# Patient Record
Sex: FEMALE | Race: WHITE | HISPANIC OR LATINO | Employment: UNEMPLOYED | URBAN - METROPOLITAN AREA
[De-identification: names, ages, dates, MRNs, and addresses within clinical notes are randomized per-mention and may not be internally consistent; named-entity substitution may affect disease eponyms.]

---

## 2017-01-09 DIAGNOSIS — G47.9 SLEEP DISTURBANCE: Primary | ICD-10-CM

## 2017-01-11 RX ORDER — ZOLPIDEM TARTRATE 5 MG/1
5 TABLET ORAL
Qty: 30 TABLET | Refills: 2
Start: 2017-01-11 | End: 2017-09-05

## 2017-01-16 DIAGNOSIS — G12.9 SPINAL MUSCLE ATROPHY (H): Primary | ICD-10-CM

## 2017-01-18 RX ORDER — ALPRAZOLAM 0.25 MG
0.25 TABLET ORAL 3 TIMES DAILY PRN
Qty: 60 TABLET | Refills: 2
Start: 2017-01-18 | End: 2017-07-07

## 2017-01-24 ENCOUNTER — MEDICAL CORRESPONDENCE (OUTPATIENT)
Dept: HEALTH INFORMATION MANAGEMENT | Facility: CLINIC | Age: 27
End: 2017-01-24

## 2017-02-03 DIAGNOSIS — G12.9 SPINAL MUSCLE ATROPHY (H): Primary | ICD-10-CM

## 2017-02-03 RX ORDER — DIAZEPAM 5 MG
5 TABLET ORAL EVERY 6 HOURS PRN
Qty: 30 TABLET | Refills: 2 | Status: SHIPPED | OUTPATIENT
Start: 2017-02-03 | End: 2017-08-08

## 2017-02-06 ENCOUNTER — TRANSFERRED RECORDS (OUTPATIENT)
Dept: HEALTH INFORMATION MANAGEMENT | Facility: CLINIC | Age: 27
End: 2017-02-06

## 2017-03-13 ENCOUNTER — MYC REFILL (OUTPATIENT)
Dept: INTERNAL MEDICINE | Facility: CLINIC | Age: 27
End: 2017-03-13

## 2017-03-13 DIAGNOSIS — J45.909 ASTHMA: Primary | ICD-10-CM

## 2017-03-13 DIAGNOSIS — J45.909 UNSPECIFIED ASTHMA(493.90): ICD-10-CM

## 2017-03-13 RX ORDER — ALBUTEROL SULFATE 0.83 MG/ML
1 SOLUTION RESPIRATORY (INHALATION) EVERY 6 HOURS PRN
Qty: 360 ML | Refills: 11 | Status: CANCELLED | OUTPATIENT
Start: 2017-03-13

## 2017-03-14 RX ORDER — ALBUTEROL SULFATE 0.83 MG/ML
1 SOLUTION RESPIRATORY (INHALATION) EVERY 6 HOURS PRN
Qty: 360 ML | Refills: 11 | Status: SHIPPED | OUTPATIENT
Start: 2017-03-14 | End: 2018-03-24

## 2017-03-14 NOTE — TELEPHONE ENCOUNTER
albuterol (2.5 MG/3ML) 0.083% nebulizer solution  Last Written Prescription Date:  9/22/15  Last Fill Quantity: 360ml,   # refills: 11  Last Office Visit with [facility], [facility] or [facility] prescribing provider:  11/14/16  Future Office visit:   none

## 2017-03-14 NOTE — TELEPHONE ENCOUNTER
Message from Linda:  Silvia Laws, RN Tue Mar 14, 2017 7:40 AM        ----- Message -----   From: Lanie Neil   Sent: 3/13/2017 10:29 PM   To: [facility] Nursing Staff-  Subject: Medication Renewal Request     Original authorizing provider: MD Lanie Contreras would like a refill of the following medications:  albuterol (2.5 MG/3ML) 0.083% nebulizer solution [Erica Mejia MD]    Preferred pharmacy: [facility], [place] - [address]    Comment:

## 2017-03-16 ENCOUNTER — TRANSFERRED RECORDS (OUTPATIENT)
Dept: HEALTH INFORMATION MANAGEMENT | Facility: CLINIC | Age: 27
End: 2017-03-16

## 2017-03-25 ENCOUNTER — MEDICAL CORRESPONDENCE (OUTPATIENT)
Dept: HEALTH INFORMATION MANAGEMENT | Facility: CLINIC | Age: 27
End: 2017-03-25

## 2017-04-06 ENCOUNTER — TRANSFERRED RECORDS (OUTPATIENT)
Dept: HEALTH INFORMATION MANAGEMENT | Facility: CLINIC | Age: 27
End: 2017-04-06

## 2017-04-24 ENCOUNTER — TRANSFERRED RECORDS (OUTPATIENT)
Dept: HEALTH INFORMATION MANAGEMENT | Facility: CLINIC | Age: 27
End: 2017-04-24

## 2017-04-25 ENCOUNTER — TRANSFERRED RECORDS (OUTPATIENT)
Dept: HEALTH INFORMATION MANAGEMENT | Facility: CLINIC | Age: 27
End: 2017-04-25

## 2017-05-08 ENCOUNTER — OFFICE VISIT (OUTPATIENT)
Dept: INTERNAL MEDICINE | Facility: CLINIC | Age: 27
End: 2017-05-08

## 2017-05-08 DIAGNOSIS — H61.22 IMPACTED CERUMEN OF LEFT EAR: ICD-10-CM

## 2017-05-08 DIAGNOSIS — L85.3 DRY SKIN: ICD-10-CM

## 2017-05-08 DIAGNOSIS — G12.9 SPINAL MUSCLE ATROPHY (H): Primary | ICD-10-CM

## 2017-05-08 DIAGNOSIS — L70.0 ACNE VULGARIS: ICD-10-CM

## 2017-05-08 DIAGNOSIS — G89.4 CHRONIC PAIN SYNDROME: ICD-10-CM

## 2017-05-08 RX ORDER — NEOMYCIN SULFATE, POLYMYXIN B SULFATE, AND DEXAMETHASONE 3.5; 10000; 1 MG/G; [USP'U]/G; MG/G
1 OINTMENT OPHTHALMIC 3 TIMES DAILY
COMMUNITY
End: 2018-05-15

## 2017-05-08 RX ORDER — GUAIFENESIN 200 MG/1
200 TABLET ORAL EVERY 4 HOURS PRN
COMMUNITY

## 2017-05-08 NOTE — PROGRESS NOTES
PRIMARY CARE CENTER       SUBJECTIVE:  Lanie Neil is a 26 year old female with pmh of Spinal muscular atrophy, vent dependent, chronic pain who comes in for follow up.     Going to [facility] and don't want to adjust medications due to her very high doses of pain medicaitons at this time.  Pt reports that seh is still In a lot of pain.  Worse anytime she tries to get up in a chair.  Has tried all kinds of muscle relaxants and alternative means of controlling pain,  On high dose gabapentin as well.   and her mother are very frustrated.  They both understand the issue with tolerance to narcotic medications over time.  Using valium for muscle spasms as well without relief.  They are inquiring about possible medical marijuana with goal to try and get down on the dose of narcotics.  They wanted my opinion on this today.     She has been doing well otherwise.  Struggling with some acne on her face and requesting topical treatment for this.     No acte illness this winter.  No uri sxs.  No cough.  No output from trach. No abd pain.  Moving bowels regularly with bowel regimen despite narcs.  No urinary sxs.  No areas of skin breakdown.         Medications and allergies reviewed by me today.       Review Of Systems    10 point ROS of systems including Constitutional, Eyes, Respiratory, Cardiovascular, Pulmonary, Gastroenterology, Genitourinary, Integumentary, Musculoskeletal, Psychiatric were all negative except for pertinent positives noted in my HPI and   notes decreased hearing in R ear.  Feels plugged.             OBJECTIVE:    There were no vitals taken for this visit.   Wt Readings from Last 1 Encounters:   09/26/16 58.5 kg (129 lb)       Constitutional: no distress, comfortable, pleasant   HEENT anicteric  PERRL  TM on R obscured.  L is normal in appearance.   OP clear   Neck: Trach clean and dry.  Vent on.  supple  no thyromegaly.   Nodes: no cervical, supraclavicular or  axillary lad  Cardiovascular: regular rate and rhythm, normal S1 and S2, no murmurs, rubs or gallops,   Respiratory: clear to auscultation, no wheezes or crackles, normal breath sounds   Gastrointestinal: soft.  NT/ND  positive bowel sounds all four quadrants no hepatosplenomegaly, no masses   Ext:   no edema     Skin: severe dry skin of arms and legs.  Scattered inflammatory papules noted on face.       ASSESSMENT/PLAN:  Pt is a 26 year old female here for follow up.    Lanie was seen today for medication request.    Diagnoses and all orders for this visit:    Impacted cerumen of left ear- R ear.  Ear wash was done today.    -     REMOVE IMPACTED CERUMEN      Chronic pain:   - certified patient for MM program in order to try and taper down on narcotics.  Pt has tried mult multidisciplinary pain programs with out success.   Pt qualifies based on chronic pain that is intractable and diffuse muscle spasms from SMA.  - idealy would like to get off narcotics.   - They are currently following with [facility].  Pt on very high doses of narcotics and requiring escalating doses for pain control.  Discussed risk of overdose with patient.      Severe dry skin recommended vanicream or eucerin bid to areas.     Inflammatory acne.  Topical clindamycin bid until resolved.     I did notify the patient that I will be departing from the [facility] at the end of May.  I have recommended that they consider establishing care with Dr. Aniyah Alarcon or Dr. Corona.  They will set up appointment for meet and greet with physician soon.             Erica Mejia MD

## 2017-05-08 NOTE — PATIENT INSTRUCTIONS
Provider notes    It was a pleasure caring for you today at the [facility].           Erica Mejia MD

## 2017-05-08 NOTE — MR AVS SNAPSHOT
After Visit Summary   5/8/2017    Lanie Neil    MRN: [id number]           Patient Information     Date Of Birth          1990        Visit Information        Provider Department      5/8/2017 12:00 PM Erica Mejia MD [facility]        Today's Diagnoses     Impacted cerumen of left ear    -  1      Care Instructions    Provider notes    It was a pleasure caring for you today at the [facility].           Erica Mejia MD          Follow-ups after your visit        Your next 10 appointments already scheduled     Jul 06, 2017  1:40 PM CDT   (Arrive by 1:25 PM)   Return Visit with Anne Soni MD   [facility] ([facility])    [address]  [address] [place] [address] [phone]              Who to contact     Please call your clinic at [phone] to:    Ask questions about your health    Make or cancel appointments    Discuss your medicines    Learn about your test results    Speak to your doctor   If you have compliments or concerns about an experience at your clinic, or if you wish to file a complaint, please contact [facility] Patient Relations at [phone] or email us at [email or web address]         Additional Information About Your Visit        MyChart Information     [employer] gives you secure access to your electronic health record. If you see a primary care provider, you can also send messages to your care team and make appointments. If you have questions, please call your primary care clinic.  If you do not have a primary care provider, please call [phone] and they will assist you.      [employer] is an electronic gateway that provides easy, online access to your medical records. With [employer], you can request a clinic appointment, read your test results, renew a prescription or communicate with your care team.     To access your  existing account, please contact your [facility] or call [phone] for assistance.        Care EveryWhere ID     This is your Care EveryWhere ID. This could be used by other organizations to access your [facility] medical records  [id number]         Blood Pressure from Last 3 Encounters:   11/14/16 118/74   09/26/16 127/85   03/07/16 (!) 83/54    Weight from Last 3 Encounters:   09/26/16 58.5 kg (129 lb)   01/22/15 68 kg (150 lb)   08/20/14 68 kg (150 lb)              We Performed the Following     REMOVE IMPACTED CERUMEN          Today's Medication Changes          These changes are accurate as of: 5/8/17 12:53 PM.  If you have any questions, ask your nurse or doctor.               These medicines have changed or have updated prescriptions.        Dose/Directions    omeprazole 40 MG capsule   Commonly known as:  priLOSEC   This may have changed:  Another medication with the same name was removed. Continue taking this medication, and follow the directions you see here.   Used for:  Gastroesophageal reflux disease without esophagitis        Dose:  40 mg   Take 1 capsule (40 mg) by mouth daily   Quantity:  30 capsule   Refills:  11         Stop taking these medicines if you haven't already. Please contact your care team if you have questions.     terbinafine 1 % cream   Commonly known as:  lamISIL AT                    Primary Care Provider Office Phone # Fax #    Erica Mejia [phone]       [address] [address]        Thank you!     Thank you for choosing [facility]  for your care. Our goal is always to provide you with excellent care. Hearing back from our patients is one way we can continue to improve our services. Please take a few minutes to complete the written survey that you may receive in the mail after your visit with us. Thank you!             Your Updated Medication List - Protect others  around you: Learn how to safely use, store and throw away your medicines at www.disposemymeds.org.          This list is accurate as of: 5/8/17 12:53 PM.  Always use your most recent med list.                   Brand Name Dispense Instructions for use    ACETAMINOPHEN PO      Take 650 mg by mouth every 4 hours as needed for pain       * albuterol 108 (90 BASE) MCG/ACT Inhaler    albuterol    1 Inhaler    Inhale 2 puffs into the lungs every 4 hours as needed for shortness of breath / dyspnea or wheezing       * albuterol (2.5 MG/3ML) 0.083% neb solution     360 mL    Take 1 vial (2.5 mg) by nebulization every 6 hours as needed for shortness of breath / dyspnea or wheezing       ALPRAZolam 0.25 MG tablet    XANAX    60 tablet    Take 1 tablet (0.25 mg) by mouth 3 times daily as needed for anxiety       celecoxib 200 MG capsule    celeBREX    30 capsule    Take 1 capsule (200 mg) by mouth daily       diazepam 5 MG tablet    VALIUM    30 tablet    Take 1 tablet (5 mg) by mouth every 6 hours as needed for muscle spasms       Flunisolide HFA 80 MCG/ACT Aers     1 Inhaler    Inhale 2 puffs into the lungs 2 times daily       fluticasone 50 MCG/ACT spray    FLONASE    16 g    Spray 1 spray into both nostrils daily       gabapentin 250 MG/5ML solution    NEURONTIN    1350 mL    USE/TAKE 15 ML IN  FEEDING TUBE 3 TIMES DAILY       glycerin-hypromellose- 0.2-0.2-1 % Soln ophthalmic solution    ARTIFICIAL TEARS    1 Bottle    Place 1 drop into both eyes 2 times daily as needed for dry eyes       guaiFENesin 200 MG Tabs tablet    ORGANIDIN     Take 200 mg by mouth every 4 hours as needed for cough       HYDROmorphone 4 MG tablet    DILAUDID    300 tablet    Take 1-2 tablets (4-8 mg) by mouth every 4 hours as needed for moderate to severe pain       SABINE 30 MG 24 hr capsule   Generic drug:  morphine     60 capsule    Take 1 capsule (30 mg) by mouth 2 times daily       LANsoprazole 30 MG CR capsule    PREVACID    30 capsule     Take 1 capsule (30 mg) by mouth daily       MILK OF MAGNESIA PO      Take 30 mLs by mouth as needed       neomycin-polymyxin-dexamethasone 3.5-41877-0.1 Oint ophthalmic ointment    MAXITROL     1 Application 3 times daily       omeprazole 40 MG capsule    priLOSEC    30 capsule    Take 1 capsule (40 mg) by mouth daily       ondansetron 4 MG ODT tab    ZOFRAN-ODT    60 tablet    Take 1 tablet (4 mg) by mouth every 8 hours as needed for nausea       OXY BALANCE FACIAL CLEAN WASH EX      Externally apply 1 pad topically daily       polyethylene glycol powder    MIRALAX    510 g    Take 17 g (1 capful) by mouth daily       Senna 8.8 MG/5ML Syrp     600 mL    Take 10 mLs (17.6 mg) by mouth 2 times daily       silver sulfADIAZINE 1 % cream    SILVADENE    400 g    Apply topically daily       XARELTO 20 MG Tabs tablet   Generic drug:  rivaroxaban ANTICOAGULANT     30 tablet    TAKE 1 TABLET(20 MG) BY MOUTH DAILY WITH DINNER       zolpidem 5 MG tablet    AMBIEN    30 tablet    Take 1 tablet (5 mg) by mouth nightly as needed for sleep       * Notice:  This list has 2 medication(s) that are the same as other medications prescribed for you. Read the directions carefully, and ask your doctor or other care provider to review them with you.

## 2017-05-10 ENCOUNTER — MYC MEDICAL ADVICE (OUTPATIENT)
Dept: INTERNAL MEDICINE | Facility: CLINIC | Age: 27
End: 2017-05-10

## 2017-05-10 DIAGNOSIS — L70.0 ACNE VULGARIS: Primary | ICD-10-CM

## 2017-05-10 PROBLEM — G89.4 CHRONIC PAIN SYNDROME: Status: ACTIVE | Noted: 2017-05-10

## 2017-05-10 RX ORDER — CLINDAMYCIN PHOSPHATE 10 UG/ML
LOTION TOPICAL 2 TIMES DAILY
Qty: 60 ML | Refills: 3 | Status: SHIPPED | OUTPATIENT
Start: 2017-05-10

## 2017-05-21 DIAGNOSIS — K21.9 GASTROESOPHAGEAL REFLUX DISEASE WITHOUT ESOPHAGITIS: Primary | ICD-10-CM

## 2017-05-23 NOTE — TELEPHONE ENCOUNTER
omeprazole (PRILOSEC) 40 MG      Last Written Prescription Date:  5/9/16  Last Fill Quantity: 30,   # refills: 11  Last Office Visit : 5/8/17  Future Office visit:   NONE

## 2017-05-24 ENCOUNTER — MEDICAL CORRESPONDENCE (OUTPATIENT)
Dept: HEALTH INFORMATION MANAGEMENT | Facility: CLINIC | Age: 27
End: 2017-05-24

## 2017-05-27 DIAGNOSIS — J32.9 SINUSITIS, CHRONIC: ICD-10-CM

## 2017-05-27 RX ORDER — FLUTICASONE PROPIONATE 50 MCG
1 SPRAY, SUSPENSION (ML) NASAL DAILY
Qty: 16 G | Refills: 11 | Status: SHIPPED | OUTPATIENT
Start: 2017-05-27 | End: 2017-12-12

## 2017-05-27 NOTE — TELEPHONE ENCOUNTER
fluticasone (FLONASE) 50 MCG/ACT nasal spray   Last Written Prescription Date:  6/21/16  Last Fill Quantity: 16g,   # refills: 11  Last Office Visit with [facility], [facility] or [facility] prescribing provider: 5/8/17  Future Office visit:   none

## 2017-06-05 ENCOUNTER — TRANSFERRED RECORDS (OUTPATIENT)
Dept: HEALTH INFORMATION MANAGEMENT | Facility: CLINIC | Age: 27
End: 2017-06-05

## 2017-06-14 ENCOUNTER — TRANSFERRED RECORDS (OUTPATIENT)
Dept: HEALTH INFORMATION MANAGEMENT | Facility: CLINIC | Age: 27
End: 2017-06-14

## 2017-06-19 ENCOUNTER — OFFICE VISIT (OUTPATIENT)
Dept: INTERNAL MEDICINE | Facility: CLINIC | Age: 27
End: 2017-06-19

## 2017-06-19 VITALS — HEART RATE: 103 BPM | SYSTOLIC BLOOD PRESSURE: 119 MMHG | OXYGEN SATURATION: 97 % | DIASTOLIC BLOOD PRESSURE: 78 MMHG

## 2017-06-19 DIAGNOSIS — G12.9 SPINAL MUSCULAR ATROPHY (H): Primary | ICD-10-CM

## 2017-06-19 ASSESSMENT — PAIN SCALES - GENERAL: PAINLEVEL: SEVERE PAIN (7)

## 2017-06-19 NOTE — NURSING NOTE
Chief Complaint   Patient presents with     Establish Care     pt is here to establish care with new PCP. Prior Dr Mejia patient       Treasure Ernandez CMA at 10:53 AM on 6/19/2017

## 2017-06-19 NOTE — PROGRESS NOTES
Cc:  Chronic pain  History of present illness:  Lanie  Is a 26-year-old woman with a history of spinal muscular atrophy, on chronic home ventilation through tracheostomy since 2004, who takes her medications via gas, but is able to eat and swallow some food for pleasure, and ltimately complicated by a chronic pain syndrome due to a bilateral dislocation of as a result of her profound muscle atrophy. She presents along with her mother, Aster, who cares for her 24/7 at home to establish care.    She saw her ventilator MD last week and she is in good shape.    Pt was certified for the medical marijuana by Dr. Mejia, in an attempt to taper opiates. [facility] she ws stable.   However, she states that they will not allow use of Medical Marijuana, so they are going to try [facility].  She has seen numerous orthopedists and surgery is not recommended because of her severe muscle atrophy.  She is working with [facility] to secure a chair that pads her hips and allows her to sit comfortably.    She had a tracheostomy placed in 2004 after multiple episodes of pneumonia, but has not had pneumonia since then.    [facility]--[facility] 6[facility]5/17  She had a full PE per Dr. Mejia 5/8/17.  She has no new concerns or complaints today.    Past Medical History:   Diagnosis Date     Anxiety      Snoring      Past Surgical History:   Procedure Laterality Date     ENT SURGERY      tubes     GASTROSTOMY TUBE       KIDNEY SURGERY       SPINE SURGERY       TRACHEOSTOMY       ROS: 10 point ROS neg other than the symptoms noted above in the HPI.    /78  Pulse 103  SpO2 97%  Breastfeeding? No  Gen:  Pleasant,conversant quadrant paretic woman with a trach who speaks very articulately.  Lying in a semi-supine wheelchair.      ASSESSMENT  This is a remarkable 26-year-old woman with a diagnosis of hereditary spinal muscular atrophy who is living her life to the maximum.    1.  Chronic pain  syndrome.  Due to bilateral hip dislocation.  Plan is to continue medical marijuana in an attempt to reduce opiate dependence. Currently  Opiates are managed and prescribed by [facility], but they're switching to [facility] , a pain clinic where use of medical marijuana is allowed. They will let me know about this.    2.  Nutrition received mostly through G-tube. We'll continue.    3.  Tracheostomy care is excellent. She has periodic ventilator management as well.  No evidence of recurrent aspiration pneumonia since 2004.    Plan: RTC and p.r.n.    I spent a total of 25 minutes face-to-face with Lanie Neil during today's office visit.  Over 50% of this time was spent counseling the patient and/or coordinating care regarding review of medical history, chronic pain, ventilation.  See note for details.

## 2017-06-19 NOTE — MR AVS SNAPSHOT
After Visit Summary   6/19/2017    Lanie Neil    MRN: [id number]           Patient Information     Date Of Birth          1990        Visit Information        Provider Department      6/19/2017 11:10 AM Doris Pat MD [facility]        Today's Diagnoses     Spinal muscular atrophy (H)    -  1      Care Instructions    Primary Care Center Phone Number [phone]  Primary Care Center Medication Refill Request Information:  * Please contact your pharmacy regarding ANY request for medication refills.  ** [facility] Prescription Fax = [phone]  * Please allow 3 business days for routine medication refills.  * Please allow 5 business days for controlled substance medication refills.     Primary Care Center Test Result notification information:  *You will be notified with in 7-10 days of your appointment day regarding the results of your test.  If you are on MyChart you will be notified as soon as the provider has reviewed the results and signed off on them.                  Follow-ups after your visit        Your next 10 appointments already scheduled     Jul 06, 2017  1:40 PM CDT   (Arrive by 1:25 PM)   Return Visit with Anne Soni MD   [facility] ([facility])    [address] [place] [address] [phone]              Who to contact     Please call your clinic at [phone] to:    Ask questions about your health    Make or cancel appointments    Discuss your medicines    Learn about your test results    Speak to your doctor   If you have compliments or concerns about an experience at your clinic, or if you wish to file a complaint, please contact [facility] Patient Relations at [phone] or email us at [email or web address]         Additional Information About Your Visit        MyChart Information     [employer] gives you secure access to  your electronic health record. If you see a primary care provider, you can also send messages to your care team and make appointments. If you have questions, please call your primary care clinic.  If you do not have a primary care provider, please call [phone] and they will assist you.      [employer] is an electronic gateway that provides easy, online access to your medical records. With [employer], you can request a clinic appointment, read your test results, renew a prescription or communicate with your care team.     To access your existing account, please contact your [facility] or call [phone] for assistance.        Care EveryWhere ID     This is your Care EveryWhere ID. This could be used by other organizations to access your [place] medical records  [id number]        Your Vitals Were     Pulse Pulse Oximetry Breastfeeding?             103 97% No          Blood Pressure from Last 3 Encounters:   06/19/17 119/78   11/14/16 118/74   09/26/16 127/85    Weight from Last 3 Encounters:   09/26/16 58.5 kg (129 lb)   01/22/15 68 kg (150 lb)   08/20/14 68 kg (150 lb)              Today, you had the following     No orders found for display       Primary Care Provider Office Phone # Fax #    Erica Meija [phone]       [address] [address]        Thank you!     Thank you for choosing [facility]  for your care. Our goal is always to provide you with excellent care. Hearing back from our patients is one way we can continue to improve our services. Please take a few minutes to complete the written survey that you may receive in the mail after your visit with us. Thank you!             Your Updated Medication List - Protect others around you: Learn how to safely use, store and throw away your medicines at www.disposemymeds.org.          This list is accurate as of: 6/19/17  4:41 PM.  Always use your  most recent med list.                   Brand Name Dispense Instructions for use    ACETAMINOPHEN PO      Take 650 mg by mouth every 4 hours as needed for pain       * albuterol 108 (90 BASE) MCG/ACT Inhaler    albuterol    1 Inhaler    Inhale 2 puffs into the lungs every 4 hours as needed for shortness of breath / dyspnea or wheezing       * albuterol (2.5 MG/3ML) 0.083% neb solution     360 mL    Take 1 vial (2.5 mg) by nebulization every 6 hours as needed for shortness of breath / dyspnea or wheezing       ALPRAZolam 0.25 MG tablet    XANAX    60 tablet    Take 1 tablet (0.25 mg) by mouth 3 times daily as needed for anxiety       celecoxib 200 MG capsule    celeBREX    30 capsule    Take 1 capsule (200 mg) by mouth daily       clindamycin 1 % lotion    CLEOCIN T    60 mL    Apply topically 2 times daily To face as needed       diazepam 5 MG tablet    VALIUM    30 tablet    Take 1 tablet (5 mg) by mouth every 6 hours as needed for muscle spasms       flunisolide HFA 80 MCG/ACT Aers oral inhaler    AEROSPAN    1 Inhaler    Inhale 2 puffs into the lungs 2 times daily       fluticasone 50 MCG/ACT spray    FLONASE    16 g    Spray 1 spray into both nostrils daily       gabapentin 250 MG/5ML solution    NEURONTIN    1350 mL    USE/TAKE 15 ML IN  FEEDING TUBE 3 TIMES DAILY       glycerin-hypromellose- 0.2-0.2-1 % Soln ophthalmic solution    ARTIFICIAL TEARS    1 Bottle    Place 1 drop into both eyes 2 times daily as needed for dry eyes       guaiFENesin 200 MG Tabs tablet    ORGANIDIN     Take 200 mg by mouth every 4 hours as needed for cough       HYDROmorphone 4 MG tablet    DILAUDID    300 tablet    Take 1-2 tablets (4-8 mg) by mouth every 4 hours as needed for moderate to severe pain       SABINE 30 MG 24 hr capsule   Generic drug:  morphine     60 capsule    Take 1 capsule (30 mg) by mouth 2 times daily       LANsoprazole 30 MG CR capsule    PREVACID    30 capsule    Take 1 capsule (30 mg) by mouth daily     "   MILK OF MAGNESIA PO      Take 30 mLs by mouth as needed       neomycin-polymyxin-dexamethasone 3.5-88362-0.1 Oint ophthalmic ointment    MAXITROL     1 Application 3 times daily       * omeprazole 40 MG capsule    priLOSEC    30 capsule    Take 1 capsule (40 mg) by mouth daily       * omeprazole 20 MG CR capsule    priLOSEC    90 capsule    TAKE 20 ML(40MG) VIA \"G-TUBE\" DAILY       ondansetron 4 MG ODT tab    ZOFRAN-ODT    60 tablet    Take 1 tablet (4 mg) by mouth every 8 hours as needed for nausea       OXY BALANCE FACIAL CLEAN WASH EX      Externally apply 1 pad topically daily       polyethylene glycol powder    MIRALAX    510 g    Take 17 g (1 capful) by mouth daily       Senna 8.8 MG/5ML Syrp     600 mL    Take 10 mLs (17.6 mg) by mouth 2 times daily       silver sulfADIAZINE 1 % cream    SILVADENE    400 g    Apply topically daily       XARELTO 20 MG Tabs tablet   Generic drug:  rivaroxaban ANTICOAGULANT     30 tablet    TAKE 1 TABLET(20 MG) BY MOUTH DAILY WITH DINNER       zolpidem 5 MG tablet    AMBIEN    30 tablet    Take 1 tablet (5 mg) by mouth nightly as needed for sleep       * Notice:  This list has 4 medication(s) that are the same as other medications prescribed for you. Read the directions carefully, and ask your doctor or other care provider to review them with you.      "

## 2017-06-19 NOTE — PATIENT INSTRUCTIONS
Primary Care Center Phone Number [phone]  Primary Care Center Medication Refill Request Information:  * Please contact your pharmacy regarding ANY request for medication refills.  ** [facility] Prescription Fax = [phone]  * Please allow 3 business days for routine medication refills.  * Please allow 5 business days for controlled substance medication refills.     Primary Care Center Test Result notification information:  *You will be notified with in 7-10 days of your appointment day regarding the results of your test.  If you are on MyChart you will be notified as soon as the provider has reviewed the results and signed off on them.

## 2017-07-02 DIAGNOSIS — G12.9 SPINAL MUSCLE ATROPHY (H): ICD-10-CM

## 2017-07-03 ENCOUNTER — MYC MEDICAL ADVICE (OUTPATIENT)
Dept: INTERNAL MEDICINE | Facility: CLINIC | Age: 27
End: 2017-07-03

## 2017-07-05 RX ORDER — GABAPENTIN 250 MG/5ML
SOLUTION ORAL
Qty: 1350 ML | Refills: 11 | Status: SHIPPED | OUTPATIENT
Start: 2017-07-05 | End: 2018-01-19

## 2017-07-05 NOTE — TELEPHONE ENCOUNTER
gabapentin (NEURONTIN)       Last Written Prescription Date:  7/14/16  Last Fill Quantity: 1350ML,   # refills: 11  Last Office Visit : 6/19/17  Future Office visit:  NONE

## 2017-07-07 DIAGNOSIS — G12.9 SPINAL MUSCLE ATROPHY (H): ICD-10-CM

## 2017-07-07 RX ORDER — ALPRAZOLAM 0.25 MG
0.25 TABLET ORAL 3 TIMES DAILY PRN
Qty: 60 TABLET | Refills: 2
Start: 2017-07-07 | End: 2018-01-30

## 2017-07-13 ENCOUNTER — TELEPHONE (OUTPATIENT)
Dept: INTERNAL MEDICINE | Facility: CLINIC | Age: 27
End: 2017-07-13

## 2017-07-23 ENCOUNTER — MEDICAL CORRESPONDENCE (OUTPATIENT)
Dept: HEALTH INFORMATION MANAGEMENT | Facility: CLINIC | Age: 27
End: 2017-07-23

## 2017-07-24 ENCOUNTER — MEDICAL CORRESPONDENCE (OUTPATIENT)
Dept: HEALTH INFORMATION MANAGEMENT | Facility: CLINIC | Age: 27
End: 2017-07-24

## 2017-08-01 ENCOUNTER — TELEPHONE (OUTPATIENT)
Dept: INTERNAL MEDICINE | Facility: CLINIC | Age: 27
End: 2017-08-01

## 2017-08-01 NOTE — TELEPHONE ENCOUNTER
----- Message from Priya Mendoza sent at 7/31/2017  9:33 AM CDT -----  Regarding: Dr. Pat, Request call to discuss concerns with pt. neck pain  Hi Gabriela,     Hope you're well!    Aster, patient mom called in and scheduled an appointment with Dr. Pat for daughters neck pain, but would also like an outreach to discuss the pain and the concerns she has for her daughter. Please follow-up.     Left message for mother to call back.  Gabriela Hylton RN 9:10 AM on 8/1/2017.

## 2017-08-07 ENCOUNTER — MEDICAL CORRESPONDENCE (OUTPATIENT)
Dept: HEALTH INFORMATION MANAGEMENT | Facility: CLINIC | Age: 27
End: 2017-08-07

## 2017-08-08 ENCOUNTER — TRANSFERRED RECORDS (OUTPATIENT)
Dept: HEALTH INFORMATION MANAGEMENT | Facility: CLINIC | Age: 27
End: 2017-08-08

## 2017-08-08 DIAGNOSIS — G12.9 SPINAL MUSCLE ATROPHY (H): ICD-10-CM

## 2017-08-08 NOTE — TELEPHONE ENCOUNTER
diazepam       Last Written Prescription Date:  2/3/17  Last Fill Quantity: 30,   # refills: 2  Last Office Visit : 6/19/17  Future Office visit:  0  Routing refill request to provider for review/approval because:  Drug not on refill protocol or controlled substance    Requesting refill for Valium.  Gabriela Hylton RN 8:25 AM on 8/9/2017.

## 2017-08-09 ENCOUNTER — TELEPHONE (OUTPATIENT)
Dept: INTERNAL MEDICINE | Facility: CLINIC | Age: 27
End: 2017-08-09

## 2017-08-09 RX ORDER — DIAZEPAM 5 MG
5 TABLET ORAL EVERY 6 HOURS PRN
Qty: 30 TABLET | Refills: 2 | Status: SHIPPED | OUTPATIENT
Start: 2017-08-09 | End: 2018-01-30

## 2017-08-09 NOTE — TELEPHONE ENCOUNTER
----- Message from Priya Mendoza sent at 7/31/2017  9:33 AM CDT -----  Regarding: Dr. Pat, Request call to discuss concerns with pt. neck pain  Hi Gabriela,     Hope you're well!    Aster, patient mom called in and scheduled an appointment with Dr. Pat for daughters neck pain, but would also like an outreach to discuss the pain and the concerns she has for her daughter. Please follow-up.     Appts made and then canceled by mother.  Gabriela Hylton RN 11:08 AM on 8/9/2017.

## 2017-08-09 NOTE — TELEPHONE ENCOUNTER
----- Message from Priya Mendoza sent at 7/31/2017  9:33 AM CDT -----  Regarding: Dr. Pat, Request call to discuss concerns with pt. neck pain  Hi Gabriela,     Hope you're well!    Aster, patient mom called in and scheduled an appointment with Dr. Pat for daughters neck pain, but would also like an outreach to discuss the pain and the concerns she has for her daughter. Please follow-up.     Left message for pt to call back.  Gabriela Hylton RN 12:18 PM on 8/9/2017.  RX for Valium called to [facility].  Gabriela Hylton RN 3:59 PM on 8/9/2017.

## 2017-08-31 ENCOUNTER — MYC REFILL (OUTPATIENT)
Dept: INTERNAL MEDICINE | Facility: CLINIC | Age: 27
End: 2017-08-31

## 2017-08-31 DIAGNOSIS — G12.9 SPINAL MUSCLE ATROPHY (H): ICD-10-CM

## 2017-09-05 ENCOUNTER — TELEPHONE (OUTPATIENT)
Dept: HEMATOLOGY | Facility: CLINIC | Age: 27
End: 2017-09-05

## 2017-09-05 DIAGNOSIS — G47.9 SLEEP DISTURBANCE: ICD-10-CM

## 2017-09-05 RX ORDER — ZOLPIDEM TARTRATE 5 MG/1
5 TABLET ORAL
Qty: 30 TABLET | Refills: 2
Start: 2017-09-05 | End: 2018-03-11

## 2017-09-05 RX ORDER — ONDANSETRON 4 MG/1
4 TABLET, ORALLY DISINTEGRATING ORAL EVERY 8 HOURS PRN
Qty: 60 TABLET | Refills: 5 | Status: SHIPPED | OUTPATIENT
Start: 2017-09-05 | End: 2020-01-21

## 2017-09-05 NOTE — TELEPHONE ENCOUNTER
ondansetron (ZOFRAN-ODT) 4 MG ODT tab     Last Written Prescription Date:  3/18/16  Last Fill Quantity: 60,   # refills: 5  Last Office Visit with [facility], [facility] or [facility] prescribing provider: 6/19/17  Future Office visit:  none

## 2017-09-05 NOTE — TELEPHONE ENCOUNTER
"    [facility]  [address] [place] [address], [facility] [address], [address], [place] [address]  Phone: [phone], Fax: [phone].    Telephone Note    Patient: Lanie Neil  MRN: [id number]  : 1990  Date: 2017  Time: 11:30 am    Lanie's mother called and reports that she has noticed that Lanie's upper extremities color changes to a dark purple when her mother tries to turn Lanie on her sides. She also concerns about Lanie's facial color changes to tomato red in the past few days. Otherwise, she reports that Lanie seems to be doing okay without pain or difficulty breathing. She apparently talked to her neurologist and was suggested to call our center as it might be symptoms of possible \"blood clot\". Lanie is currently on full therapeutic anticoagulation therapy with Xarelto at 20 mg PO Qday. Hence it is unlikely that she has a recurrent thrombotic event.     I have asked Lanie's mother to call her primary care physician's clinic to report this. I also asked Lanie's mother that if she is so concern about Lanie's symptoms, I would suggest that she brings Lanie to the emergency department for evaluation.       Jt Mir PA-C, MPAS  Physician Assistant  [facility].               "

## 2017-09-06 DIAGNOSIS — G12.9 SPINAL MUSCLE ATROPHY (H): ICD-10-CM

## 2017-09-06 RX ORDER — SILVER SULFADIAZINE 10 MG/G
CREAM TOPICAL DAILY
Qty: 400 G | Refills: 1 | Status: SHIPPED | OUTPATIENT
Start: 2017-09-06 | End: 2020-01-21

## 2017-09-06 NOTE — TELEPHONE ENCOUNTER
silver sulfADIAZINE (SILVADENE) 1 % cream      Last Written Prescription Date:  6/22/2016  Last Fill Quantity: 400 g,   # refills: 1  Last Office Visit : 6/19/2017  Future Office visit:  9/7/2017    Routing refill request to provider for review/approval because:  Drug not on the [facility] refill protocol.  Pended as last filled.

## 2017-09-07 ENCOUNTER — OFFICE VISIT (OUTPATIENT)
Dept: INTERNAL MEDICINE | Facility: CLINIC | Age: 27
End: 2017-09-07

## 2017-09-07 VITALS — DIASTOLIC BLOOD PRESSURE: 76 MMHG | OXYGEN SATURATION: 100 % | HEART RATE: 82 BPM | SYSTOLIC BLOOD PRESSURE: 112 MMHG

## 2017-09-07 DIAGNOSIS — R11.0 NAUSEA: ICD-10-CM

## 2017-09-07 DIAGNOSIS — R23.0 CYANOSIS: Primary | ICD-10-CM

## 2017-09-07 ASSESSMENT — PAIN SCALES - GENERAL: PAINLEVEL: EXTREME PAIN (8)

## 2017-09-07 NOTE — PATIENT INSTRUCTIONS
[facility] Medication Refill Request Information:  * Please contact your pharmacy regarding ANY request for medication refills.  ** [facility] Prescription Fax = [phone]  * Please allow 3 business days for routine medication refills.  * Please allow 5 business days for controlled substance medication refills.     [facility] Test Result notification information:  *You will be notified with in 7-10 days of your appointment day regarding the results of your test.  If you are on MyChart you will be notified as soon as the provider has reviewed the results and signed off on them.    [facility] [phone]

## 2017-09-07 NOTE — MR AVS SNAPSHOT
After Visit Summary   9/7/2017    Lanie Neil    MRN: [id number]           Patient Information     Date Of Birth          1990        Visit Information        Provider Department      9/7/2017 2:25 PM Santiago Lopez MD [facility]        Today's Diagnoses     Cyanosis    -  1    Nausea          Care Instructions    Primary Care Center Medication Refill Request Information:  * Please contact your pharmacy regarding ANY request for medication refills.  ** [facility] Prescription Fax = [phone]  * Please allow 3 business days for routine medication refills.  * Please allow 5 business days for controlled substance medication refills.     Primary Care Center Test Result notification information:  *You will be notified with in 7-10 days of your appointment day regarding the results of your test.  If you are on MyChart you will be notified as soon as the provider has reviewed the results and signed off on them.    [facility] [phone]             Follow-ups after your visit        Your next 10 appointments already scheduled     Nov 08, 2017  8:15 AM CST   (Arrive by 8:00 AM)   New Patient Visit with Rosa Hernandez MD   [facility] ([facility])    [address] [place] [id number] [phone]              Who to contact     Please call your clinic at [phone] to:    Ask questions about your health    Make or cancel appointments    Discuss your medicines    Learn about your test results    Speak to your doctor   If you have compliments or concerns about an experience at your clinic, or if you wish to file a complaint, please contact [facility] Patient Relations at [phone] or email us at [email or web address]         Additional Information About Your Visit        MyChart Information     [employer] gives you secure access to your electronic health record.  If you see a primary care provider, you can also send messages to your care team and make appointments. If you have questions, please call your primary care clinic.  If you do not have a primary care provider, please call [phone] and they will assist you.      [employer] is an electronic gateway that provides easy, online access to your medical records. With [employer], you can request a clinic appointment, read your test results, renew a prescription or communicate with your care team.     To access your existing account, please contact your [facility] or call [phone] for assistance.        Care EveryWhere ID     This is your Care EveryWhere ID. This could be used by other organizations to access your [place] medical records  [id number]        Your Vitals Were     Pulse Pulse Oximetry Breastfeeding?             82 100% No          Blood Pressure from Last 3 Encounters:   09/07/17 112/76   06/19/17 119/78   11/14/16 118/74    Weight from Last 3 Encounters:   09/26/16 58.5 kg (129 lb)   01/22/15 68 kg (150 lb)   08/20/14 68 kg (150 lb)               Primary Care Provider Office Phone # Fax #    Doris KAYLYNN Pat [phone]       [address] [address]        Equal Access to Services     GLEN ART : Hadii aad ku hadasho Soomaali, waaxda luqadaha, qaybta kaalmada adeegyada, waxay idiin haykaitlinn priyanka gautam. So [place] [phone].    ATENCIÓN: Si habla español, tiene a warren disposición servicios gratuitos de asistencia lingüística. Llame al [phone].    We comply with applicable federal civil rights laws and [place] laws. We do not discriminate on the basis of race, color, national origin, age, disability, sex, sexual orientation, or gender identity.            Thank you!     Thank you for choosing [facility]  for your care. Our goal is always to provide you with excellent care. Hearing back from our patients  is one way we can continue to improve our services. Please take a few minutes to complete the written survey that you may receive in the mail after your visit with us. Thank you!             Your Updated Medication List - Protect others around you: Learn how to safely use, store and throw away your medicines at www.disposemymeds.org.          This list is accurate as of: 9/7/17 11:59 PM.  Always use your most recent med list.                   Brand Name Dispense Instructions for use Diagnosis    ACETAMINOPHEN PO      Take 650 mg by mouth every 4 hours as needed for pain        * albuterol 108 (90 BASE) MCG/ACT Inhaler    PROAIR HFA    1 Inhaler    Inhale 2 puffs into the lungs every 4 hours as needed for shortness of breath / dyspnea or wheezing    Unspecified asthma(493.90)       * albuterol (2.5 MG/3ML) 0.083% neb solution     360 mL    Take 1 vial (2.5 mg) by nebulization every 6 hours as needed for shortness of breath / dyspnea or wheezing    Asthma       ALPRAZolam 0.25 MG tablet    XANAX    60 tablet    Take 1 tablet (0.25 mg) by mouth 3 times daily as needed for anxiety    Spinal muscle atrophy (H)       celecoxib 200 MG capsule    celeBREX    30 capsule    Take 1 capsule (200 mg) by mouth daily    Chronic anticoagulation, History of deep venous thrombosis, Painful menstrual periods       clindamycin 1 % lotion    CLEOCIN T    60 mL    Apply topically 2 times daily To face as needed    Acne vulgaris       diazepam 5 MG tablet    VALIUM    30 tablet    Take 1 tablet (5 mg) by mouth every 6 hours as needed for muscle spasms    Spinal muscle atrophy (H)       flunisolide HFA 80 MCG/ACT Aers oral inhaler    AEROSPAN    1 Inhaler    Inhale 2 puffs into the lungs 2 times daily    Asthma       fluticasone 50 MCG/ACT spray    FLONASE    16 g    Spray 1 spray into both nostrils daily    Sinusitis, chronic       gabapentin 250 MG/5ML solution    NEURONTIN    1350 mL    TAKE 15 ML IN FEEDING TUBE THREE TIMES DAILY     Spinal muscle atrophy (H)       glycerin-hypromellose- 0.2-0.2-1 % Soln ophthalmic solution    ARTIFICIAL TEARS    1 Bottle    Place 1 drop into both eyes 2 times daily as needed for dry eyes    Dry eyes, unspecified laterality       guaiFENesin 200 MG Tabs tablet    ORGANIDIN     Take 200 mg by mouth every 4 hours as needed for cough        HYDROmorphone 4 MG tablet    DILAUDID    300 tablet    Take 1-2 tablets (4-8 mg) by mouth every 4 hours as needed for moderate to severe pain    Spinal muscle atrophy (H)       SABINE 30 MG 24 hr capsule   Generic drug:  morphine     60 capsule    Take 1 capsule (30 mg) by mouth 2 times daily    Spinal muscle atrophy (H)       LANsoprazole 30 MG CR capsule    PREVACID    30 capsule    Take 1 capsule (30 mg) by mouth daily    Esophageal reflux       MILK OF MAGNESIA PO      Take 30 mLs by mouth as needed        neomycin-polymyxin-dexamethasone 3.5-22930-1.1 Oint ophthalmic ointment    MAXITROL     1 Application 3 times daily        omeprazole 40 MG capsule    priLOSEC    30 capsule    Take 1 capsule (40 mg) by mouth daily    Gastroesophageal reflux disease without esophagitis       ondansetron 4 MG ODT tab    ZOFRAN-ODT    60 tablet    Take 1 tablet (4 mg) by mouth every 8 hours as needed for nausea    Spinal muscle atrophy (H)       OXY BALANCE FACIAL CLEAN WASH EX      Externally apply 1 pad topically daily        polyethylene glycol powder    MIRALAX    510 g    Take 17 g (1 capful) by mouth daily    Constipation, unspecified constipation type, Need for prophylactic vaccination and inoculation against influenza, Abnormal weight gain       silver sulfADIAZINE 1 % cream    SILVADENE    400 g    Apply topically daily    Spinal muscle atrophy (H)       XARELTO 20 MG Tabs tablet   Generic drug:  rivaroxaban ANTICOAGULANT     30 tablet    TAKE 1 TABLET(20 MG) BY MOUTH DAILY WITH DINNER    DVT (deep venous thrombosis) (H)       zolpidem 5 MG tablet    AMBIEN    30 tablet     Take 1 tablet (5 mg) by mouth nightly as needed for sleep    Sleep disturbance       * Notice:  This list has 2 medication(s) that are the same as other medications prescribed for you. Read the directions carefully, and ask your doctor or other care provider to review them with you.

## 2017-09-07 NOTE — NURSING NOTE
Chief Complaint   Patient presents with     Neck Pain     Patient is here to discuss ongoing neck and back pain     Annalisa Aguilar CMA 2:23 PM on 9/7/2017.

## 2017-09-10 NOTE — PROGRESS NOTES
PRIMARY CARE CENTER       SUBJECTIVE:  Lanie Neil is a 27 year old female with a PMHx of SMA, sinusitis, DVT on anticoagulation and chronic pain who comes in for limb and facial discoloration when turned on side.      Has been going on approximately 1 month.  Worse when turned on R side compared to when on L side.  Resolve quickly after rolled back to supine position.  Denies dyspnea or desaturations on home pulse oxymeter when facial and limb discoloration is occurring.  Has never had this before.  Also notes that face swells and gets bright red during these events as well.  Denies increased in O2 supplemental needs at night.  Remains on RA during the day.  Vent settings are stable without changes from baseline.  Denies swelling of UE or LE during these episodes.  No pain, chest pain, or palpitations with these events.  Also noting increased nausea over past few weeks.  Nausea not associated with pleasure eating or tube feeding.  Tube feeds stable, no reflux or GERD symptoms associated with nausea.  No vomiting.  Recently had refill of zofran with good effect.  Continues to take anticoagulant as prescribed.  No missed doses.  Also having increased pain in shoulders and hips.  Transferring pain management to [facility] from [facility] pain for possible medical cannabis prescription.  Pain does not seem to be related to desaturations or nausea.      Denies other problems today.    Medications and allergies reviewed by me today.     ROS:   Constitutional, neuro, ENT, endocrine, pulmonary, cardiac, gastrointestinal, genitourinary, musculoskeletal, integument and psychiatric systems are negative, except as otherwise noted.      OBJECTIVE:  /76 (BP Location: Right arm, Patient Position: Chair, Cuff Size: Adult Regular)  Pulse 82  SpO2 100%  Breastfeeding? No   Wt Readings from Last 1 Encounters:   09/26/16 58.5 kg (129 lb)       GENERAL APPEARANCE: chronically ill female, wheelchair bound.  Pleasant and interactive.       HENT: MMM, no oral lesions     NECK: neck deviated to left at baseline with scoliosis, tracheostomy with clean/dry dressing      RESP: R fields clear to auscultation -reduced breath sounds L lung fields, normal work of breathing on baseline ventilator settings     CV: regular rates and rhythm, normal S1 S2, no S3 or S4 and no murmur, click or rub     ABDOMEN:  soft, nontender, bowel sounds normal     MS: reduced bulk, increased tone with posturing/contractures bilateral UE/LE, markedly reduced ROM, no UE or LE edema bilaterally     SKIN: no suspicious lesions or rashes on examined areas of skin      NEURO: mentation intact and speech normal, typical features of SMA      PSYCH: mentation appears normal. and affect normal, linear thought process      ASSESSMENT/PLAN:    Lanie was seen today for neck pain.    Diagnoses and all orders for this visit:    Cyanosis.  Pt presents with positional cyanosis without SpO2 desaturations or dyspnea.  Pt with cyanosis only.  Unclear etiology, but she does have history of DVT and is on anticoagulation chronically.  Also concern for SVC syndrome, particularly mechanical obstruction given marked scoliosis. She is otherwise stable on RA with stable vent setttings, so this is likely not an acute process.  CT chest demonstrated L lung collapse.  No prior imaging available via [facility] or [facility] to confirm chronicity, but would expect dsypnea and desaturations of SpO2 if this was an acute process.  Pt notified of results.    -     CTA Angiogram Chest & Pulmonary Embolism; Future    Nausea.  Continue zofran as it is working well.  No exacerbating events noted on interview with patient.        Pt should return to clinic for f/u with PCP as scheduled or PRN.     Santiago Lopez MD  PGY-3, IM  Pager: [phone]   Sep 7, 2017    Pt was seen and plan of care discussed with Dr. Zhang.     Pt was seen and examined with Dr. Lopez.  I agree with his  documentation as noted above.    My additional comments: None    Nithin Zhang MD

## 2017-09-15 DIAGNOSIS — G12.9 SPINAL MUSCLE ATROPHY (H): ICD-10-CM

## 2017-09-18 NOTE — TELEPHONE ENCOUNTER
SENNA  8.8 MG/5ML       Last Written Prescription Date:  7/8/16  Last Fill Quantity: 600ml,   # refills: 11  Last Office Visit : 9/7/17  Future Office visit:  NONE

## 2017-09-19 ENCOUNTER — MYC MEDICAL ADVICE (OUTPATIENT)
Dept: INTERNAL MEDICINE | Facility: CLINIC | Age: 27
End: 2017-09-19

## 2017-09-19 DIAGNOSIS — K21.9 GASTROESOPHAGEAL REFLUX DISEASE WITHOUT ESOPHAGITIS: ICD-10-CM

## 2017-09-20 RX ORDER — SENNOSIDES 8.8 MG/5ML
LIQUID ORAL
Qty: 600 ML | Refills: 11 | Status: SHIPPED | OUTPATIENT
Start: 2017-09-20 | End: 2018-09-11

## 2017-09-21 ENCOUNTER — TRANSFERRED RECORDS (OUTPATIENT)
Dept: HEALTH INFORMATION MANAGEMENT | Facility: CLINIC | Age: 27
End: 2017-09-21

## 2017-09-22 ENCOUNTER — MEDICAL CORRESPONDENCE (OUTPATIENT)
Dept: HEALTH INFORMATION MANAGEMENT | Facility: CLINIC | Age: 27
End: 2017-09-22

## 2017-09-26 ENCOUNTER — TELEPHONE (OUTPATIENT)
Dept: INTERNAL MEDICINE | Facility: CLINIC | Age: 27
End: 2017-09-26

## 2017-09-26 ENCOUNTER — TRANSFERRED RECORDS (OUTPATIENT)
Dept: HEALTH INFORMATION MANAGEMENT | Facility: CLINIC | Age: 27
End: 2017-09-26

## 2017-10-03 ENCOUNTER — TRANSFERRED RECORDS (OUTPATIENT)
Dept: HEALTH INFORMATION MANAGEMENT | Facility: CLINIC | Age: 27
End: 2017-10-03

## 2017-10-09 ENCOUNTER — TRANSFERRED RECORDS (OUTPATIENT)
Dept: HEALTH INFORMATION MANAGEMENT | Facility: CLINIC | Age: 27
End: 2017-10-09

## 2017-10-11 DIAGNOSIS — R21 FACIAL RASH: Primary | ICD-10-CM

## 2017-11-08 ENCOUNTER — OFFICE VISIT (OUTPATIENT)
Dept: DERMATOLOGY | Facility: CLINIC | Age: 27
End: 2017-11-08

## 2017-11-08 ENCOUNTER — MEDICAL CORRESPONDENCE (OUTPATIENT)
Dept: HEALTH INFORMATION MANAGEMENT | Facility: CLINIC | Age: 27
End: 2017-11-08

## 2017-11-08 DIAGNOSIS — L71.9 ACNE ROSACEA: Primary | ICD-10-CM

## 2017-11-08 RX ORDER — DOXYCYCLINE 100 MG/1
CAPSULE ORAL
Qty: 60 CAPSULE | Refills: 2 | Status: SHIPPED | OUTPATIENT
Start: 2017-11-08 | End: 2017-12-12

## 2017-11-08 ASSESSMENT — PAIN SCALES - GENERAL: PAINLEVEL: MILD PAIN (2)

## 2017-11-08 NOTE — MR AVS SNAPSHOT
After Visit Summary   11/8/2017    Lanie Neil    MRN: [id number]           Patient Information     Date Of Birth          1990        Visit Information        Provider Department      11/8/2017 8:15 AM Rosa Hernandez MD M [facility] Dermatology        Today's Diagnoses     Acne rosacea    -  1       Follow-ups after your visit        Your next 10 appointments already scheduled     Jan 29, 2018 11:00 AM CST   (Arrive by 10:45 AM)   Return Visit with MD CONSTANTIN Monahan [facility] Dermatology ([facility] [place])    [address] [place] [id number] [phone]              Who to contact     Please call your clinic at [phone] to:    Ask questions about your health    Make or cancel appointments    Discuss your medicines    Learn about your test results    Speak to your doctor   If you have compliments or concerns about an experience at your clinic, or if you wish to file a complaint, please contact [facility] Patient Relations at [phone] or email us at [email or web address]         Additional Information About Your Visit        MyChart Information     [employer] gives you secure access to your electronic health record. If you see a primary care provider, you can also send messages to your care team and make appointments. If you have questions, please call your primary care clinic.  If you do not have a primary care provider, please call [phone] and they will assist you.      [employer] is an electronic gateway that provides easy, online access to your medical records. With [employer], you can request a clinic appointment, read your test results, renew a prescription or communicate with your care team.     To access your existing account, please contact your [facility] or call [phone] for assistance.        Care EveryWhere ID     This is your Care EveryWhere  ID. This could be used by other organizations to access your [facility] medical records  [id number]         Blood Pressure from Last 3 Encounters:   09/07/17 112/76   06/19/17 119/78   11/14/16 118/74    Weight from Last 3 Encounters:   09/26/16 58.5 kg (129 lb)   01/22/15 68 kg (150 lb)   08/20/14 68 kg (150 lb)              Today, you had the following     No orders found for display         Today's Medication Changes          These changes are accurate as of: 11/8/17  8:47 AM.  If you have any questions, ask your nurse or doctor.               Start taking these medicines.        Dose/Directions    doxycycline 100 MG capsule   Commonly known as:  VIBRAMYCIN   Used for:  Acne rosacea   Started by:  Rosa Hernandez MD        100 mg twice daily through G tube   Quantity:  60 capsule   Refills:  2       metroNIDAZOLE 0.75 % cream   Commonly known as:  METROCREAM   Used for:  Acne rosacea   Started by:  Rosa Hernandez MD        Apply topically 2 times daily   Quantity:  45 g   Refills:  11            Where to get your medicines      [facility] [address] [place] [address] [address] [facility] [address]  [address] [facility] [address]     Phone:  [phone]     doxycycline 100 MG capsule    metroNIDAZOLE 0.75 % cream                Primary Care Provider Office Phone # Fax #    Doris CHAIDEZ MD Bi [phone]        [address] [address]        Equal Access to Services     [facility] AH: Hadii pema ku hadasho Soomaali, waaxda luqadaha, qaybta kaalmada adeegyada, calvin gautam. So [facility] [phone].    ATENCIÓN: Si habla español, tiene a warren disposición servicios gratuitos de asistencia lingüística. Llame al [phone].    We comply with applicable federal civil rights laws and [place] laws. We do not discriminate on the basis of race, color, national origin, age, disability, sex,  sexual orientation, or gender identity.            Thank you!     Thank you for choosing [facility] DERMATOLOGY  for your care. Our goal is always to provide you with excellent care. Hearing back from our patients is one way we can continue to improve our services. Please take a few minutes to complete the written survey that you may receive in the mail after your visit with us. Thank you!             Your Updated Medication List - Protect others around you: Learn how to safely use, store and throw away your medicines at www.disposemymeds.org.          This list is accurate as of: 11/8/17  8:48 AM.  Always use your most recent med list.                   Brand Name Dispense Instructions for use Diagnosis    ACETAMINOPHEN PO      Take 650 mg by mouth every 4 hours as needed for pain        * albuterol 108 (90 BASE) MCG/ACT Inhaler    PROAIR HFA    1 Inhaler    Inhale 2 puffs into the lungs every 4 hours as needed for shortness of breath / dyspnea or wheezing    Unspecified asthma(493.90)       * albuterol (2.5 MG/3ML) 0.083% neb solution     360 mL    Take 1 vial (2.5 mg) by nebulization every 6 hours as needed for shortness of breath / dyspnea or wheezing    Asthma       ALPRAZolam 0.25 MG tablet    XANAX    60 tablet    Take 1 tablet (0.25 mg) by mouth 3 times daily as needed for anxiety    Spinal muscle atrophy (H)       celecoxib 200 MG capsule    celeBREX    30 capsule    Take 1 capsule (200 mg) by mouth daily    Chronic anticoagulation, History of deep venous thrombosis, Painful menstrual periods       clindamycin 1 % lotion    CLEOCIN T    60 mL    Apply topically 2 times daily To face as needed    Acne vulgaris       diazepam 5 MG tablet    VALIUM    30 tablet    Take 1 tablet (5 mg) by mouth every 6 hours as needed for muscle spasms    Spinal muscle atrophy (H)       doxycycline 100 MG capsule    VIBRAMYCIN    60 capsule    100 mg twice daily through G tube    Acne rosacea       flunisolide HFA 80 MCG/ACT Aers  "oral inhaler    AEROSPAN    1 Inhaler    Inhale 2 puffs into the lungs 2 times daily    Asthma       fluticasone 50 MCG/ACT spray    FLONASE    16 g    Spray 1 spray into both nostrils daily    Sinusitis, chronic       gabapentin 250 MG/5ML solution    NEURONTIN    1350 mL    TAKE 15 ML IN FEEDING TUBE THREE TIMES DAILY    Spinal muscle atrophy (H)       glycerin-hypromellose- 0.2-0.2-1 % Soln ophthalmic solution    ARTIFICIAL TEARS    1 Bottle    Place 1 drop into both eyes 2 times daily as needed for dry eyes    Dry eyes, unspecified laterality       guaiFENesin 200 MG Tabs tablet    ORGANIDIN     Take 200 mg by mouth every 4 hours as needed for cough        HYDROmorphone 4 MG tablet    DILAUDID    300 tablet    Take 1-2 tablets (4-8 mg) by mouth every 4 hours as needed for moderate to severe pain    Spinal muscle atrophy (H)       SABINE 30 MG 24 hr capsule   Generic drug:  morphine     60 capsule    Take 1 capsule (30 mg) by mouth 2 times daily    Spinal muscle atrophy (H)       LANsoprazole 30 MG CR capsule    PREVACID    30 capsule    Take 1 capsule (30 mg) by mouth daily    Esophageal reflux       metroNIDAZOLE 0.75 % cream    METROCREAM    45 g    Apply topically 2 times daily    Acne rosacea       MILK OF MAGNESIA PO      Take 30 mLs by mouth as needed        neomycin-polymyxin-dexamethasone 3.5-14997-3.1 Oint ophthalmic ointment    MAXITROL     1 Application 3 times daily        * omeprazole 40 MG capsule    priLOSEC    30 capsule    Take 1 capsule (40 mg) by mouth daily    Gastroesophageal reflux disease without esophagitis       * omeprazole 20 MG CR capsule    priLOSEC    90 capsule    TAKE 20 ML(40MG) VIA \"G-TUBE\" DAILY    Gastroesophageal reflux disease without esophagitis       ondansetron 4 MG ODT tab    ZOFRAN-ODT    60 tablet    Take 1 tablet (4 mg) by mouth every 8 hours as needed for nausea    Spinal muscle atrophy (H)       OXY BALANCE FACIAL CLEAN WASH EX      Externally apply 1 pad " topically daily        polyethylene glycol powder    MIRALAX    510 g    Take 17 g (1 capful) by mouth daily    Constipation, unspecified constipation type, Need for prophylactic vaccination and inoculation against influenza, Abnormal weight gain       Senna 8.8 MG/5ML Syrp     600 mL    TAKE 10 ML(17.6MG) BY MOUTH TWICE DAILY AS DIRECTED    Spinal muscle atrophy (H)       silver sulfADIAZINE 1 % cream    SILVADENE    400 g    Apply topically daily    Spinal muscle atrophy (H)       XARELTO 20 MG Tabs tablet   Generic drug:  rivaroxaban ANTICOAGULANT     30 tablet    TAKE 1 TABLET(20 MG) BY MOUTH DAILY WITH DINNER    DVT (deep venous thrombosis) (H)       zolpidem 5 MG tablet    AMBIEN    30 tablet    Take 1 tablet (5 mg) by mouth nightly as needed for sleep    Sleep disturbance       * Notice:  This list has 4 medication(s) that are the same as other medications prescribed for you. Read the directions carefully, and ask your doctor or other care provider to review them with you.

## 2017-11-08 NOTE — LETTER
"11/8/2017       RE: Lanie Neil  [address]   [place] [address]     Dear Colleague,    Thank you for referring your patient, Lanie Neil, to the [facility] DERMATOLOGY at [facility]. Please see a copy of my visit note below.    [facility] Dermatology Note      Dermatology Problem List:  1.Rosacea    CC:   Chief Complaint   Patient presents with     Rash     Lanie is here today for a facial rash that she has had for about 6 weeks. Lanie notes\" The rash isn't itchy but it goes away and come back with pus in the inside \"          Encounter Date: Nov 8, 2017    History of Present Illness:  Ms. Lanie Neil is a 27 year old female who presents as a referral from Dr Pat for a facial rash for the past 6 weeks.  Her mother accompanies her today.  She reports deeper pustular lesions that have come and gone on her face, ears, and arm.  She had clindamycin lotion for another issue and tried using it for this problem but does not think it helped.  She has baseline redness of the face.  She does not use soap, only water to wash her face.  No sores in the nose. No trigger factors the patient can identify.    Past Medical History:   Patient Active Problem List   Diagnosis     Constipation     Asthma     Spinal muscle atrophy (H)     Dyspepsia and other specified disorders of function of stomach     Sinusitis, chronic     DVT (deep venous thrombosis) (H)     Chronic respiratory failure (H)     Chronic pain syndrome     Past Medical History:   Diagnosis Date     Anxiety      Snoring      Past Surgical History:   Procedure Laterality Date     ENT SURGERY      tubes     GASTROSTOMY TUBE       KIDNEY SURGERY       SPINE SURGERY       TRACHEOSTOMY         Social History:      Family History:  No family history of rosacea    Medications:  Current Outpatient Prescriptions   Medication Sig Dispense Refill     Sennosides (SENNA) 8.8 MG/5ML SYRP TAKE " "10 ML(17.6MG) BY MOUTH TWICE DAILY AS DIRECTED 600 mL 11     omeprazole (PRILOSEC) 20 MG CR capsule TAKE 20 ML(40MG) VIA \"G-TUBE\" DAILY 90 capsule 3     silver sulfADIAZINE (SILVADENE) 1 % cream Apply topically daily 400 g 1     ondansetron (ZOFRAN-ODT) 4 MG ODT tab Take 1 tablet (4 mg) by mouth every 8 hours as needed for nausea 60 tablet 5     zolpidem (AMBIEN) 5 MG tablet Take 1 tablet (5 mg) by mouth nightly as needed for sleep 30 tablet 2     diazepam (VALIUM) 5 MG tablet Take 1 tablet (5 mg) by mouth every 6 hours as needed for muscle spasms 30 tablet 2     ALPRAZolam (XANAX) 0.25 MG tablet Take 1 tablet (0.25 mg) by mouth 3 times daily as needed for anxiety 60 tablet 2     gabapentin (NEURONTIN) 250 MG/5ML solution TAKE 15 ML IN FEEDING TUBE THREE TIMES DAILY 1350 mL 11     fluticasone (FLONASE) 50 MCG/ACT spray Spray 1 spray into both nostrils daily 16 g 11     clindamycin (CLEOCIN T) 1 % lotion Apply topically 2 times daily To face as needed 60 mL 3     ACETAMINOPHEN PO Take 650 mg by mouth every 4 hours as needed for pain       Magnesium Hydroxide (MILK OF MAGNESIA PO) Take 30 mLs by mouth as needed       Salicylic Acid (OXY BALANCE FACIAL CLEAN WASH EX) Externally apply 1 pad topically daily       neomycin-polymyxin-dexamethasone (MAXITROL) 3.5-42139-4.1 OINT ophthalmic ointment 1 Application 3 times daily       guaiFENesin (ORGANIDIN) 200 MG TABS tablet Take 200 mg by mouth every 4 hours as needed for cough       albuterol (2.5 MG/3ML) 0.083% neb solution Take 1 vial (2.5 mg) by nebulization every 6 hours as needed for shortness of breath / dyspnea or wheezing 360 mL 11     Flunisolide HFA 80 MCG/ACT AERS Inhale 2 puffs into the lungs 2 times daily 1 Inhaler 11     XARELTO 20 MG TABS tablet TAKE 1 TABLET(20 MG) BY MOUTH DAILY WITH DINNER 30 tablet 11     polyethylene glycol (MIRALAX) powder Take 17 g (1 capful) by mouth daily 510 g 11     celecoxib (CELEBREX) 200 MG capsule Take 1 capsule (200 mg) by " mouth daily 30 capsule 3     albuterol (ALBUTEROL) 108 (90 BASE) MCG/ACT inhaler Inhale 2 puffs into the lungs every 4 hours as needed for shortness of breath / dyspnea or wheezing 1 Inhaler 2     omeprazole (PRILOSEC) 40 MG capsule Take 1 capsule (40 mg) by mouth daily 30 capsule 11     glycerin-hypromellose- (ARTIFICIAL TEARS) 0.2-0.2-1 % SOLN ophthalmic solution Place 1 drop into both eyes 2 times daily as needed for dry eyes 1 Bottle 11     LANsoprazole (PREVACID) 30 MG capsule Take 1 capsule (30 mg) by mouth daily 30 capsule 6     HYDROmorphone (DILAUDID) 4 MG tablet Take 1-2 tablets (4-8 mg) by mouth every 4 hours as needed for moderate to severe pain 300 tablet 0     SABINE 30 MG Take 1 capsule (30 mg) by mouth 2 times daily 60 capsule 0     No Known Allergies      Review of Systems:  -As per HPI    -Skin: As above in HPI. No additional skin concerns.    Physical exam:  Vitals: There were no vitals taken for this visit.  GEN: This is a well developed, well-nourished female in no acute distress, in a pleasant mood.    SKIN: Focused examination of the face was performed due to limitations of her wheelchair.  -There are erythematous papules and scattered telangectasias on the bilateral cheeks and nose.   No active cystic lesions today but Mom shows me pictures of pustules of the face  -No other lesions of concern on areas examined.     Impression/Plan:  1. Rosacea with a cystic component.      Metrocream bid,  since has had pustular and cystic component, will start doxycycline 100 mg capsules twice daily via G tube.    Will order capsules so she can open them and mix with water for g tube    Discussed GI upset and sun sensitivity           CC Dr. Pat on close of this encounter.  Follow-up in 3 months, earlier for new or changing lesions.       Staff Involved:  Staff Only      Again, thank you for allowing me to participate in the care of your patient.      Sincerely,    Rosa Hernandez MD

## 2017-11-08 NOTE — PROGRESS NOTES
"[facility] Dermatology Note      Dermatology Problem List:  1.Rosacea    CC:   Chief Complaint   Patient presents with     Rash     Lanie is here today for a facial rash that she has had for about 6 weeks. Lanie notes\" The rash isn't itchy but it goes away and come back with pus in the inside \"          Encounter Date: Nov 8, 2017    History of Present Illness:  Ms. Lanie Neil is a 27 year old female who presents as a referral from Dr Pat for a facial rash for the past 6 weeks.  Her mother accompanies her today.  She reports deeper pustular lesions that have come and gone on her face, ears, and arm.  She had clindamycin lotion for another issue and tried using it for this problem but does not think it helped.  She has baseline redness of the face.  She does not use soap, only water to wash her face.  No sores in the nose. No trigger factors the patient can identify.    Past Medical History:   Patient Active Problem List   Diagnosis     Constipation     Asthma     Spinal muscle atrophy (H)     Dyspepsia and other specified disorders of function of stomach     Sinusitis, chronic     DVT (deep venous thrombosis) (H)     Chronic respiratory failure (H)     Chronic pain syndrome     Past Medical History:   Diagnosis Date     Anxiety      Snoring      Past Surgical History:   Procedure Laterality Date     ENT SURGERY      tubes     GASTROSTOMY TUBE       KIDNEY SURGERY       SPINE SURGERY       TRACHEOSTOMY         Social History:      Family History:  No family history of rosacea    Medications:  Current Outpatient Prescriptions   Medication Sig Dispense Refill     Sennosides (SENNA) 8.8 MG/5ML SYRP TAKE 10 ML(17.6MG) BY MOUTH TWICE DAILY AS DIRECTED 600 mL 11     omeprazole (PRILOSEC) 20 MG CR capsule TAKE 20 ML(40MG) VIA \"G-TUBE\" DAILY 90 capsule 3     silver sulfADIAZINE (SILVADENE) 1 % cream Apply topically daily 400 g 1     ondansetron (ZOFRAN-ODT) 4 MG ODT tab Take 1 tablet (4 mg) " by mouth every 8 hours as needed for nausea 60 tablet 5     zolpidem (AMBIEN) 5 MG tablet Take 1 tablet (5 mg) by mouth nightly as needed for sleep 30 tablet 2     diazepam (VALIUM) 5 MG tablet Take 1 tablet (5 mg) by mouth every 6 hours as needed for muscle spasms 30 tablet 2     ALPRAZolam (XANAX) 0.25 MG tablet Take 1 tablet (0.25 mg) by mouth 3 times daily as needed for anxiety 60 tablet 2     gabapentin (NEURONTIN) 250 MG/5ML solution TAKE 15 ML IN FEEDING TUBE THREE TIMES DAILY 1350 mL 11     fluticasone (FLONASE) 50 MCG/ACT spray Spray 1 spray into both nostrils daily 16 g 11     clindamycin (CLEOCIN T) 1 % lotion Apply topically 2 times daily To face as needed 60 mL 3     ACETAMINOPHEN PO Take 650 mg by mouth every 4 hours as needed for pain       Magnesium Hydroxide (MILK OF MAGNESIA PO) Take 30 mLs by mouth as needed       Salicylic Acid (OXY BALANCE FACIAL CLEAN WASH EX) Externally apply 1 pad topically daily       neomycin-polymyxin-dexamethasone (MAXITROL) 3.5-85139-6.1 OINT ophthalmic ointment 1 Application 3 times daily       guaiFENesin (ORGANIDIN) 200 MG TABS tablet Take 200 mg by mouth every 4 hours as needed for cough       albuterol (2.5 MG/3ML) 0.083% neb solution Take 1 vial (2.5 mg) by nebulization every 6 hours as needed for shortness of breath / dyspnea or wheezing 360 mL 11     Flunisolide HFA 80 MCG/ACT AERS Inhale 2 puffs into the lungs 2 times daily 1 Inhaler 11     XARELTO 20 MG TABS tablet TAKE 1 TABLET(20 MG) BY MOUTH DAILY WITH DINNER 30 tablet 11     polyethylene glycol (MIRALAX) powder Take 17 g (1 capful) by mouth daily 510 g 11     celecoxib (CELEBREX) 200 MG capsule Take 1 capsule (200 mg) by mouth daily 30 capsule 3     albuterol (ALBUTEROL) 108 (90 BASE) MCG/ACT inhaler Inhale 2 puffs into the lungs every 4 hours as needed for shortness of breath / dyspnea or wheezing 1 Inhaler 2     omeprazole (PRILOSEC) 40 MG capsule Take 1 capsule (40 mg) by mouth daily 30 capsule 11      glycerin-hypromellose- (ARTIFICIAL TEARS) 0.2-0.2-1 % SOLN ophthalmic solution Place 1 drop into both eyes 2 times daily as needed for dry eyes 1 Bottle 11     LANsoprazole (PREVACID) 30 MG capsule Take 1 capsule (30 mg) by mouth daily 30 capsule 6     HYDROmorphone (DILAUDID) 4 MG tablet Take 1-2 tablets (4-8 mg) by mouth every 4 hours as needed for moderate to severe pain 300 tablet 0     SABINE 30 MG Take 1 capsule (30 mg) by mouth 2 times daily 60 capsule 0     No Known Allergies      Review of Systems:  -As per HPI    -Skin: As above in HPI. No additional skin concerns.    Physical exam:  Vitals: There were no vitals taken for this visit.  GEN: This is a well developed, well-nourished female in no acute distress, in a pleasant mood.    SKIN: Focused examination of the face was performed due to limitations of her wheelchair.  -There are erythematous papules and scattered telangectasias on the bilateral cheeks and nose.   No active cystic lesions today but Mom shows me pictures of pustules of the face  -No other lesions of concern on areas examined.     Impression/Plan:  1. Rosacea with a cystic component.      Metrocream bid,  since has had pustular and cystic component, will start doxycycline 100 mg capsules twice daily via G tube.    Will order capsules so she can open them and mix with water for g tube    Discussed GI upset and sun sensitivity           CC Dr. aPt on close of this encounter.  Follow-up in 3 months, earlier for new or changing lesions.       Staff Involved:  Staff Only

## 2017-11-08 NOTE — NURSING NOTE
"Dermatology Rooming Note    Lanie Neil's goals for this visit include:   Chief Complaint   Patient presents with     Rash     Lanie is here today for a facial rash that she has had for about 6 weeks. Lanie notes\" The rash isn't itchy but it goes away and come back with pus in the inside \"      Marisela Cotter, RMKENNEDY    "

## 2017-11-16 ENCOUNTER — TRANSFERRED RECORDS (OUTPATIENT)
Dept: HEALTH INFORMATION MANAGEMENT | Facility: CLINIC | Age: 27
End: 2017-11-16

## 2017-11-20 DIAGNOSIS — I82.409 DVT (DEEP VENOUS THROMBOSIS) (H): ICD-10-CM

## 2017-11-20 RX ORDER — RIVAROXABAN 20 MG/1
TABLET, FILM COATED ORAL
Qty: 30 TABLET | Refills: 0 | Status: SHIPPED | OUTPATIENT
Start: 2017-11-20 | End: 2017-12-18

## 2017-11-21 ENCOUNTER — MEDICAL CORRESPONDENCE (OUTPATIENT)
Dept: HEALTH INFORMATION MANAGEMENT | Facility: CLINIC | Age: 27
End: 2017-11-21

## 2017-11-29 ENCOUNTER — TRANSFERRED RECORDS (OUTPATIENT)
Dept: HEALTH INFORMATION MANAGEMENT | Facility: CLINIC | Age: 27
End: 2017-11-29

## 2017-12-11 ENCOUNTER — TELEPHONE (OUTPATIENT)
Dept: INTERNAL MEDICINE | Facility: CLINIC | Age: 27
End: 2017-12-11

## 2017-12-11 DIAGNOSIS — N94.6 PAINFUL MENSTRUAL PERIODS: ICD-10-CM

## 2017-12-11 DIAGNOSIS — Z79.01 CHRONIC ANTICOAGULATION: ICD-10-CM

## 2017-12-11 DIAGNOSIS — J18.9 RECURRENT PNEUMONIA: Primary | ICD-10-CM

## 2017-12-11 DIAGNOSIS — Z86.718 HISTORY OF DEEP VENOUS THROMBOSIS: ICD-10-CM

## 2017-12-11 NOTE — TELEPHONE ENCOUNTER
----- Message from Abby Coe sent at 12/8/2017  2:10 PM CST -----  Regarding: ORDERS NEED TO BE FAXED TO CARE COORDINATOR AT [facility]  Contact: [phone]  Nanette  from [facility] called and needs orders from Dr. Doris Pat for a hand held vibracare percussor. Please fax to her attention at [phone].    DME order faxed.  Gabriela Hylton RN 9:14 AM on 12/11/2017.

## 2017-12-12 ENCOUNTER — OFFICE VISIT (OUTPATIENT)
Dept: HEMATOLOGY | Facility: CLINIC | Age: 27
End: 2017-12-12
Attending: PHYSICIAN ASSISTANT
Payer: COMMERCIAL

## 2017-12-12 VITALS
TEMPERATURE: 98.7 F | OXYGEN SATURATION: 98 % | HEART RATE: 76 BPM | SYSTOLIC BLOOD PRESSURE: 126 MMHG | DIASTOLIC BLOOD PRESSURE: 87 MMHG | RESPIRATION RATE: 12 BRPM

## 2017-12-12 DIAGNOSIS — Z79.01 CHRONIC ANTICOAGULATION: Primary | ICD-10-CM

## 2017-12-12 DIAGNOSIS — Z86.718 HISTORY OF DEEP VENOUS THROMBOSIS: ICD-10-CM

## 2017-12-12 PROCEDURE — 99211 OFF/OP EST MAY X REQ PHY/QHP: CPT | Mod: 25

## 2017-12-12 PROCEDURE — 99213 OFFICE O/P EST LOW 20 MIN: CPT | Performed by: PHYSICIAN ASSISTANT

## 2017-12-12 RX ORDER — CELECOXIB 200 MG/1
CAPSULE ORAL
Qty: 30 CAPSULE | Refills: 3 | Status: SHIPPED | OUTPATIENT
Start: 2017-12-12 | End: 2018-04-13

## 2017-12-12 ASSESSMENT — PAIN SCALES - GENERAL: PAINLEVEL: EXTREME PAIN (8)

## 2017-12-12 NOTE — PROGRESS NOTES
[facility]  [address] [place] [id number]  Main: [phone], Fax: [phone]    Patient seen at: [facility] at [address] [place] [address]    Outpatient Visit Note:    Patient: Lanie Neil  MRN: [id number]  : 1990  JOSÉ MIGUEL: 2017    Reason:  Spinal muscular atrophy. Wheelchair bound. History of DVT. Currently on long term anticoagulation therapy with Xarelto. Annual routine follow up visit.       Brief History:  This is a 27 year old female with a history of spinal muscular atrophy, who is chronically wheelchair bound, who moved from [place] to [place] back in May 2014, who has a history of DVT in the left leg, currently on long term anticoagulation therapy with Xarelto, returns to clinic for her annual routine follow up clinic visit. I last saw this patient back in 2014 and again back in 2015, please see my initial consult note and previous progress note for her complete detail history.      Briefly, Lanie has been wheelchair bound and immobilized for many years. Dated back in 2012, she was complaining of left leg swelling. At the time an ultrasound out in [place] ([facility]), found subacute/chronic occlusive thrombus in the mid to distal left femoral vein with collateralization. She was placed on Enoxaparin initially. About 2 weeks after she was started on Enoxaparin therapy, she was complaining of headache and a head CT at the time showed a subdural hematoma on 2012. She was admitted and Enoxaparin was held. The decision was made at the time to have an IVC filter placed, which was done on 2012.      She eventually was restarted on Enoxaparin at a lowered dose and has maintain that until she was evaluated by a hematologist, Dr. Willy Box at [place]. In 2013, Dr. Box decided to stop the patient's Enoxaparin injections and started her on  Xarelto at 20 mg PO Qday. The patient has since remained on Xarelto at this dose without any trouble.      During our visit with Lanie back in Aug 2014, we agreed that she should maintain on long term anticoagulation therapy given her chronic immobilization and high risk of recurrent thromboembolism. At the time, we did obtain approval from her health insurance company for Xarelto use at 20 mg PO Qday dosing. She has since remained on this dosing regimen.     Her mother usually crushes up her Xarelto and administer it via her J-tube.      Interim History:  In the past year, Lanie has been doing well with Xarelto at 20 mg PO qday. She denies any significant bleeding issue. She denies any frequent nose bleed or gum bleeding. Denies any bleeding from the tracheostomy site. She has no issues with hematuria or blood in stools. She denies any significant ecchymosis or bruising.      In the past year, Lanie had not undergone any invasive procedures. However, she is planning to undergo intrathecal catheter placement on 1/12/2018 for treatment with newly FDA approved spinal muscular atrophy medication, SPINRAZA. The procedure will be done at [facility] and she will be hospitalized at [facility]. The neurosurgeon performing the procedure is Dr. Mihaela Lopez.     Also in the past year, she is diagnosed with Rosacea for which her rash has improved with treatment.     ROS:   As above.     Medications:  Current Outpatient Prescriptions   Medication     XARELTO 20 MG TABS tablet     metroNIDAZOLE (METROCREAM) 0.75 % cream     Sennosides (SENNA) 8.8 MG/5ML SYRP     omeprazole (PRILOSEC) 20 MG CR capsule     silver sulfADIAZINE (SILVADENE) 1 % cream     ondansetron (ZOFRAN-ODT) 4 MG ODT tab     zolpidem (AMBIEN) 5 MG tablet     diazepam (VALIUM) 5 MG tablet     ALPRAZolam (XANAX) 0.25 MG tablet     gabapentin (NEURONTIN) 250 MG/5ML solution     ACETAMINOPHEN PO     Magnesium Hydroxide (MILK OF MAGNESIA  PO)     Salicylic Acid (OXY BALANCE FACIAL CLEAN WASH EX)     guaiFENesin (ORGANIDIN) 200 MG TABS tablet     albuterol (2.5 MG/3ML) 0.083% neb solution     Flunisolide HFA 80 MCG/ACT AERS     polyethylene glycol (MIRALAX) powder     albuterol (ALBUTEROL) 108 (90 BASE) MCG/ACT inhaler     glycerin-hypromellose- (ARTIFICIAL TEARS) 0.2-0.2-1 % SOLN ophthalmic solution     HYDROmorphone (DILAUDID) 4 MG tablet     SABINE 30 MG     celecoxib (CELEBREX) 200 MG capsule     order for DME     clindamycin (CLEOCIN T) 1 % lotion     neomycin-polymyxin-dexamethasone (MAXITROL) 3.5-66867-7.1 OINT ophthalmic ointment     [DISCONTINUED] celecoxib (CELEBREX) 200 MG capsule     No current facility-administered medications for this visit.      Allergies:   No Known Allergies     PmHx:  Past Medical History:   Diagnosis Date     Anxiety      Snoring      Social History and Family History:  Deferred.    Objective:  Pleasant 27 year old female in no acute distress.  Vitals: /87 (BP Location: Left arm, Patient Position: Supine, Cuff Size: Adult Small)  Pulse 76  Temp 98.7  F (37.1  C) (Oral)  Resp 12  SpO2 98%  Exam:  Complete exam is not performed today.    Assessment:  In summary, Lanie is a 27 year old female with spinal muscular atrophy with chronic respiratory failure on ventilator via tracheostomy, who is also chronically wheelchair bound and a history of left leg DVT, currently on chronic anticoagulation therapy with Xarelto at 20 mg PO Qday, returns to clinic today for her routine follow up clinic visit. Lanie has done well in the past year with Xarelto without any bleeding or any recurrent venous thromboembolic events.      Diagnosis:  1. History of left lower extremity DVT.  2. On chronic anticoagulation therapy with Xarelto at 20 mg PO Qday.  3. Spinal muscular atrophy.  4. Chronic respiratory failure.    5. Chronically wheelchair bound.  6. Rosacea     Plan:  Lanie remains to be a good candidate to stay  on extended anticoagulation therapy with Xarelto. She is doing well with Xarelto without any bleeding complications. I will continue her on Xarelto at 20 mg PO Qday dosing.     I will also refill her Celebrex prescription today.    Check complete metabolic panel and CBC in the next few weeks. Can be done when she returns for her pre-operative physical exam with Dr. Pat in a few weeks.     In regard to her intrathecal catheter placement on 1/12/2018 for treatment of her spinal muscular atrophy, her anticoagulation therapy plan around the time of her surgery is as follow:  1. Stop Xarelto 48 hours prior to her surgery. This timing should be more than enough to have her return to normal hemostasis status.   2. Restart Xarelto 48-72 hours after her surgery assuming that she has no bleeding complications from the surgery.    As I review the package insert of SPINRAZA, about 11% of patients had below normal Platelet count during treatment with the medication in the trial. However, none had a platelet count of <50K. I assume that she will be monitored closely for platelet count while on this new medications (SPINRAZA). I would recommend to discontinue Xarelto if her platelet counts were to drop below 50K. We should also be contacted if that was the case and we can determine the timing of restarting Xarelto.     Her and her mother know to contact our center if they should have any further questions or concerns.    Otherwise, I will plan on seeing her back in one year.    Total Time Spent:  17 minutes, all 17 minutes was spent on face-to-face consultation with the patient and coordination of care in regard to her history of DVT and anticoagulation therapy management.     Time IN: 15:03  Time OUT: 15:20      Jt Mir PA-C, MPAS  Physician Assistant  [facility].

## 2017-12-12 NOTE — NURSING NOTE
Lanie Neil  MRN: [id number]  Female, 27 year old, 1990  Hx of Left leg DVT 2012; WC     Time with patient: 5 minutes    Roomed patient and reviewed med list and allergies.  Patient is taking Xarelto without bleeding issues.  Her period lasts 5-7 days.  She takes Celebrex for her period pain with good relief.  Ariana Crandall, RN, MSN -Nurse Clinician, [facility] [phone]

## 2017-12-12 NOTE — PATIENT INSTRUCTIONS
1.  Labs with Dr. Pat at pro-op visit (CBC & liver, kidney function).  2.  Hold Xarelto for 48 hours prior to surgery (spinal tube) on 1/12/18, then restart 48-72 hours after surgery with surgeon approval.  3.  See MORALES Pérez back in 1 year to review anticoagulation plan.

## 2017-12-12 NOTE — LETTER
2017      RE: Lanie Neil  [address]   [facility] [address]           [facility]  [address] [place] [address] [address], [place] [address]  Main: [phone], Fax: [phone]    Patient seen at: [facility] at [address] [place] [address]    Outpatient Visit Note:    Patient: Lanie Neil  MRN: [id number]  : 1990  JOSÉ MIGUEL: 2017    Reason:  Spinal muscular atrophy. Wheelchair bound. History of DVT. Currently on long term anticoagulation therapy with Xarelto. Annual routine follow up visit.       Brief History:  This is a 27 year old female with a history of spinal muscular atrophy, who is chronically wheelchair bound, who moved from [place] to [place] back in May 2014, who has a history of DVT in the left leg, currently on long term anticoagulation therapy with Xarelto, returns to clinic for her annual routine follow up clinic visit. I last saw this patient back in 2014 and again back in 2015, please see my initial consult note and previous progress note for her complete detail history.      Briefly, Lanie has been wheelchair bound and immobilized for many years. Dated back in 2012, she was complaining of left leg swelling. At the time an ultrasound out in [place] ([facility]), found subacute/chronic occlusive thrombus in the mid to distal left femoral vein with collateralization. She was placed on Enoxaparin initially. About 2 weeks after she was started on Enoxaparin therapy, she was complaining of headache and a head CT at the time showed a subdural hematoma on 2012. She was admitted and Enoxaparin was held. The decision was made at the time to have an IVC filter placed, which was done on 2012.      She eventually was restarted on Enoxaparin at a lowered dose and has maintain that until she was evaluated by a hematologist, Dr. Willy Box at [place]. In  9/11/2013, Dr. Box decided to stop the patient's Enoxaparin injections and started her on Xarelto at 20 mg PO Qday. The patient has since remained on Xarelto at this dose without any trouble.      During our visit with Lanie back in Aug 2014, we agreed that she should maintain on long term anticoagulation therapy given her chronic immobilization and high risk of recurrent thromboembolism. At the time, we did obtain approval from her health insurance company for Xarelto use at 20 mg PO Qday dosing. She has since remained on this dosing regimen.     Her mother usually crushes up her Xarelto and administer it via her J-tube.      Interim History:  In the past year, Lanie has been doing well with Xarelto at 20 mg PO qday. She denies any significant bleeding issue. She denies any frequent nose bleed or gum bleeding. Denies any bleeding from the tracheostomy site. She has no issues with hematuria or blood in stools. She denies any significant ecchymosis or bruising.      In the past year, Lanie had not undergone any invasive procedures. However, she is planning to undergo intrathecal catheter placement on 1/12/2018 for treatment with newly FDA approved spinal muscular atrophy medication, SPINRAZA. The procedure will be done at [facility] and she will be hospitalized at [place] [facility]. The neurosurgeon performing the procedure is Dr. Mihaela Lopez.     Also in the past year, she is diagnosed with Rosacea for which her rash has improved with treatment.     ROS:   As above.     Medications:  Current Outpatient Prescriptions   Medication     XARELTO 20 MG TABS tablet     metroNIDAZOLE (METROCREAM) 0.75 % cream     Sennosides (SENNA) 8.8 MG/5ML SYRP     omeprazole (PRILOSEC) 20 MG CR capsule     silver sulfADIAZINE (SILVADENE) 1 % cream     ondansetron (ZOFRAN-ODT) 4 MG ODT tab     zolpidem (AMBIEN) 5 MG tablet     diazepam (VALIUM) 5 MG tablet     ALPRAZolam (XANAX) 0.25 MG tablet      gabapentin (NEURONTIN) 250 MG/5ML solution     ACETAMINOPHEN PO     Magnesium Hydroxide (MILK OF MAGNESIA PO)     Salicylic Acid (OXY BALANCE FACIAL CLEAN WASH EX)     guaiFENesin (ORGANIDIN) 200 MG TABS tablet     albuterol (2.5 MG/3ML) 0.083% neb solution     Flunisolide HFA 80 MCG/ACT AERS     polyethylene glycol (MIRALAX) powder     albuterol (ALBUTEROL) 108 (90 BASE) MCG/ACT inhaler     glycerin-hypromellose- (ARTIFICIAL TEARS) 0.2-0.2-1 % SOLN ophthalmic solution     HYDROmorphone (DILAUDID) 4 MG tablet     SABINE 30 MG     celecoxib (CELEBREX) 200 MG capsule     order for DME     clindamycin (CLEOCIN T) 1 % lotion     neomycin-polymyxin-dexamethasone (MAXITROL) 3.5-45642-6.1 OINT ophthalmic ointment     [DISCONTINUED] celecoxib (CELEBREX) 200 MG capsule     No current facility-administered medications for this visit.      Allergies:   No Known Allergies     PmHx:  Past Medical History:   Diagnosis Date     Anxiety      Snoring      Social History and Family History:  Deferred.    Objective:  Pleasant 27 year old female in no acute distress.  Vitals: /87 (BP Location: Left arm, Patient Position: Supine, Cuff Size: Adult Small)  Pulse 76  Temp 98.7  F (37.1  C) (Oral)  Resp 12  SpO2 98%  Exam:  Complete exam is not performed today.    Assessment:  In summary, Lanie is a 27 year old female with spinal muscular atrophy with chronic respiratory failure on ventilator via tracheostomy, who is also chronically wheelchair bound and a history of left leg DVT, currently on chronic anticoagulation therapy with Xarelto at 20 mg PO Qday, returns to clinic today for her routine follow up clinic visit. Lanie has done well in the past year with Xarelto without any bleeding or any recurrent venous thromboembolic events.      Diagnosis:  1. History of left lower extremity DVT.  2. On chronic anticoagulation therapy with Xarelto at 20 mg PO Qday.  3. Spinal muscular atrophy.  4. Chronic respiratory failure.     5. Chronically wheelchair bound.  6. Rosacea     Plan:  Lanie remains to be a good candidate to stay on extended anticoagulation therapy with Xarelto. She is doing well with Xarelto without any bleeding complications. I will continue her on Xarelto at 20 mg PO Qday dosing.     I will also refill her Celebrex prescription today.    Check complete metabolic panel and CBC in the next few weeks. Can be done when she returns for her pre-operative physical exam with Dr. Pat in a few weeks.     In regard to her intrathecal catheter placement on 1/12/2018 for treatment of her spinal muscular atrophy, her anticoagulation therapy plan around the time of her surgery is as follow:  1. Stop Xarelto 48 hours prior to her surgery. This timing should be more than enough to have her return to normal hemostasis status.   2. Restart Xarelto 48-72 hours after her surgery assuming that she has no bleeding complications from the surgery.    As I review the package insert of SPINRAZA, about 11% of patients had below normal Platelet count during treatment with the medication in the trial. However, none had a platelet count of <50K. I assume that she will be monitored closely for platelet count while on this new medications (SPINRAZA). I would recommend to discontinue Xarelto if her platelet counts were to drop below 50K. We should also be contacted if that was the case and we can determine the timing of restarting Xarelto.     Her and her mother know to contact our center if they should have any further questions or concerns.    Otherwise, I will plan on seeing her back in one year.    Total Time Spent:  17 minutes, all 17 minutes was spent on face-to-face consultation with the patient and coordination of care in regard to her history of DVT and anticoagulation therapy management.     Time IN: 15:03  Time OUT: 15:20      Jt Mir PA-C, MPAS  Physician Assistant  [facility].

## 2017-12-12 NOTE — MR AVS SNAPSHOT
After Visit Summary   12/12/2017    Lanie Neil    MRN: [id number]           Patient Information     Date Of Birth          1990        Visit Information        Provider Department      12/12/2017 3:00 PM Jt Mir PA-C [facility]        Today's Diagnoses     Chronic anticoagulation    -  1    History of deep venous thrombosis          Care Instructions    1.  Labs with Dr. Pat at pro-op visit (CBC & liver, kidney function).  2.  Hold Xarelto for 48 hours prior to surgery (spinal tube) on 1/12/18, then restart 48-72 hours after surgery with surgeon approval.  3.  See MORALES Pérez back in 1 year to review anticoagulation plan.          Follow-ups after your visit        Your next 10 appointments already scheduled     Dec 26, 2017  4:30 PM CST   (Arrive by 4:15 PM)   PRE-OP with Doris Pat MD   [facility] ([facility])    [address] [place] [address]  4th [facility] [address] [phone]            Jan 29, 2018 11:00 AM CST   (Arrive by 10:45 AM)   Return Visit with Rosa Hernandez MD   [facility] ([facility])    [address] [place] [address]  3rd [facility] [address] [phone]              Who to contact     If you have questions or need follow up information about today's clinic visit or your schedule please contact [facility] directly at [phone].  Normal or non-critical lab and imaging results will be communicated to you by MyChart, letter or phone within 4 business days after the clinic has received the results. If you do not hear from us within 7 days, please contact the clinic through MyChart or phone. If you have a critical or abnormal lab result, we will notify you by phone as soon as possible.  Submit refill requests through [employer] or call your pharmacy and they will forward the refill request to us.  "Please allow 3 business days for your refill to be completed.          Additional Information About Your Visit        MyChart Information     [employer] gives you secure access to your electronic health record. If you see a primary care provider, you can also send messages to your care team and make appointments. If you have questions, please call your primary care clinic.  If you do not have a primary care provider, please call [phone] and they will assist you.        Care EveryWhere ID     This is your Care EveryWhere ID. This could be used by other organizations to access your [place] medical records  [id number]        Your Vitals Were     Pulse Temperature Respirations Pulse Oximetry          76 98.7  F (37.1  C) (Oral) 12 98%         Blood Pressure from Last 3 Encounters:   12/12/17 126/87   09/07/17 112/76   06/19/17 119/78    Weight from Last 3 Encounters:   09/26/16 58.5 kg (129 lb)   01/22/15 68 kg (150 lb)   08/20/14 68 kg (150 lb)              We Performed the Following     CBC with platelets     Comprehensive metabolic panel          Today's Medication Changes          These changes are accurate as of: 12/12/17  3:38 PM.  If you have any questions, ask your nurse or doctor.               These medicines have changed or have updated prescriptions.        Dose/Directions    omeprazole 20 MG CR capsule   Commonly known as:  priLOSEC   This may have changed:  Another medication with the same name was removed. Continue taking this medication, and follow the directions you see here.   Used for:  Gastroesophageal reflux disease without esophagitis        TAKE 20 ML(40MG) VIA \"G-TUBE\" DAILY   Quantity:  90 capsule   Refills:  3                Primary Care Provider Office Phone # Fax #    Doris Pat [phone] [address] [address]        Equal Access to Services     GLEN ART AH: angel Ash, zach pressley, " calvin gardnerflash greenesh la'aan ah. So [facility] [phone].    ATENCIÓN: Si teo knight, tiene a warren disposición servicios gratuitos de asistencia lingüística. Edwin daugherty [phone].    We comply with applicable federal civil rights laws and [place] laws. We do not discriminate on the basis of race, color, national origin, age, disability, sex, sexual orientation, or gender identity.            Thank you!     Thank you for choosing [facility]  for your care. Our goal is always to provide you with excellent care. Hearing back from our patients is one way we can continue to improve our services. Please take a few minutes to complete the written survey that you may receive in the mail after your visit with us. Thank you!             Your Updated Medication List - Protect others around you: Learn how to safely use, store and throw away your medicines at www.disposemymeds.org.          This list is accurate as of: 12/12/17  3:38 PM.  Always use your most recent med list.                   Brand Name Dispense Instructions for use Diagnosis    ACETAMINOPHEN PO      Take 650 mg by mouth every 4 hours as needed for pain        * albuterol 108 (90 BASE) MCG/ACT Inhaler    PROAIR HFA    1 Inhaler    Inhale 2 puffs into the lungs every 4 hours as needed for shortness of breath / dyspnea or wheezing    Unspecified asthma(493.90)       * albuterol (2.5 MG/3ML) 0.083% neb solution     360 mL    Take 1 vial (2.5 mg) by nebulization every 6 hours as needed for shortness of breath / dyspnea or wheezing    Asthma       ALPRAZolam 0.25 MG tablet    XANAX    60 tablet    Take 1 tablet (0.25 mg) by mouth 3 times daily as needed for anxiety    Spinal muscle atrophy (H)       celecoxib 200 MG capsule    celeBREX    30 capsule    TAKE 1 CAPSULE(200 MG) BY MOUTH DAILY    Chronic anticoagulation, History of deep venous thrombosis, Painful menstrual periods       clindamycin 1 % lotion    CLEOCIN T    60  "mL    Apply topically 2 times daily To face as needed    Acne vulgaris       diazepam 5 MG tablet    VALIUM    30 tablet    Take 1 tablet (5 mg) by mouth every 6 hours as needed for muscle spasms    Spinal muscle atrophy (H)       flunisolide HFA 80 MCG/ACT Aers oral inhaler    AEROSPAN    1 Inhaler    Inhale 2 puffs into the lungs 2 times daily    Asthma       gabapentin 250 MG/5ML solution    NEURONTIN    1350 mL    TAKE 15 ML IN FEEDING TUBE THREE TIMES DAILY    Spinal muscle atrophy (H)       glycerin-hypromellose- 0.2-0.2-1 % Soln ophthalmic solution    ARTIFICIAL TEARS    1 Bottle    Place 1 drop into both eyes 2 times daily as needed for dry eyes    Dry eyes, unspecified laterality       guaiFENesin 200 MG Tabs tablet    ORGANIDIN     Take 200 mg by mouth every 4 hours as needed for cough        HYDROmorphone 4 MG tablet    DILAUDID    300 tablet    Take 1-2 tablets (4-8 mg) by mouth every 4 hours as needed for moderate to severe pain    Spinal muscle atrophy (H)       SABINE 30 MG 24 hr capsule   Generic drug:  morphine     60 capsule    Take 1 capsule (30 mg) by mouth 2 times daily    Spinal muscle atrophy (H)       metroNIDAZOLE 0.75 % cream    METROCREAM    45 g    Apply topically 2 times daily    Acne rosacea       MILK OF MAGNESIA PO      Take 30 mLs by mouth as needed        neomycin-polymyxin-dexamethasone 3.5-73256-3.1 Oint ophthalmic ointment    MAXITROL     1 Application 3 times daily        omeprazole 20 MG CR capsule    priLOSEC    90 capsule    TAKE 20 ML(40MG) VIA \"G-TUBE\" DAILY    Gastroesophageal reflux disease without esophagitis       ondansetron 4 MG ODT tab    ZOFRAN-ODT    60 tablet    Take 1 tablet (4 mg) by mouth every 8 hours as needed for nausea    Spinal muscle atrophy (H)       order for DME     1 Units    Equipment being ordered: [employer] Percussor    Recurrent pneumonia       OXY BALANCE FACIAL CLEAN WASH EX      Externally apply 1 pad topically daily        polyethylene " glycol powder    MIRALAX    510 g    Take 17 g (1 capful) by mouth daily    Constipation, unspecified constipation type, Need for prophylactic vaccination and inoculation against influenza, Abnormal weight gain       Senna 8.8 MG/5ML Syrp     600 mL    TAKE 10 ML(17.6MG) BY MOUTH TWICE DAILY AS DIRECTED    Spinal muscle atrophy (H)       silver sulfADIAZINE 1 % cream    SILVADENE    400 g    Apply topically daily    Spinal muscle atrophy (H)       XARELTO 20 MG Tabs tablet   Generic drug:  rivaroxaban ANTICOAGULANT     30 tablet    TAKE 1 TABLET(20 MG) BY MOUTH DAILY WITH DINNER    DVT (deep venous thrombosis) (H)       zolpidem 5 MG tablet    AMBIEN    30 tablet    Take 1 tablet (5 mg) by mouth nightly as needed for sleep    Sleep disturbance       * Notice:  This list has 2 medication(s) that are the same as other medications prescribed for you. Read the directions carefully, and ask your doctor or other care provider to review them with you.

## 2017-12-14 ASSESSMENT — ENCOUNTER SYMPTOMS
NERVOUS/ANXIOUS: 1
INSOMNIA: 1

## 2017-12-15 ENCOUNTER — DOCUMENTATION ONLY (OUTPATIENT)
Dept: HEMATOLOGY | Facility: CLINIC | Age: 27
End: 2017-12-15

## 2017-12-15 NOTE — PROGRESS NOTES
Prior Authorization- [facility]    Medication/Dose: Celecoxib 200 mg  Frequency: daily  Route: oral  Diagnosis and ICD: z86.718, z79.01, N94.6   New/Renewal/Insurance change PA: renewal    Important lab values: none   Rationale: patient on long-term anticoagulation, NSAIDs not a good idea due to bleeding risk    If notification received from pharmacy;  Pharmacy name: [facility]  Pharmacy Phone: [phone]  Pharmacy Fax: [phone]  Contact: na - submitted via cover my meds    Insurance name: [employer]   Insurance ID:   Insurance phone (if provided):       Submitted PA via cover my meds, KEY: [id number]

## 2017-12-17 ENCOUNTER — HEALTH MAINTENANCE LETTER (OUTPATIENT)
Age: 27
End: 2017-12-17

## 2017-12-18 DIAGNOSIS — Z23 NEED FOR PROPHYLACTIC VACCINATION AND INOCULATION AGAINST INFLUENZA: ICD-10-CM

## 2017-12-18 DIAGNOSIS — R63.5 ABNORMAL WEIGHT GAIN: ICD-10-CM

## 2017-12-18 DIAGNOSIS — K59.00 CONSTIPATION, UNSPECIFIED CONSTIPATION TYPE: ICD-10-CM

## 2017-12-18 DIAGNOSIS — I82.409 DVT (DEEP VENOUS THROMBOSIS) (H): ICD-10-CM

## 2017-12-18 RX ORDER — POLYETHYLENE GLYCOL 3350 17 G/17G
17 POWDER, FOR SOLUTION ORAL DAILY
Qty: 850 G | Refills: 8 | Status: SHIPPED | OUTPATIENT
Start: 2017-12-18 | End: 2019-01-05

## 2017-12-19 RX ORDER — RIVAROXABAN 20 MG/1
TABLET, FILM COATED ORAL
Qty: 30 TABLET | Refills: 6 | Status: SHIPPED | OUTPATIENT
Start: 2017-12-19 | End: 2018-10-22

## 2017-12-20 NOTE — PROGRESS NOTES
PA approved.  Effective dates: 12/20/17 to 12/20/18  PA reference #: n/a  Pt/pharmacy notified: Maribeth notified 12/20/18 they got an approved claim., tried calling patient's phone but got a message that the voicemail box is not receiving voicemails.     PA was originally denied on 12/19/17 because it didn't meet criteria, however, one of the criteria included dysmennorhea, but primary. This was included on our PA, but listed second, after 'long-term use of anticoagulation' so they denied it. I called and did an appeals over the phone and it was approved for 12 months. Will need to remember in the future to meg N94.6 (dysmennorhea) as the primary diagnosis.

## 2017-12-26 ENCOUNTER — OFFICE VISIT (OUTPATIENT)
Dept: INTERNAL MEDICINE | Facility: CLINIC | Age: 27
End: 2017-12-26
Payer: COMMERCIAL

## 2017-12-26 VITALS — HEART RATE: 72 BPM | DIASTOLIC BLOOD PRESSURE: 65 MMHG | OXYGEN SATURATION: 99 % | SYSTOLIC BLOOD PRESSURE: 94 MMHG

## 2017-12-26 DIAGNOSIS — Z86.718 HISTORY OF DEEP VENOUS THROMBOSIS: ICD-10-CM

## 2017-12-26 DIAGNOSIS — Z79.01 CHRONIC ANTICOAGULATION: ICD-10-CM

## 2017-12-26 DIAGNOSIS — Z01.818 PRE-OP EXAM: ICD-10-CM

## 2017-12-26 DIAGNOSIS — Z01.818 PRE-OP EXAM: Primary | ICD-10-CM

## 2017-12-26 LAB
ALBUMIN SERPL-MCNC: 3.7 G/DL (ref 3.4–5)
ALP SERPL-CCNC: 107 U/L (ref 40–150)
ALT SERPL W P-5'-P-CCNC: 40 U/L (ref 0–50)
ANION GAP SERPL CALCULATED.3IONS-SCNC: 10 MMOL/L (ref 3–14)
AST SERPL W P-5'-P-CCNC: 26 U/L (ref 0–45)
BILIRUB SERPL-MCNC: 0.4 MG/DL (ref 0.2–1.3)
BUN SERPL-MCNC: 12 MG/DL (ref 7–30)
CALCIUM SERPL-MCNC: 8.8 MG/DL (ref 8.5–10.1)
CHLORIDE SERPL-SCNC: 107 MMOL/L (ref 94–109)
CO2 SERPL-SCNC: 23 MMOL/L (ref 20–32)
CREAT SERPL-MCNC: <0.2 MG/DL (ref 0.52–1.04)
ERYTHROCYTE [DISTWIDTH] IN BLOOD BY AUTOMATED COUNT: 18.8 % (ref 10–15)
GFR SERPL CREATININE-BSD FRML MDRD: >90 ML/MIN/1.7M2
GLUCOSE SERPL-MCNC: 84 MG/DL (ref 70–99)
HCT VFR BLD AUTO: 39.4 % (ref 35–47)
HGB BLD-MCNC: 12.1 G/DL (ref 11.7–15.7)
MCH RBC QN AUTO: 25 PG (ref 26.5–33)
MCHC RBC AUTO-ENTMCNC: 30.7 G/DL (ref 31.5–36.5)
MCV RBC AUTO: 81 FL (ref 78–100)
PLATELET # BLD AUTO: 336 10E9/L (ref 150–450)
POTASSIUM SERPL-SCNC: 3.7 MMOL/L (ref 3.4–5.3)
PROT SERPL-MCNC: 8.6 G/DL (ref 6.8–8.8)
RBC # BLD AUTO: 4.84 10E12/L (ref 3.8–5.2)
SODIUM SERPL-SCNC: 140 MMOL/L (ref 133–144)
WBC # BLD AUTO: 7.4 10E9/L (ref 4–11)

## 2017-12-26 NOTE — PROGRESS NOTES
[facility]   Doris aPt MD  12/26/2017     Chief Complaint:  Preoperative assessment         History of Present Illness:   This is a preoperative assessment for a 27-year-old woman with spinal muscle atrophy who will have a spinal reservoir inserted surgically on December 12, 2017 for the purpose of providing her with an experimental medication for SMA, Spinraza.    Lanie Neil has a past medical history of spinal muscular atrophy, on chronic home ventilation through tracheostomy since 2004, who takes her medications via G-tube though still able to eat and swallow some food for pleasure, and ultimately complicated by a chronic pain syndrome due to a bilateral dislocation as a result of her muscle atrophy. Her past medical history is otherwise significant for DVT on NOAC, Xarelto.    She has been feeling well recently with no upper respiratory symptoms, cough, or shortness of breath. She has not had any problems with her G-tube, nausea or vomiting. Her breathing is very comfortable recently. She's not had any bleeding whatsoever.    She is concerned about pain management postoperatively because of her chronic pain syndrome and reliance on Dilaudid.    She is accompanied by her PCA.     Review of Systems:   10 point ROS negative other than the symptoms noted above in the HPI.    Answers for HPI/ROS submitted by the patient on 12/14/2017   General Symptoms: No  Skin Symptoms: No  HENT Symptoms: No  EYE SYMPTOMS: No  HEART SYMPTOMS: No  LUNG SYMPTOMS: No  INTESTINAL SYMPTOMS: No  URINARY SYMPTOMS: No  GYNECOLOGIC SYMPTOMS: No  BREAST SYMPTOMS: No  SKELETAL SYMPTOMS: No  BLOOD SYMPTOMS: No  NERVOUS SYSTEM SYMPTOMS: No  MENTAL HEALTH SYMPTOMS: Yes  Nervous or Anxious: Yes  Trouble sleeping: Yes    Active Medications:      XARELTO 20 MG TABS tablet, TAKE 1 TABLET(20 MG) BY MOUTH DAILY WITH DINNER, Disp: 30 tablet, Rfl: 6     polyethylene glycol (MIRALAX/GLYCOLAX) powder, Take 17 g by mouth  "daily, Disp: 850 g, Rfl: 8     celecoxib (CELEBREX) 200 MG capsule, TAKE 1 CAPSULE(200 MG) BY MOUTH DAILY, Disp: 30 capsule, Rfl: 3     metroNIDAZOLE (METROCREAM) 0.75 % cream, Apply topically 2 times daily, Disp: 45 g, Rfl: 11     Sennosides (SENNA) 8.8 MG/5ML SYRP, TAKE 10 ML(17.6MG) BY MOUTH TWICE DAILY AS DIRECTED, Disp: 600 mL, Rfl: 11     omeprazole (PRILOSEC) 20 MG CR capsule, TAKE 20 ML(40MG) VIA \"G-TUBE\" DAILY, Disp: 90 capsule, Rfl: 3     silver sulfADIAZINE (SILVADENE) 1 % cream, Apply topically daily, Disp: 400 g, Rfl: 1     ondansetron (ZOFRAN-ODT) 4 MG ODT tab, Take 1 tablet (4 mg) by mouth every 8 hours as needed for nausea, Disp: 60 tablet, Rfl: 5     zolpidem (AMBIEN) 5 MG tablet, Take 1 tablet (5 mg) by mouth nightly as needed for sleep, Disp: 30 tablet, Rfl: 2     diazepam (VALIUM) 5 MG tablet, Take 1 tablet (5 mg) by mouth every 6 hours as needed for muscle spasms, Disp: 30 tablet, Rfl: 2     ALPRAZolam (XANAX) 0.25 MG tablet, Take 1 tablet (0.25 mg) by mouth 3 times daily as needed for anxiety, Disp: 60 tablet, Rfl: 2     gabapentin (NEURONTIN) 250 MG/5ML solution, TAKE 15 ML IN FEEDING TUBE THREE TIMES DAILY, Disp: 1350 mL, Rfl: 11     clindamycin (CLEOCIN T) 1 % lotion, Apply topically 2 times daily To face as needed (Patient not taking: Reported on 12/12/2017), Disp: 60 mL, Rfl: 3     ACETAMINOPHEN PO, Take 650 mg by mouth every 4 hours as needed for pain, Disp: , Rfl:      Magnesium Hydroxide (MILK OF MAGNESIA PO), Take 30 mLs by mouth as needed, Disp: , Rfl:      Salicylic Acid (OXY BALANCE FACIAL CLEAN WASH EX), Externally apply 1 pad topically daily, Disp: , Rfl:      neomycin-polymyxin-dexamethasone (MAXITROL) 3.5-94570-4.1 OINT ophthalmic ointment, 1 Application 3 times daily, Disp: , Rfl:      guaiFENesin (ORGANIDIN) 200 MG TABS tablet, Take 200 mg by mouth every 4 hours as needed for cough, Disp: , Rfl:      albuterol (2.5 MG/3ML) 0.083% neb solution, Take 1 vial (2.5 mg) by " nebulization every 6 hours as needed for shortness of breath / dyspnea or wheezing, Disp: 360 mL, Rfl: 11     Flunisolide HFA 80 MCG/ACT AERS, Inhale 2 puffs into the lungs 2 times daily, Disp: 1 Inhaler, Rfl: 11     albuterol (ALBUTEROL) 108 (90 BASE) MCG/ACT inhaler, Inhale 2 puffs into the lungs every 4 hours as needed for shortness of breath / dyspnea or wheezing, Disp: 1 Inhaler, Rfl: 2     glycerin-hypromellose- (ARTIFICIAL TEARS) 0.2-0.2-1 % SOLN ophthalmic solution, Place 1 drop into both eyes 2 times daily as needed for dry eyes, Disp: 1 Bottle, Rfl: 11     HYDROmorphone (DILAUDID) 4 MG tablet, Take 1-2 tablets (4-8 mg) by mouth every 4 hours as needed for moderate to severe pain, Disp: 300 tablet, Rfl: 0     SABINE 30 MG, Take 1 capsule (30 mg) by mouth 2 times daily, Disp: 60 capsule, Rfl: 0      Allergies:   Review of patient's allergies indicates no known allergies.      Past Medical History:  Anxiety   Snoring  Spinal muscle atrophy  Asthma   Chronic sinusitis   Chronic respiratory failure   Constipation   Dyspepsia and other specified disorders of function of stomach   DVT (deep vein thrombosis)  Chronic pain syndrome      Past Surgical History:  Spine surgery  Tracheostomy  Kidney surgery  ENT surgery  Gastrostomy tube    Family History:   There is no family history currently on file for the patient.      Social History:   The patient is single, a nonsmoker, and does not consume alcohol.       Physical Exam:   BP 94/65  Pulse 72  SpO2 99%  Constitutional: Healthy, alert, no distress and cooperative quadriparetic, lying added incline in an electric wheelchair with the ventilator over her tracheostiomy.  Head: Normocephalic. No masses, lesions, tenderness or abnormalities  ENT: Bilateral TM normal without fluid or infection, throat normal without erythema or exudate, no cervical lymphadenopathy  Cardiovascular: RRR, no murmurs, clicks, rubs, or gallops. Trace bilateral pretibial  edema.  Respiratory: Clear to auscultation bilaterally. No wheezes or crackles.  Gastrointestinal: Soft, nontender, no hepatosplenomegaly.  Neurologic: Spastic quadriplegia  Psychiatric: Mentation appears normal and affect normal/bright  Hematologic/Lymphatic/Immunologic: Normal cervical lymph nodes     Assessment and Plan:  Lanie is a low risk candidate for a low risk surgical procedure.    Hematologic wrists: There is a risk of bleeding on Xarelto.   I would recommend discontinuation of the Xarelto 2 days prior to surgery and resumption 2 days postoperatively. She is on a seen a hematologist who made the identical recommendations.     I've advised her that it is safe to take all other medications on the day of surgery.    I've agreed to help with postoperative management in the outpatient setting, on condition that her inpatient physicians specify the level with increased doses of opiates required to manage her pain in the hospital.    Lanie was seen today for pre-op exam.    Diagnoses and all orders for this visit:    Pre-op exam  -     Basic metabolic panel; Future           Scribe Disclosure:  I, Marysol Prasad, am serving as a scribe to document services personally performed by Doris Pat MD at this visit, based upon the provider's statements to me. All documentation has been reviewed by the aforementioned provider prior to being entered into the official medical record.     Portions of this medical record were completed by a scribe. UPON MY REVIEW AND AUTHENTICATION BY ELECTRONIC SIGNATURE, this confirms (a) I performed the applicable clinical services, and (b) the record is accurate.     Doris Pat MD

## 2017-12-26 NOTE — PROGRESS NOTES
Surgeon (please enter first and last name):  Dr Mihaela Dietz  Fax number for Preop Evaluation: [phone]   Location of Surgery: [facility]  Date of Surgery:  1.12.18  Procedure:  Kittrell for spine   History of reaction to anesthesia?  No    Answers for HPI/ROS submitted by the patient on 12/14/2017   General Symptoms: No  Skin Symptoms: No  HENT Symptoms: No  EYE SYMPTOMS: No  HEART SYMPTOMS: No  LUNG SYMPTOMS: No  INTESTINAL SYMPTOMS: No  URINARY SYMPTOMS: No  GYNECOLOGIC SYMPTOMS: No  BREAST SYMPTOMS: No  SKELETAL SYMPTOMS: No  BLOOD SYMPTOMS: No  NERVOUS SYSTEM SYMPTOMS: No  MENTAL HEALTH SYMPTOMS: Yes  Nervous or Anxious: Yes  Trouble sleeping: Yes

## 2017-12-26 NOTE — MR AVS SNAPSHOT
After Visit Summary   12/26/2017    Lanie Neil    MRN: [id number]           Patient Information     Date Of Birth          1990        Visit Information        Provider Department      12/26/2017 4:30 PM Doris Pat MD [facility]        Today's Diagnoses     Pre-op exam    -  1       Follow-ups after your visit        Your next 10 appointments already scheduled     Dec 26, 2017  5:00 PM CST   LAB with  LAB   [facility] ([facility])    [address] [place] [id number] [phone]           Please do not eat 10-12 hours before your appointment if you are coming in fasting for labs on lipids, cholesterol, or glucose (sugar). This does not apply to pregnant women. Water, hot tea and black coffee (with nothing added) are okay. Do not drink other fluids, diet soda or chew gum.            Jan 29, 2018 11:00 AM CST   (Arrive by 10:45 AM)   Return Visit with Rosa Hernandez MD   [facility] ([facility])    [address] [place] [address] [phone]              Future tests that were ordered for you today     Open Future Orders        Priority Expected Expires Ordered    Basic metabolic panel Routine 12/26/2017 1/9/2018 12/26/2017            Who to contact     Please call your clinic at [phone] to:    Ask questions about your health    Make or cancel appointments    Discuss your medicines    Learn about your test results    Speak to your doctor   If you have compliments or concerns about an experience at your clinic, or if you wish to file a complaint, please contact [facility] Patient Relations at [phone] or email us at [email or web address]         Additional Information About Your Visit        MyChart Information     [employer] gives you secure access to your electronic health record. If you see a  primary care provider, you can also send messages to your care team and make appointments. If you have questions, please call your primary care clinic.  If you do not have a primary care provider, please call [phone] and they will assist you.      [employer] is an electronic gateway that provides easy, online access to your medical records. With [employer], you can request a clinic appointment, read your test results, renew a prescription or communicate with your care team.     To access your existing account, please contact your [facility] or call [phone] for assistance.        Care EveryWhere ID     This is your Care EveryWhere ID. This could be used by other organizations to access your [place] medical records  [id number]        Your Vitals Were     Pulse Pulse Oximetry                72 99%           Blood Pressure from Last 3 Encounters:   12/26/17 94/65   12/12/17 126/87   09/07/17 112/76    Weight from Last 3 Encounters:   09/26/16 58.5 kg (129 lb)   01/22/15 68 kg (150 lb)   08/20/14 68 kg (150 lb)               Primary Care Provider Office Phone # Fax #    Doris Pat [phone] [address] [address]        Equal Access to Services     GLEN ART : Hadii aad ku hadasho Soomaali, waaxda luqadaha, qaybta kaalmada adeegyada, waxay mitzyin haykaitlinn priyanka allred lasunni gautam. So [facility] [phone].    ATENCIÓN: Si habla español, tiene a warren disposición servicios gratuitos de asistencia lingüística. Llame al [phone].    We comply with applicable federal civil rights laws and [place] laws. We do not discriminate on the basis of race, color, national origin, age, disability, sex, sexual orientation, or gender identity.            Thank you!     Thank you for choosing [facility]  for your care. Our goal is always to provide you with excellent care. Hearing back from our patients is one way we can continue to  improve our services. Please take a few minutes to complete the written survey that you may receive in the mail after your visit with us. Thank you!             Your Updated Medication List - Protect others around you: Learn how to safely use, store and throw away your medicines at www.disposemymeds.org.          This list is accurate as of: 12/26/17  4:53 PM.  Always use your most recent med list.                   Brand Name Dispense Instructions for use Diagnosis    ACETAMINOPHEN PO      Take 650 mg by mouth every 4 hours as needed for pain        * albuterol 108 (90 BASE) MCG/ACT Inhaler    PROAIR HFA    1 Inhaler    Inhale 2 puffs into the lungs every 4 hours as needed for shortness of breath / dyspnea or wheezing    Unspecified asthma(493.90)       * albuterol (2.5 MG/3ML) 0.083% neb solution     360 mL    Take 1 vial (2.5 mg) by nebulization every 6 hours as needed for shortness of breath / dyspnea or wheezing    Asthma       ALPRAZolam 0.25 MG tablet    XANAX    60 tablet    Take 1 tablet (0.25 mg) by mouth 3 times daily as needed for anxiety    Spinal muscle atrophy (H)       celecoxib 200 MG capsule    celeBREX    30 capsule    TAKE 1 CAPSULE(200 MG) BY MOUTH DAILY    Chronic anticoagulation, History of deep venous thrombosis, Painful menstrual periods       clindamycin 1 % lotion    CLEOCIN T    60 mL    Apply topically 2 times daily To face as needed    Acne vulgaris       diazepam 5 MG tablet    VALIUM    30 tablet    Take 1 tablet (5 mg) by mouth every 6 hours as needed for muscle spasms    Spinal muscle atrophy (H)       flunisolide HFA 80 MCG/ACT Aers oral inhaler    AEROSPAN    1 Inhaler    Inhale 2 puffs into the lungs 2 times daily    Asthma       gabapentin 250 MG/5ML solution    NEURONTIN    1350 mL    TAKE 15 ML IN FEEDING TUBE THREE TIMES DAILY    Spinal muscle atrophy (H)       glycerin-hypromellose- 0.2-0.2-1 % Soln ophthalmic solution    ARTIFICIAL TEARS    1 Bottle    Place 1 drop  "into both eyes 2 times daily as needed for dry eyes    Dry eyes, unspecified laterality       guaiFENesin 200 MG Tabs tablet    ORGANIDIN     Take 200 mg by mouth every 4 hours as needed for cough        HYDROmorphone 4 MG tablet    DILAUDID    300 tablet    Take 1-2 tablets (4-8 mg) by mouth every 4 hours as needed for moderate to severe pain    Spinal muscle atrophy (H)       SABINE 30 MG 24 hr capsule   Generic drug:  morphine     60 capsule    Take 1 capsule (30 mg) by mouth 2 times daily    Spinal muscle atrophy (H)       metroNIDAZOLE 0.75 % cream    METROCREAM    45 g    Apply topically 2 times daily    Acne rosacea       MILK OF MAGNESIA PO      Take 30 mLs by mouth as needed        neomycin-polymyxin-dexamethasone 3.5-80854-9.1 Oint ophthalmic ointment    MAXITROL     1 Application 3 times daily        omeprazole 20 MG CR capsule    priLOSEC    90 capsule    TAKE 20 ML(40MG) VIA \"G-TUBE\" DAILY    Gastroesophageal reflux disease without esophagitis       ondansetron 4 MG ODT tab    ZOFRAN-ODT    60 tablet    Take 1 tablet (4 mg) by mouth every 8 hours as needed for nausea    Spinal muscle atrophy (H)       order for [facility]     1 Units    Equipment being ordered: [employer] Percussor    Recurrent pneumonia       OXY BALANCE FACIAL CLEAN WASH EX      Externally apply 1 pad topically daily        polyethylene glycol powder    MIRALAX/GLYCOLAX    850 g    Take 17 g by mouth daily    Constipation, unspecified constipation type, Need for prophylactic vaccination and inoculation against influenza, Abnormal weight gain       Senna 8.8 MG/5ML Syrp     600 mL    TAKE 10 ML(17.6MG) BY MOUTH TWICE DAILY AS DIRECTED    Spinal muscle atrophy (H)       silver sulfADIAZINE 1 % cream    SILVADENE    400 g    Apply topically daily    Spinal muscle atrophy (H)       XARELTO 20 MG Tabs tablet   Generic drug:  rivaroxaban ANTICOAGULANT     30 tablet    TAKE 1 TABLET(20 MG) BY MOUTH DAILY WITH DINNER    DVT (deep venous thrombosis) " (H)       zolpidem 5 MG tablet    AMBIEN    30 tablet    Take 1 tablet (5 mg) by mouth nightly as needed for sleep    Sleep disturbance       * Notice:  This list has 2 medication(s) that are the same as other medications prescribed for you. Read the directions carefully, and ask your doctor or other care provider to review them with you.

## 2017-12-26 NOTE — LETTER
12/26/2017      RE: Lanie Neil  [address]   [place] [address]       [facility]  Primary Care Center   Doris Pat MD  12/26/2017     Chief Complaint:  Preoperative assessment         History of Present Illness:   This is a preoperative assessment for a 27-year-old woman with spinal muscle atrophy who will have a spinal reservoir inserted surgically on December 12, 2017 for the purpose of providing her with an experimental medication for SMA, Spinraza.    Lanie Neil has a past medical history of spinal muscular atrophy, on chronic home ventilation through tracheostomy since 2004, who takes her medications via G-tube though still able to eat and swallow some food for pleasure, and ultimately complicated by a chronic pain syndrome due to a bilateral dislocation as a result of her muscle atrophy. Her past medical history is otherwise significant for DVT on NOAC, Xarelto.    She has been feeling well recently with no upper respiratory symptoms, cough, or shortness of breath. She has not had any problems with her G-tube, nausea or vomiting. Her breathing is very comfortable recently. She's not had any bleeding whatsoever.    She is concerned about pain management postoperatively because of her chronic pain syndrome and reliance on Dilaudid.    She is accompanied by her PCA.     Review of Systems:   10 point ROS negative other than the symptoms noted above in the HPI.    Answers for HPI/ROS submitted by the patient on 12/14/2017   General Symptoms: No  Skin Symptoms: No  HENT Symptoms: No  EYE SYMPTOMS: No  HEART SYMPTOMS: No  LUNG SYMPTOMS: No  INTESTINAL SYMPTOMS: No  URINARY SYMPTOMS: No  GYNECOLOGIC SYMPTOMS: No  BREAST SYMPTOMS: No  SKELETAL SYMPTOMS: No  BLOOD SYMPTOMS: No  NERVOUS SYSTEM SYMPTOMS: No  MENTAL HEALTH SYMPTOMS: Yes  Nervous or Anxious: Yes  Trouble sleeping: Yes    Active Medications:      XARELTO 20 MG TABS tablet, TAKE 1 TABLET(20 MG) BY MOUTH DAILY WITH DINNER,  "Disp: 30 tablet, Rfl: 6     polyethylene glycol (MIRALAX/GLYCOLAX) powder, Take 17 g by mouth daily, Disp: 850 g, Rfl: 8     celecoxib (CELEBREX) 200 MG capsule, TAKE 1 CAPSULE(200 MG) BY MOUTH DAILY, Disp: 30 capsule, Rfl: 3     metroNIDAZOLE (METROCREAM) 0.75 % cream, Apply topically 2 times daily, Disp: 45 g, Rfl: 11     Sennosides (SENNA) 8.8 MG/5ML SYRP, TAKE 10 ML(17.6MG) BY MOUTH TWICE DAILY AS DIRECTED, Disp: 600 mL, Rfl: 11     omeprazole (PRILOSEC) 20 MG CR capsule, TAKE 20 ML(40MG) VIA \"G-TUBE\" DAILY, Disp: 90 capsule, Rfl: 3     silver sulfADIAZINE (SILVADENE) 1 % cream, Apply topically daily, Disp: 400 g, Rfl: 1     ondansetron (ZOFRAN-ODT) 4 MG ODT tab, Take 1 tablet (4 mg) by mouth every 8 hours as needed for nausea, Disp: 60 tablet, Rfl: 5     zolpidem (AMBIEN) 5 MG tablet, Take 1 tablet (5 mg) by mouth nightly as needed for sleep, Disp: 30 tablet, Rfl: 2     diazepam (VALIUM) 5 MG tablet, Take 1 tablet (5 mg) by mouth every 6 hours as needed for muscle spasms, Disp: 30 tablet, Rfl: 2     ALPRAZolam (XANAX) 0.25 MG tablet, Take 1 tablet (0.25 mg) by mouth 3 times daily as needed for anxiety, Disp: 60 tablet, Rfl: 2     gabapentin (NEURONTIN) 250 MG/5ML solution, TAKE 15 ML IN FEEDING TUBE THREE TIMES DAILY, Disp: 1350 mL, Rfl: 11     clindamycin (CLEOCIN T) 1 % lotion, Apply topically 2 times daily To face as needed (Patient not taking: Reported on 12/12/2017), Disp: 60 mL, Rfl: 3     ACETAMINOPHEN PO, Take 650 mg by mouth every 4 hours as needed for pain, Disp: , Rfl:      Magnesium Hydroxide (MILK OF MAGNESIA PO), Take 30 mLs by mouth as needed, Disp: , Rfl:      Salicylic Acid (OXY BALANCE FACIAL CLEAN WASH EX), Externally apply 1 pad topically daily, Disp: , Rfl:      neomycin-polymyxin-dexamethasone (MAXITROL) 3.5-79461-5.1 OINT ophthalmic ointment, 1 Application 3 times daily, Disp: , Rfl:      guaiFENesin (ORGANIDIN) 200 MG TABS tablet, Take 200 mg by mouth every 4 hours as needed for cough, " Disp: , Rfl:      albuterol (2.5 MG/3ML) 0.083% neb solution, Take 1 vial (2.5 mg) by nebulization every 6 hours as needed for shortness of breath / dyspnea or wheezing, Disp: 360 mL, Rfl: 11     Flunisolide HFA 80 MCG/ACT AERS, Inhale 2 puffs into the lungs 2 times daily, Disp: 1 Inhaler, Rfl: 11     albuterol (ALBUTEROL) 108 (90 BASE) MCG/ACT inhaler, Inhale 2 puffs into the lungs every 4 hours as needed for shortness of breath / dyspnea or wheezing, Disp: 1 Inhaler, Rfl: 2     glycerin-hypromellose- (ARTIFICIAL TEARS) 0.2-0.2-1 % SOLN ophthalmic solution, Place 1 drop into both eyes 2 times daily as needed for dry eyes, Disp: 1 Bottle, Rfl: 11     HYDROmorphone (DILAUDID) 4 MG tablet, Take 1-2 tablets (4-8 mg) by mouth every 4 hours as needed for moderate to severe pain, Disp: 300 tablet, Rfl: 0     SABINE 30 MG, Take 1 capsule (30 mg) by mouth 2 times daily, Disp: 60 capsule, Rfl: 0      Allergies:   Review of patient's allergies indicates no known allergies.      Past Medical History:  Anxiety   Snoring  Spinal muscle atrophy  Asthma   Chronic sinusitis   Chronic respiratory failure   Constipation   Dyspepsia and other specified disorders of function of stomach   DVT (deep vein thrombosis)  Chronic pain syndrome      Past Surgical History:  Spine surgery  Tracheostomy  Kidney surgery  ENT surgery  Gastrostomy tube    Family History:   There is no family history currently on file for the patient.      Social History:   The patient is single, a nonsmoker, and does not consume alcohol.       Physical Exam:   BP 94/65  Pulse 72  SpO2 99%  Constitutional: Healthy, alert, no distress and cooperative quadriparetic, lying added incline in an electric wheelchair with the ventilator over her tracheostiomy.  Head: Normocephalic. No masses, lesions, tenderness or abnormalities  ENT: Bilateral TM normal without fluid or infection, throat normal without erythema or exudate, no cervical  lymphadenopathy  Cardiovascular: RRR, no murmurs, clicks, rubs, or gallops. Trace bilateral pretibial edema.  Respiratory: Clear to auscultation bilaterally. No wheezes or crackles.  Gastrointestinal: Soft, nontender, no hepatosplenomegaly.  Neurologic: Spastic quadriplegia  Psychiatric: Mentation appears normal and affect normal/bright  Hematologic/Lymphatic/Immunologic: Normal cervical lymph nodes     Assessment and Plan:  Lanie is a low risk candidate for a low risk surgical procedure.    Hematologic wrists: There is a risk of bleeding on Xarelto.   I would recommend discontinuation of the Xarelto 2 days prior to surgery and resumption 2 days postoperatively. She is on a seen a hematologist who made the identical recommendations.     I've advised her that it is safe to take all other medications on the day of surgery.    I've agreed to help with postoperative management in the outpatient setting, on condition that her inpatient physicians specify the level with increased doses of opiates required to manage her pain in the hospital.    Lanie was seen today for pre-op exam.    Diagnoses and all orders for this visit:    Pre-op exam  -     Basic metabolic panel; Future           Scribe Disclosure:  I, Marysol Prasad, am serving as a scribe to document services personally performed by Doris Pat MD at this visit, based upon the provider's statements to me. All documentation has been reviewed by the aforementioned provider prior to being entered into the official medical record.     Portions of this medical record were completed by a scribe. UPON MY REVIEW AND AUTHENTICATION BY ELECTRONIC SIGNATURE, this confirms (a) I performed the applicable clinical services, and (b) the record is accurate.     Doris Pat MD    Surgeon (please enter first and last name):  Dr Mihaela Dietz  Fax number for Preop Evaluation: [phone]   [facility]  Date of Surgery:  1.12.18  Procedure:   Reservoir for spine   History of reaction to anesthesia?  No    Answers for HPI/ROS submitted by the patient on 12/14/2017   General Symptoms: No  Skin Symptoms: No  HENT Symptoms: No  EYE SYMPTOMS: No  HEART SYMPTOMS: No  LUNG SYMPTOMS: No  INTESTINAL SYMPTOMS: No  URINARY SYMPTOMS: No  GYNECOLOGIC SYMPTOMS: No  BREAST SYMPTOMS: No  SKELETAL SYMPTOMS: No  BLOOD SYMPTOMS: No  NERVOUS SYSTEM SYMPTOMS: No  MENTAL HEALTH SYMPTOMS: Yes  Nervous or Anxious: Yes  Trouble sleeping: Yes      Doris Pat MD

## 2018-01-19 DIAGNOSIS — G12.9 SPINAL MUSCLE ATROPHY (H): ICD-10-CM

## 2018-01-19 NOTE — TELEPHONE ENCOUNTER
Dr. Pat    This patient was recently discharged from the [facility] in [facility]. She was discharged on an increased dose of gabapentin. Previously taking 250 mg three times daily.  The discharge summary shows an increase to 300 mg three times a day.  I pended the increased dose for you to review.     Thanks,    Armin

## 2018-01-20 ENCOUNTER — MEDICAL CORRESPONDENCE (OUTPATIENT)
Dept: HEALTH INFORMATION MANAGEMENT | Facility: CLINIC | Age: 28
End: 2018-01-20

## 2018-01-22 DIAGNOSIS — K21.9 GASTROESOPHAGEAL REFLUX DISEASE WITHOUT ESOPHAGITIS: ICD-10-CM

## 2018-01-22 RX ORDER — GABAPENTIN 250 MG/5ML
300 SOLUTION ORAL 3 TIMES DAILY
Qty: 1350 ML | Refills: 3 | Status: SHIPPED | OUTPATIENT
Start: 2018-01-22 | End: 2018-05-15 | Stop reason: DRUGHIGH

## 2018-01-29 DIAGNOSIS — G12.9 SPINAL MUSCLE ATROPHY (H): ICD-10-CM

## 2018-01-29 DIAGNOSIS — K21.9 GASTROESOPHAGEAL REFLUX DISEASE WITHOUT ESOPHAGITIS: ICD-10-CM

## 2018-01-30 DIAGNOSIS — G12.9 SPINAL MUSCLE ATROPHY (H): ICD-10-CM

## 2018-01-30 RX ORDER — ALPRAZOLAM 0.25 MG
TABLET ORAL
Qty: 60 TABLET | Refills: 0 | OUTPATIENT
Start: 2018-01-30

## 2018-01-30 RX ORDER — ALPRAZOLAM 0.25 MG
0.25 TABLET ORAL 3 TIMES DAILY PRN
Qty: 60 TABLET | Refills: 2 | Status: SHIPPED | OUTPATIENT
Start: 2018-01-30 | End: 2018-03-14

## 2018-01-30 NOTE — TELEPHONE ENCOUNTER
Pt calling and needing PA on her Omeprazole.      Hi,   This medication was denied. If the provider would like to appeal please provide a letter of medical necessity and route back to the team. Otherwise you can close the encounter.   Thank you,   Central PA Team (Routing comment)                             Central Prior Authorization Team   Phone: [phone]     PRIOR AUTHORIZATION DENIED     Medication: omeprazole (PRILOSEC) 2 mg/mL SUSP-Initiated - DENIED     Denial Date: 1/30/2018     Denial Rational:                         Appeal Information:

## 2018-01-31 RX ORDER — DIAZEPAM 5 MG
5 TABLET ORAL EVERY 6 HOURS PRN
Qty: 30 TABLET | Refills: 2 | Status: SHIPPED | OUTPATIENT
Start: 2018-01-31 | End: 2018-05-22

## 2018-01-31 NOTE — TELEPHONE ENCOUNTER
diazepam (VALIUM)  Last Written Prescription Date:  8/9/17  Last Fill Quantity: 30,   # refills: 2  Last Office Visit : 12/26/17   Future Office visit:   none    Routing  Because:   controlled

## 2018-02-08 ENCOUNTER — TRANSFERRED RECORDS (OUTPATIENT)
Dept: HEALTH INFORMATION MANAGEMENT | Facility: CLINIC | Age: 28
End: 2018-02-08

## 2018-02-28 ENCOUNTER — TRANSFERRED RECORDS (OUTPATIENT)
Dept: HEALTH INFORMATION MANAGEMENT | Facility: CLINIC | Age: 28
End: 2018-02-28

## 2018-03-11 DIAGNOSIS — G47.9 SLEEP DISTURBANCE: ICD-10-CM

## 2018-03-12 NOTE — TELEPHONE ENCOUNTER
zolpidem (AMBIEN) 5 MG  Last Written Prescription Date:  9/5/17  Last Fill Quantity: 30,   # refills: 2  Last Office Visit : 12/26/17  Future Office visit:  NONE    Routing refill request to provider for review/approval because:  Drug not on the refill protocol or controlled substance

## 2018-03-14 ENCOUNTER — TRANSFERRED RECORDS (OUTPATIENT)
Dept: HEALTH INFORMATION MANAGEMENT | Facility: CLINIC | Age: 28
End: 2018-03-14

## 2018-03-14 DIAGNOSIS — G12.9 SPINAL MUSCLE ATROPHY (H): ICD-10-CM

## 2018-03-14 RX ORDER — ZOLPIDEM TARTRATE 5 MG/1
5 TABLET ORAL
Qty: 30 TABLET | Refills: 2 | Status: SHIPPED | OUTPATIENT
Start: 2018-03-14 | End: 2019-07-23

## 2018-03-14 RX ORDER — ALPRAZOLAM 0.25 MG
0.25 TABLET ORAL 3 TIMES DAILY PRN
Qty: 60 TABLET | Refills: 2 | Status: SHIPPED | OUTPATIENT
Start: 2018-03-14 | End: 2018-05-22

## 2018-03-14 NOTE — TELEPHONE ENCOUNTER
Called to [facility].  Gabriela Hylton RN 12:00 PM on 3/14/2018.

## 2018-03-19 DIAGNOSIS — J20.9 ACUTE BRONCHITIS, UNSPECIFIED ORGANISM: Primary | ICD-10-CM

## 2018-03-19 RX ORDER — AMOXICILLIN 500 MG/1
500 CAPSULE ORAL 3 TIMES DAILY
Qty: 21 CAPSULE | Refills: 0 | Status: SHIPPED | OUTPATIENT
Start: 2018-03-19 | End: 2018-03-26

## 2018-03-21 ENCOUNTER — MEDICAL CORRESPONDENCE (OUTPATIENT)
Dept: HEALTH INFORMATION MANAGEMENT | Facility: CLINIC | Age: 28
End: 2018-03-21

## 2018-03-24 DIAGNOSIS — J45.909 ASTHMA: ICD-10-CM

## 2018-03-26 RX ORDER — ALBUTEROL SULFATE 0.83 MG/ML
SOLUTION RESPIRATORY (INHALATION)
Qty: 360 ML | Refills: 6 | Status: SHIPPED | OUTPATIENT
Start: 2018-03-26 | End: 2019-12-19

## 2018-03-26 NOTE — TELEPHONE ENCOUNTER
albuterol (2.5 MG/3ML) 0.083% neb solution  Last Written Prescription Date:  3/14/17  Last Fill Quantity: 360ml,   # refills: 11  Last Office Visit : 12/26/17  Future Office visit: none       Routing  Because:  ACT

## 2018-04-11 DIAGNOSIS — K59.00 CONSTIPATION, UNSPECIFIED CONSTIPATION TYPE: Primary | ICD-10-CM

## 2018-04-11 RX ORDER — LACTULOSE 10 G/15ML
20 SOLUTION ORAL 2 TIMES DAILY PRN
Qty: 500 ML | Refills: 11 | Status: SHIPPED | OUTPATIENT
Start: 2018-04-11 | End: 2019-07-23

## 2018-04-13 DIAGNOSIS — Z86.718 HISTORY OF DEEP VENOUS THROMBOSIS: ICD-10-CM

## 2018-04-13 DIAGNOSIS — Z79.01 CHRONIC ANTICOAGULATION: ICD-10-CM

## 2018-04-13 DIAGNOSIS — N94.6 PAINFUL MENSTRUAL PERIODS: ICD-10-CM

## 2018-04-13 RX ORDER — CELECOXIB 200 MG/1
CAPSULE ORAL
Qty: 30 CAPSULE | Refills: 8 | Status: SHIPPED | OUTPATIENT
Start: 2018-04-13 | End: 2020-08-17

## 2018-04-18 ENCOUNTER — TRANSFERRED RECORDS (OUTPATIENT)
Dept: HEALTH INFORMATION MANAGEMENT | Facility: CLINIC | Age: 28
End: 2018-04-18

## 2018-05-01 ENCOUNTER — TRANSFERRED RECORDS (OUTPATIENT)
Dept: HEALTH INFORMATION MANAGEMENT | Facility: CLINIC | Age: 28
End: 2018-05-01

## 2018-05-15 ENCOUNTER — OFFICE VISIT (OUTPATIENT)
Dept: HEMATOLOGY | Facility: CLINIC | Age: 28
End: 2018-05-15
Attending: PHYSICIAN ASSISTANT
Payer: COMMERCIAL

## 2018-05-15 VITALS
WEIGHT: 145 LBS | DIASTOLIC BLOOD PRESSURE: 80 MMHG | BODY MASS INDEX: 31.38 KG/M2 | TEMPERATURE: 98 F | SYSTOLIC BLOOD PRESSURE: 111 MMHG | HEART RATE: 65 BPM

## 2018-05-15 DIAGNOSIS — Z79.01 CHRONIC ANTICOAGULATION: Primary | ICD-10-CM

## 2018-05-15 DIAGNOSIS — Z86.718 HISTORY OF DEEP VENOUS THROMBOSIS: ICD-10-CM

## 2018-05-15 DIAGNOSIS — J96.10 CHRONIC RESPIRATORY FAILURE, UNSPECIFIED WHETHER WITH HYPOXIA OR HYPERCAPNIA (H): ICD-10-CM

## 2018-05-15 DIAGNOSIS — G12.9 SPINAL MUSCLE ATROPHY (H): ICD-10-CM

## 2018-05-15 PROCEDURE — G0463 HOSPITAL OUTPT CLINIC VISIT: HCPCS

## 2018-05-15 PROCEDURE — 99212 OFFICE O/P EST SF 10 MIN: CPT | Performed by: PHYSICIAN ASSISTANT

## 2018-05-15 RX ORDER — GABAPENTIN 250 MG/5ML
18 SOLUTION ORAL 3 TIMES DAILY
COMMUNITY
End: 2018-07-25

## 2018-05-15 ASSESSMENT — PAIN SCALES - GENERAL: PAINLEVEL: SEVERE PAIN (6)

## 2018-05-15 NOTE — NURSING NOTE
Lanie Neil  MRN: [id number]  Female, 27 year old, 1990  Hx of Left leg DVT 2012;      Saw Lanie with [facility] P[facility].  Also present was her mother.  She is having a lumbar reservoir revision at [facility] which has not yet been scheduled.  We have [facility]'s Pre-op phone: [phone] and fax: [phone] to send holding orders for her Xarelto.  We will use same plan as last surgery of holding 48 hours prior to surgery and restarting 48-72 hours after surgery.  Mom will call me with surgery date and then I will fax over recommendations.  She is having no bleeding issues and Celebrex is working great for periods.  Ariana Crandall, RN, MSN -Nurse Clinician, [facility] [phone]

## 2018-05-15 NOTE — PROGRESS NOTES
[facility]  [address] [place] [id number]  Main: [phone], Fax: [phone]    Patient seen at: [facility] at [address] [place] [address].    Outpatient Visit Note:    Patient: Lanie Neil  MRN: [id number]  : 1990  JOSÉ MIGUEL: May 15, 2018    Reason:  Spinal muscular atrophy. Wheelchair bound. History of DVT. Currently on long term anticoagulation therapy with Xarelto. Here to discuss anticoagulation plan for her upcoming surgery (intrathecal catheter placement revision).       Summary of Medical History:  This is a 27 year old female with a history of spinal muscular atrophy, who is chronically wheelchair bound, who moved from [place] to [place] back in May 2014, who has a history of DVT in the left leg, currently on long term anticoagulation therapy with Xarelto, returns to clinic today to discuss anticoagulation management plan for her upcoming intrathecal catheter placement revision surgery. I last saw this patient back in Aug 2014 and again back in 2015 and Dec 2017, please see my initial consult note and previous progress note for her complete detail history.       Briefly, Lanie has been wheelchair bound and immobilized for many years. Dated back in 2012, she was complaining of left leg swelling. At the time an ultrasound out at [place] ([facility]), found subacute/chronic occlusive thrombus in the mid to distal left femoral vein with collateralization. She was placed on Enoxaparin initially. About 2 weeks after she was started on Enoxaparin therapy, she was complaining of headache and a head CT at the time showed a subdural hematoma on 2012. She was admitted and Enoxaparin was held. The decision was made at the time to have an IVC filter placed, which was done on 2012.       She eventually was restarted on Enoxaparin at a lowered dose and has maintain that until  she was evaluated by a hematologist, Dr. Willy Box at [place]. In 9/11/2013, Dr. Box decided to stop the patient's Enoxaparin injections and started her on Xarelto at 20 mg PO Qday. The patient has since remained on Xarelto at this dose without any trouble.       During our visit with Lanie tellez in Aug 2014, we agreed that she should maintain on long term anticoagulation therapy given her chronic immobilization and high risk of recurrent thromboembolism. At the time, we did obtain approval from her health insurance company for Xarelto use at 20 mg PO Qday dosing. She has since remained on this dosing regimen.      Her mother usually crushes up her Xarelto and administer it via her J-tube.       Interim History:  Lanie has continued to do well on Xarelto at 20 mg PO Qday dosing. During her visit with me back in Dec 2017, we discussed anticoagulation management plan surrounding her intrathecal catheter placement on 1/12/2018 at [facility] [place] [facility]. She stopped her Xarelto 48 hours prior to surgery and restarted her Xarelto about 72 hours after the procedure. She did well without any bleeding complications.    Her intrathecal catheter is not functional currently and she is needing a revision. The surgery is not scheduled as of yet.    Medication:  Current Outpatient Prescriptions   Medication     ACETAMINOPHEN PO     albuterol (2.5 MG/3ML) 0.083% neb solution     albuterol (ALBUTEROL) 108 (90 BASE) MCG/ACT inhaler     Albuterol Sulfate (VENTOLIN HFA IN)     ALPRAZolam (XANAX) 0.25 MG tablet     celecoxib (CELEBREX) 200 MG capsule     clindamycin (CLEOCIN T) 1 % lotion     diazepam (VALIUM) 5 MG tablet     gabapentin (NEURONTIN) 250 MG/5ML solution     glycerin-hypromellose- (ARTIFICIAL TEARS) 0.2-0.2-1 % SOLN ophthalmic solution     guaiFENesin (ORGANIDIN) 200 MG TABS tablet     HYDROmorphone (DILAUDID) 4 MG tablet     SABINE 30 MG     lactulose (CHRONULAC) 10 GM/15ML solution     Magnesium Hydroxide  (MILK OF MAGNESIA PO)     metroNIDAZOLE (METROCREAM) 0.75 % cream     omeprazole (PRILOSEC) 2 mg/mL SUSP     ondansetron (ZOFRAN-ODT) 4 MG ODT tab     order for DME     polyethylene glycol (MIRALAX/GLYCOLAX) powder     Salicylic Acid (OXY BALANCE FACIAL CLEAN WASH EX)     Sennosides (SENNA) 8.8 MG/5ML SYRP     silver sulfADIAZINE (SILVADENE) 1 % cream     XARELTO 20 MG TABS tablet     zolpidem (AMBIEN) 5 MG tablet     order for DME     [DISCONTINUED] gabapentin (NEURONTIN) 250 MG/5ML solution     No current facility-administered medications for this visit.      Allergies:   No Known Allergies    Social History and Family History:  Deferred.    PmHx:  Past Medical History:   Diagnosis Date     Anxiety      Snoring      Spinal muscle atrophy (H)        Objective:  In no acute distress. Wheelchair bound.  Vitals: /80 (BP Location: Right arm, Patient Position: Semi-Chairez's, Cuff Size: Adult Small)  Pulse 65  Temp 98  F (36.7  C) (Oral)  Wt 65.8 kg (145 lb)  BMI 31.38 kg/m2  Exam:  Complete exam is not performed today.    Assessment:  In summary, Lanie is a 27 year old female who has a history of spinal muscular atrophy, who is chronically wheelchair bound, who also has a history of DVT in the left leg, currently on long term anticoagulation therapy with Xarelto, returns to clinic today to discuss anticoagulation management plan for her upcoming intrathecal catheter placement revision surgery. Lanie continues to do well on Xarelto at 20 mg PO Qday.     Diagnosis:  1. History of left lower extremity DVT.  2. On chronic anticoagulation therapy with Xarelto at 20 mg PO Qday.  3. Spinal muscular atrophy.  4. Chronic respiratory failure.    5. Chronically wheelchair bound.  6. Rosacea     Plan:  She remains to be a good candidate to stay on Xarelto at 20 mg PO Qday. No change in regard to her indefinite anticoagulation therapy.     For her upcoming intrathecal catheter revision procedure, her anticoagulation  management is as follow:  1. Stop Xarelto 48 hours prior to her surgery. This timing should be more than enough to have her return to normal hemostasis status.   2. Restart Xarelto 48-72 hours after her surgery assuming that she has no bleeding complications from the surgery.    Will plan on seeing her back annually for routine follow up clinic visit. Sooner if she should have any bleeding issues or recurrent venous thromboembolism.     Total Time Spent:  13 minutes, all 13 minutes was spent on face-to-face consultation with the patient and coordination of care.    Time IN: 11:05  Time OUT: 11:18      Jt Mir PA-C, MPAS  Physician Assistant  [facility].

## 2018-05-15 NOTE — PATIENT INSTRUCTIONS
1.  Stay on Xarelto 20 mg.  2.  Hold 48 hours prior to surgery & restart 48-72 hours after surgery.  3.  See back in 1 year.

## 2018-05-15 NOTE — MR AVS SNAPSHOT
After Visit Summary   5/15/2018    Lanie Neil    MRN: [id number]           Patient Information     Date Of Birth          1990        Visit Information        Provider Department      5/15/2018 11:00 AM Jt Mir PA-C [facility]        Today's Diagnoses     Chronic anticoagulation    -  1    Spinal muscle atrophy (H)        History of deep venous thrombosis        Chronic respiratory failure, unspecified whether with hypoxia or hypercapnia (H)          Care Instructions    1.  Stay on Xarelto 20 mg.  2.  Hold 48 hours prior to surgery & restart 48-72 hours after surgery.  3.  See back in 1 year.          Follow-ups after your visit        Follow-up notes from your care team     Return in about 1 year (around 5/15/2019).      Your next 10 appointments already scheduled     May 29, 2018  3:00 PM CDT   (Arrive by 2:45 PM)   PRE-OP with Doris Pat MD   [facility] ([facility])    [address] [place] [id number] [phone]              Who to contact     If you have questions or need follow up information about today's clinic visit or your schedule please contact [facility] directly at [phone].  Normal or non-critical lab and imaging results will be communicated to you by MyChart, letter or phone within 4 business days after the clinic has received the results. If you do not hear from us within 7 days, please contact the clinic through [facility] Inchart or phone. If you have a critical or abnormal lab result, we will notify you by phone as soon as possible.  Submit refill requests through [employer] or call your pharmacy and they will forward the refill request to us. Please allow 3 business days for your refill to be completed.          Additional Information About Your Visit        [employer] Information     [employer] gives you secure access to your electronic  health record. If you see a primary care provider, you can also send messages to your care team and make appointments. If you have questions, please call your primary care clinic.  If you do not have a primary care provider, please call [phone] and they will assist you.        Care EveryWhere ID     This is your Care EveryWhere ID. This could be used by other organizations to access your [facility] medical records  [id number]        Your Vitals Were     Pulse Temperature BMI (Body Mass Index)             65 98  F (36.7  C) (Oral) 31.38 kg/m2          Blood Pressure from Last 3 Encounters:   05/15/18 111/80   12/26/17 94/65   12/12/17 126/87    Weight from Last 3 Encounters:   05/15/18 65.8 kg (145 lb)   09/26/16 58.5 kg (129 lb)   01/22/15 68 kg (150 lb)              Today, you had the following     No orders found for display         Today's Medication Changes          These changes are accurate as of 5/15/18 11:59 PM.  If you have any questions, ask your nurse or doctor.               These medicines have changed or have updated prescriptions.        Dose/Directions    gabapentin 250 MG/5ML solution   Commonly known as:  NEURONTIN   This may have changed:  Another medication with the same name was removed. Continue taking this medication, and follow the directions you see here.   Changed by:  Jt Mir PA-C        Dose:  18 mg   Take 18 mg by mouth 3 times daily   Refills:  0       omeprazole 2 mg/mL Susp   Commonly known as:  priLOSEC   This may have changed:  Another medication with the same name was removed. Continue taking this medication, and follow the directions you see here.   Used for:  Gastroesophageal reflux disease without esophagitis   Changed by:  tJ Mir PA-C        Dose:  40 mg   Take 20 mLs (40 mg) by mouth daily   Quantity:  1800 mL   Refills:  3         Stop taking these medicines if you haven't already. Please contact your care team if you have questions.     flunisolide HFA 80  MCG/ACT Aers oral inhaler   Commonly known as:  AEROSPAN   Stopped by:  Jt Mir PA-C                    Primary Care Provider Office Phone # Fax #    Doris Pat [phone] [address] [address]        Equal Access to Services     JESUSITAKENIA KAELYN : Hadii aad ku hadasho Soomaali, waaxda luqadaha, qaybta kaalmada adeegyada, waxay idiin hayaan adeeg jcjoanna lasunni . So [facility] [phone].    ATENCIÓN: Si habla español, tiene a warren disposición servicios gratuitos de asistencia lingüística. [facility] [phone].    We comply with applicable federal civil rights laws and [place] laws. We do not discriminate on the basis of race, color, national origin, age, disability, sex, sexual orientation, or gender identity.            Thank you!     Thank you for choosing [facility]  for your care. Our goal is always to provide you with excellent care. Hearing back from our patients is one way we can continue to improve our services. Please take a few minutes to complete the written survey that you may receive in the mail after your visit with us. Thank you!             Your Updated Medication List - Protect others around you: Learn how to safely use, store and throw away your medicines at www.disposemymeds.org.          This list is accurate as of 5/15/18 11:59 PM.  Always use your most recent med list.                   Brand Name Dispense Instructions for use Diagnosis    ACETAMINOPHEN PO      Take 650 mg by mouth every 4 hours as needed for pain        * VENTOLIN HFA IN      Inhale 2 puffs into the lungs        * albuterol 108 (90 Base) MCG/ACT Inhaler    PROAIR HFA    1 Inhaler    Inhale 2 puffs into the lungs every 4 hours as needed for shortness of breath / dyspnea or wheezing    Unspecified asthma(493.90)       * albuterol (2.5 MG/3ML) 0.083% neb solution     360 mL    USE ONE VIAL BY NEBULIZATION EVERY 6 HOURS AS NEEDED FOR SHORTNESS  OF BREATH/ DYSPNEA OR WHEEZING    Asthma       ALPRAZolam 0.25 MG tablet    XANAX    60 tablet    Take 1 tablet (0.25 mg) by mouth 3 times daily as needed for anxiety    Spinal muscle atrophy (H)       celecoxib 200 MG capsule    celeBREX    30 capsule    TAKE 1 CAPSULE(200 MG) BY MOUTH DAILY    Chronic anticoagulation, History of deep venous thrombosis, Painful menstrual periods       clindamycin 1 % lotion    CLEOCIN T    60 mL    Apply topically 2 times daily To face as needed    Acne vulgaris       diazepam 5 MG tablet    VALIUM    30 tablet    Take 1 tablet (5 mg) by mouth every 6 hours as needed for muscle spasms    Spinal muscle atrophy (H)       gabapentin 250 MG/5ML solution    NEURONTIN     Take 18 mg by mouth 3 times daily        glycerin-hypromellose- 0.2-0.2-1 % Soln ophthalmic solution    ARTIFICIAL TEARS    1 Bottle    Place 1 drop into both eyes 2 times daily as needed for dry eyes    Dry eyes, unspecified laterality       guaiFENesin 200 MG Tabs tablet    ORGANIDIN     Take 200 mg by mouth every 4 hours as needed for cough        HYDROmorphone 4 MG tablet    DILAUDID    300 tablet    Take 1-2 tablets (4-8 mg) by mouth every 4 hours as needed for moderate to severe pain    Spinal muscle atrophy (H)       SABINE 30 MG 24 hr capsule   Generic drug:  morphine     60 capsule    Take 1 capsule (30 mg) by mouth 2 times daily    Spinal muscle atrophy (H)       lactulose 10 GM/15ML solution    CHRONULAC    500 mL    Take 30 mLs (20 g) by mouth 2 times daily as needed for constipation    Constipation, unspecified constipation type       metroNIDAZOLE 0.75 % cream    METROCREAM    45 g    Apply topically 2 times daily    Acne rosacea       MILK OF MAGNESIA PO      Take 30 mLs by mouth as needed        omeprazole 2 mg/mL Susp    priLOSEC    1800 mL    Take 20 mLs (40 mg) by mouth daily    Gastroesophageal reflux disease without esophagitis       ondansetron 4 MG ODT tab    ZOFRAN-ODT    60 tablet    Take  1 tablet (4 mg) by mouth every 8 hours as needed for nausea    Spinal muscle atrophy (H)       * order for DME     1 Units    Equipment being ordered: Vibracare Percussor    Recurrent pneumonia       * order for DME     30 pad    Equipment being ordered: Mepilex (6x6), Apply to wound everyday. Fax to [facility] from [facility] at: [phone]    Encounter for wound care       OXY BALANCE FACIAL CLEAN WASH EX      Externally apply 1 pad topically daily        polyethylene glycol powder    MIRALAX/GLYCOLAX    850 g    Take 17 g by mouth daily    Constipation, unspecified constipation type, Need for prophylactic vaccination and inoculation against influenza, Abnormal weight gain       Senna 8.8 MG/5ML Syrp     600 mL    TAKE 10 ML(17.6MG) BY MOUTH TWICE DAILY AS DIRECTED    Spinal muscle atrophy (H)       silver sulfADIAZINE 1 % cream    SILVADENE    400 g    Apply topically daily    Spinal muscle atrophy (H)       XARELTO 20 MG Tabs tablet   Generic drug:  rivaroxaban ANTICOAGULANT     30 tablet    TAKE 1 TABLET(20 MG) BY MOUTH DAILY WITH DINNER    DVT (deep venous thrombosis) (H)       zolpidem 5 MG tablet    AMBIEN    30 tablet    Take 1 tablet (5 mg) by mouth nightly as needed for sleep    Sleep disturbance       * Notice:  This list has 5 medication(s) that are the same as other medications prescribed for you. Read the directions carefully, and ask your doctor or other care provider to review them with you.

## 2018-05-20 ENCOUNTER — MEDICAL CORRESPONDENCE (OUTPATIENT)
Dept: HEALTH INFORMATION MANAGEMENT | Facility: CLINIC | Age: 28
End: 2018-05-20

## 2018-05-21 DIAGNOSIS — J32.9 SINUSITIS, CHRONIC: ICD-10-CM

## 2018-05-22 DIAGNOSIS — G12.9 SPINAL MUSCLE ATROPHY (H): ICD-10-CM

## 2018-05-22 RX ORDER — DIAZEPAM 5 MG
5 TABLET ORAL EVERY 6 HOURS PRN
Qty: 30 TABLET | Refills: 2 | Status: SHIPPED | OUTPATIENT
Start: 2018-05-22 | End: 2018-09-13

## 2018-05-22 RX ORDER — ALPRAZOLAM 0.25 MG
0.25 TABLET ORAL 3 TIMES DAILY PRN
Qty: 60 TABLET | Refills: 2 | Status: SHIPPED | OUTPATIENT
Start: 2018-05-22 | End: 2018-09-13

## 2018-05-22 NOTE — TELEPHONE ENCOUNTER
Refill for diazepam.  Gabriela Hylton RN 9:04 AM on 5/22/2018.  RX faxed to pt's pharmacy.  Gabriela Hylton RN 10:30 AM on 5/22/2018.

## 2018-05-22 NOTE — TELEPHONE ENCOUNTER
RX refill for Xanax. To DR Pat for signing.  Gabriela Hylton RN 9:02 AM on 5/22/2018.  RX for Xanax faxed to [facility].  Gabriela Hylton RN 10:31 AM on 5/22/2018.

## 2018-05-23 RX ORDER — FLUTICASONE PROPIONATE 50 MCG
1 SPRAY, SUSPENSION (ML) NASAL DAILY
Qty: 16 ML | Refills: 11 | Status: SHIPPED | OUTPATIENT
Start: 2018-05-23 | End: 2020-01-21

## 2018-06-09 ENCOUNTER — NURSE TRIAGE (OUTPATIENT)
Dept: NURSING | Facility: CLINIC | Age: 28
End: 2018-06-09

## 2018-06-09 ENCOUNTER — OFFICE VISIT (OUTPATIENT)
Dept: URGENT CARE | Facility: URGENT CARE | Age: 28
End: 2018-06-09
Payer: COMMERCIAL

## 2018-06-09 VITALS
TEMPERATURE: 100.1 F | DIASTOLIC BLOOD PRESSURE: 79 MMHG | SYSTOLIC BLOOD PRESSURE: 111 MMHG | HEART RATE: 87 BPM | OXYGEN SATURATION: 100 %

## 2018-06-09 DIAGNOSIS — H60.331 SWIMMER'S EAR OF RIGHT SIDE, UNSPECIFIED CHRONICITY: Primary | ICD-10-CM

## 2018-06-09 PROCEDURE — 99213 OFFICE O/P EST LOW 20 MIN: CPT

## 2018-06-09 RX ORDER — NEOMYCIN SULFATE, POLYMYXIN B SULFATE AND HYDROCORTISONE 10; 3.5; 1 MG/ML; MG/ML; [USP'U]/ML
4 SUSPENSION/ DROPS AURICULAR (OTIC) 3 TIMES DAILY
Qty: 6 ML | Refills: 1 | Status: SHIPPED | OUTPATIENT
Start: 2018-06-09 | End: 2018-06-19

## 2018-06-09 NOTE — PROGRESS NOTES
Subjective:   Lanie Neil is a 27 year old female who presents for   Chief Complaint   Patient presents with     Urgent Care     Ear Problem     Mother tried to clean ear and injuried ear. Her ear was bleeding and popping. x3 days   Lanie comes in for evaluation of her R ear discomfort that has been noticeably worse the last few days. She had a small amount of blood after mom inserted a q-tip inside her ear. She produces a lot of tears and this will often drop along her face into her R ear. Hearing is fine.   Patient is accompanied by mother  PMHX/PSHX/MEDS/ALLERGIES/SHX/FHX reviewed and updated in Epic.    Patient Active Problem List    Diagnosis Date Noted     Chronic pain syndrome 05/10/2017     Priority: Medium     Constipation 06/18/2014     Priority: Medium     Asthma 06/18/2014     Priority: Medium     Problem list name updated by automated process. Provider to review       Spinal muscle atrophy (H) 06/18/2014     Priority: Medium     Dyspepsia and other specified disorders of function of stomach 06/18/2014     Priority: Medium     Sinusitis, chronic 06/18/2014     Priority: Medium     DVT (deep venous thrombosis) (H) 06/18/2014     Priority: Medium     Chronic respiratory failure (H) 06/18/2014     Priority: Medium       Current Outpatient Prescriptions   Medication     neomycin-polymyxin-hydrocortisone (CORTISPORIN) 3.5-67888-9 otic suspension     ACETAMINOPHEN PO     albuterol (2.5 MG/3ML) 0.083% neb solution     albuterol (ALBUTEROL) 108 (90 BASE) MCG/ACT inhaler     Albuterol Sulfate (VENTOLIN HFA IN)     ALPRAZolam (XANAX) 0.25 MG tablet     celecoxib (CELEBREX) 200 MG capsule     clindamycin (CLEOCIN T) 1 % lotion     diazepam (VALIUM) 5 MG tablet     fluticasone (FLONASE) 50 MCG/ACT spray     gabapentin (NEURONTIN) 250 MG/5ML solution     glycerin-hypromellose- (ARTIFICIAL TEARS) 0.2-0.2-1 % SOLN ophthalmic solution     guaiFENesin (ORGANIDIN) 200 MG TABS tablet     HYDROmorphone  (DILAUDID) 4 MG tablet     SABINE 30 MG     lactulose (CHRONULAC) 10 GM/15ML solution     Magnesium Hydroxide (MILK OF MAGNESIA PO)     metroNIDAZOLE (METROCREAM) 0.75 % cream     omeprazole (PRILOSEC) 2 mg/mL SUSP     ondansetron (ZOFRAN-ODT) 4 MG ODT tab     order for DME     order for DME     polyethylene glycol (MIRALAX/GLYCOLAX) powder     Salicylic Acid (OXY BALANCE FACIAL CLEAN WASH EX)     Sennosides (SENNA) 8.8 MG/5ML SYRP     silver sulfADIAZINE (SILVADENE) 1 % cream     XARELTO 20 MG TABS tablet     zolpidem (AMBIEN) 5 MG tablet     No current facility-administered medications for this visit.        ROS:  As above per HPI    Objective:   /79  Pulse 87  Temp 100.1  F (37.8  C) (Oral)  SpO2 100%  Breastfeeding? No, There is no height or weight on file to calculate BMI.  Gen:  NAD, interactive, wheelchair bound  HEENT: EOMI, PERRL sclera anicteric, Head normocephalic, ; nares patent; oropharynx pink and moist, macerated tissue of outer and inner ear canal of R side. Slight narrowing of middle portion of ear canal, no pain when pulling at the tragus   CV:  Hemodynamically stablen  Pulm:  no  increased work of breathing     Assessment & Plan:   Lanie Neil, 27 year old female who presents with:  Swimmer's ear of right side, unspecified chronicity  Treatment for 7-10 days apply 3x daily. Recommended avoiding allowing water/tears/liquids from collecting into the ear canals  - neomycin-polymyxin-hydrocortisone (CORTISPORIN) 3.5-62187-6 otic suspension  Dispense: 6 mL; Refill: 1    Henrry Mancilla MD PGY3  [phone] (Pager)  [facility]    Options for treatment and/or follow-up care were reviewed with the patient. Lanie Neil and/or legal guardian was engaged and actively involved in the decision making process. Patient/guardian verbalized understanding of the options discussed and was satisfied with the final plan.

## 2018-06-09 NOTE — TELEPHONE ENCOUNTER
Mom was cleaning tears out of her ears 3 days ago and accidentally injured her with the que tip. Some bloody drainage came out at first and now just hurts. Informed important that she is seen to evaluate what is wrong, so she can be given the correct treatment. Pt is wheel chair bound. Mom will have her other adult children help her to .     Keyonna Quinones RN, [facility]    Reason for Disposition    [1] Cotton swab (e.g., Q-tip) inserted with force AND [2] pain persists > 30 minutes    Additional Information    [1] Bloody discharge AND [2] it followed ear trauma    Negative: Knocked out (unconscious) > 1 minute    Negative: Difficult to awaken or acting confused  (e.g., disoriented, slurred speech)    Negative: Severe neck pain    Negative: Sounds like a life-threatening emergency to the triager    Negative: Wound looks infected    Negative: Pierced ear, problems and symptoms    Negative: Caused by lightning    Negative: [1] Bleeding AND [2] won't stop after 10 minutes of direct pressure (using correct technique)    Negative: Skin is split open or gaping  (or length > 1/4 inch or 6 mm)    Negative: [1] Animal or human bite AND [2] skin is broken (abraded, lacerated)    Negative: Walking is unsteady (i.e., falling or tilting to one side)    Negative: Sounds like a serious injury to the triager    Negative: [1] SEVERE pain AND [2] not improved 2 hours after pain medicine    Negative: [1] Pointed object was inserted into the ear canal AND [2] now has bleeding or earache     (Exception: doctor's ear exam)    Protocols used: EAR INJURY-ADULT-, EAR - DISCHARGE-ADULT-

## 2018-06-09 NOTE — PATIENT INSTRUCTIONS
Apply the ear drops several times a day to the outside and inside the canal    Try to avoid water/tears or other fluids from entering the ear.     Should see improvement within a week

## 2018-06-09 NOTE — MR AVS SNAPSHOT
After Visit Summary   6/9/2018    Lanie Neil    MRN: [id number]           Patient Information     Date Of Birth          1990        Visit Information        Provider Department      6/9/2018 1:20 PM CS URGENT CARE [facility]        Today's Diagnoses     Swimmer's ear of right side, unspecified chronicity    -  1      Care Instructions    Apply the ear drops several times a day to the outside and inside the canal    Try to avoid water/tears or other fluids from entering the ear.     Should see improvement within a week          Follow-ups after your visit        Your next 10 appointments already scheduled     Jun 25, 2018 10:55 AM CDT   (Arrive by 10:40 AM)   PRE-OP with Doris Pat MD   [facility] ([facility])    [address] [place] [id number] [phone]              Who to contact     If you have questions or need follow up information about today's clinic visit or your schedule please contact [facility] directly at [phone].  Normal or non-critical lab and imaging results will be communicated to you by MyChart, letter or phone within 4 business days after the clinic has received the results. If you do not hear from us within 7 days, please contact the clinic through [employer] or phone. If you have a critical or abnormal lab result, we will notify you by phone as soon as possible.  Submit refill requests through [employer] or call your pharmacy and they will forward the refill request to us. Please allow 3 business days for your refill to be completed.          Additional Information About Your Visit        MyChart Information     [employer] gives you secure access to your electronic health record. If you see a primary care provider, you can also send messages to your care team and make appointments. If you have questions, please call your primary care clinic.  If  you do not have a primary care provider, please call [phone] and they will assist you.        Care EveryWhere ID     This is your Care EveryWhere ID. This could be used by other organizations to access your [facility] medical records  [id number]        Your Vitals Were     Pulse Temperature Pulse Oximetry Breastfeeding?          87 100.1  F (37.8  C) (Oral) 100% No         Blood Pressure from Last 3 Encounters:   06/09/18 111/79   05/15/18 111/80   12/26/17 94/65    Weight from Last 3 Encounters:   05/15/18 145 lb (65.8 kg)   09/26/16 129 lb (58.5 kg)   01/22/15 150 lb (68 kg)              Today, you had the following     No orders found for display         Today's Medication Changes          These changes are accurate as of 6/9/18  1:33 PM.  If you have any questions, ask your nurse or doctor.               Start taking these medicines.        Dose/Directions    neomycin-polymyxin-hydrocortisone 3.5-67030-8 otic suspension   Commonly known as:  CORTISPORIN   Used for:  Swimmer's ear of right side, unspecified chronicity        Dose:  4 drop   Place 4 drops into the right ear 3 times daily for 10 days   Quantity:  6 mL   Refills:  1            Where to get your medicines      [facility] [address] [place] [address] - [address] AT [facility] [address] [facility] [address]     Phone:  [phone]     neomycin-polymyxin-hydrocortisone 3.5-34436-5 otic suspension                Primary Care Provider Office Phone # Fax #    Doris Pat [phone]       [address] [address]        Equal Access to Services     [facility]: Izzy Montenegro, watyler lucelina, qarickie kaalmada wendie, calvin gautam. An [facility] [phone].    ATENCIÓN: Si habla español, tiene a warren disposición servicios gratuitos de asistencia lingüística. Llame al [phone].    We comply with applicable  federal civil rights laws and [place] laws. We do not discriminate on the basis of race, color, national origin, age, disability, sex, sexual orientation, or gender identity.            Thank you!     Thank you for choosing [facility]  for your care. Our goal is always to provide you with excellent care. Hearing back from our patients is one way we can continue to improve our services. Please take a few minutes to complete the written survey that you may receive in the mail after your visit with us. Thank you!             Your Updated Medication List - Protect others around you: Learn how to safely use, store and throw away your medicines at www.disposemymeds.org.          This list is accurate as of 6/9/18  1:33 PM.  Always use your most recent med list.                   Brand Name Dispense Instructions for use Diagnosis    ACETAMINOPHEN PO      Take 650 mg by mouth every 4 hours as needed for pain        * VENTOLIN HFA IN      Inhale 2 puffs into the lungs        * albuterol 108 (90 Base) MCG/ACT Inhaler    PROAIR HFA    1 Inhaler    Inhale 2 puffs into the lungs every 4 hours as needed for shortness of breath / dyspnea or wheezing    Unspecified asthma(493.90)       * albuterol (2.5 MG/3ML) 0.083% neb solution     360 mL    USE ONE VIAL BY NEBULIZATION EVERY 6 HOURS AS NEEDED FOR SHORTNESS OF BREATH/ DYSPNEA OR WHEEZING    Asthma       ALPRAZolam 0.25 MG tablet    XANAX    60 tablet    Take 1 tablet (0.25 mg) by mouth 3 times daily as needed for anxiety    Spinal muscle atrophy (H)       celecoxib 200 MG capsule    celeBREX    30 capsule    TAKE 1 CAPSULE(200 MG) BY MOUTH DAILY    Chronic anticoagulation, History of deep venous thrombosis, Painful menstrual periods       clindamycin 1 % lotion    CLEOCIN T    60 mL    Apply topically 2 times daily To face as needed    Acne vulgaris       diazepam 5 MG tablet    VALIUM    30 tablet    Take 1 tablet (5 mg) by mouth every 6 hours as needed  for muscle spasms    Spinal muscle atrophy (H)       fluticasone 50 MCG/ACT spray    FLONASE    16 mL    Spray 1 spray into both nostrils daily    Sinusitis, chronic       gabapentin 250 MG/5ML solution    NEURONTIN     Take 18 mg by mouth 3 times daily        glycerin-hypromellose- 0.2-0.2-1 % Soln ophthalmic solution    ARTIFICIAL TEARS    1 Bottle    Place 1 drop into both eyes 2 times daily as needed for dry eyes    Dry eyes, unspecified laterality       guaiFENesin 200 MG Tabs tablet    ORGANIDIN     Take 200 mg by mouth every 4 hours as needed for cough        HYDROmorphone 4 MG tablet    DILAUDID    300 tablet    Take 1-2 tablets (4-8 mg) by mouth every 4 hours as needed for moderate to severe pain    Spinal muscle atrophy (H)       SABINE 30 MG 24 hr capsule   Generic drug:  morphine     60 capsule    Take 1 capsule (30 mg) by mouth 2 times daily    Spinal muscle atrophy (H)       lactulose 10 GM/15ML solution    CHRONULAC    500 mL    Take 30 mLs (20 g) by mouth 2 times daily as needed for constipation    Constipation, unspecified constipation type       metroNIDAZOLE 0.75 % cream    METROCREAM    45 g    Apply topically 2 times daily    Acne rosacea       MILK OF MAGNESIA PO      Take 30 mLs by mouth as needed        neomycin-polymyxin-hydrocortisone 3.5-67801-3 otic suspension    CORTISPORIN    6 mL    Place 4 drops into the right ear 3 times daily for 10 days    Swimmer's ear of right side, unspecified chronicity       omeprazole 2 mg/mL Susp    priLOSEC    1800 mL    Take 20 mLs (40 mg) by mouth daily    Gastroesophageal reflux disease without esophagitis       ondansetron 4 MG ODT tab    ZOFRAN-ODT    60 tablet    Take 1 tablet (4 mg) by mouth every 8 hours as needed for nausea    Spinal muscle atrophy (H)       * order for DME     1 Units    Equipment being ordered: Vibracare Percussor    Recurrent pneumonia       * order for DME     30 pad    Equipment being ordered: Mepilex (6x6), Apply to  wound everyday. [facility] to [facility] at: [phone]    Encounter for wound care       OXY BALANCE FACIAL CLEAN WASH EX      Externally apply 1 pad topically daily        polyethylene glycol powder    MIRALAX/GLYCOLAX    850 g    Take 17 g by mouth daily    Constipation, unspecified constipation type, Need for prophylactic vaccination and inoculation against influenza, Abnormal weight gain       Senna 8.8 MG/5ML Syrp     600 mL    TAKE 10 ML(17.6MG) BY MOUTH TWICE DAILY AS DIRECTED    Spinal muscle atrophy (H)       silver sulfADIAZINE 1 % cream    SILVADENE    400 g    Apply topically daily    Spinal muscle atrophy (H)       XARELTO 20 MG Tabs tablet   Generic drug:  rivaroxaban ANTICOAGULANT     30 tablet    TAKE 1 TABLET(20 MG) BY MOUTH DAILY WITH DINNER    DVT (deep venous thrombosis) (H)       zolpidem 5 MG tablet    AMBIEN    30 tablet    Take 1 tablet (5 mg) by mouth nightly as needed for sleep    Sleep disturbance       * Notice:  This list has 5 medication(s) that are the same as other medications prescribed for you. Read the directions carefully, and ask your doctor or other care provider to review them with you.

## 2018-06-12 ENCOUNTER — TRANSFERRED RECORDS (OUTPATIENT)
Dept: HEALTH INFORMATION MANAGEMENT | Facility: CLINIC | Age: 28
End: 2018-06-12

## 2018-06-14 ENCOUNTER — TRANSFERRED RECORDS (OUTPATIENT)
Dept: HEALTH INFORMATION MANAGEMENT | Facility: CLINIC | Age: 28
End: 2018-06-14

## 2018-06-21 ENCOUNTER — TRANSFERRED RECORDS (OUTPATIENT)
Dept: HEALTH INFORMATION MANAGEMENT | Facility: CLINIC | Age: 28
End: 2018-06-21

## 2018-06-24 ASSESSMENT — ENCOUNTER SYMPTOMS
TROUBLE SWALLOWING: 0
SINUS CONGESTION: 0
HOARSE VOICE: 0
SORE THROAT: 0
SINUS PAIN: 0
TASTE DISTURBANCE: 0
SMELL DISTURBANCE: 0
NECK MASS: 0

## 2018-06-25 ENCOUNTER — HOSPITAL ENCOUNTER (EMERGENCY)
Facility: CLINIC | Age: 28
Discharge: HOME | End: 2018-06-25
Attending: EMERGENCY MEDICINE | Admitting: EMERGENCY MEDICINE
Payer: COMMERCIAL

## 2018-06-25 ENCOUNTER — OFFICE VISIT (OUTPATIENT)
Dept: INTERNAL MEDICINE | Facility: CLINIC | Age: 28
End: 2018-06-25
Payer: COMMERCIAL

## 2018-06-25 VITALS
HEART RATE: 75 BPM | SYSTOLIC BLOOD PRESSURE: 98 MMHG | WEIGHT: 145 LBS | BODY MASS INDEX: 31.38 KG/M2 | OXYGEN SATURATION: 96 % | DIASTOLIC BLOOD PRESSURE: 61 MMHG | RESPIRATION RATE: 20 BRPM

## 2018-06-25 VITALS
OXYGEN SATURATION: 99 % | TEMPERATURE: 98.6 F | SYSTOLIC BLOOD PRESSURE: 137 MMHG | DIASTOLIC BLOOD PRESSURE: 92 MMHG | RESPIRATION RATE: 16 BRPM

## 2018-06-25 DIAGNOSIS — Z01.818 PREOP GENERAL PHYSICAL EXAM: ICD-10-CM

## 2018-06-25 DIAGNOSIS — Z01.818 PREOP GENERAL PHYSICAL EXAM: Primary | ICD-10-CM

## 2018-06-25 DIAGNOSIS — Z60.9 SOCIAL PROBLEM: ICD-10-CM

## 2018-06-25 LAB
ANION GAP SERPL CALCULATED.3IONS-SCNC: 8 MMOL/L (ref 3–14)
BUN SERPL-MCNC: 10 MG/DL (ref 7–30)
CALCIUM SERPL-MCNC: 9.2 MG/DL (ref 8.5–10.1)
CHLORIDE SERPL-SCNC: 108 MMOL/L (ref 94–109)
CO2 SERPL-SCNC: 23 MMOL/L (ref 20–32)
CREAT SERPL-MCNC: <0.2 MG/DL (ref 0.52–1.04)
GFR SERPL CREATININE-BSD FRML MDRD: >90 ML/MIN/1.7M2
GLUCOSE SERPL-MCNC: 79 MG/DL (ref 70–99)
POTASSIUM SERPL-SCNC: 3.9 MMOL/L (ref 3.4–5.3)
SODIUM SERPL-SCNC: 139 MMOL/L (ref 133–144)

## 2018-06-25 PROCEDURE — 99283 EMERGENCY DEPT VISIT LOW MDM: CPT | Mod: Z6 | Performed by: EMERGENCY MEDICINE

## 2018-06-25 PROCEDURE — 99283 EMERGENCY DEPT VISIT LOW MDM: CPT | Performed by: EMERGENCY MEDICINE

## 2018-06-25 PROCEDURE — 25000132 ZZH RX MED GY IP 250 OP 250 PS 637: Performed by: EMERGENCY MEDICINE

## 2018-06-25 RX ORDER — ALPRAZOLAM 0.25 MG
0.5 TABLET ORAL ONCE
Status: COMPLETED | OUTPATIENT
Start: 2018-06-25 | End: 2018-06-25

## 2018-06-25 RX ADMIN — ALPRAZOLAM 0.5 MG: 0.25 TABLET ORAL at 12:29

## 2018-06-25 SDOH — SOCIAL STABILITY - SOCIAL INSECURITY: PROBLEM RELATED TO SOCIAL ENVIRONMENT, UNSPECIFIED: Z60.9

## 2018-06-25 ASSESSMENT — ENCOUNTER SYMPTOMS
SHORTNESS OF BREATH: 0
WEAKNESS: 1
FEVER: 0

## 2018-06-25 ASSESSMENT — PAIN SCALES - GENERAL: PAINLEVEL: SEVERE PAIN (7)

## 2018-06-25 NOTE — ED PROVIDER NOTES
History     Chief Complaint   Patient presents with     Social Work Services     HPI  Lanie Neil is a 27 year old female with a history of spinal muscle atrophy, DVT, chronic respiratory failure, chronic pain syndrome, and anxiety who presents for social concerns. Patient was on her way to a 10:55am appointment for preparation for a surgery when her [place] [address] The tow truck was unable to change her tire as the spare was not in the car and towed the car with her wheelchair inside. The patient was unable to call a taxi since their policy will not let them  on the side of a highway therefore she called an ambulance to bring her to the Emergency Department in hopes to find help and reschedule her appointment for today. Patient is anxious due to missing her appointment and not having her wheelchair with her; she requested her prescribed Xanax. She denies any acute medical concerns or changes in her baseline health.      I have reviewed the Medications, Allergies, Past Medical and Surgical History, and Social History in the [employer] system.  Past Medical History:   Diagnosis Date     Anxiety      Snoring      Spinal muscle atrophy (H)        Past Surgical History:   Procedure Laterality Date     ENT SURGERY      tubes     GASTROSTOMY TUBE       KIDNEY SURGERY       SPINE SURGERY       TRACHEOSTOMY         Family History   Problem Relation Age of Onset     Family History Negative Other        Social History   Substance Use Topics     Smoking status: Never Smoker     Smokeless tobacco: Never Used     Alcohol use No     No current facility-administered medications for this encounter.      Current Outpatient Prescriptions   Medication     ACETAMINOPHEN PO     albuterol (2.5 MG/3ML) 0.083% neb solution     albuterol (ALBUTEROL) 108 (90 BASE) MCG/ACT inhaler     Albuterol Sulfate (VENTOLIN HFA IN)     ALPRAZolam (XANAX) 0.25 MG tablet     celecoxib (CELEBREX) 200 MG capsule     clindamycin  (CLEOCIN T) 1 % lotion     diazepam (VALIUM) 5 MG tablet     fluticasone (FLONASE) 50 MCG/ACT spray     gabapentin (NEURONTIN) 250 MG/5ML solution     glycerin-hypromellose- (ARTIFICIAL TEARS) 0.2-0.2-1 % SOLN ophthalmic solution     guaiFENesin (ORGANIDIN) 200 MG TABS tablet     HYDROmorphone (DILAUDID) 4 MG tablet     SABINE 30 MG     lactulose (CHRONULAC) 10 GM/15ML solution     Magnesium Hydroxide (MILK OF MAGNESIA PO)     metroNIDAZOLE (METROCREAM) 0.75 % cream     omeprazole (PRILOSEC) 2 mg/mL SUSP     ondansetron (ZOFRAN-ODT) 4 MG ODT tab     order for DME     order for DME     polyethylene glycol (MIRALAX/GLYCOLAX) powder     Salicylic Acid (OXY BALANCE FACIAL CLEAN WASH EX)     Sennosides (SENNA) 8.8 MG/5ML SYRP     silver sulfADIAZINE (SILVADENE) 1 % cream     XARELTO 20 MG TABS tablet     zolpidem (AMBIEN) 5 MG tablet      No Known Allergies    Review of Systems   Constitutional: Negative for fever.   Respiratory: Negative for shortness of breath.    Cardiovascular: Negative for chest pain.   Neurological: Positive for weakness (baseline).       Physical Exam   BP: (!) 137/92  Heart Rate: 77  Temp: 98.6  F (37  C)  Resp: 16  SpO2: 99 %      Physical Exam   General: patient is alert and oriented and in no acute distress but anxious  Head: atraumatic and normocephalic   EENT: moist mucus membranes, trach in place on home vent  Neck: supple   Cardiovascular: regular rate and rhythm  Pulmonary: no respiratory distress  Neurological: alert and oriented  Skin: warm, dry       ED Course   12:11 PM  The patient was seen and examined by Yamile Cosme MD in Room 3.     ED Course     Procedures             Critical Care time:  none             Labs Ordered and Resulted from Time of ED Arrival Up to the Time of Departure from the ED - No data to display         Assessments & Plan (with Medical Decision Making)   Ms. Neil is a 27 year old female with a history of spinal muscle atrophy, DVT, chronic  respiratory failure, chronic pain syndrome, and anxiety who presents for social concerns.  I have called her primary care provider who is able to see the patient at 1:10 this afternoon in clinic.  We will plan to arrange [facility] transport over to the clinic.  The clinic does have stretchers available for which she can transfer into as she does not have her wheelchair.  Social work has been involved to help coordinate her transportation home.  Her van is currently being repaired and may be available by the end of her visit to use to be transported to home.    I have reviewed the nursing notes.    I have reviewed the findings, diagnosis, plan and need for follow up with the patient.    New Prescriptions    No medications on file       Final diagnoses:   Social problem     I, Edward Randall, am serving as a trained medical scribe to document services personally performed by Yamile Cosme MD, based on the provider's statements to me.   I, Yamile Cosme MD, was physically present and have reviewed and verified the accuracy of this note documented by Edward Randall.    6/25/2018   [facility], [place], EMERGENCY DEPARTMENT     Yamile Cosme MD  06/25/18 [id number]

## 2018-06-25 NOTE — NURSING NOTE
Chief Complaint   Patient presents with     Pre-Op Exam     reservoir revision--7/5/18     Ear Problem     follow up right ear infection   Hilda Sher LPN 1:50 PM on 6/25/2018    Will do Health Maintenance at a future date.Hilda Sher LPN 1:51 PM on 6/25/2018

## 2018-06-25 NOTE — PROGRESS NOTES
[facility]  Primary Care Center   Doris Pat MD  06/25/2018      Chief Complaint:   Pre-Op Exam        History of Present Illness:   Lanie Neil is a 27 year old female with a history of chronic respiratory failure and spinal muscle atrophy who presents for a pre-op exam accompanied by her nurse, Jacinta. Lanie arrives on a gurney and using a ventilator. She is having revision surgery on 7/5/2018 for her interthecal reservoir per Dr. Washington at [facility].     Hematologic risk: She has a past medical history of DVT treated with Xarelto.  She visited Dr. Mir in Hematology on 5/15/2018 and was told to stop taking Xarelto 48 hours prior to surgery, and to restart it 48-72 hours after surgery as long as there are no postoperative complications.   Cardiovascular risks: No history of palpitation, CHF, coronary artery disease or chest pain  Pulmonary risk: She is on a ventilator and has had no recent cough wheeze or dyspnea.  Infection risk: No recent fevers chills night sweats or purulent sputum production.  No UTI symptoms.    Other concerns discussed:  Lanie reports a right external otitis infection. Her left ear is normal, and she has no other symptoms of illness or infection other than in her right ear.     Lanie got a flat tire this morning, and for this reason had to reschedule her appointment today. She presented to the ED today for evaluation of anxiety resulting from the incident.     Lanie reports that she does not need to use her Albuterol inhaler frequently, and only uses it when she is sick.      Review of Systems:   Pertinent items are noted in HPI, remainder of complete ROS is negative.      Active Medications:   Current Outpatient Prescriptions:      ACETAMINOPHEN PO, Take 650 mg by mouth every 4 hours as needed for pain, Disp: , Rfl:      albuterol (2.5 MG/3ML) 0.083% neb solution, USE ONE VIAL BY NEBULIZATION EVERY 6 HOURS AS NEEDED FOR SHORTNESS OF BREATH/ DYSPNEA OR WHEEZING,  Disp: 360 mL, Rfl: 6     albuterol (ALBUTEROL) 108 (90 BASE) MCG/ACT inhaler, Inhale 2 puffs into the lungs every 4 hours as needed for shortness of breath / dyspnea or wheezing, Disp: 1 Inhaler, Rfl: 2     Albuterol Sulfate (VENTOLIN HFA IN), Inhale 2 puffs into the lungs, Disp: , Rfl:      ALPRAZolam (XANAX) 0.25 MG tablet, Take 1 tablet (0.25 mg) by mouth 3 times daily as needed for anxiety, Disp: 60 tablet, Rfl: 2     celecoxib (CELEBREX) 200 MG capsule, TAKE 1 CAPSULE(200 MG) BY MOUTH DAILY, Disp: 30 capsule, Rfl: 8     clindamycin (CLEOCIN T) 1 % lotion, Apply topically 2 times daily To face as needed, Disp: 60 mL, Rfl: 3     diazepam (VALIUM) 5 MG tablet, Take 1 tablet (5 mg) by mouth every 6 hours as needed for muscle spasms, Disp: 30 tablet, Rfl: 2     fluticasone (FLONASE) 50 MCG/ACT spray, Spray 1 spray into both nostrils daily, Disp: 16 mL, Rfl: 11     gabapentin (NEURONTIN) 250 MG/5ML solution, Take 18 mg by mouth 3 times daily, Disp: , Rfl:      glycerin-hypromellose- (ARTIFICIAL TEARS) 0.2-0.2-1 % SOLN ophthalmic solution, Place 1 drop into both eyes 2 times daily as needed for dry eyes, Disp: 1 Bottle, Rfl: 11     guaiFENesin (ORGANIDIN) 200 MG TABS tablet, Take 200 mg by mouth every 4 hours as needed for cough, Disp: , Rfl:      HYDROmorphone (DILAUDID) 4 MG tablet, Take 1-2 tablets (4-8 mg) by mouth every 4 hours as needed for moderate to severe pain, Disp: 300 tablet, Rfl: 0     SABINE 30 MG, Take 1 capsule (30 mg) by mouth 2 times daily, Disp: 60 capsule, Rfl: 0     lactulose (CHRONULAC) 10 GM/15ML solution, Take 30 mLs (20 g) by mouth 2 times daily as needed for constipation, Disp: 500 mL, Rfl: 11     Magnesium Hydroxide (MILK OF MAGNESIA PO), Take 30 mLs by mouth as needed, Disp: , Rfl:      metroNIDAZOLE (METROCREAM) 0.75 % cream, Apply topically 2 times daily, Disp: 45 g, Rfl: 11     omeprazole (PRILOSEC) 2 mg/mL SUSP, Take 20 mLs (40 mg) by mouth daily, Disp: 1800 mL, Rfl: 3      ondansetron (ZOFRAN-ODT) 4 MG ODT tab, Take 1 tablet (4 mg) by mouth every 8 hours as needed for nausea, Disp: 60 tablet, Rfl: 5     order for DME, Equipment being ordered: Mepilex (6x6), Apply to wound everyday. Fax to [facility] from [facility] at: [phone], Disp: 30 pad, Rfl: 11     order for DME, Equipment being ordered: Vibracare Percussor, Disp: 1 Units, Rfl: 0     polyethylene glycol (MIRALAX/GLYCOLAX) powder, Take 17 g by mouth daily, Disp: 850 g, Rfl: 8     Salicylic Acid (OXY BALANCE FACIAL CLEAN WASH EX), Externally apply 1 pad topically daily, Disp: , Rfl:      Sennosides (SENNA) 8.8 MG/5ML SYRP, TAKE 10 ML(17.6MG) BY MOUTH TWICE DAILY AS DIRECTED, Disp: 600 mL, Rfl: 11     silver sulfADIAZINE (SILVADENE) 1 % cream, Apply topically daily, Disp: 400 g, Rfl: 1     XARELTO 20 MG TABS tablet, TAKE 1 TABLET(20 MG) BY MOUTH DAILY WITH DINNER, Disp: 30 tablet, Rfl: 6     zolpidem (AMBIEN) 5 MG tablet, Take 1 tablet (5 mg) by mouth nightly as needed for sleep, Disp: 30 tablet, Rfl: 2  No current facility-administered medications for this visit.       Allergies:   Review of patient's allergies indicates no known allergies.      Past Medical History:  Anxiety  Snoring  Asthma  Sinusitis, chronic  Chronic respiratory failure  Spinal muscle atrophy  Deep venous thrombosis  Constipation  Dyspepsia and other specified disorders of function of stomach  Chronic pain syndrome     Past Surgical History:  ENT surgery, unspecified  Gastrostomy tube  Kidney surgery, unspecified  Spine surgery, unspecified  Tracheostomy    Family History:   The patient denies any relevant family history.      Social History:   Marital Status: Single  Presents to the clinic accompanied by her nurseJacinta  Tobacco Use: Never smoker, never used  Alcohol Use: No  PCP: Doris Pat     Physical Exam:   BP 98/61 (BP Location: Right arm, Patient Position: Sitting, Cuff Size: Adult Small)  Pulse 75  Resp 20  Wt 65.8 kg (145 lb)  SpO2  96%  BMI 31.38 kg/m2   Constitutional: Pleasant,  Paraplegic young woman lying on the stretcher with a ventilator attached via a trach.  Alert and oriented.  Head: Normocephalic. No masses, lesions, tenderness or abnormalities  Eyes: PERRL, no conjunctival injection  ENT: Right TM normal without fluid or infection, throat normal without erythema or exudate, no cervical lymphadenopathy  Cardiovascular: RRR, S1,S2 no murmurs, clicks, rubs, or gallops. No pretibial edema.    Respiratory: Clear to auscultation and percussion bilaterally. Using ventilator. Scattered rhonchi, partially clear with cough.  Gastrointestinal: BS NA. Soft, nontender, no hepatosplenomegaly or mass. Liver is 8 centimeters at the midclavicular line.    Skin: No suspicious lesions or rashes  Psychiatric: Mentation appears normal and affect normal    Last Basic Metabolic Panel:  Lab Results   Component Value Date     06/25/2018      Lab Results   Component Value Date    POTASSIUM 3.9 06/25/2018     Lab Results   Component Value Date    CHLORIDE 108 06/25/2018     Lab Results   Component Value Date    CHINTAN 9.2 06/25/2018     Lab Results   Component Value Date    CO2 23 06/25/2018     Lab Results   Component Value Date    BUN 10 06/25/2018     Lab Results   Component Value Date    CR <0.20 06/25/2018     Lab Results   Component Value Date    GLC 79 06/25/2018         Assessment and Plan:  Preop general physical exam  Pt is a moderate risk candidate for low risk surgical procedure.  Her moderate risk is based on anticoagulation.  This can be mitigated by Holding Xarelto 48 hours prior to surgery, and to restart it 48-72 hours after surgery as long as there are no postoperative complications.    Her pulmonary status is stable.  I have advised her to take Albuterol MDI 2 puffs 30-60 minutes before surgery.    Lanie may take all prescribed medications prior to the procedure, except for Xanax, which she is not to take the morning of procedure.        Follow-up: Data Unavailable         Scribe Disclosure:   I, Heather Lucio, am serving as a scribe to document services personally performed by Doris Pat MD at this visit, based upon the provider's statements to me. All documentation has been reviewed by the aforementioned provider prior to being entered into the official medical record.     Portions of this medical record were completed by a scribe. UPON MY REVIEW AND AUTHENTICATION BY ELECTRONIC SIGNATURE, this confirms (a) I performed the applicable clinical services, and (b) the record is accurate.       Doris Pat MD

## 2018-06-25 NOTE — ED AVS SNAPSHOT
[facility], Emergency Department    [address] [address]    Phone:  [phone]                                       Lanie Neil   MRN: [id number]    Department:  [facility], Emergency Department   Date of Visit:  6/25/2018           Patient Information     Date Of Birth          1990        Your diagnoses for this visit were:     Social problem        You were seen by Yamile Cosme MD.        Discharge Instructions       Follow-up with your primary care provider today at 1:10 for pre-operative exam.      Your next 10 appointments already scheduled     Jun 25, 2018  1:10 PM CDT   (Arrive by 12:55 PM)   PRE-OP with Doris Pat MD   [facility] ([facility])    [address]  [address] [facility] [address] [phone]              24 Hour Appointment Hotline       To make an appointment at any [facility], call [phone] ([phone]). If you don't have a family doctor or clinic, we will help you find one. [place] clinics are conveniently located to serve the needs of you and your family.             Review of your medicines      Our records show that you are taking the medicines listed below. If these are incorrect, please call your family doctor or clinic.        Dose / Directions Last dose taken    ACETAMINOPHEN PO   Dose:  650 mg        Take 650 mg by mouth every 4 hours as needed for pain   Refills:  0        * VENTOLIN HFA IN   Dose:  2 puff        Inhale 2 puffs into the lungs   Refills:  0        * albuterol 108 (90 Base) MCG/ACT Inhaler   Commonly known as:  PROAIR HFA   Dose:  2 puff   Quantity:  1 Inhaler        Inhale 2 puffs into the lungs every 4 hours as needed for shortness of breath / dyspnea or wheezing   Refills:  2        * albuterol (2.5 MG/3ML) 0.083% neb solution   Quantity:  360 mL        USE ONE VIAL BY NEBULIZATION EVERY 6 HOURS AS NEEDED FOR SHORTNESS OF BREATH/ DYSPNEA  OR WHEEZING   Refills:  6        ALPRAZolam 0.25 MG tablet   Commonly known as:  XANAX   Dose:  0.25 mg   Quantity:  60 tablet        Take 1 tablet (0.25 mg) by mouth 3 times daily as needed for anxiety   Refills:  2        celecoxib 200 MG capsule   Commonly known as:  celeBREX   Quantity:  30 capsule        TAKE 1 CAPSULE(200 MG) BY MOUTH DAILY   Refills:  8        clindamycin 1 % lotion   Commonly known as:  CLEOCIN T   Quantity:  60 mL        Apply topically 2 times daily To face as needed   Refills:  3        diazepam 5 MG tablet   Commonly known as:  VALIUM   Dose:  5 mg   Quantity:  30 tablet        Take 1 tablet (5 mg) by mouth every 6 hours as needed for muscle spasms   Refills:  2        fluticasone 50 MCG/ACT spray   Commonly known as:  FLONASE   Dose:  1 spray   Quantity:  16 mL        Spray 1 spray into both nostrils daily   Refills:  11        gabapentin 250 MG/5ML solution   Commonly known as:  NEURONTIN   Dose:  18 mg        Take 18 mg by mouth 3 times daily   Refills:  0        glycerin-hypromellose- 0.2-0.2-1 % Soln ophthalmic solution   Commonly known as:  ARTIFICIAL TEARS   Dose:  1 drop   Quantity:  1 Bottle        Place 1 drop into both eyes 2 times daily as needed for dry eyes   Refills:  11        guaiFENesin 200 MG Tabs tablet   Commonly known as:  ORGANIDIN   Dose:  200 mg        Take 200 mg by mouth every 4 hours as needed for cough   Refills:  0        HYDROmorphone 4 MG tablet   Commonly known as:  DILAUDID   Dose:  4-8 mg   Quantity:  300 tablet        Take 1-2 tablets (4-8 mg) by mouth every 4 hours as needed for moderate to severe pain   Refills:  0        SABINE 30 MG 24 hr capsule   Dose:  30 mg   Quantity:  60 capsule   Generic drug:  morphine        Take 1 capsule (30 mg) by mouth 2 times daily   Refills:  0        lactulose 10 GM/15ML solution   Commonly known as:  CHRONULAC   Dose:  20 g   Quantity:  500 mL        Take 30 mLs (20 g) by mouth 2 times daily as needed for  constipation   Refills:  11        metroNIDAZOLE 0.75 % cream   Commonly known as:  METROCREAM   Quantity:  45 g        Apply topically 2 times daily   Refills:  11        MILK OF MAGNESIA PO   Dose:  30 mL        Take 30 mLs by mouth as needed   Refills:  0        omeprazole 2 mg/mL Susp   Commonly known as:  priLOSEC   Dose:  40 mg   Quantity:  1800 mL        Take 20 mLs (40 mg) by mouth daily   Refills:  3        ondansetron 4 MG ODT tab   Commonly known as:  ZOFRAN-ODT   Dose:  4 mg   Quantity:  60 tablet        Take 1 tablet (4 mg) by mouth every 8 hours as needed for nausea   Refills:  5        * order for DME   Quantity:  1 Units        Equipment being ordered: Vibracare Percussor   Refills:  0        * order for DME   Quantity:  30 pad        Equipment being ordered: Mepilex (6x6), Apply to wound everyday. Fax to [facility] from [facility] at: [phone]   Refills:  11        OXY BALANCE FACIAL CLEAN WASH EX   Dose:  1 pad        Externally apply 1 pad topically daily   Refills:  0        polyethylene glycol powder   Commonly known as:  MIRALAX/GLYCOLAX   Dose:  17 g   Quantity:  850 g        Take 17 g by mouth daily   Refills:  8        Senna 8.8 MG/5ML Syrp   Quantity:  600 mL        TAKE 10 ML(17.6MG) BY MOUTH TWICE DAILY AS DIRECTED   Refills:  11        silver sulfADIAZINE 1 % cream   Commonly known as:  SILVADENE   Quantity:  400 g        Apply topically daily   Refills:  1        XARELTO 20 MG Tabs tablet   Quantity:  30 tablet   Generic drug:  rivaroxaban ANTICOAGULANT        TAKE 1 TABLET(20 MG) BY MOUTH DAILY WITH DINNER   Refills:  6        zolpidem 5 MG tablet   Commonly known as:  AMBIEN   Dose:  5 mg   Quantity:  30 tablet        Take 1 tablet (5 mg) by mouth nightly as needed for sleep   Refills:  2        * Notice:  This list has 5 medication(s) that are the same as other medications prescribed for you. Read the directions carefully, and ask your doctor or other care provider to review them  with you.            Orders Needing Specimen Collection     None      Pending Results     No orders found from 6/23/2018 to 6/26/2018.            Pending Culture Results     No orders found from 6/23/2018 to 6/26/2018.            Pending Results Instructions     If you had any lab results that were not finalized at the time of your Discharge, you can call the ED Lab Result RN at [phone]. You will be contacted by this team for any positive Lab results or changes in treatment. The nurses are available 7 days a week from 10A to 6:30P.  You can leave a message 24 hours per day and they will return your call.        Thank you for choosing [facility]       Thank you for choosing [facility] for your care. Our goal is always to provide you with excellent care. Hearing back from our patients is one way we can continue to improve our services. Please take a few minutes to complete the written survey that you may receive in the mail after you visit with us. Thank you!        [facility] Information     [employer] gives you secure access to your electronic health record. If you see a primary care provider, you can also send messages to your care team and make appointments. If you have questions, please call your primary care clinic.  If you do not have a primary care provider, please call [phone] and they will assist you.        Care EveryWhere ID     This is your Care EveryWhere ID. This could be used by other organizations to access your [facility] medical records  [id number]        Equal Access to Services     GLEN ART : Izzy Montenegro, watyler mae, qaybta kaalcalvin arteaga. So [facility] [phone].    ATENCIÓN: Si habla español, tiene a warren disposición servicios gratuitos de asistencia lingüística. Llame al [phone].    We comply with applicable federal civil rights laws and [place] laws. We do not discriminate on the basis of race, color, national origin,  age, disability, sex, sexual orientation, or gender identity.            After Visit Summary       This is your record. Keep this with you and show to your community pharmacist(s) and doctor(s) at your next visit.
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
## 2018-06-25 NOTE — MR AVS SNAPSHOT
After Visit Summary   6/25/2018    Lanie Neil    MRN: [id number]           Patient Information     Date Of Birth          1990        Visit Information        Provider Department      6/25/2018 1:10 PM Doris Pat MD [facility]        Today's Diagnoses     Preop general physical exam    -  1      Care Instructions      Before Your Surgery      Call your surgeon if there is any change in your health. This includes signs of a cold or flu (such as a sore throat, runny nose, cough, rash or fever).    Do not smoke, drink alcohol or take over the counter medicine (unless your surgeon or primary care doctor tells you to) for the 24 hours before and after surgery.    If you take prescribed drugs: Follow your doctor s orders about which medicines to take and which to stop until after surgery.    Eating and drinking prior to surgery: follow the instructions from your surgeon    Take a shower or bath the night before surgery. Use the soap your surgeon gave you to gently clean your skin. If you do not have soap from your surgeon, use your regular soap. Do not shave or scrub the surgery site.  Wear clean pajamas and have clean sheets on your bed.           Follow-ups after your visit        Who to contact     Please call your clinic at [phone] to:    Ask questions about your health    Make or cancel appointments    Discuss your medicines    Learn about your test results    Speak to your doctor            Additional Information About Your Visit        MyChart Information     [employer] gives you secure access to your electronic health record. If you see a primary care provider, you can also send messages to your care team and make appointments. If you have questions, please call your primary care clinic.  If you do not have a primary care provider, please call [phone] and they will assist you.      [employer] is an electronic gateway that provides easy, online access to your  medical records. With [employer], you can request a clinic appointment, read your test results, renew a prescription or communicate with your care team.     To access your existing account, please contact your [facility] or call [phone] for assistance.        Care EveryWhere ID     This is your Care EveryWhere ID. This could be used by other organizations to access your [facility] medical records  [id number]        Your Vitals Were     Pulse Respirations Pulse Oximetry BMI (Body Mass Index)          75 20 96% 31.38 kg/m2         Blood Pressure from Last 3 Encounters:   06/25/18 98/61   06/25/18 (!) 137/92   06/09/18 111/79    Weight from Last 3 Encounters:   06/25/18 65.8 kg (145 lb)   05/15/18 65.8 kg (145 lb)   09/26/16 58.5 kg (129 lb)               Primary Care Provider Office Phone # Fax #    Doris Pat [phone]       [address] [address]        Equal Access to Services     [facility]: Hadii aad ku hadasho Soomaali, waaxda luqadaha, qaybta kaalmada adeegyada, waxay idiin hayandrew lopez . So [facility] [phone].    ATENCIÓN: Si habla español, tiene a warren disposición servicios gratuitos de asistencia lingüística. Llame al [phone].    We comply with applicable federal civil rights laws and [place] laws. We do not discriminate on the basis of race, color, national origin, age, disability, sex, sexual orientation, or gender identity.            Thank you!     Thank you for choosing [facility]  for your care. Our goal is always to provide you with excellent care. Hearing back from our patients is one way we can continue to improve our services. Please take a few minutes to complete the written survey that you may receive in the mail after your visit with us. Thank you!             Your Updated Medication List - Protect others around you: Learn how to safely use, store and throw away your  medicines at www.disposemymeds.org.          This list is accurate as of 6/25/18  2:32 PM.  Always use your most recent med list.                   Brand Name Dispense Instructions for use Diagnosis    ACETAMINOPHEN PO      Take 650 mg by mouth every 4 hours as needed for pain        * VENTOLIN HFA IN      Inhale 2 puffs into the lungs        * albuterol 108 (90 Base) MCG/ACT Inhaler    PROAIR HFA    1 Inhaler    Inhale 2 puffs into the lungs every 4 hours as needed for shortness of breath / dyspnea or wheezing    Unspecified asthma(493.90)       * albuterol (2.5 MG/3ML) 0.083% neb solution     360 mL    USE ONE VIAL BY NEBULIZATION EVERY 6 HOURS AS NEEDED FOR SHORTNESS OF BREATH/ DYSPNEA OR WHEEZING    Asthma       ALPRAZolam 0.25 MG tablet    XANAX    60 tablet    Take 1 tablet (0.25 mg) by mouth 3 times daily as needed for anxiety    Spinal muscle atrophy (H)       celecoxib 200 MG capsule    celeBREX    30 capsule    TAKE 1 CAPSULE(200 MG) BY MOUTH DAILY    Chronic anticoagulation, History of deep venous thrombosis, Painful menstrual periods       clindamycin 1 % lotion    CLEOCIN T    60 mL    Apply topically 2 times daily To face as needed    Acne vulgaris       diazepam 5 MG tablet    VALIUM    30 tablet    Take 1 tablet (5 mg) by mouth every 6 hours as needed for muscle spasms    Spinal muscle atrophy (H)       fluticasone 50 MCG/ACT spray    FLONASE    16 mL    Spray 1 spray into both nostrils daily    Sinusitis, chronic       gabapentin 250 MG/5ML solution    NEURONTIN     Take 18 mg by mouth 3 times daily        glycerin-hypromellose- 0.2-0.2-1 % Soln ophthalmic solution    ARTIFICIAL TEARS    1 Bottle    Place 1 drop into both eyes 2 times daily as needed for dry eyes    Dry eyes, unspecified laterality       guaiFENesin 200 MG Tabs tablet    ORGANIDIN     Take 200 mg by mouth every 4 hours as needed for cough        HYDROmorphone 4 MG tablet    DILAUDID    300 tablet    Take 1-2 tablets (4-8 mg)  by mouth every 4 hours as needed for moderate to severe pain    Spinal muscle atrophy (H)       SABINE 30 MG 24 hr capsule   Generic drug:  morphine     60 capsule    Take 1 capsule (30 mg) by mouth 2 times daily    Spinal muscle atrophy (H)       lactulose 10 GM/15ML solution    CHRONULAC    500 mL    Take 30 mLs (20 g) by mouth 2 times daily as needed for constipation    Constipation, unspecified constipation type       metroNIDAZOLE 0.75 % cream    METROCREAM    45 g    Apply topically 2 times daily    Acne rosacea       MILK OF MAGNESIA PO      Take 30 mLs by mouth as needed        omeprazole 2 mg/mL Susp    priLOSEC    1800 mL    Take 20 mLs (40 mg) by mouth daily    Gastroesophageal reflux disease without esophagitis       ondansetron 4 MG ODT tab    ZOFRAN-ODT    60 tablet    Take 1 tablet (4 mg) by mouth every 8 hours as needed for nausea    Spinal muscle atrophy (H)       * order for DME     1 Units    Equipment being ordered: Vibracare Percussor    Recurrent pneumonia       * order for DME     30 pad    Equipment being ordered: Mepilex (6x6), Apply to wound everyday. Fax to [facility] from [facility] at: [phone]    Encounter for wound care       OXY BALANCE FACIAL CLEAN WASH EX      Externally apply 1 pad topically daily        polyethylene glycol powder    MIRALAX/GLYCOLAX    850 g    Take 17 g by mouth daily    Constipation, unspecified constipation type, Need for prophylactic vaccination and inoculation against influenza, Abnormal weight gain       Senna 8.8 MG/5ML Syrp     600 mL    TAKE 10 ML(17.6MG) BY MOUTH TWICE DAILY AS DIRECTED    Spinal muscle atrophy (H)       silver sulfADIAZINE 1 % cream    SILVADENE    400 g    Apply topically daily    Spinal muscle atrophy (H)       XARELTO 20 MG Tabs tablet   Generic drug:  rivaroxaban ANTICOAGULANT     30 tablet    TAKE 1 TABLET(20 MG) BY MOUTH DAILY WITH DINNER    DVT (deep venous thrombosis) (H)       zolpidem 5 MG tablet    AMBIEN    30 tablet     Take 1 tablet (5 mg) by mouth nightly as needed for sleep    Sleep disturbance       * Notice:  This list has 5 medication(s) that are the same as other medications prescribed for you. Read the directions carefully, and ask your doctor or other care provider to review them with you.

## 2018-06-25 NOTE — ED AVS SNAPSHOT
[facility] [place], Emergency Department    [address] [place] [address]    Phone:  [phone]                                       Lanie Neil   MRN: [id number]    Department:  [facility], Emergency Department   Date of Visit:  6/25/2018           After Visit Summary Signature Page     I have received my discharge instructions, and my questions have been answered. I have discussed any challenges I see with this plan with the nurse or doctor.    ..........................................................................................................................................  Patient/Patient Representative Signature      ..........................................................................................................................................  Patient Representative Print Name and Relationship to Patient    ..................................................               ................................................  Date                                            Time    ..........................................................................................................................................  Reviewed by Signature/Title    ...................................................              ..............................................  Date                                                            Time

## 2018-07-05 ENCOUNTER — TELEPHONE (OUTPATIENT)
Dept: INTERNAL MEDICINE | Facility: CLINIC | Age: 28
End: 2018-07-05

## 2018-07-05 NOTE — TELEPHONE ENCOUNTER
Preop faxed and verified with Dina at [facility].  Julissa Giron LPN

## 2018-07-05 NOTE — TELEPHONE ENCOUNTER
[facility] Call Center    Phone Message    May a detailed message be left on voicemail: no    Reason for Call: Other: Dina at [facility] surg states they are needing paperwork from pt's pre-op physical ASAP, as they are 45 min late for surg. Writer informed Dina that a call had already been placed requesting this. Dina stated that [facility] has a different computer system, and that the fax number given by the caller at 1:18 was not correct for them. They are needing this faxed to [phone]  ASAP. Please advise.    Action Taken: Message routed to:  [facility] JEAN CARLOS

## 2018-07-05 NOTE — TELEPHONE ENCOUNTER
[facility] Call Center    Phone Message    May a detailed message be left on voicemail: no    Reason for Call: Other: Tasia from [facility], called in regards to patient. Patient is on the OR table right now. Patient needs Pre-Op Physical paperwork faxed to Tasia. Phone: [phone] Fax: [phone]      Action Taken: Message routed to:  [facility] JEAN CARLOS

## 2018-07-18 ENCOUNTER — TELEPHONE (OUTPATIENT)
Dept: INTERNAL MEDICINE | Facility: CLINIC | Age: 28
End: 2018-07-18

## 2018-07-18 DIAGNOSIS — G62.9 NEUROPATHY: Primary | ICD-10-CM

## 2018-07-19 ENCOUNTER — MEDICAL CORRESPONDENCE (OUTPATIENT)
Dept: HEALTH INFORMATION MANAGEMENT | Facility: CLINIC | Age: 28
End: 2018-07-19

## 2018-07-20 NOTE — TELEPHONE ENCOUNTER
gabapentin (NEURONTIN) 250 MG/5ML solution   Last Written Prescription Date:  UNK  Last Fill Quantity: UNK,   # refills: UNK  Last Office Visit : 6/25/18  Future Office visit:  NONE    Routing refill request to provider for review/approval because:  Drug not on the refill protocol or controlled substance  Medication is reported/historical   ERROR?         Left message for pt to call back.  Gabriela Hylton RN 11:19 AM on 7/25/2018.

## 2018-07-24 RX ORDER — GABAPENTIN 250 MG/5ML
18 SOLUTION ORAL 3 TIMES DAILY
OUTPATIENT
Start: 2018-07-24

## 2018-07-25 ENCOUNTER — TELEPHONE (OUTPATIENT)
Dept: INTERNAL MEDICINE | Facility: CLINIC | Age: 28
End: 2018-07-25

## 2018-07-25 RX ORDER — GABAPENTIN 250 MG/5ML
18 SOLUTION ORAL 3 TIMES DAILY
Qty: 450 ML | Refills: 3 | Status: SHIPPED | OUTPATIENT
Start: 2018-07-25 | End: 2018-07-26

## 2018-07-25 NOTE — TELEPHONE ENCOUNTER
[facility]    Phone Message    May a detailed message be left on voicemail: yes    Reason for Call: Other: Pt's mother is returning Gabriela's phone call     Action Taken: Message routed to:  [facility] JEAN CARLOS

## 2018-07-25 NOTE — TELEPHONE ENCOUNTER
[facility]    Phone Message    May a detailed message be left on voicemail: yes    Reason for Call: Other: Need clarification on medication before refilling it - gabapentin (NEURONTIN) 250 MG/5ML solution     Action Taken: Message routed to:  [facility] JEAN CARLOS

## 2018-07-25 NOTE — TELEPHONE ENCOUNTER
Contacted Maribeth at [phone] regarding neurontin (gabapentin) rx.  The most recent refill reads: 0.36mLs (18mg) by per feeding tube route 3 times daily.  The last gabapentin rx was:  Take 15mls  (750 mg)  per feeding tube 3 times daily.  Pharmacy needs clarification on this. Will inform provider.   Mynor Vasquez LPN at 3:15 PM on 7/25/2018

## 2018-07-26 ENCOUNTER — TELEPHONE (OUTPATIENT)
Dept: INTERNAL MEDICINE | Facility: CLINIC | Age: 28
End: 2018-07-26

## 2018-07-26 DIAGNOSIS — G62.9 NEUROPATHY: ICD-10-CM

## 2018-07-26 RX ORDER — GABAPENTIN 250 MG/5ML
SOLUTION ORAL
Qty: 450 ML | Refills: 5 | Status: SHIPPED | OUTPATIENT
Start: 2018-07-26 | End: 2018-07-30

## 2018-07-26 NOTE — TELEPHONE ENCOUNTER
I talked with patient's mother to verify the dose of gabapentin 250/5ml for chronic pain.  The dose was increased from 18 to 24 ml during July hospitalization. She immediately began feeling facial tingling after discharge on that dose. Her mother then reduce the dose to 20 mL three times per day and the facial tingling immediately went away.  The pain is well-controlled.    I have therefore renewed her prescription at 20 mL per feeding to three times daily

## 2018-07-26 NOTE — TELEPHONE ENCOUNTER
[facility] Call Center    Phone Message    May a detailed message be left on voicemail: yes    Reason for Call: Medication Question or concern regarding medication   Prescription Clarification  Name of Medication: gabapentin (NEURONTIN) 250 MG/5ML solution  Prescribing Provider: Bi   Pharmacy: [facility]    What on the order needs clarification? Dosage is off, supply will only last 7 days when it should be 30 days.  Please call [phone] to follow up.           Action Taken: Message routed to:  [facility] JEAN CARLOS

## 2018-07-26 NOTE — TELEPHONE ENCOUNTER
Pt's mother is returning Gabriela's phone call      Action Taken: Message routed to:  [facility] PCC           RX for gabapentin sent to pt's pharmacy. Mother notified.  Gabriela Hylton RN 3:06 PM on 7/26/2018.

## 2018-07-27 NOTE — TELEPHONE ENCOUNTER
I called and reached pharmacy by phone, and pharmacist verified that patient is taking Gabapentin 20 ml three times a day. (for a total of 1000mg three times a day) .  Pharmacist pointed out that with 450 ml and 5 refills, this would only last for a total of 6 weeks using all refills.  Routed to Dr. Pat to verify.    Joanne Hagan RN on 7/27/2018 at 12:48 PM

## 2018-07-30 ENCOUNTER — TRANSFERRED RECORDS (OUTPATIENT)
Dept: HEALTH INFORMATION MANAGEMENT | Facility: CLINIC | Age: 28
End: 2018-07-30

## 2018-07-30 ENCOUNTER — TELEPHONE (OUTPATIENT)
Dept: INTERNAL MEDICINE | Facility: CLINIC | Age: 28
End: 2018-07-30

## 2018-07-30 DIAGNOSIS — G62.9 NEUROPATHY: ICD-10-CM

## 2018-07-30 RX ORDER — GABAPENTIN 250 MG/5ML
SOLUTION ORAL
Qty: 450 ML | Refills: 11 | Status: SHIPPED | OUTPATIENT
Start: 2018-07-30 | End: 2018-07-30

## 2018-07-30 RX ORDER — GABAPENTIN 250 MG/5ML
SOLUTION ORAL
Qty: 1800 ML | Refills: 5 | Status: SHIPPED | OUTPATIENT
Start: 2018-07-30 | End: 2019-02-06

## 2018-07-30 NOTE — TELEPHONE ENCOUNTER
Pharmacy was called, and pharmacist suggested that for monthly refills, Rx could be made for 1800 mls.  450 mls is a weekly amount.  Routed to Dr. Pat for approval.    Joanne Hagan RN on 7/30/2018 at 9:34 AM

## 2018-07-30 NOTE — TELEPHONE ENCOUNTER
Health Call Center    Phone Message    May a detailed message be left on voicemail: no    Reason for Call: Medication Question or concern regarding medication   Prescription Clarification  Name of Medication: Dr. Pat  Prescribing Provider: gabapentin (NEURONTIN) 250 MG/5ML solution   Pharmacy: [facility] - [address]   What on the order needs clarification? Quantity- Dr. Pat prescribed a 7 day supply w/ 11 refills, however in the past pt has received a Month's worth supply.           Action Taken: Message routed to:  [facility] JEAN CARLOS

## 2018-07-31 ENCOUNTER — TELEPHONE (OUTPATIENT)
Dept: INTERNAL MEDICINE | Facility: CLINIC | Age: 28
End: 2018-07-31

## 2018-07-31 NOTE — TELEPHONE ENCOUNTER
Prior Authorization Retail Medication Request    Medication/Dose: gabapentin (NEURONTIN) 250 MG/5ML solution  ICD code (if different than what is on RX):  Neuropathy [G62.9]   Previously Tried and Failed:     Rationale:       Insurance Name:     Insurance ID:  [id number]      Pharmacy Information (if different than what is on RX)  Name:  [facility]   Phone:  [phone]

## 2018-08-01 NOTE — TELEPHONE ENCOUNTER
Central Prior Authorization Team   Phone: [phone]    PA Initiation    Medication: gabapentin (NEURONTIN) 250 MG/5ML solution  Insurance Company: [employer] - Phone [phone] Fax [phone]  Pharmacy Filling the Rx: [facility] [id number] [place] [address] - [address] AT [facility] [address]  Filling Pharmacy Phone: [phone]  Filling Pharmacy Fax:    Start Date: 8/1/2018    Waiting for questions to generate/completed via cmm. Pharmacy will call if another PA is needed for next fill

## 2018-08-20 ENCOUNTER — MEDICAL CORRESPONDENCE (OUTPATIENT)
Dept: HEALTH INFORMATION MANAGEMENT | Facility: CLINIC | Age: 28
End: 2018-08-20

## 2018-08-21 ENCOUNTER — TRANSFERRED RECORDS (OUTPATIENT)
Dept: HEALTH INFORMATION MANAGEMENT | Facility: CLINIC | Age: 28
End: 2018-08-21

## 2018-08-22 ENCOUNTER — TRANSFERRED RECORDS (OUTPATIENT)
Dept: HEALTH INFORMATION MANAGEMENT | Facility: CLINIC | Age: 28
End: 2018-08-22

## 2018-09-11 ENCOUNTER — TELEPHONE (OUTPATIENT)
Dept: INTERNAL MEDICINE | Facility: CLINIC | Age: 28
End: 2018-09-11

## 2018-09-11 DIAGNOSIS — G12.9 SPINAL MUSCLE ATROPHY (H): ICD-10-CM

## 2018-09-11 NOTE — TELEPHONE ENCOUNTER
I spoke with Lanie his mom, Aster, about the need for IV access as the patient requires multiple blood draws on her current experimental Spenraza treatment.  She tells me that each blood draw requires multiple sticks and they were unable to place a central line last time it the July clinics.  Her next infusion is in 2 weeks and they are going to do the infusion intrathecally under anesthesia at [facility].  She requests away to access venous blood.    I discussed the possibility of a Port-A-Cath and recommended that she speak with the physicians at [facility], i.e. to obtain an interventional radiology consultation for that purpose.  I told her that it may not be possible, and that they would have to assess the venous anatomy before placing the port.    Doris Pat

## 2018-09-12 ENCOUNTER — TELEPHONE (OUTPATIENT)
Dept: INTERNAL MEDICINE | Facility: CLINIC | Age: 28
End: 2018-09-12

## 2018-09-12 ASSESSMENT — ENCOUNTER SYMPTOMS
SKIN CHANGES: 0
NAIL CHANGES: 0
POOR WOUND HEALING: 0

## 2018-09-12 NOTE — TELEPHONE ENCOUNTER
Saida     Last Written Prescription Date:  9-20-17  Last Fill Quantity: 600 ml,   # refills: 11  Last Office Visit : 6-25-18  Future Office visit:  9-13-18    Routing refill request to RN for review/approval because:  Not on protocol

## 2018-09-12 NOTE — TELEPHONE ENCOUNTER
[facility] Call Center    Phone Message    May a detailed message be left on voicemail: yes    Reason for Call: Other: Pt's mother Aster called to get a sooner pre-op appt for pt since pt is getting a port put in on monday 9/17. There were no appts available, I offered getting PAC on the line to get pt scheduled there, but Aster refused. Please call Aster to discuss options. Thanks     Action Taken: Message routed to:  [facility] Primary Care

## 2018-09-12 NOTE — TELEPHONE ENCOUNTER
Spoke to patient's mother regarding port placement to be done on Monday, September 17.  Pre-op appointment will be tomorrow with Dr. Moseley at 11:05 am.  Mynor Vasquez LPN at 2:20 PM on 9/12/2018

## 2018-09-12 NOTE — PROGRESS NOTES
"[facility]   Jimmy Moseley MD  09/13/2018     Chief Complaint:   Pre-Op Exam    Surgeon (please enter first and last name):  [facility]  Fax number for Preop Evaluation:  [phone]  Location of Surgery: [facility]  Date of Surgery:  9.20.18  Procedure:  Injection into back  History of reaction to anesthesia?  No    Surgeon (please enter first and last name):  Dr Cárdenas  Fax number for Preop Evaluation:  [phone]  Location of Surgery: [facility]  Date of Surgery:  9.17.18  Procedure:  Port Placement   History of reaction to anesthesia?  No            Sept 17 port place  Sept 20 spine injection       History of Present Illness:   Lanie Neil is 28 year old female here at the request of Dr. Sanchez at [facility] for cardiovascular, pulmonary, and perioperative risk assessment prior to surgeries.  The intended surgical procedures are Port-A-Cath placement and a Spinraza injection.  A copy of this note will be sent to the surgeon . The patient comes with her mother, Aster, to the appointment today. The patient is non-ambulatory, in a gurney.      Reason for surgery:  (must document 4+HPI factors for completion) Per chart review, the patient's mother requested central line placement or port placement for blood draws that she gets with her Spinraza medication administration--this is given by IR through reservoir in the patient's spine. However, the multiple blood draws and the reservoir with this causes the patient \"extreme\" pain, per mother. It was suggested that she be placed under anesthesia in the future for the Spinraza infusions.     Her mother felt that having a port placed until treatments are completed would cause less pain for the patient, overall, as blood could be drawn through a port, rather than having multiple sticks for this. In addition, she will be placed under anesthesia for the Spinraza injections in the future, rather than having them while awake. " These are the two procedures that she needs to have pre-operative examinations for. She has three more doses of Spinraza in two weeks, then it will be administered monthly. Notably, the port is not for the Spinraza, per mother--only for blood draws.     Cardiovascular Risk:  This patient does not have chest pain with ambulation or walking up a flight of stairs.  There is not any chest pain with exercise.  She does not have any notable cardiac history, including palpitations, congestive heart failure, coronary artery disease or chest pain.  There is not a history of stroke or valvular disease.    Pulmonary Risk:  In terms of risk factors for pulmonary complications, the patient does have a history of Asthma, chronic LLL collapse by CXR from 2015, and Chronic Respiratory Failure with chronic trach in the setting of spinal muscle atrophy. The patient follows with Dr. Soni for this. The patient is on a LTV ventilator 24/7 with trach cuff deflated, and she tolerates this well, per Dr. Soni's note from 06/12/2018. This was the last time she saw her; see note for further summary of her respiratory history, care and recommendations.     Of note, the patient notes congestion 1-2 times per day in her lungs, and she has been using albuterol to clear this up, which alleviates this entirely per patient and her mother. She has no fevers or other symptoms with this, and is not concerned for pneumonia.    Perioperative Complications:   The patient does have a history of bleeding or clotting problems in the past; she has a history of DVT in her left inguinal region and her legs, treated with Xarelto. She has been on Xarelto for about ten years now. She visited Dr. Mir in hematology on 05/15/2018 and was told to stop taking Xarelto 48 hours prior to surgery, and to restart it 48/72 hours after surgery as long as there are not postoperative complications; the surgeons for both procedures now asked her to only stop Xarelto 24  hours prior to surgery. She has had complications from past surgeries.  The patient does not have a family history of any anesthesia or surgical complications.    Review of Systems:   Pertinent items are noted in HPI and below, remainder of complete ROS is negative.    Answers for HPI/ROS submitted by the patient on 9/12/2018   General Symptoms: No  Skin Symptoms: Yes  HENT Symptoms: No  EYE SYMPTOMS: No  HEART SYMPTOMS: No  LUNG SYMPTOMS: No  INTESTINAL SYMPTOMS: No  URINARY SYMPTOMS: No  GYNECOLOGIC SYMPTOMS: No  BREAST SYMPTOMS: No  SKELETAL SYMPTOMS: No  BLOOD SYMPTOMS: No  NERVOUS SYSTEM SYMPTOMS: No  MENTAL HEALTH SYMPTOMS: No  Changes in hair: No  Changes in moles/birth marks: No  Itching: No  Rashes: No  Changes in nails: No  Acne: No  Hair in places you don't want it: No  Change in facial hair: No  Warts: Yes - the patient has a small sore on her eye, which her mother would like to have looked at. She thinks it is because she tears up a lot.   Non-healing sores: No  Scarring: No  Flaking of skin: No  Color changes of hands/feet in cold : No  Sun sensitivity: No  Skin thickening: No      Active Medications:      ACETAMINOPHEN PO, Take 650 mg by mouth every 4 hours as needed for pain, Disp: , Rfl:      albuterol (2.5 MG/3ML) 0.083% neb solution, USE ONE VIAL BY NEBULIZATION EVERY 6 HOURS AS NEEDED FOR SHORTNESS OF BREATH/ DYSPNEA OR WHEEZING, Disp: 360 mL, Rfl: 6     albuterol (ALBUTEROL) 108 (90 BASE) MCG/ACT inhaler, Inhale 2 puffs into the lungs every 4 hours as needed for shortness of breath / dyspnea or wheezing, Disp: 1 Inhaler, Rfl: 2     Albuterol Sulfate (VENTOLIN HFA IN), Inhale 2 puffs into the lungs, Disp: , Rfl:      ALPRAZolam (XANAX) 0.25 MG tablet, Take 1 tablet (0.25 mg) by mouth 3 times daily as needed for anxiety, Disp: 60 tablet, Rfl: 2     celecoxib (CELEBREX) 200 MG capsule, TAKE 1 CAPSULE(200 MG) BY MOUTH DAILY, Disp: 30 capsule, Rfl: 8     clindamycin (CLEOCIN T) 1 % lotion, Apply  topically 2 times daily To face as needed, Disp: 60 mL, Rfl: 3     diazepam (VALIUM) 5 MG tablet, Take 1 tablet (5 mg) by mouth every 6 hours as needed for muscle spasms, Disp: 30 tablet, Rfl: 2     fluticasone (FLONASE) 50 MCG/ACT spray, Spray 1 spray into both nostrils daily, Disp: 16 mL, Rfl: 11     gabapentin (NEURONTIN) 250 MG/5ML solution, Take 20 mL per feeding tube three times per day, Disp: 1800 mL, Rfl: 5     glycerin-hypromellose- (ARTIFICIAL TEARS) 0.2-0.2-1 % SOLN ophthalmic solution, Place 1 drop into both eyes 2 times daily as needed for dry eyes, Disp: 1 Bottle, Rfl: 11     guaiFENesin (ORGANIDIN) 200 MG TABS tablet, Take 200 mg by mouth every 4 hours as needed for cough, Disp: , Rfl:      HYDROmorphone (DILAUDID) 4 MG tablet, Take 1-2 tablets (4-8 mg) by mouth every 4 hours as needed for moderate to severe pain, Disp: 300 tablet, Rfl: 0     SABINE 30 MG, Take 1 capsule (30 mg) by mouth 2 times daily, Disp: 60 capsule, Rfl: 0     lactulose (CHRONULAC) 10 GM/15ML solution, Take 30 mLs (20 g) by mouth 2 times daily as needed for constipation, Disp: 500 mL, Rfl: 11     Magnesium Hydroxide (MILK OF MAGNESIA PO), Take 30 mLs by mouth as needed, Disp: , Rfl:      metroNIDAZOLE (METROCREAM) 0.75 % cream, Apply topically 2 times daily, Disp: 45 g, Rfl: 11     omeprazole (PRILOSEC) 2 mg/mL SUSP, Take 20 mLs (40 mg) by mouth daily, Disp: 1800 mL, Rfl: 3     ondansetron (ZOFRAN-ODT) 4 MG ODT tab, Take 1 tablet (4 mg) by mouth every 8 hours as needed for nausea, Disp: 60 tablet, Rfl: 5     polyethylene glycol (MIRALAX/GLYCOLAX) powder, Take 17 g by mouth daily, Disp: 850 g, Rfl: 8     Salicylic Acid (OXY BALANCE FACIAL CLEAN WASH EX), Externally apply 1 pad topically daily, Disp: , Rfl:      Sennosides (SENNA) 8.8 MG/5ML SYRP, TAKE 10 MLS BY MOUTH TWICE DAILY AS DIRECTED, Disp: 474 mL, Rfl: 0     silver sulfADIAZINE (SILVADENE) 1 % cream, Apply topically daily, Disp: 400 g, Rfl: 1     XARELTO 20 MG TABS  tablet, TAKE 1 TABLET(20 MG) BY MOUTH DAILY WITH DINNER, Disp: 30 tablet, Rfl: 6     zolpidem (AMBIEN) 5 MG tablet, Take 1 tablet (5 mg) by mouth nightly as needed for sleep, Disp: 30 tablet, Rfl: 2      Allergies:   Review of patient's allergies indicates no known allergies.      Past Medical History:  Anxiety  Snoring  Spinal muscle atrophy   Constipation  Asthma  Dyspepsia and other specified disorders of function of stomach   Sinusitis  Deep vein thrombosis   Chronic respiratory failure   Chronic pain syndrome      Past Surgical History:  ENT surgery, tubes  Gastrostomy tube  Kidney surgery   Spine surgery  Tracheostomy     Family History:    History reviewed. No pertinent family history.       Social History:   The patient was accompanied to the appointment by: her mother, Aster.  Smoking Status: Never   Smokeless Tobacco: Never   Alcohol Use: no      Physical Exam:   /79  Pulse 83  SpO2 99%     Constitutional: Alert. In no distress. In wheelchair, per usual.   Head: Normocephalic. No masses, lesions, tenderness or abnormalities.  ENT: No neck nodes or sinus tenderness.  Cardiovascular: RRR. No murmurs, clicks, gallops, or rub.  Respiratory: Clear to auscultation bilaterally, no wheezes or crackles.  Gastrointestinal: Abdomen soft. Non-tender. BS normal. No masses or organomegaly.  Musculoskeletal: Extremities normal. Contracture deformities and reduced muscle tone.  Skin: No rashes. Left upper cheek just below inner eyelid, 1mm red papule   Neurologic: Sensation grossly WNL. Chronic weakness.  Psychiatric: Mentation appears normal. Normal affect.   Hematologic/Lymphatic/Immunologic: Normal cervical lymph nodes.      Future Labs:  Not necessary.     EKG:  EKG was not indicated based on risk assessment.     Pregnancy test needed: No     Assessment and Plan:  Patient received flu shot on 11/16/17 last year per mother, and will plan on getting this again soon. This is not available in clinic yet,  unfortunately, and I have recommended she get this somewhere else as soon as she is able.     We will continue to monitor the red spot by her eye--it looks benign on examination.     1. Pre-operative assessment for Port-A-Cath placement and general anesthesia for Spinraza injections  The patient is at LOW risk for cardiovascular complications and at MODERATE risk for pulmonary complications due to chronic ventilation. I did suggest she use her albuterol before surgery. This is a LOW risk surgery.     --Approval given to proceed with proposed procedure, without further diagnostic evaluation  --Patient is currently taking    Anticoagulant or Antiplatelet Medication Use  XARELTO: Bleeding risk is at least moderate for this procedure and rivaroxaban (Xarelto) should be stopped at least 24 hours prior to procedure, per recommendations of the surgeons rather than Dr. Mir of hematology.      --Do not take Celebrex next week, as she has both procedures next week.      --Patient is to take all other scheduled medications on the day of surgery     The patient has been told to not take any aspirin or NSAIDs for 10 days prior to surgery. The patient has been instructed as to what to do with medications prior to surgery.    Follow-up: Return to clinic in per previous plan.         Scribe Disclosure:   I, Lexi Lyn, am serving as a scribe to document services personally performed by Jimmy Moseley MD at this visit, based upon the provider's statements to me. All documentation has been reviewed by the aforementioned provider prior to being entered into the official medical record.     Portions of this medical record were completed by a scribe. UPON MY REVIEW AND AUTHENTICATION BY ELECTRONIC SIGNATURE, this confirms (a) I performed the applicable clinical services, and (b) the record is accurate.    Jimmy Moseley MD   normal

## 2018-09-13 ENCOUNTER — OFFICE VISIT (OUTPATIENT)
Dept: FAMILY MEDICINE | Facility: CLINIC | Age: 28
End: 2018-09-13
Payer: COMMERCIAL

## 2018-09-13 VITALS — OXYGEN SATURATION: 99 % | DIASTOLIC BLOOD PRESSURE: 79 MMHG | SYSTOLIC BLOOD PRESSURE: 117 MMHG | HEART RATE: 83 BPM

## 2018-09-13 DIAGNOSIS — Z01.818 PRE-OP EXAM: Primary | ICD-10-CM

## 2018-09-13 DIAGNOSIS — G12.9 SPINAL MUSCLE ATROPHY (H): ICD-10-CM

## 2018-09-13 RX ORDER — ALPRAZOLAM 0.25 MG
0.25 TABLET ORAL 3 TIMES DAILY PRN
Qty: 60 TABLET | Refills: 2 | Status: SHIPPED | OUTPATIENT
Start: 2018-09-13 | End: 2018-12-04

## 2018-09-13 RX ORDER — SENNOSIDES 8.8 MG/5ML
LIQUID ORAL
Qty: 474 ML | Refills: 0 | Status: SHIPPED | OUTPATIENT
Start: 2018-09-13 | End: 2018-12-21

## 2018-09-13 RX ORDER — DIAZEPAM 5 MG
5 TABLET ORAL EVERY 6 HOURS PRN
Qty: 30 TABLET | Refills: 2 | Status: SHIPPED | OUTPATIENT
Start: 2018-09-13 | End: 2018-12-04

## 2018-09-13 NOTE — MR AVS SNAPSHOT
After Visit Summary   9/13/2018    Lanie Neil    MRN: [id number]           Patient Information     Date Of Birth          1990        Visit Information        Provider Department      9/13/2018 11:05 AM Jimmy Moseley MD [facility]         Follow-ups after your visit        Who to contact     Please call your clinic at [phone] to:    Ask questions about your health    Make or cancel appointments    Discuss your medicines    Learn about your test results    Speak to your doctor            Additional Information About Your Visit        MyChart Information     [employer] gives you secure access to your electronic health record. If you see a primary care provider, you can also send messages to your care team and make appointments. If you have questions, please call your primary care clinic.  If you do not have a primary care provider, please call [phone] and they will assist you.      [employer] is an electronic gateway that provides easy, online access to your medical records. With [employer], you can request a clinic appointment, read your test results, renew a prescription or communicate with your care team.     To access your existing account, please contact your [facility] or call [phone] for assistance.        Care EveryWhere ID     This is your Care EveryWhere ID. This could be used by other organizations to access your [place] medical records  [id number]        Your Vitals Were     Pulse Pulse Oximetry                83 99%           Blood Pressure from Last 3 Encounters:   09/13/18 117/79   06/25/18 98/61   06/25/18 (!) 137/92    Weight from Last 3 Encounters:   06/25/18 65.8 kg (145 lb)   05/15/18 65.8 kg (145 lb)   09/26/16 58.5 kg (129 lb)              Today, you had the following     No orders found for display       Primary Care Provider Office Phone # Fax #    Doris Pat [phone] [address]        Equal Access to Services     GLEN ART : Hadii aad ku hadjeimysherlyn [place], wayaneliskendra mae, evonneclay jonesmakendra pressley, calvin jorgensenelizabethjoanna gautam. So [facility] [phone].    ATENCIÓN: Si habla español, tiene a warren disposición servicios gratuitos de asistencia lingüística. Ll[facility] [phone].    We comply with applicable federal civil rights laws and [place] laws. We do not discriminate on the basis of race, color, national origin, age, disability, sex, sexual orientation, or gender identity.            Thank you!     Thank you for choosing [facility]  for your care. Our goal is always to provide you with excellent care. Hearing back from our patients is one way we can continue to improve our services. Please take a few minutes to complete the written survey that you may receive in the mail after your visit with us. Thank you!             Your Updated Medication List - Protect others around you: Learn how to safely use, store and throw away your medicines at www.disposemymeds.org.          This list is accurate as of 9/13/18  3:44 PM.  Always use your most recent med list.                   Brand Name Dispense Instructions for use Diagnosis    ACETAMINOPHEN PO      Take 650 mg by mouth every 4 hours as needed for pain        * VENTOLIN HFA IN      Inhale 2 puffs into the lungs        * albuterol 108 (90 Base) MCG/ACT inhaler    PROAIR HFA    1 Inhaler    Inhale 2 puffs into the lungs every 4 hours as needed for shortness of breath / dyspnea or wheezing    Unspecified asthma(493.90)       * albuterol (2.5 MG/3ML) 0.083% neb solution     360 mL    USE ONE VIAL BY NEBULIZATION EVERY 6 HOURS AS NEEDED FOR SHORTNESS OF BREATH/ DYSPNEA OR WHEEZING    Asthma       ALPRAZolam 0.25 MG tablet    XANAX    60 tablet    Take 1 tablet (0.25 mg) by mouth 3 times daily as needed for anxiety    Spinal muscle atrophy (H)       celecoxib 200 MG capsule     celeBREX    30 capsule    TAKE 1 CAPSULE(200 MG) BY MOUTH DAILY    Chronic anticoagulation, History of deep venous thrombosis, Painful menstrual periods       clindamycin 1 % lotion    CLEOCIN T    60 mL    Apply topically 2 times daily To face as needed    Acne vulgaris       diazepam 5 MG tablet    VALIUM    30 tablet    Take 1 tablet (5 mg) by mouth every 6 hours as needed for muscle spasms    Spinal muscle atrophy (H)       fluticasone 50 MCG/ACT spray    FLONASE    16 mL    Spray 1 spray into both nostrils daily    Sinusitis, chronic       gabapentin 250 MG/5ML solution    NEURONTIN    1800 mL    Take 20 mL per feeding tube three times per day    Neuropathy       glycerin-hypromellose- 0.2-0.2-1 % Soln ophthalmic solution    ARTIFICIAL TEARS    1 Bottle    Place 1 drop into both eyes 2 times daily as needed for dry eyes    Dry eyes, unspecified laterality       guaiFENesin 200 MG Tabs tablet    ORGANIDIN     Take 200 mg by mouth every 4 hours as needed for cough        HYDROmorphone 4 MG tablet    DILAUDID    300 tablet    Take 1-2 tablets (4-8 mg) by mouth every 4 hours as needed for moderate to severe pain    Spinal muscle atrophy (H)       SABINE 30 MG 24 hr capsule   Generic drug:  morphine     60 capsule    Take 1 capsule (30 mg) by mouth 2 times daily    Spinal muscle atrophy (H)       lactulose 10 GM/15ML solution    CHRONULAC    500 mL    Take 30 mLs (20 g) by mouth 2 times daily as needed for constipation    Constipation, unspecified constipation type       metroNIDAZOLE 0.75 % cream    METROCREAM    45 g    Apply topically 2 times daily    Acne rosacea       MILK OF MAGNESIA PO      Take 30 mLs by mouth as needed        omeprazole 2 mg/mL Susp    priLOSEC    1800 mL    Take 20 mLs (40 mg) by mouth daily    Gastroesophageal reflux disease without esophagitis       ondansetron 4 MG ODT tab    ZOFRAN-ODT    60 tablet    Take 1 tablet (4 mg) by mouth every 8 hours as needed for nausea    Spinal  muscle atrophy (H)       * order for DME     1 Units    Equipment being ordered: V[employer] Percussor    Recurrent pneumonia       * order for DME     30 pad    Equipment being ordered: Mepilex (6x6), Apply to wound everyday. Fax to [facility] from [facility] at: [phone]    Encounter for wound care       OXY BALANCE FACIAL CLEAN WASH EX      Externally apply 1 pad topically daily        polyethylene glycol powder    MIRALAX/GLYCOLAX    850 g    Take 17 g by mouth daily    Constipation, unspecified constipation type, Need for prophylactic vaccination and inoculation against influenza, Abnormal weight gain       Senna 8.8 MG/5ML Syrp     474 mL    TAKE 10 MLS BY MOUTH TWICE DAILY AS DIRECTED    Spinal muscle atrophy (H)       silver sulfADIAZINE 1 % cream    SILVADENE    400 g    Apply topically daily    Spinal muscle atrophy (H)       XARELTO 20 MG Tabs tablet   Generic drug:  rivaroxaban ANTICOAGULANT     30 tablet    TAKE 1 TABLET(20 MG) BY MOUTH DAILY WITH DINNER    DVT (deep venous thrombosis) (H)       zolpidem 5 MG tablet    AMBIEN    30 tablet    Take 1 tablet (5 mg) by mouth nightly as needed for sleep    Sleep disturbance       * Notice:  This list has 5 medication(s) that are the same as other medications prescribed for you. Read the directions carefully, and ask your doctor or other care provider to review them with you.

## 2018-09-13 NOTE — LETTER
"9/13/2018      RE: Lanie Niel  [address]  [place] [address]       [facility]   Jimmy Moseley MD  09/13/2018     Chief Complaint:   Pre-Op Exam    Surgeon (please enter first and last name):  Dr Siddiqui ([facility]) [place]  Fax number for Preop Evaluation:  [phone]  Location of Surgery: [place]  Date of Surgery:  9.20.18  Procedure:  Injection into back  History of reaction to anesthesia?  No    Surgeon (please enter first and last name):  Dr Cárdenas  Fax number for Preop Evaluation:  [phone]  Location of Surgery: [facility]  Date of Surgery:  9.17.18  Procedure:  Port Placement   History of reaction to anesthesia?  No            Sept 17 port place  Sept 20 spine injection       History of Present Illness:   Lanie Neil is 28 year old female here at the request of Dr. Sanchez at [facility] for cardiovascular, pulmonary, and perioperative risk assessment prior to surgeries.  The intended surgical procedures are Port-A-Cath placement and a Spinraza injection.  A copy of this note will be sent to the surgeon . The patient comes with her mother, Aster, to the appointment today. The patient is non-ambulatory, in a gurney.      Reason for surgery:  (must document 4+HPI factors for completion) Per chart review, the patient's mother requested central line placement or port placement for blood draws that she gets with her Spinraza medication administration--this is given by IR through reservoir in the patient's spine. However, the multiple blood draws and the reservoir with this causes the patient \"extreme\" pain, per mother. It was suggested that she be placed under anesthesia in the future for the Spinraza infusions.     Her mother felt that having a port placed until treatments are completed would cause less pain for the patient, overall, as blood could be drawn through a port, rather than having multiple sticks for this. In addition, she will be placed under " anesthesia for the Spinraza injections in the future, rather than having them while awake. These are the two procedures that she needs to have pre-operative examinations for. She has three more doses of Spinraza in two weeks, then it will be administered monthly. Notably, the port is not for the Spinraza, per mother--only for blood draws.     Cardiovascular Risk:  This patient does not have chest pain with ambulation or walking up a flight of stairs.  There is not any chest pain with exercise.  She does not have any notable cardiac history, including palpitations, congestive heart failure, coronary artery disease or chest pain.  There is not a history of stroke or valvular disease.    Pulmonary Risk:  In terms of risk factors for pulmonary complications, the patient does have a history of Asthma, chronic LLL collapse by CXR from 2015, and Chronic Respiratory Failure with chronic trach in the setting of spinal muscle atrophy. The patient follows with Dr. Soni for this. The patient is on a LTV ventilator 24/7 with trach cuff deflated, and she tolerates this well, per Dr. Soni's note from 06/12/2018. This was the last time she saw her; see note for further summary of her respiratory history, care and recommendations.     Of note, the patient notes congestion 1-2 times per day in her lungs, and she has been using albuterol to clear this up, which alleviates this entirely per patient and her mother. She has no fevers or other symptoms with this, and is not concerned for pneumonia.    Perioperative Complications:   The patient does have a history of bleeding or clotting problems in the past; she has a history of DVT in her left inguinal region and her legs, treated with Xarelto. She has been on Xarelto for about ten years now. She visited Dr. Mir in hematology on 05/15/2018 and was told to stop taking Xarelto 48 hours prior to surgery, and to restart it 48/72 hours after surgery as long as there are not  postoperative complications; the surgeons for both procedures now asked her to only stop Xarelto 24 hours prior to surgery. She has had complications from past surgeries.  The patient does not have a family history of any anesthesia or surgical complications.    Review of Systems:   Pertinent items are noted in HPI and below, remainder of complete ROS is negative.    Answers for HPI/ROS submitted by the patient on 9/12/2018   General Symptoms: No  Skin Symptoms: Yes  HENT Symptoms: No  EYE SYMPTOMS: No  HEART SYMPTOMS: No  LUNG SYMPTOMS: No  INTESTINAL SYMPTOMS: No  URINARY SYMPTOMS: No  GYNECOLOGIC SYMPTOMS: No  BREAST SYMPTOMS: No  SKELETAL SYMPTOMS: No  BLOOD SYMPTOMS: No  NERVOUS SYSTEM SYMPTOMS: No  MENTAL HEALTH SYMPTOMS: No  Changes in hair: No  Changes in moles/birth marks: No  Itching: No  Rashes: No  Changes in nails: No  Acne: No  Hair in places you don't want it: No  Change in facial hair: No  Warts: Yes - the patient has a small sore on her eye, which her mother would like to have looked at. She thinks it is because she tears up a lot.   Non-healing sores: No  Scarring: No  Flaking of skin: No  Color changes of hands/feet in cold : No  Sun sensitivity: No  Skin thickening: No      Active Medications:      ACETAMINOPHEN PO, Take 650 mg by mouth every 4 hours as needed for pain, Disp: , Rfl:      albuterol (2.5 MG/3ML) 0.083% neb solution, USE ONE VIAL BY NEBULIZATION EVERY 6 HOURS AS NEEDED FOR SHORTNESS OF BREATH/ DYSPNEA OR WHEEZING, Disp: 360 mL, Rfl: 6     albuterol (ALBUTEROL) 108 (90 BASE) MCG/ACT inhaler, Inhale 2 puffs into the lungs every 4 hours as needed for shortness of breath / dyspnea or wheezing, Disp: 1 Inhaler, Rfl: 2     Albuterol Sulfate (VENTOLIN HFA IN), Inhale 2 puffs into the lungs, Disp: , Rfl:      ALPRAZolam (XANAX) 0.25 MG tablet, Take 1 tablet (0.25 mg) by mouth 3 times daily as needed for anxiety, Disp: 60 tablet, Rfl: 2     celecoxib (CELEBREX) 200 MG capsule, TAKE 1  CAPSULE(200 MG) BY MOUTH DAILY, Disp: 30 capsule, Rfl: 8     clindamycin (CLEOCIN T) 1 % lotion, Apply topically 2 times daily To face as needed, Disp: 60 mL, Rfl: 3     diazepam (VALIUM) 5 MG tablet, Take 1 tablet (5 mg) by mouth every 6 hours as needed for muscle spasms, Disp: 30 tablet, Rfl: 2     fluticasone (FLONASE) 50 MCG/ACT spray, Spray 1 spray into both nostrils daily, Disp: 16 mL, Rfl: 11     gabapentin (NEURONTIN) 250 MG/5ML solution, Take 20 mL per feeding tube three times per day, Disp: 1800 mL, Rfl: 5     glycerin-hypromellose- (ARTIFICIAL TEARS) 0.2-0.2-1 % SOLN ophthalmic solution, Place 1 drop into both eyes 2 times daily as needed for dry eyes, Disp: 1 Bottle, Rfl: 11     guaiFENesin (ORGANIDIN) 200 MG TABS tablet, Take 200 mg by mouth every 4 hours as needed for cough, Disp: , Rfl:      HYDROmorphone (DILAUDID) 4 MG tablet, Take 1-2 tablets (4-8 mg) by mouth every 4 hours as needed for moderate to severe pain, Disp: 300 tablet, Rfl: 0     SABINE 30 MG, Take 1 capsule (30 mg) by mouth 2 times daily, Disp: 60 capsule, Rfl: 0     lactulose (CHRONULAC) 10 GM/15ML solution, Take 30 mLs (20 g) by mouth 2 times daily as needed for constipation, Disp: 500 mL, Rfl: 11     Magnesium Hydroxide (MILK OF MAGNESIA PO), Take 30 mLs by mouth as needed, Disp: , Rfl:      metroNIDAZOLE (METROCREAM) 0.75 % cream, Apply topically 2 times daily, Disp: 45 g, Rfl: 11     omeprazole (PRILOSEC) 2 mg/mL SUSP, Take 20 mLs (40 mg) by mouth daily, Disp: 1800 mL, Rfl: 3     ondansetron (ZOFRAN-ODT) 4 MG ODT tab, Take 1 tablet (4 mg) by mouth every 8 hours as needed for nausea, Disp: 60 tablet, Rfl: 5     polyethylene glycol (MIRALAX/GLYCOLAX) powder, Take 17 g by mouth daily, Disp: 850 g, Rfl: 8     Salicylic Acid (OXY BALANCE FACIAL CLEAN WASH EX), Externally apply 1 pad topically daily, Disp: , Rfl:      Sennosides (SENNA) 8.8 MG/5ML SYRP, TAKE 10 MLS BY MOUTH TWICE DAILY AS DIRECTED, Disp: 474 mL, Rfl: 0     silver  sulfADIAZINE (SILVADENE) 1 % cream, Apply topically daily, Disp: 400 g, Rfl: 1     XARELTO 20 MG TABS tablet, TAKE 1 TABLET(20 MG) BY MOUTH DAILY WITH DINNER, Disp: 30 tablet, Rfl: 6     zolpidem (AMBIEN) 5 MG tablet, Take 1 tablet (5 mg) by mouth nightly as needed for sleep, Disp: 30 tablet, Rfl: 2      Allergies:   Review of patient's allergies indicates no known allergies.      Past Medical History:  Anxiety  Snoring  Spinal muscle atrophy   Constipation  Asthma  Dyspepsia and other specified disorders of function of stomach   Sinusitis  Deep vein thrombosis   Chronic respiratory failure   Chronic pain syndrome      Past Surgical History:  ENT surgery, tubes  Gastrostomy tube  Kidney surgery   Spine surgery  Tracheostomy     Family History:    History reviewed. No pertinent family history.       Social History:   The patient was accompanied to the appointment by: her mother, Aster.  Smoking Status: Never   Smokeless Tobacco: Never   Alcohol Use: no      Physical Exam:   /79  Pulse 83  SpO2 99%     Constitutional: Alert. In no distress. In wheelchair, per usual.   Head: Normocephalic. No masses, lesions, tenderness or abnormalities.  ENT: No neck nodes or sinus tenderness.  Cardiovascular: RRR. No murmurs, clicks, gallops, or rub.  Respiratory: Clear to auscultation bilaterally, no wheezes or crackles.  Gastrointestinal: Abdomen soft. Non-tender. BS normal. No masses or organomegaly.  Musculoskeletal: Extremities normal. Contracture deformities and reduced muscle tone.  Skin: No rashes. Left upper cheek just below inner eyelid, 1mm red papule   Neurologic: Sensation grossly WNL. Chronic weakness.  Psychiatric: Mentation appears normal. Normal affect.   Hematologic/Lymphatic/Immunologic: Normal cervical lymph nodes.      Future Labs:  Not necessary.     EKG:  EKG was not indicated based on risk assessment.     Pregnancy test needed: No     Assessment and Plan:  Patient received flu shot on 11/16/17 last year  per mother, and will plan on getting this again soon. This is not available in clinic yet, unfortunately, and I have recommended she get this somewhere else as soon as she is able.     We will continue to monitor the red spot by her eye--it looks benign on examination.     1. Pre-operative assessment for Port-A-Cath placement and general anesthesia for Spinraza injections  The patient is at LOW risk for cardiovascular complications and at MODERATE risk for pulmonary complications due to chronic ventilation. I did suggest she use her albuterol before surgery. This is a LOW risk surgery.     --Approval given to proceed with proposed procedure, without further diagnostic evaluation  --Patient is currently taking    Anticoagulant or Antiplatelet Medication Use  XARELTO: Bleeding risk is at least moderate for this procedure and rivaroxaban (Xarelto) should be stopped at least 24 hours prior to procedure, per recommendations of the surgeons rather than Dr. Mir of hematology.      --Do not take Celebrex next week, as she has both procedures next week.      --Patient is to take all other scheduled medications on the day of surgery     The patient has been told to not take any aspirin or NSAIDs for 10 days prior to surgery. The patient has been instructed as to what to do with medications prior to surgery.    Follow-up: Return to clinic in per previous plan.         Scribe Disclosure:   I, Lexi Lyn, am serving as a scribe to document services personally performed by Jimmy Moseley MD at this visit, based upon the provider's statements to me. All documentation has been reviewed by the aforementioned provider prior to being entered into the official medical record.     Portions of this medical record were completed by a scribe. UPON MY REVIEW AND AUTHENTICATION BY ELECTRONIC SIGNATURE, this confirms (a) I performed the applicable clinical services, and (b) the record is accurate.    Jimmy Marquez  ELIE Moseley MD

## 2018-09-13 NOTE — NURSING NOTE
Surgeon (please enter first and last name):  Dr Siddiqui ([facility]) Lucy  Fax number for Preop Evaluation:  [phone]  Location of Surgery: [place]  Date of Surgery:  9.20.18  Procedure:  Injection into back  History of reaction to anesthesia?  No    Surgeon (please enter first and last name):  Dr Cárdenas  Fax number for Preop Evaluation:  [phone]  Location of Surgery: [facility]  Date of Surgery:  9.17.18  Procedure:  Port Placement   History of reaction to anesthesia?  No  Jimmy Moseley MD

## 2018-09-14 ENCOUNTER — TELEPHONE (OUTPATIENT)
Dept: INTERNAL MEDICINE | Facility: CLINIC | Age: 28
End: 2018-09-14

## 2018-09-14 NOTE — TELEPHONE ENCOUNTER
[facility] Call Center    Phone Message    May a detailed message be left on voicemail: no    Reason for Call: Other: Shanika from [facility] called and stated someone from our clinic was requesting a fax number to send over a recent H & P for an upcoming procedure for the pt. Fax: [phone]     Action Taken: Message routed to:  [facility] ([facility]): JEAN CARLOS

## 2018-09-17 ENCOUNTER — MEDICAL CORRESPONDENCE (OUTPATIENT)
Dept: HEALTH INFORMATION MANAGEMENT | Facility: CLINIC | Age: 28
End: 2018-09-17

## 2018-09-18 ENCOUNTER — DOCUMENTATION ONLY (OUTPATIENT)
Dept: INTERNAL MEDICINE | Facility: CLINIC | Age: 28
End: 2018-09-18

## 2018-09-28 ENCOUNTER — TRANSFERRED RECORDS (OUTPATIENT)
Dept: HEALTH INFORMATION MANAGEMENT | Facility: CLINIC | Age: 28
End: 2018-09-28

## 2018-10-02 ENCOUNTER — MYC MEDICAL ADVICE (OUTPATIENT)
Dept: INTERNAL MEDICINE | Facility: CLINIC | Age: 28
End: 2018-10-02

## 2018-10-02 ENCOUNTER — TELEPHONE (OUTPATIENT)
Dept: INTERNAL MEDICINE | Facility: CLINIC | Age: 28
End: 2018-10-02

## 2018-10-02 NOTE — TELEPHONE ENCOUNTER
Prior Authorization Retail Medication Request    Medication/Dose: Gabapentin 250mg/5 ml  ICD code (if different than what is on RX):  G62.9  Previously Tried and Failed:  Unknown    Rationale:  None    Insurance Name:  [employer]  Insurance ID:  [id number]       Pharmacy Information (if different than what is on RX)

## 2018-10-02 NOTE — TELEPHONE ENCOUNTER
PA Initiation    Medication: Gabapentin 250mg/5 ml -   Insurance Company: [employer] - Phone [phone] Fax [phone]  Pharmacy Filling the Rx: [facility] [address] [place] [address] [place] - [address]  Filling Pharmacy Phone: [phone]  Filling Pharmacy Fax:    Start Date: 10/2/2018

## 2018-10-02 NOTE — TELEPHONE ENCOUNTER
Prior authorization form has been filled out and sent to PA team.    LILLIAM DAMICO at 1:00 PM on 10/2/2018.

## 2018-10-03 NOTE — TELEPHONE ENCOUNTER
Prior Authorization Not Needed per Insurance    Medication: Gabapentin 250mg/5 ml - NOT NEEDED  Insurance Company: [employer] - Phone [phone] Fax [phone]  Expected CoPay:      Pharmacy Filling the Rx: [id number]  [place] - [address] AT [facility] [address]  Pharmacy Notified: Yes  Patient Notified: Comment:  **Pharmacy will notify patient when script is ready for .**

## 2018-10-04 ENCOUNTER — TELEPHONE (OUTPATIENT)
Dept: INTERNAL MEDICINE | Facility: CLINIC | Age: 28
End: 2018-10-04

## 2018-10-04 NOTE — TELEPHONE ENCOUNTER
Central Prior Authorization Team   Phone: [phone]      PA Initiation    Medication: gabapentin (NEURONTIN) 250 MG/5ML solution-PA Initiated  Insurance Company: [employer] - Phone [phone] Fax [phone]  Pharmacy Filling the Rx: [facility] [id number] [place], [place] - [address]  Filling Pharmacy Phone: [phone]  Filling Pharmacy Fax: [phone]  Start Date: 10/4/2018

## 2018-10-04 NOTE — TELEPHONE ENCOUNTER
Prior Authorization Not Needed per Insurance    Medication: gabapentin (NEURONTIN) 250 MG/5ML solution-PA Not needed  Insurance Company: [employer] - Phone [phone] Fax [phone]  Expected CoPay:      Pharmacy Filling the Rx: [facility] [id number] [place] [place] - [address] AT [facility] [address]  Pharmacy Notified: Yes  Patient Notified: No    Spoke to pharmacy, they will call the insurance to find out what they can do. Pharmacy believes patient is running into an issue where she received 700mls at a different pharmacy and now trying to fill 1800mls, and getting a rejection that patient can only get a max of 2100mls every 25 days. Per pharmacy, due to the rejection, patient was only able to get about 900mls yesterday.

## 2018-10-22 DIAGNOSIS — I82.409 DVT (DEEP VENOUS THROMBOSIS) (H): ICD-10-CM

## 2018-10-22 ASSESSMENT — ENCOUNTER SYMPTOMS: BRUISES/BLEEDS EASILY: 1

## 2018-10-23 ENCOUNTER — OFFICE VISIT (OUTPATIENT)
Dept: INTERNAL MEDICINE | Facility: CLINIC | Age: 28
End: 2018-10-23
Payer: COMMERCIAL

## 2018-10-23 VITALS
SYSTOLIC BLOOD PRESSURE: 100 MMHG | HEART RATE: 66 BPM | RESPIRATION RATE: 20 BRPM | DIASTOLIC BLOOD PRESSURE: 69 MMHG | OXYGEN SATURATION: 98 %

## 2018-10-23 DIAGNOSIS — G12.9 SPINAL MUSCLE ATROPHY (H): Primary | ICD-10-CM

## 2018-10-23 NOTE — MR AVS SNAPSHOT
After Visit Summary   10/23/2018    Lanie Neil    MRN: [id number]           Patient Information     Date Of Birth          1990        Visit Information        Provider Department      10/23/2018 9:00 AM Nilo Montaño MD [facility]        Care Instructions    Primary Care Center Medication Refill Request Information:  * Please contact your pharmacy regarding ANY request for medication refills.  ** [facility] Prescription Fax = [phone]  * Please allow 3 business days for routine medication refills.  * Please allow 5 business days for controlled substance medication refills.     Primary Care Center Test Result notification information:  *You will be notified with in 7-10 days of your appointment day regarding the results of your test.  If you are on MyChart you will be notified as soon as the provider has reviewed the results and signed off on them.    [facility]: [phone]           Follow-ups after your visit        Who to contact     Please call your clinic at [phone] to:    Ask questions about your health    Make or cancel appointments    Discuss your medicines    Learn about your test results    Speak to your doctor            Additional Information About Your Visit        MyChart Information     [employer] gives you secure access to your electronic health record. If you see a primary care provider, you can also send messages to your care team and make appointments. If you have questions, please call your primary care clinic.  If you do not have a primary care provider, please call [phone] and they will assist you.      [employer] is an electronic gateway that provides easy, online access to your medical records. With [employer], you can request a clinic appointment, read your test results, renew a prescription or communicate with your care team.     To access your existing account, please contact your [facility] or  call [phone] for assistance.        Care EveryWhere ID     This is your Care EveryWhere ID. This could be used by other organizations to access your [facility] medical records  [id number]        Your Vitals Were     Pulse Respirations Pulse Oximetry             66 20 98%          Blood Pressure from Last 3 Encounters:   10/23/18 100/69   09/13/18 117/79   06/25/18 98/61    Weight from Last 3 Encounters:   06/25/18 65.8 kg (145 lb)   05/15/18 65.8 kg (145 lb)   09/26/16 58.5 kg (129 lb)              Today, you had the following     No orders found for display       Primary Care Provider Office Phone # Fax #    Doris CHAIDEZ [phone]       [address] [address]        Equal Access to Services     GLEN ART : Hadii pema cota hadasho Sojoseph, waaxda luqadaha, qaybta kaalmada adeegyada, calvin lopez . So [facility] [phone].    ATENCIÓN: Si habla español, tiene a warren disposición servicios gratuitos de asistencia lingüística. Edwin al [phone].    We comply with applicable federal civil rights laws and [place] laws. We do not discriminate on the basis of race, color, national origin, age, disability, sex, sexual orientation, or gender identity.            Thank you!     Thank you for choosing [facility]  for your care. Our goal is always to provide you with excellent care. Hearing back from our patients is one way we can continue to improve our services. Please take a few minutes to complete the written survey that you may receive in the mail after your visit with us. Thank you!             Your Updated Medication List - Protect others around you: Learn how to safely use, store and throw away your medicines at www.disposemymeds.org.          This list is accurate as of 10/23/18  9:56 AM.  Always use your most recent med list.                   Brand Name Dispense Instructions for use Diagnosis    ACETAMINOPHEN PO       Take 650 mg by mouth every 4 hours as needed for pain        * VENTOLIN HFA IN      Inhale 2 puffs into the lungs        * albuterol 108 (90 Base) MCG/ACT inhaler    PROAIR HFA    1 Inhaler    Inhale 2 puffs into the lungs every 4 hours as needed for shortness of breath / dyspnea or wheezing    Unspecified asthma(493.90)       * albuterol (2.5 MG/3ML) 0.083% neb solution     360 mL    USE ONE VIAL BY NEBULIZATION EVERY 6 HOURS AS NEEDED FOR SHORTNESS OF BREATH/ DYSPNEA OR WHEEZING    Asthma       ALPRAZolam 0.25 MG tablet    XANAX    60 tablet    Take 1 tablet (0.25 mg) by mouth 3 times daily as needed for anxiety    Spinal muscle atrophy (H)       celecoxib 200 MG capsule    celeBREX    30 capsule    TAKE 1 CAPSULE(200 MG) BY MOUTH DAILY    Chronic anticoagulation, History of deep venous thrombosis, Painful menstrual periods       clindamycin 1 % lotion    CLEOCIN T    60 mL    Apply topically 2 times daily To face as needed    Acne vulgaris       diazepam 5 MG tablet    VALIUM    30 tablet    Take 1 tablet (5 mg) by mouth every 6 hours as needed for muscle spasms    Spinal muscle atrophy (H)       fluticasone 50 MCG/ACT spray    FLONASE    16 mL    Spray 1 spray into both nostrils daily    Sinusitis, chronic       gabapentin 250 MG/5ML solution    NEURONTIN    1800 mL    Take 20 mL per feeding tube three times per day    Neuropathy       glycerin-hypromellose- 0.2-0.2-1 % Soln ophthalmic solution    ARTIFICIAL TEARS    1 Bottle    Place 1 drop into both eyes 2 times daily as needed for dry eyes    Dry eyes, unspecified laterality       guaiFENesin 200 MG Tabs tablet    ORGANIDIN     Take 200 mg by mouth every 4 hours as needed for cough        HYDROmorphone 4 MG tablet    DILAUDID    300 tablet    Take 1-2 tablets (4-8 mg) by mouth every 4 hours as needed for moderate to severe pain    Spinal muscle atrophy (H)       SABINE 30 MG 24 hr capsule   Generic drug:  morphine     60 capsule    Take 1 capsule (30  mg) by mouth 2 times daily    Spinal muscle atrophy (H)       lactulose 10 GM/15ML solution    CHRONULAC    500 mL    Take 30 mLs (20 g) by mouth 2 times daily as needed for constipation    Constipation, unspecified constipation type       metroNIDAZOLE 0.75 % cream    METROCREAM    45 g    Apply topically 2 times daily    Acne rosacea       MILK OF MAGNESIA PO      Take 30 mLs by mouth as needed        omeprazole 2 mg/mL Susp    priLOSEC    1800 mL    Take 20 mLs (40 mg) by mouth daily    Gastroesophageal reflux disease without esophagitis       ondansetron 4 MG ODT tab    ZOFRAN-ODT    60 tablet    Take 1 tablet (4 mg) by mouth every 8 hours as needed for nausea    Spinal muscle atrophy (H)       * order for DME     1 Units    Equipment being ordered: Vibracare Percussor    Recurrent pneumonia       * order for DME     30 pad    Equipment being ordered: Mepilex (6x6), Apply to wound everyday. Fax to [facility] from [facility] at: [phone]    Encounter for wound care       OXY BALANCE FACIAL CLEAN WASH EX      Externally apply 1 pad topically daily        polyethylene glycol powder    MIRALAX/GLYCOLAX    850 g    Take 17 g by mouth daily    Constipation, unspecified constipation type, Need for prophylactic vaccination and inoculation against influenza, Abnormal weight gain       rivaroxaban ANTICOAGULANT 20 MG Tabs tablet    XARELTO    30 tablet    TAKE 1 TABLET(20 MG) BY MOUTH DAILY WITH DINNER    DVT (deep venous thrombosis) (H)       Senna 8.8 MG/5ML Syrp     474 mL    TAKE 10 MLS BY MOUTH TWICE DAILY AS DIRECTED    Spinal muscle atrophy (H)       silver sulfADIAZINE 1 % cream    SILVADENE    400 g    Apply topically daily    Spinal muscle atrophy (H)       zolpidem 5 MG tablet    AMBIEN    30 tablet    Take 1 tablet (5 mg) by mouth nightly as needed for sleep    Sleep disturbance       * Notice:  This list has 5 medication(s) that are the same as other medications prescribed for you. Read the directions  carefully, and ask your doctor or other care provider to review them with you.

## 2018-10-23 NOTE — NURSING NOTE
Chief Complaint   Patient presents with     Pre-Op Exam     Sciraza shot -10/25/18   Hilda Sher LPN 9:22 AM on 10/23/2018    Requests to do Health Maintenance at future appointment. Requests to do flu shot at future date.Hilda Sher LPN 9:23 AM on 10/23/2018

## 2018-10-23 NOTE — PATIENT INSTRUCTIONS
[facility] Medication Refill Request Information:  * Please contact your pharmacy regarding ANY request for medication refills.  ** [facility] Prescription Fax = [phone]  * Please allow 3 business days for routine medication refills.  * Please allow 5 business days for controlled substance medication refills.     [facility] Test Result notification information:  *You will be notified with in 7-10 days of your appointment day regarding the results of your test.  If you are on MyChart you will be notified as soon as the provider has reviewed the results and signed off on them.    [facility]: [phone]

## 2018-10-23 NOTE — PROGRESS NOTES
HPI  28-year-old with a history of spinal muscle atrophy with chronic respiratory failure with tracheostomy and ongoing ventilator therapy presents today for preoperative medical evaluation.  She has a intrathecal reservoir which is filled regularly with general anesthesia related to her anxiety and pain associated with this process.  This is scheduled in another 2 days and I was asked to evaluate her medically prior to the planned procedure.  She is stable from a medical and a cardiovascular standpoint she is experienced no change in her respiratory status no increased dyspnea chest pain palpitations dizziness lightheadedness or other complaints.  This is the fourth time she is undergone this procedure she is tolerated the other is well without complications bleeding or anesthetic reaction.  Said no recent change in her overall medical status no recent infections or other complaints.  Past Medical History:   Diagnosis Date     Anxiety      Snoring      Spinal muscle atrophy (H)      Past Surgical History:   Procedure Laterality Date     ENT SURGERY      tubes     GASTROSTOMY TUBE       KIDNEY SURGERY       SPINE SURGERY       TRACHEOSTOMY       Family History   Problem Relation Age of Onset     Family History Negative Other      Social History     Social History     Marital status: Single     Spouse name: N/A     Number of children: N/A     Years of education: N/A     Social History Main Topics     Smoking status: Never Smoker     Smokeless tobacco: Never Used     Alcohol use No     Drug use: No     Sexual activity: Not Asked     Other Topics Concern     None     Social History Narrative     Answers for HPI/ROS submitted by the patient on 10/22/2018   General Symptoms: No  Skin Symptoms: No  HENT Symptoms: No  EYE SYMPTOMS: No  HEART SYMPTOMS: No  LUNG SYMPTOMS: No  INTESTINAL SYMPTOMS: No  URINARY SYMPTOMS: No  GYNECOLOGIC SYMPTOMS: No  BREAST SYMPTOMS: No  SKELETAL SYMPTOMS: No  BLOOD SYMPTOMS: Yes  NERVOUS  SYSTEM SYMPTOMS: No  MENTAL HEALTH SYMPTOMS: No  Anemia: Yes  Easy bleeding or bruising: Yes    Exam:  /69 (BP Location: Right arm, Patient Position: Sitting, Cuff Size: Adult Regular)  Pulse 66  Resp 20  SpO2 98%  PHYSICAL EXAMINATION:   The patient is alert, oriented with a clear sensorium and examined in her motorized wheelchair.   Skin shows no lesions or rashes and good turgor.   Head is normocephalic and atraumatic.   Eyes show PERRLA.   Ears show normal TMs   Mouth shows limited opening shows clear oral mucosa.   Neck shows no nodes, thyromegaly or bruits, some induration over the right lower neck upper shoulder area with some tenderness but no erythema drainage or irritation at the port site.   Lungs show few rales anteriorly with mechanical respirations  Heart shows normal S1 and S2   Abdomen is obese, soft, nontender without masses or organomegaly, G-tube in left abdomen.   Extremities show no edema and no evidence of active synovitis.   Neurologic examination shows paralysis in her extremities    ASSESSMENT  1 spinal muscle atrophy  2 respiratory failure secondary to above  3 past history of DVT  4 chronic pain syndrome  5 chronic sinusitis  6 history of anxiety    Plan  She appears stable from a medical and a cardiovascular standpoint.  She would not require any additional imaging or investigation prior to the planned procedure which she has had done several times before.  I would consider her high risk however given her respiratory status for this low risk procedure.    This note was completed using Dragon voice recognition software.  Although reviewed after completion, some word and grammatical errors may occur.    Nilo Montaño MD  General Internal Medicine  [facility]  [phone]

## 2018-11-16 ENCOUNTER — MEDICAL CORRESPONDENCE (OUTPATIENT)
Dept: HEALTH INFORMATION MANAGEMENT | Facility: CLINIC | Age: 28
End: 2018-11-16

## 2018-12-04 DIAGNOSIS — G12.9 SPINAL MUSCLE ATROPHY (H): ICD-10-CM

## 2018-12-05 RX ORDER — ALPRAZOLAM 0.25 MG
0.25 TABLET ORAL 3 TIMES DAILY PRN
Qty: 60 TABLET | Refills: 2 | Status: SHIPPED | OUTPATIENT
Start: 2018-12-05 | End: 2019-04-02

## 2018-12-05 RX ORDER — DIAZEPAM 5 MG
5 TABLET ORAL EVERY 6 HOURS PRN
Qty: 30 TABLET | Refills: 2 | Status: SHIPPED | OUTPATIENT
Start: 2018-12-05 | End: 2019-03-01

## 2018-12-05 NOTE — TELEPHONE ENCOUNTER
Valium 5 mg tab      Last Written Prescription Date:  9-13-18  Last Fill Quantity: 30,   # refills: 2  Last Office Visit : 10-23-18  Future Office visit:  none    Routing refill request to provider for review/approval because:   controlled substance    Xanax 0.25 mg      Last Written Prescription Date:  9-13-18  Last Fill Quantity: 60,   # refills: 2    Routing refill request to provider for review/approval because:   controlled substance    RXs called to Bret Hylton RN 11:20 AM on 12/5/2018.

## 2018-12-09 DIAGNOSIS — L71.9 ACNE ROSACEA: ICD-10-CM

## 2018-12-12 DIAGNOSIS — L71.9 ACNE ROSACEA: ICD-10-CM

## 2018-12-12 NOTE — TELEPHONE ENCOUNTER
Last Clinic Visit:  11/8/17  NV: NONE      DENIED RF  *RF 12 MOS FROM LAST VISIT   Scheduling has been notified to contact the pt for appointment

## 2018-12-13 ENCOUNTER — MYC MEDICAL ADVICE (OUTPATIENT)
Dept: DERMATOLOGY | Facility: CLINIC | Age: 28
End: 2018-12-13

## 2018-12-13 DIAGNOSIS — L71.9 ACNE ROSACEA: ICD-10-CM

## 2018-12-21 DIAGNOSIS — L71.9 ACNE ROSACEA: ICD-10-CM

## 2018-12-21 DIAGNOSIS — G12.9 SPINAL MUSCLE ATROPHY (H): ICD-10-CM

## 2018-12-26 ENCOUNTER — TELEPHONE (OUTPATIENT)
Dept: HEMATOLOGY | Facility: CLINIC | Age: 28
End: 2018-12-26

## 2018-12-26 RX ORDER — SENNOSIDES 8.8 MG/5ML
LIQUID ORAL
Qty: 474 ML | Refills: 0 | Status: SHIPPED | OUTPATIENT
Start: 2018-12-26 | End: 2019-02-07

## 2018-12-26 NOTE — TELEPHONE ENCOUNTER
Lanie Neil  MRN:   [id number]     Submitted PA (Celecoxib) via covermymeds.org website after Maribeth (Cesar [phone]) PA request fax received today.  Sent as urgent.  Left voicemail for patient.  Follow up via Covermymeds (Key [id number], 1990) or [phone].  Ariana Crandall, RN, MSN -Nurse Clinician, [facility] [phone]

## 2018-12-27 ENCOUNTER — DOCUMENTATION ONLY (OUTPATIENT)
Dept: HEMATOLOGY | Facility: CLINIC | Age: 28
End: 2018-12-27

## 2018-12-27 RX ORDER — DOXYCYCLINE 100 MG/1
CAPSULE ORAL
Qty: 60 CAPSULE | Refills: 2 | Status: SHIPPED | OUTPATIENT
Start: 2018-12-27 | End: 2024-08-22

## 2018-12-27 NOTE — TELEPHONE ENCOUNTER
Pt with inflammatory rosacea is requesting a refill of doxycyline. Pt was last seen in 2017, but has a F.U in March 2019. I have continued the previous Rx of doxycyline 100mg BID. I have Rx'ed 60 pill with 2 refills. No additional refills should be given unless pt RTCs.

## 2018-12-27 NOTE — TELEPHONE ENCOUNTER
"   doxycycline (VIBRAMYCIN) 100 MG capsule   Last Written Prescription Date:  11/8/17  Last Fill Quantity: 60,   # refills: 2  Last Office Visit : 11/8/17  Future Office visit:  3/13/19    11/8/17  Mary   \"doxycycline 100 mg capsules twice daily via G tube\"  \" Follow-up in 3 months, earlier for new or changing lesions\"    Routing refill request to provider for review/approval because: med end date  12/12/17   F/u 3/13/19 bid,   Rf?           "

## 2019-01-05 DIAGNOSIS — R63.5 ABNORMAL WEIGHT GAIN: ICD-10-CM

## 2019-01-05 DIAGNOSIS — Z23 NEED FOR PROPHYLACTIC VACCINATION AND INOCULATION AGAINST INFLUENZA: ICD-10-CM

## 2019-01-05 DIAGNOSIS — K59.00 CONSTIPATION, UNSPECIFIED CONSTIPATION TYPE: ICD-10-CM

## 2019-01-08 RX ORDER — POLYETHYLENE GLYCOL 3350 17 G/17G
POWDER, FOR SOLUTION ORAL
Qty: 850 G | Refills: 6 | Status: SHIPPED | OUTPATIENT
Start: 2019-01-08 | End: 2020-01-14

## 2019-01-08 NOTE — TELEPHONE ENCOUNTER
Polyethylene Glycol 3350 NP Powder 17 GM's  po every day      Sent to pharmacy Disp. 850 gms with 6 refills

## 2019-01-15 ENCOUNTER — MYC MEDICAL ADVICE (OUTPATIENT)
Dept: INTERNAL MEDICINE | Facility: CLINIC | Age: 29
End: 2019-01-15

## 2019-01-15 ENCOUNTER — MEDICAL CORRESPONDENCE (OUTPATIENT)
Dept: HEALTH INFORMATION MANAGEMENT | Facility: CLINIC | Age: 29
End: 2019-01-15

## 2019-01-15 NOTE — TELEPHONE ENCOUNTER
Spoke with  mother, not currently with patient, gave # for nurse who is currently with patient [phone].    Spoke with patient, denies headache, fever, dental pain, halitosis, ear pain, pressure or fullness, facial pain or pressure, facial congestion or fullness, orbital pain, severe neck pain, visual disturbances, periorbital or facial swelling or erythremia.     Regarding hyposmia and anosmia (does note that typically has decreased sense of smell)    Yesterday started feeling like something was coming on and this morning started with productive cough yellow slightly green sputum, congestion in lungs, fatigued, sore throat, both rhinorrhea and nasal congestion, purulent anterior and posterior nasal drainage noted yellow to greenish in color and watery eyes     Note patient is on a ventilator, wears O2 at night, O2 sats now running 97-98 with out O2 supplementation.     Patient was advised this will be sent to RN for review to see if anything can be prescribed via protocol, did advise patient that with congestion in lungs may need to be evaluated.    Advised patient to go to ED or UC if develops SOB, fever, severe headache or if symptoms worsen. Verbalized understanding.     Please call [phone] with update, ok to discuss with patients mother, Rx to [facility] in [place] if appropriate.      Discussed with RN , patient should be evaluated in clinic ok to schedule tomorrow.    Spoke with Aster patients mother, scheduled appt tomorrow with Dr Vann at [phone].     Advised patient should go to ED/UC if develops SOB, worsening of symptoms, fever, increase in secretions, verbalized understanding.

## 2019-01-28 ENCOUNTER — OFFICE VISIT (OUTPATIENT)
Dept: INTERNAL MEDICINE | Facility: CLINIC | Age: 29
End: 2019-01-28
Payer: COMMERCIAL

## 2019-01-28 ENCOUNTER — ANCILLARY PROCEDURE (OUTPATIENT)
Dept: GENERAL RADIOLOGY | Facility: CLINIC | Age: 29
End: 2019-01-28
Payer: COMMERCIAL

## 2019-01-28 VITALS
TEMPERATURE: 97.6 F | DIASTOLIC BLOOD PRESSURE: 81 MMHG | OXYGEN SATURATION: 98 % | SYSTOLIC BLOOD PRESSURE: 119 MMHG | HEART RATE: 79 BPM

## 2019-01-28 DIAGNOSIS — Z23 NEED FOR PROPHYLACTIC VACCINATION AND INOCULATION AGAINST INFLUENZA: Primary | ICD-10-CM

## 2019-01-28 DIAGNOSIS — Z01.818 PREOP GENERAL PHYSICAL EXAM: ICD-10-CM

## 2019-01-28 DIAGNOSIS — R09.3 ABNORMAL AMOUNT OF SPUTUM: ICD-10-CM

## 2019-01-28 DIAGNOSIS — J01.90 ACUTE NON-RECURRENT SINUSITIS, UNSPECIFIED LOCATION: ICD-10-CM

## 2019-01-28 ASSESSMENT — PAIN SCALES - GENERAL: PAINLEVEL: MODERATE PAIN (5)

## 2019-01-28 NOTE — PROGRESS NOTES
Rooming Note      Pre-Operative Information  Procedure: Injection into back  Date of Surgery: 2/5/19  Location: [place]  Surgeon: Dr. Angus Sanchez, Peyton Ayala NP  Fax: [phone]

## 2019-01-28 NOTE — NURSING NOTE
Lanie Neil      1.  Has the patient received the information for the influenza vaccine? YES    2.  Does the patient have any of the following contraindications?     Allergy to eggs? No     Allergic reaction to previous influenza vaccines? No     Any other problems to previous influenza vaccines? No     Paralyzed by Guillain-[place]a syndrome? No     Currently pregnant? NO     Current moderate or severe illness? No     Allergy to contact lens solution? No    3.  The vaccine has been administered in the usual fashion and the patient was instructed to wait 20 minutes before leaving the building in the event of an allergic reaction: YES    Recorded by Mj Butler

## 2019-01-28 NOTE — PROGRESS NOTES
"[facility] [place]   Doris Pat MD  01/28/2019      Chief Complaint:   No chief complaint on file.       History of Present Illness:   Lanie Neil is a 28 year old female with a history of *** who presents for evaluation of ***.    Other concerns discussed:  ***     Review of Systems:   Pertinent items are noted in HPI, remainder of complete ROS is negative.      Active Medications:     Current Outpatient Medications:      ACETAMINOPHEN PO, Take 650 mg by mouth every 4 hours as needed for pain, Disp: , Rfl:      albuterol (2.5 MG/3ML) 0.083% neb solution, USE ONE VIAL BY NEBULIZATION EVERY 6 HOURS AS NEEDED FOR SHORTNESS OF BREATH/ DYSPNEA OR WHEEZING, Disp: 360 mL, Rfl: 6     albuterol (ALBUTEROL) 108 (90 BASE) MCG/ACT inhaler, Inhale 2 puffs into the lungs every 4 hours as needed for shortness of breath / dyspnea or wheezing, Disp: 1 Inhaler, Rfl: 2     Albuterol Sulfate (VENTOLIN HFA IN), Inhale 2 puffs into the lungs, Disp: , Rfl:      ALPRAZolam (XANAX) 0.25 MG tablet, Take 1 tablet (0.25 mg) by mouth 3 times daily as needed for anxiety, Disp: 60 tablet, Rfl: 2     celecoxib (CELEBREX) 200 MG capsule, TAKE 1 CAPSULE(200 MG) BY MOUTH DAILY, Disp: 30 capsule, Rfl: 8     clindamycin (CLEOCIN T) 1 % lotion, Apply topically 2 times daily To face as needed, Disp: 60 mL, Rfl: 3     diazepam (VALIUM) 5 MG tablet, Take 1 tablet (5 mg) by mouth every 6 hours as needed for muscle spasms, Disp: 30 tablet, Rfl: 2     doxycycline hyclate (VIBRAMYCIN) 100 MG capsule, GIVE ONE CAPSULE VIA \"G\"- TUBE TWICE DAILY, Disp: 60 capsule, Rfl: 2     fluticasone (FLONASE) 50 MCG/ACT spray, Spray 1 spray into both nostrils daily, Disp: 16 mL, Rfl: 11     gabapentin (NEURONTIN) 250 MG/5ML solution, Take 20 mL per feeding tube three times per day, Disp: 1800 mL, Rfl: 5     glycerin-hypromellose- (ARTIFICIAL TEARS) 0.2-0.2-1 % SOLN ophthalmic solution, Place 1 drop into both eyes 2 times daily as needed for " dry eyes, Disp: 1 Bottle, Rfl: 11     guaiFENesin (ORGANIDIN) 200 MG TABS tablet, Take 200 mg by mouth every 4 hours as needed for cough, Disp: , Rfl:      HYDROmorphone (DILAUDID) 4 MG tablet, Take 1-2 tablets (4-8 mg) by mouth every 4 hours as needed for moderate to severe pain, Disp: 300 tablet, Rfl: 0     SABINE 30 MG, Take 1 capsule (30 mg) by mouth 2 times daily, Disp: 60 capsule, Rfl: 0     lactulose (CHRONULAC) 10 GM/15ML solution, Take 30 mLs (20 g) by mouth 2 times daily as needed for constipation, Disp: 500 mL, Rfl: 11     Magnesium Hydroxide (MILK OF MAGNESIA PO), Take 30 mLs by mouth as needed, Disp: , Rfl:      metroNIDAZOLE (METROCREAM) 0.75 % external cream, Apply topically 2 times daily, Disp: 45 g, Rfl: 11     omeprazole (PRILOSEC) 2 mg/mL SUSP, Take 20 mLs (40 mg) by mouth daily, Disp: 1800 mL, Rfl: 3     ondansetron (ZOFRAN-ODT) 4 MG ODT tab, Take 1 tablet (4 mg) by mouth every 8 hours as needed for nausea, Disp: 60 tablet, Rfl: 5     order for DME, Equipment being ordered: Mepilex (6x6), Apply to wound everyday. Fax to [facility] from [facility] at: [phone], Disp: 30 pad, Rfl: 11     order for DME, Equipment being ordered: Vibracare Percussor, Disp: 1 Units, Rfl: 0     polyethylene glycol (MIRALAX/GLYCOLAX) powder, TAKE 17 GRAMS BY MOUTH EVERY DAY, Disp: 850 g, Rfl: 6     rivaroxaban ANTICOAGULANT (XARELTO) 20 MG TABS tablet, TAKE 1 TABLET(20 MG) BY MOUTH DAILY WITH DINNER, Disp: 30 tablet, Rfl: 6     Salicylic Acid (OXY BALANCE FACIAL CLEAN WASH EX), Externally apply 1 pad topically daily, Disp: , Rfl:      Sennosides (SENNA) 8.8 MG/5ML SYRP, TAKE 10 ML BY MOUTH TWICE DAILY AS DIRECTED, Disp: 474 mL, Rfl: 0     silver sulfADIAZINE (SILVADENE) 1 % cream, Apply topically daily, Disp: 400 g, Rfl: 1     zolpidem (AMBIEN) 5 MG tablet, Take 1 tablet (5 mg) by mouth nightly as needed for sleep, Disp: 30 tablet, Rfl: 2      Allergies:   No known drug allergies.       Past Medical History:  Spinal  muscle atrophy   Chronic respiratory failure  Chronic pain syndrome  Deep vein thrombosis   Asthma   Anxiety   Dyspepsia   Chronic sinusitis      Past Surgical History:  Pressure equalizing tubes   Tracheostomy  Spine surgery  G-tube placement  Kidney surgery    Family History:   History reviewed. No pertinent family history.       Social History:   Presents to clinic ***  Tobacco Use: No previous or current tobacco use.   Alcohol Use: No alcohol use.      Physical Exam:   There were no vitals taken for this visit.   ***      Assessment and Plan:  There are no diagnoses linked to this encounter.     Follow-up: Data Unavailable

## 2019-01-28 NOTE — PROGRESS NOTES
-                     [facility]                                   [facility]     Lanie Neil is 28 year old female here at the request of Dr. Sanchez for perioperative risk assessment prior to injection via intrathecal reservoir. A copy of this note will be sent to the physician.    Past Medical History:   Diagnosis Date     Anxiety      Snoring      Spinal muscle atrophy (H)       PAST SURGICAL HISTORY  Current Outpatient Medications   Medication     ACETAMINOPHEN PO     albuterol (2.5 MG/3ML) 0.083% neb solution     albuterol (ALBUTEROL) 108 (90 BASE) MCG/ACT inhaler     Albuterol Sulfate (VENTOLIN HFA IN)     ALPRAZolam (XANAX) 0.25 MG tablet     celecoxib (CELEBREX) 200 MG capsule     clindamycin (CLEOCIN T) 1 % lotion     diazepam (VALIUM) 5 MG tablet     doxycycline hyclate (VIBRAMYCIN) 100 MG capsule     fluticasone (FLONASE) 50 MCG/ACT spray     gabapentin (NEURONTIN) 250 MG/5ML solution     glycerin-hypromellose- (ARTIFICIAL TEARS) 0.2-0.2-1 % SOLN ophthalmic solution     guaiFENesin (ORGANIDIN) 200 MG TABS tablet     HYDROmorphone (DILAUDID) 4 MG tablet     SABINE 30 MG     lactulose (CHRONULAC) 10 GM/15ML solution     Magnesium Hydroxide (MILK OF MAGNESIA PO)     metroNIDAZOLE (METROCREAM) 0.75 % external cream     omeprazole (PRILOSEC) 2 mg/mL SUSP     ondansetron (ZOFRAN-ODT) 4 MG ODT tab     order for DME     order for DME     polyethylene glycol (MIRALAX/GLYCOLAX) powder     rivaroxaban ANTICOAGULANT (XARELTO) 20 MG TABS tablet     Salicylic Acid (OXY BALANCE FACIAL CLEAN WASH EX)     Sennosides (SENNA) 8.8 MG/5ML SYRP     silver sulfADIAZINE (SILVADENE) 1 % cream     zolpidem (AMBIEN) 5 MG tablet     No current facility-administered medications for this visit.      Immunization History   Administered Date(s) Administered     Influenza (IIV3) PF 11/24/2014, 11/14/2016     Influenza Vaccine IM 3yrs+ 4 Valent IIV4 11/01/2017     Pneumococcal 23 valent 08/23/2011,  11/14/2016     TDAP Vaccine (Boostrix) 03/07/2016       This is a LOW risk surgery.      HPI:   Reason for surgery:     Patient with PMHx chronic progressive spinal muscular atrophy (c/b chronic respiratory failure requiring tracheostomy and chronic ventilation and PEG) with prior intrathecal spinal reservoir insertion in 2017 for adminsitration of spinraza, or nusinersen, for treatment of spinal muscular atrophy.  Placement of intrathecal reservoir was complicated by infection requiring reservoir revision. Per patient and mother, positioning of reservoir causes pain during administration of spinraza, necessitating anaesthesia during subsequent administrations. She is now beginning maintenance therapy period during which she will receive injections every 4 months.    Cardiovascular Risk:  The patient does not have chest pain. She does not have a history of known cardiac disease.   The patient does not have a history of stroke.     Pulmonary Risk:  In terms of risk factors for pulmonary complications, the patient does have a history of mild Asthma.     Perioperative Complications:  The patient does have a history of bleeding or clotting problems in the past, with history of DVT on chronic anticoagulation with rivaroxaban.    Other Concerns addressed today:  Further concerns include URI symptoms and need for vaccination against the influenza virus. Patient reports onset of congestion, coryza, watery eyes, sore throat with 10 days prior to current presentation with some waxing and waning of symptoms. Used APAP q 4 hours for one day and robitussin cough syrup q 6 hours for several days with some improvement in symptoms. Also increased albuterol nebulizer use to q 6 hours with some alleviation. Further endorses occasional increase and thickening of sputum production requiring increased suction frequency, however, has been intermittent. Denies fever/chills, headaches, otalgia, N/V/D.      ROS:  Constitutional: no fevers    Eyes: no vision change  Ears, Nose, Mouth, Throat: sore throat, odynophagia  Cardiovascular: no chest pain, palpitations   Respiratory: no dyspnea, + increased sputum production   GI: no nausea, vomiting, diarrhea or abdominal pain; + constipation unchanged from baseline  : no dysuria  Musculoskeletal: chronically progressive stiffness, unilateral lower extremity edema that is stable, no acute pain  Integumentary: no concerning lesions   Neuro: no headache   Heme/Lymph: no concerning bumps    PHYSICAL EXAM:  /81   Pulse 79   Temp 97.6  F (36.4  C) (Oral)   SpO2 98%     Wt Readings from Last 1 Encounters:   06/25/18 65.8 kg (145 lb)       Constitutional: no distress, comfortable, pleasant   Eyes: anicteric, normal extra-ocular movements, pupils 3 mm and equal bilaterally  Ears, Nose and Throat: tympanic membranes obscured by cerumen with mild erythema of external ear on right, macroglossia obscuring view of posterior oropharynx, no frontal or maxillary tenderness  Cardiovascular: regular rate and rhythm, normal S1 and S2  Respiratory: tracheostomy in place with coarse ventilated breath sounds bilaterally, mildly diminished at left base   Gastrointestinal: positive bowel sounds, nontender   Musculoskeletal: no appreciable edema   Skin: no concerning lesions, no jaundice   Neurological: cranial nerves grossly intact  Psychological: appropriate mood   Lymphatic: no cervical lymphadenopathy      A/P:    The patient with   Past Medical History:   Diagnosis Date     Anxiety      Snoring      Spinal muscle atrophy (H)     presents prior to surgery for assessment of perioperative risk. The patient is at low risk for cardiovascular complications and at moderate risk for pulmonary complications of this low risk procedure.    Proceed with procedure as planned. Patient previously instructed by specialty provider to hold xarelto on day prior to procedure without modification to other medications.    # Preop general  physical exam  Low risk procedure with planned anasthesia administration in setting of intrathecal device presumed for pain management. No recent labs obtained and will need BMP prior to procedure.  - BMP not obtained in office today. Please obtain BMP prior to procedure, per patient and personal care assistant discussion with your team recently.  - EKG was not indicated based on risk assessment.      # Abnormal amount of sputum  # Upper respiratory infection  # Acute sinusitis, nonrecurrent  Increased sputum production in patient with chronic respiratory failure and impaired cough requiring increased frequency of suctioning and increased albuterol nebulizer use. No fever or chills. Physical exam with coarse breath sounds. Given increased risk of pneumonia in this patient, will obtain CXR.  -     XR Chest 1 View; Future    # Need for prophylactic vaccination and inoculation against influenza  -     HC FLU VAC PRESRV FREE QUAD SPLIT VIR 3+YRS IM      Please contact our office if there are any further questions or information required about this patient.    Yelena Wells MD  PGY-1, Internal Medicine  Jan 28, 2019    Pt was seen and plan of care discussed with Dr. Pat.     Pt was seen and examined with Dr. Wells.  I agree with her documentation as noted above.    My additional comments: None    Doris Pat MD

## 2019-01-28 NOTE — PATIENT INSTRUCTIONS
[facility] Medication Refill Request Information:  * Please contact your pharmacy regarding ANY request for medication refills.  ** [facility] Prescription Fax = [phone]  * Please allow 3 business days for routine medication refills.  * Please allow 5 business days for controlled substance medication refills.     [facility] Test Result notification information:  *You will be notified with in 7-10 days of your appointment day regarding the results of your test.  If you are on MyChart you will be notified as soon as the provider has reviewed the results and signed off on them.    [facility]: [phone]

## 2019-01-28 NOTE — LETTER
1/28/2019      RE: Lanie Neil  [address]  [place] [address]       -                     [facility] CARE [facility]     Lanie Neil is 28 year old female here at the request of Dr. Sanchez for perioperative risk assessment prior to injection via intrathecal reservoir. A copy of this note will be sent to the physician.    Past Medical History:   Diagnosis Date     Anxiety      Snoring      Spinal muscle atrophy (H)       PAST SURGICAL HISTORY  Current Outpatient Medications   Medication     ACETAMINOPHEN PO     albuterol (2.5 MG/3ML) 0.083% neb solution     albuterol (ALBUTEROL) 108 (90 BASE) MCG/ACT inhaler     Albuterol Sulfate (VENTOLIN HFA IN)     ALPRAZolam (XANAX) 0.25 MG tablet     celecoxib (CELEBREX) 200 MG capsule     clindamycin (CLEOCIN T) 1 % lotion     diazepam (VALIUM) 5 MG tablet     doxycycline hyclate (VIBRAMYCIN) 100 MG capsule     fluticasone (FLONASE) 50 MCG/ACT spray     gabapentin (NEURONTIN) 250 MG/5ML solution     glycerin-hypromellose- (ARTIFICIAL TEARS) 0.2-0.2-1 % SOLN ophthalmic solution     guaiFENesin (ORGANIDIN) 200 MG TABS tablet     HYDROmorphone (DILAUDID) 4 MG tablet     SABINE 30 MG     lactulose (CHRONULAC) 10 GM/15ML solution     Magnesium Hydroxide (MILK OF MAGNESIA PO)     metroNIDAZOLE (METROCREAM) 0.75 % external cream     omeprazole (PRILOSEC) 2 mg/mL SUSP     ondansetron (ZOFRAN-ODT) 4 MG ODT tab     order for DME     order for DME     polyethylene glycol (MIRALAX/GLYCOLAX) powder     rivaroxaban ANTICOAGULANT (XARELTO) 20 MG TABS tablet     Salicylic Acid (OXY BALANCE FACIAL CLEAN WASH EX)     Sennosides (SENNA) 8.8 MG/5ML SYRP     silver sulfADIAZINE (SILVADENE) 1 % cream     zolpidem (AMBIEN) 5 MG tablet     No current facility-administered medications for this visit.      Immunization History   Administered Date(s) Administered     Influenza (IIV3) PF 11/24/2014, 11/14/2016     Influenza  Vaccine IM 3yrs+ 4 Valent IIV4 11/01/2017     Pneumococcal 23 valent 08/23/2011, 11/14/2016     TDAP Vaccine (Boostrix) 03/07/2016       This is a LOW risk surgery.      HPI:   Reason for surgery:     Patient with PMHx chronic progressive spinal muscular atrophy (c/b chronic respiratory failure requiring tracheostomy and chronic ventilation and PEG) with prior intrathecal spinal reservoir insertion in 2017 for adminsitration of spinraza, or nusinersen, for treatment of spinal muscular atrophy.  Placement of intrathecal reservoir was complicated by infection requiring reservoir revision. Per patient and mother, positioning of reservoir causes pain during administration of spinraza, necessitating anaesthesia during subsequent administrations. She is now beginning maintenance therapy period during which she will receive injections every 4 months.    Cardiovascular Risk:  The patient does not have chest pain. She does not have a history of known cardiac disease.   The patient does not have a history of stroke.     Pulmonary Risk:  In terms of risk factors for pulmonary complications, the patient does have a history of mild Asthma.     Perioperative Complications:  The patient does have a history of bleeding or clotting problems in the past, with history of DVT on chronic anticoagulation with rivaroxaban.    Other Concerns addressed today:  Further concerns include URI symptoms and need for vaccination against the influenza virus. Patient reports onset of congestion, coryza, watery eyes, sore throat with 10 days prior to current presentation with some waxing and waning of symptoms. Used APAP q 4 hours for one day and robitussin cough syrup q 6 hours for several days with some improvement in symptoms. Also increased albuterol nebulizer use to q 6 hours with some alleviation. Further endorses occasional increase and thickening of sputum production requiring increased suction frequency, however, has been intermittent.  Denies fever/chills, headaches, otalgia, N/V/D.      ROS:  Constitutional: no fevers   Eyes: no vision change  Ears, Nose, Mouth, Throat: sore throat, odynophagia  Cardiovascular: no chest pain, palpitations   Respiratory: no dyspnea, + increased sputum production   GI: no nausea, vomiting, diarrhea or abdominal pain; + constipation unchanged from baseline  : no dysuria  Musculoskeletal: chronically progressive stiffness, unilateral lower extremity edema that is stable, no acute pain  Integumentary: no concerning lesions   Neuro: no headache   Heme/Lymph: no concerning bumps    PHYSICAL EXAM:  /81   Pulse 79   Temp 97.6  F (36.4  C) (Oral)   SpO2 98%     Wt Readings from Last 1 Encounters:   06/25/18 65.8 kg (145 lb)       Constitutional: no distress, comfortable, pleasant   Eyes: anicteric, normal extra-ocular movements, pupils 3 mm and equal bilaterally  Ears, Nose and Throat: tympanic membranes obscured by cerumen with mild erythema of external ear on right, macroglossia obscuring view of posterior oropharynx, no frontal or maxillary tenderness  Cardiovascular: regular rate and rhythm, normal S1 and S2  Respiratory: tracheostomy in place with coarse ventilated breath sounds bilaterally, mildly diminished at left base   Gastrointestinal: positive bowel sounds, nontender   Musculoskeletal: no appreciable edema   Skin: no concerning lesions, no jaundice   Neurological: cranial nerves grossly intact  Psychological: appropriate mood   Lymphatic: no cervical lymphadenopathy      A/P:    The patient with   Past Medical History:   Diagnosis Date     Anxiety      Snoring      Spinal muscle atrophy (H)     presents prior to surgery for assessment of perioperative risk. The patient is at low risk for cardiovascular complications and at moderate risk for pulmonary complications of this low risk procedure.    Proceed with procedure as planned. Patient previously instructed by specialty provider to hold xarelto on  day prior to procedure without modification to other medications.    # Preop general physical exam  Low risk procedure with planned anasthesia administration in setting of intrathecal device presumed for pain management. No recent labs obtained and will need BMP prior to procedure.  - BMP not obtained in office today. Please obtain BMP prior to procedure, per patient and personal care assistant discussion with your team recently.  - EKG was not indicated based on risk assessment.      # Abnormal amount of sputum  # Upper respiratory infection  # Acute sinusitis, nonrecurrent  Increased sputum production in patient with chronic respiratory failure and impaired cough requiring increased frequency of suctioning and increased albuterol nebulizer use. No fever or chills. Physical exam with coarse breath sounds. Given increased risk of pneumonia in this patient, will obtain CXR.  -     XR Chest 1 View; Future    # Need for prophylactic vaccination and inoculation against influenza  -     HC FLU VAC PRESRV FREE QUAD SPLIT VIR 3+YRS IM      Please contact our office if there are any further questions or information required about this patient.    Yelena Wells MD  PGY-1, Internal Medicine  Jan 28, 2019    Pt was seen and plan of care discussed with Dr. Pat.     Pt was seen and examined with Dr. Wells.  I agree with her documentation as noted above.    My additional comments: None    Doris Pat MD                      Rooming Note      Pre-Operative Information  Procedure: Injection into back  Date of Surgery: 2/5/19  Location: [place]  Surgeon: Peyton Lucas NP  Fax: [phone]    Doris Pat MD

## 2019-02-06 DIAGNOSIS — G62.9 NEUROPATHY: ICD-10-CM

## 2019-02-06 NOTE — TELEPHONE ENCOUNTER
GABAPENTIN 250MG/5ML SOLUTION  Take 20 mL per feeding tube three times per day     Last Written Prescription Date:  7/30/2018  Last Fill Quantity: 1800 ml,   # refills: 5  Last Office Visit : 1/28/2019  Future Office visit: None    Routing refill request to provider for review/approval because:  Drug not on the [facility] refill protocol or controlled substance

## 2019-02-07 DIAGNOSIS — G12.9 SPINAL MUSCLE ATROPHY (H): ICD-10-CM

## 2019-02-07 RX ORDER — GABAPENTIN 250 MG/5ML
SOLUTION ORAL
Qty: 1800 ML | Refills: 6 | Status: SHIPPED | OUTPATIENT
Start: 2019-02-07 | End: 2019-09-23

## 2019-02-25 ENCOUNTER — TELEPHONE (OUTPATIENT)
Dept: INTERNAL MEDICINE | Facility: CLINIC | Age: 29
End: 2019-02-25

## 2019-02-25 DIAGNOSIS — K59.03 DRUG-INDUCED CONSTIPATION: Primary | ICD-10-CM

## 2019-02-25 NOTE — TELEPHONE ENCOUNTER
Pt's RX for Senna-GRX 8.8 mg/5 syrup is on back order-needing a replacement RX.    Per Dr Reece:  sennosides (SENNAZON) 8.8 MG/5ML syrup 5 mL, AT BEDTIME PRN 3 ordered           Summary: Take 5 mLs by mouth nightly as needed for constipation, Disp-450 mL, R-3, E-Prescribe   Dose, Route, Frequency: 5 mL, Oral, AT BEDTIME PRN  Start: 2/25/2019  End: 2/25/2020  Ord/Sold: 2/25/2019 (O)  Report  Adh:   Long-term:   [facility] [address] [place] [address] - [address] AT [facility] [address]  Med Dose History  Change       Patient Sig: Take 5 mLs by mouth nightly as needed for constipation       Ordered on: 2/25/2019       Authorized by: DEO REECE       Dispense: 450 mL        Mother called and informed of new medication. Clinic numbers given for questions or concerns.    Gabriela Hylton RN 7:56 AM on 2/26/2019.

## 2019-02-27 DIAGNOSIS — K21.9 GASTROESOPHAGEAL REFLUX DISEASE WITHOUT ESOPHAGITIS: ICD-10-CM

## 2019-03-01 ENCOUNTER — TELEPHONE (OUTPATIENT)
Dept: INTERNAL MEDICINE | Facility: CLINIC | Age: 29
End: 2019-03-01

## 2019-03-01 DIAGNOSIS — G12.9 SPINAL MUSCLE ATROPHY (H): ICD-10-CM

## 2019-03-01 DIAGNOSIS — K21.00 GASTROESOPHAGEAL REFLUX DISEASE WITH ESOPHAGITIS: Primary | ICD-10-CM

## 2019-03-01 NOTE — TELEPHONE ENCOUNTER
[facility] Call Center    Phone Message    May a detailed message be left on voicemail: yes    Reason for Call: Medication Question or concern regarding medication   Prescription Clarification  Name of Medication: omeprazole (PRILOSEC) 2 mg/mL suspension     Prescribing Provider: Dr Pat   Pharmacy: [facility] [id number] [id number] (Pharmacy)   What on the order needs clarification?   Heather from [facility] Is calling to get clarification on this medication before they fill it the order was written for 120ML and to take 20ML daily so that would only be a week supply they needed to know if the ML needs to be 600 for a 30day supply or is the original order correct please call asap its urgent         Action Taken: Message routed to:  [facility] aleshia

## 2019-03-04 RX ORDER — DIAZEPAM 5 MG
TABLET ORAL
Qty: 30 TABLET | Refills: 0 | Status: SHIPPED | OUTPATIENT
Start: 2019-03-04 | End: 2019-03-15

## 2019-03-04 NOTE — TELEPHONE ENCOUNTER
RX for Valium faxed to [facility].    LILLIAM DAMICO at 12:21 PM on 3/4/2019.      Reason For Call:        Chief Complaint   Patient presents with     Refill Request                 Medication Name, Dose and Monthly Quantity:   Valium 5 mg    Diagnosis requiring opiates:   G12.9     Problem List Updated:   Yes     Opioid Agreement On File - [facility] PAIN CONTRACT ID# [id number]:       Last Urine Drug Screen (at least once every 12 months) Date:     Unexpected Results:        [place]  Data Reviewed (at least once every 3 months) Date:   03/04/19  Unexpected Results:         Last Fill Date:    02/18/19    Next Fill Date:   03/04/19     Last Visit with PCP:    01/28/19    Future Visits with PCP:      Processing:   [facility]      LILLIAM DAMICO at 10:21 AM on 3/4/2019.

## 2019-03-08 ENCOUNTER — TELEPHONE (OUTPATIENT)
Dept: INTERNAL MEDICINE | Facility: CLINIC | Age: 29
End: 2019-03-08

## 2019-03-08 DIAGNOSIS — N39.46 MIXED INCONTINENCE: Primary | ICD-10-CM

## 2019-03-08 NOTE — TELEPHONE ENCOUNTER
[facility] Call Center    Phone Message    May a detailed message be left on voicemail: yes, Wanting a call when order has been put through     Reason for Call: Order(s): Other:   Reason for requested: Catheter   Date needed: 3/8/2019  Provider name: Doris Pat      Action Taken: Message routed to:  [facility]

## 2019-03-08 NOTE — TELEPHONE ENCOUNTER
Patient's mother, Aster, was reached by phone and explained that reason for Fischer request is that mother will be having surgery on March 21st, and she will be away for 3 days.  Aster is hoping to have fischer put in by home care nurse [place] [facility] [place], so patient will not have to go for an extended period of time with no care for urinary incontinence.  She stated that home care nurses do not come every day, and Aster is trying to plan for when she will be away for the surgery.  The fischer would only need to be in place for 3 days.  If approved, order would need to be faxed to [facility] [place].    Joanne Hagan RN on 3/8/2019 at 5:05 PM    DME order for a fischer catheter with 5cc balloon sent to [facility].  Gabriela Hylton RN 8:20 AM on 3/13/2019.

## 2019-03-12 ENCOUNTER — TELEPHONE (OUTPATIENT)
Dept: INTERNAL MEDICINE | Facility: CLINIC | Age: 29
End: 2019-03-12

## 2019-03-12 ENCOUNTER — DOCUMENTATION ONLY (OUTPATIENT)
Dept: CARE COORDINATION | Facility: CLINIC | Age: 29
End: 2019-03-12

## 2019-03-12 DIAGNOSIS — N39.41 URGE INCONTINENCE OF URINE: Primary | ICD-10-CM

## 2019-03-12 NOTE — TELEPHONE ENCOUNTER
Pt needing indwelling 16 fr catheter with 5cc balloon for 3-5 day with drainage bag.  RX faxed to [facility] [phone]  Gabriela Hylton RN 11:34 AM on 3/12/2019.

## 2019-03-15 DIAGNOSIS — G12.9 SPINAL MUSCLE ATROPHY (H): ICD-10-CM

## 2019-03-16 ENCOUNTER — MEDICAL CORRESPONDENCE (OUTPATIENT)
Dept: HEALTH INFORMATION MANAGEMENT | Facility: CLINIC | Age: 29
End: 2019-03-16

## 2019-03-18 RX ORDER — DIAZEPAM 5 MG
TABLET ORAL
Qty: 30 TABLET | Refills: 0 | Status: SHIPPED | OUTPATIENT
Start: 2019-03-18 | End: 2019-05-29

## 2019-03-18 NOTE — TELEPHONE ENCOUNTER
DIAZEPAM 5MG TABLETS          Will file in chart as: diazepam (VALIUM) 5 MG tablet    Sig: TAKE 1 TABLET BY MOUTH EVERY 6 HOURS AS NEEDED FOR MUSCLE SPASMS    Disp:  30 tablet    Refills:  0    Start: 3/15/2019    Class: E-Prescribe    For: Spinal muscle atrophy (H)    Requested on: 3/15/2019

## 2019-04-02 DIAGNOSIS — G12.9 SPINAL MUSCLE ATROPHY (H): ICD-10-CM

## 2019-04-02 RX ORDER — ALPRAZOLAM 0.25 MG
0.25 TABLET ORAL 3 TIMES DAILY PRN
Qty: 60 TABLET | Refills: 2 | Status: SHIPPED | OUTPATIENT
Start: 2019-04-02 | End: 2019-09-06

## 2019-04-02 NOTE — TELEPHONE ENCOUNTER
RX for Xanax faxed to [facility].    LILLIAM DAMICO at 8:24 AM on 4/3/2019.      Reason For Call:        Chief Complaint   Patient presents with     Refill Request                 Medication Name, Dose and Monthly Quantity:   Xanax 0.25 mg    Diagnosis requiring opiates:   G12.9     Problem List Updated:   Yes     Opioid Agreement On File - [facility] PAIN CONTRACT ID# [id number]:       Last Urine Drug Screen (at least once every 12 months) Date:     Unexpected Results:        [place]  Data Reviewed (at least once every 3 months) Date:   04/02/19  Unexpected Results:         Last Fill Date:   02/18/19    Next Fill Date:   04/02/19     Last Visit with PCP:    01/28/19    Future Visits with PCP:    Nothing scheduled  Processing:   [facility]      LILLIAM DAMICO at 10:21 AM on 3/4/2019.

## 2019-04-18 ENCOUNTER — MYC MEDICAL ADVICE (OUTPATIENT)
Dept: INTERNAL MEDICINE | Facility: CLINIC | Age: 29
End: 2019-04-18

## 2019-04-18 DIAGNOSIS — N39.41 URGE INCONTINENCE OF URINE: ICD-10-CM

## 2019-04-19 NOTE — TELEPHONE ENCOUNTER
DME order was faxed to [facility] @ [phone], attn: Fredo Hagan RN on 4/19/2019 at 11:27 AM

## 2019-04-24 ENCOUNTER — MYC MEDICAL ADVICE (OUTPATIENT)
Dept: INTERNAL MEDICINE | Facility: CLINIC | Age: 29
End: 2019-04-24

## 2019-04-30 DIAGNOSIS — G12.9 SPINAL MUSCLE ATROPHY (H): ICD-10-CM

## 2019-05-01 RX ORDER — DIAZEPAM 5 MG
TABLET ORAL
Qty: 30 TABLET | Refills: 0
Start: 2019-05-01

## 2019-05-01 NOTE — TELEPHONE ENCOUNTER
Patient was ordered 2 refills on 4/02/19.    Pharmacy was called on 04/30/19 and verified that 2 refills were valid.    Duplicate request.    LILLIAM DAMICO at 6:56 AM on 5/1/2019.

## 2019-05-15 ENCOUNTER — MEDICAL CORRESPONDENCE (OUTPATIENT)
Dept: HEALTH INFORMATION MANAGEMENT | Facility: CLINIC | Age: 29
End: 2019-05-15

## 2019-05-23 ENCOUNTER — MEDICAL CORRESPONDENCE (OUTPATIENT)
Dept: HEALTH INFORMATION MANAGEMENT | Facility: CLINIC | Age: 29
End: 2019-05-23

## 2019-05-27 ASSESSMENT — ENCOUNTER SYMPTOMS
FEVER: 0
DIARRHEA: 1
WEIGHT GAIN: 0
BOWEL INCONTINENCE: 1
CONSTIPATION: 1
POLYDIPSIA: 0
DECREASED APPETITE: 0
ABDOMINAL PAIN: 1
JAUNDICE: 0
CHILLS: 0
INCREASED ENERGY: 0
BLOOD IN STOOL: 0
NAUSEA: 1
WEIGHT LOSS: 0
BLOATING: 0
VOMITING: 1
RECTAL PAIN: 0
ALTERED TEMPERATURE REGULATION: 0
NIGHT SWEATS: 0
HALLUCINATIONS: 0
FATIGUE: 0
HEARTBURN: 0
POLYPHAGIA: 0

## 2019-05-28 ENCOUNTER — OFFICE VISIT (OUTPATIENT)
Dept: INTERNAL MEDICINE | Facility: CLINIC | Age: 29
End: 2019-05-28
Payer: COMMERCIAL

## 2019-05-28 VITALS — HEART RATE: 70 BPM | OXYGEN SATURATION: 98 % | SYSTOLIC BLOOD PRESSURE: 122 MMHG | DIASTOLIC BLOOD PRESSURE: 81 MMHG

## 2019-05-28 DIAGNOSIS — Z01.818 PRE-OPERATIVE EXAMINATION: Primary | ICD-10-CM

## 2019-05-28 RX ORDER — PROCHLORPERAZINE MALEATE 5 MG
5 TABLET ORAL EVERY 6 HOURS PRN
COMMUNITY

## 2019-05-28 RX ORDER — HYDROXYZINE HYDROCHLORIDE 25 MG/1
25 TABLET, FILM COATED ORAL EVERY 6 HOURS PRN
COMMUNITY

## 2019-05-28 RX ORDER — BISACODYL 10 MG
10 SUPPOSITORY, RECTAL RECTAL DAILY PRN
COMMUNITY

## 2019-05-28 RX ORDER — SIMETHICONE
30 LIQUID (ML) MISCELLANEOUS PRN
COMMUNITY
End: 2024-08-22

## 2019-05-28 ASSESSMENT — ANXIETY QUESTIONNAIRES
3. WORRYING TOO MUCH ABOUT DIFFERENT THINGS: NOT AT ALL
2. NOT BEING ABLE TO STOP OR CONTROL WORRYING: SEVERAL DAYS
GAD7 TOTAL SCORE: 3
6. BECOMING EASILY ANNOYED OR IRRITABLE: SEVERAL DAYS
7. FEELING AFRAID AS IF SOMETHING AWFUL MIGHT HAPPEN: NOT AT ALL
1. FEELING NERVOUS, ANXIOUS, OR ON EDGE: SEVERAL DAYS
5. BEING SO RESTLESS THAT IT IS HARD TO SIT STILL: NOT AT ALL

## 2019-05-28 ASSESSMENT — PAIN SCALES - GENERAL: PAINLEVEL: SEVERE PAIN (7)

## 2019-05-28 ASSESSMENT — PATIENT HEALTH QUESTIONNAIRE - PHQ9
5. POOR APPETITE OR OVEREATING: NOT AT ALL
SUM OF ALL RESPONSES TO PHQ QUESTIONS 1-9: 1

## 2019-05-28 NOTE — PROGRESS NOTES
[facility]   Parul KILLIANDanni Sonyodessa, RIDGE CNP  05/28/2019     Chief Complaint:   Pre-Op Exam        History of Present Illness:   Lanie Neil is an anticoagulated 28 year old female on Xarelto here at the request of the emergency department for cardiovascular, pulmonary, and perioperative risk assessment prior to surgery.  The intended procedure is an injection via intrathecal reservoir on 05/30/2019. A copy of this note will be sent to her physician.        Reason for surgery: The patient has a history of progressive spinal muscular atrophy as well as chronic pain syndrome. She underwent intrathecal spinal reservoir insertion in 2017 for administration of Spinraza for treatment of spinal muscular atrophy. Placement of the device was complicated by infection shortly thereafter, requiring reservoir revision. Due to the revised positioning of the reservoir, the patient experiences pain during administration of Spinraza, requiring anaesthesia for administrations. Of note, the patient has been receiving these injections every 4 months. She denies a history of complications with this procedure.     Cardiovascular Risk:  The patient does not have chest pain. She does not have a history of known cardiac disease.   The patient does not have a history of stroke.     Pulmonary Risk:  In terms of risk factors for pulmonary complications, the patient does have a history of mild Asthma.      Perioperative Complications:  The patient does have a history of bleeding or clotting problems in the past, with history of DVT on chronic anticoagulation with rivaroxaban.    Other concerns:   Of note, the patient was recently evaluated in the emergency department for constipation. Her last bowel movement was yesterday. She denies recent abdominal pain. The patient has not had any other recent illnesses including cold.     Review of Systems:   Pertinent items are noted in HPI or as in patient entered ROS below, remainder  "of complete ROS is negative.    Active Medications:      ACETAMINOPHEN PO, Take 650 mg by mouth every 4 hours as needed for pain, Disp: , Rfl:      albuterol (2.5 MG/3ML) 0.083% neb solution, USE ONE VIAL BY NEBULIZATION EVERY 6 HOURS AS NEEDED FOR SHORTNESS OF BREATH/ DYSPNEA OR WHEEZING, Disp: 360 mL, Rfl: 6     albuterol (ALBUTEROL) 108 (90 BASE) MCG/ACT inhaler, Inhale 2 puffs into the lungs every 4 hours as needed for shortness of breath / dyspnea or wheezing, Disp: 1 Inhaler, Rfl: 2     Albuterol Sulfate (VENTOLIN HFA IN), Inhale 2 puffs into the lungs, Disp: , Rfl:      ALPRAZolam (XANAX) 0.25 MG tablet, Take 1 tablet (0.25 mg) by mouth 3 times daily as needed for anxiety, Disp: 60 tablet, Rfl: 2     bisacodyl (DULCOLAX) 10 MG suppository, Place 10 mg rectally daily as needed for constipation, Disp: , Rfl:      celecoxib (CELEBREX) 200 MG capsule, TAKE 1 CAPSULE(200 MG) BY MOUTH DAILY, Disp: 30 capsule, Rfl: 8     clindamycin (CLEOCIN T) 1 % lotion, Apply topically 2 times daily To face as needed, Disp: 60 mL, Rfl: 3     diazepam (VALIUM) 5 MG tablet, TAKE 1 TABLET BY MOUTH EVERY 6 HOURS AS NEEDED FOR MUSCLE SPASMS, Disp: 30 tablet, Rfl: 0     doxycycline hyclate (VIBRAMYCIN) 100 MG capsule, GIVE ONE CAPSULE VIA \"G\"- TUBE TWICE DAILY, Disp: 60 capsule, Rfl: 2     fluticasone (FLONASE) 50 MCG/ACT spray, Spray 1 spray into both nostrils daily, Disp: 16 mL, Rfl: 11     gabapentin (NEURONTIN) 250 MG/5ML solution, TAKE 20 ML PER FEEDING TUBE THREE TIMES DAILY., Disp: 1800 mL, Rfl: 6     glycerin-hypromellose- (ARTIFICIAL TEARS) 0.2-0.2-1 % SOLN ophthalmic solution, Place 1 drop into both eyes 2 times daily as needed for dry eyes, Disp: 1 Bottle, Rfl: 11     guaiFENesin (ORGANIDIN) 200 MG TABS tablet, Take 200 mg by mouth every 4 hours as needed for cough, Disp: , Rfl:      HYDROmorphone (DILAUDID) 4 MG tablet, Take 1-2 tablets (4-8 mg) by mouth every 4 hours as needed for moderate to severe pain, Disp: 300 " tablet, Rfl: 0     hydrOXYzine (ATARAX) 25 MG tablet, Take 25 mg by mouth every 6 hours as needed for itching, Disp: , Rfl:      SABINE 30 MG, Take 1 capsule (30 mg) by mouth 2 times daily, Disp: 60 capsule, Rfl: 0     lactulose (CHRONULAC) 10 GM/15ML solution, Take 30 mLs (20 g) by mouth 2 times daily as needed for constipation, Disp: 500 mL, Rfl: 11     Magnesium Hydroxide (MILK OF MAGNESIA PO), Take 30 mLs by mouth as needed, Disp: , Rfl:      metroNIDAZOLE (METROCREAM) 0.75 % external cream, Apply topically 2 times daily, Disp: 45 g, Rfl: 11     omeprazole (PRILOSEC) 2 mg/mL suspension, SHAKE LIQUID WELL AND GIVE 20ML (40MG) DAILY (VIA G-TUBE), Disp: 600 mL, Rfl: 11     ondansetron (ZOFRAN-ODT) 4 MG ODT tab, Take 1 tablet (4 mg) by mouth every 8 hours as needed for nausea, Disp: 60 tablet, Rfl: 5     polyethylene glycol (MIRALAX/GLYCOLAX) powder, TAKE 17 GRAMS BY MOUTH EVERY DAY (Patient taking differently: TAKE 17 GRAMS BY MOUTH three times EVERY DAY), Disp: 850 g, Rfl: 6     prochlorperazine (COMPAZINE) 5 MG tablet, Take 5 mg by mouth every 6 hours as needed for nausea or vomiting, Disp: , Rfl:      rivaroxaban ANTICOAGULANT (XARELTO) 20 MG TABS tablet, TAKE 1 TABLET(20 MG) BY MOUTH DAILY WITH DINNER, Disp: 30 tablet, Rfl: 6     Salicylic Acid (OXY BALANCE FACIAL CLEAN WASH EX), Externally apply 1 pad topically daily, Disp: , Rfl:      sennosides (SENNA-GRX) 8.8 MG/5ML syrup, Take 10 mLs by mouth 2 times daily, Disp: 474 mL, Rfl: 7     sennosides (SENNAZON) 8.8 MG/5ML syrup, Take 5 mLs by mouth nightly as needed for constipation (Patient taking differently: Take 10 mLs by mouth 2 times daily ), Disp: 450 mL, Rfl: 3     silver sulfADIAZINE (SILVADENE) 1 % cream, Apply topically daily, Disp: 400 g, Rfl: 1     simethicone (MYLICON) 66.7 mg/ml, as needed, Disp: , Rfl:      zolpidem (AMBIEN) 5 MG tablet, Take 1 tablet (5 mg) by mouth nightly as needed for sleep, Disp: 30 tablet, Rfl: 2      Allergies:   Patient has  no known allergies.      Past Medical History:  Anxiety   Snoring   Spinal muscular atrophy   Constipation   Dyspepsia and other specified disorders of function of stomach   Chronic sinusitis   Deep venous thrombosis (DVT)   Chronic respiratory failure   Chronic pain syndrome      Past Surgical History:  ENT surgery   Gastrostomy tube   Kidney surgery   Spine surgery   Tracheostomy     Family History:   The patient does not have a known family history.      Social History:   The patient is single, a nonsmoker, and does not consume alcohol.      Physical Exam:   /81   Pulse 70   SpO2 98%       Wt Readings from Last 1 Encounters:   06/25/18 65.8 kg (145 lb)     Constitutional: no distress, comfortable, pleasant   Eyes: anicteric  Cardiovascular: regular rate and rhythm, normal S1 and S2  Respiratory: tracheostomy in place with coarse ventilated breath sounds bilaterally.  Gastrointestinal: positive bowel sounds, nontender   Musculoskeletal: no appreciable edema   Skin: no concerning lesions, no jaundice   Neurological: alert and oriented.  Psychological: appropriate mood      Recent Labs: Laboratory studies were not indicated based on risk assessment.     EKG:  EKG was not indicated based on risk assessment.      Pregnancy test needed: No     Assessment and Plan    # Preop general physical exam  Low risk procedure with planned anasthesia administration in setting of intrathecal device presumed for pain management. No recent labs obtained and will need BMP prior to procedure.  - BMP not obtained in office today. Please obtain BMP prior to procedure, per patient and personal care assistant discussion with your team recently.  - EKG was not indicated based on risk assessment.  - Hold Xarelto the day prior to procedure and hold day of p[rocedure until after procedure completed. The patient has been told to not take any aspirin or NSAIDs for 10 days prior to surgery. The patient has been instructed as to what to do  with medications prior to surgery.     Scribe Disclosure:  MORE, Marysol Prasad, am serving as a scribe to document services personally performed by RIDGE Chan CNP at this visit, based upon the provider's statements to me. All documentation has been reviewed by the aforementioned provider prior to being entered into the official medical record.    Answers for HPI/ROS submitted by the patient on 5/27/2019   General Symptoms: Yes  Skin Symptoms: No  HENT Symptoms: No  EYE SYMPTOMS: No  HEART SYMPTOMS: No  LUNG SYMPTOMS: No  INTESTINAL SYMPTOMS: Yes  URINARY SYMPTOMS: No  GYNECOLOGIC SYMPTOMS: No  BREAST SYMPTOMS: No  SKELETAL SYMPTOMS: No  BLOOD SYMPTOMS: No  NERVOUS SYSTEM SYMPTOMS: No  MENTAL HEALTH SYMPTOMS: No  Fever: No  Loss of appetite: No  Weight loss: No  Weight gain: No  Fatigue: No  Night sweats: No  Chills: No  Increased stress: No  Excessive hunger: No  Excessive thirst: No  Feeling hot or cold when others believe the temperature is normal: No  Loss of height: No  Post-operative complications: No  Surgical site pain: No  Hallucinations: No  Change in or Loss of Energy: No  Hyperactivity: No  Confusion: No  Heart burn or indigestion: No  Nausea: Yes  Vomiting: Yes  Abdominal pain: Yes  Bloating: No  Constipation: Yes  Diarrhea: Yes  Blood in stool: No  Black stools: No  Rectal or Anal pain: No  Fecal incontinence: Yes  Yellowing of skin or eyes: No  Vomit with blood: No  Change in stools: No

## 2019-05-28 NOTE — NURSING NOTE
Pre-Operative Information  Procedure: Injection into back  Date of Surgery: 5/30/19  Location: [place]  Surgeon: Dr. Angus Sanchez, Peyton Ayala NP  Fax: [phone]

## 2019-05-28 NOTE — LETTER
5/28/2019      RE: Lanie Neil  [address]  [facility] [address]       [facility]   Parul Jean-Baptiste, RIDGE GUILLAUME  05/28/2019     Chief Complaint:   Pre-Op Exam        History of Present Illness:   Lanie Neil is an anticoagulated 28 year old female on Xarelto here at the request of the emergency department for cardiovascular, pulmonary, and perioperative risk assessment prior to surgery.  The intended procedure is an injection via intrathecal reservoir on 05/30/2019. A copy of this note will be sent to her physician.        Reason for surgery: The patient has a history of progressive spinal muscular atrophy as well as chronic pain syndrome. She underwent intrathecal spinal reservoir insertion in 2017 for administration of Spinraza for treatment of spinal muscular atrophy. Placement of the device was complicated by infection shortly thereafter, requiring reservoir revision. Due to the revised positioning of the reservoir, the patient experiences pain during administration of Spinraza, requiring anaesthesia for administrations. Of note, the patient has been receiving these injections every 4 months. She denies a history of complications with this procedure.     Cardiovascular Risk:  The patient does not have chest pain. She does not have a history of known cardiac disease.   The patient does not have a history of stroke.     Pulmonary Risk:  In terms of risk factors for pulmonary complications, the patient does have a history of mild Asthma.      Perioperative Complications:  The patient does have a history of bleeding or clotting problems in the past, with history of DVT on chronic anticoagulation with rivaroxaban.    Other concerns:   Of note, the patient was recently evaluated in the emergency department for constipation. Her last bowel movement was yesterday. She denies recent abdominal pain. The patient has not had any other recent illnesses including cold.     Review of  "Systems:   Pertinent items are noted in HPI or as in patient entered ROS below, remainder of complete ROS is negative.    Active Medications:      ACETAMINOPHEN PO, Take 650 mg by mouth every 4 hours as needed for pain, Disp: , Rfl:      albuterol (2.5 MG/3ML) 0.083% neb solution, USE ONE VIAL BY NEBULIZATION EVERY 6 HOURS AS NEEDED FOR SHORTNESS OF BREATH/ DYSPNEA OR WHEEZING, Disp: 360 mL, Rfl: 6     albuterol (ALBUTEROL) 108 (90 BASE) MCG/ACT inhaler, Inhale 2 puffs into the lungs every 4 hours as needed for shortness of breath / dyspnea or wheezing, Disp: 1 Inhaler, Rfl: 2     Albuterol Sulfate (VENTOLIN HFA IN), Inhale 2 puffs into the lungs, Disp: , Rfl:      ALPRAZolam (XANAX) 0.25 MG tablet, Take 1 tablet (0.25 mg) by mouth 3 times daily as needed for anxiety, Disp: 60 tablet, Rfl: 2     bisacodyl (DULCOLAX) 10 MG suppository, Place 10 mg rectally daily as needed for constipation, Disp: , Rfl:      celecoxib (CELEBREX) 200 MG capsule, TAKE 1 CAPSULE(200 MG) BY MOUTH DAILY, Disp: 30 capsule, Rfl: 8     clindamycin (CLEOCIN T) 1 % lotion, Apply topically 2 times daily To face as needed, Disp: 60 mL, Rfl: 3     diazepam (VALIUM) 5 MG tablet, TAKE 1 TABLET BY MOUTH EVERY 6 HOURS AS NEEDED FOR MUSCLE SPASMS, Disp: 30 tablet, Rfl: 0     doxycycline hyclate (VIBRAMYCIN) 100 MG capsule, GIVE ONE CAPSULE VIA \"G\"- TUBE TWICE DAILY, Disp: 60 capsule, Rfl: 2     fluticasone (FLONASE) 50 MCG/ACT spray, Spray 1 spray into both nostrils daily, Disp: 16 mL, Rfl: 11     gabapentin (NEURONTIN) 250 MG/5ML solution, TAKE 20 ML PER FEEDING TUBE THREE TIMES DAILY., Disp: 1800 mL, Rfl: 6     glycerin-hypromellose- (ARTIFICIAL TEARS) 0.2-0.2-1 % SOLN ophthalmic solution, Place 1 drop into both eyes 2 times daily as needed for dry eyes, Disp: 1 Bottle, Rfl: 11     guaiFENesin (ORGANIDIN) 200 MG TABS tablet, Take 200 mg by mouth every 4 hours as needed for cough, Disp: , Rfl:      HYDROmorphone (DILAUDID) 4 MG tablet, Take 1-2 " tablets (4-8 mg) by mouth every 4 hours as needed for moderate to severe pain, Disp: 300 tablet, Rfl: 0     hydrOXYzine (ATARAX) 25 MG tablet, Take 25 mg by mouth every 6 hours as needed for itching, Disp: , Rfl:      SABINE 30 MG, Take 1 capsule (30 mg) by mouth 2 times daily, Disp: 60 capsule, Rfl: 0     lactulose (CHRONULAC) 10 GM/15ML solution, Take 30 mLs (20 g) by mouth 2 times daily as needed for constipation, Disp: 500 mL, Rfl: 11     Magnesium Hydroxide (MILK OF MAGNESIA PO), Take 30 mLs by mouth as needed, Disp: , Rfl:      metroNIDAZOLE (METROCREAM) 0.75 % external cream, Apply topically 2 times daily, Disp: 45 g, Rfl: 11     omeprazole (PRILOSEC) 2 mg/mL suspension, SHAKE LIQUID WELL AND GIVE 20ML (40MG) DAILY (VIA G-TUBE), Disp: 600 mL, Rfl: 11     ondansetron (ZOFRAN-ODT) 4 MG ODT tab, Take 1 tablet (4 mg) by mouth every 8 hours as needed for nausea, Disp: 60 tablet, Rfl: 5     polyethylene glycol (MIRALAX/GLYCOLAX) powder, TAKE 17 GRAMS BY MOUTH EVERY DAY (Patient taking differently: TAKE 17 GRAMS BY MOUTH three times EVERY DAY), Disp: 850 g, Rfl: 6     prochlorperazine (COMPAZINE) 5 MG tablet, Take 5 mg by mouth every 6 hours as needed for nausea or vomiting, Disp: , Rfl:      rivaroxaban ANTICOAGULANT (XARELTO) 20 MG TABS tablet, TAKE 1 TABLET(20 MG) BY MOUTH DAILY WITH DINNER, Disp: 30 tablet, Rfl: 6     Salicylic Acid (OXY BALANCE FACIAL CLEAN WASH EX), Externally apply 1 pad topically daily, Disp: , Rfl:      sennosides (SENNA-GRX) 8.8 MG/5ML syrup, Take 10 mLs by mouth 2 times daily, Disp: 474 mL, Rfl: 7     sennosides (SENNAZON) 8.8 MG/5ML syrup, Take 5 mLs by mouth nightly as needed for constipation (Patient taking differently: Take 10 mLs by mouth 2 times daily ), Disp: 450 mL, Rfl: 3     silver sulfADIAZINE (SILVADENE) 1 % cream, Apply topically daily, Disp: 400 g, Rfl: 1     simethicone (MYLICON) 66.7 mg/ml, as needed, Disp: , Rfl:      zolpidem (AMBIEN) 5 MG tablet, Take 1 tablet (5 mg) by  mouth nightly as needed for sleep, Disp: 30 tablet, Rfl: 2      Allergies:   Patient has no known allergies.      Past Medical History:  Anxiety   Snoring   Spinal muscular atrophy   Constipation   Dyspepsia and other specified disorders of function of stomach   Chronic sinusitis   Deep venous thrombosis (DVT)   Chronic respiratory failure   Chronic pain syndrome      Past Surgical History:  ENT surgery   Gastrostomy tube   Kidney surgery   Spine surgery   Tracheostomy     Family History:   The patient does not have a known family history.      Social History:   The patient is single, a nonsmoker, and does not consume alcohol.      Physical Exam:   /81   Pulse 70   SpO2 98%       Wt Readings from Last 1 Encounters:   06/25/18 65.8 kg (145 lb)     Constitutional: no distress, comfortable, pleasant   Eyes: anicteric  Cardiovascular: regular rate and rhythm, normal S1 and S2  Respiratory: tracheostomy in place with coarse ventilated breath sounds bilaterally.  Gastrointestinal: positive bowel sounds, nontender   Musculoskeletal: no appreciable edema   Skin: no concerning lesions, no jaundice   Neurological: alert and oriented.  Psychological: appropriate mood      Recent Labs: Laboratory studies were not indicated based on risk assessment.     EKG:  EKG was not indicated based on risk assessment.      Pregnancy test needed: No     Assessment and Plan    # Preop general physical exam  Low risk procedure with planned anasthesia administration in setting of intrathecal device presumed for pain management. No recent labs obtained and will need BMP prior to procedure.  - BMP not obtained in office today. Please obtain BMP prior to procedure, per patient and personal care assistant discussion with your team recently.  - EKG was not indicated based on risk assessment.  - Hold Xarelto the day prior to procedure and hold day of p[rocedure until after procedure completed. The patient has been told to not take any aspirin  or NSAIDs for 10 days prior to surgery. The patient has been instructed as to what to do with medications prior to surgery.     Scribe Disclosure:  I, Marysol Prasad, am serving as a scribe to document services personally performed by RIDGE Chan CNP at this visit, based upon the provider's statements to me. All documentation has been reviewed by the aforementioned provider prior to being entered into the official medical record.    Answers for HPI/ROS submitted by the patient on 5/27/2019   General Symptoms: Yes  Skin Symptoms: No  HENT Symptoms: No  EYE SYMPTOMS: No  HEART SYMPTOMS: No  LUNG SYMPTOMS: No  INTESTINAL SYMPTOMS: Yes  URINARY SYMPTOMS: No  GYNECOLOGIC SYMPTOMS: No  BREAST SYMPTOMS: No  SKELETAL SYMPTOMS: No  BLOOD SYMPTOMS: No  NERVOUS SYSTEM SYMPTOMS: No  MENTAL HEALTH SYMPTOMS: No  Fever: No  Loss of appetite: No  Weight loss: No  Weight gain: No  Fatigue: No  Night sweats: No  Chills: No  Increased stress: No  Excessive hunger: No  Excessive thirst: No  Feeling hot or cold when others believe the temperature is normal: No  Loss of height: No  Post-operative complications: No  Surgical site pain: No  Hallucinations: No  Change in or Loss of Energy: No  Hyperactivity: No  Confusion: No  Heart burn or indigestion: No  Nausea: Yes  Vomiting: Yes  Abdominal pain: Yes  Bloating: No  Constipation: Yes  Diarrhea: Yes  Blood in stool: No  Black stools: No  Rectal or Anal pain: No  Fecal incontinence: Yes  Yellowing of skin or eyes: No  Vomit with blood: No  Change in stools: No      RIDGE Chan CNP

## 2019-05-29 ENCOUNTER — TRANSFERRED RECORDS (OUTPATIENT)
Dept: HEALTH INFORMATION MANAGEMENT | Facility: CLINIC | Age: 29
End: 2019-05-29

## 2019-05-29 DIAGNOSIS — G12.9 SPINAL MUSCLE ATROPHY (H): ICD-10-CM

## 2019-05-29 DIAGNOSIS — Z01.818 PRE-OPERATIVE EXAMINATION: ICD-10-CM

## 2019-05-29 LAB
ANION GAP SERPL CALCULATED.3IONS-SCNC: 10 MMOL/L (ref 3–14)
BUN SERPL-MCNC: 11 MG/DL (ref 7–30)
CALCIUM SERPL-MCNC: 9.2 MG/DL (ref 8.5–10.1)
CHLORIDE SERPL-SCNC: 107 MMOL/L (ref 94–109)
CO2 SERPL-SCNC: 22 MMOL/L (ref 20–32)
CREAT SERPL-MCNC: <0.2 MG/DL (ref 0.52–1.04)
GFR SERPL CREATININE-BSD FRML MDRD: >90 ML/MIN/{1.73_M2}
GLUCOSE SERPL-MCNC: 114 MG/DL (ref 70–99)
POTASSIUM SERPL-SCNC: 3.1 MMOL/L (ref 3.4–5.3)
SODIUM SERPL-SCNC: 140 MMOL/L (ref 133–144)

## 2019-05-29 PROCEDURE — 80048 BASIC METABOLIC PNL TOTAL CA: CPT | Performed by: NURSE PRACTITIONER

## 2019-05-29 PROCEDURE — 25000128 H RX IP 250 OP 636: Performed by: NURSE PRACTITIONER

## 2019-05-29 RX ORDER — HEPARIN SODIUM (PORCINE) LOCK FLUSH IV SOLN 100 UNIT/ML 100 UNIT/ML
5 SOLUTION INTRAVENOUS EVERY 8 HOURS
Status: DISCONTINUED | OUTPATIENT
Start: 2019-05-29 | End: 2019-06-06 | Stop reason: HOSPADM

## 2019-05-29 RX ADMIN — HEPARIN 5 ML: 100 SYRINGE at 13:19

## 2019-05-29 ASSESSMENT — ANXIETY QUESTIONNAIRES: GAD7 TOTAL SCORE: 3

## 2019-05-29 NOTE — NURSING NOTE
Chief Complaint   Patient presents with     Port Draw     Labs drawn via PORT by RN in lab. Line flushed with saline and heparin.Lab appt only.      Lis Gill RN

## 2019-05-30 ENCOUNTER — TRANSFERRED RECORDS (OUTPATIENT)
Dept: HEALTH INFORMATION MANAGEMENT | Facility: CLINIC | Age: 29
End: 2019-05-30

## 2019-05-30 RX ORDER — DIAZEPAM 5 MG
TABLET ORAL
Qty: 30 TABLET | Refills: 0 | Status: SHIPPED | OUTPATIENT
Start: 2019-05-30 | End: 2019-07-02

## 2019-05-30 NOTE — TELEPHONE ENCOUNTER
Patient Requested  Diazepam  Last Filled  03/18/19  Last Office Visit  01/28/19  Next Office Visit  Nothing scheduled   Checked  05/30/19    LILLIAM DAMICO at 8:49 AM on 5/30/2019.

## 2019-06-04 ENCOUNTER — TELEPHONE (OUTPATIENT)
Dept: INTERNAL MEDICINE | Facility: CLINIC | Age: 29
End: 2019-06-04

## 2019-06-04 DIAGNOSIS — E87.6 HYPOKALEMIA: Primary | ICD-10-CM

## 2019-06-04 NOTE — TELEPHONE ENCOUNTER
Spoke with mom, Aster and informed the result/recommenadtion.  Mom says pt's potassium is frequently low due to the BM and she had potassium supplement before the surgery at the hospital. She will recheck the lab. Future lab was ordered.    Soon-Mi  ----------------------------------------------------------------      Written by Parul Jean-Baptiste APRN CNP on 6/3/2019 11:11 PM   Kenisha,     Can you call her and see if she was given any potassium supplement at the time of her procedure?  SHe should have her potassium re-checked.  I dd not see any meds which would cause hypokalemia. I only saw her for her pre-op and the labs were done the day of the procedure.     Parul SABILLON, CNP

## 2019-06-07 DIAGNOSIS — I82.409 DVT (DEEP VENOUS THROMBOSIS) (H): ICD-10-CM

## 2019-06-25 ENCOUNTER — MEDICAL CORRESPONDENCE (OUTPATIENT)
Dept: HEALTH INFORMATION MANAGEMENT | Facility: CLINIC | Age: 29
End: 2019-06-25

## 2019-06-28 ENCOUNTER — HOSPITAL LABORATORY (OUTPATIENT)
Dept: OTHER | Facility: CLINIC | Age: 29
End: 2019-06-28

## 2019-06-28 ENCOUNTER — TELEPHONE (OUTPATIENT)
Dept: INTERNAL MEDICINE | Facility: CLINIC | Age: 29
End: 2019-06-28

## 2019-06-28 LAB
ANION GAP SERPL CALCULATED.3IONS-SCNC: 11 MMOL/L (ref 3–14)
BUN SERPL-MCNC: 8 MG/DL (ref 7–30)
CALCIUM SERPL-MCNC: 9 MG/DL (ref 8.5–10.1)
CHLORIDE SERPL-SCNC: 105 MMOL/L (ref 94–109)
CO2 SERPL-SCNC: 24 MMOL/L (ref 20–32)
CREAT SERPL-MCNC: 0.15 MG/DL (ref 0.52–1.04)
GFR SERPL CREATININE-BSD FRML MDRD: >90 ML/MIN/{1.73_M2}
GLUCOSE SERPL-MCNC: 89 MG/DL (ref 70–99)
POTASSIUM SERPL-SCNC: 3.4 MMOL/L (ref 3.4–5.3)
SODIUM SERPL-SCNC: 140 MMOL/L (ref 133–144)

## 2019-06-28 NOTE — TELEPHONE ENCOUNTER
[facility] Call Center    Phone Message    May a detailed message be left on voicemail: yes    Reason for Call: Other: [facility], fax [phone] - mom requesting we fax Basic Metabolic Panel orders asap, as home care nurse is currently at their house.     Action Taken: Message routed to:  [facility]

## 2019-07-01 ENCOUNTER — MEDICAL CORRESPONDENCE (OUTPATIENT)
Dept: HEALTH INFORMATION MANAGEMENT | Facility: CLINIC | Age: 29
End: 2019-07-01

## 2019-07-02 DIAGNOSIS — G12.9 SPINAL MUSCLE ATROPHY (H): ICD-10-CM

## 2019-07-02 RX ORDER — DIAZEPAM 5 MG
TABLET ORAL
Qty: 30 TABLET | Refills: 0 | Status: SHIPPED | OUTPATIENT
Start: 2019-07-02 | End: 2019-09-06

## 2019-07-02 NOTE — TELEPHONE ENCOUNTER
RX for Valium faxed to pharmacy.    LILLIAM DAMICO at 7:31 AM on 7/3/2019.      Patient Requested  Diazepam  Last Filled  05/30/2019  Last Office Visit  05/28/2019  Next Office Visit  Nothing scheduled   Checked  07/02/2019    LILLIAM DAMICO at 1:10 PM on 7/2/2019.

## 2019-07-14 ENCOUNTER — MEDICAL CORRESPONDENCE (OUTPATIENT)
Dept: HEALTH INFORMATION MANAGEMENT | Facility: CLINIC | Age: 29
End: 2019-07-14

## 2019-07-23 ENCOUNTER — OFFICE VISIT (OUTPATIENT)
Dept: HEMATOLOGY | Facility: CLINIC | Age: 29
End: 2019-07-23
Attending: PHYSICIAN ASSISTANT
Payer: COMMERCIAL

## 2019-07-23 VITALS
TEMPERATURE: 97.6 F | OXYGEN SATURATION: 99 % | RESPIRATION RATE: 14 BRPM | SYSTOLIC BLOOD PRESSURE: 104 MMHG | HEART RATE: 67 BPM | DIASTOLIC BLOOD PRESSURE: 61 MMHG

## 2019-07-23 DIAGNOSIS — Z79.01 CHRONIC ANTICOAGULATION: Primary | ICD-10-CM

## 2019-07-23 DIAGNOSIS — Z86.718 HISTORY OF DEEP VENOUS THROMBOSIS: ICD-10-CM

## 2019-07-23 DIAGNOSIS — J96.10 CHRONIC RESPIRATORY FAILURE, UNSPECIFIED WHETHER WITH HYPOXIA OR HYPERCAPNIA (H): ICD-10-CM

## 2019-07-23 PROCEDURE — 99207 ZZC CDG-HISTORY COMP: MEETS EXP. PROB FOCUSED- DOWN CODED LACK OF PFSH: CPT | Performed by: PHYSICIAN ASSISTANT

## 2019-07-23 PROCEDURE — 99213 OFFICE O/P EST LOW 20 MIN: CPT | Performed by: PHYSICIAN ASSISTANT

## 2019-07-23 RX ORDER — AMINOCAPROIC ACID 0.25 G/ML
SYRUP ORAL
Qty: 2 BOTTLE | Refills: 5 | Status: SHIPPED | OUTPATIENT
Start: 2019-07-23 | End: 2020-09-17

## 2019-07-23 ASSESSMENT — PAIN SCALES - GENERAL: PAINLEVEL: SEVERE PAIN (6)

## 2019-07-23 NOTE — LETTER
2019      RE: Lanie Neil  [address]  [facility] [address]       [facility]  [address] [place] [address] [address], [place] [address]  Main: [phone], Fax: [phone]    [facility] at: [facility] at [address] [place] [address]    Outpatient Visit Note:    Patient: Lanie Neil  MRN: [id number]  : 1990  JOSÉ MIGUEL: 2019    Reason:  Spinal muscular atrophy. Wheelchair bound. History of DVT. Currently on long term anticoagulation therapy with Xarelto.  Here for her routine annual follow up clinic visit.      Summary of Medical History:  This is a 29 year old female with a history of spinal muscular atrophy, who is chronically wheelchair bound, who moved from [place] to [place] back in May 2014, who has a history of DVT in the left leg, currently on long term anticoagulation therapy with Xarelto, returns to clinic today for her routine annual follow up clinic visit. I last saw this patient back in 5/15/2018, please see my initial consult note and previous progress notes for her complete detail history.       Briefly, Lanie has been wheelchair bound and immobilized for many years. Dated back in 2012, she was complaining of left leg swelling. At the time an ultrasound out at [place] ([facility]), found subacute/chronic occlusive thrombus in the mid to distal left femoral vein with collateralization. She was placed on Enoxaparin initially. About 2 weeks after she was started on Enoxaparin therapy, she was complaining of headache and a head CT at the time showed a subdural hematoma on 2012. She was admitted and Enoxaparin was held. The decision was made at the time to have an IVC filter placed, which was done on 2012.       She eventually was restarted on Enoxaparin at a lowered dose and has maintain that until she was evaluated by a hematologist, Dr. Willy Box at [place]. In  9/11/2013, Dr. Box decided to stop the patient's Enoxaparin injections and started her on Xarelto at 20 mg PO Qday. The patient has since remained on Xarelto at this dose without any trouble.       During our visit with Lanie back in Aug 2014, we agreed that she should maintain on long term anticoagulation therapy given her chronic immobilization and high risk of recurrent thromboembolism. At the time, we did obtain approval from her health insurance company for Xarelto use at 20 mg PO Qday dosing. She has since remained on this dosing regimen.      Her mother usually crushes up her Xarelto and administer it via her J-tube.       Interim History:  Lanie has continued to do well on Xarelto at 20 mg PO Qday dosing. During her visit with me back in Dec 2017, we discussed anticoagulation management plan surrounding her intrathecal catheter placement on 1/12/2018 at [facility]. She stopped her Xarelto 48 hours prior to surgery and restarted her Xarelto about 72 hours after the procedure. She did well without any bleeding complications.     Also during her visit with me back in 2018, she needed revision once again for her intrathecal catheter. Again we held her Xarelto for 48 hours prior to her surgery and restart Xarelto 48-72 hours post procedurally.     Since her last visit with this writer back in 2018, she has done well without any bleeding or recurrent venous thromboembolic events.      ROS:  Denies any shortness of breath. No chest pain. Denies any significant swelling of the lower extremities. Denies any bleeding issues. No frequent epistaxis. Denies any oral mucosal bleeding. Denies any hematuria or blood in stools. She does endorse significant menstrual bleeding about every other months where she and her mother reports that she has heavy bleeding for about 4-5 days. Recently she was hospitalized for constipation and during the hospitalization she was found to be iron deficient and anemic.      Medication, Allergies and PmHx:  All have been reviewed by this writer in the electronic medical records.    Social History and Family History:  Deferred.    Objective:  Pleasant in no acute distress.  Vitals: /61 (BP Location: Right arm, Patient Position: Sitting, Cuff Size: Adult Regular)   Pulse 67   Temp 97.6  F (36.4  C) (Oral)   Resp 14   SpO2 99%   Exam:  Lungs Clear to auscultation bilaterally.  Card: S1 S2. No murmurs rubs or gallops. RRR.  Abd: Non tender. Non distended. Positive bowel sound.  Ext: There is no significant swelling noted in both lower extremities.     Labs:  Component      Latest Ref Rng & Units 6/28/2019   Sodium      133 - 144 mmol/L 140   Potassium      3.4 - 5.3 mmol/L 3.4   Chloride      94 - 109 mmol/L 105   Carbon Dioxide      20 - 32 mmol/L 24   Anion Gap      3 - 14 mmol/L 11   Glucose      70 - 99 mg/dL 89   Urea Nitrogen      7 - 30 mg/dL 8   Creatinine      0.52 - 1.04 mg/dL 0.15 (L)   GFR Estimate      >60 mL/min/1.73:m2 >90   GFR Estimate If Black      >60 mL/min/1.73:m2 >90   Calcium      8.5 - 10.1 mg/dL 9.0     CBC at outside facility on 5/30/2019:  WBC 6.4, Hgb 10.6, Plt 353.    Iron panel done at outside facility on 5/30/2019:  Iron 30. (normal is )    Assessment:  In summary, Lanie is a 27 year old female who has a history of spinal muscular atrophy, who is chronically wheelchair bound, who also has a history of DVT in the left leg, currently on long term anticoagulation therapy with Xarelto, returns to clinic today for her routine annual follow up clinic visit. She is scheduled to undergo colonoscopy in Sept 2019 for iron deficiency anemia and chronic constipation. Her iron deficiency anemia might be related to menorrhagia as well.      Diagnosis:  1. History of left lower extremity DVT.  2. On chronic anticoagulation therapy with Xarelto at 20 mg PO Qday.  3. Spinal muscular atrophy.  4. Chronic respiratory failure.    5. Chronically wheelchair  bound.  6. Rosacea   7. Iron deficiency anemia.  8. Menorrhagia.     Plan:  Doing well with chronic anticoagulation therapy with no recurrent thromboembolism. However, she does endorse menorrhagia and now with iron deficiency anemia.     I discuss with the patient today about decreasing her dose of Xarelto from 20 mg PO Qday to current FDA approved secondary pharmacological DVT/PE prophylaxis dose of Xarelto at 10 mg PO Qday. This might decrease her bleeding. She is in agreement with this plan. I will have her finish the remaining of her Xarelto at 20 mg tablets supply and then start the 10 mg PO Qday dosing thereafter.     Will provide her with Amicar syrup (an antifibrinolytic) to take during her period in attempt to decrease her bleeding. Rx sent to our clinic's pharmacy. Note that she is taking Celebrex to decrease her menstrual cramping and she reports that it is effective.     She is encouraged to re-establish care with her primary care provider to follow up with her overall health maintenance and to follow and treat her iron deficiency anemia. Might consider IV iron replacement.     She is planning to undergo colonoscopy in Sept, 2019. For this, she is instructed to hold her Xarelto for 24 hours prior to the colonoscopy procedure, then restart the next day.     Will arrange to have her get Hepatic panel check via homecare where they will come out later this week to flush her port-a-cath.     She knows to call our center if she should have any further questions or concerns.  Otherwise, we will plan on seeing her back annually for follow up clinic visit.      Jt Mir PA-C, MPAS  Physician Assistant  [facility].              Lanie Neil  MRN: [id number]  Female, 27 year old, 1990  Hx of Left leg DVT 2012  Long-term anticoagulation with Xarelto     Orders (Attn: [facility] Fax: [phone])    1. Please draw hepatic panel on  Friday 7/26/19 from [facility] (with other labs)  2.  Fax results to MORALES Pérez / Sanam Crandall -[phone]  Jt Mir PA-C, [facility]     Ariana Crandall, RN, MSN, Nurse Clinician, [facility] [phone]

## 2019-07-23 NOTE — PATIENT INSTRUCTIONS
1.  Hold Xarelto 24 hours prior to colonoscopy (Sept 5th).  Restart day after procedure.  2.  Change to Xarelto 10 mg dosing with next refill.  3.  Hold Xarelto for 24 hours after spinal injection (reservoir) (Sept).  Restart 12-24 hours after procedure with doctor approval.  4.  Get iron labs checked with new PCP  5. Labs on Friday through port (liver panel).  6.  Amicar syrup 3 gms every 8 hours prn period.

## 2019-07-23 NOTE — PROGRESS NOTES
Lanie Neil  MRN: [id number]  Female, 27 year old, 1990  Hx of Left leg DVT 2012  Long-term anticoagulation with Xarelto     Orders (Attn: [facility] Fax: [phone])    1. Please draw hepatic panel on Friday 7/26/19 from port (with other labs)  2.  Fax results to MORALES Pérez / Sanam Crandall -[phone]  Jt Mir PA-C, [facility]     Ariana Crandall, RN, MSN, Nurse Clinician, [facility] [phone]

## 2019-07-23 NOTE — PROGRESS NOTES
[facility]  [address] [place] [id number]  Main: [phone], Fax: [phone]    Patient seen at: [facility] at [address] [place] [address]    Outpatient Visit Note:    Patient: Lanie Neil  MRN: [id number]  : 1990  JOSÉ MIGUEL: 2019    Reason:  Spinal muscular atrophy. Wheelchair bound. History of DVT. Currently on long term anticoagulation therapy with Xarelto. Here for her routine annual follow up clinic visit.      Summary of Medical History:  This is a 29 year old female with a history of spinal muscular atrophy, who is chronically wheelchair bound, who moved from [place] to [place] back in May 2014, who has a history of DVT in the left leg, currently on long term anticoagulation therapy with Xarelto, returns to clinic today for her routine annual follow up clinic visit. I last saw this patient back in 5/15/2018, please see my initial consult note and previous progress notes for her complete detail history.       Briefly, Lanie has been wheelchair bound and immobilized for many years. Dated back in 2012, she was complaining of left leg swelling. At the time an ultrasound out at [place] ([facility]), found subacute/chronic occlusive thrombus in the mid to distal left femoral vein with collateralization. She was placed on Enoxaparin initially. About 2 weeks after she was started on Enoxaparin therapy, she was complaining of headache and a head CT at the time showed a subdural hematoma on 2012. She was admitted and Enoxaparin was held. The decision was made at the time to have an IVC filter placed, which was done on 2012.       She eventually was restarted on Enoxaparin at a lowered dose and has maintain that until she was evaluated by a hematologist, Dr. Willy Box at [place]. In 2013, Dr. Box decided to stop the patient's Enoxaparin injections and  started her on Xarelto at 20 mg PO Qday. The patient has since remained on Xarelto at this dose without any trouble.       During our visit with Lanie back in Aug 2014, we agreed that she should maintain on long term anticoagulation therapy given her chronic immobilization and high risk of recurrent thromboembolism. At the time, we did obtain approval from her health insurance company for Xarelto use at 20 mg PO Qday dosing. She has since remained on this dosing regimen.      Her mother usually crushes up her Xarelto and administer it via her J-tube.       Interim History:  Lanie has continued to do well on Xarelto at 20 mg PO Qday dosing. During her visit with me back in Dec 2017, we discussed anticoagulation management plan surrounding her intrathecal catheter placement on 1/12/2018 at [facility]. She stopped her Xarelto 48 hours prior to surgery and restarted her Xarelto about 72 hours after the procedure. She did well without any bleeding complications.     Also during her visit with me back in 2018, she needed revision once again for her intrathecal catheter. Again we held her Xarelto for 48 hours prior to her surgery and restart Xarelto 48-72 hours post procedurally.     Since her last visit with this writer back in 2018, she has done well without any bleeding or recurrent venous thromboembolic events.      ROS:  Denies any shortness of breath. No chest pain. Denies any significant swelling of the lower extremities. Denies any bleeding issues. No frequent epistaxis. Denies any oral mucosal bleeding. Denies any hematuria or blood in stools. She does endorse significant menstrual bleeding about every other months where she and her mother reports that she has heavy bleeding for about 4-5 days. Recently she was hospitalized for constipation and during the hospitalization she was found to be iron deficient and anemic.     Medication, Allergies and PmHx:  All have been reviewed by this writer in the  electronic medical records.    Social History and Family History:  Deferred.    Objective:  Pleasant in no acute distress.  Vitals: /61 (BP Location: Right arm, Patient Position: Sitting, Cuff Size: Adult Regular)   Pulse 67   Temp 97.6  F (36.4  C) (Oral)   Resp 14   SpO2 99%   Exam:  Lungs Clear to auscultation bilaterally.  Card: S1 S2. No murmurs rubs or gallops. RRR.  Abd: Non tender. Non distended. Positive bowel sound.  Ext: There is no significant swelling noted in both lower extremities.     Labs:  Component      Latest Ref Rng & Units 6/28/2019   Sodium      133 - 144 mmol/L 140   Potassium      3.4 - 5.3 mmol/L 3.4   Chloride      94 - 109 mmol/L 105   Carbon Dioxide      20 - 32 mmol/L 24   Anion Gap      3 - 14 mmol/L 11   Glucose      70 - 99 mg/dL 89   Urea Nitrogen      7 - 30 mg/dL 8   Creatinine      0.52 - 1.04 mg/dL 0.15 (L)   GFR Estimate      >60 mL/min/1.73:m2 >90   GFR Estimate If Black      >60 mL/min/1.73:m2 >90   Calcium      8.5 - 10.1 mg/dL 9.0     CBC at outside facility on 5/30/2019:  WBC 6.4, Hgb 10.6, Plt 353.    Iron panel done at outside facility on 5/30/2019:  Iron 30. (normal is )    Assessment:  In summary, Lanie is a 27 year old female who has a history of spinal muscular atrophy, who is chronically wheelchair bound, who also has a history of DVT in the left leg, currently on long term anticoagulation therapy with Xarelto, returns to clinic today for her routine annual follow up clinic visit. She is scheduled to undergo colonoscopy in Sept 2019 for iron deficiency anemia and chronic constipation. Her iron deficiency anemia might be related to menorrhagia as well.      Diagnosis:  1. History of left lower extremity DVT.  2. On chronic anticoagulation therapy with Xarelto at 20 mg PO Qday.  3. Spinal muscular atrophy.  4. Chronic respiratory failure.    5. Chronically wheelchair bound.  6. Rosacea   7. Iron deficiency anemia.  8. Menorrhagia.     Plan:  Doing  well with chronic anticoagulation therapy with no recurrent thromboembolism. However, she does endorse menorrhagia and now with iron deficiency anemia.     I discuss with the patient today about decreasing her dose of Xarelto from 20 mg PO Qday to current FDA approved secondary pharmacological DVT/PE prophylaxis dose of Xarelto at 10 mg PO Qday. This might decrease her bleeding. She is in agreement with this plan. I will have her finish the remaining of her Xarelto at 20 mg tablets supply and then start the 10 mg PO Qday dosing thereafter.     Will provide her with Amicar syrup (an antifibrinolytic) to take during her period in attempt to decrease her bleeding. Rx sent to our clinic's pharmacy. Note that she is taking Celebrex to decrease her menstrual cramping and she reports that it is effective.     She is encouraged to re-establish care with her primary care provider to follow up with her overall health maintenance and to follow and treat her iron deficiency anemia. Might consider IV iron replacement.     She is planning to undergo colonoscopy in Sept, 2019. For this, she is instructed to hold her Xarelto for 24 hours prior to the colonoscopy procedure, then restart the next day.     Will arrange to have her get Hepatic panel check via homecare where they will come out later this week to flush her port-a-cath.     She knows to call our center if she should have any further questions or concerns.  Otherwise, we will plan on seeing her back annually for follow up clinic visit.      Jt Mir PA-C, MPAS  Physician Assistant  [facility].

## 2019-07-24 ENCOUNTER — MYC MEDICAL ADVICE (OUTPATIENT)
Dept: INTERNAL MEDICINE | Facility: CLINIC | Age: 29
End: 2019-07-24

## 2019-07-25 DIAGNOSIS — N39.41 URGE INCONTINENCE OF URINE: ICD-10-CM

## 2019-07-25 NOTE — TELEPHONE ENCOUNTER
Replied to patient, faxed an order for two fischer caths. Ana Littlejohn Paramedic on 7/25/2019 at 3:00 PM

## 2019-07-26 ENCOUNTER — HOSPITAL LABORATORY (OUTPATIENT)
Dept: OTHER | Facility: CLINIC | Age: 29
End: 2019-07-26

## 2019-07-26 LAB
ALBUMIN SERPL-MCNC: 3.2 G/DL (ref 3.4–5)
ALP SERPL-CCNC: 119 U/L (ref 40–150)
ALT SERPL W P-5'-P-CCNC: 50 U/L (ref 0–50)
AST SERPL W P-5'-P-CCNC: 27 U/L (ref 0–45)
BILIRUB DIRECT SERPL-MCNC: 0.1 MG/DL (ref 0–0.2)
BILIRUB SERPL-MCNC: 0.4 MG/DL (ref 0.2–1.3)
PROT SERPL-MCNC: 7.5 G/DL (ref 6.8–8.8)

## 2019-07-30 ENCOUNTER — TRANSFERRED RECORDS (OUTPATIENT)
Dept: HEALTH INFORMATION MANAGEMENT | Facility: CLINIC | Age: 29
End: 2019-07-30

## 2019-07-31 ENCOUNTER — MEDICAL CORRESPONDENCE (OUTPATIENT)
Dept: HEALTH INFORMATION MANAGEMENT | Facility: CLINIC | Age: 29
End: 2019-07-31

## 2019-08-16 ENCOUNTER — MYC MEDICAL ADVICE (OUTPATIENT)
Dept: INTERNAL MEDICINE | Facility: CLINIC | Age: 29
End: 2019-08-16

## 2019-08-18 DIAGNOSIS — G12.9 SPINAL MUSCLE ATROPHY (H): ICD-10-CM

## 2019-08-19 NOTE — TELEPHONE ENCOUNTER
Dear Tami,  I am not very comfortable providing these refills.  She is taking high dose benzos and opioids, which is placing her at high risk for respiratory depression and death.  Is she using both alprazolam and diazepam? I will consider one time refill but she needs to schedule a med review for refills with a new PCP now that Dr. Pat has retired ASAP or ask her other providers to fill the benzos in the interim.  Thanks,  Lucia Corona MD  Internal Medicine

## 2019-08-19 NOTE — TELEPHONE ENCOUNTER
Patient Requested  ALPRAZolam (XANAX) 0.25 MG tablet  diazepam (VALIUM) 5 MG tablet  Last Filled  06/28/19 07/03/2019  Last Office Visit  05/28/2019  Next Office Visit  Nothing scheduled   Checked  08/19/2019    DX: Spinal muscle atrophy     Pharmacy:   [id number] - [place], [place] - [address]      LILLIAM DAMICO at 9:57 AM on 8/19/2019.

## 2019-08-28 ENCOUNTER — TELEPHONE (OUTPATIENT)
Dept: INTERNAL MEDICINE | Facility: CLINIC | Age: 29
End: 2019-08-28

## 2019-08-28 ENCOUNTER — OFFICE VISIT (OUTPATIENT)
Dept: FAMILY MEDICINE | Facility: CLINIC | Age: 29
End: 2019-08-28
Payer: COMMERCIAL

## 2019-08-28 VITALS — HEART RATE: 69 BPM | DIASTOLIC BLOOD PRESSURE: 64 MMHG | SYSTOLIC BLOOD PRESSURE: 97 MMHG

## 2019-08-28 DIAGNOSIS — G12.9 SPINAL MUSCLE ATROPHY (H): ICD-10-CM

## 2019-08-28 DIAGNOSIS — J32.9 SINUSITIS, UNSPECIFIED CHRONICITY, UNSPECIFIED LOCATION: Primary | ICD-10-CM

## 2019-08-28 DIAGNOSIS — Z93.1 G TUBE FEEDINGS (H): Primary | ICD-10-CM

## 2019-08-28 DIAGNOSIS — Z01.818 PREOPERATIVE EXAMINATION: ICD-10-CM

## 2019-08-28 DIAGNOSIS — R10.13 DYSPEPSIA AND DISORDER OF FUNCTION OF STOMACH: ICD-10-CM

## 2019-08-28 DIAGNOSIS — K31.9 DYSPEPSIA AND DISORDER OF FUNCTION OF STOMACH: ICD-10-CM

## 2019-08-28 DIAGNOSIS — J96.10 CHRONIC RESPIRATORY FAILURE (H): ICD-10-CM

## 2019-08-28 RX ORDER — LANSOPRAZOLE 30 MG/1
30 CAPSULE, DELAYED RELEASE ORAL
Status: ON HOLD | COMMUNITY
Start: 2014-05-14 | End: 2024-01-24

## 2019-08-28 RX ORDER — MOMETASONE FUROATE MONOHYDRATE 50 UG/1
2 SPRAY, METERED NASAL DAILY PRN
COMMUNITY
Start: 2014-05-14

## 2019-08-28 RX ORDER — NUTRITIONAL SUPPLEMENT 0.04G-1/ML
1 LIQUID (ML) ORAL
COMMUNITY
End: 2019-12-18

## 2019-08-28 RX ORDER — LACTULOSE 10 G/15ML
SOLUTION ORAL; RECTAL
Refills: 11 | COMMUNITY
Start: 2019-01-09

## 2019-08-28 RX ORDER — ECHINACEA PURPUREA EXTRACT 125 MG
2 TABLET ORAL DAILY PRN
COMMUNITY
End: 2024-08-22

## 2019-08-28 RX ORDER — AMOXICILLIN 250 MG
CAPSULE ORAL
COMMUNITY
End: 2020-03-11

## 2019-08-28 RX ORDER — AZITHROMYCIN 200 MG/5ML
POWDER, FOR SUSPENSION ORAL
Qty: 37.5 ML | Refills: 0 | Status: SHIPPED | OUTPATIENT
Start: 2019-08-28 | End: 2020-01-21

## 2019-08-28 RX ORDER — GINSENG 100 MG
1 CAPSULE ORAL
COMMUNITY
Start: 2014-05-14 | End: 2020-01-21

## 2019-08-28 RX ORDER — ONDANSETRON 4 MG/1
TABLET, FILM COATED ORAL
COMMUNITY
Start: 2014-01-21 | End: 2019-12-18

## 2019-08-28 ASSESSMENT — ENCOUNTER SYMPTOMS
SORE THROAT: 0
HEMOPTYSIS: 0
TASTE DISTURBANCE: 0
SNORES LOUDLY: 0
SINUS CONGESTION: 1
DYSPNEA ON EXERTION: 0
HOARSE VOICE: 1
POSTURAL DYSPNEA: 0
WHEEZING: 0
TROUBLE SWALLOWING: 0
SINUS PAIN: 0
SHORTNESS OF BREATH: 0
SPUTUM PRODUCTION: 1
COUGH DISTURBING SLEEP: 0
SMELL DISTURBANCE: 0
COUGH: 0
NECK MASS: 0

## 2019-08-28 ASSESSMENT — PAIN SCALES - GENERAL: PAINLEVEL: SEVERE PAIN (7)

## 2019-08-28 NOTE — NURSING NOTE
Chief Complaint   Patient presents with     Pre-Op Exam     pt having colonoscopy on 09/05/ 19       Becky Man CMA at 3:30 PM on 8/28/2019.

## 2019-08-28 NOTE — PROGRESS NOTES
[facility]   Jimmy Moseley MD  08/28/2019     Chief Complaint:   Pre-Op Exam        History of Present Illness:   Lanie Neil is 29 year old female here at the request of Dr. Martin Chambers for cardiovascular, pulmonary, and perioperative risk assessment prior to surgery.  The intended surgical procedure is a colonoscopy on 9/5/2019.  A copy of this note will be sent to the surgeon.     Reason for surgery:  Lanie has been experiencing worsening constipation over the last year. She is finding it more and more difficult to have a bowel movement on her own, and has been unable to feel any contractions of her muscles to pass a bowel movement. She has been taking Dulcolax and Senna, but these have not entirely relieved the constipation. Of note, she was hospitalized  on 5/5/2019 for 9 days due to constipation as well.     Cardiovascular Risk:  This patient does not have chest pain  She does not have a history of known cardiac disease.  There is not a history of stroke or valvular disease.    Pulmonary Risk:  In terms of risk factors for pulmonary complications, the patient does have a history of chronic respiratory failure and mild asthma    Perioperative Complications:  The patient does have a history of bleeding or clotting problems in the past, with history of DVT on chronic anticoagulation with Xarelto.  They are currently anticoagulated on Xarelto.  She has not had complications from past surgeries.  The patient does not have a family history of any anesthesia or surgical complications.    Review of Systems:   Pertinent items are noted in HPI or as in patient entered ROS below, remainder of complete ROS is negative.     Answers for HPI/ROS submitted by the patient on 8/28/2019   General Symptoms: No  Skin Symptoms: No  HENT Symptoms: Yes  EYE SYMPTOMS: No  HEART SYMPTOMS: No  LUNG SYMPTOMS: Yes  INTESTINAL SYMPTOMS: No  URINARY SYMPTOMS: No  GYNECOLOGIC SYMPTOMS: No  BREAST SYMPTOMS:  No  SKELETAL SYMPTOMS: No  BLOOD SYMPTOMS: No  NERVOUS SYSTEM SYMPTOMS: No  MENTAL HEALTH SYMPTOMS: No  Ear pain: No  Ear discharge: No  Hearing loss: No  Tinnitus: No  Nosebleeds: No  Congestion: Yes  Sinus pain: No  Trouble swallowing: No   Voice hoarseness: Yes  Mouth sores: No  Sore throat: No  Tooth pain: No  Gum tenderness: No  Bleeding gums: No  Change in taste: No  Change in sense of smell: No  Dry mouth: No  Hearing aid used: No  Neck lump: No  Cough: No  Sputum or phlegm: Yes  Coughing up blood: No  Difficulty breating or shortness of breath: No  Snoring: No  Wheezing: No  Difficulty breathing on exertion: No  Nighttime Cough: No  Difficulty breathing when lying flat: No      Active Medications:      ACETAMINOPHEN PO, Take 650 mg by mouth every 4 hours as needed for pain, Disp: , Rfl:      albuterol (2.5 MG/3ML) 0.083% neb solution, USE ONE VIAL BY NEBULIZATION EVERY 6 HOURS AS NEEDED FOR SHORTNESS OF BREATH/ DYSPNEA OR WHEEZING, Disp: 360 mL, Rfl: 6     albuterol (ALBUTEROL) 108 (90 BASE) MCG/ACT inhaler, Inhale 2 puffs into the lungs every 4 hours as needed for shortness of breath / dyspnea or wheezing, Disp: 1 Inhaler, Rfl: 2     Albuterol Sulfate (VENTOLIN HFA IN), Inhale 2 puffs into the lungs, Disp: , Rfl:      ALPRAZolam (XANAX) 0.25 MG tablet, Take 1 tablet (0.25 mg) by mouth 3 times daily as needed for anxiety, Disp: 60 tablet, Rfl: 2     aminocaproic acid (AMICAR) 0.25 GM/ML solution, Take 12 mL (3 grams) via G-tube every 8 hours during menstrual period., Disp: 2 Bottle, Rfl: 5     bacitracin 500 UNIT/GM OINT, Apply 1 each topically, Disp: , Rfl:      bisacodyl (DULCOLAX) 10 MG suppository, Place 10 mg rectally daily as needed for constipation, Disp: , Rfl:      celecoxib (CELEBREX) 200 MG capsule, TAKE 1 CAPSULE(200 MG) BY MOUTH DAILY, Disp: 30 capsule, Rfl: 8     clindamycin (CLEOCIN T) 1 % lotion, Apply topically 2 times daily To face as needed, Disp: 60 mL, Rfl: 3     diazepam (VALIUM) 5 MG  "tablet, TAKE 1 TABLET BY MOUTH EVERY 6 HOURS AS NEEDED FOR MUSCLE SPASMS, Disp: 30 tablet, Rfl: 0     doxycycline hyclate (VIBRAMYCIN) 100 MG capsule, GIVE ONE CAPSULE VIA \"G\"- TUBE TWICE DAILY, Disp: 60 capsule, Rfl: 2     ferrous sulfate 220 (44 Fe) MG/5ML ELIX, 22 mg by Enteral route, Disp: , Rfl:      flunisolide HFA (AEROSPAN) 80 MCG/ACT AERS oral inhaler, Inhale 2 puffs into the lungs, Disp: , Rfl:      fluticasone (FLONASE) 50 MCG/ACT spray, Spray 1 spray into both nostrils daily, Disp: 16 mL, Rfl: 11     gabapentin (NEURONTIN) 250 MG/5ML solution, TAKE 20 ML PER FEEDING TUBE THREE TIMES DAILY., Disp: 1800 mL, Rfl: 6     GENERLAC 10 GM/15ML solution, , Disp: , Rfl: 11     glycerin-hypromellose- (ARTIFICIAL TEARS) 0.2-0.2-1 % SOLN ophthalmic solution, Place 1 drop into both eyes 2 times daily as needed for dry eyes, Disp: 1 Bottle, Rfl: 11     guaiFENesin (ORGANIDIN) 200 MG TABS tablet, Take 200 mg by mouth every 4 hours as needed for cough, Disp: , Rfl:      HYDROmorphone (DILAUDID) 4 MG tablet, Take 1-2 tablets (4-8 mg) by mouth every 4 hours as needed for moderate to severe pain, Disp: 300 tablet, Rfl: 0     hydrOXYzine (ATARAX) 25 MG tablet, Take 25 mg by mouth every 6 hours as needed for itching, Disp: , Rfl:      SABINE 30 MG, Take 1 capsule (30 mg) by mouth 2 times daily, Disp: 60 capsule, Rfl: 0     LANsoprazole (PREVACID) 30 MG DR capsule, Take 30 mg by mouth, Disp: , Rfl:      Magnesium Hydroxide (MILK OF MAGNESIA PO), Take 30 mLs by mouth as needed, Disp: , Rfl:      metroNIDAZOLE (METROCREAM) 0.75 % external cream, Apply topically 2 times daily, Disp: 45 g, Rfl: 11     mometasone (NASONEX) 50 MCG/ACT nasal spray, , Disp: , Rfl:      Nutritional Supplements (REPLETE FIBER 1 CHINTAN) LIQD, Take 1 Can by mouth, Disp: , Rfl:      omeprazole (PRILOSEC) 2 mg/mL suspension, SHAKE LIQUID WELL AND GIVE 20ML (40MG) DAILY (VIA G-TUBE), Disp: 600 mL, Rfl: 11     ondansetron (ZOFRAN) 4 MG tablet, Take 1 " tablet by mouth every 6 hours as needed for nausea., Disp: , Rfl:      ondansetron (ZOFRAN-ODT) 4 MG ODT tab, Take 1 tablet (4 mg) by mouth every 8 hours as needed for nausea, Disp: 60 tablet, Rfl: 5     order for DME, Equipment being ordered: 16 fr 5cc balloon indwelling catheter for 3-5 days with drainage bag, Disp: 2 catheter, Rfl: 1     order for DME, Equipment being ordered: 16 fr 5cc balloon indwelling catheter for 3-5 days with drainage bag, Disp: 2 catheter, Rfl: 0     order for DME, Equipment being ordered: Vibracare Percussor, Disp: 1 Units, Rfl: 0     polyethylene glycol (GOLYTELY/NULYTELY) 236 g suspension, Take as directed in patient instructions: Drink 4000ml between 4 and 6 p.m. the evening before and 2000ml 4 hours before your procedure, Disp: , Rfl:      polyethylene glycol (MIRALAX/GLYCOLAX) powder, TAKE 17 GRAMS BY MOUTH EVERY DAY (Patient taking differently: TAKE 17 GRAMS BY MOUTH three times EVERY DAY), Disp: 850 g, Rfl: 6     prochlorperazine (COMPAZINE) 5 MG tablet, Take 5 mg by mouth every 6 hours as needed for nausea or vomiting, Disp: , Rfl:      rivaroxaban ANTICOAGULANT (XARELTO) 10 MG TABS tablet, Take 1 tablet (10 mg) by mouth daily (with dinner), Disp: 30 tablet, Rfl: 5     Salicylic Acid (OXY BALANCE FACIAL CLEAN WASH EX), Externally apply 1 pad topically daily, Disp: , Rfl:      senna-docusate (SENOKOT-S/PERICOLACE) 8.6-50 MG tablet, , Disp: , Rfl:      sennosides (SENNA-GRX) 8.8 MG/5ML syrup, Take 10 mLs by mouth 2 times daily, Disp: 474 mL, Rfl: 7     silver sulfADIAZINE (SILVADENE) 1 % cream, Apply topically daily, Disp: 400 g, Rfl: 1     simethicone (MYLICON) 66.7 mg/ml, as needed, Disp: , Rfl:      simethicone 40 MG/0.6ML LIQD, , Disp: , Rfl:      sodium chloride (OCEAN) 0.65 % nasal spray, 2 sprays, Disp: , Rfl:       Allergies:   Patient has no known allergies.      Past Medical History:  Anxiety  Constipation  Chronic respiratory failure  Chronic pain syndrome  Deep venous  thrombosis  Dyspepsia and other specified disorders of function of stomach  Sinusitis, chronic  Snoring  Spinal muscle atrophy     Past Surgical History:  ENT Surgery  Gastrostomy tube  Kidney surgery  Spine surgery  Tracheostomy    Family History:   History reviewed. No pertinent family history.       Social History:   The patient is single, a nonsmoker, and does not consume alcohol.      Physical Exam:   BP 97/64 (BP Location: Right arm, Patient Position: Sitting, Cuff Size: Adult Regular)   Pulse 69    Constitutional: Alert. In no distress. In wheelchair, on ventilator  Head: Normocephalic. No masses, lesions, tenderness or abnormalities.  ENT: No neck nodes or sinus tenderness.  Cardiovascular: RRR. No murmurs, clicks, gallops, or rub.  Respiratory: Clear to auscultation bilaterally, no wheezes or crackles.  Gastrointestinal: Abdomen soft. Non-tender. BS normal. No masses or organomegaly.  Musculoskeletal: Extremities no muscle tone  Skin: No suspicious lesions. No rashes.  Neurologic: She is able to speak and use her eyes, otherwise no strength  Psychiatric: Mentation appears normal. Normal affect.   Hematologic/Lymphatic/Immunologic: Normal cervical lymph nodes.     Recent Labs:      Ref Range & Units Value   WBC 3.5 - 10.5 x10(9)/L 7.9    RBC 3.90 - 5.03 x10(12)/L 4.23    Hemoglobin 12.0 - 15.5 g/dL 10.5Low     HCT 34.9 - 44.5 % 36.1    MCV 80.0 - 100.0 fL 85.3    MCH 27.6 - 33.3 pg 24.8Low     MCHC 31.5 - 35.2 g/dL 29.1Low     RDW 11.9 - 15.5 % 19.0High     Platelets 150 - 450 x10(9)/L 338    Automated NRBC <=0 /100 WBC 0    Neutrophil Absolute 1.7 - 7.0 10(9)/L 4.9    Lymphocyte Absolute 1.0 - 4.8 10(9)/L 2.3    Monocytes Absolute 0.2 - 0.9 10(9)/L 0.4    Eosinophil Absolute 0.0 - 0.5 10(9)/L 0.2    Basophil Absolute 0.0 - 0.3 10(9)/L 0.0    Immature Gran % 0.0 - 0.5 % 0.0    Resulting [facility]     Chronic Anemia stable    EKG:  EKG was not indicated based on risk assessment.    Pregnancy  test needed: No     Assessment and Plan:  Sinusitis: zpak times one, f/u prn for this     1. Pre-operative assessment for Colonssopy  The patient is at LOW risk for cardiovascular complications and at LOW risk for pulmonary complications of this LOW risk surgery.    --Approval given to proceed with proposed procedure, without further diagnostic evaluation    The patient has been told to not take any aspirin or NSAIDs for 10 days prior to surgery. The patient has been instructed as to what to do with medications prior to surgery. She will have a BMP drawn the day before her colonoscopy by her home health service as her Golytely use and resulting stool changes could potentially disrupt her electrolytes. She is already aware from her hematologist to hold her Xarelto the day before and the day of her colonoscopy. Her mother informs me she is to be on her same formula brand, but a fiber free version from now until the colonoscopy. I have asked my nurse to arrange that with her pharmacy    Follow-up: She needs 4-month pre-ops for procedures, so we will see her at those times.         Scribe Disclosure:  I, Dina Muñoz, am serving as a scribe to document services personally performed by Jimmy Moseley MD at this visit, based upon the provider's statements to me. All documentation has been reviewed by the aforementioned provider prior to being entered into the official medical record.     Portions of this medical record were completed by a scribe. UPON MY REVIEW AND AUTHENTICATION BY ELECTRONIC SIGNATURE, this confirms (a) I performed the applicable clinical services, and (b) the record is accurate.    Jimmy Moseley MD MD

## 2019-08-28 NOTE — LETTER
8/28/2019    RE: Lanie Neil  [address]  [facility] [address]     [facility]   Jimmy Moseley MD  08/28/2019     Chief Complaint:   Pre-Op Exam        History of Present Illness:   Lanie Neil is 29 year old female here at the request of Dr. Martin Chambers for cardiovascular, pulmonary, and perioperative risk assessment prior to surgery.  The intended surgical procedure is a colonoscopy on 9/5/2019.  A copy of this note will be sent to the surgeon.     Reason for surgery:  Lanie has been experiencing worsening constipation over the last year. She is finding it more and more difficult to have a bowel movement on her own, and has been unable to feel any contractions of her muscles to pass a bowel movement. She has been taking Dulcolax and Senna, but these have not entirely relieved the constipation. Of note, she was hospitalized  on 5/5/2019 for 9 days due to constipation as well.     Cardiovascular Risk:  This patient does not have chest pain  She does not have a history of known cardiac disease.  There is not a history of stroke or valvular disease.    Pulmonary Risk:  In terms of risk factors for pulmonary complications, the patient does have a history of chronic respiratory failure and mild asthma    Perioperative Complications:  The patient does have a history of bleeding or clotting problems in the past, with history of DVT on chronic anticoagulation with Xarelto.  They are currently anticoagulated on Xarelto.  She has not had complications from past surgeries.  The patient does not have a family history of any anesthesia or surgical complications.    Review of Systems:   Pertinent items are noted in HPI or as in patient entered ROS below, remainder of complete ROS is negative.     Answers for HPI/ROS submitted by the patient on 8/28/2019   General Symptoms: No  Skin Symptoms: No  HENT Symptoms: Yes  EYE SYMPTOMS: No  HEART SYMPTOMS: No  LUNG SYMPTOMS: Yes  INTESTINAL  SYMPTOMS: No  URINARY SYMPTOMS: No  GYNECOLOGIC SYMPTOMS: No  BREAST SYMPTOMS: No  SKELETAL SYMPTOMS: No  BLOOD SYMPTOMS: No  NERVOUS SYSTEM SYMPTOMS: No  MENTAL HEALTH SYMPTOMS: No  Ear pain: No  Ear discharge: No  Hearing loss: No  Tinnitus: No  Nosebleeds: No  Congestion: Yes  Sinus pain: No  Trouble swallowing: No   Voice hoarseness: Yes  Mouth sores: No  Sore throat: No  Tooth pain: No  Gum tenderness: No  Bleeding gums: No  Change in taste: No  Change in sense of smell: No  Dry mouth: No  Hearing aid used: No  Neck lump: No  Cough: No  Sputum or phlegm: Yes  Coughing up blood: No  Difficulty breating or shortness of breath: No  Snoring: No  Wheezing: No  Difficulty breathing on exertion: No  Nighttime Cough: No  Difficulty breathing when lying flat: No      Active Medications:      ACETAMINOPHEN PO, Take 650 mg by mouth every 4 hours as needed for pain, Disp: , Rfl:      albuterol (2.5 MG/3ML) 0.083% neb solution, USE ONE VIAL BY NEBULIZATION EVERY 6 HOURS AS NEEDED FOR SHORTNESS OF BREATH/ DYSPNEA OR WHEEZING, Disp: 360 mL, Rfl: 6     albuterol (ALBUTEROL) 108 (90 BASE) MCG/ACT inhaler, Inhale 2 puffs into the lungs every 4 hours as needed for shortness of breath / dyspnea or wheezing, Disp: 1 Inhaler, Rfl: 2     Albuterol Sulfate (VENTOLIN HFA IN), Inhale 2 puffs into the lungs, Disp: , Rfl:      ALPRAZolam (XANAX) 0.25 MG tablet, Take 1 tablet (0.25 mg) by mouth 3 times daily as needed for anxiety, Disp: 60 tablet, Rfl: 2     aminocaproic acid (AMICAR) 0.25 GM/ML solution, Take 12 mL (3 grams) via G-tube every 8 hours during menstrual period., Disp: 2 Bottle, Rfl: 5     bacitracin 500 UNIT/GM OINT, Apply 1 each topically, Disp: , Rfl:      bisacodyl (DULCOLAX) 10 MG suppository, Place 10 mg rectally daily as needed for constipation, Disp: , Rfl:      celecoxib (CELEBREX) 200 MG capsule, TAKE 1 CAPSULE(200 MG) BY MOUTH DAILY, Disp: 30 capsule, Rfl: 8     clindamycin (CLEOCIN T) 1 % lotion, Apply topically 2  "times daily To face as needed, Disp: 60 mL, Rfl: 3     diazepam (VALIUM) 5 MG tablet, TAKE 1 TABLET BY MOUTH EVERY 6 HOURS AS NEEDED FOR MUSCLE SPASMS, Disp: 30 tablet, Rfl: 0     doxycycline hyclate (VIBRAMYCIN) 100 MG capsule, GIVE ONE CAPSULE VIA \"G\"- TUBE TWICE DAILY, Disp: 60 capsule, Rfl: 2     ferrous sulfate 220 (44 Fe) MG/5ML ELIX, 22 mg by Enteral route, Disp: , Rfl:      flunisolide HFA (AEROSPAN) 80 MCG/ACT AERS oral inhaler, Inhale 2 puffs into the lungs, Disp: , Rfl:      fluticasone (FLONASE) 50 MCG/ACT spray, Spray 1 spray into both nostrils daily, Disp: 16 mL, Rfl: 11     gabapentin (NEURONTIN) 250 MG/5ML solution, TAKE 20 ML PER FEEDING TUBE THREE TIMES DAILY., Disp: 1800 mL, Rfl: 6     GENERLAC 10 GM/15ML solution, , Disp: , Rfl: 11     glycerin-hypromellose- (ARTIFICIAL TEARS) 0.2-0.2-1 % SOLN ophthalmic solution, Place 1 drop into both eyes 2 times daily as needed for dry eyes, Disp: 1 Bottle, Rfl: 11     guaiFENesin (ORGANIDIN) 200 MG TABS tablet, Take 200 mg by mouth every 4 hours as needed for cough, Disp: , Rfl:      HYDROmorphone (DILAUDID) 4 MG tablet, Take 1-2 tablets (4-8 mg) by mouth every 4 hours as needed for moderate to severe pain, Disp: 300 tablet, Rfl: 0     hydrOXYzine (ATARAX) 25 MG tablet, Take 25 mg by mouth every 6 hours as needed for itching, Disp: , Rfl:      SABINE 30 MG, Take 1 capsule (30 mg) by mouth 2 times daily, Disp: 60 capsule, Rfl: 0     LANsoprazole (PREVACID) 30 MG DR capsule, Take 30 mg by mouth, Disp: , Rfl:      Magnesium Hydroxide (MILK OF MAGNESIA PO), Take 30 mLs by mouth as needed, Disp: , Rfl:      metroNIDAZOLE (METROCREAM) 0.75 % external cream, Apply topically 2 times daily, Disp: 45 g, Rfl: 11     mometasone (NASONEX) 50 MCG/ACT nasal spray, , Disp: , Rfl:      Nutritional Supplements (REPLETE FIBER 1 CHINTAN) LIQD, Take 1 Can by mouth, Disp: , Rfl:      omeprazole (PRILOSEC) 2 mg/mL suspension, SHAKE LIQUID WELL AND GIVE 20ML (40MG) DAILY (VIA " G-TUBE), Disp: 600 mL, Rfl: 11     ondansetron (ZOFRAN) 4 MG tablet, Take 1 tablet by mouth every 6 hours as needed for nausea., Disp: , Rfl:      ondansetron (ZOFRAN-ODT) 4 MG ODT tab, Take 1 tablet (4 mg) by mouth every 8 hours as needed for nausea, Disp: 60 tablet, Rfl: 5     order for DME, Equipment being ordered: 16 fr 5cc balloon indwelling catheter for 3-5 days with drainage bag, Disp: 2 catheter, Rfl: 1     order for DME, Equipment being ordered: 16 fr 5cc balloon indwelling catheter for 3-5 days with drainage bag, Disp: 2 catheter, Rfl: 0     order for DME, Equipment being ordered: Vibracare Percussor, Disp: 1 Units, Rfl: 0     polyethylene glycol (GOLYTELY/NULYTELY) 236 g suspension, Take as directed in patient instructions: Drink 4000ml between 4 and 6 p.m. the evening before and 2000ml 4 hours before your procedure, Disp: , Rfl:      polyethylene glycol (MIRALAX/GLYCOLAX) powder, TAKE 17 GRAMS BY MOUTH EVERY DAY (Patient taking differently: TAKE 17 GRAMS BY MOUTH three times EVERY DAY), Disp: 850 g, Rfl: 6     prochlorperazine (COMPAZINE) 5 MG tablet, Take 5 mg by mouth every 6 hours as needed for nausea or vomiting, Disp: , Rfl:      rivaroxaban ANTICOAGULANT (XARELTO) 10 MG TABS tablet, Take 1 tablet (10 mg) by mouth daily (with dinner), Disp: 30 tablet, Rfl: 5     Salicylic Acid (OXY BALANCE FACIAL CLEAN WASH EX), Externally apply 1 pad topically daily, Disp: , Rfl:      senna-docusate (SENOKOT-S/PERICOLACE) 8.6-50 MG tablet, , Disp: , Rfl:      sennosides (SENNA-GRX) 8.8 MG/5ML syrup, Take 10 mLs by mouth 2 times daily, Disp: 474 mL, Rfl: 7     silver sulfADIAZINE (SILVADENE) 1 % cream, Apply topically daily, Disp: 400 g, Rfl: 1     simethicone (MYLICON) 66.7 mg/ml, as needed, Disp: , Rfl:      simethicone 40 MG/0.6ML LIQD, , Disp: , Rfl:      sodium chloride (OCEAN) 0.65 % nasal spray, 2 sprays, Disp: , Rfl:       Allergies:   Patient has no known allergies.      Past Medical  History:  Anxiety  Constipation  Chronic respiratory failure  Chronic pain syndrome  Deep venous thrombosis  Dyspepsia and other specified disorders of function of stomach  Sinusitis, chronic  Snoring  Spinal muscle atrophy     Past Surgical History:  ENT Surgery  Gastrostomy tube  Kidney surgery  Spine surgery  Tracheostomy    Family History:   History reviewed. No pertinent family history.       Social History:   The patient is single, a nonsmoker, and does not consume alcohol.      Physical Exam:   BP 97/64 (BP Location: Right arm, Patient Position: Sitting, Cuff Size: Adult Regular)   Pulse 69    Constitutional: Alert. In no distress. In wheelchair, on ventilator  Head: Normocephalic. No masses, lesions, tenderness or abnormalities.  ENT: No neck nodes or sinus tenderness.  Cardiovascular: RRR. No murmurs, clicks, gallops, or rub.  Respiratory: Clear to auscultation bilaterally, no wheezes or crackles.  Gastrointestinal: Abdomen soft. Non-tender. BS normal. No masses or organomegaly.  Musculoskeletal: Extremities no muscle tone  Skin: No suspicious lesions. No rashes.  Neurologic: She is able to speak and use her eyes, otherwise no strength  Psychiatric: Mentation appears normal. Normal affect.   Hematologic/Lymphatic/Immunologic: Normal cervical lymph nodes.     Recent Labs:      Ref Range & Units Value   WBC 3.5 - 10.5 x10(9)/L 7.9    RBC 3.90 - 5.03 x10(12)/L 4.23    Hemoglobin 12.0 - 15.5 g/dL 10.5Low     HCT 34.9 - 44.5 % 36.1    MCV 80.0 - 100.0 fL 85.3    MCH 27.6 - 33.3 pg 24.8Low     MCHC 31.5 - 35.2 g/dL 29.1Low     RDW 11.9 - 15.5 % 19.0High     Platelets 150 - 450 x10(9)/L 338    Automated NRBC <=0 /100 WBC 0    Neutrophil Absolute 1.7 - 7.0 10(9)/L 4.9    Lymphocyte Absolute 1.0 - 4.8 10(9)/L 2.3    Monocytes Absolute 0.2 - 0.9 10(9)/L 0.4    Eosinophil Absolute 0.0 - 0.5 10(9)/L 0.2    Basophil Absolute 0.0 - 0.3 10(9)/L 0.0    Immature Gran % 0.0 - 0.5 % 0.0    [facility]      Chronic Anemia stable    EKG:  EKG was not indicated based on risk assessment.    Pregnancy test needed: No     Assessment and Plan:  Sinusitis: zpak times one, f/u prn for this     1. Pre-operative assessment for Colonssopy  The patient is at LOW risk for cardiovascular complications and at LOW risk for pulmonary complications of this LOW risk surgery.    --Approval given to proceed with proposed procedure, without further diagnostic evaluation    The patient has been told to not take any aspirin or NSAIDs for 10 days prior to surgery. The patient has been instructed as to what to do with medications prior to surgery. She will have a BMP drawn the day before her colonoscopy by her home health service as her Golytely use and resulting stool changes could potentially disrupt her electrolytes. She is already aware from her hematologist to hold her Xarelto the day before and the day of her colonoscopy. Her mother informs me she is to be on her same formula brand, but a fiber free version from now until the colonoscopy. I have asked my nurse to arrange that with her pharmacy    Follow-up: She needs 4-month pre-ops for procedures, so we will see her at those times.         Scribe Disclosure:  I, Dina Muñoz, am serving as a scribe to document services personally performed by Jimmy Moseley MD at this visit, based upon the provider's statements to me. All documentation has been reviewed by the aforementioned provider prior to being entered into the official medical record.     Portions of this medical record were completed by a scribe. UPON MY REVIEW AND AUTHENTICATION BY ELECTRONIC SIGNATURE, this confirms (a) I performed the applicable clinical services, and (b) the record is accurate.    Jimmy Moseley MD MD      Surgeon (please enter first and last name): Dr Martin Chambers  Fax number for Preop Evaluation: [phone]  Location of Surgery: [facility]  Date of Surgery: 09/05/19  Procedure:  colonoschopy  History of reaction to anesthesia? NO    Jimmy Moseley MD MD

## 2019-08-28 NOTE — PROGRESS NOTES
"[facility]   Jimmy Moseley MD  08/28/2019      Chief Complaint:   No chief complaint on file.       History of Present Illness:   Lanie Neil is a 29 year old female anticoagulated on Rivaroxaban with a history of DVT, chronic respiratory failure, dyspepsia, spinal muscle atrophy, and chronic pain syndrome who presents for {eval/fu:924735} of ***.    Other concerns discussed:  ***     Review of Systems:   Pertinent items are noted in HPI or as in patient entered ROS below, remainder of complete ROS is negative.     Active Medications:     Current Outpatient Medications:      ACETAMINOPHEN PO, Take 650 mg by mouth every 4 hours as needed for pain, Disp: , Rfl:      albuterol (2.5 MG/3ML) 0.083% neb solution, USE ONE VIAL BY NEBULIZATION EVERY 6 HOURS AS NEEDED FOR SHORTNESS OF BREATH/ DYSPNEA OR WHEEZING, Disp: 360 mL, Rfl: 6     albuterol (ALBUTEROL) 108 (90 BASE) MCG/ACT inhaler, Inhale 2 puffs into the lungs every 4 hours as needed for shortness of breath / dyspnea or wheezing, Disp: 1 Inhaler, Rfl: 2     Albuterol Sulfate (VENTOLIN HFA IN), Inhale 2 puffs into the lungs, Disp: , Rfl:      ALPRAZolam (XANAX) 0.25 MG tablet, Take 1 tablet (0.25 mg) by mouth 3 times daily as needed for anxiety, Disp: 60 tablet, Rfl: 2     aminocaproic acid (AMICAR) 0.25 GM/ML solution, Take 12 mL (3 grams) via G-tube every 8 hours during menstrual period., Disp: 2 Bottle, Rfl: 5     bisacodyl (DULCOLAX) 10 MG suppository, Place 10 mg rectally daily as needed for constipation, Disp: , Rfl:      celecoxib (CELEBREX) 200 MG capsule, TAKE 1 CAPSULE(200 MG) BY MOUTH DAILY, Disp: 30 capsule, Rfl: 8     clindamycin (CLEOCIN T) 1 % lotion, Apply topically 2 times daily To face as needed, Disp: 60 mL, Rfl: 3     diazepam (VALIUM) 5 MG tablet, TAKE 1 TABLET BY MOUTH EVERY 6 HOURS AS NEEDED FOR MUSCLE SPASMS, Disp: 30 tablet, Rfl: 0     doxycycline hyclate (VIBRAMYCIN) 100 MG capsule, GIVE ONE CAPSULE VIA \"G\"- " TUBE TWICE DAILY, Disp: 60 capsule, Rfl: 2     fluticasone (FLONASE) 50 MCG/ACT spray, Spray 1 spray into both nostrils daily, Disp: 16 mL, Rfl: 11     gabapentin (NEURONTIN) 250 MG/5ML solution, TAKE 20 ML PER FEEDING TUBE THREE TIMES DAILY., Disp: 1800 mL, Rfl: 6     glycerin-hypromellose- (ARTIFICIAL TEARS) 0.2-0.2-1 % SOLN ophthalmic solution, Place 1 drop into both eyes 2 times daily as needed for dry eyes, Disp: 1 Bottle, Rfl: 11     guaiFENesin (ORGANIDIN) 200 MG TABS tablet, Take 200 mg by mouth every 4 hours as needed for cough, Disp: , Rfl:      HYDROmorphone (DILAUDID) 4 MG tablet, Take 1-2 tablets (4-8 mg) by mouth every 4 hours as needed for moderate to severe pain, Disp: 300 tablet, Rfl: 0     hydrOXYzine (ATARAX) 25 MG tablet, Take 25 mg by mouth every 6 hours as needed for itching, Disp: , Rfl:      SABINE 30 MG, Take 1 capsule (30 mg) by mouth 2 times daily, Disp: 60 capsule, Rfl: 0     Magnesium Hydroxide (MILK OF MAGNESIA PO), Take 30 mLs by mouth as needed, Disp: , Rfl:      metroNIDAZOLE (METROCREAM) 0.75 % external cream, Apply topically 2 times daily, Disp: 45 g, Rfl: 11     omeprazole (PRILOSEC) 2 mg/mL suspension, SHAKE LIQUID WELL AND GIVE 20ML (40MG) DAILY (VIA G-TUBE), Disp: 600 mL, Rfl: 11     ondansetron (ZOFRAN-ODT) 4 MG ODT tab, Take 1 tablet (4 mg) by mouth every 8 hours as needed for nausea, Disp: 60 tablet, Rfl: 5     order for DME, Equipment being ordered: 16 fr 5cc balloon indwelling catheter for 3-5 days with drainage bag, Disp: 2 catheter, Rfl: 1     order for DME, Equipment being ordered: 16 fr 5cc balloon indwelling catheter for 3-5 days with drainage bag, Disp: 2 catheter, Rfl: 0     order for DME, Equipment being ordered: Vibracare Percussor, Disp: 1 Units, Rfl: 0     polyethylene glycol (MIRALAX/GLYCOLAX) powder, TAKE 17 GRAMS BY MOUTH EVERY DAY (Patient taking differently: TAKE 17 GRAMS BY MOUTH three times EVERY DAY), Disp: 850 g, Rfl: 6     prochlorperazine  (COMPAZINE) 5 MG tablet, Take 5 mg by mouth every 6 hours as needed for nausea or vomiting, Disp: , Rfl:      rivaroxaban ANTICOAGULANT (XARELTO) 10 MG TABS tablet, Take 1 tablet (10 mg) by mouth daily (with dinner), Disp: 30 tablet, Rfl: 5     Salicylic Acid (OXY BALANCE FACIAL CLEAN WASH EX), Externally apply 1 pad topically daily, Disp: , Rfl:      sennosides (SENNA-GRX) 8.8 MG/5ML syrup, Take 10 mLs by mouth 2 times daily, Disp: 474 mL, Rfl: 7     silver sulfADIAZINE (SILVADENE) 1 % cream, Apply topically daily, Disp: 400 g, Rfl: 1     simethicone (MYLICON) 66.7 mg/ml, as needed, Disp: , Rfl:       Allergies:   Patient has no known allergies.      Past Medical History:  Anxiety  Constipation  Chronic respiratory failure  Chronic pain syndrome  Deep venous thrombosis  Dyspepsia and other specified disorders of function of stomach  Sinusitis, chronic  Snoring  Spinal muscle atrophy     Past Surgical History:  ENT Surgery  Gastrostomy tube  Kidney surgery  Spine surgery  Tracheostomy    Family History:   History reviewed. No pertinent family history.       Social History:   The patient is single, a nonsmoker, and does not consume alcohol.      Physical Exam:   There were no vitals taken for this visit.   ***     Assessment and Plan:  There are no diagnoses linked to this encounter.     Follow-up: No follow-ups on file.         Scribe Disclosure:  Dina AGUERO, am serving as a scribe to document services personally performed by Jimmy Moseley MD at this visit, based upon the provider's statements to me. All documentation has been reviewed by the aforementioned provider prior to being entered into the official medical record.    Scribe Preparation Attestation:  Dina AGUERO, a scribe, prepared the chart for today's encounter. ***     Portions of this medical record were completed by a scribe. UPON MY REVIEW AND AUTHENTICATION BY ELECTRONIC SIGNATURE, this confirms (a) I performed the applicable  clinical services, and (b) the record is accurate.

## 2019-08-28 NOTE — PATIENT INSTRUCTIONS
[facility] Medication Refill Request Information:  * Please contact your pharmacy regarding ANY request for medication refills.  ** [facility] Prescription Fax = [phone]  * Please allow 3 business days for routine medication refills.  * Please allow 5 business days for controlled substance medication refills.     [facility] Test Result notification information:  *You will be notified with in 7-10 days of your appointment day regarding the results of your test.  If you are on MyChart you will be notified as soon as the provider has reviewed the results and signed off on them.    [facility]: [phone]

## 2019-08-28 NOTE — TELEPHONE ENCOUNTER
[facility] Call Center    Phone Message    May a detailed message be left on voicemail: yes    Reason for Call: Other: Patient's mother calling.  Patient is scheduled for a colonoscopy on 9/5/19 at [facility].  Patient needs to be off fiber for 5 days (starting Thursday) and the Replete she currently uses has fiber.  They are asking for a new prescription be sent over to [facility] for Replete NO FIBER, [phone].  Patient is scheduled for a preop today with Dr. Moseley at 3 p.m. in case you have questions for mom.   Mom states Lanie takes 4 cans a day and they need a 5 day supply.      Action Taken: Message routed to:  [facility] Primary Care

## 2019-08-28 NOTE — PROGRESS NOTES
Surgeon (please enter first and last name): Dr Martin Chambers  Fax number for Preop Evaluation: [phone]  Location of Surgery: [facility]  Date of Surgery: 09/05/19  Procedure: colonoschopy  History of reaction to anesthesia? NO    Jimmy Moseley MD MD

## 2019-08-28 NOTE — TELEPHONE ENCOUNTER
Route to RN for Dr VALLE Unsure if she is establishing care? Was [facility] on 8/28/2019 at 1:32 PM

## 2019-08-29 ENCOUNTER — MYC MEDICAL ADVICE (OUTPATIENT)
Dept: FAMILY MEDICINE | Facility: CLINIC | Age: 29
End: 2019-08-29

## 2019-08-30 ENCOUNTER — MYC MEDICAL ADVICE (OUTPATIENT)
Dept: FAMILY MEDICINE | Facility: CLINIC | Age: 29
End: 2019-08-30

## 2019-08-30 ENCOUNTER — TELEPHONE (OUTPATIENT)
Dept: INTERNAL MEDICINE | Facility: CLINIC | Age: 29
End: 2019-08-30

## 2019-08-30 DIAGNOSIS — J45.909 ASTHMA: ICD-10-CM

## 2019-08-30 DIAGNOSIS — Z91.89 ELECTROLYTE IMBALANCE RISK: Primary | ICD-10-CM

## 2019-08-30 DIAGNOSIS — J32.9 SINUSITIS: Primary | ICD-10-CM

## 2019-08-30 DIAGNOSIS — J96.10 CHRONIC RESPIRATORY FAILURE (H): ICD-10-CM

## 2019-08-30 NOTE — TELEPHONE ENCOUNTER
Spoke with Lurdes at [facility] and informed the details. I faxed the BMP order to her per her request. Fax # [phone]  They will fax us the result.    Soon-Mi  ---------------------------------------------------------------------      ----- Message from Jimmy Moseley MD sent at 8/28/2019  3:47 PM CDT -----  She has a home care service called [facility]    Phone  Lurdes    See if they can come out and do BMP lab and get stat to me on Wednesday Sept 4 re: edema    She will be finishing a golytely prep that day for Sept 5 colonoscopy and we would like to make sure the prep does not disrupt her electrolytes

## 2019-08-30 NOTE — TELEPHONE ENCOUNTER
Dr. Moseley    [facility] accepts providers sign on the lab order. I faxed the order with my signature, but they denied. BMP order is pended.  Please sign the lab order. Thank you.    Kenisha

## 2019-09-04 ENCOUNTER — HOSPITAL LABORATORY (OUTPATIENT)
Dept: OTHER | Facility: CLINIC | Age: 29
End: 2019-09-04

## 2019-09-04 ENCOUNTER — MYC MEDICAL ADVICE (OUTPATIENT)
Dept: FAMILY MEDICINE | Facility: CLINIC | Age: 29
End: 2019-09-04

## 2019-09-04 DIAGNOSIS — G12.9 SPINAL MUSCLE ATROPHY (H): ICD-10-CM

## 2019-09-04 LAB
ANION GAP SERPL CALCULATED.3IONS-SCNC: 13 MMOL/L (ref 3–14)
BUN SERPL-MCNC: 9 MG/DL (ref 7–30)
CALCIUM SERPL-MCNC: 8.7 MG/DL (ref 8.5–10.1)
CHLORIDE SERPL-SCNC: 107 MMOL/L (ref 94–109)
CO2 SERPL-SCNC: 20 MMOL/L (ref 20–32)
CREAT SERPL-MCNC: <0.14 MG/DL (ref 0.52–1.04)
GFR SERPL CREATININE-BSD FRML MDRD: >90 ML/MIN/{1.73_M2}
GLUCOSE SERPL-MCNC: 93 MG/DL (ref 70–99)
POTASSIUM SERPL-SCNC: 3 MMOL/L (ref 3.4–5.3)
SODIUM SERPL-SCNC: 141 MMOL/L (ref 133–144)

## 2019-09-05 ENCOUNTER — TELEPHONE (OUTPATIENT)
Dept: INTERNAL MEDICINE | Facility: CLINIC | Age: 29
End: 2019-09-05

## 2019-09-05 DIAGNOSIS — E87.6 HYPOKALEMIA: Primary | ICD-10-CM

## 2019-09-05 RX ORDER — POTASSIUM CHLORIDE 20MEQ/15ML
LIQUID (ML) ORAL
Qty: 60 ML | Refills: 0 | Status: SHIPPED | OUTPATIENT
Start: 2019-09-05 | End: 2019-09-19

## 2019-09-05 NOTE — TELEPHONE ENCOUNTER
Yes  Potassium 20 meq dose, liquid (I think she uses liquids via G tube)    Give one dose now, one dose tomorrow morning, one dose tomorrow darci, one dose Saturday morning    Since I think it is low from colonoscopy prep I don't think she'll need it long term    See if can get potassium level redone by home nurse then Monday and let us know result

## 2019-09-05 NOTE — TELEPHONE ENCOUNTER
Spoke with pt's mom, states that she showed the result to colonoscopy provider who said it is OK to proceed colonoscopy.    Do you want to prescribe potassium ?    Soon-Mi  -----------------------------------------------------------------      ----- Message from Jimmy Moseley MD sent at 9/5/2019 12:51 PM CDT -----  I just saw low K report. I think having surgery today. Try to call mom. K is 3.0. Patient might be at [facility] for surgery. They could give her some if there.    If she is at home let me know, we'd give her K at home.    It is likely low due to GI prep for colonoscopy

## 2019-09-05 NOTE — TELEPHONE ENCOUNTER
Patient Requested  ALPRAZolam (XANAX) 0.25 MG tablet  diazepam (VALIUM) 5 MG tablet  Last Filled  06/28/19 07/03/2019  Last Office Visit  08/28/2019  Next Office Visit  Nothing scheduled   Checked  08/19/2019     DX: Spinal muscle atrophy      Pharmacy:   [id number] - [place], [place] - [address]        LILLIAM DAMICO at 9:57 AM on 8/19/2019.

## 2019-09-05 NOTE — TELEPHONE ENCOUNTER
New Rx was sent. Lab order placed.  Lab order was faxed to [facility].  Left a detailed message to mom regarding this matter or call me back with questions.

## 2019-09-06 RX ORDER — ALPRAZOLAM 0.25 MG
0.25 TABLET ORAL 3 TIMES DAILY PRN
Qty: 60 TABLET | Refills: 2 | Status: SHIPPED | OUTPATIENT
Start: 2019-09-06 | End: 2020-03-06

## 2019-09-06 RX ORDER — DIAZEPAM 5 MG
TABLET ORAL
Qty: 30 TABLET | Refills: 0 | Status: SHIPPED | OUTPATIENT
Start: 2019-09-06 | End: 2019-12-06

## 2019-09-09 ENCOUNTER — TELEPHONE (OUTPATIENT)
Dept: INTERNAL MEDICINE | Facility: CLINIC | Age: 29
End: 2019-09-09

## 2019-09-09 ENCOUNTER — HOSPITAL LABORATORY (OUTPATIENT)
Dept: OTHER | Facility: CLINIC | Age: 29
End: 2019-09-09

## 2019-09-09 LAB — POTASSIUM SERPL-SCNC: 3.8 MMOL/L (ref 3.4–5.3)

## 2019-09-09 NOTE — TELEPHONE ENCOUNTER
"[facility] Call Center    Phone Message    May a detailed message be left on voicemail: yes    Reason for Call: Order(s): Home Care Orders: Skilled Nursing:  Regarding the potassium lab order; pt did not start medication on time, needing clarification \"when\" to draw this lab. Please call Lurdes back.    Action Taken: Message sent to -[facility]  "

## 2019-09-09 NOTE — TELEPHONE ENCOUNTER
Spoke to home care nurse who states that the patient did not get the medication right away and that the patients mom gave the patient 2 doses yesterday (9/8/19) and 2 doses today (9/9/2019).     Home care is wondering when to draw the potassium lab. Lynette De La O LPN 9/9/2019 10:34 AM

## 2019-09-09 NOTE — TELEPHONE ENCOUNTER
Spoke to home care nurse to relay providers orders. Lurdes states verbal understanding. Lynette De La O LPN 9/9/2019 11:43 AM

## 2019-09-09 NOTE — TELEPHONE ENCOUNTER
That's fine    Have them stop giving potassium now and do the lab today and let me know    See if back to normal in terms of nutrition/fluid intake plus in terms of stooling    As if those are normal (since did colonoscopy) and blood K back to normal, then can stop K dosing.

## 2019-09-10 ENCOUNTER — TRANSFERRED RECORDS (OUTPATIENT)
Dept: HEALTH INFORMATION MANAGEMENT | Facility: CLINIC | Age: 29
End: 2019-09-10

## 2019-09-13 ENCOUNTER — MEDICAL CORRESPONDENCE (OUTPATIENT)
Dept: HEALTH INFORMATION MANAGEMENT | Facility: CLINIC | Age: 29
End: 2019-09-13

## 2019-09-16 ENCOUNTER — TELEPHONE (OUTPATIENT)
Dept: FAMILY MEDICINE | Facility: CLINIC | Age: 29
End: 2019-09-16

## 2019-09-16 DIAGNOSIS — K92.2 ACUTE LOWER GI BLEEDING: Primary | ICD-10-CM

## 2019-09-16 NOTE — TELEPHONE ENCOUNTER
Health Call Center    Phone Message    May a detailed message be left on voicemail: no    Reason for Call: Order(s): Other:   Reason for requested: Labs needed after discharge from [facility], need hemoglobin, electrolytes, and magnesium.  Date needed: 9/17/19  Provider name: Dr. Moseley  Comments: Please fax to [phone].       Action Taken: Message routed to:  [facility]

## 2019-09-17 ENCOUNTER — HOSPITAL LABORATORY (OUTPATIENT)
Dept: OTHER | Facility: CLINIC | Age: 29
End: 2019-09-17

## 2019-09-17 ENCOUNTER — MYC MEDICAL ADVICE (OUTPATIENT)
Dept: FAMILY MEDICINE | Facility: CLINIC | Age: 29
End: 2019-09-17

## 2019-09-17 DIAGNOSIS — D62 ANEMIA DUE TO BLOOD LOSS, ACUTE: ICD-10-CM

## 2019-09-17 LAB
ALBUMIN SERPL-MCNC: 3.2 G/DL (ref 3.4–5)
ALP SERPL-CCNC: 102 U/L (ref 40–150)
ALT SERPL W P-5'-P-CCNC: 42 U/L (ref 0–50)
ANION GAP SERPL CALCULATED.3IONS-SCNC: 9 MMOL/L (ref 3–14)
AST SERPL W P-5'-P-CCNC: 28 U/L (ref 0–45)
BASOPHILS # BLD AUTO: 0 10E9/L (ref 0–0.2)
BASOPHILS NFR BLD AUTO: 0.4 %
BILIRUB SERPL-MCNC: 0.4 MG/DL (ref 0.2–1.3)
BUN SERPL-MCNC: 7 MG/DL (ref 7–30)
CALCIUM SERPL-MCNC: 8.5 MG/DL (ref 8.5–10.1)
CHLORIDE SERPL-SCNC: 107 MMOL/L (ref 94–109)
CO2 SERPL-SCNC: 23 MMOL/L (ref 20–32)
CREAT SERPL-MCNC: <0.14 MG/DL (ref 0.52–1.04)
DIFFERENTIAL METHOD BLD: ABNORMAL
EOSINOPHIL # BLD AUTO: 0.3 10E9/L (ref 0–0.7)
EOSINOPHIL NFR BLD AUTO: 4.1 %
ERYTHROCYTE [DISTWIDTH] IN BLOOD BY AUTOMATED COUNT: 18 % (ref 10–15)
GFR SERPL CREATININE-BSD FRML MDRD: >90 ML/MIN/{1.73_M2}
GLUCOSE SERPL-MCNC: 125 MG/DL (ref 70–99)
HCT VFR BLD AUTO: 29.8 % (ref 35–47)
HGB BLD-MCNC: 8.8 G/DL (ref 11.7–15.7)
IMM GRANULOCYTES # BLD: 0 10E9/L (ref 0–0.4)
IMM GRANULOCYTES NFR BLD: 0.2 %
LYMPHOCYTES # BLD AUTO: 1.8 10E9/L (ref 0.8–5.3)
LYMPHOCYTES NFR BLD AUTO: 21.4 %
MAGNESIUM SERPL-MCNC: 2 MG/DL (ref 1.6–2.3)
MCH RBC QN AUTO: 26 PG (ref 26.5–33)
MCHC RBC AUTO-ENTMCNC: 29.5 G/DL (ref 31.5–36.5)
MCV RBC AUTO: 88 FL (ref 78–100)
MONOCYTES # BLD AUTO: 0.4 10E9/L (ref 0–1.3)
MONOCYTES NFR BLD AUTO: 5 %
NEUTROPHILS # BLD AUTO: 5.7 10E9/L (ref 1.6–8.3)
NEUTROPHILS NFR BLD AUTO: 68.9 %
NRBC # BLD AUTO: 0.1 10*3/UL
NRBC BLD AUTO-RTO: 1 /100
PLATELET # BLD AUTO: 318 10E9/L (ref 150–450)
POTASSIUM SERPL-SCNC: 3.4 MMOL/L (ref 3.4–5.3)
PROT SERPL-MCNC: 7.1 G/DL (ref 6.8–8.8)
RBC # BLD AUTO: 3.39 10E12/L (ref 3.8–5.2)
SODIUM SERPL-SCNC: 139 MMOL/L (ref 133–144)
WBC # BLD AUTO: 8.2 10E9/L (ref 4–11)

## 2019-09-18 ENCOUNTER — MYC MEDICAL ADVICE (OUTPATIENT)
Dept: FAMILY MEDICINE | Facility: CLINIC | Age: 29
End: 2019-09-18

## 2019-09-18 PROBLEM — D62 ANEMIA DUE TO BLOOD LOSS, ACUTE: Status: ACTIVE | Noted: 2019-09-18

## 2019-09-18 RX ORDER — NUTRITIONAL SUPPLEMENT 0.07 G-1.5
1 LIQUID (ML) ORAL 4 TIMES DAILY
COMMUNITY
Start: 2019-09-18 | End: 2024-08-22

## 2019-09-18 NOTE — TELEPHONE ENCOUNTER
I called mom and informed the transfusion order. Pt has an appt tomorrow, will sign on the consent.

## 2019-09-18 NOTE — TELEPHONE ENCOUNTER
Soon mi given symptoms can you arrange for one unit PRC transfusion re: acute blood loss anemia? Also check with patient/mom who is her caregiver to see what diet consists of--it could be short term needs iron supplementation.

## 2019-09-19 ENCOUNTER — OFFICE VISIT (OUTPATIENT)
Dept: FAMILY MEDICINE | Facility: CLINIC | Age: 29
End: 2019-09-19
Payer: COMMERCIAL

## 2019-09-19 ENCOUNTER — INFUSION THERAPY VISIT (OUTPATIENT)
Dept: INFUSION THERAPY | Facility: CLINIC | Age: 29
End: 2019-09-19
Attending: FAMILY MEDICINE
Payer: COMMERCIAL

## 2019-09-19 VITALS
HEART RATE: 70 BPM | TEMPERATURE: 98.7 F | SYSTOLIC BLOOD PRESSURE: 102 MMHG | DIASTOLIC BLOOD PRESSURE: 69 MMHG | OXYGEN SATURATION: 93 %

## 2019-09-19 VITALS
HEART RATE: 73 BPM | DIASTOLIC BLOOD PRESSURE: 67 MMHG | OXYGEN SATURATION: 99 % | RESPIRATION RATE: 16 BRPM | SYSTOLIC BLOOD PRESSURE: 106 MMHG | TEMPERATURE: 97.6 F

## 2019-09-19 DIAGNOSIS — E87.6 HYPOKALEMIA: ICD-10-CM

## 2019-09-19 DIAGNOSIS — D64.9 ANEMIA, UNSPECIFIED TYPE: ICD-10-CM

## 2019-09-19 DIAGNOSIS — Z79.51 CURRENT CHRONIC USE OF INHALED STEROID: ICD-10-CM

## 2019-09-19 DIAGNOSIS — K92.2 ACUTE LOWER GI BLEEDING: ICD-10-CM

## 2019-09-19 DIAGNOSIS — R53.83 FATIGUE, UNSPECIFIED TYPE: ICD-10-CM

## 2019-09-19 DIAGNOSIS — N92.0 MENORRHAGIA WITH REGULAR CYCLE: ICD-10-CM

## 2019-09-19 DIAGNOSIS — Z92.241 HISTORY OF STEROID THERAPY: ICD-10-CM

## 2019-09-19 DIAGNOSIS — R26.89 UNABLE TO BEAR WEIGHT: ICD-10-CM

## 2019-09-19 DIAGNOSIS — R73.09 ELEVATED GLUCOSE: ICD-10-CM

## 2019-09-19 DIAGNOSIS — Z00.00 HEALTH CARE MAINTENANCE: ICD-10-CM

## 2019-09-19 DIAGNOSIS — Z79.899 POLYPHARMACY: ICD-10-CM

## 2019-09-19 DIAGNOSIS — Z78.9 ON PARENTERAL NUTRITION: Primary | ICD-10-CM

## 2019-09-19 DIAGNOSIS — D62 ANEMIA DUE TO BLOOD LOSS, ACUTE: Primary | ICD-10-CM

## 2019-09-19 LAB
ABO + RH BLD: NORMAL
ABO + RH BLD: NORMAL
ALBUMIN SERPL-MCNC: 3.5 G/DL (ref 3.4–5)
ALP SERPL-CCNC: 109 U/L (ref 40–150)
ALT SERPL W P-5'-P-CCNC: 46 U/L (ref 0–50)
ANION GAP SERPL CALCULATED.3IONS-SCNC: 8 MMOL/L (ref 3–14)
AST SERPL W P-5'-P-CCNC: 33 U/L (ref 0–45)
BASOPHILS # BLD AUTO: 0 10E9/L (ref 0–0.2)
BASOPHILS NFR BLD AUTO: 0.5 %
BILIRUB SERPL-MCNC: 0.4 MG/DL (ref 0.2–1.3)
BLD GP AB SCN SERPL QL: NORMAL
BLD PROD TYP BPU: NORMAL
BLD PROD TYP BPU: NORMAL
BLD UNIT ID BPU: 0
BLOOD BANK CMNT PATIENT-IMP: NORMAL
BLOOD BANK CMNT PATIENT-IMP: NORMAL
BLOOD PRODUCT CODE: NORMAL
BPU ID: NORMAL
BUN SERPL-MCNC: 8 MG/DL (ref 7–30)
CALCIUM SERPL-MCNC: 9.2 MG/DL (ref 8.5–10.1)
CHLORIDE SERPL-SCNC: 108 MMOL/L (ref 94–109)
CO2 SERPL-SCNC: 24 MMOL/L (ref 20–32)
CREAT SERPL-MCNC: <0.2 MG/DL (ref 0.52–1.04)
DIFFERENTIAL METHOD BLD: ABNORMAL
EOSINOPHIL # BLD AUTO: 0.2 10E9/L (ref 0–0.7)
EOSINOPHIL NFR BLD AUTO: 2.7 %
ERYTHROCYTE [DISTWIDTH] IN BLOOD BY AUTOMATED COUNT: 17.9 % (ref 10–15)
GFR SERPL CREATININE-BSD FRML MDRD: >90 ML/MIN/{1.73_M2}
GLUCOSE SERPL-MCNC: 103 MG/DL (ref 70–99)
HCT VFR BLD AUTO: 30.5 % (ref 35–47)
HGB BLD-MCNC: 9.1 G/DL (ref 11.7–15.7)
IMM GRANULOCYTES # BLD: 0 10E9/L (ref 0–0.4)
IMM GRANULOCYTES NFR BLD: 0.4 %
LYMPHOCYTES # BLD AUTO: 1.9 10E9/L (ref 0.8–5.3)
LYMPHOCYTES NFR BLD AUTO: 25.7 %
MAGNESIUM SERPL-MCNC: 2.2 MG/DL (ref 1.6–2.3)
MCH RBC QN AUTO: 26.7 PG (ref 26.5–33)
MCHC RBC AUTO-ENTMCNC: 29.8 G/DL (ref 31.5–36.5)
MCV RBC AUTO: 89 FL (ref 78–100)
MONOCYTES # BLD AUTO: 0.5 10E9/L (ref 0–1.3)
MONOCYTES NFR BLD AUTO: 7.1 %
NEUTROPHILS # BLD AUTO: 4.7 10E9/L (ref 1.6–8.3)
NEUTROPHILS NFR BLD AUTO: 63.6 %
NRBC # BLD AUTO: 0.1 10*3/UL
NRBC BLD AUTO-RTO: 1 /100
NUM BPU REQUESTED: 1
PLATELET # BLD AUTO: 317 10E9/L (ref 150–450)
POTASSIUM SERPL-SCNC: 2.9 MMOL/L (ref 3.4–5.3)
PROT SERPL-MCNC: 7.9 G/DL (ref 6.8–8.8)
RBC # BLD AUTO: 3.41 10E12/L (ref 3.8–5.2)
SODIUM SERPL-SCNC: 139 MMOL/L (ref 133–144)
SPECIMEN EXP DATE BLD: NORMAL
TRANSFUSION STATUS PATIENT QL: NORMAL
TRANSFUSION STATUS PATIENT QL: NORMAL
WBC # BLD AUTO: 7.3 10E9/L (ref 4–11)

## 2019-09-19 PROCEDURE — P9016 RBC LEUKOCYTES REDUCED: HCPCS | Performed by: FAMILY MEDICINE

## 2019-09-19 PROCEDURE — 86850 RBC ANTIBODY SCREEN: CPT | Performed by: FAMILY MEDICINE

## 2019-09-19 PROCEDURE — 36430 TRANSFUSION BLD/BLD COMPNT: CPT

## 2019-09-19 PROCEDURE — 85025 COMPLETE CBC W/AUTO DIFF WBC: CPT | Performed by: FAMILY MEDICINE

## 2019-09-19 PROCEDURE — 86901 BLOOD TYPING SEROLOGIC RH(D): CPT | Performed by: FAMILY MEDICINE

## 2019-09-19 PROCEDURE — 25000128 H RX IP 250 OP 636: Performed by: FAMILY MEDICINE

## 2019-09-19 PROCEDURE — 86900 BLOOD TYPING SEROLOGIC ABO: CPT | Performed by: FAMILY MEDICINE

## 2019-09-19 PROCEDURE — 84132 ASSAY OF SERUM POTASSIUM: CPT | Performed by: FAMILY MEDICINE

## 2019-09-19 PROCEDURE — 83735 ASSAY OF MAGNESIUM: CPT | Performed by: FAMILY MEDICINE

## 2019-09-19 PROCEDURE — 86923 COMPATIBILITY TEST ELECTRIC: CPT | Performed by: FAMILY MEDICINE

## 2019-09-19 PROCEDURE — 80053 COMPREHEN METABOLIC PANEL: CPT | Performed by: FAMILY MEDICINE

## 2019-09-19 RX ORDER — HEPARIN SODIUM (PORCINE) LOCK FLUSH IV SOLN 100 UNIT/ML 100 UNIT/ML
5 SOLUTION INTRAVENOUS EVERY 8 HOURS
Status: DISCONTINUED | OUTPATIENT
Start: 2019-09-19 | End: 2019-09-27 | Stop reason: HOSPADM

## 2019-09-19 RX ORDER — POTASSIUM CHLORIDE 20MEQ/15ML
LIQUID (ML) ORAL
Qty: 450 ML | Refills: 1 | Status: SHIPPED | OUTPATIENT
Start: 2019-09-19 | End: 2019-10-07

## 2019-09-19 RX ADMIN — HEPARIN SODIUM (PORCINE) LOCK FLUSH IV SOLN 100 UNIT/ML 5 ML: 100 SOLUTION at 11:44

## 2019-09-19 ASSESSMENT — PAIN SCALES - GENERAL: PAINLEVEL: EXTREME PAIN (8)

## 2019-09-19 NOTE — PATIENT INSTRUCTIONS
Patient Education     After Your Blood Transfusion  Discharge Instructions  After you leave  After a blood transfusion (received red blood cells, platelets, plasma, cryo or granulocytes), you will need to watch for signs of a reaction for the next 48 hours.  Call your clinic or 911 (or go to the Emergency room) if you have any signs of a reaction:    Shaking or chills    Fever (temperature above 100.0 F)    Headache    Nausea    Hives    Itching    Swelling of the face or feeling flushed    Ongoing dry cough (nothing is coughed up)    Trouble breathing or wheezing  Some signs of a reaction won't show up for a few [id number].  These may include:    Fatigue    Dizziness    Pink or red urine  Your clinic is:   ________________________________________  ________________________________________  For informational purposes only. Not to replace the advice of your health care provider.   Copyright   2015 "[employer] All rights reserved. [employer] 271222 - Rev 07/16.  For informational purposes only. Not to replace the advice of your health care provider.  Copyright   2018 "[employer] All rights reserved.

## 2019-09-19 NOTE — PATIENT INSTRUCTIONS
[facility] Medication Refill Request Information:  * Please contact your pharmacy regarding ANY request for medication refills.  ** [facility] Prescription Fax = [phone]  * Please allow 3 business days for routine medication refills.  * Please allow 5 business days for controlled substance medication refills.     [facility] Test Result notification information:  *You will be notified with in 7-10 days of your appointment day regarding the results of your test.  If you are on MyChart you will be notified as soon as the provider has reviewed the results and signed off on them.    [facility]: [phone]

## 2019-09-19 NOTE — PROGRESS NOTES
Blood Product Transfusion Nursing Note:    Lanie Neil presents today to [facility] for a RBC transfusion.  During today's [facility] appointment orders from Dr. Moseley were completed.    Patient requires a fritz lift and a bed for infusions.    Progress note:  ID verified by name and .  Assessment completed.  Vitals were stable throughout time in [facility].    verbal education given to patient/representative regarding transfusion and possible side effects.  Patient/representative verbalized understanding.     present during visit today: Not Applicable.    All pertinent labs reviewed prior to infusion: YES    Date of consent or authorization: 19    Patient tolerated the procedure well    Transfusion given over approximately  1.5 hours     Discharge Plan:    Discharge instructions were reviewed with patient Yes  Patient/representative verbalized understanding of discharge instructions and all questions answered Yes.        Marysol Ledesma RN    /81   Pulse 67   Temp 97.6  F (36.4  C) (Oral)   Resp 16   SpO2 97%

## 2019-09-19 NOTE — PROGRESS NOTES
[facility]   Jimmy Moseley MD  09/19/2019      Chief Complaint:   Hospital F/U    History of Present Illness:   Lanie Neil is a 29 year old female with a history of spinal muscular atrophy, deep vein thrombosis, who presents with her mother, Aster, for an ED follow up.    ED visit summary: She presented to the ED on 9/10/19 with blood in her stool s/p a colonoscopy and subsequent polypectomy on 9/5/19. She is on Xarelto 10 mg daily for deep vein thrombosis, and held it before the initial colonoscopy and resumed the day following the initial colonoscopy. The ED providers determined that she had acute lower GI bleeding and acute anemia most likely due to bleeding from polypectomy site in the setting of anticoagulant use. GI was consulted, Xarelto 10 mg was held, and a colonoscopy was performed. A solitary six mm ulcer in the cecum with a clip and stigmata of recent bleeding were found . 2 additional clips were successfully placed and there were no bloody stools present after the procedure. An area of moderate melanosis was found in the entire colon. Xarelto was recommended to be resumed on 9/14/19. She experienced hypokalemia after the colonoscopy prep and was given supplemental potassium. Lanie was worried about recurring hypokalemia after discharge, so she was prescribed a 3 day course of potassium chloride 20% solution 40 mEQ to be inserted into her feeding tube. She was discharged on 9/12/18.     Anemia: She reports that she has never had blood in her stool before, and has not had any since. She is feeling very fatigued due to blood loss, and her pain has increased from baseline as well. She is on a regular iron supplement, but her dosage was not increased while she was in the hospital. She reports menstrual periods that are usually very heavy and painful with a lot of clotting. She has a blood transfusion later today. Today, her RBC was low at 3.41, her hemoglobin was low as 9.1,  her hematocrit was low at 30.5 and her potassium was low at 2.9. Her hemoglobin last week on discharge was 8.8. Her hemoglobin in May 2019 was 9.8, January 2018 was 8.5, and September 2016 was 12.4. She has a hematologist, Dr. Mir, but she has never discussed anemia with him.     Other concerns discussed:  1. She has never seen an endocrinologist. She has had to be on Prednisone in the past. She no longer has a dietician.      Review of Systems:   Pertinent items are noted in HPI or as in patient entered ROS below, remainder of complete ROS is negative.     Active Medications:     Current Outpatient Medications:      ACETAMINOPHEN PO, Take 650 mg by mouth every 4 hours as needed for pain, Disp: , Rfl:      albuterol (2.5 MG/3ML) 0.083% neb solution, USE ONE VIAL BY NEBULIZATION EVERY 6 HOURS AS NEEDED FOR SHORTNESS OF BREATH/ DYSPNEA OR WHEEZING, Disp: 360 mL, Rfl: 6     albuterol (ALBUTEROL) 108 (90 BASE) MCG/ACT inhaler, Inhale 2 puffs into the lungs every 4 hours as needed for shortness of breath / dyspnea or wheezing, Disp: 1 Inhaler, Rfl: 2     Albuterol Sulfate (VENTOLIN HFA IN), Inhale 2 puffs into the lungs, Disp: , Rfl:      ALPRAZolam (XANAX) 0.25 MG tablet, Take 1 tablet (0.25 mg) by mouth 3 times daily as needed for anxiety, Disp: 60 tablet, Rfl: 2     aminocaproic acid (AMICAR) 0.25 GM/ML solution, Take 12 mL (3 grams) via G-tube every 8 hours during menstrual period., Disp: 2 Bottle, Rfl: 5     azithromycin (ZITHROMAX) 200 MG/5ML suspension, 12.5 ml per g tube today then 6.25 ml per g tube daily for four days, Disp: 37.5 mL, Rfl: 0     bacitracin 500 UNIT/GM OINT, Apply 1 each topically, Disp: , Rfl:      bisacodyl (DULCOLAX) 10 MG suppository, Place 10 mg rectally daily as needed for constipation, Disp: , Rfl:      celecoxib (CELEBREX) 200 MG capsule, TAKE 1 CAPSULE(200 MG) BY MOUTH DAILY, Disp: 30 capsule, Rfl: 8     clindamycin (CLEOCIN T) 1 % lotion, Apply topically 2 times daily To face as  "needed, Disp: 60 mL, Rfl: 3     diazepam (VALIUM) 5 MG tablet, TAKE 1 TABLET BY MOUTH EVERY 6 HOURS AS NEEDED FOR MUSCLE SPASMS, Disp: 30 tablet, Rfl: 0     doxycycline hyclate (VIBRAMYCIN) 100 MG capsule, GIVE ONE CAPSULE VIA \"G\"- TUBE TWICE DAILY, Disp: 60 capsule, Rfl: 2     ferrous sulfate 220 (44 Fe) MG/5ML ELIX, 22 mg by Enteral route, Disp: , Rfl:      flunisolide HFA (AEROSPAN) 80 MCG/ACT AERS oral inhaler, Inhale 2 puffs into the lungs, Disp: , Rfl:      fluticasone (FLONASE) 50 MCG/ACT spray, Spray 1 spray into both nostrils daily, Disp: 16 mL, Rfl: 11     gabapentin (NEURONTIN) 250 MG/5ML solution, TAKE 20 ML PER FEEDING TUBE THREE TIMES DAILY., Disp: 1800 mL, Rfl: 6     GENERLAC 10 GM/15ML solution, , Disp: , Rfl: 11     glycerin-hypromellose- (ARTIFICIAL TEARS) 0.2-0.2-1 % SOLN ophthalmic solution, Place 1 drop into both eyes 2 times daily as needed for dry eyes, Disp: 1 Bottle, Rfl: 11     guaiFENesin (ORGANIDIN) 200 MG TABS tablet, Take 200 mg by mouth every 4 hours as needed for cough, Disp: , Rfl:      HYDROmorphone (DILAUDID) 4 MG tablet, Take 1-2 tablets (4-8 mg) by mouth every 4 hours as needed for moderate to severe pain, Disp: 300 tablet, Rfl: 0     hydrOXYzine (ATARAX) 25 MG tablet, Take 25 mg by mouth every 6 hours as needed for itching, Disp: , Rfl:      SABINE 30 MG, Take 1 capsule (30 mg) by mouth 2 times daily, Disp: 60 capsule, Rfl: 0     LANsoprazole (PREVACID) 30 MG DR capsule, Take 30 mg by mouth, Disp: , Rfl:      Magnesium Hydroxide (MILK OF MAGNESIA PO), Take 30 mLs by mouth as needed, Disp: , Rfl:      metroNIDAZOLE (METROCREAM) 0.75 % external cream, Apply topically 2 times daily, Disp: 45 g, Rfl: 11     mometasone (NASONEX) 50 MCG/ACT nasal spray, , Disp: , Rfl:      Nutritional Supplements (REPLETE FIBER 1 CHINTAN) LIQD, Take 1 Can by mouth, Disp: , Rfl:      Nutritional Supplements (REPLETE FIBER) LIQD, Take 1 Can by mouth 4 times daily, Disp: , Rfl:      omeprazole " (PRILOSEC) 2 mg/mL suspension, SHAKE LIQUID WELL AND GIVE 20ML (40MG) DAILY (VIA G-TUBE), Disp: 600 mL, Rfl: 11     ondansetron (ZOFRAN) 4 MG tablet, Take 1 tablet by mouth every 6 hours as needed for nausea., Disp: , Rfl:      ondansetron (ZOFRAN-ODT) 4 MG ODT tab, Take 1 tablet (4 mg) by mouth every 8 hours as needed for nausea, Disp: 60 tablet, Rfl: 5     order for DME, Equipment being ordered: 16 fr 5cc balloon indwelling catheter for 3-5 days with drainage bag, Disp: 2 catheter, Rfl: 1     order for DME, Equipment being ordered: 16 fr 5cc balloon indwelling catheter for 3-5 days with drainage bag, Disp: 2 catheter, Rfl: 0     order for DME, Equipment being ordered: Vibracare Percussor, Disp: 1 Units, Rfl: 0     polyethylene glycol (GOLYTELY/NULYTELY) 236 g suspension, Take as directed in patient instructions: Drink 4000ml between 4 and 6 p.m. the evening before and 2000ml 4 hours before your procedure, Disp: , Rfl:      polyethylene glycol (MIRALAX/GLYCOLAX) powder, TAKE 17 GRAMS BY MOUTH EVERY DAY (Patient taking differently: TAKE 17 GRAMS BY MOUTH three times EVERY DAY), Disp: 850 g, Rfl: 6     potassium chloride (KAYCIEL) 20 MEQ/15ML (10%) solution, Take 20 Meq (15 ml) per g tube daily, Disp: 450 mL, Rfl: 1     prochlorperazine (COMPAZINE) 5 MG tablet, Take 5 mg by mouth every 6 hours as needed for nausea or vomiting, Disp: , Rfl:      rivaroxaban ANTICOAGULANT (XARELTO) 10 MG TABS tablet, Take 1 tablet (10 mg) by mouth daily (with dinner), Disp: 30 tablet, Rfl: 5     Salicylic Acid (OXY BALANCE FACIAL CLEAN WASH EX), Externally apply 1 pad topically daily, Disp: , Rfl:      senna-docusate (SENOKOT-S/PERICOLACE) 8.6-50 MG tablet, , Disp: , Rfl:      sennosides (SENNA-GRX) 8.8 MG/5ML syrup, Take 10 mLs by mouth 2 times daily, Disp: 474 mL, Rfl: 7     silver sulfADIAZINE (SILVADENE) 1 % cream, Apply topically daily, Disp: 400 g, Rfl: 1     simethicone (MYLICON) 66.7 mg/ml, as needed, Disp: , Rfl:       simethicone 40 MG/0.6ML LIQD, , Disp: , Rfl:      sodium chloride (OCEAN) 0.65 % nasal spray, 2 sprays, Disp: , Rfl:   No current facility-administered medications for this visit.     Facility-Administered Medications Ordered in Other Visits:      heparin 100 UNIT/ML injection 5 mL, 5 mL, Intracatheter, Q8H, Jimmy Moseley MD, 5 mL at 09/19/19 1144      Allergies:   Patient has no known allergies.      Past Medical History:  Anxiety   Snoring  Spinal muscle atrophy  Constipation  Asthma  Dyspepsia and other specified disorders of function of stomach  Sinusitis, chronic   Deep venous thrombosis  Chronic respiratory failure  Chronic pain syndrome      Past Surgical History:  ENT surgery  Gastronomy tube   Kidney surgery  Spine surgery  Tracheostomy     Family History:   Reviewed. No pertinent family history on file.      Social History:   Smoking status: never smoker  Alcohol use: no    Physical Exam:   /69 (BP Location: Left arm, Patient Position: Sitting, Cuff Size: Adult Regular)   Pulse 70   Temp 98.7  F (37.1  C) (Oral)   SpO2 93%    Constitutional: Alert. In no distress. In wheelchair.   Head: The scalp, face, and head appear normal.  Musculoskeletal: Patient in stable chronic state.   Psychiatric: Mentation appears normal. Normal affect.      Assessment and Plan:  Hypokalemia  Recheck at next visit. I will ask for dietician to see her. If this persists, we will consider a renal clinic evaluation.   - potassium chloride (KAYCIEL) 20 MEQ/15ML (10%) solution  Dispense: 450 mL; Refill: 1     On parenteral nutrition  She should have periodic reassessment of appropriate nutrition. Plus for a few years she has been anemic. More recently, she has had low potassium for no apparent reason.   - NUTRITION REFERRAL    Anemia, unspecified type  I did contact her hematologist to see if he could see her to help diagnose anemia. This existed before her recent GI bleed.   - NUTRITION REFERRAL  - OB/GYN REFERRAL  -  PHARMACY ([facility]) REFERRAL  - Iron and iron binding capacity  - Ferritin  - Vitamin B12  - Folate    Unable to bear weight, Current chronic use of inhaled steroid, History of steroid therapy  She did use significant amounts of Prednisone before she reached adulthood.   - Dexa hip/pelvis/spine*    Menorrhagia with regular cycle  Heavy, painful, regular periods. It would be good to help with the pain, the blood loss, and simply the practical issues associated with a disabled person having heavy periods.  - OB/GYN REFERRAL    Polypharmacy  In particular, I would have to wonder if her meds could contrbute to her anemia over the past few years.   - PHARMACY ([facility]) REFERRAL    Fatigue, unspecified type  - Cortisol  - TSH with free T4 reflex    Elevated glucose  - Hemoglobin A1c    Health care maintenance  - FLU VAC PRESRV FREE QUAD SPLIT VIR 3+YRS IM  - Pneumococcal vaccine 13 valent PCV13 IM (Prevnar) [54129]     If unable to do bone density, I would consider having an endocrinologist see her, as her lack of weight bearing and remote steroid use put her at some risk of osteoporosis.     Follow-up: Return in about 1 month (around 10/19/2019).      Scribe Disclosure:  Kesha AGUERO, am serving as a scribe to document services personally performed by Jimmy Moseley MD at this visit, based upon the provider's statements to me. All documentation has been reviewed by the aforementioned provider prior to being entered into the official medical record.    Scribe Preparation Attestation:  Kesha AGUERO, a scribe, prepared the chart for today's encounter.      Portions of this medical record were completed by a scribe. UPON MY REVIEW AND AUTHENTICATION BY ELECTRONIC SIGNATURE, this confirms (a) I performed the applicable clinical services, and (b) the record is accurate.   Jimmy Moseley MD MD

## 2019-09-19 NOTE — TELEPHONE ENCOUNTER
I added Replete 4 cans a day to the patient's medication list as patient reported. I will route this to Dr. GARCIA as an SYDNI Novak on 9/18/2019 at 7:11 PM

## 2019-09-19 NOTE — NURSING NOTE
Chief Complaint   Patient presents with     Hospital F/U     pt here for follow up from hospital stay 9/10-9/12       Marla Willoughby EMT at 12:18 PM on 9/19/2019.

## 2019-09-23 ENCOUNTER — TELEPHONE (OUTPATIENT)
Dept: FAMILY MEDICINE | Facility: CLINIC | Age: 29
End: 2019-09-23

## 2019-09-23 DIAGNOSIS — D62 ANEMIA DUE TO BLOOD LOSS, ACUTE: ICD-10-CM

## 2019-09-23 DIAGNOSIS — E61.1 IRON DEFICIENCY: ICD-10-CM

## 2019-09-23 DIAGNOSIS — Z79.01 CHRONIC ANTICOAGULATION: Primary | ICD-10-CM

## 2019-09-23 DIAGNOSIS — Z86.718 HISTORY OF DEEP VENOUS THROMBOSIS: ICD-10-CM

## 2019-09-23 DIAGNOSIS — G62.9 NEUROPATHY: ICD-10-CM

## 2019-09-23 NOTE — TELEPHONE ENCOUNTER
[facility]    Phone Message    May a detailed message be left on voicemail: yes    Reason for Call: Order(s): Other:   Reason for requested: unknown lab orders - need to know frequency and type of labs you want pt to have taken  Date needed: asap  Provider name:     Please send to fax: [phone]    Action Taken: Message routed to:  [facility] JEAN CARLOS

## 2019-09-24 ENCOUNTER — TELEPHONE (OUTPATIENT)
Dept: FAMILY MEDICINE | Facility: CLINIC | Age: 29
End: 2019-09-24

## 2019-09-24 RX ORDER — DIAZEPAM 5 MG
TABLET ORAL
Qty: 20 TABLET | Refills: 0
Start: 2019-09-24

## 2019-09-24 RX ORDER — GABAPENTIN 250 MG/5ML
SOLUTION ORAL
Qty: 1800 ML | Refills: 5 | Status: SHIPPED | OUTPATIENT
Start: 2019-09-24 | End: 2020-03-17

## 2019-09-24 RX ORDER — ALPRAZOLAM 0.25 MG
0.25 TABLET ORAL 3 TIMES DAILY PRN
Qty: 10 TABLET | Refills: 0
Start: 2019-09-24

## 2019-09-24 NOTE — TELEPHONE ENCOUNTER
I don't yet know what labs we will need to do on a routine or recurring basis at regular intervals--first I'd like her to do the labs I ordered the other day, and see consultants I ordered including nutritionist. Try to do the labs this week for sure; perhaps they could be done at her home?

## 2019-09-24 NOTE — TELEPHONE ENCOUNTER
[facility]    Phone Message    May a detailed message be left on voicemail: yes    Reason for Call: Order(s): Other:   Reason for requested: Please fax [facility] to [facility] and please put down that it's okay for the lab to be drawn any day this week. Fax: [phone]  Date needed: asap - Please call Clara once it's been faxed.  Provider name: Dr. Moseley       Action Taken: Message routed to:  [facility]

## 2019-09-24 NOTE — TELEPHONE ENCOUNTER
[facility]    Phone Message    May a detailed message be left on voicemail: yes    Reason for Call: Order(s): Other:   Reason for requested: one of the labs cant be taken in the home - would it be ok to do all labs in clinic since pt has to go in anyway. - type and screen  Date needed: asap  Provider name:       Action Taken: Message routed to:  [facility] JEAN CARLOS

## 2019-09-24 NOTE — TELEPHONE ENCOUNTER
Called mom and got patient scheduled on [facility] schedule.    Left a message for Ludmila to relay providers message. If orders need to be faxed to ludmila, will need to know. Provider would like labs to be done this week. Once those labs are done, then PCP will have a better idea of which labs will be more regular and more routine and can inform them after these lab are done. Lynette De La O LPN 9/24/2019 10:28 AM

## 2019-09-24 NOTE — TELEPHONE ENCOUNTER
gabapentin (NEURONTIN) 250 MG/5ML solution    Last Written Prescription Date:  2/7/19  Last Fill Quantity: 1800 ml,   # refills: 6  Last Office Visit : 9/19/19 [place]  Future Office visit:  10/3/19    Routing refill request to RN for review/approval because:  Drug not on the [facility], [facility] or [facility] refill protocol or controlled substance

## 2019-09-25 NOTE — TELEPHONE ENCOUNTER
Spoke to home care nurse to relay that provider would like labs done this week. If patient is coming into clinic can get all labs done at that time, if she is not, advised to get as many as they can done this week. Home care nurse stated that the only one they would not be able to do was the type and screen. Lynette De La O LPN 9/25/2019 8:22 AM

## 2019-09-26 ENCOUNTER — TRANSFERRED RECORDS (OUTPATIENT)
Dept: HEALTH INFORMATION MANAGEMENT | Facility: CLINIC | Age: 29
End: 2019-09-26

## 2019-09-27 ENCOUNTER — TELEPHONE (OUTPATIENT)
Dept: HEMATOLOGY | Facility: CLINIC | Age: 29
End: 2019-09-27

## 2019-09-27 DIAGNOSIS — Z79.01 CHRONIC ANTICOAGULATION: ICD-10-CM

## 2019-09-27 DIAGNOSIS — D50.0 IRON DEFICIENCY ANEMIA DUE TO CHRONIC BLOOD LOSS: Primary | ICD-10-CM

## 2019-09-27 RX ORDER — HEPARIN SODIUM (PORCINE) LOCK FLUSH IV SOLN 100 UNIT/ML 100 UNIT/ML
5 SOLUTION INTRAVENOUS
Status: CANCELLED | OUTPATIENT
Start: 2019-09-27

## 2019-09-27 RX ORDER — HEPARIN SODIUM,PORCINE 10 UNIT/ML
5 VIAL (ML) INTRAVENOUS
Status: CANCELLED | OUTPATIENT
Start: 2019-09-27

## 2019-09-27 NOTE — TELEPHONE ENCOUNTER
[facility]  [address] [place], [place] [id number]  Main: [phone], Fax: [phone]    Documentation Note:    Patient: Lanie Neil  MRN: [id number]  : 1990  Date of this note written: 2019    Labs done on 2019. Received Faxed lab report from [facility]:    Iron 43 (normal )  Iron Binding Cap 406 (normal 240-430)  Iron Sat Index 10 (normal 15-46)    Ferritin 15 (normal 15-46)  Folic Acid 20.0 (normal >5.4)    Vit B12 1289 (normal 193-986)    WBC 7.4  RBC 3.81  Hgb 10.0  MCV 90      Assessment:  Lanie is iron deficient with low normal ferritin and anemic.    Plan:  Will plan on IV iron replacement considering that she has been chronically iron deficient and recent GI bleeding episode.     Will arrange for Injectafer infusions as an outpatient at Infusion Center either at [facility] or [facility]. I will have Sanam Crandall, nurse clinician at our clinic to contact patient to arrange.      Jt Mir PA-C, MPAS  Physician Assistant  [facility].

## 2019-09-30 ENCOUNTER — TELEPHONE (OUTPATIENT)
Dept: HEMATOLOGY | Facility: CLINIC | Age: 29
End: 2019-09-30

## 2019-09-30 NOTE — TELEPHONE ENCOUNTER
Lanie Neil  MRN:   [id number]    Notified Lanie's mom that Lanie was iron deficient and that MORALES Pérez has ordered IV iron for her.  Arranged iron infusions weekly x 2 over at [facility] for 10/11 and 10/18 both at 1 pm.    Ariana Crandall, RN, MSN -Nurse Clinician, [facility] [phone]

## 2019-10-02 ASSESSMENT — ENCOUNTER SYMPTOMS
SKIN CHANGES: 0
VOMITING: 0
BLOATING: 0
NAUSEA: 1
POOR WOUND HEALING: 0
ABDOMINAL PAIN: 1
DIARRHEA: 0
HEARTBURN: 0
BLOOD IN STOOL: 1
BRUISES/BLEEDS EASILY: 0
RECTAL PAIN: 0
JAUNDICE: 0
NAIL CHANGES: 0
CONSTIPATION: 0
BOWEL INCONTINENCE: 0
SWOLLEN GLANDS: 0

## 2019-10-03 ENCOUNTER — OFFICE VISIT (OUTPATIENT)
Dept: FAMILY MEDICINE | Facility: CLINIC | Age: 29
End: 2019-10-03
Payer: COMMERCIAL

## 2019-10-03 VITALS — SYSTOLIC BLOOD PRESSURE: 155 MMHG | OXYGEN SATURATION: 97 % | HEART RATE: 66 BPM | DIASTOLIC BLOOD PRESSURE: 83 MMHG

## 2019-10-03 DIAGNOSIS — E87.6 HYPOKALEMIA: ICD-10-CM

## 2019-10-03 DIAGNOSIS — D64.9 ANEMIA, UNSPECIFIED TYPE: Primary | ICD-10-CM

## 2019-10-03 DIAGNOSIS — D62 ANEMIA DUE TO BLOOD LOSS, ACUTE: ICD-10-CM

## 2019-10-03 LAB
ABO + RH BLD: NORMAL
ABO + RH BLD: NORMAL
BLD GP AB SCN SERPL QL: NORMAL
BLOOD BANK CMNT PATIENT-IMP: NORMAL
SPECIMEN EXP DATE BLD: NORMAL

## 2019-10-03 PROCEDURE — 86900 BLOOD TYPING SEROLOGIC ABO: CPT | Performed by: FAMILY MEDICINE

## 2019-10-03 PROCEDURE — 86850 RBC ANTIBODY SCREEN: CPT | Performed by: FAMILY MEDICINE

## 2019-10-03 PROCEDURE — 25000128 H RX IP 250 OP 636: Performed by: FAMILY MEDICINE

## 2019-10-03 PROCEDURE — 86901 BLOOD TYPING SEROLOGIC RH(D): CPT | Performed by: FAMILY MEDICINE

## 2019-10-03 RX ORDER — HEPARIN SODIUM (PORCINE) LOCK FLUSH IV SOLN 100 UNIT/ML 100 UNIT/ML
5 SOLUTION INTRAVENOUS EVERY 8 HOURS
Status: DISCONTINUED | OUTPATIENT
Start: 2019-10-03 | End: 2019-10-11 | Stop reason: HOSPADM

## 2019-10-03 RX ADMIN — HEPARIN 5 ML: 100 SYRINGE at 11:27

## 2019-10-03 ASSESSMENT — PAIN SCALES - GENERAL: PAINLEVEL: EXTREME PAIN (8)

## 2019-10-03 NOTE — PATIENT INSTRUCTIONS
[facility] Medication Refill Request Information:  * Please contact your pharmacy regarding ANY request for medication refills.  ** [facility] Prescription Fax = [phone]  * Please allow 3 business days for routine medication refills.  * Please allow 5 business days for controlled substance medication refills.     [facility] Test Result notification information:  *You will be notified with in 7-10 days of your appointment day regarding the results of your test.  If you are on MyChart you will be notified as soon as the provider has reviewed the results and signed off on them.    [facility]: [phone]

## 2019-10-03 NOTE — NURSING NOTE
Chief Complaint   Patient presents with     Pre-Op Exam     pt here for preop       Marla Willoughby CMA, EMT at 11:57 AM on 10/3/2019.

## 2019-10-03 NOTE — PROGRESS NOTES
Surgeon: Dr. Oliveros  Location of the surgery: [facility]  Date of the surgery: 10/09/19  Procedure: spinraza injection  History of reaction to anesthesia? : NO  Jimmy Moseley MD MD

## 2019-10-03 NOTE — PROGRESS NOTES
[facility]  Primary Care Center   Jimmy Moseley MD  10/03/2019     Chief Complaint:   Pre-Op Exam     History of Present Illness:   Lanie Neil is 29 year old female here at the request of Dr. Sanchez for cardiovascular, pulmonary, and perioperative risk assessment prior to surgery.  The intended surgical procedure is a Spinraza injection.  A copy of this note will be sent to the surgeon.     Reason for surgery:  She has chronic progressive spinal muscular atrophy (c/b chronic respiratory failure requiring tracheostomy and chronic ventilation and PEG) with prior intrathecal spinal reservoir insertion in 2017 for administration of spinraza for treatment of spinal muscular atrophy. She is having another Spinraza injection for maintenance.     Cardiovascular Risk:  This patient does not have chest pain with ambulation or walking up a flight of stairs.  There is not any chest pain with exercise.  She does not have a history of known cardiac disease.  There is not a history of stroke or valvular disease.    Pulmonary Risk:  In terms of risk factors for pulmonary complications, the patient does have a history of Asthma    Perioperative Complications:  The patient does have a history of bleeding or clotting problems in the past, specifically has history of DVT.  They are currently anticoagulated on Xarelto 10 mg daily.  She has had complications from past surgeries.  The patient does not have a family history of any anesthesia or surgical complications.    Review of Systems:   Pertinent items are noted in HPI or as in patient entered ROS below, remainder of complete ROS is negative.     Active Medications:     Current Outpatient Medications:      ACETAMINOPHEN PO, Take 650 mg by mouth every 4 hours as needed for pain, Disp: , Rfl:      albuterol (2.5 MG/3ML) 0.083% neb solution, USE ONE VIAL BY NEBULIZATION EVERY 6 HOURS AS NEEDED FOR SHORTNESS OF BREATH/ DYSPNEA OR WHEEZING, Disp: 360 mL, Rfl: 6     albuterol  "(ALBUTEROL) 108 (90 BASE) MCG/ACT inhaler, Inhale 2 puffs into the lungs every 4 hours as needed for shortness of breath / dyspnea or wheezing, Disp: 1 Inhaler, Rfl: 2     Albuterol Sulfate (VENTOLIN HFA IN), Inhale 2 puffs into the lungs, Disp: , Rfl:      ALPRAZolam (XANAX) 0.25 MG tablet, Take 1 tablet (0.25 mg) by mouth 3 times daily as needed for anxiety, Disp: 60 tablet, Rfl: 2     aminocaproic acid (AMICAR) 0.25 GM/ML solution, Take 12 mL (3 grams) via G-tube every 8 hours during menstrual period., Disp: 2 Bottle, Rfl: 5     azithromycin (ZITHROMAX) 200 MG/5ML suspension, 12.5 ml per g tube today then 6.25 ml per g tube daily for four days, Disp: 37.5 mL, Rfl: 0     bacitracin 500 UNIT/GM OINT, Apply 1 each topically, Disp: , Rfl:      bisacodyl (DULCOLAX) 10 MG suppository, Place 10 mg rectally daily as needed for constipation, Disp: , Rfl:      celecoxib (CELEBREX) 200 MG capsule, TAKE 1 CAPSULE(200 MG) BY MOUTH DAILY, Disp: 30 capsule, Rfl: 8     clindamycin (CLEOCIN T) 1 % lotion, Apply topically 2 times daily To face as needed, Disp: 60 mL, Rfl: 3     diazepam (VALIUM) 5 MG tablet, TAKE 1 TABLET BY MOUTH EVERY 6 HOURS AS NEEDED FOR MUSCLE SPASMS, Disp: 30 tablet, Rfl: 0     doxycycline hyclate (VIBRAMYCIN) 100 MG capsule, GIVE ONE CAPSULE VIA \"G\"- TUBE TWICE DAILY, Disp: 60 capsule, Rfl: 2     ferrous sulfate 220 (44 Fe) MG/5ML ELIX, 22 mg by Enteral route, Disp: , Rfl:      flunisolide HFA (AEROSPAN) 80 MCG/ACT AERS oral inhaler, Inhale 2 puffs into the lungs, Disp: , Rfl:      fluticasone (FLONASE) 50 MCG/ACT spray, Spray 1 spray into both nostrils daily, Disp: 16 mL, Rfl: 11     gabapentin (NEURONTIN) 250 MG/5ML solution, TAKE 20MLS PER FEEDING TUBE THREE TIMES DAILY, Disp: 1800 mL, Rfl: 5     GENERLAC 10 GM/15ML solution, , Disp: , Rfl: 11     glycerin-hypromellose- (ARTIFICIAL TEARS) 0.2-0.2-1 % SOLN ophthalmic solution, Place 1 drop into both eyes 2 times daily as needed for dry eyes, Disp: " 1 Bottle, Rfl: 11     guaiFENesin (ORGANIDIN) 200 MG TABS tablet, Take 200 mg by mouth every 4 hours as needed for cough, Disp: , Rfl:      HYDROmorphone (DILAUDID) 4 MG tablet, Take 1-2 tablets (4-8 mg) by mouth every 4 hours as needed for moderate to severe pain, Disp: 300 tablet, Rfl: 0     hydrOXYzine (ATARAX) 25 MG tablet, Take 25 mg by mouth every 6 hours as needed for itching, Disp: , Rfl:      SABINE 30 MG, Take 1 capsule (30 mg) by mouth 2 times daily, Disp: 60 capsule, Rfl: 0     LANsoprazole (PREVACID) 30 MG DR capsule, Take 30 mg by mouth, Disp: , Rfl:      Magnesium Hydroxide (MILK OF MAGNESIA PO), Take 30 mLs by mouth as needed, Disp: , Rfl:      metroNIDAZOLE (METROCREAM) 0.75 % external cream, Apply topically 2 times daily, Disp: 45 g, Rfl: 11     mometasone (NASONEX) 50 MCG/ACT nasal spray, , Disp: , Rfl:      Nutritional Supplements (REPLETE FIBER 1 CHINTAN) LIQD, Take 1 Can by mouth, Disp: , Rfl:      Nutritional Supplements (REPLETE FIBER) LIQD, Take 1 Can by mouth 4 times daily, Disp: , Rfl:      omeprazole (PRILOSEC) 2 mg/mL suspension, SHAKE LIQUID WELL AND GIVE 20ML (40MG) DAILY (VIA G-TUBE), Disp: 600 mL, Rfl: 11     ondansetron (ZOFRAN) 4 MG tablet, Take 1 tablet by mouth every 6 hours as needed for nausea., Disp: , Rfl:      ondansetron (ZOFRAN-ODT) 4 MG ODT tab, Take 1 tablet (4 mg) by mouth every 8 hours as needed for nausea, Disp: 60 tablet, Rfl: 5     order for DME, Equipment being ordered: 16 fr 5cc balloon indwelling catheter for 3-5 days with drainage bag, Disp: 2 catheter, Rfl: 1     order for DME, Equipment being ordered: 16 fr 5cc balloon indwelling catheter for 3-5 days with drainage bag, Disp: 2 catheter, Rfl: 0     order for DME, Equipment being ordered: Vibracare Percussor, Disp: 1 Units, Rfl: 0     polyethylene glycol (GOLYTELY/NULYTELY) 236 g suspension, Take as directed in patient instructions: Drink 4000ml between 4 and 6 p.m. the evening before and 2000ml 4 hours before your  procedure, Disp: , Rfl:      polyethylene glycol (MIRALAX/GLYCOLAX) powder, TAKE 17 GRAMS BY MOUTH EVERY DAY (Patient taking differently: TAKE 17 GRAMS BY MOUTH three times EVERY DAY), Disp: 850 g, Rfl: 6     potassium chloride (KAYCIEL) 20 MEQ/15ML (10%) solution, Take 20 Meq (15 ml) per g tube daily, Disp: 450 mL, Rfl: 1     prochlorperazine (COMPAZINE) 5 MG tablet, Take 5 mg by mouth every 6 hours as needed for nausea or vomiting, Disp: , Rfl:      rivaroxaban ANTICOAGULANT (XARELTO) 10 MG TABS tablet, Take 1 tablet (10 mg) by mouth daily (with dinner), Disp: 30 tablet, Rfl: 5     Salicylic Acid (OXY BALANCE FACIAL CLEAN WASH EX), Externally apply 1 pad topically daily, Disp: , Rfl:      senna-docusate (SENOKOT-S/PERICOLACE) 8.6-50 MG tablet, , Disp: , Rfl:      sennosides (SENNA-GRX) 8.8 MG/5ML syrup, Take 10 mLs by mouth 2 times daily, Disp: 474 mL, Rfl: 7     silver sulfADIAZINE (SILVADENE) 1 % cream, Apply topically daily, Disp: 400 g, Rfl: 1     simethicone (MYLICON) 66.7 mg/ml, as needed, Disp: , Rfl:      simethicone 40 MG/0.6ML LIQD, , Disp: , Rfl:      sodium chloride (OCEAN) 0.65 % nasal spray, 2 sprays, Disp: , Rfl:   No current facility-administered medications for this visit.     Facility-Administered Medications Ordered in Other Visits:      heparin 100 UNIT/ML injection 5 mL, 5 mL, Intracatheter, Q8H, OfstedaJimmy jose MD, 5 mL at 10/03/19 1127      Allergies:   Patient has no known allergies.      Past Medical History:  Anxiety   Snoring   Spinal muscular atrophy  Constipation  Asthma  Dyspepsia and other specified disorders of function of stomach  Sinusitis, chronic  Deep venous thrombosis  Chronic respiratory failure  Chronic pain syndrome  Anemia due to blood loss, acute  Iron deficiency anemia due to chronic blood loss  Chronic anticoagulation      Past Surgical History:  ENT surgery  Gastrostomy tube  Kidney surgery  Spine surgery  Tracheostomy     Family History:   Reviewed. No pertinent  family history on file.      Social History:   Smoking status: never smoker  Alcohol use: no     Physical Exam:   BP (!) 155/83 (BP Location: Left arm, Patient Position: Sitting, Cuff Size: Adult Regular)   Pulse 66   SpO2 97%    Constitutional: Alert. In no distress. In wheelchair  Head: The scalp, face, and head appear normal.  Musculoskeletal: Extremities appear normal. No gross deformities noted.   Neurologic: Gait normal. Speech is normal and fluent.  Psychiatric: Mentation appears normal. Normal affect.   Cardiovascular: RRR. No murmurs, clicks, gallops, or rub.  Respiratory: Clear to auscultation bilaterally, no wheezes or crackles.  Gastrointestinal: Abdomen soft. Non-tender. BS normal. No masses or organomegaly.    Recent Labs:  Component      Latest Ref Rng & Units 9/19/2019   WBC      4.0 - 11.0 10e9/L 7.3   RBC Count      3.8 - 5.2 10e12/L 3.41 (L)   Hemoglobin      11.7 - 15.7 g/dL 9.1 (L)   Hematocrit      35.0 - 47.0 % 30.5 (L)   MCV      78 - 100 fl 89   MCH      26.5 - 33.0 pg 26.7   MCHC      31.5 - 36.5 g/dL 29.8 (L)   RDW      10.0 - 15.0 % 17.9 (H)   Platelet Count      150 - 450 10e9/L 317   Diff Method       Automated Method   % Neutrophils      % 63.6   % Lymphocytes      % 25.7   % Monocytes      % 7.1   % Eosinophils      % 2.7   % Basophils      % 0.5   % Immature Granulocytes      % 0.4   Nucleated RBCs      0 /100 1 (H)   Absolute Neutrophil      1.6 - 8.3 10e9/L 4.7   Absolute Lymphocytes      0.8 - 5.3 10e9/L 1.9   Absolute Monocytes      0.0 - 1.3 10e9/L 0.5   Absolute Eosinophils      0.0 - 0.7 10e9/L 0.2   Absolute Basophils      0.0 - 0.2 10e9/L 0.0   Abs Immature Granulocytes      0 - 0.4 10e9/L 0.0   Absolute Nucleated RBC       0.1   Sodium      133 - 144 mmol/L 139   Potassium      3.4 - 5.3 mmol/L 2.9 (L)   Chloride      94 - 109 mmol/L 108   Carbon Dioxide      20 - 32 mmol/L 24   Anion Gap      3 - 14 mmol/L 8   Glucose      70 - 99 mg/dL 103 (H)   Urea Nitrogen      7 -  30 mg/dL 8   Creatinine      0.52 - 1.04 mg/dL <0.20 (L)   GFR Estimate      >60 mL/min/1.73:m2 >90   GFR Estimate If Black      >60 mL/min/1.73:m2 >90   Calcium      8.5 - 10.1 mg/dL 9.2   Bilirubin Total      0.2 - 1.3 mg/dL 0.4   Albumin      3.4 - 5.0 g/dL 3.5   Protein Total      6.8 - 8.8 g/dL 7.9   Alkaline Phosphatase      40 - 150 U/L 109   ALT      0 - 50 U/L 46   AST      0 - 45 U/L 33     EKG:  EKG was not indicated based on risk assessment.    Pregnancy test needed: No     Assessment and Plan:  Anemia, unspecified type  Patient is getting IV iron soon from hematology. She will also see gynecology to try to reduce heavy periods. Lastly, she will meet with dieticians.   - CBC with platelets differential    Hypokalemia  Due for recheck. She will plan to do blood at home the day before surgery.   - Basic metabolic panel    She will hold Xarelto 10 mg the day before the procedure.     1. Pre-operative assessment for Spinraza injection  The patient is at LOW risk for cardiovascular complications and at LOW risk for pulmonary complications of this LOW risk surgery.    --Approval given to proceed with proposed procedure, without further diagnostic evaluation    The patient has been told to not take any aspirin or NSAIDs for 10 days prior to surgery. The patient has been instructed as to what to do with medications prior to surgery.      Other concerns  Anemia: Her labs on 9/26/19 showed a low iron saturation index of 10 and a low iron value of 43. Her hematologist Dr. Mir diagnosed her with iron deficiency anemia. He arranged for repeat IV iron infusions.     Health maintenance: She would consider doing a bone density scan, but is unsure about the practicality as she is in a wheelchair. She will have an appointment with gynecology to discuss heavy and painful periods.     Scribe Disclosure:  I, Kesha Villegas, am serving as a scribe to document services personally performed by Jimmy Moseley MD at this  visit, based upon the provider's statements to me. All documentation has been reviewed by the aforementioned provider prior to being entered into the official medical record.    Scribe Preparation Attestation:  I, Kesha Villegas, a scribe, prepared the chart for today's encounter.      Portions of this medical record were completed by a scribe. UPON MY REVIEW AND AUTHENTICATION BY ELECTRONIC SIGNATURE, this confirms (a) I performed the applicable clinical services, and (b) the record is accurate.      Answers for HPI/ROS submitted by the patient on 10/2/2019   General Symptoms: No  Skin Symptoms: Yes  HENT Symptoms: No  EYE SYMPTOMS: No  HEART SYMPTOMS: No  LUNG SYMPTOMS: No  INTESTINAL SYMPTOMS: Yes  URINARY SYMPTOMS: No  GYNECOLOGIC SYMPTOMS: No  BREAST SYMPTOMS: No  SKELETAL SYMPTOMS: No  BLOOD SYMPTOMS: Yes  NERVOUS SYSTEM SYMPTOMS: No  MENTAL HEALTH SYMPTOMS: No  Changes in hair: No  Changes in moles/birth marks: No  Itching: Yes  Rashes: No  Changes in nails: No  Acne: No  Hair in places you don't want it: No  Change in facial hair: No  Warts: No  Non-healing sores: No  Scarring: No  Flaking of skin: No  Color changes of hands/feet in cold : No  Sun sensitivity: No  Skin thickening: No  Heart burn or indigestion: No  Nausea: Yes  Vomiting: No  Abdominal pain: Yes  Bloating: No  Constipation: No  Diarrhea: No  Blood in stool: Yes  Black stools: Yes  Rectal or Anal pain: No  Fecal incontinence: No  Yellowing of skin or eyes: No  Vomit with blood: No  Change in stools: No  Anemia: Yes  Swollen glands: No  Easy bleeding or bruising: No  Edema or swelling: Yes  Jimmy Moseley MD MD    Due to underlying medical condition, spinal muscle atrophy, she requires lifelong power wheelchair    Jimmy Moseley MD MD

## 2019-10-04 ENCOUNTER — TELEPHONE (OUTPATIENT)
Dept: INTERNAL MEDICINE | Facility: CLINIC | Age: 29
End: 2019-10-04

## 2019-10-04 NOTE — TELEPHONE ENCOUNTER
Spoke with jennifer at [facility] and informed the lab orders (CBC & BMP). She will go to 's house on 10/7 and draw the blood. She will fax us the results.

## 2019-10-07 ENCOUNTER — HOSPITAL LABORATORY (OUTPATIENT)
Dept: OTHER | Facility: CLINIC | Age: 29
End: 2019-10-07

## 2019-10-07 ENCOUNTER — DOCUMENTATION ONLY (OUTPATIENT)
Dept: HEMATOLOGY | Facility: CLINIC | Age: 29
End: 2019-10-07

## 2019-10-07 ENCOUNTER — TELEPHONE (OUTPATIENT)
Dept: FAMILY MEDICINE | Facility: CLINIC | Age: 29
End: 2019-10-07

## 2019-10-07 DIAGNOSIS — E87.6 HYPOKALEMIA: Primary | ICD-10-CM

## 2019-10-07 DIAGNOSIS — Z91.89 AT RISK FOR BLEEDING: ICD-10-CM

## 2019-10-07 LAB
ANION GAP SERPL CALCULATED.3IONS-SCNC: 8 MMOL/L (ref 3–14)
BASOPHILS # BLD AUTO: 0 10E9/L (ref 0–0.2)
BASOPHILS NFR BLD AUTO: 0.5 %
BUN SERPL-MCNC: 12 MG/DL (ref 7–30)
CALCIUM SERPL-MCNC: 8.5 MG/DL (ref 8.5–10.1)
CHLORIDE SERPL-SCNC: 108 MMOL/L (ref 94–109)
CO2 SERPL-SCNC: 23 MMOL/L (ref 20–32)
CREAT SERPL-MCNC: <0.14 MG/DL (ref 0.52–1.04)
DIFFERENTIAL METHOD BLD: ABNORMAL
EOSINOPHIL # BLD AUTO: 0.2 10E9/L (ref 0–0.7)
EOSINOPHIL NFR BLD AUTO: 3.7 %
ERYTHROCYTE [DISTWIDTH] IN BLOOD BY AUTOMATED COUNT: 17.5 % (ref 10–15)
GFR SERPL CREATININE-BSD FRML MDRD: >90 ML/MIN/{1.73_M2}
GLUCOSE SERPL-MCNC: 121 MG/DL (ref 70–99)
HCT VFR BLD AUTO: 35.5 % (ref 35–47)
HGB BLD-MCNC: 10.4 G/DL (ref 11.7–15.7)
IMM GRANULOCYTES # BLD: 0 10E9/L (ref 0–0.4)
IMM GRANULOCYTES NFR BLD: 0.2 %
LYMPHOCYTES # BLD AUTO: 2.4 10E9/L (ref 0.8–5.3)
LYMPHOCYTES NFR BLD AUTO: 36.4 %
MCH RBC QN AUTO: 25.6 PG (ref 26.5–33)
MCHC RBC AUTO-ENTMCNC: 29.3 G/DL (ref 31.5–36.5)
MCV RBC AUTO: 87 FL (ref 78–100)
MONOCYTES # BLD AUTO: 0.4 10E9/L (ref 0–1.3)
MONOCYTES NFR BLD AUTO: 6.4 %
NEUTROPHILS # BLD AUTO: 3.5 10E9/L (ref 1.6–8.3)
NEUTROPHILS NFR BLD AUTO: 52.8 %
NRBC # BLD AUTO: 0 10*3/UL
NRBC BLD AUTO-RTO: 0 /100
PLATELET # BLD AUTO: 385 10E9/L (ref 150–450)
POTASSIUM SERPL-SCNC: 3.1 MMOL/L (ref 3.4–5.3)
RBC # BLD AUTO: 4.06 10E12/L (ref 3.8–5.2)
SODIUM SERPL-SCNC: 138 MMOL/L (ref 133–144)
WBC # BLD AUTO: 6.5 10E9/L (ref 4–11)

## 2019-10-07 RX ORDER — POTASSIUM CHLORIDE 20MEQ/15ML
LIQUID (ML) ORAL
Qty: 450 ML | Refills: 1 | Status: SHIPPED | OUTPATIENT
Start: 2019-10-07 | End: 2019-12-14

## 2019-10-07 NOTE — TELEPHONE ENCOUNTER
[facility] Call Center    Phone Message    May a detailed message be left on voicemail: yes    Reason for Call: Other: Per call from [facility] PT is going in to see them today to prepare for the upcoming cervical procedure on 10/9 but the records and lab results from Medical Record not meeting requirements, missing PT INR, PTT and total urine protein. Am[facility] is requesting a call back ASAP to discuss it, otherwise, the procedure will be cancelled.     Action Taken: Message routed to:  [facility] ([facility] Primary Care

## 2019-10-07 NOTE — TELEPHONE ENCOUNTER
Per Dr. Moseley:  1--see phone message re: her   Needs INR, PT, PTT, urine protein   For preop     2--today blood was done did not do those, see if lab can add OR   She needs to get boost potassium, it is low   See if she is on any   Continue it   But in addition take extra 20 meq this darci, and extra 20 meq in am (above and beyond what on routinely), then, check K level tomorrow--so at that K level tomorrow she could do those above extra labs     If she is on no potassium now, then take 20 meq tonight, 20 meq in morning, and redo K tomorrow after that, along with INR, PT, PTT, urine protein     For the urine, order routine UA plus urine protein, not clear to me what Lesa wants

## 2019-10-07 NOTE — TELEPHONE ENCOUNTER
Spoke to patients mother to relay that the patient will need labs drawn tomorrow 10/8/19, as well as potassium lab is low and will need to take potassium 20meq tonight and 20meq tomorrow AM. Patients mother states verbal ok.    Spoke to pediatric home care to relay that labs will need to be drawn tomorrow, as well as the patient needs to take additional potassium. [facility] will draw labs tomorrow and call mom to set up a time.       Labs were placed and rx was sent to pharmacy. Lynette De La O LPN 10/7/2019 4:46 PM

## 2019-10-07 NOTE — PROGRESS NOTES
[facility]  [address] [place] [address] [address], [place] [address]  Main: [phone], Fax: [phone]    2019    To: [facility]   Fax: [phone]    From: Jt Mir PA-C, MPAS - Physician Assistant - [facility].     Re: Lanie Neil   : 1990    Lanie Neil  has a history of spinal muscular atrophy and is chronically wheelchair bound. 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  She is having injection of medication into a reservoir along the spine on 10/09/19.     For this procedure, the following is recommended:    1.  Hold Xarelto on 10/8/19 and 10/9/19.  2.  Resume Xarelto 10 mg daily on 10/10/19.    Please feel free to call our center with any questions at [phone].            Jt Mir PA-C, MPAS - Physician Assistant - [facility].

## 2019-10-07 NOTE — TELEPHONE ENCOUNTER
Oh if there was a specific request for those things, either a paper form or a message of some sort, I'm sorry I did not see it at the preop.    Is there a way to do at home? She has home care.    INR, PT, PTT (for bleeding risk) plus urine protein

## 2019-10-08 ENCOUNTER — HOSPITAL LABORATORY (OUTPATIENT)
Dept: OTHER | Facility: CLINIC | Age: 29
End: 2019-10-08

## 2019-10-08 LAB
ALBUMIN UR-MCNC: NEGATIVE MG/DL
AMORPH CRY #/AREA URNS HPF: ABNORMAL /HPF
APPEARANCE UR: ABNORMAL
APTT PPP: 60 SEC (ref 22–37)
BACTERIA #/AREA URNS HPF: ABNORMAL /HPF
BILIRUB UR QL STRIP: NEGATIVE
COLOR UR AUTO: ABNORMAL
CREAT UR-MCNC: 3 MG/DL
GLUCOSE UR STRIP-MCNC: NEGATIVE MG/DL
HGB UR QL STRIP: NEGATIVE
INR PPP: 1.14 (ref 0.86–1.14)
KETONES UR STRIP-MCNC: NEGATIVE MG/DL
LEUKOCYTE ESTERASE UR QL STRIP: NEGATIVE
NITRATE UR QL: NEGATIVE
PH UR STRIP: 7.5 PH (ref 5–7)
POTASSIUM SERPL-SCNC: 3.5 MMOL/L (ref 3.4–5.3)
PROT UR-MCNC: <0.05 G/L
PROT/CREAT 24H UR: NORMAL G/G CR (ref 0–0.2)
RBC #/AREA URNS AUTO: 1 /HPF (ref 0–2)
SOURCE: ABNORMAL
SP GR UR STRIP: 1 (ref 1–1.03)
UROBILINOGEN UR STRIP-MCNC: NORMAL MG/DL (ref 0–2)
WBC #/AREA URNS AUTO: 2 /HPF (ref 0–5)

## 2019-10-09 ENCOUNTER — MYC MEDICAL ADVICE (OUTPATIENT)
Dept: INTERNAL MEDICINE | Facility: CLINIC | Age: 29
End: 2019-10-09

## 2019-10-09 NOTE — TELEPHONE ENCOUNTER
Spoke with surgeon's office and pt already had the surgery done this morning and was discharged to home. There were no conflicts on surgery.    Also I called nutrition dept and they will schedule the pt. Lab order for potassium placed. I will call Lurdes at [facility] for this lab order.    Soon-Mi  ----------------------------------------------------------------------------------          ----- Message from Jimmy Moseley MD sent at 10/9/2019  2:33 PM CDT -----  Labs back.  Surgery tomorrow.Surgeon wanted PTT. Up a bit. I think can go ahead with surgery but if you can, let surgeon know PTT was 60, mildly elevated. I'm thinking PTT likely up a bit from the Xarelto, which she is holding, so it'd come down more before surgery  Potassium fine now    I've referrred to dietician for overall diet review, but [facility] if ongoing need for more K maybe could just get in nutrition and not need extra meds.    Have her redo K level in a week to see if needs it long term

## 2019-10-11 ENCOUNTER — INFUSION THERAPY VISIT (OUTPATIENT)
Dept: INFUSION THERAPY | Facility: CLINIC | Age: 29
End: 2019-10-11
Attending: PHYSICIAN ASSISTANT
Payer: COMMERCIAL

## 2019-10-11 VITALS
HEART RATE: 65 BPM | OXYGEN SATURATION: 97 % | DIASTOLIC BLOOD PRESSURE: 82 MMHG | SYSTOLIC BLOOD PRESSURE: 132 MMHG | RESPIRATION RATE: 16 BRPM

## 2019-10-11 DIAGNOSIS — Z79.01 CHRONIC ANTICOAGULATION: ICD-10-CM

## 2019-10-11 DIAGNOSIS — D50.0 IRON DEFICIENCY ANEMIA DUE TO CHRONIC BLOOD LOSS: Primary | ICD-10-CM

## 2019-10-11 PROCEDURE — 25000128 H RX IP 250 OP 636: Mod: ZF | Performed by: PHYSICIAN ASSISTANT

## 2019-10-11 PROCEDURE — 96365 THER/PROPH/DIAG IV INF INIT: CPT

## 2019-10-11 PROCEDURE — 25800030 ZZH RX IP 258 OP 636: Mod: ZF | Performed by: PHYSICIAN ASSISTANT

## 2019-10-11 PROCEDURE — 96366 THER/PROPH/DIAG IV INF ADDON: CPT

## 2019-10-11 RX ORDER — HEPARIN SODIUM,PORCINE 10 UNIT/ML
5 VIAL (ML) INTRAVENOUS
Status: CANCELLED | OUTPATIENT
Start: 2019-10-18

## 2019-10-11 RX ORDER — HEPARIN SODIUM (PORCINE) LOCK FLUSH IV SOLN 100 UNIT/ML 100 UNIT/ML
5 SOLUTION INTRAVENOUS
Status: CANCELLED | OUTPATIENT
Start: 2019-10-18

## 2019-10-11 RX ADMIN — FERRIC CARBOXYMALTOSE INJECTION 750 MG: 50 INJECTION, SOLUTION INTRAVENOUS at 13:14

## 2019-10-11 NOTE — TELEPHONE ENCOUNTER
Unable to reach BELEN Chatman at [facility].  I faxed the lab order to her at [phone].

## 2019-10-11 NOTE — PATIENT INSTRUCTIONS
Dear Lanie Neil    Thank you for choosing [facility] for your [facility].  The following information is a summary of our appointment as well as important reminders.      We look forward in seeing you on your next appointment here at [facility]  Please don t hesitate to call us at [phone] to reschedule any of your appointments or to speak with one of the [facility] registered nurses.  It was a pleasure taking care of you today.    Sincerely,    [facility]  [facility]  [address] [place] [address]  [id number]  Phone:  [phone]

## 2019-10-11 NOTE — PROGRESS NOTES
Nursing Note  Lanie Neil [facility] for:   Chief Complaint   Patient presents with     Infusion     Injectafer     During today's [facility] appointment, orders from Jt Mir were completed.  Frequency: today is dose 1 of 2 total; one week a part    Progress note:  Patient identification verified by name and date of birth.  Assessment completed.  Vitals recorded in Doc Flowsheets.  Patient was provided with education regarding infusion and possible side effects.  Patient verbalized understanding.     present during visit today: Not Applicable.    Pt in electric wheelchair and transfers via fritz lift. Pt decided not to transfer today due to short duration of infusion. Pt was accompanied by mother today.     Treatment Conditions: non-applicable.    Premedications: were not ordered.    Drug Waste Record: No    Infusion length and rate:  infusion given over approximately 15 minutes    Labs: were not ordered for this appointment.    Vascular access: port accessed prior to appointment at [facility].    Post Infusion Assessment:  Patient tolerated infusion without incident.  Patient observed for 30 minutes post infusion per protocol.     Discharge Plan:   Follow up plan of care with: ongoing infusions at [facility].  Discharge instructions were reviewed with patient.  Patient/representative verbalized understanding of discharge instructions and all questions answered.  Patient discharged from [facility] in stable condition.    Lurdes Stephens RN    Administrations This Visit     ferric carboxymaltose (INJECTAFER) 750 mg in sodium chloride 0.9 % 100 mL intermittent infusion     Admin Date  10/11/2019 Action  New Bag Dose  750 mg Rate  500 mL/hr Route  Intravenous Administered By  Lurdes Stephens RN                /82 (BP Location:  Left arm)   Pulse 65   Resp 16   SpO2 97%

## 2019-10-14 ENCOUNTER — TELEPHONE (OUTPATIENT)
Dept: HEMATOLOGY | Facility: CLINIC | Age: 29
End: 2019-10-14

## 2019-10-14 NOTE — TELEPHONE ENCOUNTER
[facility]  [address] [place] [address] [address], [place] [address]  Main: [phone], Fax: [phone]    Telephone Note:    Patient: Lanie Neil  MRN: [id number]  : 1990  Date of this note written: 2019    Received a call from homecare nursing staff inquiring if Lanie needs to get another iron panel done for labs today. Apparently Lanie's mother is asking. Also Lanie's mother is asking why her most recent aPTT was prolonged.     This writer called Aster, Lanie's mother on 10/14/2019 at around 13:50 and spoke with her.    Confirmed with Aster that Lanie is receiving her second dose of Injectafer on 10/18/2019. I explain to Aster that we generally do not check iron panel during IV iron therapy but will check level about 1 month post therapy. I have instructed Aster to call our center 4 weeks after Lanie's second Injectafer injection on 10/18/2019 to get repeat labs including CBC, Ferritin and iron panel.     In regard to the prolonged aPTT, this is likely related to the fact that Lanie is on Xarelto. However, underlying lupus anticoagulant cannot be excluded while the patient is on Xarelto. I explain to Aster that the only way to make sure that her aPTT is normal is that Lanie is off Xarelto. I explain to Aster that if Lanie were to stop Xarelto for about 48 hours in the future for any reasons, we can repeat her aPTT and make sure that it is back to normal.     Lanie did well with the medication reservoir refill procedure on 10/9/2019 after holding her Xarelto for about 24 hours.       Jt Mir PA-C, MPAS  Physician Assistant  [facility].

## 2019-10-15 ENCOUNTER — HOSPITAL LABORATORY (OUTPATIENT)
Dept: OTHER | Facility: CLINIC | Age: 29
End: 2019-10-15

## 2019-10-15 LAB — POTASSIUM SERPL-SCNC: 3.6 MMOL/L (ref 3.4–5.3)

## 2019-10-18 ENCOUNTER — INFUSION THERAPY VISIT (OUTPATIENT)
Dept: INFUSION THERAPY | Facility: CLINIC | Age: 29
End: 2019-10-18
Attending: PHYSICIAN ASSISTANT
Payer: COMMERCIAL

## 2019-10-18 VITALS
HEART RATE: 69 BPM | SYSTOLIC BLOOD PRESSURE: 103 MMHG | OXYGEN SATURATION: 98 % | TEMPERATURE: 98 F | RESPIRATION RATE: 16 BRPM | DIASTOLIC BLOOD PRESSURE: 63 MMHG

## 2019-10-18 DIAGNOSIS — D50.0 IRON DEFICIENCY ANEMIA DUE TO CHRONIC BLOOD LOSS: Primary | ICD-10-CM

## 2019-10-18 DIAGNOSIS — Z79.01 CHRONIC ANTICOAGULATION: ICD-10-CM

## 2019-10-18 PROCEDURE — 25800030 ZZH RX IP 258 OP 636: Mod: ZF | Performed by: PHYSICIAN ASSISTANT

## 2019-10-18 PROCEDURE — 96365 THER/PROPH/DIAG IV INF INIT: CPT

## 2019-10-18 PROCEDURE — 25000128 H RX IP 250 OP 636: Mod: ZF | Performed by: PHYSICIAN ASSISTANT

## 2019-10-18 RX ORDER — HEPARIN SODIUM (PORCINE) LOCK FLUSH IV SOLN 100 UNIT/ML 100 UNIT/ML
5 SOLUTION INTRAVENOUS
Status: CANCELLED | OUTPATIENT
Start: 2019-10-18

## 2019-10-18 RX ORDER — HEPARIN SODIUM (PORCINE) LOCK FLUSH IV SOLN 100 UNIT/ML 100 UNIT/ML
5 SOLUTION INTRAVENOUS
Status: DISCONTINUED | OUTPATIENT
Start: 2019-10-18 | End: 2019-10-18 | Stop reason: HOSPADM

## 2019-10-18 RX ORDER — HEPARIN SODIUM,PORCINE 10 UNIT/ML
5 VIAL (ML) INTRAVENOUS
Status: CANCELLED | OUTPATIENT
Start: 2019-10-18

## 2019-10-18 RX ADMIN — FERRIC CARBOXYMALTOSE INJECTION 750 MG: 50 INJECTION, SOLUTION INTRAVENOUS at 13:42

## 2019-10-18 RX ADMIN — HEPARIN 5 ML: 100 SYRINGE at 14:31

## 2019-10-18 NOTE — PATIENT INSTRUCTIONS
Patient Education     Injectafer  Generic Name: Ferric Carboxymaltose  Your body needs iron to make red blood cells. If you don't have enough iron, your body may be low in red blood cells. This is called iron-deficiency anemia.   Drug type   Injectafer is an iron treatment.  What this drug is used for   We use Injectafer to treat iron-deficiency anemia in adults who can't take iron treatments by mouth. Injectafer is also for patients if they have tried taking iron by mouth, but it has not helped them.  How this drug is given  This drug is given through an IV line, a small tube inserted into a vein.   We generally give Injectafer in 2 doses at least 7 days apart. Each dose, or infusion, takes 15 minutes or less. We will then watch you for another 30 minutes before we let you go home.   Please tell your nurse right away if you feel pain, discomfort or heat during your infusion. Also let the nurse know if you see redness on the skin where we put the IV.  Common side effects   These side effects should go away on their own in 1 to 2 days:    Upset stomach (nausea)    Flushing    Dizziness    Headache    Taste changes    Hard, dry stools (constipation)  Tenderness, swelling or bruising  Mild tenderness, swelling or bruising where the IV was put in should go away in 1 to 2 days. A warm washcloth or other warm, wet compress may help with pain.   Rash, itching or hives  Take 25 mg of Benadryl every 6 to 8 hours as needed for rash, itching or hives. Benadryl may make you drowsy.  Call your clinic if any of the effects listed above gets worse or lasts more than 2 days.  When to call 9-1-1  It's very rare to have a strong allergic reaction to Injectafer. But call 9-1-1 or get a ride to the emergency room if you have:    Trouble breathing    Feeling like your throat is closing up    Swelling of the mouth, face, lips or tongue  Meanwhile, take one of these drugs if you can:    Benadryl (diphenhydramine) 50 mg    ZyrTEC  (cetirizine) 10 mg    Claritin (loratidine) 10 mg  For informational purposes only. Not to replace the advice of your health care provider. Copyright   2018 [employer] [place]. All rights reserved. Clinically reviewed by Clara Horton, Pharm. D. [facility]. [employer] 186250 - 04/18.  For informational purposes only. Not to replace the advice of your health care provider.  Copyright   2018 [employer] [place]. All rights reserved.

## 2019-10-18 NOTE — PROGRESS NOTES
Nursing Note  Lanie Neil [facility] for:   Chief Complaint   Patient presents with     Infusion     Injectafer     During today's [facility] appointment, orders from Jt Mir PA-C were completed.  Frequency: today is dose 2 of 2 total.    Progress note:  Patient identification verified by name and date of birth.  Assessment completed.  Vitals recorded in Doc Flowsheets.  Patient was provided with education regarding infusion and possible side effects.  Patient verbalized understanding.     present during visit today: Not Applicable.    Treatment Conditions: patient monitored for 30 minutes after medication was infused.    Premedications: were not ordered.    Drug Waste Record: No    Infusion length and rate:  infusion given over approximately 15 minutes  400 ml/hr.    Labs: were not ordered for this appointment.    Vascular access: port accessed today.    Post Infusion Assessment:  Patient tolerated infusion without incident.     Discharge Plan:   Follow up plan of care with: ongoing infusions at [facility]. and primary medical doctor.  Discharge instructions were reviewed with patient.  Patient/representative verbalized understanding of discharge instructions and all questions answered.  Patient discharged from [facility] in stable condition.    Marysol Ledesma RN       Administrations This Visit     ferric carboxymaltose (INJECTAFER) 750 mg in sodium chloride 0.9 % 100 mL intermittent infusion     Admin Date  10/18/2019 Action  New Bag Dose  750 mg Rate  500 mL/hr Route  Intravenous Administered By  Marysol Ledesma RN          heparin 100 UNIT/ML injection 5 mL     Admin Date  10/18/2019 Action  Given Dose  5 mL Route  Intracatheter Administered By  Marysol Ledesma RN                  BP 98/67   Pulse 66   Temp 98  F (36.7  C) (Oral)   Resp 16   SpO2  98%

## 2019-11-12 ENCOUNTER — MEDICAL CORRESPONDENCE (OUTPATIENT)
Dept: HEALTH INFORMATION MANAGEMENT | Facility: CLINIC | Age: 29
End: 2019-11-12

## 2019-11-12 DIAGNOSIS — D50.0 IRON DEFICIENCY ANEMIA DUE TO CHRONIC BLOOD LOSS: Primary | ICD-10-CM

## 2019-11-12 DIAGNOSIS — Z79.01 CHRONIC ANTICOAGULATION: ICD-10-CM

## 2019-11-12 DIAGNOSIS — Z86.718 HISTORY OF DEEP VENOUS THROMBOSIS: ICD-10-CM

## 2019-11-18 ENCOUNTER — HOSPITAL LABORATORY (OUTPATIENT)
Dept: OTHER | Facility: CLINIC | Age: 29
End: 2019-11-18

## 2019-11-18 LAB
BASOPHILS # BLD AUTO: 0 10E9/L (ref 0–0.2)
BASOPHILS NFR BLD AUTO: 0.6 %
DIFFERENTIAL METHOD BLD: ABNORMAL
EOSINOPHIL # BLD AUTO: 0.2 10E9/L (ref 0–0.7)
EOSINOPHIL NFR BLD AUTO: 3.4 %
ERYTHROCYTE [DISTWIDTH] IN BLOOD BY AUTOMATED COUNT: 19.8 % (ref 10–15)
FERRITIN SERPL-MCNC: 196 NG/ML (ref 12–150)
HCT VFR BLD AUTO: 40.1 % (ref 35–47)
HGB BLD-MCNC: 12.4 G/DL (ref 11.7–15.7)
IMM GRANULOCYTES # BLD: 0 10E9/L (ref 0–0.4)
IMM GRANULOCYTES NFR BLD: 0.3 %
IRON SATN MFR SERPL: 25 % (ref 15–46)
IRON SERPL-MCNC: 78 UG/DL (ref 35–180)
LYMPHOCYTES # BLD AUTO: 2.2 10E9/L (ref 0.8–5.3)
LYMPHOCYTES NFR BLD AUTO: 34.3 %
MCH RBC QN AUTO: 28.7 PG (ref 26.5–33)
MCHC RBC AUTO-ENTMCNC: 30.9 G/DL (ref 31.5–36.5)
MCV RBC AUTO: 93 FL (ref 78–100)
MONOCYTES # BLD AUTO: 0.3 10E9/L (ref 0–1.3)
MONOCYTES NFR BLD AUTO: 4.8 %
NEUTROPHILS # BLD AUTO: 3.6 10E9/L (ref 1.6–8.3)
NEUTROPHILS NFR BLD AUTO: 56.6 %
NRBC # BLD AUTO: 0 10*3/UL
NRBC BLD AUTO-RTO: 0 /100
PLATELET # BLD AUTO: 304 10E9/L (ref 150–450)
RBC # BLD AUTO: 4.32 10E12/L (ref 3.8–5.2)
TIBC SERPL-MCNC: 308 UG/DL (ref 240–430)
WBC # BLD AUTO: 6.4 10E9/L (ref 4–11)

## 2019-11-19 ENCOUNTER — TELEPHONE (OUTPATIENT)
Dept: HEMATOLOGY | Facility: CLINIC | Age: 29
End: 2019-11-19

## 2019-11-19 NOTE — TELEPHONE ENCOUNTER
[facility]  [facility]  [address] [place], [place] [id number]  Main: [phone], Fax: [phone]    Telephone Note:    Patient: Lanie Neil  MRN: [id number]  : 1990  Date of this note written: 2019  Time: 16:10    This writer called patient's mother to communicate the test result done on 2019:    Component      Latest Ref Rng & Units 2019   WBC      4.0 - 11.0 10e9/L 6.4   RBC Count      3.8 - 5.2 10e12/L 4.32   Hemoglobin      11.7 - 15.7 g/dL 12.4   Hematocrit      35.0 - 47.0 % 40.1   MCV      78 - 100 fl 93   MCH      26.5 - 33.0 pg 28.7   MCHC      31.5 - 36.5 g/dL 30.9 (L)   RDW      10.0 - 15.0 % 19.8 (H)   Platelet Count      150 - 450 10e9/L 304   Diff Method       Automated Method   % Neutrophils      % 56.6   % Lymphocytes      % 34.3   % Monocytes      % 4.8   % Eosinophils      % 3.4   % Basophils      % 0.6   % Immature Granulocytes      % 0.3   Nucleated RBCs      0 /100 0   Absolute Neutrophil      1.6 - 8.3 10e9/L 3.6   Absolute Lymphocytes      0.8 - 5.3 10e9/L 2.2   Absolute Monocytes      0.0 - 1.3 10e9/L 0.3   Absolute Eosinophils      0.0 - 0.7 10e9/L 0.2   Absolute Basophils      0.0 - 0.2 10e9/L 0.0   Abs Immature Granulocytes      0 - 0.4 10e9/L 0.0   Absolute Nucleated RBC       0.0   Iron      35 - 180 ug/dL 78   Iron Binding Cap      240 - 430 ug/dL 308   Iron Saturation Index      15 - 46 % 25   Ferritin      12 - 150 ng/mL 196 (H)     This writer communicated that Lanie's iron panel, ferritin and hemoglobin all within normal range. In fact, the ferritin level is higher than normal. I indicate to her that Lanie does not need further iron replacement therapy at this time.    Patient's mother verbally expresses understanding of the results and verbally expresses appreciation for the call.       Jt Mir PA-C, MPAS  Physician Assistant  [facility]  for [facility].

## 2019-12-06 DIAGNOSIS — G12.9 SPINAL MUSCLE ATROPHY (H): ICD-10-CM

## 2019-12-06 RX ORDER — DIAZEPAM 5 MG
TABLET ORAL
Qty: 30 TABLET | Refills: 0 | Status: SHIPPED | OUTPATIENT
Start: 2019-12-06 | End: 2020-01-15

## 2019-12-06 NOTE — TELEPHONE ENCOUNTER
Patient Requested  diazepam (VALIUM) 5 MG tablet  Last Filled  09/06/2019  Last Office Visit  08/28/2019  Next Office Visit  Nothing scheduled   Checked  08/19/2019     DX: Spinal muscle atrophy      Pharmacy:   [facility] #[id number] - [place], [place] - [address]        LILLIAM DAMICO CMA at 11:27 AM on 12/6/2019.

## 2019-12-09 ENCOUNTER — MEDICAL CORRESPONDENCE (OUTPATIENT)
Dept: HEALTH INFORMATION MANAGEMENT | Facility: CLINIC | Age: 29
End: 2019-12-09

## 2019-12-14 DIAGNOSIS — E87.6 HYPOKALEMIA: ICD-10-CM

## 2019-12-16 RX ORDER — POTASSIUM CHLORIDE 20MEQ/15ML
20 LIQUID (ML) ORAL DAILY
Qty: 450 ML | Refills: 3 | Status: SHIPPED | OUTPATIENT
Start: 2019-12-16 | End: 2020-01-21

## 2019-12-16 NOTE — TELEPHONE ENCOUNTER
POTASSIUM CHLOR 10% LIQ(20MEQ/15ML)   Last Written Prescription Date:  10/7/2019  Last Fill Quantity: 450mL,   # refills: 1  Last Office Visit : 10/3/2019  Future Office visit:  None  450 mL, 1 Refill sent to pharmacy 12/16/2019    Georgina Sy RN  Central Triage Red Flags/Med Refills

## 2019-12-17 ENCOUNTER — MYC MEDICAL ADVICE (OUTPATIENT)
Dept: FAMILY MEDICINE | Facility: CLINIC | Age: 29
End: 2019-12-17

## 2019-12-17 DIAGNOSIS — N39.41 URGE INCONTINENCE OF URINE: ICD-10-CM

## 2019-12-19 DIAGNOSIS — J45.909 ASTHMA: ICD-10-CM

## 2019-12-19 RX ORDER — ALBUTEROL SULFATE 0.83 MG/ML
SOLUTION RESPIRATORY (INHALATION)
Qty: 360 ML | Refills: 0 | Status: SHIPPED | OUTPATIENT
Start: 2019-12-19 | End: 2022-12-07

## 2019-12-19 NOTE — TELEPHONE ENCOUNTER
Last Clinic Visit: 10/3/2019  [facility]  Overdue ACT: FYI to clinic CMA  90 day RF to pharm

## 2020-01-10 ENCOUNTER — MYC MEDICAL ADVICE (OUTPATIENT)
Dept: FAMILY MEDICINE | Facility: CLINIC | Age: 30
End: 2020-01-10

## 2020-01-10 ENCOUNTER — MEDICAL CORRESPONDENCE (OUTPATIENT)
Dept: HEALTH INFORMATION MANAGEMENT | Facility: CLINIC | Age: 30
End: 2020-01-10

## 2020-01-10 DIAGNOSIS — E87.6 HYPOKALEMIA: Primary | ICD-10-CM

## 2020-01-11 ENCOUNTER — MEDICAL CORRESPONDENCE (OUTPATIENT)
Dept: HEALTH INFORMATION MANAGEMENT | Facility: CLINIC | Age: 30
End: 2020-01-11

## 2020-01-13 NOTE — TELEPHONE ENCOUNTER
Probably, but let's redo lab, I think her home care team can do that for her, she or her mom would know.

## 2020-01-14 DIAGNOSIS — R63.5 ABNORMAL WEIGHT GAIN: ICD-10-CM

## 2020-01-14 DIAGNOSIS — Z23 NEED FOR PROPHYLACTIC VACCINATION AND INOCULATION AGAINST INFLUENZA: ICD-10-CM

## 2020-01-14 DIAGNOSIS — K59.00 CONSTIPATION, UNSPECIFIED CONSTIPATION TYPE: ICD-10-CM

## 2020-01-14 RX ORDER — POLYETHYLENE GLYCOL 3350 17 G/17G
POWDER, FOR SOLUTION ORAL
Qty: 714 G | Refills: 6 | Status: SHIPPED | OUTPATIENT
Start: 2020-01-14 | End: 2020-12-23

## 2020-01-15 DIAGNOSIS — G12.9 SPINAL MUSCLE ATROPHY (H): ICD-10-CM

## 2020-01-15 DIAGNOSIS — L71.9 ACNE ROSACEA: ICD-10-CM

## 2020-01-15 RX ORDER — DIAZEPAM 5 MG
TABLET ORAL
Qty: 30 TABLET | Refills: 0 | Status: SHIPPED | OUTPATIENT
Start: 2020-01-15 | End: 2020-02-25

## 2020-01-15 RX ORDER — DIAZEPAM 5 MG
TABLET ORAL
Qty: 30 TABLET | Refills: 0 | Status: CANCELLED | OUTPATIENT
Start: 2020-01-15

## 2020-01-15 NOTE — TELEPHONE ENCOUNTER
Patient Requested  diazepam (VALIUM) 5 MG tablet  Last Filled  12/06/2019  Last Office Visit  08/28/2019  Next Office Visit  Nothing scheduled   Checked  08/19/2019     DX: Spinal muscle atrophy      Pharmacy:   [facility] #[id number] - [place], [place] - [address]        LILLIAM DAMICO CMA at 1:43 PM on 1/15/2020.

## 2020-01-17 ENCOUNTER — TELEPHONE (OUTPATIENT)
Dept: DERMATOLOGY | Facility: CLINIC | Age: 30
End: 2020-01-17

## 2020-01-17 RX ORDER — DOXYCYCLINE 100 MG/1
CAPSULE ORAL
Qty: 60 CAPSULE | Refills: 2 | OUTPATIENT
Start: 2020-01-17

## 2020-01-17 NOTE — TELEPHONE ENCOUNTER
Last Clinic Visit:  11/8/17   NV: NONE  Scheduling has been notified to contact the pt for appointment.

## 2020-01-20 ENCOUNTER — HOSPITAL LABORATORY (OUTPATIENT)
Dept: OTHER | Facility: CLINIC | Age: 30
End: 2020-01-20

## 2020-01-20 LAB — POTASSIUM SERPL-SCNC: 3.1 MMOL/L (ref 3.4–5.3)

## 2020-01-21 ENCOUNTER — TELEPHONE (OUTPATIENT)
Dept: INTERNAL MEDICINE | Facility: CLINIC | Age: 30
End: 2020-01-21

## 2020-01-21 ENCOUNTER — MEDICAL CORRESPONDENCE (OUTPATIENT)
Dept: HEALTH INFORMATION MANAGEMENT | Facility: CLINIC | Age: 30
End: 2020-01-21

## 2020-01-21 DIAGNOSIS — E87.6 HYPOKALEMIA: ICD-10-CM

## 2020-01-21 DIAGNOSIS — G12.9 SPINAL MUSCLE ATROPHY (H): ICD-10-CM

## 2020-01-21 RX ORDER — POTASSIUM CHLORIDE 20MEQ/15ML
30 LIQUID (ML) ORAL DAILY
Qty: 675 ML | Refills: 11 | Status: SHIPPED | OUTPATIENT
Start: 2020-01-21 | End: 2020-12-15

## 2020-01-21 NOTE — TELEPHONE ENCOUNTER
Ok  We should increase potassium to 30 meq/day total  Redo lab only K in a week    See who manages her feedings, we should contact them about low K they might be able to adjust it, perhaps it is [facility]

## 2020-01-21 NOTE — TELEPHONE ENCOUNTER
Pt's mom was informed the increased dose of potassium and rechecking the lab next week. Pt does not have nutritionist now, will find one in near future and consult the potassium amount. Lab order was faxed to [facility] at [phone].

## 2020-01-22 ENCOUNTER — MYC MEDICAL ADVICE (OUTPATIENT)
Dept: FAMILY MEDICINE | Facility: CLINIC | Age: 30
End: 2020-01-22

## 2020-01-23 ENCOUNTER — MEDICAL CORRESPONDENCE (OUTPATIENT)
Dept: HEALTH INFORMATION MANAGEMENT | Facility: CLINIC | Age: 30
End: 2020-01-23

## 2020-01-31 ENCOUNTER — HOSPITAL LABORATORY (OUTPATIENT)
Dept: OTHER | Facility: CLINIC | Age: 30
End: 2020-01-31

## 2020-01-31 LAB — POTASSIUM SERPL-SCNC: 3.5 MMOL/L (ref 3.4–5.3)

## 2020-02-03 ENCOUNTER — TELEPHONE (OUTPATIENT)
Dept: FAMILY MEDICINE | Facility: CLINIC | Age: 30
End: 2020-02-03

## 2020-02-03 NOTE — TELEPHONE ENCOUNTER
Spoke to patients mother to relay providers message. Patients mother states verbal understanding. Lynette De La O LPN 2/3/2020 12:05 PM

## 2020-02-03 NOTE — TELEPHONE ENCOUNTER
----- Message from Jimmy Moseley MD sent at 2/3/2020  8:18 AM CST -----  Let her know potassium normal, at this point we should stay on same treatment plan and redo lab in a month

## 2020-02-10 DIAGNOSIS — Z79.01 CHRONIC ANTICOAGULATION: ICD-10-CM

## 2020-02-10 DIAGNOSIS — Z86.718 HISTORY OF DEEP VENOUS THROMBOSIS: ICD-10-CM

## 2020-02-10 DIAGNOSIS — J96.10 CHRONIC RESPIRATORY FAILURE, UNSPECIFIED WHETHER WITH HYPOXIA OR HYPERCAPNIA (H): ICD-10-CM

## 2020-02-12 ENCOUNTER — OFFICE VISIT (OUTPATIENT)
Dept: FAMILY MEDICINE | Facility: CLINIC | Age: 30
End: 2020-02-12

## 2020-02-12 ENCOUNTER — DOCUMENTATION ONLY (OUTPATIENT)
Dept: HEMATOLOGY | Facility: CLINIC | Age: 30
End: 2020-02-12

## 2020-02-12 VITALS
OXYGEN SATURATION: 98 % | HEART RATE: 72 BPM | SYSTOLIC BLOOD PRESSURE: 132 MMHG | TEMPERATURE: 98 F | DIASTOLIC BLOOD PRESSURE: 91 MMHG | RESPIRATION RATE: 16 BRPM

## 2020-02-12 DIAGNOSIS — D64.9 ANEMIA, UNSPECIFIED TYPE: ICD-10-CM

## 2020-02-12 DIAGNOSIS — F41.9 ANXIETY: ICD-10-CM

## 2020-02-12 DIAGNOSIS — F32.A DEPRESSION, UNSPECIFIED DEPRESSION TYPE: ICD-10-CM

## 2020-02-12 DIAGNOSIS — E87.6 HYPOKALEMIA: Primary | ICD-10-CM

## 2020-02-12 RX ORDER — SERTRALINE HYDROCHLORIDE 20 MG/ML
50 SOLUTION ORAL DAILY
Qty: 75 ML | Refills: 3 | Status: SHIPPED | OUTPATIENT
Start: 2020-02-12 | End: 2020-06-02

## 2020-02-12 ASSESSMENT — ANXIETY QUESTIONNAIRES
3. WORRYING TOO MUCH ABOUT DIFFERENT THINGS: NEARLY EVERY DAY
7. FEELING AFRAID AS IF SOMETHING AWFUL MIGHT HAPPEN: MORE THAN HALF THE DAYS
GAD7 TOTAL SCORE: 19
6. BECOMING EASILY ANNOYED OR IRRITABLE: NEARLY EVERY DAY
5. BEING SO RESTLESS THAT IT IS HARD TO SIT STILL: MORE THAN HALF THE DAYS
2. NOT BEING ABLE TO STOP OR CONTROL WORRYING: NEARLY EVERY DAY
1. FEELING NERVOUS, ANXIOUS, OR ON EDGE: NEARLY EVERY DAY

## 2020-02-12 ASSESSMENT — PATIENT HEALTH QUESTIONNAIRE - PHQ9
SUM OF ALL RESPONSES TO PHQ QUESTIONS 1-9: 16
5. POOR APPETITE OR OVEREATING: NEARLY EVERY DAY

## 2020-02-12 ASSESSMENT — PAIN SCALES - GENERAL: PAINLEVEL: NO PAIN (0)

## 2020-02-12 NOTE — NURSING NOTE
Chief Complaint   Patient presents with     Pre-Op Exam     Pt comes in for a pre op exam.         BRINA Mcclure on 2/12/2020 at 1:08 PM

## 2020-02-12 NOTE — PROGRESS NOTES
[facility]  [address] [place] [address], [place] [id number]  Main: [phone], Fax: [phone]     2020     To:      [facility]              Fax: [phone]     From:  Jt Mir PA-C MPAS - Physician Assistant - [facility].      Re:       Lanie Neil               : 1990     Lanie Neil  has a history of spinal muscular atrophy and is chronically wheelchair bound. 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  She is having injection of medication into a reservoir along the spine on 2020.      For this procedure, the following is recommended:     1.  Hold Xarelto on 20 and 20.  2.  Resume Xarelto 10 mg daily on 20.     Please feel free to call our center with any questions at [phone].                  Jt Mir PA-C, MPAS - Physician Assistant - [facility] for [facility].    Statement Selected

## 2020-02-12 NOTE — PATIENT INSTRUCTIONS
[facility] Medication Refill Request Information:  * Please contact your pharmacy regarding ANY request for medication refills.  ** [facility] Prescription Fax = [phone]  * Please allow 3 business days for routine medication refills.  * Please allow 5 business days for controlled substance medication refills.     [facility] Test Result notification information:  *You will be notified with in 7-10 days of your appointment day regarding the results of your test.  If you are on MyChart you will be notified as soon as the provider has reviewed the results and signed off on them.    [facility]: [phone]

## 2020-02-13 ENCOUNTER — MYC MEDICAL ADVICE (OUTPATIENT)
Dept: FAMILY MEDICINE | Facility: CLINIC | Age: 30
End: 2020-02-13

## 2020-02-13 ASSESSMENT — ANXIETY QUESTIONNAIRES: GAD7 TOTAL SCORE: 19

## 2020-02-13 NOTE — TELEPHONE ENCOUNTER
Using Xarelto with sertraline can cause an increased risk for bleeding. However, this is safely managed with monitoring. She should monitor for worsened bruising or bleeding at home at let us know if she notices anything concerning.

## 2020-02-14 ENCOUNTER — TELEPHONE (OUTPATIENT)
Dept: FAMILY MEDICINE | Facility: CLINIC | Age: 30
End: 2020-02-14

## 2020-02-17 ENCOUNTER — TELEPHONE (OUTPATIENT)
Dept: FAMILY MEDICINE | Facility: CLINIC | Age: 30
End: 2020-02-17

## 2020-02-17 NOTE — TELEPHONE ENCOUNTER
[facility] Call Center    Phone Message    May a detailed message be left on voicemail: yes     Reason for Call: Form or Letter   Type or form/letter needing completion: Please re Fax the patient Pre-op and lab Results from 2/12/2020 to [facility] ASAP, if there s any questions please call to address concerns   Provider: Jimmy Moseley MD   Date form needed: asap  Once completed: Fax form to: [phone]      Action Taken: Message routed to:  [facility] p[facility]    Travel Screening: Not Applicable

## 2020-02-24 ENCOUNTER — OFFICE VISIT (OUTPATIENT)
Dept: BEHAVIORAL HEALTH | Facility: CLINIC | Age: 30
End: 2020-02-24
Attending: FAMILY MEDICINE

## 2020-02-24 DIAGNOSIS — F33.1 MAJOR DEPRESSIVE DISORDER, RECURRENT EPISODE, MODERATE (H): Primary | ICD-10-CM

## 2020-02-24 NOTE — PROGRESS NOTES
[facility]: Integrated Behavioral Health  February 24, 2020      Behavioral Health Clinician Progress Note    Patient Name: Lanie Neil           Service Type: Consult Note      Service Location:  in clinic      Session Start Time: 12:00  Session End Time: 12:40      Session Length: 38 - 52      Attendees: Client    Visit Activities (Refresh list every visit): NEW and [facility] Only    Diagnostic Assessment Date: NA  Treatment Plan Review Date: NA  See Flowsheets for today's PHQ-9 and KATHARINE-7 results  Previous PHQ-9:   PHQ-9 SCORE 10/9/2014 5/28/2019 2/12/2020   PHQ-9 Total Score 7 - -   PHQ-9 Total Score - 1 16     Previous KATHARINE-7:   KATHARINE-7 SCORE 5/28/2019 2/12/2020   Total Score 3 19       BRITTANY LEVEL:  No flowsheet data found.    DATA  Extended Session (60+ minutes): No  Interactive Complexity: No  Crisis: No    Treatment Objective(s) Addressed in This Session:  Target Behavior(s): disease management/lifestyle changes      Determine treatment options     Current Stressors / Issues:  Today Lanie reported concerns with depressed mood, which she suggested began about six months ago, but has been worse in the last two months. Lanie described history of struggling with depression on occasion, noting she will struggle with low mood for some time and see improvement once a particular source of stress has resolved. Currently, Lanie stated she has struggled with depressed mood from her reported difficulty making friends or establishing intimate partners. She indicated several of her family members are pregnant or having relational troubles, which makes her struggle with feeling trapped/stuck and worthless. She cited her mother and a few family members as her primary sources of support, however suggested its hard that these are individuals that take care of her and are not the same as an intimate partner. Lanie mentioned she occasionally feels like a burden to others, particularly her  mother, for taking care of her. She additionally endorsed social withdrawal/isolation as another behavior that occurs when she becomes depressed. Lanie endorsed having rare and passive thoughts of suicide when she struggles with depression. She denied ever having a plan, identifying means, or having active intent to hurt herself however. Lanie additionally noted she began Zoloft about 10 days ago.    Shortly into session, Lanie asked if her mother could accompany her to the appointment to assist with a complication in her wheelchair. At this time, I presented several treatment options to both of them. I provided her the behavioral health access number due to Lanie living in [facility] and stating it takes over two hours to reach the [facility] due to transportation needs. Lanie stated she would prefer a therapist to come to her home for therapy if possible. I encouraged her to contact the behavioral health access number for help finding a therapist that could do in home work. I additionally scheduled Lanie to return for [facility] services with me in about two weeks for ongoing support and to begin help addressing her depressive symptoms. Despite Lanie denying history of active suicidal ideation, I provided her a handout of local crisis line information.       Progress on Treatment Objective(s) / Homework:  New Objective established this session - PREPARATION (Decided to change - considering how); Intervened by negotiating a change plan and determining options / strategies for behavior change, identifying triggers, exploring social supports, and working towards setting a date to begin behavior change    Also provided psychoeducation about behavioral health condition, symptoms, and treatment options    Care Plan review completed: No    Medication Review:  No changes to current psychiatric medication(s)    Medication Compliance:  NA    Changes in Health Issues:   None reported    Chemical Use Review:   Substance Use:  Chemical use reviewed, no active concerns identified      Tobacco Use: No current tobacco use.      Assessment: Current Emotional / Mental Status (status of significant symptoms):  Risk status (Self / Other harm or suicidal ideation)  Patient has had a history of suicidal ideation: passive thoughts only; easily controlled  Patient denies current fears or concerns for personal safety.  Patient denies current or recent suicidal ideation or behaviors.  Patient denies current or recent homicidal ideation or behaviors.  Patient denies current or recent self injurious behavior or ideation.  Patient denies other safety concerns.  A safety and risk management plan has not been developed at this time, however patient was encouraged to call [place] [phone] should there be a change in any of these risk factors.    Appearance:   Appropriate   Eye Contact:   Good   Psychomotor Behavior: Normal   Attitude:   Cooperative   Orientation:   All  Speech   Rate / Production: Normal    Volume:  Normal   Mood:    Depressed   Affect:    Appropriate   Thought Content:  Clear   Thought Form:  Coherent  Logical   Insight:    Good     Diagnoses:  1. Major depressive disorder, recurrent episode, moderate (H)        Collateral Reports Completed:  Communicated with: patient's mother     Plan: (Homework, other):  Lanie was scheduled to RTC in two weeks for help addressing depressive symptoms and assist in establishing a treatment of her choosing.  She was also given information about mental health symptoms and treatment options .  CD Recommendations: No indications of CD issues.     Héctor Higuera Psy.D, LP 2/24/2020

## 2020-02-25 DIAGNOSIS — G12.9 SPINAL MUSCLE ATROPHY (H): ICD-10-CM

## 2020-02-25 RX ORDER — DIAZEPAM 5 MG
TABLET ORAL
Qty: 30 TABLET | Refills: 0 | Status: SHIPPED | OUTPATIENT
Start: 2020-02-25 | End: 2020-04-02

## 2020-02-25 NOTE — TELEPHONE ENCOUNTER
Patient Requested  diazepam (VALIUM) 5 MG tablet  Last Filled  01/16/2020  Last Office Visit  02/12/2020  Next Office Visit  Nothing scheduled   Checked  02/25/20       DX: Spinal muscle atrophy      Pharmacy:   [facility] #[id number] - [place], [place] - [address]        LILLIAM DAMICO CMA at 11:03 AM on 2/25/2020.

## 2020-03-02 ENCOUNTER — HEALTH MAINTENANCE LETTER (OUTPATIENT)
Age: 30
End: 2020-03-02

## 2020-03-06 DIAGNOSIS — G12.9 SPINAL MUSCLE ATROPHY (H): ICD-10-CM

## 2020-03-06 RX ORDER — ALPRAZOLAM 0.25 MG
TABLET ORAL
Qty: 60 TABLET | Refills: 0 | Status: SHIPPED | OUTPATIENT
Start: 2020-03-06 | End: 2020-04-08

## 2020-03-06 NOTE — TELEPHONE ENCOUNTER
March 6, 2020  12:59 PM    Patient Requested  ALPRAZolam (XANAX) 0.25 MG tablet  Last Filled  1/16/20  Last Office Visit  2/12/20  Next Office Visit  None   Checked  3/6/20     DX:  Spinal muscle atrophy (H) [G12.9]     Pharmacy: [facility] #[id number] - [place], [place] - [address]    Raissa Ryan RN BSN

## 2020-03-11 ENCOUNTER — MEDICAL CORRESPONDENCE (OUTPATIENT)
Dept: HEALTH INFORMATION MANAGEMENT | Facility: CLINIC | Age: 30
End: 2020-03-11

## 2020-03-11 ENCOUNTER — MYC MEDICAL ADVICE (OUTPATIENT)
Dept: FAMILY MEDICINE | Facility: CLINIC | Age: 30
End: 2020-03-11

## 2020-03-11 DIAGNOSIS — K21.00 GASTROESOPHAGEAL REFLUX DISEASE WITH ESOPHAGITIS: ICD-10-CM

## 2020-03-11 DIAGNOSIS — K59.00 CONSTIPATION, UNSPECIFIED CONSTIPATION TYPE: Primary | ICD-10-CM

## 2020-03-11 RX ORDER — AMOXICILLIN 250 MG
CAPSULE ORAL
Qty: 100 TABLET | Refills: 11 | Status: SHIPPED | OUTPATIENT
Start: 2020-03-11 | End: 2020-03-12

## 2020-03-12 ENCOUNTER — TELEPHONE (OUTPATIENT)
Dept: INTERNAL MEDICINE | Facility: CLINIC | Age: 30
End: 2020-03-12

## 2020-03-12 DIAGNOSIS — K59.00 CONSTIPATION, UNSPECIFIED CONSTIPATION TYPE: ICD-10-CM

## 2020-03-12 RX ORDER — SENNOSIDES 8.8 MG/5ML
10 LIQUID ORAL 2 TIMES DAILY
Qty: 600 ML | Refills: 11 | Status: SHIPPED | OUTPATIENT
Start: 2020-03-12 | End: 2021-10-25

## 2020-03-12 NOTE — TELEPHONE ENCOUNTER
Senna-docusate was sent to the pharmacy yesterday.  Pt is requesting senna liquid, not the tablets. It appears from 'care everywhere', pt was taking sennosides 8.8 mg/5 mL, 10 mL BID. Is it ok to send the same thing ?    Soon-Mi

## 2020-03-13 ENCOUNTER — MYC MEDICAL ADVICE (OUTPATIENT)
Dept: FAMILY MEDICINE | Facility: CLINIC | Age: 30
End: 2020-03-13

## 2020-03-17 ENCOUNTER — MYC REFILL (OUTPATIENT)
Dept: INTERNAL MEDICINE | Facility: CLINIC | Age: 30
End: 2020-03-17

## 2020-03-17 ENCOUNTER — MYC MEDICAL ADVICE (OUTPATIENT)
Dept: FAMILY MEDICINE | Facility: CLINIC | Age: 30
End: 2020-03-17

## 2020-03-17 ENCOUNTER — MEDICAL CORRESPONDENCE (OUTPATIENT)
Dept: HEALTH INFORMATION MANAGEMENT | Facility: CLINIC | Age: 30
End: 2020-03-17

## 2020-03-17 DIAGNOSIS — G62.9 NEUROPATHY: ICD-10-CM

## 2020-03-17 RX ORDER — GABAPENTIN 250 MG/5ML
SOLUTION ORAL
Qty: 1800 ML | Refills: 5 | Status: SHIPPED | OUTPATIENT
Start: 2020-03-17 | End: 2020-09-10

## 2020-03-17 NOTE — TELEPHONE ENCOUNTER
March 17, 2020  12:24 PM    Patient Requested  gabapentin (NEURONTIN) 250 MG/5ML solution   Last Filled  2/17/20  Last Office Visit  2/20/20  Next Office Visit  None   Checked  3/17/20     DX:  Neuropathy [G62.9]      Pharmacy: [facility] #[id number] - [place], [place] - [address] AT [facility] [address]     Patient meets criteria for Primary Care Gabapentin protocol.    Raissa Ryan RN BSN

## 2020-04-02 ENCOUNTER — MYC REFILL (OUTPATIENT)
Dept: FAMILY MEDICINE | Facility: CLINIC | Age: 30
End: 2020-04-02

## 2020-04-02 DIAGNOSIS — G12.9 SPINAL MUSCLE ATROPHY (H): ICD-10-CM

## 2020-04-02 DIAGNOSIS — K21.00 GASTROESOPHAGEAL REFLUX DISEASE WITH ESOPHAGITIS: ICD-10-CM

## 2020-04-03 ENCOUNTER — MYC MEDICAL ADVICE (OUTPATIENT)
Dept: FAMILY MEDICINE | Facility: CLINIC | Age: 30
End: 2020-04-03

## 2020-04-03 DIAGNOSIS — K21.00 GASTROESOPHAGEAL REFLUX DISEASE WITH ESOPHAGITIS: ICD-10-CM

## 2020-04-03 RX ORDER — DIAZEPAM 5 MG
TABLET ORAL
Qty: 30 TABLET | Refills: 0 | Status: SHIPPED | OUTPATIENT
Start: 2020-04-03 | End: 2020-05-13

## 2020-04-07 ENCOUNTER — TELEPHONE (OUTPATIENT)
Dept: FAMILY MEDICINE | Facility: CLINIC | Age: 30
End: 2020-04-07

## 2020-04-07 NOTE — TELEPHONE ENCOUNTER
Prior Authorization Not Needed per Insurance    Medication: prior auth xanax-PA NOT NEEDED   Insurance Company: [employer] - Phone [phone] Fax [phone]  Expected CoPay:      Pharmacy Filling the Rx: [facility] #[id number] - [place], [place] - [address] AT [facility] [address]  Pharmacy Notified: Yes  Patient Notified: No    Called pharmacy and pharmacy stated that PA is Not Needed and medication is covered. Pharmacy is requesting a new script with refills be sent to filling pharmacy at [facility] #[id number] - [place], [place] - [address] [facility] [address]. Once script is received with a paid claim; pharmacy will notify patient when medicaiton is ready for . Insurance also stated that PA is Not Needed and medication is covered.

## 2020-04-07 NOTE — TELEPHONE ENCOUNTER
Prior Authorization Retail Medication Request    Medication/Dose:   ALPRAZolam (XANAX) 0.25 MG tablet  60 tablet  0  3/6/2020   No    Sig: TAKE 1 TABLET(0.25 MG) BY MOUTH THREE TIMES DAILY AS NEEDED FOR ANXIETY    Sent to pharmacy as: ALPRAZolam (XANAX) 0.25 MG tablet        ICD code (if different than what is on RX):  na  Previously Tried and Failed:  na  Rationale:  na    Insurance Name:     [employer] ACCESS [place]ILITY [place]        Insurance ID:  [id number]      Pharmacy Information (if different than what is on RX)  Name:  [facility]  Phone:  elise

## 2020-04-08 ENCOUNTER — TRANSFERRED RECORDS (OUTPATIENT)
Dept: HEALTH INFORMATION MANAGEMENT | Facility: CLINIC | Age: 30
End: 2020-04-08

## 2020-04-08 DIAGNOSIS — G12.9 SPINAL MUSCLE ATROPHY (H): ICD-10-CM

## 2020-04-08 RX ORDER — ALPRAZOLAM 0.25 MG
TABLET ORAL
Qty: 60 TABLET | Refills: 1 | Status: SHIPPED | OUTPATIENT
Start: 2020-04-08 | End: 2020-07-03

## 2020-05-10 ENCOUNTER — MEDICAL CORRESPONDENCE (OUTPATIENT)
Dept: HEALTH INFORMATION MANAGEMENT | Facility: CLINIC | Age: 30
End: 2020-05-10

## 2020-05-13 DIAGNOSIS — G12.9 SPINAL MUSCLE ATROPHY (H): ICD-10-CM

## 2020-05-13 RX ORDER — DIAZEPAM 5 MG
TABLET ORAL
Qty: 30 TABLET | Refills: 0 | Status: SHIPPED | OUTPATIENT
Start: 2020-05-13 | End: 2020-07-03

## 2020-05-13 NOTE — TELEPHONE ENCOUNTER
Patient Requested  ALPRAZolam (XANAX) 0.25 MG tablet    Last Filled  04/03/2020  Last Office Visit  08/28/2019  Next Office Visit  Nothing scheduled   Checked  05/13/2020       DX: Spinal muscle atrophy      Pharmacy:   [facility] #[id number] - [place], [place] - [address]        LILLIAM DAMICO CMA at 12:43 PM on 5/13/2020.

## 2020-05-19 ENCOUNTER — TELEPHONE (OUTPATIENT)
Dept: FAMILY MEDICINE | Facility: CLINIC | Age: 30
End: 2020-05-19

## 2020-05-19 NOTE — TELEPHONE ENCOUNTER
Health Call Center    Phone Message    May a detailed message be left on voicemail: yes , Patient is wanting to have Dr. Moseley to give Patient a call back.     Reason for Call: Other: Per Patient is wanting to get a call back in regards to pre-op apt, Patient states not able to physically come to apt. Patient is wanting to know if able to have it done over the phone and take the vitals at home and provide them. Please Advise.      Action Taken: Message routed to:  [facility]    Travel Screening: Not Applicable

## 2020-05-19 NOTE — TELEPHONE ENCOUNTER
I scheduled patient for virtual pre op this Thursday with Dr. Moseley. They will send us vitals. She has checked with her surgeons office who confirmed that they will accept virtual pre op. I will route to provider as an FYI. No other questions or concerns.   Joana Jaeger, EMT at 12:29 PM on 5/19/2020.

## 2020-05-19 NOTE — TELEPHONE ENCOUNTER
I called the patient to have her inquire at her surgeons office to see if they will even accept a virtual pre op appointment. This is important because we would not want her to have to come in for a second one if they need in person. Patient said she would contact her surgeons office and call us with an update.   Joana Jaeger, EMT at 11:55 AM on 5/19/2020.

## 2020-05-19 NOTE — TELEPHONE ENCOUNTER
[facility] Call Center    Phone Message    May a detailed message be left on voicemail: yes     Reason for Call: Other: Per call from PT is returnng calls to Joana and has questions re: pre-op APPT. Please reach out to the PT.      Action Taken: Message routed to:  [facility]): Primary Care    Travel Screening: Not Applicable

## 2020-05-21 ENCOUNTER — VIRTUAL VISIT (OUTPATIENT)
Dept: FAMILY MEDICINE | Facility: CLINIC | Age: 30
End: 2020-05-21

## 2020-05-21 DIAGNOSIS — Z01.818 PREOP GENERAL PHYSICAL EXAM: ICD-10-CM

## 2020-05-21 DIAGNOSIS — F32.A DEPRESSION, UNSPECIFIED DEPRESSION TYPE: Primary | ICD-10-CM

## 2020-05-21 NOTE — PROGRESS NOTES
Surgeon: ***  Location of the surgery: ***  Date of the surgery: 6/18  Procedure: Spinraza  History of reaction to anesthesia? : NO

## 2020-05-21 NOTE — NURSING NOTE
Chief Complaint   Patient presents with     Pre-Op Exam     pt to have preop       Marla Willoughby CMA, EMT at 2:13 PM on 5/21/2020.

## 2020-05-21 NOTE — PROGRESS NOTES
"Lanie Neil is a 29 year old female who is being evaluated via a billable video visit.      The patient has been notified of following:     \"This video visit will be conducted via a call between you and your physician/provider. We have found that certain health care needs can be provided without the need for an in-person physical exam.  This service lets us provide the care you need with a video conversation.  If a prescription is necessary we can send it directly to your pharmacy.  If lab work is needed we can place an order for that and you can then stop by our lab to have the test done at a later time.    Video visits are billed at different rates depending on your insurance coverage.  Please reach out to your insurance provider with any questions.    If during the course of the call the physician/provider feels a video visit is not appropriate, you will not be charged for this service.\"    Patient has given verbal consent for Video visit? Yes    How would you like to obtain your AVS? Linda    Patient would like the video invitation sent by: Send to e-mail at: [email or web address]    Will anyone else be joining your video visit? 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    No personal or FH easy bleeding or anesthesia reaction    Past Medical History:   Diagnosis Date     Anxiety      Snoring      Spinal muscle atrophy (H)      Past Surgical History:   Procedure Laterality Date     ENT SURGERY      tubes     " GASTROSTOMY TUBE       KIDNEY SURGERY       SPINE SURGERY       TRACHEOSTOMY       Current Outpatient Medications   Medication     ACETAMINOPHEN PO     albuterol (ALBUTEROL) 108 (90 BASE) MCG/ACT inhaler     albuterol (PROVENTIL) (2.5 MG/3ML) 0.083% neb solution     ALPRAZolam (XANAX) 0.25 MG tablet     aminocaproic acid (AMICAR) 0.25 GM/ML solution     bisacodyl (DULCOLAX) 10 MG suppository     celecoxib (CELEBREX) 200 MG capsule     clindamycin (CLEOCIN T) 1 % lotion     diazepam (VALIUM) 5 MG tablet     doxycycline hyclate (VIBRAMYCIN) 100 MG capsule     ferrous sulfate 220 (44 Fe) MG/5ML ELIX     flunisolide HFA (AEROSPAN) 80 MCG/ACT AERS oral inhaler     gabapentin (NEURONTIN) 250 MG/5ML solution     GENERLAC 10 GM/15ML solution     guaiFENesin (ORGANIDIN) 200 MG TABS tablet     hydrOXYzine (ATARAX) 25 MG tablet     LANsoprazole (PREVACID) 30 MG DR capsule     Magnesium Hydroxide (MILK OF MAGNESIA PO)     metroNIDAZOLE (METROCREAM) 0.75 % external cream     mometasone (NASONEX) 50 MCG/ACT nasal spray     naloxone (NARCAN) 4 MG/0.1ML nasal spray     Nutritional Supplements (REPLETE FIBER) LIQD     omeprazole (PRILOSEC) 2 mg/mL suspension     order for DME     order for DME     polyethylene glycol (MIRALAX/GLYCOLAX) powder     potassium chloride (KAYCIEL) 20 MEQ/15ML (10%) solution     prochlorperazine (COMPAZINE) 5 MG tablet     rivaroxaban ANTICOAGULANT (XARELTO ANTICOAGULANT) 10 MG TABS tablet     Salicylic Acid (OXY BALANCE FACIAL CLEAN WASH EX)     Sennosides (SENNA) 8.8 MG/5ML LIQD     sertraline (ZOLOFT) 20 MG/ML (HIGH CONC) solution     simethicone (MYLICON) 66.7 mg/ml     simethicone 40 MG/0.6ML LIQD     sodium chloride (OCEAN) 0.65 % nasal spray     No current facility-administered medications for this visit.      No Known Allergies  Social History     Socioeconomic History     Marital status: Single     Spouse name: Not on file     Number of children: Not on file     Years of education: Not on file      "Highest education level: Not on file   Occupational History     Not on file   Social Needs     Financial resource strain: Not on file     Food insecurity     Worry: Not on file     Inability: Not on file     Transportation needs     Medical: Not on file     Non-medical: Not on file   Tobacco Use     Smoking status: Never Smoker     Smokeless tobacco: Never Used   Substance and Sexual Activity     Alcohol use: No     Alcohol/week: 0.0 standard drinks     Drug use: No     Sexual activity: Not on file   Lifestyle     Physical activity     Days per week: Not on file     Minutes per session: Not on file     Stress: Not on file   Relationships     Social connections     Talks on phone: Not on file     Gets together: Not on file     Attends Mandaen service: Not on file     Active member of club or organization: Not on file     Attends meetings of clubs or organizations: Not on file     Relationship status: Not on file     Intimate partner violence     Fear of current or ex partner: Not on file     Emotionally abused: Not on file     Physically abused: Not on file     Forced sexual activity: Not on file   Other Topics Concern     Not on file   Social History Narrative     Not on file     Family History   Problem Relation Age of Onset     Family History Negative Other      No new medical issues since I last saw her, isolating not having outside help in due to covid  Zoloft very helpful and tolerated for depression      Video Start Time: 2:35               Review of Systems   Ten pt ROS completed, negative for acute concerns      Objective    There were no vitals taken for this visit.  Estimated body mass index is 31.38 kg/m  as calculated from the following:    Height as of 9/26/16: 1.448 m (4' 9\").    Weight as of 6/25/18: 65.8 kg (145 lb).  Physical Exam     GENERAL: Healthy, alert and no distress  EYES: Eyes grossly normal to inspection.  No discharge or erythema, or obvious scleral/conjunctival abnormalities.  RESP: No " audible wheeze, cough, or visible cyanosis.  No visible retractions or increased work of breathing.    SKIN: Visible skin clear. No significant rash, abnormal pigmentation or lesions.  NEURO: Cranial nerves grossly intact.  Mentation and speech appropriate for age.  PSYCH: Mentation appears normal, affect normal/bright, judgement and insight intact, normal speech and appearance well-groomed.      Will get HHN to do BMP, CBC before surgery      Preop:  Await labs  Stop xarelto day before surgery  Cleared for surgery on a medical basis if labs stable, will be done by [facility], I will review before surgery    Depression, start therapy, continue zoloft          Video-Visit Details    Type of service:  Video Visit    Video End Time: 3:00    Originating Location (pt. Location): Home    Distant Location (provider location):  [facility]     Platform used for Video Visit: 1-800-DOCTORS    No follow-ups on file.       Jimmy Moseley MD

## 2020-05-22 ENCOUNTER — TELEPHONE (OUTPATIENT)
Dept: INTERNAL MEDICINE | Facility: CLINIC | Age: 30
End: 2020-05-22

## 2020-05-22 ENCOUNTER — TELEPHONE (OUTPATIENT)
Dept: FAMILY MEDICINE | Facility: CLINIC | Age: 30
End: 2020-05-22

## 2020-05-22 DIAGNOSIS — D64.9 ANEMIA: Primary | ICD-10-CM

## 2020-05-22 DIAGNOSIS — E87.6 HYPOKALEMIA: ICD-10-CM

## 2020-05-22 NOTE — TELEPHONE ENCOUNTER
Can you print the lab orders (CBC, BMP) and fax to [facility] ,fax [phone] ?   She has surgery at [place] June 18, needs to have these labs done prior.   [facility] is  her home care, please inform them to fax the result to us on the cover sheet. Thank you.     Labs printed and faxed to [facility] @[phone]  Gabriela Hylton RN 12:28 PM on 5/22/2020.

## 2020-05-22 NOTE — TELEPHONE ENCOUNTER
Lab orders will be faxed to [facility]. They will fax us the results.    Soon-Mi  ---------------------------------------------------------------    ----- Message from Jimmy Moseley MD sent at 5/21/2020  2:45 PM CDT -----  She has surgery at [place] June 18  I think you've helped with this in past  She needs cbc, bmp  Re; anemia/hypokalemia  [facility] is  her home care, they can do it and fax it to us

## 2020-05-22 NOTE — TELEPHONE ENCOUNTER
[facility] Call Center    Phone Message    May a detailed message be left on voicemail: yes     Reason for Call: Order(s): Other:   Reason for requested: Lab order, signed by nurse and needing to be signed by Jimmy Mason, fax to: [phone]  Date needed: asap  Provider name: Pradip      Action Taken: Message routed to:  [facility]    Travel Screening: Not Applicable

## 2020-05-22 NOTE — TELEPHONE ENCOUNTER
Dr. Moseley,    Please sign the pended lab orders. Thank you.      Linda,    Please print the lab orders and fax again after Dr. Moseley signs. Thank you.    Adonis-Mi

## 2020-06-02 DIAGNOSIS — F41.9 ANXIETY: ICD-10-CM

## 2020-06-02 DIAGNOSIS — F32.A DEPRESSION, UNSPECIFIED DEPRESSION TYPE: ICD-10-CM

## 2020-06-02 RX ORDER — SERTRALINE HYDROCHLORIDE 20 MG/ML
50 SOLUTION ORAL DAILY
Qty: 75 ML | Refills: 2 | Status: SHIPPED | OUTPATIENT
Start: 2020-06-02 | End: 2020-09-10

## 2020-06-02 NOTE — TELEPHONE ENCOUNTER
sertraline (ZOLOFT) 20 MG/ML (HIGH CONC) solution   Take 2.5 mLs (50 mg) by mouth daily     Last Written Prescription Date:  2/12/20  Last Fill Quantity: 75 ml,   # refills: 3  Last Office Visit : 5/21/20 virtual visit  Future Office visit:  6/9/20  90 day to pharmacy.     Refill to pharmacy.

## 2020-06-05 ENCOUNTER — MEDICAL CORRESPONDENCE (OUTPATIENT)
Dept: HEALTH INFORMATION MANAGEMENT | Facility: CLINIC | Age: 30
End: 2020-06-05

## 2020-06-05 NOTE — PROGRESS NOTES
[facility]  [address] [place] [address] [address], [place] [address]  Main: [phone], Fax: [phone]     2020    Recommendations  6/10/2021     To:       [facility]              Fax: [phone]     From:  Jt Mir PA-C, MPAS - Physician Assistant - [facility].      Re:        Lanie Neil               : 1990     Lanie Neil  has a history of spinal muscular atrophy and is chronically wheelchair bound. 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  She is having injection of medication into a reservoir along the spine on 20.      For this procedure, the following is recommended:     1.  Hold Xarelto day before and day of  2.  Resume Xarelto 10 mg daily on POD 1     Please feel free to call our center with any questions at [phone].

## 2020-06-15 ENCOUNTER — HOSPITAL LABORATORY (OUTPATIENT)
Dept: OTHER | Facility: CLINIC | Age: 30
End: 2020-06-15

## 2020-06-15 ENCOUNTER — TRANSFERRED RECORDS (OUTPATIENT)
Dept: HEALTH INFORMATION MANAGEMENT | Facility: CLINIC | Age: 30
End: 2020-06-15

## 2020-06-15 LAB
ANION GAP SERPL CALCULATED.3IONS-SCNC: 7 MMOL/L (ref 3–14)
BASOPHILS # BLD AUTO: 0 10E9/L (ref 0–0.2)
BASOPHILS NFR BLD AUTO: 0.4 %
BUN SERPL-MCNC: 9 MG/DL (ref 7–30)
CALCIUM SERPL-MCNC: 8.8 MG/DL (ref 8.5–10.1)
CHLORIDE SERPL-SCNC: 108 MMOL/L (ref 94–109)
CO2 SERPL-SCNC: 23 MMOL/L (ref 20–32)
CREAT SERPL-MCNC: <0.14 MG/DL (ref 0.52–1.04)
CREAT UR-MCNC: <3 MG/DL
DIFFERENTIAL METHOD BLD: ABNORMAL
EOSINOPHIL # BLD AUTO: 0.1 10E9/L (ref 0–0.7)
EOSINOPHIL NFR BLD AUTO: 1.9 %
ERYTHROCYTE [DISTWIDTH] IN BLOOD BY AUTOMATED COUNT: 18.4 % (ref 10–15)
GFR SERPL CREATININE-BSD FRML MDRD: >90 ML/MIN/{1.73_M2}
GLUCOSE SERPL-MCNC: 96 MG/DL (ref 70–99)
HCT VFR BLD AUTO: 38.3 % (ref 35–47)
HGB BLD-MCNC: 11.9 G/DL (ref 11.7–15.7)
IMM GRANULOCYTES # BLD: 0 10E9/L (ref 0–0.4)
IMM GRANULOCYTES NFR BLD: 0.3 %
INR PPP: 1.43 (ref 0.86–1.14)
LYMPHOCYTES # BLD AUTO: 1.6 10E9/L (ref 0.8–5.3)
LYMPHOCYTES NFR BLD AUTO: 22.8 %
MCH RBC QN AUTO: 27.9 PG (ref 26.5–33)
MCHC RBC AUTO-ENTMCNC: 31.1 G/DL (ref 31.5–36.5)
MCV RBC AUTO: 90 FL (ref 78–100)
MONOCYTES # BLD AUTO: 0.4 10E9/L (ref 0–1.3)
MONOCYTES NFR BLD AUTO: 6.3 %
NEUTROPHILS # BLD AUTO: 4.8 10E9/L (ref 1.6–8.3)
NEUTROPHILS NFR BLD AUTO: 68.3 %
NRBC # BLD AUTO: 0 10*3/UL
NRBC BLD AUTO-RTO: 0 /100
PLATELET # BLD AUTO: 308 10E9/L (ref 150–450)
POTASSIUM SERPL-SCNC: 3.8 MMOL/L (ref 3.4–5.3)
PROT UR-MCNC: 0.09 G/L
PROT/CREAT 24H UR: NORMAL G/G CR (ref 0–0.2)
RBC # BLD AUTO: 4.26 10E12/L (ref 3.8–5.2)
SODIUM SERPL-SCNC: 138 MMOL/L (ref 133–144)
WBC # BLD AUTO: 7 10E9/L (ref 4–11)

## 2020-07-03 ENCOUNTER — MYC REFILL (OUTPATIENT)
Dept: FAMILY MEDICINE | Facility: CLINIC | Age: 30
End: 2020-07-03

## 2020-07-03 DIAGNOSIS — G12.9 SPINAL MUSCLE ATROPHY (H): ICD-10-CM

## 2020-07-03 RX ORDER — ALPRAZOLAM 0.25 MG
TABLET ORAL
Qty: 60 TABLET | Refills: 1 | Status: SHIPPED | OUTPATIENT
Start: 2020-07-03 | End: 2020-09-18

## 2020-07-03 RX ORDER — DIAZEPAM 5 MG
TABLET ORAL
Qty: 30 TABLET | Refills: 0 | Status: SHIPPED | OUTPATIENT
Start: 2020-07-03 | End: 2020-08-12

## 2020-07-03 NOTE — TELEPHONE ENCOUNTER
Patient Requested  ALPRAZolam (XANAX) 0.25 MG tablet    Last Filled  05/08/2020-xanax  05/13/2020-Diazapam  Last Office Visit  08/28/2019  Next Office Visit  Nothing scheduled   Checked  07/03/2020       DX: Spinal muscle atrophy      Pharmacy:   [id number] - [place], [place] - [address]        LILLIAM DAMICO CMA at 10:42 AM on 7/3/2020.

## 2020-07-09 ENCOUNTER — MEDICAL CORRESPONDENCE (OUTPATIENT)
Dept: HEALTH INFORMATION MANAGEMENT | Facility: CLINIC | Age: 30
End: 2020-07-09

## 2020-07-16 ENCOUNTER — TRANSFERRED RECORDS (OUTPATIENT)
Dept: HEALTH INFORMATION MANAGEMENT | Facility: CLINIC | Age: 30
End: 2020-07-16

## 2020-08-07 ENCOUNTER — TELEPHONE (OUTPATIENT)
Dept: HEMATOLOGY | Facility: CLINIC | Age: 30
End: 2020-08-07

## 2020-08-07 DIAGNOSIS — Z79.01 CHRONIC ANTICOAGULATION: ICD-10-CM

## 2020-08-07 DIAGNOSIS — J96.10 CHRONIC RESPIRATORY FAILURE, UNSPECIFIED WHETHER WITH HYPOXIA OR HYPERCAPNIA (H): ICD-10-CM

## 2020-08-07 DIAGNOSIS — Z86.718 HISTORY OF DEEP VENOUS THROMBOSIS: ICD-10-CM

## 2020-08-12 ENCOUNTER — MYC REFILL (OUTPATIENT)
Dept: FAMILY MEDICINE | Facility: CLINIC | Age: 30
End: 2020-08-12

## 2020-08-12 DIAGNOSIS — G12.9 SPINAL MUSCLE ATROPHY (H): ICD-10-CM

## 2020-08-13 ENCOUNTER — VIRTUAL VISIT (OUTPATIENT)
Dept: INTERNAL MEDICINE | Facility: CLINIC | Age: 30
End: 2020-08-13

## 2020-08-13 DIAGNOSIS — G12.9 SPINAL MUSCLE ATROPHY (H): ICD-10-CM

## 2020-08-13 DIAGNOSIS — R09.89 TERMINAL RESPIRATORY SECRETIONS: Primary | ICD-10-CM

## 2020-08-13 RX ORDER — AZITHROMYCIN 250 MG/1
250 TABLET, FILM COATED ORAL DAILY
Status: ACTIVE | OUTPATIENT
Start: 2020-08-13 | End: 2020-08-18

## 2020-08-13 RX ORDER — DIAZEPAM 5 MG
TABLET ORAL
Qty: 30 TABLET | Refills: 0 | Status: SHIPPED | OUTPATIENT
Start: 2020-08-13 | End: 2020-09-10

## 2020-08-13 NOTE — NURSING NOTE
Chief Complaint   Patient presents with     Medication Request     Pt reports congestion and would like medication      Bette Vargas EMT at 7:45 AM sign on 8/13/2020

## 2020-08-13 NOTE — PROGRESS NOTES
"Video Visit Technology for this patient: Well Video Visit- Patient was left in waiting room     Lanie Neil is a 30 year old female who is being evaluated via a billable video visit.      The patient has been notified of following:     \"This video visit will be conducted via a call between you and your physician/provider. We have found that certain health care needs can be provided without the need for an in-person physical exam.  This service lets us provide the care you need with a video conversation.  If a prescription is necessary we can send it directly to your pharmacy.  If lab work is needed we can place an order for that and you can then stop by our lab to have the test done at a later time.    Video visits are billed at different rates depending on your insurance coverage.  Please reach out to your insurance provider with any questions.    If during the course of the call the physician/provider feels a video visit is not appropriate, you will not be charged for this service.\"    Patient has given verbal consent for Video visit? Yes  How would you like to obtain your AVS? MyChart    "

## 2020-08-13 NOTE — PROGRESS NOTES
"Resident video visit documentation      This patient is being evaluated via a billable video visit as an alternative to an in-person visit.       The patient has been notified of following:     \"This video visit will be conducted via a call between you and your physician/provider. We have found that certain health care needs can be provided without the need for an in-person physical exam.  This service lets us provide the care you need with a video conversation.  If a prescription is necessary we can send it directly to your pharmacy.  If lab work is needed we can place an order for that and you can then stop by our lab to have the test done at a later time. If during the course of the call the physician/provider feels a video visit is not appropriate, you will not be charged for this service.\"     Patient has given verbal consent for Video visit? Yes    Person spoken to:  (patient name and anyone else listening in--family member, care giver, , etc.)    Location of patient: home  Location of physician resident: ( in home office):  Location of teaching physician: ( in home office):  Department name:  Medicine  Mode of communication:  Video Conference via ( LocBox,)  Time video initiated:  08:02 AM  Time video ended:  08:28 AM  Total length of video visit: 26 mins    Staffed with: Dr. Jacinta Vann                          [facility]       SUBJECTIVE:  PMHx chronic progressive spinal muscular atrophy (c/b chronic respiratory failure requiring tracheostomy and chronic ventilation and PEG) with prior intrathecal spinal reservoir insertion in 2017 for adminsitration of spinraza, or nusinersen, for treatment of spinal muscular atrophy.  Placement of intrathecal reservoir was complicated by infection requiring reservoir revision    Patient is c/o nasal and chest congestion started 3 days ago with sore throat and \"slight\" fever between 97-98. She hsa been suctioning more that often with greenish to " clear secretions. No blood was noticed on suctioning. She has a productive cough as well, but no pleurisy or SOB. Has been using 2L of O2 via trach only at night(baseline)  No chills or night sweats. No N/V/D with good appetite. Has been using Guaifenesin for secretions so far  No sick contacts. Lives with her mom.      Medications and allergies reviewed by me today.     ROS:   Constitutional, neuro, ENT, endocrine, pulmonary, cardiac, gastrointestinal, genitourinary, musculoskeletal, integument and psychiatric systems are negative, except as otherwise noted.    OBJECTIVE:    There were no vitals taken for this visit.   Wt Readings from Last 1 Encounters:   06/25/18 65.8 kg (145 lb)     Gen: alert and Ox4 NAD laying in bed   Res: on trach. Was not coughing. No respiratory distress and speaks in full sentences     ASSESSMENT/PLAN:    Lanie was seen today for medication request.    Diagnoses and all orders for this visit:    Terminal respiratory secretions  Spinal muscle atrophy (H)  Patient most likely has an URTI. Giving her chronic disease and poor ability to clear secretions, concerns for superimposed bacterial infection. Will give a ppx course of a Z-pack of 5 days and monitor for symptoms improvement. Discussed with the patient to seek medical attention if sxs are getting worse or not improving  -     azithromycin (ZITHROMAX) tablet 250 mg    Spinal muscle atrophy (H)  Patient requested a refill for Valium. Will send Dr. Moseley a Plumzi message for the patient's request         Kandice Sandra MD  IM PGY-2  Aug 13, 2020    Pt was seen and plan of care discussed with Dr. Vann     Teaching Physician Note:      Return patients, low complexity:  I, Dr. Vann, was immediately accessible to the resident, and agree with the residents's findings and plan of care, as documented in the resident's note.     Jacinta Vann M.D.  [facility]  [facility] Center  pager [phone]

## 2020-08-13 NOTE — TELEPHONE ENCOUNTER
Patient Requested  diazepam (VALIUM) 5 MG tablet  Last Filled  07/03/2020  Last Office Visit  02/12/2020  Next Office Visit  Nothing scheduled   Checked  02/25/20       DX: Spinal muscle atrophy      Pharmacy:   [id number] - [place], [place] - [address]        LILLIAM DAMICO CMA at 8:57 AM on 8/13/2020.

## 2020-08-17 DIAGNOSIS — N94.6 PAINFUL MENSTRUAL PERIODS: ICD-10-CM

## 2020-08-17 DIAGNOSIS — Z86.718 HISTORY OF DEEP VENOUS THROMBOSIS: ICD-10-CM

## 2020-08-17 DIAGNOSIS — Z79.01 CHRONIC ANTICOAGULATION: ICD-10-CM

## 2020-08-17 RX ORDER — CELECOXIB 200 MG/1
200 CAPSULE ORAL DAILY
Qty: 30 CAPSULE | Refills: 6 | Status: SHIPPED | OUTPATIENT
Start: 2020-08-17 | End: 2021-03-08

## 2020-08-20 ENCOUNTER — VIRTUAL VISIT (OUTPATIENT)
Dept: INTERNAL MEDICINE | Facility: CLINIC | Age: 30
End: 2020-08-20

## 2020-08-20 ENCOUNTER — TELEPHONE (OUTPATIENT)
Dept: FAMILY MEDICINE | Facility: CLINIC | Age: 30
End: 2020-08-20

## 2020-08-20 ENCOUNTER — ANCILLARY PROCEDURE (OUTPATIENT)
Dept: GENERAL RADIOLOGY | Facility: CLINIC | Age: 30
End: 2020-08-20
Attending: FAMILY MEDICINE

## 2020-08-20 DIAGNOSIS — J06.9 UPPER RESPIRATORY TRACT INFECTION, UNSPECIFIED TYPE: Primary | ICD-10-CM

## 2020-08-20 DIAGNOSIS — R05.9 COUGH: ICD-10-CM

## 2020-08-20 DIAGNOSIS — R05.9 COUGH: Primary | ICD-10-CM

## 2020-08-20 LAB
ANION GAP SERPL CALCULATED.3IONS-SCNC: 7 MMOL/L (ref 3–14)
BASOPHILS # BLD AUTO: 0.1 10E9/L (ref 0–0.2)
BASOPHILS NFR BLD AUTO: 0.7 %
BUN SERPL-MCNC: 11 MG/DL (ref 7–30)
CALCIUM SERPL-MCNC: 9 MG/DL (ref 8.5–10.1)
CHLORIDE SERPL-SCNC: 112 MMOL/L (ref 94–109)
CO2 SERPL-SCNC: 23 MMOL/L (ref 20–32)
CREAT SERPL-MCNC: <0.2 MG/DL (ref 0.52–1.04)
DIFFERENTIAL METHOD BLD: ABNORMAL
EOSINOPHIL # BLD AUTO: 0.2 10E9/L (ref 0–0.7)
EOSINOPHIL NFR BLD AUTO: 2.2 %
ERYTHROCYTE [DISTWIDTH] IN BLOOD BY AUTOMATED COUNT: 18.9 % (ref 10–15)
GFR SERPL CREATININE-BSD FRML MDRD: >90 ML/MIN/{1.73_M2}
GLUCOSE SERPL-MCNC: 96 MG/DL (ref 70–99)
HCT VFR BLD AUTO: 39.1 % (ref 35–47)
HGB BLD-MCNC: 12.4 G/DL (ref 11.7–15.7)
IMM GRANULOCYTES # BLD: 0 10E9/L (ref 0–0.4)
IMM GRANULOCYTES NFR BLD: 0.2 %
LYMPHOCYTES # BLD AUTO: 2.2 10E9/L (ref 0.8–5.3)
LYMPHOCYTES NFR BLD AUTO: 27 %
MCH RBC QN AUTO: 27.6 PG (ref 26.5–33)
MCHC RBC AUTO-ENTMCNC: 31.7 G/DL (ref 31.5–36.5)
MCV RBC AUTO: 87 FL (ref 78–100)
MONOCYTES # BLD AUTO: 0.5 10E9/L (ref 0–1.3)
MONOCYTES NFR BLD AUTO: 6.5 %
NEUTROPHILS # BLD AUTO: 5.2 10E9/L (ref 1.6–8.3)
NEUTROPHILS NFR BLD AUTO: 63.4 %
NRBC # BLD AUTO: 0 10*3/UL
NRBC BLD AUTO-RTO: 0 /100
PLATELET # BLD AUTO: 353 10E9/L (ref 150–450)
POTASSIUM SERPL-SCNC: 3.7 MMOL/L (ref 3.4–5.3)
RBC # BLD AUTO: 4.49 10E12/L (ref 3.8–5.2)
SODIUM SERPL-SCNC: 142 MMOL/L (ref 133–144)
WBC # BLD AUTO: 8.2 10E9/L (ref 4–11)

## 2020-08-20 PROCEDURE — 85025 COMPLETE CBC W/AUTO DIFF WBC: CPT | Performed by: FAMILY MEDICINE

## 2020-08-20 PROCEDURE — 80048 BASIC METABOLIC PNL TOTAL CA: CPT | Performed by: FAMILY MEDICINE

## 2020-08-20 PROCEDURE — 25000128 H RX IP 250 OP 636: Performed by: FAMILY MEDICINE

## 2020-08-20 RX ORDER — AZITHROMYCIN 250 MG/1
TABLET, FILM COATED ORAL
Qty: 6 TABLET | Refills: 0 | Status: SHIPPED | OUTPATIENT
Start: 2020-08-20 | End: 2020-08-25

## 2020-08-20 RX ORDER — HEPARIN SODIUM (PORCINE) LOCK FLUSH IV SOLN 100 UNIT/ML 100 UNIT/ML
5 SOLUTION INTRAVENOUS EVERY 8 HOURS
Status: DISCONTINUED | OUTPATIENT
Start: 2020-08-20 | End: 2020-08-28 | Stop reason: HOSPADM

## 2020-08-20 RX ADMIN — Medication 5 ML: at 13:17

## 2020-08-20 NOTE — TELEPHONE ENCOUNTER
Spoke to provider who would like a zpack sent to patients pharmacy and to follow up will clinic on how patient is feeling on Monday 8/24/20. Patient and patients mother state verbal understanding. Lynette De La O LPN 8/20/2020 2:29 PM

## 2020-08-20 NOTE — PROGRESS NOTES
"Resident video visit documentation      This patient is being evaluated via a billable video visit as an alternative to an in-person visit.       The patient has been notified of following:     \"This video visit will be conducted via a call between you and your physician/provider. We have found that certain health care needs can be provided without the need for an in-person physical exam.  This service lets us provide the care you need with a video conversation.  If a prescription is necessary we can send it directly to your pharmacy.  If lab work is needed we can place an order for that and you can then stop by our lab to have the test done at a later time. If during the course of the call the physician/provider feels a video visit is not appropriate, you will not be charged for this service.\"     Patient has given verbal consent for Video visit? Yes    Person spoken to:  Patient     Location of patient: (home,)  Location of physician resident: ( in home office):  Location of teaching physician: ( in clinic)  Department name:  Medicine  Mode of communication:  Video Conference via (specify: [employer])  Time video initiated:  07:56 AM  Time video ended:  08:15 AM  Total length of video visit  19 mins    Staffed with: Dr. guallpa                         [facility]       SUBJECTIVE:  Lanie Neil is a 30 year old female with a PMHx chronic progressive spinal muscular atrophy (c/b chronic respiratory failure requiring tracheostomy and chronic ventilation and PEG) with prior intrathecal spinal reservoir insertion in 2017 for adminsitration of spinraza, or nusinersen, for treatment of spinal muscular atrophy.  Placement of intrathecal reservoir was complicated by infection requiring reservoir revision who is presenting for 1 week follow up. Patient on last visit was diagnosed with URTI of 3 days and was given a prophylactic azithromycin for 5 days against pneumonia. patient reported that  She got little " better while she was on z-pack, but still having chest congestion and >baseline suctioning of clear secretions. Today she looked to me worse than last visit despite she said she feels little better. She was actively coughing a wet cough with obvious expiratory wheezing, but not in acute respiratory distress      Medications and allergies reviewed by me today.     ROS:   Constitutional, neuro, ENT, endocrine, pulmonary, cardiac, gastrointestinal, genitourinary, musculoskeletal, integument and psychiatric systems are negative, except as otherwise noted.    OBJECTIVE:    There were no vitals taken for this visit.   Wt Readings from Last 1 Encounters:   06/25/18 65.8 kg (145 lb)     Gen: NAD actively coughing  Resp: no tachypnea, expiratory wheezing, no use of accessory muscles      ASSESSMENT/PLAN:    Lanie was seen today for recheck medication.    Diagnoses and all orders for this visit:    Upper respiratory tract infection, unspecified type  ?CAP  Patient with chronic respiratory failure on trach with hx of recurrent pna, now with unresolved chest congestion, coughing and frequent suctioning concerning for ? drug-resistant pna. Patient was instructed that she needs to be evaluated IN-PERSON in either an urgent care clinic vs ED for blood tests and CXR.   Patient and her mother verbalized understanding.       Kandice Sandra MD  Internal Medicine PGY-2  Aug 20, 2020    Pt was seen and plan of care discussed with Dr. Malone     I reviewed the video visit encounter and discussed the patient with the resident and agree with the resident s assessment and plan of care as documented.    Jenna Malone MD

## 2020-08-20 NOTE — NURSING NOTE
Chief Complaint   Patient presents with     Recheck Medication     Pt would like to discuss medications      Bette Vargas, EMT at 7:51 AM sign on 8/20/2020

## 2020-08-20 NOTE — TELEPHONE ENCOUNTER
Patient came into [facility] to get labs and Xray. Xray orders were not placed. Waiting to hear from provider regarding if xrays are ok to place or if patient is to go to urgent care or ER. Lynette De La O LPN 8/20/2020 12:58 PM      Spoke to PCP and orders were placed for chest xray. Radiologist will review xray and hold patient if xrays are abnormal and call PCP. PCP has a spot in schedule today that would possibly be able to see patient if xrays are abnormal. Lyntete De La O LPN 8/20/2020 1:06 PM

## 2020-08-20 NOTE — TELEPHONE ENCOUNTER
[facility] Call Center    Phone Message    May a detailed message be left on voicemail: yes     Reason for Call: Order(s): Other: Send orders to [facility] for Lab draws through the patients port  [phone]    Reason for requested: Pt mom requesting this  Date needed: ASAP  Provider name: Dr Moselye      Action Taken: Message routed to:  [facility]    Travel Screening: Not Applicable

## 2020-08-24 ENCOUNTER — TELEPHONE (OUTPATIENT)
Dept: INTERNAL MEDICINE | Facility: CLINIC | Age: 30
End: 2020-08-24

## 2020-09-08 DIAGNOSIS — F32.A DEPRESSION, UNSPECIFIED DEPRESSION TYPE: ICD-10-CM

## 2020-09-08 DIAGNOSIS — G12.9 SPINAL MUSCLE ATROPHY (H): ICD-10-CM

## 2020-09-08 DIAGNOSIS — F41.9 ANXIETY: ICD-10-CM

## 2020-09-08 DIAGNOSIS — G62.9 NEUROPATHY: ICD-10-CM

## 2020-09-10 ENCOUNTER — MYC MEDICAL ADVICE (OUTPATIENT)
Dept: FAMILY MEDICINE | Facility: CLINIC | Age: 30
End: 2020-09-10

## 2020-09-10 RX ORDER — SERTRALINE HYDROCHLORIDE 20 MG/ML
50 SOLUTION ORAL DAILY
Qty: 75 ML | Refills: 5 | Status: SHIPPED | OUTPATIENT
Start: 2020-09-10 | End: 2021-03-04

## 2020-09-10 NOTE — TELEPHONE ENCOUNTER
sertraline (ZOLOFT) 20 MG/ML (HIGH CONC) solution       Last Written Prescription Date:  6/2/20  Last Fill Quantity: 75,   # refills: 2  Refilled x 6 months...    gabapentin (NEURONTIN) 250 MG/5ML solution       Last Written Prescription Date:  3/17/20  Last Fill Quantity: 1800,   # refills: 5     diazepam (VALIUM) 5 MG tablet       Last Written Prescription Date:  8/13/20  Last Fill Quantity: 30,   # refills: 0    Last Office Visit : 8/20/20  Future Office visit:  0    Routing refill request to provider for review/approval because:  Drug not on the [facility], [facility] or [facility] refill protocol or controlled substance

## 2020-09-11 RX ORDER — GABAPENTIN 250 MG/5ML
SOLUTION ORAL
Qty: 1800 ML | Refills: 5 | Status: SHIPPED | OUTPATIENT
Start: 2020-09-11 | End: 2021-03-05

## 2020-09-11 RX ORDER — DIAZEPAM 5 MG
5 TABLET ORAL EVERY 6 HOURS PRN
Qty: 30 TABLET | Refills: 0 | Status: SHIPPED | OUTPATIENT
Start: 2020-09-11 | End: 2020-10-19

## 2020-09-11 NOTE — TELEPHONE ENCOUNTER
Diazepam : last filled on 8/14/20 with 30 tabs per  data.  Gabapentin : last filled on 8/9 with 30 day supply per pharmacy.

## 2020-09-17 ENCOUNTER — VIRTUAL VISIT (OUTPATIENT)
Dept: HEMATOLOGY | Facility: CLINIC | Age: 30
End: 2020-09-17
Attending: PHYSICIAN ASSISTANT
Payer: COMMERCIAL

## 2020-09-17 DIAGNOSIS — Z86.718 HISTORY OF DEEP VENOUS THROMBOSIS: ICD-10-CM

## 2020-09-17 DIAGNOSIS — J96.10 CHRONIC RESPIRATORY FAILURE, UNSPECIFIED WHETHER WITH HYPOXIA OR HYPERCAPNIA (H): ICD-10-CM

## 2020-09-17 DIAGNOSIS — Z79.01 CHRONIC ANTICOAGULATION: Primary | ICD-10-CM

## 2020-09-17 PROCEDURE — 99212 OFFICE O/P EST SF 10 MIN: CPT | Mod: 95 | Performed by: PHYSICIAN ASSISTANT

## 2020-09-17 RX ORDER — AMINOCAPROIC ACID 0.25 G/ML
SYRUP ORAL
Qty: 2 BOTTLE | Refills: 5 | Status: SHIPPED | OUTPATIENT
Start: 2020-09-17 | End: 2022-07-12

## 2020-09-17 NOTE — PROGRESS NOTES
[facility]  [facility]  [address] [place] [address]  Main: [phone], Fax: [phone]    [facility]:    Patient: Lanie Neil  MRN: [id number]  : 1990  Date of this note written: 2020    Diagnosis:  1. History of DVT.  2. Chronic anticoagulation therapy.  3. History of iron deficiency anemia.     Orders for [facility]:  1. Please collect blood specimen for labs: 1) Complete blood count (CBC) AND 2) Comprehensive Metabolic Panel (CMP) with her next monthly port flush appointment.   2. Please fax results to [phone] attention: Jt Mir PA-C.     This order is faxed to [facility] at [phone]    Thank you.       Jt Mir PA-C, MPAS  Physician Assistant  [facility].

## 2020-09-17 NOTE — PATIENT INSTRUCTIONS
Lanie,    It was nice to see you via video visit today.    Below is what we have discussed:  1. Continue with Xarelto at 10 mg by mouth every 24 hours.   2. [facility] will collect your blood for labs with your next port-a-cath flushing.  3. Please call us if you need any invasive or surgical procedures or if you are experiencing any unusual bleeding issues.   4. One of our administrative assistants will contact you to schedule your return visit with us in one year.    Thank you in choosing our clinic as part of your healthcare team.      Jt Mir PA-C, MPAS  Physician Assistant  [facility].

## 2020-09-17 NOTE — PROGRESS NOTES
Patient was contacted to complete the pre-visit call prior to their video visit with the provider.  The following statement was read:       This visit will be billed to your insurance the same as an in-person visit. Because of Coronavirus we are instituting video visits when possible to keep everyone safe. This video visit will be conducted between you and the provider.  This service lets us provide the care you need with a video conversation.  If a prescription is necessary, we can send it directly to your pharmacy.If lab work or other testing is needed, we can help arrange a place/time for that to be done at a later date.If during the course of the call the provider feels a video visit is not appropriate, then your insurance company will not be billed.       Allergies and medications were reviewed and travel screening complete.     A test connection was Completed with Pt? No    I thanked them for their time to cover this information     Tevin Gallo CMA       somnolent, responds to questions

## 2020-09-17 NOTE — PROGRESS NOTES
[facility]  [address] [place] [address]  Main: [phone], Fax: [phone]    Patient seen at: [facility] at [address] [place] [address]    Video Virtual Visit Note:    Patient: Lanie Neil  MRN: [id number]  : 1990  JOSÉ MIGUEL: 2020    Due to the ongoing COVID-19 outbreak, this visit was conducted by video, with the patient's approval.    Physician has received verbal consent for a Video Visit from the patient? Yes  Patient logged in via [employer]  Video Start Time: 14:35    Reason of today's visit:  Spinal muscular atrophy. Wheelchair bound. History of DVT. Currently on long term anticoagulation therapy with Xarelto. Here for her routine annual follow up clinic visit.      Summary of Medical History:  This is a 30 year old female with a history of spinal muscular atrophy, who is chronically wheelchair bound, who moved from [place] to [place] back in May 2014, who has a history of DVT in the left leg, currently on long term anticoagulation therapy with Xarelto, participates in today's scheduled video visit for her annual follow up. I last saw this patient back in 2019, please see my initial consult note and previous progress notes for her complete detail history.       Briefly, Lanie has been wheelchair bound and immobilized for many years. Dated back in 2012, she was complaining of left leg swelling. At the time an ultrasound out at [place] ([facility]), found subacute/chronic occlusive thrombus in the mid to distal left femoral vein with collateralization. She was placed on Enoxaparin initially. About 2 weeks after she was started on Enoxaparin therapy, she was complaining of headache and a head CT at the time showed a subdural hematoma on 2012. She was admitted and Enoxaparin was held. The decision was made at the time to have an IVC filter placed, which was  done on 1/21/2012.       She eventually was restarted on Enoxaparin at a lowered dose and has maintain that until she was evaluated by a hematologist, Dr. Willy Box at [place]. In 9/11/2013, Dr. Box decided to stop the patient's Enoxaparin injections and started her on Xarelto at 20 mg PO Qday. The patient has since remained on Xarelto at this dose without any trouble.       During our visit with Lanie back in Aug 2014, we agreed that she should maintain on long term anticoagulation therapy given her chronic immobilization and high risk of recurrent thromboembolism. At the time, we did obtain approval from her health insurance company for Xarelto use at 20 mg PO Qday dosing. She has since remained on this dosing regimen.      Her mother usually crushes up her Xarelto and administer it via her J-tube.       Interim History:  During her visit with this writer back on 7/23/20219, we discussed decreasing her Xarelto to 10 mg PO Qday dosing given that she was experiencing menorrhagia. In the past year, she has been on Xarelto at 10 mg PO Qday dosing. Then in Sept 2019, she was found to be iron deficient and was anemic and thus we arranged for her to receive IV iron infusion with Injectafer. Repeat iron panel in Nov 2019 shows normal iron and ferritin with normal hgb.     She also uses celebrex for her dysmenorrhea and amicar for menorrhagia which according to Lanie are effective.     In the past year, she also has had multiple procedures done related to her spinal reservoir and catheter. We usually hold her Xarelto for about 48 hours and resume the next day and she does well with that.     ROS:  She denies any epistaxis. No oral mucosal bleeding. Denies any significant bruising. Denies any hematuria or blood in stools.     Medication, Allergies and PmHx:  All have been reviewed by this writer in the electronic medical records.    Social History and Family History:  Deferred.    Objective:  Pleasant in no  acute distress.  Visual Examination via Video:  Complete exam is not performed today.     Labs:  Component      Latest Ref Rng & Units 8/20/2020   WBC      4.0 - 11.0 10e9/L 8.2   RBC Count      3.8 - 5.2 10e12/L 4.49   Hemoglobin      11.7 - 15.7 g/dL 12.4   Hematocrit      35.0 - 47.0 % 39.1   MCV      78 - 100 fl 87   MCH      26.5 - 33.0 pg 27.6   MCHC      31.5 - 36.5 g/dL 31.7   RDW      10.0 - 15.0 % 18.9 (H)   Platelet Count      150 - 450 10e9/L 353   Diff Method       Automated Method   % Neutrophils      % 63.4   % Lymphocytes      % 27.0   % Monocytes      % 6.5   % Eosinophils      % 2.2   % Basophils      % 0.7   % Immature Granulocytes      % 0.2   Nucleated RBCs      0 /100 0   Absolute Neutrophil      1.6 - 8.3 10e9/L 5.2   Absolute Lymphocytes      0.8 - 5.3 10e9/L 2.2   Absolute Monocytes      0.0 - 1.3 10e9/L 0.5   Absolute Eosinophils      0.0 - 0.7 10e9/L 0.2   Absolute Basophils      0.0 - 0.2 10e9/L 0.1   Abs Immature Granulocytes      0 - 0.4 10e9/L 0.0   Absolute Nucleated RBC       0.0   Sodium      133 - 144 mmol/L 142   Potassium      3.4 - 5.3 mmol/L 3.7   Chloride      94 - 109 mmol/L 112 (H)   Carbon Dioxide      20 - 32 mmol/L 23   Anion Gap      3 - 14 mmol/L 7   Glucose      70 - 99 mg/dL 96   Urea Nitrogen      7 - 30 mg/dL 11   Creatinine      0.52 - 1.04 mg/dL <0.20 (L)   GFR Estimate      >60 mL/min/1.73:m2 >90   GFR Estimate If Black      >60 mL/min/1.73:m2 >90   Calcium      8.5 - 10.1 mg/dL 9.0       Assessment:  In summary, Lanie is a 30 year old female who has a history of spinal muscular atrophy, who is chronically wheelchair bound, who also has a history of DVT in the left leg, currently on long term anticoagulation therapy with Xarelto, participates in today's scheduled video visit for routine annual follow up. She has a history of iron deficiency anemia which likely was related menorrhagia. She has been using amicar for her menorrhagia and according to her is  decreasing her bleeding during her menstrual period. She also uses Celebrex for her dysmenorrhea which also is effective.      Diagnosis:  1. History of left lower extremity DVT.  2. On chronic anticoagulation therapy with Xarelto at 10 mg PO Qday.  3. Spinal muscular atrophy.  4. Chronic respiratory failure.    5. Chronically wheelchair bound.  6. Rosacea   7. Iron deficiency anemia.  8. Menorrhagia.     Plan:  No change in regard to her anticoagulation therapy. Will continue her on Xarelto at 10 mg PO Qday. I will refill her Amicar today. She still has enough Celebrex at this time. Will arrange for her to get a repeat CBC and CMP done by her homecare nursing staff with her next scheduled port-a-cath flushing.     She knows to contact our clinic if she should need any invasive or surgical procedures in the future or if she should have any unusual bleeding issues.     Otherwise, I will plan on seeing her back in one year.     Video-Visit Details:    Type of service:  Video Visit  Video End Time (time video stopped): 14:47  Originating Location (pt. Location): Home  Distant Location (provider location):  [facility]   Mode of Communication:  Video Conference via Maureen Mir PA-C, MPAS  Physician Assistant  [facility].

## 2020-09-18 ENCOUNTER — MYC REFILL (OUTPATIENT)
Dept: FAMILY MEDICINE | Facility: CLINIC | Age: 30
End: 2020-09-18

## 2020-09-18 DIAGNOSIS — G12.9 SPINAL MUSCLE ATROPHY (H): ICD-10-CM

## 2020-09-21 RX ORDER — ALPRAZOLAM 0.25 MG
TABLET ORAL
Qty: 60 TABLET | Refills: 1 | Status: SHIPPED | OUTPATIENT
Start: 2020-09-21 | End: 2020-12-08

## 2020-09-21 NOTE — TELEPHONE ENCOUNTER
Patient Requested  ALPRAZolam (XANAX) 0.25 MG tablet    Last Filled  08/03/2020  Last Office Visit  08/20/2020-resident  Next Office Visit  Nothing scheduled   Checked  09/21/2020       DX: Spinal muscle atrophy      Pharmacy:   [id number] - [place], [place] - [address]        LILLIAM DAMICO CMA at 10:17 AM on 9/21/2020.

## 2020-09-27 ENCOUNTER — VIRTUAL VISIT (OUTPATIENT)
Dept: FAMILY MEDICINE | Facility: OTHER | Age: 30
End: 2020-09-27

## 2020-09-27 ENCOUNTER — MYC MEDICAL ADVICE (OUTPATIENT)
Dept: FAMILY MEDICINE | Facility: CLINIC | Age: 30
End: 2020-09-27

## 2020-09-27 DIAGNOSIS — R50.9 FEVER: ICD-10-CM

## 2020-09-27 DIAGNOSIS — N39.0 URINARY TRACT INFECTION: Primary | ICD-10-CM

## 2020-09-27 NOTE — PROGRESS NOTES
"Date: 2020 09:46:02  Clinician: Leidy Washington  Clinician NPI: [id number]  Patient: Lanie Neil  Patient : 1990  Patient Address: [address] [place] [id number]  Patient Phone: [phone]  Visit Protocol: URI  Patient Summary:  Lanie is a 30 year old ( : 1990 ) female who initiated a [facility] Visit for COVID-19 (Coronavirus) evaluation and screening. When asked the question \"Please sign me up to receive news, health information and promotions. \", Lanie responded \"No\".    Lanie states her symptoms started 1-2 days ago.   Her symptoms consist of nasal congestion, myalgia, chills, and malaise. Lanie also feels feverish.   Symptom details     Nasal secretions: The color of her mucus is white.    Temperature: Her current temperature is 102.3 degrees Fahrenheit. Lanie has had a temperature over 100 degrees Fahrenheit for 1-2 days.      Lanie denies having cough, headache, ear pain, anosmia, vomiting, rhinitis, nausea, wheezing, facial pain or pressure, sore throat, teeth pain, ageusia, and diarrhea. She also denies having recent facial or sinus surgery in the past 60 days. She is not experiencing dyspnea.   Precipitating events  She has not recently been exposed to someone with influenza. Lanie has been in close contact with the following high risk individuals: immunocompromised people.   Pertinent COVID-19 (Coronavirus) information  In the past 14 days, Lanie has not worked in a congregate living setting.   She does not work or volunteer as healthcare worker or a  and does not work or volunteer in a healthcare facility.   Lanie also has not lived in a congregate living setting in the past 14 days. She lives with a healthcare worker.   Lanie has not had a close contact with a laboratory-confirmed COVID-19 patient within 14 days of symptom onset.   Since 2019, Lanie and has had upper respiratory infection (URI) or influenza-like illness. Has " not been diagnosed with lab-confirmed COVID-19 test      Date(s) of previous URI or influenza-like illness (free-text): beginning of August     Symptoms Lanie experienced during previous URI or influenza-like illness as reported by the patient (free-text): congestion runny nose, cough        Triage Point(s) temporarily suspended for COVID-19 (Coronavirus) screening  Lanie reported the following symptoms which were previously protocol referral points. These protocol referral points have temporarily been removed for purposes of COVID-19 (Coronavirus) screening.   Temperature &gt; 102. Current temperature: 102.3    Pertinent medical history  Lanie has taken an antibiotic medication in the past month. Antibiotic details as reported by the patient (free text): zithromycin and i took for congestion   Lanie does not get yeast infections when she takes antibiotics.   Lanie does not need a return to work/school note.   Weight: 150 lbs   Lanie does not smoke or use smokeless tobacco.   She denies pregnancy and denies breastfeeding. She is currently menstruating.   Additional information as reported by the patient (free text): I have SMA and i am on a vent 24/7,   Weight: 150 lbs    MEDICATIONS: diazepam oral, Xarelto oral, Celebrex oral, Tylenol Extra Strength oral, sertraline oral, potassium chloride oral, Xanax oral, Felipa oral, Neurontin oral, Dilaudid oral, ALLERGIES: NKDA  Clinician Response:  Dear Lanie,   Your symptoms show that you may have coronavirus (COVID-19). This illness can cause fever, cough and trouble breathing. Many people get a mild case and get better on their own. Some people can get very sick.  What should I do?  We would like to test you for this virus.   1. Please call [phone] to schedule your visit. Explain that you were referred by [facility] to have a COVID-19 test. Be ready to share your [facility] visit ID number.  The following will serve as your written order for this COVID Test,  "ordered by me, for the indication of suspected COVID [Z20.828]: The test will be ordered in [employer], our electronic health record, after you are scheduled. It will show as ordered and authorized by Ramin Burciaga MD.  Order: COVID-19 (Coronavirus) PCR [facility]IC [facility].      2. When it's time for your COVID test:  Stay at least 6 feet away from others. (If someone will drive you to your test, stay in the backseat, as far away from the  as you can.)   Cover your mouth and nose with a mask, tissue or washcloth.  Go straight to the testing site. Don't make any stops on the way there or back.      3.Starting now: Stay home and away from others (self-isolate) until:   You've had no fever---and no medicine that reduces fever---for one full day (24 hours). And...   Your other symptoms have gotten better. For example, your cough or breathing has improved. And...   At least 10 days have passed since your symptoms started.       During this time, don't leave the house except for testing or medical care.   Stay in your own room, even for meals. Use your own bathroom if you can.   Stay away from others in your home. No hugging, kissing or shaking hands. No visitors.  Don't go to work, school or anywhere else.    Clean \"high touch\" surfaces often (doorknobs, counters, handles, etc.). Use a household cleaning spray or wipes. You'll find a full list of  on the EPA website: www.epa.gov/pesticide-registration/list-n-disinfectants-use-against-sars-cov-2.   Cover your mouth and nose with a mask, tissue or washcloth to avoid spreading germs.  Wash your hands and face often. Use soap and water.  Caregivers in these groups are at risk for severe illness due to COVID-19:  o People 65 years and older  o People who live in a nursing home or long-term care facility  o People with chronic disease (lung, heart, cancer, diabetes, kidney, liver, immunologic)  o People who have a weakened immune system, including those " who:   Are in cancer treatment  Take medicine that weakens the immune system, such as corticosteroids  Had a bone marrow or organ transplant  Have an immune deficiency  Have poorly controlled HIV or AIDS  Are obese (body mass index of 40 or higher)  Smoke regularly   o Caregivers should wear gloves while washing dishes, handling laundry and cleaning bedrooms and bathrooms.  o Use caution when washing and drying laundry: Don't shake dirty laundry, and use the warmest water setting that you can.  o For more tips, go to www.cdc.gov/coronavirus/2019-ncov/downloads/10Things.pdf.    4.Sign up for [employer]. We know it's scary to hear that you might have COVID-19. We want to track your symptoms to make sure you're okay over the next 2 weeks. Please look for an email from [employer]---this is a free, online program that we'll use to keep in touch. To sign up, follow the link in the email. Learn more at [email or web address]  How can I take care of myself?   Get lots of rest. Drink extra fluids (unless a doctor has told you not to).   Take Tylenol (acetaminophen) for fever or pain. If you have liver or kidney problems, ask your family doctor if it's okay to take Tylenol.   Adults can take either:    650 mg (two 325 mg pills) every 4 to 6 hours, or...   1,000 mg (two 500 mg pills) every 8 hours as needed.    Note: Don't take more than 3,000 mg in one day. Acetaminophen is found in many medicines (both prescribed and over-the-counter medicines). Read all labels to be sure you don't take too much.   For children, check the Tylenol bottle for the right dose. The dose is based on the child's age or weight.    If you have other health problems (like cancer, heart failure, an organ transplant or severe kidney disease): Call your specialty clinic if you don't feel better in the next 2 days.       Know when to call 911. Emergency warning signs include:    Trouble breathing or shortness of breath Pain or pressure in  the chest that doesn't go away Feeling confused like you haven't felt before, or not being able to wake up Bluish-colored lips or face.  Where can I get more information?   [facility] -- About COVID-19: [email or web address]   CDC -- What to Do If You're Sick: www.cdc.gov/coronavirus/2019-ncov/about/steps-when-sick.html   CDC -- Ending Home Isolation: www.cdc.gov/coronavirus/2019-ncov/hcp/disposition-in-home-patients.html   [facility] -- Caring for Someone: www.cdc.gov/coronavirus/2019-ncov/if-you-are-sick/care-for-someone.html   [facility] -- Interim Guidance for Hospital Discharge to Home: www.health.Pending sale to [facility].[place]./diseases/coronavirus/hcp/hospdischarge.pdf   [facility] clinical trials (COVID-19 research studies): clinicalaffairs.[facility]-clinical-trials    Below are the COVID-19 hotlines at the [facility] of Health ([facility]). Interpreters are available.    For health questions: Call 530-967-1295 or 1-126.976.9898 (7 a.m. to 7 p.m.) For questions about schools and childcare: Call 871-101-3513 or 1-279.244.7648 (7 a.m. to 7 p.m.)    Diagnosis: Myalgia, unspecified site  Diagnosis ICD: M79.10

## 2020-09-28 ENCOUNTER — VIRTUAL VISIT (OUTPATIENT)
Dept: INTERNAL MEDICINE | Facility: CLINIC | Age: 30
End: 2020-09-28

## 2020-09-28 ENCOUNTER — TELEPHONE (OUTPATIENT)
Dept: FAMILY MEDICINE | Facility: CLINIC | Age: 30
End: 2020-09-28

## 2020-09-28 VITALS — TEMPERATURE: 97.5 F

## 2020-09-28 DIAGNOSIS — D64.9 ANEMIA, UNSPECIFIED TYPE: ICD-10-CM

## 2020-09-28 DIAGNOSIS — R50.9 FEVER AND CHILLS: Primary | ICD-10-CM

## 2020-09-28 DIAGNOSIS — T83.511A URINARY TRACT INFECTION ASSOCIATED WITH INDWELLING URETHRAL CATHETER, INITIAL ENCOUNTER (H): ICD-10-CM

## 2020-09-28 DIAGNOSIS — N39.0 URINARY TRACT INFECTION ASSOCIATED WITH INDWELLING URETHRAL CATHETER, INITIAL ENCOUNTER (H): ICD-10-CM

## 2020-09-28 DIAGNOSIS — Z20.822 SUSPECTED 2019 NOVEL CORONAVIRUS INFECTION: Primary | ICD-10-CM

## 2020-09-28 NOTE — NURSING NOTE
Chief Complaint   Patient presents with     UTI     pt would like to discuss possible uti since friday with fever, fever better today       Marla Willoughby CMA, EMT at 1:02 PM on 9/28/2020.

## 2020-09-28 NOTE — PROGRESS NOTES
"Lanie Neil is a 30 year old female who is being evaluated via a billable video visit.      The patient has been notified of following:     \"This video visit will be conducted via a call between you and your physician/provider. We have found that certain health care needs can be provided without the need for an in-person physical exam.  This service lets us provide the care you need with a video conversation.  If a prescription is necessary we can send it directly to your pharmacy.  If lab work is needed we can place an order for that and you can then stop by our lab to have the test done at a later time.    Video visits are billed at different rates depending on your insurance coverage.  Please reach out to your insurance provider with any questions.    If during the course of the call the physician/provider feels a video visit is not appropriate, you will not be charged for this service.\"    Patient has given verbal consent for Video visit? Yes  How would you like to obtain your AVS? MyChart  If you are dropped from the video visit, the video invite should be resent to: Text to cell phone: [phone]  Will anyone else be joining your video visit? No    Subjective     Lanie Neil is a 30 year old female who presents today via video visit for the following health issues:  Fever to 102.9 last night    Ms. Gutierres has SMA and is wheelchair bound with trach, port and indwelling fischer. No recent UTIs but had a bronchitis treated with azithro a month ago. Now with fever overnight. Better today. NO NEW SYMPTOMS. She denies a litany of symtpoms- no cough, sob, sputum, dysuria, rash, port pain, headache, stool changes. No urgency/freq but has Fischer. Has her period now so has usual pain with cycles. Home urine test LE ++ nitrite --    Vitals:  No vitals were obtained today due to virtual visit.    Physical Exam     GENERAL: In bed with trach, alert and no distress  EYES: Eyes grossly normal to inspection.  No " discharge or erythema, or obvious scleral/conjunctival abnormalities.  RESP: No audible wheeze, cough, or visible cyanosis.  No visible retractions or increased work of breathing.    SKIN: Visible skin clear. No significant rash, abnormal pigmentation or lesions.  NEURO: Cranial nerves grossly intact.  Mentation and speech appropriate for age.  PSYCH: Mentation appears normal, affect normal/bright, judgement and insight intact, normal speech and appearance well-groomed. Mom is there with her      Labs and notes from [facility] reviewed        A/P) 1) Fever without a source. Given trach, Fischer, and port need to round up the usual suspects. Check blood cx X2 including port and peripheral, UA/UC, and a procal/CBC. Hold on Abx for now but if fever recurs or symptoms localize to bladder, would add tmp/smx ds bid until culture back. Home nursing to help with this.     ADDENDUM: UA with pyuria and culture pending. GIven symptoms in setting of fischer, port, trach..... will give cipro 500 bid for 7d.     Hgb low at 8.9. Heavy periods but MCV 88 which makes Fe def less likely. No bleeding. No h/o hemolysis known - will need to repeat and if still low, get peripheral smear and retic count to start.     Over 15 min of 25 min visit spent in care coordination and counseling related to the above issues.    Herve Valenzuela MD, FACP, FAAP

## 2020-09-28 NOTE — TELEPHONE ENCOUNTER
Health Call Center    Phone Message    May a detailed message be left on voicemail: yes     Reason for Call: Other: Ariana is calling from [facility] for order clarification. She said she got a call from the patient's mother telling her they should have got lab orders from Dr. Valenzuela today before 3:30pm. however the order are in epic but the pediatric home [facility] service didn't receive any orders please reach out to address this concerns with orders  if they are supposed to receive orders or not if so please Fax them to Fax [phone] and call to address any questions or concerns thank you.     Action Taken: Message routed to:  [facility]    Travel Screening: Not Applicable

## 2020-09-28 NOTE — TELEPHONE ENCOUNTER
Ideal would be this  1-get sample for ua/uc re; fever  2-once sample obtained start presumptive antibiotic for uti (bactrim ds one po bid for one week)  3-ideally would do covid test due to fever but if she'd prefer to just pursure urine infection first she can, ask her.

## 2020-09-28 NOTE — TELEPHONE ENCOUNTER
Lab orders were faxed to them at [phone].    Called Ariana from pediatric [facility]:  Confirmed the fax number was correct compared to fax number below and they received the lab orders.  They are unable to obtain these labs today so will get them tomorrow.  Ariana report Hx of difficulty venipuncture for this patient.  If venipuncture is unsuccessful, instruct patient to get them drawn at a  clinic or here. Ariana gave verbal understanding and will relay message to pt's mother.      Remigio Becerra CMA ([facility]) at 4:54 PM on 9/28/2020

## 2020-09-29 ENCOUNTER — HOSPITAL LABORATORY (OUTPATIENT)
Dept: OTHER | Facility: CLINIC | Age: 30
End: 2020-09-29

## 2020-09-29 DIAGNOSIS — R50.9 FEVER AND CHILLS: ICD-10-CM

## 2020-09-29 LAB
ALBUMIN UR-MCNC: 10 MG/DL
APPEARANCE UR: ABNORMAL
BACTERIA #/AREA URNS HPF: ABNORMAL /HPF
BASOPHILS # BLD AUTO: 0 10E9/L (ref 0–0.2)
BASOPHILS NFR BLD AUTO: 0.6 %
BILIRUB UR QL STRIP: NEGATIVE
COLOR UR AUTO: ABNORMAL
DIFFERENTIAL METHOD BLD: ABNORMAL
EOSINOPHIL # BLD AUTO: 0.1 10E9/L (ref 0–0.7)
EOSINOPHIL NFR BLD AUTO: 3 %
ERYTHROCYTE [DISTWIDTH] IN BLOOD BY AUTOMATED COUNT: 18.8 % (ref 10–15)
GLUCOSE UR STRIP-MCNC: NEGATIVE MG/DL
HCT VFR BLD AUTO: 29.1 % (ref 35–47)
HGB BLD-MCNC: 8.9 G/DL (ref 11.7–15.7)
HGB UR QL STRIP: ABNORMAL
IMM GRANULOCYTES # BLD: 0 10E9/L (ref 0–0.4)
IMM GRANULOCYTES NFR BLD: 0.4 %
KETONES UR STRIP-MCNC: NEGATIVE MG/DL
LEUKOCYTE ESTERASE UR QL STRIP: ABNORMAL
LYMPHOCYTES # BLD AUTO: 1.6 10E9/L (ref 0.8–5.3)
LYMPHOCYTES NFR BLD AUTO: 33.2 %
MCH RBC QN AUTO: 27 PG (ref 26.5–33)
MCHC RBC AUTO-ENTMCNC: 30.6 G/DL (ref 31.5–36.5)
MCV RBC AUTO: 88 FL (ref 78–100)
MONOCYTES # BLD AUTO: 0.5 10E9/L (ref 0–1.3)
MONOCYTES NFR BLD AUTO: 11.2 %
MUCOUS THREADS #/AREA URNS LPF: PRESENT /LPF
NEUTROPHILS # BLD AUTO: 2.4 10E9/L (ref 1.6–8.3)
NEUTROPHILS NFR BLD AUTO: 51.6 %
NITRATE UR QL: NEGATIVE
NRBC # BLD AUTO: 0 10*3/UL
NRBC BLD AUTO-RTO: 0 /100
PH UR STRIP: 7.5 PH (ref 5–7)
PLATELET # BLD AUTO: 280 10E9/L (ref 150–450)
PROLACTIN SERPL-MCNC: 20 UG/L (ref 3–27)
RBC # BLD AUTO: 3.3 10E12/L (ref 3.8–5.2)
RBC #/AREA URNS AUTO: 2 /HPF (ref 0–2)
SOURCE: ABNORMAL
SP GR UR STRIP: 1 (ref 1–1.03)
TRANS CELLS #/AREA URNS HPF: 1 /HPF (ref 0–1)
UROBILINOGEN UR STRIP-MCNC: NORMAL MG/DL (ref 0–2)
WBC # BLD AUTO: 4.7 10E9/L (ref 4–11)
WBC #/AREA URNS AUTO: 89 /HPF (ref 0–5)

## 2020-09-30 ENCOUNTER — TELEPHONE (OUTPATIENT)
Dept: FAMILY MEDICINE | Facility: CLINIC | Age: 30
End: 2020-09-30

## 2020-09-30 ENCOUNTER — TELEPHONE (OUTPATIENT)
Dept: INTERNAL MEDICINE | Facility: CLINIC | Age: 30
End: 2020-09-30

## 2020-09-30 LAB
BACTERIA SPEC CULT: NORMAL
SPECIMEN SOURCE: NORMAL

## 2020-09-30 RX ORDER — CIPROFLOXACIN 500 MG/1
500 TABLET, FILM COATED ORAL 2 TIMES DAILY
Qty: 14 TABLET | Refills: 0 | Status: SHIPPED | OUTPATIENT
Start: 2020-09-30 | End: 2020-10-07

## 2020-09-30 NOTE — TELEPHONE ENCOUNTER
Noted. Mychart message from patient was sent on to PCP to advise on next step. Lynette De La O LPN 9/30/2020 12:48 PM

## 2020-09-30 NOTE — TELEPHONE ENCOUNTER
[facility] Call Center    Phone Message    May a detailed message be left on voicemail: yes     Reason for Call: Order(s): Other: peripheral labs- pt couldn't get any labs done because her veins had a hard time getting any blood. They did however get blood out of her port.They did try getting blood but enough came out to test out of the peripheral labs. Also her fever broke on Monday. They did a urine test as well at home and it is clean.  Provider name: Dr Valenzuela      Action Taken: Message routed to:  [facility]    Travel Screening: Not Applicable

## 2020-10-05 LAB
BACTERIA SPEC CULT: NO GROWTH
BACTERIA SPEC CULT: NO GROWTH
Lab: NORMAL
Lab: NORMAL
SPECIMEN SOURCE: NORMAL
SPECIMEN SOURCE: NORMAL

## 2020-10-19 ENCOUNTER — VIRTUAL VISIT (OUTPATIENT)
Dept: FAMILY MEDICINE | Facility: CLINIC | Age: 30
End: 2020-10-19
Payer: COMMERCIAL

## 2020-10-19 ENCOUNTER — TELEPHONE (OUTPATIENT)
Dept: INTERNAL MEDICINE | Facility: CLINIC | Age: 30
End: 2020-10-19

## 2020-10-19 DIAGNOSIS — G12.9 SPINAL MUSCLE ATROPHY (H): ICD-10-CM

## 2020-10-19 DIAGNOSIS — D64.9 ANEMIA, UNSPECIFIED TYPE: Primary | ICD-10-CM

## 2020-10-19 PROCEDURE — 99214 OFFICE O/P EST MOD 30 MIN: CPT | Mod: 95 | Performed by: FAMILY MEDICINE

## 2020-10-19 RX ORDER — DIAZEPAM 5 MG
5 TABLET ORAL EVERY 6 HOURS PRN
Qty: 30 TABLET | Refills: 5 | Status: SHIPPED | OUTPATIENT
Start: 2020-10-19 | End: 2021-03-05

## 2020-10-19 NOTE — PROGRESS NOTES
"Oct 28  Blood draw HHN before; h/o low K, hgb  Will arrange labs at home before   Lanie Neil is a 30 year old female who is being evaluated via a billable video visit.      The patient has been notified of following:     \"This video visit will be conducted via a call between you and your physician/provider. We have found that certain health care needs can be provided without the need for an in-person physical exam.  This service lets us provide the care you need with a video conversation.  If a prescription is necessary we can send it directly to your pharmacy.  If lab work is needed we can place an order for that and you can then stop by our lab to have the test done at a later time.    Video visits are billed at different rates depending on your insurance coverage.  Please reach out to your insurance provider with any questions.    If during the course of the call the physician/provider feels a video visit is not appropriate, you will not be charged for this service.\"    Patient has given verbal consent for Video visit? Yes  How would you like to obtain your AVS? MyChart  If you are dropped from the video visit, the video invite should be resent to:   Will anyone else be joining your video visit? no    Subjective     Lanie Neil is a 30 year old female who presents today via video visit for the following health issues:    HPI     1-Preop  October 28  eLsa O[facility]endo for spinal muscle atrophy, repeat injection    2-anemia f/u; on xareltoa, gets amicar during menses, some are heavy, not anemic in August, was in Sept w/o obvious other bleeding or change to cause, is on xarelto (knows to hold day before and day of surgery), just saw heme a month ago again here in followup    3-Fever, late Sept, source not clearly found, went away after three days, no associated symptoms, did get atbx    Past Medical History:   Diagnosis Date     Anxiety      Snoring      Spinal muscle atrophy (H)      Past " Surgical History:   Procedure Laterality Date     ENT SURGERY      tubes     GASTROSTOMY TUBE       KIDNEY SURGERY       SPINE SURGERY       TRACHEOSTOMY       Current Outpatient Medications   Medication     ACETAMINOPHEN PO     albuterol (ALBUTEROL) 108 (90 BASE) MCG/ACT inhaler     albuterol (PROVENTIL) (2.5 MG/3ML) 0.083% neb solution     ALPRAZolam (XANAX) 0.25 MG tablet     aminocaproic acid (AMICAR) 0.25 GM/ML solution     bisacodyl (DULCOLAX) 10 MG suppository     celecoxib (CELEBREX) 200 MG capsule     clindamycin (CLEOCIN T) 1 % lotion     diazepam (VALIUM) 5 MG tablet     doxycycline hyclate (VIBRAMYCIN) 100 MG capsule     ferrous sulfate 220 (44 Fe) MG/5ML ELIX     flunisolide HFA (AEROSPAN) 80 MCG/ACT AERS oral inhaler     gabapentin (NEURONTIN) 250 MG/5ML solution     GENERLAC 10 GM/15ML solution     guaiFENesin (ORGANIDIN) 200 MG TABS tablet     hydrOXYzine (ATARAX) 25 MG tablet     LANsoprazole (PREVACID) 30 MG DR capsule     Magnesium Hydroxide (MILK OF MAGNESIA PO)     metroNIDAZOLE (METROCREAM) 0.75 % external cream     mometasone (NASONEX) 50 MCG/ACT nasal spray     naloxone (NARCAN) 4 MG/0.1ML nasal spray     Nutritional Supplements (REPLETE FIBER) LIQD     omeprazole (PRILOSEC) 2 mg/mL suspension     order for DME     order for DME     polyethylene glycol (MIRALAX/GLYCOLAX) powder     potassium chloride (KAYCIEL) 20 MEQ/15ML (10%) solution     prochlorperazine (COMPAZINE) 5 MG tablet     rivaroxaban ANTICOAGULANT (XARELTO ANTICOAGULANT) 10 MG TABS tablet     Salicylic Acid (OXY BALANCE FACIAL CLEAN WASH EX)     Sennosides (SENNA) 8.8 MG/5ML LIQD     sertraline (ZOLOFT) 20 MG/ML (HIGH CONC) solution     simethicone (MYLICON) 66.7 mg/ml     simethicone 40 MG/0.6ML LIQD     sodium chloride (OCEAN) 0.65 % nasal spray     No current facility-administered medications for this visit.      No Known Allergies  No personal or FH easy bleeding or anesthesia reaction    H/o low K micha recheck before  surgery    Ten pt ROS completed o/w negative for acute issues        Video Start Time: 8        Review of Systems         Objective           Vitals:  No vitals were obtained today due to virtual visit.    Physical Exam     GENERAL: Healthy, alert and no distress  EYES: Eyes grossly normal to inspection.  No discharge or erythema, or obvious scleral/conjunctival abnormalities.  RESP: No audible wheeze, cough, or visible cyanosis.  No visible retractions or increased work of breathing.    SKIN: Visible skin clear. No significant rash, abnormal pigmentation or lesions.  NEURO: Cranial nerves grossly intact.  Mentation and speech appropriate for age.  PSYCH: Mentation appears normal, affect normal/bright, judgement and insight intact, normal speech and appearance well-groomed.    Preop  Low risk cardiopulmonary risk for low risk procedure  Hold xarelto day before/day of surgery   Labs as below  Otherwise cleared for surgery    H/o low potassium; recheck   H/o low hgb last mo no clear explanation, recheck  These will be done before surgery by home health    Fever three weeks ago, none since, no  F/u needed at this time              Video-Visit Details    Type of service:  Video Visit    Video End Time:8:30    Originating Location (pt. Location): Home    Distant Location (provider location):  [facility]     Platform used for Video Visit: Boris Moseley MD

## 2020-10-19 NOTE — TELEPHONE ENCOUNTER
Lab orders were faxed to [facility]. They will fax us the results.    Soon-Mi  -----------------------------------------------------------------        ----- Message from Jimmy Moseley MD sent at 10/19/2020  8:26 AM CDT -----  She's having surgery Oct 28 Lesa  I put lab orders in just now at virtual preop  Can you call her to have her home nurse do labs at home and get results to me

## 2020-10-19 NOTE — NURSING NOTE
Chief Complaint   Patient presents with     Recheck Medication     Discuss results      Kimberly Nissen, EMT at 7:46 AM on 10/19/2020    Video Visit Technology for this patient: Boris Video Visit- Patient was left in waiting room

## 2020-10-21 ENCOUNTER — TRANSFERRED RECORDS (OUTPATIENT)
Dept: HEALTH INFORMATION MANAGEMENT | Facility: CLINIC | Age: 30
End: 2020-10-21

## 2020-10-21 ENCOUNTER — HOSPITAL LABORATORY (OUTPATIENT)
Dept: OTHER | Facility: CLINIC | Age: 30
End: 2020-10-21

## 2020-10-21 LAB
ANION GAP SERPL CALCULATED.3IONS-SCNC: 7 MMOL/L (ref 3–14)
BASOPHILS # BLD AUTO: 0 10E9/L (ref 0–0.2)
BASOPHILS NFR BLD AUTO: 0.6 %
BUN SERPL-MCNC: 8 MG/DL (ref 7–30)
CALCIUM SERPL-MCNC: 9.2 MG/DL (ref 8.5–10.1)
CHLORIDE SERPL-SCNC: 108 MMOL/L (ref 94–109)
CO2 SERPL-SCNC: 22 MMOL/L (ref 20–32)
CREAT SERPL-MCNC: 0.17 MG/DL (ref 0.52–1.04)
DIFFERENTIAL METHOD BLD: ABNORMAL
EOSINOPHIL # BLD AUTO: 0.3 10E9/L (ref 0–0.7)
EOSINOPHIL NFR BLD AUTO: 4.6 %
ERYTHROCYTE [DISTWIDTH] IN BLOOD BY AUTOMATED COUNT: 20.5 % (ref 10–15)
FERRITIN SERPL-MCNC: 31 NG/ML (ref 12–150)
FOLATE SERPL-MCNC: 28 NG/ML
GFR SERPL CREATININE-BSD FRML MDRD: >90 ML/MIN/{1.73_M2}
GLUCOSE SERPL-MCNC: 80 MG/DL (ref 70–99)
HCT VFR BLD AUTO: 36.9 % (ref 35–47)
HGB BLD-MCNC: 10.9 G/DL (ref 11.7–15.7)
IMM GRANULOCYTES # BLD: 0 10E9/L (ref 0–0.4)
IMM GRANULOCYTES NFR BLD: 0.2 %
IRON SATN MFR SERPL: 24 % (ref 15–46)
IRON SERPL-MCNC: 81 UG/DL (ref 35–180)
LYMPHOCYTES # BLD AUTO: 2.2 10E9/L (ref 0.8–5.3)
LYMPHOCYTES NFR BLD AUTO: 33.4 %
MCH RBC QN AUTO: 26.5 PG (ref 26.5–33)
MCHC RBC AUTO-ENTMCNC: 29.5 G/DL (ref 31.5–36.5)
MCV RBC AUTO: 90 FL (ref 78–100)
MONOCYTES # BLD AUTO: 0.4 10E9/L (ref 0–1.3)
MONOCYTES NFR BLD AUTO: 6.5 %
NEUTROPHILS # BLD AUTO: 3.6 10E9/L (ref 1.6–8.3)
NEUTROPHILS NFR BLD AUTO: 54.7 %
NRBC # BLD AUTO: 0 10*3/UL
NRBC BLD AUTO-RTO: 0 /100
PLATELET # BLD AUTO: 320 10E9/L (ref 150–450)
POTASSIUM SERPL-SCNC: 3.3 MMOL/L (ref 3.4–5.3)
RBC # BLD AUTO: 4.12 10E12/L (ref 3.8–5.2)
RETICS # AUTO: 185.8 10E9/L (ref 25–95)
RETICS/RBC NFR AUTO: 4.5 % (ref 0.5–2)
SODIUM SERPL-SCNC: 138 MMOL/L (ref 133–144)
TIBC SERPL-MCNC: 337 UG/DL (ref 240–430)
VIT B12 SERPL-MCNC: 1495 PG/ML (ref 193–986)
WBC # BLD AUTO: 6.6 10E9/L (ref 4–11)

## 2020-10-22 ENCOUNTER — TELEPHONE (OUTPATIENT)
Dept: INTERNAL MEDICINE | Facility: CLINIC | Age: 30
End: 2020-10-22

## 2020-10-22 DIAGNOSIS — E87.6 HYPOKALEMIA: Primary | ICD-10-CM

## 2020-10-22 NOTE — TELEPHONE ENCOUNTER
Spoke with mom and informed the results/recommendations. Pt is currently taking potassium 30 mEq/day, will add the recommended dosage and recheck the lab on 10/26.     I spoke with Lurdes at [facility] and informed this matter.  Lab order will be faxed to [facility].    Soon-Mi  ----------------------------------------------------------        ----- Message from Jimmy Moseley MD sent at 10/22/2020  9:07 AM CDT -----  Preop labs showed low K  Can you call and see if on K or not  Needs to take K  20 meq today then 10 meq a day after that    If on K now add this; if not on K now start this    Redo lab Monday if can, just K level only

## 2020-10-26 ENCOUNTER — TRANSFERRED RECORDS (OUTPATIENT)
Dept: HEALTH INFORMATION MANAGEMENT | Facility: CLINIC | Age: 30
End: 2020-10-26

## 2020-10-27 ENCOUNTER — TELEPHONE (OUTPATIENT)
Dept: FAMILY MEDICINE | Facility: CLINIC | Age: 30
End: 2020-10-27

## 2020-10-27 NOTE — TELEPHONE ENCOUNTER
I called pt's mom.  She forgot to increase the potassium dosage that I instructed last week and pt just started taking potassium 40 meq this morning. That's why the potassium level is still low.  Per Dr. Moseley, pt needs to take 20 meq at bed time tonight (so total 60 meq today) and tomorrow morning. Pt will recheck the lab tomorrow morning when she goes to the procedure place. Mom already talked to the procedure doctor regarding this matter.

## 2020-10-27 NOTE — TELEPHONE ENCOUNTER
[facility] Call Center    Phone Message    May a detailed message be left on voicemail: yes     Reason for Call: Other: Treasure calling to let Dr Moseley know about the Potassium is trending downward. 3.0     Action Taken: Message routed to:  [facility]    Travel Screening: Not Applicable

## 2020-11-04 ENCOUNTER — MYC MEDICAL ADVICE (OUTPATIENT)
Dept: FAMILY MEDICINE | Facility: CLINIC | Age: 30
End: 2020-11-04

## 2020-11-04 DIAGNOSIS — T14.8XXA OPEN WOUND: Primary | ICD-10-CM

## 2020-11-05 RX ORDER — SILVER SULFADIAZINE 10 MG/G
CREAM TOPICAL 2 TIMES DAILY
Qty: 400 G | Refills: 1 | Status: SHIPPED | OUTPATIENT
Start: 2020-11-05 | End: 2021-04-29

## 2020-11-05 NOTE — TELEPHONE ENCOUNTER
RECORDS RECEIVED FROM: Internal   DATE RECEIVED: 12.18.2020   NOTES STATUS DETAILS   OFFICE NOTE from referring provider Internal 11.03.2020 Jimmy Moseley MD   OFFICE NOTE from other specialist  N/A    *Only VASCULITIS or LUPUS gather office notes for the following N/A    *PULMONARY   N/A    *ENT N/A    *DERMATOLOGY N/A    *RHEUMATOLOGY N/A    DISCHARGE SUMMARY from hospital N/A    DISCHARGE REPORT from the ER N/A    MEDICATION LIST Internal    IMAGING  (NEED IMAGES AND REPORTS)     KIDNEY CT SCAN N/A    KIDNEY ULTRASOUND N/A    MR ABDOMEN N/A    NUCLEAR MEDICINE RENAL N/A    LABS     CBC Care Everywhere 10.26.2020   CMP Care Everywhere 10.26.2020   BMP Internal 10.21.2020   UA Internal 09.29.2020   URINE PROTEIN Internal 09.29.2020   RENAL PANEL N/A    BIOPSY     KIDNEY BIOPSY  N/A

## 2020-11-06 ENCOUNTER — MEDICAL CORRESPONDENCE (OUTPATIENT)
Dept: HEALTH INFORMATION MANAGEMENT | Facility: CLINIC | Age: 30
End: 2020-11-06

## 2020-11-24 ENCOUNTER — MEDICAL CORRESPONDENCE (OUTPATIENT)
Dept: HEALTH INFORMATION MANAGEMENT | Facility: CLINIC | Age: 30
End: 2020-11-24

## 2020-12-08 ENCOUNTER — MYC REFILL (OUTPATIENT)
Dept: FAMILY MEDICINE | Facility: CLINIC | Age: 30
End: 2020-12-08

## 2020-12-08 ENCOUNTER — MYC MEDICAL ADVICE (OUTPATIENT)
Dept: FAMILY MEDICINE | Facility: CLINIC | Age: 30
End: 2020-12-08

## 2020-12-08 DIAGNOSIS — G12.9 SPINAL MUSCLE ATROPHY (H): ICD-10-CM

## 2020-12-09 ENCOUNTER — MYC MEDICAL ADVICE (OUTPATIENT)
Dept: FAMILY MEDICINE | Facility: CLINIC | Age: 30
End: 2020-12-09

## 2020-12-09 DIAGNOSIS — G12.9 SPINAL MUSCLE ATROPHY (H): Primary | ICD-10-CM

## 2020-12-09 RX ORDER — ALPRAZOLAM 0.25 MG
TABLET ORAL
Qty: 60 TABLET | Refills: 1 | Status: SHIPPED | OUTPATIENT
Start: 2020-12-09 | End: 2021-02-23

## 2020-12-09 NOTE — TELEPHONE ENCOUNTER
Patient Requested  ALPRAZolam (XANAX) 0.25 MG tablet    Last Filled  10/21/2020  Last Office Visit  08/20/2020-resident  Next Office Visit  Nothing scheduled   Checked  12/09/2020       DX: Spinal muscle atrophy      Pharmacy:   [id number] - [place], [place] - [address]        LILLIAM DAMICO CMA at 6:25 AM on 12/9/2020.

## 2020-12-13 DIAGNOSIS — E87.6 HYPOKALEMIA: ICD-10-CM

## 2020-12-14 DIAGNOSIS — E87.6 HYPOKALEMIA: Primary | ICD-10-CM

## 2020-12-15 ENCOUNTER — TELEPHONE (OUTPATIENT)
Dept: NEPHROLOGY | Facility: CLINIC | Age: 30
End: 2020-12-15

## 2020-12-15 ENCOUNTER — VIRTUAL VISIT (OUTPATIENT)
Dept: BEHAVIORAL HEALTH | Facility: CLINIC | Age: 30
End: 2020-12-15
Payer: COMMERCIAL

## 2020-12-15 ENCOUNTER — TELEPHONE (OUTPATIENT)
Dept: FAMILY MEDICINE | Facility: CLINIC | Age: 30
End: 2020-12-15

## 2020-12-15 DIAGNOSIS — E87.6 HYPOKALEMIA: Primary | ICD-10-CM

## 2020-12-15 DIAGNOSIS — F33.1 MAJOR DEPRESSIVE DISORDER, RECURRENT EPISODE, MODERATE (H): Primary | ICD-10-CM

## 2020-12-15 DIAGNOSIS — G89.4 CHRONIC PAIN SYNDROME: ICD-10-CM

## 2020-12-15 DIAGNOSIS — G12.9 SPINAL MUSCLE ATROPHY (H): Primary | ICD-10-CM

## 2020-12-15 PROCEDURE — 90834 PSYTX W PT 45 MINUTES: CPT | Mod: 95 | Performed by: PSYCHOLOGIST

## 2020-12-15 RX ORDER — POTASSIUM CHLORIDE 20MEQ/15ML
30 LIQUID (ML) ORAL DAILY
Qty: 675 ML | Refills: 11 | Status: SHIPPED | OUTPATIENT
Start: 2020-12-15 | End: 2021-12-03

## 2020-12-15 ASSESSMENT — PATIENT HEALTH QUESTIONNAIRE - PHQ9
SUM OF ALL RESPONSES TO PHQ QUESTIONS 1-9: 2
SUM OF ALL RESPONSES TO PHQ QUESTIONS 1-9: 2
10. IF YOU CHECKED OFF ANY PROBLEMS, HOW DIFFICULT HAVE THESE PROBLEMS MADE IT FOR YOU TO DO YOUR WORK, TAKE CARE OF THINGS AT HOME, OR GET ALONG WITH OTHER PEOPLE: NOT DIFFICULT AT ALL

## 2020-12-15 ASSESSMENT — ANXIETY QUESTIONNAIRES
7. FEELING AFRAID AS IF SOMETHING AWFUL MIGHT HAPPEN: NOT AT ALL
2. NOT BEING ABLE TO STOP OR CONTROL WORRYING: NOT AT ALL
4. TROUBLE RELAXING: NOT AT ALL
5. BEING SO RESTLESS THAT IT IS HARD TO SIT STILL: NOT AT ALL
7. FEELING AFRAID AS IF SOMETHING AWFUL MIGHT HAPPEN: NOT AT ALL
GAD7 TOTAL SCORE: 1
6. BECOMING EASILY ANNOYED OR IRRITABLE: NOT AT ALL
1. FEELING NERVOUS, ANXIOUS, OR ON EDGE: SEVERAL DAYS
3. WORRYING TOO MUCH ABOUT DIFFERENT THINGS: NOT AT ALL
GAD7 TOTAL SCORE: 1
GAD7 TOTAL SCORE: 1

## 2020-12-15 NOTE — TELEPHONE ENCOUNTER
Lab orders placed per Dr. Limon.  Spoke to Ariana GLOVER care coordinator from [facility] for patient, will fax labs to them as Anshu, home care nurse will be going to her home this Thursday. They will attempt to collect VBG, time may be a concern, they will try. Message to Dr. Limon on stability of the test and if ok still. As patient has a fischer catheter, mom will collect urine samples. Will fax all orders to [phone].  Brittany Rosenbaum LPN  Nephrology  [phone]

## 2020-12-15 NOTE — TELEPHONE ENCOUNTER
----- Message from Mono Limon MD sent at 12/14/2020  6:42 PM CST -----  Can I also get a venous blood gas (VBG), a serum aldosterone, and a serum renin activity (for an aldosterone to renin activity ratio)?     ThanksNehemias

## 2020-12-15 NOTE — PROGRESS NOTES
[facility]  December 15, 2020      Behavioral Health Clinician Progress Note    Patient Name: Lanie Neil           Service Type: Video appointment      Service Location:  Video appointment      Session Start Time: 11:02  Session End Time: 11:50      Session Length: 38 - 52      Attendees: Client    Visit Activities (Refresh list every visit): [facility] Only    Telemedicine Visit: The patient's condition can be safely assessed and treated via synchronous audio and visual telemedicine encounter.      Reason for Telemedicine Visit: Patient has requested telehealth visit and Services only offered telehealth    Originating Site (Patient Location): Patient's home    Distant Site (Provider Location): Provider Remote Setting    Consent:  The patient/guardian has verbally consented to: the potential risks and benefits of telemedicine (video visit) versus in person care; bill my insurance or make self-payment for services provided; and responsibility for payment of non-covered services.     Mode of Communication:  Video Conference via [employer]    As the provider I attest to compliance with applicable laws and regulations related to telemedicine.      Diagnostic Assessment Date: IP  Treatment Plan Review Date: IP  See Flowsheets for today's PHQ-9 and KATHARINE-7 results  Previous PHQ-9:   PHQ-9 SCORE 5/28/2019 2/12/2020 12/15/2020   PHQ-9 Total Score - - -   PHQ-9 Total Score MyChart - - 2 (Minimal depression)   PHQ-9 Total Score 1 16 2     Previous KATAHRINE-7:   KATHARINE-7 SCORE 5/28/2019 2/12/2020 12/15/2020   Total Score - - 1 (minimal anxiety)   Total Score 3 19 1       BRITTANY LEVEL:  No flowsheet data found.    DATA  Extended Session (60+ minutes): No  Interactive Complexity: No  Crisis: No    Treatment Objective(s) Addressed in This Session:  Target Behavior(s): disease management/lifestyle changes      Depressed Mood: Increase interest, engagement, and pleasure in doing  things  Decrease frequency and intensity of feeling down, depressed, hopeless  Relationship Problems: will address relationship difficulties in a more adaptive manner  Psychological distress related to Chronic Disease Management    Current Stressors / Issues:  Lanie completed a [facility] follow-up today. She indicated wishing to return to counseling as she reported concerns with depression, relationship troubles, and difficulty with the pandemic. She did report feeling fortunate however that we could meet over video now, as meeting in person was a challenge for her from our last visit on 2/24/20. Our session consisted mainly of reviewing her concerns and discussing options for care. Lanie shared the pandemic has been difficult for her and her mother, her primary care taker. Per her report, they spend a significant amount of time together and are having arguments a little more often than she would prefer. When asked about these arguments, Lanie indicated she struggles at times with having her mother do most things for her, like manage her medications. Lanie said she can insist on doing more on her own and this can lead to conflicts when she is unable or her mother takes charge. An issue with pain medications in particular was discussed. Lanie notes she believes she is dependent on her pain medication, but does not believe she would face issues if she managed this on her own, despite her mother's worries about her taking too much. She did report a history of taking her pain medications earlier than recommended, however.     We talked about themes of independence and autonomy, how these can be difficult for her to achieve. We also discussed a sense of mourning she experiences, with regard to not being able to have a family or children of her own. This was a significant life goal for her. We talked about new ways to build a structured day, find value in what she does/can do, and processing her sense of loss. I  presented to her options for ongoing counseling, as I observed some depressive issues I would like to help her address. She agreed to meet with me for a few more sessions for this purpose.       From Initial Appointment on 2/24/2020:  Today Lanie reported concerns with depressed mood, which she suggested began about six months ago, but has been worse in the last two months. Lanie described history of struggling with depression on occasion, noting she will struggle with low mood for some time and see improvement once a particular source of stress has resolved. Currently, Lanie stated she has struggled with depressed mood from her reported difficulty making friends or establishing intimate partners. She indicated several of her family members are pregnant or having relational troubles, which makes her struggle with feeling trapped/stuck and worthless. She cited her mother and a few family members as her primary sources of support, however suggested its hard that these are individuals that take care of her and are not the same as an intimate partner. Lanie mentioned she occasionally feels like a burden to others, particularly her mother, for taking care of her. She additionally endorsed social withdrawal/isolation as another behavior that occurs when she becomes depressed. Lanie endorsed having rare and passive thoughts of suicide when she struggles with depression. She denied ever having a plan, identifying means, or having active intent to hurt herself however. Lanie additionally noted she began Zoloft about 10 days ago.    Shortly into session, Lanie asked if her mother could accompany her to the appointment to assist with a complication in her wheelchair. At this time, I presented several treatment options to both of them. I provided her the behavioral health access number due to Lanie living in [facility] and stating [place] [facility] [place]. Lanie  stated she would prefer a therapist to come to her home for therapy if possible. I encouraged her to contact the behavioral health access number for help finding a therapist that could do in home work. I additionally scheduled Lanie to return for [facility] services with me in about two weeks for ongoing support and to begin help addressing her depressive symptoms. Despite Lanie denying history of active suicidal ideation, I provided her a handout of local crisis line information.       Progress on Treatment Objective(s) / Homework:  New Objective established this session - PREPARATION (Decided to change - considering how); Intervened by negotiating a change plan and determining options / strategies for behavior change, identifying triggers, exploring social supports, and working towards setting a date to begin behavior change    Also provided psychoeducation about behavioral health condition, symptoms, and treatment options     Supportive counseling, insight-oriented therapy    Care Plan review completed: No    Medication Review:  No changes to current psychiatric medication(s)    Medication Compliance:  NA    Changes in Health Issues:   None reported    Chemical Use Review:   Substance Use: Chemical use reviewed, no active concerns identified      Tobacco Use: No current tobacco use.      Assessment: Current Emotional / Mental Status (status of significant symptoms):  Risk status (Self / Other harm or suicidal ideation)  Patient has had a history of suicidal ideation: passive thoughts only; easily controlled - denied any recently    Patient denies current fears or concerns for personal safety.  Patient denies current or recent suicidal ideation or behaviors.  Patient denies current or recent homicidal ideation or behaviors.  Patient denies current or recent self injurious behavior or ideation.  Patient denies other safety concerns.     A safety and risk management plan has not been developed at this time, however  patient was encouraged to call [facility] should there be a change in any of these risk factors.    Appearance:   Appropriate   Eye Contact:   Good   Psychomotor Behavior: Normal   Attitude:   Cooperative   Orientation:   All  Speech   Rate / Production: Normal    Volume:  Normal   Mood:    Depressed   Affect:    Appropriate   Thought Content:  Clear   Thought Form:  Coherent  Logical   Insight:    Good     Diagnoses:  1. Major depressive disorder, recurrent episode, moderate (H)        Collateral Reports Completed:  [facility] will coordinate with care team as needed    Plan: (Homework, other):  Lanie was scheduled to RTC in two weeks for help addressing depressive symptoms. She was also given information about mental health symptoms and treatment options .  CD Recommendations: No indications of CD issues.     Héctor Higuera Psy.D, LP 12/15/2020      Answers for HPI/ROS submitted by the patient on 12/15/2020   If you checked off any problems, how difficult have these problems made it for you to do your work, take care of things at home, or get along with other people?: Not difficult at all  PHQ9 TOTAL SCORE: 2  KATHARINE 7 TOTAL SCORE: 1

## 2020-12-15 NOTE — TELEPHONE ENCOUNTER
potassium chloride (KAYCIEL) 20 MEQ/15ML (10%) solution     Last Written Prescription Date:  1/21/20  Last Fill Quantity: 675ml,   # refills: 11  Last Office Visit : 10/19/20  Future Office visit:  none    K +  L  Reviewed By    Jimmy Moseley MD on 10/22/2020  9:09 AM     Routed because:      Written by Jimmy Moseley MD on 10/22/2020  9:09 AM  ....... potassium a bit low my RN will call

## 2020-12-15 NOTE — TELEPHONE ENCOUNTER
Health Call Center    Phone Message    May a detailed message be left on voicemail: yes     Reason for Call: Other: Ya, Care Coordinator for patient called to request a prescription for a new hospital bed for the patient. Prescription does not require face to face due to patient being on medical assistance. Per Ya, prescription needs to state diagnosis and length of use. Please fax to ATTN: Treasure at [facility] [phone]. Follow up with Ya for questions. Thank you!      Action Taken: Message routed to:  [facility] pcc    Travel Screening: Not Applicable

## 2020-12-15 NOTE — TELEPHONE ENCOUNTER
----- Message from Mono Limon MD sent at 12/14/2020  3:51 PM CST -----  Carlos Soto,     For this patient, can I also get a serum magnesium, serum osmolality, a spot urine sodium, a spot urine potassium, a spot urine chloride, and a spot urine osmolality?     This patient will definitely need to get her labs before her appointment this Friday since these labs are really the crux of the consult. Could you help arrange?     Thanks,     Nehemias

## 2020-12-16 ASSESSMENT — ANXIETY QUESTIONNAIRES: GAD7 TOTAL SCORE: 1

## 2020-12-16 ASSESSMENT — PATIENT HEALTH QUESTIONNAIRE - PHQ9: SUM OF ALL RESPONSES TO PHQ QUESTIONS 1-9: 2

## 2020-12-16 NOTE — TELEPHONE ENCOUNTER
Dr. Moseley    Pt is requesting an order for the new hospital bed.  Is it OK to order ?    Soon-Mi

## 2020-12-16 NOTE — TELEPHONE ENCOUNTER
Called and left Ya waldron  to call back.  Need to know if they want bed rails on hospital bed.  If yes, full bed rails or half bed rails?      Remigio Becerra CMA ([facility]) at 9:50 AM on 12/16/2020

## 2020-12-17 ENCOUNTER — HOSPITAL LABORATORY (OUTPATIENT)
Dept: OTHER | Facility: CLINIC | Age: 30
End: 2020-12-17

## 2020-12-17 LAB
ALBUMIN SERPL-MCNC: 3.2 G/DL (ref 3.4–5)
ALBUMIN UR-MCNC: 10 MG/DL
ANION GAP SERPL CALCULATED.3IONS-SCNC: 7 MMOL/L (ref 3–14)
APPEARANCE UR: ABNORMAL
BACTERIA #/AREA URNS HPF: ABNORMAL /HPF
BASE EXCESS BLDV CALC-SCNC: 1.1 MMOL/L
BASOPHILS # BLD AUTO: 0 10E9/L (ref 0–0.2)
BASOPHILS NFR BLD AUTO: 0.5 %
BILIRUB UR QL STRIP: NEGATIVE
BUN SERPL-MCNC: 9 MG/DL (ref 7–30)
CALCIUM SERPL-MCNC: 9.2 MG/DL (ref 8.5–10.1)
CHLORIDE SERPL-SCNC: 107 MMOL/L (ref 94–109)
CHLORIDE UR-SCNC: 14 MMOL/L
CO2 SERPL-SCNC: 26 MMOL/L (ref 20–32)
COLOR UR AUTO: ABNORMAL
CREAT SERPL-MCNC: <0.14 MG/DL (ref 0.52–1.04)
CREAT UR-MCNC: <3 MG/DL
DIFFERENTIAL METHOD BLD: ABNORMAL
EOSINOPHIL # BLD AUTO: 0.3 10E9/L (ref 0–0.7)
EOSINOPHIL NFR BLD AUTO: 4.6 %
ERYTHROCYTE [DISTWIDTH] IN BLOOD BY AUTOMATED COUNT: 19.1 % (ref 10–15)
GFR SERPL CREATININE-BSD FRML MDRD: >90 ML/MIN/{1.73_M2}
GLUCOSE SERPL-MCNC: 170 MG/DL (ref 70–99)
GLUCOSE UR STRIP-MCNC: NEGATIVE MG/DL
HCO3 BLDV-SCNC: 26 MMOL/L (ref 21–28)
HCT VFR BLD AUTO: 36.8 % (ref 35–47)
HGB BLD-MCNC: 11.7 G/DL (ref 11.7–15.7)
HGB UR QL STRIP: ABNORMAL
IMM GRANULOCYTES # BLD: 0 10E9/L (ref 0–0.4)
IMM GRANULOCYTES NFR BLD: 0.2 %
KETONES UR STRIP-MCNC: NEGATIVE MG/DL
LEUKOCYTE ESTERASE UR QL STRIP: ABNORMAL
LYMPHOCYTES # BLD AUTO: 1.8 10E9/L (ref 0.8–5.3)
LYMPHOCYTES NFR BLD AUTO: 29.2 %
MAGNESIUM SERPL-MCNC: 1.8 MG/DL (ref 1.6–2.3)
MCH RBC QN AUTO: 27.6 PG (ref 26.5–33)
MCHC RBC AUTO-ENTMCNC: 31.8 G/DL (ref 31.5–36.5)
MCV RBC AUTO: 87 FL (ref 78–100)
MONOCYTES # BLD AUTO: 0.4 10E9/L (ref 0–1.3)
MONOCYTES NFR BLD AUTO: 6.5 %
MUCOUS THREADS #/AREA URNS LPF: PRESENT /LPF
NEUTROPHILS # BLD AUTO: 3.6 10E9/L (ref 1.6–8.3)
NEUTROPHILS NFR BLD AUTO: 59 %
NITRATE UR QL: NEGATIVE
NRBC # BLD AUTO: 0 10*3/UL
NRBC BLD AUTO-RTO: 0 /100
O2/TOTAL GAS SETTING VFR VENT: NORMAL %
OSMOLALITY SERPL: 293 MMOL/KG (ref 275–295)
OSMOLALITY UR: 119 MMOL/KG (ref 100–1200)
PCO2 BLDV: 41 MM HG (ref 40–50)
PH BLDV: 7.41 PH (ref 7.32–7.43)
PH UR STRIP: 7.5 PH (ref 5–7)
PHOSPHATE SERPL-MCNC: 2.8 MG/DL (ref 2.5–4.5)
PLATELET # BLD AUTO: 299 10E9/L (ref 150–450)
PO2 BLDV: 39 MM HG (ref 25–47)
POTASSIUM SERPL-SCNC: 3.5 MMOL/L (ref 3.4–5.3)
PROT UR-MCNC: 0.2 G/L
PROT/CREAT 24H UR: NORMAL G/G CR (ref 0–0.2)
RBC # BLD AUTO: 4.24 10E12/L (ref 3.8–5.2)
RBC #/AREA URNS AUTO: 6 /HPF (ref 0–2)
SODIUM SERPL-SCNC: 140 MMOL/L (ref 133–144)
SODIUM UR-SCNC: 11 MMOL/L
SOURCE: ABNORMAL
SP GR UR STRIP: 1 (ref 1–1.03)
SQUAMOUS #/AREA URNS AUTO: 1 /HPF (ref 0–1)
UROBILINOGEN UR STRIP-MCNC: NORMAL MG/DL (ref 0–2)
WBC # BLD AUTO: 6 10E9/L (ref 4–11)
WBC #/AREA URNS AUTO: 59 /HPF (ref 0–5)
WBC CLUMPS #/AREA URNS HPF: PRESENT /HPF

## 2020-12-17 NOTE — TELEPHONE ENCOUNTER
Order and cover form faxed to [facility] [phone]  Joana Jaeger, EMT at 10:20 AM on 12/17/2020.

## 2020-12-18 ENCOUNTER — PRE VISIT (OUTPATIENT)
Dept: NEPHROLOGY | Facility: CLINIC | Age: 30
End: 2020-12-18

## 2020-12-18 ENCOUNTER — VIRTUAL VISIT (OUTPATIENT)
Dept: NEPHROLOGY | Facility: CLINIC | Age: 30
End: 2020-12-18
Attending: FAMILY MEDICINE
Payer: COMMERCIAL

## 2020-12-18 DIAGNOSIS — E87.6 HYPOKALEMIA: Primary | ICD-10-CM

## 2020-12-18 LAB
ALDOST SERPL-MCNC: 5.6 NG/DL (ref 0–31)
POTASSIUM UR-SCNC: 19 MMOL/L

## 2020-12-18 PROCEDURE — 99204 OFFICE O/P NEW MOD 45 MIN: CPT | Mod: 95 | Performed by: STUDENT IN AN ORGANIZED HEALTH CARE EDUCATION/TRAINING PROGRAM

## 2020-12-18 NOTE — PROGRESS NOTES
"Lanie Neil is a 30 year old female who is being evaluated via a billable video visit.      The patient has been notified of following:     \"This video visit will be conducted via a call between you and your physician/provider. We have found that certain health care needs can be provided without the need for an in-person physical exam.  This service lets us provide the care you need with a video conversation.  If a prescription is necessary we can send it directly to your pharmacy.  If lab work is needed we can place an order for that and you can then stop by our lab to have the test done at a later time.    Video visits are billed at different rates depending on your insurance coverage.  Please reach out to your insurance provider with any questions.    If during the course of the call the physician/provider feels a video visit is not appropriate, you will not be charged for this service.\"    Patient has given verbal consent for Video visit? Yes  How would you like to obtain your AVS? Mail a copy  If you are dropped from the video visit, the video invite should be resent to:    Will anyone else be joining your video visit? No        Video-Visit Details    Type of service:  Video Visit    Video Start Time: 10:05am  Video End Time: 10:49 AM    Originating Location (pt. Location): Home    Distant Location (provider location):  [facility] [place]     Platform used for Video Visit: Boris Limon MD        "

## 2020-12-18 NOTE — PROGRESS NOTES
Nephrology Clinic    Video Visit    CC: hypokalemia     HPI:  Lanie Neil is a 30 year old female with pmh of spinal muscle atrophy, DVT on rivaroxaban, and asthma referred by Dr. Moseley for hypokalemia to nephrology.     The patient has had recurrent hypokalemia, evident in [place] and [facility] labs (via care-everywhere). She has noticed no weakness, but she reports significant generalized weakness at baseline. These low potassium values have often been found in advance of therapy for her SMA and have required repletion. At times her bicarb has been low on the same panel of labs (e.g. 19), but no consistently. Her BP appears variable, ranging from 98 to 155 on BPs in the last year, for example.     She receives all her nutrition via G tube with Replete formula, which has 400mg of KCl per bottle. She takes 4 bottles per day. She also takes 30mEq of KCl liquid per day. This equates to just over 50mEq per day (or 3850 mg/day).    No N/V or G tube drainage. No diarrhea. She reports issues with constipation.     She has had kidney stones. Her urine pH is repeatedly ~7.5. She describes a very large kidney stone in 2012 treated at [place] which sounds like a [place], but she denies ever being told she had a bacteria causing her UTIs.     She reports recent SBP around 120/70.    She urinates via an indwelling urinary catheter. No fever or chills of abdominal pain at this time (her UA is c/w bacterial prepense).     Past Medical History:   Diagnosis Date     Anxiety      Snoring      Spinal muscle atrophy (H)        Past Surgical History:   Procedure Laterality Date     ENT SURGERY      tubes     GASTROSTOMY TUBE       KIDNEY SURGERY       SPINE SURGERY       TRACHEOSTOMY        Family history: mother's sister also had kidney stones     Social History     Tobacco Use     Smoking status: Never Smoker     Smokeless tobacco: Never Used   Substance Use Topics     Alcohol use: No     Alcohol/week: 0.0 standard drinks  "    Drug use: No       Medications:  Current Outpatient Medications   Medication Sig Dispense Refill     ACETAMINOPHEN PO Take 650 mg by mouth every 4 hours as needed for pain       albuterol (ALBUTEROL) 108 (90 BASE) MCG/ACT inhaler Inhale 2 puffs into the lungs every 4 hours as needed for shortness of breath / dyspnea or wheezing 1 Inhaler 2     albuterol (PROVENTIL) (2.5 MG/3ML) 0.083% neb solution USE ONE VIAL BY NEBULIZATION EVERY 6 HOURS AS NEEDED FOR SHORTNESS OF BREATH/ DYSPNEA OR WHEEZING 360 mL 0     ALPRAZolam (XANAX) 0.25 MG tablet TAKE 1 TABLET(0.25 MG) BY MOUTH THREE TIMES DAILY AS NEEDED FOR ANXIETY 60 tablet 1     aminocaproic acid (AMICAR) 0.25 GM/ML solution Take 12 mL (3 grams) via G-tube every 8 hours during menstrual period. 2 Bottle 5     bisacodyl (DULCOLAX) 10 MG suppository Place 10 mg rectally daily as needed for constipation       celecoxib (CELEBREX) 200 MG capsule Take 1 capsule (200 mg) by mouth daily 30 capsule 6     clindamycin (CLEOCIN T) 1 % lotion Apply topically 2 times daily To face as needed 60 mL 3     diazepam (VALIUM) 5 MG tablet Take 1 tablet (5 mg) by mouth every 6 hours as needed for muscle spasms 30 tablet 5     doxycycline hyclate (VIBRAMYCIN) 100 MG capsule GIVE ONE CAPSULE VIA \"G\"- TUBE TWICE DAILY 60 capsule 2     ferrous sulfate 220 (44 Fe) MG/5ML ELIX 22 mg by Enteral route       flunisolide HFA (AEROSPAN) 80 MCG/ACT AERS oral inhaler Inhale 2 puffs into the lungs       gabapentin (NEURONTIN) 250 MG/5ML solution TAKE 20MLS PER FEEDING TUBE THREE TIMES DAILY 1800 mL 5     GENERLAC 10 GM/15ML solution   11     guaiFENesin (ORGANIDIN) 200 MG TABS tablet Take 200 mg by mouth every 4 hours as needed for cough       hydrOXYzine (ATARAX) 25 MG tablet Take 25 mg by mouth every 6 hours as needed for itching       LANsoprazole (PREVACID) 30 MG DR capsule Take 30 mg by mouth       Magnesium Hydroxide (MILK OF MAGNESIA PO) Take 30 mLs by mouth as needed       metroNIDAZOLE " (METROCREAM) 0.75 % external cream Apply topically 2 times daily 45 g 11     mometasone (NASONEX) 50 MCG/ACT nasal spray        naloxone (NARCAN) 4 MG/0.1ML nasal spray Spray 1 spray (4 mg) into one nostril alternating nostrils once as needed for opioid reversal every 2-3 minutes until assistance arrives 0.2 mL 0     Nutritional Supplements (REPLETE FIBER) LIQD Take 1 Can by mouth 4 times daily       omeprazole (PRILOSEC) 2 mg/mL suspension SHAKE LIQUID WELL AND GIVE 20ML (40MG) DAILY (VIA G-TUBE) 600 mL 11     order for DME Equipment being ordered: 16 fr 5cc balloon indwelling catheter with drainage bag. 2 catheters/month 2 catheter 11     order for DME Equipment being ordered: Vibracare Percussor 1 Units 0     polyethylene glycol (MIRALAX/GLYCOLAX) powder TAKE 17 GRAMS BY MOUTH EVERY  g 6     potassium chloride (KAYCIEL) 20 MEQ/15ML (10%) solution 22.5 mLs (30 mEq) by Per G Tube route daily 675 mL 11     prochlorperazine (COMPAZINE) 5 MG tablet Take 5 mg by mouth every 6 hours as needed for nausea or vomiting       rivaroxaban ANTICOAGULANT (XARELTO ANTICOAGULANT) 10 MG TABS tablet Take 1 tablet (10 mg) by mouth daily (with dinner) 90 tablet 2     Salicylic Acid (OXY BALANCE FACIAL CLEAN WASH EX) Externally apply 1 pad topically daily       Sennosides (SENNA) 8.8 MG/5ML LIQD Take 10 mLs by mouth 2 times daily 600 mL 11     sertraline (ZOLOFT) 20 MG/ML (HIGH CONC) solution Take 2.5 mLs (50 mg) by mouth daily 75 mL 5     silver sulfADIAZINE (SILVADENE) 1 % external cream Apply topically 2 times daily To the affected area. 400 g 1     simethicone (MYLICON) 66.7 mg/ml as needed       simethicone 40 MG/0.6ML LIQD        sodium chloride (OCEAN) 0.65 % nasal spray 2 sprays         Review Of Systems:  10 point ROS otherwise negative    OBJECTIVE:  Vitals and exam deferred due to video visit.     Labs reviewed.     Recent Labs   Lab Test 12/17/20 2126 10/21/20  1524    138   POTASSIUM 3.5 3.3*   CHLORIDE 107  108   CO2 26 22   ANIONGAP 7 7   * 80   BUN 9 8   CR <0.14* 0.17*   CHINTAN 9.2 9.2       Lab Results   Component Value Date    WBC 6.0 12/17/2020     Lab Results   Component Value Date    RBC 4.24 12/17/2020     Lab Results   Component Value Date    HGB 11.7 12/17/2020     Lab Results   Component Value Date    HCT 36.8 12/17/2020     No components found for: MCT  Lab Results   Component Value Date    MCV 87 12/17/2020     Lab Results   Component Value Date    MCH 27.6 12/17/2020     Lab Results   Component Value Date    MCHC 31.8 12/17/2020     Lab Results   Component Value Date    RDW 19.1 12/17/2020     Lab Results   Component Value Date     12/17/2020       Color Urine (no units)   Date Value   12/17/2020 Light Yellow     Appearance Urine (no units)   Date Value   12/17/2020 Slightly Cloudy     Glucose Urine (mg/dL)   Date Value   12/17/2020 Negative     Bilirubin Urine (no units)   Date Value   12/17/2020 Negative     Ketones Urine (mg/dL)   Date Value   12/17/2020 Negative     Specific Gravity Urine (no units)   Date Value   12/17/2020 1.004     pH Urine (pH)   Date Value   12/17/2020 7.5 (H)     Protein Albumin Urine (mg/dL)   Date Value   12/17/2020 10 (A)     Nitrite Urine (no units)   Date Value   12/17/2020 Negative     Leukocyte Esterase Urine (no units)   Date Value   12/17/2020 Large (A)             ASSESSMENT/PLAN:  Pt is a 30 year old female here to establish care.    Diagnoses and all orders for this visit:    Hypokalemia, recurrent but resolved at this time   No clear answer on labs checked so far, though they are difficult to interpret in the absence of hypokalemia that at this time is resolved (except her pending shakeel-to-renin ratio, which will actually be more specific in the absence of hypokalemia). For example, her urine potassium concentration is < 20 (though not in the <10 range), but her TTKG is elevated at 13. Regardless, in the absence of current hypokalemia, her urinary  potassium at any level can be appropriate depending on the relative amount of potassium and water in her diet.    What can be said at this time is her daily potassium intake should be adequate (she is getting ~50 mEq of KCl / day) and without diarrhea she isn't having obvious GI losses (and vomiting induces renal losses from aldosterone-activity). We could consider a 24hr urine to show her clearing (and thus absorbing from the GI track) her known daily potassium intake, but I will hold off doing that yet, pending determining if we can find an answer with spot urines.    She also doesn't have hypomagnesemia. Her urine chloride rules out Batter's and Gittleman's (did not check urinary calcium-to-creatinine due to her undetectable serum creatinine clearly undermining an assumption of 1g of daily creatinine production). She isn't taking a diuretic (and her urine chloride similarly wouldn't fit recent diuretic usage).    She doesn't definitively have an RTA (her serum bicarb is 26, and while her urine gap is positive, again in the absence of a current abnormality this can be appropriate). She has had likely metabolic acidosis at times (bicarb down to 19 with some 2019 hypoK-events), though. Her high (very high) urine pH could have suggested distal RTA, potentially due to obstruction from her stone history (though this is very high - perhaps it better fits a urease-producing bacteria having colonized her  track) - will obtain a renal U/S given her stone hx.    Will repeat several of her labs from today/yesterday upon our next visit along with a thyroid test (to check for thyrotoxicosis-driven hypokalemic periodic paralysis), though their utility will largely depend on capturing a day of hypokalemia. Otherwise she may require some additional potassium supplementation and repeating of these labs on a day when hypoK is discovered again.   -     Cancel: Potassium random urine  -     Cancel: TSH with free T4 reflex  -     US  Renal Complete; Future  -     Cystatin C with GFR; Future  -     Renal panel; Future  -     Sodium random urine; Future  -     Chloride random urine; Future  -     Potassium random urine; Future  -     Osmolality urine; Future  -     Osmolality; Future    GFR  Spinal muscle atrophy  Will obtain cystatin C upon next labs due to an undetectable serum creatinine, certainly as a result of muscle atrophy due to her SMA. Her UA has '10' of albumin, and her spot urine protein concentration (in dilute urine, sp grav 1.004) is 0.20 mg/dl. Her UPCR is undetectable, but as stated, she clearly has minimal creatinine production. Will plan to get 24-hour urine protein if we proceed with a 24-hr urine potassium check.     History of kidney stones  Renal U/S ordered as above.    Blood pressure  Variable BP (thus not fitting secondary hypertension, for example, from hyperaldosteronemia with resulting hypokalemia; also bicarb not high to alternatively suggest contracted state that would also cause hypokalemia). Normotensive recently, thus no potassium-raising antihypertensives started at this time.       Return to clinic in 1-2 months.     Mono Limon MD  Instructor  Nephrology  Pager [phone]

## 2020-12-18 NOTE — LETTER
12/18/2020       RE: Lanie Neil  [address]  [place] [address]     Dear Colleague,    Thank you for referring your patient, Lanie Neil, to the [facility] [place] [facility]. Please see a copy of my visit note below.    Nephrology Clinic    Video Visit    CC: hypokalemia     HPI:  Lanie Neil is a 30 year old female with pmh of spinal muscle atrophy, DVT on rivaroxaban, and asthma referred by Dr. Moseley for hypokalemia to nephrology.     The patient has had recurrent hypokalemia, evident in [place] and [facility] labs (via care-everywhere). She has noticed no weakness, but she reports significant generalized weakness at baseline. These low potassium values have often been found in advance of therapy for her SMA and have required repletion. At times her bicarb has been low on the same panel of labs (e.g. 19), but no consistently. Her BP appears variable, ranging from 98 to 155 on BPs in the last year, for example.     She receives all her nutrition via G tube with Replete formula, which has 400mg of KCl per bottle. She takes 4 bottles per day. She also takes 30mEq of KCl liquid per day. This equates to just over 50mEq per day (or 3850 mg/day).    No N/V or G tube drainage. No diarrhea. She reports issues with constipation.     She has had kidney stones. Her urine pH is repeatedly ~7.5. She describes a very large kidney stone in 2012 treated at [place] which sounds like a [place], but she denies ever being told she had a bacteria causing her UTIs.     She reports recent SBP around 120/70.    She urinates via an indwelling urinary catheter. No fever or chills of abdominal pain at this time (her UA is c/w bacterial prepense).     Past Medical History:   Diagnosis Date     Anxiety      Snoring      Spinal muscle atrophy (H)        Past Surgical History:   Procedure Laterality Date     ENT SURGERY      tubes     GASTROSTOMY  "TUBE       KIDNEY SURGERY       SPINE SURGERY       TRACHEOSTOMY        Family history: mother's sister also had kidney stones     Social History     Tobacco Use     Smoking status: Never Smoker     Smokeless tobacco: Never Used   Substance Use Topics     Alcohol use: No     Alcohol/week: 0.0 standard drinks     Drug use: No       Medications:  Current Outpatient Medications   Medication Sig Dispense Refill     ACETAMINOPHEN PO Take 650 mg by mouth every 4 hours as needed for pain       albuterol (ALBUTEROL) 108 (90 BASE) MCG/ACT inhaler Inhale 2 puffs into the lungs every 4 hours as needed for shortness of breath / dyspnea or wheezing 1 Inhaler 2     albuterol (PROVENTIL) (2.5 MG/3ML) 0.083% neb solution USE ONE VIAL BY NEBULIZATION EVERY 6 HOURS AS NEEDED FOR SHORTNESS OF BREATH/ DYSPNEA OR WHEEZING 360 mL 0     ALPRAZolam (XANAX) 0.25 MG tablet TAKE 1 TABLET(0.25 MG) BY MOUTH THREE TIMES DAILY AS NEEDED FOR ANXIETY 60 tablet 1     aminocaproic acid (AMICAR) 0.25 GM/ML solution Take 12 mL (3 grams) via G-tube every 8 hours during menstrual period. 2 Bottle 5     bisacodyl (DULCOLAX) 10 MG suppository Place 10 mg rectally daily as needed for constipation       celecoxib (CELEBREX) 200 MG capsule Take 1 capsule (200 mg) by mouth daily 30 capsule 6     clindamycin (CLEOCIN T) 1 % lotion Apply topically 2 times daily To face as needed 60 mL 3     diazepam (VALIUM) 5 MG tablet Take 1 tablet (5 mg) by mouth every 6 hours as needed for muscle spasms 30 tablet 5     doxycycline hyclate (VIBRAMYCIN) 100 MG capsule GIVE ONE CAPSULE VIA \"G\"- TUBE TWICE DAILY 60 capsule 2     ferrous sulfate 220 (44 Fe) MG/5ML ELIX 22 mg by Enteral route       flunisolide HFA (AEROSPAN) 80 MCG/ACT AERS oral inhaler Inhale 2 puffs into the lungs       gabapentin (NEURONTIN) 250 MG/5ML solution TAKE 20MLS PER FEEDING TUBE THREE TIMES DAILY 1800 mL 5     GENERLAC 10 GM/15ML solution   11     guaiFENesin (ORGANIDIN) 200 MG TABS tablet Take 200 mg " by mouth every 4 hours as needed for cough       hydrOXYzine (ATARAX) 25 MG tablet Take 25 mg by mouth every 6 hours as needed for itching       LANsoprazole (PREVACID) 30 MG DR capsule Take 30 mg by mouth       Magnesium Hydroxide (MILK OF MAGNESIA PO) Take 30 mLs by mouth as needed       metroNIDAZOLE (METROCREAM) 0.75 % external cream Apply topically 2 times daily 45 g 11     mometasone (NASONEX) 50 MCG/ACT nasal spray        naloxone (NARCAN) 4 MG/0.1ML nasal spray Spray 1 spray (4 mg) into one nostril alternating nostrils once as needed for opioid reversal every 2-3 minutes until assistance arrives 0.2 mL 0     Nutritional Supplements (REPLETE FIBER) LIQD Take 1 Can by mouth 4 times daily       omeprazole (PRILOSEC) 2 mg/mL suspension SHAKE LIQUID WELL AND GIVE 20ML (40MG) DAILY (VIA G-TUBE) 600 mL 11     order for DME Equipment being ordered: 16 fr 5cc balloon indwelling catheter with drainage bag. 2 catheters/month 2 catheter 11     order for DME Equipment being ordered: Vibracare Percussor 1 Units 0     polyethylene glycol (MIRALAX/GLYCOLAX) powder TAKE 17 GRAMS BY MOUTH EVERY  g 6     potassium chloride (KAYCIEL) 20 MEQ/15ML (10%) solution 22.5 mLs (30 mEq) by Per G Tube route daily 675 mL 11     prochlorperazine (COMPAZINE) 5 MG tablet Take 5 mg by mouth every 6 hours as needed for nausea or vomiting       rivaroxaban ANTICOAGULANT (XARELTO ANTICOAGULANT) 10 MG TABS tablet Take 1 tablet (10 mg) by mouth daily (with dinner) 90 tablet 2     Salicylic Acid (OXY BALANCE FACIAL CLEAN WASH EX) Externally apply 1 pad topically daily       Sennosides (SENNA) 8.8 MG/5ML LIQD Take 10 mLs by mouth 2 times daily 600 mL 11     sertraline (ZOLOFT) 20 MG/ML (HIGH CONC) solution Take 2.5 mLs (50 mg) by mouth daily 75 mL 5     silver sulfADIAZINE (SILVADENE) 1 % external cream Apply topically 2 times daily To the affected area. 400 g 1     simethicone (MYLICON) 66.7 mg/ml as needed       simethicone 40 MG/0.6ML LIQD         sodium chloride (OCEAN) 0.65 % nasal spray 2 sprays         Review Of Systems:  10 point ROS otherwise negative    OBJECTIVE:  Vitals and exam deferred due to video visit.     Labs reviewed.     Recent Labs   Lab Test 12/17/20  2126 10/21/20  1524    138   POTASSIUM 3.5 3.3*   CHLORIDE 107 108   CO2 26 22   ANIONGAP 7 7   * 80   BUN 9 8   CR <0.14* 0.17*   CHINTAN 9.2 9.2       Lab Results   Component Value Date    WBC 6.0 12/17/2020     Lab Results   Component Value Date    RBC 4.24 12/17/2020     Lab Results   Component Value Date    HGB 11.7 12/17/2020     Lab Results   Component Value Date    HCT 36.8 12/17/2020     No components found for: MCT  Lab Results   Component Value Date    MCV 87 12/17/2020     Lab Results   Component Value Date    MCH 27.6 12/17/2020     Lab Results   Component Value Date    MCHC 31.8 12/17/2020     Lab Results   Component Value Date    RDW 19.1 12/17/2020     Lab Results   Component Value Date     12/17/2020       Color Urine (no units)   Date Value   12/17/2020 Light Yellow     Appearance Urine (no units)   Date Value   12/17/2020 Slightly Cloudy     Glucose Urine (mg/dL)   Date Value   12/17/2020 Negative     Bilirubin Urine (no units)   Date Value   12/17/2020 Negative     Ketones Urine (mg/dL)   Date Value   12/17/2020 Negative     Specific Gravity Urine (no units)   Date Value   12/17/2020 1.004     pH Urine (pH)   Date Value   12/17/2020 7.5 (H)     Protein Albumin Urine (mg/dL)   Date Value   12/17/2020 10 (A)     Nitrite Urine (no units)   Date Value   12/17/2020 Negative     Leukocyte Esterase Urine (no units)   Date Value   12/17/2020 Large (A)             ASSESSMENT/PLAN:  Pt is a 30 year old female here to establish care.    Diagnoses and all orders for this visit:    Hypokalemia, recurrent but resolved at this time   No clear answer on labs checked so far, though they are difficult to interpret in the absence of hypokalemia that at this time is  resolved (except her pending shakeel-to-renin ratio, which will actually be more specific in the absence of hypokalemia). For example, her urine potassium concentration is < 20 (though not in the <10 range), but her TTKG is elevated at 13. Regardless, in the absence of current hypokalemia, her urinary potassium at any level can be appropriate depending on the relative amount of potassium and water in her diet.    What can be said at this time is her daily potassium intake should be adequate (she is getting ~50 mEq of KCl / day) and without diarrhea she isn't having obvious GI losses (and vomiting induces renal losses from aldosterone-activity). We could consider a 24hr urine to show her clearing (and thus absorbing from the GI track) her known daily potassium intake, but I will hold off doing that yet, pending determining if we can find an answer with spot urines.    She also doesn't have hypomagnesemia. Her urine chloride rules out Batter's and Gittleman's (did not check urinary calcium-to-creatinine due to her undetectable serum creatinine clearly undermining an assumption of 1g of daily creatinine production). She isn't taking a diuretic (and her urine chloride similarly wouldn't fit recent diuretic usage).    She doesn't definitively have an RTA (her serum bicarb is 26, and while her urine gap is positive, again in the absence of a current abnormality this can be appropriate). She has had likely metabolic acidosis at times (bicarb down to 19 with some 2019 hypoK-events), though. Her high (very high) urine pH could have suggested distal RTA, potentially due to obstruction from her stone history (though this is very high - perhaps it better fits a urease-producing bacteria having colonized her  track) - will obtain a renal U/S given her stone hx.    Will repeat several of her labs from today/yesterday upon our next visit along with a thyroid test (to check for thyrotoxicosis-driven hypokalemic periodic paralysis),  "though their utility will largely depend on capturing a day of hypokalemia. Otherwise she may require some additional potassium supplementation and repeating of these labs on a day when hypoK is discovered again.   -     Cancel: Potassium random urine  -     Cancel: TSH with free T4 reflex  -     US Renal Complete; Future  -     Cystatin C with GFR; Future  -     Renal panel; Future  -     Sodium random urine; Future  -     Chloride random urine; Future  -     Potassium random urine; Future  -     Osmolality urine; Future  -     Osmolality; Future    GFR  Spinal muscle atrophy  Will obtain cystatin C upon next labs due to an undetectable serum creatinine, certainly as a result of muscle atrophy due to her SMA. Her UA has '10' of albumin, and her spot urine protein concentration (in dilute urine, sp grav 1.004) is 0.20 mg/dl. Her UPCR is undetectable, but as stated, she clearly has minimal creatinine production. Will plan to get 24-hour urine protein if we proceed with a 24-hr urine potassium check.     History of kidney stones  Renal U/S ordered as above.    Blood pressure  Variable BP (thus not fitting secondary hypertension, for example, from hyperaldosteronemia with resulting hypokalemia; also bicarb not high to alternatively suggest contracted state that would also cause hypokalemia). Normotensive recently, thus no potassium-raising antihypertensives started at this time.       Return to clinic in 1-2 months.     Mono Limon MD  Instructor  Nephrology  Pager [phone]        Lanie Neil is a 30 year old female who is being evaluated via a billable video visit.      The patient has been notified of following:     \"This video visit will be conducted via a call between you and your physician/provider. We have found that certain health care needs can be provided without the need for an in-person physical exam.  This service lets us provide the care you need with a video conversation.  If a prescription is " "necessary we can send it directly to your pharmacy.  If lab work is needed we can place an order for that and you can then stop by our lab to have the test done at a later time.    Video visits are billed at different rates depending on your insurance coverage.  Please reach out to your insurance provider with any questions.    If during the course of the call the physician/provider feels a video visit is not appropriate, you will not be charged for this service.\"    Patient has given verbal consent for Video visit? Yes  How would you like to obtain your AVS? Mail a copy  If you are dropped from the video visit, the video invite should be resent to:    Will anyone else be joining your video visit? No        Video-Visit Details    Type of service:  Video Visit    Video Start Time: 10:05am  Video End Time: 10:49 AM    Originating Location (pt. Location): Home    Distant Location (provider location):  [facility] [place]     Platform used for Video Visit: Boris Limon MD          "

## 2020-12-20 ENCOUNTER — HEALTH MAINTENANCE LETTER (OUTPATIENT)
Age: 30
End: 2020-12-20

## 2020-12-23 DIAGNOSIS — Z23 NEED FOR PROPHYLACTIC VACCINATION AND INOCULATION AGAINST INFLUENZA: ICD-10-CM

## 2020-12-23 DIAGNOSIS — K59.00 CONSTIPATION, UNSPECIFIED CONSTIPATION TYPE: ICD-10-CM

## 2020-12-23 DIAGNOSIS — R63.5 ABNORMAL WEIGHT GAIN: ICD-10-CM

## 2020-12-23 LAB — RENIN PLAS-CCNC: 1.4 NG/ML/HR

## 2020-12-29 RX ORDER — POLYETHYLENE GLYCOL 3350 17 G/17G
17 POWDER, FOR SOLUTION ORAL DAILY
Qty: 714 G | Refills: 6 | Status: SHIPPED | OUTPATIENT
Start: 2020-12-29 | End: 2022-07-20

## 2021-01-05 ENCOUNTER — VIRTUAL VISIT (OUTPATIENT)
Dept: BEHAVIORAL HEALTH | Facility: CLINIC | Age: 31
End: 2021-01-05
Payer: COMMERCIAL

## 2021-01-05 ENCOUNTER — MYC MEDICAL ADVICE (OUTPATIENT)
Dept: FAMILY MEDICINE | Facility: CLINIC | Age: 31
End: 2021-01-05

## 2021-01-05 DIAGNOSIS — F33.1 MAJOR DEPRESSIVE DISORDER, RECURRENT EPISODE, MODERATE (H): Primary | ICD-10-CM

## 2021-01-05 PROCEDURE — 90837 PSYTX W PT 60 MINUTES: CPT | Mod: 95 | Performed by: PSYCHOLOGIST

## 2021-01-05 NOTE — PROGRESS NOTES
[facility]: Integrated Behavioral Health  January 5, 2020      Behavioral Health Clinician Progress Note    Patient Name: Lanie Neil           Service Type: Video appointment      Service Location:  Video appointment      Session Start Time: 11:03  Session End Time: 11:59      Session Length: 38 - 52      Attendees: Client    Visit Activities (Refresh list every visit): [facility] Only    Telemedicine Visit: The patient's condition can be safely assessed and treated via synchronous audio and visual telemedicine encounter.      Reason for Telemedicine Visit: Patient has requested telehealth visit and Services only offered telehealth    Originating Site (Patient Location): Patient's home    Distant Site (Provider Location): Provider Remote Setting    Consent:  The patient/guardian has verbally consented to: the potential risks and benefits of telemedicine (video visit) versus in person care; bill my insurance or make self-payment for services provided; and responsibility for payment of non-covered services.     Mode of Communication:  Video Conference via [employer]    As the provider I attest to compliance with applicable laws and regulations related to telemedicine.      Diagnostic Assessment Date: IP  Treatment Plan Review Date: IP  See Flowsheets for today's PHQ-9 and KATHARINE-7 results  Previous PHQ-9:   PHQ-9 SCORE 5/28/2019 2/12/2020 12/15/2020   PHQ-9 Total Score - - -   PHQ-9 Total Score MyChart - - 2 (Minimal depression)   PHQ-9 Total Score 1 16 2     Previous KATHARINE-7:   KATHARINE-7 SCORE 5/28/2019 2/12/2020 12/15/2020   Total Score - - 1 (minimal anxiety)   Total Score 3 19 1       BRITTANY LEVEL:  No flowsheet data found.    DATA  Extended Session (60+ minutes): No  Interactive Complexity: No  Crisis: No    Treatment Objective(s) Addressed in This Session:  Target Behavior(s): disease management/lifestyle changes      Depressed Mood: Increase interest, engagement, and pleasure in doing  "things  Decrease frequency and intensity of feeling down, depressed, hopeless  Relationship Problems: will address relationship difficulties in a more adaptive manner  Psychological distress related to Chronic Disease Management    Current Stressors / Issues:  Lanie states she continues to feel \"stuck\" in life, as if she lacks a sense of purpose or meaning. She spoke about learning of several family members becoming pregnant, something difficult for her to learn, not being able to have children of her own. She indicated despite this positive news about pregnancies, she struggles with family conflicts that occur, between her brothers and mother. Lanie often finds herself in the \"middle\" of these conflicts and states she feels compelled to try and intervene, resolve the issues that unfold. She reports trying to share with a family member the other person's perspective on the matter, but finds this frustrates them. We spoke about risks of triangualization and trying to \"fix\" others issues in attempt to reduce feelings of fear/abanonment that arises in her. We also discussed how confirmation bias in arguments can make her attempts to see others perspectives an invalidating experience for them. We briefly began to discuss alternative forms of communication/focusing on herself more instead of the conflicts of others.     Insight-oriented counseling was primarily used today.       From Second Appointment on 12/15/2020:  Lanie completed a [facility] follow-up today. She indicated wishing to return to counseling as she reported concerns with depression, relationship troubles, and difficulty with the pandemic. She did report feeling fortunate however that we could meet over video now, as meeting in person was a challenge for her from our last visit on 2/24/20. Our session consisted mainly of reviewing her concerns and discussing options for care. Lanie shared the pandemic has been difficult for her and her mother, her " primary care taker. Per her report, they spend a significant amount of time together and are having arguments a little more often than she would prefer. When asked about these arguments, Lanie indicated she struggles at times with having her mother do most things for her, like manage her medications. Lanie said she can insist on doing more on her own and this can lead to conflicts when she is unable or her mother takes charge. An issue with pain medications in particular was discussed. Lanie notes she believes she is dependent on her pain medication, but does not believe she would face issues if she managed this on her own, despite her mother's worries about her taking too much. She did report a history of taking her pain medications earlier than recommended, however.     We talked about themes of independence and autonomy, how these can be difficult for her to achieve. We also discussed a sense of mourning she experiences, with regard to not being able to have a family or children of her own. This was a significant life goal for her. We talked about new ways to build a structured day, find value in what she does/can do, and processing her sense of loss. I presented to her options for ongoing counseling, as I observed some depressive issues I would like to help her address. She agreed to meet with me for a few more sessions for this purpose.       From Initial Appointment on 2/24/2020:  Today Lanie reported concerns with depressed mood, which she suggested began about six months ago, but has been worse in the last two months. Lanie described history of struggling with depression on occasion, noting she will struggle with low mood for some time and see improvement once a particular source of stress has resolved. Currently, Lanie stated she has struggled with depressed mood from her reported difficulty making friends or establishing intimate partners. She indicated several of her family members are  pregnant or having relational troubles, which makes her struggle with feeling trapped/stuck and worthless. She cited her mother and a few family members as her primary sources of support, however suggested its hard that these are individuals that take care of her and are not the same as an intimate partner. Lanie mentioned she occasionally feels like a burden to others, particularly her mother, for taking care of her. She additionally endorsed social withdrawal/isolation as another behavior that occurs when she becomes depressed. Lanie endorsed having rare and passive thoughts of suicide when she struggles with depression. She denied ever having a plan, identifying means, or having active intent to hurt herself however. Lanie additionally noted she began Zoloft about 10 days ago.    Shortly into session, Lanie asked if her mother could accompany her to the appointment to assist with a complication in her wheelchair. At this time, I presented several treatment options to both of them. I provided her the behavioral health access number due to Lanie living in [facility] and stating it takes over two hours to reach the [facility] due to transportation needs. Lanie stated she would prefer a therapist to come to her home for therapy if possible. I encouraged her to contact the behavioral health access number for help finding a therapist that could do in home work. I additionally scheduled Lanie to return for [facility] services with me in about two weeks for ongoing support and to begin help addressing her depressive symptoms. Despite Lanie denying history of active suicidal ideation, I provided her a handout of local crisis line information.       Progress on Treatment Objective(s) / Homework:  New Objective established this session - PREPARATION (Decided to change - considering how); Intervened by negotiating a change plan and determining options / strategies for behavior change, identifying triggers, exploring social  supports, and working towards setting a date to begin behavior change    Also provided psychoeducation about behavioral health condition, symptoms, and treatment options     Supportive counseling, insight-oriented therapy    Care Plan review completed: No    Medication Review:  No changes to current psychiatric medication(s)    Medication Compliance:  NA    Changes in Health Issues:   None reported    Chemical Use Review:   Substance Use: Chemical use reviewed, no active concerns identified      Tobacco Use: No current tobacco use.      Assessment: Current Emotional / Mental Status (status of significant symptoms):  Risk status (Self / Other harm or suicidal ideation)  Patient has had a history of suicidal ideation: passive thoughts only; easily controlled - denied any recently    Patient denies current fears or concerns for personal safety.  Patient denies current or recent suicidal ideation or behaviors.  Patient denies current or recent homicidal ideation or behaviors.  Patient denies current or recent self injurious behavior or ideation.  Patient denies other safety concerns.     A safety and risk management plan has not been developed at this time, however patient was encouraged to call [place] [phone] should there be a change in any of these risk factors.       Appearance:   Appropriate   Eye Contact:   Good   Psychomotor Behavior: Normal   Attitude:   Cooperative   Orientation:   All  Speech   Rate / Production: Normal    Volume:  Normal   Mood:    Depressed    Affect:    Appropriate   Thought Content:  Clear   Thought Form:  Coherent  Logical   Insight:    Good     Diagnoses:  1. Major depressive disorder, recurrent episode, moderate (H)        Collateral Reports Completed:  [facility] will coordinate with care team as needed    Plan: (Homework, other):  Lanie was scheduled to [facility] in two weeks for help addressing depressive symptoms. She was also given information about mental health symptoms and treatment  options .  CD Recommendations: No indications of CD issues.     Héctor Higuera Psy.D, JENNIFER 1/5/2021      Answers for HPI/ROS submitted by the patient on 12/15/2020   If you checked off any problems, how difficult have these problems made it for you to do your work, take care of things at home, or get along with other people?: Not difficult at all  PHQ9 TOTAL SCORE: 2  KATHARINE 7 TOTAL SCORE: 1

## 2021-01-07 ENCOUNTER — MYC MEDICAL ADVICE (OUTPATIENT)
Dept: FAMILY MEDICINE | Facility: CLINIC | Age: 31
End: 2021-01-07

## 2021-01-07 DIAGNOSIS — J45.909 ASTHMA: Primary | ICD-10-CM

## 2021-01-08 NOTE — TELEPHONE ENCOUNTER
I refilled flunisolide HFA inhaler today, but the pharmacy says it is not available any more, needs an alternative.    Soon-Mi

## 2021-01-12 NOTE — TELEPHONE ENCOUNTER
Hi Soon-Mi,    All of the inhaled steroids work about the same. I'm not sure which will be covered by insurance so here are a few options:    Arnuity Ellipta 100 mcg/in 1 puff daily  Qvar 40 mcg/act 2 puffs twice daily  Pulmicort 90  Mcg/act 2 puffs twice daily  Alvesco 80 mcg/act 2 puffs twice daily    I've ordered the Arnuity below. I listed the others for reference just in case it isn't covered.     Fred

## 2021-01-15 ENCOUNTER — TELEPHONE (OUTPATIENT)
Dept: FAMILY MEDICINE | Facility: CLINIC | Age: 31
End: 2021-01-15

## 2021-01-16 ENCOUNTER — HOSPITAL LABORATORY (OUTPATIENT)
Dept: OTHER | Facility: CLINIC | Age: 31
End: 2021-01-16

## 2021-01-16 LAB
ALBUMIN SERPL-MCNC: 3.3 G/DL (ref 3.4–5)
ANION GAP SERPL CALCULATED.3IONS-SCNC: 8 MMOL/L (ref 3–14)
BUN SERPL-MCNC: 9 MG/DL (ref 7–30)
CALCIUM SERPL-MCNC: 8.6 MG/DL (ref 8.5–10.1)
CHLORIDE SERPL-SCNC: 110 MMOL/L (ref 94–109)
CHLORIDE UR-SCNC: 40 MMOL/L
CO2 SERPL-SCNC: 22 MMOL/L (ref 20–32)
CREAT SERPL-MCNC: <0.14 MG/DL (ref 0.52–1.04)
GFR SERPL CREATININE-BSD FRML MDRD: >90 ML/MIN/{1.73_M2}
GLUCOSE SERPL-MCNC: 115 MG/DL (ref 70–99)
OSMOLALITY SERPL: 282 MMOL/KG (ref 275–295)
OSMOLALITY UR: 203 MMOL/KG (ref 100–1200)
PHOSPHATE SERPL-MCNC: 3.2 MG/DL (ref 2.5–4.5)
POTASSIUM SERPL-SCNC: 3.8 MMOL/L (ref 3.4–5.3)
POTASSIUM UR-SCNC: 41 MMOL/L
SODIUM SERPL-SCNC: 140 MMOL/L (ref 133–144)
SODIUM UR-SCNC: 15 MMOL/L
TSH SERPL DL<=0.005 MIU/L-ACNC: 1.48 MU/L (ref 0.4–4)

## 2021-01-19 ENCOUNTER — VIRTUAL VISIT (OUTPATIENT)
Dept: BEHAVIORAL HEALTH | Facility: CLINIC | Age: 31
End: 2021-01-19
Payer: COMMERCIAL

## 2021-01-19 ENCOUNTER — MEDICAL CORRESPONDENCE (OUTPATIENT)
Dept: HEALTH INFORMATION MANAGEMENT | Facility: CLINIC | Age: 31
End: 2021-01-19

## 2021-01-19 DIAGNOSIS — F33.0 MAJOR DEPRESSIVE DISORDER, RECURRENT EPISODE, MILD WITH ANXIOUS DISTRESS (H): Primary | ICD-10-CM

## 2021-01-19 PROCEDURE — 90791 PSYCH DIAGNOSTIC EVALUATION: CPT | Mod: 95 | Performed by: PSYCHOLOGIST

## 2021-01-19 NOTE — TELEPHONE ENCOUNTER
Central Prior Authorization Team   [phone]    PA Initiation    Medication: fluticasone (ARNUITY ELLIPTA) 100 MCG/ACT inhaler  Insurance Company: [employer] - Phone [phone] Fax [phone]  Pharmacy Filling the Rx: [id number] - [place], [place] - [address] AT [facility] [address] [facility] [address]  Filling Pharmacy Phone: [phone]  Filling Pharmacy Fax: [phone]  Start Date: 1/19/2021

## 2021-01-19 NOTE — PROGRESS NOTES
[facility]: Integrated Behavioral Health  Provider Name:  Héctor Higuera      Credentials:  JENNIFER Bradshaw    PATIENT'S NAME: Lanie Neil  PREFERRED NAME: Lanie Neil  PRONOUNS:     She/her/hers  MRN: [id number]  : 1990  ADDRESS: [facility] [address] [place] [address]   ACCT. NUMBER:  [id number]  DATE OF SERVICE: 21  START TIME: 11:00  END TIME: 11:55  PREFERRED PHONE: [phone]  May we leave a program related message: Yes  SERVICE MODALITY:  Video Visit:      Provider verified identity through the following two step process.  Patient provided:  Patient photo and Patient     Telemedicine Visit: The patient's condition can be safely assessed and treated via synchronous audio and visual telemedicine encounter.      Reason for Telemedicine Visit: Services only offered telehealth    Originating Site (Patient Location): Patient's home    Distant Site (Provider Location): Provider Remote Setting    Consent:  The patient/guardian has verbally consented to: the potential risks and benefits of telemedicine (video visit) versus in person care; bill my insurance or make self-payment for services provided; and responsibility for payment of non-covered services.     Patient would like the video invitation sent by:  Send to e-mail at: [email or web address]    Mode of Communication:  Video Conference via [employer]    As the provider I attest to compliance with applicable laws and regulations related to telemedicine.    UNIVERSAL ADULT Mental Health DIAGNOSTIC ASSESSMENT      Identifying Information:  Patient is a 30 year old, [place] single, female with a history of spinal muscle atrophy and history of chronic pain syndrome who presented to [facility] services upon referral from her primary care provider.  The pronoun use throughout this assessment reflects the patient's chosen pronoun.  Patient was referred for an assessment by her primary care provider.  Patient attended the session  alone.     Chief Complaint:   The reason for seeking services at this time is: concerns with relationships and depressed mood. The problem(s) began in Fall of 2020, though records show longstanding issues with depression and anxiety, dating back to at least 2014. Patient has attempted to resolve these concerns in the past through counseling with this writer.      Social/Family History:  Patient was born in [place], but had moved to [place], [place], and back to [place] before moving to [place] in 2014.They were raised by biological mother, who is her primary care-taker today. Her father left at an early age.  Parents are .  Patient reported that their childhood was difficult, as she cited being raised by a single mother.  Patient described their current relationships with family of origin as close, though sometimes conflictual with her mother. She indicated that she has three brothers and three half sisters. She is close with one of her brothers, who periodically lives with her and her mother.       The patient describes their cultural background as of [place] decent.  Cultural influences and impact on patient's life structure, values, norms, and healthcare: none reported.  Contextual influences on patient's health include: Individual Factors spinal muscle atrophy.  These factors will be addressed in the Preliminary Treatment plan.  Patient identified their preferred language to be English. Patient reported they does not need the assistance of an  or other support involved in therapy.     Patient reported had no significant delays in developmental tasks.   Patient's highest education level was high school graduate. Patient identified the following learning problems: none reported.  Modifications will not be used to assist communication in therapy.  Patient reports they are  able to understand written materials.    Patient reported the following relationship history: single, never  .  Patient's current relationship status is single for several years; she did have a boyfriend she met online.   Patient identified their sexual orientation as heterosexual.  Patient reported having zero child(alysia). Patient identified mother and siblings as part of their support system.  Patient identified the quality of these relationships as fair.      Patient's current living/housing situation involves staying in own home/apartment.  She lives with her mother, her PCA, and they report that housing is stable.     Patient is currently disabled.  Patient reports their finances are obtained through disability and mother.  Patient does not identify finances as a current stressor.      Patient reported that they have not been involved with the legal system. Patient denies being on probation / parole / under the jurisdiction of the court.    Patient's Strengths and Limitations:  Patient identified the following strengths or resources that will help them succeed in treatment: commitment to health and well being and family support. Things that may interfere with the patient's success in treatment include: few friends, physical health concerns and transportation concerns.     Personal and Family Medical History:   Patient does report a family history of mental health concerns. She suspects a family history of anxiety and depression. Patient reports family history includes Family History Negative in an other family member.    Patient does report Mental Health Diagnosis and/or Treatment.  Patient Patient reported the following previous diagnoses which include(s): Depression.  Depression issues appear longstanding, according to her report and records. Patient has received mental health services in the past: therapy with a pain psychologist in 2014.  Psychiatric Hospitalizations: None.  Patient denies a history of civil commitment.  Currently, patient is receiving other mental health services.  These include medication  from PCP.     Patient has had a physical exam to rule out medical causes for current symptoms.  Date of last physical exam was within the past year. Client was encouraged to follow up with PCP if symptoms were to develop. The patient has a [facility] Primary Care Provider, who is named Jimmy Moseley.  Patient reports the following current medical concerns: SMA, chronic pain syndrome, asthma, DVT (see EMR).  Patient reports pain concerns including from her SMA.  Patient does want help addressing pain concerns.  There are not significant appetite / nutritional concerns / weight changes. Patient does not report a history of head injury / trauma / cognitive impairment.      See EMR for current medication list.    Medication Adherence:  Patient reports taking prescribed medications as prescribed.    Patient Allergies:  No Known Allergies    Medical History:    Past Medical History:   Diagnosis Date     Anxiety      Snoring      Spinal muscle atrophy (H)          Current Mental Status Exam:   Appearance:  Appropriate    Eye Contact:  Good   Psychomotor:  Slow       Gait / station:  NA Patient uses wheelchair   Attitude / Demeanor: Cooperative  Friendly Pleasant  Speech      Rate / Production: Normal/ Responsive      Volume:  Normal  volume      Language:  intact  Mood:   Depressed   Affect:   Appropriate    Thought Content: Clear   Thought Process: Logical       Associations: No loosening of associations  Insight:   Good   Judgment:  Intact   Orientation:  All  Attention/concentration: Good    Rating Scales:    PHQ9:    PHQ-9 SCORE 5/28/2019 2/12/2020 12/15/2020   PHQ-9 Total Score - - -   PHQ-9 Total Score MyChart - - 2 (Minimal depression)   PHQ-9 Total Score 1 16 2   ;    GAD7:    KATHARINE-7 SCORE 5/28/2019 2/12/2020 12/15/2020   Total Score - - 1 (minimal anxiety)   Total Score 3 19 1     CGI:     First:Considering your total clinical experience with this particular patient population, how severe are the patient's  symptoms at this time?: 4 (2/18/2021  4:43 PM)  ;    Most recentCompared to the patient's condition at the START of treatment, this patient's condition is: 3 (2/18/2021  4:43 PM)    Substance Use:  Patient did not report a family history of substance use concerns; see medical history section for details.  Patient has not received chemical dependency treatment in the past.  Patient has not ever been to detox.      Patient is not currently receiving any chemical dependency treatment. Patient reported the following problems as a result of their substance use: none.    Patient denies using alcohol.  Patient denies using tobacco.  Patient denies using marijuana.  Patient denies using caffeine.  Patient reports using/abusing the following substance(s). Patient reported no other substance use.      Patient does use opioid pain medication and benzodiazepine. She recognizes dependence on these substances due to her pain condition. She denies problematic use however.     Substance Use: none    Based on the clinical interview there  are not indications of drug or alcohol abuse. We have talked about her dependency on narcotics for pain relief however, despite taking these as prescribed.       Significant Losses / Trauma / Abuse / Neglect Issues:   Patient did not serve in the .  There are indications or report of significant loss, trauma, abuse or neglect issues related to: are no indications and client denies any losses, trauma, abuse, or neglect concerns.  Concerns for possible neglect are not present.    Safety Assessment:   Current Safety Concerns:  [place] Suicide Severity Rating Scale (Short Version)  [place] Suicide Severity Rating (Short Version) 2/18/2021   Over the past 2 weeks have you felt down, depressed, or hopeless? yes   Over the past 2 weeks have you had thoughts of killing yourself? no   Have you ever attempted to kill yourself? no     Patient does have a history of passive thoughts of suicide,  however described these thoughts as fleeting and easily controllable, citing her family and pets as protective factors. Her thoughts did not include evidence of a plan for suicide, identified means, or any intent for self-harm. She does not have a history of previous attempts. Estimated risk of self-harm is low. She appears appropriate for an ongoing outpatient level of care.     Patient denies current homicidal ideation and behaviors.  Patient denies current self-injurious ideation and behaviors.    Patient denied risk behaviors associated with substance use.  Patient denies any high risk behaviors associated with mental health symptoms.  Patient reports the following current concerns for their personal safety: None.  Patient reports there are not  firearms in the house. NA.     History of Safety Concerns:  Patient denied a history of homicidal ideation.     Patient denied a history of personal safety concerns.    Patient denied a history of assaultive behaviors.    Patient denied a history of sexual assault behaviors.     Patient denied a history of risk behaviors associated with substance use.  Patient denies any history of high risk behaviors associated with mental health symptoms.  Patient reports the following protective factors: forward/future oriented thinking, dedication to family/friends, living with other people and pets    Risk Plan:  See Recommendations for Safety and Risk Management Plan    Review of Symptoms per patient report:  Depression: Lack of interest, Excessive or inappropriate guilt, Feelings of hopelessness, Feelings of helplessness, Low self-worth, Feeling sad, down, or depressed and Withdrawn  Vy:  No Symptoms  Psychosis: No Symptoms  Anxiety: Excessive worry, Nervousness, Ruminations and Irritability  Panic:  No symptoms  Post Traumatic Stress Disorder:  No Symptoms   Eating Disorder: No Symptoms  ADD / ADHD:  No symptoms  Conduct Disorder: No symptoms  Autism Spectrum Disorder: No  symptoms  Obsessive Compulsive Disorder: No Symptoms    Patient reports the following compulsive behaviors and treatment history: none reported.      Diagnostic Criteria:   A) Recurrent episode(s) - symptoms have been present during the same 2-week period and represent a change from previous functioning 5 or more symptoms (required for diagnosis)   - Depressed mood. Note: In children and adolescents, can be irritable mood.     - Diminished interest or pleasure in all, or almost all, activities.    - Fatigue or loss of energy.    - Feelings of worthlessness or inappropriate and excessive guilt.    - Recurrent thoughts of death (not just fear of dying), recurrent suicidal ideation without a specific plan, or a suicide attempt or a specific plan for committing suicide.   B) The symptoms cause clinically significant distress or impairment in social, occupational, or other important areas of functioning  C) The episode is not attributable to the physiological effects of a substance or to another medical condition  D) The occurence of major depressive episode is not better explained by other thought / psychotic disorders  E) There has never been a manic episode or hypomanic episode    Functional Status:  Patient reports the following functional impairments: chronic disease management and relationship(s).     WHODAS: No flowsheet data found.  Nonprogrammatic care:  Patient is requesting basic services to address current mental health concerns.    Clinical Summary:  1. Reason for assessment: depression and relationship concerns.  2. Psychosocial, Cultural and Contextual Factors: Pt with SMA.  3. Principal DSM5 Diagnoses  (Sustained by DSM5 Criteria Listed Above):   296.31 (F33.0) Major Depressive Disorder, Recurrent Episode, Mild With anxious distress.  4. Other Diagnoses that is relevant to services: See EMR  5. Provisional Diagnosis: None.  6. Prognosis: Expect Improvement.  7. Likely consequences of symptoms if not  treated: deterioration of mood symptoms and quality of life.  8. Client strengths include:  intelligent, open to learning, open to suggestions / feedback and wants to learn .     Recommendations:     1. Plan for Safety and Risk Management:   Recommended that patient call 911 or go to the local ED should there be a change in any of these risk factors..          Report to child / adult protection services was NA.     2. Patient's identified no cultural or diversity factors relevant to counseling.     3. Initial Treatment will focus on:    Depressed Mood - help allieviate depressed mood and related relationship problems.     4. Resources/Service Plan:    services are not indicated.   Modifications to assist communication are not indicated.   Additional disability accommodations are not indicated.      5. Collaboration:   Collaboration / coordination of treatment will be initiated with the following  support professionals: primary care physician.      6.  Referrals:   The following referral(s) will be initiated: none right now. Next Scheduled Appointment: NA.     A Release of Information has been obtained for the following: NA.    7. ROBBY:    ROBBY:  Discussed the general effects of drugs and alcohol on health and well-being. Provider gave patient printed information about the effects of chemical use on their health and well being. Recommendations: She may benefit from long-term therapy for help managing chronic pain and related narcotic use.     8. Records:   These were reviewed at time of assessment.   Information in this assessment was obtained from the medical record and  provided by patient who is a good historian. Patient will have open access to their mental health medical record.        Provider Name/ Credentials:  Héctor Higuera LP  February 18, 2021

## 2021-01-20 LAB — LAB SCANNED RESULT: NORMAL

## 2021-01-20 NOTE — TELEPHONE ENCOUNTER
Prior Authorization Approval    Authorization Effective Date: 12/20/2020  Authorization Expiration Date: 1/20/2022  Medication: fluticasone (ARNUITY ELLIPTA) 100 MCG/ACT inhaler-PA APPROVED   Approved Dose/Quantity:   Reference #:     Insurance Company: [employer] - Phone [phone] Fax [phone]  Expected CoPay:       CoPay Card Available:      Foundation Assistance Needed:    Which Pharmacy is filling the prescription (Not needed for infusion/clinic administered): [id number] - [place], [place] - [address] AT [facility] [address]  Pharmacy Notified: Yes- **Instructed pharmacy to notify patient when script is ready to /ship.**  Patient Notified: Yes

## 2021-02-05 ENCOUNTER — VIRTUAL VISIT (OUTPATIENT)
Dept: NEPHROLOGY | Facility: CLINIC | Age: 31
End: 2021-02-05
Attending: NURSE PRACTITIONER
Payer: COMMERCIAL

## 2021-02-05 DIAGNOSIS — E87.6 HYPOKALEMIA: Primary | ICD-10-CM

## 2021-02-05 PROCEDURE — 99214 OFFICE O/P EST MOD 30 MIN: CPT | Mod: 95 | Performed by: STUDENT IN AN ORGANIZED HEALTH CARE EDUCATION/TRAINING PROGRAM

## 2021-02-05 RX ORDER — MORPHINE SULFATE 30 MG/1
30 CAPSULE, EXTENDED RELEASE ORAL 2 TIMES DAILY
COMMUNITY
Start: 2021-01-08

## 2021-02-05 RX ORDER — HYDROMORPHONE HYDROCHLORIDE 4 MG/1
8 TABLET ORAL
COMMUNITY
Start: 2021-01-08

## 2021-02-05 ASSESSMENT — PAIN SCALES - GENERAL: PAINLEVEL: SEVERE PAIN (6)

## 2021-02-05 NOTE — PROGRESS NOTES
Lanie is a 30 year old who is being evaluated via a billable video visit.  Javi please text a link.    How would you like to obtain your AVS? MyChart  If the video visit is dropped, the invitation should be resent by: Send to e-mail at: [email or web address]  Will anyone else be joining your video visit? No        Video-Visit Details    Type of service:  Video Visit    Video Start Time: 9:00am    Video End Time:9:35am    Originating Location (pt. Location): Home    Distant Location (provider location):  [facility] [place]     Platform used for Video Visit: Javi Limon MD  Nephrology

## 2021-02-05 NOTE — PROGRESS NOTES
"Nephrology Clinic    Video Visit     SUBJECTIVE:   Lanie Neil is a 30 year old year old female with h/o spinal muscle atrophy (SMA), DVT on rivaroxaban, and asthma here for: follow up of hypokalemia    The patient returns after 2 months. Her cystatin C returned showing a cystatin C-eGFR that is > 90 ml/min. Her serum potassium is again normal. Urine potassium has risen from 14 to 41 (TTKG = 15) (but with normal blood level) and urine chloride has risen from 14 to 40.     The patient notes she recently increased her water intake because her urine had a smell/odor and visible \"sediment.\"     One foot recently swollen but not now. Hands sometime swell but not currently. She is trach'ed and uses a mechanical ventilator at home.     Home BP is 104/62 mmHg.     She reaffirms no diarrhea.     Review of systems:  4 point ROS negative except as noted above.    Past Medical History:   Diagnosis Date     Anxiety      Snoring      Spinal muscle atrophy (H)              ACETAMINOPHEN PO, Take 650 mg by mouth every 4 hours as needed for pain       albuterol (ALBUTEROL) 108 (90 BASE) MCG/ACT inhaler, Inhale 2 puffs into the lungs every 4 hours as needed for shortness of breath / dyspnea or wheezing       albuterol (PROVENTIL) (2.5 MG/3ML) 0.083% neb solution, USE ONE VIAL BY NEBULIZATION EVERY 6 HOURS AS NEEDED FOR SHORTNESS OF BREATH/ DYSPNEA OR WHEEZING       ALPRAZolam (XANAX) 0.25 MG tablet, TAKE 1 TABLET(0.25 MG) BY MOUTH THREE TIMES DAILY AS NEEDED FOR ANXIETY       aminocaproic acid (AMICAR) 0.25 GM/ML solution, Take 12 mL (3 grams) via G-tube every 8 hours during menstrual period.       bisacodyl (DULCOLAX) 10 MG suppository, Place 10 mg rectally daily as needed for constipation       celecoxib (CELEBREX) 200 MG capsule, Take 1 capsule (200 mg) by mouth daily       diazepam (VALIUM) 5 MG tablet, Take 1 tablet (5 mg) by mouth every 6 hours as needed for muscle spasms       fluticasone (ARNUITY ELLIPTA) 100 MCG/ACT " inhaler, Inhale 1 puff into the lungs daily Please fill with the one that can be used within the ventilator through a spacer.       gabapentin (NEURONTIN) 250 MG/5ML solution, TAKE 20MLS PER FEEDING TUBE THREE TIMES DAILY       guaiFENesin (ORGANIDIN) 200 MG TABS tablet, Take 200 mg by mouth every 4 hours as needed for cough       metroNIDAZOLE (METROCREAM) 0.75 % external cream, Apply topically 2 times daily       naloxone (NARCAN) 4 MG/0.1ML nasal spray, Spray 1 spray (4 mg) into one nostril alternating nostrils once as needed for opioid reversal every 2-3 minutes until assistance arrives       Nutritional Supplements (REPLETE FIBER) LIQD, Take 1 Can by mouth 4 times daily       omeprazole (PRILOSEC) 2 mg/mL suspension, SHAKE LIQUID WELL AND GIVE 20ML (40MG) DAILY (VIA G-TUBE)       polyethylene glycol (MIRALAX) 17 GM/Dose powder, Take 17 g by mouth daily       potassium chloride (KAYCIEL) 20 MEQ/15ML (10%) solution, 22.5 mLs (30 mEq) by Per G Tube route daily       prochlorperazine (COMPAZINE) 5 MG tablet, Take 5 mg by mouth every 6 hours as needed for nausea or vomiting       rivaroxaban ANTICOAGULANT (XARELTO ANTICOAGULANT) 10 MG TABS tablet, Take 1 tablet (10 mg) by mouth daily (with dinner)       Salicylic Acid (OXY BALANCE FACIAL CLEAN WASH EX), Externally apply 1 pad topically daily       Sennosides (SENNA) 8.8 MG/5ML LIQD, Take 10 mLs by mouth 2 times daily       sertraline (ZOLOFT) 20 MG/ML (HIGH CONC) solution, Take 2.5 mLs (50 mg) by mouth daily       silver sulfADIAZINE (SILVADENE) 1 % external cream, Apply topically 2 times daily To the affected area.       simethicone (MYLICON) 66.7 mg/ml, as needed       simethicone 40 MG/0.6ML LIQD,        sodium chloride (OCEAN) 0.65 % nasal spray, 2 sprays       clindamycin (CLEOCIN T) 1 % lotion, Apply topically 2 times daily To face as needed (Patient not taking: Reported on 2/5/2021)       doxycycline hyclate (VIBRAMYCIN) 100 MG capsule, GIVE ONE CAPSULE VIA  "\"G\"- TUBE TWICE DAILY (Patient not taking: Reported on 2/5/2021)       ferrous sulfate 220 (44 Fe) MG/5ML ELIX, 22 mg by Enteral route       GENERLAC 10 GM/15ML solution,        HYDROmorphone (DILAUDID) 4 MG tablet,        hydrOXYzine (ATARAX) 25 MG tablet, Take 25 mg by mouth every 6 hours as needed for itching       LANsoprazole (PREVACID) 30 MG DR capsule, Take 30 mg by mouth       Magnesium Hydroxide (MILK OF MAGNESIA PO), Take 30 mLs by mouth as needed       mometasone (NASONEX) 50 MCG/ACT nasal spray,        morphine (SABINE) 30 MG 24 hr capsule,        order for DME, Equipment being ordered: 16 fr 5cc balloon indwelling catheter with drainage bag. 2 catheters/month       order for DME, Equipment being ordered: [facility] Percussor    No current facility-administered medications on file prior to visit.        OBJECTIVE:  Vitals and exam deferred due to video visit.     Recent BMP, urine sodium, urine potassium urine chloride, urine osmolality, serum osmolality, cystatin C, aldosterone level and renin activity, and TSH reviewed.     Recent Labs   Lab Test 01/16/21  1350 12/17/20  2126    140   POTASSIUM 3.8 3.5   CHLORIDE 110* 107   CO2 22 26   ANIONGAP 8 7   * 170*   BUN 9 9   CR <0.14* <0.14*   CHINTAN 8.6 9.2       Lab Results   Component Value Date    WBC 6.0 12/17/2020     Lab Results   Component Value Date    RBC 4.24 12/17/2020     Lab Results   Component Value Date    HGB 11.7 12/17/2020     Lab Results   Component Value Date    HCT 36.8 12/17/2020     No components found for: MCT  Lab Results   Component Value Date    MCV 87 12/17/2020     Lab Results   Component Value Date    MCH 27.6 12/17/2020     Lab Results   Component Value Date    MCHC 31.8 12/17/2020     Lab Results   Component Value Date    RDW 19.1 12/17/2020     Lab Results   Component Value Date     12/17/2020       Color Urine (no units)   Date Value   12/17/2020 Light Yellow     Appearance Urine (no units)   Date Value "   12/17/2020 Slightly Cloudy     Glucose Urine (mg/dL)   Date Value   12/17/2020 Negative     Bilirubin Urine (no units)   Date Value   12/17/2020 Negative     Ketones Urine (mg/dL)   Date Value   12/17/2020 Negative     Specific Gravity Urine (no units)   Date Value   12/17/2020 1.004     pH Urine (pH)   Date Value   12/17/2020 7.5 (H)     Protein Albumin Urine (mg/dL)   Date Value   12/17/2020 10 (A)     Nitrite Urine (no units)   Date Value   12/17/2020 Negative     Leukocyte Esterase Urine (no units)   Date Value   12/17/2020 Large (A)           ASSESSMENT and PLAN:   Lanie was seen today for telephone.    Diagnoses and all orders for this visit:    Hypokalemia   The patient again had a normal serum level, potentially due to her daily repletion of potassium with liquid KCl 30 mEq. Her urine level was higher, but like at her last visit when it was lower, with a normal serum level this may just reflect appropriate excretion of excess intake of potassium. Thus, we will now attempt a brief trial off of her potassium for two days (I discussed risks and benefits with the patient, and she elected to proceed) and to re-measure the below labs to attempt to capture how the kidneys are handling potassium when in a situation where, if not the source of the problem, the kidneys should hold onto potassium avidly. If this doesn't reveal a cause (either renal or GI as shifting is much less likely in a patient with normal TSH, normal serum bicarb, no diabetes with insulin), then the patient and I discussed a follow-up plan with repeat labs if or when hypokalemia becomes a more active problem again. Overall, the treatment with supplemental potassium is currently maintaining a normal serum level on her last several labs and doesn't require adjustment at this time.    Additional labs returning show shakeel/renin ratio not suggesting primary hyperaldosteronism (nor is BP high), and without low BP and elevated bicarb an appropriate  elevation in aldosterone from hypovolemia doesn't appear to be present (and on most recent labs the urine chloride isn't low - thus not in a chloride responsive/hypovolemic state). As stated in my last note, her previously low urine chloride rules out Barter's and Gittleman's. .   -     Renal panel; Future  -     Potassium random urine; Future  -     Osmolality; Future  -     Osmolality urine; Future  -     Sodium random urine; Future  -     Chloride random urine; Future      GFR  By cystatin C appears normal (>90 ml/min).     Blood pressure  Patient is without hypertension or diomedes or symptomatic hypotension. Occasional symptoms that may suggest edema - we discussed careful follow-up of potassium if another provider ever prescribes her diuretics.       Return to clinic as needed.     Mono Limon MD  Instructor  Nephrology  Pager: [phone]

## 2021-02-09 ENCOUNTER — VIRTUAL VISIT (OUTPATIENT)
Dept: BEHAVIORAL HEALTH | Facility: CLINIC | Age: 31
End: 2021-02-09
Payer: COMMERCIAL

## 2021-02-09 DIAGNOSIS — F33.0 MAJOR DEPRESSIVE DISORDER, RECURRENT EPISODE, MILD WITH ANXIOUS DISTRESS (H): Primary | ICD-10-CM

## 2021-02-09 PROCEDURE — 90837 PSYTX W PT 60 MINUTES: CPT | Mod: GT | Performed by: PSYCHOLOGIST

## 2021-02-09 NOTE — PROGRESS NOTES
[facility]: Integrated Behavioral Health  February 9, 2021      Behavioral Health Clinician Progress Note    Patient Name: Lanie Neil           Service Type: Video appointment      Service Location:  Video appointment      Session Start Time: 11:04  Session End Time: 11:59      Session Length: 53 - 60      Attendees: Client    Visit Activities (Refresh list every visit): [facility] Only    Telemedicine Visit: The patient's condition can be safely assessed and treated via synchronous audio and visual telemedicine encounter.      Reason for Telemedicine Visit: Patient has requested telehealth visit and Services only offered telehealth    Originating Site (Patient Location): Patient's home    Distant Site (Provider Location): Provider Remote Setting    Consent:  The patient/guardian has verbally consented to: the potential risks and benefits of telemedicine (video visit) versus in person care; bill my insurance or make self-payment for services provided; and responsibility for payment of non-covered services.     Mode of Communication:  Video Conference via [employer]    As the provider I attest to compliance with applicable laws and regulations related to telemedicine.      Diagnostic Assessment Date: 1/19/2021  Treatment Plan Review Date: 5/9/2021  See Flowsheets for today's PHQ-9 and KATHARINE-7 results  Previous PHQ-9:   PHQ-9 SCORE 5/28/2019 2/12/2020 12/15/2020   PHQ-9 Total Score - - -   PHQ-9 Total Score MyChart - - 2 (Minimal depression)   PHQ-9 Total Score 1 16 2     Previous KATHARINE-7:   KATHARINE-7 SCORE 5/28/2019 2/12/2020 12/15/2020   Total Score - - 1 (minimal anxiety)   Total Score 3 19 1       BRITTANY LEVEL:  No flowsheet data found.    DATA  Extended Session (60+ minutes): No  Interactive Complexity: No  Crisis: No    Treatment Objective(s) Addressed in This Session:  Target Behavior(s): disease management/lifestyle changes      Depressed Mood: Increase interest, engagement, and  "pleasure in doing things  Decrease frequency and intensity of feeling down, depressed, hopeless  Relationship Problems: will address relationship difficulties in a more adaptive manner  Psychological distress related to Chronic Disease Management    Current Stressors / Issues:  Lanie presents with concerns about keeping up with her medical appointments over the last few weeks. She cited having low K+ levels and related kidney difficulties and had a follow-up about her chronic pain treatment. She reported particular concern with reaching the maximum safe dose for her pain medication, noting that her tolerance for her current dose does leave her in pain a good deal of the time. We explored her concern with this in some detail, which lead us to discussing how she struggles at times feeling heard or credible in her reporting with regard to her pain treatment. Lanie shared that because she is unable to take more than prescribed or do any nefarious things with regard to narcotics (like buying it off the street, she says), she believes the risk of her having complications is low, but her mother disagrees (she manages her medications).     We explored and processed her experiences with not feeling heard or credible at times, how this affects her sense of agency, a longstanding difficulty for her. In a sense, we talked about the fear of losing more control for her, which manifests in the arguments/conflicts she has with her mother. We explored a bit of her notions about her mother's perspective in the conflict, why she thinks she is trying to moderate her use of pain medication to build her perspective-taking and insight to the conflict.     The latter half the session focused on Lanie's history and concern with dating, relationships, and her sense of low self-esteem/vulnerability. We explored her pursuit of more connection, less isolation in her life.       From 1/5/2021:  Lanie states she continues to feel \"stuck\" " "in life, as if she lacks a sense of purpose or meaning. She spoke about learning of several family members becoming pregnant, something difficult for her to learn, not being able to have children of her own. She indicated despite this positive news about pregnancies, she struggles with family conflicts that occur, between her brothers and mother. Lanie often finds herself in the \"middle\" of these conflicts and states she feels compelled to try and intervene, resolve the issues that unfold. She reports trying to share with a family member the other person's perspective on the matter, but finds this frustrates them. We spoke about risks of triangualization and trying to \"fix\" others issues in attempt to reduce feelings of fear/abanonment that arises in her. We also discussed how confirmation bias in arguments can make her attempts to see others perspectives an invalidating experience for them. We briefly began to discuss alternative forms of communication/focusing on herself more instead of the conflicts of others.     Insight-oriented counseling was primarily used today.        From Second Appointment on 12/15/2020:  Lanie completed a [facility] follow-up today. She indicated wishing to return to counseling as she reported concerns with depression, relationship troubles, and difficulty with the pandemic. She did report feeling fortunate however that we could meet over video now, as meeting in person was a challenge for her from our last visit on 2/24/20. Our session consisted mainly of reviewing her concerns and discussing options for care. Lanie shared the pandemic has been difficult for her and her mother, her primary care taker. Per her report, they spend a significant amount of time together and are having arguments a little more often than she would prefer. When asked about these arguments, Lanie indicated she struggles at times with having her mother do most things for her, like manage her medications. Lanie " said she can insist on doing more on her own and this can lead to conflicts when she is unable or her mother takes charge. An issue with pain medications in particular was discussed. Lanie notes she believes she is dependent on her pain medication, but does not believe she would face issues if she managed this on her own, despite her mother's worries about her taking too much. She did report a history of taking her pain medications earlier than recommended, however.     We talked about themes of independence and autonomy, how these can be difficult for her to achieve. We also discussed a sense of mourning she experiences, with regard to not being able to have a family or children of her own. This was a significant life goal for her. We talked about new ways to build a structured day, find value in what she does/can do, and processing her sense of loss. I presented to her options for ongoing counseling, as I observed some depressive issues I would like to help her address. She agreed to meet with me for a few more sessions for this purpose.       From Initial Appointment on 2/24/2020:  Today Lanie reported concerns with depressed mood, which she suggested began about six months ago, but has been worse in the last two months. Lanie described history of struggling with depression on occasion, noting she will struggle with low mood for some time and see improvement once a particular source of stress has resolved. Currently, Lanie stated she has struggled with depressed mood from her reported difficulty making friends or establishing intimate partners. She indicated several of her family members are pregnant or having relational troubles, which makes her struggle with feeling trapped/stuck and worthless. She cited her mother and a few family members as her primary sources of support, however suggested its hard that these are individuals that take care of her and are not the same as an intimate partner. Lanie  mentioned she occasionally feels like a burden to others, particularly her mother, for taking care of her. She additionally endorsed social withdrawal/isolation as another behavior that occurs when she becomes depressed. Lanie endorsed having rare and passive thoughts of suicide when she struggles with depression. She denied ever having a plan, identifying means, or having active intent to hurt herself however. Lanie additionally noted she began Zoloft about 10 days ago.    Shortly into session, Lanie asked if her mother could accompany her to the appointment to assist with a complication in her wheelchair. At this time, I presented several treatment options to both of them. I provided her the behavioral health access number due to Lanie living in [facility] and stating it takes over two hours to reach the [facility] due to transportation needs. Lanie stated she would prefer a therapist to come to her home for therapy if possible. I encouraged her to contact the behavioral health access number for help finding a therapist that could do in home work. I additionally scheduled Lanie to return for [facility] services with me in about two weeks for ongoing support and to begin help addressing her depressive symptoms. Despite Lanie denying history of active suicidal ideation, I provided her a handout of local crisis line information.       Progress on Treatment Objective(s) / Homework:  New Objective established this session - PREPARATION (Decided to change - considering how); Intervened by negotiating a change plan and determining options / strategies for behavior change, identifying triggers, exploring social supports, and working towards setting a date to begin behavior change    Also provided psychoeducation about behavioral health condition, symptoms, and treatment options     Supportive counseling, insight-oriented therapy    Care Plan review completed: No    Medication Review:  No changes to current psychiatric  medication(s)    Medication Compliance:  NA    Changes in Health Issues:   None reported    Chemical Use Review:   Substance Use: Chemical use reviewed, no active concerns identified      Tobacco Use: No current tobacco use.      Assessment: Current Emotional / Mental Status (status of significant symptoms):  Risk status (Self / Other harm or suicidal ideation)  Patient has had a history of suicidal ideation: passive thoughts only; easily controlled - denied any recently    Patient denies current fears or concerns for personal safety.  Patient denies current or recent suicidal ideation or behaviors.  Patient denies current or recent homicidal ideation or behaviors.  Patient denies current or recent self injurious behavior or ideation.  Patient denies other safety concerns.     A safety and risk management plan has not been developed at this time, however patient was encouraged to call [place] [phone] should there be a change in any of these risk factors.       Appearance:   Appropriate   Eye Contact:   Good   Psychomotor Behavior: Normal   Attitude:   Cooperative   Orientation:   All  Speech   Rate / Production: Normal    Volume:  Normal   Mood:    Depressed    Affect:    Appropriate   Thought Content:  Clear   Thought Form:  Coherent  Logical   Insight:    Good     Diagnoses:  1. Major depressive disorder, recurrent episode, mild with anxious distress (H)        Collateral Reports Completed:  [facility] will coordinate with care team as needed    Plan: (Homework, other):  Lanie was scheduled to RTC in two weeks for help addressing depressive symptoms. She was also given information about mental health symptoms and treatment options .  CD Recommendations: No indications of CD issues.     Héctor Higuera Psy.D, LP 2/9/2021      Answers for HPI/ROS submitted by the patient on 12/15/2020   If you checked off any problems, how difficult have these problems made it for you to do your work, take care of things at home, or  get along with other people?: Not difficult at all  PHQ9 TOTAL SCORE: 2  KATHARINE 7 TOTAL SCORE: 1    _________________________________________________________________________  [facility]    Client's Name: Lanie Neil  YOB: 1990    Date: 2/9/2021    DSM-V Diagnoses: 296.31 (F33.0) Major Depressive Disorder, Recurrent Episode, Mild With anxious distress;   Psychosocial / Contextual Factors: SMA     Clinical Global Impressions  First:  Considering your total clinical experience with this particular patient population, how severe are the patient's symptoms at this time?: 4 (2/18/2021  4:43 PM)      Most recent:  Compared to the patient's condition at the START of treatment, this patient's condition is: 3 (2/18/2021  4:43 PM)      Referral / Collaboration:  Will collaborate with care team as indicated during treatment.    Anticipated number of session or this episode of care: 10+      MeasurableTreatment Goal(s) related to diagnosis / functional impairment(s)  Goal 1:  Patient will experience a reduction in depressive and anxious symptoms, along with a corresponding increase in positive emotion and life satisfaction.    Objective #A: Patient will experience a reduction in depressed mood, will develop more effective coping skills to manage depressive symptoms, will develop healthy cognitive patterns and beliefs, will increase ability to function adaptively and will continue to take medications as prescribed / participate in supportive activities and services    Status: Continued - Date(s): 2/9/2021    Objective #B: Patient will experience a reduction in anxiety, will develop more effective coping skills to manage anxiety symptoms, will develop healthy cognitive patterns and beliefs and will increase ability to function adaptively  Status: Continued - Date(s): 2/9/2021    Objective #C: Patient will develop better understanding of triggers and coping strategies to  stabilize mood  Status: Continued - Date(s): 2/9/2021    Goal 2:  Patient will identify and increase engagement in valued activity, i.e. improving social connections/relationships, pursuing occupational goals or personally meaningful pursuits, exploration of meaning in life.     Objective #A: Patient will identify meaningful activity in social, occupational and  personal goals, and increase behavioral activation around these goals   Status: Continued - Date(s): 2/9/2021    Objective #B: Patient will address relationship difficulties in a more adaptive manner  Status: Continued - Date(s): 2/9/2021    Objective #C: Patient will develop coping/problem-solving skills to facilitate more adaptive adjustment and will effectively address problems that interfere with adaptive functioning  Status: Continued - Date(s): 2/9/2021    Goal 3:  Patient will increase understanding of pain medications and develop more adaptive responses to chronic pain.    Objective #A: Patient will increase understanding of the relationship between pain and stress/mood   Status: Continued - Date(s): 2/9/2021    Objective #B: Patient will develop 2-3 additional chronic pain management strategies  Status: Continued - Date(s): 2/9/2021      Possible Therapeutic Intervention(s)  Psycho-education regarding mental health diagnoses and treatment options    Skills training    Explore skills useful to client in current situation.    Skills include assertiveness, communication, conflict management, problem-solving, relaxation, etc.    Solution-Focused Therapy    Explore patterns in patient's relationships and discuss options for new behaviors.    Explore patterns in patient's actions and choices and discuss options for new behaviors.    Cognitive-behavioral Therapy    Discuss common cognitive distortions, identify them in patient's life.    Explore ways to challenge, replace, and act against these cognitions.    Acceptance and Commitment Therapy    Explore  and identify important values in patient's life.    Discuss ways to commit to behavioral activation around these values.    Psychodynamic psychotherapy    Discuss patient's emotional dynamics and issues and how they impact behaviors.    Explore patient's history of relationships and how they impact present behaviors.    Explore how to work with and make changes in these schemas and patterns.    Narrative Therapy    Explore the patient's story of his/her life from his/her perspective.    Explore alternate ways of understanding their experience, identifying exceptions, developing new themes.    Interpersonal Psychotherapy    Explore patterns in relationships that are effective or ineffective at helping patient reach their goals, find satisfying experience.    Discuss new patterns or behaviors to engage in for improved social functioning.    Behavioral Activation    Discuss steps patient can take to become more involved in meaningful activity.    Identify barriers to these activities and explore possible solutions.    Mindfulness-Based Strategies    Discuss skills based on development and application of mindfulness.    Skills drawn from compassion-focused therapy, dialectical behavior therapy, mindfulness-based stress reduction, mindfulness-based cognitive therapy, etc.      We have developed these goals together during our work to this point. Patient has assisted in the development of these goals and has agreed to this treatment plan.       Héctor Higuera LP  February 09, 2021

## 2021-02-11 ENCOUNTER — TELEPHONE (OUTPATIENT)
Dept: FAMILY MEDICINE | Facility: CLINIC | Age: 31
End: 2021-02-11

## 2021-02-11 ENCOUNTER — VIRTUAL VISIT (OUTPATIENT)
Dept: INTERNAL MEDICINE | Facility: CLINIC | Age: 31
End: 2021-02-11
Payer: COMMERCIAL

## 2021-02-11 DIAGNOSIS — G12.9 SPINAL MUSCLE ATROPHY (H): ICD-10-CM

## 2021-02-11 DIAGNOSIS — Z01.818 PRE-OPERATIVE EXAMINATION: Primary | ICD-10-CM

## 2021-02-11 PROCEDURE — 99214 OFFICE O/P EST MOD 30 MIN: CPT | Mod: 95 | Performed by: STUDENT IN AN ORGANIZED HEALTH CARE EDUCATION/TRAINING PROGRAM

## 2021-02-11 ASSESSMENT — ENCOUNTER SYMPTOMS
SWOLLEN GLANDS: 0
BRUISES/BLEEDS EASILY: 0
NAUSEA: 0
DIARRHEA: 0
DECREASED LIBIDO: 0
BLOATING: 0
VOMITING: 0
ABDOMINAL PAIN: 0
HOT FLASHES: 0
RECTAL PAIN: 0
HEARTBURN: 0
BLOOD IN STOOL: 0
BOWEL INCONTINENCE: 0
CONSTIPATION: 1
JAUNDICE: 0

## 2021-02-11 NOTE — PROGRESS NOTES
CC:  Pre-Op exam, virtual visit    HPI:  Lanie Neil is 30 year old female here for cardiovascular, pulmonary, and perioperative risk assessment prior to surgery.  The intended procedure is a Spinraza injection under anesthesia w/ Dr. Angus Sanchez on 2/17/21 at [facility].  A copy of this note will be sent to the surgeon.     Reason for surgery:   She has chronic progressive spinal muscular atrophy (c/b chronic respiratory failure requiring tracheostomy and chronic ventilation and PEG) with prior intrathecal spinal reservoir insertion in 2017 for administration of spinraza for treatment of spinal muscular atrophy. She is having another Spinraza injection for disease maintenance every 4 months.     Other Concerns Discussed:   1. Potassium - Currently being followed-up by nephrology, serum potassium was within normal limits at last check. Follow-up labs scheduled by nephrology.     2. Depression - Patient reports stable mood. No new concerns at this visit.    Cardiovascular Risk:  This patient does not have chest pain with ambulation. She has no palpitations or light headedness.  She does not have a history of known cardiac disease.  There is not a history of stroke or valvular disease.     Pulmonary Risk:  In terms of risk factors for pulmonary complications, the patient does have a history of Asthma and chronic respiratory failure requiring tracheostomy and chronic mechanical ventilation.       Perioperative Complications:  The patient does have a history of bleeding or clotting problems in the past, specifically has history of DVT.  They are currently anticoagulated on Xarelto 10 mg daily.  The patient does not have a family history of any anesthesia or surgical complications.    ROS:  Answers for HPI/ROS submitted by the patient on 2/11/2021   General Symptoms: No  Skin Symptoms: No  HENT Symptoms: No  EYE SYMPTOMS: No  HEART SYMPTOMS: No  LUNG SYMPTOMS: No  INTESTINAL  SYMPTOMS: Yes  URINARY SYMPTOMS: No  GYNECOLOGIC SYMPTOMS: Yes  BREAST SYMPTOMS: No  SKELETAL SYMPTOMS: No  BLOOD SYMPTOMS: Yes  NERVOUS SYSTEM SYMPTOMS: No  MENTAL HEALTH SYMPTOMS: No  Heart burn or indigestion: No  Nausea: No  Vomiting: No  Abdominal pain: No  Bloating: No  Constipation: Yes  Diarrhea: No  Blood in stool: No  Black stools: No  Rectal or Anal pain: No  Fecal incontinence: No  Yellowing of skin or eyes: No  Vomit with blood: No  Change in stools: No  Anemia: Yes  Swollen glands: No  Easy bleeding or bruising: No  Edema or swelling: No  Bleeding or spotting between periods: No  Heavy or painful periods: Yes  Irregular periods: Yes  Vaginal discharge: No  Hot flashes: No  Vaginal dryness: No  Genital ulcers: No  Reduced libido: No  Painful intercourse: No  Difficulty with sexual arousal: No  Post-menopausal bleeding: No            Past Medical History:   Diagnosis Date     Anxiety      Snoring      Spinal muscle atrophy (H)      Past Surgical History:   Procedure Laterality Date     ENT SURGERY      tubes     GASTROSTOMY TUBE       KIDNEY SURGERY       SPINE SURGERY       TRACHEOSTOMY       Family History   Problem Relation Age of Onset     Family History Negative Other      Social History     Tobacco Use     Smoking status: Never Smoker     Smokeless tobacco: Never Used   Substance Use Topics     Alcohol use: No     Alcohol/week: 0.0 standard drinks     Drug use: No     Current Outpatient Medications   Medication Sig Dispense Refill     ACETAMINOPHEN PO Take 650 mg by mouth every 4 hours as needed for pain       albuterol (ALBUTEROL) 108 (90 BASE) MCG/ACT inhaler Inhale 2 puffs into the lungs every 4 hours as needed for shortness of breath / dyspnea or wheezing 1 Inhaler 2     albuterol (PROVENTIL) (2.5 MG/3ML) 0.083% neb solution USE ONE VIAL BY NEBULIZATION EVERY 6 HOURS AS NEEDED FOR SHORTNESS OF BREATH/ DYSPNEA OR WHEEZING 360 mL 0     ALPRAZolam (XANAX) 0.25 MG tablet TAKE 1 TABLET(0.25 MG) BY  "MOUTH THREE TIMES DAILY AS NEEDED FOR ANXIETY 60 tablet 1     aminocaproic acid (AMICAR) 0.25 GM/ML solution Take 12 mL (3 grams) via G-tube every 8 hours during menstrual period. 2 Bottle 5     bisacodyl (DULCOLAX) 10 MG suppository Place 10 mg rectally daily as needed for constipation       celecoxib (CELEBREX) 200 MG capsule Take 1 capsule (200 mg) by mouth daily 30 capsule 6     clindamycin (CLEOCIN T) 1 % lotion Apply topically 2 times daily To face as needed (Patient not taking: Reported on 2/5/2021) 60 mL 3     diazepam (VALIUM) 5 MG tablet Take 1 tablet (5 mg) by mouth every 6 hours as needed for muscle spasms 30 tablet 5     doxycycline hyclate (VIBRAMYCIN) 100 MG capsule GIVE ONE CAPSULE VIA \"G\"- TUBE TWICE DAILY (Patient not taking: Reported on 2/5/2021) 60 capsule 2     ferrous sulfate 220 (44 Fe) MG/5ML ELIX 22 mg by Enteral route       fluticasone (FLOVENT HFA) 110 MCG/ACT inhaler Inhale 1 puff into the lungs 2 times daily 36 g 3     gabapentin (NEURONTIN) 250 MG/5ML solution TAKE 20MLS PER FEEDING TUBE THREE TIMES DAILY 1800 mL 5     GENERLAC 10 GM/15ML solution   11     guaiFENesin (ORGANIDIN) 200 MG TABS tablet Take 200 mg by mouth every 4 hours as needed for cough       HYDROmorphone (DILAUDID) 4 MG tablet        hydrOXYzine (ATARAX) 25 MG tablet Take 25 mg by mouth every 6 hours as needed for itching       LANsoprazole (PREVACID) 30 MG DR capsule Take 30 mg by mouth       Magnesium Hydroxide (MILK OF MAGNESIA PO) Take 30 mLs by mouth as needed       metroNIDAZOLE (METROCREAM) 0.75 % external cream Apply topically 2 times daily 45 g 11     mometasone (NASONEX) 50 MCG/ACT nasal spray        morphine (SABINE) 30 MG 24 hr capsule        naloxone (NARCAN) 4 MG/0.1ML nasal spray Spray 1 spray (4 mg) into one nostril alternating nostrils once as needed for opioid reversal every 2-3 minutes until assistance arrives 0.2 mL 0     Nutritional Supplements (REPLETE FIBER) LIQD Take 1 Can by mouth 4 times daily "       omeprazole (PRILOSEC) 2 mg/mL suspension SHAKE LIQUID WELL AND GIVE 20ML (40MG) DAILY (VIA G-TUBE) 600 mL 11     order for DME Equipment being ordered: 16 fr 5cc balloon indwelling catheter with drainage bag. 2 catheters/month 2 catheter 11     order for DME Equipment being ordered: Vibracare Percussor 1 Units 0     polyethylene glycol (MIRALAX) 17 GM/Dose powder Take 17 g by mouth daily 714 g 6     potassium chloride (KAYCIEL) 20 MEQ/15ML (10%) solution 22.5 mLs (30 mEq) by Per G Tube route daily 675 mL 11     prochlorperazine (COMPAZINE) 5 MG tablet Take 5 mg by mouth every 6 hours as needed for nausea or vomiting       rivaroxaban ANTICOAGULANT (XARELTO ANTICOAGULANT) 10 MG TABS tablet Take 1 tablet (10 mg) by mouth daily (with dinner) 90 tablet 2     Salicylic Acid (OXY BALANCE FACIAL CLEAN WASH EX) Externally apply 1 pad topically daily       Sennosides (SENNA) 8.8 MG/5ML LIQD Take 10 mLs by mouth 2 times daily 600 mL 11     sertraline (ZOLOFT) 20 MG/ML (HIGH CONC) solution Take 2.5 mLs (50 mg) by mouth daily 75 mL 5     silver sulfADIAZINE (SILVADENE) 1 % external cream Apply topically 2 times daily To the affected area. 400 g 1     simethicone (MYLICON) 66.7 mg/ml as needed       simethicone 40 MG/0.6ML LIQD        sodium chloride (OCEAN) 0.65 % nasal spray 2 sprays        No Known Allergies      There were no vitals taken for this visit. No vitals were obtained today due to virtual visit.  Physical Examination:   General:  Conversant, generally healthy appearing, no acute distress  Respiratory: appears comfortable w/ normal wob, no audible stridor or wheeze, no apparent central cyanosis  Psych:  Alert and oriented. Appropriate affect.  Not psychomotor slowed.  No signs of anxiety or agitation.      EKG:  EKG was not indicated based on risk assessment.      Pregnancy test needed: No    A&P:       1. Pre-operative assessment for Spinraza injection under anesthesia.   The patient is at LOW risk for  cardiovascular complications and at HIGH risk for pulmonary complications of this LOW risk surgery.     --Approval given to proceed with proposed procedure, without further diagnostic evaluation.  --Patient is currently taking:  Anticoagulant or Antiplatelet Medication Use  XARELTO: She will hold Xarelto 10mg the day before and of the procedure.     The patient has been told to not take any aspirin or NSAIDs for 10 days prior to surgery. The patient has been instructed as to what to do with medications prior to surgery.     Follow-up: In March or April 2021.     Patient seen and discussed with Dr. Silvio Smalls MD  Internal Medicine Resident PGY 1  Pager: [phone]    Pt was seen via video with Dr. Smalls.  I agree with his documentation as noted above.    My additional comments: pulmonary risk is just due to underlying condition. As she has trach, post op she will just need to observe regular respiratory routine to prevent atelectasis    Nithin Zhang MD

## 2021-02-11 NOTE — LETTER
2/11/2021       RE: Lanie Neil  [address]  [place] [address]     Dear Colleague,    Thank you for referring your patient, Lanie Neil, to the [facility] [place] at [facility]. Please see a copy of my visit note below.      CC:  Pre-Op exam, virtual visit    HPI:  Lanie Neil is 30 year old female here for cardiovascular, pulmonary, and perioperative risk assessment prior to surgery.  The intended procedure is a Spinraza injection under anesthesia w/ Dr. Angus Sanchez on 2/17/21 at [facility].  A copy of this note will be sent to the surgeon.     Reason for surgery:   She has chronic progressive spinal muscular atrophy (c/b chronic respiratory failure requiring tracheostomy and chronic ventilation and PEG) with prior intrathecal spinal reservoir insertion in 2017 for administration of spinraza for treatment of spinal muscular atrophy. She is having another Spinraza injection for disease maintenance every 4 months.     Other Concerns Discussed:   1. Potassium - Currently being followed-up by nephrology, serum potassium was within normal limits at last check. Follow-up labs scheduled by nephrology.     2. Depression - Patient reports stable mood. No new concerns at this visit.    Cardiovascular Risk:  This patient does not have chest pain with ambulation. She has no palpitations or light headedness.  She does not have a history of known cardiac disease.  There is not a history of stroke or valvular disease.     Pulmonary Risk:  In terms of risk factors for pulmonary complications, the patient does have a history of Asthma and chronic respiratory failure requiring tracheostomy and chronic mechanical ventilation.       Perioperative Complications:  The patient does have a history of bleeding or clotting problems in the past, specifically has history of DVT.  They are currently  anticoagulated on Xarelto 10 mg daily.  The patient does not have a family history of any anesthesia or surgical complications.    ROS:  Answers for HPI/ROS submitted by the patient on 2/11/2021   General Symptoms: No  Skin Symptoms: No  HENT Symptoms: No  EYE SYMPTOMS: No  HEART SYMPTOMS: No  LUNG SYMPTOMS: No  INTESTINAL SYMPTOMS: Yes  URINARY SYMPTOMS: No  GYNECOLOGIC SYMPTOMS: Yes  BREAST SYMPTOMS: No  SKELETAL SYMPTOMS: No  BLOOD SYMPTOMS: Yes  NERVOUS SYSTEM SYMPTOMS: No  MENTAL HEALTH SYMPTOMS: No  Heart burn or indigestion: No  Nausea: No  Vomiting: No  Abdominal pain: No  Bloating: No  Constipation: Yes  Diarrhea: No  Blood in stool: No  Black stools: No  Rectal or Anal pain: No  Fecal incontinence: No  Yellowing of skin or eyes: No  Vomit with blood: No  Change in stools: No  Anemia: Yes  Swollen glands: No  Easy bleeding or bruising: No  Edema or swelling: No  Bleeding or spotting between periods: No  Heavy or painful periods: Yes  Irregular periods: Yes  Vaginal discharge: No  Hot flashes: No  Vaginal dryness: No  Genital ulcers: No  Reduced libido: No  Painful intercourse: No  Difficulty with sexual arousal: No  Post-menopausal bleeding: No            Past Medical History:   Diagnosis Date     Anxiety      Snoring      Spinal muscle atrophy (H)      Past Surgical History:   Procedure Laterality Date     ENT SURGERY      tubes     GASTROSTOMY TUBE       KIDNEY SURGERY       SPINE SURGERY       TRACHEOSTOMY       Family History   Problem Relation Age of Onset     Family History Negative Other      Social History     Tobacco Use     Smoking status: Never Smoker     Smokeless tobacco: Never Used   Substance Use Topics     Alcohol use: No     Alcohol/week: 0.0 standard drinks     Drug use: No     Current Outpatient Medications   Medication Sig Dispense Refill     ACETAMINOPHEN PO Take 650 mg by mouth every 4 hours as needed for pain       albuterol (ALBUTEROL) 108 (90 BASE) MCG/ACT inhaler Inhale 2 puffs  "into the lungs every 4 hours as needed for shortness of breath / dyspnea or wheezing 1 Inhaler 2     albuterol (PROVENTIL) (2.5 MG/3ML) 0.083% neb solution USE ONE VIAL BY NEBULIZATION EVERY 6 HOURS AS NEEDED FOR SHORTNESS OF BREATH/ DYSPNEA OR WHEEZING 360 mL 0     ALPRAZolam (XANAX) 0.25 MG tablet TAKE 1 TABLET(0.25 MG) BY MOUTH THREE TIMES DAILY AS NEEDED FOR ANXIETY 60 tablet 1     aminocaproic acid (AMICAR) 0.25 GM/ML solution Take 12 mL (3 grams) via G-tube every 8 hours during menstrual period. 2 Bottle 5     bisacodyl (DULCOLAX) 10 MG suppository Place 10 mg rectally daily as needed for constipation       celecoxib (CELEBREX) 200 MG capsule Take 1 capsule (200 mg) by mouth daily 30 capsule 6     clindamycin (CLEOCIN T) 1 % lotion Apply topically 2 times daily To face as needed (Patient not taking: Reported on 2/5/2021) 60 mL 3     diazepam (VALIUM) 5 MG tablet Take 1 tablet (5 mg) by mouth every 6 hours as needed for muscle spasms 30 tablet 5     doxycycline hyclate (VIBRAMYCIN) 100 MG capsule GIVE ONE CAPSULE VIA \"G\"- TUBE TWICE DAILY (Patient not taking: Reported on 2/5/2021) 60 capsule 2     ferrous sulfate 220 (44 Fe) MG/5ML ELIX 22 mg by Enteral route       fluticasone (FLOVENT HFA) 110 MCG/ACT inhaler Inhale 1 puff into the lungs 2 times daily 36 g 3     gabapentin (NEURONTIN) 250 MG/5ML solution TAKE 20MLS PER FEEDING TUBE THREE TIMES DAILY 1800 mL 5     GENERLAC 10 GM/15ML solution   11     guaiFENesin (ORGANIDIN) 200 MG TABS tablet Take 200 mg by mouth every 4 hours as needed for cough       HYDROmorphone (DILAUDID) 4 MG tablet        hydrOXYzine (ATARAX) 25 MG tablet Take 25 mg by mouth every 6 hours as needed for itching       LANsoprazole (PREVACID) 30 MG DR capsule Take 30 mg by mouth       Magnesium Hydroxide (MILK OF MAGNESIA PO) Take 30 mLs by mouth as needed       metroNIDAZOLE (METROCREAM) 0.75 % external cream Apply topically 2 times daily 45 g 11     mometasone (NASONEX) 50 MCG/ACT nasal " spray        morphine (SABINE) 30 MG 24 hr capsule        naloxone (NARCAN) 4 MG/0.1ML nasal spray Spray 1 spray (4 mg) into one nostril alternating nostrils once as needed for opioid reversal every 2-3 minutes until assistance arrives 0.2 mL 0     Nutritional Supplements (REPLETE FIBER) LIQD Take 1 Can by mouth 4 times daily       omeprazole (PRILOSEC) 2 mg/mL suspension SHAKE LIQUID WELL AND GIVE 20ML (40MG) DAILY (VIA G-TUBE) 600 mL 11     order for DME Equipment being ordered: 16 fr 5cc balloon indwelling catheter with drainage bag. 2 catheters/month 2 catheter 11     order for DME Equipment being ordered: Vibracare Percussor 1 Units 0     polyethylene glycol (MIRALAX) 17 GM/Dose powder Take 17 g by mouth daily 714 g 6     potassium chloride (KAYCIEL) 20 MEQ/15ML (10%) solution 22.5 mLs (30 mEq) by Per G Tube route daily 675 mL 11     prochlorperazine (COMPAZINE) 5 MG tablet Take 5 mg by mouth every 6 hours as needed for nausea or vomiting       rivaroxaban ANTICOAGULANT (XARELTO ANTICOAGULANT) 10 MG TABS tablet Take 1 tablet (10 mg) by mouth daily (with dinner) 90 tablet 2     Salicylic Acid (OXY BALANCE FACIAL CLEAN WASH EX) Externally apply 1 pad topically daily       Sennosides (SENNA) 8.8 MG/5ML LIQD Take 10 mLs by mouth 2 times daily 600 mL 11     sertraline (ZOLOFT) 20 MG/ML (HIGH CONC) solution Take 2.5 mLs (50 mg) by mouth daily 75 mL 5     silver sulfADIAZINE (SILVADENE) 1 % external cream Apply topically 2 times daily To the affected area. 400 g 1     simethicone (MYLICON) 66.7 mg/ml as needed       simethicone 40 MG/0.6ML LIQD        sodium chloride (OCEAN) 0.65 % nasal spray 2 sprays        No Known Allergies      There were no vitals taken for this visit. No vitals were obtained today due to virtual visit.  Physical Examination:   General:  Conversant, generally healthy appearing, no acute distress  Respiratory: appears comfortable w/ normal wob, no audible stridor or wheeze, no apparent central  cyanosis  Psych:  Alert and oriented. Appropriate affect.  Not psychomotor slowed.  No signs of anxiety or agitation.      EKG:  EKG was not indicated based on risk assessment.      Pregnancy test needed: No    A&P:       1. Pre-operative assessment for Spinraza injection under anesthesia.   The patient is at LOW risk for cardiovascular complications and at HIGH risk for pulmonary complications of this LOW risk surgery.     --Approval given to proceed with proposed procedure, without further diagnostic evaluation.  --Patient is currently taking:  Anticoagulant or Antiplatelet Medication Use  XARELTO: She will hold Xarelto 10mg the day before and of the procedure.     The patient has been told to not take any aspirin or NSAIDs for 10 days prior to surgery. The patient has been instructed as to what to do with medications prior to surgery.     Follow-up: In March or April 2021.     Patient seen and discussed with Dr. Silvio Smalls MD  Internal Medicine Resident PGY 1  Pager: [phone]    Pt was seen via video with Dr. Smalls.  I agree with his documentation as noted above.    My additional comments: pulmonary risk is just due to underlying condition. As she has trach, post op she will just need to observe regular respiratory routine to prevent atelectasis    Nithin Zhang MD         Again, thank you for allowing me to participate in the care of your patient.      Sincerely,    Bill Smalls MD

## 2021-02-11 NOTE — TELEPHONE ENCOUNTER
[facility] Call Center    Phone Message    May a detailed message be left on voicemail: yes     Reason for Call: Other: Per Patients Mother is wanting to get a call back in regards to knowing if able to Pre-op done virtually by Video or Telephone. Patients Mother states she is wanting to prevent Patient from being exposed to covid. Please advise.        Patients mother states the Pre-op is for spinraza injection w/ Angus Sanchez MD on 2/17/2021      Action Taken: Message routed to:  [facility]    Travel Screening: Not Applicable

## 2021-02-12 NOTE — TELEPHONE ENCOUNTER
I called and left VM. They would have to call surg office to see if they will accept a virtual pre op. They can call us to schedule if they accept.  Joana Jaeger, EMT at 7:52 AM on 2/12/2021.

## 2021-02-15 ENCOUNTER — TRANSFERRED RECORDS (OUTPATIENT)
Dept: HEALTH INFORMATION MANAGEMENT | Facility: CLINIC | Age: 31
End: 2021-02-15

## 2021-02-19 ENCOUNTER — HOSPITAL LABORATORY (OUTPATIENT)
Dept: OTHER | Facility: CLINIC | Age: 31
End: 2021-02-19

## 2021-02-19 ENCOUNTER — MYC MEDICAL ADVICE (OUTPATIENT)
Dept: FAMILY MEDICINE | Facility: CLINIC | Age: 31
End: 2021-02-19

## 2021-02-19 DIAGNOSIS — G12.9 SPINAL MUSCLE ATROPHY (H): ICD-10-CM

## 2021-02-19 LAB
ALBUMIN SERPL-MCNC: 2.9 G/DL (ref 3.4–5)
ANION GAP SERPL CALCULATED.3IONS-SCNC: 6 MMOL/L (ref 3–14)
BUN SERPL-MCNC: 9 MG/DL (ref 7–30)
CALCIUM SERPL-MCNC: 8.3 MG/DL (ref 8.5–10.1)
CHLORIDE SERPL-SCNC: 110 MMOL/L (ref 94–109)
CHLORIDE UR-SCNC: <10 MMOL/L
CO2 SERPL-SCNC: 21 MMOL/L (ref 20–32)
CREAT SERPL-MCNC: <0.14 MG/DL (ref 0.52–1.04)
GFR SERPL CREATININE-BSD FRML MDRD: >90 ML/MIN/{1.73_M2}
GLUCOSE SERPL-MCNC: 132 MG/DL (ref 70–99)
OSMOLALITY SERPL: 287 MMOL/KG (ref 275–295)
OSMOLALITY UR: 209 MMOL/KG (ref 100–1200)
PHOSPHATE SERPL-MCNC: 2.6 MG/DL (ref 2.5–4.5)
POTASSIUM SERPL-SCNC: 3.2 MMOL/L (ref 3.4–5.3)
POTASSIUM UR-SCNC: 15 MMOL/L
SODIUM SERPL-SCNC: 137 MMOL/L (ref 133–144)
SODIUM UR-SCNC: 14 MMOL/L

## 2021-02-23 RX ORDER — ALPRAZOLAM 0.25 MG
TABLET ORAL
Qty: 60 TABLET | Refills: 1 | Status: SHIPPED | OUTPATIENT
Start: 2021-02-23 | End: 2021-04-23

## 2021-02-23 NOTE — TELEPHONE ENCOUNTER
Patient Requested  ALPRAZolam (XANAX) 0.25 MG tablet    Last Filled  01/09/2021  Last Office Visit  02/11/2021-Resident  Next Office Visit  Nothing scheduled   Checked  02/23/2021       DX: Spinal muscle atrophy      Pharmacy:   [id number] - [place], [place] - [address]        LILLIAM DAMICO CMA at 6:44 AM on 2/23/2021.

## 2021-02-25 ENCOUNTER — MYC MEDICAL ADVICE (OUTPATIENT)
Dept: FAMILY MEDICINE | Facility: CLINIC | Age: 31
End: 2021-02-25

## 2021-02-25 DIAGNOSIS — Z97.8 INDWELLING FOLEY CATHETER PRESENT: Primary | ICD-10-CM

## 2021-02-25 DIAGNOSIS — K30 FUNCTIONAL DYSPEPSIA: ICD-10-CM

## 2021-02-25 DIAGNOSIS — J96.10 CHRONIC RESPIRATORY FAILURE (H): ICD-10-CM

## 2021-02-25 DIAGNOSIS — K30 DYSPEPSIA DUE TO DYSMOTILITY: ICD-10-CM

## 2021-02-26 ENCOUNTER — VIRTUAL VISIT (OUTPATIENT)
Dept: BEHAVIORAL HEALTH | Facility: CLINIC | Age: 31
End: 2021-02-26
Payer: COMMERCIAL

## 2021-02-26 DIAGNOSIS — F33.0 MAJOR DEPRESSIVE DISORDER, RECURRENT EPISODE, MILD WITH ANXIOUS DISTRESS (H): Primary | ICD-10-CM

## 2021-02-26 PROCEDURE — 90834 PSYTX W PT 45 MINUTES: CPT | Mod: GT | Performed by: PSYCHOLOGIST

## 2021-02-26 NOTE — PROGRESS NOTES
[facility]: Integrated Behavioral Health  February 26, 2021      Behavioral Health Clinician Progress Note    Patient Name: Lanie Neil           Service Type: Video appointment      Service Location:  Video appointment      Session Start Time: 10:14  Session End Time: 10:59      Session Length: 53 - 60      Attendees: Client    Visit Activities (Refresh list every visit): [facility] [place] Only    Telemedicine Visit: The patient's condition can be safely assessed and treated via synchronous audio and visual telemedicine encounter.      Reason for Telemedicine Visit: Patient has requested telehealth visit and Services only offered telehealth    Originating Site (Patient Location): Patient's home    Distant Site (Provider Location): Provider Remote Setting    Consent:  The patient/guardian has verbally consented to: the potential risks and benefits of telemedicine (video visit) versus in person care; bill my insurance or make self-payment for services provided; and responsibility for payment of non-covered services.     Mode of Communication:  Video Conference via [employer]    As the provider I attest to compliance with applicable laws and regulations related to telemedicine.      Diagnostic Assessment Date: 1/19/2021  Treatment Plan Review Date: 5/9/2021  See Flowsheets for today's PHQ-9 and KATHARINE-7 results  Previous PHQ-9:   PHQ-9 SCORE 5/28/2019 2/12/2020 12/15/2020   PHQ-9 Total Score - - -   PHQ-9 Total Score MyChart - - 2 (Minimal depression)   PHQ-9 Total Score 1 16 2     Previous KATHARINE-7:   KATHARINE-7 SCORE 5/28/2019 2/12/2020 12/15/2020   Total Score - - 1 (minimal anxiety)   Total Score 3 19 1       BRITTANY LEVEL:  No flowsheet data found.    DATA  Extended Session (60+ minutes): No  Interactive Complexity: No  Crisis: No    Treatment Objective(s) Addressed in This Session:  Target Behavior(s): disease management/lifestyle changes      Depressed Mood: Increase interest, engagement, and  pleasure in doing things  Decrease frequency and intensity of feeling down, depressed, hopeless  Relationship Problems: will address relationship difficulties in a more adaptive manner  Psychological distress related to Chronic Disease Management    Current Stressors / Issues:  Lanie presented today with relational concerns. Explored/discussed her concerns and difficulties with various family members. Explored her sense of lacking agency and decision making in her life. Lanie also shared she struggles to find lasting and intimate social connections, which is especially difficult right now because of the pandemic, though she suspects this would be hard regardless. Processed Lanie's desire for more friendships rather than intimate partners, but how restricted she feels in finding them. Lanie shared she would ideally like more help at home from nursing staff, though we agreed this might be reflective of her wanting for more friendship, as she has tried to grow relationships with nursing staff before. Explored the pros and downsides of this approach. Looked at that it makes her feel as though her relationships are only transactional in nature. Insight-oriented counseling and support was primary today.     Progress on Treatment Objective(s) / Homework:  Satisfactory progress - ACTION (Actively working towards change); Intervened by reinforcing change plan / affirming steps taken    Also provided psychoeducation about behavioral health condition, symptoms, and treatment options     Supportive counseling, insight-oriented therapy    Care Plan review completed: No    Medication Review:  No changes to current psychiatric medication(s)    Medication Compliance:  NA    Changes in Health Issues:   None reported    Chemical Use Review:   Substance Use: Chemical use reviewed, no active concerns identified      Tobacco Use: No current tobacco use.      Assessment: Current Emotional / Mental Status (status of significant  symptoms):  Risk status (Self / Other harm or suicidal ideation)  Patient has had a history of suicidal ideation: passive thoughts only; easily controlled - denied any recently    Patient denies current fears or concerns for personal safety.  Patient denies current or recent suicidal ideation or behaviors.  Patient denies current or recent homicidal ideation or behaviors.  Patient denies current or recent self injurious behavior or ideation.  Patient denies other safety concerns.     A safety and risk management plan has not been developed at this time, however patient was encouraged to call [place] [phone] should there be a change in any of these risk factors.      Gibson Suicide Severity Rating Scale (Short Version)  Gibson Suicide Severity Rating (Short Version) 2/18/2021   Over the past 2 weeks have you felt down, depressed, or hopeless? yes   Over the past 2 weeks have you had thoughts of killing yourself? no   Have you ever attempted to kill yourself? no       Appearance:   Appropriate   Eye Contact:   Good   Psychomotor Behavior: Normal   Attitude:   Cooperative   Orientation:   All  Speech   Rate / Production: Normal    Volume:  Normal   Mood:    Depressed    Affect:    Appropriate   Thought Content:  Clear   Thought Form:  Coherent  Logical   Insight:    Good     Diagnoses:  1. Major depressive disorder, recurrent episode, mild with anxious distress (H)        Collateral Reports Completed:  [facility] [place] will coordinate with care team as needed    Plan: (Homework, other):  Lanie was scheduled to RTC in two weeks for help addressing depressive symptoms. She was also given information about mental health symptoms and treatment options .  CD Recommendations: No indications of CD issues.     Héctor Higuera Psy.D, LP 2/26/2021      Answers for HPI/ROS submitted by the patient on 12/15/2020   If you checked off any problems, how difficult have these problems made it for you to do your work, take care of things  at home, or get along with other people?: Not difficult at all  PHQ9 TOTAL SCORE: 2  KATHARINE 7 TOTAL SCORE: 1    _________________________________________________________________________  [facility] Integrated Behavioral Health Treatment Plan    Client's Name: Lanie Neil  YOB: 1990    Date: 2/9/2021    DSM-V Diagnoses: 296.31 (F33.0) Major Depressive Disorder, Recurrent Episode, Mild With anxious distress;   Psychosocial / Contextual Factors: SMA     Clinical Global Impressions  First:  Considering your total clinical experience with this particular patient population, how severe are the patient's symptoms at this time?: 4 (2/18/2021  4:43 PM)      Most recent:  Compared to the patient's condition at the START of treatment, this patient's condition is: 3 (2/18/2021  4:43 PM)      Referral / Collaboration:  Will collaborate with care team as indicated during treatment.    Anticipated number of session or this episode of care: 10+      MeasurableTreatment Goal(s) related to diagnosis / functional impairment(s)  Goal 1:  Patient will experience a reduction in depressive and anxious symptoms, along with a corresponding increase in positive emotion and life satisfaction.    Objective #A: Patient will experience a reduction in depressed mood, will develop more effective coping skills to manage depressive symptoms, will develop healthy cognitive patterns and beliefs, will increase ability to function adaptively and will continue to take medications as prescribed / participate in supportive activities and services    Status: Continued - Date(s): 2/9/2021    Objective #B: Patient will experience a reduction in anxiety, will develop more effective coping skills to manage anxiety symptoms, will develop healthy cognitive patterns and beliefs and will increase ability to function adaptively  Status: Continued - Date(s): 2/9/2021    Objective #C: Patient will develop better understanding of triggers and coping  strategies to stabilize mood  Status: Continued - Date(s): 2/9/2021    Goal 2:  Patient will identify and increase engagement in valued activity, i.e. improving social connections/relationships, pursuing occupational goals or personally meaningful pursuits, exploration of meaning in life.     Objective #A: Patient will identify meaningful activity in social, occupational and  personal goals, and increase behavioral activation around these goals   Status: Continued - Date(s): 2/9/2021    Objective #B: Patient will address relationship difficulties in a more adaptive manner  Status: Continued - Date(s): 2/9/2021    Objective #C: Patient will develop coping/problem-solving skills to facilitate more adaptive adjustment and will effectively address problems that interfere with adaptive functioning  Status: Continued - Date(s): 2/9/2021    Goal 3:  Patient will increase understanding of pain medications and develop more adaptive responses to chronic pain.    Objective #A: Patient will increase understanding of the relationship between pain and stress/mood   Status: Continued - Date(s): 2/9/2021    Objective #B: Patient will develop 2-3 additional chronic pain management strategies  Status: Continued - Date(s): 2/9/2021      Possible Therapeutic Intervention(s)  Psycho-education regarding mental health diagnoses and treatment options    Skills training    Explore skills useful to client in current situation.    Skills include assertiveness, communication, conflict management, problem-solving, relaxation, etc.    Solution-Focused Therapy    Explore patterns in patient's relationships and discuss options for new behaviors.    Explore patterns in patient's actions and choices and discuss options for new behaviors.    Cognitive-behavioral Therapy    Discuss common cognitive distortions, identify them in patient's life.    Explore ways to challenge, replace, and act against these cognitions.    Acceptance and Commitment  Therapy    Explore and identify important values in patient's life.    Discuss ways to commit to behavioral activation around these values.    Psychodynamic psychotherapy    Discuss patient's emotional dynamics and issues and how they impact behaviors.    Explore patient's history of relationships and how they impact present behaviors.    Explore how to work with and make changes in these schemas and patterns.    Narrative Therapy    Explore the patient's story of his/her life from his/her perspective.    Explore alternate ways of understanding their experience, identifying exceptions, developing new themes.    Interpersonal Psychotherapy    Explore patterns in relationships that are effective or ineffective at helping patient reach their goals, find satisfying experience.    Discuss new patterns or behaviors to engage in for improved social functioning.    Behavioral Activation    Discuss steps patient can take to become more involved in meaningful activity.    Identify barriers to these activities and explore possible solutions.    Mindfulness-Based Strategies    Discuss skills based on development and application of mindfulness.    Skills drawn from compassion-focused therapy, dialectical behavior therapy, mindfulness-based stress reduction, mindfulness-based cognitive therapy, etc.      We have developed these goals together during our work to this point. Patient has assisted in the development of these goals and has agreed to this treatment plan.       Héctor Higuera LP  February 09, 2021

## 2021-03-01 ENCOUNTER — MYC MEDICAL ADVICE (OUTPATIENT)
Dept: FAMILY MEDICINE | Facility: CLINIC | Age: 31
End: 2021-03-01

## 2021-03-02 NOTE — TELEPHONE ENCOUNTER
Please print out 2 DME orders and fax them to [facility] at [phone].    Thank you.    Kenisha

## 2021-03-03 DIAGNOSIS — F41.9 ANXIETY: ICD-10-CM

## 2021-03-03 DIAGNOSIS — F32.A DEPRESSION, UNSPECIFIED DEPRESSION TYPE: ICD-10-CM

## 2021-03-03 DIAGNOSIS — G62.9 NEUROPATHY: ICD-10-CM

## 2021-03-04 ENCOUNTER — MYC MEDICAL ADVICE (OUTPATIENT)
Dept: FAMILY MEDICINE | Facility: CLINIC | Age: 31
End: 2021-03-04

## 2021-03-04 DIAGNOSIS — G12.9 SPINAL MUSCLE ATROPHY (H): ICD-10-CM

## 2021-03-04 RX ORDER — SERTRALINE HYDROCHLORIDE 20 MG/ML
50 SOLUTION ORAL DAILY
Qty: 75 ML | Refills: 2 | Status: SHIPPED | OUTPATIENT
Start: 2021-03-04 | End: 2021-06-09

## 2021-03-04 NOTE — TELEPHONE ENCOUNTER
Last Clinic Visit: 2/11/2021  [facility] [place]  sertraline (ZOLOFT) 20 MG/ML (HIGH CONC) solution

## 2021-03-04 NOTE — TELEPHONE ENCOUNTER
gabapentin (NEURONTIN) 250 MG/5ML solution      Last Written Prescription Date:  9-11-20  Last Fill Quantity: 1800 ml,   # refills: 5  Last Office Visit : 2-11-21  Future Office visit:  none    Routing refill request to provider for review/approval because:  Drug not on the [facility], [facility] or [facility] refill protocol or controlled substance

## 2021-03-05 ENCOUNTER — TELEPHONE (OUTPATIENT)
Dept: FAMILY MEDICINE | Facility: CLINIC | Age: 31
End: 2021-03-05

## 2021-03-05 RX ORDER — GABAPENTIN 250 MG/5ML
SOLUTION ORAL
Qty: 1800 ML | Refills: 5 | Status: SHIPPED | OUTPATIENT
Start: 2021-03-05 | End: 2021-08-24

## 2021-03-05 RX ORDER — DIAZEPAM 5 MG
5 TABLET ORAL EVERY 6 HOURS PRN
Qty: 30 TABLET | Refills: 5 | Status: SHIPPED | OUTPATIENT
Start: 2021-03-05 | End: 2021-08-30

## 2021-03-05 NOTE — TELEPHONE ENCOUNTER
Patients mother calling requesting a return call to discuss mychart message regarding medications, Please call to discuss thank you.

## 2021-03-08 DIAGNOSIS — N94.6 PAINFUL MENSTRUAL PERIODS: ICD-10-CM

## 2021-03-08 DIAGNOSIS — Z79.01 CHRONIC ANTICOAGULATION: ICD-10-CM

## 2021-03-08 DIAGNOSIS — Z86.718 HISTORY OF DEEP VENOUS THROMBOSIS: ICD-10-CM

## 2021-03-08 RX ORDER — CELECOXIB 200 MG/1
200 CAPSULE ORAL DAILY
Qty: 30 CAPSULE | Refills: 6 | Status: SHIPPED | OUTPATIENT
Start: 2021-03-08 | End: 2021-10-04

## 2021-03-09 NOTE — TELEPHONE ENCOUNTER
LM for patient to offer sooner appt with Gildardo--Cancellation is for tomorrow at [facility]  Please transfer call to me at [phone]  Spoke with pt's mom.  Pt is currently taking potassium 20 meq/day and not taking any diuretics. Per mom pt's right hand is swollen off and on for a month. No redness, no pain, no injury, no shortness of breath.    Soon-Mi  ------------------------------------------------------------------------      ----- Message from Jimmy Moseley MD sent at 1/21/2020  8:52 AM CST -----  Can you see if she is on diuretics and/or potassium? I know we'd been on and off.    Potassium low so regardless will need to give some.

## 2021-03-16 ENCOUNTER — IMMUNIZATION (OUTPATIENT)
Dept: NURSING | Facility: CLINIC | Age: 31
End: 2021-03-16
Payer: COMMERCIAL

## 2021-03-16 ENCOUNTER — VIRTUAL VISIT (OUTPATIENT)
Dept: BEHAVIORAL HEALTH | Facility: CLINIC | Age: 31
End: 2021-03-16
Payer: COMMERCIAL

## 2021-03-16 DIAGNOSIS — F33.0 MAJOR DEPRESSIVE DISORDER, RECURRENT EPISODE, MILD WITH ANXIOUS DISTRESS (H): Primary | ICD-10-CM

## 2021-03-16 PROCEDURE — 0001A PR COVID VAC PFIZER DIL RECON 30 MCG/0.3 ML IM: CPT

## 2021-03-16 PROCEDURE — 90837 PSYTX W PT 60 MINUTES: CPT | Mod: 95 | Performed by: PSYCHOLOGIST

## 2021-03-16 PROCEDURE — 91300 PR COVID VAC PFIZER DIL RECON 30 MCG/0.3 ML IM: CPT

## 2021-03-16 ASSESSMENT — ANXIETY QUESTIONNAIRES
5. BEING SO RESTLESS THAT IT IS HARD TO SIT STILL: NOT AT ALL
7. FEELING AFRAID AS IF SOMETHING AWFUL MIGHT HAPPEN: NOT AT ALL
GAD7 TOTAL SCORE: 1
GAD7 TOTAL SCORE: 1
4. TROUBLE RELAXING: NOT AT ALL
2. NOT BEING ABLE TO STOP OR CONTROL WORRYING: NOT AT ALL
6. BECOMING EASILY ANNOYED OR IRRITABLE: NOT AT ALL
3. WORRYING TOO MUCH ABOUT DIFFERENT THINGS: NOT AT ALL
7. FEELING AFRAID AS IF SOMETHING AWFUL MIGHT HAPPEN: NOT AT ALL
GAD7 TOTAL SCORE: 1
1. FEELING NERVOUS, ANXIOUS, OR ON EDGE: SEVERAL DAYS

## 2021-03-16 NOTE — PROGRESS NOTES
[facility]: Integrated Behavioral Health  March 16, 2021      Behavioral Health Clinician Progress Note    Patient Name: Lanie Neil           Service Type: Video appointment      Service Location:  Video appointment      Session Start Time: 11:08  Session End Time: 12:03      Session Length: 53 - 60      Attendees: Client    Visit Activities (Refresh list every visit): [facility] Only    Telemedicine Visit: The patient's condition can be safely assessed and treated via synchronous audio and visual telemedicine encounter.      Reason for Telemedicine Visit: Patient has requested telehealth visit and Services only offered telehealth    Originating Site (Patient Location): Patient's home    Distant Site (Provider Location): Provider Remote Setting    Consent:  The patient/guardian has verbally consented to: the potential risks and benefits of telemedicine (video visit) versus in person care; bill my insurance or make self-payment for services provided; and responsibility for payment of non-covered services.     Mode of Communication:  Video Conference via [employer]    As the provider I attest to compliance with applicable laws and regulations related to telemedicine.      Diagnostic Assessment Date: 1/19/2021  Treatment Plan Review Date: 5/9/2021  See Flowsheets for today's PHQ-9 and KATHARINE-7 results  Previous PHQ-9:   PHQ-9 SCORE 2/12/2020 12/15/2020 3/16/2021   PHQ-9 Total Score - - -   PHQ-9 Total Score MyChart - 2 (Minimal depression) 2 (Minimal depression)   PHQ-9 Total Score 16 2 2     Previous KATHARINE-7:   KATHARINE-7 SCORE 2/12/2020 12/15/2020 3/16/2021   Total Score - 1 (minimal anxiety) 1 (minimal anxiety)   Total Score 19 1 1       BRITTANY LEVEL:  No flowsheet data found.    DATA  Extended Session (60+ minutes): No  Interactive Complexity: No  Crisis: No    Treatment Objective(s) Addressed in This Session:  Target Behavior(s): disease management/lifestyle changes      Depressed Mood:  "Increase interest, engagement, and pleasure in doing things  Decrease frequency and intensity of feeling down, depressed, hopeless  Relationship Problems: will address relationship difficulties in a more adaptive manner  Psychological distress related to Chronic Disease Management    Current Stressors / Issues:  Lanie reports having a lot to discuss today. For starters, she shared that her bathroom at home is being worked on to be more accessible for her, which creates a lot of noise. She indicated this will be finished in 10-14 days. Lanie also reports she handled a situation involving a conflict with her brother and mother better than she used to - she indicated going to her brother directly to explain her upset feelings with him instead of using the \"devil's advocate\" approach. She indicated this helped resolve the issue faster than otherwise in the past. Continued to talk about relationship concerns she has and how she struggles with acceptance of her current life situation. Lanie indicated she does have trouble  her feelings from her mothers at times, which makes her prone to be upset at times. Explored and worked on her feelings of being a burden to others, how she has thoughts her mother would be better off without her. To note, Lanie denied SI in this context, but does feel guilt from the situation her mother is in taking care of her. Made use of cognitive interventions to help Lanie with her depressed mood.       Answers for HPI/ROS submitted by the patient on 3/16/2021   If you checked off any problems, how difficult have these problems made it for you to do your work, take care of things at home, or get along with other people?: Not difficult at all  PHQ9 TOTAL SCORE: 2  KATHARINE 7 TOTAL SCORE: 1      Progress on Treatment Objective(s) / Homework:  Satisfactory progress - ACTION (Actively working towards change); Intervened by reinforcing change plan / affirming steps taken    Cognitive " therapy used today    Care Plan review completed: No    Medication Review:  No changes to current psychiatric medication(s)    Medication Compliance:  NA    Changes in Health Issues:   None reported    Chemical Use Review:   Substance Use: Chemical use reviewed, no active concerns identified      Tobacco Use: No current tobacco use.      Assessment: Current Emotional / Mental Status (status of significant symptoms):  Risk status (Self / Other harm or suicidal ideation)  Patient has had a history of suicidal ideation: passive thoughts only; easily controlled - denied any recently    Patient denies current fears or concerns for personal safety.  Patient denies current or recent suicidal ideation or behaviors.  Patient denies current or recent homicidal ideation or behaviors.  Patient denies current or recent self injurious behavior or ideation.  Patient denies other safety concerns.     A safety and risk management plan has not been developed at this time, however patient was encouraged to call [place] [phone] should there be a change in any of these risk factors.      [place] Suicide Severity Rating Scale (Short Version)  [place] Suicide Severity Rating (Short Version) 2/18/2021   Over the past 2 weeks have you felt down, depressed, or hopeless? yes   Over the past 2 weeks have you had thoughts of killing yourself? no   Have you ever attempted to kill yourself? no       Appearance:   Appropriate   Eye Contact:   Good   Psychomotor Behavior: Normal   Attitude:   Cooperative   Orientation:   All  Speech   Rate / Production: Normal    Volume:  Normal   Mood:    Depressed    Affect:    Appropriate   Thought Content:  Clear   Thought Form:  Coherent  Logical   Insight:    Good     Diagnoses:  1. Major depressive disorder, recurrent episode, mild with anxious distress (H)        Collateral Reports Completed:  [facility] will coordinate with care team as needed    Plan: (Homework, other):  Lanie was scheduled to RTC in two  weeks for help addressing depressive symptoms. She was also given information about mental health symptoms and treatment options .  CD Recommendations: No indications of CD issues.     Héctor Higuera Psy.D, [place] 3/16/2021      Answers for HPI/ROS submitted by the patient on 12/15/2020   If you checked off any problems, how difficult have these problems made it for you to do your work, take care of things at home, or get along with other people?: Not difficult at all  PHQ9 TOTAL SCORE: 2  KATHARINE 7 TOTAL SCORE: 1    _________________________________________________________________________  [facility] Integrated Behavioral Health Treatment Plan    Client's Name: Lanie Neil  YOB: 1990    Date: 2/9/2021    DSM-V Diagnoses: 296.31 (F33.0) Major Depressive Disorder, Recurrent Episode, Mild With anxious distress;   Psychosocial / Contextual Factors: SMA     Clinical Global Impressions  First:  Considering your total clinical experience with this particular patient population, how severe are the patient's symptoms at this time?: 4 (2/18/2021  4:43 PM)      Most recent:  Compared to the patient's condition at the START of treatment, this patient's condition is: 3 (2/18/2021  4:43 PM)      Referral / Collaboration:  Will collaborate with care team as indicated during treatment.    Anticipated number of session or this episode of care: 10+      MeasurableTreatment Goal(s) related to diagnosis / functional impairment(s)  Goal 1:  Patient will experience a reduction in depressive and anxious symptoms, along with a corresponding increase in positive emotion and life satisfaction.    Objective #A: Patient will experience a reduction in depressed mood, will develop more effective coping skills to manage depressive symptoms, will develop healthy cognitive patterns and beliefs, will increase ability to function adaptively and will continue to take medications as prescribed / participate in supportive activities and services     Status: Continued - Date(s): 2/9/2021    Objective #B: Patient will experience a reduction in anxiety, will develop more effective coping skills to manage anxiety symptoms, will develop healthy cognitive patterns and beliefs and will increase ability to function adaptively  Status: Continued - Date(s): 2/9/2021    Objective #C: Patient will develop better understanding of triggers and coping strategies to stabilize mood  Status: Continued - Date(s): 2/9/2021    Goal 2:  Patient will identify and increase engagement in valued activity, i.e. improving social connections/relationships, pursuing occupational goals or personally meaningful pursuits, exploration of meaning in life.     Objective #A: Patient will identify meaningful activity in social, occupational and  personal goals, and increase behavioral activation around these goals   Status: Continued - Date(s): 2/9/2021    Objective #B: Patient will address relationship difficulties in a more adaptive manner  Status: Continued - Date(s): 2/9/2021    Objective #C: Patient will develop coping/problem-solving skills to facilitate more adaptive adjustment and will effectively address problems that interfere with adaptive functioning  Status: Continued - Date(s): 2/9/2021    Goal 3:  Patient will increase understanding of pain medications and develop more adaptive responses to chronic pain.    Objective #A: Patient will increase understanding of the relationship between pain and stress/mood   Status: Continued - Date(s): 2/9/2021    Objective #B: Patient will develop 2-3 additional chronic pain management strategies  Status: Continued - Date(s): 2/9/2021      Possible Therapeutic Intervention(s)  Psycho-education regarding mental health diagnoses and treatment options    Skills training    Explore skills useful to client in current situation.    Skills include assertiveness, communication, conflict management, problem-solving, relaxation, etc.    Solution-Focused  Therapy    Explore patterns in patient's relationships and discuss options for new behaviors.    Explore patterns in patient's actions and choices and discuss options for new behaviors.    Cognitive-behavioral Therapy    Discuss common cognitive distortions, identify them in patient's life.    Explore ways to challenge, replace, and act against these cognitions.    Acceptance and Commitment Therapy    Explore and identify important values in patient's life.    Discuss ways to commit to behavioral activation around these values.    Psychodynamic psychotherapy    Discuss patient's emotional dynamics and issues and how they impact behaviors.    Explore patient's history of relationships and how they impact present behaviors.    Explore how to work with and make changes in these schemas and patterns.    Narrative Therapy    Explore the patient's story of his/her life from his/her perspective.    Explore alternate ways of understanding their experience, identifying exceptions, developing new themes.    Interpersonal Psychotherapy    Explore patterns in relationships that are effective or ineffective at helping patient reach their goals, find satisfying experience.    Discuss new patterns or behaviors to engage in for improved social functioning.    Behavioral Activation    Discuss steps patient can take to become more involved in meaningful activity.    Identify barriers to these activities and explore possible solutions.    Mindfulness-Based Strategies    Discuss skills based on development and application of mindfulness.    Skills drawn from compassion-focused therapy, dialectical behavior therapy, mindfulness-based stress reduction, mindfulness-based cognitive therapy, etc.      We have developed these goals together during our work to this point. Patient has assisted in the development of these goals and has agreed to this treatment plan.       Héctor Higuera LP  February 09, 2021

## 2021-03-17 ASSESSMENT — PATIENT HEALTH QUESTIONNAIRE - PHQ9: SUM OF ALL RESPONSES TO PHQ QUESTIONS 1-9: 2

## 2021-03-17 ASSESSMENT — ANXIETY QUESTIONNAIRES: GAD7 TOTAL SCORE: 1

## 2021-03-18 ENCOUNTER — VIRTUAL VISIT (OUTPATIENT)
Dept: DERMATOLOGY | Facility: CLINIC | Age: 31
End: 2021-03-18
Payer: COMMERCIAL

## 2021-03-18 DIAGNOSIS — L21.9 DERMATITIS, SEBORRHEIC: Primary | ICD-10-CM

## 2021-03-18 DIAGNOSIS — L85.8 RETENTION HYPERKERATOSIS: ICD-10-CM

## 2021-03-18 PROCEDURE — 99213 OFFICE O/P EST LOW 20 MIN: CPT | Mod: GQ | Performed by: DERMATOLOGY

## 2021-03-18 RX ORDER — KETOCONAZOLE 20 MG/ML
SHAMPOO TOPICAL
Qty: 120 ML | Refills: 11 | Status: SHIPPED | OUTPATIENT
Start: 2021-03-18

## 2021-03-18 RX ORDER — FLUOCINOLONE ACETONIDE 0.11 MG/ML
OIL TOPICAL
Qty: 118 ML | Refills: 3 | Status: SHIPPED | OUTPATIENT
Start: 2021-03-18

## 2021-03-18 ASSESSMENT — PAIN SCALES - GENERAL: PAINLEVEL: NO PAIN (0)

## 2021-03-18 NOTE — PROGRESS NOTES
[facility] Dermatology Note  Encounter Date: Mar 18, 2021  Store-and-Forward and Telephone ([phone]). Location of teledermatologist: [facility] [place].  Start time: 5:17 PM. End time: 5:24 PM.    Dermatology Problem List:  1. Rosacea  - prior tx: doxycycline 100 mg PO BID, metrocream BID  2. Seb derm  - keto shampoo; fluocinolone 0.01% oil, amlactin lotion    ____________________________________________    Assessment & Plan:     # Seborrheic dermatitis / retention hyperkeratosis. Recommended topical therapies as below. Will arrange for follow-up in 3 months to assess response.   - Start ketoconazole 2% shampoo in the shower at least once weekly  - Can use fluocinolone 0.01% oil at bedtime PRN itch or scale; nightly for up two weeks, then reduce frequency to 1-2 times weekly PRN  - Start ammonium lactate moisturizer for forehead to reduce scale    # Hx Acne rosacea. Not addressed at this visit.     Procedures Performed:    None    Follow-up: 3 month(s) virtually (telephone with photos), or earlier for new or changing lesions    Staff:     Edward Mack MD  Pronouns: he/him/his    Department of Dermatology  [facility]: Phone: [phone], Fax:[phone]  [facility]: Phone: [phone] Fax: [phone]    ____________________________________________    CC: Derm Problem (eva is having this visit today to discuss the skin concern on her forehead, states that it builds up over the day)    HPI:  Ms. Eva Neil is a(n) 30 year old female who presents today as a return patient for rash on her forehead. She states she has been having an itchy, scaly rash on the forehead, extending into the scalp. She has been using over-the-counter moisturizers which have not been helpful. She shampoos once weekly with a regular over-the-counter  shampoo.     Patient is otherwise feeling well, without additional skin concerns.    Labs Reviewed:  N/A    Physical Exam:  Vitals: There were no vitals taken for this visit.  SKIN: Teledermatology photos were reviewed; image quality and interpretability: acceptable. Image date: 3/18/21.  - White flaky, somewhat hyperkeratotic scale on bilateral forehead, extending into scalp.   - No other lesions of concern on areas examined.              Medications:  Current Outpatient Medications   Medication     ACETAMINOPHEN PO     albuterol (ALBUTEROL) 108 (90 BASE) MCG/ACT inhaler     albuterol (PROVENTIL) (2.5 MG/3ML) 0.083% neb solution     ALPRAZolam (XANAX) 0.25 MG tablet     aminocaproic acid (AMICAR) 0.25 GM/ML solution     bisacodyl (DULCOLAX) 10 MG suppository     celecoxib (CELEBREX) 200 MG capsule     clindamycin (CLEOCIN T) 1 % lotion     diazepam (VALIUM) 5 MG tablet     doxycycline hyclate (VIBRAMYCIN) 100 MG capsule     ferrous sulfate 220 (44 Fe) MG/5ML ELIX     fluticasone (FLOVENT HFA) 110 MCG/ACT inhaler     gabapentin (NEURONTIN) 250 MG/5ML solution     GENERLAC 10 GM/15ML solution     guaiFENesin (ORGANIDIN) 200 MG TABS tablet     HYDROmorphone (DILAUDID) 4 MG tablet     hydrOXYzine (ATARAX) 25 MG tablet     LANsoprazole (PREVACID) 30 MG DR capsule     Magnesium Hydroxide (MILK OF MAGNESIA PO)     metroNIDAZOLE (METROCREAM) 0.75 % external cream     mometasone (NASONEX) 50 MCG/ACT nasal spray     morphine (SABINE) 30 MG 24 hr capsule     naloxone (NARCAN) 4 MG/0.1ML nasal spray     Nutritional Supplements (REPLETE FIBER) LIQD     omeprazole (PRILOSEC) 2 mg/mL suspension     order for DME     order for DME     polyethylene glycol (MIRALAX) 17 GM/Dose powder     potassium chloride (KAYCIEL) 20 MEQ/15ML (10%) solution     prochlorperazine (COMPAZINE) 5 MG tablet     rivaroxaban ANTICOAGULANT (XARELTO ANTICOAGULANT) 10 MG TABS tablet     Salicylic Acid (OXY BALANCE FACIAL CLEAN WASH EX)     Sennosides (SENNA)  8.8 MG/5ML LIQD     sertraline (ZOLOFT) 20 MG/ML (HIGH CONC) solution     silver sulfADIAZINE (SILVADENE) 1 % external cream     simethicone (MYLICON) 66.7 mg/ml     simethicone 40 MG/0.6ML LIQD     sodium chloride (OCEAN) 0.65 % nasal spray     No current facility-administered medications for this visit.       Past Medical/Surgical History:   Patient Active Problem List   Diagnosis     Constipation     Asthma     Spinal muscle atrophy (H)     Dyspepsia and other specified disorders of function of stomach     Sinusitis, chronic     DVT (deep venous thrombosis) (H)     Chronic respiratory failure (H)     Chronic pain syndrome     Anemia due to blood loss, acute     Iron deficiency anemia due to chronic blood loss     Chronic anticoagulation     Hypokalemia     Past Medical History:   Diagnosis Date     Anxiety      Snoring      Spinal muscle atrophy (H)        CC Jimmy Moseley MD  [address] [place] [id number] on close of this encounter.

## 2021-03-18 NOTE — LETTER
3/18/2021       RE: Lanie Neil  [address]  [place] [address]     Dear Colleague,    Thank you for referring your patient, Lanie Neil, to the [facility] [place] at [facility]. Please see a copy of my visit note below.    [facility] Dermatology Note  Encounter Date: Mar 18, 2021  Store-and-Forward and Telephone ([phone]). Location of teledermatologist: [facility] [place].  Start time: 5:17 PM. End time: 5:24 PM.    Dermatology Problem List:  1. Rosacea  - prior tx: doxycycline 100 mg PO BID, metrocream BID  2. Seb derm  - keto shampoo; fluocinolone 0.01% oil, amlactin lotion    ____________________________________________    Assessment & Plan:     # Seborrheic dermatitis / retention hyperkeratosis. Recommended topical therapies as below. Will arrange for follow-up in 3 months to assess response.   - Start ketoconazole 2% shampoo in the shower at least once weekly  - Can use fluocinolone 0.01% oil at bedtime PRN itch or scale; nightly for up two weeks, then reduce frequency to 1-2 times weekly PRN  - Start ammonium lactate moisturizer for forehead to reduce scale    # Hx Acne rosacea. Not addressed at this visit.     Procedures Performed:    None    Follow-up: 3 month(s) virtually (telephone with photos), or earlier for new or changing lesions    Staff:     Edward Mack MD  Pronouns: he/him/his    Department of Dermatology  [facility]: Phone: [phone], Fax:[phone]  [facility]: Phone: [phone] Fax: [phone]    ____________________________________________    CC: Derm Problem (lanie is having this visit today to discuss the skin concern on her forehead, states that it builds up over the day)    HPI:  Ms. Dela Cruz Jolynn is a(n)  30 year old female who presents today as a return patient for rash on her forehead. She states she has been having an itchy, scaly rash on the forehead, extending into the scalp. She has been using over-the-counter moisturizers which have not been helpful. She shampoos once weekly with a regular over-the-counter shampoo.     Patient is otherwise feeling well, without additional skin concerns.    Labs Reviewed:  N/A    Physical Exam:  Vitals: There were no vitals taken for this visit.  SKIN: Teledermatology photos were reviewed; image quality and interpretability: acceptable. Image date: 3/18/21.  - White flaky, somewhat hyperkeratotic scale on bilateral forehead, extending into scalp.   - No other lesions of concern on areas examined.              Medications:  Current Outpatient Medications   Medication     ACETAMINOPHEN PO     albuterol (ALBUTEROL) 108 (90 BASE) MCG/ACT inhaler     albuterol (PROVENTIL) (2.5 MG/3ML) 0.083% neb solution     ALPRAZolam (XANAX) 0.25 MG tablet     aminocaproic acid (AMICAR) 0.25 GM/ML solution     bisacodyl (DULCOLAX) 10 MG suppository     celecoxib (CELEBREX) 200 MG capsule     clindamycin (CLEOCIN T) 1 % lotion     diazepam (VALIUM) 5 MG tablet     doxycycline hyclate (VIBRAMYCIN) 100 MG capsule     ferrous sulfate 220 (44 Fe) MG/5ML ELIX     fluticasone (FLOVENT HFA) 110 MCG/ACT inhaler     gabapentin (NEURONTIN) 250 MG/5ML solution     GENERLAC 10 GM/15ML solution     guaiFENesin (ORGANIDIN) 200 MG TABS tablet     HYDROmorphone (DILAUDID) 4 MG tablet     hydrOXYzine (ATARAX) 25 MG tablet     LANsoprazole (PREVACID) 30 MG DR capsule     Magnesium Hydroxide (MILK OF MAGNESIA PO)     metroNIDAZOLE (METROCREAM) 0.75 % external cream     mometasone (NASONEX) 50 MCG/ACT nasal spray     morphine (SABINE) 30 MG 24 hr capsule     naloxone (NARCAN) 4 MG/0.1ML nasal spray     Nutritional Supplements (REPLETE FIBER) LIQD     omeprazole (PRILOSEC) 2 mg/mL suspension     order for DME     order  for DME     polyethylene glycol (MIRALAX) 17 GM/Dose powder     potassium chloride (KAYCIEL) 20 MEQ/15ML (10%) solution     prochlorperazine (COMPAZINE) 5 MG tablet     rivaroxaban ANTICOAGULANT (XARELTO ANTICOAGULANT) 10 MG TABS tablet     Salicylic Acid (OXY BALANCE FACIAL CLEAN WASH EX)     Sennosides (SENNA) 8.8 MG/5ML LIQD     sertraline (ZOLOFT) 20 MG/ML (HIGH CONC) solution     silver sulfADIAZINE (SILVADENE) 1 % external cream     simethicone (MYLICON) 66.7 mg/ml     simethicone 40 MG/0.6ML LIQD     sodium chloride (OCEAN) 0.65 % nasal spray     No current facility-administered medications for this visit.       Past Medical/Surgical History:   Patient Active Problem List   Diagnosis     Constipation     Asthma     Spinal muscle atrophy (H)     Dyspepsia and other specified disorders of function of stomach     Sinusitis, chronic     DVT (deep venous thrombosis) (H)     Chronic respiratory failure (H)     Chronic pain syndrome     Anemia due to blood loss, acute     Iron deficiency anemia due to chronic blood loss     Chronic anticoagulation     Hypokalemia     Past Medical History:   Diagnosis Date     Anxiety      Snoring      Spinal muscle atrophy (H)        CC Jimmy Moseley MD  [address] [place] [address] on close of this encounter.

## 2021-03-18 NOTE — NURSING NOTE
Dermatology Rooming Note    Lanie Neil's goals for this visit include:   Chief Complaint   Patient presents with     Derm Problem     lanie is having this visit today to discuss the skin concern on her forehead, states that it builds up over the day     Vanita Fleming CMA on 3/18/2021 at 1:39 PM

## 2021-03-18 NOTE — PATIENT INSTRUCTIONS
[facility] Dermatology Visit    Thank you for allowing us to participate in your care. Your findings, instructions and follow-up plan are as follows:     Seborrheic dermatitis / scale on forehead/scalp.   1. Start using ketoconazole 2% shampoo in the shower at least once weekly; let sit for 1-3 minutes before rinsing.   2. Can use fluocinolone 0.01% oil nightly for up to two weeks to forehead/scalp; then reduce to 1-2 times weekly as able  3. Recommend ammonium lactate (amlactin) 12% lotion as moisturizer which may help reduce scale.     When should I call my doctor?    If you are worsening or not improving, please, contact us or seek urgent care as noted below.     Who should I call with questions (adults)?    [facility] (adult and pediatric): [phone]     [facility] (adult): [phone]    For urgent needs outside of business hours call the [facility] at [phone] and ask for the dermatology resident on call    If this is a medical emergency and you are unable to reach an ER, Call [phone]      Who should I call with questions (pediatric)?  [facility]- Pediatric Dermatology  Dr. Leeanna Davalos, Dr. Savana Valdovinos, Dr. Greta Cam, Petty Dejesus, MORALES Sparks, Dr. Maude Patel & Dr. Daniel Thornton  Non Urgent  Nurse Triage Line; [phone]- Hailey and Yamel GLOVER Care Coordinators   Jeimy (/Complex ) [phone]    If you need a prescription refill, please contact your pharmacy. Refills are approved or denied by our Physicians during normal business hours, Monday through Fridays  Per office policy, refills will not be granted if you have not been seen within the past year (or sooner depending on your child's condition)    Scheduling Information:  Pediatric Appointment Scheduling and Call Center [phone]  Radiology Scheduling-  [phone]  Sedation Unit Scheduling- [phone]  [place] Scheduling- General [phone]; Pediatric Dermatology [phone]  Main  Services: [phone]  Moroccan: [phone]  Panamanian: [phone]  Hmong/Luxembourgish/Gomez: [phone]  Preadmission Nursing Department Fax Number: [phone] (Fax all pre-operative paperwork to this number)    For urgent matters arising during evenings, weekends, or holidays that cannot wait for normal business hours please call [phone] and ask for the Dermatology Resident On-Call to be paged.

## 2021-03-19 ENCOUNTER — TELEPHONE (OUTPATIENT)
Dept: DERMATOLOGY | Facility: CLINIC | Age: 31
End: 2021-03-19

## 2021-03-21 DIAGNOSIS — E87.6 HYPOKALEMIA: ICD-10-CM

## 2021-03-24 RX ORDER — POTASSIUM CHLORIDE 20MEQ/15ML
30 LIQUID (ML) ORAL DAILY
Qty: 675 ML | Refills: 11 | OUTPATIENT
Start: 2021-03-24

## 2021-03-24 NOTE — TELEPHONE ENCOUNTER
"  Duplicate:     potassium chloride (KAYCIEL) 20 MEQ/15ML (10%) solution 675 mL 11 12/15/2020  No     Will call pharmacy upon opening to confirm remaining refills. Spoke to pharmacist, Carline. \"Disregard - refills remaining\".     [id number] - [place], [place] - [address]  [phone].      "

## 2021-04-01 ENCOUNTER — VIRTUAL VISIT (OUTPATIENT)
Dept: BEHAVIORAL HEALTH | Facility: CLINIC | Age: 31
End: 2021-04-01
Payer: COMMERCIAL

## 2021-04-01 ENCOUNTER — TRANSFERRED RECORDS (OUTPATIENT)
Dept: HEALTH INFORMATION MANAGEMENT | Facility: CLINIC | Age: 31
End: 2021-04-01

## 2021-04-01 DIAGNOSIS — F33.0 MAJOR DEPRESSIVE DISORDER, RECURRENT EPISODE, MILD WITH ANXIOUS DISTRESS (H): Primary | ICD-10-CM

## 2021-04-01 PROCEDURE — 90837 PSYTX W PT 60 MINUTES: CPT | Mod: GT | Performed by: PSYCHOLOGIST

## 2021-04-01 NOTE — PROGRESS NOTES
[facility]: Integrated Behavioral Health  April 1, 2021      Behavioral Health Clinician Progress Note    Patient Name: Lanie Neil           Service Type: Video appointment      Service Location:  Video appointment      Session Start Time: 9:02  Session End Time: 10:02      Session Length: 53 - 60      Attendees: Client    Visit Activities (Refresh list every visit): [facility] Only    Telemedicine Visit: The patient's condition can be safely assessed and treated via synchronous audio and visual telemedicine encounter.      Reason for Telemedicine Visit: Patient has requested telehealth visit and Services only offered telehealth    Originating Site (Patient Location): Patient's home    Distant Site (Provider Location): Provider Remote Setting    Consent:  The patient/guardian has verbally consented to: the potential risks and benefits of telemedicine (video visit) versus in person care; bill my insurance or make self-payment for services provided; and responsibility for payment of non-covered services.     Mode of Communication:  Video Conference via [facility]    As the provider I attest to compliance with applicable laws and regulations related to telemedicine.      Diagnostic Assessment Date: 1/19/2021  Treatment Plan Review Date: 5/9/2021  See Flowsheets for today's PHQ-9 and KATHARINE-7 results  Previous PHQ-9:   PHQ-9 SCORE 2/12/2020 12/15/2020 3/16/2021   PHQ-9 Total Score - - -   PHQ-9 Total Score MyChart - 2 (Minimal depression) 2 (Minimal depression)   PHQ-9 Total Score 16 2 2     Previous KATHARINE-7:   KATHARINE-7 SCORE 2/12/2020 12/15/2020 3/16/2021   Total Score - 1 (minimal anxiety) 1 (minimal anxiety)   Total Score 19 1 1       BRITTANY LEVEL:  No flowsheet data found.    DATA  Extended Session (60+ minutes): No  Interactive Complexity: No  Crisis: No    Treatment Objective(s) Addressed in This Session:  Target Behavior(s): disease management/lifestyle changes related to pain medication  "use    Depressed Mood: Increase interest, engagement, and pleasure in doing things  Decrease frequency and intensity of feeling down, depressed, hopeless  Relationship Problems: will address relationship difficulties in a more adaptive manner  Psychological distress related to Pain  Psychological distress related to Chronic Disease Management    Current Stressors / Issues:  Lanie presented today for a [facility] follow-up. Our session focused mainly on her concern about her use of pain medications, how this leads to conflicts with her mother and her care team at times. Per Lanie's report, she runs into conflict with her mother because she believes she ought to have more say/control of how/when to take her prescribed pain medications whereas she says her mother wants this more regulated because she fears Lanie is too dependent on them. Lanie indicated that she does not believe she is an \"addict,\" but can recognize she has dependency on pain medications. Due to her situation, she says, it is not possible for her to get too out of control with her use, such that occasionally taking her pain medication earlier than indicated by an hour is not a problem. Explored and talked about Lanie's thoughts about her mother's perspective on this issue, how perhaps she is trying to protect her from harm, not just being restrictive. Also explored some of the concerns/complicatons from opioid misuse and dependency, such has the risks associated with accidental overdose and possible hyperalgesia/pain syndromes. Other topics we covered included Lanie's difficulties with being labeled as \"drug seeking\" because of her medical record showing at times history of opioid dependency. Talked about the definitions of \"dependency\" versus addiction. Processed her ultimate fear of being denied pain medication because of the stigma/label associated with chemical dependency which would leave her helpless to manage her pain.      Answers for " HPI/ROS submitted by the patient on 3/16/2021   If you checked off any problems, how difficult have these problems made it for you to do your work, take care of things at home, or get along with other people?: Not difficult at all  PHQ9 TOTAL SCORE: 2  KATHARINE 7 TOTAL SCORE: 1      Progress on Treatment Objective(s) / Homework:  Satisfactory progress - ACTION (Actively working towards change); Intervened by reinforcing change plan / affirming steps taken    Motivational Interviewing  Target Behavior: disease management/lifestyle changes pain med use    Stage of Change: CONTEMPLATION (Considering change and yet undecided)    MI Intervention: Expressed Empathy/Understanding, Supported Autonomy, Collaboration, Evocation, Permission to raise concern or advise, Open-ended questions, Reflections: simple and complex, Rolled with resistance: Emphasized patient autonomy, Simple reflection, Complex reflection, Amplified reflection (weaker or stronger meaning) and Reframed sustain talk in the direction of change, Change talk (evoked) and Reframe     Change Talk Expressed by the Patient: Reasons to change    Provider Response to Change Talk: E - Evoked more info from patient about behavior change, A - Affirmed patient's thoughts, decisions, or attempts at behavior change, R - Reflected patient's change talk and S - Summarized patient's change talk statements       Care Plan review completed: No    Medication Review:  No changes to current psychiatric medication(s)    Medication Compliance:  NA    Changes in Health Issues:   None reported    Chemical Use Review:   Substance Use: Chemical use reviewed, no active concerns identified      Tobacco Use: No current tobacco use.      Assessment: Current Emotional / Mental Status (status of significant symptoms):  Risk status (Self / Other harm or suicidal ideation)  Patient has had a history of suicidal ideation: passive thoughts only; easily controlled - denied any recently    Patient  denies current fears or concerns for personal safety.  Patient denies current or recent suicidal ideation or behaviors.  Patient denies current or recent homicidal ideation or behaviors.  Patient denies current or recent self injurious behavior or ideation.  Patient denies other safety concerns.     A safety and risk management plan has not been developed at this time, however patient was encouraged to call [place] [phone] should there be a change in any of these risk factors.      [place] Suicide Severity Rating Scale (Short Version)  [place] Suicide Severity Rating (Short Version) 2/18/2021   Over the past 2 weeks have you felt down, depressed, or hopeless? yes   Over the past 2 weeks have you had thoughts of killing yourself? no   Have you ever attempted to kill yourself? no       Appearance:   Appropriate   Eye Contact:   Good   Psychomotor Behavior: Normal   Attitude:   Cooperative   Orientation:   All  Speech   Rate / Production: Normal    Volume:  Normal   Mood:    Depressed    Affect:    Appropriate   Thought Content:  Clear   Thought Form:  Coherent  Logical   Insight:    Good     Diagnoses:  1. Major depressive disorder, recurrent episode, mild with anxious distress (H)        Collateral Reports Completed:  [facility] will coordinate with care team as needed    Plan: (Homework, other):  Lanie was scheduled to RTC in two weeks for help addressing depressive symptoms. She was also given information about mental health symptoms and treatment options .  CD Recommendations: No indications of CD issues.     Héctor Higuera Psy.D, LP 4/1/2021      Answers for HPI/ROS submitted by the patient on 12/15/2020   If you checked off any problems, how difficult have these problems made it for you to do your work, take care of things at home, or get along with other people?: Not difficult at all  PHQ9 TOTAL SCORE: 2  KATHARINE 7 TOTAL SCORE: 1    _________________________________________________________________________  [facility]  Integrated Behavioral Health Treatment Plan    Client's Name: Lanie Neil  YOB: 1990    Date: 2/9/2021    DSM-V Diagnoses: 296.31 (F33.0) Major Depressive Disorder, Recurrent Episode, Mild With anxious distress;   Psychosocial / Contextual Factors: SMA     Clinical Global Impressions  First:  Considering your total clinical experience with this particular patient population, how severe are the patient's symptoms at this time?: 4 (2/18/2021  4:43 PM)      Most recent:  Compared to the patient's condition at the START of treatment, this patient's condition is: 3 (2/18/2021  4:43 PM)      Referral / Collaboration:  Will collaborate with care team as indicated during treatment.    Anticipated number of session or this episode of care: 10+      MeasurableTreatment Goal(s) related to diagnosis / functional impairment(s)  Goal 1:  Patient will experience a reduction in depressive and anxious symptoms, along with a corresponding increase in positive emotion and life satisfaction.    Objective #A: Patient will experience a reduction in depressed mood, will develop more effective coping skills to manage depressive symptoms, will develop healthy cognitive patterns and beliefs, will increase ability to function adaptively and will continue to take medications as prescribed / participate in supportive activities and services    Status: Continued - Date(s): 2/9/2021    Objective #B: Patient will experience a reduction in anxiety, will develop more effective coping skills to manage anxiety symptoms, will develop healthy cognitive patterns and beliefs and will increase ability to function adaptively  Status: Continued - Date(s): 2/9/2021    Objective #C: Patient will develop better understanding of triggers and coping strategies to stabilize mood  Status: Continued - Date(s): 2/9/2021    Goal 2:  Patient will identify and increase engagement in valued activity, i.e. improving social connections/relationships,  pursuing occupational goals or personally meaningful pursuits, exploration of meaning in life.     Objective #A: Patient will identify meaningful activity in social, occupational and  personal goals, and increase behavioral activation around these goals   Status: Continued - Date(s): 2/9/2021    Objective #B: Patient will address relationship difficulties in a more adaptive manner  Status: Continued - Date(s): 2/9/2021    Objective #C: Patient will develop coping/problem-solving skills to facilitate more adaptive adjustment and will effectively address problems that interfere with adaptive functioning  Status: Continued - Date(s): 2/9/2021    Goal 3:  Patient will increase understanding of pain medications and develop more adaptive responses to chronic pain.    Objective #A: Patient will increase understanding of the relationship between pain and stress/mood   Status: Continued - Date(s): 2/9/2021    Objective #B: Patient will develop 2-3 additional chronic pain management strategies  Status: Continued - Date(s): 2/9/2021      Possible Therapeutic Intervention(s)  Psycho-education regarding mental health diagnoses and treatment options    Skills training    Explore skills useful to client in current situation.    Skills include assertiveness, communication, conflict management, problem-solving, relaxation, etc.    Solution-Focused Therapy    Explore patterns in patient's relationships and discuss options for new behaviors.    Explore patterns in patient's actions and choices and discuss options for new behaviors.    Cognitive-behavioral Therapy    Discuss common cognitive distortions, identify them in patient's life.    Explore ways to challenge, replace, and act against these cognitions.    Acceptance and Commitment Therapy    Explore and identify important values in patient's life.    Discuss ways to commit to behavioral activation around these values.    Psychodynamic psychotherapy    Discuss patient's emotional  dynamics and issues and how they impact behaviors.    Explore patient's history of relationships and how they impact present behaviors.    Explore how to work with and make changes in these schemas and patterns.    Narrative Therapy    Explore the patient's story of his/her life from his/her perspective.    Explore alternate ways of understanding their experience, identifying exceptions, developing new themes.    Interpersonal Psychotherapy    Explore patterns in relationships that are effective or ineffective at helping patient reach their goals, find satisfying experience.    Discuss new patterns or behaviors to engage in for improved social functioning.    Behavioral Activation    Discuss steps patient can take to become more involved in meaningful activity.    Identify barriers to these activities and explore possible solutions.    Mindfulness-Based Strategies    Discuss skills based on development and application of mindfulness.    Skills drawn from compassion-focused therapy, dialectical behavior therapy, mindfulness-based stress reduction, mindfulness-based cognitive therapy, etc.      We have developed these goals together during our work to this point. Patient has assisted in the development of these goals and has agreed to this treatment plan.       Héctor Higuera LP  February 09, 2021

## 2021-04-06 ENCOUNTER — IMMUNIZATION (OUTPATIENT)
Dept: NURSING | Facility: CLINIC | Age: 31
End: 2021-04-06
Attending: INTERNAL MEDICINE
Payer: COMMERCIAL

## 2021-04-06 PROCEDURE — 91300 PR COVID VAC PFIZER DIL RECON 30 MCG/0.3 ML IM: CPT

## 2021-04-06 PROCEDURE — 0002A PR COVID VAC PFIZER DIL RECON 30 MCG/0.3 ML IM: CPT

## 2021-04-08 ENCOUNTER — TRANSFERRED RECORDS (OUTPATIENT)
Dept: HEALTH INFORMATION MANAGEMENT | Facility: CLINIC | Age: 31
End: 2021-04-08

## 2021-04-23 ENCOUNTER — MYC MEDICAL ADVICE (OUTPATIENT)
Dept: FAMILY MEDICINE | Facility: CLINIC | Age: 31
End: 2021-04-23

## 2021-04-23 DIAGNOSIS — G12.9 SPINAL MUSCLE ATROPHY (H): ICD-10-CM

## 2021-04-23 DIAGNOSIS — L98.9 SKIN SORE: Primary | ICD-10-CM

## 2021-04-23 RX ORDER — ALPRAZOLAM 0.25 MG
TABLET ORAL
Qty: 60 TABLET | Refills: 3 | Status: SHIPPED | OUTPATIENT
Start: 2021-04-23 | End: 2021-09-24

## 2021-04-23 RX ORDER — MUPIROCIN CALCIUM 20 MG/G
CREAM TOPICAL 3 TIMES DAILY
Qty: 30 G | Refills: 3 | Status: SHIPPED | OUTPATIENT
Start: 2021-04-23 | End: 2022-10-03

## 2021-04-24 ENCOUNTER — HEALTH MAINTENANCE LETTER (OUTPATIENT)
Age: 31
End: 2021-04-24

## 2021-04-26 ENCOUNTER — TELEPHONE (OUTPATIENT)
Dept: FAMILY MEDICINE | Facility: CLINIC | Age: 31
End: 2021-04-26

## 2021-04-26 NOTE — TELEPHONE ENCOUNTER
Prior Authorization Approval    Authorization Effective Date: 3/27/2021  Authorization Expiration Date: 4/26/2022  Medication: mupirocin (BACTROBAN) 2 % external cream-PA APPROVED   Approved Dose/Quantity:   Reference #:     Insurance Company: [employer] - Phone [phone] Fax [phone]  Expected CoPay:       CoPay Card Available:      Foundation Assistance Needed:    Which Pharmacy is filling the prescription (Not needed for infusion/clinic administered): [id number] - [place] - [address] AT [facility] [address]  Pharmacy Notified: Yes- **Instructed pharmacy to notify patient when script is ready to /ship.**   Patient Notified: Yes

## 2021-04-26 NOTE — TELEPHONE ENCOUNTER
Central Prior Authorization Team   [phone]    PA Initiation    Medication: mupirocin (BACTROBAN) 2 % external cream  Insurance Company: [employer] - Phone [phone] Fax [phone]  Pharmacy Filling the Rx: [id number] - [place], [place] - [address] AT [facility] [place] [address]  Filling Pharmacy Phone: [phone]  Filling Pharmacy Fax: [phone]  Start Date: 4/26/2021

## 2021-04-27 DIAGNOSIS — K21.00 GASTROESOPHAGEAL REFLUX DISEASE WITH ESOPHAGITIS: ICD-10-CM

## 2021-04-28 NOTE — TELEPHONE ENCOUNTER
Last Clinic Visit: 2/11/2021  [facility] Internal Medicine [place]

## 2021-04-29 DIAGNOSIS — T14.8XXA OPEN WOUND: ICD-10-CM

## 2021-04-29 NOTE — TELEPHONE ENCOUNTER
silver sulfADIAZINE (SILVADENE) 1 % external cream      Last Written Prescription Date:  11-5-20  Last Fill Quantity: 400g,   # refills: 1  Last Office Visit : 2-11-21  Future Office visit:  none    Routing refill request to provider for review/approval because:  Med not on protocol

## 2021-05-03 RX ORDER — SILVER SULFADIAZINE 10 MG/G
CREAM TOPICAL 2 TIMES DAILY
Qty: 400 G | Refills: 3 | Status: SHIPPED | OUTPATIENT
Start: 2021-05-03 | End: 2022-07-20

## 2021-05-05 ENCOUNTER — MYC MEDICAL ADVICE (OUTPATIENT)
Dept: FAMILY MEDICINE | Facility: CLINIC | Age: 31
End: 2021-05-05

## 2021-05-05 DIAGNOSIS — R11.0 NAUSEA: Primary | ICD-10-CM

## 2021-05-07 ENCOUNTER — TRANSFERRED RECORDS (OUTPATIENT)
Dept: HEALTH INFORMATION MANAGEMENT | Facility: CLINIC | Age: 31
End: 2021-05-07

## 2021-05-07 RX ORDER — ONDANSETRON 4 MG/1
4 TABLET, ORALLY DISINTEGRATING ORAL EVERY 8 HOURS PRN
Qty: 30 TABLET | Refills: 1 | Status: SHIPPED | OUTPATIENT
Start: 2021-05-07

## 2021-05-18 NOTE — TELEPHONE ENCOUNTER
DIAGNOSIS: Toe pain   APPOINTMENT DATE: 5.28.21   NOTES STATUS DETAILS   OFFICE NOTE from referring provider N/A    OFFICE NOTE from other specialist N/A    DISCHARGE SUMMARY from hospital N/A    DISCHARGE REPORT from the ER N/A    OPERATIVE REPORT N/A    MEDICATION LIST Internal    EMG (for Spine) N/A    IMPLANT RECORD/STICKER N/A    LABS     CBC/DIFF N/A    CULTURES N/A    INJECTIONS DONE IN RADIOLOGY N/A    MRI N/A    CT SCAN N/A    XRAYS (IMAGES & REPORTS) N/A    TUMOR     PATHOLOGY  Slides & report N/A

## 2021-05-28 ENCOUNTER — PRE VISIT (OUTPATIENT)
Dept: ORTHOPEDICS | Facility: CLINIC | Age: 31
End: 2021-05-28

## 2021-06-03 ENCOUNTER — MEDICAL CORRESPONDENCE (OUTPATIENT)
Dept: HEALTH INFORMATION MANAGEMENT | Facility: CLINIC | Age: 31
End: 2021-06-03

## 2021-06-05 DIAGNOSIS — F41.9 ANXIETY: ICD-10-CM

## 2021-06-05 DIAGNOSIS — F32.A DEPRESSION, UNSPECIFIED DEPRESSION TYPE: ICD-10-CM

## 2021-06-07 ENCOUNTER — TELEPHONE (OUTPATIENT)
Dept: INTERNAL MEDICINE | Facility: CLINIC | Age: 31
End: 2021-06-07

## 2021-06-07 ENCOUNTER — VIRTUAL VISIT (OUTPATIENT)
Dept: FAMILY MEDICINE | Facility: CLINIC | Age: 31
End: 2021-06-07
Payer: COMMERCIAL

## 2021-06-07 DIAGNOSIS — Z01.818 PREOP GENERAL PHYSICAL EXAM: ICD-10-CM

## 2021-06-07 DIAGNOSIS — D50.0 IRON DEFICIENCY ANEMIA DUE TO CHRONIC BLOOD LOSS: ICD-10-CM

## 2021-06-07 DIAGNOSIS — E87.6 HYPOKALEMIA: Primary | ICD-10-CM

## 2021-06-07 PROCEDURE — 99214 OFFICE O/P EST MOD 30 MIN: CPT | Mod: 95 | Performed by: FAMILY MEDICINE

## 2021-06-07 NOTE — PROGRESS NOTES
Lanie is a 30 year old who is being evaluated via a billable video visit.      How would you like to obtain your AVS? MyChart  If the video visit is dropped, the invitation should be resent by: in epic  Will anyone else be joining your video visit? No    Video Start Time: 9:33    Assessment & Plan     Hypokalemia  Recheck before surgery. No exact cause found w/ renal consult  - Basic metabolic panel; Future    Iron deficiency anemia due to chronic blood loss  Recheck before procedure  - CBC with platelets differential; Future    She is cleared for procedure on a medical basis pending cbc/bmp       32 minutes spent on the date of the encounter doing chart review, history and exam, documentation and further activities per the note    Jimmy Moseley MD  [facility]   Lanie is a 30 year old who presents for the following health issues     HPI Preop. Spinraza needed episodically by injection for SMA, helpful and tolerated, due. [facility] June 23 2021 Dr Sanchez.     No interval c/o; toenail infection resolved on own w/ mom helping clean it. Sees derm soon for skin lesion and seb d.    Pt and family w/ history DVT, she is on xarelto and will stop it over 24 ahead of time. No personal or FH anesthesia reactions.    Low hgb, potassium in past. Recheck.    Past Medical History:   Diagnosis Date     Anxiety      Snoring      Spinal muscle atrophy (H)      Past Surgical History:   Procedure Laterality Date     ENT SURGERY      tubes     GASTROSTOMY TUBE       KIDNEY SURGERY       SPINE SURGERY       TRACHEOSTOMY       Current Outpatient Medications   Medication     ACETAMINOPHEN PO     albuterol (ALBUTEROL) 108 (90 BASE) MCG/ACT inhaler     albuterol (PROVENTIL) (2.5 MG/3ML) 0.083% neb solution     ALPRAZolam (XANAX) 0.25 MG tablet     aminocaproic acid (AMICAR) 0.25 GM/ML solution     bisacodyl (DULCOLAX) 10 MG suppository     celecoxib (CELEBREX) 200 MG  capsule     clindamycin (CLEOCIN T) 1 % lotion     diazepam (VALIUM) 5 MG tablet     doxycycline hyclate (VIBRAMYCIN) 100 MG capsule     ferrous sulfate 220 (44 Fe) MG/5ML ELIX     fluocinolone acetonide (DERMA SMOOTHE/FS BODY) 0.01 % external oil     fluticasone (FLOVENT HFA) 110 MCG/ACT inhaler     gabapentin (NEURONTIN) 250 MG/5ML solution     GENERLAC 10 GM/15ML solution     guaiFENesin (ORGANIDIN) 200 MG TABS tablet     HYDROmorphone (DILAUDID) 4 MG tablet     hydrOXYzine (ATARAX) 25 MG tablet     ketoconazole (NIZORAL) 2 % external shampoo     LANsoprazole (PREVACID) 30 MG DR capsule     Magnesium Hydroxide (MILK OF MAGNESIA PO)     metroNIDAZOLE (METROCREAM) 0.75 % external cream     mometasone (NASONEX) 50 MCG/ACT nasal spray     morphine (SABINE) 30 MG 24 hr capsule     mupirocin (BACTROBAN) 2 % external cream     naloxone (NARCAN) 4 MG/0.1ML nasal spray     Nutritional Supplements (REPLETE FIBER) LIQD     omeprazole (PRILOSEC) 2 mg/mL suspension     ondansetron (ZOFRAN-ODT) 4 MG ODT tab     order for DME     order for DME     polyethylene glycol (MIRALAX) 17 GM/Dose powder     potassium chloride (KAYCIEL) 20 MEQ/15ML (10%) solution     prochlorperazine (COMPAZINE) 5 MG tablet     rivaroxaban ANTICOAGULANT (XARELTO ANTICOAGULANT) 10 MG TABS tablet     Salicylic Acid (OXY BALANCE FACIAL CLEAN WASH EX)     Sennosides (SENNA) 8.8 MG/5ML LIQD     sertraline (ZOLOFT) 20 MG/ML (HIGH CONC) solution     silver sulfADIAZINE (SILVADENE) 1 % external cream     simethicone (MYLICON) 66.7 mg/ml     simethicone 40 MG/0.6ML LIQD     sodium chloride (OCEAN) 0.65 % nasal spray     No current facility-administered medications for this visit.      No Known Allergies  Family History   Problem Relation Age of Onset     Family History Negative Other      Social History     Socioeconomic History     Marital status: Single     Spouse name: Not on file     Number of children: Not on file     Years of education: Not on file      Highest education level: Not on file   Occupational History     Not on file   Social Needs     Financial resource strain: Not on file     Food insecurity     Worry: Not on file     Inability: Not on file     Transportation needs     Medical: Not on file     Non-medical: Not on file   Tobacco Use     Smoking status: Never Smoker     Smokeless tobacco: Never Used   Substance and Sexual Activity     Alcohol use: No     Alcohol/week: 0.0 standard drinks     Drug use: No     Sexual activity: Not on file   Lifestyle     Physical activity     Days per week: Not on file     Minutes per session: Not on file     Stress: Not on file   Relationships     Social connections     Talks on phone: Not on file     Gets together: Not on file     Attends [employer] service: Not on file     Active member of club or organization: Not on file     Attends meetings of clubs or organizations: Not on file     Relationship status: Not on file     Intimate partner violence     Fear of current or ex partner: Not on file     Emotionally abused: Not on file     Physically abused: Not on file     Forced sexual activity: Not on file   Other Topics Concern     Not on file   Social History Narrative     Not on file         Review of Systems   Ten pt ROS o/w negative for interval or acute problems      Objective           Vitals:  No vitals were obtained today due to virtual visit.    Physical Exam   GENERAL: Healthy, alert and no distress  EYES: Eyes grossly normal to inspection.  No discharge or erythema, or obvious scleral/conjunctival abnormalities.  RESP: No audible wheeze, cough, or visible cyanosis.  No visible retractions or increased work of breathing.    SKIN: Visible skin clear. No significant rash, abnormal pigmentation or lesions.  NEURO: Cranial nerves grossly intact.  Mentation and speech appropriate for age.  PSYCH: Mentation appears normal, affect normal/bright, judgement and insight intact, normal speech and appearance  well-groomed.        Video-Visit Details    Type of service:  Video Visit    Video End Time:9:58    Originating Location (pt. Location): Home    Distant Location (provider location):  [facility] [place]     Platform used for Video Visit: [employer]

## 2021-06-07 NOTE — NURSING NOTE
Chief Complaint   Patient presents with     Pre-Op Exam     Pt is having a pre-op.      Hortensia Quinonez LPN at 8:58 AM on 6/7/2021.

## 2021-06-07 NOTE — TELEPHONE ENCOUNTER
BMP, CBC were ordered today. We will fax these orders to [facility] at [facility], fax # [phone].    Left a detailed message to [facility].

## 2021-06-08 ENCOUNTER — VIRTUAL VISIT (OUTPATIENT)
Dept: BEHAVIORAL HEALTH | Facility: CLINIC | Age: 31
End: 2021-06-08
Payer: COMMERCIAL

## 2021-06-08 ENCOUNTER — TELEPHONE (OUTPATIENT)
Dept: FAMILY MEDICINE | Facility: CLINIC | Age: 31
End: 2021-06-08

## 2021-06-08 DIAGNOSIS — F33.0 MAJOR DEPRESSIVE DISORDER, RECURRENT EPISODE, MILD WITH ANXIOUS DISTRESS (H): Primary | ICD-10-CM

## 2021-06-08 PROCEDURE — 90837 PSYTX W PT 60 MINUTES: CPT | Mod: GT | Performed by: PSYCHOLOGIST

## 2021-06-08 NOTE — PROGRESS NOTES
.[facility]  [facility] Integrated [facility]al Health  June 8, 2021        Behavioral Health Clinician Progress Note    Patient Name: Lanie Neil           Service Type: Video appointment      Service Location:  Video appointment      Session Start Time: 11:04  Session End Time: 12:01       Session Length: 53 - 60      Attendees: Client    Visit Activities (Refresh list every visit): [facility] Only    Telemedicine Visit: The patient's condition can be safely assessed and treated via synchronous audio and visual telemedicine encounter.      Reason for Telemedicine Visit: Patient has requested telehealth visit and Services only offered telehealth    Originating Site (Patient Location): Patient's home    Distant Site (Provider Location): Provider Remote Setting    Consent:  The patient/guardian has verbally consented to: the potential risks and benefits of telemedicine (video visit) versus in person care; bill my insurance or make self-payment for services provided; and responsibility for payment of non-covered services.     Mode of Communication:  Video Conference via [employer]    As the provider I attest to compliance with applicable laws and regulations related to telemedicine.      Diagnostic Assessment Date: 1/19/2021  Treatment Plan Review Date:  9/8/2021  See Flowsheets for today's PHQ-9 and KATHARINE-7 results  Previous PHQ-9:   PHQ-9 SCORE 2/12/2020 12/15/2020 3/16/2021   PHQ-9 Total Score - - -   PHQ-9 Total Score MyChart - 2 (Minimal depression) 2 (Minimal depression)   PHQ-9 Total Score 16 2 2     Previous KATHARINE-7:   KATHARINE-7 SCORE 2/12/2020 12/15/2020 3/16/2021   Total Score - 1 (minimal anxiety) 1 (minimal anxiety)   Total Score 19 1 1       BRITTANY LEVEL:  No flowsheet data found.    DATA  Extended Session (60+ minutes): No  Interactive Complexity: No  Crisis: No    Treatment Objective(s) Addressed in This Session:  Target Behavior(s): disease management/lifestyle changes related to pain  medication use    Depressed Mood: Increase interest, engagement, and pleasure in doing things  Decrease frequency and intensity of feeling down, depressed, hopeless  Relationship Problems: will address relationship difficulties in a more adaptive manner  Psychological distress related to Pain  Psychological distress related to Chronic Disease Management    Current Stressors / Issues:  Lanie reports working with new technologies to improve her sense of autonomy at home -  She got a smart phone and some apps to help control her fan and other things to help her feel more comfortable. She provided a few updates about family events and relationships over the last few weeks. In particular, Lanie presented today with relationship concerns about her brother moving to [place]. She indicated that it feels as though her brother is abandoning her and her mother, which she realizes is not completely fair to her brother, but she still struggles with these feelings. We talked about how some difficulties she has with abandonment might be affecting her perception of her brother moving. Explored under an interpersonal therapy framework her relationship with her biological father, how he left her and her family at an early age. We discussed how this manifests in a few of Lanie's other relationships, large and small. For instance, we talked about the abandonment she feels when a care team member transfers her care or no longer works with her, like with home nursing aids. We began working toward processing these fears of abandonment, looking at new ways of viewing situations like her brother moving away.      Answers for HPI/ROS submitted by the patient on 3/16/2021   If you checked off any problems, how difficult have these problems made it for you to do your work, take care of things at home, or get along with other people?: Not difficult at all  PHQ9 TOTAL SCORE: 2  KATHARINE 7 TOTAL SCORE: 1      Progress on Treatment Objective(s) /  Homework:  Satisfactory progress - CONTEMPLATION (Considering change and yet undecided); Intervened by assessing the negative and positive thinking (ambivalence) about behavior change    Motivational Interviewing  Target Behavior: disease management/lifestyle changes pain med use    Stage of Change: CONTEMPLATION (Considering change and yet undecided)    MI Intervention: Expressed Empathy/Understanding, Supported Autonomy, Collaboration, Evocation, Permission to raise concern or advise, Open-ended questions, Reflections: simple and complex, Rolled with resistance: Emphasized patient autonomy, Simple reflection, Complex reflection, Amplified reflection (weaker or stronger meaning) and Reframed sustain talk in the direction of change, Change talk (evoked) and Reframe     Change Talk Expressed by the Patient: Reasons to change    Provider Response to Change Talk: E - Evoked more info from patient about behavior change, A - Affirmed patient's thoughts, decisions, or attempts at behavior change, R - Reflected patient's change talk and S - Summarized patient's change talk statements       Interventions drawn from:  Interpersonal Psychotherapy    Explore patterns in relationships that are effective or ineffective at helping patient reach their goals, find satisfying experience.    Discuss new patterns or behaviors to engage in for improved social functioning.      Care Plan review completed: Yes; updated    Medication Review:  No changes to current psychiatric medication(s)    Medication Compliance:  NA    Changes in Health Issues:   None reported    Chemical Use Review:   Substance Use: Chemical use reviewed, no active concerns identified      Tobacco Use: No current tobacco use.      Assessment: Current Emotional / Mental Status (status of significant symptoms):  Risk status (Self / Other harm or suicidal ideation)  Patient has had a history of suicidal ideation: passive thoughts only; easily controlled - denied any  recently    Patient denies current fears or concerns for personal safety.  Patient denies current or recent suicidal ideation or behaviors.  Patient denies current or recent homicidal ideation or behaviors.  Patient denies current or recent self injurious behavior or ideation.  Patient denies other safety concerns.     A safety and risk management plan has not been developed at this time, however patient was encouraged to call [place] [phone] should there be a change in any of these risk factors.      [place] Suicide Severity Rating Scale (Short Version)  [place] Suicide Severity Rating (Short Version) 2/18/2021   Over the past 2 weeks have you felt down, depressed, or hopeless? yes   Over the past 2 weeks have you had thoughts of killing yourself? no   Have you ever attempted to kill yourself? no       Appearance:   Appropriate   Eye Contact:   Good   Psychomotor Behavior: Normal   Attitude:   Cooperative   Orientation:   All  Speech   Rate / Production: Normal    Volume:  Normal   Mood:    Depressed    Affect:    Appropriate   Thought Content:  Clear   Thought Form:  Coherent  Logical   Insight:    Good     Diagnoses:  1. Major depressive disorder, recurrent episode, mild with anxious distress (H)        Collateral Reports Completed:  [facility] will coordinate with care team as needed    Plan: (Homework, other):  Lanie was scheduled to RTC in two weeks for help addressing depressive symptoms. She was also given information about mental health symptoms and treatment options .  CD Recommendations: No indications of CD issues.     Héctor Higuera Psy.D, LP 6/8/2021    Answers for HPI/ROS submitted by the patient on 12/15/2020   If you checked off any problems, how difficult have these problems made it for you to do your work, take care of things at home, or get along with other people?: Not difficult at all  PHQ9 TOTAL SCORE: 2  KATHARINE 7 TOTAL SCORE:  1    _________________________________________________________________________  [facility] Integrated [facility]al Health Treatment Plan    Client's Name: Lanie Neil  YOB: 1990    Date: 6/8/2021    DSM-V Diagnoses: 296.31 (F33.0) Major Depressive Disorder, Recurrent Episode, Mild With anxious distress;   Psychosocial / Contextual Factors: SMA     Clinical Global Impressions  First:  Considering your total clinical experience with this particular patient population, how severe are the patient's symptoms at this time?: 4 (6/22/2021  3:13 PM)      Most recent:  Compared to the patient's condition at the START of treatment, this patient's condition is: 3 (6/22/2021  3:13 PM)      Referral / Collaboration:  Will collaborate with care team as indicated during treatment.    Anticipated number of session or this episode of care: 10+      MeasurableTreatment Goal(s) related to diagnosis / functional impairment(s)  Goal 1:  Patient will experience a reduction in depressive and anxious symptoms, along with a corresponding increase in positive emotion and life satisfaction.    Objective #A: Patient will experience a reduction in depressed mood, will develop more effective coping skills to manage depressive symptoms, will develop healthy cognitive patterns and beliefs, will increase ability to function adaptively and will continue to take medications as prescribed / participate in supportive activities and services    Status: Continued - Date(s): 6/8/2021    Objective #B: Patient will experience a reduction in anxiety, will develop more effective coping skills to manage anxiety symptoms, will develop healthy cognitive patterns and beliefs and will increase ability to function adaptively  Status: Continued - Date(s): 6/8/2021    Objective #C: Patient will develop better understanding of triggers and coping strategies to stabilize mood  Status: Continued - Date(s): 6/8/2021    Goal 2:  Patient will identify and  increase engagement in valued activity, i.e. improving social connections/relationships, pursuing occupational goals or personally meaningful pursuits, exploration of meaning in life.     Objective #A: Patient will identify meaningful activity in social, occupational and  personal goals, and increase behavioral activation around these goals   Status: Continued - Date(s):6/8/2021    Objective #B: Patient will address relationship difficulties in a more adaptive manner  Status: Continued - Date(s): 6/8/2021    Objective #C: Patient will develop coping/problem-solving skills to facilitate more adaptive adjustment and will effectively address problems that interfere with adaptive functioning  Status: Continued - Date(s): 6/8/2021    Goal 3:  Patient will increase understanding of pain medications and develop more adaptive responses to chronic pain.    Objective #A: Patient will increase understanding of the relationship between pain and stress/mood   Status: Continued - Date(s): 6/8/2021    Objective #B: Patient will develop 2-3 additional chronic pain management strategies  Status: Continued - Date(s): 6/8/2021      Possible Therapeutic Intervention(s)  Psycho-education regarding mental health diagnoses and treatment options    Skills training    Explore skills useful to client in current situation.    Skills include assertiveness, communication, conflict management, problem-solving, relaxation, etc.    Solution-Focused Therapy    Explore patterns in patient's relationships and discuss options for new behaviors.    Explore patterns in patient's actions and choices and discuss options for new behaviors.    Cognitive-behavioral Therapy    Discuss common cognitive distortions, identify them in patient's life.    Explore ways to challenge, replace, and act against these cognitions.    Acceptance and Commitment Therapy    Explore and identify important values in patient's life.    Discuss ways to commit to behavioral  activation around these values.    Psychodynamic psychotherapy    Discuss patient's emotional dynamics and issues and how they impact behaviors.    Explore patient's history of relationships and how they impact present behaviors.    Explore how to work with and make changes in these schemas and patterns.    Narrative Therapy    Explore the patient's story of his/her life from his/her perspective.    Explore alternate ways of understanding their experience, identifying exceptions, developing new themes.    Interpersonal Psychotherapy    Explore patterns in relationships that are effective or ineffective at helping patient reach their goals, find satisfying experience.    Discuss new patterns or behaviors to engage in for improved social functioning.    Behavioral Activation    Discuss steps patient can take to become more involved in meaningful activity.    Identify barriers to these activities and explore possible solutions.    Mindfulness-Based Strategies    Discuss skills based on development and application of mindfulness.    Skills drawn from compassion-focused therapy, dialectical behavior therapy, mindfulness-based stress reduction, mindfulness-based cognitive therapy, etc.      We have developed these goals together during our work to this point. Patient has assisted in the development of these goals and has agreed to this treatment plan.       Héctor Higuera LP  6/8/2021

## 2021-06-08 NOTE — TELEPHONE ENCOUNTER
Health Call Center    Phone Message    May a detailed message be left on voicemail: yes     Reason for Call: Other: Madai from [facility] called to f/u on orders that were faxed to the clinic on 6/3 and 6/7, she stated they are needing to be signed and faxed back to them. Madai stated the pt's mother is wanting the pt to start the homecare asap     Action Taken: Message routed to:  [facility]

## 2021-06-09 ENCOUNTER — TRANSFERRED RECORDS (OUTPATIENT)
Dept: HEALTH INFORMATION MANAGEMENT | Facility: CLINIC | Age: 31
End: 2021-06-09

## 2021-06-09 RX ORDER — SERTRALINE HYDROCHLORIDE 20 MG/ML
50 SOLUTION ORAL DAILY
Qty: 75 ML | Refills: 5 | Status: SHIPPED | OUTPATIENT
Start: 2021-06-09 | End: 2021-10-11

## 2021-06-09 NOTE — CONFIDENTIAL NOTE
Faxed office notes , Problem list and medication list to [facility] at [phone].    Sybil Devine on 6/9/2021 at 11:53 AM

## 2021-06-17 ENCOUNTER — DOCUMENTATION ONLY (OUTPATIENT)
Dept: HEMATOLOGY | Facility: CLINIC | Age: 31
End: 2021-06-17

## 2021-06-17 NOTE — PROGRESS NOTES
[facility]  [address] [place] [address] [address], [place] [address]  Main: [phone], Fax: [phone]     2021  Recommendations for procedure 2021   To:       [facility]              Fax: [phone]     From:  Jt Mir PA-C, MPAS - Physician Assistant - [facility].      Re:        Lanie Neil               : 1990     Lanie Neil  has a history of spinal muscular atrophy and is chronically wheelchair bound. 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  She is having injection of medication into a reservoir along the spine on 2021     For this procedure, the following is recommended:     1.  Hold Xarelto day before and day of  2.  Resume Xarelto 10 mg daily on POD 1     Please feel free to call our center with any questions at [phone].

## 2021-06-22 ENCOUNTER — TRANSFERRED RECORDS (OUTPATIENT)
Dept: HEALTH INFORMATION MANAGEMENT | Facility: CLINIC | Age: 31
End: 2021-06-22

## 2021-06-22 ENCOUNTER — VIRTUAL VISIT (OUTPATIENT)
Dept: BEHAVIORAL HEALTH | Facility: CLINIC | Age: 31
End: 2021-06-22
Payer: COMMERCIAL

## 2021-06-22 ENCOUNTER — VIRTUAL VISIT (OUTPATIENT)
Dept: DERMATOLOGY | Facility: CLINIC | Age: 31
End: 2021-06-22
Payer: COMMERCIAL

## 2021-06-22 DIAGNOSIS — F33.0 MAJOR DEPRESSIVE DISORDER, RECURRENT EPISODE, MILD WITH ANXIOUS DISTRESS (H): Primary | ICD-10-CM

## 2021-06-22 DIAGNOSIS — L71.9 ACNE ROSACEA: Primary | ICD-10-CM

## 2021-06-22 PROCEDURE — 90837 PSYTX W PT 60 MINUTES: CPT | Mod: GT | Performed by: PSYCHOLOGIST

## 2021-06-22 PROCEDURE — 99213 OFFICE O/P EST LOW 20 MIN: CPT | Mod: TEL | Performed by: DERMATOLOGY

## 2021-06-22 RX ORDER — TRETINOIN 0.25 MG/G
CREAM TOPICAL
Qty: 45 G | Refills: 2 | Status: SHIPPED | OUTPATIENT
Start: 2021-06-22

## 2021-06-22 ASSESSMENT — PAIN SCALES - GENERAL: PAINLEVEL: NO PAIN (0)

## 2021-06-22 NOTE — NURSING NOTE
Dermatology Rooming Note    Lanie Neil's goals for this visit include:   Chief Complaint   Patient presents with     Derm Problem     Lanie would like to follow up on the dermatitis. She states that things seem to be going well. She has noticed less itchiness and general improvments. She is also concerned about a spot on her left inner thigh which is in the picture sent via [address].      Eliseo Arriaga, EMT

## 2021-06-22 NOTE — PROGRESS NOTES
[facility] Dermatology Note  Encounter Date: Jun 22, 2021  Store-and-Forward and Video. Location of teledermatologist: [facility] [place]. Date of images: 06/22/21. Start time: 1:53 PM. End time: 2:05 PM.    Dermatology Problem List:  1. Rosacea / oily skin  - tretinoin 0.025% cream at bedtime started 6/22/21  - prior tx: doxycycline 100 mg PO BID, metrocream BID  2. Seb derm  - keto shampoo; fluocinolone 0.01% oil, amlactin lotion  3. Lesion to monitor: R thigh. Suspect SK. Recommended in-person evaluation.   ____________________________________________    Assessment & Plan:     # Brown papule, R medial thigh. Ddx favor seborrheic keratosis, versus less likely melanocytic lesion. Did recommend non-urgent in-person evaluation for dermatoscopic examination.  - Monitor: Patient to continue monitoring at home and will contact the clinic for any changes.  - Follow-up in-person in 2-3 months for evaluation     # Seborrheic dermatitis. Chronic, stable/improved.  - Continue ketoconazole 2% shampoo at least three times per week  - Can use fluocinolone 0.01% oil at bedtime PRN flares    Procedures Performed:    None    Follow-up: 2 month(s) in-person, or earlier for new or changing lesions    Staff:     Edward Mack MD  Pronouns: he/him/his    Department of Dermatology  [facility]: Phone: [phone], Fax:[phone]  [facility]: Phone: [phone] Fax: [phone]  ____________________________________________    CC: Derm Problem (Lanie would like to follow up on the dermatitis. She states that things seem to be going well. She has noticed less itchiness and general improvments. She is also concerned about a spot on her left inner thigh which is in the picture sent via [facility]. )    HPI:  Ms. Lanie Neil is a(n) 30 year old female who  presents today as a return patient for seborrheic dermatitis / retention hyperkeratosis. Last seen 3/18/21 when started on fluocinolone 0.01% oil, ketoconazole 2% shampoo, and amlactin lotion.     Today, she reports that:    (1) Scalp is improved considerably. Using ketoconazole 2% shampoo and fluocinolone 0.01% oil with improvement.     (2) Dry skin on legs. Using a Jergen's moisturizer.     (3) Rosacea/ oily skin. Gets intermittent acne spots and very oily skin on the nose. Has prior used metrocream and oral doxycycline. Would like to discuss alternatives.     (4) Brown spot on the right. She reports about a 3-4 week history of a brown spot on the right inner thigh. Has never noticed prior. Lesion is asymptomatic, no pain, tenderness or bleeding and stable in size. There is no family history of skin cancer, but many other internal malignancies and she was worried this might be a skin cancer.    Patient is otherwise feeling well, without additional skin concerns.    Labs Reviewed:  N/A    Physical Exam:  Vitals: There were no vitals taken for this visit.  SKIN: Teledermatology photos were reviewed; image quality and interpretability: acceptable. Image date: 6/22/21.  - 8-9 mm brown, well-demarcated, stuck-on appearing papule on the right thigh.   - No other lesions of concern on areas examined.             Medications:  Current Outpatient Medications   Medication     ACETAMINOPHEN PO     albuterol (ALBUTEROL) 108 (90 BASE) MCG/ACT inhaler     albuterol (PROVENTIL) (2.5 MG/3ML) 0.083% neb solution     ALPRAZolam (XANAX) 0.25 MG tablet     aminocaproic acid (AMICAR) 0.25 GM/ML solution     bisacodyl (DULCOLAX) 10 MG suppository     celecoxib (CELEBREX) 200 MG capsule     clindamycin (CLEOCIN T) 1 % lotion     diazepam (VALIUM) 5 MG tablet     doxycycline hyclate (VIBRAMYCIN) 100 MG capsule     ferrous sulfate 220 (44 Fe) MG/5ML ELIX     fluocinolone acetonide (DERMA SMOOTHE/FS BODY) 0.01 % external oil      fluticasone (FLOVENT HFA) 110 MCG/ACT inhaler     gabapentin (NEURONTIN) 250 MG/5ML solution     GENERLAC 10 GM/15ML solution     guaiFENesin (ORGANIDIN) 200 MG TABS tablet     HYDROmorphone (DILAUDID) 4 MG tablet     hydrOXYzine (ATARAX) 25 MG tablet     ketoconazole (NIZORAL) 2 % external shampoo     LANsoprazole (PREVACID) 30 MG DR capsule     Magnesium Hydroxide (MILK OF MAGNESIA PO)     metroNIDAZOLE (METROCREAM) 0.75 % external cream     mometasone (NASONEX) 50 MCG/ACT nasal spray     morphine (SABINE) 30 MG 24 hr capsule     mupirocin (BACTROBAN) 2 % external cream     naloxone (NARCAN) 4 MG/0.1ML nasal spray     Nutritional Supplements (REPLETE FIBER) LIQD     omeprazole (PRILOSEC) 2 mg/mL suspension     ondansetron (ZOFRAN-ODT) 4 MG ODT tab     order for DME     order for DME     polyethylene glycol (MIRALAX) 17 GM/Dose powder     potassium chloride (KAYCIEL) 20 MEQ/15ML (10%) solution     prochlorperazine (COMPAZINE) 5 MG tablet     rivaroxaban ANTICOAGULANT (XARELTO ANTICOAGULANT) 10 MG TABS tablet     Salicylic Acid (OXY BALANCE FACIAL CLEAN WASH EX)     Sennosides (SENNA) 8.8 MG/5ML LIQD     sertraline (ZOLOFT) 20 MG/ML (HIGH CONC) solution     silver sulfADIAZINE (SILVADENE) 1 % external cream     simethicone (MYLICON) 66.7 mg/ml     simethicone 40 MG/0.6ML LIQD     sodium chloride (OCEAN) 0.65 % nasal spray     No current facility-administered medications for this visit.       Past Medical/Surgical History:   Patient Active Problem List   Diagnosis     Constipation     Asthma     Spinal muscle atrophy (H)     Dyspepsia and other specified disorders of function of stomach     Sinusitis, chronic     DVT (deep venous thrombosis) (H)     Chronic respiratory failure (H)     Chronic pain syndrome     Anemia due to blood loss, acute     Iron deficiency anemia due to chronic blood loss     Chronic anticoagulation     Hypokalemia     Past Medical History:   Diagnosis Date     Anxiety      Snoring      Spinal  muscle atrophy (H)        CC Jimmy Moseley MD  [address] [place] [address] on close of this encounter.

## 2021-06-22 NOTE — LETTER
6/22/2021       RE: Lanie Neil  [address]  [place] [address]     Dear Colleague,    Thank you for referring your patient, Lanie Neil, to the [facility] [place] at [facility]. Please see a copy of my visit note below.    [facility] Dermatology Note  Encounter Date: Jun 22, 2021  Store-and-Forward and Video. Location of teledermatologist: [facility] [place]. Date of images: 06/22/21. Start time: 1:53 PM. End time: 2:05 PM.    Dermatology Problem List:  1. Rosacea / oily skin  - tretinoin 0.025% cream at bedtime started 6/22/21  - prior tx: doxycycline 100 mg PO BID, metrocream BID  2. Seb derm  - keto shampoo; fluocinolone 0.01% oil, amlactin lotion  3. Lesion to monitor: R thigh. Suspect SK. Recommended in-person evaluation.   ____________________________________________    Assessment & Plan:     # Brown papule, R medial thigh. Ddx favor seborrheic keratosis, versus less likely melanocytic lesion. Did recommend non-urgent in-person evaluation for dermatoscopic examination.  - Monitor: Patient to continue monitoring at home and will contact the clinic for any changes.  - Follow-up in-person in 2-3 months for evaluation     # Seborrheic dermatitis. Chronic, stable/improved.  - Continue ketoconazole 2% shampoo at least three times per week  - Can use fluocinolone 0.01% oil at bedtime PRN flares    Procedures Performed:    None    Follow-up: 2 month(s) in-person, or earlier for new or changing lesions    Staff:     Edward Mack MD  Pronouns: he/him/his    Department of Dermatology  [facility]: Phone: [phone], Fax:[phone]  [facility]: Phone: [phone] Fax: [phone]  ____________________________________________    CC: Derm Problem  (Lanie would like to follow up on the dermatitis. She states that things seem to be going well. She has noticed less itchiness and general improvments. She is also concerned about a spot on her left inner thigh which is in the picture sent via [facility]. )    HPI:  Ms. Lanie Neil is a(n) 30 year old female who presents today as a return patient for seborrheic dermatitis / retention hyperkeratosis. Last seen 3/18/21 when started on fluocinolone 0.01% oil, ketoconazole 2% shampoo, and amlactin lotion.     Today, she reports that:    (1) Scalp is improved considerably. Using ketoconazole 2% shampoo and fluocinolone 0.01% oil with improvement.     (2) Dry skin on legs. Using a Jergen's moisturizer.     (3) Rosacea/ oily skin. Gets intermittent acne spots and very oily skin on the nose. Has prior used metrocream and oral doxycycline. Would like to discuss alternatives.     (4) Brown spot on the right. She reports about a 3-4 week history of a brown spot on the right inner thigh. Has never noticed prior. Lesion is asymptomatic, no pain, tenderness or bleeding and stable in size. There is no family history of skin cancer, but many other internal malignancies and she was worried this might be a skin cancer.    Patient is otherwise feeling well, without additional skin concerns.    Labs Reviewed:  N/A    Physical Exam:  Vitals: There were no vitals taken for this visit.  SKIN: Teledermatology photos were reviewed; image quality and interpretability: acceptable. Image date: 6/22/21.  - 8-9 mm brown, well-demarcated, stuck-on appearing papule on the right thigh.   - No other lesions of concern on areas examined.             Medications:  Current Outpatient Medications   Medication     ACETAMINOPHEN PO     albuterol (ALBUTEROL) 108 (90 BASE) MCG/ACT inhaler     albuterol (PROVENTIL) (2.5 MG/3ML) 0.083% neb solution     ALPRAZolam (XANAX) 0.25 MG tablet     aminocaproic acid (AMICAR) 0.25 GM/ML solution     bisacodyl  (DULCOLAX) 10 MG suppository     celecoxib (CELEBREX) 200 MG capsule     clindamycin (CLEOCIN T) 1 % lotion     diazepam (VALIUM) 5 MG tablet     doxycycline hyclate (VIBRAMYCIN) 100 MG capsule     ferrous sulfate 220 (44 Fe) MG/5ML ELIX     fluocinolone acetonide (DERMA SMOOTHE/FS BODY) 0.01 % external oil     fluticasone (FLOVENT HFA) 110 MCG/ACT inhaler     gabapentin (NEURONTIN) 250 MG/5ML solution     GENERLAC 10 GM/15ML solution     guaiFENesin (ORGANIDIN) 200 MG TABS tablet     HYDROmorphone (DILAUDID) 4 MG tablet     hydrOXYzine (ATARAX) 25 MG tablet     ketoconazole (NIZORAL) 2 % external shampoo     LANsoprazole (PREVACID) 30 MG DR capsule     Magnesium Hydroxide (MILK OF MAGNESIA PO)     metroNIDAZOLE (METROCREAM) 0.75 % external cream     mometasone (NASONEX) 50 MCG/ACT nasal spray     morphine (SABINE) 30 MG 24 hr capsule     mupirocin (BACTROBAN) 2 % external cream     naloxone (NARCAN) 4 MG/0.1ML nasal spray     Nutritional Supplements (REPLETE FIBER) LIQD     omeprazole (PRILOSEC) 2 mg/mL suspension     ondansetron (ZOFRAN-ODT) 4 MG ODT tab     order for DME     order for DME     polyethylene glycol (MIRALAX) 17 GM/Dose powder     potassium chloride (KAYCIEL) 20 MEQ/15ML (10%) solution     prochlorperazine (COMPAZINE) 5 MG tablet     rivaroxaban ANTICOAGULANT (XARELTO ANTICOAGULANT) 10 MG TABS tablet     Salicylic Acid (OXY BALANCE FACIAL CLEAN WASH EX)     Sennosides (SENNA) 8.8 MG/5ML LIQD     sertraline (ZOLOFT) 20 MG/ML (HIGH CONC) solution     silver sulfADIAZINE (SILVADENE) 1 % external cream     simethicone (MYLICON) 66.7 mg/ml     simethicone 40 MG/0.6ML LIQD     sodium chloride (OCEAN) 0.65 % nasal spray     No current facility-administered medications for this visit.       Past Medical/Surgical History:   Patient Active Problem List   Diagnosis     Constipation     Asthma     Spinal muscle atrophy (H)     Dyspepsia and other specified disorders of function of stomach     Sinusitis, chronic      DVT (deep venous thrombosis) (H)     Chronic respiratory failure (H)     Chronic pain syndrome     Anemia due to blood loss, acute     Iron deficiency anemia due to chronic blood loss     Chronic anticoagulation     Hypokalemia     Past Medical History:   Diagnosis Date     Anxiety      Snoring      Spinal muscle atrophy (H)        CC Jimmy Moseley MD  [address] [place] [id number] on close of this encounter.

## 2021-06-22 NOTE — PATIENT INSTRUCTIONS
"[facility] Dermatology Visit    Thank you for allowing us to participate in your care. Your findings, instructions and follow-up plan are as follows:     Seborrheic dermatitis (dandruff)  1. Continue the ketoconazole 2% shampoo and fluocinolone 0.01% oil.     Spot on the right thigh. Appears most consistent with a benign seborrheic keratosis (see further details below), but do recommend in-person visit to assess.   1. Someone will call you to arrange an in-person visit.     Dry skin   1. Recommend using a CeraVe moisturizer.       Overview    Seborrheic keratoses are common benign growths of unknown cause seen in adults   due to a thickening of an area of the top skin layer.    Who's At Risk  Although they can occur anytime after puberty, almost everyone over 50 has one or more of these and they increase in number with age. Some families have an inherited tendency to grow multiple lesions. Men and women are equally as likely to develop seborrheic keratoses. Dark-skinned people are less affected than those with light skin; a variant seen in blacks is called dermatosis papulosa nigra.    Signs & Symptoms  One or more spots can occur anywhere on the body, except for palms, soles, and mucous membranes (eg, in the mouth or rectum). They do not go away. They do not turn into cancers, but some cancers resemble seborrheic keratosis.    They start as light brown to skin-colored, flat areas, which are round to oval and of varying size (usually less than a half inch, but sometimes much larger). As they grow thicker and rise above the skin surface, seborrheic keratoses may become dark brown to almost black with a \"stuck on\" appearance. The surface may feel smooth or rough.    Self-Care Guidelines  No treatment is needed unless there is irritation from clothing with itching or bleeding.    There is no way to prevent new spots from forming.    Some lotions with alpha hydroxyl acids may make the areas feel " smoother with regular use but will not eliminate them.    OTC freezing techniques are available but usually not effective.    When to Seek Medical Care  If a spot on the skin is growing, bleeding, painful, or itchy, see your doctor.    Spots can be removed if you don't want them, but removal is considered a cosmetic issue and is usually not covered by insurance.    Treatments Your Physician May Prescribe  Removal can be accomplished with freezing (cryosurgery), scraping (curettage), burning (electrocautery), lasers, or with acids. The doctor might conduct a biopsy if the growth looks unusual.    References:  Jeff Otero, ed. Dermatology, pp.8126-2005. [place]: [place], 2003.    Jeremi Owens, ed. Bairon's Dermatology in General Medicine. 6th ed, pp.767-770. [facility] [place]: Balta, 2003.      When should I call my doctor?    If you are worsening or not improving, please, contact us or seek urgent care as noted below.     Who should I call with questions (adults)?    [facility] (adult and pediatric): [phone]     [facility] (adult): [phone]    For urgent needs outside of business hours call the [facility] at [phone] and ask for the dermatology resident on call    If this is a medical emergency and you are unable to reach an ER, Call [phone]      Who should I call with questions (pediatric)?  [facility]- Pediatric Dermatology  Dr. Leeanna Davalos, Dr. Savana Valdovinos, Dr. Greta Cam, Petty Angelnhalia, MORALES Sparks, Dr. Maude Patel & Dr. Daniel Thornton  Non Urgent  Nurse Triage Line; [phone]- Hailey and Yamel GLOVER Care Coordinators   Jeimy (/Complex ) [phone]    If you need a prescription refill, please contact your pharmacy. Refills are approved or denied by our Physicians during normal business hours, Monday through Fridays  Per office policy,  refills will not be granted if you have not been seen within the past year (or sooner depending on your child's condition)    Scheduling Information:  Pediatric Appointment Scheduling and Call Center [phone]  Radiology Scheduling- [phone]  Sedation Unit Scheduling- [phone]  [place] Scheduling- General [phone]; Pediatric Dermatology [phone]  Main  Services: [phone]  [place]: [phone]  [place] Ze[place]ander: [phone]  Hmong/Fijian/Gomez: [phone]  Preadmission Nursing Department Fax Number: [phone] (Fax all pre-operative paperwork to this number)    For urgent matters arising during evenings, weekends, or holidays that cannot wait for normal business hours please call [phone] and ask for the Dermatology Resident On-Call to be paged.

## 2021-06-22 NOTE — PROGRESS NOTES
.[facility] [place]  [facility]: Integrated Behavioral Health  June 22, 2021        Behavioral Health Clinician Progress Note    Patient Name: Lanie Neil           Service Type: Video appointment      Service Location:  Video appointment      Session Start Time: 11:03  Session End Time: 12:01       Session Length: 53 - 60      Attendees: Client    Visit Activities (Refresh list every visit): [facility] Only    Telemedicine Visit: The patient's condition can be safely assessed and treated via synchronous audio and visual telemedicine encounter.      Reason for Telemedicine Visit: Patient has requested telehealth visit and Services only offered telehealth    Originating Site (Patient Location): Patient's home    Distant Site (Provider Location): Provider Remote Setting- Home Office    Consent:  The patient/guardian has verbally consented to: the potential risks and benefits of telemedicine (video visit) versus in person care; bill my insurance or make self-payment for services provided; and responsibility for payment of non-covered services.     Mode of Communication:  Video Conference via [employer]    As the provider I attest to compliance with applicable laws and regulations related to telemedicine.      Diagnostic Assessment Date: 1/19/2021  Treatment Plan Review Date: 9/8/2021  See Flowsheets for today's PHQ-9 and KATHARINE-7 results  Previous PHQ-9:   PHQ-9 SCORE 2/12/2020 12/15/2020 3/16/2021   PHQ-9 Total Score - - -   PHQ-9 Total Score MyChart - 2 (Minimal depression) 2 (Minimal depression)   PHQ-9 Total Score 16 2 2     Previous KATHARINE-7:   KATHARINE-7 SCORE 2/12/2020 12/15/2020 3/16/2021   Total Score - 1 (minimal anxiety) 1 (minimal anxiety)   Total Score 19 1 1       BRITTANY LEVEL:  No flowsheet data found.    DATA  Extended Session (60+ minutes): No  Interactive Complexity: No  Crisis: No    Treatment Objective(s) Addressed in This Session:  Target Behavior(s): disease management/lifestyle changes  related to pain medication use    Depressed Mood: Increase interest, engagement, and pleasure in doing things  Decrease frequency and intensity of feeling down, depressed, hopeless  Relationship Problems: will address relationship difficulties in a more adaptive manner  Psychological distress related to Pain  Psychological distress related to Chronic Disease Management    Current Stressors / Issues:  Lanie presents today for a therapy follow-up. She reports keeping busy with several appointments over the last few weeks prior to a procedure tomorrow. She described some stress with this, as there was some mix-ups with her lab work being sent over. She reports feeling positive about the procedure overall. She has this procedure about every four months, so she is familiar with what to expect. She did express concerned about the new pain medication laws and how this will affect her ability to receive pain relief. We talked about how she feels angry about her sense of being treated unfairly because of other's difficulties with addiction.      We talked about her various attitudes and beliefs about addiction and those who experience it. We talked about underlying assumptions she might be making about herself, in a negative lens perhaps, about struggling with pain medication use, which we discussed her dependence with. Lanie states she dislikes the experience of being treated like an addict during appointments. We talked about alternative explanations for why her care team expresses concern about her opioid medication use. Lanie is aware, she says, of the risks associated with long-term opioid dependence, though does not see other options to adequately manage her pain. Motivational interviewing and CBT elements were emphasized today.     We ended with talking about her sense of having a fantasy life, how this helps cope with pain. Discussed ways to normalize her inner dialogues/worlds, not to pathologize this. This  writer did make comments to Lanie about some of the content of her fantasy worlds, which did contain some concerning elements regarding suicide (not for herself, but for people she imagines). We agreed to talk more about this during our next session.     Answers for HPI/ROS submitted by the patient on 3/16/2021   If you checked off any problems, how difficult have these problems made it for you to do your work, take care of things at home, or get along with other people?: Not difficult at all  PHQ9 TOTAL SCORE: 2  KATHARINE 7 TOTAL SCORE: 1      Progress on Treatment Objective(s) / Homework:  Satisfactory progress - CONTEMPLATION (Considering change and yet undecided); Intervened by assessing the negative and positive thinking (ambivalence) about behavior change    Motivational Interviewing  Target Behavior: disease management/lifestyle changes pain med use    Stage of Change: CONTEMPLATION (Considering change and yet undecided)    MI Intervention: Expressed Empathy/Understanding, Supported Autonomy, Collaboration, Evocation, Permission to raise concern or advise, Open-ended questions, Reflections: simple and complex, Rolled with resistance: Emphasized patient autonomy, Simple reflection, Complex reflection, Amplified reflection (weaker or stronger meaning) and Reframed sustain talk in the direction of change, Change talk (evoked) and Reframe     Change Talk Expressed by the Patient: Reasons to change    Provider Response to Change Talk: E - Evoked more info from patient about behavior change, A - Affirmed patient's thoughts, decisions, or attempts at behavior change, R - Reflected patient's change talk and S - Summarized patient's change talk statements     Narrative Therapy    Explore the patient's story of his/her life from his/her perspective.    Explore alternate ways of understanding their experience, identifying exceptions, developing new themes.    Interpersonal Psychotherapy    Explore patterns in relationships  that are effective or ineffective at helping patient reach their goals, find satisfying experience.    Discuss new patterns or behaviors to engage in for improved social functioning.    Care Plan review completed: No    Medication Review:  No changes to current psychiatric medication(s)    Medication Compliance:  NA    Changes in Health Issues:   None reported    Chemical Use Review:   Substance Use: Chemical use reviewed, no active concerns identified      Tobacco Use: No current tobacco use.      Assessment: Current Emotional / Mental Status (status of significant symptoms):  Risk status (Self / Other harm or suicidal ideation)  Patient has had a history of suicidal ideation: passive thoughts only; easily controlled - denied any recently    Patient denies current fears or concerns for personal safety.  Patient denies current or recent suicidal ideation or behaviors.  Patient denies current or recent homicidal ideation or behaviors.  Patient denies current or recent self injurious behavior or ideation.  Patient denies other safety concerns.     A safety and risk management plan has not been developed at this time, however patient was encouraged to call [place] [phone] should there be a change in any of these risk factors.      [place] Suicide Severity Rating Scale (Short Version)  [place] Suicide Severity Rating (Short Version) 2/18/2021   Over the past 2 weeks have you felt down, depressed, or hopeless? yes   Over the past 2 weeks have you had thoughts of killing yourself? no   Have you ever attempted to kill yourself? no       Appearance:   Appropriate   Eye Contact:   Good   Psychomotor Behavior: Normal   Attitude:   Cooperative   Orientation:   All  Speech   Rate / Production: Normal    Volume:  Normal   Mood:    Depressed    Affect:    Appropriate   Thought Content:  Clear   Thought Form:  Coherent  Logical   Insight:    Good     Diagnoses:  1. Major depressive disorder, recurrent episode, mild with  anxious distress (H)        Collateral Reports Completed:  [facility] will coordinate with care team as needed    Plan: (Homework, other):  Lanie was scheduled to RTC in two weeks for help addressing depressive symptoms. She was also given information about mental health symptoms and treatment options .  CD Recommendations: No indications of CD issues.     Héctor Higuera Psy.D, LP June 22, 2021      Answers for HPI/ROS submitted by the patient on 12/15/2020   If you checked off any problems, how difficult have these problems made it for you to do your work, take care of things at home, or get along with other people?: Not difficult at all  PHQ9 TOTAL SCORE: 2  KATHARINE 7 TOTAL SCORE: 1    _________________________________________________________________________  [facility] Integrated [facility]al Health Treatment Plan    Client's Name: Lanie Neil  YOB: 1990    Date: 6/8/2021    DSM-V Diagnoses: 296.31 (F33.0) Major Depressive Disorder, Recurrent Episode, Mild With anxious distress;   Psychosocial / Contextual Factors: SMA     Clinical Global Impressions  First:  Considering your total clinical experience with this particular patient population, how severe are the patient's symptoms at this time?: 4 (6/22/2021  3:13 PM)      Most recent:  Compared to the patient's condition at the START of treatment, this patient's condition is: 3 (6/22/2021  3:13 PM)      Referral / Collaboration:  Will collaborate with care team as indicated during treatment.    Anticipated number of session or this episode of care: 10+      MeasurableTreatment Goal(s) related to diagnosis / functional impairment(s)  Goal 1:  Patient will experience a reduction in depressive and anxious symptoms, along with a corresponding increase in positive emotion and life satisfaction.    Objective #A: Patient will experience a reduction in depressed mood, will develop more effective coping skills to manage depressive symptoms, will develop healthy  cognitive patterns and beliefs, will increase ability to function adaptively and will continue to take medications as prescribed / participate in supportive activities and services    Status: Continued - Date(s):6/8/2021    Objective #B: Patient will experience a reduction in anxiety, will develop more effective coping skills to manage anxiety symptoms, will develop healthy cognitive patterns and beliefs and will increase ability to function adaptively  Status: Continued - Date(s): 6/8/2021    Objective #C: Patient will develop better understanding of triggers and coping strategies to stabilize mood  Status: Continued - Date(s): 6/8/2021    Goal 2:  Patient will identify and increase engagement in valued activity, i.e. improving social connections/relationships, pursuing occupational goals or personally meaningful pursuits, exploration of meaning in life.     Objective #A: Patient will identify meaningful activity in social, occupational and  personal goals, and increase behavioral activation around these goals   Status: Continued - Date(s): 6/8/2021    Objective #B: Patient will address relationship difficulties in a more adaptive manner  Status: Continued - Date(s): 6/8/2021    Objective #C: Patient will develop coping/problem-solving skills to facilitate more adaptive adjustment and will effectively address problems that interfere with adaptive functioning  Status: Continued - Date(s): 6/8/2021    Goal 3:  Patient will increase understanding of pain medications and develop more adaptive responses to chronic pain.    Objective #A: Patient will increase understanding of the relationship between pain and stress/mood   Status: Continued - Date(s): 6/8/2021    Objective #B: Patient will develop 2-3 additional chronic pain management strategies  Status: Continued - Date(s): 6/8/2021      Possible Therapeutic Intervention(s)  Psycho-education regarding mental health diagnoses and treatment options    Skills  training    Explore skills useful to client in current situation.    Skills include assertiveness, communication, conflict management, problem-solving, relaxation, etc.    Solution-Focused Therapy    Explore patterns in patient's relationships and discuss options for new behaviors.    Explore patterns in patient's actions and choices and discuss options for new behaviors.    Cognitive-behavioral Therapy    Discuss common cognitive distortions, identify them in patient's life.    Explore ways to challenge, replace, and act against these cognitions.    Acceptance and Commitment Therapy    Explore and identify important values in patient's life.    Discuss ways to commit to behavioral activation around these values.    Psychodynamic psychotherapy    Discuss patient's emotional dynamics and issues and how they impact behaviors.    Explore patient's history of relationships and how they impact present behaviors.    Explore how to work with and make changes in these schemas and patterns.    Narrative Therapy    Explore the patient's story of his/her life from his/her perspective.    Explore alternate ways of understanding their experience, identifying exceptions, developing new themes.    Interpersonal Psychotherapy    Explore patterns in relationships that are effective or ineffective at helping patient reach their goals, find satisfying experience.    Discuss new patterns or behaviors to engage in for improved social functioning.    Behavioral Activation    Discuss steps patient can take to become more involved in meaningful activity.    Identify barriers to these activities and explore possible solutions.    Mindfulness-Based Strategies    Discuss skills based on development and application of mindfulness.    Skills drawn from compassion-focused therapy, dialectical behavior therapy, mindfulness-based stress reduction, mindfulness-based cognitive therapy, etc.      We have developed these goals together during our work  to this point. Patient has assisted in the development of these goals and has agreed to this treatment plan.       Héctor Higuera LP  February 09, 2021

## 2021-06-23 ENCOUNTER — TRANSFERRED RECORDS (OUTPATIENT)
Dept: HEALTH INFORMATION MANAGEMENT | Facility: CLINIC | Age: 31
End: 2021-06-23

## 2021-06-25 ENCOUNTER — MYC MEDICAL ADVICE (OUTPATIENT)
Dept: INTERNAL MEDICINE | Facility: CLINIC | Age: 31
End: 2021-06-25

## 2021-06-25 DIAGNOSIS — D64.9 ANEMIA: ICD-10-CM

## 2021-06-25 DIAGNOSIS — E87.6 HYPOKALEMIA: Primary | ICD-10-CM

## 2021-06-25 NOTE — TELEPHONE ENCOUNTER
----- Message from Jimmy Moseley MD sent at 6/25/2021  8:47 AM CDT -----  She just did labs outside  K slightly low at 3.4    Can you ask her if on K? I think we've given her some.     Also see if on diuretics; I don't think so but could be, some doctors are elsewhere    Also slight anemic    We will redo labs at some point, see if she needs to do for any other MD in near future, can combine    Next labs will be  BMP re: low K  CBC/diff re: anemia  Iron, tibc, ferritin, b12, folate

## 2021-06-30 ENCOUNTER — VIRTUAL VISIT (OUTPATIENT)
Dept: BEHAVIORAL HEALTH | Facility: CLINIC | Age: 31
End: 2021-06-30
Payer: COMMERCIAL

## 2021-06-30 DIAGNOSIS — F33.0 MAJOR DEPRESSIVE DISORDER, RECURRENT EPISODE, MILD WITH ANXIOUS DISTRESS (H): Primary | ICD-10-CM

## 2021-06-30 PROCEDURE — 90837 PSYTX W PT 60 MINUTES: CPT | Mod: GT | Performed by: PSYCHOLOGIST

## 2021-06-30 ASSESSMENT — ANXIETY QUESTIONNAIRES
4. TROUBLE RELAXING: NOT AT ALL
GAD7 TOTAL SCORE: 1
GAD7 TOTAL SCORE: 1
1. FEELING NERVOUS, ANXIOUS, OR ON EDGE: NOT AT ALL
7. FEELING AFRAID AS IF SOMETHING AWFUL MIGHT HAPPEN: NOT AT ALL
5. BEING SO RESTLESS THAT IT IS HARD TO SIT STILL: NOT AT ALL
3. WORRYING TOO MUCH ABOUT DIFFERENT THINGS: NOT AT ALL
6. BECOMING EASILY ANNOYED OR IRRITABLE: NOT AT ALL
2. NOT BEING ABLE TO STOP OR CONTROL WORRYING: SEVERAL DAYS
GAD7 TOTAL SCORE: 1
7. FEELING AFRAID AS IF SOMETHING AWFUL MIGHT HAPPEN: NOT AT ALL

## 2021-06-30 NOTE — PROGRESS NOTES
[facility]: Integrated Behavioral Health  June 30, 2021        Behavioral Health Clinician Progress Note    Patient Name: Lanie Neil           Service Type: Video appointment      Service Location:  Video appointment      Session Start Time: 10:07  Session End Time: 11:00      Session Length: 53 - 60      Attendees: Client    Visit Activities (Refresh list every visit): [facility] Only    Telemedicine Visit: The patient's condition can be safely assessed and treated via synchronous audio and visual telemedicine encounter.      Reason for Telemedicine Visit: Patient has requested telehealth visit and Services only offered telehealth    Originating Site (Patient Location): Patient's home    Distant Site (Provider Location): Provider Remote Setting- Home Office    Consent:  The patient/guardian has verbally consented to: the potential risks and benefits of telemedicine (video visit) versus in person care; bill my insurance or make self-payment for services provided; and responsibility for payment of non-covered services.     Mode of Communication:  Video Conference via [employer]    As the provider I attest to compliance with applicable laws and regulations related to telemedicine.      Diagnostic Assessment Date: 1/19/2021  Treatment Plan Review Date: 9/8/2021  See Flowsheets for today's PHQ-9 and KATHARINE-7 results  Previous PHQ-9:   PHQ-9 SCORE 12/15/2020 3/16/2021 6/30/2021   PHQ-9 Total Score - - -   PHQ-9 Total Score MyChart 2 (Minimal depression) 2 (Minimal depression) 2 (Minimal depression)   PHQ-9 Total Score 2 2 2     Previous KATHARINE-7:   KATHARINE-7 SCORE 12/15/2020 3/16/2021 6/30/2021   Total Score 1 (minimal anxiety) 1 (minimal anxiety) 1 (minimal anxiety)   Total Score 1 1 1       BRITTANY LEVEL:  No flowsheet data found.    DATA  Extended Session (60+ minutes): No  Interactive Complexity: No  Crisis: No    Treatment Objective(s) Addressed in This Session:  Target Behavior(s): disease  management/lifestyle changes related to pain medication use    Depressed Mood: Increase interest, engagement, and pleasure in doing things  Decrease frequency and intensity of feeling down, depressed, hopeless  Anxiety: will experience a reduction in anxiety, will develop more effective coping skills to manage anxiety symptoms and will develop healthy cognitive patterns and beliefs  Relationship Problems: will address relationship difficulties in a more adaptive manner    Current Stressors / Issues:  Lanie presented today with concerns about relationship conflict that occurred during her birthday party on 6/26/2021. She described how there were a few mix-ups with communication about the party that resulted in arguments with her brother. Lanie described how upset these conflicts made her feel during her birthday party. She described a sense of feeling particularly upset for apologizing when she felt she did not need to do this. We explored how this is a pattern for her in relationships, apologizing despite feeling hurt and unsettled by relationship conflicts she does not start. Discussed how her efforts to apologize and mitigate conflicts stems from a sense of fearing rejection/abandonment from others, as we have discussed in previous sessions. She indicates that her fear of being abandoned by her family is so strong, that she would prefer to apologize to maintain relationships. We explored how advocacy for herself, such as assertive communication styles, might lead to more desirable outcomes for her. We also explored examples of when she was assertive with others, but was not abandoned to test her fear. To this end, we talked about how love and discontentment can exist simultaneously.      Progress on Treatment Objective(s) / Homework:  Satisfactory progress - ACTION (Actively working towards change); Intervened by reinforcing change plan / affirming steps taken    Narrative Therapy    Explore the patient's  story of his/her life from his/her perspective.    Explore alternate ways of understanding their experience, identifying exceptions, developing new themes.    Interpersonal Psychotherapy    Explore patterns in relationships that are effective or ineffective at helping patient reach their goals, find satisfying experience.    Discuss new patterns or behaviors to engage in for improved social functioning.    Care Plan review completed: No    Medication Review:  No changes to current psychiatric medication(s)    Medication Compliance:  NA    Changes in Health Issues:   None reported    Chemical Use Review:   Substance Use: Chemical use reviewed, no active concerns identified      Tobacco Use: No current tobacco use.      Assessment: Current Emotional / Mental Status (status of significant symptoms):  Risk status (Self / Other harm or suicidal ideation)  Patient has had a history of suicidal ideation: passive thoughts only; easily controlled - denied any recently    Patient denies current fears or concerns for personal safety.  Patient denies current or recent suicidal ideation or behaviors.  Patient denies current or recent homicidal ideation or behaviors.  Patient denies current or recent self injurious behavior or ideation.  Patient denies other safety concerns.     A safety and risk management plan has not been developed at this time, however patient was encouraged to call [place] [phone] should there be a change in any of these risk factors.      [place] Suicide Severity Rating Scale (Short Version)  [place] Suicide Severity Rating (Short Version) 2/18/2021   Over the past 2 weeks have you felt down, depressed, or hopeless? yes   Over the past 2 weeks have you had thoughts of killing yourself? no   Have you ever attempted to kill yourself? no       Appearance:   Appropriate   Eye Contact:   Good   Psychomotor Behavior: Normal   Attitude:   Cooperative   Orientation:   All  Speech   Rate / Production: Normal     Volume:  Normal   Mood:    Depressed    Affect:    Appropriate   Thought Content:  Clear   Thought Form:  Coherent  Logical   Insight:    Good     Diagnoses:  1. Major depressive disorder, recurrent episode, mild with anxious distress (H)        Collateral Reports Completed:  [facility] will coordinate with care team as needed    Plan: (Homework, other):  Lanie was scheduled to RTC in two weeks for help addressing depressive symptoms. She given information about ways to adaptively manage relationship conflict.  CD Recommendations: No indications of CD issues.     Héctor Higuera Psy.D, LP June 30, 2021      Answers for HPI/ROS submitted by the patient on 12/15/2020   If you checked off any problems, how difficult have these problems made it for you to do your work, take care of things at home, or get along with other people?: Not difficult at all  PHQ9 TOTAL SCORE: 2  KATHARINE 7 TOTAL SCORE: 1    _________________________________________________________________________  [facility] Integrated Behavioral Health Treatment Plan    Client's Name: Lanie Neil  YOB: 1990    Date: 6/8/2021    DSM-V Diagnoses: 296.31 (F33.0) Major Depressive Disorder, Recurrent Episode, Mild With anxious distress;   Psychosocial / Contextual Factors: SMA     Clinical Global Impressions  First:  Considering your total clinical experience with this particular patient population, how severe are the patient's symptoms at this time?: 4 (6/22/2021  3:13 PM)      Most recent:  Compared to the patient's condition at the START of treatment, this patient's condition is: 3 (6/22/2021  3:13 PM)      Referral / Collaboration:  Will collaborate with care team as indicated during treatment.    Anticipated number of session or this episode of care: 10+      MeasurableTreatment Goal(s) related to diagnosis / functional impairment(s)  Goal 1:  Patient will experience a reduction in depressive and anxious symptoms, along with a corresponding increase in  positive emotion and life satisfaction.    Objective #A: Patient will experience a reduction in depressed mood, will develop more effective coping skills to manage depressive symptoms, will develop healthy cognitive patterns and beliefs, will increase ability to function adaptively and will continue to take medications as prescribed / participate in supportive activities and services    Status: Continued - Date(s):6/8/2021    Objective #B: Patient will experience a reduction in anxiety, will develop more effective coping skills to manage anxiety symptoms, will develop healthy cognitive patterns and beliefs and will increase ability to function adaptively  Status: Continued - Date(s): 6/8/2021    Objective #C: Patient will develop better understanding of triggers and coping strategies to stabilize mood  Status: Continued - Date(s): 6/8/2021    Goal 2:  Patient will identify and increase engagement in valued activity, i.e. improving social connections/relationships, pursuing occupational goals or personally meaningful pursuits, exploration of meaning in life.     Objective #A: Patient will identify meaningful activity in social, occupational and  personal goals, and increase behavioral activation around these goals   Status: Continued - Date(s): 6/8/2021    Objective #B: Patient will address relationship difficulties in a more adaptive manner  Status: Continued - Date(s): 6/8/2021    Objective #C: Patient will develop coping/problem-solving skills to facilitate more adaptive adjustment and will effectively address problems that interfere with adaptive functioning  Status: Continued - Date(s): 6/8/2021    Goal 3:  Patient will increase understanding of pain medications and develop more adaptive responses to chronic pain.    Objective #A: Patient will increase understanding of the relationship between pain and stress/mood   Status: Continued - Date(s): 6/8/2021    Objective #B: Patient will develop 2-3 additional  chronic pain management strategies  Status: Continued - Date(s): 6/8/2021      Possible Therapeutic Intervention(s)  Psycho-education regarding mental health diagnoses and treatment options    Skills training    Explore skills useful to client in current situation.    Skills include assertiveness, communication, conflict management, problem-solving, relaxation, etc.    Solution-Focused Therapy    Explore patterns in patient's relationships and discuss options for new behaviors.    Explore patterns in patient's actions and choices and discuss options for new behaviors.    Cognitive-behavioral Therapy    Discuss common cognitive distortions, identify them in patient's life.    Explore ways to challenge, replace, and act against these cognitions.    Acceptance and Commitment Therapy    Explore and identify important values in patient's life.    Discuss ways to commit to behavioral activation around these values.    Psychodynamic psychotherapy    Discuss patient's emotional dynamics and issues and how they impact behaviors.    Explore patient's history of relationships and how they impact present behaviors.    Explore how to work with and make changes in these schemas and patterns.    Narrative Therapy    Explore the patient's story of his/her life from his/her perspective.    Explore alternate ways of understanding their experience, identifying exceptions, developing new themes.    Interpersonal Psychotherapy    Explore patterns in relationships that are effective or ineffective at helping patient reach their goals, find satisfying experience.    Discuss new patterns or behaviors to engage in for improved social functioning.    Behavioral Activation    Discuss steps patient can take to become more involved in meaningful activity.    Identify barriers to these activities and explore possible solutions.    Mindfulness-Based Strategies    Discuss skills based on development and application of mindfulness.    Skills drawn  from compassion-focused therapy, dialectical behavior therapy, mindfulness-based stress reduction, mindfulness-based cognitive therapy, etc.      We have developed these goals together during our work to this point. Patient has assisted in the development of these goals and has agreed to this treatment plan.       Héctor Higuera LP  February 09, 2021  Answers for HPI/ROS submitted by the patient on 6/30/2021   If you checked off any problems, how difficult have these problems made it for you to do your work, take care of things at home, or get along with other people?: Not difficult at all  PHQ9 TOTAL SCORE: 2  KATHARINE 7 TOTAL SCORE: 1

## 2021-07-01 ASSESSMENT — PATIENT HEALTH QUESTIONNAIRE - PHQ9: SUM OF ALL RESPONSES TO PHQ QUESTIONS 1-9: 2

## 2021-07-01 ASSESSMENT — ANXIETY QUESTIONNAIRES: GAD7 TOTAL SCORE: 1

## 2021-07-07 ENCOUNTER — TRANSFERRED RECORDS (OUTPATIENT)
Dept: HEALTH INFORMATION MANAGEMENT | Facility: CLINIC | Age: 31
End: 2021-07-07

## 2021-07-13 NOTE — TELEPHONE ENCOUNTER
Please fax the lab orders on 7/13/21 to [facility] at fax [phone]. Thank you.    Kenisha

## 2021-07-14 NOTE — TELEPHONE ENCOUNTER
6 labs ordered on 7/13/21 were faxed to [facility].    Joanne Hagan RN on 7/14/2021 at 8:18 AM

## 2021-07-14 NOTE — TELEPHONE ENCOUNTER
Pt is currently on potassium 30 mEq/day.  She will have another labs done this week.  Do you want to wait till having the new results ?    Soon-Mi

## 2021-07-15 ENCOUNTER — VIRTUAL VISIT (OUTPATIENT)
Dept: BEHAVIORAL HEALTH | Facility: CLINIC | Age: 31
End: 2021-07-15
Payer: COMMERCIAL

## 2021-07-15 DIAGNOSIS — F33.0 MAJOR DEPRESSIVE DISORDER, RECURRENT EPISODE, MILD WITH ANXIOUS DISTRESS (H): Primary | ICD-10-CM

## 2021-07-15 PROCEDURE — 90837 PSYTX W PT 60 MINUTES: CPT | Mod: 95 | Performed by: PSYCHOLOGIST

## 2021-07-15 NOTE — PROGRESS NOTES
[facility]: Integrated Behavioral Health  July 15, 2021        Behavioral Health Clinician Progress Note    Patient Name: Lanie Neil           Service Type: Video appointment      Service Location:  Video appointment      Session Start Time: 11:12  Session End Time: 12:06      Session Length: 53 - 60      Attendees: Client    Visit Activities (Refresh list every visit): [facility] Only    Telemedicine Visit: The patient's condition can be safely assessed and treated via synchronous audio and visual telemedicine encounter.      Reason for Telemedicine Visit: Patient has requested telehealth visit and Services only offered telehealth    Originating Site (Patient Location): Patient's home    Distant Site (Provider Location): Provider Remote Setting- Home Office    Consent:  The patient/guardian has verbally consented to: the potential risks and benefits of telemedicine (video visit) versus in person care; bill my insurance or make self-payment for services provided; and responsibility for payment of non-covered services.     Mode of Communication:  Video Conference via [email or web address]    As the provider I attest to compliance with applicable laws and regulations related to telemedicine.      Diagnostic Assessment Date: 1/19/2021  Treatment Plan Review Date: 9/8/2021  See Flowsheets for today's PHQ-9 and KATHARINE-7 results  Previous PHQ-9:   PHQ-9 SCORE 12/15/2020 3/16/2021 6/30/2021   PHQ-9 Total Score - - -   PHQ-9 Total Score MyChart 2 (Minimal depression) 2 (Minimal depression) 2 (Minimal depression)   PHQ-9 Total Score 2 2 2     Previous KATHARINE-7:   KATHARINE-7 SCORE 12/15/2020 3/16/2021 6/30/2021   Total Score 1 (minimal anxiety) 1 (minimal anxiety) 1 (minimal anxiety)   Total Score 1 1 1       BRITTANY LEVEL:  No flowsheet data found.    DATA  Extended Session (60+ minutes): No  Interactive Complexity: No  Crisis: No    Treatment Objective(s) Addressed in This Session:  Target Behavior(s): disease  management/lifestyle changes related to pain medication use    Depressed Mood: Increase interest, engagement, and pleasure in doing things  Decrease frequency and intensity of feeling down, depressed, hopeless  Anxiety: will experience a reduction in anxiety, will develop more effective coping skills to manage anxiety symptoms and will develop healthy cognitive patterns and beliefs  Relationship Problems: will address relationship difficulties in a more adaptive manner    Current Stressors / Issues:  [facility] met with Lanie today to continue supporting her treatment of depression, anxiety, and relationship conflict. We continued to discuss relationship difficulties she has with family, namely her brother.     Provided active listening and support as we explored relationship conflict in detail. Provided Lanie interpersonal feedback and observations to help expand her insight to relational conflicts/dynamics and unhelpful patterns of relating to others.     Discussed concerns she is having about pain medications, how laws are changing with this soon. This is a difficult topic for her to discuss, as she fears greatly not having pain medication in the future. She verbalized how she has been recommended to cut down before, but she states this not feasible for her. [facility] continued to help Lanie process these upcoming changes, and recommended we talk about how she might prepare for them. She indicated not wanting to prepare for this because it is not certain laws with prescribing pain medications will change, so she will look into this if she needs to.     Progress on Treatment Objective(s) / Homework:  Satisfactory progress - ACTION (Actively working towards change); Intervened by reinforcing change plan / affirming steps taken    Narrative Therapy    Explore the patient's story of his/her life from his/her perspective.    Explore alternate ways of understanding their experience, identifying exceptions, developing new  themes.    Interpersonal Psychotherapy    Explore patterns in relationships that are effective or ineffective at helping patient reach their goals, find satisfying experience.    Discuss new patterns or behaviors to engage in for improved social functioning.    Acceptance and Commitment Therapy    Explore and identify important values in patient's life.    Discuss ways to commit to behavioral activation around these values.    Care Plan review completed: No    Medication Review:  No changes to current psychiatric medication(s)    Medication Compliance:  NA    Changes in Health Issues:   None reported    Chemical Use Review:   Substance Use: Chemical use reviewed, no active concerns identified      Tobacco Use: No current tobacco use.      Assessment: Current Emotional / Mental Status (status of significant symptoms):  Risk status (Self / Other harm or suicidal ideation)  Patient has had a history of suicidal ideation: passive thoughts only; easily controlled - denied any recently    Patient denies current fears or concerns for personal safety.  Patient denies current or recent suicidal ideation or behaviors.  Patient denies current or recent homicidal ideation or behaviors.  Patient denies current or recent self injurious behavior or ideation.  Patient denies other safety concerns.     A safety and risk management plan has not been developed at this time, however patient was encouraged to call [place] [phone] should there be a change in any of these risk factors.      Terry Suicide Severity Rating Scale (Short Version)  Terry Suicide Severity Rating (Short Version) 2/18/2021   Over the past 2 weeks have you felt down, depressed, or hopeless? yes   Over the past 2 weeks have you had thoughts of killing yourself? no   Have you ever attempted to kill yourself? no       Appearance:   Appropriate   Eye Contact:   Good   Psychomotor Behavior: Normal   Attitude:   Cooperative   Orientation:   All  Speech   Rate /  Production: Normal    Volume:  Normal   Mood:    Depressed    Affect:    Appropriate   Thought Content:  Clear   Thought Form:  Coherent  Logical   Insight:    Good     Diagnoses:  1. Major depressive disorder, recurrent episode, mild with anxious distress (H)        Collateral Reports Completed:  [facility] will coordinate with care team as needed    Plan: (Homework, other):  Lanie was scheduled to RTC in two weeks for help addressing depressive symptoms. She given information about ways to adaptively manage relationship conflict.  CD Recommendations: No indications of CD issues.     Héctor Higuera Psy.D, LP July 15, 2021      Answers for HPI/ROS submitted by the patient on 12/15/2020   If you checked off any problems, how difficult have these problems made it for you to do your work, take care of things at home, or get along with other people?: Not difficult at all  PHQ9 TOTAL SCORE: 2  KATHARINE 7 TOTAL SCORE: 1    _________________________________________________________________________  [facility] Integrated Behavioral Health Treatment Plan    Client's Name: Lanie Neil  YOB: 1990    Date: 6/8/2021    DSM-V Diagnoses: 296.31 (F33.0) Major Depressive Disorder, Recurrent Episode, Mild With anxious distress;   Psychosocial / Contextual Factors: SMA     Clinical Global Impressions  First:  Considering your total clinical experience with this particular patient population, how severe are the patient's symptoms at this time?: 4 (6/22/2021  3:13 PM)      Most recent:  Compared to the patient's condition at the START of treatment, this patient's condition is: 3 (6/22/2021  3:13 PM)      Referral / Collaboration:  Will collaborate with care team as indicated during treatment.    Anticipated number of session or this episode of care: 10+      MeasurableTreatment Goal(s) related to diagnosis / functional impairment(s)  Goal 1:  Patient will experience a reduction in depressive and anxious symptoms, along with a  corresponding increase in positive emotion and life satisfaction.    Objective #A: Patient will experience a reduction in depressed mood, will develop more effective coping skills to manage depressive symptoms, will develop healthy cognitive patterns and beliefs, will increase ability to function adaptively and will continue to take medications as prescribed / participate in supportive activities and services    Status: Continued - Date(s):6/8/2021    Objective #B: Patient will experience a reduction in anxiety, will develop more effective coping skills to manage anxiety symptoms, will develop healthy cognitive patterns and beliefs and will increase ability to function adaptively  Status: Continued - Date(s): 6/8/2021    Objective #C: Patient will develop better understanding of triggers and coping strategies to stabilize mood  Status: Continued - Date(s): 6/8/2021    Goal 2:  Patient will identify and increase engagement in valued activity, i.e. improving social connections/relationships, pursuing occupational goals or personally meaningful pursuits, exploration of meaning in life.     Objective #A: Patient will identify meaningful activity in social, occupational and  personal goals, and increase behavioral activation around these goals   Status: Continued - Date(s): 6/8/2021    Objective #B: Patient will address relationship difficulties in a more adaptive manner  Status: Continued - Date(s): 6/8/2021    Objective #C: Patient will develop coping/problem-solving skills to facilitate more adaptive adjustment and will effectively address problems that interfere with adaptive functioning  Status: Continued - Date(s): 6/8/2021    Goal 3:  Patient will increase understanding of pain medications and develop more adaptive responses to chronic pain.    Objective #A: Patient will increase understanding of the relationship between pain and stress/mood   Status: Continued - Date(s): 6/8/2021    Objective #B: Patient will  develop 2-3 additional chronic pain management strategies  Status: Continued - Date(s): 6/8/2021      Possible Therapeutic Intervention(s)  Psycho-education regarding mental health diagnoses and treatment options    Skills training    Explore skills useful to client in current situation.    Skills include assertiveness, communication, conflict management, problem-solving, relaxation, etc.    Solution-Focused Therapy    Explore patterns in patient's relationships and discuss options for new behaviors.    Explore patterns in patient's actions and choices and discuss options for new behaviors.    Cognitive-behavioral Therapy    Discuss common cognitive distortions, identify them in patient's life.    Explore ways to challenge, replace, and act against these cognitions.    Acceptance and Commitment Therapy    Explore and identify important values in patient's life.    Discuss ways to commit to behavioral activation around these values.    Psychodynamic psychotherapy    Discuss patient's emotional dynamics and issues and how they impact behaviors.    Explore patient's history of relationships and how they impact present behaviors.    Explore how to work with and make changes in these schemas and patterns.    Narrative Therapy    Explore the patient's story of his/her life from his/her perspective.    Explore alternate ways of understanding their experience, identifying exceptions, developing new themes.    Interpersonal Psychotherapy    Explore patterns in relationships that are effective or ineffective at helping patient reach their goals, find satisfying experience.    Discuss new patterns or behaviors to engage in for improved social functioning.    Behavioral Activation    Discuss steps patient can take to become more involved in meaningful activity.    Identify barriers to these activities and explore possible solutions.    Mindfulness-Based Strategies    Discuss skills based on development and application of  mindfulness.    Skills drawn from compassion-focused therapy, dialectical behavior therapy, mindfulness-based stress reduction, mindfulness-based cognitive therapy, etc.      We have developed these goals together during our work to this point. Patient has assisted in the development of these goals and has agreed to this treatment plan.       Héctor Higuera LP  February 09, 2021  Answers for HPI/ROS submitted by the patient on 6/30/2021   If you checked off any problems, how difficult have these problems made it for you to do your work, take care of things at home, or get along with other people?: Not difficult at all  PHQ9 TOTAL SCORE: 2  KAHTARINE 7 TOTAL SCORE: 1

## 2021-07-16 ENCOUNTER — MYC MEDICAL ADVICE (OUTPATIENT)
Dept: FAMILY MEDICINE | Facility: CLINIC | Age: 31
End: 2021-07-16

## 2021-07-16 DIAGNOSIS — N12 PYELONEPHRITIS: Primary | ICD-10-CM

## 2021-07-17 ENCOUNTER — NURSE TRIAGE (OUTPATIENT)
Dept: NURSING | Facility: CLINIC | Age: 31
End: 2021-07-17

## 2021-07-17 NOTE — TELEPHONE ENCOUNTER
Writer speaking with Lanie and her mother.  Past medical HX significant for SMA and indwelling catheter.  Her symptoms are:    Fever 102.6-102.9 (Tylenol given)    Pelvic pain    Cloudy urine    [place] colored    Last bladder infection August 2020    Writer paged on call provider Dr. Card she would like patient to go into urgent care for a UA/UC.  Writer call patient back she will have mother drive her to the [place] urgent care.  Silvia Solis, RN MAN   Triage Nurse Advisor [facility]     Reason for Disposition    Fever > 100.4 F (38.0 C)    Additional Information    Negative: Shock suspected (e.g., cold/pale/clammy skin, too weak to stand, low BP, rapid pulse)    Negative: Sounds like a life-threatening emergency to the triager    Negative: [1] Catheter was accidentally pulled-out AND [2] bright red continuous bleeding    Negative: SEVERE abdominal pain    Protocols used: URINARY CATHETER SYMPTOMS AND [id number]

## 2021-07-19 NOTE — TELEPHONE ENCOUNTER
I called and reached , left brief message to call back.  Joana Jaeger, EMT at 3:20 PM on 7/19/2021.  [facility]  [place]  [phone]

## 2021-07-19 NOTE — TELEPHONE ENCOUNTER
If there'd be a way to do UA/UC let's get specimen first, she is not easily mobile so may not be realistic, she'll know, ask her    Start levaquin 750 mg once a day for one week    If can get urine do it first    Levaquin is a pill or a liquid, she might need liquid if so it'd be levaquin 25 mg/ml  Take 30 ml once a day via feed tube, dispense 210 ml no refills    Re: pyelonephritis    Ask her if ever had kidney infection in past, seems likely to be one    To ER if worse

## 2021-07-20 RX ORDER — LEVOFLOXACIN 25 MG/ML
750 SOLUTION ORAL DAILY
Qty: 210 ML | Refills: 0 | Status: SHIPPED | OUTPATIENT
Start: 2021-07-20 | End: 2021-07-27

## 2021-07-20 NOTE — TELEPHONE ENCOUNTER
Spoke with pt's mom. Pt is in [facility] inpatient due to port infection. She is on IV antibiotics.  Her potassium was low and she is having potassium supplement, too.    She will have antibiotic therapy after the discharge.

## 2021-07-20 NOTE — TELEPHONE ENCOUNTER
Pt is currently in [facility] inpatient, had labs and is taking potassium supplement.

## 2021-07-30 ENCOUNTER — VIRTUAL VISIT (OUTPATIENT)
Dept: BEHAVIORAL HEALTH | Facility: CLINIC | Age: 31
End: 2021-07-30
Payer: COMMERCIAL

## 2021-07-30 DIAGNOSIS — Z79.01 CHRONIC ANTICOAGULATION: ICD-10-CM

## 2021-07-30 DIAGNOSIS — J96.10 CHRONIC RESPIRATORY FAILURE, UNSPECIFIED WHETHER WITH HYPOXIA OR HYPERCAPNIA (H): ICD-10-CM

## 2021-07-30 DIAGNOSIS — Z86.718 HISTORY OF DEEP VENOUS THROMBOSIS: ICD-10-CM

## 2021-07-30 DIAGNOSIS — F33.0 MAJOR DEPRESSIVE DISORDER, RECURRENT EPISODE, MILD WITH ANXIOUS DISTRESS (H): Primary | ICD-10-CM

## 2021-07-30 PROCEDURE — 90837 PSYTX W PT 60 MINUTES: CPT | Mod: GT | Performed by: PSYCHOLOGIST

## 2021-07-30 NOTE — PROGRESS NOTES
[facility]: Integrated Behavioral Health  July 30, 2021          Behavioral Health Clinician Progress Note    Patient Name: Lanie Neil           Service Type: Video appointment      Service Location:  Video appointment      Session Start Time: 1:33  Session End Time: 2:30      Session Length: 53 - 60      Attendees: Client    Visit Activities (Refresh list every visit): [facility] Only    Telemedicine Visit: The patient's condition can be safely assessed and treated via synchronous audio and visual telemedicine encounter.      Reason for Telemedicine Visit: Patient has requested telehealth visit and Services only offered telehealth    Originating Site (Patient Location): Patient's home    Distant Site (Provider Location): Provider Remote Setting- Home Office    Consent:  The patient/guardian has verbally consented to: the potential risks and benefits of telemedicine (video visit) versus in person care; bill my insurance or make self-payment for services provided; and responsibility for payment of non-covered services.     Mode of Communication:  Video Conference via [employer]    As the provider I attest to compliance with applicable laws and regulations related to telemedicine.      Diagnostic Assessment Date: 1/19/2021  Treatment Plan Review Date: 9/8/2021  See Flowsheets for today's PHQ-9 and KATHARINE-7 results  Previous PHQ-9:   PHQ-9 SCORE 12/15/2020 3/16/2021 6/30/2021   PHQ-9 Total Score - - -   PHQ-9 Total Score MyChart 2 (Minimal depression) 2 (Minimal depression) 2 (Minimal depression)   PHQ-9 Total Score 2 2 2     Previous KATHARINE-7:   KATHARINE-7 SCORE 12/15/2020 3/16/2021 6/30/2021   Total Score 1 (minimal anxiety) 1 (minimal anxiety) 1 (minimal anxiety)   Total Score 1 1 1       BRITTANY LEVEL:  No flowsheet data found.    DATA  Extended Session (60+ minutes): No  Interactive Complexity: No  Crisis: No    Treatment Objective(s) Addressed in This Session:  Target Behavior(s): disease  management/lifestyle changes related to pain medication use    Depressed Mood: Decrease frequency and intensity of feeling down, depressed, hopeless  Identify negative self-talk and behaviors: challenge core beliefs, myths, and actions  Anxiety: will experience a reduction in anxiety, will develop more effective coping skills to manage anxiety symptoms and will develop healthy cognitive patterns and beliefs  Psychological distress related to Chronic Disease Management    Current Stressors / Issues:  [facility] met with Lanie today over telemedicine to continue supporting her treatment of depression and emotional difficulties related to chronic disease management. Lanie states she was hospitalized at [facility] for a few days over the last few weeks due to a high fever and other discomforting symptoms, which ultimately was diagnosed as sepsis. Lanie disclosed that when she was discharged, she was given a prescription of dilaudid to help with recovery. She reported occaisonally taking a dilaudid pill in addition to her pain medication regimen at night to help with sleep and added pain relief. We spoke at length about opioid dependence and the increased risks associated with opioid use, including risk of increased tolerance, accidental overdose, and exacerbated chronic pain in the long term. Lanie stated she was aware of this risks and expressed confidence she could moderate her use. [facility] recommended Lanie inform her primary care provider and pain physician about her increased use over the last few weeks. Lanie stated she is ambivalent about informing her care team as she fears her medication will be taken away or she will be denied a refill of medication which is frightening to her. She indicated preference to hold onto the surplus of dilaudid medication for breakthrough pain as needed. We spent time processing the idea of having pain medication around, how this would affect her coping with pain. We also continued to  process her difficult emotions about opioid dependence, including fear and resentment for being on them for so long.       Progress on Treatment Objective(s) / Homework:  Satisfactory progress - ACTION (Actively working towards change); Intervened by reinforcing change plan / affirming steps taken    Psycho-education regarding mental health diagnoses and treatment options    Narrative Therapy    Explore the patient's story of his/her life from his/her perspective.    Explore alternate ways of understanding their experience, identifying exceptions, developing new themes.      Acceptance and Commitment Therapy    Explore and identify important values in patient's life.    Discuss ways to commit to behavioral activation around these values.    Care Plan review completed: No    Medication Review:  No changes to current psychiatric medication(s)    Medication Compliance:  NA    Changes in Health Issues:   None reported    Chemical Use Review:   Substance Use: Chemical use reviewed, no active concerns identified      Tobacco Use: No current tobacco use.      Assessment: Current Emotional / Mental Status (status of significant symptoms):  Risk status (Self / Other harm or suicidal ideation)  Patient has had a history of suicidal ideation: passive thoughts only; easily controlled - denied any recently    Patient denies current fears or concerns for personal safety.  Patient denies current or recent suicidal ideation or behaviors.  Patient denies current or recent homicidal ideation or behaviors.  Patient denies current or recent self injurious behavior or ideation.  Patient denies other safety concerns.     A safety and risk management plan has not been developed at this time, however patient was encouraged to call [place] [phone] should there be a change in any of these risk factors.      [place] Suicide Severity Rating Scale (Short Version)  [place] Suicide Severity Rating (Short Version) 2/18/2021   Over the past 2  weeks have you felt down, depressed, or hopeless? yes   Over the past 2 weeks have you had thoughts of killing yourself? no   Have you ever attempted to kill yourself? no       Appearance:   Appropriate   Eye Contact:   Good   Psychomotor Behavior: Normal   Attitude:   Cooperative   Orientation:   All  Speech   Rate / Production: Normal    Volume:  Normal   Mood:    Depressed    Affect:    Appropriate   Thought Content:  Clear   Thought Form:  Coherent  Logical   Insight:    Good     Diagnoses:  1. Major depressive disorder, recurrent episode, mild with anxious distress (H)        Collateral Reports Completed:  [facility] will coordinate with care team as needed    Plan: (Homework, other):  Lanie was scheduled to RTC in two weeks for help addressing depressive symptoms. She was encouraged to follow-up with her PCP about pain medication. CD Recommendations: No indications of CD issues.     Héctor Higuera Psy.D, LP July 30, 2021      Answers for HPI/ROS submitted by the patient on 12/15/2020   If you checked off any problems, how difficult have these problems made it for you to do your work, take care of things at home, or get along with other people?: Not difficult at all  PHQ9 TOTAL SCORE: 2  KATHARINE 7 TOTAL SCORE: 1    _________________________________________________________________________  [facility] Integrated Behavioral Health Treatment Plan    Client's Name: Lanie Neil  YOB: 1990    Date: 6/8/2021    DSM-V Diagnoses: 296.31 (F33.0) Major Depressive Disorder, Recurrent Episode, Mild With anxious distress;   Psychosocial / Contextual Factors: SMA     Clinical Global Impressions  First:  Considering your total clinical experience with this particular patient population, how severe are the patient's symptoms at this time?: 4 (6/22/2021  3:13 PM)      Most recent:  Compared to the patient's condition at the START of treatment, this patient's condition is: 3 (6/22/2021  3:13 PM)      Referral /  Collaboration:  Will collaborate with care team as indicated during treatment.    Anticipated number of session or this episode of care: 10+      MeasurableTreatment Goal(s) related to diagnosis / functional impairment(s)  Goal 1:  Patient will experience a reduction in depressive and anxious symptoms, along with a corresponding increase in positive emotion and life satisfaction.    Objective #A: Patient will experience a reduction in depressed mood, will develop more effective coping skills to manage depressive symptoms, will develop healthy cognitive patterns and beliefs, will increase ability to function adaptively and will continue to take medications as prescribed / participate in supportive activities and services    Status: Continued - Date(s):6/8/2021    Objective #B: Patient will experience a reduction in anxiety, will develop more effective coping skills to manage anxiety symptoms, will develop healthy cognitive patterns and beliefs and will increase ability to function adaptively  Status: Continued - Date(s): 6/8/2021    Objective #C: Patient will develop better understanding of triggers and coping strategies to stabilize mood  Status: Continued - Date(s): 6/8/2021    Goal 2:  Patient will identify and increase engagement in valued activity, i.e. improving social connections/relationships, pursuing occupational goals or personally meaningful pursuits, exploration of meaning in life.     Objective #A: Patient will identify meaningful activity in social, occupational and  personal goals, and increase behavioral activation around these goals   Status: Continued - Date(s): 6/8/2021    Objective #B: Patient will address relationship difficulties in a more adaptive manner  Status: Continued - Date(s): 6/8/2021    Objective #C: Patient will develop coping/problem-solving skills to facilitate more adaptive adjustment and will effectively address problems that interfere with adaptive functioning  Status: Continued  - Date(s): 6/8/2021    Goal 3:  Patient will increase understanding of pain medications and develop more adaptive responses to chronic pain.    Objective #A: Patient will increase understanding of the relationship between pain and stress/mood   Status: Continued - Date(s): 6/8/2021    Objective #B: Patient will develop 2-3 additional chronic pain management strategies  Status: Continued - Date(s): 6/8/2021      Possible Therapeutic Intervention(s)  Psycho-education regarding mental health diagnoses and treatment options    Skills training    Explore skills useful to client in current situation.    Skills include assertiveness, communication, conflict management, problem-solving, relaxation, etc.    Solution-Focused Therapy    Explore patterns in patient's relationships and discuss options for new behaviors.    Explore patterns in patient's actions and choices and discuss options for new behaviors.    Cognitive-behavioral Therapy    Discuss common cognitive distortions, identify them in patient's life.    Explore ways to challenge, replace, and act against these cognitions.    Acceptance and Commitment Therapy    Explore and identify important values in patient's life.    Discuss ways to commit to behavioral activation around these values.    Psychodynamic psychotherapy    Discuss patient's emotional dynamics and issues and how they impact behaviors.    Explore patient's history of relationships and how they impact present behaviors.    Explore how to work with and make changes in these schemas and patterns.    Narrative Therapy    Explore the patient's story of his/her life from his/her perspective.    Explore alternate ways of understanding their experience, identifying exceptions, developing new themes.    Interpersonal Psychotherapy    Explore patterns in relationships that are effective or ineffective at helping patient reach their goals, find satisfying experience.    Discuss new patterns or behaviors to engage  in for improved social functioning.    Behavioral Activation    Discuss steps patient can take to become more involved in meaningful activity.    Identify barriers to these activities and explore possible solutions.    Mindfulness-Based Strategies    Discuss skills based on development and application of mindfulness.    Skills drawn from compassion-focused therapy, dialectical behavior therapy, mindfulness-based stress reduction, mindfulness-based cognitive therapy, etc.      We have developed these goals together during our work to this point. Patient has assisted in the development of these goals and has agreed to this treatment plan.       Héctor Higuera LP  February 09, 2021

## 2021-08-08 ENCOUNTER — MYC MEDICAL ADVICE (OUTPATIENT)
Dept: FAMILY MEDICINE | Facility: CLINIC | Age: 31
End: 2021-08-08

## 2021-08-10 ENCOUNTER — VIRTUAL VISIT (OUTPATIENT)
Dept: BEHAVIORAL HEALTH | Facility: CLINIC | Age: 31
End: 2021-08-10
Payer: COMMERCIAL

## 2021-08-10 ENCOUNTER — TRANSFERRED RECORDS (OUTPATIENT)
Dept: HEALTH INFORMATION MANAGEMENT | Facility: CLINIC | Age: 31
End: 2021-08-10

## 2021-08-10 DIAGNOSIS — F33.0 MAJOR DEPRESSIVE DISORDER, RECURRENT EPISODE, MILD WITH ANXIOUS DISTRESS (H): Primary | ICD-10-CM

## 2021-08-10 PROCEDURE — 90837 PSYTX W PT 60 MINUTES: CPT | Mod: GT | Performed by: PSYCHOLOGIST

## 2021-08-10 NOTE — PROGRESS NOTES
[facility]: Integrated Behavioral Health  August 10, 2021      Behavioral Health Clinician Progress Note    Patient Name: Lanie Neil           Service Type: Video appointment      Service Location:  Video appointment      Session Start Time: 12:36  Session End Time: 1:36      Session Length: 53 - 60      Attendees: Client    Visit Activities (Refresh list every visit): [facility] Only    Telemedicine Visit: The patient's condition can be safely assessed and treated via synchronous audio and visual telemedicine encounter.      Reason for Telemedicine Visit: Patient has requested telehealth visit and Services only offered telehealth    Originating Site (Patient Location): Patient's home    Distant Site (Provider Location): Provider Remote Setting- Home Office    Consent:  The patient/guardian has verbally consented to: the potential risks and benefits of telemedicine (video visit) versus in person care; bill my insurance or make self-payment for services provided; and responsibility for payment of non-covered services.     Mode of Communication:  Video Conference via [employer]    As the provider I attest to compliance with applicable laws and regulations related to telemedicine.      Diagnostic Assessment Date: 1/19/2021  Treatment Plan Review Date: 9/8/2021  See Flowsheets for today's PHQ-9 and KATHARINE-7 results  Previous PHQ-9:   PHQ-9 SCORE 12/15/2020 3/16/2021 6/30/2021   PHQ-9 Total Score - - -   PHQ-9 Total Score MyChart 2 (Minimal depression) 2 (Minimal depression) 2 (Minimal depression)   PHQ-9 Total Score 2 2 2     Previous KATHARINE-7:   KATHARINE-7 SCORE 12/15/2020 3/16/2021 6/30/2021   Total Score 1 (minimal anxiety) 1 (minimal anxiety) 1 (minimal anxiety)   Total Score 1 1 1       BRITTANY LEVEL:  No flowsheet data found.    DATA  Extended Session (60+ minutes): No  Interactive Complexity: No  Crisis: No    Treatment Objective(s) Addressed in This Session:  Target Behavior(s): disease  "management/lifestyle changes related to pain medication use    Depressed Mood: Decrease frequency and intensity of feeling down, depressed, hopeless  Identify negative self-talk and behaviors: challenge core beliefs, myths, and actions  Relationship Problems: will address relationship difficulties in a more adaptive manner  Psychological distress related to Chronic Disease Management    Current Stressors / Issues:  [facility] met with Lanie today to continue supporting her treatment of anxiety and depressed mood secondary to reported relationship concerns. Lanie presented today with greater depressed mood due to relationship difficulties she is experiencing with her mother. Lanie described her sense of how her mother has felt more restrictive of her lately, how she is not allowed to \"have a life\" outside of their immediate family. Lanie referred to having more friends and romantic relationships when she described her wants for a life. She described a sense of wanting to date men, which her mother suspects is a bad idea for her, but Lanie would like the opportunity to experience this even if it doesn't end well. Lanie reflected on her mother's history of past hurts and conflicts in relationships and how she suspects this affects their relationship. Lanie stated she feels guilty about her anger toward her, as she recognizes her mother does a lot for her and is her primary source of support in her life. [facility] helped Lanie process difficult emotions and build insight to relationship patterns/dynamics that might be contributing to her depressed mood.       Progress on Treatment Objective(s) / Homework:  Satisfactory progress - ACTION (Actively working towards change); Intervened by reinforcing change plan / affirming steps taken    Interpersonal Psychotherapy    Explore patterns in relationships that are effective or ineffective at helping patient reach their goals, find satisfying experience.    Discuss new " patterns or behaviors to engage in for improved social functioning.    Narrative Therapy    Explore the patient's story of his/her life from his/her perspective.    Explore alternate ways of understanding their experience, identifying exceptions, developing new themes.    Acceptance and Commitment Therapy    Explore and identify important values in patient's life.    Discuss ways to commit to behavioral activation around these values.    Care Plan review completed: No    Medication Review:  No changes to current psychiatric medication(s)    Medication Compliance:  NA    Changes in Health Issues:   None reported    Chemical Use Review:   Substance Use: Chemical use reviewed, no active concerns identified      Tobacco Use: No current tobacco use.      Assessment: Current Emotional / Mental Status (status of significant symptoms):  Risk status (Self / Other harm or suicidal ideation)  Patient has had a history of suicidal ideation: passive thoughts only; easily controlled - denied any recently    Patient denies current fears or concerns for personal safety.  Patient denies current or recent suicidal ideation or behaviors.  Patient denies current or recent homicidal ideation or behaviors.  Patient denies current or recent self injurious behavior or ideation.  Patient denies other safety concerns.     A safety and risk management plan has not been developed at this time, however patient was encouraged to call [place] [phone] should there be a change in any of these risk factors.      [place] Suicide Severity Rating Scale (Short Version)  [place] Suicide Severity Rating (Short Version) 2/18/2021   Over the past 2 weeks have you felt down, depressed, or hopeless? yes   Over the past 2 weeks have you had thoughts of killing yourself? no   Have you ever attempted to kill yourself? no     [place] Suicide Severity Rating Scale (Lifetime/Recent)  [place] Suicide Severity Rating (Lifetime/Recent) 8/13/2021   1. Wish to  be Dead (Lifetime) No   1. Wish to be Dead (Recent) No   2. Non-Specific Active Suicidal Thoughts (Lifetime) No   2. Non-Specific Active Suicidal Thoughts (Recent) No   3. Active Suicidal Ideation with any Methods (Not Plan) Without Intent to Act (Lifetime) No   3. Active Suicidal Ideation with any Methods (Not Plan) Without Intent to Act (Recent) No   4. Active Suicidal Ideation with Some Intent to Act, Without Specific Plan (Lifetime) No   4. Active Suicidal Ideation with Some Intent to Act, Without Specific Plan (Recent) No   5. Active Suicidal Ideation with Specific Plan and Intent (Lifetime) No   5. Active Suicidal Ideation with Specific Plan and Intent (Recent) No   Most Severe Ideation Rating (Lifetime) NA   Frequency (Lifetime) NA   Duration (Lifetime) NA   Controllability (Lifetime) NA   Protective Factors  (Lifetime) NA   Reasons for Ideation (Lifetime) NA   Most Severe Ideation Rating (Past Month) NA   Frequency (Past Month) NA   Duration (Past Month) NA   Controllability (Past Month) NA   Protective Factors (Past Month) NA   Reasons for Ideation (Past Month) NA   Actual Attempt (Lifetime) No   Actual Attempt (Past 3 Months) No   Has subject engaged in non-suicidal self-injurious behavior? (Lifetime) No   Has subject engaged in non-suicidal self-injurious behavior? (Past 3 Months) No   Interrupted Attempts (Lifetime) No   Interrupted Attempts (Past 3 Months) No   Aborted or Self-Interrupted Attempt (Lifetime) No   Aborted or Self-Interrupted Attempt (Past 3 Months) No   Preparatory Acts or Behavior (Lifetime) No   Preparatory Acts or Behavior (Past 3 Months) No   Most Recent Attempt Actual Lethality Code NA   Most Lethal Attempt Actual Lethality Code NA   Initial/First Attempt Actual Lethality Code NA         Appearance:   Appropriate   Eye Contact:   Good   Psychomotor Behavior: Normal   Attitude:   Cooperative   Orientation:   All  Speech   Rate / Production: Normal    Volume:  Normal    Mood:    Depressed    Affect:    Appropriate   Thought Content:  Clear   Thought Form:  Coherent  Logical   Insight:    Good     Diagnoses:  1. Major depressive disorder, recurrent episode, mild with anxious distress (H)        Collateral Reports Completed:  [facility] will coordinate with care team as needed    Plan: (Homework, other):  Lanie was scheduled to RTC in two weeks for help addressing depressive symptoms. She was given strategies/ideas to better manage relationship conflicts today. CD Recommendations: No indications of CD issues.     Héctor Higuera Psy.D, LP August 13, 2021        Answers for HPI/ROS submitted by the patient on 12/15/2020   If you checked off any problems, how difficult have these problems made it for you to do your work, take care of things at home, or get along with other people?: Not difficult at all  PHQ9 TOTAL SCORE: 2  KATHARINE 7 TOTAL SCORE: 1    _________________________________________________________________________  [facility] Integrated [facility]al Health Treatment Plan    Client's Name: Lanie Neil  YOB: 1990    Date: 6/8/2021    DSM-V Diagnoses: 296.31 (F33.0) Major Depressive Disorder, Recurrent Episode, Mild With anxious distress;   Psychosocial / Contextual Factors: SMA     Clinical Global Impressions  First:  Considering your total clinical experience with this particular patient population, how severe are the patient's symptoms at this time?: 4 (6/22/2021  3:13 PM)      Most recent:  Compared to the patient's condition at the START of treatment, this patient's condition is: 3 (6/22/2021  3:13 PM)      Referral / Collaboration:  Will collaborate with care team as indicated during treatment.    Anticipated number of session or this episode of care: 10+      MeasurableTreatment Goal(s) related to diagnosis / functional impairment(s)  Goal 1:  Patient will experience a reduction in depressive and anxious symptoms, along with a corresponding increase in positive  emotion and life satisfaction.    Objective #A: Patient will experience a reduction in depressed mood, will develop more effective coping skills to manage depressive symptoms, will develop healthy cognitive patterns and beliefs, will increase ability to function adaptively and will continue to take medications as prescribed / participate in supportive activities and services    Status: Continued - Date(s):6/8/2021    Objective #B: Patient will experience a reduction in anxiety, will develop more effective coping skills to manage anxiety symptoms, will develop healthy cognitive patterns and beliefs and will increase ability to function adaptively  Status: Continued - Date(s): 6/8/2021    Objective #C: Patient will develop better understanding of triggers and coping strategies to stabilize mood  Status: Continued - Date(s): 6/8/2021    Goal 2:  Patient will identify and increase engagement in valued activity, i.e. improving social connections/relationships, pursuing occupational goals or personally meaningful pursuits, exploration of meaning in life.     Objective #A: Patient will identify meaningful activity in social, occupational and  personal goals, and increase behavioral activation around these goals   Status: Continued - Date(s): 6/8/2021    Objective #B: Patient will address relationship difficulties in a more adaptive manner  Status: Continued - Date(s): 6/8/2021    Objective #C: Patient will develop coping/problem-solving skills to facilitate more adaptive adjustment and will effectively address problems that interfere with adaptive functioning  Status: Continued - Date(s): 6/8/2021    Goal 3:  Patient will increase understanding of pain medications and develop more adaptive responses to chronic pain.    Objective #A: Patient will increase understanding of the relationship between pain and stress/mood   Status: Continued - Date(s): 6/8/2021    Objective #B: Patient will develop 2-3 additional chronic pain  management strategies  Status: Continued - Date(s): 6/8/2021      Possible Therapeutic Intervention(s)  Psycho-education regarding mental health diagnoses and treatment options    Skills training    Explore skills useful to client in current situation.    Skills include assertiveness, communication, conflict management, problem-solving, relaxation, etc.    Solution-Focused Therapy    Explore patterns in patient's relationships and discuss options for new behaviors.    Explore patterns in patient's actions and choices and discuss options for new behaviors.    Cognitive-behavioral Therapy    Discuss common cognitive distortions, identify them in patient's life.    Explore ways to challenge, replace, and act against these cognitions.    Acceptance and Commitment Therapy    Explore and identify important values in patient's life.    Discuss ways to commit to behavioral activation around these values.    Psychodynamic psychotherapy    Discuss patient's emotional dynamics and issues and how they impact behaviors.    Explore patient's history of relationships and how they impact present behaviors.    Explore how to work with and make changes in these schemas and patterns.    Narrative Therapy    Explore the patient's story of his/her life from his/her perspective.    Explore alternate ways of understanding their experience, identifying exceptions, developing new themes.    Interpersonal Psychotherapy    Explore patterns in relationships that are effective or ineffective at helping patient reach their goals, find satisfying experience.    Discuss new patterns or behaviors to engage in for improved social functioning.    Behavioral Activation    Discuss steps patient can take to become more involved in meaningful activity.    Identify barriers to these activities and explore possible solutions.    Mindfulness-Based Strategies    Discuss skills based on development and application of mindfulness.    Skills drawn from  compassion-focused therapy, dialectical behavior therapy, mindfulness-based stress reduction, mindfulness-based cognitive therapy, etc.      We have developed these goals together during our work to this point. Patient has assisted in the development of these goals and has agreed to this treatment plan.       Héctor Higuera LP  February 09, 2021

## 2021-08-11 NOTE — CONFIDENTIAL NOTE
Completed DMV Disability Parking Certificate fo patient and placed in Dr. Moseley;'s in box for signature.    Sybil Devine on 8/11/2021 at 10:50 AM'

## 2021-08-12 DIAGNOSIS — J96.10 CHRONIC RESPIRATORY FAILURE, UNSPECIFIED WHETHER WITH HYPOXIA OR HYPERCAPNIA (H): ICD-10-CM

## 2021-08-12 DIAGNOSIS — Z79.01 CHRONIC ANTICOAGULATION: ICD-10-CM

## 2021-08-12 DIAGNOSIS — Z86.718 HISTORY OF DEEP VENOUS THROMBOSIS: ICD-10-CM

## 2021-08-12 RX ORDER — RIVAROXABAN 10 MG/1
TABLET, FILM COATED ORAL
Qty: 90 TABLET | Refills: 0 | Status: SHIPPED | OUTPATIENT
Start: 2021-08-12 | End: 2021-12-30

## 2021-08-13 ASSESSMENT — COLUMBIA-SUICIDE SEVERITY RATING SCALE - C-SSRS
TOTAL  NUMBER OF INTERRUPTED ATTEMPTS PAST 3 MONTHS: NO
4. HAVE YOU HAD THESE THOUGHTS AND HAD SOME INTENTION OF ACTING ON THEM?: NO
5. HAVE YOU STARTED TO WORK OUT OR WORKED OUT THE DETAILS OF HOW TO KILL YOURSELF? DO YOU INTEND TO CARRY OUT THIS PLAN?: NO
6. HAVE YOU EVER DONE ANYTHING, STARTED TO DO ANYTHING, OR PREPARED TO DO ANYTHING TO END YOUR LIFE?: NO
1. IN THE PAST MONTH, HAVE YOU WISHED YOU WERE DEAD OR WISHED YOU COULD GO TO SLEEP AND NOT WAKE UP?: NO
4. HAVE YOU HAD THESE THOUGHTS AND HAD SOME INTENTION OF ACTING ON THEM?: NO
2. HAVE YOU ACTUALLY HAD ANY THOUGHTS OF KILLING YOURSELF LIFETIME?: NO
2. HAVE YOU ACTUALLY HAD ANY THOUGHTS OF KILLING YOURSELF?: NO
5. HAVE YOU STARTED TO WORK OUT OR WORKED OUT THE DETAILS OF HOW TO KILL YOURSELF? DO YOU INTEND TO CARRY OUT THIS PLAN?: NO
TOTAL  NUMBER OF ABORTED OR SELF INTERRUPTED ATTEMPTS PAST LIFETIME: NO
TOTAL  NUMBER OF INTERRUPTED ATTEMPTS LIFETIME: NO
1. IN THE PAST MONTH, HAVE YOU WISHED YOU WERE DEAD OR WISHED YOU COULD GO TO SLEEP AND NOT WAKE UP?: NO
6. HAVE YOU EVER DONE ANYTHING, STARTED TO DO ANYTHING, OR PREPARED TO DO ANYTHING TO END YOUR LIFE?: NO
ATTEMPT PAST THREE MONTHS: NO
TOTAL  NUMBER OF ABORTED OR SELF INTERRUPTED ATTEMPTS PAST 3 MONTHS: NO
3. HAVE YOU BEEN THINKING ABOUT HOW YOU MIGHT KILL YOURSELF?: NO
ATTEMPT LIFETIME: NO

## 2021-08-15 ENCOUNTER — MYC MEDICAL ADVICE (OUTPATIENT)
Dept: FAMILY MEDICINE | Facility: CLINIC | Age: 31
End: 2021-08-15

## 2021-08-17 NOTE — TELEPHONE ENCOUNTER
1-I will do parking form once gets to me  2-yes I agree she needs booster, I believe [facility] is putting together a system but not set up yet for those who need a third shot, if we have more info let her know

## 2021-08-18 ENCOUNTER — VIRTUAL VISIT (OUTPATIENT)
Dept: BEHAVIORAL HEALTH | Facility: CLINIC | Age: 31
End: 2021-08-18
Payer: COMMERCIAL

## 2021-08-18 DIAGNOSIS — F33.0 MAJOR DEPRESSIVE DISORDER, RECURRENT EPISODE, MILD WITH ANXIOUS DISTRESS (H): Primary | ICD-10-CM

## 2021-08-18 PROCEDURE — 90837 PSYTX W PT 60 MINUTES: CPT | Mod: GT | Performed by: PSYCHOLOGIST

## 2021-08-18 NOTE — PROGRESS NOTES
[facility]: Integrated Behavioral Health  August 18, 2021      Behavioral Health Clinician Progress Note    Patient Name: Lanie Neil           Service Type: Video appointment      Service Location:  Video appointment      Session Start Time: 10:10  Session End Time: 11:05      Session Length: 53 - 60      Attendees: Client    Visit Activities (Refresh list every visit): [facility] Only    Telemedicine Visit: The patient's condition can be safely assessed and treated via synchronous audio and visual telemedicine encounter.      Reason for Telemedicine Visit: Patient has requested telehealth visit and Services only offered telehealth    Originating Site (Patient Location): Patient's home    Distant Site (Provider Location): Provider Remote Setting- Home Office    Consent:  The patient/guardian has verbally consented to: the potential risks and benefits of telemedicine (video visit) versus in person care; bill my insurance or make self-payment for services provided; and responsibility for payment of non-covered services.     Mode of Communication:  Video Conference via [employer]    As the provider I attest to compliance with applicable laws and regulations related to telemedicine.      Diagnostic Assessment Date: 1/19/2021  Treatment Plan Review Date: 9/8/2021  See Flowsheets for today's PHQ-9 and KATHARINE-7 results  Previous PHQ-9:   PHQ-9 SCORE 12/15/2020 3/16/2021 6/30/2021   PHQ-9 Total Score - - -   PHQ-9 Total Score MyChart 2 (Minimal depression) 2 (Minimal depression) 2 (Minimal depression)   PHQ-9 Total Score 2 2 2     Previous KATHARINE-7:   KATHARINE-7 SCORE 12/15/2020 3/16/2021 6/30/2021   Total Score 1 (minimal anxiety) 1 (minimal anxiety) 1 (minimal anxiety)   Total Score 1 1 1       BRITTANY LEVEL:  No flowsheet data found.    DATA  Extended Session (60+ minutes): No  Interactive Complexity: No  Crisis: No    Treatment Objective(s) Addressed in This Session:  Target Behavior(s): disease  "management/lifestyle changes related to pain medication use    Depressed Mood: Decrease frequency and intensity of feeling down, depressed, hopeless  Identify negative self-talk and behaviors: challenge core beliefs, myths, and actions  Relationship Problems: will address relationship difficulties in a more adaptive manner  Psychological distress related to Chronic Disease Management    Current Stressors / Issues:  [facility] met with Lanie today per her request via [employer] message from 8/15/21 sent to her PCP then relayed to this provider. Lanie states she has experienced two \"emotional breakdowns\" over the last week, citing some ongoing conflict between her, her mother, and brother. Lanie reported her brother became upset with her mother over a miscommunication and how this lead to escalated conflict between them. Lanie stated she found herself in the middle of the conflict and she tried to resolve the issue by explaining/defending the perspective of each of the conflicted parties, which she described unfortunately did not help resolve the conflict. Lanie described feeling hurt because she does not feel her mother or brother ever consider how she feels, how the conflicts affect her so strongly. She stated that her brother and mother's \"pride\" seem to be prevent the conflicts from resolving successfully.     [facility] provided Lanie support and we talked about her perspective of how she involves herself in the conflicts of others, why it is difficult for her to not involve herself in them. We talked about her pattern of defending others, what this leads to and how well it works for her during conflicts. Namely we looked at if defending others to a person feeling alienated works well for her and resolving the conflict. We explored alternative behaviors, perhaps what she could try doing or not doing in the future, should these conflicts continue. We explored how difficulties/themes with abandonment seem to contribute " to her anxious thoughts and related feelings when conflicts occur between her family. Provided information about how past hurts, like mild traumas, can affect her experiences with current conflicts between family.     Lanie did not report concern with safety issues today. This traditionally has not been an issue for her.      Progress on Treatment Objective(s) / Homework:  Satisfactory progress - ACTION (Actively working towards change); Intervened by reinforcing change plan / affirming steps taken    Interpersonal Psychotherapy    Explore patterns in relationships that are effective or ineffective at helping patient reach their goals, find satisfying experience.    Discuss new patterns or behaviors to engage in for improved social functioning.    Narrative Therapy    Explore the patient's story of his/her life from his/her perspective.    Explore alternate ways of understanding their experience, identifying exceptions, developing new themes.    Acceptance and Commitment Therapy    Explore and identify important values in patient's life.    Discuss ways to commit to behavioral activation around these values.    Care Plan review completed: No    Medication Review:  No changes to current psychiatric medication(s)    Medication Compliance:  NA    Changes in Health Issues:   None reported    Chemical Use Review:   Substance Use: Chemical use reviewed, no active concerns identified      Tobacco Use: No current tobacco use.      Assessment: Current Emotional / Mental Status (status of significant symptoms):  Risk status (Self / Other harm or suicidal ideation)  Patient has had a history of suicidal ideation: passive thoughts only; easily controlled - denied any recently    Patient denies current fears or concerns for personal safety.  Patient denies current or recent suicidal ideation or behaviors.  Patient denies current or recent homicidal ideation or behaviors.  Patient denies current or recent self injurious  behavior or ideation.  Patient denies other safety concerns.     A safety and risk management plan has not been developed at this time, however patient was encouraged to call [place] [phone] should there be a change in any of these risk factors.      [place] Suicide Severity Rating Scale (Short Version)  [place] Suicide Severity Rating (Short Version) 2/18/2021   Over the past 2 weeks have you felt down, depressed, or hopeless? yes   Over the past 2 weeks have you had thoughts of killing yourself? no   Have you ever attempted to kill yourself? no     [place] Suicide Severity Rating Scale (Lifetime/Recent)  [place] Suicide Severity Rating (Lifetime/Recent) 8/13/2021   1. Wish to be Dead (Lifetime) No   1. Wish to be Dead (Recent) No   2. Non-Specific Active Suicidal Thoughts (Lifetime) No   2. Non-Specific Active Suicidal Thoughts (Recent) No   3. Active Suicidal Ideation with any Methods (Not Plan) Without Intent to Act (Lifetime) No   3. Active Suicidal Ideation with any Methods (Not Plan) Without Intent to Act (Recent) No   4. Active Suicidal Ideation with Some Intent to Act, Without Specific Plan (Lifetime) No   4. Active Suicidal Ideation with Some Intent to Act, Without Specific Plan (Recent) No   5. Active Suicidal Ideation with Specific Plan and Intent (Lifetime) No   5. Active Suicidal Ideation with Specific Plan and Intent (Recent) No   Most Severe Ideation Rating (Lifetime) NA   Frequency (Lifetime) NA   Duration (Lifetime) NA   Controllability (Lifetime) NA   Protective Factors  (Lifetime) NA   Reasons for Ideation (Lifetime) NA   Most Severe Ideation Rating (Past Month) NA   Frequency (Past Month) NA   Duration (Past Month) NA   Controllability (Past Month) NA   Protective Factors (Past Month) NA   Reasons for Ideation (Past Month) NA   Actual Attempt (Lifetime) No   Actual Attempt (Past 3 Months) No   Has subject engaged in non-suicidal self-injurious behavior? (Lifetime) No   Has subject  engaged in non-suicidal self-injurious behavior? (Past 3 Months) No   Interrupted Attempts (Lifetime) No   Interrupted Attempts (Past 3 Months) No   Aborted or Self-Interrupted Attempt (Lifetime) No   Aborted or Self-Interrupted Attempt (Past 3 Months) No   Preparatory Acts or Behavior (Lifetime) No   Preparatory Acts or Behavior (Past 3 Months) No   Most Recent Attempt Actual Lethality Code NA   Most Lethal Attempt Actual Lethality Code NA   Initial/First Attempt Actual Lethality Code NA         Appearance:   Appropriate   Eye Contact:   Good   Psychomotor Behavior: Normal   Attitude:   Cooperative   Orientation:   All  Speech   Rate / Production: Normal    Volume:  Normal   Mood:    Depressed    Affect:    Tearful  Thought Content:  Clear   Thought Form:  Coherent  Logical   Insight:    Good     Diagnoses:  1. Major depressive disorder, recurrent episode, mild with anxious distress (H)        Collateral Reports Completed:  [facility] will coordinate with care team as needed    Plan: (Homework, other):  Lanie was scheduled to RTC in two weeks for help addressing depressive symptoms and relationship conflicts. She was given strategies/ideas to better manage relationship conflicts today. CD Recommendations: No indications of CD issues.     Héctor Higuera Psy.D, LP August 18, 2021        Answers for HPI/ROS submitted by the patient on 12/15/2020   If you checked off any problems, how difficult have these problems made it for you to do your work, take care of things at home, or get along with other people?: Not difficult at all  PHQ9 TOTAL SCORE: 2  KATHARINE 7 TOTAL SCORE: 1    _________________________________________________________________________  [facility] Health Treatment Plan    Client's Name: Lanie Neil  YOB: 1990    Date: 6/8/2021    DSM-V Diagnoses: 296.31 (F33.0) Major Depressive Disorder, Recurrent Episode, Mild With anxious distress;   Psychosocial / Contextual Factors: [facility]      Clinical Global Impressions  First:  Considering your total clinical experience with this particular patient population, how severe are the patient's symptoms at this time?: 4 (6/22/2021  3:13 PM)      Most recent:  Compared to the patient's condition at the START of treatment, this patient's condition is: 3 (6/22/2021  3:13 PM)      Referral / Collaboration:  Will collaborate with care team as indicated during treatment.    Anticipated number of session or this episode of care: 10+      MeasurableTreatment Goal(s) related to diagnosis / functional impairment(s)  Goal 1:  Patient will experience a reduction in depressive and anxious symptoms, along with a corresponding increase in positive emotion and life satisfaction.    Objective #A: Patient will experience a reduction in depressed mood, will develop more effective coping skills to manage depressive symptoms, will develop healthy cognitive patterns and beliefs, will increase ability to function adaptively and will continue to take medications as prescribed / participate in supportive activities and services    Status: Continued - Date(s):6/8/2021    Objective #B: Patient will experience a reduction in anxiety, will develop more effective coping skills to manage anxiety symptoms, will develop healthy cognitive patterns and beliefs and will increase ability to function adaptively  Status: Continued - Date(s): 6/8/2021    Objective #C: Patient will develop better understanding of triggers and coping strategies to stabilize mood  Status: Continued - Date(s): 6/8/2021    Goal 2:  Patient will identify and increase engagement in valued activity, i.e. improving social connections/relationships, pursuing occupational goals or personally meaningful pursuits, exploration of meaning in life.     Objective #A: Patient will identify meaningful activity in social, occupational and  personal goals, and increase behavioral activation around these goals   Status: Continued -  Date(s): 6/8/2021    Objective #B: Patient will address relationship difficulties in a more adaptive manner  Status: Continued - Date(s): 6/8/2021    Objective #C: Patient will develop coping/problem-solving skills to facilitate more adaptive adjustment and will effectively address problems that interfere with adaptive functioning  Status: Continued - Date(s): 6/8/2021    Goal 3:  Patient will increase understanding of pain medications and develop more adaptive responses to chronic pain.    Objective #A: Patient will increase understanding of the relationship between pain and stress/mood   Status: Continued - Date(s): 6/8/2021    Objective #B: Patient will develop 2-3 additional chronic pain management strategies  Status: Continued - Date(s): 6/8/2021      Possible Therapeutic Intervention(s)  Psycho-education regarding mental health diagnoses and treatment options    Skills training    Explore skills useful to client in current situation.    Skills include assertiveness, communication, conflict management, problem-solving, relaxation, etc.    Solution-Focused Therapy    Explore patterns in patient's relationships and discuss options for new behaviors.    Explore patterns in patient's actions and choices and discuss options for new behaviors.    Cognitive-behavioral Therapy    Discuss common cognitive distortions, identify them in patient's life.    Explore ways to challenge, replace, and act against these cognitions.    Acceptance and Commitment Therapy    Explore and identify important values in patient's life.    Discuss ways to commit to behavioral activation around these values.    Psychodynamic psychotherapy    Discuss patient's emotional dynamics and issues and how they impact behaviors.    Explore patient's history of relationships and how they impact present behaviors.    Explore how to work with and make changes in these schemas and patterns.    Narrative Therapy    Explore the patient's story of his/her  life from his/her perspective.    Explore alternate ways of understanding their experience, identifying exceptions, developing new themes.    Interpersonal Psychotherapy    Explore patterns in relationships that are effective or ineffective at helping patient reach their goals, find satisfying experience.    Discuss new patterns or behaviors to engage in for improved social functioning.    Behavioral Activation    Discuss steps patient can take to become more involved in meaningful activity.    Identify barriers to these activities and explore possible solutions.    Mindfulness-Based Strategies    Discuss skills based on development and application of mindfulness.    Skills drawn from compassion-focused therapy, dialectical behavior therapy, mindfulness-based stress reduction, mindfulness-based cognitive therapy, etc.      We have developed these goals together during our work to this point. Patient has assisted in the development of these goals and has agreed to this treatment plan.       Héctor Higuera LP  February 09, 2021

## 2021-08-20 DIAGNOSIS — G62.9 NEUROPATHY: ICD-10-CM

## 2021-08-24 NOTE — TELEPHONE ENCOUNTER
gabapentin (NEURONTIN) 250 MG/5ML solution      Last Written Prescription Date:  3-5-2021  Last Fill Quantity: 1800,   # refills: 5  Last Office Visit : 6-7-2021  Future Office visit:  none    Routing refill request to provider for review/approval because:  Not on protocol.          Kathleen M Doege RN

## 2021-08-25 ENCOUNTER — VIRTUAL VISIT (OUTPATIENT)
Dept: BEHAVIORAL HEALTH | Facility: CLINIC | Age: 31
End: 2021-08-25
Payer: COMMERCIAL

## 2021-08-25 DIAGNOSIS — F33.0 MAJOR DEPRESSIVE DISORDER, RECURRENT EPISODE, MILD WITH ANXIOUS DISTRESS (H): Primary | ICD-10-CM

## 2021-08-25 PROCEDURE — 90837 PSYTX W PT 60 MINUTES: CPT | Mod: GT | Performed by: PSYCHOLOGIST

## 2021-08-25 RX ORDER — GABAPENTIN 250 MG/5ML
1000 SOLUTION ORAL 3 TIMES DAILY
Qty: 1800 ML | Refills: 5 | Status: SHIPPED | OUTPATIENT
Start: 2021-08-25 | End: 2022-02-18

## 2021-08-25 NOTE — PROGRESS NOTES
[facility]: Integrated Behavioral Health  August 25, 2021      Behavioral Health Clinician Progress Note    Patient Name: Lanie Neil           Service Type: Video appointment      Service Location:  Video appointment      Session Start Time: 10:07  Session End Time: 11:05      Session Length: 53 - 60      Attendees: Client    Visit Activities (Refresh list every visit): [facility] Only    Telemedicine Visit: The patient's condition can be safely assessed and treated via synchronous audio and visual telemedicine encounter.      Reason for Telemedicine Visit: Patient has requested telehealth visit and Services only offered telehealth    Originating Site (Patient Location): Patient's home    Distant Site (Provider Location): Provider Remote Setting- Home Office    Consent:  The patient/guardian has verbally consented to: the potential risks and benefits of telemedicine (video visit) versus in person care; bill my insurance or make self-payment for services provided; and responsibility for payment of non-covered services.     Mode of Communication:  Video Conference via [employer]    As the provider I attest to compliance with applicable laws and regulations related to telemedicine.      Diagnostic Assessment Date: 1/19/2021  Treatment Plan Review Date: 9/8/2021  See Flowsheets for today's PHQ-9 and KATHARINE-7 results  Previous PHQ-9:   PHQ-9 SCORE 12/15/2020 3/16/2021 6/30/2021   PHQ-9 Total Score - - -   PHQ-9 Total Score MyChart 2 (Minimal depression) 2 (Minimal depression) 2 (Minimal depression)   PHQ-9 Total Score 2 2 2     Previous KATHARINE-7:   KATHARINE-7 SCORE 12/15/2020 3/16/2021 6/30/2021   Total Score 1 (minimal anxiety) 1 (minimal anxiety) 1 (minimal anxiety)   Total Score 1 1 1       BRITTANY LEVEL:  No flowsheet data found.    DATA  Extended Session (60+ minutes): No  Interactive Complexity: No  Crisis: No    Treatment Objective(s) Addressed in This Session:  Target Behavior(s): disease  management/lifestyle changes      Depressed Mood: Decrease frequency and intensity of feeling down, depressed, hopeless  Identify negative self-talk and behaviors: challenge core beliefs, myths, and actions  Anxiety: will develop more effective coping skills to manage anxiety symptoms  Relationship Problems: will address relationship difficulties in a more adaptive manner    Current Stressors / Issues:  [facility] met with Lanie today to continue supporting her treatment of depression, adjustment to chronic disease management, and relationship problems. Concerns regarding ongoing relationship conflict was raised by Lanie today, particularly between her brother and mother. She states that while the conflict has reduced somewhat over the last week, she expressed having ongoing concern about their conflicted relationship. We continued to explore her behavior patterns in relationships, how they contribute to her distress in some ways or are ineffective at reaching successful resolution. We explored Lanie's perspective about the nature of these conflicts and discussed alternative patterns of behavior she could explore to address future conflicts differently. In particular, we talked about alternatives to explaining the point of view of someone else to someone when they are upset with said person. We emphasized that her intention is good, that it is helpful to try and explain other's perspectives, though we talked about how this is infective when someone is acutely distressed/angery/hurt. We spent time talking about how she might approach conflicts differently in this regard, such as using validation or revisiting distressful topics when someone is calmer/less distressed.     Progress on Treatment Objective(s) / Homework:  Satisfactory progress - ACTION (Actively working towards change); Intervened by reinforcing change plan / affirming steps taken    Interpersonal Psychotherapy    Explore patterns in relationships that  are effective or ineffective at helping patient reach their goals, find satisfying experience.    Discuss new patterns or behaviors to engage in for improved social functioning.    Narrative Therapy    Explore the patient's story of his/her life from his/her perspective.    Explore alternate ways of understanding their experience, identifying exceptions, developing new themes.    Acceptance and Commitment Therapy    Explore and identify important values in patient's life.    Discuss ways to commit to behavioral activation around these values.    Care Plan review completed: No    Medication Review:  No changes to current psychiatric medication(s)    Medication Compliance:  NA    Changes in Health Issues:   None reported    Chemical Use Review:   Substance Use: Chemical use reviewed, no active concerns identified      Tobacco Use: No current tobacco use.      Assessment: Current Emotional / Mental Status (status of significant symptoms):  Risk status (Self / Other harm or suicidal ideation)  Patient has had a history of suicidal ideation: passive thoughts only; easily controlled - denied any recently    Patient denies current fears or concerns for personal safety.  Patient denies current or recent suicidal ideation or behaviors.  Patient denies current or recent homicidal ideation or behaviors.  Patient denies current or recent self injurious behavior or ideation.  Patient denies other safety concerns.     A safety and risk management plan has not been developed at this time, however patient was encouraged to call [place] [phone] should there be a change in any of these risk factors.      [place] Suicide Severity Rating Scale (Short Version)  [place] Suicide Severity Rating (Short Version) 2/18/2021   Over the past 2 weeks have you felt down, depressed, or hopeless? yes   Over the past 2 weeks have you had thoughts of killing yourself? no   Have you ever attempted to kill yourself? no     [place] Suicide  Severity Rating Scale (Lifetime/Recent)  [place] Suicide Severity Rating (Lifetime/Recent) 8/13/2021   1. Wish to be Dead (Lifetime) No   1. Wish to be Dead (Recent) No   2. Non-Specific Active Suicidal Thoughts (Lifetime) No   2. Non-Specific Active Suicidal Thoughts (Recent) No   3. Active Suicidal Ideation with any Methods (Not Plan) Without Intent to Act (Lifetime) No   3. Active Suicidal Ideation with any Methods (Not Plan) Without Intent to Act (Recent) No   4. Active Suicidal Ideation with Some Intent to Act, Without Specific Plan (Lifetime) No   4. Active Suicidal Ideation with Some Intent to Act, Without Specific Plan (Recent) No   5. Active Suicidal Ideation with Specific Plan and Intent (Lifetime) No   5. Active Suicidal Ideation with Specific Plan and Intent (Recent) No   Most Severe Ideation Rating (Lifetime) NA   Frequency (Lifetime) NA   Duration (Lifetime) NA   Controllability (Lifetime) NA   Protective Factors  (Lifetime) NA   Reasons for Ideation (Lifetime) NA   Most Severe Ideation Rating (Past Month) NA   Frequency (Past Month) NA   Duration (Past Month) NA   Controllability (Past Month) NA   Protective Factors (Past Month) NA   Reasons for Ideation (Past Month) NA   Actual Attempt (Lifetime) No   Actual Attempt (Past 3 Months) No   Has subject engaged in non-suicidal self-injurious behavior? (Lifetime) No   Has subject engaged in non-suicidal self-injurious behavior? (Past 3 Months) No   Interrupted Attempts (Lifetime) No   Interrupted Attempts (Past 3 Months) No   Aborted or Self-Interrupted Attempt (Lifetime) No   Aborted or Self-Interrupted Attempt (Past 3 Months) No   Preparatory Acts or Behavior (Lifetime) No   Preparatory Acts or Behavior (Past 3 Months) No   Most Recent Attempt Actual Lethality Code NA   Most Lethal Attempt Actual Lethality Code NA   Initial/First Attempt Actual Lethality Code NA         Appearance:   Appropriate   Eye Contact:   Good   Psychomotor Behavior: Normal    Attitude:   Cooperative   Orientation:   All  Speech   Rate / Production: Normal    Volume:  Normal   Mood:    Depressed    Affect:    Tearful  Thought Content:  Clear   Thought Form:  Coherent  Logical   Insight:    Good     Diagnoses:  1. Major depressive disorder, recurrent episode, mild with anxious distress (H)        Collateral Reports Completed:  [facility] will coordinate with care team as needed    Plan: (Homework, other):  Lanie was scheduled to RTC in two weeks for help addressing depressive symptoms and relationship conflicts. She was given additional strategies/ideas to better manage relationship conflicts today. CD Recommendations: No indications of CD issues.     Héctor Higuera Psy.D, LP August 25, 2021        Answers for HPI/ROS submitted by the patient on 12/15/2020   If you checked off any problems, how difficult have these problems made it for you to do your work, take care of things at home, or get along with other people?: Not difficult at all  PHQ9 TOTAL SCORE: 2  KATHARINE 7 TOTAL SCORE: 1    _________________________________________________________________________  [facility] Integrated Behavioral Health Treatment Plan    Client's Name: Lanie eNil  YOB: 1990    Date: 6/8/2021    DSM-V Diagnoses: 296.31 (F33.0) Major Depressive Disorder, Recurrent Episode, Mild With anxious distress;   Psychosocial / Contextual Factors: SMA     Clinical Global Impressions  First:  Considering your total clinical experience with this particular patient population, how severe are the patient's symptoms at this time?: 4 (6/22/2021  3:13 PM)      Most recent:  Compared to the patient's condition at the START of treatment, this patient's condition is: 3 (6/22/2021  3:13 PM)      Referral / Collaboration:  Will collaborate with care team as indicated during treatment.    Anticipated number of session or this episode of care: 10+      MeasurableTreatment Goal(s) related to diagnosis / functional  impairment(s)  Goal 1:  Patient will experience a reduction in depressive and anxious symptoms, along with a corresponding increase in positive emotion and life satisfaction.    Objective #A: Patient will experience a reduction in depressed mood, will develop more effective coping skills to manage depressive symptoms, will develop healthy cognitive patterns and beliefs, will increase ability to function adaptively and will continue to take medications as prescribed / participate in supportive activities and services    Status: Continued - Date(s):6/8/2021    Objective #B: Patient will experience a reduction in anxiety, will develop more effective coping skills to manage anxiety symptoms, will develop healthy cognitive patterns and beliefs and will increase ability to function adaptively  Status: Continued - Date(s): 6/8/2021    Objective #C: Patient will develop better understanding of triggers and coping strategies to stabilize mood  Status: Continued - Date(s): 6/8/2021    Goal 2:  Patient will identify and increase engagement in valued activity, i.e. improving social connections/relationships, pursuing occupational goals or personally meaningful pursuits, exploration of meaning in life.     Objective #A: Patient will identify meaningful activity in social, occupational and  personal goals, and increase behavioral activation around these goals   Status: Continued - Date(s): 6/8/2021    Objective #B: Patient will address relationship difficulties in a more adaptive manner  Status: Continued - Date(s): 6/8/2021    Objective #C: Patient will develop coping/problem-solving skills to facilitate more adaptive adjustment and will effectively address problems that interfere with adaptive functioning  Status: Continued - Date(s): 6/8/2021    Goal 3:  Patient will increase understanding of pain medications and develop more adaptive responses to chronic pain.    Objective #A: Patient will increase understanding of the  relationship between pain and stress/mood   Status: Continued - Date(s): 6/8/2021    Objective #B: Patient will develop 2-3 additional chronic pain management strategies  Status: Continued - Date(s): 6/8/2021      Possible Therapeutic Intervention(s)  Psycho-education regarding mental health diagnoses and treatment options    Skills training    Explore skills useful to client in current situation.    Skills include assertiveness, communication, conflict management, problem-solving, relaxation, etc.    Solution-Focused Therapy    Explore patterns in patient's relationships and discuss options for new behaviors.    Explore patterns in patient's actions and choices and discuss options for new behaviors.    Cognitive-behavioral Therapy    Discuss common cognitive distortions, identify them in patient's life.    Explore ways to challenge, replace, and act against these cognitions.    Acceptance and Commitment Therapy    Explore and identify important values in patient's life.    Discuss ways to commit to behavioral activation around these values.    Psychodynamic psychotherapy    Discuss patient's emotional dynamics and issues and how they impact behaviors.    Explore patient's history of relationships and how they impact present behaviors.    Explore how to work with and make changes in these schemas and patterns.    Narrative Therapy    Explore the patient's story of his/her life from his/her perspective.    Explore alternate ways of understanding their experience, identifying exceptions, developing new themes.    Interpersonal Psychotherapy    Explore patterns in relationships that are effective or ineffective at helping patient reach their goals, find satisfying experience.    Discuss new patterns or behaviors to engage in for improved social functioning.    Behavioral Activation    Discuss steps patient can take to become more involved in meaningful activity.    Identify barriers to these activities and explore  possible solutions.    Mindfulness-Based Strategies    Discuss skills based on development and application of mindfulness.    Skills drawn from compassion-focused therapy, dialectical behavior therapy, mindfulness-based stress reduction, mindfulness-based cognitive therapy, etc.      We have developed these goals together during our work to this point. Patient has assisted in the development of these goals and has agreed to this treatment plan.       Héctor Higuera, LP

## 2021-08-27 ENCOUNTER — MYC MEDICAL ADVICE (OUTPATIENT)
Dept: FAMILY MEDICINE | Facility: CLINIC | Age: 31
End: 2021-08-27

## 2021-08-27 DIAGNOSIS — G12.9 SPINAL MUSCLE ATROPHY (H): ICD-10-CM

## 2021-08-30 RX ORDER — DIAZEPAM 5 MG
TABLET ORAL
Qty: 30 TABLET | Refills: 0 | Status: SHIPPED | OUTPATIENT
Start: 2021-08-30 | End: 2021-09-24

## 2021-08-30 NOTE — TELEPHONE ENCOUNTER
Patient Requested  Valium  Last Filled  03/05/2021 (5 refills)   Last Office Visit  06/07/2021  Next Office Visit  Nothing scheduled   Checked  08/30/2021       DX: Spinal muscle atrophy      Pharmacy:   [id number] - [place], [place] - [address]        LILLIAM Jefferson RN at 10:45 AM on 8/30/2021.

## 2021-09-09 ENCOUNTER — TRANSFERRED RECORDS (OUTPATIENT)
Dept: HEALTH INFORMATION MANAGEMENT | Facility: CLINIC | Age: 31
End: 2021-09-09

## 2021-09-17 DIAGNOSIS — G12.9 SPINAL MUSCLE ATROPHY (H): ICD-10-CM

## 2021-09-23 ENCOUNTER — MYC MEDICAL ADVICE (OUTPATIENT)
Dept: FAMILY MEDICINE | Facility: CLINIC | Age: 31
End: 2021-09-23

## 2021-09-23 ENCOUNTER — VIRTUAL VISIT (OUTPATIENT)
Dept: BEHAVIORAL HEALTH | Facility: CLINIC | Age: 31
End: 2021-09-23
Payer: COMMERCIAL

## 2021-09-23 DIAGNOSIS — F33.0 MAJOR DEPRESSIVE DISORDER, RECURRENT EPISODE, MILD WITH ANXIOUS DISTRESS (H): Primary | ICD-10-CM

## 2021-09-23 PROCEDURE — 90837 PSYTX W PT 60 MINUTES: CPT | Mod: GT | Performed by: PSYCHOLOGIST

## 2021-09-23 NOTE — PROGRESS NOTES
[facility]: Integrated Behavioral Health  September 23, 2021        Behavioral Health Clinician Progress Note    Patient Name: Lanie Neil           Service Type: Video appointment      Service Location:  Video appointment      Session Start Time: 10:06  Session End Time: 11:05      Session Length: 53 - 60      Attendees: Client    Visit Activities (Refresh list every visit): [facility] Only    Telemedicine Visit: The patient's condition can be safely assessed and treated via synchronous audio and visual telemedicine encounter.      Reason for Telemedicine Visit: Patient has requested telehealth visit and Services only offered telehealth    Originating Site (Patient Location): Patient's home    Distant Site (Provider Location): Provider Remote Setting- Home Office    Consent:  The patient/guardian has verbally consented to: the potential risks and benefits of telemedicine (video visit) versus in person care; bill my insurance or make self-payment for services provided; and responsibility for payment of non-covered services.     Mode of Communication:  Video Conference via [employer]    As the provider I attest to compliance with applicable laws and regulations related to telemedicine.      Diagnostic Assessment Date: 1/19/2021  Treatment Plan Review Date: 12/23/2021  See Flowsheets for today's PHQ-9 and KATHARINE-7 results  Previous PHQ-9:   PHQ-9 SCORE 12/15/2020 3/16/2021 6/30/2021   PHQ-9 Total Score - - -   PHQ-9 Total Score MyChart 2 (Minimal depression) 2 (Minimal depression) 2 (Minimal depression)   PHQ-9 Total Score 2 2 2     Previous KATHARINE-7:   KATHARINE-7 SCORE 12/15/2020 3/16/2021 6/30/2021   Total Score 1 (minimal anxiety) 1 (minimal anxiety) 1 (minimal anxiety)   Total Score 1 1 1       DATA  Extended Session (60+ minutes): No  Interactive Complexity: No  Crisis: No    Treatment Objective(s) Addressed in This Session:  Target Behavior(s): disease management/lifestyle changes       Depressed Mood: Decrease frequency and intensity of feeling down, depressed, hopeless  Identify negative self-talk and behaviors: challenge core beliefs, myths, and actions  Anxiety: will develop more effective coping skills to manage anxiety symptoms  Relationship Problems: will address relationship difficulties in a more adaptive manner    Current Stressors / Issues:  [facility] [place] met with Lanie today to continue supporting her treatment of depression, adjustment to chronic disease management, and relationship problems. We continued addressing her concerns about ongoing relationship conflict  between her brother and mother. She shared how bothered she feels when they are in conflict with each other; she notes feeling quite distressed and struggles with trying to find resolution to their conflicts. We discussed her tendency to mediate conflicts by sharing others' perspectives, but ultimately how this seems to alienate them. We discussed how managing others' behaviors/actions in conflicts can be very difficult, so we explored what Lanie could control in herself during conflicts. Explored in particular what thoughts and behaviors she could focus on to help manage the distress that she experiences in conflicts. Framed this idea to Lanie as working toward short-term versus long-term solutions to distress, how her current pattern of behavior supports short-term solutions, but adds to her distress long-term. We began exploring certain actions she could take to promote longer-term solutions to her difficulties presented today.     Progress on Treatment Objective(s) / Homework:  Satisfactory progress - ACTION (Actively working towards change); Intervened by reinforcing change plan / affirming steps taken    Interpersonal Psychotherapy    Explore patterns in relationships that are effective or ineffective at helping patient reach their goals, find satisfying experience.    Discuss new patterns or behaviors to engage in for  improved social functioning.    Narrative Therapy    Explore the patient's story of his/her life from his/her perspective.    Explore alternate ways of understanding their experience, identifying exceptions, developing new themes.    Acceptance and Commitment Therapy    Explore and identify important values in patient's life.    Discuss ways to commit to behavioral activation around these values.    Care Plan review completed: yes    Medication Review:  No changes to current psychiatric medication(s)    Medication Compliance:  NA    Changes in Health Issues:   None reported    Chemical Use Review:   Substance Use: Chemical use reviewed, no active concerns identified      Tobacco Use: No current tobacco use.      Assessment: Current Emotional / Mental Status (status of significant symptoms):  Risk status (Self / Other harm or suicidal ideation)  Patient has had a history of suicidal ideation: passive thoughts only; easily controlled - denied any recently    Patient denies current fears or concerns for personal safety.  Patient denies current or recent suicidal ideation or behaviors.  Patient denies current or recent homicidal ideation or behaviors.  Patient denies current or recent self injurious behavior or ideation.  Patient denies other safety concerns.     A safety and risk management plan has not been developed at this time, however patient was encouraged to call [place] [phone] should there be a change in any of these risk factors.      Stephens Suicide Severity Rating Scale (Short Version)  Stephens Suicide Severity Rating (Short Version) 2/18/2021   Over the past 2 weeks have you felt down, depressed, or hopeless? yes   Over the past 2 weeks have you had thoughts of killing yourself? no   Have you ever attempted to kill yourself? no     Stephens Suicide Severity Rating Scale (Lifetime/Recent)  Stephens Suicide Severity Rating (Lifetime/Recent) 8/13/2021   1. Wish to be Dead (Lifetime) No   1. Wish to be  Dead (Recent) No   2. Non-Specific Active Suicidal Thoughts (Lifetime) No   2. Non-Specific Active Suicidal Thoughts (Recent) No   3. Active Suicidal Ideation with any Methods (Not Plan) Without Intent to Act (Lifetime) No   3. Active Suicidal Ideation with any Methods (Not Plan) Without Intent to Act (Recent) No   4. Active Suicidal Ideation with Some Intent to Act, Without Specific Plan (Lifetime) No   4. Active Suicidal Ideation with Some Intent to Act, Without Specific Plan (Recent) No   5. Active Suicidal Ideation with Specific Plan and Intent (Lifetime) No   5. Active Suicidal Ideation with Specific Plan and Intent (Recent) No   Most Severe Ideation Rating (Lifetime) NA   Frequency (Lifetime) NA   Duration (Lifetime) NA   Controllability (Lifetime) NA   Protective Factors  (Lifetime) NA   Reasons for Ideation (Lifetime) NA   Most Severe Ideation Rating (Past Month) NA   Frequency (Past Month) NA   Duration (Past Month) NA   Controllability (Past Month) NA   Protective Factors (Past Month) NA   Reasons for Ideation (Past Month) NA   Actual Attempt (Lifetime) No   Actual Attempt (Past 3 Months) No   Has subject engaged in non-suicidal self-injurious behavior? (Lifetime) No   Has subject engaged in non-suicidal self-injurious behavior? (Past 3 Months) No   Interrupted Attempts (Lifetime) No   Interrupted Attempts (Past 3 Months) No   Aborted or Self-Interrupted Attempt (Lifetime) No   Aborted or Self-Interrupted Attempt (Past 3 Months) No   Preparatory Acts or Behavior (Lifetime) No   Preparatory Acts or Behavior (Past 3 Months) No   Most Recent Attempt Actual Lethality Code NA   Most Lethal Attempt Actual Lethality Code NA   Initial/First Attempt Actual Lethality Code NA         Appearance:   Appropriate   Eye Contact:   Good   Psychomotor Behavior: Normal   Attitude:   Cooperative   Orientation:   All  Speech   Rate / Production: Normal    Volume:  Normal   Mood:    Depressed    Affect:    Tearful  Thought  Content:  Clear   Thought Form:  Coherent  Logical   Insight:    Good     Diagnoses:  1. Major depressive disorder, recurrent episode, mild with anxious distress (H)        Collateral Reports Completed:  [facility] will coordinate with care team as needed    Plan: (Homework, other):  Lanie was scheduled to RTC in two weeks for help addressing depressive symptoms and relationship conflicts. She was given further strategies/ideas to better manage relationship conflicts today. CD Recommendations: No indications of CD issues.     Héctor Higuera Psy.D, LP September 23, 2021          Answers for HPI/ROS submitted by the patient on 12/15/2020   If you checked off any problems, how difficult have these problems made it for you to do your work, take care of things at home, or get along with other people?: Not difficult at all  PHQ9 TOTAL SCORE: 2  KATHARINE 7 TOTAL SCORE: 1    _________________________________________________________________________  [facility] Integrated [facility]al Health Treatment Plan    Client's Name: Lanie Neil  YOB: 1990    Date: 9/23/2021    DSM-V Diagnoses: 296.31 (F33.0) Major Depressive Disorder, Recurrent Episode, Mild With anxious distress;   Psychosocial / Contextual Factors: SMA     Clinical Global Impressions  First:  Considering your total clinical experience with this particular patient population, how severe are the patient's symptoms at this time?: 4 (9/23/2021  3:29 PM)      Most recent:  Compared to the patient's condition at the START of treatment, this patient's condition is: 3 (9/23/2021  3:29 PM)      Referral / Collaboration:  Will collaborate with care team as indicated during treatment.    Anticipated number of session or this episode of care: 10+      MeasurableTreatment Goal(s) related to diagnosis / functional impairment(s)  Goal 1:  Patient will experience a reduction in depressive and anxious symptoms, along with a corresponding increase in positive emotion and life  satisfaction.    Objective #A: Patient will experience a reduction in depressed mood, will develop more effective coping skills to manage depressive symptoms, will develop healthy cognitive patterns and beliefs, will increase ability to function adaptively and will continue to take medications as prescribed / participate in supportive activities and services    Status: Continued - Date(s): 9/23/2021    Objective #B: Patient will experience a reduction in anxiety, will develop more effective coping skills to manage anxiety symptoms, will develop healthy cognitive patterns and beliefs and will increase ability to function adaptively  Status: Continued - Date(s):  9/23/2021    Objective #C: Patient will develop better understanding of triggers and coping strategies to stabilize mood  Status: Continued - Date(s):  9/23/2021    Goal 2:  Patient will identify and increase engagement in valued activity, i.e. improving social connections/relationships, pursuing occupational goals or personally meaningful pursuits, exploration of meaning in life.     Objective #A: Patient will identify meaningful activity in social, occupational and  personal goals, and increase behavioral activation around these goals   Status: Continued - Date(s):  9/23/2021    Objective #B: Patient will address relationship difficulties in a more adaptive manner  Status: Continued - Date(s):  9/23/2021    Objective #C: Patient will develop coping/problem-solving skills to facilitate more adaptive adjustment and will effectively address problems that interfere with adaptive functioning  Status: Continued - Date(s):  9/23/2021    Goal 3:  Patient will increase understanding of pain medications and develop more adaptive responses to chronic pain.    Objective #A: Patient will increase understanding of the relationship between pain and stress/mood   Status: Continued - Date(s):  9/23/2021    Objective #B: Patient will develop 2-3 additional chronic pain  management strategies  Status: Continued - Date(s):  9/23/2021      Possible Therapeutic Intervention(s)  Psycho-education regarding mental health diagnoses and treatment options    Skills training    Explore skills useful to client in current situation.    Skills include assertiveness, communication, conflict management, problem-solving, relaxation, etc.    Solution-Focused Therapy    Explore patterns in patient's relationships and discuss options for new behaviors.    Explore patterns in patient's actions and choices and discuss options for new behaviors.    Cognitive-behavioral Therapy    Discuss common cognitive distortions, identify them in patient's life.    Explore ways to challenge, replace, and act against these cognitions.    Acceptance and Commitment Therapy    Explore and identify important values in patient's life.    Discuss ways to commit to behavioral activation around these values.    Psychodynamic psychotherapy    Discuss patient's emotional dynamics and issues and how they impact behaviors.    Explore patient's history of relationships and how they impact present behaviors.    Explore how to work with and make changes in these schemas and patterns.    Narrative Therapy    Explore the patient's story of his/her life from his/her perspective.    Explore alternate ways of understanding their experience, identifying exceptions, developing new themes.    Interpersonal Psychotherapy    Explore patterns in relationships that are effective or ineffective at helping patient reach their goals, find satisfying experience.    Discuss new patterns or behaviors to engage in for improved social functioning.    Behavioral Activation    Discuss steps patient can take to become more involved in meaningful activity.    Identify barriers to these activities and explore possible solutions.    Mindfulness-Based Strategies    Discuss skills based on development and application of mindfulness.    Skills drawn from  compassion-focused therapy, dialectical behavior therapy, mindfulness-based stress reduction, mindfulness-based cognitive therapy, etc.      We have developed these goals together during our work to this point. Patient has assisted in the development of these goals and has agreed to this treatment plan.       Héctor Higuera LP   9/23/2021

## 2021-09-24 RX ORDER — DIAZEPAM 5 MG
TABLET ORAL
Qty: 30 TABLET | Refills: 5 | Status: SHIPPED | OUTPATIENT
Start: 2021-09-24 | End: 2022-03-07

## 2021-09-24 RX ORDER — ALPRAZOLAM 0.25 MG
TABLET ORAL
Qty: 60 TABLET | Refills: 5 | Status: SHIPPED | OUTPATIENT
Start: 2021-09-24 | End: 2022-03-30

## 2021-09-24 NOTE — TELEPHONE ENCOUNTER
Alprazolam : last filled on 8/27/21 with 60 tabs per  data.  Diazepam : last filled on 8/30/21 with 30 tabs per  data.

## 2021-09-29 ENCOUNTER — MYC MEDICAL ADVICE (OUTPATIENT)
Dept: FAMILY MEDICINE | Facility: CLINIC | Age: 31
End: 2021-09-29

## 2021-10-03 ENCOUNTER — HEALTH MAINTENANCE LETTER (OUTPATIENT)
Age: 31
End: 2021-10-03

## 2021-10-06 ENCOUNTER — TRANSFERRED RECORDS (OUTPATIENT)
Dept: HEALTH INFORMATION MANAGEMENT | Facility: CLINIC | Age: 31
End: 2021-10-06

## 2021-10-07 ENCOUNTER — VIRTUAL VISIT (OUTPATIENT)
Dept: BEHAVIORAL HEALTH | Facility: CLINIC | Age: 31
End: 2021-10-07
Payer: COMMERCIAL

## 2021-10-07 DIAGNOSIS — F33.0 MAJOR DEPRESSIVE DISORDER, RECURRENT EPISODE, MILD WITH ANXIOUS DISTRESS (H): Primary | ICD-10-CM

## 2021-10-07 PROCEDURE — 90837 PSYTX W PT 60 MINUTES: CPT | Mod: GT | Performed by: PSYCHOLOGIST

## 2021-10-07 NOTE — PROGRESS NOTES
[facility]: Integrated Behavioral Health  October 7, 2021      Behavioral Health Clinician Progress Note    Patient Name: Lanie Neil           Service Type: Video appointment      Service Location:  Video appointment      Session Start Time: 11:02  Session End Time: 11:59      Session Length: 53 - 60      Attendees: Client    Visit Activities (Refresh list every visit): [facility] Only    Telemedicine Visit: The patient's condition can be safely assessed and treated via synchronous audio and visual telemedicine encounter.      Reason for Telemedicine Visit: Patient has requested telehealth visit and Services only offered telehealth    Originating Site (Patient Location): Patient's home    Distant Site (Provider Location): Provider Remote Setting- Home Office    Consent:  The patient/guardian has verbally consented to: the potential risks and benefits of telemedicine (video visit) versus in person care; bill my insurance or make self-payment for services provided; and responsibility for payment of non-covered services.     Mode of Communication:  Video Conference via [email or web address]    As the provider I attest to compliance with applicable laws and regulations related to telemedicine.      Diagnostic Assessment Date: 1/19/2021  Treatment Plan Review Date: 12/23/2021  See Flowsheets for today's PHQ-9 and KATHARINE-7 results  Previous PHQ-9:   PHQ-9 SCORE 12/15/2020 3/16/2021 6/30/2021   PHQ-9 Total Score - - -   PHQ-9 Total Score MyChart 2 (Minimal depression) 2 (Minimal depression) 2 (Minimal depression)   PHQ-9 Total Score 2 2 2     Previous KATHARINE-7:   KATHARINE-7 SCORE 12/15/2020 3/16/2021 6/30/2021   Total Score 1 (minimal anxiety) 1 (minimal anxiety) 1 (minimal anxiety)   Total Score 1 1 1       DATA  Extended Session (60+ minutes): No  Interactive Complexity: No  Crisis: No    Treatment Objective(s) Addressed in This Session:  Target Behavior(s): disease management/lifestyle changes       Depressed Mood: Decrease frequency and intensity of feeling down, depressed, hopeless  Identify negative self-talk and behaviors: challenge core beliefs, myths, and actions  Anxiety: will develop more effective coping skills to manage anxiety symptoms  Relationship Problems: will address relationship difficulties in a more adaptive manner  Psychological distress related to Pain    Current Stressors / Issues:  [facility] met with Lanie today to continue supporting her treatment of depression, adjustment to chronic disease management, and relationship problems.     Lanie presents today in acute distress. She was tearful during the session, citing that she recently learned of new policy changes with regard to prescription of pain medication that will affect her. She reports that over the next year or so, she will be required to taper down on her pain medication use, specifically with the use of dilaudid. She reports fearing the pain she will experience in the coming months, as she knows that going without her maximum dose of pain medication is difficult for her. She spoke to additional frustration with feeling as though she is treated like an addict, though she reports not doing the things those with addiction do to warrant changes to prescription policies. This makes her reportedly feel punished for the mistakes of others, as she says.     [facility] provided Lanie support and we worked toward helping her process this upcoming change for her. We also explored ideas related to ACT for pain to help her think about the situation differently. For instance, we discussed how she didn't choose this path with pain, ultimately to help Lanie reduce the sense of guilt she feels for being dependent on pain medication.     Her narrative also included ongoing conflict with her brother that we discussed. She spoke to the sense of disappointment and hurt she feels by him quite often. Discussed the role of acceptance and focusing on how  she can cope with these situations by doing something different in their interactions or limiting interactions altogether.       Progress on Treatment Objective(s) / Homework:  Worsening - CONTEMPLATION (Considering change and yet undecided); Intervened by assessing the negative and positive thinking (ambivalence) about behavior change      Narrative Therapy    Explore the patient's story of his/her life from his/her perspective.    Explore alternate ways of understanding their experience, identifying exceptions, developing new themes.    Acceptance and Commitment Therapy    Explore and identify important values in patient's life.    Discuss ways to commit to behavioral activation around these values.    Care Plan review completed: yes    Medication Review:  No changes to current psychiatric medication(s)    Medication Compliance:  NA    Changes in Health Issues:   None reported    Chemical Use Review:   Substance Use: Chemical use reviewed, no active concerns identified      Tobacco Use: No current tobacco use.      Assessment: Current Emotional / Mental Status (status of significant symptoms):  Risk status (Self / Other harm or suicidal ideation)  Patient has had a history of suicidal ideation: passive thoughts only; easily controlled - denied any recently    Patient denies current fears or concerns for personal safety.  Patient denies current or recent suicidal ideation or behaviors.  Patient denies current or recent homicidal ideation or behaviors.  Patient denies current or recent self injurious behavior or ideation.  Patient denies other safety concerns.     A safety and risk management plan has not been developed at this time, however patient was encouraged to call [place] [phone] should there be a change in any of these risk factors.      [place] Suicide Severity Rating Scale (Short Version)  [place] Suicide Severity Rating (Short Version) 2/18/2021   Over the past 2 weeks have you felt down,  depressed, or hopeless? yes   Over the past 2 weeks have you had thoughts of killing yourself? no   Have you ever attempted to kill yourself? no     [place] Suicide Severity Rating Scale (Lifetime/Recent)  [place] Suicide Severity Rating (Lifetime/Recent) 8/13/2021   1. Wish to be Dead (Lifetime) No   1. Wish to be Dead (Recent) No   2. Non-Specific Active Suicidal Thoughts (Lifetime) No   2. Non-Specific Active Suicidal Thoughts (Recent) No   3. Active Suicidal Ideation with any Methods (Not Plan) Without Intent to Act (Lifetime) No   3. Active Suicidal Ideation with any Methods (Not Plan) Without Intent to Act (Recent) No   4. Active Suicidal Ideation with Some Intent to Act, Without Specific Plan (Lifetime) No   4. Active Suicidal Ideation with Some Intent to Act, Without Specific Plan (Recent) No   5. Active Suicidal Ideation with Specific Plan and Intent (Lifetime) No   5. Active Suicidal Ideation with Specific Plan and Intent (Recent) No   Most Severe Ideation Rating (Lifetime) NA   Frequency (Lifetime) NA   Duration (Lifetime) NA   Controllability (Lifetime) NA   Protective Factors  (Lifetime) NA   Reasons for Ideation (Lifetime) NA   Most Severe Ideation Rating (Past Month) NA   Frequency (Past Month) NA   Duration (Past Month) NA   Controllability (Past Month) NA   Protective Factors (Past Month) NA   Reasons for Ideation (Past Month) NA   Actual Attempt (Lifetime) No   Actual Attempt (Past 3 Months) No   Has subject engaged in non-suicidal self-injurious behavior? (Lifetime) No   Has subject engaged in non-suicidal self-injurious behavior? (Past 3 Months) No   Interrupted Attempts (Lifetime) No   Interrupted Attempts (Past 3 Months) No   Aborted or Self-Interrupted Attempt (Lifetime) No   Aborted or Self-Interrupted Attempt (Past 3 Months) No   Preparatory Acts or Behavior (Lifetime) No   Preparatory Acts or Behavior (Past 3 Months) No   Most Recent Attempt Actual Lethality Code NA   Most Lethal Attempt  Actual Lethality Code NA   Initial/First Attempt Actual Lethality Code NA         Appearance:   Appropriate   Eye Contact:   Good   Psychomotor Behavior: Normal   Attitude:   Cooperative   Orientation:   All  Speech   Rate / Production: Normal    Volume:  Normal   Mood:    Depressed    Affect:    Tearful  Thought Content:  Clear   Thought Form:  Coherent  Logical   Insight:    Good     Diagnoses:  1. Major depressive disorder, recurrent episode, mild with anxious distress (H)        Collateral Reports Completed:  [facility] will coordinate with care team as needed    Plan: (Homework, other):  Lanie was scheduled to RTC in two weeks for help addressing depressive symptoms, pain, and relationship conflicts. She was given further strategies/ideas to better manage relationship conflicts today. CD Recommendations: No indications of CD issues.     Héctor Higuera Psy.D, LP October 7, 2021            Answers for HPI/ROS submitted by the patient on 12/15/2020   If you checked off any problems, how difficult have these problems made it for you to do your work, take care of things at home, or get along with other people?: Not difficult at all  PHQ9 TOTAL SCORE: 2  KATHARINE 7 TOTAL SCORE: 1    _________________________________________________________________________  [facility] Integrated Behavioral Health Treatment Plan    Client's Name: Lanie Neil  YOB: 1990    Date: 9/23/2021    DSM-V Diagnoses: 296.31 (F33.0) Major Depressive Disorder, Recurrent Episode, Mild With anxious distress;   Psychosocial / Contextual Factors: SMA     Clinical Global Impressions  First:  Considering your total clinical experience with this particular patient population, how severe are the patient's symptoms at this time?: 4 (9/23/2021  3:29 PM)      Most recent:  Compared to the patient's condition at the START of treatment, this patient's condition is: 3 (9/23/2021  3:29 PM)      Referral / Collaboration:  Will collaborate with care team as  indicated during treatment.    Anticipated number of session or this episode of care: 10+      MeasurableTreatment Goal(s) related to diagnosis / functional impairment(s)  Goal 1:  Patient will experience a reduction in depressive and anxious symptoms, along with a corresponding increase in positive emotion and life satisfaction.    Objective #A: Patient will experience a reduction in depressed mood, will develop more effective coping skills to manage depressive symptoms, will develop healthy cognitive patterns and beliefs, will increase ability to function adaptively and will continue to take medications as prescribed / participate in supportive activities and services    Status: Continued - Date(s): 9/23/2021    Objective #B: Patient will experience a reduction in anxiety, will develop more effective coping skills to manage anxiety symptoms, will develop healthy cognitive patterns and beliefs and will increase ability to function adaptively  Status: Continued - Date(s):  9/23/2021    Objective #C: Patient will develop better understanding of triggers and coping strategies to stabilize mood  Status: Continued - Date(s):  9/23/2021    Goal 2:  Patient will identify and increase engagement in valued activity, i.e. improving social connections/relationships, pursuing occupational goals or personally meaningful pursuits, exploration of meaning in life.     Objective #A: Patient will identify meaningful activity in social, occupational and  personal goals, and increase behavioral activation around these goals   Status: Continued - Date(s):  9/23/2021    Objective #B: Patient will address relationship difficulties in a more adaptive manner  Status: Continued - Date(s):  9/23/2021    Objective #C: Patient will develop coping/problem-solving skills to facilitate more adaptive adjustment and will effectively address problems that interfere with adaptive functioning  Status: Continued - Date(s):  9/23/2021    Goal 3:   Patient will increase understanding of pain medications and develop more adaptive responses to chronic pain.    Objective #A: Patient will increase understanding of the relationship between pain and stress/mood   Status: Continued - Date(s):  9/23/2021    Objective #B: Patient will develop 2-3 additional chronic pain management strategies  Status: Continued - Date(s):  9/23/2021      Possible Therapeutic Intervention(s)  Psycho-education regarding mental health diagnoses and treatment options    Skills training    Explore skills useful to client in current situation.    Skills include assertiveness, communication, conflict management, problem-solving, relaxation, etc.    Solution-Focused Therapy    Explore patterns in patient's relationships and discuss options for new behaviors.    Explore patterns in patient's actions and choices and discuss options for new behaviors.    Cognitive-behavioral Therapy    Discuss common cognitive distortions, identify them in patient's life.    Explore ways to challenge, replace, and act against these cognitions.    Acceptance and Commitment Therapy    Explore and identify important values in patient's life.    Discuss ways to commit to behavioral activation around these values.    Psychodynamic psychotherapy    Discuss patient's emotional dynamics and issues and how they impact behaviors.    Explore patient's history of relationships and how they impact present behaviors.    Explore how to work with and make changes in these schemas and patterns.    Narrative Therapy    Explore the patient's story of his/her life from his/her perspective.    Explore alternate ways of understanding their experience, identifying exceptions, developing new themes.    Interpersonal Psychotherapy    Explore patterns in relationships that are effective or ineffective at helping patient reach their goals, find satisfying experience.    Discuss new patterns or behaviors to engage in for improved social  functioning.    Behavioral Activation    Discuss steps patient can take to become more involved in meaningful activity.    Identify barriers to these activities and explore possible solutions.    Mindfulness-Based Strategies    Discuss skills based on development and application of mindfulness.    Skills drawn from compassion-focused therapy, dialectical behavior therapy, mindfulness-based stress reduction, mindfulness-based cognitive therapy, etc.      We have developed these goals together during our work to this point. Patient has assisted in the development of these goals and has agreed to this treatment plan.       Héctor Higuera LP   9/23/2021

## 2021-10-11 ENCOUNTER — TRANSFERRED RECORDS (OUTPATIENT)
Dept: HEALTH INFORMATION MANAGEMENT | Facility: CLINIC | Age: 31
End: 2021-10-11

## 2021-10-11 ENCOUNTER — MEDICAL CORRESPONDENCE (OUTPATIENT)
Dept: HEALTH INFORMATION MANAGEMENT | Facility: CLINIC | Age: 31
End: 2021-10-11

## 2021-10-11 ENCOUNTER — TELEPHONE (OUTPATIENT)
Dept: FAMILY MEDICINE | Facility: CLINIC | Age: 31
End: 2021-10-11

## 2021-10-11 ENCOUNTER — VIRTUAL VISIT (OUTPATIENT)
Dept: FAMILY MEDICINE | Facility: CLINIC | Age: 31
End: 2021-10-11
Payer: COMMERCIAL

## 2021-10-11 DIAGNOSIS — F32.A DEPRESSION, UNSPECIFIED DEPRESSION TYPE: ICD-10-CM

## 2021-10-11 DIAGNOSIS — F41.9 ANXIETY: ICD-10-CM

## 2021-10-11 DIAGNOSIS — Z01.818 PREOP GENERAL PHYSICAL EXAM: ICD-10-CM

## 2021-10-11 DIAGNOSIS — D64.9 ANEMIA, UNSPECIFIED TYPE: Primary | ICD-10-CM

## 2021-10-11 DIAGNOSIS — E87.6 HYPOKALEMIA: ICD-10-CM

## 2021-10-11 PROCEDURE — 99214 OFFICE O/P EST MOD 30 MIN: CPT | Mod: 95 | Performed by: FAMILY MEDICINE

## 2021-10-11 RX ORDER — SERTRALINE HYDROCHLORIDE 20 MG/ML
SOLUTION ORAL
Qty: 320 ML | Refills: 5 | Status: SHIPPED | OUTPATIENT
Start: 2021-10-11 | End: 2022-12-15

## 2021-10-11 NOTE — TELEPHONE ENCOUNTER
[facility]    Phone Message    May a detailed message be left on voicemail: yes     Reason for Call: Other: Patient has have reoccurent UTI due to indwelling cath. She is wondering if there is something she can take to help prevent or what can be done.      Action Taken: Message routed to:  [facility]    Travel Screening: Not Applicable

## 2021-10-11 NOTE — PROGRESS NOTES
Lanie is a 31 year old who is being evaluated via a billable video visit.      How would you like to obtain your AVS? MyChart  If the video visit is dropped, the invitation should be resent by: in epic  Will anyone else be joining your video visit? No    Video Start Time: 9 am    Assessment & Plan     Depression, unspecified depression type  Increase dose. Consider switch to cymbalta, discussed.   - sertraline (ZOLOFT) 20 MG/ML (HIGH CONC) solution; 4 ml per G tube once a day    Pain: she'll continue discuss w/ pain clinic consider trial low dose muscle relaxant    Anxiety  Same  - sertraline (ZOLOFT) 20 MG/ML (HIGH CONC) solution; 4 ml per G tube once a day    Hypokalemia  Recheck before surgery  - Basic metabolic panel; Future    Anemia, unspecified type  Same  - Folate; Future  - Ferritin; Future  - Vitamin B12; Future  - Iron and iron binding capacity; Future  - CBC with platelets differential; Future    Preop general physical exam  Cleared for surgery on medical basis if labs stable, pending        35 minutes spent on the date of the encounter doing chart review, history and exam, documentation and further activities per the note    Jimmy Moseley MD  [facility]   Lanie is a 31 year old who presents for the following health issues   HPI Preop spinraza injection October 13 [facility] [address] [facility] for SMA  Has done before, tolerated and helpful    No personal or FH anesthesia reaction; she and family have DVT history and she is on blood thinners    She is having low mood re: pain clinic reducing narcotics soon. Per pt same chronic dose helpful and tolerated. Per pt they have not offered her other interventions and she cannot afford med cannabis.    H/o low K recheck     Past Medical History:   Diagnosis Date     Anxiety      Snoring      Spinal muscle atrophy (H)      Past Surgical History:   Procedure Laterality Date     ENT SURGERY       tubes     GASTROSTOMY TUBE       KIDNEY SURGERY       SPINE SURGERY       TRACHEOSTOMY       Current Outpatient Medications   Medication     sertraline (ZOLOFT) 20 MG/ML (HIGH CONC) solution     ACETAMINOPHEN PO     albuterol (ALBUTEROL) 108 (90 BASE) MCG/ACT inhaler     albuterol (PROVENTIL) (2.5 MG/3ML) 0.083% neb solution     ALPRAZolam (XANAX) 0.25 MG tablet     aminocaproic acid (AMICAR) 0.25 GM/ML solution     bisacodyl (DULCOLAX) 10 MG suppository     celecoxib (CELEBREX) 200 MG capsule     clindamycin (CLEOCIN T) 1 % lotion     diazepam (VALIUM) 5 MG tablet     doxycycline hyclate (VIBRAMYCIN) 100 MG capsule     ferrous sulfate 220 (44 Fe) MG/5ML ELIX     fluocinolone acetonide (DERMA SMOOTHE/FS BODY) 0.01 % external oil     fluticasone (FLOVENT HFA) 110 MCG/ACT inhaler     gabapentin (NEURONTIN) 250 MG/5ML solution     GENERLAC 10 GM/15ML solution     guaiFENesin (ORGANIDIN) 200 MG TABS tablet     HYDROmorphone (DILAUDID) 4 MG tablet     hydrOXYzine (ATARAX) 25 MG tablet     ketoconazole (NIZORAL) 2 % external shampoo     LANsoprazole (PREVACID) 30 MG DR capsule     Magnesium Hydroxide (MILK OF MAGNESIA PO)     metroNIDAZOLE (METROCREAM) 0.75 % external cream     mometasone (NASONEX) 50 MCG/ACT nasal spray     morphine (SABINE) 30 MG 24 hr capsule     mupirocin (BACTROBAN) 2 % external cream     naloxone (NARCAN) 4 MG/0.1ML nasal spray     Nutritional Supplements (REPLETE FIBER) LIQD     omeprazole (PRILOSEC) 2 mg/mL suspension     ondansetron (ZOFRAN-ODT) 4 MG ODT tab     order for DME     order for DME     polyethylene glycol (MIRALAX) 17 GM/Dose powder     potassium chloride (KAYCIEL) 20 MEQ/15ML (10%) solution     prochlorperazine (COMPAZINE) 5 MG tablet     Salicylic Acid (OXY BALANCE FACIAL CLEAN WASH EX)     Sennosides (SENNA) 8.8 MG/5ML LIQD     silver sulfADIAZINE (SILVADENE) 1 % external cream     simethicone (MYLICON) 66.7 mg/ml     simethicone 40 MG/0.6ML LIQD     sodium chloride (OCEAN) 0.65 %  nasal spray     tretinoin (RETIN-A) 0.025 % external cream     XARELTO ANTICOAGULANT 10 MG TABS tablet     No current facility-administered medications for this visit.     No Known Allergies    Past Medical History:   Diagnosis Date     Anxiety      Snoring      Spinal muscle atrophy (H)      Past Surgical History:   Procedure Laterality Date     ENT SURGERY      tubes     GASTROSTOMY TUBE       KIDNEY SURGERY       SPINE SURGERY       TRACHEOSTOMY       Current Outpatient Medications   Medication     sertraline (ZOLOFT) 20 MG/ML (HIGH CONC) solution     ACETAMINOPHEN PO     albuterol (ALBUTEROL) 108 (90 BASE) MCG/ACT inhaler     albuterol (PROVENTIL) (2.5 MG/3ML) 0.083% neb solution     ALPRAZolam (XANAX) 0.25 MG tablet     aminocaproic acid (AMICAR) 0.25 GM/ML solution     bisacodyl (DULCOLAX) 10 MG suppository     celecoxib (CELEBREX) 200 MG capsule     clindamycin (CLEOCIN T) 1 % lotion     diazepam (VALIUM) 5 MG tablet     doxycycline hyclate (VIBRAMYCIN) 100 MG capsule     ferrous sulfate 220 (44 Fe) MG/5ML ELIX     fluocinolone acetonide (DERMA SMOOTHE/FS BODY) 0.01 % external oil     fluticasone (FLOVENT HFA) 110 MCG/ACT inhaler     gabapentin (NEURONTIN) 250 MG/5ML solution     GENERLAC 10 GM/15ML solution     guaiFENesin (ORGANIDIN) 200 MG TABS tablet     HYDROmorphone (DILAUDID) 4 MG tablet     hydrOXYzine (ATARAX) 25 MG tablet     ketoconazole (NIZORAL) 2 % external shampoo     LANsoprazole (PREVACID) 30 MG DR capsule     Magnesium Hydroxide (MILK OF MAGNESIA PO)     metroNIDAZOLE (METROCREAM) 0.75 % external cream     mometasone (NASONEX) 50 MCG/ACT nasal spray     morphine (SABINE) 30 MG 24 hr capsule     mupirocin (BACTROBAN) 2 % external cream     naloxone (NARCAN) 4 MG/0.1ML nasal spray     Nutritional Supplements (REPLETE FIBER) LIQD     omeprazole (PRILOSEC) 2 mg/mL suspension     ondansetron (ZOFRAN-ODT) 4 MG ODT tab     order for DME     order for DME     polyethylene glycol (MIRALAX) 17 GM/Dose  powder     potassium chloride (KAYCIEL) 20 MEQ/15ML (10%) solution     prochlorperazine (COMPAZINE) 5 MG tablet     Salicylic Acid (OXY BALANCE FACIAL CLEAN WASH EX)     Sennosides (SENNA) 8.8 MG/5ML LIQD     silver sulfADIAZINE (SILVADENE) 1 % external cream     simethicone (MYLICON) 66.7 mg/ml     simethicone 40 MG/0.6ML LIQD     sodium chloride (OCEAN) 0.65 % nasal spray     tretinoin (RETIN-A) 0.025 % external cream     XARELTO ANTICOAGULANT 10 MG TABS tablet     No current facility-administered medications for this visit.     No Known Allergies  Social History     Socioeconomic History     Marital status: Single     Spouse name: Not on file     Number of children: Not on file     Years of education: Not on file     Highest education level: Not on file   Occupational History     Not on file   Tobacco Use     Smoking status: Never Smoker     Smokeless tobacco: Never Used   Substance and Sexual Activity     Alcohol use: No     Alcohol/week: 0.0 standard drinks     Drug use: No     Sexual activity: Not on file   Other Topics Concern     Not on file   Social History Narrative     Not on file     Social Determinants of Health     Financial Resource Strain:      Difficulty of Paying Living Expenses:    Food Insecurity:      Worried About Running Out of Food in the Last Year:      Ran Out of Food in the Last Year:    Transportation Needs:      Lack of Transportation (Medical):      Lack of Transportation (Non-Medical):    Physical Activity:      Days of Exercise per Week:      Minutes of Exercise per Session:    Stress:      Feeling of Stress :    Social Connections:      Frequency of Communication with Friends and Family:      Frequency of Social Gatherings with Friends and Family:      Attends [employer] Services:      Active Member of Clubs or Organizations:      Attends Club or Organization Meetings:      Marital Status:    Intimate Partner Violence:      Fear of Current or Ex-Partner:      Emotionally Abused:       Physically Abused:      Sexually Abused:        Review of Systems   Ten pt ROS otherwise negative for acute issues      Objective           Vitals:  No vitals were obtained today due to virtual visit.    Physical Exam   GENERAL: Breaths w/ assist as usual, alert and no distress  EYES: Eyes grossly normal to inspection.  No discharge or erythema, or obvious scleral/conjunctival abnormalities.  RESP: No audible wheeze, cough, or visible cyanosis.  No visible retractions or increased work of breathing.    SKIN: Visible skin clear. No significant rash, abnormal pigmentation or lesions.  NEURO: Cranial nerves grossly intact.  Mentation and speech appropriate for age.  PSYCH: Mentation appears normal, affect normal/bright, judgement and insight intact, normal speech and appearance well-groomed.        Video-Visit Details    Type of service:  Video Visit    Video End Time:925    Originating Location (pt. Location): Home    Distant Location (provider location):  [facility] [place]     Platform used for Video Visit: [employer]

## 2021-10-12 NOTE — TELEPHONE ENCOUNTER
Message left that Dr. Moseley would like her to see urology.    LILLIAM Jefferson RN at 12:02 PM on 10/12/2021.

## 2021-10-13 ENCOUNTER — MEDICAL CORRESPONDENCE (OUTPATIENT)
Dept: HEALTH INFORMATION MANAGEMENT | Facility: CLINIC | Age: 31
End: 2021-10-13

## 2021-10-14 ENCOUNTER — VIRTUAL VISIT (OUTPATIENT)
Dept: BEHAVIORAL HEALTH | Facility: CLINIC | Age: 31
End: 2021-10-14
Payer: COMMERCIAL

## 2021-10-14 DIAGNOSIS — F33.0 MAJOR DEPRESSIVE DISORDER, RECURRENT EPISODE, MILD WITH ANXIOUS DISTRESS (H): Primary | ICD-10-CM

## 2021-10-14 PROCEDURE — 90834 PSYTX W PT 45 MINUTES: CPT | Mod: GT | Performed by: PSYCHOLOGIST

## 2021-10-14 NOTE — PROGRESS NOTES
[facility]: Integrated Behavioral Health  October 14, 2021        Behavioral Health Clinician Progress Note    Patient Name: Lanie Neil           Service Type: Video appointment      Service Location:  Video appointment      Session Start Time: 2:10 Session End Time: 2:58      Session Length: 38 - 52      Attendees: Client    Visit Activities (Refresh list every visit): [facility] Only    Telemedicine Visit: The patient's condition can be safely assessed and treated via synchronous audio and visual telemedicine encounter.      Reason for Telemedicine Visit: Patient has requested telehealth visit and Services only offered telehealth    Originating Site (Patient Location): Patient's home    Distant Site (Provider Location): Provider Remote Setting- Home Office    Consent:  The patient/guardian has verbally consented to: the potential risks and benefits of telemedicine (video visit) versus in person care; bill my insurance or make self-payment for services provided; and responsibility for payment of non-covered services.     Mode of Communication:  Video Conference via [employer]    As the provider I attest to compliance with applicable laws and regulations related to telemedicine.      Diagnostic Assessment Date: 1/19/2021  Treatment Plan Review Date: 12/23/2021  See Flowsheets for today's PHQ-9 and KATHARINE-7 results  Previous PHQ-9:   PHQ-9 SCORE 12/15/2020 3/16/2021 6/30/2021   PHQ-9 Total Score - - -   PHQ-9 Total Score MyChart 2 (Minimal depression) 2 (Minimal depression) 2 (Minimal depression)   PHQ-9 Total Score 2 2 2     Previous KATHARINE-7:   KATHARINE-7 SCORE 12/15/2020 3/16/2021 6/30/2021   Total Score 1 (minimal anxiety) 1 (minimal anxiety) 1 (minimal anxiety)   Total Score 1 1 1       DATA  Extended Session (60+ minutes): No  Interactive Complexity: No  Crisis: No    Treatment Objective(s) Addressed in This Session:  Target Behavior(s): disease management/lifestyle changes       Depressed Mood: Decrease frequency and intensity of feeling down, depressed, hopeless  Identify negative self-talk and behaviors: challenge core beliefs, myths, and actions  Anxiety: will develop more effective coping skills to manage anxiety symptoms  Relationship Problems: will address relationship difficulties in a more adaptive manner  Psychological distress related to Pain    Current Stressors / Issues:  [facility] met with Lanie today to continue supporting her treatment of depression, adjustment to chronic disease management, and relationship problems.     Lanie reports continuing to struggle with learning of the upcoming legal changes surrounding prescription of pain medication and how this will affect her in the future. She reports trying to not think about it as much, mainly though distraction like playing games and watching videos online. Discussed her thoughts about the upcoming change and discussed how she will imagine coping with not having as much medication. We discussed the sense of loss and fear she is experiencing, how this can manifest as anger at times.     We made a few connections to what is going on with her medications right now to other losses/helpless moments in her life. Discussed how the emotional pain she feels right now is familiar to her in this regard. Explored/processed past hurts in relationships for example.     As we discussed, [facility] made the recommendation that increasing her access to social support is going to be important as she transitions to reduced pain medication. We discussed a few ways of achieving this, perhaps by connecting her with a chronic pain support group or have her engage in a higher level of care due to acute distress/depression she faces, such as day treatment. Provided information about day treatment programming and Lanie stated she will consider this moving forward. This writer also indicated additional resources for chronic pain groups in the [facility]  would be passed along if found.       Progress on Treatment Objective(s) / Homework:  Worsening - CONTEMPLATION (Considering change and yet undecided); Intervened by assessing the negative and positive thinking (ambivalence) about behavior change      Narrative Therapy    Explore the patient's story of his/her life from his/her perspective.    Explore alternate ways of understanding their experience, identifying exceptions, developing new themes.    Acceptance and Commitment Therapy    Explore and identify important values in patient's life.    Discuss ways to commit to behavioral activation around these values.    Care Plan review completed: yes    Medication Review:  No changes to current psychiatric medication(s)    Medication Compliance:  NA    Changes in Health Issues:   None reported    Chemical Use Review:   Substance Use: Chemical use reviewed, no active concerns identified      Tobacco Use: No current tobacco use.      Assessment: Current Emotional / Mental Status (status of significant symptoms):  Risk status (Self / Other harm or suicidal ideation)  Patient has had a history of suicidal ideation: passive thoughts only; easily controlled - denied any recently    Patient denies current fears or concerns for personal safety.  Patient denies current or recent suicidal ideation or behaviors.  Patient denies current or recent homicidal ideation or behaviors.  Patient denies current or recent self injurious behavior or ideation.  Patient denies other safety concerns.     A safety and risk management plan has not been developed at this time, however patient was encouraged to call [place] [phone] should there be a change in any of these risk factors.      [place] Suicide Severity Rating Scale (Short Version)  [place] Suicide Severity Rating (Short Version) 2/18/2021   Over the past 2 weeks have you felt down, depressed, or hopeless? yes   Over the past 2 weeks have you had thoughts of killing yourself? no    Have you ever attempted to kill yourself? no     [place] Suicide Severity Rating Scale (Lifetime/Recent)  [place] Suicide Severity Rating (Lifetime/Recent) 8/13/2021   1. Wish to be Dead (Lifetime) No   1. Wish to be Dead (Recent) No   2. Non-Specific Active Suicidal Thoughts (Lifetime) No   2. Non-Specific Active Suicidal Thoughts (Recent) No   3. Active Suicidal Ideation with any Methods (Not Plan) Without Intent to Act (Lifetime) No   3. Active Suicidal Ideation with any Methods (Not Plan) Without Intent to Act (Recent) No   4. Active Suicidal Ideation with Some Intent to Act, Without Specific Plan (Lifetime) No   4. Active Suicidal Ideation with Some Intent to Act, Without Specific Plan (Recent) No   5. Active Suicidal Ideation with Specific Plan and Intent (Lifetime) No   5. Active Suicidal Ideation with Specific Plan and Intent (Recent) No   Most Severe Ideation Rating (Lifetime) NA   Frequency (Lifetime) NA   Duration (Lifetime) NA   Controllability (Lifetime) NA   Protective Factors  (Lifetime) NA   Reasons for Ideation (Lifetime) NA   Most Severe Ideation Rating (Past Month) NA   Frequency (Past Month) NA   Duration (Past Month) NA   Controllability (Past Month) NA   Protective Factors (Past Month) NA   Reasons for Ideation (Past Month) NA   Actual Attempt (Lifetime) No   Actual Attempt (Past 3 Months) No   Has subject engaged in non-suicidal self-injurious behavior? (Lifetime) No   Has subject engaged in non-suicidal self-injurious behavior? (Past 3 Months) No   Interrupted Attempts (Lifetime) No   Interrupted Attempts (Past 3 Months) No   Aborted or Self-Interrupted Attempt (Lifetime) No   Aborted or Self-Interrupted Attempt (Past 3 Months) No   Preparatory Acts or Behavior (Lifetime) No   Preparatory Acts or Behavior (Past 3 Months) No   Most Recent Attempt Actual Lethality Code NA   Most Lethal Attempt Actual Lethality Code NA   Initial/First Attempt Actual Lethality Code NA          Appearance:   Appropriate   Eye Contact:   Good   Psychomotor Behavior: Normal   Attitude:   Cooperative   Orientation:   All  Speech   Rate / Production: Normal    Volume:  Normal   Mood:    Depressed    Affect:    Tearful  Thought Content:  Clear   Thought Form:  Coherent  Logical   Insight:    Good     Diagnoses:  1. Major depressive disorder, recurrent episode, mild with anxious distress (H)        Collateral Reports Completed:  [facility] will coordinate with care team as needed    Plan: (Homework, other):  Lanie was scheduled to RTC in two weeks for help addressing depressive symptoms, pain, and relationship conflicts. We will continue looking into chronic pain programs/treatment ideas for her. CD Recommendations: No indications of CD issues.     Héctor Higuera Psy.D, LP October 14, 2021            Answers for HPI/ROS submitted by the patient on 12/15/2020   If you checked off any problems, how difficult have these problems made it for you to do your work, take care of things at home, or get along with other people?: Not difficult at all  PHQ9 TOTAL SCORE: 2  KATHARINE 7 TOTAL SCORE: 1    _________________________________________________________________________  [facility] Integrated Behavioral Health Treatment Plan    Client's Name: Lanie Neil  YOB: 1990    Date: 9/23/2021    DSM-V Diagnoses: 296.31 (F33.0) Major Depressive Disorder, Recurrent Episode, Mild With anxious distress;   Psychosocial / Contextual Factors: SMA     Clinical Global Impressions  First:  Considering your total clinical experience with this particular patient population, how severe are the patient's symptoms at this time?: 4 (9/23/2021  3:29 PM)      Most recent:  Compared to the patient's condition at the START of treatment, this patient's condition is: 3 (9/23/2021  3:29 PM)      Referral / Collaboration:  Will collaborate with care team as indicated during treatment.    Anticipated number of session or this episode of  care: 10+      MeasurableTreatment Goal(s) related to diagnosis / functional impairment(s)  Goal 1:  Patient will experience a reduction in depressive and anxious symptoms, along with a corresponding increase in positive emotion and life satisfaction.    Objective #A: Patient will experience a reduction in depressed mood, will develop more effective coping skills to manage depressive symptoms, will develop healthy cognitive patterns and beliefs, will increase ability to function adaptively and will continue to take medications as prescribed / participate in supportive activities and services    Status: Continued - Date(s): 9/23/2021    Objective #B: Patient will experience a reduction in anxiety, will develop more effective coping skills to manage anxiety symptoms, will develop healthy cognitive patterns and beliefs and will increase ability to function adaptively  Status: Continued - Date(s):  9/23/2021    Objective #C: Patient will develop better understanding of triggers and coping strategies to stabilize mood  Status: Continued - Date(s):  9/23/2021    Goal 2:  Patient will identify and increase engagement in valued activity, i.e. improving social connections/relationships, pursuing occupational goals or personally meaningful pursuits, exploration of meaning in life.     Objective #A: Patient will identify meaningful activity in social, occupational and  personal goals, and increase behavioral activation around these goals   Status: Continued - Date(s):  9/23/2021    Objective #B: Patient will address relationship difficulties in a more adaptive manner  Status: Continued - Date(s):  9/23/2021    Objective #C: Patient will develop coping/problem-solving skills to facilitate more adaptive adjustment and will effectively address problems that interfere with adaptive functioning  Status: Continued - Date(s):  9/23/2021    Goal 3:  Patient will increase understanding of pain medications and develop more adaptive  responses to chronic pain.    Objective #A: Patient will increase understanding of the relationship between pain and stress/mood   Status: Continued - Date(s):  9/23/2021    Objective #B: Patient will develop 2-3 additional chronic pain management strategies  Status: Continued - Date(s):  9/23/2021      Possible Therapeutic Intervention(s)  Psycho-education regarding mental health diagnoses and treatment options    Skills training    Explore skills useful to client in current situation.    Skills include assertiveness, communication, conflict management, problem-solving, relaxation, etc.    Solution-Focused Therapy    Explore patterns in patient's relationships and discuss options for new behaviors.    Explore patterns in patient's actions and choices and discuss options for new behaviors.    Cognitive-behavioral Therapy    Discuss common cognitive distortions, identify them in patient's life.    Explore ways to challenge, replace, and act against these cognitions.    Acceptance and Commitment Therapy    Explore and identify important values in patient's life.    Discuss ways to commit to behavioral activation around these values.    Psychodynamic psychotherapy    Discuss patient's emotional dynamics and issues and how they impact behaviors.    Explore patient's history of relationships and how they impact present behaviors.    Explore how to work with and make changes in these schemas and patterns.    Narrative Therapy    Explore the patient's story of his/her life from his/her perspective.    Explore alternate ways of understanding their experience, identifying exceptions, developing new themes.    Interpersonal Psychotherapy    Explore patterns in relationships that are effective or ineffective at helping patient reach their goals, find satisfying experience.    Discuss new patterns or behaviors to engage in for improved social functioning.    Behavioral Activation    Discuss steps patient can take to become more  involved in meaningful activity.    Identify barriers to these activities and explore possible solutions.    Mindfulness-Based Strategies    Discuss skills based on development and application of mindfulness.    Skills drawn from compassion-focused therapy, dialectical behavior therapy, mindfulness-based stress reduction, mindfulness-based cognitive therapy, etc.      We have developed these goals together during our work to this point. Patient has assisted in the development of these goals and has agreed to this treatment plan.       Héctor Higuera LP   9/23/2021

## 2021-10-15 NOTE — PATIENT INSTRUCTIONS
Adult Day Treatment  [phone]  Intensive outpatient services for adults with mental health conditions    Three hours per day, three days per week    Psychiatric evaluation as needed    group Psychotherapy and education with emphasis on skill development    Average length of service is 12 weeks    Currently virtual

## 2021-10-21 DIAGNOSIS — K59.00 CONSTIPATION, UNSPECIFIED CONSTIPATION TYPE: ICD-10-CM

## 2021-10-22 ENCOUNTER — VIRTUAL VISIT (OUTPATIENT)
Dept: BEHAVIORAL HEALTH | Facility: CLINIC | Age: 31
End: 2021-10-22
Payer: COMMERCIAL

## 2021-10-22 DIAGNOSIS — F33.1 MAJOR DEPRESSIVE DISORDER, RECURRENT EPISODE, MODERATE (H): Primary | ICD-10-CM

## 2021-10-22 PROCEDURE — 90837 PSYTX W PT 60 MINUTES: CPT | Mod: GT | Performed by: PSYCHOLOGIST

## 2021-10-22 NOTE — PROGRESS NOTES
[facility]: Integrated Behavioral Health  October 22, 2021        Behavioral Health Clinician Progress Note    Patient Name: Lanie Neil           Service Type: Video appointment      Service Location:  Video appointment      Session Start Time: 3:05  Session End Time: 4:05      Session Length: 53 - 60      Attendees: Client    Visit Activities (Refresh list every visit): [facility] Only    Telemedicine Visit: The patient's condition can be safely assessed and treated via synchronous audio and visual telemedicine encounter.      Reason for Telemedicine Visit: Patient has requested telehealth visit and Services only offered telehealth    Originating Site (Patient Location): Patient's home    Distant Site (Provider Location): Provider Remote Setting- Home Office    Consent:  The patient/guardian has verbally consented to: the potential risks and benefits of telemedicine (video visit) versus in person care; bill my insurance or make self-payment for services provided; and responsibility for payment of non-covered services.     Mode of Communication:  Video Conference via [employer]    As the provider I attest to compliance with applicable laws and regulations related to telemedicine.      Diagnostic Assessment Date: 1/19/2021  Treatment Plan Review Date: 12/23/2021  See Flowsheets for today's PHQ-9 and KATHARINE-7 results  Previous PHQ-9:   PHQ-9 SCORE 12/15/2020 3/16/2021 6/30/2021   PHQ-9 Total Score - - -   PHQ-9 Total Score MyChart 2 (Minimal depression) 2 (Minimal depression) 2 (Minimal depression)   PHQ-9 Total Score 2 2 2     Previous KATHARINE-7:   KATHARINE-7 SCORE 12/15/2020 3/16/2021 6/30/2021   Total Score 1 (minimal anxiety) 1 (minimal anxiety) 1 (minimal anxiety)   Total Score 1 1 1       DATA  Extended Session (60+ minutes): No  Interactive Complexity: No  Crisis: No    Treatment Objective(s) Addressed in This Session:  Target Behavior(s): disease management/lifestyle changes   "    Depressed Mood: Decrease frequency and intensity of feeling down, depressed, hopeless  Identify negative self-talk and behaviors: challenge core beliefs, myths, and actions  Anxiety: will develop more effective coping skills to manage anxiety symptoms  Relationship Problems: will address relationship difficulties in a more adaptive manner  Psychological distress related to Pain    Current Stressors / Issues:  [facility] met with Lanie today to continue supporting her treatment of depression, adjustment to chronic disease management, and relationship problems.     Lanie reports experiencing sadness and frustration with the upcoming changes to her pain medication. She also described a sense of betrayal today, as she reports learning her mother withheld a pill of her pain medication without her knowing. Lanie states that even though she did not recognize a difference in her pain levels, she was very hurt and upset that her mother did this. She reports this led to a more intense argument between them. Lanie described how her mother has done this in the past.     We explored the hurt she feels and processed what happened during the argument. We reflected on her mother's perspectives on why she did this - we agreed it seemed to be well-intended. Lanie states that the arguments she has with her mother are difficult because at times, her mother will make threats about ending her life, which is hard for Lanie to hear. Lanie states that she has also made similar comments during arguments in the past. We discussed the role/purpose of these comments during arguments and the effect they have.     [facility] encouraged Lanie to make use of \"fair fighting rules\" during conflicts, which entails her thinking of boundaries and \"rules\" she would like herself and others to respect during arguments. We talked about not trying to avoid arguments altogether, but rather arguing fairly/safely. For instance, we talked about not making " suicidal threats during arguments as a rule they could both follow. Reviewed a few additional rules others find helpful during conflicts.       Progress on Treatment Objective(s) / Homework:  Worsening - CONTEMPLATION (Considering change and yet undecided); Intervened by assessing the negative and positive thinking (ambivalence) about behavior change      Skills training    Explore skills useful to client in current situation.    Skills include assertiveness, communication, conflict management, problem-solving, relaxation, etc.    Solution-Focused Therapy    Explore patterns in patient's relationships and discuss options for new behaviors.    Explore patterns in patient's actions and choices and discuss options for new behaviors.    Care Plan review completed: yes    Medication Review:  No changes to current psychiatric medication(s)    Medication Compliance:  NA    Changes in Health Issues:   None reported    Chemical Use Review:   Substance Use: Chemical use reviewed, no active concerns identified      Tobacco Use: No current tobacco use.      Assessment: Current Emotional / Mental Status (status of significant symptoms):  Risk status (Self / Other harm or suicidal ideation)  Patient has had a history of suicidal ideation: passive thoughts only; easily controlled - denied any recently    Patient denies current fears or concerns for personal safety.  Patient denies current or recent suicidal ideation or behaviors.  Patient denies current or recent homicidal ideation or behaviors.  Patient denies current or recent self injurious behavior or ideation.  Patient denies other safety concerns.     A safety and risk management plan has not been developed at this time, however patient was encouraged to call [place] [phone] should there be a change in any of these risk factors.      [place] Suicide Severity Rating Scale (Short Version)  [place] Suicide Severity Rating (Short Version) 2/18/2021   Over the past 2 weeks  have you felt down, depressed, or hopeless? yes   Over the past 2 weeks have you had thoughts of killing yourself? no   Have you ever attempted to kill yourself? no     [place] Suicide Severity Rating Scale (Lifetime/Recent)  [place] Suicide Severity Rating (Lifetime/Recent) 8/13/2021   1. Wish to be Dead (Lifetime) No   1. Wish to be Dead (Recent) No   2. Non-Specific Active Suicidal Thoughts (Lifetime) No   2. Non-Specific Active Suicidal Thoughts (Recent) No   3. Active Suicidal Ideation with any Methods (Not Plan) Without Intent to Act (Lifetime) No   3. Active Suicidal Ideation with any Methods (Not Plan) Without Intent to Act (Recent) No   4. Active Suicidal Ideation with Some Intent to Act, Without Specific Plan (Lifetime) No   4. Active Suicidal Ideation with Some Intent to Act, Without Specific Plan (Recent) No   5. Active Suicidal Ideation with Specific Plan and Intent (Lifetime) No   5. Active Suicidal Ideation with Specific Plan and Intent (Recent) No   Most Severe Ideation Rating (Lifetime) NA   Frequency (Lifetime) NA   Duration (Lifetime) NA   Controllability (Lifetime) NA   Protective Factors  (Lifetime) NA   Reasons for Ideation (Lifetime) NA   Most Severe Ideation Rating (Past Month) NA   Frequency (Past Month) NA   Duration (Past Month) NA   Controllability (Past Month) NA   Protective Factors (Past Month) NA   Reasons for Ideation (Past Month) NA   Actual Attempt (Lifetime) No   Actual Attempt (Past 3 Months) No   Has subject engaged in non-suicidal self-injurious behavior? (Lifetime) No   Has subject engaged in non-suicidal self-injurious behavior? (Past 3 Months) No   Interrupted Attempts (Lifetime) No   Interrupted Attempts (Past 3 Months) No   Aborted or Self-Interrupted Attempt (Lifetime) No   Aborted or Self-Interrupted Attempt (Past 3 Months) No   Preparatory Acts or Behavior (Lifetime) No   Preparatory Acts or Behavior (Past 3 Months) No   Most Recent Attempt Actual Lethality Code NA    Most Lethal Attempt Actual Lethality Code NA   Initial/First Attempt Actual Lethality Code NA         Appearance:   Appropriate   Eye Contact:   Good   Psychomotor Behavior: Normal   Attitude:   Cooperative   Orientation:   All  Speech   Rate / Production: Normal    Volume:  Normal   Mood:    Depressed    Affect:    Tearful  Thought Content:  Clear   Thought Form:  Coherent  Logical   Insight:    Good     Diagnoses:  1. Major depressive disorder, recurrent episode, moderate (H)        Collateral Reports Completed:  [facility] will coordinate with care team as needed    Plan: (Homework, other):  Lanie was scheduled to RTC in two weeks for help addressing depressive symptoms, pain, and relationship conflicts. We will continue looking into chronic pain programs/treatment ideas for her. CD Recommendations: No indications of CD issues.     Héctor Higuera Psy.D, LP October 22, 2021            Answers for HPI/ROS submitted by the patient on 12/15/2020   If you checked off any problems, how difficult have these problems made it for you to do your work, take care of things at home, or get along with other people?: Not difficult at all  PHQ9 TOTAL SCORE: 2  KATHARINE 7 TOTAL SCORE: 1    _________________________________________________________________________  [facility] Integrated Behavioral Health Treatment Plan    Client's Name: Lanie Neil  YOB: 1990    Date: 9/23/2021    DSM-V Diagnoses: 296.31 (F33.0) Major Depressive Disorder, Recurrent Episode, Mild With anxious distress;   Psychosocial / Contextual Factors: SMA     Clinical Global Impressions  First:  Considering your total clinical experience with this particular patient population, how severe are the patient's symptoms at this time?: 4 (9/23/2021  3:29 PM)      Most recent:  Compared to the patient's condition at the START of treatment, this patient's condition is: 3 (9/23/2021  3:29 PM)      Referral / Collaboration:  Will collaborate with care team as  indicated during treatment.    Anticipated number of session or this episode of care: 10+      MeasurableTreatment Goal(s) related to diagnosis / functional impairment(s)  Goal 1:  Patient will experience a reduction in depressive and anxious symptoms, along with a corresponding increase in positive emotion and life satisfaction.    Objective #A: Patient will experience a reduction in depressed mood, will develop more effective coping skills to manage depressive symptoms, will develop healthy cognitive patterns and beliefs, will increase ability to function adaptively and will continue to take medications as prescribed / participate in supportive activities and services    Status: Continued - Date(s): 9/23/2021    Objective #B: Patient will experience a reduction in anxiety, will develop more effective coping skills to manage anxiety symptoms, will develop healthy cognitive patterns and beliefs and will increase ability to function adaptively  Status: Continued - Date(s):  9/23/2021    Objective #C: Patient will develop better understanding of triggers and coping strategies to stabilize mood  Status: Continued - Date(s):  9/23/2021    Goal 2:  Patient will identify and increase engagement in valued activity, i.e. improving social connections/relationships, pursuing occupational goals or personally meaningful pursuits, exploration of meaning in life.     Objective #A: Patient will identify meaningful activity in social, occupational and  personal goals, and increase behavioral activation around these goals   Status: Continued - Date(s):  9/23/2021    Objective #B: Patient will address relationship difficulties in a more adaptive manner  Status: Continued - Date(s):  9/23/2021    Objective #C: Patient will develop coping/problem-solving skills to facilitate more adaptive adjustment and will effectively address problems that interfere with adaptive functioning  Status: Continued - Date(s):  9/23/2021    Goal 3:   Patient will increase understanding of pain medications and develop more adaptive responses to chronic pain.    Objective #A: Patient will increase understanding of the relationship between pain and stress/mood   Status: Continued - Date(s):  9/23/2021    Objective #B: Patient will develop 2-3 additional chronic pain management strategies  Status: Continued - Date(s):  9/23/2021      Possible Therapeutic Intervention(s)  Psycho-education regarding mental health diagnoses and treatment options    Skills training    Explore skills useful to client in current situation.    Skills include assertiveness, communication, conflict management, problem-solving, relaxation, etc.    Solution-Focused Therapy    Explore patterns in patient's relationships and discuss options for new behaviors.    Explore patterns in patient's actions and choices and discuss options for new behaviors.    Cognitive-behavioral Therapy    Discuss common cognitive distortions, identify them in patient's life.    Explore ways to challenge, replace, and act against these cognitions.    Acceptance and Commitment Therapy    Explore and identify important values in patient's life.    Discuss ways to commit to behavioral activation around these values.    Psychodynamic psychotherapy    Discuss patient's emotional dynamics and issues and how they impact behaviors.    Explore patient's history of relationships and how they impact present behaviors.    Explore how to work with and make changes in these schemas and patterns.    Narrative Therapy    Explore the patient's story of his/her life from his/her perspective.    Explore alternate ways of understanding their experience, identifying exceptions, developing new themes.    Interpersonal Psychotherapy    Explore patterns in relationships that are effective or ineffective at helping patient reach their goals, find satisfying experience.    Discuss new patterns or behaviors to engage in for improved social  functioning.    Behavioral Activation    Discuss steps patient can take to become more involved in meaningful activity.    Identify barriers to these activities and explore possible solutions.    Mindfulness-Based Strategies    Discuss skills based on development and application of mindfulness.    Skills drawn from compassion-focused therapy, dialectical behavior therapy, mindfulness-based stress reduction, mindfulness-based cognitive therapy, etc.      We have developed these goals together during our work to this point. Patient has assisted in the development of these goals and has agreed to this treatment plan.       Hécotr Higuera LP   9/23/2021

## 2021-10-25 RX ORDER — SENNOSIDES 8.8 MG/5ML
10 LIQUID ORAL 2 TIMES DAILY
Qty: 600 ML | Refills: 11 | Status: SHIPPED | OUTPATIENT
Start: 2021-10-25 | End: 2022-12-15

## 2021-11-03 ENCOUNTER — VIRTUAL VISIT (OUTPATIENT)
Dept: BEHAVIORAL HEALTH | Facility: CLINIC | Age: 31
End: 2021-11-03
Payer: COMMERCIAL

## 2021-11-03 DIAGNOSIS — F33.1 MAJOR DEPRESSIVE DISORDER, RECURRENT EPISODE, MODERATE (H): Primary | ICD-10-CM

## 2021-11-03 PROCEDURE — 90837 PSYTX W PT 60 MINUTES: CPT | Mod: GT | Performed by: PSYCHOLOGIST

## 2021-11-03 NOTE — PROGRESS NOTES
[facility]: Integrated Behavioral Health  November 3, 2021        Behavioral Health Clinician Progress Note    Patient Name: Lanie Neil           Service Type: Video appointment      Service Location:  Video appointment      Session Start Time: 2:07  Session End Time: 3:00      Session Length: 53 - 60      Attendees: Client    Visit Activities (Refresh list every visit): [facility] Only    Telemedicine Visit: The patient's condition can be safely assessed and treated via synchronous audio and visual telemedicine encounter.      Reason for Telemedicine Visit: Patient has requested telehealth visit and Services only offered telehealth    Originating Site (Patient Location): Patient's home    Distant Site (Provider Location): Provider Remote Setting- Home Office    Consent:  The patient/guardian has verbally consented to: the potential risks and benefits of telemedicine (video visit) versus in person care; bill my insurance or make self-payment for services provided; and responsibility for payment of non-covered services.     Mode of Communication:  Video Conference via [employer]    As the provider I attest to compliance with applicable laws and regulations related to telemedicine.      Diagnostic Assessment Date: 1/19/2021  Treatment Plan Review Date: 12/23/2021  See Flowsheets for today's PHQ-9 and KATHARINE-7 results  Previous PHQ-9:   PHQ-9 SCORE 12/15/2020 3/16/2021 6/30/2021   PHQ-9 Total Score - - -   PHQ-9 Total Score MyChart 2 (Minimal depression) 2 (Minimal depression) 2 (Minimal depression)   PHQ-9 Total Score 2 2 2     Previous KATHARINE-7:   KATHARINE-7 SCORE 12/15/2020 3/16/2021 6/30/2021   Total Score 1 (minimal anxiety) 1 (minimal anxiety) 1 (minimal anxiety)   Total Score 1 1 1       DATA  Extended Session (60+ minutes): No  Interactive Complexity: No  Crisis: No    Treatment Objective(s) Addressed in This Session:  Target Behavior(s): disease management/lifestyle changes       Depressed Mood: Decrease frequency and intensity of feeling down, depressed, hopeless  Identify negative self-talk and behaviors: challenge core beliefs, myths, and actions  Anxiety: will develop more effective coping skills to manage anxiety symptoms  Relationship Problems: will address relationship difficulties in a more adaptive manner  Psychological distress related to Pain    Current Stressors / Issues:  [facility] met with Lanie today to continue supporting her treatment of depression, adjustment to chronic disease management, and relationship problems.     Lanie states she is managing the changes associated with her pain medication moving forward. She spoke to how she got a new cat and this has helped distract her from these upcoming changes. She reports ongoing relationship conflict with her mother however and we continued to discuss this, how she could adaptively respond to these conflicts.     We also processed the sense of loss Lanie experiences about not being able to have children. She identified how she suspects her adoption of cats and pets might reflect her sadness for not being able to have children. [facility] helped Lanie explore and process this sense of loss. Discussed triggers she has for this difficulty, mainly from learning of others in her family having children or becoming pregnant. Explored notions related to acceptance, finding new ways of finding value/purpose in life.     Progress on Treatment Objective(s) / Homework:  Stable - ACTION (Actively working towards change); Intervened by reinforcing change plan / affirming steps taken      Acceptance and Commitment Therapy    Explore and identify important values in patient's life.    Discuss ways to commit to behavioral activation around these values.    Solution-Focused Therapy    Explore patterns in patient's relationships and discuss options for new behaviors.    Explore patterns in patient's actions and choices and discuss options for new  behaviors.    Care Plan review completed: yes    Medication Review:  No changes to current psychiatric medication(s)    Medication Compliance:  NA    Changes in Health Issues:   None reported    Chemical Use Review:   Substance Use: Chemical use reviewed, no active concerns identified      Tobacco Use: No current tobacco use.      Assessment: Current Emotional / Mental Status (status of significant symptoms):  Risk status (Self / Other harm or suicidal ideation)  Patient has had a history of suicidal ideation: passive thoughts only; easily controlled - denied any recently    Patient denies current fears or concerns for personal safety.  Patient denies current or recent suicidal ideation or behaviors.  Patient denies current or recent homicidal ideation or behaviors.  Patient denies current or recent self injurious behavior or ideation.  Patient denies other safety concerns.     A safety and risk management plan has not been developed at this time, however patient was encouraged to call [place] [phone] should there be a change in any of these risk factors.      [place] Suicide Severity Rating Scale (Short Version)  [place] Suicide Severity Rating (Short Version) 2/18/2021   Over the past 2 weeks have you felt down, depressed, or hopeless? yes   Over the past 2 weeks have you had thoughts of killing yourself? no   Have you ever attempted to kill yourself? no     [place] Suicide Severity Rating Scale (Lifetime/Recent)  [place] Suicide Severity Rating (Lifetime/Recent) 8/13/2021   1. Wish to be Dead (Lifetime) No   1. Wish to be Dead (Recent) No   2. Non-Specific Active Suicidal Thoughts (Lifetime) No   2. Non-Specific Active Suicidal Thoughts (Recent) No   3. Active Suicidal Ideation with any Methods (Not Plan) Without Intent to Act (Lifetime) No   3. Active Suicidal Ideation with any Methods (Not Plan) Without Intent to Act (Recent) No   4. Active Suicidal Ideation with Some Intent to Act, Without Specific  Plan (Lifetime) No   4. Active Suicidal Ideation with Some Intent to Act, Without Specific Plan (Recent) No   5. Active Suicidal Ideation with Specific Plan and Intent (Lifetime) No   5. Active Suicidal Ideation with Specific Plan and Intent (Recent) No   Most Severe Ideation Rating (Lifetime) NA   Frequency (Lifetime) NA   Duration (Lifetime) NA   Controllability (Lifetime) NA   Protective Factors  (Lifetime) NA   Reasons for Ideation (Lifetime) NA   Most Severe Ideation Rating (Past Month) NA   Frequency (Past Month) NA   Duration (Past Month) NA   Controllability (Past Month) NA   Protective Factors (Past Month) NA   Reasons for Ideation (Past Month) NA   Actual Attempt (Lifetime) No   Actual Attempt (Past 3 Months) No   Has subject engaged in non-suicidal self-injurious behavior? (Lifetime) No   Has subject engaged in non-suicidal self-injurious behavior? (Past 3 Months) No   Interrupted Attempts (Lifetime) No   Interrupted Attempts (Past 3 Months) No   Aborted or Self-Interrupted Attempt (Lifetime) No   Aborted or Self-Interrupted Attempt (Past 3 Months) No   Preparatory Acts or Behavior (Lifetime) No   Preparatory Acts or Behavior (Past 3 Months) No   Most Recent Attempt Actual Lethality Code NA   Most Lethal Attempt Actual Lethality Code NA   Initial/First Attempt Actual Lethality Code NA         Appearance:   Appropriate   Eye Contact:   Good   Psychomotor Behavior: Normal   Attitude:   Cooperative   Orientation:   All  Speech   Rate / Production: Normal    Volume:  Normal   Mood:    Depressed    Affect:    Tearful  Thought Content:  Clear   Thought Form:  Coherent  Logical   Insight:    Good     Diagnoses:  1. Major depressive disorder, recurrent episode, moderate (H)        Collateral Reports Completed:  [facility] will coordinate with care team as needed    Plan: (Homework, other):  Lanie was scheduled to RTC in two weeks for help addressing depressive symptoms, pain, and relationship conflicts. We will  continue looking into chronic pain programs/treatment ideas for her. CD Recommendations: No indications of CD issues.     Héctor Higuera Psy.D, LP November 3, 2021              Answers for HPI/ROS submitted by the patient on 12/15/2020   If you checked off any problems, how difficult have these problems made it for you to do your work, take care of things at home, or get along with other people?: Not difficult at all  PHQ9 TOTAL SCORE: 2  KATHARINE 7 TOTAL SCORE: 1    _________________________________________________________________________  [facility] Integrated Behavioral Health Treatment Plan    Client's Name: Lanie Neil  YOB: 1990    Date: 9/23/2021    DSM-V Diagnoses: 296.31 (F33.0) Major Depressive Disorder, Recurrent Episode, Mild With anxious distress;   Psychosocial / Contextual Factors: SMA     Clinical Global Impressions  First:  Considering your total clinical experience with this particular patient population, how severe are the patient's symptoms at this time?: 4 (9/23/2021  3:29 PM)      Most recent:  Compared to the patient's condition at the START of treatment, this patient's condition is: 3 (9/23/2021  3:29 PM)      Referral / Collaboration:  Will collaborate with care team as indicated during treatment.    Anticipated number of session or this episode of care: 10+      MeasurableTreatment Goal(s) related to diagnosis / functional impairment(s)  Goal 1:  Patient will experience a reduction in depressive and anxious symptoms, along with a corresponding increase in positive emotion and life satisfaction.    Objective #A: Patient will experience a reduction in depressed mood, will develop more effective coping skills to manage depressive symptoms, will develop healthy cognitive patterns and beliefs, will increase ability to function adaptively and will continue to take medications as prescribed / participate in supportive activities and services    Status: Continued - Date(s):  9/23/2021    Objective #B: Patient will experience a reduction in anxiety, will develop more effective coping skills to manage anxiety symptoms, will develop healthy cognitive patterns and beliefs and will increase ability to function adaptively  Status: Continued - Date(s):  9/23/2021    Objective #C: Patient will develop better understanding of triggers and coping strategies to stabilize mood  Status: Continued - Date(s):  9/23/2021    Goal 2:  Patient will identify and increase engagement in valued activity, i.e. improving social connections/relationships, pursuing occupational goals or personally meaningful pursuits, exploration of meaning in life.     Objective #A: Patient will identify meaningful activity in social, occupational and  personal goals, and increase behavioral activation around these goals   Status: Continued - Date(s):  9/23/2021    Objective #B: Patient will address relationship difficulties in a more adaptive manner  Status: Continued - Date(s):  9/23/2021    Objective #C: Patient will develop coping/problem-solving skills to facilitate more adaptive adjustment and will effectively address problems that interfere with adaptive functioning  Status: Continued - Date(s):  9/23/2021    Goal 3:  Patient will increase understanding of pain medications and develop more adaptive responses to chronic pain.    Objective #A: Patient will increase understanding of the relationship between pain and stress/mood   Status: Continued - Date(s):  9/23/2021    Objective #B: Patient will develop 2-3 additional chronic pain management strategies  Status: Continued - Date(s):  9/23/2021      Possible Therapeutic Intervention(s)  Psycho-education regarding mental health diagnoses and treatment options    Skills training    Explore skills useful to client in current situation.    Skills include assertiveness, communication, conflict management, problem-solving, relaxation, etc.    Solution-Focused Therapy    Explore  patterns in patient's relationships and discuss options for new behaviors.    Explore patterns in patient's actions and choices and discuss options for new behaviors.    Cognitive-behavioral Therapy    Discuss common cognitive distortions, identify them in patient's life.    Explore ways to challenge, replace, and act against these cognitions.    Acceptance and Commitment Therapy    Explore and identify important values in patient's life.    Discuss ways to commit to behavioral activation around these values.    Psychodynamic psychotherapy    Discuss patient's emotional dynamics and issues and how they impact behaviors.    Explore patient's history of relationships and how they impact present behaviors.    Explore how to work with and make changes in these schemas and patterns.    Narrative Therapy    Explore the patient's story of his/her life from his/her perspective.    Explore alternate ways of understanding their experience, identifying exceptions, developing new themes.    Interpersonal Psychotherapy    Explore patterns in relationships that are effective or ineffective at helping patient reach their goals, find satisfying experience.    Discuss new patterns or behaviors to engage in for improved social functioning.    Behavioral Activation    Discuss steps patient can take to become more involved in meaningful activity.    Identify barriers to these activities and explore possible solutions.    Mindfulness-Based Strategies    Discuss skills based on development and application of mindfulness.    Skills drawn from compassion-focused therapy, dialectical behavior therapy, mindfulness-based stress reduction, mindfulness-based cognitive therapy, etc.      We have developed these goals together during our work to this point. Patient has assisted in the development of these goals and has agreed to this treatment plan.       Héctor Higuera LP   9/23/2021

## 2021-11-09 ENCOUNTER — MEDICAL CORRESPONDENCE (OUTPATIENT)
Dept: HEALTH INFORMATION MANAGEMENT | Facility: CLINIC | Age: 31
End: 2021-11-09
Payer: COMMERCIAL

## 2021-11-09 ENCOUNTER — TRANSFERRED RECORDS (OUTPATIENT)
Dept: HEALTH INFORMATION MANAGEMENT | Facility: CLINIC | Age: 31
End: 2021-11-09
Payer: COMMERCIAL

## 2021-11-22 ENCOUNTER — VIRTUAL VISIT (OUTPATIENT)
Dept: BEHAVIORAL HEALTH | Facility: CLINIC | Age: 31
End: 2021-11-22
Payer: COMMERCIAL

## 2021-11-22 DIAGNOSIS — F33.1 MAJOR DEPRESSIVE DISORDER, RECURRENT EPISODE, MODERATE (H): Primary | ICD-10-CM

## 2021-11-22 PROCEDURE — 90837 PSYTX W PT 60 MINUTES: CPT | Mod: GT | Performed by: PSYCHOLOGIST

## 2021-11-22 NOTE — PROGRESS NOTES
[facility]: Integrated Behavioral Health  November 22, 2021        Behavioral Health Clinician Progress Note    Patient Name: Lanie Neil           Service Type: Video appointment      Service Location:  Video appointment      Session Start Time: 1:00  Session End Time: 2:00      Session Length: 53 - 60      Attendees: Client    Visit Activities (Refresh list every visit): [facility] Only    Telemedicine Visit: The patient's condition can be safely assessed and treated via synchronous audio and visual telemedicine encounter.      Reason for Telemedicine Visit: Patient has requested telehealth visit and Services only offered telehealth    Originating Site (Patient Location): Patient's home    Distant Site (Provider Location): Provider Remote Setting- Home Office    Consent:  The patient/guardian has verbally consented to: the potential risks and benefits of telemedicine (video visit) versus in person care; bill my insurance or make self-payment for services provided; and responsibility for payment of non-covered services.     Mode of Communication:  Video Conference via [employer]    As the provider I attest to compliance with applicable laws and regulations related to telemedicine.      Diagnostic Assessment Date: 1/19/2021  Treatment Plan Review Date: 12/23/2021  See Flowsheets for today's PHQ-9 and KATHARINE-7 results  Previous PHQ-9:   PHQ-9 SCORE 12/15/2020 3/16/2021 6/30/2021   PHQ-9 Total Score - - -   PHQ-9 Total Score MyChart 2 (Minimal depression) 2 (Minimal depression) 2 (Minimal depression)   PHQ-9 Total Score 2 2 2     Previous KATHARINE-7:   KATHARINE-7 SCORE 12/15/2020 3/16/2021 6/30/2021   Total Score 1 (minimal anxiety) 1 (minimal anxiety) 1 (minimal anxiety)   Total Score 1 1 1       DATA  Extended Session (60+ minutes): No  Interactive Complexity: No  Crisis: No    Treatment Objective(s) Addressed in This Session:  Target Behavior(s): disease management/lifestyle changes       Depressed Mood: Decrease frequency and intensity of feeling down, depressed, hopeless  Identify negative self-talk and behaviors: challenge core beliefs, myths, and actions  Anxiety: will develop more effective coping skills to manage anxiety symptoms  Relationship Problems: will address relationship difficulties in a more adaptive manner  Psychological distress related to Pain    Current Stressors / Issues:  [facility] met with Lanie today to continue supporting her treatment of depression, adjustment to chronic disease management, and relationship problems.     We continued to process the sense of loss Lanie experiences about not being able to have children. She spoke to how she has a long-term history of imagining her life with children, noting she sees this as a way to establish a legacy and contribute something meaningful to life. [facility] helped Lanie entertain alternative ideas about ways to establish these goals outside of having children. She identified how having other close friendships and helping others could help, but she doesn't see this as replacing having children.    She is also fearing her brother will move to [place], which is triggering her fears of abandonment. She recognizes that his statements about moving are not personal, though she struggles with not feeling abandoned by him when he makes these comments. Discussed investing her time into other relationships, perhaps getting creative on ways to cultivate other, more satisfying relationships. Explored the benefits of taking proactive steps in managing relational concerns, as opposed to waiting for things to happen/others coming to her.     Progress on Treatment Objective(s) / Homework:  Stable - ACTION (Actively working towards change); Intervened by reinforcing change plan / affirming steps taken      Acceptance and Commitment Therapy    Explore and identify important values in patient's life.    Discuss ways to commit to behavioral activation  around these values.    Solution-Focused Therapy    Explore patterns in patient's relationships and discuss options for new behaviors.    Explore patterns in patient's actions and choices and discuss options for new behaviors.    Care Plan review completed: yes    Medication Review:  No changes to current psychiatric medication(s)    Medication Compliance:  NA    Changes in Health Issues:   None reported    Chemical Use Review:   Substance Use: Chemical use reviewed, no active concerns identified      Tobacco Use: No current tobacco use.      Assessment: Current Emotional / Mental Status (status of significant symptoms):  Risk status (Self / Other harm or suicidal ideation)  Patient has had a history of suicidal ideation: passive thoughts only; easily controlled - denied any recently    Patient denies current fears or concerns for personal safety.  Patient denies current or recent suicidal ideation or behaviors.  Patient denies current or recent homicidal ideation or behaviors.  Patient denies current or recent self injurious behavior or ideation.  Patient denies other safety concerns.     A safety and risk management plan has not been developed at this time, however patient was encouraged to call [place] [phone] should there be a change in any of these risk factors.      Humphreys Suicide Severity Rating Scale (Short Version)  Hum[place]reys Suicide Severity Rating (Short Version) 2/18/2021   Over the past 2 weeks have you felt down, depressed, or hopeless? yes   Over the past 2 weeks have you had thoughts of killing yourself? no   Have you ever attempted to kill yourself? no     Humphreys Suicide Severity Rating Scale (Lifetime/Recent)  Hum[place]reys Suicide Severity Rating (Lifetime/Recent) 8/13/2021   1. Wish to be Dead (Lifetime) No   1. Wish to be Dead (Recent) No   2. Non-Specific Active Suicidal Thoughts (Lifetime) No   2. Non-Specific Active Suicidal Thoughts (Recent) No   3. Active Suicidal Ideation with any  Methods (Not Plan) Without Intent to Act (Lifetime) No   3. Active Suicidal Ideation with any Methods (Not Plan) Without Intent to Act (Recent) No   4. Active Suicidal Ideation with Some Intent to Act, Without Specific Plan (Lifetime) No   4. Active Suicidal Ideation with Some Intent to Act, Without Specific Plan (Recent) No   5. Active Suicidal Ideation with Specific Plan and Intent (Lifetime) No   5. Active Suicidal Ideation with Specific Plan and Intent (Recent) No   Most Severe Ideation Rating (Lifetime) NA   Frequency (Lifetime) NA   Duration (Lifetime) NA   Controllability (Lifetime) NA   Protective Factors  (Lifetime) NA   Reasons for Ideation (Lifetime) NA   Most Severe Ideation Rating (Past Month) NA   Frequency (Past Month) NA   Duration (Past Month) NA   Controllability (Past Month) NA   Protective Factors (Past Month) NA   Reasons for Ideation (Past Month) NA   Actual Attempt (Lifetime) No   Actual Attempt (Past 3 Months) No   Has subject engaged in non-suicidal self-injurious behavior? (Lifetime) No   Has subject engaged in non-suicidal self-injurious behavior? (Past 3 Months) No   Interrupted Attempts (Lifetime) No   Interrupted Attempts (Past 3 Months) No   Aborted or Self-Interrupted Attempt (Lifetime) No   Aborted or Self-Interrupted Attempt (Past 3 Months) No   Preparatory Acts or Behavior (Lifetime) No   Preparatory Acts or Behavior (Past 3 Months) No   Most Recent Attempt Actual Lethality Code NA   Most Lethal Attempt Actual Lethality Code NA   Initial/First Attempt Actual Lethality Code NA         Appearance:   Appropriate   Eye Contact:   Good   Psychomotor Behavior: Normal   Attitude:   Cooperative   Orientation:   All  Speech   Rate / Production: Normal    Volume:  Normal   Mood:    Depressed    Affect:    Tearful  Thought Content:  Clear   Thought Form:  Coherent  Logical   Insight:    Good     Diagnoses:  1. Major depressive disorder, recurrent episode, moderate (H)        Collateral  Reports Completed:  [facility] will coordinate with care team as needed    Plan: (Homework, other):  Lanie was scheduled to RTC in two weeks for help addressing depressive symptoms, pain, and relationship conflicts. We will continue looking into ways to improve social support. CD Recommendations: No indications of CD issues.     Héctor Higuera Psy.D, LP November 22, 2021              Answers for HPI/ROS submitted by the patient on 12/15/2020   If you checked off any problems, how difficult have these problems made it for you to do your work, take care of things at home, or get along with other people?: Not difficult at all  PHQ9 TOTAL SCORE: 2  KATHARINE 7 TOTAL SCORE: 1    _________________________________________________________________________  [facility] Integrated [facility]al Health Treatment Plan    Client's Name: Lanie Neil  YOB: 1990    Date: 9/23/2021    DSM-V Diagnoses: 296.31 (F33.0) Major Depressive Disorder, Recurrent Episode, Mild With anxious distress;   Psychosocial / Contextual Factors: SMA     Clinical Global Impressions  First:  Considering your total clinical experience with this particular patient population, how severe are the patient's symptoms at this time?: 4 (9/23/2021  3:29 PM)      Most recent:  Compared to the patient's condition at the START of treatment, this patient's condition is: 3 (9/23/2021  3:29 PM)      Referral / Collaboration:  Will collaborate with care team as indicated during treatment.    Anticipated number of session or this episode of care: 10+      MeasurableTreatment Goal(s) related to diagnosis / functional impairment(s)  Goal 1:  Patient will experience a reduction in depressive and anxious symptoms, along with a corresponding increase in positive emotion and life satisfaction.    Objective #A: Patient will experience a reduction in depressed mood, will develop more effective coping skills to manage depressive symptoms, will develop healthy cognitive patterns and  beliefs, will increase ability to function adaptively and will continue to take medications as prescribed / participate in supportive activities and services    Status: Continued - Date(s): 9/23/2021    Objective #B: Patient will experience a reduction in anxiety, will develop more effective coping skills to manage anxiety symptoms, will develop healthy cognitive patterns and beliefs and will increase ability to function adaptively  Status: Continued - Date(s):  9/23/2021    Objective #C: Patient will develop better understanding of triggers and coping strategies to stabilize mood  Status: Continued - Date(s):  9/23/2021    Goal 2:  Patient will identify and increase engagement in valued activity, i.e. improving social connections/relationships, pursuing occupational goals or personally meaningful pursuits, exploration of meaning in life.     Objective #A: Patient will identify meaningful activity in social, occupational and  personal goals, and increase behavioral activation around these goals   Status: Continued - Date(s):  9/23/2021    Objective #B: Patient will address relationship difficulties in a more adaptive manner  Status: Continued - Date(s):  9/23/2021    Objective #C: Patient will develop coping/problem-solving skills to facilitate more adaptive adjustment and will effectively address problems that interfere with adaptive functioning  Status: Continued - Date(s):  9/23/2021    Goal 3:  Patient will increase understanding of pain medications and develop more adaptive responses to chronic pain.    Objective #A: Patient will increase understanding of the relationship between pain and stress/mood   Status: Continued - Date(s):  9/23/2021    Objective #B: Patient will develop 2-3 additional chronic pain management strategies  Status: Continued - Date(s):  9/23/2021      Possible Therapeutic Intervention(s)  Psycho-education regarding mental health diagnoses and treatment options    Skills training    Explore  skills useful to client in current situation.    Skills include assertiveness, communication, conflict management, problem-solving, relaxation, etc.    Solution-Focused Therapy    Explore patterns in patient's relationships and discuss options for new behaviors.    Explore patterns in patient's actions and choices and discuss options for new behaviors.    Cognitive-behavioral Therapy    Discuss common cognitive distortions, identify them in patient's life.    Explore ways to challenge, replace, and act against these cognitions.    Acceptance and Commitment Therapy    Explore and identify important values in patient's life.    Discuss ways to commit to behavioral activation around these values.    Psychodynamic psychotherapy    Discuss patient's emotional dynamics and issues and how they impact behaviors.    Explore patient's history of relationships and how they impact present behaviors.    Explore how to work with and make changes in these schemas and patterns.    Narrative Therapy    Explore the patient's story of his/her life from his/her perspective.    Explore alternate ways of understanding their experience, identifying exceptions, developing new themes.    Interpersonal Psychotherapy    Explore patterns in relationships that are effective or ineffective at helping patient reach their goals, find satisfying experience.    Discuss new patterns or behaviors to engage in for improved social functioning.    Behavioral Activation    Discuss steps patient can take to become more involved in meaningful activity.    Identify barriers to these activities and explore possible solutions.    Mindfulness-Based Strategies    Discuss skills based on development and application of mindfulness.    Skills drawn from compassion-focused therapy, dialectical behavior therapy, mindfulness-based stress reduction, mindfulness-based cognitive therapy, etc.      We have developed these goals together during our work to this point.  Patient has assisted in the development of these goals and has agreed to this treatment plan.       Héctor Higuera LP   9/23/2021

## 2021-11-30 DIAGNOSIS — E87.6 HYPOKALEMIA: ICD-10-CM

## 2021-12-03 ENCOUNTER — VIRTUAL VISIT (OUTPATIENT)
Dept: BEHAVIORAL HEALTH | Facility: CLINIC | Age: 31
End: 2021-12-03
Payer: COMMERCIAL

## 2021-12-03 DIAGNOSIS — F33.1 MAJOR DEPRESSIVE DISORDER, RECURRENT EPISODE, MODERATE (H): Primary | ICD-10-CM

## 2021-12-03 PROCEDURE — 90837 PSYTX W PT 60 MINUTES: CPT | Mod: GT | Performed by: PSYCHOLOGIST

## 2021-12-03 RX ORDER — POTASSIUM CHLORIDE 20MEQ/15ML
LIQUID (ML) ORAL
Qty: 675 ML | Refills: 11 | Status: SHIPPED | OUTPATIENT
Start: 2021-12-03 | End: 2022-11-22

## 2021-12-03 NOTE — PROGRESS NOTES
[facility]: Integrated Behavioral Health  December 3, 2021      Behavioral Health Clinician Progress Note    Patient Name: Lanie Neil           Service Type: Video appointment      Service Location:  Video appointment      Session Start Time: 10:03 Session End Time: 11:00      Session Length: 53 - 60      Attendees: Client    Visit Activities (Refresh list every visit): [facility] Only    Telemedicine Visit: The patient's condition can be safely assessed and treated via synchronous audio and visual telemedicine encounter.      Reason for Telemedicine Visit: Patient has requested telehealth visit and Services only offered telehealth    Originating Site (Patient Location): Patient's home    Distant Site (Provider Location): Provider Remote Setting- Home Office    Consent:  The patient/guardian has verbally consented to: the potential risks and benefits of telemedicine (video visit) versus in person care; bill my insurance or make self-payment for services provided; and responsibility for payment of non-covered services.     Mode of Communication:  Video Conference via [employer]    As the provider I attest to compliance with applicable laws and regulations related to telemedicine.      Diagnostic Assessment Date: 1/19/2021  Treatment Plan Review Date: 12/23/2021  See Flowsheets for today's PHQ-9 and KATHARINE-7 results  Previous PHQ-9:   PHQ-9 SCORE 12/15/2020 3/16/2021 6/30/2021   PHQ-9 Total Score - - -   PHQ-9 Total Score MyChart 2 (Minimal depression) 2 (Minimal depression) 2 (Minimal depression)   PHQ-9 Total Score 2 2 2     Previous KATHARINE-7:   KATHARINE-7 SCORE 12/15/2020 3/16/2021 6/30/2021   Total Score 1 (minimal anxiety) 1 (minimal anxiety) 1 (minimal anxiety)   Total Score 1 1 1       DATA  Extended Session (60+ minutes): No  Interactive Complexity: No  Crisis: No    Treatment Objective(s) Addressed in This Session:  Target Behavior(s): disease management/lifestyle changes   "    Depressed Mood: Decrease frequency and intensity of feeling down, depressed, hopeless  Identify negative self-talk and behaviors: challenge core beliefs, myths, and actions  Anxiety: will develop more effective coping skills to manage anxiety symptoms  Relationship Problems: will address relationship difficulties in a more adaptive manner  Psychological distress related to Pain    Current Stressors / Issues:  [facility] met with Lanie today to continue supporting her treatment of depression, adjustment to chronic disease management, and relationship problems. [facility] on 11/30/2021. Lanie reports today acute distress about learning her brother is more strongly considering, and making steps toward, moving to [place]. Lanie states instances where male figures moving away, leaving her traditionally is a very difficult experience for her. She reports feeling as though her brother is acting selfish, choosing the weather of [place] over being with his family. She described the feelings of abandonment she is struggling with, how she also feels helpless to make her brother stay here in [place]. She reflected on how this is a recurring theme in her life, how others can move/do things freely while she struggles with autonomy, taking actions on her own. [facility] provided supportive psychotherapy and we discussed focusing on what she can control in difficult relational situations like these. We reflected on how she cannot control other people, what they do/choose to live, but can focus on her own reactions/behavior. Explored the role of radical acceptance to this end. Lanie notes she recognizes she cannot control others, though feels she is in a \"dark place\" with hearing her brother might move. She notes that she is also worried about how her brother moving would affect the support she receives at home, considering he is a part-time PCA for her. She worries that her " mother will have the bear all the responsibility of her care, which has resulted in conflict before. Her mother is reluctant to get additional help due to COVID-19 at the moment. This makes Lanie worry considerably about the future.       Progress on Treatment Objective(s) / Homework:  Stable - ACTION (Actively working towards change); Intervened by reinforcing change plan / affirming steps taken      Acceptance and Commitment Therapy    Explore and identify important values in patient's life.    Discuss ways to commit to behavioral activation around these values.    Solution-Focused Therapy    Explore patterns in patient's relationships and discuss options for new behaviors.    Explore patterns in patient's actions and choices and discuss options for new behaviors.    Care Plan review completed: No    Medication Review:  No changes to current psychiatric medication(s)    Medication Compliance:  NA    Changes in Health Issues:   None reported    Chemical Use Review:   Substance Use: Chemical use reviewed, no active concerns identified      Tobacco Use: No current tobacco use.      Assessment: Current Emotional / Mental Status (status of significant symptoms):  Risk status (Self / Other harm or suicidal ideation)  Patient has had a history of suicidal ideation: passive thoughts only; easily controlled - denied any recently    Patient denies current fears or concerns for personal safety.  Patient denies current or recent suicidal ideation or behaviors.  Patient denies current or recent homicidal ideation or behaviors.  Patient denies current or recent self injurious behavior or ideation.  Patient denies other safety concerns.     A safety and risk management plan has not been developed at this time, however patient was encouraged to call [facility] should there be a change in any of these risk factors.      Carson Suicide Severity Rating Scale (Short Version)  Carson Suicide Severity Rating (Short  Version) 2/18/2021   Over the past 2 weeks have you felt down, depressed, or hopeless? yes   Over the past 2 weeks have you had thoughts of killing yourself? no   Have you ever attempted to kill yourself? no     Stanly Suicide Severity Rating Scale (Lifetime/Recent)  Stanly Suicide Severity Rating (Lifetime/Recent) 8/13/2021   1. Wish to be Dead (Lifetime) No   1. Wish to be Dead (Recent) No   2. Non-Specific Active Suicidal Thoughts (Lifetime) No   2. Non-Specific Active Suicidal Thoughts (Recent) No   3. Active Suicidal Ideation with any Methods (Not Plan) Without Intent to Act (Lifetime) No   3. Active Suicidal Ideation with any Methods (Not Plan) Without Intent to Act (Recent) No   4. Active Suicidal Ideation with Some Intent to Act, Without Specific Plan (Lifetime) No   4. Active Suicidal Ideation with Some Intent to Act, Without Specific Plan (Recent) No   5. Active Suicidal Ideation with Specific Plan and Intent (Lifetime) No   5. Active Suicidal Ideation with Specific Plan and Intent (Recent) No   Most Severe Ideation Rating (Lifetime) NA   Frequency (Lifetime) NA   Duration (Lifetime) NA   Controllability (Lifetime) NA   Protective Factors  (Lifetime) NA   Reasons for Ideation (Lifetime) NA   Most Severe Ideation Rating (Past Month) NA   Frequency (Past Month) NA   Duration (Past Month) NA   Controllability (Past Month) NA   Protective Factors (Past Month) NA   Reasons for Ideation (Past Month) NA   Actual Attempt (Lifetime) No   Actual Attempt (Past 3 Months) No   Has subject engaged in non-suicidal self-injurious behavior? (Lifetime) No   Has subject engaged in non-suicidal self-injurious behavior? (Past 3 Months) No   Interrupted Attempts (Lifetime) No   Interrupted Attempts (Past 3 Months) No   Aborted or Self-Interrupted Attempt (Lifetime) No   Aborted or Self-Interrupted Attempt (Past 3 Months) No   Preparatory Acts or Behavior (Lifetime) No   Preparatory Acts or Behavior (Past 3 Months) No   Most  Recent Attempt Actual Lethality Code NA   Most Lethal Attempt Actual Lethality Code NA   Initial/First Attempt Actual Lethality Code NA         Appearance:   Appropriate   Eye Contact:   Good   Psychomotor Behavior: Normal   Attitude:   Cooperative   Orientation:   All  Speech   Rate / Production: Normal    Volume:  Normal   Mood:    Depressed    Affect:    Tearful  Thought Content:  Clear   Thought Form:  Coherent  Logical   Insight:    Good     Diagnoses:  1. Major depressive disorder, recurrent episode, moderate (H)        Collateral Reports Completed:  [facility] will coordinate with care team as needed    Plan: (Homework, other):  Lanie was scheduled to RTC in two weeks for help addressing depressive symptoms, pain, and relationship conflicts. We will continue looking into ways to improve social support. CD Recommendations: No indications of CD issues.     Héctor Higuera Psy.D, LP December 3, 2021                Answers for HPI/ROS submitted by the patient on 12/15/2020   If you checked off any problems, how difficult have these problems made it for you to do your work, take care of things at home, or get along with other people?: Not difficult at all  PHQ9 TOTAL SCORE: 2  KATHARINE 7 TOTAL SCORE: 1    _________________________________________________________________________  [facility] Integrated Behavioral Health Treatment Plan    Client's Name: Lanie Neil  YOB: 1990    Date: 9/23/2021    DSM-V Diagnoses: 296.31 (F33.0) Major Depressive Disorder, Recurrent Episode, Mild With anxious distress;   Psychosocial / Contextual Factors: SMA     Clinical Global Impressions  First:  Considering your total clinical experience with this particular patient population, how severe are the patient's symptoms at this time?: 4 (9/23/2021  3:29 PM)      Most recent:  Compared to the patient's condition at the START of treatment, this patient's condition is: 3 (9/23/2021  3:29 PM)      Referral / Collaboration:  Will  collaborate with care team as indicated during treatment.    Anticipated number of session or this episode of care: 10+      MeasurableTreatment Goal(s) related to diagnosis / functional impairment(s)  Goal 1:  Patient will experience a reduction in depressive and anxious symptoms, along with a corresponding increase in positive emotion and life satisfaction.    Objective #A: Patient will experience a reduction in depressed mood, will develop more effective coping skills to manage depressive symptoms, will develop healthy cognitive patterns and beliefs, will increase ability to function adaptively and will continue to take medications as prescribed / participate in supportive activities and services    Status: Continued - Date(s): 9/23/2021    Objective #B: Patient will experience a reduction in anxiety, will develop more effective coping skills to manage anxiety symptoms, will develop healthy cognitive patterns and beliefs and will increase ability to function adaptively  Status: Continued - Date(s):  9/23/2021    Objective #C: Patient will develop better understanding of triggers and coping strategies to stabilize mood  Status: Continued - Date(s):  9/23/2021    Goal 2:  Patient will identify and increase engagement in valued activity, i.e. improving social connections/relationships, pursuing occupational goals or personally meaningful pursuits, exploration of meaning in life.     Objective #A: Patient will identify meaningful activity in social, occupational and  personal goals, and increase behavioral activation around these goals   Status: Continued - Date(s):  9/23/2021    Objective #B: Patient will address relationship difficulties in a more adaptive manner  Status: Continued - Date(s):  9/23/2021    Objective #C: Patient will develop coping/problem-solving skills to facilitate more adaptive adjustment and will effectively address problems that interfere with adaptive functioning  Status: Continued - Date(s):   9/23/2021    Goal 3:  Patient will increase understanding of pain medications and develop more adaptive responses to chronic pain.    Objective #A: Patient will increase understanding of the relationship between pain and stress/mood   Status: Continued - Date(s):  9/23/2021    Objective #B: Patient will develop 2-3 additional chronic pain management strategies  Status: Continued - Date(s):  9/23/2021      Possible Therapeutic Intervention(s)  Psycho-education regarding mental health diagnoses and treatment options    Skills training    Explore skills useful to client in current situation.    Skills include assertiveness, communication, conflict management, problem-solving, relaxation, etc.    Solution-Focused Therapy    Explore patterns in patient's relationships and discuss options for new behaviors.    Explore patterns in patient's actions and choices and discuss options for new behaviors.    Cognitive-behavioral Therapy    Discuss common cognitive distortions, identify them in patient's life.    Explore ways to challenge, replace, and act against these cognitions.    Acceptance and Commitment Therapy    Explore and identify important values in patient's life.    Discuss ways to commit to behavioral activation around these values.    Psychodynamic psychotherapy    Discuss patient's emotional dynamics and issues and how they impact behaviors.    Explore patient's history of relationships and how they impact present behaviors.    Explore how to work with and make changes in these schemas and patterns.    Narrative Therapy    Explore the patient's story of his/her life from his/her perspective.    Explore alternate ways of understanding their experience, identifying exceptions, developing new themes.    Interpersonal Psychotherapy    Explore patterns in relationships that are effective or ineffective at helping patient reach their goals, find satisfying experience.    Discuss new patterns or behaviors to engage in  for improved social functioning.    Behavioral Activation    Discuss steps patient can take to become more involved in meaningful activity.    Identify barriers to these activities and explore possible solutions.    Mindfulness-Based Strategies    Discuss skills based on development and application of mindfulness.    Skills drawn from compassion-focused therapy, dialectical behavior therapy, mindfulness-based stress reduction, mindfulness-based cognitive therapy, etc.      We have developed these goals together during our work to this point. Patient has assisted in the development of these goals and has agreed to this treatment plan.       Héctor Higuera LP   9/23/2021

## 2021-12-03 NOTE — TELEPHONE ENCOUNTER
POTASSIUM CHLOR 10% LIQ(20MEQ/15ML)  22.5 mLs (30 mEq) by Per G Tube route daily   Last Written Prescription Date:  12/15/20  Last Fill Quantity: 675 ml ,   # refills: 11  Last Office Visit : 10/11/2021  Future Office visit:  12/3/21        Scanned image : labs done 10/11/21 K=4.0  (Other completed labs: Folate, ferritin, Vit B12, Iron, CBCP, BMP)

## 2021-12-07 ENCOUNTER — TRANSFERRED RECORDS (OUTPATIENT)
Dept: HEALTH INFORMATION MANAGEMENT | Facility: CLINIC | Age: 31
End: 2021-12-07
Payer: COMMERCIAL

## 2021-12-09 ENCOUNTER — VIRTUAL VISIT (OUTPATIENT)
Dept: FAMILY MEDICINE | Facility: CLINIC | Age: 31
End: 2021-12-09
Payer: COMMERCIAL

## 2021-12-09 DIAGNOSIS — Z78.9 ON ENTERAL NUTRITION AT HOME: ICD-10-CM

## 2021-12-09 DIAGNOSIS — D50.0 IRON DEFICIENCY ANEMIA DUE TO CHRONIC BLOOD LOSS: Primary | ICD-10-CM

## 2021-12-09 DIAGNOSIS — G12.9 SPINAL MUSCLE ATROPHY (H): ICD-10-CM

## 2021-12-09 PROCEDURE — 99214 OFFICE O/P EST MOD 30 MIN: CPT | Mod: 95 | Performed by: FAMILY MEDICINE

## 2021-12-09 NOTE — NURSING NOTE
Lanie Neil is a 31 year old female patient that presents today in clinic for the following:    Chief Complaint   Patient presents with     RECHECK     discuss potassium     The patient's allergies and medications were reviewed as noted. A set of vitals were recorded as noted without incident. The patient does not have any other questions for the provider.    Misael Lawson, EMT at 12:55 PM on 12/9/2021

## 2021-12-09 NOTE — PROGRESS NOTES
Lanie is a 31 year old who is being evaluated via a billable video visit.      How would you like to obtain your AVS? MyChart  If the video visit is dropped, the invitation should be resent by: in epic  Will anyone else be joining your video visit? No    Video Start Time: 1 pm    Assessment & Plan     Iron deficiency anemia due to chronic blood loss  Recheck  - CBC with platelets differential; Future  - Comprehensive metabolic panel; Future  - Iron and iron binding capacity; Future  - Ferritin; Future    On enteral nutrition at home  Recheck given relies on enteral nutrition, see dietician (RN working on)  - CBC with platelets differential; Future  - Comprehensive metabolic panel; Future  - Iron and iron binding capacity; Future  - Ferritin; Future    Spinal muscle atrophy (H)  Reason for needing nutrition supplements. Also will see me in two mo for Spinraza preop.        33 minutes spent on the date of the encounter doing chart review, history and exam, documentation and further activities per the note    Jimmy Moseley MD  [facility]   Lanie is a 31 year old who presents for the following health issues  accompanied by her mother.    HPI Here for a few things.  1-uses [employer] for PCA, [facility] for liquid supplement diet, [facility] for labs. Does not have HHN per se per pt [place] one [place] come out to where she lives. Needs note saysing she needs her food supplements. In fact she cannot sustain herself or live without them. Will consume very small amounts by mouth on occasion, pleasure eating, minimal substance. Uses four cans a day, see med list for type, hungry at times, wonders if could make it 4-5 cans/day. She thinks wt stable, no recent wt. No recent nutrition consult. Will get nutrition labs then consult. Will send this note to [facility], must have supplements.   2-she agrees to get flu shot this mo  3-will need preop for  Spinraza early Feb    Past Medical History:   Diagnosis Date     Anxiety      Snoring      Spinal muscle atrophy (H)      Past Surgical History:   Procedure Laterality Date     ENT SURGERY      tubes     GASTROSTOMY TUBE       KIDNEY SURGERY       SPINE SURGERY       TRACHEOSTOMY       Current Outpatient Medications   Medication     ACETAMINOPHEN PO     albuterol (ALBUTEROL) 108 (90 BASE) MCG/ACT inhaler     albuterol (PROVENTIL) (2.5 MG/3ML) 0.083% neb solution     ALPRAZolam (XANAX) 0.25 MG tablet     aminocaproic acid (AMICAR) 0.25 GM/ML solution     bisacodyl (DULCOLAX) 10 MG suppository     celecoxib (CELEBREX) 200 MG capsule     clindamycin (CLEOCIN T) 1 % lotion     diazepam (VALIUM) 5 MG tablet     doxycycline hyclate (VIBRAMYCIN) 100 MG capsule     fluocinolone acetonide (DERMA SMOOTHE/FS BODY) 0.01 % external oil     fluticasone (FLOVENT HFA) 110 MCG/ACT inhaler     gabapentin (NEURONTIN) 250 MG/5ML solution     GENERLAC 10 GM/15ML solution     guaiFENesin (ORGANIDIN) 200 MG TABS tablet     HYDROmorphone (DILAUDID) 4 MG tablet     hydrOXYzine (ATARAX) 25 MG tablet     ketoconazole (NIZORAL) 2 % external shampoo     LANsoprazole (PREVACID) 30 MG DR capsule     Magnesium Hydroxide (MILK OF MAGNESIA PO)     metroNIDAZOLE (METROCREAM) 0.75 % external cream     mometasone (NASONEX) 50 MCG/ACT nasal spray     morphine (SABINE) 30 MG 24 hr capsule     mupirocin (BACTROBAN) 2 % external cream     naloxone (NARCAN) 4 MG/0.1ML nasal spray     Nutritional Supplements (REPLETE FIBER) LIQD     omeprazole (PRILOSEC) 2 mg/mL suspension     ondansetron (ZOFRAN-ODT) 4 MG ODT tab     order for DME     order for DME     polyethylene glycol (MIRALAX) 17 GM/Dose powder     potassium chloride (KAYCIEL) 20 MEQ/15ML (10%) solution     prochlorperazine (COMPAZINE) 5 MG tablet     Salicylic Acid (OXY BALANCE FACIAL CLEAN WASH EX)     Sennosides (SENNA) 8.8 MG/5ML SYRP     sertraline (ZOLOFT) 20 MG/ML (HIGH CONC) solution     silver  sulfADIAZINE (SILVADENE) 1 % external cream     simethicone (MYLICON) 66.7 mg/ml     simethicone 40 MG/0.6ML LIQD     sodium chloride (OCEAN) 0.65 % nasal spray     tretinoin (RETIN-A) 0.025 % external cream     XARELTO ANTICOAGULANT 10 MG TABS tablet     No current facility-administered medications for this visit.     No Known Allergies      Review of Systems         Objective           Vitals:  No vitals were obtained today due to virtual visit.    Physical Exam   GENERAL: Healthy, alert and no distress  EYES: Eyes grossly normal to inspection.  No discharge or erythema, or obvious scleral/conjunctival abnormalities.  RESP: No audible wheeze, cough, or visible cyanosis.  No visible retractions or increased work of breathing.    SKIN: Visible skin clear. No significant rash, abnormal pigmentation or lesions.  NEURO: Cranial nerves grossly intact.  Mentation and speech appropriate for age.  PSYCH: Mentation appears normal, affect normal/bright, judgement and insight intact, normal speech and appearance well-groomed.  Per usual, in bed, very limited mobility by underlying condition, no acute changes       Video-Visit Details    Type of service:  Video Visit    Video End Time:1:25    Originating Location (pt. Location): Home    Distant Location (provider location):  [facility]IMARY [facility]     Platform used for Video Visit: [employer]

## 2021-12-10 ENCOUNTER — TELEPHONE (OUTPATIENT)
Dept: INTERNAL MEDICINE | Facility: CLINIC | Age: 31
End: 2021-12-10
Payer: COMMERCIAL

## 2021-12-10 ENCOUNTER — TELEPHONE (OUTPATIENT)
Dept: DERMATOLOGY | Facility: CLINIC | Age: 31
End: 2021-12-10
Payer: COMMERCIAL

## 2021-12-10 NOTE — TELEPHONE ENCOUNTER
Spoke with Georgette. Pt will have dietician consult in 3 weeks and they will fax us the recommendation.

## 2021-12-10 NOTE — TELEPHONE ENCOUNTER
Prior Authorization Retail Medication Request    Medication/Dose: tretinoin (RETIN-A) 0.025 % external cream  ICD code (if different than what is on RX):  [L71.9]  Previously Tried and Failed:  doxycycline 100 mg PO BID, metrocream BID    Insurance Name:  [employer]  Insurance ID:  [id number]

## 2021-12-10 NOTE — TELEPHONE ENCOUNTER
Spoke with Kat.  [facility] can provide nutrition consult and they will fax us the documentation after the consult.    Soon-Mi  --------------------------------------------------------------------------------        ----- Message from Jimmy Moseley MD sent at 12/9/2021  1:10 PM CST -----  I just did video visit w/ her    I just ordered labs, she has [facility] draw those at home, Kat is her contact  [phone]    Neel is an agency she gets PCA from    [facility] where she gets nutritional supplements from    Lanie needs a new nutrition consult re: lives on enteral nutrition (she gets four cans a day and would like it to be 4-5 actually)    Can you see if any of those agencies could be provide a home nutritioin consult, just to see what current needs are?    Thx

## 2021-12-10 NOTE — TELEPHONE ENCOUNTER
[facility] Call Center    Phone Message    May a detailed message be left on voicemail: yes     Reason for Call: Georgette calling from [facility] and just need more clarification of what is needed and orders. Please call back to discuss or the specific formula, amount per day to order, and route either the J or G tube with the dr signature. You can call to discuss or Fax [phone] over the order or do both. Which is fine too with Georgette.    Action Taken: Message routed to:  [facility] ([facility]    Travel Screening: Not Applicable

## 2021-12-14 ENCOUNTER — VIRTUAL VISIT (OUTPATIENT)
Dept: BEHAVIORAL HEALTH | Facility: CLINIC | Age: 31
End: 2021-12-14
Payer: COMMERCIAL

## 2021-12-14 DIAGNOSIS — F33.1 MAJOR DEPRESSIVE DISORDER, RECURRENT EPISODE, MODERATE (H): Primary | ICD-10-CM

## 2021-12-14 PROCEDURE — 90837 PSYTX W PT 60 MINUTES: CPT | Mod: 95 | Performed by: PSYCHOLOGIST

## 2021-12-14 NOTE — TELEPHONE ENCOUNTER
PA Initiation    Medication: tretinoin (RETIN-A) 0.025 % external cream   Insurance Company: [employer] - Phone [phone] Fax [phone]  Pharmacy Filling the Rx: [id number] - [place] - [address] AT [facility] [address]  Filling Pharmacy Phone: [phone]  Filling Pharmacy Fax: [phone]  Start Date: 12/14/2021

## 2021-12-14 NOTE — PROGRESS NOTES
[facility]: Integrated Behavioral Health  December 14, 2021      Behavioral Health Clinician Progress Note    Patient Name: Lanie Neil           Service Type: Video appointment      Service Location:  Video appointment      Session Start Time: 2:00 Session End Time: 3:00      Session Length: 53 - 60      Attendees: Client    Visit Activities (Refresh list every visit): [facility] Only    Telemedicine Visit: The patient's condition can be safely assessed and treated via synchronous audio and visual telemedicine encounter.      Reason for Telemedicine Visit: Patient has requested telehealth visit and Services only offered telehealth    Originating Site (Patient Location): Patient's home    Distant Site (Provider Location): Provider Remote Setting- Home Office    Consent:  The patient/guardian has verbally consented to: the potential risks and benefits of telemedicine (video visit) versus in person care; bill my insurance or make self-payment for services provided; and responsibility for payment of non-covered services.     Mode of Communication:  Video Conference via [employer]    As the provider I attest to compliance with applicable laws and regulations related to telemedicine.      Diagnostic Assessment Date: 1/19/2021  Treatment Plan Review Date: 12/23/2021  See Flowsheets for today's PHQ-9 and KATHARINE-7 results  Previous PHQ-9:   PHQ-9 SCORE 12/15/2020 3/16/2021 6/30/2021   PHQ-9 Total Score - - -   PHQ-9 Total Score MyChart 2 (Minimal depression) 2 (Minimal depression) 2 (Minimal depression)   PHQ-9 Total Score 2 2 2     Previous KATHARINE-7:   KATHARINE-7 SCORE 12/15/2020 3/16/2021 6/30/2021   Total Score 1 (minimal anxiety) 1 (minimal anxiety) 1 (minimal anxiety)   Total Score 1 1 1       DATA  Extended Session (60+ minutes): No  Interactive Complexity: No  Crisis: No    Treatment Objective(s) Addressed in This Session:  Target Behavior(s): disease management/lifestyle changes      Depressed  Mood: Decrease frequency and intensity of feeling down, depressed, hopeless  Identify negative self-talk and behaviors: challenge core beliefs, myths, and actions  Anxiety: will develop more effective coping skills to manage anxiety symptoms  Relationship Problems: will address relationship difficulties in a more adaptive manner  Psychological distress related to Pain    Current Stressors / Issues:  [facility] met with Lanie today to continue supporting her treatment of depression, adjustment to chronic disease management, and relationship problems. Today Lanie notes she continues to struggle with thinking about her brother possibly moving to [place]. She reflected on how traditionally change is very difficult for her and she is worried about how this would affect her daily care, considering her brother is a PCA. She also reports concern about recently learning that her pain clinic is encouraging her further reduction of her pain medication, down further than she anticipated. She notes that while she can recognize this recommendation is likely due to government policy changes about pain medications, she struggles with feeling punished and helpless in this situation. She spoke to how this is a longstanding feeling for her. Lanie notes she often feels as though she does not have a say in medical and life decisions. This writer provided support and we discussed the benefits of utilizing neutral thought patterns in response to stressful situations, instead of thinking negative or feeling pressure to think positively. Explored a few examples of neutral-style thought patterns for her to consider moving forward.     Progress on Treatment Objective(s) / Homework:  Stable - ACTION (Actively working towards change); Intervened by reinforcing change plan / affirming steps taken      Acceptance and Commitment Therapy    Explore and identify important values in patient's life.    Discuss ways to commit to behavioral activation  around these values.    Solution-Focused Therapy    Explore patterns in patient's relationships and discuss options for new behaviors.    Explore patterns in patient's actions and choices and discuss options for new behaviors.    Care Plan review completed: No    Medication Review:  No changes to current psychiatric medication(s)    Medication Compliance:  NA    Changes in Health Issues:   None reported    Chemical Use Review:   Substance Use: Chemical use reviewed, no active concerns identified      Tobacco Use: No current tobacco use.      Assessment: Current Emotional / Mental Status (status of significant symptoms):  Risk status (Self / Other harm or suicidal ideation)  Patient has had a history of suicidal ideation: passive thoughts only; easily controlled - denied any recently    Patient denies current fears or concerns for personal safety.  Patient denies current or recent suicidal ideation or behaviors.  Patient denies current or recent homicidal ideation or behaviors.  Patient denies current or recent self injurious behavior or ideation.  Patient denies other safety concerns.     A safety and risk management plan has not been developed at this time, however patient was encouraged to call [place] [phone] should there be a change in any of these risk factors.      Perry Suicide Severity Rating Scale (Short Version)  Perry Suicide Severity Rating (Short Version) 2/18/2021   Over the past 2 weeks have you felt down, depressed, or hopeless? yes   Over the past 2 weeks have you had thoughts of killing yourself? no   Have you ever attempted to kill yourself? no     Perry Suicide Severity Rating Scale (Lifetime/Recent)  Perry Suicide Severity Rating (Lifetime/Recent) 8/13/2021   1. Wish to be Dead (Lifetime) No   1. Wish to be Dead (Recent) No   2. Non-Specific Active Suicidal Thoughts (Lifetime) No   2. Non-Specific Active Suicidal Thoughts (Recent) No   3. Active Suicidal Ideation with any Methods  (Not Plan) Without Intent to Act (Lifetime) No   3. Active Suicidal Ideation with any Methods (Not Plan) Without Intent to Act (Recent) No   4. Active Suicidal Ideation with Some Intent to Act, Without Specific Plan (Lifetime) No   4. Active Suicidal Ideation with Some Intent to Act, Without Specific Plan (Recent) No   5. Active Suicidal Ideation with Specific Plan and Intent (Lifetime) No   5. Active Suicidal Ideation with Specific Plan and Intent (Recent) No   Most Severe Ideation Rating (Lifetime) NA   Frequency (Lifetime) NA   Duration (Lifetime) NA   Controllability (Lifetime) NA   Protective Factors  (Lifetime) NA   Reasons for Ideation (Lifetime) NA   Most Severe Ideation Rating (Past Month) NA   Frequency (Past Month) NA   Duration (Past Month) NA   Controllability (Past Month) NA   Protective Factors (Past Month) NA   Reasons for Ideation (Past Month) NA   Actual Attempt (Lifetime) No   Actual Attempt (Past 3 Months) No   Has subject engaged in non-suicidal self-injurious behavior? (Lifetime) No   Has subject engaged in non-suicidal self-injurious behavior? (Past 3 Months) No   Interrupted Attempts (Lifetime) No   Interrupted Attempts (Past 3 Months) No   Aborted or Self-Interrupted Attempt (Lifetime) No   Aborted or Self-Interrupted Attempt (Past 3 Months) No   Preparatory Acts or Behavior (Lifetime) No   Preparatory Acts or Behavior (Past 3 Months) No   Most Recent Attempt Actual Lethality Code NA   Most Lethal Attempt Actual Lethality Code NA   Initial/First Attempt Actual Lethality Code NA         Appearance:   Appropriate   Eye Contact:   Good   Psychomotor Behavior: Normal   Attitude:   Cooperative   Orientation:   All  Speech   Rate / Production: Normal    Volume:  Normal   Mood:    Depressed    Affect:    Tearful  Thought Content:  Clear   Thought Form:  Coherent  Logical   Insight:    Good     Diagnoses:  1. Major depressive disorder, recurrent episode, moderate (H)        Collateral Reports  Completed:  [facility] will coordinate with care team as needed    Plan: (Homework, other):  Lanie was scheduled to RTC in two weeks for help addressing depressive symptoms, pain, and relationship conflicts. Provided CBT skills to help manage depressive experiences. CD Recommendations: No indications of CD issues.     Héctor Higuera Psy.D, LP December 14, 2021        Answers for HPI/ROS submitted by the patient on 12/15/2020   If you checked off any problems, how difficult have these problems made it for you to do your work, take care of things at home, or get along with other people?: Not difficult at all  PHQ9 TOTAL SCORE: 2  KATHARINE 7 TOTAL SCORE: 1    _________________________________________________________________________  [facility] Integrated [facility]al Health Treatment Plan    Client's Name: Lanie Neil  YOB: 1990    Date: 9/23/2021    DSM-V Diagnoses: 296.31 (F33.0) Major Depressive Disorder, Recurrent Episode, Mild With anxious distress;   Psychosocial / Contextual Factors: SMA     Clinical Global Impressions  First:  Considering your total clinical experience with this particular patient population, how severe are the patient's symptoms at this time?: 4 (9/23/2021  3:29 PM)      Most recent:  Compared to the patient's condition at the START of treatment, this patient's condition is: 3 (9/23/2021  3:29 PM)      Referral / Collaboration:  Will collaborate with care team as indicated during treatment.    Anticipated number of session or this episode of care: 10+      MeasurableTreatment Goal(s) related to diagnosis / functional impairment(s)  Goal 1:  Patient will experience a reduction in depressive and anxious symptoms, along with a corresponding increase in positive emotion and life satisfaction.    Objective #A: Patient will experience a reduction in depressed mood, will develop more effective coping skills to manage depressive symptoms, will develop healthy cognitive patterns and beliefs, will  increase ability to function adaptively and will continue to take medications as prescribed / participate in supportive activities and services    Status: Continued - Date(s): 9/23/2021    Objective #B: Patient will experience a reduction in anxiety, will develop more effective coping skills to manage anxiety symptoms, will develop healthy cognitive patterns and beliefs and will increase ability to function adaptively  Status: Continued - Date(s):  9/23/2021    Objective #C: Patient will develop better understanding of triggers and coping strategies to stabilize mood  Status: Continued - Date(s):  9/23/2021    Goal 2:  Patient will identify and increase engagement in valued activity, i.e. improving social connections/relationships, pursuing occupational goals or personally meaningful pursuits, exploration of meaning in life.     Objective #A: Patient will identify meaningful activity in social, occupational and  personal goals, and increase behavioral activation around these goals   Status: Continued - Date(s):  9/23/2021    Objective #B: Patient will address relationship difficulties in a more adaptive manner  Status: Continued - Date(s):  9/23/2021    Objective #C: Patient will develop coping/problem-solving skills to facilitate more adaptive adjustment and will effectively address problems that interfere with adaptive functioning  Status: Continued - Date(s):  9/23/2021    Goal 3:  Patient will increase understanding of pain medications and develop more adaptive responses to chronic pain.    Objective #A: Patient will increase understanding of the relationship between pain and stress/mood   Status: Continued - Date(s):  9/23/2021    Objective #B: Patient will develop 2-3 additional chronic pain management strategies  Status: Continued - Date(s):  9/23/2021      Possible Therapeutic Intervention(s)  Psycho-education regarding mental health diagnoses and treatment options    Skills training    Explore skills useful  to client in current situation.    Skills include assertiveness, communication, conflict management, problem-solving, relaxation, etc.    Solution-Focused Therapy    Explore patterns in patient's relationships and discuss options for new behaviors.    Explore patterns in patient's actions and choices and discuss options for new behaviors.    Cognitive-behavioral Therapy    Discuss common cognitive distortions, identify them in patient's life.    Explore ways to challenge, replace, and act against these cognitions.    Acceptance and Commitment Therapy    Explore and identify important values in patient's life.    Discuss ways to commit to behavioral activation around these values.    Psychodynamic psychotherapy    Discuss patient's emotional dynamics and issues and how they impact behaviors.    Explore patient's history of relationships and how they impact present behaviors.    Explore how to work with and make changes in these schemas and patterns.    Narrative Therapy    Explore the patient's story of his/her life from his/her perspective.    Explore alternate ways of understanding their experience, identifying exceptions, developing new themes.    Interpersonal Psychotherapy    Explore patterns in relationships that are effective or ineffective at helping patient reach their goals, find satisfying experience.    Discuss new patterns or behaviors to engage in for improved social functioning.    Behavioral Activation    Discuss steps patient can take to become more involved in meaningful activity.    Identify barriers to these activities and explore possible solutions.    Mindfulness-Based Strategies    Discuss skills based on development and application of mindfulness.    Skills drawn from compassion-focused therapy, dialectical behavior therapy, mindfulness-based stress reduction, mindfulness-based cognitive therapy, etc.      We have developed these goals together during our work to this point. Patient has  assisted in the development of these goals and has agreed to this treatment plan.       Héctor Higuera, JENNIFER   9/23/2021

## 2021-12-15 NOTE — TELEPHONE ENCOUNTER
Prior Authorization Approval    Authorization Effective Date: 11/14/2021  Authorization Expiration Date: 12/14/2022  Medication: tretinoin (RETIN-A) 0.025 % external cream--APPROVED  Approved Dose/Quantity:   Reference #:     Insurance Company: [employer] - Phone [phone] Fax [phone]  Expected CoPay:       CoPay Card Available:      Foundation Assistance Needed:    Which Pharmacy is filling the prescription (Not needed for infusion/clinic administered): [id number] - [place], [place] - [address] AT [facility] [address]  Pharmacy Notified: Yes  Patient Notified: Yes **Instructed pharmacy to notify patient when script is ready to /ship.**

## 2021-12-29 ENCOUNTER — MYC MEDICAL ADVICE (OUTPATIENT)
Dept: FAMILY MEDICINE | Facility: CLINIC | Age: 31
End: 2021-12-29
Payer: COMMERCIAL

## 2021-12-29 DIAGNOSIS — J45.909 ASTHMA: ICD-10-CM

## 2021-12-29 NOTE — PROGRESS NOTES
[facility]  [address] [place] [address], [place] [address]  Main: [phone], Fax: [phone]    [facility] seen at: [facility] at [address] [place] [address]    Video Virtual Visit Note:    Patient: Lanie Neil  MRN: [id number]  : 1990  JOSÉ MIGUEL: 2021    Due to the ongoing COVID-19 outbreak, this visit was conducted by video, with the patient's approval.      Reasons:  Spinal muscular atrophy. Wheelchair bound. History of DVT. Currently on long term anticoagulation therapy with Xarelto. Here for her routine annual follow up clinic visit.      Summary of Medical History:  This is a 31 year old female with a history of spinal muscular atrophy, who is chronically wheelchair bound, who moved from [place] to [place] back in May 2014, who has a history of DVT in the left leg, currently on long term anticoagulation therapy with Xarelto, participates in today's scheduled video visit for her annual follow up. I last saw this patient back in 2019, please see my initial consult note and previous progress notes for her complete detail history.     Thrombosis History Summary:    2012, she was having left leg swelling. Ultrasound at the time in [facility] found subacute/chronic occlusive thrombus in the mid to distal left femoral vein with collateralization. She was placed on enoxaparin initially for 2 weeks when she start complaining of headaches. A CT head at the time showed a subdural hematoma on 2012. Enoxaparin was held and an IVC filter was placed on 2012.     Eventually restarted on enoxaparin at a lowered dose.    2013, saw Dr. Willy Box, hematologist at [place] who stopped her enoxaparin and placed her on rivaroxaban at 20 mg PO Qday dosing.    Aug 2014, she established care with our clinic after she moved to [place] and she had maintained on long term anticoagulation therapy  with rivaroxaban. Her mother usually crushes up her rivaroxaban and administers it via her J-tube.     7/23/2019, her rivaroxaban dose was decreased to prophylactic dosing regimen of 10 mg PO Qday as she was experiencing menorrhagia.     Sept 2019, she was found to be iron deficient and was anemic and we arranged for her to receive IV iron infusion with injectafer. Repeat iron panel in Nov 2019 showed normal iron and ferritin with normal hemoglobin.       She also uses celebrex for her dysmenorrhea and amicar for menorrhagia which according to Lanie are effective.      Historically, for invasive procedures such as one related to her spinal reservoir and catheter, We usually hold her Xarelto for about 48 hours and resume the next day and she does well with that.     Interim History:  Today, she continues to complaint of dysmenorrhea and menorrhagia. She reports that her menstrual bleeding is rather heavy every other month. She reports that Amicar seems to be helping with decreasing the amount of bleeding. She has not seen a Ob/Gyn physician for a few years now. Has never had a conversation in regard to more definitive management of her menorrhagia and dysmenorrhea.     ROS:  Denies any frequent epistaxis. No oral mucosal bleeding. Denies any hematuria or blood in stools. She does have a indwelling catheter and occasionally she does have some bleeding with this.     Medication, Allergies and PmHx:  All have been reviewed by this writer in the electronic medical records.    Social History and Family History:  Deferred.    Objective:  Pleasant in no acute distress.  Visual Examination via Video:  Complete exam is not performed today.     Labs:  Last Hemoglobin was done back in 6/22/2021 at outside facility and it was 11.1. .   Creatinine on 6/22/2021 was <0.1.     Assessment:  In summary, Lanie is a 31 year old female who has a history of spinal muscular atrophy, who is chronically wheelchair bound, who  also has a history of DVT in the left leg, currently on long term anticoagulation therapy with Xarelto, participates in today's scheduled video visit for routine annual follow up. She has a history of iron deficiency anemia which likely was related menorrhagia. She has been using amicar for her menorrhagia and according to her is decreasing her bleeding during her menstrual period. She also uses Celebrex for her dysmenorrhea which also is effective. Both Celebrex and Amicar are prescribed by this writer.      Diagnosis:  1. History of left lower extremity DVT.  2. On chronic anticoagulation therapy with Xarelto at 10 mg PO Qday.  3. Spinal muscular atrophy.  4. Chronic respiratory failure.    5. Chronically wheelchair bound.  6. Rosacea   7. History of Iron deficiency anemia.  8. Menorrhagia.     Plan:  She will continue with her rivaroxaban at 10 mg PO Qday dosing for indefinite secondary pharmacological DVT/PE prophylaxis. Will continue with Celebrex use for dysmenorrhea and Amicar use during her menstrual period for menorrhagia.     I have faxed lab orders to [facility] (her homecare agency) today to get CBC, CMP and iron panel done. She is possibly iron deficient and anemic once again.     I strongly advise her to return to her Ob/Gyn physician for a discussion in regard to her dysmenorrhea and menorrhagia and see if there are any more definitive approach is appropriate for this patient who is wheelchair bound and has no plans to ever become pregnant.     She is instructed to call if she should have any unusual bleeding issues or if she should need any surgery or invasive procedures.     Otherwise, we will plan to see her back in one year for follow up visit.     16 minutes spent on the date of the encounter doing chart review, history and exam, documentation and further activities per the note     Video-Visit Details:  Type of service:  Video Visit  Video Start Time: 14:40  Video End Time (time video stopped):  14:50  Originating Location (pt. Location): Home  Distant Location (provider location):  [facility]   Mode of Communication:  Video Conference via [employer]2integrated      Jt Mir PA-C, MPAS  Physician Assistant  [facility].

## 2021-12-30 ENCOUNTER — VIRTUAL VISIT (OUTPATIENT)
Dept: HEMATOLOGY | Facility: CLINIC | Age: 31
End: 2021-12-30
Attending: PHYSICIAN ASSISTANT
Payer: COMMERCIAL

## 2021-12-30 DIAGNOSIS — Z86.718 HISTORY OF DEEP VENOUS THROMBOSIS: ICD-10-CM

## 2021-12-30 DIAGNOSIS — Z79.01 CHRONIC ANTICOAGULATION: ICD-10-CM

## 2021-12-30 DIAGNOSIS — J96.10 CHRONIC RESPIRATORY FAILURE, UNSPECIFIED WHETHER WITH HYPOXIA OR HYPERCAPNIA (H): ICD-10-CM

## 2021-12-30 PROCEDURE — 99212 OFFICE O/P EST SF 10 MIN: CPT | Mod: 95 | Performed by: PHYSICIAN ASSISTANT

## 2021-12-30 RX ORDER — FLUTICASONE PROPIONATE 110 UG/1
1 AEROSOL, METERED RESPIRATORY (INHALATION) 2 TIMES DAILY
Qty: 36 G | Refills: 3 | Status: SHIPPED | OUTPATIENT
Start: 2021-12-30 | End: 2023-02-01

## 2021-12-30 RX ORDER — ALBUTEROL SULFATE 90 UG/1
2 AEROSOL, METERED RESPIRATORY (INHALATION) EVERY 4 HOURS PRN
Qty: 18 G | Refills: 3 | Status: SHIPPED | OUTPATIENT
Start: 2021-12-30 | End: 2023-06-30

## 2021-12-30 NOTE — LETTER
[facility]  [address] [place] [address], [place] [address]  Main: [phone], Fax: [phone]    Patient seen at: [facility] at [address] [place] [address]    Video Virtual Visit Note:    Patient: Lanie Neil  MRN: [id number]  : 1990  JOSÉ MIGUEL: 2021    Due to the ongoing COVID-19 outbreak, this visit was conducted by video, with the patient's approval.      Reasons:  Spinal muscular atrophy. Wheelchair bound. History of DVT. Currently on long term anticoagulation therapy with Xarelto. Here for her routine annual follow up clinic visit.      Summary of Medical History:  This is a 31 year old female with a history of spinal muscular atrophy, who is chronically wheelchair bound, who moved from [place] to [place] back in May 2014, who has a history of DVT in the left leg, currently on long term anticoagulation therapy with Xarelto, participates in today's scheduled video visit for her annual follow up. I last saw this patient back in 2019, please see my initial consult note and previous progress notes for her complete detail history.     Thrombosis History Summary:    2012, she was having left leg swelling. Ultrasound at the time in [facility] found subacute/chronic occlusive thrombus in the mid to distal left femoral vein with collateralization. She was placed on enoxaparin initially for 2 weeks when she start complaining of headaches. A CT head at the time showed a subdural hematoma on 2012. Enoxaparin was held and an IVC filter was placed on 2012.     Eventually restarted on enoxaparin at a lowered dose.    2013, saw Dr. Willy Box, hematologist at [place] who stopped her enoxaparin and placed her on rivaroxaban at 20 mg PO Qday dosing.    Aug 2014, she established care with our clinic after she moved to [place] and she had maintained on long term anticoagulation  therapy with rivaroxaban. Her mother usually crushes up her rivaroxaban and administers it via her J-tube.     7/23/2019, her rivaroxaban dose was decreased to prophylactic dosing regimen of 10 mg PO Qday as she was experiencing menorrhagia.     Sept 2019, she was found to be iron deficient and was anemic and we arranged for her to receive IV iron infusion with injectafer. Repeat iron panel in Nov 2019 showed normal iron and ferritin with normal hemoglobin.       She also uses celebrex for her dysmenorrhea and amicar for menorrhagia which according to Lanie are effective.      Historically, for invasive procedures such as one related to her spinal reservoir and catheter, We usually hold her Xarelto for about 48 hours and resume the next day and she does well with that.     Interim History:  Today, she continues to complaint of dysmenorrhea and menorrhagia. She reports that her menstrual bleeding is rather heavy every other month. She reports that Amicar seems to be helping with decreasing the amount of bleeding. She has not seen a Ob/Gyn physician for a few years now. Has never had a conversation in regard to more definitive management of her menorrhagia and dysmenorrhea.     ROS:  Denies any frequent epistaxis. No oral mucosal bleeding. Denies any hematuria or blood in stools. She does have a indwelling catheter and occasionally she does have some bleeding with this.     Medication, Allergies and PmHx:  All have been reviewed by this writer in the electronic medical records.    Social History and Family History:  Deferred.    Objective:  Pleasant in no acute distress.  Visual Examination via Video:  Complete exam is not performed today.     Labs:  Last Hemoglobin was done back in 6/22/2021 at outside facility and it was 11.1. .   Creatinine on 6/22/2021 was <0.1.     Assessment:  In summary, Lanie is a 31 year old female who has a history of spinal muscular atrophy, who is chronically wheelchair  bound, who also has a history of DVT in the left leg, currently on long term anticoagulation therapy with Xarelto, participates in today's scheduled video visit for routine annual follow up. She has a history of iron deficiency anemia which likely was related menorrhagia. She has been using amicar for her menorrhagia and according to her is decreasing her bleeding during her menstrual period. She also uses Celebrex for her dysmenorrhea which also is effective. Both Celebrex and Amicar are prescribed by this writer.      Diagnosis:  1. History of left lower extremity DVT.  2. On chronic anticoagulation therapy with Xarelto at 10 mg PO Qday.  3. Spinal muscular atrophy.  4. Chronic respiratory failure.    5. Chronically wheelchair bound.  6. Rosacea   7. History of Iron deficiency anemia.  8. Menorrhagia.     Plan:  She will continue with her rivaroxaban at 10 mg PO Qday dosing for indefinite secondary pharmacological DVT/PE prophylaxis. Will continue with Celebrex use for dysmenorrhea and Amicar use during her menstrual period for menorrhagia.     I have faxed lab orders to [facility] (her homecare agency) today to get CBC, CMP and iron panel done. She is possibly iron deficient and anemic once again.     I strongly advise her to return to her Ob/Gyn physician for a discussion in regard to her dysmenorrhea and menorrhagia and see if there are any more definitive approach is appropriate for this patient who is wheelchair bound and has no plans to ever become pregnant.     She is instructed to call if she should have any unusual bleeding issues or if she should need any surgery or invasive procedures.     Otherwise, we will plan to see her back in one year for follow up visit.     16 minutes spent on the date of the encounter doing chart review, history and exam, documentation and further activities per the note     Video-Visit Details:  Type of service:  Video Visit  Video Start Time: 14:40  Video End Time (time video  stopped): 14:50  Originating Location (pt. Location): Home  Distant Location (provider location):  [facility]   Mode of Communication:  Video Conference via GOSOTho Mir PA-C, MPAS  Physician Assistant  [facility].

## 2021-12-30 NOTE — PATIENT INSTRUCTIONS
Lanie,    It was nice to see you again via video visit.     Below is a summary of our plan:  1. Continue with rivaroxaban (Xarelto) at 10 mg by mouth every 24 hours.  2. Continue to use Celebrex as needed for your menstrual period related pain.   3. Continue to use Amicar during your menstrual bleeding.   4. Please return to your Ob/Gyn physician to discuss about a more definitive approach to manage your painful and heavy menstrual bleeding.   5. I have faxed order to [facility] and made Kat aware to collect your blood tests on Monday 1/3/2022.  6. If you should experience any unusual bleeding issues or if you should need any invasive or surgical procedures, please call [phone] and ask to speak to a nursing staff.    Thank you once again in choosing our clinic as part of your healthcare team.      Jt Mir PA-C, MPAS  Physician Assistant  [facility].

## 2021-12-30 NOTE — PROGRESS NOTES
[facility]  [facility]  [address] [place] [address], [address], [place] [address]  Main: [phone], Fax: [phone]      [facility] labs:     Patient: Lanie Neil  MRN: [id number]  : 1990  Date of this note written: 2021     Diagnosis:  1. History of DVT.  2. Chronic anticoagulation therapy.  3. History of iron deficiency anemia.      Orders for [facility]:  1. Please collect blood specimen for labs: 1) Complete blood count (CBC), 2) Comprehensive Metabolic Panel (CMP), 3) Iron level, 4) Iron binding capacity, AND 5) Ferritin  2. Please fax results to [phone] attention: Jt Mir PA-C.      This order is faxed to Kat at [phone]     Thank you.       Jt Mir PA-C, MPAS  Physician Assistant  [facility].

## 2022-01-03 ENCOUNTER — LAB REQUISITION (OUTPATIENT)
Dept: LAB | Facility: CLINIC | Age: 32
End: 2022-01-03
Payer: COMMERCIAL

## 2022-01-03 DIAGNOSIS — G12.9 SPINAL MUSCULAR ATROPHY, UNSPECIFIED (H): ICD-10-CM

## 2022-01-03 LAB
ALBUMIN SERPL-MCNC: 3.1 G/DL (ref 3.4–5)
ALP SERPL-CCNC: 119 U/L (ref 40–150)
ALT SERPL W P-5'-P-CCNC: 43 U/L (ref 0–50)
ANION GAP SERPL CALCULATED.3IONS-SCNC: 7 MMOL/L (ref 3–14)
AST SERPL W P-5'-P-CCNC: 25 U/L (ref 0–45)
BASOPHILS # BLD AUTO: 0 10E3/UL (ref 0–0.2)
BASOPHILS NFR BLD AUTO: 0 %
BILIRUB SERPL-MCNC: 0.4 MG/DL (ref 0.2–1.3)
BUN SERPL-MCNC: 12 MG/DL (ref 7–30)
CALCIUM SERPL-MCNC: 9.4 MG/DL (ref 8.5–10.1)
CHLORIDE BLD-SCNC: 112 MMOL/L (ref 94–109)
CO2 SERPL-SCNC: 20 MMOL/L (ref 20–32)
CREAT SERPL-MCNC: <0.14 MG/DL (ref 0.52–1.04)
EOSINOPHIL # BLD AUTO: 0.2 10E3/UL (ref 0–0.7)
EOSINOPHIL NFR BLD AUTO: 2 %
ERYTHROCYTE [DISTWIDTH] IN BLOOD BY AUTOMATED COUNT: 18.6 % (ref 10–15)
FERRITIN SERPL-MCNC: 12 NG/ML (ref 12–150)
GFR SERPL CREATININE-BSD FRML MDRD: ABNORMAL ML/MIN/{1.73_M2}
GLUCOSE BLD-MCNC: 104 MG/DL (ref 70–99)
HCT VFR BLD AUTO: 35.1 % (ref 35–47)
HGB BLD-MCNC: 11.1 G/DL (ref 11.7–15.7)
HOLD SPECIMEN: NORMAL
HOLD SPECIMEN: NORMAL
IMM GRANULOCYTES # BLD: 0 10E3/UL
IMM GRANULOCYTES NFR BLD: 0 %
IRON SATN MFR SERPL: 15 % (ref 15–46)
IRON SERPL-MCNC: 54 UG/DL (ref 35–180)
LYMPHOCYTES # BLD AUTO: 2.2 10E3/UL (ref 0.8–5.3)
LYMPHOCYTES NFR BLD AUTO: 32 %
MCH RBC QN AUTO: 26.7 PG (ref 26.5–33)
MCHC RBC AUTO-ENTMCNC: 31.6 G/DL (ref 31.5–36.5)
MCV RBC AUTO: 84 FL (ref 78–100)
MONOCYTES # BLD AUTO: 0.5 10E3/UL (ref 0–1.3)
MONOCYTES NFR BLD AUTO: 7 %
NEUTROPHILS # BLD AUTO: 4.2 10E3/UL (ref 1.6–8.3)
NEUTROPHILS NFR BLD AUTO: 59 %
NRBC # BLD AUTO: 0 10E3/UL
NRBC BLD AUTO-RTO: 0 /100
PLATELET # BLD AUTO: 294 10E3/UL (ref 150–450)
POTASSIUM BLD-SCNC: 3.3 MMOL/L (ref 3.4–5.3)
PROT SERPL-MCNC: 7.5 G/DL (ref 6.8–8.8)
RBC # BLD AUTO: 4.16 10E6/UL (ref 3.8–5.2)
SODIUM SERPL-SCNC: 139 MMOL/L (ref 133–144)
TIBC SERPL-MCNC: 362 UG/DL (ref 240–430)
WBC # BLD AUTO: 7.1 10E3/UL (ref 4–11)

## 2022-01-03 PROCEDURE — 85025 COMPLETE CBC W/AUTO DIFF WBC: CPT | Mod: ORL | Performed by: PHYSICIAN ASSISTANT

## 2022-01-03 PROCEDURE — 82728 ASSAY OF FERRITIN: CPT | Mod: ORL | Performed by: PHYSICIAN ASSISTANT

## 2022-01-03 PROCEDURE — 80053 COMPREHEN METABOLIC PANEL: CPT | Mod: ORL | Performed by: PHYSICIAN ASSISTANT

## 2022-01-03 PROCEDURE — 83550 IRON BINDING TEST: CPT | Mod: ORL | Performed by: PHYSICIAN ASSISTANT

## 2022-01-05 DIAGNOSIS — Z79.01 CHRONIC ANTICOAGULATION: ICD-10-CM

## 2022-01-05 DIAGNOSIS — D50.0 IRON DEFICIENCY ANEMIA DUE TO CHRONIC BLOOD LOSS: Primary | ICD-10-CM

## 2022-01-05 RX ORDER — EPINEPHRINE 1 MG/ML
0.3 INJECTION, SOLUTION, CONCENTRATE INTRAVENOUS EVERY 5 MIN PRN
Status: CANCELLED | OUTPATIENT
Start: 2022-01-07

## 2022-01-05 RX ORDER — MEPERIDINE HYDROCHLORIDE 25 MG/ML
25 INJECTION INTRAMUSCULAR; INTRAVENOUS; SUBCUTANEOUS EVERY 30 MIN PRN
Status: CANCELLED | OUTPATIENT
Start: 2022-01-07

## 2022-01-05 RX ORDER — DIPHENHYDRAMINE HYDROCHLORIDE 50 MG/ML
50 INJECTION INTRAMUSCULAR; INTRAVENOUS
Status: CANCELLED
Start: 2022-01-07

## 2022-01-05 RX ORDER — NALOXONE HYDROCHLORIDE 0.4 MG/ML
0.2 INJECTION, SOLUTION INTRAMUSCULAR; INTRAVENOUS; SUBCUTANEOUS
Status: CANCELLED | OUTPATIENT
Start: 2022-01-07

## 2022-01-05 RX ORDER — HEPARIN SODIUM (PORCINE) LOCK FLUSH IV SOLN 100 UNIT/ML 100 UNIT/ML
5 SOLUTION INTRAVENOUS
Status: CANCELLED | OUTPATIENT
Start: 2022-01-07

## 2022-01-05 RX ORDER — ALBUTEROL SULFATE 0.83 MG/ML
2.5 SOLUTION RESPIRATORY (INHALATION)
Status: CANCELLED | OUTPATIENT
Start: 2022-01-07

## 2022-01-05 RX ORDER — HEPARIN SODIUM,PORCINE 10 UNIT/ML
5 VIAL (ML) INTRAVENOUS
Status: CANCELLED | OUTPATIENT
Start: 2022-01-07

## 2022-01-05 RX ORDER — ALBUTEROL SULFATE 90 UG/1
1-2 AEROSOL, METERED RESPIRATORY (INHALATION)
Status: CANCELLED
Start: 2022-01-07

## 2022-01-05 NOTE — PROGRESS NOTES
[facility]  [facility]  [address] [place] [address], [address], [place] [address]  Main: [phone], Fax: [phone]    Telephone Note:    Patient: Lanie Neil  MRN: [id number]  : 1990  Date of this note written: 2022    This writer called the patient and spoke with her and her mother today in regard to lab results. Lanie is iron deficient with Ferritin level of 12 and anemic.     I am recommending that Lanie is to get IV iron replacement once again. She did well with Injectafer back in 2019. Will repeat this. Lanie and her mother is in agreement. Order placed today. Will repeat her Ferritin and CBC in 2 months. Lanie is instructed to call our clinic 2 months after her IV iron infusion to get orders faxed to her home healthcare nursing staff to collect the labs.     Component      Latest Ref Rng & Units 1/3/2022   WBC      4.0 - 11.0 10e3/uL 7.1   RBC Count      3.80 - 5.20 10e6/uL 4.16   Hemoglobin      11.7 - 15.7 g/dL 11.1 (L)   Hematocrit      35.0 - 47.0 % 35.1   MCV      78 - 100 fL 84   MCH      26.5 - 33.0 pg 26.7   MCHC      31.5 - 36.5 g/dL 31.6   RDW      10.0 - 15.0 % 18.6 (H)   Platelet Count      150 - 450 10e3/uL 294   % Neutrophils      % 59   % Lymphocytes      % 32   % Monocytes      % 7   % Eosinophils      % 2   % Basophils      % 0   % Immature Granulocytes      % 0   NRBCs per 100 WBC      <1 /100 0   Absolute Neutrophils      1.6 - 8.3 10e3/uL 4.2   Absolute Lymphocytes      0.8 - 5.3 10e3/uL 2.2   Absolute Monocytes      0.0 - 1.3 10e3/uL 0.5   Absolute Eosinophils      0.0 - 0.7 10e3/uL 0.2   Absolute Basophils      0.0 - 0.2 10e3/uL 0.0   Absolute Immature Granulocytes      <=0.4 10e3/uL 0.0   Absolute NRBCs      10e3/uL 0.0   Sodium      133 - 144 mmol/L 139   Potassium      3.4 - 5.3 mmol/L 3.3 (L)   Chloride      94 - 109 mmol/L 112 (H)   Carbon Dioxide      20 - 32 mmol/L 20   Anion Gap      3 - 14 mmol/L  7   Urea Nitrogen      7 - 30 mg/dL 12   Creatinine      0.52 - 1.04 mg/dL <0.14 (L)   Calcium      8.5 - 10.1 mg/dL 9.4   Glucose      70 - 99 mg/dL 104 (H)   Alkaline Phosphatase      40 - 150 U/L 119   AST      0 - 45 U/L 25   ALT      0 - 50 U/L 43   Protein Total      6.8 - 8.8 g/dL 7.5   Albumin      3.4 - 5.0 g/dL 3.1 (L)   Bilirubin Total      0.2 - 1.3 mg/dL 0.4   GFR Estimate          Iron      35 - 180 ug/dL 54   Iron Binding Cap      240 - 430 ug/dL 362   Iron Saturation Index      15 - 46 % 15   Ferritin      12 - 150 ng/mL 12       Jt Mir PA-C, MPAS  Physician Assistant  [facility].

## 2022-01-06 ENCOUNTER — MYC MEDICAL ADVICE (OUTPATIENT)
Dept: FAMILY MEDICINE | Facility: CLINIC | Age: 32
End: 2022-01-06
Payer: COMMERCIAL

## 2022-01-06 DIAGNOSIS — N92.0 HEAVY MENSES: Primary | ICD-10-CM

## 2022-01-06 DIAGNOSIS — D50.9 ANEMIA, IRON DEFICIENCY: ICD-10-CM

## 2022-01-06 NOTE — TELEPHONE ENCOUNTER
It looks like hematology ordered IV iron yesterday    Ok Ob referral re: heavy menses, iron def anemia

## 2022-01-07 ENCOUNTER — VIRTUAL VISIT (OUTPATIENT)
Dept: BEHAVIORAL HEALTH | Facility: CLINIC | Age: 32
End: 2022-01-07
Payer: COMMERCIAL

## 2022-01-07 ENCOUNTER — TELEPHONE (OUTPATIENT)
Dept: OBGYN | Facility: CLINIC | Age: 32
End: 2022-01-07
Payer: COMMERCIAL

## 2022-01-07 ENCOUNTER — TELEPHONE (OUTPATIENT)
Dept: FAMILY MEDICINE | Facility: CLINIC | Age: 32
End: 2022-01-07
Payer: COMMERCIAL

## 2022-01-07 DIAGNOSIS — F33.1 MAJOR DEPRESSIVE DISORDER, RECURRENT EPISODE, MODERATE (H): Primary | ICD-10-CM

## 2022-01-07 PROCEDURE — 90837 PSYTX W PT 60 MINUTES: CPT | Mod: 95 | Performed by: PSYCHOLOGIST

## 2022-01-07 NOTE — PROGRESS NOTES
[facility]: Integrated Behavioral Health  January 7, 2022        Behavioral Health Clinician Progress Note    Patient Name: Lanie Neil           Service Type: Video appointment      Service Location:  Video appointment      Session Start Time: 1:05 Session End Time: 2:00      Session Length: 53 - 60      Attendees: Client    Visit Activities (Refresh list every visit): [facility] Only    Telemedicine Visit: The patient's condition can be safely assessed and treated via synchronous audio and visual telemedicine encounter.      Reason for Telemedicine Visit: Patient has requested telehealth visit and Services only offered telehealth    Originating Site (Patient Location): Patient's home    Distant Site (Provider Location): Provider Remote Setting- Home Office    Consent:  The patient/guardian has verbally consented to: the potential risks and benefits of telemedicine (video visit) versus in person care; bill my insurance or make self-payment for services provided; and responsibility for payment of non-covered services.     Mode of Communication:  Video Conference via [employer]    As the provider I attest to compliance with applicable laws and regulations related to telemedicine.      Diagnostic Assessment Date: 1/19/2021  Treatment Plan Review Date: 4/7/2022  See Flowsheets for today's PHQ-9 and KATHARINE-7 results  Previous PHQ-9:   PHQ-9 SCORE 12/15/2020 3/16/2021 6/30/2021   PHQ-9 Total Score - - -   PHQ-9 Total Score MyChart 2 (Minimal depression) 2 (Minimal depression) 2 (Minimal depression)   PHQ-9 Total Score 2 2 2     Previous KATHARINE-7:   KATHARINE-7 SCORE 12/15/2020 3/16/2021 6/30/2021   Total Score 1 (minimal anxiety) 1 (minimal anxiety) 1 (minimal anxiety)   Total Score 1 1 1       DATA  Extended Session (60+ minutes): No  Interactive Complexity: No  Crisis: No    Treatment Objective(s) Addressed in This Session:  Target Behavior(s): disease management/lifestyle changes      Depressed  Mood: Decrease frequency and intensity of feeling down, depressed, hopeless  Identify negative self-talk and behaviors: challenge core beliefs, myths, and actions  Anxiety: will develop more effective coping skills to manage anxiety symptoms  Relationship Problems: will address relationship difficulties in a more adaptive manner  Psychological distress related to Pain    Current Stressors / Issues:  [facility] met with Lanie today to continue supporting her treatment of depression, adjustment to chronic disease management, and relationship problems. Reports today doing overall pretty well over the holidays, though notes stress over the last few days. Focused on her concerns regarding the gradual ween down on pain medications she is being directed toward. Notes concern about moving to eight pills per day; she is not sure how realistic this is for her. Also notes concern about not being supported by her pain care team about this - notes they seem to lack compassion for her. Reports considering consulting with other pain clinics, to see if others would be willing to keep her on her medication regimen for pain management. Expresses dislike for how others have in the past disregarded or invalidated her pain experiences, feels like she needs to justify her symptoms to others at times. Provided support. Discussed taking perspectives of care team members, re-framing the effort to reduce pain medications as not punitive, perhaps indirectly supportive.     Progress on Treatment Objective(s) / Homework:  Stable - ACTION (Actively working towards change); Intervened by reinforcing change plan / affirming steps taken      Acceptance and Commitment Therapy    Explore and identify important values in patient's life.    Discuss ways to commit to behavioral activation around these values.    Solution-Focused Therapy    Explore patterns in patient's relationships and discuss options for new behaviors.    Explore patterns in patient's  actions and choices and discuss options for new behaviors.    Care Plan review completed: No    Medication Review:  No changes to current psychiatric medication(s)    Medication Compliance:  NA    Changes in Health Issues:   None reported    Chemical Use Review:   Substance Use: Chemical use reviewed, no active concerns identified      Tobacco Use: No current tobacco use.      Assessment: Current Emotional / Mental Status (status of significant symptoms):  Risk status (Self / Other harm or suicidal ideation)  Patient has had a history of suicidal ideation: passive thoughts only; easily controlled - denied any recently    Patient denies current fears or concerns for personal safety.  Patient denies current or recent suicidal ideation or behaviors.  Patient denies current or recent homicidal ideation or behaviors.  Patient denies current or recent self injurious behavior or ideation.  Patient denies other safety concerns.     A safety and risk management plan has not been developed at this time, however patient was encouraged to call [place] [phone] should there be a change in any of these risk factors.      [employer] Suicide Severity Rating Scale (Short Version)  [employer] Suicide Severity Rating (Short Version) 2/18/2021   Over the past 2 weeks have you felt down, depressed, or hopeless? yes   Over the past 2 weeks have you had thoughts of killing yourself? no   Have you ever attempted to kill yourself? no     [employer] Suicide Severity Rating Scale (Lifetime/Recent)  [employer] Suicide Severity Rating (Lifetime/Recent) 8/13/2021   1. Wish to be Dead (Lifetime) No   1. Wish to be Dead (Recent) No   2. Non-Specific Active Suicidal Thoughts (Lifetime) No   2. Non-Specific Active Suicidal Thoughts (Recent) No   3. Active Suicidal Ideation with any Methods (Not Plan) Without Intent to Act (Lifetime) No   3. Active Suicidal Ideation with any Methods (Not Plan) Without Intent to Act (Recent) No   4. Active Suicidal  Ideation with Some Intent to Act, Without Specific Plan (Lifetime) No   4. Active Suicidal Ideation with Some Intent to Act, Without Specific Plan (Recent) No   5. Active Suicidal Ideation with Specific Plan and Intent (Lifetime) No   5. Active Suicidal Ideation with Specific Plan and Intent (Recent) No   Most Severe Ideation Rating (Lifetime) NA   Frequency (Lifetime) NA   Duration (Lifetime) NA   Controllability (Lifetime) NA   Protective Factors  (Lifetime) NA   Reasons for Ideation (Lifetime) NA   Most Severe Ideation Rating (Past Month) NA   Frequency (Past Month) NA   Duration (Past Month) NA   Controllability (Past Month) NA   Protective Factors (Past Month) NA   Reasons for Ideation (Past Month) NA   Actual Attempt (Lifetime) No   Actual Attempt (Past 3 Months) No   Has subject engaged in non-suicidal self-injurious behavior? (Lifetime) No   Has subject engaged in non-suicidal self-injurious behavior? (Past 3 Months) No   Interrupted Attempts (Lifetime) No   Interrupted Attempts (Past 3 Months) No   Aborted or Self-Interrupted Attempt (Lifetime) No   Aborted or Self-Interrupted Attempt (Past 3 Months) No   Preparatory Acts or Behavior (Lifetime) No   Preparatory Acts or Behavior (Past 3 Months) No   Most Recent Attempt Actual Lethality Code NA   Most Lethal Attempt Actual Lethality Code NA   Initial/First Attempt Actual Lethality Code NA         Appearance:   Appropriate   Eye Contact:   Good   Psychomotor Behavior: Normal   Attitude:   Cooperative   Orientation:   All  Speech   Rate / Production: Normal    Volume:  Normal   Mood:    Depressed    Affect:    Tearful  Thought Content:  Clear   Thought Form:  Coherent  Logical   Insight:    Good     Diagnoses:  1. Major depressive disorder, recurrent episode, moderate (H)        Collateral Reports Completed:  [facility] will coordinate with care team as needed    Plan: (Homework, other):  Lanie was scheduled to RTC in two weeks for help addressing depressive  symptoms, pain, and relationship conflicts. Provided CBT skills to help manage depressive experiences. CD Recommendations: No indications of CD issues.     Héctor Higuera Psy.D, JENNIFER January 07, 2022          Answers for HPI/ROS submitted by the patient on 12/15/2020   If you checked off any problems, how difficult have these problems made it for you to do your work, take care of things at home, or get along with other people?: Not difficult at all  PHQ9 TOTAL SCORE: 2  KATHARINE 7 TOTAL SCORE: 1    _________________________________________________________________________  [facility] Integrated Behavioral Health Treatment Plan    Client's Name: Lanie Neil  YOB: 1990    Date: 1/7/2022    DSM-V Diagnoses: 296.31 (F33.0) Major Depressive Disorder, Recurrent Episode, Mild With anxious distress;   Psychosocial / Contextual Factors: SMA     Clinical Global Impressions  First:  Considering your total clinical experience with this particular patient population, how severe are the patient's symptoms at this time?: 4 (9/23/2021  3:29 PM)      Most recent:  Compared to the patient's condition at the START of treatment, this patient's condition is: 3 (9/23/2021  3:29 PM)      Referral / Collaboration:  Will collaborate with care team as indicated during treatment.    Anticipated number of session or this episode of care: 10+      MeasurableTreatment Goal(s) related to diagnosis / functional impairment(s)  Goal 1:  Patient will experience a reduction in depressive and anxious symptoms, along with a corresponding increase in positive emotion and life satisfaction.    Objective #A: Patient will experience a reduction in depressed mood, will develop more effective coping skills to manage depressive symptoms, will develop healthy cognitive patterns and beliefs, will increase ability to function adaptively and will continue to take medications as prescribed / participate in supportive activities and services    Status:  Continued - Date(s): 1/7/2022    Objective #B: Patient will experience a reduction in anxiety, will develop more effective coping skills to manage anxiety symptoms, will develop healthy cognitive patterns and beliefs and will increase ability to function adaptively  Status: Continued - Date(s):  1/7/2022    Objective #C: Patient will develop better understanding of triggers and coping strategies to stabilize mood  Status: Continued - Date(s):  1/7/2022    Goal 2:  Patient will identify and increase engagement in valued activity, i.e. improving social connections/relationships, pursuing occupational goals or personally meaningful pursuits, exploration of meaning in life.     Objective #A: Patient will identify meaningful activity in social, occupational and  personal goals, and increase behavioral activation around these goals   Status: Continued - Date(s):  1/7/2022    Objective #B: Patient will address relationship difficulties in a more adaptive manner  Status: Continued - Date(s):  1/7/2022    Objective #C: Patient will develop coping/problem-solving skills to facilitate more adaptive adjustment and will effectively address problems that interfere with adaptive functioning  Status: Continued - Date(s):  9/23/2021    Goal 3:  Patient will increase understanding of pain medications and develop more adaptive responses to chronic pain.    Objective #A: Patient will increase understanding of the relationship between pain and stress/mood   Status: Continued - Date(s):  1/7/2022    Objective #B: Patient will develop 2-3 additional chronic pain management strategies  Status: Continued - Date(s):  1/7/2022      Possible Therapeutic Intervention(s)  Psycho-education regarding mental health diagnoses and treatment options    Skills training    Explore skills useful to client in current situation.    Skills include assertiveness, communication, conflict management, problem-solving, relaxation, etc.    Solution-Focused  Therapy    Explore patterns in patient's relationships and discuss options for new behaviors.    Explore patterns in patient's actions and choices and discuss options for new behaviors.    Cognitive-behavioral Therapy    Discuss common cognitive distortions, identify them in patient's life.    Explore ways to challenge, replace, and act against these cognitions.    Acceptance and Commitment Therapy    Explore and identify important values in patient's life.    Discuss ways to commit to behavioral activation around these values.    Psychodynamic psychotherapy    Discuss patient's emotional dynamics and issues and how they impact behaviors.    Explore patient's history of relationships and how they impact present behaviors.    Explore how to work with and make changes in these schemas and patterns.    Narrative Therapy    Explore the patient's story of his/her life from his/her perspective.    Explore alternate ways of understanding their experience, identifying exceptions, developing new themes.    Interpersonal Psychotherapy    Explore patterns in relationships that are effective or ineffective at helping patient reach their goals, find satisfying experience.    Discuss new patterns or behaviors to engage in for improved social functioning.    Behavioral Activation    Discuss steps patient can take to become more involved in meaningful activity.    Identify barriers to these activities and explore possible solutions.    Mindfulness-Based Strategies    Discuss skills based on development and application of mindfulness.    Skills drawn from compassion-focused therapy, dialectical behavior therapy, mindfulness-based stress reduction, mindfulness-based cognitive therapy, etc.      We have developed these goals together during our work to this point. Patient has assisted in the development of these goals and has agreed to this treatment plan.       Héctor Higuera LP  1/7/2022

## 2022-01-07 NOTE — TELEPHONE ENCOUNTER
----- Message from Kenisha Costa RN sent at 1/7/2022  8:37 AM CST -----  Please schedule with [facility] and call the pt. Thanks.  Kenisha

## 2022-01-07 NOTE — TELEPHONE ENCOUNTER
[facility]    Phone Message    May a detailed message be left on voicemail: yes     Reason for Call: the patient called asking if her 1/25/22 appointment can be done virtual? If not, she is wondering if there is someone that would be able to help her on the examination table? Please advise. Thank you.    Action Taken: Message routed to:  [facility]    Travel Screening: Not Applicable

## 2022-01-07 NOTE — TELEPHONE ENCOUNTER
Call patient to let her know of appointment schedule with [facility] clinic on 1/25/22 at 130 PM. Patient asking for a virtual instead. [facility] number given to patient to call and reschedule.

## 2022-01-10 ENCOUNTER — TELEPHONE (OUTPATIENT)
Dept: OBGYN | Facility: CLINIC | Age: 32
End: 2022-01-10
Payer: COMMERCIAL

## 2022-01-10 DIAGNOSIS — N92.0 HEAVY MENSES: Primary | ICD-10-CM

## 2022-01-10 NOTE — TELEPHONE ENCOUNTER
----- Message from Suri Estevez RN sent at 1/7/2022  3:50 PM CST -----  Regarding: Returning call

## 2022-01-10 NOTE — TELEPHONE ENCOUNTER
Lanie will need fritz to transfer.  She is being referred due to history of heavy menses and anemia.  She has not had an ultrasound       Wishes to discuss options.  Has heard IUD is painful first day.  Hematologist has suggested hysterectomy for definitive solution.  Lanie has never had a PAP, and does not wish to attempt in office.      Scheduled for US and office visit to follow.  She reports that she is able to fill bladder by clamping fischer

## 2022-01-12 ENCOUNTER — MYC MEDICAL ADVICE (OUTPATIENT)
Dept: FAMILY MEDICINE | Facility: CLINIC | Age: 32
End: 2022-01-12
Payer: COMMERCIAL

## 2022-01-12 DIAGNOSIS — E87.6 HYPOKALEMIA: ICD-10-CM

## 2022-01-12 DIAGNOSIS — Z11.59 SCREENING FOR VIRAL DISEASE: ICD-10-CM

## 2022-01-12 DIAGNOSIS — D50.0 IRON DEFICIENCY ANEMIA DUE TO CHRONIC BLOOD LOSS: Primary | ICD-10-CM

## 2022-01-12 DIAGNOSIS — D50.0 IRON DEFICIENCY ANEMIA DUE TO CHRONIC BLOOD LOSS: ICD-10-CM

## 2022-01-12 DIAGNOSIS — J96.10 CHRONIC RESPIRATORY FAILURE, UNSPECIFIED WHETHER WITH HYPOXIA OR HYPERCAPNIA (H): ICD-10-CM

## 2022-01-12 DIAGNOSIS — R53.83 FATIGUE, UNSPECIFIED TYPE: ICD-10-CM

## 2022-01-12 DIAGNOSIS — Z79.01 CHRONIC ANTICOAGULATION: Primary | ICD-10-CM

## 2022-01-13 NOTE — TELEPHONE ENCOUNTER
I ordered cbc and bmp both do anytime and tsh too and cortisol  She does virtual preop with me  I have open spots Caio 24 am  Do labs before then and preop on that am  See if that works

## 2022-01-21 ENCOUNTER — VIRTUAL VISIT (OUTPATIENT)
Dept: BEHAVIORAL HEALTH | Facility: CLINIC | Age: 32
End: 2022-01-21
Payer: COMMERCIAL

## 2022-01-21 ENCOUNTER — TELEPHONE (OUTPATIENT)
Dept: HEMATOLOGY | Facility: CLINIC | Age: 32
End: 2022-01-21

## 2022-01-21 ENCOUNTER — LAB REQUISITION (OUTPATIENT)
Dept: LAB | Facility: CLINIC | Age: 32
End: 2022-01-21
Payer: COMMERCIAL

## 2022-01-21 DIAGNOSIS — G12.9 SPINAL MUSCULAR ATROPHY, UNSPECIFIED (H): ICD-10-CM

## 2022-01-21 DIAGNOSIS — F33.1 MAJOR DEPRESSIVE DISORDER, RECURRENT EPISODE, MODERATE (H): Primary | ICD-10-CM

## 2022-01-21 LAB
ANION GAP SERPL CALCULATED.3IONS-SCNC: 8 MMOL/L (ref 3–14)
BASOPHILS # BLD AUTO: 0 10E3/UL (ref 0–0.2)
BASOPHILS NFR BLD AUTO: 0 %
BUN SERPL-MCNC: 9 MG/DL (ref 7–30)
CALCIUM SERPL-MCNC: 9.2 MG/DL (ref 8.5–10.1)
CHLORIDE BLD-SCNC: 111 MMOL/L (ref 94–109)
CO2 SERPL-SCNC: 21 MMOL/L (ref 20–32)
CORTIS SERPL-MCNC: 15.8 UG/DL (ref 4–22)
CREAT SERPL-MCNC: <0.14 MG/DL (ref 0.52–1.04)
EOSINOPHIL # BLD AUTO: 0.2 10E3/UL (ref 0–0.7)
EOSINOPHIL NFR BLD AUTO: 4 %
ERYTHROCYTE [DISTWIDTH] IN BLOOD BY AUTOMATED COUNT: 18.3 % (ref 10–15)
GFR SERPL CREATININE-BSD FRML MDRD: ABNORMAL ML/MIN/{1.73_M2}
GLUCOSE BLD-MCNC: 132 MG/DL (ref 70–99)
HCT VFR BLD AUTO: 34.7 % (ref 35–47)
HGB BLD-MCNC: 10.8 G/DL (ref 11.7–15.7)
IMM GRANULOCYTES # BLD: 0 10E3/UL
IMM GRANULOCYTES NFR BLD: 0 %
LYMPHOCYTES # BLD AUTO: 1.5 10E3/UL (ref 0.8–5.3)
LYMPHOCYTES NFR BLD AUTO: 21 %
MCH RBC QN AUTO: 26.7 PG (ref 26.5–33)
MCHC RBC AUTO-ENTMCNC: 31.1 G/DL (ref 31.5–36.5)
MCV RBC AUTO: 86 FL (ref 78–100)
MONOCYTES # BLD AUTO: 0.4 10E3/UL (ref 0–1.3)
MONOCYTES NFR BLD AUTO: 5 %
NEUTROPHILS # BLD AUTO: 4.8 10E3/UL (ref 1.6–8.3)
NEUTROPHILS NFR BLD AUTO: 70 %
NRBC # BLD AUTO: 0 10E3/UL
NRBC BLD AUTO-RTO: 0 /100
PLATELET # BLD AUTO: 345 10E3/UL (ref 150–450)
POTASSIUM BLD-SCNC: 4.4 MMOL/L (ref 3.4–5.3)
RBC # BLD AUTO: 4.04 10E6/UL (ref 3.8–5.2)
SODIUM SERPL-SCNC: 140 MMOL/L (ref 133–144)
TSH SERPL DL<=0.005 MIU/L-ACNC: 1.23 MU/L (ref 0.4–4)
WBC # BLD AUTO: 7 10E3/UL (ref 4–11)

## 2022-01-21 PROCEDURE — 85025 COMPLETE CBC W/AUTO DIFF WBC: CPT | Mod: ORL | Performed by: PHYSICIAN ASSISTANT

## 2022-01-21 PROCEDURE — 80048 BASIC METABOLIC PNL TOTAL CA: CPT | Mod: ORL | Performed by: PHYSICIAN ASSISTANT

## 2022-01-21 PROCEDURE — 84443 ASSAY THYROID STIM HORMONE: CPT | Mod: ORL | Performed by: PHYSICIAN ASSISTANT

## 2022-01-21 PROCEDURE — U0003 INFECTIOUS AGENT DETECTION BY NUCLEIC ACID (DNA OR RNA); SEVERE ACUTE RESPIRATORY SYNDROME CORONAVIRUS 2 (SARS-COV-2) (CORONAVIRUS DISEASE [COVID-19]), AMPLIFIED PROBE TECHNIQUE, MAKING USE OF HIGH THROUGHPUT TECHNOLOGIES AS DESCRIBED BY CMS-2020-01-R: HCPCS | Mod: ORL | Performed by: PHYSICIAN ASSISTANT

## 2022-01-21 PROCEDURE — 82533 TOTAL CORTISOL: CPT | Mod: ORL | Performed by: PHYSICIAN ASSISTANT

## 2022-01-21 PROCEDURE — 90837 PSYTX W PT 60 MINUTES: CPT | Mod: GT | Performed by: PSYCHOLOGIST

## 2022-01-21 NOTE — TELEPHONE ENCOUNTER
[facility]  [facility]  [facility], [address], [place] [address]  Main: [phone], Fax: [phone]    Telephone Note:    Patient: Lanie Neil  MRN: [id number]  : 1990  Date of this note written: 2022    This writer called the patient to check in on the progress on IV iron replacement. Lanie reports that she received her first dose of IV iron today by home care nursing staff (Injectafer). Plan to repeat CBC and Ferritin in 2 months.     Her CBC today:  Component      Latest Ref Rng & Units 2022   WBC      4.0 - 11.0 10e3/uL 7.0   RBC Count      3.80 - 5.20 10e6/uL 4.04   Hemoglobin      11.7 - 15.7 g/dL 10.8 (L)   Hematocrit      35.0 - 47.0 % 34.7 (L)   MCV      78 - 100 fL 86   MCH      26.5 - 33.0 pg 26.7   MCHC      31.5 - 36.5 g/dL 31.1 (L)   RDW      10.0 - 15.0 % 18.3 (H)   Platelet Count      150 - 450 10e3/uL 345   % Neutrophils      % 70   % Lymphocytes      % 21   % Monocytes      % 5   % Eosinophils      % 4   % Basophils      % 0   % Immature Granulocytes      % 0   NRBCs per 100 WBC      <1 /100 0   Absolute Neutrophils      1.6 - 8.3 10e3/uL 4.8   Absolute Lymphocytes      0.8 - 5.3 10e3/uL 1.5   Absolute Monocytes      0.0 - 1.3 10e3/uL 0.4   Absolute Eosinophils      0.0 - 0.7 10e3/uL 0.2   Absolute Basophils      0.0 - 0.2 10e3/uL 0.0   Absolute Immature Granulocytes      <=0.4 10e3/uL 0.0   Absolute NRBCs      10e3/uL 0.0       Jt Mir PA-C, MPAS  Physician Assistant  [facility].

## 2022-01-21 NOTE — PROGRESS NOTES
[facility]: Integrated Behavioral Health  January 21, 2022      Behavioral Health Clinician Progress Note    Patient Name: Lanie Neil           Service Type: Video appointment      Service Location:  Video appointment      Session Start Time: 1:05 Session End Time: 2:00      Session Length: 53 - 60      Attendees: Client    Visit Activities (Refresh list every visit): [facility] Only    Telemedicine Visit: The patient's condition can be safely assessed and treated via synchronous audio and visual telemedicine encounter.      Reason for Telemedicine Visit: Patient has requested telehealth visit and Services only offered telehealth    Originating Site (Patient Location): Patient's home    Distant Site (Provider Location): Provider Remote Setting- Home Office    Consent:  The patient/guardian has verbally consented to: the potential risks and benefits of telemedicine (video visit) versus in person care; bill my insurance or make self-payment for services provided; and responsibility for payment of non-covered services.     Mode of Communication:  Video Conference via [employer]    As the provider I attest to compliance with applicable laws and regulations related to telemedicine.      Diagnostic Assessment Date: 1/19/2021  Treatment Plan Review Date: 4/7/2022  See Flowsheets for today's PHQ-9 and KATHARINE-7 results  Previous PHQ-9:   PHQ-9 SCORE 12/15/2020 3/16/2021 6/30/2021   PHQ-9 Total Score - - -   PHQ-9 Total Score MyChart 2 (Minimal depression) 2 (Minimal depression) 2 (Minimal depression)   PHQ-9 Total Score 2 2 2     Previous KATHARINE-7:   KATHARINE-7 SCORE 12/15/2020 3/16/2021 6/30/2021   Total Score 1 (minimal anxiety) 1 (minimal anxiety) 1 (minimal anxiety)   Total Score 1 1 1       DATA  Extended Session (60+ minutes): No  Interactive Complexity: No  Crisis: No    Treatment Objective(s) Addressed in This Session:  Target Behavior(s): disease management/lifestyle changes      Depressed  Mood: Decrease frequency and intensity of feeling down, depressed, hopeless  Identify negative self-talk and behaviors: challenge core beliefs, myths, and actions  Anxiety: will develop more effective coping skills to manage anxiety symptoms  Relationship Problems: will address relationship difficulties in a more adaptive manner  Psychological distress related to Pain    Current Stressors / Issues:  [facility] met with Lanie today to continue supporting her treatment of depression, adjustment to chronic disease management, and relationship problems. Lanie presents today with picking up where we left off in our last session. Specifically, she provided background about her rich fantasy world, including the fictional family she has created. We reflected on observed themes/patterns in her fantasies, namely looking at the functional aspects to them. Reflected on how observed themes help protect her against difficult feelings of abandonment and helplessness. She identified how her fantasy life helps comfort her when thinking about the abandonment she feels from her father and the helplessness she experiences with her chronic pain. We did discuss the possibility that fantasy thoughts can create dissonance between them and reality, thereby creating more vulnerability for hurt/abandonment feelings. Under a narrative therapy approach, we talked about incorporating themes of naturally-ended relationships and her handling them well into her fantasies. Explored how re-writing the narrative of her handling ended relationships well could help instill more resiliency/ability to cope effectively in reality.     Progress on Treatment Objective(s) / Homework:  Satisfactory progress - ACTION (Actively working towards change); Intervened by reinforcing change plan / affirming steps taken    Narrative Therapy    Explore the patient's story of his/her life from his/her perspective.    Explore alternate ways of understanding their experience,  identifying exceptions, developing new themes.    Interpersonal Psychotherapy    Explore patterns in relationships that are effective or ineffective at helping patient reach their goals, find satisfying experience.    Discuss new patterns or behaviors to engage in for improved social functioning.    Care Plan review completed: No    Medication Review:  No changes to current psychiatric medication(s)    Medication Compliance:  NA    Changes in Health Issues:   None reported    Chemical Use Review:   Substance Use: Chemical use reviewed, no active concerns identified      Tobacco Use: No current tobacco use.      Assessment: Current Emotional / Mental Status (status of significant symptoms):  Risk status (Self / Other harm or suicidal ideation)  Patient has had a history of suicidal ideation: passive thoughts only; easily controlled - denied any recently    Patient denies current fears or concerns for personal safety.  Patient denies current or recent suicidal ideation or behaviors.  Patient denies current or recent homicidal ideation or behaviors.  Patient denies current or recent self injurious behavior or ideation.  Patient denies other safety concerns.     A safety and risk management plan has not been developed at this time, however patient was encouraged to call [place] [phone] should there be a change in any of these risk factors.      [place] Suicide Severity Rating Scale (Short Version)  [place] Suicide Severity Rating (Short Version) 2/18/2021   Over the past 2 weeks have you felt down, depressed, or hopeless? yes   Over the past 2 weeks have you had thoughts of killing yourself? no   Have you ever attempted to kill yourself? no     [place] Suicide Severity Rating Scale (Lifetime/Recent)  [place] Suicide Severity Rating (Lifetime/Recent) 8/13/2021   1. Wish to be Dead (Lifetime) No   1. Wish to be Dead (Recent) No   2. Non-Specific Active Suicidal Thoughts (Lifetime) No   2. Non-Specific Active  Suicidal Thoughts (Recent) No   3. Active Suicidal Ideation with any Methods (Not Plan) Without Intent to Act (Lifetime) No   3. Active Suicidal Ideation with any Methods (Not Plan) Without Intent to Act (Recent) No   4. Active Suicidal Ideation with Some Intent to Act, Without Specific Plan (Lifetime) No   4. Active Suicidal Ideation with Some Intent to Act, Without Specific Plan (Recent) No   5. Active Suicidal Ideation with Specific Plan and Intent (Lifetime) No   5. Active Suicidal Ideation with Specific Plan and Intent (Recent) No   Most Severe Ideation Rating (Lifetime) NA   Frequency (Lifetime) NA   Duration (Lifetime) NA   Controllability (Lifetime) NA   Protective Factors  (Lifetime) NA   Reasons for Ideation (Lifetime) NA   Most Severe Ideation Rating (Past Month) NA   Frequency (Past Month) NA   Duration (Past Month) NA   Controllability (Past Month) NA   Protective Factors (Past Month) NA   Reasons for Ideation (Past Month) NA   Actual Attempt (Lifetime) No   Actual Attempt (Past 3 Months) No   Has subject engaged in non-suicidal self-injurious behavior? (Lifetime) No   Has subject engaged in non-suicidal self-injurious behavior? (Past 3 Months) No   Interrupted Attempts (Lifetime) No   Interrupted Attempts (Past 3 Months) No   Aborted or Self-Interrupted Attempt (Lifetime) No   Aborted or Self-Interrupted Attempt (Past 3 Months) No   Preparatory Acts or Behavior (Lifetime) No   Preparatory Acts or Behavior (Past 3 Months) No   Most Recent Attempt Actual Lethality Code NA   Most Lethal Attempt Actual Lethality Code NA   Initial/First Attempt Actual Lethality Code NA         Appearance:   Appropriate   Eye Contact:   Good   Psychomotor Behavior: Normal   Attitude:   Cooperative   Orientation:   All  Speech   Rate / Production: Normal    Volume:  Normal   Mood:    Depressed    Affect:    Tearful  Thought Content:  Clear   Thought Form:  Coherent  Logical   Insight:    Good     Diagnoses:  1. Major  depressive disorder, recurrent episode, moderate (H)        Collateral Reports Completed:  [facility] [place] will coordinate with care team as needed    Plan: (Homework, other):  Lanie was scheduled to RTC in two weeks for help addressing depressive symptoms, pain, and relationship conflicts. Provided CBT skills to help manage depressive experiences. CD Recommendations: No indications of CD issues.     Héctor Higuera Psy.D, LP January 21, 2022          Answers for HPI/ROS submitted by the patient on 12/15/2020   If you checked off any problems, how difficult have these problems made it for you to do your work, take care of things at home, or get along with other people?: Not difficult at all  PHQ9 TOTAL SCORE: 2  KATHARINE 7 TOTAL SCORE: 1    _________________________________________________________________________  [facility] Integrated Behavioral Health Treatment Plan    Client's Name: Lanie Neil  YOB: 1990    Date: 1/7/2022    DSM-V Diagnoses: 296.31 (F33.0) Major Depressive Disorder, Recurrent Episode, Mild With anxious distress;   Psychosocial / Contextual Factors: SMA     Clinical Global Impressions  First:  Considering your total clinical experience with this particular patient population, how severe are the patient's symptoms at this time?: 4 (9/23/2021  3:29 PM)      Most recent:  Compared to the patient's condition at the START of treatment, this patient's condition is: 3 (9/23/2021  3:29 PM)      Referral / Collaboration:  Will collaborate with care team as indicated during treatment.    Anticipated number of session or this episode of care: 10+      MeasurableTreatment Goal(s) related to diagnosis / functional impairment(s)  Goal 1:  Patient will experience a reduction in depressive and anxious symptoms, along with a corresponding increase in positive emotion and life satisfaction.    Objective #A: Patient will experience a reduction in depressed mood, will develop more effective coping skills to manage  depressive symptoms, will develop healthy cognitive patterns and beliefs, will increase ability to function adaptively and will continue to take medications as prescribed / participate in supportive activities and services    Status: Continued - Date(s): 1/7/2022    Objective #B: Patient will experience a reduction in anxiety, will develop more effective coping skills to manage anxiety symptoms, will develop healthy cognitive patterns and beliefs and will increase ability to function adaptively  Status: Continued - Date(s):  1/7/2022    Objective #C: Patient will develop better understanding of triggers and coping strategies to stabilize mood  Status: Continued - Date(s):  1/7/2022    Goal 2:  Patient will identify and increase engagement in valued activity, i.e. improving social connections/relationships, pursuing occupational goals or personally meaningful pursuits, exploration of meaning in life.     Objective #A: Patient will identify meaningful activity in social, occupational and  personal goals, and increase behavioral activation around these goals   Status: Continued - Date(s):  1/7/2022    Objective #B: Patient will address relationship difficulties in a more adaptive manner  Status: Continued - Date(s):  1/7/2022    Objective #C: Patient will develop coping/problem-solving skills to facilitate more adaptive adjustment and will effectively address problems that interfere with adaptive functioning  Status: Continued - Date(s):  9/23/2021    Goal 3:  Patient will increase understanding of pain medications and develop more adaptive responses to chronic pain.    Objective #A: Patient will increase understanding of the relationship between pain and stress/mood   Status: Continued - Date(s):  1/7/2022    Objective #B: Patient will develop 2-3 additional chronic pain management strategies  Status: Continued - Date(s):  1/7/2022      Possible Therapeutic Intervention(s)  Psycho-education regarding mental health  diagnoses and treatment options    Skills training    Explore skills useful to client in current situation.    Skills include assertiveness, communication, conflict management, problem-solving, relaxation, etc.    Solution-Focused Therapy    Explore patterns in patient's relationships and discuss options for new behaviors.    Explore patterns in patient's actions and choices and discuss options for new behaviors.    Cognitive-behavioral Therapy    Discuss common cognitive distortions, identify them in patient's life.    Explore ways to challenge, replace, and act against these cognitions.    Acceptance and Commitment Therapy    Explore and identify important values in patient's life.    Discuss ways to commit to behavioral activation around these values.    Psychodynamic psychotherapy    Discuss patient's emotional dynamics and issues and how they impact behaviors.    Explore patient's history of relationships and how they impact present behaviors.    Explore how to work with and make changes in these schemas and patterns.    Narrative Therapy    Explore the patient's story of his/her life from his/her perspective.    Explore alternate ways of understanding their experience, identifying exceptions, developing new themes.    Interpersonal Psychotherapy    Explore patterns in relationships that are effective or ineffective at helping patient reach their goals, find satisfying experience.    Discuss new patterns or behaviors to engage in for improved social functioning.    Behavioral Activation    Discuss steps patient can take to become more involved in meaningful activity.    Identify barriers to these activities and explore possible solutions.    Mindfulness-Based Strategies    Discuss skills based on development and application of mindfulness.    Skills drawn from compassion-focused therapy, dialectical behavior therapy, mindfulness-based stress reduction, mindfulness-based cognitive therapy, etc.      We have  developed these goals together during our work to this point. Patient has assisted in the development of these goals and has agreed to this treatment plan.       Héctor Higuera LP  1/7/2022

## 2022-01-21 NOTE — TELEPHONE ENCOUNTER
Spoke to Cher.  Gave verbal orders per Dr. Moseley to draw blood today for labs: CBC, TSH, Cortisone, and BMP      Remigio Becerra CMA ([facility]) at 10:15 AM on 1/21/2022

## 2022-01-22 LAB — SARS-COV-2 RNA RESP QL NAA+PROBE: NEGATIVE

## 2022-01-24 ENCOUNTER — VIRTUAL VISIT (OUTPATIENT)
Dept: FAMILY MEDICINE | Facility: CLINIC | Age: 32
End: 2022-01-24
Payer: COMMERCIAL

## 2022-01-24 DIAGNOSIS — D50.0 IRON DEFICIENCY ANEMIA DUE TO CHRONIC BLOOD LOSS: ICD-10-CM

## 2022-01-24 DIAGNOSIS — Z01.818 PREOP GENERAL PHYSICAL EXAM: Primary | ICD-10-CM

## 2022-01-24 PROCEDURE — 99214 OFFICE O/P EST MOD 30 MIN: CPT | Mod: 95 | Performed by: FAMILY MEDICINE

## 2022-01-24 NOTE — LETTER
1/24/2022       RE: Lanie Neil  [address]  [place] [address]     Dear Colleague,    Thank you for referring your patient, Lanie Neil, to the [facility] [place] at [facility]. Please see a copy of my visit note below.    Lanie Neil presents on 1/24/2022 for a pre-operative exam.    Patient Name: Lanie Neil  Location of Surgery: [facility]  Surgeon: Dr. Paresh Sanchez  Type of Surgery or Procedure: Unknown  Date and Time of Surgery or Procedure: 2/2/22 at 9:00 AM  Have you or anyone in your family ever had problems with anesthesia or sedation? Ivania Lennon, EMT at 9:10 AM on 1/24/2022    Lanie is a 31 year old who is being evaluated via a billable video visit.      How would you like to obtain your AVS? MyChart  If the video visit is dropped, the invitation should be resent by: in epic  Will anyone else be joining your video visit? No    Video Start Time: 9:30    Assessment & Plan     Preop general physical exam  Cleared for surgery    Iron deficiency anemia due to chronic blood loss  Continue IV iron        35 minutes spent on the date of the encounter doing chart review, history and exam, documentation and further activities per the note         Jimmy Moseley MD  [facility] [place]    Julia Dela Cruz is a 31 year old who presents for the following health issues     HPI Spinraza procedures. For SMA. Has done for about four years. Spinraza seems to have stabilized progresson in her opinion.     Just starting iron replacdement for anemia    No acute complaints    Past Medical History:   Diagnosis Date     Anxiety      Snoring      Spinal muscle atrophy (H)      Past Surgical History:   Procedure Laterality Date     ENT SURGERY      tubes     GASTROSTOMY TUBE       KIDNEY SURGERY       SPINE SURGERY       TRACHEOSTOMY       Current Outpatient Medications    Medication     ACETAMINOPHEN PO     albuterol (PROAIR HFA) 108 (90 Base) MCG/ACT inhaler     albuterol (PROVENTIL) (2.5 MG/3ML) 0.083% neb solution     ALPRAZolam (XANAX) 0.25 MG tablet     aminocaproic acid (AMICAR) 0.25 GM/ML solution     bisacodyl (DULCOLAX) 10 MG suppository     celecoxib (CELEBREX) 200 MG capsule     clindamycin (CLEOCIN T) 1 % lotion     diazepam (VALIUM) 5 MG tablet     doxycycline hyclate (VIBRAMYCIN) 100 MG capsule     fluocinolone acetonide (DERMA SMOOTHE/FS BODY) 0.01 % external oil     fluticasone (FLOVENT HFA) 110 MCG/ACT inhaler     gabapentin (NEURONTIN) 250 MG/5ML solution     GENERLAC 10 GM/15ML solution     guaiFENesin (ORGANIDIN) 200 MG TABS tablet     HYDROmorphone (DILAUDID) 4 MG tablet     hydrOXYzine (ATARAX) 25 MG tablet     ketoconazole (NIZORAL) 2 % external shampoo     LANsoprazole (PREVACID) 30 MG DR capsule     Magnesium Hydroxide (MILK OF MAGNESIA PO)     metroNIDAZOLE (METROCREAM) 0.75 % external cream     mometasone (NASONEX) 50 MCG/ACT nasal spray     morphine (SABINE) 30 MG 24 hr capsule     mupirocin (BACTROBAN) 2 % external cream     naloxone (NARCAN) 4 MG/0.1ML nasal spray     Nutritional Supplements (REPLETE FIBER) LIQD     omeprazole (PRILOSEC) 2 mg/mL suspension     ondansetron (ZOFRAN-ODT) 4 MG ODT tab     order for DME     order for DME     polyethylene glycol (MIRALAX) 17 GM/Dose powder     potassium chloride (KAYCIEL) 20 MEQ/15ML (10%) solution     prochlorperazine (COMPAZINE) 5 MG tablet     rivaroxaban ANTICOAGULANT (XARELTO ANTICOAGULANT) 10 MG TABS tablet     Salicylic Acid (OXY BALANCE FACIAL CLEAN WASH EX)     Sennosides (SENNA) 8.8 MG/5ML SYRP     sertraline (ZOLOFT) 20 MG/ML (HIGH CONC) solution     silver sulfADIAZINE (SILVADENE) 1 % external cream     simethicone (MYLICON) 66.7 mg/ml     simethicone 40 MG/0.6ML LIQD     sodium chloride (OCEAN) 0.65 % nasal spray     tretinoin (RETIN-A) 0.025 % external cream     No current facility-administered  medications for this visit.     No Known Allergies    Past Medical History:   Diagnosis Date     Anxiety      Snoring      Spinal muscle atrophy (H)      Past Surgical History:   Procedure Laterality Date     ENT SURGERY      tubes     GASTROSTOMY TUBE       KIDNEY SURGERY       SPINE SURGERY       TRACHEOSTOMY       Current Outpatient Medications   Medication     ACETAMINOPHEN PO     albuterol (PROAIR HFA) 108 (90 Base) MCG/ACT inhaler     albuterol (PROVENTIL) (2.5 MG/3ML) 0.083% neb solution     ALPRAZolam (XANAX) 0.25 MG tablet     aminocaproic acid (AMICAR) 0.25 GM/ML solution     bisacodyl (DULCOLAX) 10 MG suppository     celecoxib (CELEBREX) 200 MG capsule     clindamycin (CLEOCIN T) 1 % lotion     diazepam (VALIUM) 5 MG tablet     doxycycline hyclate (VIBRAMYCIN) 100 MG capsule     fluocinolone acetonide (DERMA SMOOTHE/FS BODY) 0.01 % external oil     fluticasone (FLOVENT HFA) 110 MCG/ACT inhaler     gabapentin (NEURONTIN) 250 MG/5ML solution     GENERLAC 10 GM/15ML solution     guaiFENesin (ORGANIDIN) 200 MG TABS tablet     HYDROmorphone (DILAUDID) 4 MG tablet     hydrOXYzine (ATARAX) 25 MG tablet     ketoconazole (NIZORAL) 2 % external shampoo     LANsoprazole (PREVACID) 30 MG DR capsule     Magnesium Hydroxide (MILK OF MAGNESIA PO)     metroNIDAZOLE (METROCREAM) 0.75 % external cream     mometasone (NASONEX) 50 MCG/ACT nasal spray     morphine (SABINE) 30 MG 24 hr capsule     mupirocin (BACTROBAN) 2 % external cream     naloxone (NARCAN) 4 MG/0.1ML nasal spray     Nutritional Supplements (REPLETE FIBER) LIQD     omeprazole (PRILOSEC) 2 mg/mL suspension     ondansetron (ZOFRAN-ODT) 4 MG ODT tab     order for DME     order for DME     polyethylene glycol (MIRALAX) 17 GM/Dose powder     potassium chloride (KAYCIEL) 20 MEQ/15ML (10%) solution     prochlorperazine (COMPAZINE) 5 MG tablet     rivaroxaban ANTICOAGULANT (XARELTO ANTICOAGULANT) 10 MG TABS tablet     Salicylic Acid (OXY BALANCE FACIAL CLEAN WASH  EX)     Sennosides (SENNA) 8.8 MG/5ML SYRP     sertraline (ZOLOFT) 20 MG/ML (HIGH CONC) solution     silver sulfADIAZINE (SILVADENE) 1 % external cream     simethicone (MYLICON) 66.7 mg/ml     simethicone 40 MG/0.6ML LIQD     sodium chloride (OCEAN) 0.65 % nasal spray     tretinoin (RETIN-A) 0.025 % external cream     No current facility-administered medications for this visit.     No Known Allergies  No personal or FH easy bleed or anesthesia reaction    Pt does get DVT and is on blood thinners      Review of Systems         Objective           Vitals:  No vitals were obtained today due to virtual visit.    Physical Exam   GENERAL: Healthy, alert and no distress, chronic minimal motor ability  EYES: Eyes grossly normal to inspection.  No discharge or erythema, or obvious scleral/conjunctival abnormalities.  RESP: No audible wheeze, cough, or visible cyanosis.  No visible retractions or increased work of breathing.  On vent.  SKIN: Visible skin clear. No significant rash, abnormal pigmentation or lesions.  NEURO:  Mentation and speech appropriate for age.  PSYCH: Mentation appears normal, affect normal/bright, judgement and insight intact, normal speech and appearance well-groomed.          Again, thank you for allowing me to participate in the care of your patient.      Sincerely,    Jimmy Moseley MD

## 2022-01-24 NOTE — NURSING NOTE
Lanie Neil is a 31 year old female patient who is being evaluated via a billable video visit. The patient was called and checked-in for today's virtual visit. After the patient was checked in, Lanie Neil's primary concerns for today's visit were discussed. The following are the primary complaints of the patient:     Chief Complaint   Patient presents with     Pre-Op Exam     The patient's allergies and medications were reviewed as noted. The patient does not have any other questions for the provider.    BRINA Wisdom at 9:12 AM on 1/24/2022

## 2022-01-24 NOTE — PROGRESS NOTES
Lanie Neil presents on 1/24/2022 for a pre-operative exam.    Patient Name: Lanie Neil  Location of Surgery: [facility]  Surgeon: Dr. Paresh Sanchez  Type of Surgery or Procedure: Unknown  Date and Time of Surgery or Procedure: 2/2/22 at 9:00 AM  Have you or anyone in your family ever had problems with anesthesia or sedation? Ivania Lennon, EMT at 9:10 AM on 1/24/2022    Lanie is a 31 year old who is being evaluated via a billable video visit.      How would you like to obtain your AVS? MyChart  If the video visit is dropped, the invitation should be resent by: in epic  Will anyone else be joining your video visit? No    Video Start Time: 9:30    Assessment & Plan     Preop general physical exam  Cleared for surgery    Iron deficiency anemia due to chronic blood loss  Continue IV iron        35 minutes spent on the date of the encounter doing chart review, history and exam, documentation and further activities per the note         Jimmy Moseley MD  [facility]   Lanie is a 31 year old who presents for the following health issues     HPI Spinraza procedures. For SMA. Has done for about four years. Spinraza seems to have stabilized progresson in her opinion.     Just starting iron replacdement for anemia    No acute complaints    Past Medical History:   Diagnosis Date     Anxiety      Snoring      Spinal muscle atrophy (H)      Past Surgical History:   Procedure Laterality Date     ENT SURGERY      tubes     GASTROSTOMY TUBE       KIDNEY SURGERY       SPINE SURGERY       TRACHEOSTOMY       Current Outpatient Medications   Medication     ACETAMINOPHEN PO     albuterol (PROAIR HFA) 108 (90 Base) MCG/ACT inhaler     albuterol (PROVENTIL) (2.5 MG/3ML) 0.083% neb solution     ALPRAZolam (XANAX) 0.25 MG tablet     aminocaproic acid (AMICAR) 0.25 GM/ML solution     bisacodyl (DULCOLAX) 10 MG suppository     celecoxib (CELEBREX) 200 MG capsule      clindamycin (CLEOCIN T) 1 % lotion     diazepam (VALIUM) 5 MG tablet     doxycycline hyclate (VIBRAMYCIN) 100 MG capsule     fluocinolone acetonide (DERMA SMOOTHE/FS BODY) 0.01 % external oil     fluticasone (FLOVENT HFA) 110 MCG/ACT inhaler     gabapentin (NEURONTIN) 250 MG/5ML solution     GENERLAC 10 GM/15ML solution     guaiFENesin (ORGANIDIN) 200 MG TABS tablet     HYDROmorphone (DILAUDID) 4 MG tablet     hydrOXYzine (ATARAX) 25 MG tablet     ketoconazole (NIZORAL) 2 % external shampoo     LANsoprazole (PREVACID) 30 MG DR capsule     Magnesium Hydroxide (MILK OF MAGNESIA PO)     metroNIDAZOLE (METROCREAM) 0.75 % external cream     mometasone (NASONEX) 50 MCG/ACT nasal spray     morphine (SABINE) 30 MG 24 hr capsule     mupirocin (BACTROBAN) 2 % external cream     naloxone (NARCAN) 4 MG/0.1ML nasal spray     Nutritional Supplements (REPLETE FIBER) LIQD     omeprazole (PRILOSEC) 2 mg/mL suspension     ondansetron (ZOFRAN-ODT) 4 MG ODT tab     order for DME     order for DME     polyethylene glycol (MIRALAX) 17 GM/Dose powder     potassium chloride (KAYCIEL) 20 MEQ/15ML (10%) solution     prochlorperazine (COMPAZINE) 5 MG tablet     rivaroxaban ANTICOAGULANT (XARELTO ANTICOAGULANT) 10 MG TABS tablet     Salicylic Acid (OXY BALANCE FACIAL CLEAN WASH EX)     Sennosides (SENNA) 8.8 MG/5ML SYRP     sertraline (ZOLOFT) 20 MG/ML (HIGH CONC) solution     silver sulfADIAZINE (SILVADENE) 1 % external cream     simethicone (MYLICON) 66.7 mg/ml     simethicone 40 MG/0.6ML LIQD     sodium chloride (OCEAN) 0.65 % nasal spray     tretinoin (RETIN-A) 0.025 % external cream     No current facility-administered medications for this visit.     No Known Allergies    Past Medical History:   Diagnosis Date     Anxiety      Snoring      Spinal muscle atrophy (H)      Past Surgical History:   Procedure Laterality Date     ENT SURGERY      tubes     GASTROSTOMY TUBE       KIDNEY SURGERY       SPINE SURGERY       TRACHEOSTOMY        Current Outpatient Medications   Medication     ACETAMINOPHEN PO     albuterol (PROAIR HFA) 108 (90 Base) MCG/ACT inhaler     albuterol (PROVENTIL) (2.5 MG/3ML) 0.083% neb solution     ALPRAZolam (XANAX) 0.25 MG tablet     aminocaproic acid (AMICAR) 0.25 GM/ML solution     bisacodyl (DULCOLAX) 10 MG suppository     celecoxib (CELEBREX) 200 MG capsule     clindamycin (CLEOCIN T) 1 % lotion     diazepam (VALIUM) 5 MG tablet     doxycycline hyclate (VIBRAMYCIN) 100 MG capsule     fluocinolone acetonide (DERMA SMOOTHE/FS BODY) 0.01 % external oil     fluticasone (FLOVENT HFA) 110 MCG/ACT inhaler     gabapentin (NEURONTIN) 250 MG/5ML solution     GENERLAC 10 GM/15ML solution     guaiFENesin (ORGANIDIN) 200 MG TABS tablet     HYDROmorphone (DILAUDID) 4 MG tablet     hydrOXYzine (ATARAX) 25 MG tablet     ketoconazole (NIZORAL) 2 % external shampoo     LANsoprazole (PREVACID) 30 MG DR capsule     Magnesium Hydroxide (MILK OF MAGNESIA PO)     metroNIDAZOLE (METROCREAM) 0.75 % external cream     mometasone (NASONEX) 50 MCG/ACT nasal spray     morphine (SABINE) 30 MG 24 hr capsule     mupirocin (BACTROBAN) 2 % external cream     naloxone (NARCAN) 4 MG/0.1ML nasal spray     Nutritional Supplements (REPLETE FIBER) LIQD     omeprazole (PRILOSEC) 2 mg/mL suspension     ondansetron (ZOFRAN-ODT) 4 MG ODT tab     order for DME     order for DME     polyethylene glycol (MIRALAX) 17 GM/Dose powder     potassium chloride (KAYCIEL) 20 MEQ/15ML (10%) solution     prochlorperazine (COMPAZINE) 5 MG tablet     rivaroxaban ANTICOAGULANT (XARELTO ANTICOAGULANT) 10 MG TABS tablet     Salicylic Acid (OXY BALANCE FACIAL CLEAN WASH EX)     Sennosides (SENNA) 8.8 MG/5ML SYRP     sertraline (ZOLOFT) 20 MG/ML (HIGH CONC) solution     silver sulfADIAZINE (SILVADENE) 1 % external cream     simethicone (MYLICON) 66.7 mg/ml     simethicone 40 MG/0.6ML LIQD     sodium chloride (OCEAN) 0.65 % nasal spray     tretinoin (RETIN-A) 0.025 % external cream      No current facility-administered medications for this visit.     No Known Allergies  No personal or FH easy bleed or anesthesia reaction    Pt does get DVT and is on blood thinners      Review of Systems         Objective           Vitals:  No vitals were obtained today due to virtual visit.    Physical Exam   GENERAL: Healthy, alert and no distress, chronic minimal motor ability  EYES: Eyes grossly normal to inspection.  No discharge or erythema, or obvious scleral/conjunctival abnormalities.  RESP: No audible wheeze, cough, or visible cyanosis.  No visible retractions or increased work of breathing.  On vent.  SKIN: Visible skin clear. No significant rash, abnormal pigmentation or lesions.  NEURO:  Mentation and speech appropriate for age.  PSYCH: Mentation appears normal, affect normal/bright, judgement and insight intact, normal speech and appearance well-groomed.            Video-Visit Details    Type of service:  Video Visit    Video End Time:9:55    Originating Location (pt. Location): Home    Distant Location (provider location):  [facility] [place]     Platform used for Video Visit: Nubefy

## 2022-01-25 ENCOUNTER — MEDICAL CORRESPONDENCE (OUTPATIENT)
Dept: HEALTH INFORMATION MANAGEMENT | Facility: CLINIC | Age: 32
End: 2022-01-25
Payer: COMMERCIAL

## 2022-01-27 ENCOUNTER — TELEPHONE (OUTPATIENT)
Dept: HEMATOLOGY | Facility: CLINIC | Age: 32
End: 2022-01-27
Payer: COMMERCIAL

## 2022-01-27 NOTE — TELEPHONE ENCOUNTER
Lanie Neil  [id number]  1990    Received call from Viktoria at [facility]. Lanie will be having an injection of Spinraza into her Omni port February 1st. Discussed with MORALES Pérez who would like her to hold her Xarelto 48 hours prior to procedure and resume the next day.    This note is confirmation that her plan is the same as last injection and note faxed to [phone].     Ariana Crandall, MSN, RN, PHN -Nurse Clinician, [facility] [phone]

## 2022-02-01 ENCOUNTER — TRANSFERRED RECORDS (OUTPATIENT)
Dept: HEALTH INFORMATION MANAGEMENT | Facility: CLINIC | Age: 32
End: 2022-02-01
Payer: COMMERCIAL

## 2022-02-04 ENCOUNTER — VIRTUAL VISIT (OUTPATIENT)
Dept: BEHAVIORAL HEALTH | Facility: CLINIC | Age: 32
End: 2022-02-04
Payer: COMMERCIAL

## 2022-02-04 DIAGNOSIS — F33.1 MAJOR DEPRESSIVE DISORDER, RECURRENT EPISODE, MODERATE (H): Primary | ICD-10-CM

## 2022-02-04 PROCEDURE — 90837 PSYTX W PT 60 MINUTES: CPT | Mod: GT | Performed by: PSYCHOLOGIST

## 2022-02-04 NOTE — PROGRESS NOTES
[facility] Integrated [facility]al Health  February 4, 2022      Behavioral Health Clinician Progress Note    Patient Name: Lanie Neil           Service Type: Video appointment      Service Location:  Video appointment      Session Start Time: 1:04 Session End Time: 2:00      Session Length: 53 - 60      Attendees: Patient    Visit Activities (Refresh list every visit): [facility] Only    Telemedicine Visit: The patient's condition can be safely assessed and treated via synchronous audio and visual telemedicine encounter.      Reason for Telemedicine Visit: Patient has requested telehealth visit and Services only offered telehealth    Originating Site (Patient Location): Patient's home    Distant Site (Provider Location): Provider Remote Setting- Home Office    Consent:  The patient/guardian has verbally consented to: the potential risks and benefits of telemedicine (video visit) versus in person care; bill my insurance or make self-payment for services provided; and responsibility for payment of non-covered services.     Mode of Communication:  Video Conference via [employer]    As the provider I attest to compliance with applicable laws and regulations related to telemedicine.      Diagnostic Assessment Date: 1/19/2021  Treatment Plan Review Date: 4/7/2022  See Flowsheets for today's PHQ-9 and KATHARINE-7 results  Previous PHQ-9:   PHQ-9 SCORE 12/15/2020 3/16/2021 6/30/2021   PHQ-9 Total Score - - -   PHQ-9 Total Score MyChart 2 (Minimal depression) 2 (Minimal depression) 2 (Minimal depression)   PHQ-9 Total Score 2 2 2     Previous KATHARINE-7:   KATHARINE-7 SCORE 12/15/2020 3/16/2021 6/30/2021   Total Score 1 (minimal anxiety) 1 (minimal anxiety) 1 (minimal anxiety)   Total Score 1 1 1       DATA  Extended Session (60+ minutes): No  Interactive Complexity: No  Crisis: No    Treatment Objective(s) Addressed in This Session:  Target Behavior(s): disease management/lifestyle changes      Depressed  Mood: Decrease frequency and intensity of feeling down, depressed, hopeless  Identify negative self-talk and behaviors: challenge core beliefs, myths, and actions  Anxiety: will develop more effective coping skills to manage anxiety symptoms  Relationship Problems: will address relationship difficulties in a more adaptive manner  Psychological distress related to Pain    Current Stressors / Issues:  [facility] met with Lanie today to continue supporting her treatment of depression, adjustment to chronic disease management, and relationship problems. Today our session focused on helping Lanie address the ongoing difficulties she experiences with helplessness and powerlessness in her life that contribute to experiences of depression. Reports a recent conflict with her mother about her experience with pain. She described a familiar experience of feeling invalidated/dismissed about her pain. She described how this connects to the upcoming change about her pain medication, how this will be reduced in April. She reports the difficult feeling of being punished. She also reflected on the difficulties she has accepting certain realities of her life, such as not being able to have children and dating others. Ultimately, we reflected on the difficulties she has with not having a sense of autonomy or control over events in her life. Explored ways to focus on managing depressive and anxious thoughts, how she may utilize emotional reasoning, mind-reading, and all-or-nothing thought patterns that contribute to her depression. Explored ways to utilize mindfulness to develop awareness of these thought patterns.     Progress on Treatment Objective(s) / Homework:  Satisfactory progress - ACTION (Actively working towards change); Intervened by reinforcing change plan / affirming steps taken    Cognitive-behavioral Therapy    Discuss common cognitive distortions, identify them in patient's life.    Explore ways to challenge, replace, and  act against these cognitions.    Acceptance and Commitment Therapy    Explore and identify important values in patient's life.    Discuss ways to commit to behavioral activation around these values.    Care Plan review completed: No    Medication Review:  No changes to current psychiatric medication(s)    Medication Compliance:  NA    Changes in Health Issues:   None reported    Chemical Use Review:   Substance Use: Chemical use reviewed, no active concerns identified      Tobacco Use: No current tobacco use.      Assessment: Current Emotional / Mental Status (status of significant symptoms):  Risk status (Self / Other harm or suicidal ideation)  Patient has had a history of suicidal ideation: passive thoughts only; easily controlled - denied any recently    Patient denies current fears or concerns for personal safety.  Patient denies current or recent suicidal ideation or behaviors.  Patient denies current or recent homicidal ideation or behaviors.  Patient denies current or recent self injurious behavior or ideation.  Patient denies other safety concerns.     A safety and risk management plan has not been developed at this time, however patient was encouraged to call [place] [phone] should there be a change in any of these risk factors.      Skipwith Suicide Severity Rating Scale (Short Version)  Skipwith Suicide Severity Rating (Short Version) 2/18/2021   Over the past 2 weeks have you felt down, depressed, or hopeless? yes   Over the past 2 weeks have you had thoughts of killing yourself? no   Have you ever attempted to kill yourself? no     Skipwith Suicide Severity Rating Scale (Lifetime/Recent)  Skipwith Suicide Severity Rating (Lifetime/Recent) 8/13/2021   1. Wish to be Dead (Lifetime) No   1. Wish to be Dead (Recent) No   2. Non-Specific Active Suicidal Thoughts (Lifetime) No   2. Non-Specific Active Suicidal Thoughts (Recent) No   3. Active Suicidal Ideation with any Methods (Not Plan) Without Intent  to Act (Lifetime) No   3. Active Suicidal Ideation with any Methods (Not Plan) Without Intent to Act (Recent) No   4. Active Suicidal Ideation with Some Intent to Act, Without Specific Plan (Lifetime) No   4. Active Suicidal Ideation with Some Intent to Act, Without Specific Plan (Recent) No   5. Active Suicidal Ideation with Specific Plan and Intent (Lifetime) No   5. Active Suicidal Ideation with Specific Plan and Intent (Recent) No   Most Severe Ideation Rating (Lifetime) NA   Frequency (Lifetime) NA   Duration (Lifetime) NA   Controllability (Lifetime) NA   Protective Factors  (Lifetime) NA   Reasons for Ideation (Lifetime) NA   Most Severe Ideation Rating (Past Month) NA   Frequency (Past Month) NA   Duration (Past Month) NA   Controllability (Past Month) NA   Protective Factors (Past Month) NA   Reasons for Ideation (Past Month) NA   Actual Attempt (Lifetime) No   Actual Attempt (Past 3 Months) No   Has subject engaged in non-suicidal self-injurious behavior? (Lifetime) No   Has subject engaged in non-suicidal self-injurious behavior? (Past 3 Months) No   Interrupted Attempts (Lifetime) No   Interrupted Attempts (Past 3 Months) No   Aborted or Self-Interrupted Attempt (Lifetime) No   Aborted or Self-Interrupted Attempt (Past 3 Months) No   Preparatory Acts or Behavior (Lifetime) No   Preparatory Acts or Behavior (Past 3 Months) No   Most Recent Attempt Actual Lethality Code NA   Most Lethal Attempt Actual Lethality Code NA   Initial/First Attempt Actual Lethality Code NA         Appearance:   Appropriate   Eye Contact:   Good   Psychomotor Behavior: Normal   Attitude:   Cooperative   Orientation:   All  Speech   Rate / Production: Normal    Volume:  Normal   Mood:    Depressed    Affect:    Tearful  Thought Content:  Clear   Thought Form:  Coherent  Logical   Insight:    Good     Diagnoses:  1. Major depressive disorder, recurrent episode, moderate (H)        Collateral Reports Completed:  [facility] will coordinate  with care team as needed    Plan: (Homework, other):  Lanie was scheduled to RTC in two weeks for help addressing depressive symptoms, pain, and relationship conflicts. Provided CBT skills to help manage depressive experiences. CD Recommendations: No indications of CD issues.     Héctor Higuera Psy.D, LP February 4, 2022            Answers for HPI/ROS submitted by the patient on 12/15/2020   If you checked off any problems, how difficult have these problems made it for you to do your work, take care of things at home, or get along with other people?: Not difficult at all  PHQ9 TOTAL SCORE: 2  KATHARINE 7 TOTAL SCORE: 1    _________________________________________________________________________  [facility] Integrated Behavioral Health Treatment Plan    Client's Name: Lanie Neil  YOB: 1990    Date: 1/7/2022    DSM-V Diagnoses: 296.31 (F33.0) Major Depressive Disorder, Recurrent Episode, Mild With anxious distress;   Psychosocial / Contextual Factors: SMA     Clinical Global Impressions  First:  Considering your total clinical experience with this particular patient population, how severe are the patient's symptoms at this time?: 4 (9/23/2021  3:29 PM)      Most recent:  Compared to the patient's condition at the START of treatment, this patient's condition is: 3 (9/23/2021  3:29 PM)      Referral / Collaboration:  Will collaborate with care team as indicated during treatment.    Anticipated number of session or this episode of care: 10+      MeasurableTreatment Goal(s) related to diagnosis / functional impairment(s)  Goal 1:  Patient will experience a reduction in depressive and anxious symptoms, along with a corresponding increase in positive emotion and life satisfaction.    Objective #A: Patient will experience a reduction in depressed mood, will develop more effective coping skills to manage depressive symptoms, will develop healthy cognitive patterns and beliefs, will increase ability to function  adaptively and will continue to take medications as prescribed / participate in supportive activities and services    Status: Continued - Date(s): 1/7/2022    Objective #B: Patient will experience a reduction in anxiety, will develop more effective coping skills to manage anxiety symptoms, will develop healthy cognitive patterns and beliefs and will increase ability to function adaptively  Status: Continued - Date(s):  1/7/2022    Objective #C: Patient will develop better understanding of triggers and coping strategies to stabilize mood  Status: Continued - Date(s):  1/7/2022    Goal 2:  Patient will identify and increase engagement in valued activity, i.e. improving social connections/relationships, pursuing occupational goals or personally meaningful pursuits, exploration of meaning in life.     Objective #A: Patient will identify meaningful activity in social, occupational and  personal goals, and increase behavioral activation around these goals   Status: Continued - Date(s):  1/7/2022    Objective #B: Patient will address relationship difficulties in a more adaptive manner  Status: Continued - Date(s):  1/7/2022    Objective #C: Patient will develop coping/problem-solving skills to facilitate more adaptive adjustment and will effectively address problems that interfere with adaptive functioning  Status: Continued - Date(s):  9/23/2021    Goal 3:  Patient will increase understanding of pain medications and develop more adaptive responses to chronic pain.    Objective #A: Patient will increase understanding of the relationship between pain and stress/mood   Status: Continued - Date(s):  1/7/2022    Objective #B: Patient will develop 2-3 additional chronic pain management strategies  Status: Continued - Date(s):  1/7/2022      Possible Therapeutic Intervention(s)  Psycho-education regarding mental health diagnoses and treatment options    Skills training    Explore skills useful to client in current  situation.    Skills include assertiveness, communication, conflict management, problem-solving, relaxation, etc.    Solution-Focused Therapy    Explore patterns in patient's relationships and discuss options for new behaviors.    Explore patterns in patient's actions and choices and discuss options for new behaviors.    Cognitive-behavioral Therapy    Discuss common cognitive distortions, identify them in patient's life.    Explore ways to challenge, replace, and act against these cognitions.    Acceptance and Commitment Therapy    Explore and identify important values in patient's life.    Discuss ways to commit to behavioral activation around these values.    Psychodynamic psychotherapy    Discuss patient's emotional dynamics and issues and how they impact behaviors.    Explore patient's history of relationships and how they impact present behaviors.    Explore how to work with and make changes in these schemas and patterns.    Narrative Therapy    Explore the patient's story of his/her life from his/her perspective.    Explore alternate ways of understanding their experience, identifying exceptions, developing new themes.    Interpersonal Psychotherapy    Explore patterns in relationships that are effective or ineffective at helping patient reach their goals, find satisfying experience.    Discuss new patterns or behaviors to engage in for improved social functioning.    Behavioral Activation    Discuss steps patient can take to become more involved in meaningful activity.    Identify barriers to these activities and explore possible solutions.    Mindfulness-Based Strategies    Discuss skills based on development and application of mindfulness.    Skills drawn from compassion-focused therapy, dialectical behavior therapy, mindfulness-based stress reduction, mindfulness-based cognitive therapy, etc.      We have developed these goals together during our work to this point. Patient has assisted in the development  of these goals and has agreed to this treatment plan.       Héctor Higuera, JENNIFER  1/7/2022

## 2022-02-10 ENCOUNTER — TRANSFERRED RECORDS (OUTPATIENT)
Dept: HEALTH INFORMATION MANAGEMENT | Facility: CLINIC | Age: 32
End: 2022-02-10
Payer: COMMERCIAL

## 2022-02-14 DIAGNOSIS — J45.909 ASTHMA: ICD-10-CM

## 2022-02-16 RX ORDER — FLUTICASONE PROPIONATE 110 UG/1
1 AEROSOL, METERED RESPIRATORY (INHALATION) 2 TIMES DAILY
Qty: 36 G | Refills: 3 | OUTPATIENT
Start: 2022-02-16

## 2022-02-17 ENCOUNTER — TELEPHONE (OUTPATIENT)
Dept: FAMILY MEDICINE | Facility: CLINIC | Age: 32
End: 2022-02-17
Payer: COMMERCIAL

## 2022-02-17 ENCOUNTER — MYC MEDICAL ADVICE (OUTPATIENT)
Dept: FAMILY MEDICINE | Facility: CLINIC | Age: 32
End: 2022-02-17
Payer: COMMERCIAL

## 2022-02-17 NOTE — TELEPHONE ENCOUNTER
[facility] Call Center    Phone Message    May a detailed message be left on voicemail: no     Reason for Call: Other: Treasure from [facility] requesting call back to discuss getting the pt on a different vent. Treasure stated pt's mother called wanting to get a vent that lasts longer to get the pt out of the house more     Action Taken: Message routed to:  [facility] ([facility]): aleshia

## 2022-02-18 ENCOUNTER — VIRTUAL VISIT (OUTPATIENT)
Dept: BEHAVIORAL HEALTH | Facility: CLINIC | Age: 32
End: 2022-02-18
Payer: COMMERCIAL

## 2022-02-18 DIAGNOSIS — F33.1 MAJOR DEPRESSIVE DISORDER, RECURRENT EPISODE, MODERATE (H): Primary | ICD-10-CM

## 2022-02-18 DIAGNOSIS — G62.9 NEUROPATHY: ICD-10-CM

## 2022-02-18 PROCEDURE — 90837 PSYTX W PT 60 MINUTES: CPT | Mod: GT | Performed by: PSYCHOLOGIST

## 2022-02-18 RX ORDER — GABAPENTIN 250 MG/5ML
1000 SOLUTION ORAL 3 TIMES DAILY
Qty: 1800 ML | Refills: 5 | Status: SHIPPED | OUTPATIENT
Start: 2022-02-18 | End: 2022-08-08

## 2022-02-18 NOTE — TELEPHONE ENCOUNTER
gabapentin (NEURONTIN) 250 MG/5ML solution  Last Written Prescription Date:  8/25/2021  Last Fill Quantity: 1800,   # refills: 5  Last Office Visit :  1/24/2022  Future Office visit:  None    Routing refill request to provider for review/approval because:  Drug not on the [facility], [facility] or [facility] refill protocol or controlled substance      Georgina Sy RN  Central Triage Red Flags/Med Refills

## 2022-02-18 NOTE — PROGRESS NOTES
[facility]: Integrated Behavioral Health  February 18, 2022    Behavioral Health Clinician Progress Note    Patient Name: Lanie Neil           Service Type: Video appointment      Service Location:  Video appointment      Session Start Time: 3:04 Session End Time: 4:03      Session Length: 53 - 60      Attendees: Patient    Visit Activities (Refresh list every visit): [facility] [place] Only    Telemedicine Visit: The patient's condition can be safely assessed and treated via synchronous audio and visual telemedicine encounter.      Reason for Telemedicine Visit: Patient has requested telehealth visit and Services only offered telehealth    Originating Site (Patient Location): Patient's home    Distant Site (Provider Location): Provider Remote Setting- Home Office    Consent:  The patient/guardian has verbally consented to: the potential risks and benefits of telemedicine (video visit) versus in person care; bill my insurance or make self-payment for services provided; and responsibility for payment of non-covered services.     Mode of Communication:  Video Conference via Oct[employer]    As the provider I attest to compliance with applicable laws and regulations related to telemedicine.      Diagnostic Assessment Date: 1/19/2021  Treatment Plan Review Date: 4/7/2022  See Flowsheets for today's PHQ-9 and KATHARINE-7 results  Previous PHQ-9:   PHQ-9 SCORE 12/15/2020 3/16/2021 6/30/2021   PHQ-9 Total Score - - -   PHQ-9 Total Score MyChart 2 (Minimal depression) 2 (Minimal depression) 2 (Minimal depression)   PHQ-9 Total Score 2 2 2     Previous KATHARINE-7:   KATHARINE-7 SCORE 12/15/2020 3/16/2021 6/30/2021   Total Score 1 (minimal anxiety) 1 (minimal anxiety) 1 (minimal anxiety)   Total Score 1 1 1       DATA  Extended Session (60+ minutes): No  Interactive Complexity: No  Crisis: No    Treatment Objective(s) Addressed in This Session:  Target Behavior(s): disease management/lifestyle changes      Depressed  Mood: Decrease frequency and intensity of feeling down, depressed, hopeless  Identify negative self-talk and behaviors: challenge core beliefs, myths, and actions  Anxiety: will develop more effective coping skills to manage anxiety symptoms  Relationship Problems: will address relationship difficulties in a more adaptive manner  Psychological distress related to Pain    Current Stressors / Issues:  [facility] met with Lanie today to continue supporting her treatment of depression, adjustment to chronic disease management, and relationship problems. Today our session focused on helping Lanie address the ongoing difficulties she experiences with helplessness and powerlessness in her life that contribute to experiences of depression. Reports her father recently asked her mother for money, which angered her, triggered feelings of betrayal as we discussed/identified. Broadly discussed how limited she feels at home and how she lacks meaningful social connections in life. Explored how making this a goal for her could be worthwhile and we hypothesized about how having a stronger social support base could benefit her treatment of depression. Explored the topic of looking into group homes for individuals with disabilities and possibly looking into community support groups. Discussed taking small steps toward change, what 1 percent difference would look like for her and how taking steps can help reduce feelings of powerlessness. Lanie worries about the impact of talking about a group home would have on her mother, who helps take care of her. She recognizes that a group home context would likely help her find more social connections though she worries about the care she might receive.     Progress on Treatment Objective(s) / Homework:  Satisfactory progress - ACTION (Actively working towards change); Intervened by reinforcing change plan / affirming steps taken    Cognitive-behavioral Therapy    Discuss common cognitive  distortions, identify them in patient's life.    Explore ways to challenge, replace, and act against these cognitions.    Acceptance and Commitment Therapy    Explore and identify important values in patient's life.    Discuss ways to commit to behavioral activation around these values.    Care Plan review completed: No    Medication Review:  No changes to current psychiatric medication(s)    Medication Compliance:  NA    Changes in Health Issues:   None reported    Chemical Use Review:   Substance Use: Chemical use reviewed, no active concerns identified      Tobacco Use: No current tobacco use.      Assessment: Current Emotional / Mental Status (status of significant symptoms):  Risk status (Self / Other harm or suicidal ideation)  Patient has had a history of suicidal ideation: passive thoughts only; easily controlled - denied any recently    Patient denies current fears or concerns for personal safety.  Patient denies current or recent suicidal ideation or behaviors.  Patient denies current or recent homicidal ideation or behaviors.  Patient denies current or recent self injurious behavior or ideation.  Patient denies other safety concerns.     A safety and risk management plan has not been developed at this time, however patient was encouraged to call [place] [phone] should there be a change in any of these risk factors.      [place] Suicide Severity Rating Scale (Short Version)  [place] Suicide Severity Rating (Short Version) 2/18/2021   Over the past 2 weeks have you felt down, depressed, or hopeless? yes   Over the past 2 weeks have you had thoughts of killing yourself? no   Have you ever attempted to kill yourself? no     [place] Suicide Severity Rating Scale (Lifetime/Recent)  [place] Suicide Severity Rating (Lifetime/Recent) 8/13/2021   Wish to be Dead (Lifetime) No   Non-Specific Active Suicidal Thoughts (Lifetime) No   Most Severe Ideation Rating (Lifetime) NA   Frequency (Lifetime) NA    Duration (Lifetime) NA   Controllability (Lifetime) NA   Protective Factors  (Lifetime) NA   Reasons for Ideation (Lifetime) NA   RETIRED: 1. Wish to be Dead (Recent) No   RETIRED: 2. Non-Specific Active Suicidal Thoughts (Recent) No   3. Active Suicidal Ideation with any Methods (Not Plan) Without Intent to Act (Lifetime) No   RETIRED: 3. Active Suicidal Ideation with any Methods (Not Plan) Without Intent to Act (Recent) No   RETIRE: 4. Active Suicidal Ideation with Some Intent to Act, Without Specific Plan (Lifetime) No   4. Active Suicidal Ideation with Some Intent to Act, Without Specific Plan (Recent) No   RETIRE: 5. Active Suicidal Ideation with Specific Plan and Intent (Lifetime) No   RETIRED: 5. Active Suicidal Ideation with Specific Plan and Intent (Recent) No   Most Severe Ideation Rating (Past Month) NA   Frequency (Past Month) NA   Duration (Past Month) NA   Controllability (Past Month) NA   Protective Factors (Past Month) NA   Reasons for Ideation (Past Month) NA   Actual Attempt (Lifetime) No   Actual Attempt (Past 3 Months) No   Has subject engaged in non-suicidal self-injurious behavior? (Lifetime) No   Has subject engaged in non-suicidal self-injurious behavior? (Past 3 Months) No   Interrupted Attempts (Lifetime) No   Interrupted Attempts (Past 3 Months) No   Aborted or Self-Interrupted Attempt (Lifetime) No   Aborted or Self-Interrupted Attempt (Past 3 Months) No   Preparatory Acts or Behavior (Lifetime) No   Preparatory Acts or Behavior (Past 3 Months) No   Most Recent Attempt Actual Lethality Code NA   Most Lethal Attempt Actual Lethality Code NA   Initial/First Attempt Actual Lethality Code NA         Appearance:   Appropriate   Eye Contact:   Good   Psychomotor Behavior: Normal   Attitude:   Cooperative   Orientation:   All  Speech   Rate / Production: Normal    Volume:  Normal   Mood:    Depressed    Affect:    Tearful  Thought Content:  Clear   Thought Form:  Coherent  Logical    Insight:    Good     Diagnoses:  1. Major depressive disorder, recurrent episode, moderate (H)        Collateral Reports Completed:  [facility] will coordinate with care team as needed    Plan: (Homework, other):  Lanie was scheduled to RTC in two weeks for help addressing depressive symptoms, pain, and relationship conflicts. Provided CBT skills to help manage depressive experiences. CD Recommendations: No indications of CD issues.     Héctor Higuera Psy.D, LP February 18, 2022            Answers for HPI/ROS submitted by the patient on 12/15/2020   If you checked off any problems, how difficult have these problems made it for you to do your work, take care of things at home, or get along with other people?: Not difficult at all  PHQ9 TOTAL SCORE: 2  KATHARINE 7 TOTAL SCORE: 1    _________________________________________________________________________  [facility] Integrated Behavioral Health Treatment Plan    Client's Name: Lanie Neil  YOB: 1990    Date: 1/7/2022    DSM-V Diagnoses: 296.31 (F33.0) Major Depressive Disorder, Recurrent Episode, Mild With anxious distress;   Psychosocial / Contextual Factors: SMA     Clinical Global Impressions  First:  Considering your total clinical experience with this particular patient population, how severe are the patient's symptoms at this time?: 4 (9/23/2021  3:29 PM)      Most recent:  Compared to the patient's condition at the START of treatment, this patient's condition is: 3 (9/23/2021  3:29 PM)      Referral / Collaboration:  Will collaborate with care team as indicated during treatment.    Anticipated number of session or this episode of care: 10+      MeasurableTreatment Goal(s) related to diagnosis / functional impairment(s)  Goal 1:  Patient will experience a reduction in depressive and anxious symptoms, along with a corresponding increase in positive emotion and life satisfaction.    Objective #A: Patient will experience a reduction in depressed mood, will  develop more effective coping skills to manage depressive symptoms, will develop healthy cognitive patterns and beliefs, will increase ability to function adaptively and will continue to take medications as prescribed / participate in supportive activities and services    Status: Continued - Date(s): 1/7/2022    Objective #B: Patient will experience a reduction in anxiety, will develop more effective coping skills to manage anxiety symptoms, will develop healthy cognitive patterns and beliefs and will increase ability to function adaptively  Status: Continued - Date(s):  1/7/2022    Objective #C: Patient will develop better understanding of triggers and coping strategies to stabilize mood  Status: Continued - Date(s):  1/7/2022    Goal 2:  Patient will identify and increase engagement in valued activity, i.e. improving social connections/relationships, pursuing occupational goals or personally meaningful pursuits, exploration of meaning in life.     Objective #A: Patient will identify meaningful activity in social, occupational and  personal goals, and increase behavioral activation around these goals   Status: Continued - Date(s):  1/7/2022    Objective #B: Patient will address relationship difficulties in a more adaptive manner  Status: Continued - Date(s):  1/7/2022    Objective #C: Patient will develop coping/problem-solving skills to facilitate more adaptive adjustment and will effectively address problems that interfere with adaptive functioning  Status: Continued - Date(s):  9/23/2021    Goal 3:  Patient will increase understanding of pain medications and develop more adaptive responses to chronic pain.    Objective #A: Patient will increase understanding of the relationship between pain and stress/mood   Status: Continued - Date(s):  1/7/2022    Objective #B: Patient will develop 2-3 additional chronic pain management strategies  Status: Continued - Date(s):  1/7/2022      Possible Therapeutic  Intervention(s)  Psycho-education regarding mental health diagnoses and treatment options    Skills training    Explore skills useful to client in current situation.    Skills include assertiveness, communication, conflict management, problem-solving, relaxation, etc.    Solution-Focused Therapy    Explore patterns in patient's relationships and discuss options for new behaviors.    Explore patterns in patient's actions and choices and discuss options for new behaviors.    Cognitive-behavioral Therapy    Discuss common cognitive distortions, identify them in patient's life.    Explore ways to challenge, replace, and act against these cognitions.    Acceptance and Commitment Therapy    Explore and identify important values in patient's life.    Discuss ways to commit to behavioral activation around these values.    Psychodynamic psychotherapy    Discuss patient's emotional dynamics and issues and how they impact behaviors.    Explore patient's history of relationships and how they impact present behaviors.    Explore how to work with and make changes in these schemas and patterns.    Narrative Therapy    Explore the patient's story of his/her life from his/her perspective.    Explore alternate ways of understanding their experience, identifying exceptions, developing new themes.    Interpersonal Psychotherapy    Explore patterns in relationships that are effective or ineffective at helping patient reach their goals, find satisfying experience.    Discuss new patterns or behaviors to engage in for improved social functioning.    Behavioral Activation    Discuss steps patient can take to become more involved in meaningful activity.    Identify barriers to these activities and explore possible solutions.    Mindfulness-Based Strategies    Discuss skills based on development and application of mindfulness.    Skills drawn from compassion-focused therapy, dialectical behavior therapy, mindfulness-based stress reduction,  mindfulness-based cognitive therapy, etc.      We have developed these goals together during our work to this point. Patient has assisted in the development of these goals and has agreed to this treatment plan.       Héctor Higuera LP  1/7/2022

## 2022-03-03 ENCOUNTER — MYC MEDICAL ADVICE (OUTPATIENT)
Dept: HEMATOLOGY | Facility: CLINIC | Age: 32
End: 2022-03-03
Payer: COMMERCIAL

## 2022-03-03 ENCOUNTER — MYC MEDICAL ADVICE (OUTPATIENT)
Dept: FAMILY MEDICINE | Facility: CLINIC | Age: 32
End: 2022-03-03
Payer: COMMERCIAL

## 2022-03-03 DIAGNOSIS — G12.9 SPINAL MUSCLE ATROPHY (H): ICD-10-CM

## 2022-03-04 ENCOUNTER — VIRTUAL VISIT (OUTPATIENT)
Dept: BEHAVIORAL HEALTH | Facility: CLINIC | Age: 32
End: 2022-03-04
Payer: COMMERCIAL

## 2022-03-04 DIAGNOSIS — F33.1 MAJOR DEPRESSIVE DISORDER, RECURRENT EPISODE, MODERATE (H): Primary | ICD-10-CM

## 2022-03-04 PROCEDURE — 90837 PSYTX W PT 60 MINUTES: CPT | Mod: GT | Performed by: PSYCHOLOGIST

## 2022-03-04 NOTE — PROGRESS NOTES
[facility] Integrated Behavioral Health  March 4, 2022      Behavioral Health Clinician Progress Note    Patient Name: Lanie Neil           Service Type: Video appointment      Service Location:  Video appointment      Session Start Time: 2:06 Session End Time: 3:05      Session Length: 53 - 60      Attendees: Patient    Visit Activities (Refresh list every visit): [facility] Only    Telemedicine Visit: The patient's condition can be safely assessed and treated via synchronous audio and visual telemedicine encounter.      Reason for Telemedicine Visit: Patient has requested telehealth visit and Services only offered telehealth    Originating Site (Patient Location): Patient's home    Distant Site (Provider Location): Provider Remote Setting- Home Office    Consent:  The patient/guardian has verbally consented to: the potential risks and benefits of telemedicine (video visit) versus in person care; bill my insurance or make self-payment for services provided; and responsibility for payment of non-covered services.     Mode of Communication:  Video Conference via [employer]    As the provider I attest to compliance with applicable laws and regulations related to telemedicine.      Diagnostic Assessment Date: 1/19/2021  Treatment Plan Review Date: 6/4/2022  See Flowsheets for today's PHQ-9 and KATHARINE-7 results  Previous PHQ-9:   PHQ-9 SCORE 12/15/2020 3/16/2021 6/30/2021   PHQ-9 Total Score - - -   PHQ-9 Total Score MyChart 2 (Minimal depression) 2 (Minimal depression) 2 (Minimal depression)   PHQ-9 Total Score 2 2 2     Previous KATHARINE-7:   KATHARINE-7 SCORE 12/15/2020 3/16/2021 6/30/2021   Total Score 1 (minimal anxiety) 1 (minimal anxiety) 1 (minimal anxiety)   Total Score 1 1 1       DATA  Extended Session (60+ minutes): No  Interactive Complexity: No  Crisis: No    Treatment Objective(s) Addressed in This Session:  Target Behavior(s): disease management/lifestyle changes      Depressed  Mood: Decrease frequency and intensity of feeling down, depressed, hopeless  Identify negative self-talk and behaviors: challenge core beliefs, myths, and actions  Anxiety: will develop more effective coping skills to manage anxiety symptoms  Relationship Problems: will address relationship difficulties in a more adaptive manner  Psychological distress related to Pain    Current Stressors / Issues:  [facility] [place] met with Lanie today to continue supporting her treatment of depression, adjustment to chronic disease management, and relationship problems. Today our session focused on extending our session topic from her previous session - helping Lanie address the ongoing difficulties she experiences with helplessness and powerlessness in her life that contribute to experiences of depression. She reports some positive and negative events over the last two weeks. Cites that she was able to make a new connection/relationship over these last two weeks with a neighbor. She reports they get together occasionally and watch old [place] movies together, which she says has been very positive for her and helped her mood. She does report ongoing concern however with the possible threat of her brother moving to [place] (he recently moved in in preparation to move, she suspects) and how things will change with her pain medication plan in the next month. She also spoke in detail about the relationship concern she has with her father, how she holds anger/resentment towards him for his actions of the past. Lanie also wishes to consult with other pain clinics about staying with her current pain medication regimen, noting she wants to at least try, but she has received feedback this is unlikely going to work given the push to reduce narcotic use across clinics. Reflected on the sense of fear and helplessness she is experiencing and began to look at ways to cope effectively.     Progress on Treatment Objective(s) / Homework:  Satisfactory  progress - ACTION (Actively working towards change); Intervened by reinforcing change plan / affirming steps taken    Cognitive-behavioral Therapy    Discuss common cognitive distortions, identify them in patient's life.    Explore ways to challenge, replace, and act against these cognitions.    Acceptance and Commitment Therapy    Explore and identify important values in patient's life.    Discuss ways to commit to behavioral activation around these values.    Care Plan review completed: yes    Medication Review:  No changes to current psychiatric medication(s)    Medication Compliance:  NA    Changes in Health Issues:   None reported    Chemical Use Review:   Substance Use: Chemical use reviewed, no active concerns identified      Tobacco Use: No current tobacco use.      Assessment: Current Emotional / Mental Status (status of significant symptoms):  Risk status (Self / Other harm or suicidal ideation)  Patient has had a history of suicidal ideation: passive thoughts only; easily controlled - denied any recently    Patient denies current fears or concerns for personal safety.  Patient denies current or recent suicidal ideation or behaviors.  Patient denies current or recent homicidal ideation or behaviors.  Patient denies current or recent self injurious behavior or ideation.  Patient denies other safety concerns.     A safety and risk management plan has not been developed at this time, however patient was encouraged to call [place] [phone] should there be a change in any of these risk factors.      Potter Suicide Severity Rating Scale (Short Version)  Potter Suicide Severity Rating (Short Version) 2/18/2021   Over the past 2 weeks have you felt down, depressed, or hopeless? yes   Over the past 2 weeks have you had thoughts of killing yourself? no   Have you ever attempted to kill yourself? no     Potter Suicide Severity Rating Scale (Lifetime/Recent)  Potter Suicide Severity Rating (Lifetime/Recent)  8/13/2021   Wish to be Dead (Lifetime) No   Non-Specific Active Suicidal Thoughts (Lifetime) No   Most Severe Ideation Rating (Lifetime) NA   Frequency (Lifetime) NA   Duration (Lifetime) NA   Controllability (Lifetime) NA   Protective Factors  (Lifetime) NA   Reasons for Ideation (Lifetime) NA   RETIRED: 1. Wish to be Dead (Recent) No   RETIRED: 2. Non-Specific Active Suicidal Thoughts (Recent) No   3. Active Suicidal Ideation with any Methods (Not Plan) Without Intent to Act (Lifetime) No   RETIRED: 3. Active Suicidal Ideation with any Methods (Not Plan) Without Intent to Act (Recent) No   RETIRE: 4. Active Suicidal Ideation with Some Intent to Act, Without Specific Plan (Lifetime) No   4. Active Suicidal Ideation with Some Intent to Act, Without Specific Plan (Recent) No   RETIRE: 5. Active Suicidal Ideation with Specific Plan and Intent (Lifetime) No   RETIRED: 5. Active Suicidal Ideation with Specific Plan and Intent (Recent) No   Most Severe Ideation Rating (Past Month) NA   Frequency (Past Month) NA   Duration (Past Month) NA   Controllability (Past Month) NA   Protective Factors (Past Month) NA   Reasons for Ideation (Past Month) NA   Actual Attempt (Lifetime) No   Actual Attempt (Past 3 Months) No   Has subject engaged in non-suicidal self-injurious behavior? (Lifetime) No   Has subject engaged in non-suicidal self-injurious behavior? (Past 3 Months) No   Interrupted Attempts (Lifetime) No   Interrupted Attempts (Past 3 Months) No   Aborted or Self-Interrupted Attempt (Lifetime) No   Aborted or Self-Interrupted Attempt (Past 3 Months) No   Preparatory Acts or Behavior (Lifetime) No   Preparatory Acts or Behavior (Past 3 Months) No   Most Recent Attempt Actual Lethality Code NA   Most Lethal Attempt Actual Lethality Code NA   Initial/First Attempt Actual Lethality Code NA         Appearance:   Appropriate   Eye Contact:   Good   Psychomotor Behavior: Normal   Attitude:   Cooperative    Orientation:   All  Speech   Rate / Production: Normal    Volume:  Normal   Mood:    Depressed    Affect:    Tearful  Thought Content:  Clear   Thought Form:  Coherent  Logical   Insight:    Good     Diagnoses:  1. Major depressive disorder, recurrent episode, moderate (H)        Collateral Reports Completed:  [facility] will coordinate with care team as needed    Plan: (Homework, other):  Lanie was scheduled to RTC in two weeks for help addressing depressive symptoms, pain, and relationship conflicts. Provided CBT skills to help manage depressive experiences. CD Recommendations: No indications of CD issues.     Héctor Higuera Psy.D, LP March 7, 2022  _______________________________________________________________________________                                              Individual Treatment Plan    Patient's Name: Lanie Neil  YOB: 1990    Date of Creation: 3/4/2022  Date Treatment Plan Last Reviewed/Revised: 3/4/2022    DSM5 Diagnoses: 296.32 (F33.1) Major Depressive Disorder, Recurrent Episode, Moderate With anxious distress  Psychosocial / Contextual Factors: Chronic pain, SMA, disabled  PROMIS (reviewed every 90 days):     PROMIS-10    In general, would you say your health is: (P) Excellent    In general, would you say your quality of life is: (P) Good    In general, how would you rate your physical health? (P) Very good    In general, how would you rate your mental health, including your mood and your ability to think? (P) Good    In general, how would you rate your satisfaction with your social activities and relationships? (P) Good    In general, please rate how well you carry out your usual social activities and roles. (This includes activities at home, at work and in your community, and responsibilities as a parent, child, spouse, employee, friend, etc.) (P) Very good    To what extent are you able to carry out your everyday physical activities such as walking, climbing stairs,  carrying groceries, or moving a chair? (P) Not at all    In the past 7 days,  How often have you been bothered by emotional problems such as feeling anxious, depressed or irritable? (P) Rarely  How would you rate your fatigue on average? (P) Moderate  How would you rate your pain on average? (P) 6    PROMIS GLOBAL SCORES    Mental health question re-calculation - no clinical value - (P) 4  Physical health question re-calculation - no clinical value - (P) 3  Pain question re-calculation - no clinical value - (P) 3  Global Mental Health Score - (P) 13  Global Physical Health Score - (P) 11  PROMIS TOTAL - SUBSCORES - (P) 24      [id number] PROMIS HEALTH ORGANIZATION AND PROMIS COOPERATIVE GROUP VERSION 1.1      Referral / Collaboration:  Referral to another professional/service is not indicated at this time..    Anticipated number of session for this episode of care: 7-9  Anticipation frequency of session: Every other week  Anticipated Duration of each session: 38-52 minutes  Treatment plan will be reviewed in 90 days or when goals have been changed.       MeasurableTreatment Goal(s) related to diagnosis / functional impairment(s)  Goal 1: Patient will experience a reduction in depressive symptoms along with a corresponding increase in positive emotion and life satisfaction.      Objective #A (Patient Action)    Patient will Increase interest, engagement, and pleasure in doing things.  Status: Continued - Date(s): 3/4/2022    Intervention(s)  Therapist will help patient identify pleasant and mastery oriented events that elicit positive, relaxed mood.    Objective #B  Patient will Decrease frequency and intensity of feeling down, depressed, hopeless.  Status: Continued - Date(s): 3/4/2022    Intervention(s)  Therapist will introduce patient to cognitive-behavioral and acceptance and commitment therapy topics aimed to help reduce depression and anxiety    Objective #C  Patient will Identify negative self-talk and  "behaviors: challenge core beliefs, myths, and actions  Improve concentration, focus, and mindfulness in daily activities .  Status: Continued - Date(s): 3/4/2022    Intervention(s)  Therapist will help patient identify and manage negative self-talk and automatic thoughts; introduce patient to cognitive distortions; help patient develop cognitive diffusion techniques      Goal 2: Patient will experience a reduction in anxious symptoms, along with a corresponding increase in relaxed emotional states and life satisfaction.      Objective #A (Patient Action)  Patient will use cognitive-behavioral and thought diffusion strategies identified in therapy to challenge anxious thoughts.    Status: Continued - Date(s): 3/4/2022    Intervention(s)  Therapist will utilize CBT and ACT ideas to help patient challenge anxious thoughts and reduce intensity/duration of anxious distress    Objective #B  Patient will use \"worry time\" each day for 15 minutes of scheduled worry and then defer obsessive or anxious thinking until the next structured worry time.    Status: Continued - Date(s): 3/4/2022    Intervention(s)  Therapist will teach patient how to effectively utilize worry time and/or thought logs/journals each day and incorporate more relaxation behaviors into their routine.    Objective #C  Patient will identify the stressors which contribute to feelings of anxiety  Patient will increase engagement in adaptive coping skills and recreational activities, such as exercise and healthy socialization, to manage distress.  Status: Continued - Date(s): 3/4/2022    Intervention(s)  Therapist will help patient identify triggers/situational factors that contribute to anxiety and behavioral skills aimed to manage anxious distress.      Goal 3: Patient will learn more adaptive ways to manage chronic pain      Objective #A (Patient Action)    Status: Continued - Date(s): 3/4/2022    Patient will identify 2-4 strategies for managing " pain.    Intervention(s)  Therapist will teach distraction skills aimed to help manage chronic pain and utilize ACT/CBT ideas to help with this (e.g. relaxation).    Objective #B  Patient will state understanding of stressors and relationship to physical symptoms.    Status: Continued - Date(s): 3/4/2022       Other Possible Therapeutic Intervention(s):    Psycho-education regarding mental health diagnoses and treatment options    Supportive Therapy    Provide affirmations, reflections, and establish working rapport    Emphasize and reflect on strength of therapeutic relationship    Skills training    Explore skills useful to client in current situation.    Skills include assertiveness, communication, conflict management, problem-solving, relaxation, etc.    Solution-Focused Therapy    Explore patterns in patient's relationships and discuss options for new behaviors.    Explore patterns in patient's actions and choices and discuss options for new behaviors.    Cognitive-behavioral Therapy    Discuss common cognitive distortions, identify them in patient's life.    Explore ways to challenge, replace, and act against these cognitions.    Acceptance and Commitment Therapy    Explore and identify important values in patient's life.    Discuss ways to commit to behavioral activation around these values.    Psychodynamic psychotherapy    Discuss patient's emotional dynamics and issues and how they impact behaviors.    Explore patient's history of relationships and how they impact present behaviors.    Explore how to work with and make changes in these schemas and patterns.    Narrative Therapy    Explore the patient's story of his/her life from his/her perspective.    Explore alternate ways of understanding their experience, identifying exceptions, developing new themes.    Interpersonal Psychotherapy    Explore patterns in relationships that are effective or ineffective at helping patient reach their goals, find  satisfying experience.    Discuss new patterns or behaviors to engage in for improved social functioning.    Behavioral Activation    Discuss steps patient can take to become more involved in meaningful activity.    Identify barriers to these activities and explore possible solutions.    Mindfulness-Based Strategies    Discuss skills based on development and application of mindfulness.    Skills drawn from compassion-focused therapy, dialectical behavior therapy, mindfulness-based stress reduction, mindfulness-based cognitive therapy, etc.      Patient has reviewed and agreed to the above plan.      Héctor Higuera Psy.D, LP   Behavioral Health Clinician   -[facility]     March 4, 2022

## 2022-03-04 NOTE — PROGRESS NOTES
[facility]  [address] [place] [address], [address], [place] [address]  Main: [phone], Fax: [phone]  Patient: Lanie Neil  MRN: [id number]  : 1990  Date of this note written: 2022    Diagnosis:  1. History of DVT.  2. Chronic anticoagulation therapy.  3. History of iron deficiency anemia.      Orders for [facility]:  1. Please collect blood specimen for labs: 1) Complete blood count (CBC), 2) Iron level, 3) Iron binding capacity, AND 4) Ferritin  2. Please fax results to [phone] attention: Jt Mir PA-C.      This order is faxed to Kat at [phone]     Thank you.       Jt Mir PA-C, MPAS  Physician Assistant  [facility].

## 2022-03-07 RX ORDER — DIAZEPAM 5 MG
TABLET ORAL
Qty: 30 TABLET | Refills: 5 | Status: SHIPPED | OUTPATIENT
Start: 2022-03-07 | End: 2022-09-07

## 2022-03-07 NOTE — TELEPHONE ENCOUNTER
site reviewed today. Diazepam 5 mg tablet last sold 2/04/22 for #30.    Will send to provider for review.    Jacqueline Corcoran RN ([place])

## 2022-03-10 ENCOUNTER — LAB REQUISITION (OUTPATIENT)
Dept: LAB | Facility: CLINIC | Age: 32
End: 2022-03-10
Payer: COMMERCIAL

## 2022-03-10 ENCOUNTER — TRANSFERRED RECORDS (OUTPATIENT)
Dept: HEALTH INFORMATION MANAGEMENT | Facility: CLINIC | Age: 32
End: 2022-03-10

## 2022-03-10 DIAGNOSIS — G12.9 SPINAL MUSCULAR ATROPHY, UNSPECIFIED (H): ICD-10-CM

## 2022-03-10 LAB
BASOPHILS # BLD AUTO: 0 10E3/UL (ref 0–0.2)
BASOPHILS NFR BLD AUTO: 1 %
EOSINOPHIL # BLD AUTO: 0.2 10E3/UL (ref 0–0.7)
EOSINOPHIL NFR BLD AUTO: 3 %
ERYTHROCYTE [DISTWIDTH] IN BLOOD BY AUTOMATED COUNT: 19.1 % (ref 10–15)
FERRITIN SERPL-MCNC: 179 NG/ML (ref 12–150)
HCT VFR BLD AUTO: 41.1 % (ref 35–47)
HGB BLD-MCNC: 13.4 G/DL (ref 11.7–15.7)
IMM GRANULOCYTES # BLD: 0 10E3/UL
IMM GRANULOCYTES NFR BLD: 0 %
IRON SATN MFR SERPL: 24 % (ref 15–46)
IRON SERPL-MCNC: 68 UG/DL (ref 35–180)
IRON SERPL-MCNC: 68 UG/DL (ref 35–180)
LYMPHOCYTES # BLD AUTO: 2.2 10E3/UL (ref 0.8–5.3)
LYMPHOCYTES NFR BLD AUTO: 34 %
MCH RBC QN AUTO: 29.3 PG (ref 26.5–33)
MCHC RBC AUTO-ENTMCNC: 32.6 G/DL (ref 31.5–36.5)
MCV RBC AUTO: 90 FL (ref 78–100)
MONOCYTES # BLD AUTO: 0.5 10E3/UL (ref 0–1.3)
MONOCYTES NFR BLD AUTO: 7 %
NEUTROPHILS # BLD AUTO: 3.7 10E3/UL (ref 1.6–8.3)
NEUTROPHILS NFR BLD AUTO: 55 %
NRBC # BLD AUTO: 0 10E3/UL
NRBC BLD AUTO-RTO: 0 /100
PLATELET # BLD AUTO: 261 10E3/UL (ref 150–450)
RBC # BLD AUTO: 4.57 10E6/UL (ref 3.8–5.2)
TIBC SERPL-MCNC: 279 UG/DL (ref 240–430)
WBC # BLD AUTO: 6.6 10E3/UL (ref 4–11)

## 2022-03-10 PROCEDURE — 85025 COMPLETE CBC W/AUTO DIFF WBC: CPT | Mod: ORL | Performed by: PHYSICIAN ASSISTANT

## 2022-03-10 PROCEDURE — 83540 ASSAY OF IRON: CPT | Mod: ORL | Performed by: PHYSICIAN ASSISTANT

## 2022-03-10 PROCEDURE — 82728 ASSAY OF FERRITIN: CPT | Mod: ORL | Performed by: PHYSICIAN ASSISTANT

## 2022-03-10 PROCEDURE — 83550 IRON BINDING TEST: CPT | Mod: ORL | Performed by: PHYSICIAN ASSISTANT

## 2022-03-15 ENCOUNTER — VIRTUAL VISIT (OUTPATIENT)
Dept: BEHAVIORAL HEALTH | Facility: CLINIC | Age: 32
End: 2022-03-15
Payer: COMMERCIAL

## 2022-03-15 DIAGNOSIS — F33.1 MAJOR DEPRESSIVE DISORDER, RECURRENT EPISODE, MODERATE (H): Primary | ICD-10-CM

## 2022-03-15 PROCEDURE — 90837 PSYTX W PT 60 MINUTES: CPT | Mod: 95 | Performed by: PSYCHOLOGIST

## 2022-03-15 NOTE — PROGRESS NOTES
[facility] Integrated [facility]al Health  March 15, 2022      Behavioral Health Clinician Progress Note    Patient Name: Lanie Neil           Service Type: Video appointment      Service Location:  Video appointment      Session Start Time: 2:34 Session End Time: 3:34      Session Length: 53 - 60      Attendees: Patient    Visit Activities (Refresh list every visit): [facility] [place] Only    Provider verified identity through the following two step process.  Patient provided:  Patient photo and Patient is known previously to provider    Telemedicine Visit: The patient's condition can be safely assessed and treated via synchronous audio and visual telemedicine encounter.      Reason for Telemedicine Visit: Patient has requested telehealth visit and Services only offered telehealth    Originating Site (Patient Location): Patient's home    Distant Site (Provider Location): Provider Remote Setting- Home Office    Consent:  The patient/guardian has verbally consented to: the potential risks and benefits of telemedicine (video visit) versus in person care; bill my insurance or make self-payment for services provided; and responsibility for payment of non-covered services.     Mode of Communication:  Video Conference via [employer]    As the provider I attest to compliance with applicable laws and regulations related to telemedicine.      Diagnostic Assessment Date: 1/19/2021  Treatment Plan Review Date: 6/4/2022  See Flowsheets for today's PHQ-9 and KATHARINE-7 results  Previous PHQ-9:   PHQ-9 SCORE 12/15/2020 3/16/2021 6/30/2021   PHQ-9 Total Score - - -   PHQ-9 Total Score MyChart 2 (Minimal depression) 2 (Minimal depression) 2 (Minimal depression)   PHQ-9 Total Score 2 2 2     Previous KATHARINE-7:   KATHARINE-7 SCORE 12/15/2020 3/16/2021 6/30/2021   Total Score 1 (minimal anxiety) 1 (minimal anxiety) 1 (minimal anxiety)   Total Score 1 1 1       DATA  Extended Session (60+ minutes): No  Interactive  Complexity: No  Crisis: No    Treatment Objective(s) Addressed in This Session:  Target Behavior(s): disease management/lifestyle changes      Depressed Mood: Decrease frequency and intensity of feeling down, depressed, hopeless  Identify negative self-talk and behaviors: challenge core beliefs, myths, and actions  Anxiety: will develop more effective coping skills to manage anxiety symptoms  Relationship Problems: will address relationship difficulties in a more adaptive manner  Psychological distress related to Pain    Current Stressors / Issues:  [facility] met with Lanie today to continue supporting her treatment of depression, adjustment to chronic disease management, and relationship problems. Today our session focused on extending our session topic from her previous session - helping Lanie address the ongoing difficulties she experiences with helplessness and powerlessness in her life that contribute to experiences of depression. She presents today with dysphoric mood and tearful affect. She reports recently completing an updated appointment with her pain clinic and was given information about her gradual taper off pain medication, eventually down to two pills per day starting this April. Lanie notes her mother believes this is a positive step for her, though she reports feeling invalidated by this in addition to feeling fearful, helpless, and hopeless about her situation. She described a sense of feeling as though no one understands her pain well and her point of view with needing the prescription. Lanie noted at one point in our conversation that she does not see the point in living. This writer inquired about SI to which she denied having a plan, method, or intent to do anything to harm herself, though she mentioned that even if she wanted to kill herself, that she wouldn't be able to. We reflected on how helpless she feels with her situation right now and how thoughts like this can result from strong  emotions/fear.     Explored ideas pertaining to mindfulness and keeping her focus more on the here-and-now. Discussed the relationship between future thinking and catastrophic thought patterns on anxiety and hopelessness. We also discussed the importance of returning to a more scheduled routine each day, to limit the amount of time she spends thinking about her pain medication and its taper. She indicates historically benefiting a lot from having a structured day, so we discussed methods of returning to this for now, to help foster a sense of predictability and control in her daily life.     Progress on Treatment Objective(s) / Homework:  Satisfactory progress - ACTION (Actively working towards change); Intervened by reinforcing change plan / affirming steps taken    Cognitive-behavioral Therapy    Discuss common cognitive distortions, identify them in patient's life.    Explore ways to challenge, replace, and act against these cognitions.    Acceptance and Commitment Therapy    Explore and identify important values in patient's life.    Discuss ways to commit to behavioral activation around these values.    Care Plan review completed: No    Medication Review:  No changes to current psychiatric medication(s)    Medication Compliance:  NA    Changes in Health Issues:   None reported    Chemical Use Review:   Substance Use: Chemical use reviewed, no active concerns identified      Tobacco Use: No current tobacco use.      Assessment: Current Emotional / Mental Status (status of significant symptoms):  Risk status (Self / Other harm or suicidal ideation)  Patient has had a history of suicidal ideation: passive thoughts only; easily controlled - denied any recently    Patient denies current fears or concerns for personal safety.  Patient denies current or recent suicidal ideation or behaviors.  Patient denies current or recent homicidal ideation or behaviors.  Patient denies current or recent self injurious behavior or  ideation.  Patient denies other safety concerns.     A safety and risk management plan has not been developed at this time, however patient was encouraged to call [place] [phone] should there be a change in any of these risk factors.      [place] Suicide Severity Rating Scale (Short Version)  [place] Suicide Severity Rating (Short Version) 2/18/2021   Over the past 2 weeks have you felt down, depressed, or hopeless? yes   Over the past 2 weeks have you had thoughts of killing yourself? no   Have you ever attempted to kill yourself? no     [place] Suicide Severity Rating Scale (Lifetime/Recent)  [place] Suicide Severity Rating (Lifetime/Recent) 8/13/2021   Wish to be Dead (Lifetime) No   Non-Specific Active Suicidal Thoughts (Lifetime) No   Most Severe Ideation Rating (Lifetime) NA   Frequency (Lifetime) NA   Duration (Lifetime) NA   Controllability (Lifetime) NA   Protective Factors  (Lifetime) NA   Reasons for Ideation (Lifetime) NA   RETIRED: 1. Wish to be Dead (Recent) No   RETIRED: 2. Non-Specific Active Suicidal Thoughts (Recent) No   3. Active Suicidal Ideation with any Methods (Not Plan) Without Intent to Act (Lifetime) No   RETIRED: 3. Active Suicidal Ideation with any Methods (Not Plan) Without Intent to Act (Recent) No   RETIRE: 4. Active Suicidal Ideation with Some Intent to Act, Without Specific Plan (Lifetime) No   4. Active Suicidal Ideation with Some Intent to Act, Without Specific Plan (Recent) No   RETIRE: 5. Active Suicidal Ideation with Specific Plan and Intent (Lifetime) No   RETIRED: 5. Active Suicidal Ideation with Specific Plan and Intent (Recent) No   Most Severe Ideation Rating (Past Month) NA   Frequency (Past Month) NA   Duration (Past Month) NA   Controllability (Past Month) NA   Protective Factors (Past Month) NA   Reasons for Ideation (Past Month) NA   Actual Attempt (Lifetime) No   Actual Attempt (Past 3 Months) No   Has subject engaged in non-suicidal self-injurious behavior?  (Lifetime) No   Has subject engaged in non-suicidal self-injurious behavior? (Past 3 Months) No   Interrupted Attempts (Lifetime) No   Interrupted Attempts (Past 3 Months) No   Aborted or Self-Interrupted Attempt (Lifetime) No   Aborted or Self-Interrupted Attempt (Past 3 Months) No   Preparatory Acts or Behavior (Lifetime) No   Preparatory Acts or Behavior (Past 3 Months) No   Most Recent Attempt Actual Lethality Code NA   Most Lethal Attempt Actual Lethality Code NA   Initial/First Attempt Actual Lethality Code NA         Appearance:   Appropriate   Eye Contact:   Good   Psychomotor Behavior: Normal   Attitude:   Cooperative   Orientation:   All  Speech   Rate / Production: Normal    Volume:  Normal   Mood:    Depressed    Affect:    Tearful  Thought Content:  Clear   Thought Form:  Coherent  Logical   Insight:    Good     Diagnoses:  1. Major depressive disorder, recurrent episode, moderate (H)        Collateral Reports Completed:  [facility] will coordinate with care team as needed    Plan: (Homework, other):  Lanie was scheduled to RTC in two weeks for help addressing depressive symptoms, pain, and relationship conflicts. Provided CBT skills to help manage depressive experiences. CD Recommendations: No indications of CD issues.     Héctor Higuera Psy.D, LP March 15, 2022  _______________________________________________________________________________                                              Individual Treatment Plan    Patient's Name: Lanie Neil  YOB: 1990    Date of Creation: 3/4/2022  Date Treatment Plan Last Reviewed/Revised: 3/4/2022    DSM5 Diagnoses: 296.32 (F33.1) Major Depressive Disorder, Recurrent Episode, Moderate With anxious distress  Psychosocial / Contextual Factors: Chronic pain, SMA, disabled  PROMIS (reviewed every 90 days):     PROMIS-10    In general, would you say your health is: (P) Excellent    In general, would you say your quality of life is: (P) Good    In  general, how would you rate your physical health? (P) Very good    In general, how would you rate your mental health, including your mood and your ability to think? (P) Good    In general, how would you rate your satisfaction with your social activities and relationships? (P) Good    In general, please rate how well you carry out your usual social activities and roles. (This includes activities at home, at work and in your community, and responsibilities as a parent, child, spouse, employee, friend, etc.) (P) Very good    To what extent are you able to carry out your everyday physical activities such as walking, climbing stairs, carrying groceries, or moving a chair? (P) Not at all    In the past 7 days,  How often have you been bothered by emotional problems such as feeling anxious, depressed or irritable? (P) Rarely  How would you rate your fatigue on average? (P) Moderate  How would you rate your pain on average? (P) 6    PROMIS GLOBAL SCORES    Mental health question re-calculation - no clinical value - (P) 4  Physical health question re-calculation - no clinical value - (P) 3  Pain question re-calculation - no clinical value - (P) 3  Global Mental Health Score - (P) 13  Global Physical Health Score - (P) 11  PROMIS TOTAL - SUBSCORES - (P) 24      [id number] PROMIS HEALTH ORGANIZATION AND PROMIS COOPERATIVE GROUP VERSION 1.1      Referral / Collaboration:  Referral to another professional/service is not indicated at this time..    Anticipated number of session for this episode of care: 7-9  Anticipation frequency of session: Every other week  Anticipated Duration of each session: 38-52 minutes  Treatment plan will be reviewed in 90 days or when goals have been changed.       MeasurableTreatment Goal(s) related to diagnosis / functional impairment(s)  Goal 1: Patient will experience a reduction in depressive symptoms along with a corresponding increase in positive emotion and life satisfaction.      Objective #A  "(Patient Action)    Patient will Increase interest, engagement, and pleasure in doing things.  Status: Continued - Date(s): 3/4/2022    Intervention(s)  Therapist will help patient identify pleasant and mastery oriented events that elicit positive, relaxed mood.    Objective #B  Patient will Decrease frequency and intensity of feeling down, depressed, hopeless.  Status: Continued - Date(s): 3/4/2022    Intervention(s)  Therapist will introduce patient to cognitive-behavioral and acceptance and commitment therapy topics aimed to help reduce depression and anxiety    Objective #C  Patient will Identify negative self-talk and behaviors: challenge core beliefs, myths, and actions  Improve concentration, focus, and mindfulness in daily activities .  Status: Continued - Date(s): 3/4/2022    Intervention(s)  Therapist will help patient identify and manage negative self-talk and automatic thoughts; introduce patient to cognitive distortions; help patient develop cognitive diffusion techniques      Goal 2: Patient will experience a reduction in anxious symptoms, along with a corresponding increase in relaxed emotional states and life satisfaction.      Objective #A (Patient Action)  Patient will use cognitive-behavioral and thought diffusion strategies identified in therapy to challenge anxious thoughts.    Status: Continued - Date(s): 3/4/2022    Intervention(s)  Therapist will utilize CBT and ACT ideas to help patient challenge anxious thoughts and reduce intensity/duration of anxious distress    Objective #B  Patient will use \"worry time\" each day for 15 minutes of scheduled worry and then defer obsessive or anxious thinking until the next structured worry time.    Status: Continued - Date(s): 3/4/2022    Intervention(s)  Therapist will teach patient how to effectively utilize worry time and/or thought logs/journals each day and incorporate more relaxation behaviors into their routine.    Objective #C  Patient will " identify the stressors which contribute to feelings of anxiety  Patient will increase engagement in adaptive coping skills and recreational activities, such as exercise and healthy socialization, to manage distress.  Status: Continued - Date(s): 3/4/2022    Intervention(s)  Therapist will help patient identify triggers/situational factors that contribute to anxiety and behavioral skills aimed to manage anxious distress.      Goal 3: Patient will learn more adaptive ways to manage chronic pain      Objective #A (Patient Action)    Status: Continued - Date(s): 3/4/2022    Patient will identify 2-4 strategies for managing pain.    Intervention(s)  Therapist will teach distraction skills aimed to help manage chronic pain and utilize ACT/CBT ideas to help with this (e.g. relaxation).    Objective #B  Patient will state understanding of stressors and relationship to physical symptoms.    Status: Continued - Date(s): 3/4/2022       Other Possible Therapeutic Intervention(s):    Psycho-education regarding mental health diagnoses and treatment options    Supportive Therapy    Provide affirmations, reflections, and establish working rapport    Emphasize and reflect on strength of therapeutic relationship    Skills training    Explore skills useful to client in current situation.    Skills include assertiveness, communication, conflict management, problem-solving, relaxation, etc.    Solution-Focused Therapy    Explore patterns in patient's relationships and discuss options for new behaviors.    Explore patterns in patient's actions and choices and discuss options for new behaviors.    Cognitive-behavioral Therapy    Discuss common cognitive distortions, identify them in patient's life.    Explore ways to challenge, replace, and act against these cognitions.    Acceptance and Commitment Therapy    Explore and identify important values in patient's life.    Discuss ways to commit to behavioral activation around these  values.    Psychodynamic psychotherapy    Discuss patient's emotional dynamics and issues and how they impact behaviors.    Explore patient's history of relationships and how they impact present behaviors.    Explore how to work with and make changes in these schemas and patterns.    Narrative Therapy    Explore the patient's story of his/her life from his/her perspective.    Explore alternate ways of understanding their experience, identifying exceptions, developing new themes.    Interpersonal Psychotherapy    Explore patterns in relationships that are effective or ineffective at helping patient reach their goals, find satisfying experience.    Discuss new patterns or behaviors to engage in for improved social functioning.    Behavioral Activation    Discuss steps patient can take to become more involved in meaningful activity.    Identify barriers to these activities and explore possible solutions.    Mindfulness-Based Strategies    Discuss skills based on development and application of mindfulness.    Skills drawn from compassion-focused therapy, dialectical behavior therapy, mindfulness-based stress reduction, mindfulness-based cognitive therapy, etc.      Patient has reviewed and agreed to the above plan.      Héctor Higuera Psy.D, LP   Behavioral Health Clinician   -[facility]     March 4, 2022

## 2022-03-24 ENCOUNTER — NURSE TRIAGE (OUTPATIENT)
Dept: NURSING | Facility: CLINIC | Age: 32
End: 2022-03-24
Payer: COMMERCIAL

## 2022-03-24 ENCOUNTER — MYC MEDICAL ADVICE (OUTPATIENT)
Dept: FAMILY MEDICINE | Facility: CLINIC | Age: 32
End: 2022-03-24
Payer: COMMERCIAL

## 2022-03-24 DIAGNOSIS — N39.0 URINARY TRACT INFECTION: Primary | ICD-10-CM

## 2022-03-24 NOTE — TELEPHONE ENCOUNTER
C/o feeling very cold starting early AM today as well as headache. T 102.0 orally. Has Adrian cath. She changed the Adrian. Clear, yellow urine. Always has lower back pain but it is a little worse today than usual. She took AZO home test for UTI which was positive. Pt and mom very concerned about UTI as pt has become septic before w/ UTI if she waits too long. They request antibiotic.     Home Covid test negative. Denies SOB or other respiratory sx.     Paged on-call for [facility] - Dr. Hedrick @7:12pm by [facility] page . Rec'd call back @7:17pm. Dr Hedrick states due to pt complexity and w/ her fever and back pain she advises pt go to ED tonight for evaluation and tx. Called pt back @ 7:20pm, disc'd 's advice.Pt voiced understanding and agreement. Will go [facility].       Reason for Disposition    Fever > 100.4 F (38.0 C)    Additional Information    Negative: Shock suspected (e.g., cold/pale/clammy skin, too weak to stand, low BP, rapid pulse)    Negative: Sounds like a life-threatening emergency to the triager    Negative: [1] Catheter was accidentally pulled-out AND [2] bright red continuous bleeding    Negative: SEVERE abdominal pain    Protocols used: URINARY CATHETER SYMPTOMS AND [id number]

## 2022-03-25 DIAGNOSIS — N12 PYELONEPHRITIS: ICD-10-CM

## 2022-03-25 RX ORDER — LEVOFLOXACIN 25 MG/ML
750 SOLUTION ORAL DAILY
Qty: 210 ML | Refills: 0 | Status: CANCELLED | OUTPATIENT
Start: 2022-03-25

## 2022-03-26 ENCOUNTER — TELEPHONE (OUTPATIENT)
Dept: NURSING | Facility: CLINIC | Age: 32
End: 2022-03-26

## 2022-03-26 ENCOUNTER — MYC MEDICAL ADVICE (OUTPATIENT)
Dept: FAMILY MEDICINE | Facility: CLINIC | Age: 32
End: 2022-03-26

## 2022-03-26 NOTE — TELEPHONE ENCOUNTER
Telephone call:    Pt called stating she has a fever 100.3, headache, and lower back pain.  Pt declined triage. Pt reports having a fischer cathter in. Pt calling about her e-visit she thought she had scheduled with her primary stating it does not look normal and wanted to talk with scheduling.  Pt transferred to scheduling and  stated she is unable to schedule for [facility] closed for scheduling on the weekend.  Pt and mother declined scheduling a virtual UC visit.  Pt's mother stated she will take pt to [facility] ER like she was advised to do by On call provider on 3-24-22.      Preethi Laughlin RN  03/26/22 9:09 AM  [facility] Nurse Advisor

## 2022-03-28 RX ORDER — LEVOFLOXACIN 500 MG/1
500 TABLET, FILM COATED ORAL DAILY
Qty: 14 TABLET | Refills: 0 | Status: CANCELLED | OUTPATIENT
Start: 2022-03-28

## 2022-03-28 NOTE — TELEPHONE ENCOUNTER
levofloxacin (LEVAQUIN) 25 MG/ML solution      NOT ON MEDICATION LIST    Last Office Visit : 1-  Future Office visit:  3-    Routing refill request to provider for review/approval because:  Drug not active on patient's medication list      Kathleen M Doege RN

## 2022-03-28 NOTE — TELEPHONE ENCOUNTER
I don't see lab  Can you call and ask where done, get copy, as if UTI it'd depend on sensitivities what we'd give

## 2022-03-28 NOTE — CONFIDENTIAL NOTE
Spoke to Lanie's mom via telephone regarding recent mychart message. She reports that Lanie did a home test that showed bacteria in the urine. She's had headaches, and a low grade fever  degrees.     They went to the hospital today due to a sharp pain in the right side that did not get better over the weekend.  CAT scan didn't see anything.  [facility]. [facility] requests an appointment with Dr. GARCIA and was able to get them scheduled for a visit tomorrow.  Ani Núñez RN

## 2022-03-29 ENCOUNTER — TELEPHONE (OUTPATIENT)
Dept: FAMILY MEDICINE | Facility: CLINIC | Age: 32
End: 2022-03-29

## 2022-03-29 ENCOUNTER — VIRTUAL VISIT (OUTPATIENT)
Dept: FAMILY MEDICINE | Facility: CLINIC | Age: 32
End: 2022-03-29
Payer: COMMERCIAL

## 2022-03-29 DIAGNOSIS — G47.00 INSOMNIA, UNSPECIFIED TYPE: ICD-10-CM

## 2022-03-29 DIAGNOSIS — N39.0 URINARY TRACT INFECTION WITHOUT HEMATURIA, SITE UNSPECIFIED: Primary | ICD-10-CM

## 2022-03-29 PROCEDURE — 99214 OFFICE O/P EST MOD 30 MIN: CPT | Mod: 95 | Performed by: FAMILY MEDICINE

## 2022-03-29 RX ORDER — LEVOFLOXACIN 25 MG/ML
SOLUTION ORAL
Qty: 140 ML | Refills: 0 | Status: SHIPPED | OUTPATIENT
Start: 2022-03-29 | End: 2022-08-12

## 2022-03-29 NOTE — TELEPHONE ENCOUNTER
[facility] Call Center    Phone Message    May a detailed message be left on voicemail: yes     Reason for Call: Other: Patient had virtual visit with Dr. Moseley this morning about recent hospitalization. Dr. Moseley was having difficulty getting access to these medical records. Patient's mother talked to a nurse and was told that Dr. Moseley can call the nurse and she will give him the code that will enable him to access the medical information.       Nurse PH#: [phone]    Action Taken: Message routed to:  [facility]    Travel Screening: Not Applicable

## 2022-03-29 NOTE — PROGRESS NOTES
Lanie is a 31 year old who is being evaluated via a billable video visit.      How would you like to obtain your AVS? MyChart  If the video visit is dropped, the invitation should be resent by: in Epic  Will anyone else be joining your video visit? No    Video Start Time: 9:45 am    Assessment & Plan     Urinary tract infection without hematuria, site unspecified  Use prn if sx recur  - levofloxacin (LEVAQUIN) 25 MG/ML solution; Take 20 ml by feeding tube once/day for one week    Insomnia, unspecified type  Try. Has tried benadryl w/o help. On xanax already.  - traZODone (DESYREL) 5 mg/ml SUSP; Take 10 mLs (50 mg) by mouth At Bedtime Prn insomnia      31 minutes spent on the date of the encounter doing chart review, history and exam, documentation and further activities per the note    She will need summer preop so will set up  Jimmy Moseley MD  [facility]   Lanie is a 31 year old who presents for the following health issues here w/ mom and Ojhn the kitten.    HPI   Last week, five days ago, became very chilled, low back ache that was unusual, no n/v, very fatigued, negative home covid test, went to [facility], in ER 12 hrs then home, labs/abd ct done, found to have UTI home on Levaquin, still on, better. Unusual for her.     We tried (she gave verbal permission) for some reason no [facility] recent records in Care , I've asked staff to check and get paper records if needed as ideally we could see if UTI confirmed as source and if so final UC report.    She is at risk for UTI given SMA changes    Also, insomnia she thinks triggered by pain clinic narc reduction, other meds rvwd, she is on a vent 24/7    Past Medical History:   Diagnosis Date     Anxiety      Snoring      Spinal muscle atrophy (H)      Past Surgical History:   Procedure Laterality Date     ENT SURGERY      tubes     GASTROSTOMY TUBE       KIDNEY SURGERY       SPINE SURGERY       TRACHEOSTOMY        Current Outpatient Medications   Medication     levofloxacin (LEVAQUIN) 25 MG/ML solution     traZODone (DESYREL) 5 mg/ml SUSP     ACETAMINOPHEN PO     albuterol (PROAIR HFA) 108 (90 Base) MCG/ACT inhaler     albuterol (PROVENTIL) (2.5 MG/3ML) 0.083% neb solution     ALPRAZolam (XANAX) 0.25 MG tablet     aminocaproic acid (AMICAR) 0.25 GM/ML solution     bisacodyl (DULCOLAX) 10 MG suppository     celecoxib (CELEBREX) 200 MG capsule     clindamycin (CLEOCIN T) 1 % lotion     diazepam (VALIUM) 5 MG tablet     doxycycline hyclate (VIBRAMYCIN) 100 MG capsule     fluocinolone acetonide (DERMA SMOOTHE/FS BODY) 0.01 % external oil     fluticasone (FLOVENT HFA) 110 MCG/ACT inhaler     gabapentin (NEURONTIN) 250 MG/5ML solution     GENERLAC 10 GM/15ML solution     guaiFENesin (ORGANIDIN) 200 MG TABS tablet     HYDROmorphone (DILAUDID) 4 MG tablet     hydrOXYzine (ATARAX) 25 MG tablet     ketoconazole (NIZORAL) 2 % external shampoo     LANsoprazole (PREVACID) 30 MG DR capsule     Magnesium Hydroxide (MILK OF MAGNESIA PO)     metroNIDAZOLE (METROCREAM) 0.75 % external cream     mometasone (NASONEX) 50 MCG/ACT nasal spray     morphine (SABINE) 30 MG 24 hr capsule     mupirocin (BACTROBAN) 2 % external cream     naloxone (NARCAN) 4 MG/0.1ML nasal spray     Nutritional Supplements (REPLETE FIBER) LIQD     omeprazole (PRILOSEC) 2 mg/mL suspension     ondansetron (ZOFRAN-ODT) 4 MG ODT tab     order for DME     order for DME     polyethylene glycol (MIRALAX) 17 GM/Dose powder     potassium chloride (KAYCIEL) 20 MEQ/15ML (10%) solution     prochlorperazine (COMPAZINE) 5 MG tablet     rivaroxaban ANTICOAGULANT (XARELTO ANTICOAGULANT) 10 MG TABS tablet     Salicylic Acid (OXY BALANCE FACIAL CLEAN WASH EX)     Sennosides (SENNA) 8.8 MG/5ML SYRP     sertraline (ZOLOFT) 20 MG/ML (HIGH CONC) solution     silver sulfADIAZINE (SILVADENE) 1 % external cream     simethicone (MYLICON) 66.7 mg/ml     simethicone 40 MG/0.6ML LIQD     sodium  chloride (OCEAN) 0.65 % nasal spray     tretinoin (RETIN-A) 0.025 % external cream     No current facility-administered medications for this visit.     No Known Allergies    Review of Systems         Objective           Vitals:  No vitals were obtained today due to virtual visit.    Physical Exam   GENERAL: Healthy, alert and no distress  EYES: Eyes grossly normal to inspection.  No discharge or erythema, or obvious scleral/conjunctival abnormalities.  RESP: No audible wheeze, cough, or visible cyanosis.  No visible retractions or increased work of breathing.    SKIN: Visible skin clear. No significant rash, abnormal pigmentation or lesions.  NEURO: Cranial nerves grossly intact.  Mentation and speech appropriate for age.  PSYCH: Mentation appears normal, affect normal/bright, judgement and insight intact, normal speech and appearance well-groomed.            Video-Visit Details    Type of service:  Video Visit    Video End Time:10:08    Originating Location (pt. Location): Home    Distant Location (provider location):  [facility] [place]     Platform used for Video Visit: [employer]

## 2022-03-29 NOTE — TELEPHONE ENCOUNTER
Resent the Rx with the comment of substitute for different concentration at equivalent dose.

## 2022-03-29 NOTE — TELEPHONE ENCOUNTER
Daniel,    We sent the Rx of trazodone 5 mg/ml, but [facility] say they only have 10 mg/ml or 20 mg/ml concentrate.  I could not find one of those in Epic. Do you have any idea how I can send one of these Rx ?    Thank you.    Adonis-Mi

## 2022-03-29 NOTE — TELEPHONE ENCOUNTER
[facility] Call Center    Phone Message    May a detailed message be left on voicemail: yes     Reason for Call: Medication Question or concern regarding medication   Prescription Clarification  Name of Medication: Trazadone suspension  Prescribing Provider: Pradip   Pharmacy:      [id number] - [place], [place] - [address]       What on the order needs clarification? Pharmacy would like clarification on the medication.           Action Taken: Message routed to:  [facility]    Travel Screening: Not Applicable

## 2022-03-29 NOTE — TELEPHONE ENCOUNTER
Duplicate. Levaquin prescription sent to the pharmacy today.    Jacqueline Boudreaux) BELEN Corcoran

## 2022-03-30 DIAGNOSIS — G12.9 SPINAL MUSCLE ATROPHY (H): ICD-10-CM

## 2022-03-30 RX ORDER — ALPRAZOLAM 0.25 MG
0.25 TABLET ORAL 3 TIMES DAILY PRN
Qty: 60 TABLET | Refills: 5 | Status: SHIPPED | OUTPATIENT
Start: 2022-03-30 | End: 2022-10-07

## 2022-04-08 ENCOUNTER — TRANSFERRED RECORDS (OUTPATIENT)
Dept: HEALTH INFORMATION MANAGEMENT | Facility: CLINIC | Age: 32
End: 2022-04-08
Payer: COMMERCIAL

## 2022-04-14 ENCOUNTER — VIRTUAL VISIT (OUTPATIENT)
Dept: BEHAVIORAL HEALTH | Facility: CLINIC | Age: 32
End: 2022-04-14
Payer: COMMERCIAL

## 2022-04-14 DIAGNOSIS — F33.1 MAJOR DEPRESSIVE DISORDER, RECURRENT EPISODE, MODERATE (H): Primary | ICD-10-CM

## 2022-04-14 PROCEDURE — 90837 PSYTX W PT 60 MINUTES: CPT | Mod: GT | Performed by: PSYCHOLOGIST

## 2022-04-14 NOTE — PROGRESS NOTES
[facility] Integrated [facility]al [facility]  April 14, 2022      Behavioral Health Clinician Progress Note    Patient Name: Lanie Neil           Service Type: Video appointment      Service Location:  Video appointment      Session Start Time: 11:12  Session End Time: 12:06      Session Length: 53 - 60      Attendees: Patient    Visit Activities (Refresh list every visit): [facility] Only    Provider verified identity through the following two step process.  Patient provided:  Patient photo and Patient is known previously to provider    Telemedicine Visit: The patient's condition can be safely assessed and treated via synchronous audio and visual telemedicine encounter.      Reason for Telemedicine Visit: Patient has requested telehealth visit and Services only offered telehealth    Originating Site (Patient Location): Patient's home    Distant Site (Provider Location): Provider Remote Setting- Home Office    Consent:  The patient/guardian has verbally consented to: the potential risks and benefits of telemedicine (video visit) versus in person care; bill my insurance or make self-payment for services provided; and responsibility for payment of non-covered services.     Mode of Communication:  Video Conference via [employer]    As the provider I attest to compliance with applicable laws and regulations related to telemedicine.      Diagnostic Assessment Date: 1/19/2021  Treatment Plan Review Date: 6/4/2022  See Flowsheets for today's PHQ-9 and KATHARINE-7 results  Previous PHQ-9:   PHQ-9 SCORE 12/15/2020 3/16/2021 6/30/2021   PHQ-9 Total Score - - -   PHQ-9 Total Score MyChart 2 (Minimal depression) 2 (Minimal depression) 2 (Minimal depression)   PHQ-9 Total Score 2 2 2     Previous KATHARINE-7:   KATHARINE-7 SCORE 12/15/2020 3/16/2021 6/30/2021   Total Score 1 (minimal anxiety) 1 (minimal anxiety) 1 (minimal anxiety)   Total Score 1 1 1       DATA  Extended Session (60+ minutes): No  Interactive  "Complexity: No  Crisis: No    Treatment Objective(s) Addressed in This Session:  Target Behavior(s): disease management/lifestyle changes      Depressed Mood: Decrease frequency and intensity of feeling down, depressed, hopeless  Identify negative self-talk and behaviors: challenge core beliefs, myths, and actions  Anxiety: will develop more effective coping skills to manage anxiety symptoms  Relationship Problems: will address relationship difficulties in a more adaptive manner  Psychological distress related to Pain    Current Stressors / Issues:  [facility] met with Lanie today to continue supporting her treatment of depression, adjustment to chronic disease management, and relationship problems. Today our session focused on extending our session topic from her previous session - helping Lanie address the ongoing difficulties she experiences with helplessness and powerlessness in her life that contribute to experiences of depression. She begins our session today with an overview of recent events, why she had to cancel our last session due to being in the hospital with a UTI. She reports trouble with sleep as a result and suspects this is adversely affecting her mood.     She discussed the sense of invalidation and dismissal she experiences from doctors and others in her life about not believing her when she expresses concern about pain and her medications. She reports her mother in particular can tend to label her as an addict and this bothers her quite a bit. She reports not being believed by others for having pain is a significant factor in her depression. Discussed how her pain is real and there are factors that can exacerbate it, such as long-term use of opioids, mood difficulties, and relationship conflict. Discussed how possibly setting \"ground rules\" or boundaries with others about her preferences, such as not being labeled as an addict, would benefit her. We ended our session with discussing and processing " her fear that I don't take her pain seriously, that I am paid to understand her and therefore don't really believe or care about her. Processed this fear together and identified what contributed to her thoughts about this. Insight oriented and solution-focused therapies were used today.     Progress on Treatment Objective(s) / Homework:  Satisfactory progress - ACTION (Actively working towards change); Intervened by reinforcing change plan / affirming steps taken    Cognitive-behavioral Therapy    Discuss common cognitive distortions, identify them in patient's life.    Explore ways to challenge, replace, and act against these cognitions.    Acceptance and Commitment Therapy    Explore and identify important values in patient's life.    Discuss ways to commit to behavioral activation around these values.    Care Plan review completed: No    Medication Review:  No changes to current psychiatric medication(s)    Medication Compliance:  NA    Changes in Health Issues:   None reported    Chemical Use Review:   Substance Use: Chemical use reviewed, no active concerns identified      Tobacco Use: No current tobacco use.      Assessment: Current Emotional / Mental Status (status of significant symptoms):  Risk status (Self / Other harm or suicidal ideation)  Patient has had a history of suicidal ideation: passive thoughts only; easily controlled - denied any recently    Patient denies current fears or concerns for personal safety.  Patient denies current or recent suicidal ideation or behaviors.  Patient denies current or recent homicidal ideation or behaviors.  Patient denies current or recent self injurious behavior or ideation.  Patient denies other safety concerns.     A safety and risk management plan has not been developed at this time, however patient was encouraged to call [facility] should there be a change in any of these risk factors.      [place] Suicide Severity Rating Scale (Short Version)  [place]  Suicide Severity Rating (Short Version) 2/18/2021   Over the past 2 weeks have you felt down, depressed, or hopeless? yes   Over the past 2 weeks have you had thoughts of killing yourself? no   Have you ever attempted to kill yourself? no     [place] Suicide Severity Rating Scale (Lifetime/Recent)  [place] Suicide Severity Rating (Lifetime/Recent) 8/13/2021   Wish to be Dead (Lifetime) No   Non-Specific Active Suicidal Thoughts (Lifetime) No   Most Severe Ideation Rating (Lifetime) NA   Frequency (Lifetime) NA   Duration (Lifetime) NA   Controllability (Lifetime) NA   Protective Factors  (Lifetime) NA   Reasons for Ideation (Lifetime) NA   RETIRED: 1. Wish to be Dead (Recent) No   RETIRED: 2. Non-Specific Active Suicidal Thoughts (Recent) No   3. Active Suicidal Ideation with any Methods (Not Plan) Without Intent to Act (Lifetime) No   RETIRED: 3. Active Suicidal Ideation with any Methods (Not Plan) Without Intent to Act (Recent) No   RETIRE: 4. Active Suicidal Ideation with Some Intent to Act, Without Specific Plan (Lifetime) No   4. Active Suicidal Ideation with Some Intent to Act, Without Specific Plan (Recent) No   RETIRE: 5. Active Suicidal Ideation with Specific Plan and Intent (Lifetime) No   RETIRED: 5. Active Suicidal Ideation with Specific Plan and Intent (Recent) No   Most Severe Ideation Rating (Past Month) NA   Frequency (Past Month) NA   Duration (Past Month) NA   Controllability (Past Month) NA   Protective Factors (Past Month) NA   Reasons for Ideation (Past Month) NA   Actual Attempt (Lifetime) No   Actual Attempt (Past 3 Months) No   Has subject engaged in non-suicidal self-injurious behavior? (Lifetime) No   Has subject engaged in non-suicidal self-injurious behavior? (Past 3 Months) No   Interrupted Attempts (Lifetime) No   Interrupted Attempts (Past 3 Months) No   Aborted or Self-Interrupted Attempt (Lifetime) No   Aborted or Self-Interrupted Attempt (Past 3 Months) No   Preparatory Acts or  Behavior (Lifetime) No   Preparatory Acts or Behavior (Past 3 Months) No   Most Recent Attempt Actual Lethality Code NA   Most Lethal Attempt Actual Lethality Code NA   Initial/First Attempt Actual Lethality Code NA         Appearance:   Appropriate   Eye Contact:   Good   Psychomotor Behavior: Normal   Attitude:   Cooperative   Orientation:   All  Speech   Rate / Production: Normal    Volume:  Normal   Mood:    Depressed    Affect:    Tearful  Thought Content:  Clear   Thought Form:  Coherent  Logical   Insight:    Good     Diagnoses:  1. Major depressive disorder, recurrent episode, moderate (H)        Collateral Reports Completed:  [facility] will coordinate with care team as needed    Plan: (Homework, other):  Lanie was scheduled to RTC in two weeks for help addressing depressive symptoms, pain, and relationship conflicts. Provided CBT skills to help manage depressive experiences. CD Recommendations: No indications of CD issues.     Héctor Higuera Psy.D, LP April 14, 2022  _______________________________________________________________________________                                              Individual Treatment Plan    Patient's Name: Lanie Neil  YOB: 1990    Date of Creation: 3/4/2022  Date Treatment Plan Last Reviewed/Revised: 3/4/2022    DSM5 Diagnoses: 296.32 (F33.1) Major Depressive Disorder, Recurrent Episode, Moderate With anxious distress  Psychosocial / Contextual Factors: Chronic pain, SMA, disabled  PROMIS (reviewed every 90 days):     PROMIS-10    In general, would you say your health is: (P) Excellent    In general, would you say your quality of life is: (P) Good    In general, how would you rate your physical health? (P) Very good    In general, how would you rate your mental health, including your mood and your ability to think? (P) Good    In general, how would you rate your satisfaction with your social activities and relationships? (P) Good    In general, please rate how  well you carry out your usual social activities and roles. (This includes activities at home, at work and in your community, and responsibilities as a parent, child, spouse, employee, friend, etc.) (P) Very good    To what extent are you able to carry out your everyday physical activities such as walking, climbing stairs, carrying groceries, or moving a chair? (P) Not at all    In the past 7 days,  How often have you been bothered by emotional problems such as feeling anxious, depressed or irritable? (P) Rarely  How would you rate your fatigue on average? (P) Moderate  How would you rate your pain on average? (P) 6    PROMIS GLOBAL SCORES    Mental health question re-calculation - no clinical value - (P) 4  Physical health question re-calculation - no clinical value - (P) 3  Pain question re-calculation - no clinical value - (P) 3  Global Mental Health Score - (P) 13  Global Physical Health Score - (P) 11  PROMIS TOTAL - SUBSCORES - (P) 24      [id number] PROMIS HEALTH ORGANIZATION AND PROMIS COOPERATIVE GROUP VERSION 1.1      Referral / Collaboration:  Referral to another professional/service is not indicated at this time..    Anticipated number of session for this episode of care: 7-9  Anticipation frequency of session: Every other week  Anticipated Duration of each session: 38-52 minutes  Treatment plan will be reviewed in 90 days or when goals have been changed.       MeasurableTreatment Goal(s) related to diagnosis / functional impairment(s)  Goal 1: Patient will experience a reduction in depressive symptoms along with a corresponding increase in positive emotion and life satisfaction.      Objective #A (Patient Action)    Patient will Increase interest, engagement, and pleasure in doing things.  Status: Continued - Date(s): 3/4/2022    Intervention(s)  Therapist will help patient identify pleasant and mastery oriented events that elicit positive, relaxed mood.    Objective #B  Patient will Decrease frequency  "and intensity of feeling down, depressed, hopeless.  Status: Continued - Date(s): 3/4/2022    Intervention(s)  Therapist will introduce patient to cognitive-behavioral and acceptance and commitment therapy topics aimed to help reduce depression and anxiety    Objective #C  Patient will Identify negative self-talk and behaviors: challenge core beliefs, myths, and actions  Improve concentration, focus, and mindfulness in daily activities .  Status: Continued - Date(s): 3/4/2022    Intervention(s)  Therapist will help patient identify and manage negative self-talk and automatic thoughts; introduce patient to cognitive distortions; help patient develop cognitive diffusion techniques      Goal 2: Patient will experience a reduction in anxious symptoms, along with a corresponding increase in relaxed emotional states and life satisfaction.      Objective #A (Patient Action)  Patient will use cognitive-behavioral and thought diffusion strategies identified in therapy to challenge anxious thoughts.    Status: Continued - Date(s): 3/4/2022    Intervention(s)  Therapist will utilize CBT and ACT ideas to help patient challenge anxious thoughts and reduce intensity/duration of anxious distress    Objective #B  Patient will use \"worry time\" each day for 15 minutes of scheduled worry and then defer obsessive or anxious thinking until the next structured worry time.    Status: Continued - Date(s): 3/4/2022    Intervention(s)  Therapist will teach patient how to effectively utilize worry time and/or thought logs/journals each day and incorporate more relaxation behaviors into their routine.    Objective #C  Patient will identify the stressors which contribute to feelings of anxiety  Patient will increase engagement in adaptive coping skills and recreational activities, such as exercise and healthy socialization, to manage distress.  Status: Continued - Date(s): 3/4/2022    Intervention(s)  Therapist will help patient identify " triggers/situational factors that contribute to anxiety and behavioral skills aimed to manage anxious distress.      Goal 3: Patient will learn more adaptive ways to manage chronic pain      Objective #A (Patient Action)    Status: Continued - Date(s): 3/4/2022    Patient will identify 2-4 strategies for managing pain.    Intervention(s)  Therapist will teach distraction skills aimed to help manage chronic pain and utilize ACT/CBT ideas to help with this (e.g. relaxation).    Objective #B  Patient will state understanding of stressors and relationship to physical symptoms.    Status: Continued - Date(s): 3/4/2022       Other Possible Therapeutic Intervention(s):    Psycho-education regarding mental health diagnoses and treatment options    Supportive Therapy    Provide affirmations, reflections, and establish working rapport    Emphasize and reflect on strength of therapeutic relationship    Skills training    Explore skills useful to client in current situation.    Skills include assertiveness, communication, conflict management, problem-solving, relaxation, etc.    Solution-Focused Therapy    Explore patterns in patient's relationships and discuss options for new behaviors.    Explore patterns in patient's actions and choices and discuss options for new behaviors.    Cognitive-behavioral Therapy    Discuss common cognitive distortions, identify them in patient's life.    Explore ways to challenge, replace, and act against these cognitions.    Acceptance and Commitment Therapy    Explore and identify important values in patient's life.    Discuss ways to commit to behavioral activation around these values.    Psychodynamic psychotherapy    Discuss patient's emotional dynamics and issues and how they impact behaviors.    Explore patient's history of relationships and how they impact present behaviors.    Explore how to work with and make changes in these schemas and patterns.    Narrative Therapy    Explore the  patient's story of his/her life from his/her perspective.    Explore alternate ways of understanding their experience, identifying exceptions, developing new themes.    Interpersonal Psychotherapy    Explore patterns in relationships that are effective or ineffective at helping patient reach their goals, find satisfying experience.    Discuss new patterns or behaviors to engage in for improved social functioning.    Behavioral Activation    Discuss steps patient can take to become more involved in meaningful activity.    Identify barriers to these activities and explore possible solutions.    Mindfulness-Based Strategies    Discuss skills based on development and application of mindfulness.    Skills drawn from compassion-focused therapy, dialectical behavior therapy, mindfulness-based stress reduction, mindfulness-based cognitive therapy, etc.      Patient has reviewed and agreed to the above plan.      Héctor Higuera Psy.D, LP   Behavioral Health Clinician   -[facility]     March 4, 2022

## 2022-04-27 ENCOUNTER — VIRTUAL VISIT (OUTPATIENT)
Dept: BEHAVIORAL HEALTH | Facility: CLINIC | Age: 32
End: 2022-04-27
Payer: COMMERCIAL

## 2022-04-27 DIAGNOSIS — F33.1 MAJOR DEPRESSIVE DISORDER, RECURRENT EPISODE, MODERATE (H): Primary | ICD-10-CM

## 2022-04-27 PROCEDURE — 90837 PSYTX W PT 60 MINUTES: CPT | Mod: GT | Performed by: PSYCHOLOGIST

## 2022-04-27 NOTE — PROGRESS NOTES
[facility]: Integrated Behavioral Health  April 27, 2022      Behavioral Health Clinician Progress Note    Patient Name: Lanie Neil           Service Type: Video appointment      Service Location:  Video appointment      Session Start Time: 3:35  Session End Time: 4:30      Session Length: 53 - 60      Attendees: Patient    Visit Activities (Refresh list every visit): [facility] Only    Provider verified identity through the following two step process.  Patient provided:  Patient photo and Patient is known previously to provider    Telemedicine Visit: The patient's condition can be safely assessed and treated via synchronous audio and visual telemedicine encounter.      Reason for Telemedicine Visit: Patient has requested telehealth visit and Services only offered telehealth    Originating Site (Patient Location): Patient's home    Distant Site (Provider Location): Provider Remote Setting- Home Office    Consent:  The patient/guardian has verbally consented to: the potential risks and benefits of telemedicine (video visit) versus in person care; bill my insurance or make self-payment for services provided; and responsibility for payment of non-covered services.     Mode of Communication:  Video Conference via [employer]    As the provider I attest to compliance with applicable laws and regulations related to telemedicine.      Diagnostic Assessment Date: 1/19/2021  Treatment Plan Review Date: 6/4/2022  See Flowsheets for today's PHQ-9 and KATHARINE-7 results  Previous PHQ-9:   PHQ-9 SCORE 12/15/2020 3/16/2021 6/30/2021   PHQ-9 Total Score - - -   PHQ-9 Total Score MyChart 2 (Minimal depression) 2 (Minimal depression) 2 (Minimal depression)   PHQ-9 Total Score 2 2 2     Previous KATHARINE-7:   KATHARINE-7 SCORE 12/15/2020 3/16/2021 6/30/2021   Total Score 1 (minimal anxiety) 1 (minimal anxiety) 1 (minimal anxiety)   Total Score 1 1 1       DATA  Extended Session (60+ minutes): No  Interactive  Complexity: No  Crisis: No    Treatment Objective(s) Addressed in This Session:  Target Behavior(s): disease management/lifestyle changes      Depressed Mood: Decrease frequency and intensity of feeling down, depressed, hopeless  Identify negative self-talk and behaviors: challenge core beliefs, myths, and actions  Anxiety: will develop more effective coping skills to manage anxiety symptoms  Psychological distress related to Pain    Current Stressors / Issues:  [facility] [place] met with Lanie today to continue supporting her treatment of depression, adjustment to chronic disease management, and relationship problems. Today our session focused on extending our session topic from her previous session - helping Lanie address the ongoing difficulties she experiences with helplessness and powerlessness in her life that contribute to experiences of depression. Session focused primarily on helping Lanie adjust to the circumstances behind the gradual reduction in her pain medication. We also spent time processing her observed tendency to reach out to myself and her care team in moments of acute distress and we explored what happens for her during these moments, what themes/experiences drive her to reach out for support. Explored/processed observed themes of loneliness and fear in particular. Spent time exploring other adaptive responses to acute distress and emotion regulation ideas including how to make use of positive self-talk and reminding herself that distress is temporary, that it will pass.     Progress on Treatment Objective(s) / Homework:  Satisfactory progress - ACTION (Actively working towards change); Intervened by reinforcing change plan / affirming steps taken    Cognitive-behavioral Therapy    Discuss common cognitive distortions, identify them in patient's life.    Explore ways to challenge, replace, and act against these cognitions.    Acceptance and Commitment Therapy    Explore and identify important values in  patient's life.    Discuss ways to commit to behavioral activation around these values.    Care Plan review completed: No    Medication Review:  No changes to current psychiatric medication(s)    Medication Compliance:  NA    Changes in Health Issues:   None reported    Chemical Use Review:   Substance Use: Chemical use reviewed, no active concerns identified      Tobacco Use: No current tobacco use.      Assessment: Current Emotional / Mental Status (status of significant symptoms):  Risk status (Self / Other harm or suicidal ideation)  Patient has had a history of suicidal ideation: passive thoughts only; easily controlled - denied any recently    Patient denies current fears or concerns for personal safety.  Patient denies current or recent suicidal ideation or behaviors.  Patient denies current or recent homicidal ideation or behaviors.  Patient denies current or recent self injurious behavior or ideation.  Patient denies other safety concerns.     A safety and risk management plan has not been developed at this time, however patient was encouraged to call [place] [phone] should there be a change in any of these risk factors.      [place] Suicide Severity Rating Scale (Short Version)  [place] Suicide Severity Rating (Short Version) 2/18/2021   Over the past 2 weeks have you felt down, depressed, or hopeless? yes   Over the past 2 weeks have you had thoughts of killing yourself? no   Have you ever attempted to kill yourself? no     [place] Suicide Severity Rating Scale (Lifetime/Recent)  [place] Suicide Severity Rating (Lifetime/Recent) 8/13/2021   Wish to be Dead (Lifetime) No   Non-Specific Active Suicidal Thoughts (Lifetime) No   Most Severe Ideation Rating (Lifetime) NA   Frequency (Lifetime) NA   Duration (Lifetime) NA   Controllability (Lifetime) NA   Protective Factors  (Lifetime) NA   Reasons for Ideation (Lifetime) NA   RETIRED: 1. Wish to be Dead (Recent) No   RETIRED: 2. Non-Specific Active  Suicidal Thoughts (Recent) No   3. Active Suicidal Ideation with any Methods (Not Plan) Without Intent to Act (Lifetime) No   RETIRED: 3. Active Suicidal Ideation with any Methods (Not Plan) Without Intent to Act (Recent) No   RETIRE: 4. Active Suicidal Ideation with Some Intent to Act, Without Specific Plan (Lifetime) No   4. Active Suicidal Ideation with Some Intent to Act, Without Specific Plan (Recent) No   RETIRE: 5. Active Suicidal Ideation with Specific Plan and Intent (Lifetime) No   RETIRED: 5. Active Suicidal Ideation with Specific Plan and Intent (Recent) No   Most Severe Ideation Rating (Past Month) NA   Frequency (Past Month) NA   Duration (Past Month) NA   Controllability (Past Month) NA   Protective Factors (Past Month) NA   Reasons for Ideation (Past Month) NA   Actual Attempt (Lifetime) No   Actual Attempt (Past 3 Months) No   Has subject engaged in non-suicidal self-injurious behavior? (Lifetime) No   Has subject engaged in non-suicidal self-injurious behavior? (Past 3 Months) No   Interrupted Attempts (Lifetime) No   Interrupted Attempts (Past 3 Months) No   Aborted or Self-Interrupted Attempt (Lifetime) No   Aborted or Self-Interrupted Attempt (Past 3 Months) No   Preparatory Acts or Behavior (Lifetime) No   Preparatory Acts or Behavior (Past 3 Months) No   Most Recent Attempt Actual Lethality Code NA   Most Lethal Attempt Actual Lethality Code NA   Initial/First Attempt Actual Lethality Code NA         Appearance:   Appropriate   Eye Contact:   Good   Psychomotor Behavior: Normal   Attitude:   Cooperative   Orientation:   All  Speech   Rate / Production: Normal    Volume:  Normal   Mood:    Depressed    Affect:    Tearful  Thought Content:  Clear   Thought Form:  Coherent  Logical   Insight:    Good     Diagnoses:  1. Major depressive disorder, recurrent episode, moderate (H)        Collateral Reports Completed:  [facility] will coordinate with care team as needed    Plan: (Homework, other):  Lanie  was scheduled to RTC in two weeks for help addressing depressive symptoms, pain, and relationship conflicts. Provided CBT skills to help manage depressive experiences. CD Recommendations: No indications of CD issues.     Héctor Higuera Psy.D, JENNIFER 4/27/2022  _______________________________________________________________________________                                              Individual Treatment Plan    Patient's Name: Lanie Neil  YOB: 1990    Date of Creation: 3/4/2022  Date Treatment Plan Last Reviewed/Revised: 3/4/2022    DSM5 Diagnoses: 296.32 (F33.1) Major Depressive Disorder, Recurrent Episode, Moderate With anxious distress  Psychosocial / Contextual Factors: Chronic pain, SMA, disabled  PROMIS (reviewed every 90 days):     PROMIS-10    In general, would you say your health is: (P) Excellent    In general, would you say your quality of life is: (P) Good    In general, how would you rate your physical health? (P) Very good    In general, how would you rate your mental health, including your mood and your ability to think? (P) Good    In general, how would you rate your satisfaction with your social activities and relationships? (P) Good    In general, please rate how well you carry out your usual social activities and roles. (This includes activities at home, at work and in your community, and responsibilities as a parent, child, spouse, employee, friend, etc.) (P) Very good    To what extent are you able to carry out your everyday physical activities such as walking, climbing stairs, carrying groceries, or moving a chair? (P) Not at all    In the past 7 days,  How often have you been bothered by emotional problems such as feeling anxious, depressed or irritable? (P) Rarely  How would you rate your fatigue on average? (P) Moderate  How would you rate your pain on average? (P) 6    PROMIS GLOBAL SCORES    Mental health question re-calculation - no clinical value - (P) 4  Physical  health question re-calculation - no clinical value - (P) 3  Pain question re-calculation - no clinical value - (P) 3  Global Mental Health Score - (P) 13  Global Physical Health Score - (P) 11  PROMIS TOTAL - SUBSCORES - (P) 24      [id number] PROMIS HEALTH ORGANIZATION AND PROMIS COOPERATIVE GROUP VERSION 1.1      Referral / Collaboration:  Referral to another professional/service is not indicated at this time..    Anticipated number of session for this episode of care: 7-9  Anticipation frequency of session: Every other week  Anticipated Duration of each session: 38-52 minutes  Treatment plan will be reviewed in 90 days or when goals have been changed.       MeasurableTreatment Goal(s) related to diagnosis / functional impairment(s)  Goal 1: Patient will experience a reduction in depressive symptoms along with a corresponding increase in positive emotion and life satisfaction.      Objective #A (Patient Action)    Patient will Increase interest, engagement, and pleasure in doing things.  Status: Continued - Date(s): 3/4/2022    Intervention(s)  Therapist will help patient identify pleasant and mastery oriented events that elicit positive, relaxed mood.    Objective #B  Patient will Decrease frequency and intensity of feeling down, depressed, hopeless.  Status: Continued - Date(s): 3/4/2022    Intervention(s)  Therapist will introduce patient to cognitive-behavioral and acceptance and commitment therapy topics aimed to help reduce depression and anxiety    Objective #C  Patient will Identify negative self-talk and behaviors: challenge core beliefs, myths, and actions  Improve concentration, focus, and mindfulness in daily activities .  Status: Continued - Date(s): 3/4/2022    Intervention(s)  Therapist will help patient identify and manage negative self-talk and automatic thoughts; introduce patient to cognitive distortions; help patient develop cognitive diffusion techniques      Goal 2: Patient will experience a  "reduction in anxious symptoms, along with a corresponding increase in relaxed emotional states and life satisfaction.      Objective #A (Patient Action)  Patient will use cognitive-behavioral and thought diffusion strategies identified in therapy to challenge anxious thoughts.    Status: Continued - Date(s): 3/4/2022    Intervention(s)  Therapist will utilize CBT and ACT ideas to help patient challenge anxious thoughts and reduce intensity/duration of anxious distress    Objective #B  Patient will use \"worry time\" each day for 15 minutes of scheduled worry and then defer obsessive or anxious thinking until the next structured worry time.    Status: Continued - Date(s): 3/4/2022    Intervention(s)  Therapist will teach patient how to effectively utilize worry time and/or thought logs/journals each day and incorporate more relaxation behaviors into their routine.    Objective #C  Patient will identify the stressors which contribute to feelings of anxiety  Patient will increase engagement in adaptive coping skills and recreational activities, such as exercise and healthy socialization, to manage distress.  Status: Continued - Date(s): 3/4/2022    Intervention(s)  Therapist will help patient identify triggers/situational factors that contribute to anxiety and behavioral skills aimed to manage anxious distress.      Goal 3: Patient will learn more adaptive ways to manage chronic pain      Objective #A (Patient Action)    Status: Continued - Date(s): 3/4/2022    Patient will identify 2-4 strategies for managing pain.    Intervention(s)  Therapist will teach distraction skills aimed to help manage chronic pain and utilize ACT/CBT ideas to help with this (e.g. relaxation).    Objective #B  Patient will state understanding of stressors and relationship to physical symptoms.    Status: Continued - Date(s): 3/4/2022       Other Possible Therapeutic Intervention(s):    Psycho-education regarding mental health diagnoses and " treatment options    Supportive Therapy    Provide affirmations, reflections, and establish working rapport    Emphasize and reflect on strength of therapeutic relationship    Skills training    Explore skills useful to client in current situation.    Skills include assertiveness, communication, conflict management, problem-solving, relaxation, etc.    Solution-Focused Therapy    Explore patterns in patient's relationships and discuss options for new behaviors.    Explore patterns in patient's actions and choices and discuss options for new behaviors.    Cognitive-behavioral Therapy    Discuss common cognitive distortions, identify them in patient's life.    Explore ways to challenge, replace, and act against these cognitions.    Acceptance and Commitment Therapy    Explore and identify important values in patient's life.    Discuss ways to commit to behavioral activation around these values.    Psychodynamic psychotherapy    Discuss patient's emotional dynamics and issues and how they impact behaviors.    Explore patient's history of relationships and how they impact present behaviors.    Explore how to work with and make changes in these schemas and patterns.    Narrative Therapy    Explore the patient's story of his/her life from his/her perspective.    Explore alternate ways of understanding their experience, identifying exceptions, developing new themes.    Interpersonal Psychotherapy    Explore patterns in relationships that are effective or ineffective at helping patient reach their goals, find satisfying experience.    Discuss new patterns or behaviors to engage in for improved social functioning.    Behavioral Activation    Discuss steps patient can take to become more involved in meaningful activity.    Identify barriers to these activities and explore possible solutions.    Mindfulness-Based Strategies    Discuss skills based on development and application of mindfulness.    Skills drawn from  compassion-focused therapy, dialectical behavior therapy, mindfulness-based stress reduction, mindfulness-based cognitive therapy, etc.      Patient has reviewed and agreed to the above plan.      Héctor Higuera Psy.D, LP   Behavioral Health Clinician   -[facility]     March 4, 2022

## 2022-05-06 ENCOUNTER — TRANSFERRED RECORDS (OUTPATIENT)
Dept: HEALTH INFORMATION MANAGEMENT | Facility: CLINIC | Age: 32
End: 2022-05-06
Payer: COMMERCIAL

## 2022-05-15 ENCOUNTER — HEALTH MAINTENANCE LETTER (OUTPATIENT)
Age: 32
End: 2022-05-15

## 2022-05-16 ENCOUNTER — VIRTUAL VISIT (OUTPATIENT)
Dept: BEHAVIORAL HEALTH | Facility: CLINIC | Age: 32
End: 2022-05-16
Payer: COMMERCIAL

## 2022-05-16 DIAGNOSIS — F33.1 MAJOR DEPRESSIVE DISORDER, RECURRENT EPISODE, MODERATE (H): Primary | ICD-10-CM

## 2022-05-16 PROCEDURE — 90837 PSYTX W PT 60 MINUTES: CPT | Mod: 95 | Performed by: PSYCHOLOGIST

## 2022-05-16 NOTE — PROGRESS NOTES
[facility]: Integrated Behavioral Health  May 16, 2022      Behavioral Health Clinician Progress Note    Patient Name: Lanie Neil           Service Type: Video appointment      Service Location:  Video appointment      Session Start Time: 11:09  Session End Time: 12:06      Session Length: 53 - 60      Attendees: Patient    Visit Activities (Refresh list every visit): [facility] [place] Only    Provider verified identity through the following two step process.  Patient provided:  Patient photo and Patient is known previously to provider    Telemedicine Visit: The patient's condition can be safely assessed and treated via synchronous audio and visual telemedicine encounter.      Reason for Telemedicine Visit: Patient has requested telehealth visit and Services only offered telehealth    Originating Site (Patient Location): Patient's home    Distant Site (Provider Location): Provider Remote Setting- Home Office    Consent:  The patient/guardian has verbally consented to: the potential risks and benefits of telemedicine (video visit) versus in person care; bill my insurance or make self-payment for services provided; and responsibility for payment of non-covered services.     Mode of Communication:  Video Conference via [employer]    As the provider I attest to compliance with applicable laws and regulations related to telemedicine.      Diagnostic Assessment Date: 1/19/2021  Treatment Plan Review Date: 6/4/2022  See Flowsheets for today's PHQ-9 and KATHARINE-7 results  Previous PHQ-9:   PHQ-9 SCORE 12/15/2020 3/16/2021 6/30/2021   PHQ-9 Total Score - - -   PHQ-9 Total Score MyChart 2 (Minimal depression) 2 (Minimal depression) 2 (Minimal depression)   PHQ-9 Total Score 2 2 2     Previous KATHARINE-7:   KATHARINE-7 SCORE 12/15/2020 3/16/2021 6/30/2021   Total Score 1 (minimal anxiety) 1 (minimal anxiety) 1 (minimal anxiety)   Total Score 1 1 1       DATA  Extended Session (60+ minutes): No  Interactive  "Complexity: No  Crisis: No    Treatment Objective(s) Addressed in This Session:  Target Behavior(s): disease management/lifestyle changes      Depressed Mood: Decrease frequency and intensity of feeling down, depressed, hopeless  Identify negative self-talk and behaviors: challenge core beliefs, myths, and actions  Anxiety: will develop more effective coping skills to manage anxiety symptoms  Psychological distress related to Pain    Current Stressors / Issues:  [facility] met with Lanie today to continue supporting her treatment of depression, adjustment to chronic disease management, and relationship problems. Reports today greatest concern regarding a recent conflict with her mother. Reports she needed to use the restroom and asked her mother for assistance when she was doing something else. Lanie states this lead to her mother becoming upset and hitting herself in front of Lanie. Discussed her reactions to this and how she struggles seeing her mother have difficulties with some mental health concerns. Explored possible ideas of what leads to their frequent conflicts, such as Lanie's reported need to be believed/validated about her pain and how her mother reacts to this. Explored possible alternative methods of resolving conflict and ways to regulate emotions more effectively.     Lanie reports she often struggles with thoughts of \"what is the point of living\" though denies making plans for suicide or thinking she might act on her thoughts. She reports sometimes wishing a procedure or surgery would inadvertently take her life at times, but does not take steps to carry out a plan for self-harm and states she is future-oriented/would not do anything about her thoughts.     Progress on Treatment Objective(s) / Homework:  Satisfactory progress - ACTION (Actively working towards change); Intervened by reinforcing change plan / affirming steps taken    Cognitive-behavioral Therapy    Discuss common cognitive " distortions, identify them in patient's life.    Explore ways to challenge, replace, and act against these cognitions.    Acceptance and Commitment Therapy    Explore and identify important values in patient's life.    Discuss ways to commit to behavioral activation around these values.    Care Plan review completed: No    Medication Review:  No changes to current psychiatric medication(s)    Medication Compliance:  NA    Changes in Health Issues:   None reported    Chemical Use Review:   Substance Use: Chemical use reviewed, no active concerns identified      Tobacco Use: No current tobacco use.      Assessment: Current Emotional / Mental Status (status of significant symptoms):  Risk status (Self / Other harm or suicidal ideation)  Patient has had a history of suicidal ideation: passive thoughts only; easily controlled - denied any recently    Patient denies current fears or concerns for personal safety.  Patient denies current or recent suicidal ideation or behaviors.  Patient denies current or recent homicidal ideation or behaviors.  Patient denies current or recent self injurious behavior or ideation.  Patient denies other safety concerns.     A safety and risk management plan has not been developed at this time, however patient was encouraged to call [place] [phone] should there be a change in any of these risk factors.      [place] Suicide Severity Rating Scale (Short Version)  [place] Suicide Severity Rating (Short Version) 2/18/2021   Over the past 2 weeks have you felt down, depressed, or hopeless? yes   Over the past 2 weeks have you had thoughts of killing yourself? no   Have you ever attempted to kill yourself? no     [place] Suicide Severity Rating Scale (Lifetime/Recent)  [place] Suicide Severity Rating (Lifetime/Recent) 8/13/2021   Wish to be Dead (Lifetime) No   Non-Specific Active Suicidal Thoughts (Lifetime) No   Most Severe Ideation Rating (Lifetime) NA   Frequency (Lifetime) NA    Duration (Lifetime) NA   Controllability (Lifetime) NA   Protective Factors  (Lifetime) NA   Reasons for Ideation (Lifetime) NA   RETIRED: 1. Wish to be Dead (Recent) No   RETIRED: 2. Non-Specific Active Suicidal Thoughts (Recent) No   3. Active Suicidal Ideation with any Methods (Not Plan) Without Intent to Act (Lifetime) No   RETIRED: 3. Active Suicidal Ideation with any Methods (Not Plan) Without Intent to Act (Recent) No   RETIRE: 4. Active Suicidal Ideation with Some Intent to Act, Without Specific Plan (Lifetime) No   4. Active Suicidal Ideation with Some Intent to Act, Without Specific Plan (Recent) No   RETIRE: 5. Active Suicidal Ideation with Specific Plan and Intent (Lifetime) No   RETIRED: 5. Active Suicidal Ideation with Specific Plan and Intent (Recent) No   Most Severe Ideation Rating (Past Month) NA   Frequency (Past Month) NA   Duration (Past Month) NA   Controllability (Past Month) NA   Protective Factors (Past Month) NA   Reasons for Ideation (Past Month) NA   Actual Attempt (Lifetime) No   Actual Attempt (Past 3 Months) No   Has subject engaged in non-suicidal self-injurious behavior? (Lifetime) No   Has subject engaged in non-suicidal self-injurious behavior? (Past 3 Months) No   Interrupted Attempts (Lifetime) No   Interrupted Attempts (Past 3 Months) No   Aborted or Self-Interrupted Attempt (Lifetime) No   Aborted or Self-Interrupted Attempt (Past 3 Months) No   Preparatory Acts or Behavior (Lifetime) No   Preparatory Acts or Behavior (Past 3 Months) No   Most Recent Attempt Actual Lethality Code NA   Most Lethal Attempt Actual Lethality Code NA   Initial/First Attempt Actual Lethality Code NA         Appearance:   Appropriate   Eye Contact:   Good   Psychomotor Behavior: Normal   Attitude:   Cooperative   Orientation:   All  Speech   Rate / Production: Normal    Volume:  Normal   Mood:    Depressed    Affect:    Tearful  Thought Content:  Clear   Thought Form:  Coherent  Logical    Insight:    Good     Diagnoses:  1. Major depressive disorder, recurrent episode, moderate (H)        Collateral Reports Completed:  [facility] will coordinate with care team as needed    Plan: (Homework, other):  Lanie was scheduled to RTC in two weeks for help addressing depressive symptoms, pain, and relationship conflicts. Provided CBT skills to help manage depressive experiences and ways she could resolve relationship conflict more adaptively. CD Recommendations: No indications of CD issues.     Héctor Higuera Psy.D, LP May 16, 2022    _______________________________________________________________________________                                              Individual Treatment Plan    Patient's Name: Lanie Neil  YOB: 1990    Date of Creation: 3/4/2022  Date Treatment Plan Last Reviewed/Revised: 3/4/2022    DSM5 Diagnoses: 296.32 (F33.1) Major Depressive Disorder, Recurrent Episode, Moderate With anxious distress  Psychosocial / Contextual Factors: Chronic pain, SMA, disabled  PROMIS (reviewed every 90 days):     PROMIS-10    In general, would you say your health is: (P) Excellent    In general, would you say your quality of life is: (P) Good    In general, how would you rate your physical health? (P) Very good    In general, how would you rate your mental health, including your mood and your ability to think? (P) Good    In general, how would you rate your satisfaction with your social activities and relationships? (P) Good    In general, please rate how well you carry out your usual social activities and roles. (This includes activities at home, at work and in your community, and responsibilities as a parent, child, spouse, employee, friend, etc.) (P) Very good    To what extent are you able to carry out your everyday physical activities such as walking, climbing stairs, carrying groceries, or moving a chair? (P) Not at all    In the past 7 days,  How often have you been bothered by  emotional problems such as feeling anxious, depressed or irritable? (P) Rarely  How would you rate your fatigue on average? (P) Moderate  How would you rate your pain on average? (P) 6    PROMIS GLOBAL SCORES    Mental health question re-calculation - no clinical value - (P) 4  Physical health question re-calculation - no clinical value - (P) 3  Pain question re-calculation - no clinical value - (P) 3  Global Mental Health Score - (P) 13  Global Physical Health Score - (P) 11  PROMIS TOTAL - SUBSCORES - (P) 24      [id number] [facility] PR[facility]OPERATIVE GROUP VERSION 1.1      Referral / Collaboration:  Referral to another professional/service is not indicated at this time..    Anticipated number of session for this episode of care: 7-9  Anticipation frequency of session: Every other week  Anticipated Duration of each session: 38-52 minutes  Treatment plan will be reviewed in 90 days or when goals have been changed.       MeasurableTreatment Goal(s) related to diagnosis / functional impairment(s)  Goal 1: Patient will experience a reduction in depressive symptoms along with a corresponding increase in positive emotion and life satisfaction.      Objective #A (Patient Action)    Patient will Increase interest, engagement, and pleasure in doing things.  Status: Continued - Date(s): 3/4/2022    Intervention(s)  Therapist will help patient identify pleasant and mastery oriented events that elicit positive, relaxed mood.    Objective #B  Patient will Decrease frequency and intensity of feeling down, depressed, hopeless.  Status: Continued - Date(s): 3/4/2022    Intervention(s)  Therapist will introduce patient to cognitive-behavioral and acceptance and commitment therapy topics aimed to help reduce depression and anxiety    Objective #C  Patient will Identify negative self-talk and behaviors: challenge core beliefs, myths, and actions  Improve concentration, focus, and mindfulness in daily activities  ".  Status: Continued - Date(s): 3/4/2022    Intervention(s)  Therapist will help patient identify and manage negative self-talk and automatic thoughts; introduce patient to cognitive distortions; help patient develop cognitive diffusion techniques      Goal 2: Patient will experience a reduction in anxious symptoms, along with a corresponding increase in relaxed emotional states and life satisfaction.      Objective #A (Patient Action)  Patient will use cognitive-behavioral and thought diffusion strategies identified in therapy to challenge anxious thoughts.    Status: Continued - Date(s): 3/4/2022    Intervention(s)  Therapist will utilize CBT and ACT ideas to help patient challenge anxious thoughts and reduce intensity/duration of anxious distress    Objective #B  Patient will use \"worry time\" each day for 15 minutes of scheduled worry and then defer obsessive or anxious thinking until the next structured worry time.    Status: Continued - Date(s): 3/4/2022    Intervention(s)  Therapist will teach patient how to effectively utilize worry time and/or thought logs/journals each day and incorporate more relaxation behaviors into their routine.    Objective #C  Patient will identify the stressors which contribute to feelings of anxiety  Patient will increase engagement in adaptive coping skills and recreational activities, such as exercise and healthy socialization, to manage distress.  Status: Continued - Date(s): 3/4/2022    Intervention(s)  Therapist will help patient identify triggers/situational factors that contribute to anxiety and behavioral skills aimed to manage anxious distress.      Goal 3: Patient will learn more adaptive ways to manage chronic pain      Objective #A (Patient Action)    Status: Continued - Date(s): 3/4/2022    Patient will identify 2-4 strategies for managing pain.    Intervention(s)  Therapist will teach distraction skills aimed to help manage chronic pain and utilize ACT/CBT ideas to " help with this (e.g. relaxation).    Objective #B  Patient will state understanding of stressors and relationship to physical symptoms.    Status: Continued - Date(s): 3/4/2022       Other Possible Therapeutic Intervention(s):    Psycho-education regarding mental health diagnoses and treatment options    Supportive Therapy    Provide affirmations, reflections, and establish working rapport    Emphasize and reflect on strength of therapeutic relationship    Skills training    Explore skills useful to client in current situation.    Skills include assertiveness, communication, conflict management, problem-solving, relaxation, etc.    Solution-Focused Therapy    Explore patterns in patient's relationships and discuss options for new behaviors.    Explore patterns in patient's actions and choices and discuss options for new behaviors.    Cognitive-behavioral Therapy    Discuss common cognitive distortions, identify them in patient's life.    Explore ways to challenge, replace, and act against these cognitions.    Acceptance and Commitment Therapy    Explore and identify important values in patient's life.    Discuss ways to commit to behavioral activation around these values.    Psychodynamic psychotherapy    Discuss patient's emotional dynamics and issues and how they impact behaviors.    Explore patient's history of relationships and how they impact present behaviors.    Explore how to work with and make changes in these schemas and patterns.    Narrative Therapy    Explore the patient's story of his/her life from his/her perspective.    Explore alternate ways of understanding their experience, identifying exceptions, developing new themes.    Interpersonal Psychotherapy    Explore patterns in relationships that are effective or ineffective at helping patient reach their goals, find satisfying experience.    Discuss new patterns or behaviors to engage in for improved social functioning.    Behavioral  Activation    Discuss steps patient can take to become more involved in meaningful activity.    Identify barriers to these activities and explore possible solutions.    Mindfulness-Based Strategies    Discuss skills based on development and application of mindfulness.    Skills drawn from compassion-focused therapy, dialectical behavior therapy, mindfulness-based stress reduction, mindfulness-based cognitive therapy, etc.      Patient has reviewed and agreed to the above plan.      Héctor Higuera Psy.D, LP   Behavioral Health Clinician   -[facility]     March 4, 2022

## 2022-05-23 ENCOUNTER — VIRTUAL VISIT (OUTPATIENT)
Dept: FAMILY MEDICINE | Facility: CLINIC | Age: 32
End: 2022-05-23
Payer: COMMERCIAL

## 2022-05-23 ENCOUNTER — TELEPHONE (OUTPATIENT)
Dept: FAMILY MEDICINE | Facility: CLINIC | Age: 32
End: 2022-05-23

## 2022-05-23 DIAGNOSIS — G89.4 CHRONIC PAIN SYNDROME: ICD-10-CM

## 2022-05-23 DIAGNOSIS — G12.9 SPINAL MUSCLE ATROPHY (H): ICD-10-CM

## 2022-05-23 DIAGNOSIS — E87.6 HYPOKALEMIA: Primary | ICD-10-CM

## 2022-05-23 DIAGNOSIS — Z01.818 PREOP GENERAL PHYSICAL EXAM: ICD-10-CM

## 2022-05-23 PROCEDURE — 99215 OFFICE O/P EST HI 40 MIN: CPT | Mod: 95 | Performed by: FAMILY MEDICINE

## 2022-05-23 NOTE — PROGRESS NOTES
Lanie is a 31 year old who is being evaluated via a billable video visit.      How would you like to obtain your AVS? MyChart  If the video visit is dropped, the invitation should be resent by: Text to cell phone: [phone]  Will anyone else be joining your video visit? No  Lanie is a 31 year old who is being evaluated via a billable video visit.        Video Start Time: 10 am    Assessment & Plan     Hypokalemia  Recheck K, hgb before surgery. Surgeon stet up covid test per pt  - Basic metabolic panel; Future  - CBC with platelets differential; Future    Spinal muscle atrophy (H)  Spinrza    Preop general physical exam  Cleared for surgery pending labs    Chronic pain syndrome  She'll cont work w/ pain clinic        49 minutes spent on the date of the encounter doing chart review, history and exam, documentation and further activities per the note    Jimmy Moseley MD  [facility] [place]    Julia Dela Cruz is a 31 year old who presents for the following health issues here w/ mom    HPI   1-preop  Spinraza injection for SMA, routine, [place], Dr Sanchez, May 26 2022 Lesa  2-helps slow SMA progression, tolerated  3-no known personal or FH anesthesia reaciton, she and family do have DVT she'll stop blood thinners preop  4-h/o low hgb, K will check both preop  5-pain: pain clinic is weaning her narcotics (of note, she is vent dependent). They are discussing what sounds like implanted spinal narcotic pump. We discuss this at length, with pt and mom, to their satisfaction, over 25 minutes today on this topic including her pain region, concept chronic pain eval/treat, options to try, risks/benefits narcotic pump.  Past Medical History:   Diagnosis Date     Anxiety      Snoring      Spinal muscle atrophy (H)      Past Surgical History:   Procedure Laterality Date     ENT SURGERY      tubes     GASTROSTOMY TUBE       KIDNEY SURGERY       SPINE SURGERY       TRACHEOSTOMY        Current Outpatient Medications   Medication     ACETAMINOPHEN PO     albuterol (PROAIR HFA) 108 (90 Base) MCG/ACT inhaler     albuterol (PROVENTIL) (2.5 MG/3ML) 0.083% neb solution     ALPRAZolam (XANAX) 0.25 MG tablet     aminocaproic acid (AMICAR) 0.25 GM/ML solution     bisacodyl (DULCOLAX) 10 MG suppository     celecoxib (CELEBREX) 200 MG capsule     clindamycin (CLEOCIN T) 1 % lotion     diazepam (VALIUM) 5 MG tablet     doxycycline hyclate (VIBRAMYCIN) 100 MG capsule     fluocinolone acetonide (DERMA SMOOTHE/FS BODY) 0.01 % external oil     fluticasone (FLOVENT HFA) 110 MCG/ACT inhaler     gabapentin (NEURONTIN) 250 MG/5ML solution     GENERLAC 10 GM/15ML solution     guaiFENesin (ORGANIDIN) 200 MG TABS tablet     HYDROmorphone (DILAUDID) 4 MG tablet     hydrOXYzine (ATARAX) 25 MG tablet     ketoconazole (NIZORAL) 2 % external shampoo     LANsoprazole (PREVACID) 30 MG DR capsule     levofloxacin (LEVAQUIN) 25 MG/ML solution     Magnesium Hydroxide (MILK OF MAGNESIA PO)     metroNIDAZOLE (METROCREAM) 0.75 % external cream     mometasone (NASONEX) 50 MCG/ACT nasal spray     morphine (SABINE) 30 MG 24 hr capsule     mupirocin (BACTROBAN) 2 % external cream     naloxone (NARCAN) 4 MG/0.1ML nasal spray     Nutritional Supplements (REPLETE FIBER) LIQD     omeprazole (PRILOSEC) 2 mg/mL suspension     ondansetron (ZOFRAN-ODT) 4 MG ODT tab     order for DME     order for DME     polyethylene glycol (MIRALAX) 17 GM/Dose powder     potassium chloride (KAYCIEL) 20 MEQ/15ML (10%) solution     prochlorperazine (COMPAZINE) 5 MG tablet     rivaroxaban ANTICOAGULANT (XARELTO ANTICOAGULANT) 10 MG TABS tablet     Salicylic Acid (OXY BALANCE FACIAL CLEAN WASH EX)     Sennosides (SENNA) 8.8 MG/5ML SYRP     sertraline (ZOLOFT) 20 MG/ML (HIGH CONC) solution     silver sulfADIAZINE (SILVADENE) 1 % external cream     simethicone (MYLICON) 66.7 mg/ml     simethicone 40 MG/0.6ML LIQD     sodium chloride (OCEAN) 0.65 % nasal spray  "    traZODone (DESYREL) 5 mg/ml SUSP     tretinoin (RETIN-A) 0.025 % external cream     No current facility-administered medications for this visit.     No Known Allergies          Review of Systems         Objective    Vitals - Patient Reported  Weight (Patient Reported): 68 kg (150 lb)  Height (Patient Reported): 144.8 cm (4' 9\")  BMI (Based on Pt Reported Ht/Wt): 32.46  Pain Score: Severe Pain (6)  Pain Loc: Hip        Physical Exam   GENERAL: Healthy, alert and no distress  EYES: Eyes grossly normal to inspection.  No discharge or erythema, or obvious scleral/conjunctival abnormalities.  RESP: No audible wheeze, cough, or visible cyanosis.  No visible retractions or increased work of breathing.    SKIN: Visible skin clear. No significant rash, abnormal pigmentation or lesions.  NEURO: Cranial nerves grossly intact.  Mentation and speech appropriate for age.  PSYCH: Mentation appears normal, affect normal/bright, judgement and insight intact, normal speech and appearance well-groomed.        Video-Visit Details    Type of service:  Video Visit    Video End Time:10:40    Originating Location (pt. Location): Home    Distant Location (provider location):  [facility]     Platform used for Video Visit: Boris    "

## 2022-05-23 NOTE — TELEPHONE ENCOUNTER
Lab orders were faxed to [facility] for CBC and BMP. Attempted to call jennifer to relay orders were faxed over and there was no voicemail to leave a message at. Lynette Lees LPN 5/23/2022 4:15 PM

## 2022-05-23 NOTE — TELEPHONE ENCOUNTER
----- Message from Jimmy Moseley MD sent at 5/23/2022 10:19 AM CDT -----  I'm not sure if Soon mi in today, I think she's done this    I ordered cbc, bmp, she has [facility] out tmrw, they can draw those, can you get them the orders?    THx

## 2022-05-31 ENCOUNTER — MYC MEDICAL ADVICE (OUTPATIENT)
Dept: FAMILY MEDICINE | Facility: CLINIC | Age: 32
End: 2022-05-31
Payer: COMMERCIAL

## 2022-06-03 ENCOUNTER — VIRTUAL VISIT (OUTPATIENT)
Dept: BEHAVIORAL HEALTH | Facility: CLINIC | Age: 32
End: 2022-06-03
Payer: COMMERCIAL

## 2022-06-03 DIAGNOSIS — F33.1 MAJOR DEPRESSIVE DISORDER, RECURRENT EPISODE, MODERATE (H): Primary | ICD-10-CM

## 2022-06-03 PROCEDURE — 90837 PSYTX W PT 60 MINUTES: CPT | Mod: GT | Performed by: PSYCHOLOGIST

## 2022-06-03 NOTE — PROGRESS NOTES
[facility] Integrated Behavioral Health  Yin 3, 2022        Behavioral Health Clinician Progress Note    Patient Name: Lanie Neil           Service Type: Video appointment      Service Location:  Video appointment      Session Start Time: 2:05  Session End Time: 3:00      Session Length: 53 - 60      Attendees: Patient    Visit Activities (Refresh list every visit): [facility] [place] Only    Provider verified identity through the following two step process.  Patient provided:  Patient photo and Patient is known previously to provider    Telemedicine Visit: The patient's condition can be safely assessed and treated via synchronous audio and visual telemedicine encounter.      Reason for Telemedicine Visit: Patient has requested telehealth visit and Services only offered telehealth    Originating Site (Patient Location): Patient's home    Distant Site (Provider Location): Provider Remote Setting- Home Office    Consent:  The patient/guardian has verbally consented to: the potential risks and benefits of telemedicine (video visit) versus in person care; bill my insurance or make self-payment for services provided; and responsibility for payment of non-covered services.     Mode of Communication:  Video Conference via [employer]    As the provider I attest to compliance with applicable laws and regulations related to telemedicine.      Diagnostic Assessment Date: 1/19/2021  Treatment Plan Review Date: 9/3/2022  See Flowsheets for today's PHQ-9 and KATHARINE-7 results  Previous PHQ-9:   PHQ-9 SCORE 12/15/2020 3/16/2021 6/30/2021   PHQ-9 Total Score - - -   PHQ-9 Total Score MyChart 2 (Minimal depression) 2 (Minimal depression) 2 (Minimal depression)   PHQ-9 Total Score 2 2 2     Previous KATHARINE-7:   KATHARINE-7 SCORE 12/15/2020 3/16/2021 6/30/2021   Total Score 1 (minimal anxiety) 1 (minimal anxiety) 1 (minimal anxiety)   Total Score 1 1 1       DATA  Extended Session (60+ minutes): No  Interactive  "Complexity: No  Crisis: No    Treatment Objective(s) Addressed in This Session:  Target Behavior(s): disease management/lifestyle changes      Depressed Mood: Decrease frequency and intensity of feeling down, depressed, hopeless  Identify negative self-talk and behaviors: challenge core beliefs, myths, and actions  Anxiety: will develop more effective coping skills to manage anxiety symptoms  Psychological distress related to Pain    Current Stressors / Issues:  [facility] met with Lanie today to continue supporting her treatment of depression, adjustment to chronic disease management, and relationship problems. States today struggling with intermittent periods of depressed mood with crying spells, usually at night. Notes that relationship concerns with her mother and her chronic pain contribute most to these periods. She described the ongoing pattern of not feeling supported/validated in her experiences with pain, how she reports being labeled as \"dramatic\" by her mother when her pain is elevated. Explored and identified a cycle of communication in their relationship and discussed finding alternative ways of responding to various triggers. Discussed alternative ways of reacting to being called \"dramatic\" in these instances, such as focusing on calming her mind and recognizing she cannot control what other people say/do and that reacting with anger tends to exacerbate depression and pain experiences.     Progress on Treatment Objective(s) / Homework:  Satisfactory progress - ACTION (Actively working towards change); Intervened by reinforcing change plan / affirming steps taken    Solution-Focused Therapy    Explore patterns in patient's relationships and discuss options for new behaviors.    Explore patterns in patient's actions and choices and discuss options for new behaviors.    Care Plan review completed: No    Medication Review:  No changes to current psychiatric medication(s)    Medication " Compliance:  NA    Changes in Health Issues:   None reported    Chemical Use Review:   Substance Use: Chemical use reviewed, no active concerns identified      Tobacco Use: No current tobacco use.      Assessment: Current Emotional / Mental Status (status of significant symptoms):  Risk status (Self / Other harm or suicidal ideation)  Patient has had a history of suicidal ideation: passive thoughts only; easily controlled - denied any recently    Patient denies current fears or concerns for personal safety.  Patient denies current or recent suicidal ideation or behaviors.  Patient denies current or recent homicidal ideation or behaviors.  Patient denies current or recent self injurious behavior or ideation.  Patient denies other safety concerns.     A safety and risk management plan has not been developed at this time, however patient was encouraged to call [place] [phone] should there be a change in any of these risk factors.      [place] Suicide Severity Rating Scale (Short Version)  [place] Suicide Severity Rating (Short Version) 2/18/2021   Over the past 2 weeks have you felt down, depressed, or hopeless? yes   Over the past 2 weeks have you had thoughts of killing yourself? no   Have you ever attempted to kill yourself? no     [place] Suicide Severity Rating Scale (Lifetime/Recent)  [place] Suicide Severity Rating (Lifetime/Recent) 8/13/2021   Wish to be Dead (Lifetime) No   Non-Specific Active Suicidal Thoughts (Lifetime) No   Most Severe Ideation Rating (Lifetime) NA   Frequency (Lifetime) NA   Duration (Lifetime) NA   Controllability (Lifetime) NA   Protective Factors  (Lifetime) NA   Reasons for Ideation (Lifetime) NA   RETIRED: 1. Wish to be Dead (Recent) No   RETIRED: 2. Non-Specific Active Suicidal Thoughts (Recent) No   3. Active Suicidal Ideation with any Methods (Not Plan) Without Intent to Act (Lifetime) No   RETIRED: 3. Active Suicidal Ideation with any Methods (Not Plan) Without Intent to  Act (Recent) No   RETIRE: 4. Active Suicidal Ideation with Some Intent to Act, Without Specific Plan (Lifetime) No   4. Active Suicidal Ideation with Some Intent to Act, Without Specific Plan (Recent) No   RETIRE: 5. Active Suicidal Ideation with Specific Plan and Intent (Lifetime) No   RETIRED: 5. Active Suicidal Ideation with Specific Plan and Intent (Recent) No   Most Severe Ideation Rating (Past Month) NA   Frequency (Past Month) NA   Duration (Past Month) NA   Controllability (Past Month) NA   Protective Factors (Past Month) NA   Reasons for Ideation (Past Month) NA   Actual Attempt (Lifetime) No   Actual Attempt (Past 3 Months) No   Has subject engaged in non-suicidal self-injurious behavior? (Lifetime) No   Has subject engaged in non-suicidal self-injurious behavior? (Past 3 Months) No   Interrupted Attempts (Lifetime) No   Interrupted Attempts (Past 3 Months) No   Aborted or Self-Interrupted Attempt (Lifetime) No   Aborted or Self-Interrupted Attempt (Past 3 Months) No   Preparatory Acts or Behavior (Lifetime) No   Preparatory Acts or Behavior (Past 3 Months) No   Most Recent Attempt Actual Lethality Code NA   Most Lethal Attempt Actual Lethality Code NA   Initial/First Attempt Actual Lethality Code NA         Appearance:   Appropriate   Eye Contact:   Good   Psychomotor Behavior: Normal   Attitude:   Cooperative   Orientation:   All  Speech   Rate / Production: Normal    Volume:  Normal   Mood:    Depressed    Affect:    Tearful  Thought Content:  Clear   Thought Form:  Coherent  Logical   Insight:    Good     Diagnoses:  1. Major depressive disorder, recurrent episode, moderate (H)        Collateral Reports Completed:  [facility] will coordinate with care team as needed    Plan: (Homework, other):  Lanie was scheduled to RTC in two weeks for help addressing depressive symptoms, pain, and relationship conflicts. Provided social skills to help manage depressive experiences and ways she could resolve relationship  conflict more adaptively. CD Recommendations: No indications of CD issues.     Héctor Higuera Psy.D, LP June 6, 2022      _______________________________________________________________________________                                              Individual Treatment Plan    Patient's Name: Lanie Neil  YOB: 1990    Date of Creation: 3/4/2022  Date Treatment Plan Last Reviewed/Revised: 6/3/2022    DSM5 Diagnoses: 296.32 (F33.1) Major Depressive Disorder, Recurrent Episode, Moderate With anxious distress  Psychosocial / Contextual Factors: Chronic pain, SMA, disabled  PROMIS (reviewed every 90 days):     PROMIS-10    In general, would you say your health is: (P) Excellent    In general, would you say your quality of life is: (P) Good    In general, how would you rate your physical health? (P) Very good    In general, how would you rate your mental health, including your mood and your ability to think? (P) Good    In general, how would you rate your satisfaction with your social activities and relationships? (P) Good    In general, please rate how well you carry out your usual social activities and roles. (This includes activities at home, at work and in your community, and responsibilities as a parent, child, spouse, employee, friend, etc.) (P) Very good    To what extent are you able to carry out your everyday physical activities such as walking, climbing stairs, carrying groceries, or moving a chair? (P) Not at all    In the past 7 days,  How often have you been bothered by emotional problems such as feeling anxious, depressed or irritable? (P) Rarely  How would you rate your fatigue on average? (P) Moderate  How would you rate your pain on average? (P) 6    PROMIS GLOBAL SCORES    Mental health question re-calculation - no clinical value - (P) 4  Physical health question re-calculation - no clinical value - (P) 3  Pain question re-calculation - no clinical value - (P) 3  Global Mental Health  Score - (P) 13  Global Physical Health Score - (P) 11  PROMIS TOTAL - SUBSCORES - (P) 24      [id number] PROMIS HEALTH ORGANIZATION AND PROMIS COOPERATIVE GROUP VERSION 1.1      Referral / Collaboration:  Referral to another professional/service is not indicated at this time..    Anticipated number of session for this episode of care: 7-9  Anticipation frequency of session: Every other week  Anticipated Duration of each session: 38-52 minutes  Treatment plan will be reviewed in 90 days or when goals have been changed.       MeasurableTreatment Goal(s) related to diagnosis / functional impairment(s)  Goal 1: Patient will experience a reduction in depressive symptoms along with a corresponding increase in positive emotion and life satisfaction.      Objective #A (Patient Action)    Patient will Increase interest, engagement, and pleasure in doing things.  Status: Continued - Date(s): 6/3/2022    Intervention(s)  Therapist will help patient identify pleasant and mastery oriented events that elicit positive, relaxed mood.    Objective #B  Patient will Decrease frequency and intensity of feeling down, depressed, hopeless.  Status: Continued - Date(s): 6/3/2022    Intervention(s)  Therapist will introduce patient to cognitive-behavioral and acceptance and commitment therapy topics aimed to help reduce depression and anxiety    Objective #C  Patient will Identify negative self-talk and behaviors: challenge core beliefs, myths, and actions  Improve concentration, focus, and mindfulness in daily activities .  Status: Continued - Date(s): 6/3/2022    Intervention(s)  Therapist will help patient identify and manage negative self-talk and automatic thoughts; introduce patient to cognitive distortions; help patient develop cognitive diffusion techniques      Goal 2: Patient will experience a reduction in anxious symptoms, along with a corresponding increase in relaxed emotional states and life satisfaction.      Objective #A  "(Patient Action)  Patient will use cognitive-behavioral and thought diffusion strategies identified in therapy to challenge anxious thoughts.    Status: Continued - Date(s): 6/3/2022    Intervention(s)  Therapist will utilize CBT and ACT ideas to help patient challenge anxious thoughts and reduce intensity/duration of anxious distress    Objective #B  Patient will use \"worry time\" each day for 15 minutes of scheduled worry and then defer obsessive or anxious thinking until the next structured worry time.    Status: Continued - Date(s): 6/3/2022    Intervention(s)  Therapist will teach patient how to effectively utilize worry time and/or thought logs/journals each day and incorporate more relaxation behaviors into their routine.    Objective #C  Patient will identify the stressors which contribute to feelings of anxiety  Patient will increase engagement in adaptive coping skills and recreational activities, such as exercise and healthy socialization, to manage distress.  Status: Continued - Date(s): 6/3/2022    Intervention(s)  Therapist will help patient identify triggers/situational factors that contribute to anxiety and behavioral skills aimed to manage anxious distress.      Goal 3: Patient will learn more adaptive ways to manage chronic pain      Objective #A (Patient Action)    Status: Continued - Date(s): 6/3/2022    Patient will identify 2-4 strategies for managing pain.    Intervention(s)  Therapist will teach distraction skills aimed to help manage chronic pain and utilize ACT/CBT ideas to help with this (e.g. relaxation).    Objective #B  Patient will state understanding of stressors and relationship to physical symptoms.    Status: Continued - Date(s): 6/3/2022      Other Possible Therapeutic Intervention(s):    Psycho-education regarding mental health diagnoses and treatment options    Supportive Therapy    Provide affirmations, reflections, and establish working rapport    Emphasize and reflect on " strength of therapeutic relationship    Skills training    Explore skills useful to client in current situation.    Skills include assertiveness, communication, conflict management, problem-solving, relaxation, etc.    Solution-Focused Therapy    Explore patterns in patient's relationships and discuss options for new behaviors.    Explore patterns in patient's actions and choices and discuss options for new behaviors.    Cognitive-behavioral Therapy    Discuss common cognitive distortions, identify them in patient's life.    Explore ways to challenge, replace, and act against these cognitions.    Acceptance and Commitment Therapy    Explore and identify important values in patient's life.    Discuss ways to commit to behavioral activation around these values.    Psychodynamic psychotherapy    Discuss patient's emotional dynamics and issues and how they impact behaviors.    Explore patient's history of relationships and how they impact present behaviors.    Explore how to work with and make changes in these schemas and patterns.    Narrative Therapy    Explore the patient's story of his/her life from his/her perspective.    Explore alternate ways of understanding their experience, identifying exceptions, developing new themes.    Interpersonal Psychotherapy    Explore patterns in relationships that are effective or ineffective at helping patient reach their goals, find satisfying experience.    Discuss new patterns or behaviors to engage in for improved social functioning.    Behavioral Activation    Discuss steps patient can take to become more involved in meaningful activity.    Identify barriers to these activities and explore possible solutions.    Mindfulness-Based Strategies    Discuss skills based on development and application of mindfulness.    Skills drawn from compassion-focused therapy, dialectical behavior therapy, mindfulness-based stress reduction, mindfulness-based cognitive therapy, etc.      Patient  has reviewed and agreed to the above plan.      Héctor Higuera Psy.D, LP   Behavioral Health Clinician   -[facility]     6/3/2022

## 2022-06-14 ENCOUNTER — MEDICAL CORRESPONDENCE (OUTPATIENT)
Dept: HEALTH INFORMATION MANAGEMENT | Facility: CLINIC | Age: 32
End: 2022-06-14
Payer: COMMERCIAL

## 2022-06-20 ENCOUNTER — VIRTUAL VISIT (OUTPATIENT)
Dept: BEHAVIORAL HEALTH | Facility: CLINIC | Age: 32
End: 2022-06-20
Payer: COMMERCIAL

## 2022-06-20 DIAGNOSIS — F33.1 MAJOR DEPRESSIVE DISORDER, RECURRENT EPISODE, MODERATE (H): Primary | ICD-10-CM

## 2022-06-20 PROCEDURE — 90837 PSYTX W PT 60 MINUTES: CPT | Mod: GT | Performed by: PSYCHOLOGIST

## 2022-06-20 NOTE — PROGRESS NOTES
[facility]: Integrated Behavioral Health  June 20, 2022        Behavioral Health Clinician Progress Note    Patient Name: Lanie Neil           Service Type: Video appointment      Service Location:  Video appointment      Session Start Time: 2:35  Session End Time: 3:35      Session Length: 53 - 60      Attendees: Patient    Visit Activities (Refresh list every visit): [facility] Only    Provider verified identity through the following two step process.  Patient provided:  Patient photo and Patient is known previously to provider    Telemedicine Visit: The patient's condition can be safely assessed and treated via synchronous audio and visual telemedicine encounter.      Reason for Telemedicine Visit: Patient has requested telehealth visit and Services only offered telehealth    Originating Site (Patient Location): Patient's home    Distant Site (Provider Location): Provider Remote Setting- Home Office    Consent:  The patient/guardian has verbally consented to: the potential risks and benefits of telemedicine (video visit) versus in person care; bill my insurance or make self-payment for services provided; and responsibility for payment of non-covered services.     Mode of Communication:  Video Conference via [employer]    As the provider I attest to compliance with applicable laws and regulations related to telemedicine.      Diagnostic Assessment Date: 1/19/2021  Treatment Plan Review Date: 9/3/2022  See Flowsheets for today's PHQ-9 and KATHARINE-7 results  Previous PHQ-9:   PHQ-9 SCORE 12/15/2020 3/16/2021 6/30/2021   PHQ-9 Total Score - - -   PHQ-9 Total Score MyChart 2 (Minimal depression) 2 (Minimal depression) 2 (Minimal depression)   PHQ-9 Total Score 2 2 2     Previous KATHARINE-7:   KATHARINE-7 SCORE 12/15/2020 3/16/2021 6/30/2021   Total Score 1 (minimal anxiety) 1 (minimal anxiety) 1 (minimal anxiety)   Total Score 1 1 1       DATA  Extended Session (60+ minutes): No  Interactive  "Complexity: No  Crisis: No    Treatment Objective(s) Addressed in This Session:  Target Behavior(s): disease management/lifestyle changes      Depressed Mood: Decrease frequency and intensity of feeling down, depressed, hopeless  Identify negative self-talk and behaviors: challenge core beliefs, myths, and actions  Anxiety: will develop more effective coping skills to manage anxiety symptoms  Psychological distress related to Pain    Current Stressors / Issues:  [facility] met with Lanie today to continue supporting her treatment of depression, adjustment to chronic disease management, and relationship problems. Today session focused on exploring/discussing the relationship between pain and depressed mood with emphasis on how these variables affect avoidance behaviors. Explored the idea of a \"downward spiral\" to this end, how pain leads to avoidance which results in greater depressed mood and arguably worse pain experiences/rumination on pain. Explored the difference of \"living with pain\" versus \"living around pain\" to this end. Discussed adaptive behavior change to decrease avoidance, such as incorporating a structured day (thereby reducing the time ruminating), incorporating more pleasant events (even if they result in relative increases in pain), and engaging socially with others even if not immediately desirable. Discussed the notion of pain/depression affecting people to crave short-term solutions to their experiences and we looked at how long-term behaviors can induce pain at first, yet help build greater quality of life long-term.       Progress on Treatment Objective(s) / Homework:  Satisfactory progress - ACTION (Actively working towards change); Intervened by reinforcing change plan / affirming steps taken    Solution-Focused Therapy    Explore patterns in patient's relationships and discuss options for new behaviors.    Explore patterns in patient's actions and choices and discuss options for new " behaviors.    Behavioral Activation    Discuss steps patient can take to become more involved in meaningful activity.    Identify barriers to these activities and explore possible solutions.    Care Plan review completed: No    Medication Review:  No changes to current psychiatric medication(s)    Medication Compliance:  NA    Changes in Health Issues:   None reported    Chemical Use Review:   Substance Use: Chemical use reviewed, no active concerns identified      Tobacco Use: No current tobacco use.      Assessment: Current Emotional / Mental Status (status of significant symptoms):  Risk status (Self / Other harm or suicidal ideation)  Patient has had a history of suicidal ideation: passive thoughts only; easily controlled - denied any recently    Patient denies current fears or concerns for personal safety.  Patient denies current or recent suicidal ideation or behaviors.  Patient denies current or recent homicidal ideation or behaviors.  Patient denies current or recent self injurious behavior or ideation.  Patient denies other safety concerns.     A safety and risk management plan has not been developed at this time, however patient was encouraged to call [place] [phone] should there be a change in any of these risk factors.      [place] Suicide Severity Rating Scale (Short Version)  [place] Suicide Severity Rating (Short Version) 2/18/2021   Over the past 2 weeks have you felt down, depressed, or hopeless? yes   Over the past 2 weeks have you had thoughts of killing yourself? no   Have you ever attempted to kill yourself? no     [place] Suicide Severity Rating Scale (Lifetime/Recent)  [place] Suicide Severity Rating (Lifetime/Recent) 8/13/2021   Wish to be Dead (Lifetime) No   Non-Specific Active Suicidal Thoughts (Lifetime) No   Most Severe Ideation Rating (Lifetime) NA   Frequency (Lifetime) NA   Duration (Lifetime) NA   Controllability (Lifetime) NA   Protective Factors  (Lifetime) NA   Reasons for  Ideation (Lifetime) NA   RETIRED: 1. Wish to be Dead (Recent) No   RETIRED: 2. Non-Specific Active Suicidal Thoughts (Recent) No   3. Active Suicidal Ideation with any Methods (Not Plan) Without Intent to Act (Lifetime) No   RETIRED: 3. Active Suicidal Ideation with any Methods (Not Plan) Without Intent to Act (Recent) No   RETIRE: 4. Active Suicidal Ideation with Some Intent to Act, Without Specific Plan (Lifetime) No   4. Active Suicidal Ideation with Some Intent to Act, Without Specific Plan (Recent) No   RETIRE: 5. Active Suicidal Ideation with Specific Plan and Intent (Lifetime) No   RETIRED: 5. Active Suicidal Ideation with Specific Plan and Intent (Recent) No   Most Severe Ideation Rating (Past Month) NA   Frequency (Past Month) NA   Duration (Past Month) NA   Controllability (Past Month) NA   Protective Factors (Past Month) NA   Reasons for Ideation (Past Month) NA   Actual Attempt (Lifetime) No   Actual Attempt (Past 3 Months) No   Has subject engaged in non-suicidal self-injurious behavior? (Lifetime) No   Has subject engaged in non-suicidal self-injurious behavior? (Past 3 Months) No   Interrupted Attempts (Lifetime) No   Interrupted Attempts (Past 3 Months) No   Aborted or Self-Interrupted Attempt (Lifetime) No   Aborted or Self-Interrupted Attempt (Past 3 Months) No   Preparatory Acts or Behavior (Lifetime) No   Preparatory Acts or Behavior (Past 3 Months) No   Most Recent Attempt Actual Lethality Code NA   Most Lethal Attempt Actual Lethality Code NA   Initial/First Attempt Actual Lethality Code NA         Appearance:   Appropriate   Eye Contact:   Good   Psychomotor Behavior: Normal   Attitude:   Cooperative   Orientation:   All  Speech   Rate / Production: Normal    Volume:  Normal   Mood:    Depressed    Affect:    Tearful  Thought Content:  Rumination   Thought Form:  Coherent  Logical   Insight:    Good     Diagnoses:  1. Major depressive disorder, recurrent episode, moderate (H)        Collateral  Reports Completed:  [facility] will coordinate with care team as needed    Plan: (Homework, other):  Lanie was scheduled to RTC in two weeks for help addressing depressive symptoms, pain, and relationship conflicts. Behavioral activation ideas provided today. Also provided social skills to help manage depressive experiences and ways she could resolve relationship conflict more adaptively. CD Recommendations: No indications of CD issues.     Héctor Higuear Psy.D, LP June 20, 2022      _______________________________________________________________________________                                              Individual Treatment Plan    Patient's Name: Lanie Neil  YOB: 1990    Date of Creation: 3/4/2022  Date Treatment Plan Last Reviewed/Revised: 6/3/2022    DSM5 Diagnoses: 296.32 (F33.1) Major Depressive Disorder, Recurrent Episode, Moderate With anxious distress  Psychosocial / Contextual Factors: Chronic pain, SMA, disabled  PROMIS (reviewed every 90 days):     PROMIS-10    In general, would you say your health is: (P) Excellent    In general, would you say your quality of life is: (P) Good    In general, how would you rate your physical health? (P) Very good    In general, how would you rate your mental health, including your mood and your ability to think? (P) Good    In general, how would you rate your satisfaction with your social activities and relationships? (P) Good    In general, please rate how well you carry out your usual social activities and roles. (This includes activities at home, at work and in your community, and responsibilities as a parent, child, spouse, employee, friend, etc.) (P) Very good    To what extent are you able to carry out your everyday physical activities such as walking, climbing stairs, carrying groceries, or moving a chair? (P) Not at all    In the past 7 days,  How often have you been bothered by emotional problems such as feeling anxious, depressed or  irritable? (P) Rarely  How would you rate your fatigue on average? (P) Moderate  How would you rate your pain on average? (P) 6    PROMIS GLOBAL SCORES    Mental health question re-calculation - no clinical value - (P) 4  Physical health question re-calculation - no clinical value - (P) 3  Pain question re-calculation - no clinical value - (P) 3  Global Mental Health Score - (P) 13  Global Physical Health Score - (P) 11  PROMIS TOTAL - SUBSCORES - (P) 24      [id number] PROMIS HEALTH ORGANIZATION AND PROMIS COOPERATIVE GROUP VERSION 1.1      Referral / Collaboration:  Referral to another professional/service is not indicated at this time..    Anticipated number of session for this episode of care: 7-9  Anticipation frequency of session: Every other week  Anticipated Duration of each session: 38-52 minutes  Treatment plan will be reviewed in 90 days or when goals have been changed.       MeasurableTreatment Goal(s) related to diagnosis / functional impairment(s)  Goal 1: Patient will experience a reduction in depressive symptoms along with a corresponding increase in positive emotion and life satisfaction.      Objective #A (Patient Action)    Patient will Increase interest, engagement, and pleasure in doing things.  Status: Continued - Date(s): 6/3/2022    Intervention(s)  Therapist will help patient identify pleasant and mastery oriented events that elicit positive, relaxed mood.    Objective #B  Patient will Decrease frequency and intensity of feeling down, depressed, hopeless.  Status: Continued - Date(s): 6/3/2022    Intervention(s)  Therapist will introduce patient to cognitive-behavioral and acceptance and commitment therapy topics aimed to help reduce depression and anxiety    Objective #C  Patient will Identify negative self-talk and behaviors: challenge core beliefs, myths, and actions  Improve concentration, focus, and mindfulness in daily activities .  Status: Continued - Date(s):  "6/3/2022    Intervention(s)  Therapist will help patient identify and manage negative self-talk and automatic thoughts; introduce patient to cognitive distortions; help patient develop cognitive diffusion techniques      Goal 2: Patient will experience a reduction in anxious symptoms, along with a corresponding increase in relaxed emotional states and life satisfaction.      Objective #A (Patient Action)  Patient will use cognitive-behavioral and thought diffusion strategies identified in therapy to challenge anxious thoughts.    Status: Continued - Date(s): 6/3/2022    Intervention(s)  Therapist will utilize CBT and ACT ideas to help patient challenge anxious thoughts and reduce intensity/duration of anxious distress    Objective #B  Patient will use \"worry time\" each day for 15 minutes of scheduled worry and then defer obsessive or anxious thinking until the next structured worry time.    Status: Continued - Date(s): 6/3/2022    Intervention(s)  Therapist will teach patient how to effectively utilize worry time and/or thought logs/journals each day and incorporate more relaxation behaviors into their routine.    Objective #C  Patient will identify the stressors which contribute to feelings of anxiety  Patient will increase engagement in adaptive coping skills and recreational activities, such as exercise and healthy socialization, to manage distress.  Status: Continued - Date(s): 6/3/2022    Intervention(s)  Therapist will help patient identify triggers/situational factors that contribute to anxiety and behavioral skills aimed to manage anxious distress.      Goal 3: Patient will learn more adaptive ways to manage chronic pain      Objective #A (Patient Action)    Status: Continued - Date(s): 6/3/2022    Patient will identify 2-4 strategies for managing pain.    Intervention(s)  Therapist will teach distraction skills aimed to help manage chronic pain and utilize ACT/CBT ideas to help with this (e.g. " relaxation).    Objective #B  Patient will state understanding of stressors and relationship to physical symptoms.    Status: Continued - Date(s): 6/3/2022      Other Possible Therapeutic Intervention(s):    Psycho-education regarding mental health diagnoses and treatment options    Supportive Therapy    Provide affirmations, reflections, and establish working rapport    Emphasize and reflect on strength of therapeutic relationship    Skills training    Explore skills useful to client in current situation.    Skills include assertiveness, communication, conflict management, problem-solving, relaxation, etc.    Solution-Focused Therapy    Explore patterns in patient's relationships and discuss options for new behaviors.    Explore patterns in patient's actions and choices and discuss options for new behaviors.    Cognitive-behavioral Therapy    Discuss common cognitive distortions, identify them in patient's life.    Explore ways to challenge, replace, and act against these cognitions.    Acceptance and Commitment Therapy    Explore and identify important values in patient's life.    Discuss ways to commit to behavioral activation around these values.    Psychodynamic psychotherapy    Discuss patient's emotional dynamics and issues and how they impact behaviors.    Explore patient's history of relationships and how they impact present behaviors.    Explore how to work with and make changes in these schemas and patterns.    Narrative Therapy    Explore the patient's story of his/her life from his/her perspective.    Explore alternate ways of understanding their experience, identifying exceptions, developing new themes.    Interpersonal Psychotherapy    Explore patterns in relationships that are effective or ineffective at helping patient reach their goals, find satisfying experience.    Discuss new patterns or behaviors to engage in for improved social functioning.    Behavioral Activation    Discuss steps patient can  take to become more involved in meaningful activity.    Identify barriers to these activities and explore possible solutions.    Mindfulness-Based Strategies    Discuss skills based on development and application of mindfulness.    Skills drawn from compassion-focused therapy, dialectical behavior therapy, mindfulness-based stress reduction, mindfulness-based cognitive therapy, etc.      Patient has reviewed and agreed to the above plan.      Lakeisha Melara.[email or web address], LP   Behavioral Health Clinician   -[facility]     6/3/2022

## 2022-06-24 ENCOUNTER — TRANSFERRED RECORDS (OUTPATIENT)
Dept: HEALTH INFORMATION MANAGEMENT | Facility: CLINIC | Age: 32
End: 2022-06-24

## 2022-07-06 ENCOUNTER — TRANSFERRED RECORDS (OUTPATIENT)
Dept: HEALTH INFORMATION MANAGEMENT | Facility: CLINIC | Age: 32
End: 2022-07-06

## 2022-07-07 ENCOUNTER — VIRTUAL VISIT (OUTPATIENT)
Dept: BEHAVIORAL HEALTH | Facility: CLINIC | Age: 32
End: 2022-07-07
Payer: COMMERCIAL

## 2022-07-07 DIAGNOSIS — F33.1 MAJOR DEPRESSIVE DISORDER, RECURRENT EPISODE, MODERATE (H): Primary | ICD-10-CM

## 2022-07-07 PROCEDURE — 90834 PSYTX W PT 45 MINUTES: CPT | Mod: GT | Performed by: PSYCHOLOGIST

## 2022-07-07 NOTE — PROGRESS NOTES
[facility]: Integrated Behavioral Health  July 7, 2022        Behavioral Health Clinician Progress Note    Patient Name: Lanie Neil           Service Type: Video appointment      Service Location:  Video appointment      Session Start Time: 2:08  Session End Time: 2:55      Session Length: 38 - 52      Attendees: Patient    Visit Activities (Refresh list every visit): [facility] [place] Only    Provider verified identity through the following two step process.  Patient provided:  Patient photo and Patient is known previously to provider    Telemedicine Visit: The patient's condition can be safely assessed and treated via synchronous audio and visual telemedicine encounter.      Reason for Telemedicine Visit: Patient has requested telehealth visit and Services only offered telehealth    Originating Site (Patient Location): Patient's home    Distant Site (Provider Location): Provider Remote Setting- Home Office    Consent:  The patient/guardian has verbally consented to: the potential risks and benefits of telemedicine (video visit) versus in person care; bill my insurance or make self-payment for services provided; and responsibility for payment of non-covered services.     Mode of Communication:  Video Conference via [employer]    As the provider I attest to compliance with applicable laws and regulations related to telemedicine.      Diagnostic Assessment Date: 1/19/2021  Treatment Plan Review Date: 9/3/2022  See Flowsheets for today's PHQ-9 and KATHARINE-7 results  Previous PHQ-9:   PHQ-9 SCORE 12/15/2020 3/16/2021 6/30/2021   PHQ-9 Total Score - - -   PHQ-9 Total Score MyChart 2 (Minimal depression) 2 (Minimal depression) 2 (Minimal depression)   PHQ-9 Total Score 2 2 2     Previous KATHARINE-7:   KATHARINE-7 SCORE 12/15/2020 3/16/2021 6/30/2021   Total Score 1 (minimal anxiety) 1 (minimal anxiety) 1 (minimal anxiety)   Total Score 1 1 1       DATA  Extended Session (60+ minutes): No  Interactive  Complexity: No  Crisis: No    Treatment Objective(s) Addressed in This Session:  Target Behavior(s): disease management/lifestyle changes      Depressed Mood: Decrease frequency and intensity of feeling down, depressed, hopeless  Identify negative self-talk and behaviors: challenge core beliefs, myths, and actions  Anxiety: will develop more effective coping skills to manage anxiety symptoms  Psychological distress related to Pain    Current Stressors / Issues:  [facility] met with Lanie today to continue supporting her treatment of depression, adjustment to chronic disease management, and relationship problems. Today session focused on exploring/discussing the relationship between pain and depressed mood with emphasis on how these variables affect avoidance behaviors. Continued to explore/facilitate discussion about her fears/ambivalence regarding her gradual reduction in pain medication. Highlighted the differences in opinion she faces from others compared to her preferences, such as how she was recommended to taper off short acting medication rather than the extended release pain medication. She notes her mother is also opposed to her getting a pain pump due to the surgical risks associated with this whereas she is preferring to take the risk. Facilitated ongoing discussion about her preferences with her pain management while we discussed ideas around successful pain management from a clinical perspective. Continued to provide support and validation to her point of view; utilized active listening and reflection of core ideas/themes.       Progress on Treatment Objective(s) / Homework:  Satisfactory progress - ACTION (Actively working towards change); Intervened by reinforcing change plan / affirming steps taken    Solution-Focused Therapy    Explore patterns in patient's relationships and discuss options for new behaviors.    Explore patterns in patient's actions and choices and discuss options for new  behaviors.    Behavioral Activation    Discuss steps patient can take to become more involved in meaningful activity.    Identify barriers to these activities and explore possible solutions.    Care Plan review completed: No    Medication Review:  No changes to current psychiatric medication(s)    Medication Compliance:  NA    Changes in Health Issues:   None reported    Chemical Use Review:   Substance Use: Chemical use reviewed, no active concerns identified      Tobacco Use: No current tobacco use.      Assessment: Current Emotional / Mental Status (status of significant symptoms):  Risk status (Self / Other harm or suicidal ideation)  Patient has had a history of suicidal ideation: passive thoughts only; easily controlled - denied any recently    Patient denies current fears or concerns for personal safety.  Patient denies current or recent suicidal ideation or behaviors.  Patient denies current or recent homicidal ideation or behaviors.  Patient denies current or recent self injurious behavior or ideation.  Patient denies other safety concerns.     A safety and risk management plan has not been developed at this time, however patient was encouraged to call [place] [phone] should there be a change in any of these risk factors.      [place] Suicide Severity Rating Scale (Short Version)  [place] Suicide Severity Rating (Short Version) 2/18/2021   Over the past 2 weeks have you felt down, depressed, or hopeless? yes   Over the past 2 weeks have you had thoughts of killing yourself? no   Have you ever attempted to kill yourself? no     [place] Suicide Severity Rating Scale (Lifetime/Recent)  [place] Suicide Severity Rating (Lifetime/Recent) 8/13/2021   Wish to be Dead (Lifetime) No   Non-Specific Active Suicidal Thoughts (Lifetime) No   Most Severe Ideation Rating (Lifetime) NA   Frequency (Lifetime) NA   Duration (Lifetime) NA   Controllability (Lifetime) NA   Protective Factors  (Lifetime) NA   Reasons for  Ideation (Lifetime) NA   RETIRED: 1. Wish to be Dead (Recent) No   RETIRED: 2. Non-Specific Active Suicidal Thoughts (Recent) No   3. Active Suicidal Ideation with any Methods (Not Plan) Without Intent to Act (Lifetime) No   RETIRED: 3. Active Suicidal Ideation with any Methods (Not Plan) Without Intent to Act (Recent) No   RETIRE: 4. Active Suicidal Ideation with Some Intent to Act, Without Specific Plan (Lifetime) No   4. Active Suicidal Ideation with Some Intent to Act, Without Specific Plan (Recent) No   RETIRE: 5. Active Suicidal Ideation with Specific Plan and Intent (Lifetime) No   RETIRED: 5. Active Suicidal Ideation with Specific Plan and Intent (Recent) No   Most Severe Ideation Rating (Past Month) NA   Frequency (Past Month) NA   Duration (Past Month) NA   Controllability (Past Month) NA   Protective Factors (Past Month) NA   Reasons for Ideation (Past Month) NA   Actual Attempt (Lifetime) No   Actual Attempt (Past 3 Months) No   Has subject engaged in non-suicidal self-injurious behavior? (Lifetime) No   Has subject engaged in non-suicidal self-injurious behavior? (Past 3 Months) No   Interrupted Attempts (Lifetime) No   Interrupted Attempts (Past 3 Months) No   Aborted or Self-Interrupted Attempt (Lifetime) No   Aborted or Self-Interrupted Attempt (Past 3 Months) No   Preparatory Acts or Behavior (Lifetime) No   Preparatory Acts or Behavior (Past 3 Months) No   Most Recent Attempt Actual Lethality Code NA   Most Lethal Attempt Actual Lethality Code NA   Initial/First Attempt Actual Lethality Code NA         Appearance:   Appropriate   Eye Contact:   Good   Psychomotor Behavior: Normal   Attitude:   Cooperative   Orientation:   All  Speech   Rate / Production: Normal    Volume:  Normal   Mood:    Depressed    Affect:    Tearful  Thought Content:  Rumination   Thought Form:  Coherent  Logical   Insight:    Good     Diagnoses:  1. Major depressive disorder, recurrent episode, moderate (H)        Collateral  Reports Completed:  [facility] will coordinate with care team as needed    Plan: (Homework, other):  Lanie was scheduled to RTC in two weeks for help addressing depressive symptoms, pain, and relationship conflicts. Behavioral activation ideas provided today. Continue behavioral activation for mood management. CD Recommendations: No indications of CD issues.     Héctor Higuera Psy.D, LP July 7, 2022        _______________________________________________________________________________                                              Individual Treatment Plan    Patient's Name: Lanie Neil  YOB: 1990    Date of Creation: 3/4/2022  Date Treatment Plan Last Reviewed/Revised: 6/3/2022    DSM5 Diagnoses: 296.32 (F33.1) Major Depressive Disorder, Recurrent Episode, Moderate With anxious distress  Psychosocial / Contextual Factors: Chronic pain, SMA, disabled  PROMIS (reviewed every 90 days):     PROMIS-10    In general, would you say your health is: (P) Excellent    In general, would you say your quality of life is: (P) Good    In general, how would you rate your physical health? (P) Very good    In general, how would you rate your mental health, including your mood and your ability to think? (P) Good    In general, how would you rate your satisfaction with your social activities and relationships? (P) Good    In general, please rate how well you carry out your usual social activities and roles. (This includes activities at home, at work and in your community, and responsibilities as a parent, child, spouse, employee, friend, etc.) (P) Very good    To what extent are you able to carry out your everyday physical activities such as walking, climbing stairs, carrying groceries, or moving a chair? (P) Not at all    In the past 7 days,  How often have you been bothered by emotional problems such as feeling anxious, depressed or irritable? (P) Rarely  How would you rate your fatigue on average? (P) Moderate  How  would you rate your pain on average? (P) 6    PROMIS GLOBAL SCORES    Mental health question re-calculation - no clinical value - (P) 4  Physical health question re-calculation - no clinical value - (P) 3  Pain question re-calculation - no clinical value - (P) 3  Global Mental Health Score - (P) 13  Global Physical Health Score - (P) 11  PROMIS TOTAL - SUBSCORES - (P) 24      [id number] [facility] PROMIS COOPERATIVE GROUP VERSION 1.1      Referral / Collaboration:  Referral to another professional/service is not indicated at this time..    Anticipated number of session for this episode of care: 7-9  Anticipation frequency of session: Every other week  Anticipated Duration of each session: 38-52 minutes  Treatment plan will be reviewed in 90 days or when goals have been changed.       MeasurableTreatment Goal(s) related to diagnosis / functional impairment(s)  Goal 1: Patient will experience a reduction in depressive symptoms along with a corresponding increase in positive emotion and life satisfaction.      Objective #A (Patient Action)    Patient will Increase interest, engagement, and pleasure in doing things.  Status: Continued - Date(s): 6/3/2022    Intervention(s)  Therapist will help patient identify pleasant and mastery oriented events that elicit positive, relaxed mood.    Objective #B  Patient will Decrease frequency and intensity of feeling down, depressed, hopeless.  Status: Continued - Date(s): 6/3/2022    Intervention(s)  Therapist will introduce patient to cognitive-behavioral and acceptance and commitment therapy topics aimed to help reduce depression and anxiety    Objective #C  Patient will Identify negative self-talk and behaviors: challenge core beliefs, myths, and actions  Improve concentration, focus, and mindfulness in daily activities .  Status: Continued - Date(s): 6/3/2022    Intervention(s)  Therapist will help patient identify and manage negative self-talk and automatic  "thoughts; introduce patient to cognitive distortions; help patient develop cognitive diffusion techniques      Goal 2: Patient will experience a reduction in anxious symptoms, along with a corresponding increase in relaxed emotional states and life satisfaction.      Objective #A (Patient Action)  Patient will use cognitive-behavioral and thought diffusion strategies identified in therapy to challenge anxious thoughts.    Status: Continued - Date(s): 6/3/2022    Intervention(s)  Therapist will utilize CBT and ACT ideas to help patient challenge anxious thoughts and reduce intensity/duration of anxious distress    Objective #B  Patient will use \"worry time\" each day for 15 minutes of scheduled worry and then defer obsessive or anxious thinking until the next structured worry time.    Status: Continued - Date(s): 6/3/2022    Intervention(s)  Therapist will teach patient how to effectively utilize worry time and/or thought logs/journals each day and incorporate more relaxation behaviors into their routine.    Objective #C  Patient will identify the stressors which contribute to feelings of anxiety  Patient will increase engagement in adaptive coping skills and recreational activities, such as exercise and healthy socialization, to manage distress.  Status: Continued - Date(s): 6/3/2022    Intervention(s)  Therapist will help patient identify triggers/situational factors that contribute to anxiety and behavioral skills aimed to manage anxious distress.      Goal 3: Patient will learn more adaptive ways to manage chronic pain      Objective #A (Patient Action)    Status: Continued - Date(s): 6/3/2022    Patient will identify 2-4 strategies for managing pain.    Intervention(s)  Therapist will teach distraction skills aimed to help manage chronic pain and utilize ACT/CBT ideas to help with this (e.g. relaxation).    Objective #B  Patient will state understanding of stressors and relationship to physical " symptoms.    Status: Continued - Date(s): 6/3/2022      Other Possible Therapeutic Intervention(s):    Psycho-education regarding mental health diagnoses and treatment options    Supportive Therapy    Provide affirmations, reflections, and establish working rapport    Emphasize and reflect on strength of therapeutic relationship    Skills training    Explore skills useful to client in current situation.    Skills include assertiveness, communication, conflict management, problem-solving, relaxation, etc.    Solution-Focused Therapy    Explore patterns in patient's relationships and discuss options for new behaviors.    Explore patterns in patient's actions and choices and discuss options for new behaviors.    Cognitive-behavioral Therapy    Discuss common cognitive distortions, identify them in patient's life.    Explore ways to challenge, replace, and act against these cognitions.    Acceptance and Commitment Therapy    Explore and identify important values in patient's life.    Discuss ways to commit to behavioral activation around these values.    Psychodynamic psychotherapy    Discuss patient's emotional dynamics and issues and how they impact behaviors.    Explore patient's history of relationships and how they impact present behaviors.    Explore how to work with and make changes in these schemas and patterns.    Narrative Therapy    Explore the patient's story of his/her life from his/her perspective.    Explore alternate ways of understanding their experience, identifying exceptions, developing new themes.    Interpersonal Psychotherapy    Explore patterns in relationships that are effective or ineffective at helping patient reach their goals, find satisfying experience.    Discuss new patterns or behaviors to engage in for improved social functioning.    Behavioral Activation    Discuss steps patient can take to become more involved in meaningful activity.    Identify barriers to these activities and  explore possible solutions.    Mindfulness-Based Strategies    Discuss skills based on development and application of mindfulness.    Skills drawn from compassion-focused therapy, dialectical behavior therapy, mindfulness-based stress reduction, mindfulness-based cognitive therapy, etc.      Patient has reviewed and agreed to the above plan.      Héctor Higuera Psy.D,    Behavioral Health Clinician   -[facility]     6/3/2022

## 2022-07-12 DIAGNOSIS — Z79.01 CHRONIC ANTICOAGULATION: ICD-10-CM

## 2022-07-12 DIAGNOSIS — Z86.718 HISTORY OF DEEP VENOUS THROMBOSIS: ICD-10-CM

## 2022-07-12 DIAGNOSIS — J96.10 CHRONIC RESPIRATORY FAILURE, UNSPECIFIED WHETHER WITH HYPOXIA OR HYPERCAPNIA (H): ICD-10-CM

## 2022-07-12 RX ORDER — AMINOCAPROIC ACID 0.25 G/ML
SYRUP ORAL
Qty: 473 ML | Refills: 5 | Status: SHIPPED | OUTPATIENT
Start: 2022-07-12 | End: 2023-12-28

## 2022-07-16 DIAGNOSIS — R63.5 ABNORMAL WEIGHT GAIN: ICD-10-CM

## 2022-07-16 DIAGNOSIS — K59.00 CONSTIPATION, UNSPECIFIED CONSTIPATION TYPE: ICD-10-CM

## 2022-07-16 DIAGNOSIS — T14.8XXA OPEN WOUND: ICD-10-CM

## 2022-07-16 DIAGNOSIS — Z23 NEED FOR PROPHYLACTIC VACCINATION AND INOCULATION AGAINST INFLUENZA: ICD-10-CM

## 2022-07-19 ENCOUNTER — TRANSFERRED RECORDS (OUTPATIENT)
Dept: HEALTH INFORMATION MANAGEMENT | Facility: CLINIC | Age: 32
End: 2022-07-19

## 2022-07-20 RX ORDER — POLYETHYLENE GLYCOL 3350 17 G/17G
17 POWDER, FOR SOLUTION ORAL DAILY
Qty: 714 G | Refills: 1 | Status: SHIPPED | OUTPATIENT
Start: 2022-07-20 | End: 2023-07-28

## 2022-07-20 RX ORDER — SILVER SULFADIAZINE 10 MG/G
CREAM TOPICAL 2 TIMES DAILY
Qty: 400 G | Refills: 3 | Status: SHIPPED | OUTPATIENT
Start: 2022-07-20 | End: 2024-01-16

## 2022-07-20 NOTE — TELEPHONE ENCOUNTER
SILVER SULFADIAZINE 1% CREAM 85GM  Last Written Prescription Date:   5/3/2021  Last Fill Quantity: 400,   # refills: 3  Last Office Visit :  5/23/2022  Future Office visit:  None  Routing refill request to provider for review/approval because:  Drug not on the [facility], [facility] or [facility] refill protocol or controlled substance    POLYETH GLYCOL 3350 NF POWDER 238GM  Last Written Prescription Date:    12/29/2020  Last Fill Quantity: 714,   # refills: 6  Last Office Visit :  5/23/2022  Future Office visit:  None  Routing refill request to provider for review/approval because:  Gaps in refills.  Last filled Dec 2020    Georgina Sy RN  Central Triage Red Flags/Med Refills

## 2022-07-21 ENCOUNTER — VIRTUAL VISIT (OUTPATIENT)
Dept: BEHAVIORAL HEALTH | Facility: CLINIC | Age: 32
End: 2022-07-21
Payer: COMMERCIAL

## 2022-07-21 DIAGNOSIS — F33.1 MAJOR DEPRESSIVE DISORDER, RECURRENT EPISODE, MODERATE (H): Primary | ICD-10-CM

## 2022-07-21 PROCEDURE — 90834 PSYTX W PT 45 MINUTES: CPT | Mod: GT | Performed by: PSYCHOLOGIST

## 2022-07-21 NOTE — PROGRESS NOTES
[facility]: Integrated Behavioral Health  July 21, 2022        Behavioral Health Clinician Progress Note    Patient Name: Lanie Neil           Service Type: Video appointment      Service Location:  Video appointment      Session Start Time: 3:02  Session End Time: 3:53      Session Length: 38 - 52      Attendees: Patient    Visit Activities (Refresh list every visit): [facility] Only    Provider verified identity through the following two step process.  Patient provided:  Patient photo and Patient is known previously to provider    Telemedicine Visit: The patient's condition can be safely assessed and treated via synchronous audio and visual telemedicine encounter.      Reason for Telemedicine Visit: Patient has requested telehealth visit and Services only offered telehealth    Originating Site (Patient Location): Patient's home    Distant Site (Provider Location): Provider Remote Setting- Home Office    Consent:  The patient/guardian has verbally consented to: the potential risks and benefits of telemedicine (video visit) versus in person care; bill my insurance or make self-payment for services provided; and responsibility for payment of non-covered services.     Mode of Communication:  Video Conference via [employer]    As the provider I attest to compliance with applicable laws and regulations related to telemedicine.      Diagnostic Assessment Date: 1/19/2021  Treatment Plan Review Date: 9/3/2022  See Flowsheets for today's PHQ-9 and KATHARINE-7 results  Previous PHQ-9:   PHQ-9 SCORE 12/15/2020 3/16/2021 6/30/2021   PHQ-9 Total Score - - -   PHQ-9 Total Score MyChart 2 (Minimal depression) 2 (Minimal depression) 2 (Minimal depression)   PHQ-9 Total Score 2 2 2     Previous KATHARINE-7:   KATHARINE-7 SCORE 12/15/2020 3/16/2021 6/30/2021   Total Score 1 (minimal anxiety) 1 (minimal anxiety) 1 (minimal anxiety)   Total Score 1 1 1       DATA  Extended Session (60+ minutes): No  Interactive  Complexity: No  Crisis: No    Treatment Objective(s) Addressed in This Session:  Target Behavior(s): disease management/lifestyle changes      Depressed Mood: Decrease frequency and intensity of feeling down, depressed, hopeless  Identify negative self-talk and behaviors: challenge core beliefs, myths, and actions  Anxiety: will develop more effective coping skills to manage anxiety symptoms  Psychological distress related to Pain    Current Stressors / Issues:  [facility] met with Lanie today to continue supporting her treatment of depression, adjustment to chronic disease management, and relationship problems. Reports today feeling more down and hopeless about her situation as she recently learned her sister is having another baby. She also states that changes to her pain medication plan are also contributing to her feeling hopeless. Lanie notes that while she is happy for her sister, she struggles with the sense of loss and sadness about not being able to have children of her own. She spoke to how it feels as though God is testing her in many ways, though she struggles knowing the purpose behind the tests and if she will ever experience relief. Provided active listening and supportive psychotherapy. This writer broached the idea of emphasizing time looking into finding support programs with emphasis on chronic pain/disability, as we agreed that too much time spent focusing on others dissimilar to her will worsen hopelessness feelings. Agreed for both of us to look into chronic pain groups moving forward.     Progress on Treatment Objective(s) / Homework:  Satisfactory progress - ACTION (Actively working towards change); Intervened by reinforcing change plan / affirming steps taken    Solution-Focused Therapy    Explore patterns in patient's relationships and discuss options for new behaviors.    Explore patterns in patient's actions and choices and discuss options for new behaviors.    Behavioral  Activation    Discuss steps patient can take to become more involved in meaningful activity.    Identify barriers to these activities and explore possible solutions.    Care Plan review completed: No    Medication Review:  No changes to current psychiatric medication(s)    Medication Compliance:  NA    Changes in Health Issues:   None reported    Chemical Use Review:   Substance Use: Chemical use reviewed, no active concerns identified      Tobacco Use: No current tobacco use.      Assessment: Current Emotional / Mental Status (status of significant symptoms):  Risk status (Self / Other harm or suicidal ideation)  Patient has had a history of suicidal ideation: passive thoughts only; easily controlled - denied any recently    Patient denies current fears or concerns for personal safety.  Patient denies current or recent suicidal ideation or behaviors.  Patient denies current or recent homicidal ideation or behaviors.  Patient denies current or recent self injurious behavior or ideation.  Patient denies other safety concerns.     A safety and risk management plan has not been developed at this time, however patient was encouraged to call [place] [phone] should there be a change in any of these risk factors.      Grandy Suicide Severity Rating Scale (Short Version)  Grandy Suicide Severity Rating (Short Version) 2/18/2021   Over the past 2 weeks have you felt down, depressed, or hopeless? yes   Over the past 2 weeks have you had thoughts of killing yourself? no   Have you ever attempted to kill yourself? no     Grandy Suicide Severity Rating Scale (Lifetime/Recent)  Grandy Suicide Severity Rating (Lifetime/Recent) 8/13/2021   Wish to be Dead (Lifetime) No   Non-Specific Active Suicidal Thoughts (Lifetime) No   Most Severe Ideation Rating (Lifetime) NA   Frequency (Lifetime) NA   Duration (Lifetime) NA   Controllability (Lifetime) NA   Protective Factors  (Lifetime) NA   Reasons for Ideation (Lifetime) NA    RETIRED: 1. Wish to be Dead (Recent) No   RETIRED: 2. Non-Specific Active Suicidal Thoughts (Recent) No   3. Active Suicidal Ideation with any Methods (Not Plan) Without Intent to Act (Lifetime) No   RETIRED: 3. Active Suicidal Ideation with any Methods (Not Plan) Without Intent to Act (Recent) No   RETIRE: 4. Active Suicidal Ideation with Some Intent to Act, Without Specific Plan (Lifetime) No   4. Active Suicidal Ideation with Some Intent to Act, Without Specific Plan (Recent) No   RETIRE: 5. Active Suicidal Ideation with Specific Plan and Intent (Lifetime) No   RETIRED: 5. Active Suicidal Ideation with Specific Plan and Intent (Recent) No   Most Severe Ideation Rating (Past Month) NA   Frequency (Past Month) NA   Duration (Past Month) NA   Controllability (Past Month) NA   Protective Factors (Past Month) NA   Reasons for Ideation (Past Month) NA   Actual Attempt (Lifetime) No   Actual Attempt (Past 3 Months) No   Has subject engaged in non-suicidal self-injurious behavior? (Lifetime) No   Has subject engaged in non-suicidal self-injurious behavior? (Past 3 Months) No   Interrupted Attempts (Lifetime) No   Interrupted Attempts (Past 3 Months) No   Aborted or Self-Interrupted Attempt (Lifetime) No   Aborted or Self-Interrupted Attempt (Past 3 Months) No   Preparatory Acts or Behavior (Lifetime) No   Preparatory Acts or Behavior (Past 3 Months) No   Most Recent Attempt Actual Lethality Code NA   Most Lethal Attempt Actual Lethality Code NA   Initial/First Attempt Actual Lethality Code NA         Appearance:   Appropriate   Eye Contact:   Good   Psychomotor Behavior: Normal   Attitude:   Cooperative   Orientation:   All  Speech   Rate / Production: Normal    Volume:  Normal   Mood:    Depressed    Affect:    Tearful  Thought Content:  Rumination   Thought Form:  Coherent  Logical   Insight:    Good     Diagnoses:  1. Major depressive disorder, recurrent episode, moderate (H)        Collateral Reports  Completed:  [facility] will coordinate with care team as needed    Plan: (Homework, other):  Lanie was scheduled to RTC in two weeks for help addressing depressive symptoms, pain, and relationship conflicts. Behavioral activation ideas provided today. Research pain support groups. CD Recommendations: No indications of CD issues.     Héctor Higuera Psy.D, JENNIFER July 21, 2022          _______________________________________________________________________________                                              Individual Treatment Plan    Patient's Name: Lanie Neil  YOB: 1990    Date of Creation: 3/4/2022  Date Treatment Plan Last Reviewed/Revised: 6/3/2022    DSM5 Diagnoses: 296.32 (F33.1) Major Depressive Disorder, Recurrent Episode, Moderate With anxious distress  Psychosocial / Contextual Factors: Chronic pain, SMA, disabled  PROMIS (reviewed every 90 days):     PROMIS-10    In general, would you say your health is: (P) Excellent    In general, would you say your quality of life is: (P) Good    In general, how would you rate your physical health? (P) Very good    In general, how would you rate your mental health, including your mood and your ability to think? (P) Good    In general, how would you rate your satisfaction with your social activities and relationships? (P) Good    In general, please rate how well you carry out your usual social activities and roles. (This includes activities at home, at work and in your community, and responsibilities as a parent, child, spouse, employee, friend, etc.) (P) Very good    To what extent are you able to carry out your everyday physical activities such as walking, climbing stairs, carrying groceries, or moving a chair? (P) Not at all    In the past 7 days,  How often have you been bothered by emotional problems such as feeling anxious, depressed or irritable? (P) Rarely  How would you rate your fatigue on average? (P) Moderate  How would you rate your pain on  average? (P) 6    PROMIS GLOBAL SCORES    Mental health question re-calculation - no clinical value - (P) 4  Physical health question re-calculation - no clinical value - (P) 3  Pain question re-calculation - no clinical value - (P) 3  Global Mental Health Score - (P) 13  Global Physical Health Score - (P) 11  PROMIS TOTAL - SUBSCORES - (P) 24      [id number] [facility]AT[facility] VERSION 1.1      Referral / Collaboration:  Referral to another professional/service is not indicated at this time..    Anticipated number of session for this episode of care: 7-9  Anticipation frequency of session: Every other week  Anticipated Duration of each session: 38-52 minutes  Treatment plan will be reviewed in 90 days or when goals have been changed.       MeasurableTreatment Goal(s) related to diagnosis / functional impairment(s)  Goal 1: Patient will experience a reduction in depressive symptoms along with a corresponding increase in positive emotion and life satisfaction.      Objective #A (Patient Action)    Patient will Increase interest, engagement, and pleasure in doing things.  Status: Continued - Date(s): 6/3/2022    Intervention(s)  Therapist will help patient identify pleasant and mastery oriented events that elicit positive, relaxed mood.    Objective #B  Patient will Decrease frequency and intensity of feeling down, depressed, hopeless.  Status: Continued - Date(s): 6/3/2022    Intervention(s)  Therapist will introduce patient to cognitive-behavioral and acceptance and commitment therapy topics aimed to help reduce depression and anxiety    Objective #C  Patient will Identify negative self-talk and behaviors: challenge core beliefs, myths, and actions  Improve concentration, focus, and mindfulness in daily activities .  Status: Continued - Date(s): 6/3/2022    Intervention(s)  Therapist will help patient identify and manage negative self-talk and automatic thoughts; introduce patient  "to cognitive distortions; help patient develop cognitive diffusion techniques      Goal 2: Patient will experience a reduction in anxious symptoms, along with a corresponding increase in relaxed emotional states and life satisfaction.      Objective #A (Patient Action)  Patient will use cognitive-behavioral and thought diffusion strategies identified in therapy to challenge anxious thoughts.    Status: Continued - Date(s): 6/3/2022    Intervention(s)  Therapist will utilize CBT and ACT ideas to help patient challenge anxious thoughts and reduce intensity/duration of anxious distress    Objective #B  Patient will use \"worry time\" each day for 15 minutes of scheduled worry and then defer obsessive or anxious thinking until the next structured worry time.    Status: Continued - Date(s): 6/3/2022    Intervention(s)  Therapist will teach patient how to effectively utilize worry time and/or thought logs/journals each day and incorporate more relaxation behaviors into their routine.    Objective #C  Patient will identify the stressors which contribute to feelings of anxiety  Patient will increase engagement in adaptive coping skills and recreational activities, such as exercise and healthy socialization, to manage distress.  Status: Continued - Date(s): 6/3/2022    Intervention(s)  Therapist will help patient identify triggers/situational factors that contribute to anxiety and behavioral skills aimed to manage anxious distress.      Goal 3: Patient will learn more adaptive ways to manage chronic pain      Objective #A (Patient Action)    Status: Continued - Date(s): 6/3/2022    Patient will identify 2-4 strategies for managing pain.    Intervention(s)  Therapist will teach distraction skills aimed to help manage chronic pain and utilize ACT/CBT ideas to help with this (e.g. relaxation).    Objective #B  Patient will state understanding of stressors and relationship to physical symptoms.    Status: Continued - Date(s): " 6/3/2022      Other Possible Therapeutic Intervention(s):    Psycho-education regarding mental health diagnoses and treatment options    Supportive Therapy    Provide affirmations, reflections, and establish working rapport    Emphasize and reflect on strength of therapeutic relationship    Skills training    Explore skills useful to client in current situation.    Skills include assertiveness, communication, conflict management, problem-solving, relaxation, etc.    Solution-Focused Therapy    Explore patterns in patient's relationships and discuss options for new behaviors.    Explore patterns in patient's actions and choices and discuss options for new behaviors.    Cognitive-behavioral Therapy    Discuss common cognitive distortions, identify them in patient's life.    Explore ways to challenge, replace, and act against these cognitions.    Acceptance and Commitment Therapy    Explore and identify important values in patient's life.    Discuss ways to commit to behavioral activation around these values.    Psychodynamic psychotherapy    Discuss patient's emotional dynamics and issues and how they impact behaviors.    Explore patient's history of relationships and how they impact present behaviors.    Explore how to work with and make changes in these schemas and patterns.    Narrative Therapy    Explore the patient's story of his/her life from his/her perspective.    Explore alternate ways of understanding their experience, identifying exceptions, developing new themes.    Interpersonal Psychotherapy    Explore patterns in relationships that are effective or ineffective at helping patient reach their goals, find satisfying experience.    Discuss new patterns or behaviors to engage in for improved social functioning.    Behavioral Activation    Discuss steps patient can take to become more involved in meaningful activity.    Identify barriers to these activities and explore possible solutions.    Mindfulness-Based  Strategies    Discuss skills based on development and application of mindfulness.    Skills drawn from compassion-focused therapy, dialectical behavior therapy, mindfulness-based stress reduction, mindfulness-based cognitive therapy, etc.      Patient has reviewed and agreed to the above plan.      Héctor Higuera Psy.D,    Behavioral Health Clinician   -[facility]     6/3/2022

## 2022-07-22 DIAGNOSIS — G47.00 INSOMNIA, UNSPECIFIED TYPE: ICD-10-CM

## 2022-08-03 ENCOUNTER — TRANSFERRED RECORDS (OUTPATIENT)
Dept: HEALTH INFORMATION MANAGEMENT | Facility: CLINIC | Age: 32
End: 2022-08-03

## 2022-08-03 DIAGNOSIS — G62.9 NEUROPATHY: ICD-10-CM

## 2022-08-05 ENCOUNTER — VIRTUAL VISIT (OUTPATIENT)
Dept: BEHAVIORAL HEALTH | Facility: CLINIC | Age: 32
End: 2022-08-05
Payer: COMMERCIAL

## 2022-08-05 DIAGNOSIS — F33.1 MAJOR DEPRESSIVE DISORDER, RECURRENT EPISODE, MODERATE (H): Primary | ICD-10-CM

## 2022-08-05 PROCEDURE — 90834 PSYTX W PT 45 MINUTES: CPT | Mod: GT | Performed by: PSYCHOLOGIST

## 2022-08-05 NOTE — PROGRESS NOTES
[facility]: Integrated Behavioral Health  August 5, 2022          Behavioral Health Clinician Progress Note    Patient Name: Lanie Neil           Service Type: Video appointment      Service Location:  Video appointment      Session Start Time: 3:02  Session End Time: 3:53      Session Length: 38 - 52      Attendees: Patient    Visit Activities (Refresh list every visit): [facility] Only    Provider verified identity through the following two step process.  Patient provided:  Patient photo and Patient is known previously to provider    Telemedicine Visit: The patient's condition can be safely assessed and treated via synchronous audio and visual telemedicine encounter.      Reason for Telemedicine Visit: Patient has requested telehealth visit and Services only offered telehealth    Originating Site (Patient Location): Patient's home    Distant Site (Provider Location): Provider Remote Setting- Home Office    Consent:  The patient/guardian has verbally consented to: the potential risks and benefits of telemedicine (video visit) versus in person care; bill my insurance or make self-payment for services provided; and responsibility for payment of non-covered services.     Mode of Communication:  Video Conference via [email or web address]    As the provider I attest to compliance with applicable laws and regulations related to telemedicine.      Diagnostic Assessment Date: 1/19/2021  Treatment Plan Review Date: 9/3/2022  See Flowsheets for today's PHQ-9 and KATHARINE-7 results  Previous PHQ-9:   PHQ-9 SCORE 12/15/2020 3/16/2021 6/30/2021   PHQ-9 Total Score - - -   PHQ-9 Total Score MyChart 2 (Minimal depression) 2 (Minimal depression) 2 (Minimal depression)   PHQ-9 Total Score 2 2 2     Previous KATHARINE-7:   KATHARINE-7 SCORE 12/15/2020 3/16/2021 6/30/2021   Total Score 1 (minimal anxiety) 1 (minimal anxiety) 1 (minimal anxiety)   Total Score 1 1 1       DATA  Extended Session (60+ minutes): No  Interactive  "Complexity: No  Crisis: No    Treatment Objective(s) Addressed in This Session:  Target Behavior(s): disease management/lifestyle changes      Depressed Mood: Decrease frequency and intensity of feeling down, depressed, hopeless  Identify negative self-talk and behaviors: challenge core beliefs, myths, and actions  Anxiety: will develop more effective coping skills to manage anxiety symptoms  Psychological distress related to Pain    Current Stressors / Issues:  [facility] [place] met with Lanie today to continue supporting her treatment of depression, adjustment to chronic disease management, and relationship problems. Lanie reports today feeling frustrated and down regarding the situation with her pain medication. States this continues to be reduced to be in compliance with prescription requirements. She reports feeling this is unfair for her due to her situation and notes disliking the sense of being treated like an \"addict.\" Discussed ways of shifting her frame of reference for this, such as seeing at as a method of increasing safety and security for everyone. Lanie notes she fears not being able to tolerate even small behaviors without pain and this making life miserable for her. Explored ACT ideas about pain, including doing things with her pain and how this can eventually help change her relationship with pain over time. Discussed the idea of getting creative with solutions to make life feel more meaningful, such as connecting with more social groups or pain programs we discussed. MI and ACT ideas were emphasized during her session today.         Progress on Treatment Objective(s) / Homework:  Satisfactory progress - ACTION (Actively working towards change); Intervened by reinforcing change plan / affirming steps taken     Motivational Interviewing  Target Behavior: disease management/lifestyle changes      Stage of Change: PRECONTEMPLATION (Not seeing need for change)    MI Intervention: Expressed " Empathy/Understanding, Supported Autonomy, Collaboration, Evocation, Permission to raise concern or advise, Open-ended questions, Reflections: simple and complex, Rolled with resistance: Emphasized patient autonomy, Simple reflection, Complex reflection, Shifted topic to defuse resistance, Reframed sustain talk in the direction of change and Evoked patient agenda and Reframe     Change Talk Expressed by the Patient: NA - Precontemplative    Provider Response to Change Talk: E - Evoked more info from patient about behavior change, A - Affirmed patient's thoughts, decisions, or attempts at behavior change, R - Reflected patient's change talk and S - Summarized patient's change talk statements      Acceptance and Commitment Therapy    Explore and identify important values in patient's life.    Discuss ways to commit to behavioral activation around these values.        Care Plan review completed: No    Medication Review:  No changes to current psychiatric medication(s)    Medication Compliance:  NA    Changes in Health Issues:   None reported    Chemical Use Review:   Substance Use: Chemical use reviewed, no active concerns identified      Tobacco Use: No current tobacco use.      Assessment: Current Emotional / Mental Status (status of significant symptoms):  Risk status (Self / Other harm or suicidal ideation)  Patient has had a history of suicidal ideation: passive thoughts only; easily controlled - denied any recently    Patient denies current fears or concerns for personal safety.  Patient denies current or recent suicidal ideation or behaviors.  Patient denies current or recent homicidal ideation or behaviors.  Patient denies current or recent self injurious behavior or ideation.  Patient denies other safety concerns.     A safety and risk management plan has not been developed at this time, however patient was encouraged to call [facility] should there be a change in any of these risk factors.       [place] Suicide Severity Rating Scale (Short Version)  [place] Suicide Severity Rating (Short Version) 2/18/2021   Over the past 2 weeks have you felt down, depressed, or hopeless? yes   Over the past 2 weeks have you had thoughts of killing yourself? no   Have you ever attempted to kill yourself? no     [place] Suicide Severity Rating Scale (Lifetime/Recent)  [place] Suicide Severity Rating (Lifetime/Recent) 8/13/2021   Wish to be Dead (Lifetime) No   Non-Specific Active Suicidal Thoughts (Lifetime) No   Most Severe Ideation Rating (Lifetime) NA   Frequency (Lifetime) NA   Duration (Lifetime) NA   Controllability (Lifetime) NA   Protective Factors  (Lifetime) NA   Reasons for Ideation (Lifetime) NA   RETIRED: 1. Wish to be Dead (Recent) No   RETIRED: 2. Non-Specific Active Suicidal Thoughts (Recent) No   3. Active Suicidal Ideation with any Methods (Not Plan) Without Intent to Act (Lifetime) No   RETIRED: 3. Active Suicidal Ideation with any Methods (Not Plan) Without Intent to Act (Recent) No   RETIRE: 4. Active Suicidal Ideation with Some Intent to Act, Without Specific Plan (Lifetime) No   4. Active Suicidal Ideation with Some Intent to Act, Without Specific Plan (Recent) No   RETIRE: 5. Active Suicidal Ideation with Specific Plan and Intent (Lifetime) No   RETIRED: 5. Active Suicidal Ideation with Specific Plan and Intent (Recent) No   Most Severe Ideation Rating (Past Month) NA   Frequency (Past Month) NA   Duration (Past Month) NA   Controllability (Past Month) NA   Protective Factors (Past Month) NA   Reasons for Ideation (Past Month) NA   Actual Attempt (Lifetime) No   Actual Attempt (Past 3 Months) No   Has subject engaged in non-suicidal self-injurious behavior? (Lifetime) No   Has subject engaged in non-suicidal self-injurious behavior? (Past 3 Months) No   Interrupted Attempts (Lifetime) No   Interrupted Attempts (Past 3 Months) No   Aborted or Self-Interrupted Attempt (Lifetime) No   Aborted or  Self-Interrupted Attempt (Past 3 Months) No   Preparatory Acts or Behavior (Lifetime) No   Preparatory Acts or Behavior (Past 3 Months) No   Most Recent Attempt Actual Lethality Code NA   Most Lethal Attempt Actual Lethality Code NA   Initial/First Attempt Actual Lethality Code NA         Appearance:   Appropriate   Eye Contact:   Good   Psychomotor Behavior: Normal   Attitude:   Cooperative   Orientation:   All  Speech   Rate / Production: Normal    Volume:  Normal   Mood:    Depressed    Affect:    Tearful  Thought Content:  Rumination   Thought Form:  Coherent  Logical   Insight:    Good     Diagnoses:  1. Major depressive disorder, recurrent episode, moderate (H)        Collateral Reports Completed:  [facility] will coordinate with care team as needed    Plan: (Homework, other):  Lanie was scheduled to RTC in two weeks for help addressing depressive symptoms, pain, and relationship conflicts. Behavioral activation ideas provided today. Research pain support groups. CD Recommendations: No indications of CD issues.     Héctor Higuera Psy.D, LP August 5, 2022            _______________________________________________________________________________                                              Individual Treatment Plan    Patient's Name: Lanie Neil  YOB: 1990    Date of Creation: 3/4/2022  Date Treatment Plan Last Reviewed/Revised: 6/3/2022    DSM5 Diagnoses: 296.32 (F33.1) Major Depressive Disorder, Recurrent Episode, Moderate With anxious distress  Psychosocial / Contextual Factors: Chronic pain, SMA, disabled  PROMIS (reviewed every 90 days):     PROMIS-10    In general, would you say your health is: (P) Excellent    In general, would you say your quality of life is: (P) Good    In general, how would you rate your physical health? (P) Very good    In general, how would you rate your mental health, including your mood and your ability to think? (P) Good    In general, how would you rate your  satisfaction with your social activities and relationships? (P) Good    In general, please rate how well you carry out your usual social activities and roles. (This includes activities at home, at work and in your community, and responsibilities as a parent, child, spouse, employee, friend, etc.) (P) Very good    To what extent are you able to carry out your everyday physical activities such as walking, climbing stairs, carrying groceries, or moving a chair? (P) Not at all    In the past 7 days,  How often have you been bothered by emotional problems such as feeling anxious, depressed or irritable? (P) Rarely  How would you rate your fatigue on average? (P) Moderate  How would you rate your pain on average? (P) 6    PROMIS GLOBAL SCORES    Mental health question re-calculation - no clinical value - (P) 4  Physical health question re-calculation - no clinical value - (P) 3  Pain question re-calculation - no clinical value - (P) 3  Global Mental Health Score - (P) 13  Global Physical Health Score - (P) 11  PROMIS TOTAL - SUBSCORES - (P) 24      [id number] PROMIS HEALTH ORGANIZATION AND PROMIS COOPERATIVE GROUP VERSION 1.1      Referral / Collaboration:  Referral to another professional/service is not indicated at this time..    Anticipated number of session for this episode of care: 7-9  Anticipation frequency of session: Every other week  Anticipated Duration of each session: 38-52 minutes  Treatment plan will be reviewed in 90 days or when goals have been changed.       MeasurableTreatment Goal(s) related to diagnosis / functional impairment(s)  Goal 1: Patient will experience a reduction in depressive symptoms along with a corresponding increase in positive emotion and life satisfaction.      Objective #A (Patient Action)    Patient will Increase interest, engagement, and pleasure in doing things.  Status: Continued - Date(s): 6/3/2022    Intervention(s)  Therapist will help patient identify pleasant and mastery  "oriented events that elicit positive, relaxed mood.    Objective #B  Patient will Decrease frequency and intensity of feeling down, depressed, hopeless.  Status: Continued - Date(s): 6/3/2022    Intervention(s)  Therapist will introduce patient to cognitive-behavioral and acceptance and commitment therapy topics aimed to help reduce depression and anxiety    Objective #C  Patient will Identify negative self-talk and behaviors: challenge core beliefs, myths, and actions  Improve concentration, focus, and mindfulness in daily activities .  Status: Continued - Date(s): 6/3/2022    Intervention(s)  Therapist will help patient identify and manage negative self-talk and automatic thoughts; introduce patient to cognitive distortions; help patient develop cognitive diffusion techniques      Goal 2: Patient will experience a reduction in anxious symptoms, along with a corresponding increase in relaxed emotional states and life satisfaction.      Objective #A (Patient Action)  Patient will use cognitive-behavioral and thought diffusion strategies identified in therapy to challenge anxious thoughts.    Status: Continued - Date(s): 6/3/2022    Intervention(s)  Therapist will utilize CBT and ACT ideas to help patient challenge anxious thoughts and reduce intensity/duration of anxious distress    Objective #B  Patient will use \"worry time\" each day for 15 minutes of scheduled worry and then defer obsessive or anxious thinking until the next structured worry time.    Status: Continued - Date(s): 6/3/2022    Intervention(s)  Therapist will teach patient how to effectively utilize worry time and/or thought logs/journals each day and incorporate more relaxation behaviors into their routine.    Objective #C  Patient will identify the stressors which contribute to feelings of anxiety  Patient will increase engagement in adaptive coping skills and recreational activities, such as exercise and healthy socialization, to manage " distress.  Status: Continued - Date(s): 6/3/2022    Intervention(s)  Therapist will help patient identify triggers/situational factors that contribute to anxiety and behavioral skills aimed to manage anxious distress.      Goal 3: Patient will learn more adaptive ways to manage chronic pain      Objective #A (Patient Action)    Status: Continued - Date(s): 6/3/2022    Patient will identify 2-4 strategies for managing pain.    Intervention(s)  Therapist will teach distraction skills aimed to help manage chronic pain and utilize ACT/CBT ideas to help with this (e.g. relaxation).    Objective #B  Patient will state understanding of stressors and relationship to physical symptoms.    Status: Continued - Date(s): 6/3/2022      Other Possible Therapeutic Intervention(s):    Psycho-education regarding mental health diagnoses and treatment options    Supportive Therapy    Provide affirmations, reflections, and establish working rapport    Emphasize and reflect on strength of therapeutic relationship    Skills training    Explore skills useful to client in current situation.    Skills include assertiveness, communication, conflict management, problem-solving, relaxation, etc.    Solution-Focused Therapy    Explore patterns in patient's relationships and discuss options for new behaviors.    Explore patterns in patient's actions and choices and discuss options for new behaviors.    Cognitive-behavioral Therapy    Discuss common cognitive distortions, identify them in patient's life.    Explore ways to challenge, replace, and act against these cognitions.    Acceptance and Commitment Therapy    Explore and identify important values in patient's life.    Discuss ways to commit to behavioral activation around these values.    Psychodynamic psychotherapy    Discuss patient's emotional dynamics and issues and how they impact behaviors.    Explore patient's history of relationships and how they impact present behaviors.    Explore  how to work with and make changes in these schemas and patterns.    Narrative Therapy    Explore the patient's story of his/her life from his/her perspective.    Explore alternate ways of understanding their experience, identifying exceptions, developing new themes.    Interpersonal Psychotherapy    Explore patterns in relationships that are effective or ineffective at helping patient reach their goals, find satisfying experience.    Discuss new patterns or behaviors to engage in for improved social functioning.    Behavioral Activation    Discuss steps patient can take to become more involved in meaningful activity.    Identify barriers to these activities and explore possible solutions.    Mindfulness-Based Strategies    Discuss skills based on development and application of mindfulness.    Skills drawn from compassion-focused therapy, dialectical behavior therapy, mindfulness-based stress reduction, mindfulness-based cognitive therapy, etc.      Patient has reviewed and agreed to the above plan.    Crisis Resources    Refer to the resources below as needed.    Steps to care for yourself    1. If you are currently in counseling, call your counselor for an appointment  2. Call the local crisis resources below if needed.  3. Contact friends or family for support.  4. Get more exercise.  5. Do activities you enjoy.  6. Eat a well-balanced diet and drink plenty of fluids.  7. Rest as needed.  8. Limit alcohol and recreational drugs. These can worsen depression.    When to contact your primary care provider       You have thoughts of harming or killing yourself but have not made a plan to carry it out.    Your depression gets in the way of daily activities.    You are often unable to sleep.    You need help cutting back on alcohol or recreational drugs.    When to call 911 or go to the Emergency Room     Get emergency help right away if you have any of the following:    You are planning to harm or kill yourself and you  have a way to carry out the plan.     You have injured yourself or others. Or, you think you will.    You feel confused or are having trouble thinking or remembering.    You are having delusions (false beliefs).    You are hearing voices or seeing things that aren t there.    You are feeling psychotic (paranoid, fearful, restless, agitated, nervous, racing thoughts or speech)    Crisis Resources     These hotlines are for both adults and children. The and are open 24 hours a day, 7 days a week unless noted otherwise.    988 - National Suicide and Crisis Lifeline  If you or someone you know needs support now, call or text 988 or chat [facility]Payline.org. 988 connects you with a trained crisis counselor who can help.    National Suicide Prevention Lifeline   2-110-359-TALK (5089)    Crisis Text Line    www.crisistextline.org  Text HOME to 607334 from anywhere in the [place], anytime, about any type of crisis. A live, trained crisis counselor will receive the text and respond quickly.    [place] Lifeline for LGBTQ Youth  A national crisis intervention and suicide lifeline for LGBTQ youth under 25. Provides a safe place to talk without judgement.   Call 1-427.182.9429; text START to 052703 or visit www.thetrevorproject.org to talk to a trained counselor.  For [facility] crisis numbers, visit the [facility] website at:  https://[place].gov/dhs/people-we-serve/adults/health-care/mental-health/resources/crisis-contacts.jsp      Héctor Higuera Psy.D, LP   Behavioral Health Clinician   [facility]     6/3/2022

## 2022-08-06 ENCOUNTER — MYC MEDICAL ADVICE (OUTPATIENT)
Dept: FAMILY MEDICINE | Facility: CLINIC | Age: 32
End: 2022-08-06

## 2022-08-08 DIAGNOSIS — N39.0 URINARY TRACT INFECTION WITHOUT HEMATURIA, SITE UNSPECIFIED: ICD-10-CM

## 2022-08-08 NOTE — TELEPHONE ENCOUNTER
gabapentin (NEURONTIN) 250 MG/5ML solution: addressed in 8-3-22 encounter, sent to provider this AM 8-8-22

## 2022-08-08 NOTE — TELEPHONE ENCOUNTER
gabapentin (NEURONTIN) 250 MG/5ML solution     Last Written Prescription Date:  2/18/22  Last Fill Quantity: 1800ml,   # refills: 5  Last Office Visit : 5/23/22  Future Office visit:  none    Routing refill request to provider for review/approval because:  Drug not on the [facility] refill protocol   Thank-you, Lucai WEBB RN Medication Refill Team

## 2022-08-09 RX ORDER — GABAPENTIN 250 MG/5ML
1000 SOLUTION ORAL 3 TIMES DAILY
Qty: 1800 ML | Refills: 5 | Status: SHIPPED | OUTPATIENT
Start: 2022-08-09 | End: 2023-02-02

## 2022-08-12 ENCOUNTER — TELEPHONE (OUTPATIENT)
Dept: FAMILY MEDICINE | Facility: CLINIC | Age: 32
End: 2022-08-12

## 2022-08-12 RX ORDER — LEVOFLOXACIN 25 MG/ML
SOLUTION ORAL
Qty: 480 ML | Refills: 1 | Status: SHIPPED | OUTPATIENT
Start: 2022-08-12 | End: 2023-06-02

## 2022-08-12 NOTE — TELEPHONE ENCOUNTER
LEVOFLOXACIN 25MG/ML SOLUTION  Last Written Prescription Date:   3/29/2022  Last Fill Quantity: 140,   # refills: 0  Last Office Visit :  5/23/2022  Future Office visit:  None    Routing refill request to provider for review/approval because:  Drug not on the [facility] or [facility] refill protocol or controlled substance      Georgina Sy RN  Central Triage Red Flags/Med Refills

## 2022-08-18 ENCOUNTER — VIRTUAL VISIT (OUTPATIENT)
Dept: BEHAVIORAL HEALTH | Facility: CLINIC | Age: 32
End: 2022-08-18
Payer: COMMERCIAL

## 2022-08-18 DIAGNOSIS — F33.1 MAJOR DEPRESSIVE DISORDER, RECURRENT EPISODE, MODERATE (H): Primary | ICD-10-CM

## 2022-08-18 PROCEDURE — 90834 PSYTX W PT 45 MINUTES: CPT | Mod: GT | Performed by: PSYCHOLOGIST

## 2022-08-18 NOTE — PROGRESS NOTES
[facility]: Integrated Behavioral Health  August 18, 2022      Behavioral Health Clinician Progress Note    Patient Name: Lanie Neil           Service Type: Video appointment      Service Location:  Video appointment      Session Start Time: 2:05  Session End Time: 2:55      Session Length: 38 - 52      Attendees: Patient    Visit Activities (Refresh list every visit): [facility] Only    Provider verified identity through the following two step process.  Patient provided:  Patient photo and Patient is known previously to provider    Telemedicine Visit: The patient's condition can be safely assessed and treated via synchronous audio and visual telemedicine encounter.      Reason for Telemedicine Visit: Patient has requested telehealth visit and Services only offered telehealth    Originating Site (Patient Location): Patient's home    Distant Site (Provider Location): Provider Remote Setting- Home Office    Consent:  The patient/guardian has verbally consented to: the potential risks and benefits of telemedicine (video visit) versus in person care; bill my insurance or make self-payment for services provided; and responsibility for payment of non-covered services.     Mode of Communication:  Video Conference via ItsGoinOn    As the provider I attest to compliance with applicable laws and regulations related to telemedicine.      Diagnostic Assessment Date: 1/19/2021  Treatment Plan Review Date: 9/3/2022  See Flowsheets for today's PHQ-9 and KATHARINE-7 results  Previous PHQ-9:   PHQ-9 SCORE 12/15/2020 3/16/2021 6/30/2021   PHQ-9 Total Score - - -   PHQ-9 Total Score MyChart 2 (Minimal depression) 2 (Minimal depression) 2 (Minimal depression)   PHQ-9 Total Score 2 2 2     Previous KATHARINE-7:   KATHARINE-7 SCORE 12/15/2020 3/16/2021 6/30/2021   Total Score 1 (minimal anxiety) 1 (minimal anxiety) 1 (minimal anxiety)   Total Score 1 1 1       DATA  Extended Session (60+ minutes): No  Interactive  Complexity: No  Crisis: No    Treatment Objective(s) Addressed in This Session:  Target Behavior(s): disease management/lifestyle changes      Depressed Mood: Decrease frequency and intensity of feeling down, depressed, hopeless  Identify negative self-talk and behaviors: challenge core beliefs, myths, and actions  Anxiety: will develop more effective coping skills to manage anxiety symptoms  Psychological distress related to Pain    Current Stressors / Issues:  [facility] [place] met with Lanie today to continue supporting her treatment of depression, adjustment to chronic disease management, and relationship problems. Began with processing how our last session unfolded. Provided interpersonal feedback aimed to help facilitate Lanie developing insight to relationship dynamics. Explored the role of looking for validation from others, how this seems to be a difficult benchmark for her to obtain, despite working on lowering expectations with others, per her report. Explored relational dynamics and related automatic thoughts with her father, how these affect her other relationships. Processed the idea of forgiveness of past hurts, doing this for herself not others to this end. Utilized elements of interpersonal psychotherapy including observational feedback, exploring maladaptive relationship patterns, and identifying new thoughts/behaviors to try to improve quality of life.         Progress on Treatment Objective(s) / Homework:  Satisfactory progress - ACTION (Actively working towards change); Intervened by reinforcing change plan / affirming steps taken       Acceptance and Commitment Therapy    Explore and identify important values in patient's life.    Discuss ways to commit to behavioral activation around these values.    Interpersonal Psychotherapy    Explore patterns in relationships that are effective or ineffective at helping patient reach their goals, find satisfying experience.    Discuss new patterns or behaviors to  engage in for improved social functioning.      Care Plan review completed: No    Medication Review:  No changes to current psychiatric medication(s)    Medication Compliance:  NA    Changes in Health Issues:   None reported    Chemical Use Review:   Substance Use: Chemical use reviewed, no active concerns identified      Tobacco Use: No current tobacco use.      Assessment: Current Emotional / Mental Status (status of significant symptoms):  Risk status (Self / Other harm or suicidal ideation)  Patient has had a history of suicidal ideation: passive thoughts only; easily controlled - denied any recently    Patient denies current fears or concerns for personal safety.  Patient denies current or recent suicidal ideation or behaviors.  Patient denies current or recent homicidal ideation or behaviors.  Patient denies current or recent self injurious behavior or ideation.  Patient denies other safety concerns.     A safety and risk management plan has not been developed at this time, however patient was encouraged to call [place] [phone] should there be a change in any of these risk factors.      Kane Suicide Severity Rating Scale (Short Version)  Kane Suicide Severity Rating (Short Version) 2/18/2021   Over the past 2 weeks have you felt down, depressed, or hopeless? yes   Over the past 2 weeks have you had thoughts of killing yourself? no   Have you ever attempted to kill yourself? no     Kane Suicide Severity Rating Scale (Lifetime/Recent)  Kane Suicide Severity Rating (Lifetime/Recent) 8/13/2021   Wish to be Dead (Lifetime) No   Non-Specific Active Suicidal Thoughts (Lifetime) No   Most Severe Ideation Rating (Lifetime) NA   Frequency (Lifetime) NA   Duration (Lifetime) NA   Controllability (Lifetime) NA   Protective Factors  (Lifetime) NA   Reasons for Ideation (Lifetime) NA   RETIRED: 1. Wish to be Dead (Recent) No   RETIRED: 2. Non-Specific Active Suicidal Thoughts (Recent) No   3. Active  Suicidal Ideation with any Methods (Not Plan) Without Intent to Act (Lifetime) No   RETIRED: 3. Active Suicidal Ideation with any Methods (Not Plan) Without Intent to Act (Recent) No   RETIRE: 4. Active Suicidal Ideation with Some Intent to Act, Without Specific Plan (Lifetime) No   4. Active Suicidal Ideation with Some Intent to Act, Without Specific Plan (Recent) No   RETIRE: 5. Active Suicidal Ideation with Specific Plan and Intent (Lifetime) No   RETIRED: 5. Active Suicidal Ideation with Specific Plan and Intent (Recent) No   Most Severe Ideation Rating (Past Month) NA   Frequency (Past Month) NA   Duration (Past Month) NA   Controllability (Past Month) NA   Protective Factors (Past Month) NA   Reasons for Ideation (Past Month) NA   Actual Attempt (Lifetime) No   Actual Attempt (Past 3 Months) No   Has subject engaged in non-suicidal self-injurious behavior? (Lifetime) No   Has subject engaged in non-suicidal self-injurious behavior? (Past 3 Months) No   Interrupted Attempts (Lifetime) No   Interrupted Attempts (Past 3 Months) No   Aborted or Self-Interrupted Attempt (Lifetime) No   Aborted or Self-Interrupted Attempt (Past 3 Months) No   Preparatory Acts or Behavior (Lifetime) No   Preparatory Acts or Behavior (Past 3 Months) No   Most Recent Attempt Actual Lethality Code NA   Most Lethal Attempt Actual Lethality Code NA   Initial/First Attempt Actual Lethality Code NA         Appearance:   Appropriate   Eye Contact:   Good   Psychomotor Behavior: Normal   Attitude:   Cooperative   Orientation:   All  Speech   Rate / Production: Normal    Volume:  Normal   Mood:    Depressed    Affect:    Tearful  Thought Content:  Rumination   Thought Form:  Coherent  Logical   Insight:    Good     Diagnoses:  1. Major depressive disorder, recurrent episode, moderate (H)        Collateral Reports Completed:  [facility] will coordinate with care team as needed    Plan: (Homework, other):  Lanie was scheduled to RTC in two weeks for  help addressing depressive symptoms, pain, and relationship conflicts.  Research pain support groups. CD Recommendations: No indications of CD issues.     Héctor Higuera Psy.D, LP August 18, 2022            _______________________________________________________________________________                                              Individual Treatment Plan    Patient's Name: Lanie Neil  YOB: 1990    Date of Creation: 3/4/2022  Date Treatment Plan Last Reviewed/Revised: 6/3/2022    DSM5 Diagnoses: 296.32 (F33.1) Major Depressive Disorder, Recurrent Episode, Moderate With anxious distress  Psychosocial / Contextual Factors: Chronic pain, SMA, disabled  PROMIS (reviewed every 90 days):     PROMIS-10    In general, would you say your health is: (P) Excellent    In general, would you say your quality of life is: (P) Good    In general, how would you rate your physical health? (P) Very good    In general, how would you rate your mental health, including your mood and your ability to think? (P) Good    In general, how would you rate your satisfaction with your social activities and relationships? (P) Good    In general, please rate how well you carry out your usual social activities and roles. (This includes activities at home, at work and in your community, and responsibilities as a parent, child, spouse, employee, friend, etc.) (P) Very good    To what extent are you able to carry out your everyday physical activities such as walking, climbing stairs, carrying groceries, or moving a chair? (P) Not at all    In the past 7 days,  How often have you been bothered by emotional problems such as feeling anxious, depressed or irritable? (P) Rarely  How would you rate your fatigue on average? (P) Moderate  How would you rate your pain on average? (P) 6    PROMIS GLOBAL SCORES    Mental health question re-calculation - no clinical value - (P) 4  Physical health question re-calculation - no clinical value -  (P) 3  Pain question re-calculation - no clinical value - (P) 3  Global Mental Health Score - (P) 13  Global Physical Health Score - (P) 11  PROMIS TOTAL - SUBSCORES - (P) 24      [id number] PROMIS HEALTH ORGANIZATION AND PROMIS COOPERATIVE GROUP VERSION 1.1      Referral / Collaboration:  Referral to another professional/service is not indicated at this time..    Anticipated number of session for this episode of care: 7-9  Anticipation frequency of session: Every other week  Anticipated Duration of each session: 38-52 minutes  Treatment plan will be reviewed in 90 days or when goals have been changed.       MeasurableTreatment Goal(s) related to diagnosis / functional impairment(s)  Goal 1: Patient will experience a reduction in depressive symptoms along with a corresponding increase in positive emotion and life satisfaction.      Objective #A (Patient Action)    Patient will Increase interest, engagement, and pleasure in doing things.  Status: Continued - Date(s): 6/3/2022    Intervention(s)  Therapist will help patient identify pleasant and mastery oriented events that elicit positive, relaxed mood.    Objective #B  Patient will Decrease frequency and intensity of feeling down, depressed, hopeless.  Status: Continued - Date(s): 6/3/2022    Intervention(s)  Therapist will introduce patient to cognitive-behavioral and acceptance and commitment therapy topics aimed to help reduce depression and anxiety    Objective #C  Patient will Identify negative self-talk and behaviors: challenge core beliefs, myths, and actions  Improve concentration, focus, and mindfulness in daily activities .  Status: Continued - Date(s): 6/3/2022    Intervention(s)  Therapist will help patient identify and manage negative self-talk and automatic thoughts; introduce patient to cognitive distortions; help patient develop cognitive diffusion techniques      Goal 2: Patient will experience a reduction in anxious symptoms, along with a  "corresponding increase in relaxed emotional states and life satisfaction.      Objective #A (Patient Action)  Patient will use cognitive-behavioral and thought diffusion strategies identified in therapy to challenge anxious thoughts.    Status: Continued - Date(s): 6/3/2022    Intervention(s)  Therapist will utilize CBT and ACT ideas to help patient challenge anxious thoughts and reduce intensity/duration of anxious distress    Objective #B  Patient will use \"worry time\" each day for 15 minutes of scheduled worry and then defer obsessive or anxious thinking until the next structured worry time.    Status: Continued - Date(s): 6/3/2022    Intervention(s)  Therapist will teach patient how to effectively utilize worry time and/or thought logs/journals each day and incorporate more relaxation behaviors into their routine.    Objective #C  Patient will identify the stressors which contribute to feelings of anxiety  Patient will increase engagement in adaptive coping skills and recreational activities, such as exercise and healthy socialization, to manage distress.  Status: Continued - Date(s): 6/3/2022    Intervention(s)  Therapist will help patient identify triggers/situational factors that contribute to anxiety and behavioral skills aimed to manage anxious distress.      Goal 3: Patient will learn more adaptive ways to manage chronic pain      Objective #A (Patient Action)    Status: Continued - Date(s): 6/3/2022    Patient will identify 2-4 strategies for managing pain.    Intervention(s)  Therapist will teach distraction skills aimed to help manage chronic pain and utilize ACT/CBT ideas to help with this (e.g. relaxation).    Objective #B  Patient will state understanding of stressors and relationship to physical symptoms.    Status: Continued - Date(s): 6/3/2022      Other Possible Therapeutic Intervention(s):    Psycho-education regarding mental health diagnoses and treatment options    Supportive " Therapy    Provide affirmations, reflections, and establish working rapport    Emphasize and reflect on strength of therapeutic relationship    Skills training    Explore skills useful to client in current situation.    Skills include assertiveness, communication, conflict management, problem-solving, relaxation, etc.    Solution-Focused Therapy    Explore patterns in patient's relationships and discuss options for new behaviors.    Explore patterns in patient's actions and choices and discuss options for new behaviors.    Cognitive-behavioral Therapy    Discuss common cognitive distortions, identify them in patient's life.    Explore ways to challenge, replace, and act against these cognitions.    Acceptance and Commitment Therapy    Explore and identify important values in patient's life.    Discuss ways to commit to behavioral activation around these values.    Psychodynamic psychotherapy    Discuss patient's emotional dynamics and issues and how they impact behaviors.    Explore patient's history of relationships and how they impact present behaviors.    Explore how to work with and make changes in these schemas and patterns.    Narrative Therapy    Explore the patient's story of his/her life from his/her perspective.    Explore alternate ways of understanding their experience, identifying exceptions, developing new themes.    Interpersonal Psychotherapy    Explore patterns in relationships that are effective or ineffective at helping patient reach their goals, find satisfying experience.    Discuss new patterns or behaviors to engage in for improved social functioning.    Behavioral Activation    Discuss steps patient can take to become more involved in meaningful activity.    Identify barriers to these activities and explore possible solutions.    Mindfulness-Based Strategies    Discuss skills based on development and application of mindfulness.    Skills drawn from compassion-focused therapy, dialectical  behavior therapy, mindfulness-based stress reduction, mindfulness-based cognitive therapy, etc.      Patient has reviewed and agreed to the above plan.    Crisis Resources    Refer to the resources below as needed.    Steps to care for yourself    1. If you are currently in counseling, call your counselor for an appointment  2. Call the local crisis resources below if needed.  3. Contact friends or family for support.  4. Get more exercise.  5. Do activities you enjoy.  6. Eat a well-balanced diet and drink plenty of fluids.  7. Rest as needed.  8. Limit alcohol and recreational drugs. These can worsen depression.    When to contact your primary care provider       You have thoughts of harming or killing yourself but have not made a plan to carry it out.    Your depression gets in the way of daily activities.    You are often unable to sleep.    You need help cutting back on alcohol or recreational drugs.    When to call 911 or go to the Emergency Room     Get emergency help right away if you have any of the following:    You are planning to harm or kill yourself and you have a way to carry out the plan.     You have injured yourself or others. Or, you think you will.    You feel confused or are having trouble thinking or remembering.    You are having delusions (false beliefs).    You are hearing voices or seeing things that aren t there.    You are feeling psychotic (paranoid, fearful, restless, agitated, nervous, racing thoughts or speech)    Crisis Resources     These hotlines are for both adults and children. The and are open 24 hours a day, 7 days a week unless noted otherwise.    988 - National Suicide and Crisis Lifeline  If you or someone you know needs support now, call or text 298 or chat 988TriggerMailline.org. 988 connects you with a trained crisis counselor who can help.    National Suicide Prevention Lifeline   8-178-669-TALK (0637)    Crisis Text Line    www.crisistextline.org  Text HOME to 278743 from  anywhere in the [place], anytime, about any type of crisis. A live, trained crisis counselor will receive the text and respond quickly.    [place] Lifeline for LGBTQ Youth  A national crisis intervention and suicide lifeline for LGBTQ youth under 25. Provides a safe place to talk without judgement.   Call 1-175.134.8978; text START to 053579 or visit www.thetrevorproject.org to talk to a trained counselor.  For county crisis numbers, visit the [place] DHS website at:  https://[place].gov/dhs/people-we-serve/adults/health-care/mental-health/resources/crisis-contacts.jsp      Héctor Higuera Psy.D, LP   Behavioral Health Clinician   [facility]     6/3/2022

## 2022-08-23 ENCOUNTER — TRANSFERRED RECORDS (OUTPATIENT)
Dept: HEALTH INFORMATION MANAGEMENT | Facility: CLINIC | Age: 32
End: 2022-08-23

## 2022-09-01 ENCOUNTER — TRANSFERRED RECORDS (OUTPATIENT)
Dept: HEALTH INFORMATION MANAGEMENT | Facility: CLINIC | Age: 32
End: 2022-09-01

## 2022-09-05 DIAGNOSIS — G12.9 SPINAL MUSCLE ATROPHY (H): ICD-10-CM

## 2022-09-06 NOTE — TELEPHONE ENCOUNTER
diazepam (VALIUM) 5 MG tablet 30 tablet 5 3/7/2022         Last Written Prescription Date:  3-7-2022  Last Fill Quantity: 30,   # refills: 5  Last Office Visit : 3-  Future Office visit:  NONE    Routing refill request to provider for review/approval because:  CONTROLLED MEDICATION      Kathleen M Doege RN

## 2022-09-07 ENCOUNTER — MYC MEDICAL ADVICE (OUTPATIENT)
Dept: HEMATOLOGY | Facility: CLINIC | Age: 32
End: 2022-09-07

## 2022-09-07 RX ORDER — DIAZEPAM 5 MG
TABLET ORAL
Qty: 30 TABLET | Refills: 3 | Status: SHIPPED | OUTPATIENT
Start: 2022-09-07 | End: 2023-02-06

## 2022-09-09 ENCOUNTER — VIRTUAL VISIT (OUTPATIENT)
Dept: BEHAVIORAL HEALTH | Facility: CLINIC | Age: 32
End: 2022-09-09
Payer: COMMERCIAL

## 2022-09-09 DIAGNOSIS — F33.1 MAJOR DEPRESSIVE DISORDER, RECURRENT EPISODE, MODERATE (H): Primary | ICD-10-CM

## 2022-09-09 PROCEDURE — 90837 PSYTX W PT 60 MINUTES: CPT | Mod: GT | Performed by: PSYCHOLOGIST

## 2022-09-09 NOTE — PROGRESS NOTES
[facility]: Integrated Behavioral Health  September 9, 2022      Behavioral Health Clinician Progress Note    Patient Name: Lanie Neil           Service Type: Video appointment      Service Location:  Video appointment      Session Start Time: 11:04  Session End Time: 11:59      Session Length: 53 - 60      Attendees: Patient    Visit Activities (Refresh list every visit): [facility] Only    Provider verified identity through the following two step process.  Patient provided:  Patient photo and Patient is known previously to provider    Telemedicine Visit: The patient's condition can be safely assessed and treated via synchronous audio and visual telemedicine encounter.      Reason for Telemedicine Visit: Patient has requested telehealth visit and Services only offered telehealth    Originating Site (Patient Location): Patient's home    Distant Site (Provider Location): Provider Remote Setting- Home Office    Consent:  The patient/guardian has verbally consented to: the potential risks and benefits of telemedicine (video visit) versus in person care; bill my insurance or make self-payment for services provided; and responsibility for payment of non-covered services.     Mode of Communication:  Video Conference via [employer]    As the provider I attest to compliance with applicable laws and regulations related to telemedicine.      Diagnostic Assessment Date: 1/19/2021  Treatment Plan Review Date: 12/9/2022  See Flowsheets for today's PHQ-9 and KATHARINE-7 results  Previous PHQ-9:   PHQ-9 SCORE 12/15/2020 3/16/2021 6/30/2021   PHQ-9 Total Score - - -   PHQ-9 Total Score MyChart 2 (Minimal depression) 2 (Minimal depression) 2 (Minimal depression)   PHQ-9 Total Score 2 2 2     Previous KATHARINE-7:   KATHARINE-7 SCORE 12/15/2020 3/16/2021 6/30/2021   Total Score 1 (minimal anxiety) 1 (minimal anxiety) 1 (minimal anxiety)   Total Score 1 1 1       DATA  Extended Session (60+ minutes): No  Interactive  Complexity: No  Crisis: No    Treatment Objective(s) Addressed in This Session:  Target Behavior(s): disease management/lifestyle changes      Depressed Mood: Decrease frequency and intensity of feeling down, depressed, hopeless  Identify negative self-talk and behaviors: challenge core beliefs, myths, and actions  Anxiety: will develop more effective coping skills to manage anxiety symptoms  Psychological distress related to Pain    Current Stressors / Issues:  [facility] met with Lanie today to continue supporting her treatment of depression, adjustment to chronic disease management, and relationship problems. Lanie notes having good days and bad days over the last three weeks. She describes today ongoing difficulty with her pain medication being gradually tapered down and related relationship difficulties. Long discussion today about pain, avoidance, and emotional factors related to pain. Discussed focusing on factors she can control, like her reactions/responses to others' comments about her pain experiences, which she recieves as invalidation. Explored how a sense of invalidation leads to a worse relationship with her pain and greater relationship difficulties. Utilized solution-focused ideas to help identify past successes in managing/coping with others comments about her pain (e.g reframing). Assigned HW to continue exploring adaptive responses to relationship cues/factors, such as invalidation. Briefly discussed difference between suffering and pain to this end, how she can manage the former.       Progress on Treatment Objective(s) / Homework:  Satisfactory progress - ACTION (Actively working towards change); Intervened by reinforcing change plan / affirming steps taken       Acceptance and Commitment Therapy    Explore and identify important values in patient's life.    Discuss ways to commit to behavioral activation around these values.    Interpersonal Psychotherapy    Explore patterns in relationships that  are effective or ineffective at helping patient reach their goals, find satisfying experience.    Discuss new patterns or behaviors to engage in for improved social functioning.      Care Plan review completed: No    Medication Review:  No changes to current psychiatric medication(s)    Medication Compliance:  NA    Changes in Health Issues:   None reported    Chemical Use Review:   Substance Use: Chemical use reviewed, no active concerns identified      Tobacco Use: No current tobacco use.      Assessment: Current Emotional / Mental Status (status of significant symptoms):  Risk status (Self / Other harm or suicidal ideation)  Patient has had a history of suicidal ideation: passive thoughts only; easily controlled - denied any recently    Patient denies current fears or concerns for personal safety.  Patient denies current or recent suicidal ideation or behaviors.  Patient denies current or recent homicidal ideation or behaviors.  Patient denies current or recent self injurious behavior or ideation.  Patient denies other safety concerns.     A safety and risk management plan has not been developed at this time, however patient was encouraged to call [place] [phone] should there be a change in any of these risk factors.      [place] Suicide Severity Rating Scale (Short Version)  [place] Suicide Severity Rating (Short Version) 2/18/2021   Over the past 2 weeks have you felt down, depressed, or hopeless? yes   Over the past 2 weeks have you had thoughts of killing yourself? no   Have you ever attempted to kill yourself? no     [place] Suicide Severity Rating Scale (Lifetime/Recent)  [place] Suicide Severity Rating (Lifetime/Recent) 8/13/2021   Wish to be Dead (Lifetime) No   Non-Specific Active Suicidal Thoughts (Lifetime) No   Most Severe Ideation Rating (Lifetime) NA   Frequency (Lifetime) NA   Duration (Lifetime) NA   Controllability (Lifetime) NA   Protective Factors  (Lifetime) NA   Reasons for Ideation  (Lifetime) NA   RETIRED: 1. Wish to be Dead (Recent) No   RETIRED: 2. Non-Specific Active Suicidal Thoughts (Recent) No   3. Active Suicidal Ideation with any Methods (Not Plan) Without Intent to Act (Lifetime) No   RETIRED: 3. Active Suicidal Ideation with any Methods (Not Plan) Without Intent to Act (Recent) No   RETIRE: 4. Active Suicidal Ideation with Some Intent to Act, Without Specific Plan (Lifetime) No   4. Active Suicidal Ideation with Some Intent to Act, Without Specific Plan (Recent) No   RETIRE: 5. Active Suicidal Ideation with Specific Plan and Intent (Lifetime) No   RETIRED: 5. Active Suicidal Ideation with Specific Plan and Intent (Recent) No   Most Severe Ideation Rating (Past Month) NA   Frequency (Past Month) NA   Duration (Past Month) NA   Controllability (Past Month) NA   Protective Factors (Past Month) NA   Reasons for Ideation (Past Month) NA   Actual Attempt (Lifetime) No   Actual Attempt (Past 3 Months) No   Has subject engaged in non-suicidal self-injurious behavior? (Lifetime) No   Has subject engaged in non-suicidal self-injurious behavior? (Past 3 Months) No   Interrupted Attempts (Lifetime) No   Interrupted Attempts (Past 3 Months) No   Aborted or Self-Interrupted Attempt (Lifetime) No   Aborted or Self-Interrupted Attempt (Past 3 Months) No   Preparatory Acts or Behavior (Lifetime) No   Preparatory Acts or Behavior (Past 3 Months) No   Most Recent Attempt Actual Lethality Code NA   Most Lethal Attempt Actual Lethality Code NA   Initial/First Attempt Actual Lethality Code NA         Appearance:   Appropriate   Eye Contact:   Good   Psychomotor Behavior: Normal   Attitude:   Cooperative   Orientation:   All  Speech   Rate / Production: Normal    Volume:  Normal   Mood:    Depressed    Affect:    Tearful  Thought Content:  Rumination   Thought Form:  Coherent  Logical   Insight:    Good     Diagnoses:  1. Major depressive disorder, recurrent episode, moderate (H)        Collateral Reports  Completed:  [facility] will coordinate with care team as needed    Plan: (Homework, other):  Lanie was scheduled to RTC in two weeks for help addressing depressive symptoms, pain, and relationship conflicts.  Research pain support groups. CD Recommendations: No indications of CD issues.     Héctor Higuera Psy.D, LP September 9, 2022            _______________________________________________________________________                                              Individual Treatment Plan    Patient's Name: Lanie Neil  YOB: 1990    Date of Creation: 3/4/2022  Date Treatment Plan Last Reviewed/Revised: 9/9/2022    DSM5 Diagnoses: 296.32 (F33.1) Major Depressive Disorder, Recurrent Episode, Moderate With anxious distress  Psychosocial / Contextual Factors: Chronic pain, SMA, disabled  PROMIS (reviewed every 90 days):     PROMIS-10    In general, would you say your health is: (P) Excellent    In general, would you say your quality of life is: (P) Good    In general, how would you rate your physical health? (P) Very good    In general, how would you rate your mental health, including your mood and your ability to think? (P) Good    In general, how would you rate your satisfaction with your social activities and relationships? (P) Good    In general, please rate how well you carry out your usual social activities and roles. (This includes activities at home, at work and in your community, and responsibilities as a parent, child, spouse, employee, friend, etc.) (P) Very good    To what extent are you able to carry out your everyday physical activities such as walking, climbing stairs, carrying groceries, or moving a chair? (P) Not at all    In the past 7 days,  How often have you been bothered by emotional problems such as feeling anxious, depressed or irritable? (P) Rarely  How would you rate your fatigue on average? (P) Moderate  How would you rate your pain on average? (P) 6    PROMIS GLOBAL  SCORES    Mental health question re-calculation - no clinical value - (P) 4  Physical health question re-calculation - no clinical value - (P) 3  Pain question re-calculation - no clinical value - (P) 3  Global Mental Health Score - (P) 13  Global Physical Health Score - (P) 11  PROMIS TOTAL - SUBSCORES - (P) 24      [id number] [facility] AND PROMIS COOPERATIVE GROUP VERSION 1.1          Referral / Collaboration:  Referral to another professional/service is not indicated at this time..    Anticipated number of session for this episode of care: 7-9  Anticipation frequency of session: Every other week  Anticipated Duration of each session: 38-52 minutes  Treatment plan will be reviewed in 90 days or when goals have been changed.       MeasurableTreatment Goal(s) related to diagnosis / functional impairment(s)  Goal 1: Patient will experience a reduction in depressive symptoms along with a corresponding increase in positive emotion and life satisfaction.      Objective #A (Patient Action)    Patient will Increase interest, engagement, and pleasure in doing things.  Status: Continued - Date(s): 9/9/2022    Intervention(s)  Therapist will help patient identify pleasant and mastery oriented events that elicit positive, relaxed mood.    Objective #B  Patient will Decrease frequency and intensity of feeling down, depressed, hopeless.  Status: Continued - Date(s): 9/9/2022    Intervention(s)  Therapist will introduce patient to cognitive-behavioral and acceptance and commitment therapy topics aimed to help reduce depression and anxiety    Objective #C  Patient will Identify negative self-talk and behaviors: challenge core beliefs, myths, and actions  Improve concentration, focus, and mindfulness in daily activities .  Status: Continued - Date(s): 9/9/2022    Intervention(s)  Therapist will help patient identify and manage negative self-talk and automatic thoughts; introduce patient to cognitive distortions;  "help patient develop cognitive diffusion techniques      Goal 2: Patient will experience a reduction in anxious symptoms, along with a corresponding increase in relaxed emotional states and life satisfaction.      Objective #A (Patient Action)  Patient will use cognitive-behavioral and thought diffusion strategies identified in therapy to challenge anxious thoughts.    Status: Continued - Date(s): 9/9/2022    Intervention(s)  Therapist will utilize CBT and ACT ideas to help patient challenge anxious thoughts and reduce intensity/duration of anxious distress    Objective #B  Patient will use \"worry time\" each day for 15 minutes of scheduled worry and then defer obsessive or anxious thinking until the next structured worry time.    Status: Continued - Date(s): 9/9/2022    Intervention(s)  Therapist will teach patient how to effectively utilize worry time and/or thought logs/journals each day and incorporate more relaxation behaviors into their routine.    Objective #C  Patient will identify the stressors which contribute to feelings of anxiety  Patient will increase engagement in adaptive coping skills and recreational activities, such as exercise and healthy socialization, to manage distress.  Status: Continued - Date(s): 9/9/2022    Intervention(s)  Therapist will help patient identify triggers/situational factors that contribute to anxiety and behavioral skills aimed to manage anxious distress.      Goal 3: Patient will learn more adaptive ways to manage chronic pain      Objective #A (Patient Action)    Status: Continued - Date(s): 9/9/2022    Patient will identify 2-4 strategies for managing pain.    Intervention(s)  Therapist will teach distraction skills aimed to help manage chronic pain and utilize ACT/CBT ideas to help with this (e.g. relaxation).    Objective #B  Patient will state understanding of stressors and relationship to physical symptoms.    Status: Continued - Date(s): 9/9/2022      Other Possible " Therapeutic Intervention(s):    Psycho-education regarding mental health diagnoses and treatment options    Supportive Therapy    Provide affirmations, reflections, and establish working rapport    Emphasize and reflect on strength of therapeutic relationship    Skills training    Explore skills useful to client in current situation.    Skills include assertiveness, communication, conflict management, problem-solving, relaxation, etc.    Solution-Focused Therapy    Explore patterns in patient's relationships and discuss options for new behaviors.    Explore patterns in patient's actions and choices and discuss options for new behaviors.    Cognitive-behavioral Therapy    Discuss common cognitive distortions, identify them in patient's life.    Explore ways to challenge, replace, and act against these cognitions.    Acceptance and Commitment Therapy    Explore and identify important values in patient's life.    Discuss ways to commit to behavioral activation around these values.    Psychodynamic psychotherapy    Discuss patient's emotional dynamics and issues and how they impact behaviors.    Explore patient's history of relationships and how they impact present behaviors.    Explore how to work with and make changes in these schemas and patterns.    Narrative Therapy    Explore the patient's story of his/her life from his/her perspective.    Explore alternate ways of understanding their experience, identifying exceptions, developing new themes.    Interpersonal Psychotherapy    Explore patterns in relationships that are effective or ineffective at helping patient reach their goals, find satisfying experience.    Discuss new patterns or behaviors to engage in for improved social functioning.    Behavioral Activation    Discuss steps patient can take to become more involved in meaningful activity.    Identify barriers to these activities and explore possible solutions.    Mindfulness-Based Strategies    Discuss skills  based on development and application of mindfulness.    Skills drawn from compassion-focused therapy, dialectical behavior therapy, mindfulness-based stress reduction, mindfulness-based cognitive therapy, etc.      Patient has reviewed and agreed to the above plan.    Crisis Resources    Refer to the resources below as needed.    Steps to care for yourself    1. If you are currently in counseling, call your counselor for an appointment  2. Call the local crisis resources below if needed.  3. Contact friends or family for support.  4. Get more exercise.  5. Do activities you enjoy.  6. Eat a well-balanced diet and drink plenty of fluids.  7. Rest as needed.  8. Limit alcohol and recreational drugs. These can worsen depression.    When to contact your primary care provider       You have thoughts of harming or killing yourself but have not made a plan to carry it out.    Your depression gets in the way of daily activities.    You are often unable to sleep.    You need help cutting back on alcohol or recreational drugs.    When to call 911 or go to the Emergency Room     Get emergency help right away if you have any of the following:    You are planning to harm or kill yourself and you have a way to carry out the plan.     You have injured yourself or others. Or, you think you will.    You feel confused or are having trouble thinking or remembering.    You are having delusions (false beliefs).    You are hearing voices or seeing things that aren t there.    You are feeling psychotic (paranoid, fearful, restless, agitated, nervous, racing thoughts or speech)    Crisis Resources     These hotlines are for both adults and children. The and are open 24 hours a day, 7 days a week unless noted otherwise.    988 - National Suicide and Crisis Lifeline  If you or someone you know needs support now, call or text 988 or chat [employer]line.org. 988 connects you with a trained crisis counselor who can help.    National Suicide  Prevention Lifeline   7-035-474-TALK (1363)    Crisis Text Line    www.crisistextline.org  Text HOME to 012137 from anywhere in the [place], anytime, about any type of crisis. A live, trained crisis counselor will receive the text and respond quickly.    [place] Lifeline for LGBTQ Youth  A national crisis intervention and suicide lifeline for LGBTQ youth under 25. Provides a safe place to talk without judgement.   Call 1-525.783.6148; text START to 663605 or visit www.thetrevorproject.org to talk to a trained counselor.  For [facility] crisis numbers, visit the [facility] website at:  https://[place].gov/dhs/people-we-serve/adults/health-care/mental-health/resources/crisis-contacts.jsp      Héctor Higuera Psy.D, LP   Behavioral Health Clinician   [facility]     6/3/2022

## 2022-09-10 ENCOUNTER — HEALTH MAINTENANCE LETTER (OUTPATIENT)
Age: 32
End: 2022-09-10

## 2022-09-22 ENCOUNTER — VIRTUAL VISIT (OUTPATIENT)
Dept: BEHAVIORAL HEALTH | Facility: CLINIC | Age: 32
End: 2022-09-22
Payer: COMMERCIAL

## 2022-09-22 DIAGNOSIS — Z91.199 NO-SHOW FOR APPOINTMENT: Primary | ICD-10-CM

## 2022-09-22 NOTE — PROGRESS NOTES
No Show:    This patient was a no show for this scheduled appointment. Call attempt went to . Left a message with instructions of how to reschedule.     Héctor Higuera, LP

## 2022-09-29 ENCOUNTER — VIRTUAL VISIT (OUTPATIENT)
Dept: BEHAVIORAL HEALTH | Facility: CLINIC | Age: 32
End: 2022-09-29
Payer: COMMERCIAL

## 2022-09-29 DIAGNOSIS — F33.1 MAJOR DEPRESSIVE DISORDER, RECURRENT EPISODE, MODERATE (H): Primary | ICD-10-CM

## 2022-09-29 PROCEDURE — 90837 PSYTX W PT 60 MINUTES: CPT | Mod: GT | Performed by: PSYCHOLOGIST

## 2022-09-29 NOTE — PROGRESS NOTES
[facility]: Integrated Behavioral Health  September 29, 2022      Behavioral Health Clinician Progress Note    Patient Name: Lanie Neil           Service Type: Video appointment      Service Location:  Video appointment      Session Start Time: 9:06  Session End Time: 9:59      Session Length: 53 - 60      Attendees: Patient    Visit Activities (Refresh list every visit): [facility] Only    Provider verified identity through the following two step process.  Patient provided:  Patient photo and Patient is known previously to provider    Telemedicine Visit: The patient's condition can be safely assessed and treated via synchronous audio and visual telemedicine encounter.      Reason for Telemedicine Visit: Patient has requested telehealth visit and Services only offered telehealth    Originating Site (Patient Location): Patient's home    Distant Site (Provider Location): Provider Remote Setting- Home Office    Consent:  The patient/guardian has verbally consented to: the potential risks and benefits of telemedicine (video visit) versus in person care; bill my insurance or make self-payment for services provided; and responsibility for payment of non-covered services.     Mode of Communication:  Video Conference via Tk20    As the provider I attest to compliance with applicable laws and regulations related to telemedicine.      Diagnostic Assessment Date: 1/19/2021  Treatment Plan Review Date: 12/9/2022  See Flowsheets for today's PHQ-9 and KATHARINE-7 results  Previous PHQ-9:   PHQ-9 SCORE 12/15/2020 3/16/2021 6/30/2021   PHQ-9 Total Score - - -   PHQ-9 Total Score MyChart 2 (Minimal depression) 2 (Minimal depression) 2 (Minimal depression)   PHQ-9 Total Score 2 2 2     Previous KATHARINE-7:   KATHARINE-7 SCORE 12/15/2020 3/16/2021 6/30/2021   Total Score 1 (minimal anxiety) 1 (minimal anxiety) 1 (minimal anxiety)   Total Score 1 1 1       DATA  Extended Session (60+ minutes): No  Interactive  "Complexity: No  Crisis: No    Treatment Objective(s) Addressed in This Session:  Target Behavior(s): disease management/lifestyle changes      Depressed Mood: Decrease frequency and intensity of feeling down, depressed, hopeless  Identify negative self-talk and behaviors: challenge core beliefs, myths, and actions  Anxiety: will develop more effective coping skills to manage anxiety symptoms  Psychological distress related to Pain    Current Stressors / Issues:  [facility] met with Lanie today to continue supporting her treatment of depression, adjustment to chronic disease management, and relationship problems. Lanie reports today \"failing\" her assignment to work on managing her reactions to pain and interpersonal conflicts. Reviewed recent conflicts that involved her experiencing greater difficulties with pain. Identified and reviewed patterns of escalation with her mother in particular. Long discussion about ACT ideas for pain. Utilized several ACT ideas to help Lanie notice a tendency to get \"hooked\" by certain thoughts or feelings and learning adaptive ways of \"unhooking\" from them. Explored concrete examples of this in daily practice. Discussed HW around practicing unhooking from thoughts and feelings.     Am I Hooked or Unhooked From My Thoughts?    Clues that I'm getting hooked and threatened by my thoughts:    1) Thoughts are Literal and Threatening; it s as if what we re thinking is actually present, here and now!    2) Thoughts are \"The Truth\"; we literally believe them!    3) Thoughts are Most Important; we take them seriously, and give them our full attention!    4) Thoughts are Orders; we automatically obey them!    5) Thoughts are Always Wise; we assume they know best and we follow their advice!     _____________________________________________________________________    Noticing when I'm unhooked and not threatened by my thoughts:    1) Thoughts are merely sounds, words, stories, bits of language, " "passing through our heads.    2) Thoughts may or may not be true. We don t automatically believe them.    3) Thoughts may or may not be important. We pay attention only if they re helpful.    4) Thoughts are not orders. We don t have to obey them.    5) Thoughts may or may not be wise. We don t automatically follow their advice.   Thoughts That Hook You:    What memories, fears, worries, self-criticisms, or other unhelpful thoughts do you dwell on or get \"stuck\" thinking about? What thoughts tend to hook you, jerk you around, and pull you out of your life?                 Life-Draining Actions:    What are you currently doing that makes your life worse in the long run: keeps you stuck, wastes your time or money, drains your energy; restricts your life; impacts negatively on your health, work, or relationships; maintains or worsens the problems your are dealing with?                     Feelings That Hook You:    What emotions, feelings, urges, impulses, or sensations tend to hook you and jerk you around or pull you into self-defeating actions?           Avoiding Challenging Situations:    What situations, activities, people, or places are you avoiding or staying away from? What have you quit, withdrawn from, dropped out of? What do you keep \"putting off\" until later?                           Progress on Treatment Objective(s) / Homework:  Satisfactory progress - ACTION (Actively working towards change); Intervened by reinforcing change plan / affirming steps taken       Acceptance and Commitment Therapy    Explore and identify important values in patient's life.    Discuss ways to commit to behavioral activation around these values.    Interpersonal Psychotherapy    Explore patterns in relationships that are effective or ineffective at helping patient reach their goals, find satisfying experience.    Discuss new patterns or behaviors to engage in for improved social functioning.      Care Plan review completed: " No    Medication Review:  No changes to current psychiatric medication(s)    Medication Compliance:  NA    Changes in Health Issues:   None reported    Chemical Use Review:   Substance Use: Chemical use reviewed, no active concerns identified      Tobacco Use: No current tobacco use.      Assessment: Current Emotional / Mental Status (status of significant symptoms):  Risk status (Self / Other harm or suicidal ideation)  Patient has had a history of suicidal ideation: passive thoughts only; easily controlled - denied any recently    Patient denies current fears or concerns for personal safety.  Patient denies current or recent suicidal ideation or behaviors.  Patient denies current or recent homicidal ideation or behaviors.  Patient denies current or recent self injurious behavior or ideation.  Patient denies other safety concerns.     A safety and risk management plan has not been developed at this time, however patient was encouraged to call [place] [phone] should there be a change in any of these risk factors.      Bishop Suicide Severity Rating Scale (Short Version)  Bishop Suicide Severity Rating (Short Version) 2/18/2021   Over the past 2 weeks have you felt down, depressed, or hopeless? yes   Over the past 2 weeks have you had thoughts of killing yourself? no   Have you ever attempted to kill yourself? no     Bishop Suicide Severity Rating Scale (Lifetime/Recent)  Bishop Suicide Severity Rating (Lifetime/Recent) 8/13/2021   Wish to be Dead (Lifetime) No   Non-Specific Active Suicidal Thoughts (Lifetime) No   Most Severe Ideation Rating (Lifetime) NA   Frequency (Lifetime) NA   Duration (Lifetime) NA   Controllability (Lifetime) NA   Protective Factors  (Lifetime) NA   Reasons for Ideation (Lifetime) NA   RETIRED: 1. Wish to be Dead (Recent) No   RETIRED: 2. Non-Specific Active Suicidal Thoughts (Recent) No   3. Active Suicidal Ideation with any Methods (Not Plan) Without Intent to Act (Lifetime)  No   RETIRED: 3. Active Suicidal Ideation with any Methods (Not Plan) Without Intent to Act (Recent) No   RETIRE: 4. Active Suicidal Ideation with Some Intent to Act, Without Specific Plan (Lifetime) No   4. Active Suicidal Ideation with Some Intent to Act, Without Specific Plan (Recent) No   RETIRE: 5. Active Suicidal Ideation with Specific Plan and Intent (Lifetime) No   RETIRED: 5. Active Suicidal Ideation with Specific Plan and Intent (Recent) No   Most Severe Ideation Rating (Past Month) NA   Frequency (Past Month) NA   Duration (Past Month) NA   Controllability (Past Month) NA   Protective Factors (Past Month) NA   Reasons for Ideation (Past Month) NA   Actual Attempt (Lifetime) No   Actual Attempt (Past 3 Months) No   Has subject engaged in non-suicidal self-injurious behavior? (Lifetime) No   Has subject engaged in non-suicidal self-injurious behavior? (Past 3 Months) No   Interrupted Attempts (Lifetime) No   Interrupted Attempts (Past 3 Months) No   Aborted or Self-Interrupted Attempt (Lifetime) No   Aborted or Self-Interrupted Attempt (Past 3 Months) No   Preparatory Acts or Behavior (Lifetime) No   Preparatory Acts or Behavior (Past 3 Months) No   Most Recent Attempt Actual Lethality Code NA   Most Lethal Attempt Actual Lethality Code NA   Initial/First Attempt Actual Lethality Code NA         Appearance:   Appropriate   Eye Contact:   Good   Psychomotor Behavior: Normal   Attitude:   Cooperative   Orientation:   All  Speech   Rate / Production: Normal    Volume:  Normal   Mood:    Depressed    Affect:    Tearful  Thought Content:  Rumination   Thought Form:  Coherent  Logical   Insight:    Good     Diagnoses:  1. Major depressive disorder, recurrent episode, moderate (H)        Collateral Reports Completed:  [facility] will coordinate with care team as needed    Plan: (Homework, other):  Lanie was scheduled to RTC in two weeks for help addressing depressive symptoms, pain, and relationship conflicts.  ACT  ideas provided. Research pain support groups. CD Recommendations: No indications of CD issues.     Héctor Higuera Psy.D, LP September 29, 2022            _______________________________________________________________________                                              Individual Treatment Plan    Patient's Name: Lanie Neil  YOB: 1990    Date of Creation: 3/4/2022  Date Treatment Plan Last Reviewed/Revised: 9/9/2022    DSM5 Diagnoses: 296.32 (F33.1) Major Depressive Disorder, Recurrent Episode, Moderate With anxious distress  Psychosocial / Contextual Factors: Chronic pain, SMA, disabled  PROMIS (reviewed every 90 days):     PROMIS-10    In general, would you say your health is: (P) Excellent    In general, would you say your quality of life is: (P) Good    In general, how would you rate your physical health? (P) Very good    In general, how would you rate your mental health, including your mood and your ability to think? (P) Good    In general, how would you rate your satisfaction with your social activities and relationships? (P) Good    In general, please rate how well you carry out your usual social activities and roles. (This includes activities at home, at work and in your community, and responsibilities as a parent, child, spouse, employee, friend, etc.) (P) Very good    To what extent are you able to carry out your everyday physical activities such as walking, climbing stairs, carrying groceries, or moving a chair? (P) Not at all    In the past 7 days,  How often have you been bothered by emotional problems such as feeling anxious, depressed or irritable? (P) Rarely  How would you rate your fatigue on average? (P) Moderate  How would you rate your pain on average? (P) 6    PROMIS GLOBAL SCORES    Mental health question re-calculation - no clinical value - (P) 4  Physical health question re-calculation - no clinical value - (P) 3  Pain question re-calculation - no clinical value - (P)  3  Global Mental Health Score - (P) 13  Global Physical Health Score - (P) 11  PROMIS TOTAL - SUBSCORES - (P) 24      [id number] PROMIS HEALTH ORGANIZATION AND PROMIS COOPERATIVE GROUP VERSION 1.1          Referral / Collaboration:  Referral to another professional/service is not indicated at this time..    Anticipated number of session for this episode of care: 7-9  Anticipation frequency of session: Every other week  Anticipated Duration of each session: 38-52 minutes  Treatment plan will be reviewed in 90 days or when goals have been changed.       MeasurableTreatment Goal(s) related to diagnosis / functional impairment(s)  Goal 1: Patient will experience a reduction in depressive symptoms along with a corresponding increase in positive emotion and life satisfaction.      Objective #A (Patient Action)    Patient will Increase interest, engagement, and pleasure in doing things.  Status: Continued - Date(s): 9/9/2022    Intervention(s)  Therapist will help patient identify pleasant and mastery oriented events that elicit positive, relaxed mood.    Objective #B  Patient will Decrease frequency and intensity of feeling down, depressed, hopeless.  Status: Continued - Date(s): 9/9/2022    Intervention(s)  Therapist will introduce patient to cognitive-behavioral and acceptance and commitment therapy topics aimed to help reduce depression and anxiety    Objective #C  Patient will Identify negative self-talk and behaviors: challenge core beliefs, myths, and actions  Improve concentration, focus, and mindfulness in daily activities .  Status: Continued - Date(s): 9/9/2022    Intervention(s)  Therapist will help patient identify and manage negative self-talk and automatic thoughts; introduce patient to cognitive distortions; help patient develop cognitive diffusion techniques      Goal 2: Patient will experience a reduction in anxious symptoms, along with a corresponding increase in relaxed emotional states and life  "satisfaction.      Objective #A (Patient Action)  Patient will use cognitive-behavioral and thought diffusion strategies identified in therapy to challenge anxious thoughts.    Status: Continued - Date(s): 9/9/2022    Intervention(s)  Therapist will utilize CBT and ACT ideas to help patient challenge anxious thoughts and reduce intensity/duration of anxious distress    Objective #B  Patient will use \"worry time\" each day for 15 minutes of scheduled worry and then defer obsessive or anxious thinking until the next structured worry time.    Status: Continued - Date(s): 9/9/2022    Intervention(s)  Therapist will teach patient how to effectively utilize worry time and/or thought logs/journals each day and incorporate more relaxation behaviors into their routine.    Objective #C  Patient will identify the stressors which contribute to feelings of anxiety  Patient will increase engagement in adaptive coping skills and recreational activities, such as exercise and healthy socialization, to manage distress.  Status: Continued - Date(s): 9/9/2022    Intervention(s)  Therapist will help patient identify triggers/situational factors that contribute to anxiety and behavioral skills aimed to manage anxious distress.      Goal 3: Patient will learn more adaptive ways to manage chronic pain      Objective #A (Patient Action)    Status: Continued - Date(s): 9/9/2022    Patient will identify 2-4 strategies for managing pain.    Intervention(s)  Therapist will teach distraction skills aimed to help manage chronic pain and utilize ACT/CBT ideas to help with this (e.g. relaxation).    Objective #B  Patient will state understanding of stressors and relationship to physical symptoms.    Status: Continued - Date(s): 9/9/2022      Other Possible Therapeutic Intervention(s):    Psycho-education regarding mental health diagnoses and treatment options    Supportive Therapy    Provide affirmations, reflections, and establish working " rapport    Emphasize and reflect on strength of therapeutic relationship    Skills training    Explore skills useful to client in current situation.    Skills include assertiveness, communication, conflict management, problem-solving, relaxation, etc.    Solution-Focused Therapy    Explore patterns in patient's relationships and discuss options for new behaviors.    Explore patterns in patient's actions and choices and discuss options for new behaviors.    Cognitive-behavioral Therapy    Discuss common cognitive distortions, identify them in patient's life.    Explore ways to challenge, replace, and act against these cognitions.    Acceptance and Commitment Therapy    Explore and identify important values in patient's life.    Discuss ways to commit to behavioral activation around these values.    Psychodynamic psychotherapy    Discuss patient's emotional dynamics and issues and how they impact behaviors.    Explore patient's history of relationships and how they impact present behaviors.    Explore how to work with and make changes in these schemas and patterns.    Narrative Therapy    Explore the patient's story of his/her life from his/her perspective.    Explore alternate ways of understanding their experience, identifying exceptions, developing new themes.    Interpersonal Psychotherapy    Explore patterns in relationships that are effective or ineffective at helping patient reach their goals, find satisfying experience.    Discuss new patterns or behaviors to engage in for improved social functioning.    Behavioral Activation    Discuss steps patient can take to become more involved in meaningful activity.    Identify barriers to these activities and explore possible solutions.    Mindfulness-Based Strategies    Discuss skills based on development and application of mindfulness.    Skills drawn from compassion-focused therapy, dialectical behavior therapy, mindfulness-based stress reduction, mindfulness-based  cognitive therapy, etc.      Patient has reviewed and agreed to the above plan.    Crisis Resources    Refer to the resources below as needed.    Steps to care for yourself    1. If you are currently in counseling, call your counselor for an appointment  2. Call the local crisis resources below if needed.  3. Contact friends or family for support.  4. Get more exercise.  5. Do activities you enjoy.  6. Eat a well-balanced diet and drink plenty of fluids.  7. Rest as needed.  8. Limit alcohol and recreational drugs. These can worsen depression.    When to contact your primary care provider       You have thoughts of harming or killing yourself but have not made a plan to carry it out.    Your depression gets in the way of daily activities.    You are often unable to sleep.    You need help cutting back on alcohol or recreational drugs.    When to call 911 or go to the Emergency Room     Get emergency help right away if you have any of the following:    You are planning to harm or kill yourself and you have a way to carry out the plan.     You have injured yourself or others. Or, you think you will.    You feel confused or are having trouble thinking or remembering.    You are having delusions (false beliefs).    You are hearing voices or seeing things that aren t there.    You are feeling psychotic (paranoid, fearful, restless, agitated, nervous, racing thoughts or speech)    Crisis Resources     These hotlines are for both adults and children. The and are open 24 hours a day, 7 days a week unless noted otherwise.    988 - National Suicide and Crisis Lifeline  If you or someone you know needs support now, call or text 338 or chat 9824tidyline.org. 988 connects you with a trained crisis counselor who can help.    National Suicide Prevention Lifeline   3-359-388-TALK (0398)    Crisis Text Line    www.crisistextline.org  Text HOME to 395974 from anywhere in the [place], anytime, about any type of crisis. A live,  trained crisis counselor will receive the text and respond quickly.    [place] Lifeline for LGBTQ Youth  A national crisis intervention and suicide lifeline for LGBTQ youth under 25. Provides a safe place to talk without judgement.   Call 1-709.828.6888; text START to 388303 or visit www.theCoomunavorproject.org to talk to a trained counselor.  For [facility] crisis numbers, visit the [facility] website at:  https://[place].gov/dhs/people-we-serve/adults/health-care/mental-health/resources/crisis-contacts.jsp      Héctor Higuera Psy.D, LP   Behavioral Health Clinician   [facility]     6/3/2022

## 2022-09-30 ENCOUNTER — TRANSFERRED RECORDS (OUTPATIENT)
Dept: HEALTH INFORMATION MANAGEMENT | Facility: CLINIC | Age: 32
End: 2022-09-30

## 2022-10-02 DIAGNOSIS — L98.9 SKIN SORE: ICD-10-CM

## 2022-10-03 NOTE — TELEPHONE ENCOUNTER
mupirocin (BACTROBAN) 2 % external cream  Last Written Prescription Date:  4/23/2021  Last Fill Quantity: 30,   # refills: 3  Last Office Visit :  5/23/2022  Future Office visit:  None    Routing refill request to provider for review/approval because:  Drug not on the [facility], P or [facility] refill protocol or controlled substance      Georgina Sy RN  Central Triage Red Flags/Med Refills

## 2022-10-04 RX ORDER — MUPIROCIN CALCIUM 20 MG/G
CREAM TOPICAL 3 TIMES DAILY
Qty: 30 G | Refills: 3 | Status: SHIPPED | OUTPATIENT
Start: 2022-10-04 | End: 2024-08-22

## 2022-10-06 DIAGNOSIS — G12.9 SPINAL MUSCLE ATROPHY (H): ICD-10-CM

## 2022-10-07 RX ORDER — ALPRAZOLAM 0.25 MG
0.25 TABLET ORAL 3 TIMES DAILY PRN
Qty: 60 TABLET | Refills: 5 | Status: SHIPPED | OUTPATIENT
Start: 2022-10-07 | End: 2023-12-27

## 2022-10-07 NOTE — TELEPHONE ENCOUNTER
ALPRAZOLAM 0.25MG TABLETS  Last Written Prescription Date:  3/30/2022  Last Fill Quantity: 60,   # refills: 5  Last Office Visit :  5/23/2022  Future Office visit:  None    Routing refill request to provider for review/approval because:  Drug not on the [facility], P or [facility] refill protocol or controlled substance      Georgina Sy RN  Central Triage Red Flags/Med Refills

## 2022-10-19 ENCOUNTER — VIRTUAL VISIT (OUTPATIENT)
Dept: BEHAVIORAL HEALTH | Facility: CLINIC | Age: 32
End: 2022-10-19
Payer: COMMERCIAL

## 2022-10-19 DIAGNOSIS — F33.1 MAJOR DEPRESSIVE DISORDER, RECURRENT EPISODE, MODERATE (H): Primary | ICD-10-CM

## 2022-10-19 PROCEDURE — 90837 PSYTX W PT 60 MINUTES: CPT | Mod: GT | Performed by: PSYCHOLOGIST

## 2022-10-19 NOTE — PROGRESS NOTES
[facility]: Integrated Behavioral Health  October 19, 2022        Behavioral Health Clinician Progress Note    Patient Name: Lanie Neil           Service Type: Video appointment      Service Location:  Video appointment      Session Start Time: 11:02  Session End Time: 12:00      Session Length: 53 - 60      Attendees: Patient    Visit Activities (Refresh list every visit): [facility] Only    Service Modality:  Video Visit:      Provider verified identity through the following two step process.  Patient provided:  Patient photo and Patient is known previously to provider    Telemedicine Visit: The patient's condition can be safely assessed and treated via synchronous audio and visual telemedicine encounter.      Reason for Telemedicine Visit: Patient unable to travel    Originating Site (Patient Location): Patient's home    Distant Site (Provider Location): [facility]: [facility] [place]    Consent:  The patient/guardian has verbally consented to: the potential risks and benefits of telemedicine (video visit) versus in person care; bill my insurance or make self-payment for services provided; and responsibility for payment of non-covered services.     Patient would like the video invitation sent by:  My Chart    Mode of Communication:  Video Conference via [facility]well    Distant Location (Provider):  On-site    As the provider I attest to compliance with applicable laws and regulations related to telemedicine.      Diagnostic Assessment Date: 1/19/2021  Treatment Plan Review Date: 12/9/2022  See Flowsheets for today's PHQ-9 and KATHARINE-7 results  Previous PHQ-9:   PHQ-9 SCORE 12/15/2020 3/16/2021 6/30/2021   PHQ-9 Total Score - - -   PHQ-9 Total Score MyChart 2 (Minimal depression) 2 (Minimal depression) 2 (Minimal depression)   PHQ-9 Total Score 2 2 2     Previous KATHARINE-7:   KATHARINE-7 SCORE 12/15/2020 3/16/2021 6/30/2021   Total Score 1 (minimal anxiety) 1 (minimal anxiety) 1 (minimal anxiety)  "  Total Score 1 1 1       DATA  Extended Session (60+ minutes): No  Interactive Complexity: No  Crisis: No    Treatment Objective(s) Addressed in This Session:  Target Behavior(s): disease management/lifestyle changes      Depressed Mood: Decrease frequency and intensity of feeling down, depressed, hopeless  Identify negative self-talk and behaviors: challenge core beliefs, myths, and actions  Anxiety: will develop more effective coping skills to manage anxiety symptoms  Psychological distress related to Pain    Current Stressors / Issues:  [facility] [place] met with Lanie today to continue supporting her treatment of depression, adjustment to chronic disease management, and relationship problems. Reports today updates about her recent transition to a pain clinic for management of her medications and chronic pain. Reports feeling very surprised at the visit, noting she felt \"relief\" from the provider being willing to take on her medications and be agreeable to return to a previously higher dose of pain medication. She reports that while she felt positive about this, she has observed more ongoing conflicts with her mother. This writer made use of process comments and insight-oriented ideas to help facilitate development of awareness for Lanie into relational dynamics/factors. Discussed in particular her tendency to justify herself at times, her point of view, rather than focusing on options for change. Encouraged the latter, looking at what she herself could do. Discussed the value of self-awareness to this end, how recognizing her pattern in communication and her mothers could help reduce conflict, act more \"wisely\" as we discussed.     Am I Hooked or Unhooked From My Thoughts?    Clues that I'm getting hooked and threatened by my thoughts:    1) Thoughts are Literal and Threatening; it s as if what we re thinking is actually present, here and now!    2) Thoughts are \"The Truth\"; we literally believe them!    3) Thoughts are " "Most Important; we take them seriously, and give them our full attention!    4) Thoughts are Orders; we automatically obey them!    5) Thoughts are Always Wise; we assume they know best and we follow their advice!     _____________________________________________________________________    Noticing when I'm unhooked and not threatened by my thoughts:    1) Thoughts are merely sounds, words, stories, bits of language, passing through our heads.    2) Thoughts may or may not be true. We don t automatically believe them.    3) Thoughts may or may not be important. We pay attention only if they re helpful.    4) Thoughts are not orders. We don t have to obey them.    5) Thoughts may or may not be wise. We don t automatically follow their advice.   Thoughts That Hook You:    What memories, fears, worries, self-criticisms, or other unhelpful thoughts do you dwell on or get \"stuck\" thinking about? What thoughts tend to hook you, jerk you around, and pull you out of your life?                 Life-Draining Actions:    What are you currently doing that makes your life worse in the long run: keeps you stuck, wastes your time or money, drains your energy; restricts your life; impacts negatively on your health, work, or relationships; maintains or worsens the problems your are dealing with?                     Feelings That Hook You:    What emotions, feelings, urges, impulses, or sensations tend to hook you and jerk you around or pull you into self-defeating actions?           Avoiding Challenging Situations:    What situations, activities, people, or places are you avoiding or staying away from? What have you quit, withdrawn from, dropped out of? What do you keep \"putting off\" until later?                           Progress on Treatment Objective(s) / Homework:  Satisfactory progress - ACTION (Actively working towards change); Intervened by reinforcing change plan / affirming steps taken       Acceptance and Commitment " Therapy    Explore and identify important values in patient's life.    Discuss ways to commit to behavioral activation around these values.    Interpersonal Psychotherapy    Explore patterns in relationships that are effective or ineffective at helping patient reach their goals, find satisfying experience.    Discuss new patterns or behaviors to engage in for improved social functioning.      Care Plan review completed: No    Medication Review:  No changes to current psychiatric medication(s)    Medication Compliance:  NA    Changes in Health Issues:   None reported    Chemical Use Review:   Substance Use: Chemical use reviewed, no active concerns identified      Tobacco Use: No current tobacco use.      Assessment: Current Emotional / Mental Status (status of significant symptoms):  Risk status (Self / Other harm or suicidal ideation)  Patient has had a history of suicidal ideation: passive thoughts only; easily controlled - denied any recently    Patient denies current fears or concerns for personal safety.  Patient denies current or recent suicidal ideation or behaviors.  Patient denies current or recent homicidal ideation or behaviors.  Patient denies current or recent self injurious behavior or ideation.  Patient denies other safety concerns.     A safety and risk management plan has not been developed at this time, however patient was encouraged to call [place] [phone] should there be a change in any of these risk factors.      [place] Suicide Severity Rating Scale (Short Version)  [place] Suicide Severity Rating (Short Version) 2/18/2021   Over the past 2 weeks have you felt down, depressed, or hopeless? yes   Over the past 2 weeks have you had thoughts of killing yourself? no   Have you ever attempted to kill yourself? no     [place] Suicide Severity Rating Scale (Lifetime/Recent)  [place] Suicide Severity Rating (Lifetime/Recent) 8/13/2021   Wish to be Dead (Lifetime) No   Non-Specific Active  Suicidal Thoughts (Lifetime) No   Most Severe Ideation Rating (Lifetime) NA   Frequency (Lifetime) NA   Duration (Lifetime) NA   Controllability (Lifetime) NA   Protective Factors  (Lifetime) NA   Reasons for Ideation (Lifetime) NA   RETIRED: 1. Wish to be Dead (Recent) No   RETIRED: 2. Non-Specific Active Suicidal Thoughts (Recent) No   3. Active Suicidal Ideation with any Methods (Not Plan) Without Intent to Act (Lifetime) No   RETIRED: 3. Active Suicidal Ideation with any Methods (Not Plan) Without Intent to Act (Recent) No   RETIRE: 4. Active Suicidal Ideation with Some Intent to Act, Without Specific Plan (Lifetime) No   4. Active Suicidal Ideation with Some Intent to Act, Without Specific Plan (Recent) No   RETIRE: 5. Active Suicidal Ideation with Specific Plan and Intent (Lifetime) No   RETIRED: 5. Active Suicidal Ideation with Specific Plan and Intent (Recent) No   Most Severe Ideation Rating (Past Month) NA   Frequency (Past Month) NA   Duration (Past Month) NA   Controllability (Past Month) NA   Protective Factors (Past Month) NA   Reasons for Ideation (Past Month) NA   Actual Attempt (Lifetime) No   Actual Attempt (Past 3 Months) No   Has subject engaged in non-suicidal self-injurious behavior? (Lifetime) No   Has subject engaged in non-suicidal self-injurious behavior? (Past 3 Months) No   Interrupted Attempts (Lifetime) No   Interrupted Attempts (Past 3 Months) No   Aborted or Self-Interrupted Attempt (Lifetime) No   Aborted or Self-Interrupted Attempt (Past 3 Months) No   Preparatory Acts or Behavior (Lifetime) No   Preparatory Acts or Behavior (Past 3 Months) No   Most Recent Attempt Actual Lethality Code NA   Most Lethal Attempt Actual Lethality Code NA   Initial/First Attempt Actual Lethality Code NA         Appearance:   Appropriate   Eye Contact:   Good   Psychomotor Behavior: Normal   Attitude:   Cooperative   Orientation:   All  Speech   Rate / Production: Normal    Volume:  Normal    Mood:    Depressed    Affect:    Tearful  Thought Content:  Rumination   Thought Form:  Coherent  Logical   Insight:    Good     Diagnoses:  1. Major depressive disorder, recurrent episode, moderate (H)        Collateral Reports Completed:  [facility] will coordinate with care team as needed    Plan: (Homework, other):  Lanie was scheduled to RTC in two weeks for help addressing depressive symptoms, pain, and relationship conflicts.  ACT ideas provided. Research pain support groups. CD Recommendations: No indications of CD issues.     Héctor Higuera Psy.D, LP October 20, 2022          _______________________________________________________________________                                              Individual Treatment Plan    Patient's Name: Lanie Neil  YOB: 1990    Date of Creation: 3/4/2022  Date Treatment Plan Last Reviewed/Revised: 9/9/2022    DSM5 Diagnoses: 296.32 (F33.1) Major Depressive Disorder, Recurrent Episode, Moderate With anxious distress  Psychosocial / Contextual Factors: Chronic pain, SMA, disabled  PROMIS (reviewed every 90 days):     PROMIS-10    In general, would you say your health is: (P) Excellent    In general, would you say your quality of life is: (P) Good    In general, how would you rate your physical health? (P) Very good    In general, how would you rate your mental health, including your mood and your ability to think? (P) Good    In general, how would you rate your satisfaction with your social activities and relationships? (P) Good    In general, please rate how well you carry out your usual social activities and roles. (This includes activities at home, at work and in your community, and responsibilities as a parent, child, spouse, employee, friend, etc.) (P) Very good    To what extent are you able to carry out your everyday physical activities such as walking, climbing stairs, carrying groceries, or moving a chair? (P) Not at all    In the past 7 days,  How  often have you been bothered by emotional problems such as feeling anxious, depressed or irritable? (P) Rarely  How would you rate your fatigue on average? (P) Moderate  How would you rate your pain on average? (P) 6    PROMIS GLOBAL SCORES    Mental health question re-calculation - no clinical value - (P) 4  Physical health question re-calculation - no clinical value - (P) 3  Pain question re-calculation - no clinical value - (P) 3  Global Mental Health Score - (P) 13  Global Physical Health Score - (P) 11  PROMIS TOTAL - SUBSCORES - (P) 24      [id number] [employer] VERSION 1.1          Referral / Collaboration:  Referral to another professional/service is not indicated at this time..    Anticipated number of session for this episode of care: 7-9  Anticipation frequency of session: Every other week  Anticipated Duration of each session: 38-52 minutes  Treatment plan will be reviewed in 90 days or when goals have been changed.       MeasurableTreatment Goal(s) related to diagnosis / functional impairment(s)  Goal 1: Patient will experience a reduction in depressive symptoms along with a corresponding increase in positive emotion and life satisfaction.      Objective #A (Patient Action)    Patient will Increase interest, engagement, and pleasure in doing things.  Status: Continued - Date(s): 9/9/2022    Intervention(s)  Therapist will help patient identify pleasant and mastery oriented events that elicit positive, relaxed mood.    Objective #B  Patient will Decrease frequency and intensity of feeling down, depressed, hopeless.  Status: Continued - Date(s): 9/9/2022    Intervention(s)  Therapist will introduce patient to cognitive-behavioral and acceptance and commitment therapy topics aimed to help reduce depression and anxiety    Objective #C  Patient will Identify negative self-talk and behaviors: challenge core beliefs, myths, and actions  Improve concentration, focus,  "and mindfulness in daily activities .  Status: Continued - Date(s): 9/9/2022    Intervention(s)  Therapist will help patient identify and manage negative self-talk and automatic thoughts; introduce patient to cognitive distortions; help patient develop cognitive diffusion techniques      Goal 2: Patient will experience a reduction in anxious symptoms, along with a corresponding increase in relaxed emotional states and life satisfaction.      Objective #A (Patient Action)  Patient will use cognitive-behavioral and thought diffusion strategies identified in therapy to challenge anxious thoughts.    Status: Continued - Date(s): 9/9/2022    Intervention(s)  Therapist will utilize CBT and ACT ideas to help patient challenge anxious thoughts and reduce intensity/duration of anxious distress    Objective #B  Patient will use \"worry time\" each day for 15 minutes of scheduled worry and then defer obsessive or anxious thinking until the next structured worry time.    Status: Continued - Date(s): 9/9/2022    Intervention(s)  Therapist will teach patient how to effectively utilize worry time and/or thought logs/journals each day and incorporate more relaxation behaviors into their routine.    Objective #C  Patient will identify the stressors which contribute to feelings of anxiety  Patient will increase engagement in adaptive coping skills and recreational activities, such as exercise and healthy socialization, to manage distress.  Status: Continued - Date(s): 9/9/2022    Intervention(s)  Therapist will help patient identify triggers/situational factors that contribute to anxiety and behavioral skills aimed to manage anxious distress.      Goal 3: Patient will learn more adaptive ways to manage chronic pain      Objective #A (Patient Action)    Status: Continued - Date(s): 9/9/2022    Patient will identify 2-4 strategies for managing pain.    Intervention(s)  Therapist will teach distraction skills aimed to help manage chronic " pain and utilize ACT/CBT ideas to help with this (e.g. relaxation).    Objective #B  Patient will state understanding of stressors and relationship to physical symptoms.    Status: Continued - Date(s): 9/9/2022      Other Possible Therapeutic Intervention(s):    Psycho-education regarding mental health diagnoses and treatment options    Supportive Therapy    Provide affirmations, reflections, and establish working rapport    Emphasize and reflect on strength of therapeutic relationship    Skills training    Explore skills useful to client in current situation.    Skills include assertiveness, communication, conflict management, problem-solving, relaxation, etc.    Solution-Focused Therapy    Explore patterns in patient's relationships and discuss options for new behaviors.    Explore patterns in patient's actions and choices and discuss options for new behaviors.    Cognitive-behavioral Therapy    Discuss common cognitive distortions, identify them in patient's life.    Explore ways to challenge, replace, and act against these cognitions.    Acceptance and Commitment Therapy    Explore and identify important values in patient's life.    Discuss ways to commit to behavioral activation around these values.    Psychodynamic psychotherapy    Discuss patient's emotional dynamics and issues and how they impact behaviors.    Explore patient's history of relationships and how they impact present behaviors.    Explore how to work with and make changes in these schemas and patterns.    Narrative Therapy    Explore the patient's story of his/her life from his/her perspective.    Explore alternate ways of understanding their experience, identifying exceptions, developing new themes.    Interpersonal Psychotherapy    Explore patterns in relationships that are effective or ineffective at helping patient reach their goals, find satisfying experience.    Discuss new patterns or behaviors to engage in for improved social  functioning.    Behavioral Activation    Discuss steps patient can take to become more involved in meaningful activity.    Identify barriers to these activities and explore possible solutions.    Mindfulness-Based Strategies    Discuss skills based on development and application of mindfulness.    Skills drawn from compassion-focused therapy, dialectical behavior therapy, mindfulness-based stress reduction, mindfulness-based cognitive therapy, etc.      Patient has reviewed and agreed to the above plan.    Crisis Resources    Refer to the resources below as needed.    Steps to care for yourself    1. If you are currently in counseling, call your counselor for an appointment  2. Call the local crisis resources below if needed.  3. Contact friends or family for support.  4. Get more exercise.  5. Do activities you enjoy.  6. Eat a well-balanced diet and drink plenty of fluids.  7. Rest as needed.  8. Limit alcohol and recreational drugs. These can worsen depression.    When to contact your primary care provider       You have thoughts of harming or killing yourself but have not made a plan to carry it out.    Your depression gets in the way of daily activities.    You are often unable to sleep.    You need help cutting back on alcohol or recreational drugs.    When to call 911 or go to the Emergency Room     Get emergency help right away if you have any of the following:    You are planning to harm or kill yourself and you have a way to carry out the plan.     You have injured yourself or others. Or, you think you will.    You feel confused or are having trouble thinking or remembering.    You are having delusions (false beliefs).    You are hearing voices or seeing things that aren t there.    You are feeling psychotic (paranoid, fearful, restless, agitated, nervous, racing thoughts or speech)    Crisis Resources     These hotlines are for both adults and children. The and are open 24 hours a day, 7 days a week  unless noted otherwise.    988 - National Suicide and Crisis Lifeline  If you or someone you know needs support now, call or text 071 or chat AF83line.org. 988 connects you with a trained crisis counselor who can help.    National Suicide Prevention Lifeline   6-134-165-TALK (9911)    Crisis Text Line    www.crisistextline.org  Text HOME to 221646 from anywhere in the [place], anytime, about any type of crisis. A live, trained crisis counselor will receive the text and respond quickly.    [place] Lifeline for LGBTQ Youth  A national crisis intervention and suicide lifeline for LGBTQ youth under 25. Provides a safe place to talk without judgement.   Call 1-914.144.8471; text START to 996997 or visit www.thetrevorproject.org to talk to a trained counselor.  For county crisis numbers, visit the [facility] website at:  https://[place].gov/dhs/people-we-serve/adults/health-care/mental-health/resources/crisis-contacts.jsp      Héctor Higuera Psy.D, LP   Behavioral Health Clinician   [facility]     6/3/2022

## 2022-10-27 ENCOUNTER — DOCUMENTATION ONLY (OUTPATIENT)
Dept: FAMILY MEDICINE | Facility: CLINIC | Age: 32
End: 2022-10-27

## 2022-10-27 NOTE — PROGRESS NOTES
Type of Form Received: INFUSION ORDER    Form Received (Date) 10/27/22   Form Filled out YES 10/31/22   Placed in provider folder Yes

## 2022-10-28 ENCOUNTER — MEDICAL CORRESPONDENCE (OUTPATIENT)
Dept: HEALTH INFORMATION MANAGEMENT | Facility: CLINIC | Age: 32
End: 2022-10-28

## 2022-10-31 DIAGNOSIS — Z79.01 CHRONIC ANTICOAGULATION: ICD-10-CM

## 2022-10-31 DIAGNOSIS — N94.6 PAINFUL MENSTRUAL PERIODS: ICD-10-CM

## 2022-10-31 DIAGNOSIS — Z86.718 HISTORY OF DEEP VENOUS THROMBOSIS: ICD-10-CM

## 2022-10-31 RX ORDER — CELECOXIB 200 MG/1
CAPSULE ORAL
Qty: 30 CAPSULE | Refills: 3 | Status: SHIPPED | OUTPATIENT
Start: 2022-10-31 | End: 2023-02-24

## 2022-11-01 ENCOUNTER — MYC MEDICAL ADVICE (OUTPATIENT)
Dept: FAMILY MEDICINE | Facility: CLINIC | Age: 32
End: 2022-11-01

## 2022-11-01 DIAGNOSIS — Z43.0 TRACHEOSTOMY CARE (H): ICD-10-CM

## 2022-11-01 DIAGNOSIS — G12.9 SPINAL MUSCLE ATROPHY (H): Primary | ICD-10-CM

## 2022-11-01 NOTE — TELEPHONE ENCOUNTER
ENT referral re: [facility]    Palliative care re: [facility], they might have some new ideas for comfort and quality of life

## 2022-11-03 ENCOUNTER — VIRTUAL VISIT (OUTPATIENT)
Dept: BEHAVIORAL HEALTH | Facility: CLINIC | Age: 32
End: 2022-11-03
Payer: COMMERCIAL

## 2022-11-03 DIAGNOSIS — F33.1 MAJOR DEPRESSIVE DISORDER, RECURRENT EPISODE, MODERATE (H): Primary | ICD-10-CM

## 2022-11-03 PROCEDURE — 90837 PSYTX W PT 60 MINUTES: CPT | Mod: 95 | Performed by: PSYCHOLOGIST

## 2022-11-03 NOTE — PROGRESS NOTES
[facility]: Integrated Behavioral Health  November 3, 2022        Behavioral Health Clinician Progress Note    Patient Name: Lanie Neil           Service Type: Video appointment      Service Location:  Video appointment      Session Start Time: 2:03  Session End Time: 2:58      Session Length: 53 - 60      Attendees: Patient    Visit Activities (Refresh list every visit): [facility] Only    Service Modality:  Video Visit:      Provider verified identity through the following two step process.  Patient provided:  Patient photo and Patient is known previously to provider    Telemedicine Visit: The patient's condition can be safely assessed and treated via synchronous audio and visual telemedicine encounter.      Reason for Telemedicine Visit: Patient unable to travel    Originating Site (Patient Location): Patient's home    Distant Site (Provider Location): Provider Remote Setting- Home Office    Consent:  The patient/guardian has verbally consented to: the potential risks and benefits of telemedicine (video visit) versus in person care; bill my insurance or make self-payment for services provided; and responsibility for payment of non-covered services.     Patient would like the video invitation sent by:  My Chart    Mode of Communication:  Video Conference via [facility]    Distant Location (Provider):  Off-site    As the provider I attest to compliance with applicable laws and regulations related to telemedicine.      Diagnostic Assessment Date: 1/19/2021  Treatment Plan Review Date: 12/9/2022  See Flowsheets for today's PHQ-9 and KATHARINE-7 results  Previous PHQ-9:   PHQ-9 SCORE 12/15/2020 3/16/2021 6/30/2021   PHQ-9 Total Score - - -   PHQ-9 Total Score MyChart 2 (Minimal depression) 2 (Minimal depression) 2 (Minimal depression)   PHQ-9 Total Score 2 2 2     Previous KATHARINE-7:   KATHARINE-7 SCORE 12/15/2020 3/16/2021 6/30/2021   Total Score 1 (minimal anxiety) 1 (minimal anxiety) 1 (minimal  "anxiety)   Total Score 1 1 1       DATA  Extended Session (60+ minutes): No  Interactive Complexity: No  Crisis: No    Treatment Objective(s) Addressed in This Session:  Target Behavior(s): disease management/lifestyle changes      Depressed Mood: Decrease frequency and intensity of feeling down, depressed, hopeless  Identify negative self-talk and behaviors: challenge core beliefs, myths, and actions  Anxiety: will develop more effective coping skills to manage anxiety symptoms  Psychological distress related to Pain    Current Stressors / Issues:  [facility] met with Lanie today to continue supporting her treatment of depression, adjustment to chronic disease management, and relationship problems. Reports doing better with managing mood in response to pain during bathing. She suspects her progress was due to her mother being kinder, though this writer prompted exploration of what things she might have been doing differently. Reports concern about learning of a family member being pregnant, which she feels positive about, though makes her feel down because of her inability to have children of her own. Provided support. Explored ACT ideas pertaining to roles and values, how cultivating and engaging with a new role might help her (e.g. being the fun aunt or cousin). Discussed holding onto roles that feel important, though no longer work for her emotionally and contribute to distress (e.g. being a mother). Supportive and ACT ideas were utilized today.      Am I Hooked or Unhooked From My Thoughts?    Clues that I'm getting hooked and threatened by my thoughts:    1) Thoughts are Literal and Threatening; it s as if what we re thinking is actually present, here and now!    2) Thoughts are \"The Truth\"; we literally believe them!    3) Thoughts are Most Important; we take them seriously, and give them our full attention!    4) Thoughts are Orders; we automatically obey them!    5) Thoughts are Always Wise; we assume they know " "best and we follow their advice!     _____________________________________________________________________    Noticing when I'm unhooked and not threatened by my thoughts:    1) Thoughts are merely sounds, words, stories, bits of language, passing through our heads.    2) Thoughts may or may not be true. We don t automatically believe them.    3) Thoughts may or may not be important. We pay attention only if they re helpful.    4) Thoughts are not orders. We don t have to obey them.    5) Thoughts may or may not be wise. We don t automatically follow their advice.   Thoughts That Hook You:    What memories, fears, worries, self-criticisms, or other unhelpful thoughts do you dwell on or get \"stuck\" thinking about? What thoughts tend to hook you, jerk you around, and pull you out of your life?                 Life-Draining Actions:    What are you currently doing that makes your life worse in the long run: keeps you stuck, wastes your time or money, drains your energy; restricts your life; impacts negatively on your health, work, or relationships; maintains or worsens the problems your are dealing with?                     Feelings That Hook You:    What emotions, feelings, urges, impulses, or sensations tend to hook you and jerk you around or pull you into self-defeating actions?           Avoiding Challenging Situations:    What situations, activities, people, or places are you avoiding or staying away from? What have you quit, withdrawn from, dropped out of? What do you keep \"putting off\" until later?                           Progress on Treatment Objective(s) / Homework:  Satisfactory progress - ACTION (Actively working towards change); Intervened by reinforcing change plan / affirming steps taken       Acceptance and Commitment Therapy    Explore and identify important values in patient's life.    Discuss ways to commit to behavioral activation around these values.    Interpersonal Psychotherapy    Explore " patterns in relationships that are effective or ineffective at helping patient reach their goals, find satisfying experience.    Discuss new patterns or behaviors to engage in for improved social functioning.      Care Plan review completed: No    Medication Review:  No changes to current psychiatric medication(s)    Medication Compliance:  NA    Changes in Health Issues:   None reported    Chemical Use Review:   Substance Use: Chemical use reviewed, no active concerns identified      Tobacco Use: No current tobacco use.      Assessment: Current Emotional / Mental Status (status of significant symptoms):  Risk status (Self / Other harm or suicidal ideation)  Patient has had a history of suicidal ideation: passive thoughts only; easily controlled - denied any recently    Patient denies current fears or concerns for personal safety.  Patient denies current or recent suicidal ideation or behaviors.  Patient denies current or recent homicidal ideation or behaviors.  Patient denies current or recent self injurious behavior or ideation.  Patient denies other safety concerns.     A safety and risk management plan has not been developed at this time, however patient was encouraged to call [place] [phone] should there be a change in any of these risk factors.      [place] Suicide Severity Rating Scale (Short Version)  [place] Suicide Severity Rating (Short Version) 2/18/2021   Over the past 2 weeks have you felt down, depressed, or hopeless? yes   Over the past 2 weeks have you had thoughts of killing yourself? no   Have you ever attempted to kill yourself? no     [place] Suicide Severity Rating Scale (Lifetime/Recent)  [place] Suicide Severity Rating (Lifetime/Recent) 8/13/2021   Wish to be Dead (Lifetime) No   Non-Specific Active Suicidal Thoughts (Lifetime) No   Most Severe Ideation Rating (Lifetime) NA   Frequency (Lifetime) NA   Duration (Lifetime) NA   Controllability (Lifetime) NA   Protective Factors   (Lifetime) NA   Reasons for Ideation (Lifetime) NA   RETIRED: 1. Wish to be Dead (Recent) No   RETIRED: 2. Non-Specific Active Suicidal Thoughts (Recent) No   3. Active Suicidal Ideation with any Methods (Not Plan) Without Intent to Act (Lifetime) No   RETIRED: 3. Active Suicidal Ideation with any Methods (Not Plan) Without Intent to Act (Recent) No   RETIRE: 4. Active Suicidal Ideation with Some Intent to Act, Without Specific Plan (Lifetime) No   4. Active Suicidal Ideation with Some Intent to Act, Without Specific Plan (Recent) No   RETIRE: 5. Active Suicidal Ideation with Specific Plan and Intent (Lifetime) No   RETIRED: 5. Active Suicidal Ideation with Specific Plan and Intent (Recent) No   Most Severe Ideation Rating (Past Month) NA   Frequency (Past Month) NA   Duration (Past Month) NA   Controllability (Past Month) NA   Protective Factors (Past Month) NA   Reasons for Ideation (Past Month) NA   Actual Attempt (Lifetime) No   Actual Attempt (Past 3 Months) No   Has subject engaged in non-suicidal self-injurious behavior? (Lifetime) No   Has subject engaged in non-suicidal self-injurious behavior? (Past 3 Months) No   Interrupted Attempts (Lifetime) No   Interrupted Attempts (Past 3 Months) No   Aborted or Self-Interrupted Attempt (Lifetime) No   Aborted or Self-Interrupted Attempt (Past 3 Months) No   Preparatory Acts or Behavior (Lifetime) No   Preparatory Acts or Behavior (Past 3 Months) No   Most Recent Attempt Actual Lethality Code NA   Most Lethal Attempt Actual Lethality Code NA   Initial/First Attempt Actual Lethality Code NA         Appearance:   Appropriate   Eye Contact:   Good   Psychomotor Behavior: Normal   Attitude:   Cooperative   Orientation:   All  Speech   Rate / Production: Normal    Volume:  Normal   Mood:    Depressed    Affect:    Tearful  Thought Content:  Rumination   Thought Form:  Coherent  Logical   Insight:    Good     Diagnoses:  1. Major depressive disorder, recurrent episode,  moderate (H)        Collateral Reports Completed:  [facility] will coordinate with care team as needed    Plan: (Homework, other):  Lanie was scheduled to RTC in two weeks for help addressing depressive symptoms, pain, and relationship conflicts.  ACT ideas provided. Research pain support groups. CD Recommendations: No indications of CD issues.     Héctor Higuera Psy.D, JENNIFER 11/3/2022          _______________________________________________________________________                                              Individual Treatment Plan    Patient's Name: Lanie Neil  YOB: 1990    Date of Creation: 3/4/2022  Date Treatment Plan Last Reviewed/Revised: 9/9/2022    DSM5 Diagnoses: 296.32 (F33.1) Major Depressive Disorder, Recurrent Episode, Moderate With anxious distress  Psychosocial / Contextual Factors: Chronic pain, SMA, disabled  PROMIS (reviewed every 90 days):     PROMIS-10    In general, would you say your health is: (P) Excellent    In general, would you say your quality of life is: (P) Good    In general, how would you rate your physical health? (P) Very good    In general, how would you rate your mental health, including your mood and your ability to think? (P) Good    In general, how would you rate your satisfaction with your social activities and relationships? (P) Good    In general, please rate how well you carry out your usual social activities and roles. (This includes activities at home, at work and in your community, and responsibilities as a parent, child, spouse, employee, friend, etc.) (P) Very good    To what extent are you able to carry out your everyday physical activities such as walking, climbing stairs, carrying groceries, or moving a chair? (P) Not at all    In the past 7 days,  How often have you been bothered by emotional problems such as feeling anxious, depressed or irritable? (P) Rarely  How would you rate your fatigue on average? (P) Moderate  How would you rate your pain  on average? (P) 6    PROMIS GLOBAL SCORES    Mental health question re-calculation - no clinical value - (P) 4  Physical health question re-calculation - no clinical value - (P) 3  Pain question re-calculation - no clinical value - (P) 3  Global Mental Health Score - (P) 13  Global Physical Health Score - (P) 11  PROMIS TOTAL - SUBSCORES - (P) 24      [id number] [facility]OPERAT[facility] VERSION 1.1          Referral / Collaboration:  Referral to another professional/service is not indicated at this time..    Anticipated number of session for this episode of care: 7-9  Anticipation frequency of session: Every other week  Anticipated Duration of each session: 38-52 minutes  Treatment plan will be reviewed in 90 days or when goals have been changed.       MeasurableTreatment Goal(s) related to diagnosis / functional impairment(s)  Goal 1: Patient will experience a reduction in depressive symptoms along with a corresponding increase in positive emotion and life satisfaction.      Objective #A (Patient Action)    Patient will Increase interest, engagement, and pleasure in doing things.  Status: Continued - Date(s): 9/9/2022    Intervention(s)  Therapist will help patient identify pleasant and mastery oriented events that elicit positive, relaxed mood.    Objective #B  Patient will Decrease frequency and intensity of feeling down, depressed, hopeless.  Status: Continued - Date(s): 9/9/2022    Intervention(s)  Therapist will introduce patient to cognitive-behavioral and acceptance and commitment therapy topics aimed to help reduce depression and anxiety    Objective #C  Patient will Identify negative self-talk and behaviors: challenge core beliefs, myths, and actions  Improve concentration, focus, and mindfulness in daily activities .  Status: Continued - Date(s): 9/9/2022    Intervention(s)  Therapist will help patient identify and manage negative self-talk and automatic thoughts; introduce  "patient to cognitive distortions; help patient develop cognitive diffusion techniques      Goal 2: Patient will experience a reduction in anxious symptoms, along with a corresponding increase in relaxed emotional states and life satisfaction.      Objective #A (Patient Action)  Patient will use cognitive-behavioral and thought diffusion strategies identified in therapy to challenge anxious thoughts.    Status: Continued - Date(s): 9/9/2022    Intervention(s)  Therapist will utilize CBT and ACT ideas to help patient challenge anxious thoughts and reduce intensity/duration of anxious distress    Objective #B  Patient will use \"worry time\" each day for 15 minutes of scheduled worry and then defer obsessive or anxious thinking until the next structured worry time.    Status: Continued - Date(s): 9/9/2022    Intervention(s)  Therapist will teach patient how to effectively utilize worry time and/or thought logs/journals each day and incorporate more relaxation behaviors into their routine.    Objective #C  Patient will identify the stressors which contribute to feelings of anxiety  Patient will increase engagement in adaptive coping skills and recreational activities, such as exercise and healthy socialization, to manage distress.  Status: Continued - Date(s): 9/9/2022    Intervention(s)  Therapist will help patient identify triggers/situational factors that contribute to anxiety and behavioral skills aimed to manage anxious distress.      Goal 3: Patient will learn more adaptive ways to manage chronic pain      Objective #A (Patient Action)    Status: Continued - Date(s): 9/9/2022    Patient will identify 2-4 strategies for managing pain.    Intervention(s)  Therapist will teach distraction skills aimed to help manage chronic pain and utilize ACT/CBT ideas to help with this (e.g. relaxation).    Objective #B  Patient will state understanding of stressors and relationship to physical symptoms.    Status: Continued - " Date(s): 9/9/2022      Other Possible Therapeutic Intervention(s):    Psycho-education regarding mental health diagnoses and treatment options    Supportive Therapy    Provide affirmations, reflections, and establish working rapport    Emphasize and reflect on strength of therapeutic relationship    Skills training    Explore skills useful to client in current situation.    Skills include assertiveness, communication, conflict management, problem-solving, relaxation, etc.    Solution-Focused Therapy    Explore patterns in patient's relationships and discuss options for new behaviors.    Explore patterns in patient's actions and choices and discuss options for new behaviors.    Cognitive-behavioral Therapy    Discuss common cognitive distortions, identify them in patient's life.    Explore ways to challenge, replace, and act against these cognitions.    Acceptance and Commitment Therapy    Explore and identify important values in patient's life.    Discuss ways to commit to behavioral activation around these values.    Psychodynamic psychotherapy    Discuss patient's emotional dynamics and issues and how they impact behaviors.    Explore patient's history of relationships and how they impact present behaviors.    Explore how to work with and make changes in these schemas and patterns.    Narrative Therapy    Explore the patient's story of his/her life from his/her perspective.    Explore alternate ways of understanding their experience, identifying exceptions, developing new themes.    Interpersonal Psychotherapy    Explore patterns in relationships that are effective or ineffective at helping patient reach their goals, find satisfying experience.    Discuss new patterns or behaviors to engage in for improved social functioning.    Behavioral Activation    Discuss steps patient can take to become more involved in meaningful activity.    Identify barriers to these activities and explore possible  solutions.    Mindfulness-Based Strategies    Discuss skills based on development and application of mindfulness.    Skills drawn from compassion-focused therapy, dialectical behavior therapy, mindfulness-based stress reduction, mindfulness-based cognitive therapy, etc.      Patient has reviewed and agreed to the above plan.    Crisis Resources    Refer to the resources below as needed.    Steps to care for yourself    1. If you are currently in counseling, call your counselor for an appointment  2. Call the local crisis resources below if needed.  3. Contact friends or family for support.  4. Get more exercise.  5. Do activities you enjoy.  6. Eat a well-balanced diet and drink plenty of fluids.  7. Rest as needed.  8. Limit alcohol and recreational drugs. These can worsen depression.    When to contact your primary care provider       You have thoughts of harming or killing yourself but have not made a plan to carry it out.    Your depression gets in the way of daily activities.    You are often unable to sleep.    You need help cutting back on alcohol or recreational drugs.    When to call 911 or go to the Emergency Room     Get emergency help right away if you have any of the following:    You are planning to harm or kill yourself and you have a way to carry out the plan.     You have injured yourself or others. Or, you think you will.    You feel confused or are having trouble thinking or remembering.    You are having delusions (false beliefs).    You are hearing voices or seeing things that aren t there.    You are feeling psychotic (paranoid, fearful, restless, agitated, nervous, racing thoughts or speech)    Crisis Resources     These hotlines are for both adults and children. The and are open 24 hours a day, 7 days a week unless noted otherwise.    988 - National Suicide and Crisis Lifeline  If you or someone you know needs support now, call or text 988 or chat [place]eline.[facility]. 988 connects you with a  trained crisis counselor who can help.    National Suicide Prevention Lifeline   3-081-781-TALK (6746)    Crisis Text Line    www.crisistextline.org  Text HOME to 973636 from anywhere in the [place], anytime, about any type of crisis. A live, trained crisis counselor will receive the text and respond quickly.    [place] Lifeline for LGBTQ Youth  A national crisis intervention and suicide lifeline for LGBTQ youth under 25. Provides a safe place to talk without judgement.   Call 1-632.258.6746; text START to 091135 or visit www.thetrevorproject.org to talk to a trained counselor.  For county crisis numbers, visit the [place] DHS website at:  https://[place].gov/dhs/people-we-serve/adults/health-care/mental-health/resources/crisis-contacts.jsp      Lakeisha Melara.DUSTY, LP   Behavioral Health Clinician   -[facility]     6/3/2022

## 2022-11-08 ENCOUNTER — DOCUMENTATION ONLY (OUTPATIENT)
Dept: FAMILY MEDICINE | Facility: CLINIC | Age: 32
End: 2022-11-08

## 2022-11-08 NOTE — PROGRESS NOTES
Type of Form Received: orders    Form Received (Date) 11/8/22   Form Filled out Yes 11/9/22   Placed in provider folder Yes

## 2022-11-09 ENCOUNTER — MEDICAL CORRESPONDENCE (OUTPATIENT)
Dept: HEALTH INFORMATION MANAGEMENT | Facility: CLINIC | Age: 32
End: 2022-11-09

## 2022-11-17 ENCOUNTER — VIRTUAL VISIT (OUTPATIENT)
Dept: BEHAVIORAL HEALTH | Facility: CLINIC | Age: 32
End: 2022-11-17
Payer: COMMERCIAL

## 2022-11-17 DIAGNOSIS — F33.1 MAJOR DEPRESSIVE DISORDER, RECURRENT EPISODE, MODERATE (H): Primary | ICD-10-CM

## 2022-11-17 PROCEDURE — 90837 PSYTX W PT 60 MINUTES: CPT | Mod: GT | Performed by: PSYCHOLOGIST

## 2022-11-17 NOTE — PROGRESS NOTES
[facility]: Integrated Behavioral Health  November 17, 2022        Behavioral Health Clinician Progress Note    Patient Name: Lanie Neil           Service Type: Video appointment      Service Location:  Video appointment      Session Start Time: 10:07  Session End Time: 11:00      Session Length: 53 - 60      Attendees: Patient    Visit Activities (Refresh list every visit): [facility] Only    Service Modality:  Video Visit:      Provider verified identity through the following two step process.  Patient provided:  Patient photo and Patient is known previously to provider    Telemedicine Visit: The patient's condition can be safely assessed and treated via synchronous audio and visual telemedicine encounter.      Reason for Telemedicine Visit: Patient unable to travel    Originating Site (Patient Location): Patient's home    Distant Site (Provider Location): Provider Remote Setting- Home Office    Consent:  The patient/guardian has verbally consented to: the potential risks and benefits of telemedicine (video visit) versus in person care; bill my insurance or make self-payment for services provided; and responsibility for payment of non-covered services.     Patient would like the video invitation sent by:  My Chart    Mode of Communication:  Video Conference via [facility]    Distant Location (Provider):  Off-site    As the provider I attest to compliance with applicable laws and regulations related to telemedicine.      Diagnostic Assessment Date: 1/19/2021  Treatment Plan Review Date: 12/9/2022  See Flowsheets for today's PHQ-9 and KATHARINE-7 results  Previous PHQ-9:   PHQ-9 SCORE 12/15/2020 3/16/2021 6/30/2021   PHQ-9 Total Score - - -   PHQ-9 Total Score MyChart 2 (Minimal depression) 2 (Minimal depression) 2 (Minimal depression)   PHQ-9 Total Score 2 2 2     Previous KATHARINE-7:   KATHARINE-7 SCORE 12/15/2020 3/16/2021 6/30/2021   Total Score 1 (minimal anxiety) 1 (minimal anxiety) 1 (minimal  "anxiety)   Total Score 1 1 1       DATA  Extended Session (60+ minutes): No  Interactive Complexity: No  Crisis: No    Treatment Objective(s) Addressed in This Session:  Target Behavior(s): disease management/lifestyle changes      Depressed Mood: Decrease frequency and intensity of feeling down, depressed, hopeless  Identify negative self-talk and behaviors: challenge core beliefs, myths, and actions  Anxiety: will develop more effective coping skills to manage anxiety symptoms  Psychological distress related to Pain    Current Stressors / Issues:  [facility] met with Lanie today to continue supporting her treatment of depression, adjustment to chronic disease management, and relationship problems. Long discussion today about use of online community groups for connection and social support. Explored benefits of online groups and how she meets people, though we reflected on her observed tendency to get over-involved with other's conflicts/issues, taking on a  role for conflicts. Reflected on how this mirrors a pattern at home and in her immediate family for her. Discussed her reflections on feeling developmentally younger than she is in regard to relationships, how she connects with younger people. Insight-oriented and psychodynamic ideas were utilized today.     Am I Hooked or Unhooked From My Thoughts?    Clues that I'm getting hooked and threatened by my thoughts:    1) Thoughts are Literal and Threatening; it s as if what we re thinking is actually present, here and now!    2) Thoughts are \"The Truth\"; we literally believe them!    3) Thoughts are Most Important; we take them seriously, and give them our full attention!    4) Thoughts are Orders; we automatically obey them!    5) Thoughts are Always Wise; we assume they know best and we follow their advice!     _____________________________________________________________________    Noticing when I'm unhooked and not threatened by my thoughts:    1) Thoughts " "are merely sounds, words, stories, bits of language, passing through our heads.    2) Thoughts may or may not be true. We don t automatically believe them.    3) Thoughts may or may not be important. We pay attention only if they re helpful.    4) Thoughts are not orders. We don t have to obey them.    5) Thoughts may or may not be wise. We don t automatically follow their advice.   Thoughts That Hook You:    What memories, fears, worries, self-criticisms, or other unhelpful thoughts do you dwell on or get \"stuck\" thinking about? What thoughts tend to hook you, jerk you around, and pull you out of your life?                 Life-Draining Actions:    What are you currently doing that makes your life worse in the long run: keeps you stuck, wastes your time or money, drains your energy; restricts your life; impacts negatively on your health, work, or relationships; maintains or worsens the problems your are dealing with?                     Feelings That Hook You:    What emotions, feelings, urges, impulses, or sensations tend to hook you and jerk you around or pull you into self-defeating actions?           Avoiding Challenging Situations:    What situations, activities, people, or places are you avoiding or staying away from? What have you quit, withdrawn from, dropped out of? What do you keep \"putting off\" until later?                           Progress on Treatment Objective(s) / Homework:  Satisfactory progress - ACTION (Actively working towards change); Intervened by reinforcing change plan / affirming steps taken       Psychodynamic psychotherapy    Discuss patient's emotional dynamics and issues and how they impact behaviors.    Explore patient's history of relationships and how they impact present behaviors.    Explore how to work with and make changes in these schemas and patterns.    Interpersonal Psychotherapy    Explore patterns in relationships that are effective or ineffective at helping patient reach " their goals, find satisfying experience.    Discuss new patterns or behaviors to engage in for improved social functioning.      Care Plan review completed: No    Medication Review:  No changes to current psychiatric medication(s)    Medication Compliance:  NA    Changes in Health Issues:   None reported    Chemical Use Review:   Substance Use: Chemical use reviewed, no active concerns identified      Tobacco Use: No current tobacco use.      Assessment: Current Emotional / Mental Status (status of significant symptoms):  Risk status (Self / Other harm or suicidal ideation)  Patient has had a history of suicidal ideation: passive thoughts only; easily controlled - denied any recently    Patient denies current fears or concerns for personal safety.  Patient denies current or recent suicidal ideation or behaviors.  Patient denies current or recent homicidal ideation or behaviors.  Patient denies current or recent self injurious behavior or ideation.  Patient denies other safety concerns.     A safety and risk management plan has not been developed at this time, however patient was encouraged to call [place] [phone] should there be a change in any of these risk factors.      [place] Suicide Severity Rating Scale (Short Version)  [place] Suicide Severity Rating (Short Version) 2/18/2021   Over the past 2 weeks have you felt down, depressed, or hopeless? yes   Over the past 2 weeks have you had thoughts of killing yourself? no   Have you ever attempted to kill yourself? no     [place] Suicide Severity Rating Scale (Lifetime/Recent)  [place] Suicide Severity Rating (Lifetime/Recent) 8/13/2021   Wish to be Dead (Lifetime) No   Non-Specific Active Suicidal Thoughts (Lifetime) No   Most Severe Ideation Rating (Lifetime) NA   Frequency (Lifetime) NA   Duration (Lifetime) NA   Controllability (Lifetime) NA   Protective Factors  (Lifetime) NA   Reasons for Ideation (Lifetime) NA   RETIRED: 1. Wish to be Dead (Recent) No    RETIRED: 2. Non-Specific Active Suicidal Thoughts (Recent) No   3. Active Suicidal Ideation with any Methods (Not Plan) Without Intent to Act (Lifetime) No   RETIRED: 3. Active Suicidal Ideation with any Methods (Not Plan) Without Intent to Act (Recent) No   RETIRE: 4. Active Suicidal Ideation with Some Intent to Act, Without Specific Plan (Lifetime) No   4. Active Suicidal Ideation with Some Intent to Act, Without Specific Plan (Recent) No   RETIRE: 5. Active Suicidal Ideation with Specific Plan and Intent (Lifetime) No   RETIRED: 5. Active Suicidal Ideation with Specific Plan and Intent (Recent) No   Most Severe Ideation Rating (Past Month) NA   Frequency (Past Month) NA   Duration (Past Month) NA   Controllability (Past Month) NA   Protective Factors (Past Month) NA   Reasons for Ideation (Past Month) NA   Actual Attempt (Lifetime) No   Actual Attempt (Past 3 Months) No   Has subject engaged in non-suicidal self-injurious behavior? (Lifetime) No   Has subject engaged in non-suicidal self-injurious behavior? (Past 3 Months) No   Interrupted Attempts (Lifetime) No   Interrupted Attempts (Past 3 Months) No   Aborted or Self-Interrupted Attempt (Lifetime) No   Aborted or Self-Interrupted Attempt (Past 3 Months) No   Preparatory Acts or Behavior (Lifetime) No   Preparatory Acts or Behavior (Past 3 Months) No   Most Recent Attempt Actual Lethality Code NA   Most Lethal Attempt Actual Lethality Code NA   Initial/First Attempt Actual Lethality Code NA         Appearance:   Appropriate   Eye Contact:   Good   Psychomotor Behavior: Normal   Attitude:   Cooperative   Orientation:   All  Speech   Rate / Production: Normal    Volume:  Normal   Mood:    Depressed    Affect:    Tearful  Thought Content:  Rumination   Thought Form:  Coherent  Logical   Insight:    Good     Diagnoses:  1. Major depressive disorder, recurrent episode, moderate (H)        Collateral Reports Completed:  [facility] will coordinate with care team as  needed    Plan: (Homework, other):  Lanie was scheduled to RTC in two weeks for help addressing depressive symptoms, pain, and relationship conflicts.   CD Recommendations: No indications of CD issues.     Héctor Higuera Psy.D, LP November 18, 2022            _______________________________________________________________________                                              Individual Treatment Plan    Patient's Name: Lanie Neil  YOB: 1990    Date of Creation: 3/4/2022  Date Treatment Plan Last Reviewed/Revised: 9/9/2022    DSM5 Diagnoses: 296.32 (F33.1) Major Depressive Disorder, Recurrent Episode, Moderate With anxious distress  Psychosocial / Contextual Factors: Chronic pain, SMA, disabled  PROMIS (reviewed every 90 days):     PROMIS-10    In general, would you say your health is: (P) Excellent    In general, would you say your quality of life is: (P) Good    In general, how would you rate your physical health? (P) Very good    In general, how would you rate your mental health, including your mood and your ability to think? (P) Good    In general, how would you rate your satisfaction with your social activities and relationships? (P) Good    In general, please rate how well you carry out your usual social activities and roles. (This includes activities at home, at work and in your community, and responsibilities as a parent, child, spouse, employee, friend, etc.) (P) Very good    To what extent are you able to carry out your everyday physical activities such as walking, climbing stairs, carrying groceries, or moving a chair? (P) Not at all    In the past 7 days,  How often have you been bothered by emotional problems such as feeling anxious, depressed or irritable? (P) Rarely  How would you rate your fatigue on average? (P) Moderate  How would you rate your pain on average? (P) 6    PROMIS GLOBAL SCORES    Mental health question re-calculation - no clinical value - (P) 4  Physical health  question re-calculation - no clinical value - (P) 3  Pain question re-calculation - no clinical value - (P) 3  Global Mental Health Score - (P) 13  Global Physical Health Score - (P) 11  PROMIS TOTAL - SUBSCORES - (P) 24      [id number] PROMIS HEALTH ORGANIZATION AND PROMIS COOPERATIVE GROUP VERSION 1.1          Referral / Collaboration:  Referral to another professional/service is not indicated at this time..    Anticipated number of session for this episode of care: 7-9  Anticipation frequency of session: Every other week  Anticipated Duration of each session: 38-52 minutes  Treatment plan will be reviewed in 90 days or when goals have been changed.       MeasurableTreatment Goal(s) related to diagnosis / functional impairment(s)  Goal 1: Patient will experience a reduction in depressive symptoms along with a corresponding increase in positive emotion and life satisfaction.      Objective #A (Patient Action)    Patient will Increase interest, engagement, and pleasure in doing things.  Status: Continued - Date(s): 9/9/2022    Intervention(s)  Therapist will help patient identify pleasant and mastery oriented events that elicit positive, relaxed mood.    Objective #B  Patient will Decrease frequency and intensity of feeling down, depressed, hopeless.  Status: Continued - Date(s): 9/9/2022    Intervention(s)  Therapist will introduce patient to cognitive-behavioral and acceptance and commitment therapy topics aimed to help reduce depression and anxiety    Objective #C  Patient will Identify negative self-talk and behaviors: challenge core beliefs, myths, and actions  Improve concentration, focus, and mindfulness in daily activities .  Status: Continued - Date(s): 9/9/2022    Intervention(s)  Therapist will help patient identify and manage negative self-talk and automatic thoughts; introduce patient to cognitive distortions; help patient develop cognitive diffusion techniques      Goal 2: Patient will experience a  "reduction in anxious symptoms, along with a corresponding increase in relaxed emotional states and life satisfaction.      Objective #A (Patient Action)  Patient will use cognitive-behavioral and thought diffusion strategies identified in therapy to challenge anxious thoughts.    Status: Continued - Date(s): 9/9/2022    Intervention(s)  Therapist will utilize CBT and ACT ideas to help patient challenge anxious thoughts and reduce intensity/duration of anxious distress    Objective #B  Patient will use \"worry time\" each day for 15 minutes of scheduled worry and then defer obsessive or anxious thinking until the next structured worry time.    Status: Continued - Date(s): 9/9/2022    Intervention(s)  Therapist will teach patient how to effectively utilize worry time and/or thought logs/journals each day and incorporate more relaxation behaviors into their routine.    Objective #C  Patient will identify the stressors which contribute to feelings of anxiety  Patient will increase engagement in adaptive coping skills and recreational activities, such as exercise and healthy socialization, to manage distress.  Status: Continued - Date(s): 9/9/2022    Intervention(s)  Therapist will help patient identify triggers/situational factors that contribute to anxiety and behavioral skills aimed to manage anxious distress.      Goal 3: Patient will learn more adaptive ways to manage chronic pain      Objective #A (Patient Action)    Status: Continued - Date(s): 9/9/2022    Patient will identify 2-4 strategies for managing pain.    Intervention(s)  Therapist will teach distraction skills aimed to help manage chronic pain and utilize ACT/CBT ideas to help with this (e.g. relaxation).    Objective #B  Patient will state understanding of stressors and relationship to physical symptoms.    Status: Continued - Date(s): 9/9/2022      Other Possible Therapeutic Intervention(s):    Psycho-education regarding mental health diagnoses and " treatment options    Supportive Therapy    Provide affirmations, reflections, and establish working rapport    Emphasize and reflect on strength of therapeutic relationship    Skills training    Explore skills useful to client in current situation.    Skills include assertiveness, communication, conflict management, problem-solving, relaxation, etc.    Solution-Focused Therapy    Explore patterns in patient's relationships and discuss options for new behaviors.    Explore patterns in patient's actions and choices and discuss options for new behaviors.    Cognitive-behavioral Therapy    Discuss common cognitive distortions, identify them in patient's life.    Explore ways to challenge, replace, and act against these cognitions.    Acceptance and Commitment Therapy    Explore and identify important values in patient's life.    Discuss ways to commit to behavioral activation around these values.    Psychodynamic psychotherapy    Discuss patient's emotional dynamics and issues and how they impact behaviors.    Explore patient's history of relationships and how they impact present behaviors.    Explore how to work with and make changes in these schemas and patterns.    Narrative Therapy    Explore the patient's story of his/her life from his/her perspective.    Explore alternate ways of understanding their experience, identifying exceptions, developing new themes.    Interpersonal Psychotherapy    Explore patterns in relationships that are effective or ineffective at helping patient reach their goals, find satisfying experience.    Discuss new patterns or behaviors to engage in for improved social functioning.    Behavioral Activation    Discuss steps patient can take to become more involved in meaningful activity.    Identify barriers to these activities and explore possible solutions.    Mindfulness-Based Strategies    Discuss skills based on development and application of mindfulness.    Skills drawn from  compassion-focused therapy, dialectical behavior therapy, mindfulness-based stress reduction, mindfulness-based cognitive therapy, etc.      Patient has reviewed and agreed to the above plan.    Crisis Resources    Refer to the resources below as needed.    Steps to care for yourself    1. If you are currently in counseling, call your counselor for an appointment  2. Call the local crisis resources below if needed.  3. Contact friends or family for support.  4. Get more exercise.  5. Do activities you enjoy.  6. Eat a well-balanced diet and drink plenty of fluids.  7. Rest as needed.  8. Limit alcohol and recreational drugs. These can worsen depression.    When to contact your primary care provider       You have thoughts of harming or killing yourself but have not made a plan to carry it out.    Your depression gets in the way of daily activities.    You are often unable to sleep.    You need help cutting back on alcohol or recreational drugs.    When to call 911 or go to the Emergency Room     Get emergency help right away if you have any of the following:    You are planning to harm or kill yourself and you have a way to carry out the plan.     You have injured yourself or others. Or, you think you will.    You feel confused or are having trouble thinking or remembering.    You are having delusions (false beliefs).    You are hearing voices or seeing things that aren t there.    You are feeling psychotic (paranoid, fearful, restless, agitated, nervous, racing thoughts or speech)    Crisis Resources     These hotlines are for both adults and children. The and are open 24 hours a day, 7 days a week unless noted otherwise.    988 - National Suicide and Crisis Lifeline  If you or someone you know needs support now, call or text 678 or chat [facility]org. 988 connects you with a trained crisis counselor who can help.    National Suicide Prevention Lifeline   0-582-896-TALK (4884)    Crisis Text  Line    www.crisistextline.org  Text HOME to 646434 from anywhere in the [place], anytime, about any type of crisis. A live, trained crisis counselor will receive the text and respond quickly.    [place] Lifeline for LGBTQ Youth  A national crisis intervention and suicide lifeline for LGBTQ youth under 25. Provides a safe place to talk without judgement.   Call 1-556.518.5939; text START to 191581 or visit www.thetrevorproject.org to talk to a trained counselor.  For county crisis numbers, visit the [place] DHS website at:  https://[place].gov/dhs/people-we-serve/adults/health-care/mental-health/resources/crisis-contacts.jsp      Héctor Higuera Psy.D, LP   Behavioral Health Clinician   [facility]     6/3/2022

## 2022-11-18 DIAGNOSIS — E87.6 HYPOKALEMIA: ICD-10-CM

## 2022-11-22 RX ORDER — POTASSIUM CHLORIDE 20MEQ/15ML
20 LIQUID (ML) ORAL DAILY
Qty: 675 ML | Refills: 1 | Status: SHIPPED | OUTPATIENT
Start: 2022-11-22 | End: 2023-02-21

## 2022-12-03 DIAGNOSIS — G47.00 INSOMNIA, UNSPECIFIED TYPE: ICD-10-CM

## 2022-12-05 NOTE — TELEPHONE ENCOUNTER
traZODone (DESYREL) 5 mg/ml SUSP  Passed Protocol    Last office visit:  5/23/2022  [facility] Jimmy Patel MD  Family Medicine    Routing refill request to provider for review/approval because:    Request does not match the medication list dose/susp strength

## 2022-12-06 ENCOUNTER — CARE COORDINATION (OUTPATIENT)
Dept: FAMILY MEDICINE | Facility: CLINIC | Age: 32
End: 2022-12-06

## 2022-12-06 ENCOUNTER — MYC MEDICAL ADVICE (OUTPATIENT)
Dept: FAMILY MEDICINE | Facility: CLINIC | Age: 32
End: 2022-12-06

## 2022-12-07 ENCOUNTER — VIRTUAL VISIT (OUTPATIENT)
Dept: BEHAVIORAL HEALTH | Facility: CLINIC | Age: 32
End: 2022-12-07
Payer: COMMERCIAL

## 2022-12-07 ENCOUNTER — VIRTUAL VISIT (OUTPATIENT)
Dept: FAMILY MEDICINE | Facility: CLINIC | Age: 32
End: 2022-12-07
Payer: COMMERCIAL

## 2022-12-07 ENCOUNTER — TELEPHONE (OUTPATIENT)
Dept: FAMILY MEDICINE | Facility: CLINIC | Age: 32
End: 2022-12-07

## 2022-12-07 DIAGNOSIS — K21.00 GASTROESOPHAGEAL REFLUX DISEASE WITH ESOPHAGITIS WITHOUT HEMORRHAGE: ICD-10-CM

## 2022-12-07 DIAGNOSIS — G89.4 CHRONIC PAIN SYNDROME: ICD-10-CM

## 2022-12-07 DIAGNOSIS — G12.9 SPINAL MUSCLE ATROPHY (H): Primary | ICD-10-CM

## 2022-12-07 DIAGNOSIS — J45.909 UNCOMPLICATED ASTHMA, UNSPECIFIED ASTHMA SEVERITY, UNSPECIFIED WHETHER PERSISTENT: ICD-10-CM

## 2022-12-07 DIAGNOSIS — F33.1 MAJOR DEPRESSIVE DISORDER, RECURRENT EPISODE, MODERATE (H): Primary | ICD-10-CM

## 2022-12-07 PROCEDURE — 99214 OFFICE O/P EST MOD 30 MIN: CPT | Mod: 95 | Performed by: FAMILY MEDICINE

## 2022-12-07 PROCEDURE — 90837 PSYTX W PT 60 MINUTES: CPT | Mod: 95 | Performed by: PSYCHOLOGIST

## 2022-12-07 RX ORDER — ALBUTEROL SULFATE 0.83 MG/ML
SOLUTION RESPIRATORY (INHALATION)
Qty: 360 ML | Refills: 1 | Status: SHIPPED | OUTPATIENT
Start: 2022-12-07 | End: 2023-12-30

## 2022-12-07 NOTE — PROGRESS NOTES
[facility]: Integrated Behavioral Health  December 8, 2022        Behavioral Health Clinician Progress Note    Patient Name: Lanie Neil           Service Type: Video appointment      Service Location:  Video appointment      Session Start Time: 1:04 Session End Time: 2:00      Session Length: 53 - 60      Attendees: Patient    Visit Activities (Refresh list every visit): [facility] Only    Service Modality:  Video Visit:      Provider verified identity through the following two step process.  Patient provided:  Patient photo and Patient is known previously to provider    Telemedicine Visit: The patient's condition can be safely assessed and treated via synchronous audio and visual telemedicine encounter.      Reason for Telemedicine Visit: Patient unable to travel    Originating Site (Patient Location): Patient's home    Distant Site (Provider Location): [facility]: [facility] [place]    Consent:  The patient/guardian has verbally consented to: the potential risks and benefits of telemedicine (video visit) versus in person care; bill my insurance or make self-payment for services provided; and responsibility for payment of non-covered services.     Patient would like the video invitation sent by:  My Chart    Mode of Communication:  Video Conference via [facility]well    Distant Location (Provider):  On-site    As the provider I attest to compliance with applicable laws and regulations related to telemedicine.      Diagnostic Assessment Date: 1/19/2021  Treatment Plan Review Date: 12/9/2022  See Flowsheets for today's PHQ-9 and KATHARINE-7 results  Previous PHQ-9:   PHQ-9 SCORE 12/15/2020 3/16/2021 6/30/2021   PHQ-9 Total Score - - -   PHQ-9 Total Score MyChart 2 (Minimal depression) 2 (Minimal depression) 2 (Minimal depression)   PHQ-9 Total Score 2 2 2     Previous KATHARINE-7:   KATHARINE-7 SCORE 12/15/2020 3/16/2021 6/30/2021   Total Score 1 (minimal anxiety) 1 (minimal anxiety) 1 (minimal anxiety)  "  Total Score 1 1 1       DATA  Extended Session (60+ minutes): No  Interactive Complexity: No  Crisis: No    Treatment Objective(s) Addressed in This Session:  Target Behavior(s): disease management/lifestyle changes      Depressed Mood: Decrease frequency and intensity of feeling down, depressed, hopeless  Identify negative self-talk and behaviors: challenge core beliefs, myths, and actions  Anxiety: will develop more effective coping skills to manage anxiety symptoms  Psychological distress related to Pain    Current Stressors / Issues:  [facility] met with Lanie today to continue supporting her treatment of depression, adjustment to chronic disease management, and relationship problems. Long discussion today about anger/resentment toward others in her family when she perceives excess spending or frivolous purchases by others. Reports this upsets her because of how much she and her mother are struggling financially; she reports hoping they would be more inclined to help them instead of buying unnecessary items. This lead us to processing identified themes of abandonment and helplessness that underlay her experiences of anger/hurt. Explored in particular forgiveness of her father for past events/hurts. Lanie states she has challenge with this, though we processed her fear/regret experienced when her father had a minor health issue, how she worried she wouldn't be afforded time to make amends. Insight oriented and CBT ideas were utilized in session today.    Am I Hooked or Unhooked From My Thoughts?    Clues that I'm getting hooked and threatened by my thoughts:    1) Thoughts are Literal and Threatening; it s as if what we re thinking is actually present, here and now!    2) Thoughts are \"The Truth\"; we literally believe them!    3) Thoughts are Most Important; we take them seriously, and give them our full attention!    4) Thoughts are Orders; we automatically obey them!    5) Thoughts are Always Wise; we assume " "they know best and we follow their advice!     _____________________________________________________________________    Noticing when I'm unhooked and not threatened by my thoughts:    1) Thoughts are merely sounds, words, stories, bits of language, passing through our heads.    2) Thoughts may or may not be true. We don t automatically believe them.    3) Thoughts may or may not be important. We pay attention only if they re helpful.    4) Thoughts are not orders. We don t have to obey them.    5) Thoughts may or may not be wise. We don t automatically follow their advice.   Thoughts That Hook You:    What memories, fears, worries, self-criticisms, or other unhelpful thoughts do you dwell on or get \"stuck\" thinking about? What thoughts tend to hook you, jerk you around, and pull you out of your life?                 Life-Draining Actions:    What are you currently doing that makes your life worse in the long run: keeps you stuck, wastes your time or money, drains your energy; restricts your life; impacts negatively on your health, work, or relationships; maintains or worsens the problems your are dealing with?                     Feelings That Hook You:    What emotions, feelings, urges, impulses, or sensations tend to hook you and jerk you around or pull you into self-defeating actions?           Avoiding Challenging Situations:    What situations, activities, people, or places are you avoiding or staying away from? What have you quit, withdrawn from, dropped out of? What do you keep \"putting off\" until later?                           Progress on Treatment Objective(s) / Homework:  Satisfactory progress - ACTION (Actively working towards change); Intervened by reinforcing change plan / affirming steps taken       Psychodynamic psychotherapy    Discuss patient's emotional dynamics and issues and how they impact behaviors.    Explore patient's history of relationships and how they impact present " behaviors.    Explore how to work with and make changes in these schemas and patterns.    Interpersonal Psychotherapy    Explore patterns in relationships that are effective or ineffective at helping patient reach their goals, find satisfying experience.    Discuss new patterns or behaviors to engage in for improved social functioning.      Care Plan review completed: No    Medication Review:  No changes to current psychiatric medication(s)    Medication Compliance:  NA    Changes in Health Issues:   None reported    Chemical Use Review:   Substance Use: Chemical use reviewed, no active concerns identified      Tobacco Use: No current tobacco use.      Assessment: Current Emotional / Mental Status (status of significant symptoms):  Risk status (Self / Other harm or suicidal ideation)  Patient has had a history of suicidal ideation: passive thoughts only; easily controlled - denied any recently    Patient denies current fears or concerns for personal safety.  Patient denies current or recent suicidal ideation or behaviors.  Patient denies current or recent homicidal ideation or behaviors.  Patient denies current or recent self injurious behavior or ideation.  Patient denies other safety concerns.     A safety and risk management plan has not been developed at this time, however patient was encouraged to call [place] [phone] should there be a change in any of these risk factors.      [place] Suicide Severity Rating Scale (Short Version)  [place] Suicide Severity Rating (Short Version) 2/18/2021   Over the past 2 weeks have you felt down, depressed, or hopeless? yes   Over the past 2 weeks have you had thoughts of killing yourself? no   Have you ever attempted to kill yourself? no     [place] Suicide Severity Rating Scale (Lifetime/Recent)  [place] Suicide Severity Rating (Lifetime/Recent) 8/13/2021   Wish to be Dead (Lifetime) No   Non-Specific Active Suicidal Thoughts (Lifetime) No   Most Severe Ideation  Rating (Lifetime) NA   Frequency (Lifetime) NA   Duration (Lifetime) NA   Controllability (Lifetime) NA   Protective Factors  (Lifetime) NA   Reasons for Ideation (Lifetime) NA   RETIRED: 1. Wish to be Dead (Recent) No   RETIRED: 2. Non-Specific Active Suicidal Thoughts (Recent) No   3. Active Suicidal Ideation with any Methods (Not Plan) Without Intent to Act (Lifetime) No   RETIRED: 3. Active Suicidal Ideation with any Methods (Not Plan) Without Intent to Act (Recent) No   RETIRE: 4. Active Suicidal Ideation with Some Intent to Act, Without Specific Plan (Lifetime) No   4. Active Suicidal Ideation with Some Intent to Act, Without Specific Plan (Recent) No   RETIRE: 5. Active Suicidal Ideation with Specific Plan and Intent (Lifetime) No   RETIRED: 5. Active Suicidal Ideation with Specific Plan and Intent (Recent) No   Most Severe Ideation Rating (Past Month) NA   Frequency (Past Month) NA   Duration (Past Month) NA   Controllability (Past Month) NA   Protective Factors (Past Month) NA   Reasons for Ideation (Past Month) NA   Actual Attempt (Lifetime) No   Actual Attempt (Past 3 Months) No   Has subject engaged in non-suicidal self-injurious behavior? (Lifetime) No   Has subject engaged in non-suicidal self-injurious behavior? (Past 3 Months) No   Interrupted Attempts (Lifetime) No   Interrupted Attempts (Past 3 Months) No   Aborted or Self-Interrupted Attempt (Lifetime) No   Aborted or Self-Interrupted Attempt (Past 3 Months) No   Preparatory Acts or Behavior (Lifetime) No   Preparatory Acts or Behavior (Past 3 Months) No   Most Recent Attempt Actual Lethality Code NA   Most Lethal Attempt Actual Lethality Code NA   Initial/First Attempt Actual Lethality Code NA         Appearance:   Appropriate   Eye Contact:   Good   Psychomotor Behavior: Normal   Attitude:   Cooperative   Orientation:   All  Speech   Rate / Production: Normal    Volume:  Normal   Mood:    Depressed    Affect:    Tearful  Thought  Content:  Rumination   Thought Form:  Coherent  Logical   Insight:    Good     Diagnoses:  1. Major depressive disorder, recurrent episode, moderate (H)        Collateral Reports Completed:  [facility] will coordinate with care team as needed    Plan: (Homework, other):  Lanie was scheduled to RTC in two weeks for help addressing depressive symptoms, pain, and relationship conflicts.   CD Recommendations: No indications of CD issues.     Héctor Higuera Psy.D, LP December 8, 2022              _______________________________________________________________________                                              Individual Treatment Plan    Patient's Name: Lanie Neil  YOB: 1990    Date of Creation: 3/4/2022  Date Treatment Plan Last Reviewed/Revised: 9/9/2022    DSM5 Diagnoses: 296.32 (F33.1) Major Depressive Disorder, Recurrent Episode, Moderate With anxious distress  Psychosocial / Contextual Factors: Chronic pain, SMA, disabled  PROMIS (reviewed every 90 days):     PROMIS-10    In general, would you say your health is: (P) Excellent    In general, would you say your quality of life is: (P) Good    In general, how would you rate your physical health? (P) Very good    In general, how would you rate your mental health, including your mood and your ability to think? (P) Good    In general, how would you rate your satisfaction with your social activities and relationships? (P) Good    In general, please rate how well you carry out your usual social activities and roles. (This includes activities at home, at work and in your community, and responsibilities as a parent, child, spouse, employee, friend, etc.) (P) Very good    To what extent are you able to carry out your everyday physical activities such as walking, climbing stairs, carrying groceries, or moving a chair? (P) Not at all    In the past 7 days,  How often have you been bothered by emotional problems such as feeling anxious, depressed or  irritable? (P) Rarely  How would you rate your fatigue on average? (P) Moderate  How would you rate your pain on average? (P) 6    PROMIS GLOBAL SCORES    Mental health question re-calculation - no clinical value - (P) 4  Physical health question re-calculation - no clinical value - (P) 3  Pain question re-calculation - no clinical value - (P) 3  Global Mental Health Score - (P) 13  Global Physical Health Score - (P) 11  PROMIS TOTAL - SUBSCORES - (P) 24      [id number] PROMIS HEALTH ORGANIZATION AND PROMIS COOPERATIVE GROUP VERSION 1.1          Referral / Collaboration:  Referral to another professional/service is not indicated at this time..    Anticipated number of session for this episode of care: 7-9  Anticipation frequency of session: Every other week  Anticipated Duration of each session: 38-52 minutes  Treatment plan will be reviewed in 90 days or when goals have been changed.       MeasurableTreatment Goal(s) related to diagnosis / functional impairment(s)  Goal 1: Patient will experience a reduction in depressive symptoms along with a corresponding increase in positive emotion and life satisfaction.      Objective #A (Patient Action)    Patient will Increase interest, engagement, and pleasure in doing things.  Status: Continued - Date(s): 9/9/2022    Intervention(s)  Therapist will help patient identify pleasant and mastery oriented events that elicit positive, relaxed mood.    Objective #B  Patient will Decrease frequency and intensity of feeling down, depressed, hopeless.  Status: Continued - Date(s): 9/9/2022    Intervention(s)  Therapist will introduce patient to cognitive-behavioral and acceptance and commitment therapy topics aimed to help reduce depression and anxiety    Objective #C  Patient will Identify negative self-talk and behaviors: challenge core beliefs, myths, and actions  Improve concentration, focus, and mindfulness in daily activities .  Status: Continued - Date(s):  "9/9/2022    Intervention(s)  Therapist will help patient identify and manage negative self-talk and automatic thoughts; introduce patient to cognitive distortions; help patient develop cognitive diffusion techniques      Goal 2: Patient will experience a reduction in anxious symptoms, along with a corresponding increase in relaxed emotional states and life satisfaction.      Objective #A (Patient Action)  Patient will use cognitive-behavioral and thought diffusion strategies identified in therapy to challenge anxious thoughts.    Status: Continued - Date(s): 9/9/2022    Intervention(s)  Therapist will utilize CBT and ACT ideas to help patient challenge anxious thoughts and reduce intensity/duration of anxious distress    Objective #B  Patient will use \"worry time\" each day for 15 minutes of scheduled worry and then defer obsessive or anxious thinking until the next structured worry time.    Status: Continued - Date(s): 9/9/2022    Intervention(s)  Therapist will teach patient how to effectively utilize worry time and/or thought logs/journals each day and incorporate more relaxation behaviors into their routine.    Objective #C  Patient will identify the stressors which contribute to feelings of anxiety  Patient will increase engagement in adaptive coping skills and recreational activities, such as exercise and healthy socialization, to manage distress.  Status: Continued - Date(s): 9/9/2022    Intervention(s)  Therapist will help patient identify triggers/situational factors that contribute to anxiety and behavioral skills aimed to manage anxious distress.      Goal 3: Patient will learn more adaptive ways to manage chronic pain      Objective #A (Patient Action)    Status: Continued - Date(s): 9/9/2022    Patient will identify 2-4 strategies for managing pain.    Intervention(s)  Therapist will teach distraction skills aimed to help manage chronic pain and utilize ACT/CBT ideas to help with this (e.g. " relaxation).    Objective #B  Patient will state understanding of stressors and relationship to physical symptoms.    Status: Continued - Date(s): 9/9/2022      Other Possible Therapeutic Intervention(s):    Psycho-education regarding mental health diagnoses and treatment options    Supportive Therapy    Provide affirmations, reflections, and establish working rapport    Emphasize and reflect on strength of therapeutic relationship    Skills training    Explore skills useful to client in current situation.    Skills include assertiveness, communication, conflict management, problem-solving, relaxation, etc.    Solution-Focused Therapy    Explore patterns in patient's relationships and discuss options for new behaviors.    Explore patterns in patient's actions and choices and discuss options for new behaviors.    Cognitive-behavioral Therapy    Discuss common cognitive distortions, identify them in patient's life.    Explore ways to challenge, replace, and act against these cognitions.    Acceptance and Commitment Therapy    Explore and identify important values in patient's life.    Discuss ways to commit to behavioral activation around these values.    Psychodynamic psychotherapy    Discuss patient's emotional dynamics and issues and how they impact behaviors.    Explore patient's history of relationships and how they impact present behaviors.    Explore how to work with and make changes in these schemas and patterns.    Narrative Therapy    Explore the patient's story of his/her life from his/her perspective.    Explore alternate ways of understanding their experience, identifying exceptions, developing new themes.    Interpersonal Psychotherapy    Explore patterns in relationships that are effective or ineffective at helping patient reach their goals, find satisfying experience.    Discuss new patterns or behaviors to engage in for improved social functioning.    Behavioral Activation    Discuss steps patient can  take to become more involved in meaningful activity.    Identify barriers to these activities and explore possible solutions.    Mindfulness-Based Strategies    Discuss skills based on development and application of mindfulness.    Skills drawn from compassion-focused therapy, dialectical behavior therapy, mindfulness-based stress reduction, mindfulness-based cognitive therapy, etc.      Patient has reviewed and agreed to the above plan.    Crisis Resources    Refer to the resources below as needed.    Steps to care for yourself    1. If you are currently in counseling, call your counselor for an appointment  2. Call the local crisis resources below if needed.  3. Contact friends or family for support.  4. Get more exercise.  5. Do activities you enjoy.  6. Eat a well-balanced diet and drink plenty of fluids.  7. Rest as needed.  8. Limit alcohol and recreational drugs. These can worsen depression.    When to contact your primary care provider       You have thoughts of harming or killing yourself but have not made a plan to carry it out.    Your depression gets in the way of daily activities.    You are often unable to sleep.    You need help cutting back on alcohol or recreational drugs.    When to call 911 or go to the Emergency Room     Get emergency help right away if you have any of the following:    You are planning to harm or kill yourself and you have a way to carry out the plan.     You have injured yourself or others. Or, you think you will.    You feel confused or are having trouble thinking or remembering.    You are having delusions (false beliefs).    You are hearing voices or seeing things that aren t there.    You are feeling psychotic (paranoid, fearful, restless, agitated, nervous, racing thoughts or speech)    Crisis Resources     These hotlines are for both adults and children. The and are open 24 hours a day, 7 days a week unless noted otherwise.    988 - National Suicide and Crisis  Lifeline  If you or someone you know needs support now, call or text 590 or chat [facility]. 988 connects you with a trained crisis counselor who can help.    National Suicide Prevention Lifeline   9-732-741-TALK (2241)    Crisis Text Line    www.crisistextline.org  Text HOME to 545586 from anywhere in the [place], anytime, about any type of crisis. A live, trained crisis counselor will receive the text and respond quickly.    [place] Lifeline for LGBTQ Youth  A national crisis intervention and suicide lifeline for LGBTQ youth under 25. Provides a safe place to talk without judgement.   Call 1-232.513.5692; text START to 262329 or visit www.thetrevorproject.org to talk to a trained counselor.  For county crisis numbers, visit the [place] DHS website at:  https://[place].gov/dhs/people-we-serve/adults/health-care/mental-health/resources/crisis-contacts.jsp      Héctor Higuera Psy.D, LP   Behavioral Health Clinician   [facility]     6/3/2022

## 2022-12-07 NOTE — PROGRESS NOTES
Lanie is a 32 year old who is being evaluated via a billable video visit.      Pt is in [place]    How would you like to obtain your AVS? MyChart  If the video visit is dropped, the invitation should be resent by: Send to e-mail at: [email or web address]  Will anyone else be joining your video visit? No    Anupama [place]    Video-Visit Details    Video Start Time: 2:24    Type of service:  Video Visit    Video End Time:2:45    Originating Location (pt. Location): Home    Distant Location (provider location):  On-site    Platform used for Video Visit: Boris Dela Cruz is a 32 year old who is being evaluated via a billable video visit.            Assessment & Plan     Asthma  Helps, tolerated, not frequent use  - albuterol (PROVENTIL) (2.5 MG/3ML) 0.083% neb solution; USE ONE VIAL BY NEBULIZATION EVERY 6 HOURS AS NEEDED FOR SHORTNESS OF BREATH/ DYSPNEA OR WHEEZING    Gastroesophageal reflux disease with esophagitis  Helps, tolerated  - omeprazole (PRILOSEC) 2 mg/mL suspension; SHAKE LIQUID WELL AND GIVE 20ML (40MG) DAILY (VIA G-TUBE)    Spinal muscle atrophy (H)  Cause of pain    Chronic pain syndrome  Cont w/ outside pain clinic, in visit re signed up for med cannabis        27 minutes spent on the date of the encounter doing chart review, history and exam, documentation and further activities per the note      Return in about 3 months (around 3/7/2023).    Jimmy Moseley MD  [facility] [place]    Julia Dela Cruz is a 32 year old, presenting for the following health issues:  Video Visit ([facility] medical cannibas)      HPI Here for a few things  One, pain, medical cannabis helps/tolerated, selvin needs if there's an inadvertent break in rx opiods from outside pain clinic  Two, due for prev20, flu, covid shots; she will be in PCC soon, we will try to do nurse only then     No acute or interval c/o, no longer needs Spinraza due to new oral med    GERD well managed w/ well tolerated  PPI    Asthma well managed w/ infrequent nebs    Past Medical History:   Diagnosis Date     Anxiety      Snoring      Spinal muscle atrophy (H)      Past Surgical History:   Procedure Laterality Date     ENT SURGERY      tubes     GASTROSTOMY TUBE       KIDNEY SURGERY       SPINE SURGERY       TRACHEOSTOMY       Current Outpatient Medications   Medication     ACETAMINOPHEN PO     albuterol (PROAIR HFA) 108 (90 Base) MCG/ACT inhaler     albuterol (PROVENTIL) (2.5 MG/3ML) 0.083% neb solution     ALPRAZolam (XANAX) 0.25 MG tablet     aminocaproic acid (AMICAR) 0.25 GM/ML solution     bisacodyl (DULCOLAX) 10 MG suppository     celecoxib (CELEBREX) 200 MG capsule     clindamycin (CLEOCIN T) 1 % lotion     diazepam (VALIUM) 5 MG tablet     doxycycline hyclate (VIBRAMYCIN) 100 MG capsule     fluocinolone acetonide (DERMA SMOOTHE/FS BODY) 0.01 % external oil     fluticasone (FLOVENT HFA) 110 MCG/ACT inhaler     gabapentin (NEURONTIN) 250 MG/5ML solution     guaiFENesin (ORGANIDIN) 200 MG TABS tablet     HYDROmorphone (DILAUDID) 4 MG tablet     ketoconazole (NIZORAL) 2 % external shampoo     levofloxacin (LEVAQUIN) 25 MG/ML solution     Magnesium Hydroxide (MILK OF MAGNESIA PO)     metroNIDAZOLE (METROCREAM) 0.75 % external cream     mometasone (NASONEX) 50 MCG/ACT nasal spray     morphine (SABINE) 30 MG 24 hr capsule     naloxone (NARCAN) 4 MG/0.1ML nasal spray     Nutritional Supplements (REPLETE FIBER) LIQD     omeprazole (PRILOSEC) 2 mg/mL suspension     ondansetron (ZOFRAN-ODT) 4 MG ODT tab     polyethylene glycol (MIRALAX) 17 GM/Dose powder     potassium chloride (KAYCIEL) 20 MEQ/15ML (10%) solution     prochlorperazine (COMPAZINE) 5 MG tablet     rivaroxaban ANTICOAGULANT (XARELTO ANTICOAGULANT) 10 MG TABS tablet     Salicylic Acid (OXY BALANCE FACIAL CLEAN WASH EX)     Sennosides (SENNA) 8.8 MG/5ML SYRP     sertraline (ZOLOFT) 20 MG/ML (HIGH CONC) solution     silver sulfADIAZINE (SSD) 1 % external cream      "simethicone (MYLICON) 66.7 mg/ml     simethicone 40 MG/0.6ML LIQD     sodium chloride (OCEAN) 0.65 % nasal spray     traZODone (DESYREL) 5 mg/ml SUSP     tretinoin (RETIN-A) 0.025 % external cream     GENERLAC 10 GM/15ML solution     hydrOXYzine (ATARAX) 25 MG tablet     LANsoprazole (PREVACID) 30 MG DR capsule     mupirocin (BACTROBAN) 2 % external cream     order for DME     order for DME     No current facility-administered medications for this visit.     No Known Allergies        Review of Systems         Objective    Vitals - Patient Reported  Weight (Patient Reported): 72.6 kg (160 lb)  Height (Patient Reported): 144.8 cm (4' 9\")  BMI (Based on Pt Reported Ht/Wt): 34.62  Pain Score: Moderate Pain (5)  Pain Loc: Low Back        Physical Exam   GENERAL: Healthy, alert and no distress  EYES: Eyes grossly normal to inspection.  No discharge or erythema, or obvious scleral/conjunctival abnormalities.  RESP: No audible wheeze, cough, or visible cyanosis.  No visible retractions or increased work of breathing.    SKIN: Visible skin clear. No significant rash, abnormal pigmentation or lesions.  NEURO: Cranial nerves grossly intact.  Mentation and speech appropriate for age.  PSYCH: Mentation appears normal, affect normal/bright, judgement and insight intact, normal speech and appearance well-groomed.          "

## 2022-12-09 NOTE — PROGRESS NOTES
[facility]  [address] [place] [address] [address] [address]  Main: [phone], Fax: [phone]      Video Virtual Visit Note:    Patient: Lanie Neil  MRN: [id number]  : 1990  JOSÉ MIGUEL: 2022      Due to the ongoing COVID-19 outbreak, this visit was conducted by video, with the patient's approval.      Reason of today's visit:  Spinal muscular atrophy. Wheelchair bound. History of DVT. Currently on long term anticoagulation therapy with Xarelto. Here for her routine annual follow up clinic visit.      Summary of Medical History:  This is a 32 year old female with a history of spinal muscular atrophy, who is chronically wheelchair bound, who moved from [place] to [place] back in May 2014, who has a history of DVT in the left leg, currently on long term anticoagulation therapy with Xarelto, participates in today's scheduled video visit for her annual follow up. I last saw this patient back in 2021, please see my initial consult note and previous progress notes for her complete detail history.      Thrombosis History Summary:    2012, she was having left leg swelling. Ultrasound at the time in [facility] found subacute/chronic occlusive thrombus in the mid to distal left femoral vein with collateralization. She was placed on enoxaparin initially for 2 weeks when she start complaining of headaches. A CT head at the time showed a subdural hematoma on 2012. Enoxaparin was held and an IVC filter was placed on 2012.     Eventually restarted on enoxaparin at a lowered dose.    2013, saw Dr. Willy Box, hematologist at [place] who stopped her enoxaparin and placed her on rivaroxaban at 20 mg PO Qday dosing.    Aug 2014, she established care with our clinic after she moved to [place] and she had maintained on long term anticoagulation therapy with rivaroxaban. Her mother usually crushes up her rivaroxaban  and administers it via her J-tube.     7/23/2019, her rivaroxaban dose was decreased to prophylactic dosing regimen of 10 mg PO Qday as she was experiencing menorrhagia.     Sept 2019, she was found to be iron deficient and was anemic and we arranged for her to receive IV iron infusion with injectafer. Repeat iron panel in Nov 2019 showed normal iron and ferritin with normal hemoglobin.       She also uses celebrex for her dysmenorrhea and amicar for menorrhagia which according to Lanie are effective.      Historically, for invasive procedures such as one related to her spinal reservoir and catheter, We usually hold her Xarelto for about 48 hours and resume the next day and she does well with that.      Interim History:  Lanie was found to have iron deficiency anemia after my visit with her back in Dec 2021. She eventually required IV iron infusion. Her iron deficiency anemia was related to menorrhagia.     Repeat CBC in 3/10/2022, showed that her Hgb was 13.4; Iron of 68; Ferritin 179.     Lanie has had repeat CBC or iron panel done since March 2022.     Today, Lanie still complaining of heavy menstrual bleeding. She has not discuss her menorrhagia with her Ob/Gyn physician despite me encouraging her to do so last year. Lanie feels that she might be anemic when she is having her period. She has continued to use aminocaproic acid during her menses as well as celebrex for dysmenorrhea.      ROS:  Denies any bleeding complications. Specifically, no frequent epistaxis. No issues with oral mucosal bleeding. Denies any hematuria or blood in stools. Denies any shortness of breath. No chest pain. No cough. No fever.    Medications:   Current Outpatient Medications   Medication     ACETAMINOPHEN PO     albuterol (PROAIR HFA) 108 (90 Base) MCG/ACT inhaler     albuterol (PROVENTIL) (2.5 MG/3ML) 0.083% neb solution     ALPRAZolam (XANAX) 0.25 MG tablet     aminocaproic acid (AMICAR) 0.25 GM/ML solution     bisacodyl  (DULCOLAX) 10 MG suppository     celecoxib (CELEBREX) 200 MG capsule     clindamycin (CLEOCIN T) 1 % lotion     diazepam (VALIUM) 5 MG tablet     doxycycline hyclate (VIBRAMYCIN) 100 MG capsule     fluocinolone acetonide (DERMA SMOOTHE/FS BODY) 0.01 % external oil     fluticasone (FLOVENT HFA) 110 MCG/ACT inhaler     gabapentin (NEURONTIN) 250 MG/5ML solution     GENERLAC 10 GM/15ML solution     guaiFENesin (ORGANIDIN) 200 MG TABS tablet     HYDROmorphone (DILAUDID) 4 MG tablet     hydrOXYzine (ATARAX) 25 MG tablet     ketoconazole (NIZORAL) 2 % external shampoo     LANsoprazole (PREVACID) 30 MG DR capsule     levofloxacin (LEVAQUIN) 25 MG/ML solution     Magnesium Hydroxide (MILK OF MAGNESIA PO)     metroNIDAZOLE (METROCREAM) 0.75 % external cream     mometasone (NASONEX) 50 MCG/ACT nasal spray     morphine (SABINE) 30 MG 24 hr capsule     mupirocin (BACTROBAN) 2 % external cream     naloxone (NARCAN) 4 MG/0.1ML nasal spray     Nutritional Supplements (REPLETE FIBER) LIQD     omeprazole (PRILOSEC) 2 mg/mL suspension     ondansetron (ZOFRAN-ODT) 4 MG ODT tab     order for DME     order for DME     polyethylene glycol (MIRALAX) 17 GM/Dose powder     potassium chloride (KAYCIEL) 20 MEQ/15ML (10%) solution     prochlorperazine (COMPAZINE) 5 MG tablet     rivaroxaban ANTICOAGULANT (XARELTO ANTICOAGULANT) 10 MG TABS tablet     Salicylic Acid (OXY BALANCE FACIAL CLEAN WASH EX)     Sennosides (SENNA) 8.8 MG/5ML SYRP     sertraline (ZOLOFT) 20 MG/ML (HIGH CONC) solution     silver sulfADIAZINE (SSD) 1 % external cream     simethicone (MYLICON) 66.7 mg/ml     simethicone 40 MG/0.6ML LIQD     sodium chloride (OCEAN) 0.65 % nasal spray     traZODone (DESYREL) 5 mg/ml SUSP     tretinoin (RETIN-A) 0.025 % external cream     No current facility-administered medications for this visit.       Allergies:    No Known Allergies    PMH:   Past Medical History:   Diagnosis Date     Anxiety      Snoring      Spinal muscle atrophy (H)         Social History:   Social History     Tobacco Use     Smoking status: Never     Smokeless tobacco: Never   Substance Use Topics     Alcohol use: No     Alcohol/week: 0.0 standard drinks     Drug use: No       Family History:  Deferred    Objective:  Visual Examination via Video:  Pleasant in no acute distress.  Normal work of breathing   A+O x 3    Labs:  Component      Latest Ref Rng & Units 3/10/2022   WBC      4.0 - 11.0 10e3/uL 6.6   RBC Count      3.80 - 5.20 10e6/uL 4.57   Hemoglobin      11.7 - 15.7 g/dL 13.4   Hematocrit      35.0 - 47.0 % 41.1   MCV      78 - 100 fL 90   MCH      26.5 - 33.0 pg 29.3   MCHC      31.5 - 36.5 g/dL 32.6   RDW      10.0 - 15.0 % 19.1 (H)   Platelet Count      150 - 450 10e3/uL 261   % Neutrophils      % 55   % Lymphocytes      % 34   % Monocytes      % 7   % Eosinophils      % 3   % Basophils      % 1   % Immature Granulocytes      % 0   NRBCs per 100 WBC      <1 /100 0   Absolute Neutrophils      1.6 - 8.3 10e3/uL 3.7   Absolute Lymphocytes      0.8 - 5.3 10e3/uL 2.2   Absolute Monocytes      0.0 - 1.3 10e3/uL 0.5   Absolute Eosinophils      0.0 - 0.7 10e3/uL 0.2   Absolute Basophils      0.0 - 0.2 10e3/uL 0.0   Absolute Immature Granulocytes      <=0.4 10e3/uL 0.0   Absolute NRBCs      10e3/uL 0.0   Ferritin      12 - 150 ng/mL 179 (H)   Iron      35 - 180 ug/dL 68       Assessment:  In summary, Lanie is a 32 year old female who has a history of spinal muscular atrophy, who is chronically wheelchair bound, who also has a history of DVT in the left leg, currently on long term anticoagulation therapy with rivaroxaban at 10 mg PO Qday dosing, participates in today's scheduled video visit for routine annual follow up. She has a history of iron deficiency anemia which likely was related menorrhagia. She has been using amicar for her menorrhagia and according to her is decreasing her bleeding during her menstrual period. She also uses Celebrex for her dysmenorrhea which also  is effective. Both Celebrex and Amicar are prescribed by this writer.      Diagnosis:  1. History of left lower extremity DVT.  2. On chronic anticoagulation therapy with Xarelto at 10 mg PO Qday.  3. Spinal muscular atrophy.  4. Chronic respiratory failure.    5. Chronically wheelchair bound.  6. Rosacea   7. History of Iron deficiency anemia.  8. Menorrhagia.     Plan:  Lanie remains to be a good candidate to stay on indefinite anticoagulation therapy. I will keep her on rivaroxaban at 10 mg PO Qday dosing. However, she continues to complaint of menorrhagia for which I had encouraged her to discuss this with her Ob/Gyn physician for the past several years but unfortunately that patient still has not done so. Lanie is not currently on any oral contraceptives and she is worry about pain in regard to possible placement of IUD. She is also hesitating about implantable or injectable progesterone only containing products.     Note that she has not had a repeat CBC or iron panel done since March 2022. I will proceed with checking her CBC, iron panel and CMP. Will consider IV iron replacement if she is iron deficient once again.     I again encourage her to make an appointment with her Ob/Gyn physician to discuss options of controlling her menorrhagia and dysmenorrhea.     She is instructed to call if she should have any unusual bleeding issues or if she should need any surgery or invasive procedures.      Otherwise, we will plan to see her back in one year for follow up visit (virtual visit is fine).     Video-Visit Details:  Type of service:  Video Visit  Video Start Time: 12:29  Video End Time (time video stopped): 12:44  Originating Location (pt. Location): Home  Distant Location (provider location):  [facility]   Mode of Communication:  Video Conference via stickK      Jt Mir PA-C, MPAS  Physician Assistant  [facility].     35 minutes spent on the date of the encounter doing chart review, history and exam, documentation and further activities per the note

## 2022-12-12 ENCOUNTER — VIRTUAL VISIT (OUTPATIENT)
Dept: HEMATOLOGY | Facility: CLINIC | Age: 32
End: 2022-12-12
Attending: PHYSICIAN ASSISTANT
Payer: COMMERCIAL

## 2022-12-12 DIAGNOSIS — J96.10 CHRONIC RESPIRATORY FAILURE, UNSPECIFIED WHETHER WITH HYPOXIA OR HYPERCAPNIA (H): ICD-10-CM

## 2022-12-12 DIAGNOSIS — D50.0 IRON DEFICIENCY ANEMIA DUE TO CHRONIC BLOOD LOSS: ICD-10-CM

## 2022-12-12 DIAGNOSIS — Z86.718 HISTORY OF DEEP VENOUS THROMBOSIS: ICD-10-CM

## 2022-12-12 DIAGNOSIS — Z79.01 CHRONIC ANTICOAGULATION: Primary | ICD-10-CM

## 2022-12-12 PROCEDURE — 99214 OFFICE O/P EST MOD 30 MIN: CPT | Mod: 95 | Performed by: PHYSICIAN ASSISTANT

## 2022-12-12 NOTE — PROGRESS NOTES
Patient was contacted to complete the pre-visit call prior to their video visit with the provider.      Allergies and medications were reviewed.    I thanked them for their time to cover this information     Patient current weight 160 lbs.    Sharon Chiang.

## 2022-12-12 NOTE — PATIENT INSTRUCTIONS
Lanie,    It was nice to see you via video visit today.    Below is a summary of our plan:  Please continue with taking rivaroxaban (Xarelto) at 10 mg by mouth every 24 hours.   I will sent an order to Kat to get your CBC, iron panel, ferritin and comprehensive metabolic panel checked.   If you are found to be iron deficient and anemic once again, we will consider giving you IV iron replacement once again.   Please schedule an appointment with your Ob/Gyn to discuss ways to control you heavy and painful menstrual bleeding.   If you should have any further questions, or experience any unusual bleeding issues or if you should need any invasive or surgical procedures in the future, please call us at [phone] and ask to speak to a nursing staff.  One of our administrative assistants will contact you to schedule your return visit with me in one year.     Thank you once again in choosing our clinic as part of your healthcare team.      Jt Mir PA-C, MPAS  Physician Assistant  [facility].

## 2022-12-12 NOTE — PROGRESS NOTES
[facility]  [facility]  [facility], [address], [place] [address]  Main: [phone], Fax: [phone]    Patient: Lanie Neil  MRN: [id number]  : 1990  Date of this note written: 2022  Order date: 2022     Diagnosis:  1. History of DVT.  2. Chronic anticoagulation therapy.  3. History of iron deficiency anemia.      Orders for PHS:  1. Please collect blood specimen for labs: 1) Complete blood count (CBC), 2) Iron level, 3) Iron binding capacity, AND 4) Ferritin  2. Please fax results to [phone] attention: Jt Mir PA-C.      This order is faxed to Kat at [phone]     Thank you.       Jt Mir PA-C, MPAS  Physician Assistant  [facility].

## 2022-12-14 ENCOUNTER — LAB REQUISITION (OUTPATIENT)
Dept: LAB | Facility: CLINIC | Age: 32
End: 2022-12-14
Payer: COMMERCIAL

## 2022-12-14 DIAGNOSIS — K59.00 CONSTIPATION, UNSPECIFIED CONSTIPATION TYPE: ICD-10-CM

## 2022-12-14 DIAGNOSIS — Z01.812 ENCOUNTER FOR PREPROCEDURAL LABORATORY EXAMINATION: ICD-10-CM

## 2022-12-14 DIAGNOSIS — F32.A DEPRESSION, UNSPECIFIED DEPRESSION TYPE: ICD-10-CM

## 2022-12-14 DIAGNOSIS — F41.9 ANXIETY: ICD-10-CM

## 2022-12-14 LAB
BASOPHILS # BLD AUTO: 0 10E3/UL (ref 0–0.2)
BASOPHILS NFR BLD AUTO: 1 %
EOSINOPHIL # BLD AUTO: 0.1 10E3/UL (ref 0–0.7)
EOSINOPHIL NFR BLD AUTO: 2 %
ERYTHROCYTE [DISTWIDTH] IN BLOOD BY AUTOMATED COUNT: 18.7 % (ref 10–15)
FERRITIN SERPL-MCNC: 15 NG/ML (ref 12–150)
HCT VFR BLD AUTO: 33.4 % (ref 35–47)
HGB BLD-MCNC: 10.5 G/DL (ref 11.7–15.7)
HOLD SPECIMEN: NORMAL
IMM GRANULOCYTES # BLD: 0 10E3/UL
IMM GRANULOCYTES NFR BLD: 0 %
IRON SATN MFR SERPL: 21 % (ref 15–46)
IRON SERPL-MCNC: 73 UG/DL (ref 35–180)
IRON SERPL-MCNC: 73 UG/DL (ref 35–180)
LYMPHOCYTES # BLD AUTO: 1.6 10E3/UL (ref 0.8–5.3)
LYMPHOCYTES NFR BLD AUTO: 21 %
MCH RBC QN AUTO: 27.3 PG (ref 26.5–33)
MCHC RBC AUTO-ENTMCNC: 31.4 G/DL (ref 31.5–36.5)
MCV RBC AUTO: 87 FL (ref 78–100)
MONOCYTES # BLD AUTO: 0.2 10E3/UL (ref 0–1.3)
MONOCYTES NFR BLD AUTO: 3 %
NEUTROPHILS # BLD AUTO: 5.4 10E3/UL (ref 1.6–8.3)
NEUTROPHILS NFR BLD AUTO: 73 %
NRBC # BLD AUTO: 0 10E3/UL
NRBC BLD AUTO-RTO: 0 /100
PLATELET # BLD AUTO: 336 10E3/UL (ref 150–450)
RBC # BLD AUTO: 3.84 10E6/UL (ref 3.8–5.2)
TIBC SERPL-MCNC: 344 UG/DL (ref 240–430)
WBC # BLD AUTO: 7.4 10E3/UL (ref 4–11)

## 2022-12-14 PROCEDURE — 82728 ASSAY OF FERRITIN: CPT | Mod: ORL | Performed by: PHYSICIAN ASSISTANT

## 2022-12-14 PROCEDURE — 83540 ASSAY OF IRON: CPT | Mod: ORL | Performed by: PHYSICIAN ASSISTANT

## 2022-12-14 PROCEDURE — 83550 IRON BINDING TEST: CPT | Mod: ORL | Performed by: PHYSICIAN ASSISTANT

## 2022-12-14 PROCEDURE — 85025 COMPLETE CBC W/AUTO DIFF WBC: CPT | Mod: ORL | Performed by: PHYSICIAN ASSISTANT

## 2022-12-15 RX ORDER — SENNOSIDES 8.8 MG/5ML
10 LIQUID ORAL 2 TIMES DAILY
Qty: 600 ML | Refills: 11 | Status: SHIPPED | OUTPATIENT
Start: 2022-12-15 | End: 2023-05-09

## 2022-12-15 RX ORDER — SERTRALINE HYDROCHLORIDE 20 MG/ML
SOLUTION ORAL
Qty: 360 ML | Refills: 0 | Status: SHIPPED | OUTPATIENT
Start: 2022-12-15 | End: 2023-06-30

## 2022-12-15 NOTE — TELEPHONE ENCOUNTER
SENNA 8.8MG/5ML LIQUID      Last Written Prescription Date:  10/25/21  Last Fill Quantity: 600ml,   # refills: 11  Last Office Visit : 12/7/22  Future Office visit:  3/8/23    Routing refill request to provider for review/approval because:  Drug not on the [facility], [facility] or [facility] refill protocol or controlled substance      SERTRALINE 20MG/ML CONCENTRATE      Last Written Prescription Date:  10/11/21  Last Fill Quantity: 320ml,   # refills: 5  Last Office Visit : 12/7/22  Future Office visit:  3/8/23    Routing refill request to provider for review/approval because:  Overdue for PHQ  90 day george refill sent to pharmacy      Drug Warning:  Warnings Override History for sertraline (ZOLOFT) 20 MG/ML (HIGH CONC) solution [[id number]]    Overridden by Jimmy Moseley MD on Dec 6, 2022 3:57 PM   Drug-Drug   1. SELECTIVE SEROTONIN REUPTAKE INHIBITORS / SEROTONERGIC NON-OPIOID CNS DEPRESSANTS [Level: Major]   Other Orders: traZODone (DESYREL) 5 mg/ml SUSP         Overridden by Jimmy Moseley MD on Dec 6, 2022 3:56 PM   Drug-Drug   1. SELECTIVE SEROTONIN REUPTAKE INHIBITORS / SEROTONERGIC NON-OPIOID CNS DEPRESSANTS [Level: Major]   Other Orders: traZODone (DESYREL) 5 mg/ml SUSP

## 2022-12-16 DIAGNOSIS — D50.0 IRON DEFICIENCY ANEMIA DUE TO CHRONIC BLOOD LOSS: Primary | ICD-10-CM

## 2022-12-16 DIAGNOSIS — Z79.01 CHRONIC ANTICOAGULATION: ICD-10-CM

## 2022-12-16 RX ORDER — ALBUTEROL SULFATE 90 UG/1
1-2 AEROSOL, METERED RESPIRATORY (INHALATION)
Status: CANCELLED
Start: 2022-12-16

## 2022-12-16 RX ORDER — HEPARIN SODIUM,PORCINE 10 UNIT/ML
5 VIAL (ML) INTRAVENOUS
Status: CANCELLED | OUTPATIENT
Start: 2022-12-16

## 2022-12-16 RX ORDER — MEPERIDINE HYDROCHLORIDE 25 MG/ML
25 INJECTION INTRAMUSCULAR; INTRAVENOUS; SUBCUTANEOUS EVERY 30 MIN PRN
Status: CANCELLED | OUTPATIENT
Start: 2022-12-16

## 2022-12-16 RX ORDER — DIPHENHYDRAMINE HYDROCHLORIDE 50 MG/ML
50 INJECTION INTRAMUSCULAR; INTRAVENOUS
Status: CANCELLED
Start: 2022-12-16

## 2022-12-16 RX ORDER — ALBUTEROL SULFATE 0.83 MG/ML
2.5 SOLUTION RESPIRATORY (INHALATION)
Status: CANCELLED | OUTPATIENT
Start: 2022-12-16

## 2022-12-16 RX ORDER — EPINEPHRINE 1 MG/ML
0.3 INJECTION, SOLUTION, CONCENTRATE INTRAVENOUS EVERY 5 MIN PRN
Status: CANCELLED | OUTPATIENT
Start: 2022-12-16

## 2022-12-16 RX ORDER — HEPARIN SODIUM (PORCINE) LOCK FLUSH IV SOLN 100 UNIT/ML 100 UNIT/ML
5 SOLUTION INTRAVENOUS
Status: CANCELLED | OUTPATIENT
Start: 2022-12-16

## 2022-12-16 NOTE — PROGRESS NOTES
"    [facility]  [facility], [address], [place] [address]  Main: [phone], Fax: [phone]    Order For PHS    Patient: Lanie Neil  MRN: [id number]  : 1990  Date of this note written: 2022  Order date: 2022    Diagnosis:  1. History of DVT  2. Chronic anticoagulation therapy.   3. Iron deficiency anemia.     ORDERS FOR PHS:  1. Please proceed with IV iron infusion with Injectafer in the next 1-2 weeks. See order set attached for specific orders.  2. Please obtain CBC, iron level, iron binding capacity and ferritin level, 6 weeks following her last Injectafer infusion. Please fax result to [phone] Attention: Jt Mir PA-C.     This order along with a copy of the Injectafer order set is faxed to Kat at [phone]    Thank you.    If there are any questions, please call [phone] and ask to speak to a nursing staff.       Jt Mir PA-C, MPAS  Physician Assistant  [facility].     Addendum 2022 at 11:35am:    Injectafer order was denied with part of the Injectafer requirements was \"You doctor must confirm that there has been a trial and failure of, or an intolerance or contraindication (a specific symptom or condition in which a drug should not be used because it my be harmful) to, one of the following medicines before using Injectafer: sodium ferric gluconate complex (Ferrlccit), Iron dextran (INFeD), or iron sucrose (Venofer).\"    I will re-write order for INFeD.     ORDERS for PHS on 2022 at 11:35am:  1. Please proceed with IV iron infusion with INFeD in the next 1-2 weeks. See order set attached for specific orders.  2. Please obtain CBC, iron level, iron binding capacity and ferritin level, 6 weeks following her last INFeD infusion. Please fax result to [phone] Attention: Jt Mir PA-C.     This " addendum along with a copy of the INFeD order set is faxed to Kat at [phone]        Jt Mir PA-C, TRINA  Physician Assistant  [facility].     Addendum 12/23/2022 at 11:05am:     [facility] called and requesting that we change the order from INFeD to Venofer.     Order changed to Venofer.       Jt Mir PA-C, TRINA  Physician Assistant  [facility].

## 2022-12-19 ENCOUNTER — OFFICE VISIT (OUTPATIENT)
Dept: OTOLARYNGOLOGY | Facility: CLINIC | Age: 32
End: 2022-12-19
Payer: COMMERCIAL

## 2022-12-19 ENCOUNTER — PRE VISIT (OUTPATIENT)
Dept: OTOLARYNGOLOGY | Facility: CLINIC | Age: 32
End: 2022-12-19

## 2022-12-19 ENCOUNTER — ALLIED HEALTH/NURSE VISIT (OUTPATIENT)
Dept: INTERNAL MEDICINE | Facility: CLINIC | Age: 32
End: 2022-12-19
Payer: COMMERCIAL

## 2022-12-19 DIAGNOSIS — Z93.0 TRACHEOSTOMY DEPENDENT (H): Primary | ICD-10-CM

## 2022-12-19 DIAGNOSIS — Z98.890 GRANULATION TISSUE OF SITE OF TRACHEOSTOMY: ICD-10-CM

## 2022-12-19 DIAGNOSIS — L92.9 GRANULATION TISSUE OF SITE OF TRACHEOSTOMY: ICD-10-CM

## 2022-12-19 DIAGNOSIS — J39.8 INCREASED TRACHEAL SECRETIONS: ICD-10-CM

## 2022-12-19 DIAGNOSIS — Z23 NEED FOR VACCINATION: Primary | ICD-10-CM

## 2022-12-19 PROCEDURE — 99214 OFFICE O/P EST MOD 30 MIN: CPT | Mod: 25 | Performed by: REGISTERED NURSE

## 2022-12-19 PROCEDURE — 90677 PCV20 VACCINE IM: CPT

## 2022-12-19 PROCEDURE — 91312 COVID-19 VACCINE BIVALENT BOOSTER 12+ (PFIZER): CPT

## 2022-12-19 PROCEDURE — 90686 IIV4 VACC NO PRSV 0.5 ML IM: CPT

## 2022-12-19 PROCEDURE — 31615 TRCHEOBRNCHSC EST TRACHS INC: CPT | Performed by: REGISTERED NURSE

## 2022-12-19 PROCEDURE — 0124A COVID-19 VACCINE BIVALENT BOOSTER 12+ (PFIZER): CPT

## 2022-12-19 PROCEDURE — 90471 IMMUNIZATION ADMIN: CPT

## 2022-12-19 PROCEDURE — 90472 IMMUNIZATION ADMIN EACH ADD: CPT

## 2022-12-19 RX ORDER — FERROUS SULFATE 325(65) MG
325 TABLET ORAL
Status: CANCELLED | OUTPATIENT
Start: 2022-12-19

## 2022-12-19 RX ORDER — CIPROFLOXACIN AND DEXAMETHASONE 3; 1 MG/ML; MG/ML
SUSPENSION/ DROPS AURICULAR (OTIC)
Qty: 7.5 ML | Refills: 0 | Status: SHIPPED | OUTPATIENT
Start: 2022-12-19 | End: 2022-12-29

## 2022-12-19 ASSESSMENT — PAIN SCALES - GENERAL: PAINLEVEL: SEVERE PAIN (7)

## 2022-12-19 NOTE — PROGRESS NOTES
December 19, 2022     HPI: Lanie Neil is a 32 year old female who presents today for a tracheostomy check. Patient has a history of chronic respiratory failure and chronic trach in the setting of spinal muscular atrophy. Patient is vented 24/7. Mother and patient care for trach independently. Patient scheduled patient due to concern of increased trach secretions and intermittent bleeding around the trach. Patient denies any pain, shortness of breath, fever/chills, or hemoptysis.     Past Medical History:  Past Medical History:   Diagnosis Date     Anxiety      Snoring      Spinal muscle atrophy (H)        Past Surgical History:  Past Surgical History:   Procedure Laterality Date     ENT SURGERY      tubes     GASTROSTOMY TUBE       KIDNEY SURGERY       SPINE SURGERY       TRACHEOSTOMY         Medications:  Current Outpatient Medications   Medication Sig Dispense Refill     ACETAMINOPHEN PO Take 650 mg by mouth every 4 hours as needed for pain       albuterol (PROAIR HFA) 108 (90 Base) MCG/ACT inhaler Inhale 2 puffs into the lungs every 4 hours as needed for shortness of breath / dyspnea or wheezing 18 g 3     albuterol (PROVENTIL) (2.5 MG/3ML) 0.083% neb solution USE ONE VIAL BY NEBULIZATION EVERY 6 HOURS AS NEEDED FOR SHORTNESS OF BREATH/ DYSPNEA OR WHEEZING 360 mL 1     ALPRAZolam (XANAX) 0.25 MG tablet Take 1 tablet (0.25 mg) by mouth 3 times daily as needed for anxiety 60 tablet 5     aminocaproic acid (AMICAR) 0.25 GM/ML solution TAKE 12 ML (3 GRAMS) VIA G-TUBE EVERY 8 HOURS DURING MENSTRUAL PERIOD. 473 mL 5     bisacodyl (DULCOLAX) 10 MG suppository Place 10 mg rectally daily as needed for constipation       celecoxib (CELEBREX) 200 MG capsule TAKE 1 CAPSULE(200 MG) BY MOUTH DAILY 30 capsule 3     ciprofloxacin-dexamethasone (CIPRODEX) 0.3-0.1 % otic suspension Instill 4 drops onto the trach stoma twice daily for 10 days. Use as needed after 10 days 7.5 mL 0     clindamycin (CLEOCIN T) 1 % lotion Apply  topically 2 times daily To face as needed 60 mL 3     diazepam (VALIUM) 5 MG tablet TAKE 1 TABLET(5 MG) BY MOUTH EVERY 6 HOURS AS NEEDED FOR MUSCLE SPASMS 30 tablet 3     fluocinolone acetonide (DERMA SMOOTHE/FS BODY) 0.01 % external oil Apply at nighttime to forehead and scalp as needed for scale or pruritus 118 mL 3     fluticasone (FLOVENT HFA) 110 MCG/ACT inhaler Inhale 1 puff into the lungs 2 times daily 36 g 3     gabapentin (NEURONTIN) 250 MG/5ML solution 20 mLs (1,000 mg) by Per Feeding Tube route 3 times daily 1800 mL 5     GENERLAC 10 GM/15ML solution   11     guaiFENesin (ORGANIDIN) 200 MG TABS tablet Take 200 mg by mouth every 4 hours as needed for cough       HYDROmorphone (DILAUDID) 4 MG tablet        hydrOXYzine (ATARAX) 25 MG tablet Take 25 mg by mouth every 6 hours as needed for itching       ketoconazole (NIZORAL) 2 % external shampoo Lather onto scalp and forehead once weekly when showering and let sit for 1-2 minutes before rinsing 120 mL 11     LANsoprazole (PREVACID) 30 MG DR capsule Take 30 mg by mouth       levofloxacin (LEVAQUIN) 25 MG/ML solution TAKE 20ML BY FEEDING TUBE ONCE DAILY FOR ONE WEEK 480 mL 1     Magnesium Hydroxide (MILK OF MAGNESIA PO) Take 30 mLs by mouth as needed       metroNIDAZOLE (METROCREAM) 0.75 % external cream Apply topically 2 times daily 45 g 11     mometasone (NASONEX) 50 MCG/ACT nasal spray        morphine (SABINE) 30 MG 24 hr capsule        mupirocin (BACTROBAN) 2 % external cream Apply topically 3 times daily 30 g 3     naloxone (NARCAN) 4 MG/0.1ML nasal spray Spray 1 spray (4 mg) into one nostril alternating nostrils once as needed for opioid reversal every 2-3 minutes until assistance arrives 0.2 mL 0     Nutritional Supplements (REPLETE FIBER) LIQD Take 1 Can by mouth 4 times daily       omeprazole (PRILOSEC) 2 mg/mL suspension SHAKE LIQUID WELL AND GIVE 20ML (40MG) DAILY (VIA G-TUBE) 600 mL 9     ondansetron (ZOFRAN-ODT) 4 MG ODT tab Take 1 tablet (4 mg) by  "mouth every 8 hours as needed for nausea 30 tablet 1     order for DME Equipment being ordered: 16 fr 5cc balloon indwelling catheter with drainage bag. 2 catheters/month 2 catheter 11     order for DME Equipment being ordered: Vibracare Percussor 1 Units 0     polyethylene glycol (MIRALAX) 17 GM/Dose powder Take 17 g by mouth daily 714 g 1     potassium chloride (KAYCIEL) 20 MEQ/15ML (10%) solution 15 mLs (20 mEq) by Per G Tube route daily 675 mL 1     prochlorperazine (COMPAZINE) 5 MG tablet Take 5 mg by mouth every 6 hours as needed for nausea or vomiting       rivaroxaban ANTICOAGULANT (XARELTO ANTICOAGULANT) 10 MG TABS tablet 1 tablet (10 mg) by Oral or Feeding Tube route daily (with dinner) 90 tablet 3     Salicylic Acid (OXY BALANCE FACIAL CLEAN WASH EX) Externally apply 1 pad topically daily       Sennosides (SENNA) 8.8 MG/5ML LIQD Take 10 mLs by mouth 2 times daily 600 mL 11     sertraline (ZOLOFT) 20 MG/ML (HIGH CONC) solution TAKE 2.5 ML(50 MG) BY MOUTH DAILY 360 mL 0     silver sulfADIAZINE (SSD) 1 % external cream Apply topically 2 times daily To affected areas. 400 g 3     simethicone (MYLICON) 66.7 mg/ml as needed       simethicone 40 MG/0.6ML LIQD        sodium chloride (OCEAN) 0.65 % nasal spray 2 sprays       traZODone (DESYREL) 5 mg/ml SUSP Take 10 mLs (50 mg) by mouth nightly as needed for sleep Prn insomnia 300 mL 11     tretinoin (RETIN-A) 0.025 % external cream Apply a pea-sized amount evenly over the face at nighttime before bed. 45 g 2     doxycycline hyclate (VIBRAMYCIN) 100 MG capsule GIVE ONE CAPSULE VIA \"G\"- TUBE TWICE DAILY (Patient not taking: Reported on 12/19/2022) 60 capsule 2       Allergies:  Allergies   Allergen Reactions     Ibuprofen         Social History:  Social History     Tobacco Use     Smoking status: Never     Smokeless tobacco: Never   Substance Use Topics     Alcohol use: No     Alcohol/week: 0.0 standard drinks     Drug use: No       ROS: 10 point ROS neg other than " the symptoms noted above in the HPI.    Physical Exam:    There were no vitals taken for this visit.     Constitutional:  The patient was well-groomed and in no acute distress.     Skin: Normal:  warm and pink without rash    Neurologic: Alert and oriented x 3.    Psychiatric: The patient's affect was calm, cooperative, and appropriate.     Communication:  Communicates verbally.   Respiratory: Patient vented without stridor or difficulty breathing.   Neck: Bivona trach in place. Trach in good condition.     Procedure: Flexible tracheobronchoscopy through tracheotomy stoma.    Indication: The procedure warranted to assess the trach tract.  Description: After verbal consent, the endoscope was passed through the patient's tracheotomy stoma after current trach tube had been removed. This allowed for visualization of the trach trach.  Findings: Granulation tissue on right lateral stoma that was cauterized using silver nitrate. Remainder of the stoma healthy. Trach tract was unremarkable.     Assessment/Plan:  1. Tracheostomy dependent (H)  Long term tracheostomy dependence. Independently managed by mother and patient.     2.  Granulation tissue of site of tracheostomy  Granulation tissue at tracheal stoma cauterized using silver nitrate today. Will have patient start ciprodex drops to stoma to treat granulation tissue and tracheal secretions.     Patient will follow up as needed if symptoms do not improve with prescribed treatment. Continue independent trach management.    Denita Hogan DNP, APRN, CNP  Otolaryngology  Head & Neck Surgery  [phone]    30 minutes spent on the date of the encounter doing chart review, history and exam, documentation and further activities per the note excluding time performing trach cares and scope exam.

## 2022-12-19 NOTE — PROGRESS NOTES
Lanie Neil received the Prevnar 20, Flu, and COVID BiValent Vaccination today in clinic at the request of the patient. The immunization was given under the supervision of Dr. Salas if assistance was needed. The immunization site was cleaned with an alcohol prep wipe. The immunization was given without incident--see immunization list for administration details. No swelling or redness was observed at the site of injection after the immunization was given. The patient was advised to remain in [facility] lobby for 15 minutes after the injection in case of an adverse reaction.     BRINA Greenwood at 1:10 PM on 12/19/2022.

## 2022-12-19 NOTE — LETTER
12/19/2022       RE: Lanie Neil  [address]  [place] [address]     Dear Colleague,    Thank you for referring your patient, Lanei Neil, to the [facility] [place] [facility]. Please see a copy of my visit note below.      December 19, 2022     HPI: Lanie Neil is a 32 year old female who presents today for a tracheostomy check. Patient has a history of chronic respiratory failure and chronic trach in the setting of spinal muscular atrophy. Patient is vented 24/7. Mother and patient care for trach independently. Patient scheduled patient due to concern of increased trach secretions and intermittent bleeding around the trach. Patient denies any pain, shortness of breath, fever/chills, or hemoptysis.     Past Medical History:  Past Medical History:   Diagnosis Date     Anxiety      Snoring      Spinal muscle atrophy (H)        Past Surgical History:  Past Surgical History:   Procedure Laterality Date     ENT SURGERY      tubes     GASTROSTOMY TUBE       KIDNEY SURGERY       SPINE SURGERY       TRACHEOSTOMY         Medications:  Current Outpatient Medications   Medication Sig Dispense Refill     ACETAMINOPHEN PO Take 650 mg by mouth every 4 hours as needed for pain       albuterol (PROAIR HFA) 108 (90 Base) MCG/ACT inhaler Inhale 2 puffs into the lungs every 4 hours as needed for shortness of breath / dyspnea or wheezing 18 g 3     albuterol (PROVENTIL) (2.5 MG/3ML) 0.083% neb solution USE ONE VIAL BY NEBULIZATION EVERY 6 HOURS AS NEEDED FOR SHORTNESS OF BREATH/ DYSPNEA OR WHEEZING 360 mL 1     ALPRAZolam (XANAX) 0.25 MG tablet Take 1 tablet (0.25 mg) by mouth 3 times daily as needed for anxiety 60 tablet 5     aminocaproic acid (AMICAR) 0.25 GM/ML solution TAKE 12 ML (3 GRAMS) VIA G-TUBE EVERY 8 HOURS DURING MENSTRUAL PERIOD. 473 mL 5     bisacodyl (DULCOLAX) 10 MG suppository Place 10 mg rectally daily as  needed for constipation       celecoxib (CELEBREX) 200 MG capsule TAKE 1 CAPSULE(200 MG) BY MOUTH DAILY 30 capsule 3     ciprofloxacin-dexamethasone (CIPRODEX) 0.3-0.1 % otic suspension Instill 4 drops onto the trach stoma twice daily for 10 days. Use as needed after 10 days 7.5 mL 0     clindamycin (CLEOCIN T) 1 % lotion Apply topically 2 times daily To face as needed 60 mL 3     diazepam (VALIUM) 5 MG tablet TAKE 1 TABLET(5 MG) BY MOUTH EVERY 6 HOURS AS NEEDED FOR MUSCLE SPASMS 30 tablet 3     fluocinolone acetonide (DERMA SMOOTHE/FS BODY) 0.01 % external oil Apply at nighttime to forehead and scalp as needed for scale or pruritus 118 mL 3     fluticasone (FLOVENT HFA) 110 MCG/ACT inhaler Inhale 1 puff into the lungs 2 times daily 36 g 3     gabapentin (NEURONTIN) 250 MG/5ML solution 20 mLs (1,000 mg) by Per Feeding Tube route 3 times daily 1800 mL 5     GENERLAC 10 GM/15ML solution   11     guaiFENesin (ORGANIDIN) 200 MG TABS tablet Take 200 mg by mouth every 4 hours as needed for cough       HYDROmorphone (DILAUDID) 4 MG tablet        hydrOXYzine (ATARAX) 25 MG tablet Take 25 mg by mouth every 6 hours as needed for itching       ketoconazole (NIZORAL) 2 % external shampoo Lather onto scalp and forehead once weekly when showering and let sit for 1-2 minutes before rinsing 120 mL 11     LANsoprazole (PREVACID) 30 MG DR capsule Take 30 mg by mouth       levofloxacin (LEVAQUIN) 25 MG/ML solution TAKE 20ML BY FEEDING TUBE ONCE DAILY FOR ONE WEEK 480 mL 1     Magnesium Hydroxide (MILK OF MAGNESIA PO) Take 30 mLs by mouth as needed       metroNIDAZOLE (METROCREAM) 0.75 % external cream Apply topically 2 times daily 45 g 11     mometasone (NASONEX) 50 MCG/ACT nasal spray        morphine (SABINE) 30 MG 24 hr capsule        mupirocin (BACTROBAN) 2 % external cream Apply topically 3 times daily 30 g 3     naloxone (NARCAN) 4 MG/0.1ML nasal spray Spray 1 spray (4 mg) into one nostril alternating nostrils once as needed for  "opioid reversal every 2-3 minutes until assistance arrives 0.2 mL 0     Nutritional Supplements (REPLETE FIBER) LIQD Take 1 Can by mouth 4 times daily       omeprazole (PRILOSEC) 2 mg/mL suspension SHAKE LIQUID WELL AND GIVE 20ML (40MG) DAILY (VIA G-TUBE) 600 mL 9     ondansetron (ZOFRAN-ODT) 4 MG ODT tab Take 1 tablet (4 mg) by mouth every 8 hours as needed for nausea 30 tablet 1     order for DME Equipment being ordered: 16 fr 5cc balloon indwelling catheter with drainage bag. 2 catheters/month 2 catheter 11     order for DME Equipment being ordered: Vibracare Percussor 1 Units 0     polyethylene glycol (MIRALAX) 17 GM/Dose powder Take 17 g by mouth daily 714 g 1     potassium chloride (KAYCIEL) 20 MEQ/15ML (10%) solution 15 mLs (20 mEq) by Per G Tube route daily 675 mL 1     prochlorperazine (COMPAZINE) 5 MG tablet Take 5 mg by mouth every 6 hours as needed for nausea or vomiting       rivaroxaban ANTICOAGULANT (XARELTO ANTICOAGULANT) 10 MG TABS tablet 1 tablet (10 mg) by Oral or Feeding Tube route daily (with dinner) 90 tablet 3     Salicylic Acid (OXY BALANCE FACIAL CLEAN WASH EX) Externally apply 1 pad topically daily       Sennosides (SENNA) 8.8 MG/5ML LIQD Take 10 mLs by mouth 2 times daily 600 mL 11     sertraline (ZOLOFT) 20 MG/ML (HIGH CONC) solution TAKE 2.5 ML(50 MG) BY MOUTH DAILY 360 mL 0     silver sulfADIAZINE (SSD) 1 % external cream Apply topically 2 times daily To affected areas. 400 g 3     simethicone (MYLICON) 66.7 mg/ml as needed       simethicone 40 MG/0.6ML LIQD        sodium chloride (OCEAN) 0.65 % nasal spray 2 sprays       traZODone (DESYREL) 5 mg/ml SUSP Take 10 mLs (50 mg) by mouth nightly as needed for sleep Prn insomnia 300 mL 11     tretinoin (RETIN-A) 0.025 % external cream Apply a pea-sized amount evenly over the face at nighttime before bed. 45 g 2     doxycycline hyclate (VIBRAMYCIN) 100 MG capsule GIVE ONE CAPSULE VIA \"G\"- TUBE TWICE DAILY (Patient not taking: Reported on " 12/19/2022) 60 capsule 2       Allergies:  Allergies   Allergen Reactions     Ibuprofen         Social History:  Social History     Tobacco Use     Smoking status: Never     Smokeless tobacco: Never   Substance Use Topics     Alcohol use: No     Alcohol/week: 0.0 standard drinks     Drug use: No       ROS: 10 point ROS neg other than the symptoms noted above in the HPI.    Physical Exam:    There were no vitals taken for this visit.     Constitutional:  The patient was well-groomed and in no acute distress.     Skin: Normal:  warm and pink without rash    Neurologic: Alert and oriented x 3.    Psychiatric: The patient's affect was calm, cooperative, and appropriate.     Communication:  Communicates verbally.   Respiratory: Patient vented without stridor or difficulty breathing.   Neck: Bivona trach in place. Trach in good condition.     Procedure: Flexible tracheobronchoscopy through tracheotomy stoma.    Indication: The procedure warranted to assess the trach tract.  Description: After verbal consent, the endoscope was passed through the patient's tracheotomy stoma after current trach tube had been removed. This allowed for visualization of the trach trach.  Findings: Granulation tissue on right lateral stoma that was cauterized using silver nitrate. Remainder of the stoma healthy. Trach tract was unremarkable.     Assessment/Plan:  1. Tracheostomy dependent (H)  Long term tracheostomy dependence. Independently managed by mother and patient.     2.  Granulation tissue of site of tracheostomy  Granulation tissue at tracheal stoma cauterized using silver nitrate today. Will have patient start ciprodex drops to stoma to treat granulation tissue and tracheal secretions.     Patient will follow up as needed if symptoms do not improve with prescribed treatment. Continue independent trach management.    Denita Hogan DNP, APRN, CNP  Otolaryngology  Head & Neck Surgery  [phone]    30 minutes spent on the date of the  encounter doing chart review, history and exam, documentation and further activities per the note excluding time performing trach cares and scope exam.

## 2022-12-19 NOTE — NURSING NOTE
Chief Complaint   Patient presents with     Consult     Wound check at [facility] site       Russell Wooten LPN

## 2022-12-22 ENCOUNTER — TELEPHONE (OUTPATIENT)
Dept: HEMATOLOGY | Facility: CLINIC | Age: 32
End: 2022-12-22

## 2022-12-22 ENCOUNTER — VIRTUAL VISIT (OUTPATIENT)
Dept: BEHAVIORAL HEALTH | Facility: CLINIC | Age: 32
End: 2022-12-22
Payer: COMMERCIAL

## 2022-12-22 DIAGNOSIS — F33.1 MAJOR DEPRESSIVE DISORDER, RECURRENT EPISODE, MODERATE (H): Primary | ICD-10-CM

## 2022-12-22 PROCEDURE — 90837 PSYTX W PT 60 MINUTES: CPT | Mod: GT | Performed by: PSYCHOLOGIST

## 2022-12-22 RX ORDER — DIPHENHYDRAMINE HYDROCHLORIDE 50 MG/ML
50 INJECTION INTRAMUSCULAR; INTRAVENOUS
Status: CANCELLED
Start: 2022-12-22

## 2022-12-22 RX ORDER — ALBUTEROL SULFATE 90 UG/1
1-2 AEROSOL, METERED RESPIRATORY (INHALATION)
Status: CANCELLED
Start: 2022-12-22

## 2022-12-22 RX ORDER — EPINEPHRINE 1 MG/ML
0.3 INJECTION, SOLUTION, CONCENTRATE INTRAVENOUS EVERY 5 MIN PRN
Status: CANCELLED | OUTPATIENT
Start: 2022-12-22

## 2022-12-22 RX ORDER — HEPARIN SODIUM (PORCINE) LOCK FLUSH IV SOLN 100 UNIT/ML 100 UNIT/ML
5 SOLUTION INTRAVENOUS
Status: CANCELLED | OUTPATIENT
Start: 2022-12-22

## 2022-12-22 RX ORDER — ALBUTEROL SULFATE 0.83 MG/ML
2.5 SOLUTION RESPIRATORY (INHALATION)
Status: CANCELLED | OUTPATIENT
Start: 2022-12-22

## 2022-12-22 RX ORDER — HEPARIN SODIUM,PORCINE 10 UNIT/ML
5 VIAL (ML) INTRAVENOUS
Status: CANCELLED | OUTPATIENT
Start: 2022-12-22

## 2022-12-22 RX ORDER — MEPERIDINE HYDROCHLORIDE 25 MG/ML
25 INJECTION INTRAMUSCULAR; INTRAVENOUS; SUBCUTANEOUS EVERY 30 MIN PRN
Status: CANCELLED | OUTPATIENT
Start: 2022-12-22

## 2022-12-22 NOTE — PROGRESS NOTES
[facility]: Integrated Behavioral Health  December 22, 2022        Behavioral Health Clinician Progress Note    Patient Name: Lanie Neil           Service Type: Video appointment      Service Location:  Video appointment      Session Start Time: 1:03 Session End Time: 2:00      Session Length: 53 - 60      Attendees: Patient    Visit Activities (Refresh list every visit): [facility] Only    Service Modality:  Video Visit:      Provider verified identity through the following two step process.  Patient provided:  Patient photo and Patient is known previously to provider    Telemedicine Visit: The patient's condition can be safely assessed and treated via synchronous audio and visual telemedicine encounter.      Reason for Telemedicine Visit: Patient unable to travel    Originating Site (Patient Location): Patient's home    Distant Site (Provider Location): [facility]: [facility] [place], [place]    Consent:  The patient/guardian has verbally consented to: the potential risks and benefits of telemedicine (video visit) versus in person care; bill my insurance or make self-payment for services provided; and responsibility for payment of non-covered services.     Patient would like the video invitation sent by:  My Chart    Mode of Communication:  Video Conference via Amwell    Distant Location (Provider):  On-site    As the provider I attest to compliance with applicable laws and regulations related to telemedicine.      Diagnostic Assessment Date: 1/19/2021  Treatment Plan Review Date: 3/22/2023  See Flowsheets for today's PHQ-9 and KATHARINE-7 results  Previous PHQ-9:   PHQ-9 SCORE 12/15/2020 3/16/2021 6/30/2021   PHQ-9 Total Score - - -   PHQ-9 Total Score MyChart 2 (Minimal depression) 2 (Minimal depression) 2 (Minimal depression)   PHQ-9 Total Score 2 2 2     Previous KATHARINE-7:   KATHARINE-7 SCORE 12/15/2020 3/16/2021 6/30/2021   Total Score 1 (minimal anxiety) 1 (minimal anxiety) 1 (minimal anxiety)  "  Total Score 1 1 1       DATA  Extended Session (60+ minutes): No  Interactive Complexity: No  Crisis: No    Treatment Objective(s) Addressed in This Session:  Target Behavior(s): disease management/lifestyle changes      Depressed Mood: Decrease frequency and intensity of feeling down, depressed, hopeless  Identify negative self-talk and behaviors: challenge core beliefs, myths, and actions  Anxiety: will develop more effective coping skills to manage anxiety symptoms  Grief / Loss: will engage in effective approach to address and resolve grief/loss issues  Psychological distress related to Pain    Current Stressors / Issues:  [facility] met with Lanie today to continue supporting her treatment of depression, adjustment to chronic disease management, and relationship problems. Continued discussion about feeling down and \"trapped\" at home, citing a feeling of being restricted and confined to her home. She states her mother manages most areas of life for her, though she wishes she could date, meet more people in public spaces. Lanie talked about meeting someone online and feels dismay that she will likely not be able to meet them in person. In general, she reports a sense of not having control over most areas of her life. Provided support and discussed finding creative ideas to help build engagement in values. Discussed developing new roles, like the \"fun aunt\" that she could find ways to engage with. Lanie notes some of her acute difficulties with depression are due to considering a hysterectomy, which has prompted her grief of not being able to have children someday or appearing less attractive to potential partners. Spent time processing grief, loss in this way, exploring and facilitating expression of emotional themes.     Am I Hooked or Unhooked From My Thoughts?    Clues that I'm getting hooked and threatened by my thoughts:    1) Thoughts are Literal and Threatening; it s as if what we re thinking is actually " "present, here and now!    2) Thoughts are \"The Truth\"; we literally believe them!    3) Thoughts are Most Important; we take them seriously, and give them our full attention!    4) Thoughts are Orders; we automatically obey them!    5) Thoughts are Always Wise; we assume they know best and we follow their advice!     _____________________________________________________________________    Noticing when I'm unhooked and not threatened by my thoughts:    1) Thoughts are merely sounds, words, stories, bits of language, passing through our heads.    2) Thoughts may or may not be true. We don t automatically believe them.    3) Thoughts may or may not be important. We pay attention only if they re helpful.    4) Thoughts are not orders. We don t have to obey them.    5) Thoughts may or may not be wise. We don t automatically follow their advice.   Thoughts That Hook You:    What memories, fears, worries, self-criticisms, or other unhelpful thoughts do you dwell on or get \"stuck\" thinking about? What thoughts tend to hook you, jerk you around, and pull you out of your life?                 Life-Draining Actions:    What are you currently doing that makes your life worse in the long run: keeps you stuck, wastes your time or money, drains your energy; restricts your life; impacts negatively on your health, work, or relationships; maintains or worsens the problems your are dealing with?                     Feelings That Hook You:    What emotions, feelings, urges, impulses, or sensations tend to hook you and jerk you around or pull you into self-defeating actions?           Avoiding Challenging Situations:    What situations, activities, people, or places are you avoiding or staying away from? What have you quit, withdrawn from, dropped out of? What do you keep \"putting off\" until later?                           Progress on Treatment Objective(s) / Homework:  Satisfactory progress - ACTION (Actively working towards change); " Intervened by reinforcing change plan / affirming steps taken       Psychodynamic psychotherapy    Discuss patient's emotional dynamics and issues and how they impact behaviors.    Explore patient's history of relationships and how they impact present behaviors.    Explore how to work with and make changes in these schemas and patterns.    Interpersonal Psychotherapy    Explore patterns in relationships that are effective or ineffective at helping patient reach their goals, find satisfying experience.    Discuss new patterns or behaviors to engage in for improved social functioning.      Care Plan review completed: Yes    Medication Review:  No changes to current psychiatric medication(s)    Medication Compliance:  NA    Changes in Health Issues:   None reported    Chemical Use Review:   Substance Use: Chemical use reviewed, no active concerns identified      Tobacco Use: No current tobacco use.      Assessment: Current Emotional / Mental Status (status of significant symptoms):  Risk status (Self / Other harm or suicidal ideation)  Patient has had a history of suicidal ideation: passive thoughts only; easily controlled - denied any recently    Patient denies current fears or concerns for personal safety.  Patient denies current or recent suicidal ideation or behaviors.  Patient denies current or recent homicidal ideation or behaviors.  Patient denies current or recent self injurious behavior or ideation.  Patient denies other safety concerns.     A safety and risk management plan has not been developed at this time, however patient was encouraged to call [place] [phone] should there be a change in any of these risk factors.      [place] Suicide Severity Rating Scale (Short Version)  [place] Suicide Severity Rating (Short Version) 2/18/2021   Over the past 2 weeks have you felt down, depressed, or hopeless? yes   Over the past 2 weeks have you had thoughts of killing yourself? no   Have you ever attempted to  kill yourself? no     [place] Suicide Severity Rating Scale (Lifetime/Recent)  [place] Suicide Severity Rating (Lifetime/Recent) 8/13/2021   Wish to be Dead (Lifetime) No   Non-Specific Active Suicidal Thoughts (Lifetime) No   Most Severe Ideation Rating (Lifetime) NA   Frequency (Lifetime) NA   Duration (Lifetime) NA   Controllability (Lifetime) NA   Protective Factors  (Lifetime) NA   Reasons for Ideation (Lifetime) NA   RETIRED: 1. Wish to be Dead (Recent) No   RETIRED: 2. Non-Specific Active Suicidal Thoughts (Recent) No   3. Active Suicidal Ideation with any Methods (Not Plan) Without Intent to Act (Lifetime) No   RETIRED: 3. Active Suicidal Ideation with any Methods (Not Plan) Without Intent to Act (Recent) No   RETIRE: 4. Active Suicidal Ideation with Some Intent to Act, Without Specific Plan (Lifetime) No   4. Active Suicidal Ideation with Some Intent to Act, Without Specific Plan (Recent) No   RETIRE: 5. Active Suicidal Ideation with Specific Plan and Intent (Lifetime) No   RETIRED: 5. Active Suicidal Ideation with Specific Plan and Intent (Recent) No   Most Severe Ideation Rating (Past Month) NA   Frequency (Past Month) NA   Duration (Past Month) NA   Controllability (Past Month) NA   Protective Factors (Past Month) NA   Reasons for Ideation (Past Month) NA   Actual Attempt (Lifetime) No   Actual Attempt (Past 3 Months) No   Has subject engaged in non-suicidal self-injurious behavior? (Lifetime) No   Has subject engaged in non-suicidal self-injurious behavior? (Past 3 Months) No   Interrupted Attempts (Lifetime) No   Interrupted Attempts (Past 3 Months) No   Aborted or Self-Interrupted Attempt (Lifetime) No   Aborted or Self-Interrupted Attempt (Past 3 Months) No   Preparatory Acts or Behavior (Lifetime) No   Preparatory Acts or Behavior (Past 3 Months) No   Most Recent Attempt Actual Lethality Code NA   Most Lethal Attempt Actual Lethality Code NA   Initial/First Attempt Actual Lethality Code NA          Appearance:   Appropriate   Eye Contact:   Good   Psychomotor Behavior: Normal   Attitude:   Cooperative   Orientation:   All  Speech   Rate / Production: Normal    Volume:  Normal   Mood:    Depressed    Affect:    Tearful  Thought Content:  Rumination   Thought Form:  Coherent  Logical   Insight:    Good     Diagnoses:  1. Major depressive disorder, recurrent episode, moderate (H)        Collateral Reports Completed:  [facility] will coordinate with care team as needed    Plan: (Homework, other):  Lanie was scheduled to RTC in two weeks for help addressing depressive symptoms, pain, and relationship conflicts.   CD Recommendations: No indications of CD issues.     Héctor Higuera Psy.D, LP December 22, 2022              _______________________________________________________________________                                              Individual Treatment Plan    Patient's Name: Lanie Neil  YOB: 1990    Date of Creation: 3/4/2022  Date Treatment Plan Last Reviewed/Revised: 12/22/2022    DSM5 Diagnoses: 296.32 (F33.1) Major Depressive Disorder, Recurrent Episode, Moderate With anxious distress  Psychosocial / Contextual Factors: Chronic pain, SMA, disabled  PROMIS (reviewed every 90 days):     PROMIS-10    In general, would you say your health is: (P) Excellent    In general, would you say your quality of life is: (P) Good    In general, how would you rate your physical health? (P) Very good    In general, how would you rate your mental health, including your mood and your ability to think? (P) Good    In general, how would you rate your satisfaction with your social activities and relationships? (P) Good    In general, please rate how well you carry out your usual social activities and roles. (This includes activities at home, at work and in your community, and responsibilities as a parent, child, spouse, employee, friend, etc.) (P) Very good    To what extent are you able to carry out  your everyday physical activities such as walking, climbing stairs, carrying groceries, or moving a chair? (P) Not at all    In the past 7 days,  How often have you been bothered by emotional problems such as feeling anxious, depressed or irritable? (P) Rarely  How would you rate your fatigue on average? (P) Moderate  How would you rate your pain on average? (P) 6    PROMIS GLOBAL SCORES    Mental health question re-calculation - no clinical value - (P) 4  Physical health question re-calculation - no clinical value - (P) 3  Pain question re-calculation - no clinical value - (P) 3  Global Mental Health Score - (P) 13  Global Physical Health Score - (P) 11  PROMIS TOTAL - SUBSCORES - (P) 24      [id number] PROMIS HEALTH ORGANIZATION AND PROMIS COOPERATIVE GROUP VERSION 1.1          Referral / Collaboration:  Referral to another professional/service is not indicated at this time..    Anticipated number of session for this episode of care: 7-9  Anticipation frequency of session: Every other week  Anticipated Duration of each session: 38-52 minutes  Treatment plan will be reviewed in 90 days or when goals have been changed.       MeasurableTreatment Goal(s) related to diagnosis / functional impairment(s)  Goal 1: Patient will experience a reduction in depressive symptoms along with a corresponding increase in positive emotion and life satisfaction.      Objective #A (Patient Action)    Patient will Increase interest, engagement, and pleasure in doing things.  Status: Continued - Date(s): 12/22/2022    Intervention(s)  Therapist will help patient identify pleasant and mastery oriented events that elicit positive, relaxed mood.    Objective #B  Patient will Decrease frequency and intensity of feeling down, depressed, hopeless.  Status: Continued - Date(s): 12/22/2022    Intervention(s)  Therapist will introduce patient to cognitive-behavioral and acceptance and commitment therapy topics aimed to help reduce depression and  "anxiety    Objective #C  Patient will Identify negative self-talk and behaviors: challenge core beliefs, myths, and actions  Improve concentration, focus, and mindfulness in daily activities .  Status: Continued - Date(s): 12/22/2022    Intervention(s)  Therapist will help patient identify and manage negative self-talk and automatic thoughts; introduce patient to cognitive distortions; help patient develop cognitive diffusion techniques      Goal 2: Patient will experience a reduction in anxious symptoms, along with a corresponding increase in relaxed emotional states and life satisfaction.      Objective #A (Patient Action)  Patient will use cognitive-behavioral and thought diffusion strategies identified in therapy to challenge anxious thoughts.    Status: Continued - Date(s): 12/22/2022    Intervention(s)  Therapist will utilize CBT and ACT ideas to help patient challenge anxious thoughts and reduce intensity/duration of anxious distress    Objective #B  Patient will use \"worry time\" each day for 15 minutes of scheduled worry and then defer obsessive or anxious thinking until the next structured worry time.    Status: Continued - Date(s): 12/22/2022    Intervention(s)  Therapist will teach patient how to effectively utilize worry time and/or thought logs/journals each day and incorporate more relaxation behaviors into their routine.    Objective #C  Patient will identify the stressors which contribute to feelings of anxiety  Patient will increase engagement in adaptive coping skills and recreational activities, such as exercise and healthy socialization, to manage distress.  Status: Continued - Date(s): 12/22/2022    Intervention(s)  Therapist will help patient identify triggers/situational factors that contribute to anxiety and behavioral skills aimed to manage anxious distress.      Goal 3: Patient will learn more adaptive ways to manage chronic pain      Objective #A (Patient Action)    Status: Continued - " Date(s): 12/22/2022    Patient will identify 2-4 strategies for managing pain.    Intervention(s)  Therapist will teach distraction skills aimed to help manage chronic pain and utilize ACT/CBT ideas to help with this (e.g. relaxation).    Objective #B  Patient will state understanding of stressors and relationship to physical symptoms.    Status: Continued - Date(s): 12/22/2022      Other Possible Therapeutic Intervention(s):    Psycho-education regarding mental health diagnoses and treatment options    Supportive Therapy    Provide affirmations, reflections, and establish working rapport    Emphasize and reflect on strength of therapeutic relationship    Skills training    Explore skills useful to client in current situation.    Skills include assertiveness, communication, conflict management, problem-solving, relaxation, etc.    Solution-Focused Therapy    Explore patterns in patient's relationships and discuss options for new behaviors.    Explore patterns in patient's actions and choices and discuss options for new behaviors.    Cognitive-behavioral Therapy    Discuss common cognitive distortions, identify them in patient's life.    Explore ways to challenge, replace, and act against these cognitions.    Acceptance and Commitment Therapy    Explore and identify important values in patient's life.    Discuss ways to commit to behavioral activation around these values.    Psychodynamic psychotherapy    Discuss patient's emotional dynamics and issues and how they impact behaviors.    Explore patient's history of relationships and how they impact present behaviors.    Explore how to work with and make changes in these schemas and patterns.    Narrative Therapy    Explore the patient's story of his/her life from his/her perspective.    Explore alternate ways of understanding their experience, identifying exceptions, developing new themes.    Interpersonal Psychotherapy    Explore patterns in relationships that are  effective or ineffective at helping patient reach their goals, find satisfying experience.    Discuss new patterns or behaviors to engage in for improved social functioning.    Behavioral Activation    Discuss steps patient can take to become more involved in meaningful activity.    Identify barriers to these activities and explore possible solutions.    Mindfulness-Based Strategies    Discuss skills based on development and application of mindfulness.    Skills drawn from compassion-focused therapy, dialectical behavior therapy, mindfulness-based stress reduction, mindfulness-based cognitive therapy, etc.      Patient has reviewed and agreed to the above plan.    Héctor Higuera Psy.D, LP   Behavioral Health Clinician   [facility]     12/22/2022    Crisis Resources    Refer to the resources below as needed.    Steps to care for yourself    1. If you are currently in counseling, call your counselor for an appointment  2. Call the local crisis resources below if needed.  3. Contact friends or family for support.  4. Get more exercise.  5. Do activities you enjoy.  6. Eat a well-balanced diet and drink plenty of fluids.  7. Rest as needed.  8. Limit alcohol and recreational drugs. These can worsen depression.    When to contact your primary care provider       You have thoughts of harming or killing yourself but have not made a plan to carry it out.    Your depression gets in the way of daily activities.    You are often unable to sleep.    You need help cutting back on alcohol or recreational drugs.    When to call 911 or go to the Emergency Room     Get emergency help right away if you have any of the following:    You are planning to harm or kill yourself and you have a way to carry out the plan.     You have injured yourself or others. Or, you think you will.    You feel confused or are having trouble thinking or remembering.    You are having delusions (false beliefs).    You are  hearing voices or seeing things that aren t there.    You are feeling psychotic (paranoid, fearful, restless, agitated, nervous, racing thoughts or speech)    Crisis Resources     These hotlines are for both adults and children. The and are open 24 hours a day, 7 days a week unless noted otherwise.    988 - National Suicide and Crisis Lifeline  If you or someone you know needs support now, call or text 988 or chat [facility]. 988 connects you with a trained crisis counselor who can help.    National Suicide Prevention Lifeline   2-912-411-TALK (5575)    Crisis Text Line    www.crisistextline.org  Text HOME to [address] from anywhere in the [place], anytime, about any type of crisis. A live, trained crisis counselor will receive the text and respond quickly.    [place] Lifeline for LGBTQ Youth  A national crisis intervention and suicide lifeline for LGBTQ youth under 25. Provides a safe place to talk without judgement.   Call 1-306.906.1729; text START to 552125 or visit www.thetrevorproject.org to talk to a trained counselor.  For [facility] crisis numbers, visit the [facility] website at:  https://[place].gov/dhs/people-we-serve/adults/health-care/mental-health/resources/crisis-contacts.jsp      Héctor Higuera Psy.D,    Behavioral Health Clinician   [facility]     6/3/2022

## 2022-12-22 NOTE — TELEPHONE ENCOUNTER
Lanie Neil  Female, 32 year old, 1990  MRN:   [id number]  Received a call [facility]) [phone]. They did a PA for Lanie's Injectafer and it was denied due to lack of information. They are hoping we can resubmit the PA with this additional info. They will be faxing us  the form they received. They would like to know that we have received the form. I will send this to the pharmacy team who do the PA's for our clinic.  Will route message to in-clinic nurses, to that form can get to Jt/pharmacy team.  Ariana Crandall, MSN, RN, PHN -Nurse Clinician, [facility] [phone]

## 2022-12-23 ENCOUNTER — TELEPHONE (OUTPATIENT)
Dept: HEMATOLOGY | Facility: CLINIC | Age: 32
End: 2022-12-23

## 2022-12-23 RX ORDER — HEPARIN SODIUM,PORCINE 10 UNIT/ML
5 VIAL (ML) INTRAVENOUS
Status: CANCELLED | OUTPATIENT
Start: 2022-12-23

## 2022-12-23 RX ORDER — EPINEPHRINE 1 MG/ML
0.3 INJECTION, SOLUTION, CONCENTRATE INTRAVENOUS EVERY 5 MIN PRN
Status: CANCELLED | OUTPATIENT
Start: 2022-12-23

## 2022-12-23 RX ORDER — DIPHENHYDRAMINE HYDROCHLORIDE 50 MG/ML
50 INJECTION INTRAMUSCULAR; INTRAVENOUS
Status: CANCELLED
Start: 2022-12-23

## 2022-12-23 RX ORDER — ALBUTEROL SULFATE 0.83 MG/ML
2.5 SOLUTION RESPIRATORY (INHALATION)
Status: CANCELLED | OUTPATIENT
Start: 2022-12-23

## 2022-12-23 RX ORDER — ALBUTEROL SULFATE 90 UG/1
1-2 AEROSOL, METERED RESPIRATORY (INHALATION)
Status: CANCELLED
Start: 2022-12-23

## 2022-12-23 RX ORDER — MEPERIDINE HYDROCHLORIDE 25 MG/ML
25 INJECTION INTRAMUSCULAR; INTRAVENOUS; SUBCUTANEOUS EVERY 30 MIN PRN
Status: CANCELLED | OUTPATIENT
Start: 2022-12-23

## 2022-12-23 RX ORDER — HEPARIN SODIUM (PORCINE) LOCK FLUSH IV SOLN 100 UNIT/ML 100 UNIT/ML
5 SOLUTION INTRAVENOUS
Status: CANCELLED | OUTPATIENT
Start: 2022-12-23

## 2022-12-23 NOTE — TELEPHONE ENCOUNTER
Lanie Neil  [id number]  1990    Received call from Renato at [facility] [phone]. Insurance recently denied her Injectifer orders and so orders were placed for Infed. Now Carlos is requesting that they be changed to Venofer, as she gets home infusion and there may be less reactivity with Venofer and no test dose. MORALES Pérez now ordered Venofer and this was commuinicated to Renato at [facility].  Ariana Crandall, MSN, RN, PHN -Nurse Clinician, [facility] [phone]

## 2022-12-29 ENCOUNTER — TRANSFERRED RECORDS (OUTPATIENT)
Dept: HEALTH INFORMATION MANAGEMENT | Facility: CLINIC | Age: 32
End: 2022-12-29

## 2023-01-05 ENCOUNTER — DOCUMENTATION ONLY (OUTPATIENT)
Dept: FAMILY MEDICINE | Facility: CLINIC | Age: 33
End: 2023-01-05

## 2023-01-05 NOTE — PROGRESS NOTES
Type of Form Received: chart notes    Form Received (Date) 1/5/23   Form Filled out No   Placed in provider folder Yes

## 2023-01-06 ENCOUNTER — VIRTUAL VISIT (OUTPATIENT)
Dept: BEHAVIORAL HEALTH | Facility: CLINIC | Age: 33
End: 2023-01-06
Payer: COMMERCIAL

## 2023-01-06 DIAGNOSIS — F33.1 MAJOR DEPRESSIVE DISORDER, RECURRENT EPISODE, MODERATE (H): Primary | ICD-10-CM

## 2023-01-06 PROCEDURE — 90837 PSYTX W PT 60 MINUTES: CPT | Mod: GT | Performed by: PSYCHOLOGIST

## 2023-01-06 NOTE — PROGRESS NOTES
[facility]: Integrated Behavioral Health  January 6, 2023        Behavioral Health Clinician Progress Note    Patient Name: Lanie Neil           Service Type: Video appointment      Service Location:  Video appointment      Session Start Time: 11:06 Session End Time: 12:06      Session Length: 53 - 60      Attendees: Patient    Visit Activities (Refresh list every visit): [facility] Only    Service Modality:  Video Visit:      Provider verified identity through the following two step process.  Patient provided:  Patient photo and Patient is known previously to provider    Telemedicine Visit: The patient's condition can be safely assessed and treated via synchronous audio and visual telemedicine encounter.      Reason for Telemedicine Visit: Patient unable to travel    Originating Site (Patient Location): Patient's home    Distant Site (Provider Location): Provider Remote Setting- Home Office    Consent:  The patient/guardian has verbally consented to: the potential risks and benefits of telemedicine (video visit) versus in person care; bill my insurance or make self-payment for services provided; and responsibility for payment of non-covered services.     Patient would like the video invitation sent by:  My Chart    Mode of Communication:  Video Conference via [facility]    Distant Location (Provider):  Off-site    As the provider I attest to compliance with applicable laws and regulations related to telemedicine.      Diagnostic Assessment Date: 1/19/2021  Treatment Plan Review Date: 3/22/2023  See Flowsheets for today's PHQ-9 and KATHARINE-7 results  Previous PHQ-9:   PHQ-9 SCORE 12/15/2020 3/16/2021 6/30/2021   PHQ-9 Total Score - - -   PHQ-9 Total Score MyChart 2 (Minimal depression) 2 (Minimal depression) 2 (Minimal depression)   PHQ-9 Total Score 2 2 2     Previous KATHARINE-7:   KATHARINE-7 SCORE 12/15/2020 3/16/2021 6/30/2021   Total Score 1 (minimal anxiety) 1 (minimal anxiety) 1 (minimal  "anxiety)   Total Score 1 1 1       DATA  Extended Session (60+ minutes): No  Interactive Complexity: No  Crisis: No    Treatment Objective(s) Addressed in This Session:  Target Behavior(s): disease management/lifestyle changes      Depressed Mood: Decrease frequency and intensity of feeling down, depressed, hopeless  Identify negative self-talk and behaviors: challenge core beliefs, myths, and actions  Anxiety: will develop more effective coping skills to manage anxiety symptoms  Grief / Loss: will engage in effective approach to address and resolve grief/loss issues  Psychological distress related to Pain    Current Stressors / Issues:  [facility] met with Lanie today to continue supporting her treatment of depression, adjustment to chronic disease management, and relationship problems. Extended discussion today about the holidays for her, reviewing a recent conflict with her brother. She states her brother made a comment about not needing/wanting to come to her home for [place] which upset her. Explored themes of rejection we have previously identified/processed. We also discussed her challenges with feeling rejected and hurt in a personal relationship she has with a man online. She reports today her significant other declined her offer to meet in person, which she identified as adding to her sense of rejection. Lanie also notes her mother an family made light of her relationship online and how this made her feel humiliated. Provided support and identified strategies to address underlying themes of rejection/abaondonment. Discussed writing an impact statement around experiences with her father as a child, how she struggles with forgiveness/moving forward in this relationship.     Am I Hooked or Unhooked From My Thoughts?    Clues that I'm getting hooked and threatened by my thoughts:    1) Thoughts are Literal and Threatening; it s as if what we re thinking is actually present, here and now!    2) Thoughts are \"The " "Truth\"; we literally believe them!    3) Thoughts are Most Important; we take them seriously, and give them our full attention!    4) Thoughts are Orders; we automatically obey them!    5) Thoughts are Always Wise; we assume they know best and we follow their advice!     _____________________________________________________________________    Noticing when I'm unhooked and not threatened by my thoughts:    1) Thoughts are merely sounds, words, stories, bits of language, passing through our heads.    2) Thoughts may or may not be true. We don t automatically believe them.    3) Thoughts may or may not be important. We pay attention only if they re helpful.    4) Thoughts are not orders. We don t have to obey them.    5) Thoughts may or may not be wise. We don t automatically follow their advice.   Thoughts That Hook You:    What memories, fears, worries, self-criticisms, or other unhelpful thoughts do you dwell on or get \"stuck\" thinking about? What thoughts tend to hook you, jerk you around, and pull you out of your life?                 Life-Draining Actions:    What are you currently doing that makes your life worse in the long run: keeps you stuck, wastes your time or money, drains your energy; restricts your life; impacts negatively on your health, work, or relationships; maintains or worsens the problems your are dealing with?                     Feelings That Hook You:    What emotions, feelings, urges, impulses, or sensations tend to hook you and jerk you around or pull you into self-defeating actions?           Avoiding Challenging Situations:    What situations, activities, people, or places are you avoiding or staying away from? What have you quit, withdrawn from, dropped out of? What do you keep \"putting off\" until later?                           Progress on Treatment Objective(s) / Homework:  Satisfactory progress - ACTION (Actively working towards change); Intervened by reinforcing change plan / " affirming steps taken       Psychodynamic psychotherapy    Discuss patient's emotional dynamics and issues and how they impact behaviors.    Explore patient's history of relationships and how they impact present behaviors.    Explore how to work with and make changes in these schemas and patterns.    Interpersonal Psychotherapy    Explore patterns in relationships that are effective or ineffective at helping patient reach their goals, find satisfying experience.    Discuss new patterns or behaviors to engage in for improved social functioning.      Care Plan review completed: no     Medication Review:  No changes to current psychiatric medication(s)    Medication Compliance:  NA    Changes in Health Issues:   None reported    Chemical Use Review:   Substance Use: Chemical use reviewed, no active concerns identified      Tobacco Use: No current tobacco use.      Assessment: Current Emotional / Mental Status (status of significant symptoms):  Risk status (Self / Other harm or suicidal ideation)  Patient has had a history of suicidal ideation: passive thoughts only; easily controlled - denied any recently    Patient denies current fears or concerns for personal safety.  Patient denies current or recent suicidal ideation or behaviors.  Patient denies current or recent homicidal ideation or behaviors.  Patient denies current or recent self injurious behavior or ideation.  Patient denies other safety concerns.     A safety and risk management plan has not been developed at this time, however patient was encouraged to call [place] [phone] should there be a change in any of these risk factors.      [place] Suicide Severity Rating Scale (Short Version)  [place] Suicide Severity Rating (Short Version) 2/18/2021   Over the past 2 weeks have you felt down, depressed, or hopeless? yes   Over the past 2 weeks have you had thoughts of killing yourself? no   Have you ever attempted to kill yourself? no     [place] Suicide  Severity Rating Scale (Lifetime/Recent)  [place] Suicide Severity Rating (Lifetime/Recent) 8/13/2021   Wish to be Dead (Lifetime) No   Non-Specific Active Suicidal Thoughts (Lifetime) No   Most Severe Ideation Rating (Lifetime) NA   Frequency (Lifetime) NA   Duration (Lifetime) NA   Controllability (Lifetime) NA   Protective Factors  (Lifetime) NA   Reasons for Ideation (Lifetime) NA   RETIRED: 1. Wish to be Dead (Recent) No   RETIRED: 2. Non-Specific Active Suicidal Thoughts (Recent) No   3. Active Suicidal Ideation with any Methods (Not Plan) Without Intent to Act (Lifetime) No   RETIRED: 3. Active Suicidal Ideation with any Methods (Not Plan) Without Intent to Act (Recent) No   RETIRE: 4. Active Suicidal Ideation with Some Intent to Act, Without Specific Plan (Lifetime) No   4. Active Suicidal Ideation with Some Intent to Act, Without Specific Plan (Recent) No   RETIRE: 5. Active Suicidal Ideation with Specific Plan and Intent (Lifetime) No   RETIRED: 5. Active Suicidal Ideation with Specific Plan and Intent (Recent) No   Most Severe Ideation Rating (Past Month) NA   Frequency (Past Month) NA   Duration (Past Month) NA   Controllability (Past Month) NA   Protective Factors (Past Month) NA   Reasons for Ideation (Past Month) NA   Actual Attempt (Lifetime) No   Actual Attempt (Past 3 Months) No   Has subject engaged in non-suicidal self-injurious behavior? (Lifetime) No   Has subject engaged in non-suicidal self-injurious behavior? (Past 3 Months) No   Interrupted Attempts (Lifetime) No   Interrupted Attempts (Past 3 Months) No   Aborted or Self-Interrupted Attempt (Lifetime) No   Aborted or Self-Interrupted Attempt (Past 3 Months) No   Preparatory Acts or Behavior (Lifetime) No   Preparatory Acts or Behavior (Past 3 Months) No   Most Recent Attempt Actual Lethality Code NA   Most Lethal Attempt Actual Lethality Code NA   Initial/First Attempt Actual Lethality Code NA         Appearance:   Appropriate   Eye  Contact:   Good   Psychomotor Behavior: Normal   Attitude:   Cooperative   Orientation:   All  Speech   Rate / Production: Normal    Volume:  Normal   Mood:    Depressed    Affect:    Tearful  Thought Content:  Rumination   Thought Form:  Coherent  Logical   Insight:    Good     Diagnoses:  1. Major depressive disorder, recurrent episode, moderate (H)        Collateral Reports Completed:  [facility] will coordinate with care team as needed    Plan: (Homework, other):  Lanie was scheduled to RTC in two weeks for help addressing depressive symptoms, pain, and relationship conflicts.   CD Recommendations: No indications of CD issues.     Héctor Higuera Psy.D, LP January 6, 2023                _______________________________________________________________________                                              Individual Treatment Plan    Patient's Name: Lanie Neil  YOB: 1990    Date of Creation: 3/4/2022  Date Treatment Plan Last Reviewed/Revised: 12/22/2022    DSM5 Diagnoses: 296.32 (F33.1) Major Depressive Disorder, Recurrent Episode, Moderate With anxious distress  Psychosocial / Contextual Factors: Chronic pain, SMA, disabled  PROMIS (reviewed every 90 days):     PROMIS-10    In general, would you say your health is: (P) Excellent    In general, would you say your quality of life is: (P) Good    In general, how would you rate your physical health? (P) Very good    In general, how would you rate your mental health, including your mood and your ability to think? (P) Good    In general, how would you rate your satisfaction with your social activities and relationships? (P) Good    In general, please rate how well you carry out your usual social activities and roles. (This includes activities at home, at work and in your community, and responsibilities as a parent, child, spouse, employee, friend, etc.) (P) Very good    To what extent are you able to carry out your everyday physical activities such as  walking, climbing stairs, carrying groceries, or moving a chair? (P) Not at all    In the past 7 days,  How often have you been bothered by emotional problems such as feeling anxious, depressed or irritable? (P) Rarely  How would you rate your fatigue on average? (P) Moderate  How would you rate your pain on average? (P) 6    PROMIS GLOBAL SCORES    Mental health question re-calculation - no clinical value - (P) 4  Physical health question re-calculation - no clinical value - (P) 3  Pain question re-calculation - no clinical value - (P) 3  Global Mental Health Score - (P) 13  Global Physical Health Score - (P) 11  PROMIS TOTAL - SUBSCORES - (P) 24      [id number] PROMIS HEALTH ORGANIZATION AND PROMIS COOPERATIVE GROUP VERSION 1.1          Referral / Collaboration:  Referral to another professional/service is not indicated at this time..    Anticipated number of session for this episode of care: 7-9  Anticipation frequency of session: Every other week  Anticipated Duration of each session: 38-52 minutes  Treatment plan will be reviewed in 90 days or when goals have been changed.       MeasurableTreatment Goal(s) related to diagnosis / functional impairment(s)  Goal 1: Patient will experience a reduction in depressive symptoms along with a corresponding increase in positive emotion and life satisfaction.      Objective #A (Patient Action)    Patient will Increase interest, engagement, and pleasure in doing things.  Status: Continued - Date(s): 12/22/2022    Intervention(s)  Therapist will help patient identify pleasant and mastery oriented events that elicit positive, relaxed mood.    Objective #B  Patient will Decrease frequency and intensity of feeling down, depressed, hopeless.  Status: Continued - Date(s): 12/22/2022    Intervention(s)  Therapist will introduce patient to cognitive-behavioral and acceptance and commitment therapy topics aimed to help reduce depression and anxiety    Objective #C  Patient will  "Identify negative self-talk and behaviors: challenge core beliefs, myths, and actions  Improve concentration, focus, and mindfulness in daily activities .  Status: Continued - Date(s): 12/22/2022    Intervention(s)  Therapist will help patient identify and manage negative self-talk and automatic thoughts; introduce patient to cognitive distortions; help patient develop cognitive diffusion techniques      Goal 2: Patient will experience a reduction in anxious symptoms, along with a corresponding increase in relaxed emotional states and life satisfaction.      Objective #A (Patient Action)  Patient will use cognitive-behavioral and thought diffusion strategies identified in therapy to challenge anxious thoughts.    Status: Continued - Date(s): 12/22/2022    Intervention(s)  Therapist will utilize CBT and ACT ideas to help patient challenge anxious thoughts and reduce intensity/duration of anxious distress    Objective #B  Patient will use \"worry time\" each day for 15 minutes of scheduled worry and then defer obsessive or anxious thinking until the next structured worry time.    Status: Continued - Date(s): 12/22/2022    Intervention(s)  Therapist will teach patient how to effectively utilize worry time and/or thought logs/journals each day and incorporate more relaxation behaviors into their routine.    Objective #C  Patient will identify the stressors which contribute to feelings of anxiety  Patient will increase engagement in adaptive coping skills and recreational activities, such as exercise and healthy socialization, to manage distress.  Status: Continued - Date(s): 12/22/2022    Intervention(s)  Therapist will help patient identify triggers/situational factors that contribute to anxiety and behavioral skills aimed to manage anxious distress.      Goal 3: Patient will learn more adaptive ways to manage chronic pain      Objective #A (Patient Action)    Status: Continued - Date(s): 12/22/2022    Patient will " identify 2-4 strategies for managing pain.    Intervention(s)  Therapist will teach distraction skills aimed to help manage chronic pain and utilize ACT/CBT ideas to help with this (e.g. relaxation).    Objective #B  Patient will state understanding of stressors and relationship to physical symptoms.    Status: Continued - Date(s): 12/22/2022      Other Possible Therapeutic Intervention(s):    Psycho-education regarding mental health diagnoses and treatment options    Supportive Therapy    Provide affirmations, reflections, and establish working rapport    Emphasize and reflect on strength of therapeutic relationship    Skills training    Explore skills useful to client in current situation.    Skills include assertiveness, communication, conflict management, problem-solving, relaxation, etc.    Solution-Focused Therapy    Explore patterns in patient's relationships and discuss options for new behaviors.    Explore patterns in patient's actions and choices and discuss options for new behaviors.    Cognitive-behavioral Therapy    Discuss common cognitive distortions, identify them in patient's life.    Explore ways to challenge, replace, and act against these cognitions.    Acceptance and Commitment Therapy    Explore and identify important values in patient's life.    Discuss ways to commit to behavioral activation around these values.    Psychodynamic psychotherapy    Discuss patient's emotional dynamics and issues and how they impact behaviors.    Explore patient's history of relationships and how they impact present behaviors.    Explore how to work with and make changes in these schemas and patterns.    Narrative Therapy    Explore the patient's story of his/her life from his/her perspective.    Explore alternate ways of understanding their experience, identifying exceptions, developing new themes.    Interpersonal Psychotherapy    Explore patterns in relationships that are effective or ineffective at helping  patient reach their goals, find satisfying experience.    Discuss new patterns or behaviors to engage in for improved social functioning.    Behavioral Activation    Discuss steps patient can take to become more involved in meaningful activity.    Identify barriers to these activities and explore possible solutions.    Mindfulness-Based Strategies    Discuss skills based on development and application of mindfulness.    Skills drawn from compassion-focused therapy, dialectical behavior therapy, mindfulness-based stress reduction, mindfulness-based cognitive therapy, etc.      Patient has reviewed and agreed to the above plan.    Héctor Higuera Psy.D, LP   Behavioral Health Clinician   [facility]     12/22/2022    Crisis Resources    Refer to the resources below as needed.    Steps to care for yourself    1. If you are currently in counseling, call your counselor for an appointment  2. Call the local crisis resources below if needed.  3. Contact friends or family for support.  4. Get more exercise.  5. Do activities you enjoy.  6. Eat a well-balanced diet and drink plenty of fluids.  7. Rest as needed.  8. Limit alcohol and recreational drugs. These can worsen depression.    When to contact your primary care provider       You have thoughts of harming or killing yourself but have not made a plan to carry it out.    Your depression gets in the way of daily activities.    You are often unable to sleep.    You need help cutting back on alcohol or recreational drugs.    When to call 911 or go to the Emergency Room     Get emergency help right away if you have any of the following:    You are planning to harm or kill yourself and you have a way to carry out the plan.     You have injured yourself or others. Or, you think you will.    You feel confused or are having trouble thinking or remembering.    You are having delusions (false beliefs).    You are hearing voices or seeing things that  aren t there.    You are feeling psychotic (paranoid, fearful, restless, agitated, nervous, racing thoughts or speech)    Crisis Resources     These hotlines are for both adults and children. The and are open 24 hours a day, 7 days a week unless noted otherwise.    988 - National Suicide and Crisis Lifeline  If you or someone you know needs support now, call or text 882 or chat Antuitline.org. 988 connects you with a trained crisis counselor who can help.    National Suicide Prevention Lifeline   2-766-713-TALK (3952)    Crisis Text Line    www.crisistextline.org  Text HOME to 176464 from anywhere in the [place], anytime, about any type of crisis. A live, trained crisis counselor will receive the text and respond quickly.    [place] Lifeline for LGBTQ Youth  A national crisis intervention and suicide lifeline for LGBTQ youth under 25. Provides a safe place to talk without judgement.   Call 1-938.187.9395; text START to 443638 or visit www.thetrevorproject.org to talk to a trained counselor.  For [facility] crisis numbers, visit the [facility] website at:  https://[place].gov/dhs/people-we-serve/adults/health-care/mental-health/resources/crisis-contacts.jsp      Héctor Higuera Psy.D, LP   Behavioral Health Clinician   [facility]     6/3/2022

## 2023-01-09 DIAGNOSIS — F32.A DEPRESSION, UNSPECIFIED DEPRESSION TYPE: ICD-10-CM

## 2023-01-09 DIAGNOSIS — F41.9 ANXIETY: ICD-10-CM

## 2023-01-10 ENCOUNTER — MYC MEDICAL ADVICE (OUTPATIENT)
Dept: FAMILY MEDICINE | Facility: CLINIC | Age: 33
End: 2023-01-10

## 2023-01-11 RX ORDER — SERTRALINE HYDROCHLORIDE 20 MG/ML
SOLUTION ORAL
Qty: 360 ML | Refills: 0 | OUTPATIENT
Start: 2023-01-11

## 2023-01-13 ENCOUNTER — DOCUMENTATION ONLY (OUTPATIENT)
Dept: FAMILY MEDICINE | Facility: CLINIC | Age: 33
End: 2023-01-13
Payer: COMMERCIAL

## 2023-01-13 NOTE — PROGRESS NOTES
Type of Form Received: EQUIPMENT LETTER OF MEDICAL NECESSITY    Form Received (Date) 1/13/23   Form Filled out Yes 1/17/23   Placed in provider folder Yes

## 2023-01-17 ENCOUNTER — MEDICAL CORRESPONDENCE (OUTPATIENT)
Dept: HEALTH INFORMATION MANAGEMENT | Facility: CLINIC | Age: 33
End: 2023-01-17
Payer: COMMERCIAL

## 2023-01-20 ENCOUNTER — VIRTUAL VISIT (OUTPATIENT)
Dept: BEHAVIORAL HEALTH | Facility: CLINIC | Age: 33
End: 2023-01-20
Payer: COMMERCIAL

## 2023-01-20 DIAGNOSIS — F33.1 MAJOR DEPRESSIVE DISORDER, RECURRENT EPISODE, MODERATE (H): Primary | ICD-10-CM

## 2023-01-20 PROCEDURE — 90834 PSYTX W PT 45 MINUTES: CPT | Mod: GT | Performed by: PSYCHOLOGIST

## 2023-01-20 NOTE — PROGRESS NOTES
[facility]: Integrated Behavioral Health  January 20, 2023      Behavioral Health Clinician Progress Note    Patient Name: Lanie Neil           Service Type: Video appointment      Service Location:  Video appointment      Session Start Time: 1:09 Session End Time: 1:59      Session Length: 38 - 52      Attendees: Patient    Visit Activities (Refresh list every visit): [facility] Only    Service Modality:  Video Visit:      Provider verified identity through the following two step process.  Patient provided:  Patient photo and Patient is known previously to provider    Telemedicine Visit: The patient's condition can be safely assessed and treated via synchronous audio and visual telemedicine encounter.      Reason for Telemedicine Visit: Patient unable to travel    Originating Site (Patient Location): Patient's home    Distant Site (Provider Location): Provider Remote Setting- Home Office    Consent:  The patient/guardian has verbally consented to: the potential risks and benefits of telemedicine (video visit) versus in person care; bill my insurance or make self-payment for services provided; and responsibility for payment of non-covered services.     Patient would like the video invitation sent by:  My Chart    Mode of Communication:  Video Conference via [employer]well    Distant Location (Provider):  Off-site    As the provider I attest to compliance with applicable laws and regulations related to telemedicine.      Diagnostic Assessment Date: 1/19/2021  Treatment Plan Review Date: 3/22/2023   See Flowsheets for today's PHQ-9 and KATHARINE-7 results  Previous PHQ-9:   PHQ-9 SCORE 12/15/2020 3/16/2021 6/30/2021   PHQ-9 Total Score - - -   PHQ-9 Total Score MyChart 2 (Minimal depression) 2 (Minimal depression) 2 (Minimal depression)   PHQ-9 Total Score 2 2 2     Previous KATHARINE-7:   KATHARINE-7 SCORE 12/15/2020 3/16/2021 6/30/2021   Total Score 1 (minimal anxiety) 1 (minimal anxiety) 1 (minimal anxiety)  "  Total Score 1 1 1       DATA  Extended Session (60+ minutes): No  Interactive Complexity: No  Crisis: No    Treatment Objective(s) Addressed in This Session:  Target Behavior(s): disease management/lifestyle changes      Depressed Mood: Decrease frequency and intensity of feeling down, depressed, hopeless  Identify negative self-talk and behaviors: challenge core beliefs, myths, and actions  Anxiety: will develop more effective coping skills to manage anxiety symptoms  Grief / Loss: will engage in effective approach to address and resolve grief/loss issues  Psychological distress related to Pain    Current Stressors / Issues:  [facility] met with Lanie today to continue supporting her treatment of depression, adjustment to chronic disease management, and relationship problems. Continued discussion today about grief and acceptance, centering around her thoughts about being unable to have children. Discussed feeling held back at home from doing things she would enjoy, even if they didn't work out well for her, like dating or going out and doing more things. She identified for instance a concert she would like to attend. Explored the notion of \"learned helplessness\" - not taking opportunities when presented when faced with a series of negative events in the past that elicited helplessness. Discussed incorporating more scheduled events outside the home to help improve depressed mood. Reviewed the merits of planned activities to this end.     Am I Hooked or Unhooked From My Thoughts?    Clues that I'm getting hooked and threatened by my thoughts:    1) Thoughts are Literal and Threatening; it s as if what we re thinking is actually present, here and now!    2) Thoughts are \"The Truth\"; we literally believe them!    3) Thoughts are Most Important; we take them seriously, and give them our full attention!    4) Thoughts are Orders; we automatically obey them!    5) Thoughts are Always Wise; we assume they know best and we " "follow their advice!     _____________________________________________________________________    Noticing when I'm unhooked and not threatened by my thoughts:    1) Thoughts are merely sounds, words, stories, bits of language, passing through our heads.    2) Thoughts may or may not be true. We don t automatically believe them.    3) Thoughts may or may not be important. We pay attention only if they re helpful.    4) Thoughts are not orders. We don t have to obey them.    5) Thoughts may or may not be wise. We don t automatically follow their advice.   Thoughts That Hook You:    What memories, fears, worries, self-criticisms, or other unhelpful thoughts do you dwell on or get \"stuck\" thinking about? What thoughts tend to hook you, jerk you around, and pull you out of your life?                 Life-Draining Actions:    What are you currently doing that makes your life worse in the long run: keeps you stuck, wastes your time or money, drains your energy; restricts your life; impacts negatively on your health, work, or relationships; maintains or worsens the problems your are dealing with?                     Feelings That Hook You:    What emotions, feelings, urges, impulses, or sensations tend to hook you and jerk you around or pull you into self-defeating actions?           Avoiding Challenging Situations:    What situations, activities, people, or places are you avoiding or staying away from? What have you quit, withdrawn from, dropped out of? What do you keep \"putting off\" until later?                           Progress on Treatment Objective(s) / Homework:  Satisfactory progress - ACTION (Actively working towards change); Intervened by reinforcing change plan / affirming steps taken       Psychodynamic psychotherapy    Discuss patient's emotional dynamics and issues and how they impact behaviors.    Explore patient's history of relationships and how they impact present behaviors.    Explore how to work with " and make changes in these schemas and patterns.    Interpersonal Psychotherapy    Explore patterns in relationships that are effective or ineffective at helping patient reach their goals, find satisfying experience.    Discuss new patterns or behaviors to engage in for improved social functioning.      Care Plan review completed: no     Medication Review:  No changes to current psychiatric medication(s)    Medication Compliance:  NA    Changes in Health Issues:   None reported    Chemical Use Review:   Substance Use: Chemical use reviewed, no active concerns identified      Tobacco Use: No current tobacco use.      Assessment: Current Emotional / Mental Status (status of significant symptoms):  Risk status (Self / Other harm or suicidal ideation)  Patient has had a history of suicidal ideation: passive thoughts only; easily controlled - denied any recently    Patient denies current fears or concerns for personal safety.  Patient denies current or recent suicidal ideation or behaviors.  Patient denies current or recent homicidal ideation or behaviors.  Patient denies current or recent self injurious behavior or ideation.  Patient denies other safety concerns.     A safety and risk management plan has not been developed at this time, however patient was encouraged to call [place] [phone] should there be a change in any of these risk factors.      [place] Suicide Severity Rating Scale (Short Version)  [place] Suicide Severity Rating (Short Version) 2/18/2021   Over the past 2 weeks have you felt down, depressed, or hopeless? yes   Over the past 2 weeks have you had thoughts of killing yourself? no   Have you ever attempted to kill yourself? no     [place] Suicide Severity Rating Scale (Lifetime/Recent)  [place] Suicide Severity Rating (Lifetime/Recent) 8/13/2021   Wish to be Dead (Lifetime) No   Non-Specific Active Suicidal Thoughts (Lifetime) No   Most Severe Ideation Rating (Lifetime) NA   Frequency  (Lifetime) NA   Duration (Lifetime) NA   Controllability (Lifetime) NA   Protective Factors  (Lifetime) NA   Reasons for Ideation (Lifetime) NA   RETIRED: 1. Wish to be Dead (Recent) No   RETIRED: 2. Non-Specific Active Suicidal Thoughts (Recent) No   3. Active Suicidal Ideation with any Methods (Not Plan) Without Intent to Act (Lifetime) No   RETIRED: 3. Active Suicidal Ideation with any Methods (Not Plan) Without Intent to Act (Recent) No   RETIRE: 4. Active Suicidal Ideation with Some Intent to Act, Without Specific Plan (Lifetime) No   4. Active Suicidal Ideation with Some Intent to Act, Without Specific Plan (Recent) No   RETIRE: 5. Active Suicidal Ideation with Specific Plan and Intent (Lifetime) No   RETIRED: 5. Active Suicidal Ideation with Specific Plan and Intent (Recent) No   Most Severe Ideation Rating (Past Month) NA   Frequency (Past Month) NA   Duration (Past Month) NA   Controllability (Past Month) NA   Protective Factors (Past Month) NA   Reasons for Ideation (Past Month) NA   Actual Attempt (Lifetime) No   Actual Attempt (Past 3 Months) No   Has subject engaged in non-suicidal self-injurious behavior? (Lifetime) No   Has subject engaged in non-suicidal self-injurious behavior? (Past 3 Months) No   Interrupted Attempts (Lifetime) No   Interrupted Attempts (Past 3 Months) No   Aborted or Self-Interrupted Attempt (Lifetime) No   Aborted or Self-Interrupted Attempt (Past 3 Months) No   Preparatory Acts or Behavior (Lifetime) No   Preparatory Acts or Behavior (Past 3 Months) No   Most Recent Attempt Actual Lethality Code NA   Most Lethal Attempt Actual Lethality Code NA   Initial/First Attempt Actual Lethality Code NA         Appearance:   Appropriate   Eye Contact:   Good   Psychomotor Behavior: Normal   Attitude:   Cooperative   Orientation:   All  Speech   Rate / Production: Normal    Volume:  Normal   Mood:    Depressed    Affect:    Tearful  Thought Content:  Rumination   Thought Form:  Coherent   Logical   Insight:    Good     Diagnoses:  1. Major depressive disorder, recurrent episode, moderate (H)        Collateral Reports Completed:  [facility] will coordinate with care team as needed    Plan: (Homework, other):  Lanie was scheduled to RTC in two weeks for help addressing depressive symptoms, pain, and relationship conflicts.   CD Recommendations: No indications of CD issues.     Héctor Higuera Psy.D, LP January 20, 2023          _______________________________________________________________________                                              Individual Treatment Plan    Patient's Name: Lanie Neil  YOB: 1990    Date of Creation: 3/4/2022  Date Treatment Plan Last Reviewed/Revised: 12/22/2022    DSM5 Diagnoses: 296.32 (F33.1) Major Depressive Disorder, Recurrent Episode, Moderate With anxious distress  Psychosocial / Contextual Factors: Chronic pain, SMA, disabled  PROMIS (reviewed every 90 days):     PROMIS-10    In general, would you say your health is: (P) Excellent    In general, would you say your quality of life is: (P) Good    In general, how would you rate your physical health? (P) Very good    In general, how would you rate your mental health, including your mood and your ability to think? (P) Good    In general, how would you rate your satisfaction with your social activities and relationships? (P) Good    In general, please rate how well you carry out your usual social activities and roles. (This includes activities at home, at work and in your community, and responsibilities as a parent, child, spouse, employee, friend, etc.) (P) Very good    To what extent are you able to carry out your everyday physical activities such as walking, climbing stairs, carrying groceries, or moving a chair? (P) Not at all    In the past 7 days,  How often have you been bothered by emotional problems such as feeling anxious, depressed or irritable? (P) Rarely  How would you rate your fatigue on  average? (P) Moderate  How would you rate your pain on average? (P) 6    PROMIS GLOBAL SCORES    Mental health question re-calculation - no clinical value - (P) 4  Physical health question re-calculation - no clinical value - (P) 3  Pain question re-calculation - no clinical value - (P) 3  Global Mental Health Score - (P) 13  Global Physical Health Score - (P) 11  PROMIS TOTAL - SUBSCORES - (P) 24      [id number] [facility] PROMIS [facility]OPERATIVE [facility] VERSION 1.1          Referral / Collaboration:  Referral to another professional/service is not indicated at this time..    Anticipated number of session for this episode of care: 7-9  Anticipation frequency of session: Every other week  Anticipated Duration of each session: 38-52 minutes  Treatment plan will be reviewed in 90 days or when goals have been changed.       MeasurableTreatment Goal(s) related to diagnosis / functional impairment(s)  Goal 1: Patient will experience a reduction in depressive symptoms along with a corresponding increase in positive emotion and life satisfaction.      Objective #A (Patient Action)    Patient will Increase interest, engagement, and pleasure in doing things.  Status: Continued - Date(s): 12/22/2022    Intervention(s)  Therapist will help patient identify pleasant and mastery oriented events that elicit positive, relaxed mood.    Objective #B  Patient will Decrease frequency and intensity of feeling down, depressed, hopeless.  Status: Continued - Date(s): 12/22/2022    Intervention(s)  Therapist will introduce patient to cognitive-behavioral and acceptance and commitment therapy topics aimed to help reduce depression and anxiety    Objective #C  Patient will Identify negative self-talk and behaviors: challenge core beliefs, myths, and actions  Improve concentration, focus, and mindfulness in daily activities .  Status: Continued - Date(s): 12/22/2022    Intervention(s)  Therapist will help patient identify and  "manage negative self-talk and automatic thoughts; introduce patient to cognitive distortions; help patient develop cognitive diffusion techniques      Goal 2: Patient will experience a reduction in anxious symptoms, along with a corresponding increase in relaxed emotional states and life satisfaction.      Objective #A (Patient Action)  Patient will use cognitive-behavioral and thought diffusion strategies identified in therapy to challenge anxious thoughts.    Status: Continued - Date(s): 12/22/2022    Intervention(s)  Therapist will utilize CBT and ACT ideas to help patient challenge anxious thoughts and reduce intensity/duration of anxious distress    Objective #B  Patient will use \"worry time\" each day for 15 minutes of scheduled worry and then defer obsessive or anxious thinking until the next structured worry time.    Status: Continued - Date(s): 12/22/2022    Intervention(s)  Therapist will teach patient how to effectively utilize worry time and/or thought logs/journals each day and incorporate more relaxation behaviors into their routine.    Objective #C  Patient will identify the stressors which contribute to feelings of anxiety  Patient will increase engagement in adaptive coping skills and recreational activities, such as exercise and healthy socialization, to manage distress.  Status: Continued - Date(s): 12/22/2022    Intervention(s)  Therapist will help patient identify triggers/situational factors that contribute to anxiety and behavioral skills aimed to manage anxious distress.      Goal 3: Patient will learn more adaptive ways to manage chronic pain      Objective #A (Patient Action)    Status: Continued - Date(s): 12/22/2022    Patient will identify 2-4 strategies for managing pain.    Intervention(s)  Therapist will teach distraction skills aimed to help manage chronic pain and utilize ACT/CBT ideas to help with this (e.g. relaxation).    Objective #B  Patient will state understanding of stressors " and relationship to physical symptoms.    Status: Continued - Date(s): 12/22/2022      Other Possible Therapeutic Intervention(s):    Psycho-education regarding mental health diagnoses and treatment options    Supportive Therapy    Provide affirmations, reflections, and establish working rapport    Emphasize and reflect on strength of therapeutic relationship    Skills training    Explore skills useful to client in current situation.    Skills include assertiveness, communication, conflict management, problem-solving, relaxation, etc.    Solution-Focused Therapy    Explore patterns in patient's relationships and discuss options for new behaviors.    Explore patterns in patient's actions and choices and discuss options for new behaviors.    Cognitive-behavioral Therapy    Discuss common cognitive distortions, identify them in patient's life.    Explore ways to challenge, replace, and act against these cognitions.    Acceptance and Commitment Therapy    Explore and identify important values in patient's life.    Discuss ways to commit to behavioral activation around these values.    Psychodynamic psychotherapy    Discuss patient's emotional dynamics and issues and how they impact behaviors.    Explore patient's history of relationships and how they impact present behaviors.    Explore how to work with and make changes in these schemas and patterns.    Narrative Therapy    Explore the patient's story of his/her life from his/her perspective.    Explore alternate ways of understanding their experience, identifying exceptions, developing new themes.    Interpersonal Psychotherapy    Explore patterns in relationships that are effective or ineffective at helping patient reach their goals, find satisfying experience.    Discuss new patterns or behaviors to engage in for improved social functioning.    Behavioral Activation    Discuss steps patient can take to become more involved in meaningful activity.    Identify barriers  to these activities and explore possible solutions.    Mindfulness-Based Strategies    Discuss skills based on development and application of mindfulness.    Skills drawn from compassion-focused therapy, dialectical behavior therapy, mindfulness-based stress reduction, mindfulness-based cognitive therapy, etc.      Patient has reviewed and agreed to the above plan.    Héctor Higuera Psy.D, JENNIFER   Behavioral Health Clinician   [facility]     12/22/2022    Crisis Resources    Refer to the resources below as needed.    Steps to care for yourself    1. If you are currently in counseling, call your counselor for an appointment  2. Call the local crisis resources below if needed.  3. Contact friends or family for support.  4. Get more exercise.  5. Do activities you enjoy.  6. Eat a well-balanced diet and drink plenty of fluids.  7. Rest as needed.  8. Limit alcohol and recreational drugs. These can worsen depression.    When to contact your primary care provider       You have thoughts of harming or killing yourself but have not made a plan to carry it out.    Your depression gets in the way of daily activities.    You are often unable to sleep.    You need help cutting back on alcohol or recreational drugs.    When to call 911 or go to the Emergency Room     Get emergency help right away if you have any of the following:    You are planning to harm or kill yourself and you have a way to carry out the plan.     You have injured yourself or others. Or, you think you will.    You feel confused or are having trouble thinking or remembering.    You are having delusions (false beliefs).    You are hearing voices or seeing things that aren t there.    You are feeling psychotic (paranoid, fearful, restless, agitated, nervous, racing thoughts or speech)    Crisis Resources     These hotlines are for both adults and children. The and are open 24 hours a day, 7 days a week unless noted  otherwise.    988 - National Suicide and Crisis Lifeline  If you or someone you know needs support now, call or text 728 or chat [employer]. 988 connects you with a trained crisis counselor who can help.    National Suicide Prevention Lifeline   7-343-446-TALK (7434)    Crisis Text Line    www.crisistextline.org  Text HOME to 310546 from anywhere in the [place], anytime, about any type of crisis. A live, trained crisis counselor will receive the text and respond quickly.    [place] Lifeline for LGBTQ Youth  A national crisis intervention and suicide lifeline for LGBTQ youth under 25. Provides a safe place to talk without judgement.   Call 1-996.900.2364; text START to 351804 or visit www.thetrevorproject.org to talk to a trained counselor.  For county crisis numbers, visit the [place] DHS website at:  https://[place].gov/dhs/people-we-serve/adults/health-care/mental-health/resources/crisis-contacts.jsp      Héctor Higuera Psy.D, LP   Behavioral Health Clinician   -[facility]     6/3/2022

## 2023-01-25 ENCOUNTER — TRANSFERRED RECORDS (OUTPATIENT)
Dept: HEALTH INFORMATION MANAGEMENT | Facility: CLINIC | Age: 33
End: 2023-01-25
Payer: COMMERCIAL

## 2023-01-27 ENCOUNTER — TELEPHONE (OUTPATIENT)
Dept: INTERNAL MEDICINE | Facility: CLINIC | Age: 33
End: 2023-01-27

## 2023-01-27 ENCOUNTER — VIRTUAL VISIT (OUTPATIENT)
Dept: FAMILY MEDICINE | Facility: CLINIC | Age: 33
End: 2023-01-27
Payer: COMMERCIAL

## 2023-01-27 DIAGNOSIS — T81.89XS: ICD-10-CM

## 2023-01-27 DIAGNOSIS — G12.9 SPINAL MUSCLE ATROPHY (H): ICD-10-CM

## 2023-01-27 DIAGNOSIS — D50.0 IRON DEFICIENCY ANEMIA DUE TO CHRONIC BLOOD LOSS: ICD-10-CM

## 2023-01-27 DIAGNOSIS — K21.9 GASTROESOPHAGEAL REFLUX DISEASE WITHOUT ESOPHAGITIS: Primary | ICD-10-CM

## 2023-01-27 DIAGNOSIS — E87.6 HYPOKALEMIA: ICD-10-CM

## 2023-01-27 DIAGNOSIS — Z79.01 CHRONIC ANTICOAGULATION: Primary | ICD-10-CM

## 2023-01-27 PROCEDURE — 99214 OFFICE O/P EST MOD 30 MIN: CPT | Mod: 95 | Performed by: FAMILY MEDICINE

## 2023-01-27 RX ORDER — FAMOTIDINE 40 MG/5ML
20 POWDER, FOR SUSPENSION ORAL 2 TIMES DAILY
Qty: 150 ML | Refills: 11 | Status: SHIPPED | OUTPATIENT
Start: 2023-01-27 | End: 2024-04-10

## 2023-01-27 NOTE — TELEPHONE ENCOUNTER
BMP order was fazed to [facility], [phone].    [facility]  ---------------------------------------------------------------------          ----- Message from Jimmy Moseley MD sent at 1/27/2023 11:05 AM CST -----  She does blood in three weeks for hematology at her home; I just ordered BMP to do at same time  I think we've done this before  Bonny at [facility] her home nurse

## 2023-01-27 NOTE — PROGRESS NOTES
Lanie is a 32 year old who is being evaluated via a billable video visit.      How would you like to obtain your AVS? MyChart  If the video visit is dropped, the invitation should be resent by: Text to cell phone: [phone]  Will anyone else be joining your video visit? No    Video-Visit Details    Type of service:  Video Visit   Video Start Time: 10:30  Video End Time:10:58    Originating Location (pt. Location): Home  Distant Location (provider location):  On-site  Platform used for Video Visit: Boris Dela Cruz is a 32 year old who is being evaluated via a billable video visit.          Assessment & Plan     Gastroesophageal reflux disease without esophagitis  Add on to PPI in case helps  - famotidine (PEPCID) 40 MG/5ML suspension; Take 2.5 mLs (20 mg) by mouth 2 times daily    Hypokalemia  Recheck when does home labs for heme, RN will coordinate  - Basic metabolic panel  (Ca, Cl, CO2, Creat, Gluc, K, Na, BUN); Future    Granuloma of surgical wound, sequela  See HPI  - silver nitrate (ARZOL) 75-25 % miscellaneous; Use one stick apply tip to granuloma prn granuloma formation    Spinal muscle atrophy (H)  Need for power chair based on this diagnosis w/ sequlae        35 minutes spent on the date of the encounter doing chart review, history and exam, documentation and further activities per the note    Jimmy Moseley MD  [facility] [place]    Julia Dela Cruz is a 32 year old, presenting for the following health issues:  Video Visit (Needs info for power wheelchair )      HPI Here for f/u  1-needs face to face re: power wheelchair; pt needs in home for all ADL's, due to impact of diagnosis SMA on her strength and mobility  2-told by dentist has poor dentition, from acid perhaps; on hi dose PPI daily, tolerated, no apparent breakthrough reflux symptoms, discussed ways to improve saliva flow and that we could ask pharmD if meds are drying her saliva. As a precaution, we  "discuss hard to measure if helps or not, we agree to start Pepcid too in case asymptomatic breakthgouh reflux  3-doing anemia eval w/ heme, rvwd, just did IV iron, doing labs soon  4-for h/o low K will add K to labs, done at home  5-very rarely gets granuloma by neck wound, stoma for trach, mom can use silver nitrate sticks and has a number of times successfully w/o complications per pt, will refill        Review of Systems         Objective    Vitals - Patient Reported  Weight (Patient Reported): 68 kg (150 lb)  Height (Patient Reported): 144.8 cm (4' 9\")  BMI (Based on Pt Reported Ht/Wt): 32.46  Pain Score: Severe Pain (6)  Pain Loc:  (Chronic)        Physical Exam   GENERAL: Healthy, alert and no distress, in bed as per chronic status   EYES: Eyes grossly normal to inspection.  No discharge or erythema, or obvious scleral/conjunctival abnormalities.  RESP: No audible wheeze, cough, or visible cyanosis.  No visible retractions or increased work of breathing.    SKIN: Visible skin clear. No significant rash, abnormal pigmentation or lesions.  NEURO: Cranial nerves grossly intact.  Mentation and speech appropriate for age.  PSYCH: Mentation appears normal, affect normal/bright, judgement and insight intact, normal speech and appearance well-groomed.                "

## 2023-01-27 NOTE — PATIENT INSTRUCTIONS
Thank you for visiting the Primary Care Center today at the [facility]! The following is some information about our clinic:     Primary Care Center Frequently-Asked Questions    (1) How do I schedule appointments at the [facility]?     Primary Care--to schedule or make changes to an existing appointment, please call our primary care line at [phone].    Labs--to schedule a lab appointment at the [facility] you can use [employer] or call [phone]. If you have a [place] location that is closer to home, you can reach out to that location for scheduling options.     Imaging--if you need to schedule a CT, X-ray, MRI, ultrasound, or other imaging study you can call [phone] to schedule at the [facility] or any other [facility] imaging location.     Referrals--if a referral to another specialty was ordered you can expect a phone call from their scheduling team. If you have not heard from them in a week, please call us or send us a [employer]ist message to check the status or get a scheduling number. Please note that this only applies to internal [facility] referrals. If the referral is external you would need to contact their office for scheduling.     (2) I have a question about my visit, who do I contact?     You can call us at the primary care line at [phone] to ask questions about your visit. You can also send a secure message through [employer]ist, which is reviewed by clinic staff. Please note that [employer] messages have a twenty-four to forty-eight business hour turnaround time and should not be used for urgent concerns.    (3) How will I get the results of my tests?    If you are signed up for [employer] all tests will be released to you within twenty-four hours of resulting. Please allow three to five days for your doctor to review your results and place a note interpreting the results. If you do not have [employer] you will receive your  results through mail seven to ten business days following the return of the tests. Please note that if there should be any urgent or concerning results that your doctor or their registered nurse will reach out to you the same day as the tests come back. If you have follow up questions about your results or would like to discuss the results in detail please schedule a follow up with your provider either in person or virtually.     (4) How do I get refills of my prescriptions?     You should always first contact your pharmacy for refills of your medications. If submitting a refill request on [employer], please be sure to submit the request only once--repeat requests can cause delays in refill. If you are requesting a NEW medication or a medication related to new symptoms you will need to schedule an appointment with a provider prior to approval. Please note: Routine medication refills have up to one to three business day turnaround whereas controlled substances refills have up to five to seven business day turnaround.    (5) I have new symptoms, what do I do?     If you are having an immediate medical emergency, you should dial 911 for assistance.   For anything urgent that needs to be seen within a few hours to one day you should visit a local urgent care for assistance.  For non-urgent symptoms that need to be seen within a few days to a week you can schedule with an available provider in primary care by going to Directly or calling [phone].   If you are not sure how serious your symptoms are or you would like to receive medical advice you can always call [phone] to speak with a triage nurse.

## 2023-01-27 NOTE — NURSING NOTE
Pt needs refill of Doxycycline Hyclate 100 mg cap= instructions Take 1 capsule per G-tube twice daily.      IMPORTANT  MUST use phrase = Powerwheelchair in home ADL.  To get chair approved for use by way of insurance.

## 2023-01-29 DIAGNOSIS — J45.909 ASTHMA: ICD-10-CM

## 2023-01-30 ENCOUNTER — TELEPHONE (OUTPATIENT)
Dept: INTERNAL MEDICINE | Facility: CLINIC | Age: 33
End: 2023-01-30
Payer: COMMERCIAL

## 2023-01-30 NOTE — TELEPHONE ENCOUNTER
Pt advised to make an appointment for medical clearance for dental exams. Please see Validas message. Routed to Dr. Moseley as well as NITA HAMILTON RN on 1/30/2023 at 5:00 PM

## 2023-01-30 NOTE — TELEPHONE ENCOUNTER
[facility] Call Center    Phone Message    May a detailed message be left on voicemail: yes     Reason for Call: Other: Caity was calling to let us know that the patient needs a root canal and crown procedure, Caity was calling to get the primary care clinic suggestions on preference of anesthetics and to obtain medical clearance to move forward with the procedure, please call to address questions or concerns thank you.     Action Taken: Message routed to:  [facility]    Travel Screening: Not Applicable

## 2023-01-31 DIAGNOSIS — G62.9 NEUROPATHY: ICD-10-CM

## 2023-01-31 NOTE — TELEPHONE ENCOUNTER
Spoke with dental office. Pt will have local anesthesia, not general anesthesia for root canal and . They want to inform PCP what dental procedures pt will go through. No need for pre op.  They will fax us the form and I will review.

## 2023-02-01 RX ORDER — FLUTICASONE PROPIONATE 110 UG/1
1 AEROSOL, METERED RESPIRATORY (INHALATION) 2 TIMES DAILY
Qty: 36 G | Refills: 0 | Status: SHIPPED | OUTPATIENT
Start: 2023-02-01 | End: 2023-10-05

## 2023-02-01 NOTE — TELEPHONE ENCOUNTER
fluticasone (FLOVENT HFA) 110 MCG/ACT inhaler      Last Written Prescription Date:  12/30/21  Last Fill Quantity: 36g,   # refills: 3  Last Office Visit : 1/27/23  Future Office visit:  3/8/23    Routing refill request to provider for review/approval because:  No ACT on file

## 2023-02-02 ENCOUNTER — DOCUMENTATION ONLY (OUTPATIENT)
Dept: FAMILY MEDICINE | Facility: CLINIC | Age: 33
End: 2023-02-02
Payer: COMMERCIAL

## 2023-02-02 NOTE — TELEPHONE ENCOUNTER
gabapentin (NEURONTIN) 250 MG/5ML solution      Last Written Prescription Date:  8/9/22  Last Fill Quantity: 1800ml,   # refills: 5  Last Office Visit : 1/27/23  Future Office visit:  3/8/23    Routing refill request to provider for review/approval because:  Drug not on the [facility], P or [facility] refill protocol or controlled substance

## 2023-02-03 ENCOUNTER — TELEPHONE (OUTPATIENT)
Dept: INTERNAL MEDICINE | Facility: CLINIC | Age: 33
End: 2023-02-03
Payer: COMMERCIAL

## 2023-02-03 DIAGNOSIS — G12.9 SPINAL MUSCLE ATROPHY (H): ICD-10-CM

## 2023-02-03 RX ORDER — GABAPENTIN 250 MG/5ML
1000 SOLUTION ORAL 3 TIMES DAILY
Qty: 1800 ML | Refills: 5 | Status: SHIPPED | OUTPATIENT
Start: 2023-02-03 | End: 2023-08-04

## 2023-02-03 NOTE — TELEPHONE ENCOUNTER
Prior Authorization Retail Medication Request    Medication/Dose: fluticasone (FLOVENT HFA) 110 MCG/ACT inhaler  ICD code (if different than what is on RX):    Previously Tried and Failed:    Rationale:      Insurance Name:    Insurance ID:        Pharmacy Information (if different than what is on RX)  Name:    Phone:

## 2023-02-06 ENCOUNTER — MEDICAL CORRESPONDENCE (OUTPATIENT)
Dept: HEALTH INFORMATION MANAGEMENT | Facility: CLINIC | Age: 33
End: 2023-02-06
Payer: COMMERCIAL

## 2023-02-06 RX ORDER — DIAZEPAM 5 MG
5 TABLET ORAL EVERY 6 HOURS PRN
Qty: 30 TABLET | Refills: 0 | Status: SHIPPED | OUTPATIENT
Start: 2023-02-06 | End: 2023-05-01

## 2023-02-06 NOTE — TELEPHONE ENCOUNTER
diazepam (VALIUM) 5 MG tablet 30 tablet 3 9/7/2022      Last Written Prescription Date:  9-7-2022  Last Fill Quantity: 30,   # refills: 3  Last Office Visit : 1-  Future Office visit:  3-8-2023    Routing refill request to provider for review/approval because:  CONTROLLED MEDICATION      Kathleen M Doege RN

## 2023-02-07 NOTE — TELEPHONE ENCOUNTER
Prior Authorization Not Needed per Insurance    Medication: fluticasone (FLOVENT HFA) 110 MCG/ACT inhaler-PA NOT  NEEDED   Insurance Company: [employer] - Phone [phone] Fax [phone]  Expected CoPay:      Pharmacy Filling the Rx: [id number] - [place], [place] - [address]  Pharmacy Notified: Yes  Patient Notified: No    Called pharmacy and pharmacy stated that PA is Not Needed and Brand Flovent HFA is covered. **Instructed pharmacy to notify patient when script is ready to /ship.**  Pharmacy stated that they have a paid claim on medication on 2/1/2023 quantity 1 inhaler for 30 day supply and patient has picked up medication.

## 2023-02-13 ENCOUNTER — VIRTUAL VISIT (OUTPATIENT)
Dept: BEHAVIORAL HEALTH | Facility: CLINIC | Age: 33
End: 2023-02-13
Payer: COMMERCIAL

## 2023-02-13 DIAGNOSIS — F33.1 MAJOR DEPRESSIVE DISORDER, RECURRENT EPISODE, MODERATE (H): Primary | ICD-10-CM

## 2023-02-13 PROCEDURE — 90834 PSYTX W PT 45 MINUTES: CPT | Mod: VID | Performed by: PSYCHOLOGIST

## 2023-02-14 NOTE — PROGRESS NOTES
[facility]: Integrated Behavioral Health  February 13, 2023        Behavioral Health Clinician Progress Note    Patient Name: Lanie Neil           Service Type: Video appointment      Service Location:  Video appointment      Session Start Time: 2:10 Session End Time: 3:00      Session Length: 38 - 52      Attendees: Patient    Visit Activities (Refresh list every visit): [facility] [place] Only    Service Modality:  Video Visit:      Provider verified identity through the following two step process.  Patient provided:  Patient photo and Patient is known previously to provider    Telemedicine Visit: The patient's condition can be safely assessed and treated via synchronous audio and visual telemedicine encounter.      Reason for Telemedicine Visit: Patient unable to travel    Originating Site (Patient Location): Patient's home    [place] [facility] [place]ent:  The patient/guardian has verbally consented to: the potential risks and benefits of telemedicine (video visit) versus in person care; bill my insurance or make self-payment for services provided; and responsibility for payment of non-covered services.     Patient would like the video invitation sent by:  My Chart    Mode of Communication:  Video Conference via [facility]    Distant Location (Provider):  On-site    As the provider I attest to compliance with applicable laws and regulations related to telemedicine.      Diagnostic Assessment Date: 1/19/2021  Treatment Plan Review Date: 3/22/2023   See Flowsheets for today's PHQ-9 and KATHARINE-7 results  Previous PHQ-9:   PHQ-9 SCORE 12/15/2020 3/16/2021 6/30/2021   PHQ-9 Total Score - - -   PHQ-9 Total Score MyChart 2 (Minimal depression) 2 (Minimal depression) 2 (Minimal depression)   PHQ-9 Total Score 2 2 2     Previous KATHARINE-7:   KATHARINE-7 SCORE 12/15/2020 3/16/2021 6/30/2021   Total Score 1 (minimal anxiety) 1 (minimal anxiety) 1 (minimal anxiety)  "  Total Score 1 1 1       DATA  Extended Session (60+ minutes): No  Interactive Complexity: No  Crisis: No    Treatment Objective(s) Addressed in This Session:  Target Behavior(s): disease management/lifestyle changes      Depressed Mood: Decrease frequency and intensity of feeling down, depressed, hopeless  Identify negative self-talk and behaviors: challenge core beliefs, myths, and actions  Anxiety: will develop more effective coping skills to manage anxiety symptoms  Grief / Loss: will engage in effective approach to address and resolve grief/loss issues  Psychological distress related to Pain    Current Stressors / Issues:  [facility] met with Lanie today to continue supporting her treatment of depression, adjustment to chronic disease management, and relationship problems. Continued discussion today about grief and acceptance, centering around her thoughts about being unable to have children. Reports her sister-in-law had a baby recently and she is experiencing tonio for her, but also a sense of loss/grief about herself being unable to have children. She notes asking her mother to consider adoption instead, though she recognizes the financial and logistic barriers to this. Much of the session focused on processing her observed tendency to justify herself and her beliefs to this writer, looking at possible underlying themes of abandonment and feeling misunderstood. We had a more direct conversation about finding objectives to consider engaging in, such as increasing social support (perhaps finding a chronic pain support group, for instance) and keeping a log to help record her thought patterns throughout the day. Explained how writing down thoughts might help her have better control of them from a CBT perspective. Explored the role of \"intrusive thoughts\" with depression, looking at possible ways of managing them adaptively.     Am I Hooked or Unhooked From My Thoughts?    Clues that I'm getting hooked and threatened " "by my thoughts:    1) Thoughts are Literal and Threatening; it s as if what we re thinking is actually present, here and now!    2) Thoughts are \"The Truth\"; we literally believe them!    3) Thoughts are Most Important; we take them seriously, and give them our full attention!    4) Thoughts are Orders; we automatically obey them!    5) Thoughts are Always Wise; we assume they know best and we follow their advice!     _____________________________________________________________________    Noticing when I'm unhooked and not threatened by my thoughts:    1) Thoughts are merely sounds, words, stories, bits of language, passing through our heads.    2) Thoughts may or may not be true. We don t automatically believe them.    3) Thoughts may or may not be important. We pay attention only if they re helpful.    4) Thoughts are not orders. We don t have to obey them.    5) Thoughts may or may not be wise. We don t automatically follow their advice.   Thoughts That Hook You:    What memories, fears, worries, self-criticisms, or other unhelpful thoughts do you dwell on or get \"stuck\" thinking about? What thoughts tend to hook you, jerk you around, and pull you out of your life?                 Life-Draining Actions:    What are you currently doing that makes your life worse in the long run: keeps you stuck, wastes your time or money, drains your energy; restricts your life; impacts negatively on your health, work, or relationships; maintains or worsens the problems your are dealing with?                     Feelings That Hook You:    What emotions, feelings, urges, impulses, or sensations tend to hook you and jerk you around or pull you into self-defeating actions?           Avoiding Challenging Situations:    What situations, activities, people, or places are you avoiding or staying away from? What have you quit, withdrawn from, dropped out of? What do you keep \"putting off\" until later?                           Progress " on Treatment Objective(s) / Homework:  Satisfactory progress - ACTION (Actively working towards change); Intervened by reinforcing change plan / affirming steps taken       Psychodynamic psychotherapy    Discuss patient's emotional dynamics and issues and how they impact behaviors.    Explore patient's history of relationships and how they impact present behaviors.    Explore how to work with and make changes in these schemas and patterns.    Interpersonal Psychotherapy    Explore patterns in relationships that are effective or ineffective at helping patient reach their goals, find satisfying experience.    Discuss new patterns or behaviors to engage in for improved social functioning.      Care Plan review completed: no     Medication Review:  No changes to current psychiatric medication(s)    Medication Compliance:  NA    Changes in Health Issues:   None reported    Chemical Use Review:   Substance Use: Chemical use reviewed, no active concerns identified      Tobacco Use: No current tobacco use.      Assessment: Current Emotional / Mental Status (status of significant symptoms):  Risk status (Self / Other harm or suicidal ideation)  Patient has had a history of suicidal ideation: passive thoughts only; easily controlled - denied any recently    Patient denies current fears or concerns for personal safety.  Patient denies current or recent suicidal ideation or behaviors.  Patient denies current or recent homicidal ideation or behaviors.  Patient denies current or recent self injurious behavior or ideation.  Patient denies other safety concerns.     A safety and risk management plan has not been developed at this time, however patient was encouraged to call [place] [phone] should there be a change in any of these risk factors.      [place] Suicide Severity Rating Scale (Short Version)  [place] Suicide Severity Rating (Short Version) 2/18/2021   Over the past 2 weeks have you felt down, depressed, or hopeless?  yes   Over the past 2 weeks have you had thoughts of killing yourself? no   Have you ever attempted to kill yourself? no     [place] Suicide Severity Rating Scale (Lifetime/Recent)  [place] Suicide Severity Rating (Lifetime/Recent) 8/13/2021   Wish to be Dead (Lifetime) No   Non-Specific Active Suicidal Thoughts (Lifetime) No   Most Severe Ideation Rating (Lifetime) NA   Frequency (Lifetime) NA   Duration (Lifetime) NA   Controllability (Lifetime) NA   Protective Factors  (Lifetime) NA   Reasons for Ideation (Lifetime) NA   RETIRED: 1. Wish to be Dead (Recent) No   RETIRED: 2. Non-Specific Active Suicidal Thoughts (Recent) No   3. Active Suicidal Ideation with any Methods (Not Plan) Without Intent to Act (Lifetime) No   RETIRED: 3. Active Suicidal Ideation with any Methods (Not Plan) Without Intent to Act (Recent) No   RETIRE: 4. Active Suicidal Ideation with Some Intent to Act, Without Specific Plan (Lifetime) No   4. Active Suicidal Ideation with Some Intent to Act, Without Specific Plan (Recent) No   RETIRE: 5. Active Suicidal Ideation with Specific Plan and Intent (Lifetime) No   RETIRED: 5. Active Suicidal Ideation with Specific Plan and Intent (Recent) No   Most Severe Ideation Rating (Past Month) NA   Frequency (Past Month) NA   Duration (Past Month) NA   Controllability (Past Month) NA   Protective Factors (Past Month) NA   Reasons for Ideation (Past Month) NA   Actual Attempt (Lifetime) No   Actual Attempt (Past 3 Months) No   Has subject engaged in non-suicidal self-injurious behavior? (Lifetime) No   Has subject engaged in non-suicidal self-injurious behavior? (Past 3 Months) No   Interrupted Attempts (Lifetime) No   Interrupted Attempts (Past 3 Months) No   Aborted or Self-Interrupted Attempt (Lifetime) No   Aborted or Self-Interrupted Attempt (Past 3 Months) No   Preparatory Acts or Behavior (Lifetime) No   Preparatory Acts or Behavior (Past 3 Months) No   Most Recent Attempt Actual Lethality Code NA    Most Lethal Attempt Actual Lethality Code NA   Initial/First Attempt Actual Lethality Code NA         Appearance:   Appropriate   Eye Contact:   Good   Psychomotor Behavior: Normal   Attitude:   Cooperative   Orientation:   All  Speech   Rate / Production: Normal    Volume:  Normal   Mood:    Depressed    Affect:    Tearful  Thought Content:  Rumination   Thought Form:  Coherent  Logical   Insight:    Good     Diagnoses:  1. Major depressive disorder, recurrent episode, moderate (H)        Collateral Reports Completed:  [facility] will coordinate with care team as needed    Plan: (Homework, other):  Lanie was scheduled to RTC in two weeks for help addressing depressive symptoms, pain, and relationship conflicts. Keep thought log for help with depression.  CD Recommendations: No indications of CD issues.       February 14, 2023            _______________________________________________________________________                                              Individual Treatment Plan    Patient's Name: Lanie Neil  YOB: 1990    Date of Creation: 3/4/2022  Date Treatment Plan Last Reviewed/Revised: 12/22/2022    DSM5 Diagnoses: 296.32 (F33.1) Major Depressive Disorder, Recurrent Episode, Moderate With anxious distress  Psychosocial / Contextual Factors: Chronic pain, SMA, disabled  PROMIS (reviewed every 90 days):     PROMIS-10    In general, would you say your health is: (P) Excellent    In general, would you say your quality of life is: (P) Good    In general, how would you rate your physical health? (P) Very good    In general, how would you rate your mental health, including your mood and your ability to think? (P) Good    In general, how would you rate your satisfaction with your social activities and relationships? (P) Good    In general, please rate how well you carry out your usual social activities and roles. (This includes activities at home, at work and in your community, and responsibilities as  a parent, child, spouse, employee, friend, etc.) (P) Very good    To what extent are you able to carry out your everyday physical activities such as walking, climbing stairs, carrying groceries, or moving a chair? (P) Not at all    In the past 7 days,  How often have you been bothered by emotional problems such as feeling anxious, depressed or irritable? (P) Rarely  How would you rate your fatigue on average? (P) Moderate  How would you rate your pain on average? (P) 6    PROMIS GLOBAL SCORES    Mental health question re-calculation - no clinical value - (P) 4  Physical health question re-calculation - no clinical value - (P) 3  Pain question re-calculation - no clinical value - (P) 3  Global Mental Health Score - (P) 13  Global Physical Health Score - (P) 11  PROMIS TOTAL - SUBSCORES - (P) 24      [id number] PROMIS HEALTH ORGANIZATION AND PROMIS COOPERATIVE GROUP VERSION 1.1          Referral / Collaboration:  Referral to another professional/service is not indicated at this time..    Anticipated number of session for this episode of care: 7-9  Anticipation frequency of session: Every other week  Anticipated Duration of each session: 38-52 minutes  Treatment plan will be reviewed in 90 days or when goals have been changed.       MeasurableTreatment Goal(s) related to diagnosis / functional impairment(s)  Goal 1: Patient will experience a reduction in depressive symptoms along with a corresponding increase in positive emotion and life satisfaction.      Objective #A (Patient Action)    Patient will Increase interest, engagement, and pleasure in doing things.  Status: Continued - Date(s): 12/22/2022    Intervention(s)  Therapist will help patient identify pleasant and mastery oriented events that elicit positive, relaxed mood.    Objective #B  Patient will Decrease frequency and intensity of feeling down, depressed, hopeless.  Status: Continued - Date(s): 12/22/2022    Intervention(s)  Therapist will introduce patient  "to cognitive-behavioral and acceptance and commitment therapy topics aimed to help reduce depression and anxiety    Objective #C  Patient will Identify negative self-talk and behaviors: challenge core beliefs, myths, and actions  Improve concentration, focus, and mindfulness in daily activities .  Status: Continued - Date(s): 12/22/2022    Intervention(s)  Therapist will help patient identify and manage negative self-talk and automatic thoughts; introduce patient to cognitive distortions; help patient develop cognitive diffusion techniques      Goal 2: Patient will experience a reduction in anxious symptoms, along with a corresponding increase in relaxed emotional states and life satisfaction.      Objective #A (Patient Action)  Patient will use cognitive-behavioral and thought diffusion strategies identified in therapy to challenge anxious thoughts.    Status: Continued - Date(s): 12/22/2022    Intervention(s)  Therapist will utilize CBT and ACT ideas to help patient challenge anxious thoughts and reduce intensity/duration of anxious distress    Objective #B  Patient will use \"worry time\" each day for 15 minutes of scheduled worry and then defer obsessive or anxious thinking until the next structured worry time.    Status: Continued - Date(s): 12/22/2022    Intervention(s)  Therapist will teach patient how to effectively utilize worry time and/or thought logs/journals each day and incorporate more relaxation behaviors into their routine.    Objective #C  Patient will identify the stressors which contribute to feelings of anxiety  Patient will increase engagement in adaptive coping skills and recreational activities, such as exercise and healthy socialization, to manage distress.  Status: Continued - Date(s): 12/22/2022    Intervention(s)  Therapist will help patient identify triggers/situational factors that contribute to anxiety and behavioral skills aimed to manage anxious distress.      Goal 3: Patient will " learn more adaptive ways to manage chronic pain      Objective #A (Patient Action)    Status: Continued - Date(s): 12/22/2022    Patient will identify 2-4 strategies for managing pain.    Intervention(s)  Therapist will teach distraction skills aimed to help manage chronic pain and utilize ACT/CBT ideas to help with this (e.g. relaxation).    Objective #B  Patient will state understanding of stressors and relationship to physical symptoms.    Status: Continued - Date(s): 12/22/2022      Other Possible Therapeutic Intervention(s):    Psycho-education regarding mental health diagnoses and treatment options    Supportive Therapy    Provide affirmations, reflections, and establish working rapport    Emphasize and reflect on strength of therapeutic relationship    Skills training    Explore skills useful to client in current situation.    Skills include assertiveness, communication, conflict management, problem-solving, relaxation, etc.    Solution-Focused Therapy    Explore patterns in patient's relationships and discuss options for new behaviors.    Explore patterns in patient's actions and choices and discuss options for new behaviors.    Cognitive-behavioral Therapy    Discuss common cognitive distortions, identify them in patient's life.    Explore ways to challenge, replace, and act against these cognitions.    Acceptance and Commitment Therapy    Explore and identify important values in patient's life.    Discuss ways to commit to behavioral activation around these values.    Psychodynamic psychotherapy    Discuss patient's emotional dynamics and issues and how they impact behaviors.    Explore patient's history of relationships and how they impact present behaviors.    Explore how to work with and make changes in these schemas and patterns.    Narrative Therapy    Explore the patient's story of his/her life from his/her perspective.    Explore alternate ways of understanding their experience, identifying  exceptions, developing new themes.    Interpersonal Psychotherapy    Explore patterns in relationships that are effective or ineffective at helping patient reach their goals, find satisfying experience.    Discuss new patterns or behaviors to engage in for improved social functioning.    Behavioral Activation    Discuss steps patient can take to become more involved in meaningful activity.    Identify barriers to these activities and explore possible solutions.    Mindfulness-Based Strategies    Discuss skills based on development and application of mindfulness.    Skills drawn from compassion-focused therapy, dialectical behavior therapy, mindfulness-based stress reduction, mindfulness-based cognitive therapy, etc.      Patient has reviewed and agreed to the above plan.    Héctor Higuera Psy.D,    Behavioral Health Clinician   [facility]     12/22/2022    Crisis Resources    Refer to the resources below as needed.    Steps to care for yourself    1. If you are currently in counseling, call your counselor for an appointment  2. Call the local crisis resources below if needed.  3. Contact friends or family for support.  4. Get more exercise.  5. Do activities you enjoy.  6. Eat a well-balanced diet and drink plenty of fluids.  7. Rest as needed.  8. Limit alcohol and recreational drugs. These can worsen depression.    When to contact your primary care provider       You have thoughts of harming or killing yourself but have not made a plan to carry it out.    Your depression gets in the way of daily activities.    You are often unable to sleep.    You need help cutting back on alcohol or recreational drugs.    When to call 911 or go to the Emergency Room     Get emergency help right away if you have any of the following:    You are planning to harm or kill yourself and you have a way to carry out the plan.     You have injured yourself or others. Or, you think you will.    You feel  confused or are having trouble thinking or remembering.    You are having delusions (false beliefs).    You are hearing voices or seeing things that aren t there.    You are feeling psychotic (paranoid, fearful, restless, agitated, nervous, racing thoughts or speech)    Crisis Resources     These hotlines are for both adults and children. The and are open 24 hours a day, 7 days a week unless noted otherwise.    988 - National Suicide and Crisis Lifeline  If you or someone you know needs support now, call or text 988 or chat 98dooubline.org. 988 connects you with a trained crisis counselor who can help.    National Suicide Prevention Lifeline   1-508-665-TALK (5771)    Crisis Text Line    www.crisistextline.org  Text HOME to 862676 from anywhere in the [place], anytime, about any type of crisis. A live, trained crisis counselor will receive the text and respond quickly.    [place] Lifeline for LGBTQ Youth  A national crisis intervention and suicide lifeline for LGBTQ youth under 25. Provides a safe place to talk without judgement.   Call 1-183.782.3517; text START to 513381 or visit www.thetrevorproject.org to talk to a trained counselor.  For [facility] crisis numbers, visit the [facility] website at:  https://[place].gov/dhs/people-we-serve/adults/health-care/mental-health/resources/crisis-contacts.jsp      Héctor Higuera Psy.D, LP   Behavioral Health Clinician   -[facility]     6/3/2022

## 2023-02-15 ENCOUNTER — LAB REQUISITION (OUTPATIENT)
Dept: LAB | Facility: CLINIC | Age: 33
End: 2023-02-15
Payer: COMMERCIAL

## 2023-02-15 DIAGNOSIS — Z01.812 ENCOUNTER FOR PREPROCEDURAL LABORATORY EXAMINATION: ICD-10-CM

## 2023-02-15 LAB
ANION GAP SERPL CALCULATED.3IONS-SCNC: 13 MMOL/L (ref 7–15)
BASOPHILS # BLD AUTO: 0.1 10E3/UL (ref 0–0.2)
BASOPHILS NFR BLD AUTO: 1 %
BUN SERPL-MCNC: 11 MG/DL (ref 6–20)
CALCIUM SERPL-MCNC: 9.3 MG/DL (ref 8.6–10)
CHLORIDE SERPL-SCNC: 108 MMOL/L (ref 98–107)
CREAT SERPL-MCNC: 0.06 MG/DL (ref 0.51–0.95)
DEPRECATED HCO3 PLAS-SCNC: 19 MMOL/L (ref 22–29)
EOSINOPHIL # BLD AUTO: 0.1 10E3/UL (ref 0–0.7)
EOSINOPHIL NFR BLD AUTO: 2 %
ERYTHROCYTE [DISTWIDTH] IN BLOOD BY AUTOMATED COUNT: 18.6 % (ref 10–15)
FERRITIN SERPL-MCNC: 87 NG/ML (ref 6–175)
GFR SERPL CREATININE-BSD FRML MDRD: >90 ML/MIN/1.73M2
GLUCOSE SERPL-MCNC: 94 MG/DL (ref 70–99)
HCT VFR BLD AUTO: 39.2 % (ref 35–47)
HGB BLD-MCNC: 12.3 G/DL (ref 11.7–15.7)
IMM GRANULOCYTES # BLD: 0 10E3/UL
IMM GRANULOCYTES NFR BLD: 0 %
IRON BINDING CAPACITY (ROCHE): 305 UG/DL (ref 240–430)
IRON SATN MFR SERPL: 20 % (ref 15–46)
IRON SERPL-MCNC: 61 UG/DL (ref 37–145)
LYMPHOCYTES # BLD AUTO: 1.7 10E3/UL (ref 0.8–5.3)
LYMPHOCYTES NFR BLD AUTO: 24 %
MCH RBC QN AUTO: 28.9 PG (ref 26.5–33)
MCHC RBC AUTO-ENTMCNC: 31.4 G/DL (ref 31.5–36.5)
MCV RBC AUTO: 92 FL (ref 78–100)
MONOCYTES # BLD AUTO: 0.4 10E3/UL (ref 0–1.3)
MONOCYTES NFR BLD AUTO: 5 %
NEUTROPHILS # BLD AUTO: 4.9 10E3/UL (ref 1.6–8.3)
NEUTROPHILS NFR BLD AUTO: 68 %
NRBC # BLD AUTO: 0 10E3/UL
NRBC BLD AUTO-RTO: 0 /100
PLATELET # BLD AUTO: 334 10E3/UL (ref 150–450)
POTASSIUM SERPL-SCNC: 4.3 MMOL/L (ref 3.4–5.3)
RBC # BLD AUTO: 4.26 10E6/UL (ref 3.8–5.2)
SODIUM SERPL-SCNC: 140 MMOL/L (ref 136–145)
WBC # BLD AUTO: 7.1 10E3/UL (ref 4–11)

## 2023-02-15 PROCEDURE — 80048 BASIC METABOLIC PNL TOTAL CA: CPT | Mod: ORL | Performed by: FAMILY MEDICINE

## 2023-02-15 PROCEDURE — 83550 IRON BINDING TEST: CPT | Mod: ORL | Performed by: FAMILY MEDICINE

## 2023-02-15 PROCEDURE — 82728 ASSAY OF FERRITIN: CPT | Mod: ORL | Performed by: FAMILY MEDICINE

## 2023-02-15 PROCEDURE — 85025 COMPLETE CBC W/AUTO DIFF WBC: CPT | Mod: ORL | Performed by: FAMILY MEDICINE

## 2023-02-17 DIAGNOSIS — E87.6 HYPOKALEMIA: ICD-10-CM

## 2023-02-21 RX ORDER — POTASSIUM CHLORIDE 20MEQ/15ML
20 LIQUID (ML) ORAL DAILY
Qty: 675 ML | Refills: 7 | Status: SHIPPED | OUTPATIENT
Start: 2023-02-21 | End: 2023-02-28

## 2023-02-21 NOTE — TELEPHONE ENCOUNTER
POTASSIUM CHLOR 10% LIQ(20MEQ/15ML)          Virtual Visit  1/27/2023  [facility] Jimmy Patel MD  Family Medicine

## 2023-02-22 ENCOUNTER — MYC MEDICAL ADVICE (OUTPATIENT)
Dept: FAMILY MEDICINE | Facility: CLINIC | Age: 33
End: 2023-02-22
Payer: COMMERCIAL

## 2023-02-22 DIAGNOSIS — E87.6 HYPOKALEMIA: ICD-10-CM

## 2023-02-24 ENCOUNTER — TRANSFERRED RECORDS (OUTPATIENT)
Dept: HEALTH INFORMATION MANAGEMENT | Facility: CLINIC | Age: 33
End: 2023-02-24
Payer: COMMERCIAL

## 2023-02-24 DIAGNOSIS — N94.6 PAINFUL MENSTRUAL PERIODS: ICD-10-CM

## 2023-02-24 DIAGNOSIS — Z79.01 CHRONIC ANTICOAGULATION: ICD-10-CM

## 2023-02-24 DIAGNOSIS — Z86.718 HISTORY OF DEEP VENOUS THROMBOSIS: ICD-10-CM

## 2023-02-24 RX ORDER — CELECOXIB 200 MG/1
CAPSULE ORAL
Qty: 30 CAPSULE | Refills: 3 | Status: SHIPPED | OUTPATIENT
Start: 2023-02-24 | End: 2023-06-27

## 2023-02-28 ENCOUNTER — VIRTUAL VISIT (OUTPATIENT)
Dept: BEHAVIORAL HEALTH | Facility: CLINIC | Age: 33
End: 2023-02-28
Payer: COMMERCIAL

## 2023-02-28 DIAGNOSIS — F33.1 MAJOR DEPRESSIVE DISORDER, RECURRENT EPISODE, MODERATE (H): Primary | ICD-10-CM

## 2023-02-28 PROCEDURE — 90834 PSYTX W PT 45 MINUTES: CPT | Mod: VID | Performed by: PSYCHOLOGIST

## 2023-02-28 RX ORDER — POTASSIUM CHLORIDE 20MEQ/15ML
30 LIQUID (ML) ORAL DAILY
Qty: 675 ML | Refills: 11 | Status: SHIPPED | OUTPATIENT
Start: 2023-02-28 | End: 2024-03-06

## 2023-03-02 NOTE — PROGRESS NOTES
[facility]: Integrated Behavioral Health  March 2, 2023        Behavioral Health Clinician Progress Note    Patient Name: Lanie Neil           Service Type: Video appointment      Service Location:  Video appointment      Session Start Time: 1:00 Session End Time: 1:51      Session Length: 38 - 52      Attendees: Patient    Visit Activities (Refresh list every visit): [facility] Only    Service Modality:  Video Visit:      Provider verified identity through the following two step process.  Patient provided:  Patient photo and Patient is known previously to provider    Telemedicine Visit: The patient's condition can be safely assessed and treated via synchronous audio and visual telemedicine encounter.      Reason for Telemedicine Visit: Patient unable to travel    Originating Site (Patient Location): Patient's home    Distant Site (Provider Location): [facility]: [facility] [place]    Consent:  The patient/guardian has verbally consented to: the potential risks and benefits of telemedicine (video visit) versus in person care; bill my insurance or make self-payment for services provided; and responsibility for payment of non-covered services.     Patient would like the video invitation sent by:  My Chart    Mode of Communication:  Video Conference via Amwell    Distant Location (Provider):  On-site    As the provider I attest to compliance with applicable laws and regulations related to telemedicine.      Diagnostic Assessment Date: 1/19/2021  Treatment Plan Review Date: 3/22/2023   See Flowsheets for today's PHQ-9 and KATHARINE-7 results  Previous PHQ-9:   PHQ-9 SCORE 12/15/2020 3/16/2021 6/30/2021   PHQ-9 Total Score - - -   PHQ-9 Total Score MyChart 2 (Minimal depression) 2 (Minimal depression) 2 (Minimal depression)   PHQ-9 Total Score 2 2 2     Previous KATHARINE-7:   KATHARINE-7 SCORE 12/15/2020 3/16/2021 6/30/2021   Total Score 1 (minimal anxiety) 1 (minimal anxiety) 1 (minimal anxiety)   Total  "Score 1 1 1       DATA  Extended Session (60+ minutes): No  Interactive Complexity: No  Crisis: No    Treatment Objective(s) Addressed in This Session:  Target Behavior(s): disease management/lifestyle changes      Depressed Mood: Decrease frequency and intensity of feeling down, depressed, hopeless  Identify negative self-talk and behaviors: challenge core beliefs, myths, and actions  Anxiety: will develop more effective coping skills to manage anxiety symptoms  Relationship Problems: will address relationship difficulties in a more adaptive manner    Current Stressors / Issues:  [facility] met with Lanie today to continue supporting her treatment of depression, adjustment to chronic disease management, and relationship problems. Began with processing thoughts and reactions to our last session. Reviewed the HW assignment to start using a log and journal about thoughts more frequently. Reviewed completed entries and discussed her related thoughts/reactions. Focused mainly on interpersonal themes and related automatic thoughts. In particular, explored examples of \"rigid\" versus \"flexible\" style thoughts. Discussed the observed example of \"why can't others do...\" and how this affects her distress, frustration at times.     Am I Hooked or Unhooked From My Thoughts?    Clues that I'm getting hooked and threatened by my thoughts:    1) Thoughts are Literal and Threatening; it s as if what we re thinking is actually present, here and now!    2) Thoughts are \"The Truth\"; we literally believe them!    3) Thoughts are Most Important; we take them seriously, and give them our full attention!    4) Thoughts are Orders; we automatically obey them!    5) Thoughts are Always Wise; we assume they know best and we follow their advice!     _____________________________________________________________________    Noticing when I'm unhooked and not threatened by my thoughts:    1) Thoughts are merely sounds, words, stories, bits of " "language, passing through our heads.    2) Thoughts may or may not be true. We don t automatically believe them.    3) Thoughts may or may not be important. We pay attention only if they re helpful.    4) Thoughts are not orders. We don t have to obey them.    5) Thoughts may or may not be wise. We don t automatically follow their advice.   Thoughts That Hook You:    What memories, fears, worries, self-criticisms, or other unhelpful thoughts do you dwell on or get \"stuck\" thinking about? What thoughts tend to hook you, jerk you around, and pull you out of your life?                 Life-Draining Actions:    What are you currently doing that makes your life worse in the long run: keeps you stuck, wastes your time or money, drains your energy; restricts your life; impacts negatively on your health, work, or relationships; maintains or worsens the problems your are dealing with?                     Feelings That Hook You:    What emotions, feelings, urges, impulses, or sensations tend to hook you and jerk you around or pull you into self-defeating actions?           Avoiding Challenging Situations:    What situations, activities, people, or places are you avoiding or staying away from? What have you quit, withdrawn from, dropped out of? What do you keep \"putting off\" until later?                           Progress on Treatment Objective(s) / Homework:  Satisfactory progress - ACTION (Actively working towards change); Intervened by reinforcing change plan / affirming steps taken       Psychodynamic psychotherapy    Discuss patient's emotional dynamics and issues and how they impact behaviors.    Explore patient's history of relationships and how they impact present behaviors.    Explore how to work with and make changes in these schemas and patterns.    Interpersonal Psychotherapy    Explore patterns in relationships that are effective or ineffective at helping patient reach their goals, find satisfying " experience.    Discuss new patterns or behaviors to engage in for improved social functioning.      Care Plan review completed: no     Medication Review:  No changes to current psychiatric medication(s)    Medication Compliance:  NA    Changes in Health Issues:   None reported    Chemical Use Review:   Substance Use: Chemical use reviewed, no active concerns identified      Tobacco Use: No current tobacco use.      Assessment: Current Emotional / Mental Status (status of significant symptoms):  Risk status (Self / Other harm or suicidal ideation)  Patient has had a history of suicidal ideation: passive thoughts only; easily controlled - denied any recently    Patient denies current fears or concerns for personal safety.  Patient denies current or recent suicidal ideation or behaviors.  Patient denies current or recent homicidal ideation or behaviors.  Patient denies current or recent self injurious behavior or ideation.  Patient denies other safety concerns.     A safety and risk management plan has not been developed at this time, however patient was encouraged to call [place] [phone] should there be a change in any of these risk factors.      K[place]ack Suicide Severity Rating Scale (Short Version)  [place] Suicide Severity Rating (Short Version) 2/18/2021   Over the past 2 weeks have you felt down, depressed, or hopeless? yes   Over the past 2 weeks have you had thoughts of killing yourself? no   Have you ever attempted to kill yourself? no     Karnack Suicide Severity Rating Scale (Lifetime/Recent)  K[place] Suicide Severity Rating (Lifetime/Recent) 8/13/2021   Wish to be Dead (Lifetime) No   Non-Specific Active Suicidal Thoughts (Lifetime) No   Most Severe Ideation Rating (Lifetime) NA   Frequency (Lifetime) NA   Duration (Lifetime) NA   Controllability (Lifetime) NA   Protective Factors  (Lifetime) NA   Reasons for Ideation (Lifetime) NA   RETIRED: 1. Wish to be Dead (Recent) No   RETIRED: 2. Non-Specific  Active Suicidal Thoughts (Recent) No   3. Active Suicidal Ideation with any Methods (Not Plan) Without Intent to Act (Lifetime) No   RETIRED: 3. Active Suicidal Ideation with any Methods (Not Plan) Without Intent to Act (Recent) No   RETIRE: 4. Active Suicidal Ideation with Some Intent to Act, Without Specific Plan (Lifetime) No   4. Active Suicidal Ideation with Some Intent to Act, Without Specific Plan (Recent) No   RETIRE: 5. Active Suicidal Ideation with Specific Plan and Intent (Lifetime) No   RETIRED: 5. Active Suicidal Ideation with Specific Plan and Intent (Recent) No   Most Severe Ideation Rating (Past Month) NA   Frequency (Past Month) NA   Duration (Past Month) NA   Controllability (Past Month) NA   Protective Factors (Past Month) NA   Reasons for Ideation (Past Month) NA   Actual Attempt (Lifetime) No   Actual Attempt (Past 3 Months) No   Has subject engaged in non-suicidal self-injurious behavior? (Lifetime) No   Has subject engaged in non-suicidal self-injurious behavior? (Past 3 Months) No   Interrupted Attempts (Lifetime) No   Interrupted Attempts (Past 3 Months) No   Aborted or Self-Interrupted Attempt (Lifetime) No   Aborted or Self-Interrupted Attempt (Past 3 Months) No   Preparatory Acts or Behavior (Lifetime) No   Preparatory Acts or Behavior (Past 3 Months) No   Most Recent Attempt Actual Lethality Code NA   Most Lethal Attempt Actual Lethality Code NA   Initial/First Attempt Actual Lethality Code NA         Appearance:   Appropriate   Eye Contact:   Good   Psychomotor Behavior: Normal   Attitude:   Cooperative   Orientation:   All  Speech   Rate / Production: Normal    Volume:  Normal   Mood:    Depressed    Affect:    Tearful  Thought Content:  Rumination   Thought Form:  Coherent  Logical   Insight:    Good     Diagnoses:  1. Major depressive disorder, recurrent episode, moderate (H)        Collateral Reports Completed:  [facility] will coordinate with care team as needed    Plan: (Homework,  other):  Lanie was scheduled to RTC in two weeks for help addressing depressive symptoms, pain, and relationship conflicts. Continue thought log for help with depression.  CD Recommendations: No indications of CD issues.       _______________________________________________________________________                                              Individual Treatment Plan    Patient's Name: Lanie Neil  YOB: 1990    Date of Creation: 3/4/2022  Date Treatment Plan Last Reviewed/Revised: 12/22/2022    DSM5 Diagnoses: 296.32 (F33.1) Major Depressive Disorder, Recurrent Episode, Moderate With anxious distress  Psychosocial / Contextual Factors: Chronic pain, SMA, disabled  PROMIS (reviewed every 90 days):     PROMIS-10    In general, would you say your health is: (P) Excellent    In general, would you say your quality of life is: (P) Good    In general, how would you rate your physical health? (P) Very good    In general, how would you rate your mental health, including your mood and your ability to think? (P) Good    In general, how would you rate your satisfaction with your social activities and relationships? (P) Good    In general, please rate how well you carry out your usual social activities and roles. (This includes activities at home, at work and in your community, and responsibilities as a parent, child, spouse, employee, friend, etc.) (P) Very good    To what extent are you able to carry out your everyday physical activities such as walking, climbing stairs, carrying groceries, or moving a chair? (P) Not at all    In the past 7 days,  How often have you been bothered by emotional problems such as feeling anxious, depressed or irritable? (P) Rarely  How would you rate your fatigue on average? (P) Moderate  How would you rate your pain on average? (P) 6    PROMIS GLOBAL SCORES    Mental health question re-calculation - no clinical value - (P) 4  Physical health question re-calculation - no  clinical value - (P) 3  Pain question re-calculation - no clinical value - (P) 3  Global Mental Health Score - (P) 13  Global Physical Health Score - (P) 11  PROMIS TOTAL - SUBSCORES - (P) 24      [id number] PROMIS HEALTH ORGANIZATION AND PROMIS COOPERATIVE GROUP VERSION 1.1          Referral / Collaboration:  Referral to another professional/service is not indicated at this time..    Anticipated number of session for this episode of care: 7-9  Anticipation frequency of session: Every other week  Anticipated Duration of each session: 38-52 minutes  Treatment plan will be reviewed in 90 days or when goals have been changed.       MeasurableTreatment Goal(s) related to diagnosis / functional impairment(s)  Goal 1: Patient will experience a reduction in depressive symptoms along with a corresponding increase in positive emotion and life satisfaction.      Objective #A (Patient Action)    Patient will Increase interest, engagement, and pleasure in doing things.  Status: Continued - Date(s): 12/22/2022    Intervention(s)  Therapist will help patient identify pleasant and mastery oriented events that elicit positive, relaxed mood.    Objective #B  Patient will Decrease frequency and intensity of feeling down, depressed, hopeless.  Status: Continued - Date(s): 12/22/2022    Intervention(s)  Therapist will introduce patient to cognitive-behavioral and acceptance and commitment therapy topics aimed to help reduce depression and anxiety    Objective #C  Patient will Identify negative self-talk and behaviors: challenge core beliefs, myths, and actions  Improve concentration, focus, and mindfulness in daily activities .  Status: Continued - Date(s): 12/22/2022    Intervention(s)  Therapist will help patient identify and manage negative self-talk and automatic thoughts; introduce patient to cognitive distortions; help patient develop cognitive diffusion techniques      Goal 2: Patient will experience a reduction in anxious  "symptoms, along with a corresponding increase in relaxed emotional states and life satisfaction.      Objective #A (Patient Action)  Patient will use cognitive-behavioral and thought diffusion strategies identified in therapy to challenge anxious thoughts.    Status: Continued - Date(s): 12/22/2022    Intervention(s)  Therapist will utilize CBT and ACT ideas to help patient challenge anxious thoughts and reduce intensity/duration of anxious distress    Objective #B  Patient will use \"worry time\" each day for 15 minutes of scheduled worry and then defer obsessive or anxious thinking until the next structured worry time.    Status: Continued - Date(s): 12/22/2022    Intervention(s)  Therapist will teach patient how to effectively utilize worry time and/or thought logs/journals each day and incorporate more relaxation behaviors into their routine.    Objective #C  Patient will identify the stressors which contribute to feelings of anxiety  Patient will increase engagement in adaptive coping skills and recreational activities, such as exercise and healthy socialization, to manage distress.  Status: Continued - Date(s): 12/22/2022    Intervention(s)  Therapist will help patient identify triggers/situational factors that contribute to anxiety and behavioral skills aimed to manage anxious distress.      Goal 3: Patient will learn more adaptive ways to manage chronic pain      Objective #A (Patient Action)    Status: Continued - Date(s): 12/22/2022    Patient will identify 2-4 strategies for managing pain.    Intervention(s)  Therapist will teach distraction skills aimed to help manage chronic pain and utilize ACT/CBT ideas to help with this (e.g. relaxation).    Objective #B  Patient will state understanding of stressors and relationship to physical symptoms.    Status: Continued - Date(s): 12/22/2022      Other Possible Therapeutic Intervention(s):    Psycho-education regarding mental health diagnoses and treatment " options    Supportive Therapy    Provide affirmations, reflections, and establish working rapport    Emphasize and reflect on strength of therapeutic relationship    Skills training    Explore skills useful to client in current situation.    Skills include assertiveness, communication, conflict management, problem-solving, relaxation, etc.    Solution-Focused Therapy    Explore patterns in patient's relationships and discuss options for new behaviors.    Explore patterns in patient's actions and choices and discuss options for new behaviors.    Cognitive-behavioral Therapy    Discuss common cognitive distortions, identify them in patient's life.    Explore ways to challenge, replace, and act against these cognitions.    Acceptance and Commitment Therapy    Explore and identify important values in patient's life.    Discuss ways to commit to behavioral activation around these values.    Psychodynamic psychotherapy    Discuss patient's emotional dynamics and issues and how they impact behaviors.    Explore patient's history of relationships and how they impact present behaviors.    Explore how to work with and make changes in these schemas and patterns.    Narrative Therapy    Explore the patient's story of his/her life from his/her perspective.    Explore alternate ways of understanding their experience, identifying exceptions, developing new themes.    Interpersonal Psychotherapy    Explore patterns in relationships that are effective or ineffective at helping patient reach their goals, find satisfying experience.    Discuss new patterns or behaviors to engage in for improved social functioning.    Behavioral Activation    Discuss steps patient can take to become more involved in meaningful activity.    Identify barriers to these activities and explore possible solutions.    Mindfulness-Based Strategies    Discuss skills based on development and application of mindfulness.    Skills drawn from compassion-focused  therapy, dialectical behavior therapy, mindfulness-based stress reduction, mindfulness-based cognitive therapy, etc.      Patient has reviewed and agreed to the above plan.    Héctor Higuera Psy.D, JENNIFER   Behavioral Health Clinician   -[facility]     12/22/2022    Crisis Resources    Refer to the resources below as needed.    Steps to care for yourself    1. If you are currently in counseling, call your counselor for an appointment  2. Call the local crisis resources below if needed.  3. Contact friends or family for support.  4. Get more exercise.  5. Do activities you enjoy.  6. Eat a well-balanced diet and drink plenty of fluids.  7. Rest as needed.  8. Limit alcohol and recreational drugs. These can worsen depression.    When to contact your primary care provider       You have thoughts of harming or killing yourself but have not made a plan to carry it out.    Your depression gets in the way of daily activities.    You are often unable to sleep.    You need help cutting back on alcohol or recreational drugs.    When to call 911 or go to the Emergency Room     Get emergency help right away if you have any of the following:    You are planning to harm or kill yourself and you have a way to carry out the plan.     You have injured yourself or others. Or, you think you will.    You feel confused or are having trouble thinking or remembering.    You are having delusions (false beliefs).    You are hearing voices or seeing things that aren t there.    You are feeling psychotic (paranoid, fearful, restless, agitated, nervous, racing thoughts or speech)    Crisis Resources     These hotlines are for both adults and children. The and are open 24 hours a day, 7 days a week unless noted otherwise.    988 - National Suicide and Crisis Lifeline  If you or someone you know needs support now, call or text That{img} or chat Extend Healthline.org. 988 connects you with a trained crisis counselor who can  help.    National Suicide Prevention Lifeline   2-147-185-TALK (9837)    Crisis Text Line    www.crisistextline.org  Text HOME to 960891 from anywhere in the [place], anytime, about any type of crisis. A live, trained crisis counselor will receive the text and respond quickly.    [place] Lifeline for LGBTQ Youth  A national crisis intervention and suicide lifeline for LGBTQ youth under 25. Provides a safe place to talk without judgement.   Call 1-964.769.8097; text START to 425696 or visit www.thetrevorproject.org to talk to a trained counselor.  For county crisis numbers, visit the [facility] website at:  https://[place].gov/dhs/people-we-serve/adults/health-care/mental-health/resources/crisis-contacts.jsp      Héctor Higuera Psy.D, LP   Behavioral Health Clinician   [facility]     6/3/2022

## 2023-03-08 ENCOUNTER — VIRTUAL VISIT (OUTPATIENT)
Dept: FAMILY MEDICINE | Facility: CLINIC | Age: 33
End: 2023-03-08
Payer: COMMERCIAL

## 2023-03-08 DIAGNOSIS — G47.00 INSOMNIA, UNSPECIFIED TYPE: ICD-10-CM

## 2023-03-08 DIAGNOSIS — T81.89XS: ICD-10-CM

## 2023-03-08 DIAGNOSIS — R05.9 COUGH, UNSPECIFIED TYPE: Primary | ICD-10-CM

## 2023-03-08 PROCEDURE — 99214 OFFICE O/P EST MOD 30 MIN: CPT | Mod: VID | Performed by: FAMILY MEDICINE

## 2023-03-08 RX ORDER — AZITHROMYCIN 200 MG/5ML
POWDER, FOR SUSPENSION ORAL
Qty: 40 ML | Refills: 0 | Status: SHIPPED | OUTPATIENT
Start: 2023-03-08 | End: 2023-09-13

## 2023-03-08 NOTE — NURSING NOTE
Is the patient currently in the state of [place]? YES    Visit mode:VIDEO    If the visit is dropped, the patient can be reconnected by: VIDEO VISIT: Text to cell phone: [phone]    Will anyone else be joining the visit? NO      How would you like to obtain your AVS? MyChart    Are changes needed to the allergy or medication list? NO    Reason for visit: ashlie Will [facility]

## 2023-03-08 NOTE — PATIENT INSTRUCTIONS
Thank you for visiting the Primary Care Center today at the [facility]! The following is some information about our clinic:     Primary Care Center Frequently-Asked Questions    (1) How do I schedule appointments at the [facility]?     Primary Care--to schedule or make changes to an existing appointment, please call our primary care line at [phone].    Labs--to schedule a lab appointment at the [facility] you can use HeatSync or call [phone]. If you have a [place] location that is closer to home, you can reach out to that location for scheduling options.     Imaging--if you need to schedule a CT, X-ray, MRI, ultrasound, or other imaging study you can call [phone] to schedule at the [facility] or any other [facility] imaging location.     Referrals--if a referral to another specialty was ordered you can expect a phone call from their scheduling team. If you have not heard from them in a week, please call us or send us a HeatSync message to check the status or get a scheduling number. Please note that this only applies to internal [facility] referrals. If the referral is external you would need to contact their office for scheduling.     (2) I have a question about my visit, who do I contact?     You can call us at the primary care line at [phone] to ask questions about your visit. You can also send a secure message through HeatSync, which is reviewed by clinic staff. Please note that HeatSync messages have a twenty-four to forty-eight business hour turnaround time and should not be used for urgent concerns.    (3) How will I get the results of my tests?    If you are signed up for Gar[employer] all tests will be released to you within twenty-four hours of resulting. Please allow three to five days for your doctor to review your results and place a note interpreting the results. If you do not have [employer] you will receive your  results through mail seven to ten business days following the return of the tests. Please note that if there should be any urgent or concerning results that your doctor or their registered nurse will reach out to you the same day as the tests come back. If you have follow up questions about your results or would like to discuss the results in detail please schedule a follow up with your provider either in person or virtually.     (4) How do I get refills of my prescriptions?     You should always first contact your pharmacy for refills of your medications. If submitting a refill request on YouCastr, please be sure to submit the request only once--repeat requests can cause delays in refill. If you are requesting a NEW medication or a medication related to new symptoms you will need to schedule an appointment with a provider prior to approval. Please note: Routine medication refills have up to one to three business day turnaround whereas controlled substances refills have up to five to seven business day turnaround.    (5) I have new symptoms, what do I do?     If you are having an immediate medical emergency, you should dial 911 for assistance.   For anything urgent that needs to be seen within a few hours to one day you should visit a local urgent care for assistance.  For non-urgent symptoms that need to be seen within a few days to a week you can schedule with an available provider in primary care by going to St[employer] or calling [phone].   If you are not sure how serious your symptoms are or you would like to receive medical advice you can always call [phone] to speak with a triage nurse.

## 2023-03-08 NOTE — PROGRESS NOTES
Video-Visit Details    Type of service:  Video Visit    Video Start Time (time video started): 12:50 pm    Video End Time (time video stopped): 1:10 pm    Originating Location (pt. Location): Home    Distant Location (provider location):  On-site    Mode of Communication:  Video Conference via [employer]  Lanie is a 32 year old who is being evaluated via a billable video visit.      How would you like to obtain your AVS? MyChart  If the video visit is dropped, the invitation should be resent by: in epic  Will anyone else be joining your video visit? No        Assessment & Plan     Granuloma of surgical wound, sequela  Needs sent to new pharm, see last note  - silver nitrate (ARZOL) 75-25 % miscellaneous; Use one stick apply tip to granuloma prn granuloma formation    Insomnia, unspecified type  Increase dose  - traZODone (DESYREL) 5 mg/ml SUSP; Take 12.5 ml (75 mg) po at bedtime Prn insomnia    Cough, unspecified type  Hi risk, shots utd, call if worse  - azithromycin (ZITHROMAX) 200 MG/5ML suspension; Take 12.5 ml po today then 6.25 ml once a day for four more days (total five days)      25 minutes spent on the date of the encounter doing chart review, history and exam, documentation and further activities per the note    No follow-ups on file.    Jimmy Moseley MD  [facility] PRIMARY CARE CLINIC [facility]   Lanie is a 32 year old with mom, presenting for the following health issues:  Video Visit and Cold      HPI 1 day runny nose, cough, no fever, mom has a cold, neg home covid test, shots utd, hi risk w/ vent status  Recent labs showed normal lytes, hgb/iron  No significant GERD since last seen   Insomnia partly not well treated with her trazodone which is tolerated  They are still working on power chair  Past Medical History:   Diagnosis Date     Anxiety      Snoring      Spinal muscle atrophy (H)      Past Surgical History:   Procedure Laterality Date     ENT SURGERY      tubes      GASTROSTOMY TUBE       KIDNEY SURGERY       SPINE SURGERY       TRACHEOSTOMY       Current Outpatient Medications   Medication     azithromycin (ZITHROMAX) 200 MG/5ML suspension     silver nitrate (ARZOL) 75-25 % miscellaneous     traZODone (DESYREL) 5 mg/ml SUSP     ACETAMINOPHEN PO     albuterol (PROAIR HFA) 108 (90 Base) MCG/ACT inhaler     albuterol (PROVENTIL) (2.5 MG/3ML) 0.083% neb solution     ALPRAZolam (XANAX) 0.25 MG tablet     aminocaproic acid (AMICAR) 0.25 GM/ML solution     bisacodyl (DULCOLAX) 10 MG suppository     celecoxib (CELEBREX) 200 MG capsule     clindamycin (CLEOCIN T) 1 % lotion     diazepam (VALIUM) 5 MG tablet     doxycycline hyclate (VIBRAMYCIN) 100 MG capsule     famotidine (PEPCID) 40 MG/5ML suspension     fluocinolone acetonide (DERMA SMOOTHE/FS BODY) 0.01 % external oil     fluticasone (FLOVENT HFA) 110 MCG/ACT inhaler     gabapentin (NEURONTIN) 250 MG/5ML solution     GENERLAC 10 GM/15ML solution     guaiFENesin (ORGANIDIN) 200 MG TABS tablet     HYDROmorphone (DILAUDID) 4 MG tablet     hydrOXYzine (ATARAX) 25 MG tablet     ketoconazole (NIZORAL) 2 % external shampoo     LANsoprazole (PREVACID) 30 MG DR capsule     levofloxacin (LEVAQUIN) 25 MG/ML solution     Magnesium Hydroxide (MILK OF MAGNESIA PO)     metroNIDAZOLE (METROCREAM) 0.75 % external cream     mometasone (NASONEX) 50 MCG/ACT nasal spray     morphine (SABINE) 30 MG 24 hr capsule     mupirocin (BACTROBAN) 2 % external cream     naloxone (NARCAN) 4 MG/0.1ML nasal spray     Nutritional Supplements (REPLETE FIBER) LIQD     omeprazole (PRILOSEC) 2 mg/mL suspension     ondansetron (ZOFRAN-ODT) 4 MG ODT tab     order for DME     order for DME     polyethylene glycol (MIRALAX) 17 GM/Dose powder     potassium chloride (KAYCIEL) 20 MEQ/15ML (10%) solution     prochlorperazine (COMPAZINE) 5 MG tablet     rivaroxaban ANTICOAGULANT (XARELTO ANTICOAGULANT) 10 MG TABS tablet     Salicylic Acid (OXY BALANCE FACIAL CLEAN WASH EX)  "    Sennosides (SENNA) 8.8 MG/5ML LIQD     sertraline (ZOLOFT) 20 MG/ML (HIGH CONC) solution     silver sulfADIAZINE (SSD) 1 % external cream     simethicone (MYLICON) 66.7 mg/ml     simethicone 40 MG/0.6ML LIQD     sodium chloride (OCEAN) 0.65 % nasal spray     tretinoin (RETIN-A) 0.025 % external cream     No current facility-administered medications for this visit.     Allergies   Allergen Reactions     Ibuprofen              Review of Systems         Objective    Vitals - Patient Reported  Weight (Patient Reported): 68 kg (150 lb)  Height (Patient Reported): 144.8 cm (4' 9\")  BMI (Based on Pt Reported Ht/Wt): 32.46  Pain Score: Severe Pain (6)  Pain Loc:  (chronic pain)        Physical Exam   GENERAL: Healthy, alert and no distress  EYES: Eyes grossly normal to inspection.  No discharge or erythema, or obvious scleral/conjunctival abnormalities.  RESP: No audible wheeze, cough, or visible cyanosis.  No visible retractions or increased work of breathing.    SKIN: Visible skin clear. No significant rash, abnormal pigmentation or lesions.  NEURO: Cranial nerves grossly intact.  Mentation and speech appropriate for age.  PSYCH: Mentation appears normal, affect normal/bright, judgement and insight intact, normal speech and appearance well-groomed.                "

## 2023-03-14 ENCOUNTER — VIRTUAL VISIT (OUTPATIENT)
Dept: INTERNAL MEDICINE | Facility: CLINIC | Age: 33
End: 2023-03-14
Payer: COMMERCIAL

## 2023-03-14 ENCOUNTER — TELEPHONE (OUTPATIENT)
Dept: INTERNAL MEDICINE | Facility: CLINIC | Age: 33
End: 2023-03-14

## 2023-03-14 DIAGNOSIS — N92.0 MENORRHAGIA WITH REGULAR CYCLE: Primary | ICD-10-CM

## 2023-03-14 PROCEDURE — 99214 OFFICE O/P EST MOD 30 MIN: CPT | Mod: VID | Performed by: INTERNAL MEDICINE

## 2023-03-14 NOTE — PROGRESS NOTES
Lanie is a 32 year old who is being evaluated via a billable video visit.      How would you like to obtain your AVS? MyChart  If the video visit is dropped, the invitation should be resent by: Text to cell phone: [phone]  Will anyone else be joining your video visit? No          Assessment & Plan     Menorrhagia with regular cycle  Reviewed causes of menorrhagia. Discussed approach to evaluation and management. Recommend further evaluation with pelvic ultrasound and consultation with OB/GYN. Patient is in agreement with the plan.   - Ob/Gyn Referral; Future  - US Pelvic Complete with Transvaginal; Future      I spent a total of 30 minutes on the day of the visit.   Time spent doing chart review, history and exam, documentation and further activities per the note           No follow-ups on file.    Jenna Malone MD  [facility] INTERNAL MEDICINE [facility]   Lanie is a 32 year old, presenting for the following health issues:  Video Visit and Contraception      HPI     Patient reports that she would like to stop her period. She states that she has been dealing with heavy periods. Patient states that she has been diagnosed with PCOS. She is on rivaroxaban for DVT. She states that she also has a period that lasts about a week. She has been getting iron infusions for iron deficiency. She reports that she is not planning a pregnancy.       Review of Systems   Constitutional, HEENT, cardiovascular, pulmonary, GI, , musculoskeletal, neuro, skin, endocrine and psych systems are negative, except as otherwise noted.      Objective    Vitals - Patient Reported  Pain Score: Severe Pain (6)  Pain Loc: Other - see comment (hips and lower back)        Physical Exam   GENERAL: Healthy, alert and no distress  EYES: Eyes grossly normal to inspection.  No discharge or erythema, or obvious scleral/conjunctival abnormalities.  RESP: No audible wheeze, cough, or visible cyanosis.  No visible  retractions or increased work of breathing.    SKIN: Visible skin clear. No significant rash, abnormal pigmentation or lesions.  NEURO: Cranial nerves grossly intact.  Mentation and speech appropriate for age.  PSYCH: Mentation appears normal, affect normal/bright, judgement and insight intact, normal speech and appearance well-groomed.          Video-Visit Details    Type of service:  Video Visit   Video Start Time: 9:30 AM  Video End Time:9:55 AM    Originating Location (pt. Location): Home    Distant Location (provider location):  Off-site  Platform used for Video Visit: Boris

## 2023-03-14 NOTE — PATIENT INSTRUCTIONS
Thank you for visiting the Primary Care Center today at the [facility]! The following is some information about our clinic:     Primary Care Center Frequently-Asked Questions    (1) How do I schedule appointments at the [facility]?     Primary Care--to schedule or make changes to an existing appointment, please call our primary care line at [phone].    Labs--to schedule a lab appointment at the [facility] you can use LeadFire or call [phone]. If you have a [place] location that is closer to home, you can reach out to that location for scheduling options.     Imaging--if you need to schedule a CT, X-ray, MRI, ultrasound, or other imaging study you can call [phone] to schedule at the [facility] or any other [facility] imaging location.     Referrals--if a referral to another specialty was ordered you can expect a phone call from their scheduling team. If you have not heard from them in a week, please call us or send us a LeadFire message to check the status or get a scheduling number. Please note that this only applies to internal [facility] referrals. If the referral is external you would need to contact their office for scheduling.     (2) I have a question about my visit, who do I contact?     You can call us at the primary care line at [phone] to ask questions about your visit. You can also send a secure message through LeadFire, which is reviewed by clinic staff. Please note that LeadFire messages have a twenty-four to forty-eight business hour turnaround time and should not be used for urgent concerns.    (3) How will I get the results of my tests?    If you are signed up for PatientFocust all tests will be released to you within twenty-four hours of resulting. Please allow three to five days for your doctor to review your results and place a note interpreting the results. If you do not have [employer] you will receive your  results through mail seven to ten business days following the return of the tests. Please note that if there should be any urgent or concerning results that your doctor or their registered nurse will reach out to you the same day as the tests come back. If you have follow up questions about your results or would like to discuss the results in detail please schedule a follow up with your provider either in person or virtually.     (4) How do I get refills of my prescriptions?     You should always first contact your pharmacy for refills of your medications. If submitting a refill request on GlucoSentient, please be sure to submit the request only once--repeat requests can cause delays in refill. If you are requesting a NEW medication or a medication related to new symptoms you will need to schedule an appointment with a provider prior to approval. Please note: Routine medication refills have up to one to three business day turnaround whereas controlled substances refills have up to five to seven business day turnaround.    (5) I have new symptoms, what do I do?     If you are having an immediate medical emergency, you should dial 911 for assistance.   For anything urgent that needs to be seen within a few hours to one day you should visit a local urgent care for assistance.  For non-urgent symptoms that need to be seen within a few days to a week you can schedule with an available provider in primary care by going to [employer] or calling [phone].   If you are not sure how serious your symptoms are or you would like to receive medical advice you can always call [phone] to speak with a triage nurse.

## 2023-03-14 NOTE — NURSING NOTE
Is the patient currently in the state of [place]? YES    Visit mode:VIDEO    If the visit is dropped, the patient can be reconnected by: VIDEO VISIT:  Send e-mail to at [email or web address]    Will anyone else be joining the visit? No  (If patient encounters technical issues they should call [phone])    How would you like to obtain your AVS? MyChart    Are changes needed to the allergy or medication list? NO and medications were just reviewed on the 8th and pt states no changes since then    Rooming Documentation: no recent vitals to report    Reason for visit: birth control    ARIANNE Graves

## 2023-03-14 NOTE — TELEPHONE ENCOUNTER
----- Message from Kirk Fofana sent at 3/13/2023  1:29 PM CDT -----  Regarding: Sebastian Silver Nit  Hello,    Dr Ofstedal prescribed Silver Nit applicators for this patient, but the insurance will not cover them, even with a pa.  Is there something else the provider would like to prescribe?    Thanks    Kirk Fofana  Pharmacy Technician Supervisor  [facility]  [phone]

## 2023-03-14 NOTE — PROGRESS NOTES
"Video-Visit Details    Type of service:  Video Visit    Video Start Time (time video started): ***    Video End Time (time video stopped): ***    Originating Location (pt. Location): {video visit patient location:[id number]::\"Home\"}    {PROVIDER LOCATION On-site should be selected for visits conducted from your clinic location or adjoining [facility] hospital, academic office, or other nearby [facility] building. Off-site should be selected for all other provider locations, including home:[id number]}    Distant Location (provider location):  {virtual location provider:325284}    Mode of Communication:  Video Conference via Proxeon        "

## 2023-03-15 ENCOUNTER — VIRTUAL VISIT (OUTPATIENT)
Dept: BEHAVIORAL HEALTH | Facility: CLINIC | Age: 33
End: 2023-03-15
Payer: COMMERCIAL

## 2023-03-15 DIAGNOSIS — F33.1 MAJOR DEPRESSIVE DISORDER, RECURRENT EPISODE, MODERATE (H): Primary | ICD-10-CM

## 2023-03-15 PROCEDURE — 90837 PSYTX W PT 60 MINUTES: CPT | Mod: VID | Performed by: PSYCHOLOGIST

## 2023-03-15 NOTE — PROGRESS NOTES
[facility]: Integrated Behavioral Health  March 15, 2023        Behavioral Health Clinician Progress Note    Patient Name: Lanie Neil           Service Type: Video appointment      Service Location:  Video appointment      Session Start Time: 1:04 Session End Time: 2:00      Session Length: 53 - 60      Attendees: Patient    Visit Activities (Refresh list every visit): [facility] Only    Service Modality:  Video Visit:      Provider verified identity through the following two step process.  Patient provided:  Patient photo and Patient is known previously to provider    Telemedicine Visit: The patient's condition can be safely assessed and treated via synchronous audio and visual telemedicine encounter.      Reason for Telemedicine Visit: Patient unable to travel    Originating Site (Patient Location): Patient's home    Distant Site (Provider Location): Provider Remote Setting- Home Office    Consent:  The patient/guardian has verbally consented to: the potential risks and benefits of telemedicine (video visit) versus in person care; bill my insurance or make self-payment for services provided; and responsibility for payment of non-covered services.     Patient would like the video invitation sent by:  My Chart    Mode of Communication:  Video Conference via [facility]    Distant Location (Provider):  Off-site    As the provider I attest to compliance with applicable laws and regulations related to telemedicine.      Diagnostic Assessment Date: 1/19/2021  Treatment Plan Review Date: 3/22/2023   See Flowsheets for today's PHQ-9 and KATHARINE-7 results  Previous PHQ-9:   PHQ-9 SCORE 12/15/2020 3/16/2021 6/30/2021   PHQ-9 Total Score - - -   PHQ-9 Total Score MyChart 2 (Minimal depression) 2 (Minimal depression) 2 (Minimal depression)   PHQ-9 Total Score 2 2 2     Previous KATHARINE-7:   KATHARINE-7 SCORE 12/15/2020 3/16/2021 6/30/2021   Total Score 1 (minimal anxiety) 1 (minimal anxiety) 1 (minimal anxiety)  "  Total Score 1 1 1       DATA  Extended Session (60+ minutes): No  Interactive Complexity: No  Crisis: No    Treatment Objective(s) Addressed in This Session:  Target Behavior(s): disease management/lifestyle changes      Depressed Mood: Decrease frequency and intensity of feeling down, depressed, hopeless  Identify negative self-talk and behaviors: challenge core beliefs, myths, and actions  Anxiety: will develop more effective coping skills to manage anxiety symptoms  Relationship Problems: will address relationship difficulties in a more adaptive manner    Current Stressors / Issues:  [facility] met with Lanie today to continue supporting her treatment of depression, adjustment to chronic disease management, and relationship problems. Extended discussion today about feeling \"stuck\" and lacking a sense of autonomy. This writer shared the observation that Roslyn's speech appeared slurred and she appeared to take longer to answer questions. She disclosed that she used Delta 8 products prior to our appointment and was experiencing a high from this. She stated that she took Delta 8 to feel as though she could make her own decision about something, even if it was not advantageous for her or a \"mistake\" as she reports. Discussed the various risks of cannabis products and Delta 8, namely the lack of regulation and risk of dependency. Encouraged Lanie to inform her PCP of cannabis use so her care team can be aware this is something she is using. Explored the idea of her looking into more social support groups for an objective, as we agreed lacking meaningful social connections might be a significant factor for her depression and sense of \"stuckness\" as we identified.     Am I Hooked or Unhooked From My Thoughts?    Clues that I'm getting hooked and threatened by my thoughts:    1) Thoughts are Literal and Threatening; it s as if what we re thinking is actually present, here and now!    2) Thoughts are \"The Truth\"; we " "literally believe them!    3) Thoughts are Most Important; we take them seriously, and give them our full attention!    4) Thoughts are Orders; we automatically obey them!    5) Thoughts are Always Wise; we assume they know best and we follow their advice!     _____________________________________________________________________    Noticing when I'm unhooked and not threatened by my thoughts:    1) Thoughts are merely sounds, words, stories, bits of language, passing through our heads.    2) Thoughts may or may not be true. We don t automatically believe them.    3) Thoughts may or may not be important. We pay attention only if they re helpful.    4) Thoughts are not orders. We don t have to obey them.    5) Thoughts may or may not be wise. We don t automatically follow their advice.   Thoughts That Hook You:    What memories, fears, worries, self-criticisms, or other unhelpful thoughts do you dwell on or get \"stuck\" thinking about? What thoughts tend to hook you, jerk you around, and pull you out of your life?                 Life-Draining Actions:    What are you currently doing that makes your life worse in the long run: keeps you stuck, wastes your time or money, drains your energy; restricts your life; impacts negatively on your health, work, or relationships; maintains or worsens the problems your are dealing with?                     Feelings That Hook You:    What emotions, feelings, urges, impulses, or sensations tend to hook you and jerk you around or pull you into self-defeating actions?           Avoiding Challenging Situations:    What situations, activities, people, or places are you avoiding or staying away from? What have you quit, withdrawn from, dropped out of? What do you keep \"putting off\" until later?                           Progress on Treatment Objective(s) / Homework:  Satisfactory progress - ACTION (Actively working towards change); Intervened by reinforcing change plan / affirming steps " taken       Psychodynamic psychotherapy    Discuss patient's emotional dynamics and issues and how they impact behaviors.    Explore patient's history of relationships and how they impact present behaviors.    Explore how to work with and make changes in these schemas and patterns.    Interpersonal Psychotherapy    Explore patterns in relationships that are effective or ineffective at helping patient reach their goals, find satisfying experience.    Discuss new patterns or behaviors to engage in for improved social functioning.      Care Plan review completed: no     Medication Review:  No changes to current psychiatric medication(s)    Medication Compliance:  NA    Changes in Health Issues:   None reported    Chemical Use Review:   Substance Use: increase in cannabis .  Patient reports frequency of use 2 Delta 8 tablets today.  Reviewed information and resources for treatment and ongoing sobriety  Patient assessed present costs and future losses as a result of substance use  Reviewed concerns related to health related substance abuse risk        Tobacco Use: No current tobacco use.      Assessment: Current Emotional / Mental Status (status of significant symptoms):  Risk status (Self / Other harm or suicidal ideation)  Patient has had a history of suicidal ideation: passive thoughts only; easily controlled - denied any recently    Patient denies current fears or concerns for personal safety.  Patient denies current or recent suicidal ideation or behaviors.  Patient denies current or recent homicidal ideation or behaviors.  Patient denies current or recent self injurious behavior or ideation.  Patient denies other safety concerns.     A safety and risk management plan has not been developed at this time, however patient was encouraged to call [facility] should there be a change in any of these risk factors.      [place] Suicide Severity Rating Scale (Short Version)  [place] Suicide Severity Rating (Short  Version) 2/18/2021   Over the past 2 weeks have you felt down, depressed, or hopeless? yes   Over the past 2 weeks have you had thoughts of killing yourself? no   Have you ever attempted to kill yourself? no     [place] Suicide Severity Rating Scale (Lifetime/Recent)  [place] Suicide Severity Rating (Lifetime/Recent) 8/13/2021   Wish to be Dead (Lifetime) No   Non-Specific Active Suicidal Thoughts (Lifetime) No   Most Severe Ideation Rating (Lifetime) NA   Frequency (Lifetime) NA   Duration (Lifetime) NA   Controllability (Lifetime) NA   Protective Factors  (Lifetime) NA   Reasons for Ideation (Lifetime) NA   RETIRED: 1. Wish to be Dead (Recent) No   RETIRED: 2. Non-Specific Active Suicidal Thoughts (Recent) No   3. Active Suicidal Ideation with any Methods (Not Plan) Without Intent to Act (Lifetime) No   RETIRED: 3. Active Suicidal Ideation with any Methods (Not Plan) Without Intent to Act (Recent) No   RETIRE: 4. Active Suicidal Ideation with Some Intent to Act, Without Specific Plan (Lifetime) No   4. Active Suicidal Ideation with Some Intent to Act, Without Specific Plan (Recent) No   RETIRE: 5. Active Suicidal Ideation with Specific Plan and Intent (Lifetime) No   RETIRED: 5. Active Suicidal Ideation with Specific Plan and Intent (Recent) No   Most Severe Ideation Rating (Past Month) NA   Frequency (Past Month) NA   Duration (Past Month) NA   Controllability (Past Month) NA   Protective Factors (Past Month) NA   Reasons for Ideation (Past Month) NA   Actual Attempt (Lifetime) No   Actual Attempt (Past 3 Months) No   Has subject engaged in non-suicidal self-injurious behavior? (Lifetime) No   Has subject engaged in non-suicidal self-injurious behavior? (Past 3 Months) No   Interrupted Attempts (Lifetime) No   Interrupted Attempts (Past 3 Months) No   Aborted or Self-Interrupted Attempt (Lifetime) No   Aborted or Self-Interrupted Attempt (Past 3 Months) No   Preparatory Acts or Behavior (Lifetime) No    Preparatory Acts or Behavior (Past 3 Months) No   Most Recent Attempt Actual Lethality Code NA   Most Lethal Attempt Actual Lethality Code NA   Initial/First Attempt Actual Lethality Code NA         Appearance:   Appropriate   Eye Contact:   Good   Psychomotor Behavior: Normal   Attitude:   Cooperative   Orientation:   All  Speech   Rate / Production: Normal    Volume:  Normal   Mood:    Depressed    Affect:    Tearful  Thought Content:  Rumination   Thought Form:  Coherent  Logical   Insight:    Good     Diagnoses:  1. Major depressive disorder, recurrent episode, moderate (H)        Collateral Reports Completed:  [facility] will coordinate with care team as needed    Plan: (Homework, other):  Lanie was scheduled to RTC in two weeks for help addressing depressive symptoms, pain, and relationship conflicts. Continue thought log for help with depression.  CD Recommendations: No indications of CD issues.     Héctor Higuera Psy.D, LP   Behavioral Health Clinician   [facility]     _______________________________________________________________________                                              Individual Treatment Plan    Patient's Name: Lanie Neil  YOB: 1990    Date of Creation: 3/4/2022  Date Treatment Plan Last Reviewed/Revised: 12/22/2022    DSM5 Diagnoses: 296.32 (F33.1) Major Depressive Disorder, Recurrent Episode, Moderate With anxious distress  Psychosocial / Contextual Factors: Chronic pain, SMA, disabled  PROMIS (reviewed every 90 days):     PROMIS-10    In general, would you say your health is: (P) Excellent    In general, would you say your quality of life is: (P) Good    In general, how would you rate your physical health? (P) Very good    In general, how would you rate your mental health, including your mood and your ability to think? (P) Good    In general, how would you rate your satisfaction with your social activities and relationships? (P)  Good    In general, please rate how well you carry out your usual social activities and roles. (This includes activities at home, at work and in your community, and responsibilities as a parent, child, spouse, employee, friend, etc.) (P) Very good    To what extent are you able to carry out your everyday physical activities such as walking, climbing stairs, carrying groceries, or moving a chair? (P) Not at all    In the past 7 days,  How often have you been bothered by emotional problems such as feeling anxious, depressed or irritable? (P) Rarely  How would you rate your fatigue on average? (P) Moderate  How would you rate your pain on average? (P) 6    PROMIS GLOBAL SCORES    Mental health question re-calculation - no clinical value - (P) 4  Physical health question re-calculation - no clinical value - (P) 3  Pain question re-calculation - no clinical value - (P) 3  Global Mental Health Score - (P) 13  Global Physical Health Score - (P) 11  PROMIS TOTAL - SUBSCORES - (P) 24      [id number] [employer] HEALTH ORGANIZATION AND PROMIS COOPERATIVE GROUP VERSION 1.1          Referral / Collaboration:  Referral to another professional/service is not indicated at this time..    Anticipated number of session for this episode of care: 7-9  Anticipation frequency of session: Every other week  Anticipated Duration of each session: 38-52 minutes  Treatment plan will be reviewed in 90 days or when goals have been changed.       MeasurableTreatment Goal(s) related to diagnosis / functional impairment(s)  Goal 1: Patient will experience a reduction in depressive symptoms along with a corresponding increase in positive emotion and life satisfaction.      Objective #A (Patient Action)    Patient will Increase interest, engagement, and pleasure in doing things.  Status: Continued - Date(s): 12/22/2022    Intervention(s)  Therapist will help patient identify pleasant and mastery oriented events that elicit positive, relaxed  "mood.    Objective #B  Patient will Decrease frequency and intensity of feeling down, depressed, hopeless.  Status: Continued - Date(s): 12/22/2022    Intervention(s)  Therapist will introduce patient to cognitive-behavioral and acceptance and commitment therapy topics aimed to help reduce depression and anxiety    Objective #C  Patient will Identify negative self-talk and behaviors: challenge core beliefs, myths, and actions  Improve concentration, focus, and mindfulness in daily activities .  Status: Continued - Date(s): 12/22/2022    Intervention(s)  Therapist will help patient identify and manage negative self-talk and automatic thoughts; introduce patient to cognitive distortions; help patient develop cognitive diffusion techniques      Goal 2: Patient will experience a reduction in anxious symptoms, along with a corresponding increase in relaxed emotional states and life satisfaction.      Objective #A (Patient Action)  Patient will use cognitive-behavioral and thought diffusion strategies identified in therapy to challenge anxious thoughts.    Status: Continued - Date(s): 12/22/2022    Intervention(s)  Therapist will utilize CBT and ACT ideas to help patient challenge anxious thoughts and reduce intensity/duration of anxious distress    Objective #B  Patient will use \"worry time\" each day for 15 minutes of scheduled worry and then defer obsessive or anxious thinking until the next structured worry time.    Status: Continued - Date(s): 12/22/2022    Intervention(s)  Therapist will teach patient how to effectively utilize worry time and/or thought logs/journals each day and incorporate more relaxation behaviors into their routine.    Objective #C  Patient will identify the stressors which contribute to feelings of anxiety  Patient will increase engagement in adaptive coping skills and recreational activities, such as exercise and healthy socialization, to manage distress.  Status: Continued - Date(s): " 12/22/2022    Intervention(s)  Therapist will help patient identify triggers/situational factors that contribute to anxiety and behavioral skills aimed to manage anxious distress.      Goal 3: Patient will learn more adaptive ways to manage chronic pain      Objective #A (Patient Action)    Status: Continued - Date(s): 12/22/2022    Patient will identify 2-4 strategies for managing pain.    Intervention(s)  Therapist will teach distraction skills aimed to help manage chronic pain and utilize ACT/CBT ideas to help with this (e.g. relaxation).    Objective #B  Patient will state understanding of stressors and relationship to physical symptoms.    Status: Continued - Date(s): 12/22/2022      Other Possible Therapeutic Intervention(s):    Psycho-education regarding mental health diagnoses and treatment options    Supportive Therapy    Provide affirmations, reflections, and establish working rapport    Emphasize and reflect on strength of therapeutic relationship    Skills training    Explore skills useful to client in current situation.    Skills include assertiveness, communication, conflict management, problem-solving, relaxation, etc.    Solution-Focused Therapy    Explore patterns in patient's relationships and discuss options for new behaviors.    Explore patterns in patient's actions and choices and discuss options for new behaviors.    Cognitive-behavioral Therapy    Discuss common cognitive distortions, identify them in patient's life.    Explore ways to challenge, replace, and act against these cognitions.    Acceptance and Commitment Therapy    Explore and identify important values in patient's life.    Discuss ways to commit to behavioral activation around these values.    Psychodynamic psychotherapy    Discuss patient's emotional dynamics and issues and how they impact behaviors.    Explore patient's history of relationships and how they impact present behaviors.    Explore how to work with and make changes  in these schemas and patterns.    Narrative Therapy    Explore the patient's story of his/her life from his/her perspective.    Explore alternate ways of understanding their experience, identifying exceptions, developing new themes.    Interpersonal Psychotherapy    Explore patterns in relationships that are effective or ineffective at helping patient reach their goals, find satisfying experience.    Discuss new patterns or behaviors to engage in for improved social functioning.    Behavioral Activation    Discuss steps patient can take to become more involved in meaningful activity.    Identify barriers to these activities and explore possible solutions.    Mindfulness-Based Strategies    Discuss skills based on development and application of mindfulness.    Skills drawn from compassion-focused therapy, dialectical behavior therapy, mindfulness-based stress reduction, mindfulness-based cognitive therapy, etc.      Patient has reviewed and agreed to the above plan.    Héctor Higuera Psy.D, LP   Behavioral Health Clinician   [facility]     12/22/2022    Crisis Resources    Refer to the resources below as needed.    Steps to care for yourself    1. If you are currently in counseling, call your counselor for an appointment  2. Call the local crisis resources below if needed.  3. Contact friends or family for support.  4. Get more exercise.  5. Do activities you enjoy.  6. Eat a well-balanced diet and drink plenty of fluids.  7. Rest as needed.  8. Limit alcohol and recreational drugs. These can worsen depression.    When to contact your primary care provider       You have thoughts of harming or killing yourself but have not made a plan to carry it out.    Your depression gets in the way of daily activities.    You are often unable to sleep.    You need help cutting back on alcohol or recreational drugs.    When to call 911 or go to the Emergency Room     Get emergency help right away  if you have any of the following:    You are planning to harm or kill yourself and you have a way to carry out the plan.     You have injured yourself or others. Or, you think you will.    You feel confused or are having trouble thinking or remembering.    You are having delusions (false beliefs).    You are hearing voices or seeing things that aren t there.    You are feeling psychotic (paranoid, fearful, restless, agitated, nervous, racing thoughts or speech)    Crisis Resources     These hotlines are for both adults and children. The and are open 24 hours a day, 7 days a week unless noted otherwise.    988 - National Suicide and Crisis Lifeline  If you or someone you know needs support now, call or text 051 or chat Booster.ly.org. 988 connects you with a trained crisis counselor who can help.    National Suicide Prevention Lifeline   4-602-467-TALK (6967)    Crisis Text Line    www.crisistextline.org  Text HOME to 327894 from anywhere in the [place], anytime, about any type of crisis. A live, trained crisis counselor will receive the text and respond quickly.    [place] Lifeline for LGBTQ Youth  A national crisis intervention and suicide lifeline for LGBTQ youth under 25. Provides a safe place to talk without judgement.   Call 1-496.925.3542; text START to 280999 or visit www.the[employer]vorproject.org to talk to a trained counselor.  For [facility] crisis numbers, visit the [facility] website at:  https://[place].gov/dhs/people-we-serve/adults/health-care/mental-health/resources/crisis-contacts.jsp      Héctor Higuera Psy.D, LP   Behavioral Health Clinician   [facility]     6/3/2022

## 2023-03-29 ENCOUNTER — TRANSFERRED RECORDS (OUTPATIENT)
Dept: HEALTH INFORMATION MANAGEMENT | Facility: CLINIC | Age: 33
End: 2023-03-29
Payer: MEDICARE

## 2023-03-30 ENCOUNTER — VIRTUAL VISIT (OUTPATIENT)
Dept: BEHAVIORAL HEALTH | Facility: CLINIC | Age: 33
End: 2023-03-30
Payer: COMMERCIAL

## 2023-03-30 DIAGNOSIS — F33.1 MAJOR DEPRESSIVE DISORDER, RECURRENT EPISODE, MODERATE (H): Primary | ICD-10-CM

## 2023-03-30 PROCEDURE — 90837 PSYTX W PT 60 MINUTES: CPT | Mod: VID | Performed by: PSYCHOLOGIST

## 2023-03-30 NOTE — PROGRESS NOTES
[facility]: Integrated Behavioral Health  March 30, 2023      Behavioral Health Clinician Progress Note    Patient Name: Lanie Neil           Service Type: Video appointment      Service Location:  Video appointment      Session Start Time: 1:06 Session End Time: 2:05      Session Length: 53 - 60      Attendees: Patient    Visit Activities (Refresh list every visit): [facility] Only    Service Modality:  Video Visit:      Provider verified identity through the following two step process.  Patient provided:  Patient photo and Patient is known previously to provider    Telemedicine Visit: The patient's condition can be safely assessed and treated via synchronous audio and visual telemedicine encounter.      Reason for Telemedicine Visit: Patient unable to travel    Originating Site (Patient Location): Patient's home    Distant Site (Provider Location): Provider Remote Setting- Home Office    Consent:  The patient/guardian has verbally consented to: the potential risks and benefits of telemedicine (video visit) versus in person care; bill my insurance or make self-payment for services provided; and responsibility for payment of non-covered services.     Patient would like the video invitation sent by:  My Chart    Mode of Communication:  Video Conference via [facility]    Distant Location (Provider):  Off-site    As the provider I attest to compliance with applicable laws and regulations related to telemedicine.      Diagnostic Assessment Date: 1/19/2021  Treatment Plan Review Date: 6/30/2023  See Flowsheets for today's PHQ-9 and KATHARINE-7 results  Previous PHQ-9:   PHQ-9 SCORE 12/15/2020 3/16/2021 6/30/2021   PHQ-9 Total Score - - -   PHQ-9 Total Score MyChart 2 (Minimal depression) 2 (Minimal depression) 2 (Minimal depression)   PHQ-9 Total Score 2 2 2     Previous KATHARINE-7:   KATHARINE-7 SCORE 12/15/2020 3/16/2021 6/30/2021   Total Score 1 (minimal anxiety) 1 (minimal anxiety) 1 (minimal anxiety)  "  Total Score 1 1 1       DATA  Extended Session (60+ minutes): No  Interactive Complexity: No  Crisis: No    Treatment Objective(s) Addressed in This Session:  Target Behavior(s): disease management/lifestyle changes      Depressed Mood: Decrease frequency and intensity of feeling down, depressed, hopeless  Identify negative self-talk and behaviors: challenge core beliefs, myths, and actions  Anxiety: will develop more effective coping skills to manage anxiety symptoms  Relationship Problems: will address relationship difficulties in a more adaptive manner    Current Stressors / Issues:  [facility] met with Lanie today to continue supporting her treatment of depression, adjustment to chronic disease management, and relationship problems. Extended discussion today about relationship concerns, with her older brothers. States concern about how they appear to lack compassion or understanding toward her and her mother's situation, which leads to Lanie struggling with comments they make, things they do/don't do. Explored feelings of hurt and disconnection she experiences. We explored possible filial systems, themes that might be contributing to ongoing conflicts, estrangement in the family, looking at past traumas/hurts in her mother and brother's lives. Explored these ideas in an an attempt to expand awareness/flexibility in thinking about these situations she faces. Supportive and insight-oriented therapies emphasized during our session today.     Am I Hooked or Unhooked From My Thoughts?    Clues that I'm getting hooked and threatened by my thoughts:    1) Thoughts are Literal and Threatening; it s as if what we re thinking is actually present, here and now!    2) Thoughts are \"The Truth\"; we literally believe them!    3) Thoughts are Most Important; we take them seriously, and give them our full attention!    4) Thoughts are Orders; we automatically obey them!    5) Thoughts are Always Wise; we assume they know best and " "we follow their advice!     _____________________________________________________________________    Noticing when I'm unhooked and not threatened by my thoughts:    1) Thoughts are merely sounds, words, stories, bits of language, passing through our heads.    2) Thoughts may or may not be true. We don t automatically believe them.    3) Thoughts may or may not be important. We pay attention only if they re helpful.    4) Thoughts are not orders. We don t have to obey them.    5) Thoughts may or may not be wise. We don t automatically follow their advice.   Thoughts That Hook You:    What memories, fears, worries, self-criticisms, or other unhelpful thoughts do you dwell on or get \"stuck\" thinking about? What thoughts tend to hook you, jerk you around, and pull you out of your life?                 Life-Draining Actions:    What are you currently doing that makes your life worse in the long run: keeps you stuck, wastes your time or money, drains your energy; restricts your life; impacts negatively on your health, work, or relationships; maintains or worsens the problems your are dealing with?                     Feelings That Hook You:    What emotions, feelings, urges, impulses, or sensations tend to hook you and jerk you around or pull you into self-defeating actions?           Avoiding Challenging Situations:    What situations, activities, people, or places are you avoiding or staying away from? What have you quit, withdrawn from, dropped out of? What do you keep \"putting off\" until later?                           Progress on Treatment Objective(s) / Homework:  Satisfactory progress - ACTION (Actively working towards change); Intervened by reinforcing change plan / affirming steps taken       Psychodynamic psychotherapy    Discuss patient's emotional dynamics and issues and how they impact behaviors.    Explore patient's history of relationships and how they impact present behaviors.    Explore how to work with " and make changes in these schemas and patterns.    Interpersonal Psychotherapy    Explore patterns in relationships that are effective or ineffective at helping patient reach their goals, find satisfying experience.    Discuss new patterns or behaviors to engage in for improved social functioning.      Care Plan review completed: no     Medication Review:  No changes to current psychiatric medication(s)    Medication Compliance:  NA    Changes in Health Issues:   None reported    Chemical Use Review:   Substance Use: increase in cannabis .  Patient reports frequency of use 2 Delta 8 tablets today.  Reviewed information and resources for treatment and ongoing sobriety  Patient assessed present costs and future losses as a result of substance use  Reviewed concerns related to health related substance abuse risk        Tobacco Use: No current tobacco use.      Assessment: Current Emotional / Mental Status (status of significant symptoms):  Risk status (Self / Other harm or suicidal ideation)  Patient has had a history of suicidal ideation: passive thoughts only; easily controlled - denied any recently    Patient denies current fears or concerns for personal safety.  Patient denies current or recent suicidal ideation or behaviors.  Patient denies current or recent homicidal ideation or behaviors.  Patient denies current or recent self injurious behavior or ideation.  Patient denies other safety concerns.     A safety and risk management plan has not been developed at this time, however patient was encouraged to call [place] [phone] should there be a change in any of these risk factors.      [place] Suicide Severity Rating Scale (Short Version)  [place] Suicide Severity Rating (Short Version) 2/18/2021   Over the past 2 weeks have you felt down, depressed, or hopeless? yes   Over the past 2 weeks have you had thoughts of killing yourself? no   Have you ever attempted to kill yourself? no     [place] Suicide  Severity Rating Scale (Lifetime/Recent)  [place] Suicide Severity Rating (Lifetime/Recent) 8/13/2021   Wish to be Dead (Lifetime) No   Non-Specific Active Suicidal Thoughts (Lifetime) No   Most Severe Ideation Rating (Lifetime) NA   Frequency (Lifetime) NA   Duration (Lifetime) NA   Controllability (Lifetime) NA   Protective Factors  (Lifetime) NA   Reasons for Ideation (Lifetime) NA   RETIRED: 1. Wish to be Dead (Recent) No   RETIRED: 2. Non-Specific Active Suicidal Thoughts (Recent) No   3. Active Suicidal Ideation with any Methods (Not Plan) Without Intent to Act (Lifetime) No   RETIRED: 3. Active Suicidal Ideation with any Methods (Not Plan) Without Intent to Act (Recent) No   RETIRE: 4. Active Suicidal Ideation with Some Intent to Act, Without Specific Plan (Lifetime) No   4. Active Suicidal Ideation with Some Intent to Act, Without Specific Plan (Recent) No   RETIRE: 5. Active Suicidal Ideation with Specific Plan and Intent (Lifetime) No   RETIRED: 5. Active Suicidal Ideation with Specific Plan and Intent (Recent) No   Most Severe Ideation Rating (Past Month) NA   Frequency (Past Month) NA   Duration (Past Month) NA   Controllability (Past Month) NA   Protective Factors (Past Month) NA   Reasons for Ideation (Past Month) NA   Actual Attempt (Lifetime) No   Actual Attempt (Past 3 Months) No   Has subject engaged in non-suicidal self-injurious behavior? (Lifetime) No   Has subject engaged in non-suicidal self-injurious behavior? (Past 3 Months) No   Interrupted Attempts (Lifetime) No   Interrupted Attempts (Past 3 Months) No   Aborted or Self-Interrupted Attempt (Lifetime) No   Aborted or Self-Interrupted Attempt (Past 3 Months) No   Preparatory Acts or Behavior (Lifetime) No   Preparatory Acts or Behavior (Past 3 Months) No   Most Recent Attempt Actual Lethality Code NA   Most Lethal Attempt Actual Lethality Code NA   Initial/First Attempt Actual Lethality Code NA         Appearance:   Appropriate   Eye  Contact:   Good   Psychomotor Behavior: Normal   Attitude:   Cooperative   Orientation:   All  Speech   Rate / Production: Normal    Volume:  Normal   Mood:    Depressed    Affect:    Tearful  Thought Content:  Rumination   Thought Form:  Coherent  Logical   Insight:    Good     Diagnoses:  1. Major depressive disorder, recurrent episode, moderate (H)        Collateral Reports Completed:  [facility] will coordinate with care team as needed    Plan: (Homework, other):  Lanie was scheduled to RTC in two weeks for help addressing depressive symptoms, pain, and relationship conflicts. Continue thought log for help with depression.  CD Recommendations: No indications of CD issues.     Héctor Higuera Psy.D, LP   Behavioral Health Clinician   [facility]     _______________________________________________________________________                                              Individual Treatment Plan    Patient's Name: Lanie Neil  YOB: 1990    Date of Creation: 3/4/2022  Date Treatment Plan Last Reviewed/Revised: 3/30/2023    DSM5 Diagnoses: 296.32 (F33.1) Major Depressive Disorder, Recurrent Episode, Moderate With anxious distress  Psychosocial / Contextual Factors: Chronic pain, SMA, disabled  PROMIS (reviewed every 90 days):     PROMIS-10    In general, would you say your health is: (P) Excellent    In general, would you say your quality of life is: (P) Good    In general, how would you rate your physical health? (P) Very good    In general, how would you rate your mental health, including your mood and your ability to think? (P) Good    In general, how would you rate your satisfaction with your social activities and relationships? (P) Good    In general, please rate how well you carry out your usual social activities and roles. (This includes activities at home, at work and in your community, and responsibilities as a parent, child, spouse, employee, friend, etc.) (P) Very  good    To what extent are you able to carry out your everyday physical activities such as walking, climbing stairs, carrying groceries, or moving a chair? (P) Not at all    In the past 7 days,  How often have you been bothered by emotional problems such as feeling anxious, depressed or irritable? (P) Rarely  How would you rate your fatigue on average? (P) Moderate  How would you rate your pain on average? (P) 6    PROMIS GLOBAL SCORES    Mental health question re-calculation - no clinical value - (P) 4  Physical health question re-calculation - no clinical value - (P) 3  Pain question re-calculation - no clinical value - (P) 3  Global Mental Health Score - (P) 13  Global Physical Health Score - (P) 11  PROMIS TOTAL - SUBSCORES - (P) 24      [id number] PROMIS HEALTH ORGANIZATION AND PROMIS COOPERATIVE GROUP VERSION 1.1          Referral / Collaboration:  Referral to another professional/service is not indicated at this time..    Anticipated number of session for this episode of care: 7-9  Anticipation frequency of session: Every other week  Anticipated Duration of each session: 38-52 minutes  Treatment plan will be reviewed in 90 days or when goals have been changed.       MeasurableTreatment Goal(s) related to diagnosis / functional impairment(s)  Goal 1: Patient will experience a reduction in depressive symptoms along with a corresponding increase in positive emotion and life satisfaction.      Objective #A (Patient Action)    Patient will Increase interest, engagement, and pleasure in doing things.  Status: Continued - Date(s): 3/30/2023    Intervention(s)  Therapist will help patient identify pleasant and mastery oriented events that elicit positive, relaxed mood.    Objective #B  Patient will Decrease frequency and intensity of feeling down, depressed, hopeless.  Status: Continued - Date(s): 3/30/2023    Intervention(s)  Therapist will introduce patient to cognitive-behavioral and acceptance and commitment  "therapy topics aimed to help reduce depression and anxiety    Objective #C  Patient will Identify negative self-talk and behaviors: challenge core beliefs, myths, and actions  Improve concentration, focus, and mindfulness in daily activities .  Status: Continued - Date(s): 3/30/2023    Intervention(s)  Therapist will help patient identify and manage negative self-talk and automatic thoughts; introduce patient to cognitive distortions; help patient develop cognitive diffusion techniques      Goal 2: Patient will experience a reduction in anxious symptoms, along with a corresponding increase in relaxed emotional states and life satisfaction.      Objective #A (Patient Action)  Patient will use cognitive-behavioral and thought diffusion strategies identified in therapy to challenge anxious thoughts.    Status: Continued - Date(s): 3/30/2023    Intervention(s)  Therapist will utilize CBT and ACT ideas to help patient challenge anxious thoughts and reduce intensity/duration of anxious distress    Objective #B  Patient will use \"worry time\" each day for 15 minutes of scheduled worry and then defer obsessive or anxious thinking until the next structured worry time.    Status: Continued - Date(s): 3/30/2023    Intervention(s)  Therapist will teach patient how to effectively utilize worry time and/or thought logs/journals each day and incorporate more relaxation behaviors into their routine.    Objective #C  Patient will identify the stressors which contribute to feelings of anxiety  Patient will increase engagement in adaptive coping skills and recreational activities, such as exercise and healthy socialization, to manage distress.  Status: Continued - Date(s): 3/30/2023    Intervention(s)  Therapist will help patient identify triggers/situational factors that contribute to anxiety and behavioral skills aimed to manage anxious distress.      Goal 3: Patient will learn more adaptive ways to manage chronic pain      Objective " #A (Patient Action)    Status: Continued - Date(s): 3/30/2023    Patient will identify 2-4 strategies for managing pain.    Intervention(s)  Therapist will teach distraction skills aimed to help manage chronic pain and utilize ACT/CBT ideas to help with this (e.g. relaxation).    Objective #B  Patient will state understanding of stressors and relationship to physical symptoms.    Status: Continued - Date(s): 3/30/2023      Other Possible Therapeutic Intervention(s):    Psycho-education regarding mental health diagnoses and treatment options    Supportive Therapy    Provide affirmations, reflections, and establish working rapport    Emphasize and reflect on strength of therapeutic relationship    Skills training    Explore skills useful to client in current situation.    Skills include assertiveness, communication, conflict management, problem-solving, relaxation, etc.    Solution-Focused Therapy    Explore patterns in patient's relationships and discuss options for new behaviors.    Explore patterns in patient's actions and choices and discuss options for new behaviors.    Cognitive-behavioral Therapy    Discuss common cognitive distortions, identify them in patient's life.    Explore ways to challenge, replace, and act against these cognitions.    Acceptance and Commitment Therapy    Explore and identify important values in patient's life.    Discuss ways to commit to behavioral activation around these values.    Psychodynamic psychotherapy    Discuss patient's emotional dynamics and issues and how they impact behaviors.    Explore patient's history of relationships and how they impact present behaviors.    Explore how to work with and make changes in these schemas and patterns.    Narrative Therapy    Explore the patient's story of his/her life from his/her perspective.    Explore alternate ways of understanding their experience, identifying exceptions, developing new themes.    Interpersonal  Psychotherapy    Explore patterns in relationships that are effective or ineffective at helping patient reach their goals, find satisfying experience.    Discuss new patterns or behaviors to engage in for improved social functioning.    Behavioral Activation    Discuss steps patient can take to become more involved in meaningful activity.    Identify barriers to these activities and explore possible solutions.    Mindfulness-Based Strategies    Discuss skills based on development and application of mindfulness.    Skills drawn from compassion-focused therapy, dialectical behavior therapy, mindfulness-based stress reduction, mindfulness-based cognitive therapy, etc.      Patient has reviewed and agreed to the above plan.    Héctor Higuera Psy.D,    Behavioral Health Clinician   [facility]     3/30/2023    Crisis Resources    Refer to the resources below as needed.    Steps to care for yourself    1. If you are currently in counseling, call your counselor for an appointment  2. Call the local crisis resources below if needed.  3. Contact friends or family for support.  4. Get more exercise.  5. Do activities you enjoy.  6. Eat a well-balanced diet and drink plenty of fluids.  7. Rest as needed.  8. Limit alcohol and recreational drugs. These can worsen depression.    When to contact your primary care provider       You have thoughts of harming or killing yourself but have not made a plan to carry it out.    Your depression gets in the way of daily activities.    You are often unable to sleep.    You need help cutting back on alcohol or recreational drugs.    When to call 911 or go to the Emergency Room     Get emergency help right away if you have any of the following:    You are planning to harm or kill yourself and you have a way to carry out the plan.     You have injured yourself or others. Or, you think you will.    You feel confused or are having trouble thinking or  remembering.    You are having delusions (false beliefs).    You are hearing voices or seeing things that aren t there.    You are feeling psychotic (paranoid, fearful, restless, agitated, nervous, racing thoughts or speech)    Crisis Resources     These hotlines are for both adults and children. The and are open 24 hours a day, 7 days a week unless noted otherwise.    988 - National Suicide and Crisis Lifeline  If you or someone you know needs support now, call or text 988 or chat [employer]SimpleDealline.org. 988 connects you with a trained crisis counselor who can help.    National Suicide Prevention Lifeline   2-976-216-TALK (4988)    Crisis Text Line    www.crisistextline.org  Text HOME to 348716 from anywhere in the [place], anytime, about any type of crisis. A live, trained crisis counselor will receive the text and respond quickly.    [place] Lifeline for LGBTQ Youth  A national crisis intervention and suicide lifeline for LGBTQ youth under 25. Provides a safe place to talk without judgement.   Call 1-836.667.6766; text START to 638213 or visit www.thesim4tecvorproject.org to talk to a trained counselor.  For [facility] crisis numbers, visit the [facility] website at:  https://[place].gov/dhs/people-we-serve/adults/health-care/mental-health/resources/crisis-contacts.jsp      Héctor Higuera Psy.D, LP   Behavioral Health Clinician   -[facility]     6/3/2022

## 2023-04-06 ENCOUNTER — MYC MEDICAL ADVICE (OUTPATIENT)
Dept: FAMILY MEDICINE | Facility: CLINIC | Age: 33
End: 2023-04-06
Payer: MEDICARE

## 2023-04-06 DIAGNOSIS — F41.9 ANXIETY: Primary | ICD-10-CM

## 2023-04-07 RX ORDER — ALPRAZOLAM 0.25 MG
0.25 TABLET ORAL 3 TIMES DAILY PRN
Qty: 60 TABLET | Refills: 0 | Status: SHIPPED | OUTPATIENT
Start: 2023-04-07 | End: 2023-05-15

## 2023-04-12 DIAGNOSIS — G12.9 SPINAL MUSCLE ATROPHY (H): ICD-10-CM

## 2023-04-13 ENCOUNTER — DOCUMENTATION ONLY (OUTPATIENT)
Dept: FAMILY MEDICINE | Facility: CLINIC | Age: 33
End: 2023-04-13
Payer: MEDICARE

## 2023-04-13 NOTE — TELEPHONE ENCOUNTER
diazepam (VALIUM) 5 MG tablet 30 tablet 0 2/6/2023         Last Written Prescription Date:  2-6-2023  Last Fill Quantity: 30,   # refills: 0  Last Office Visit : 3-8-2023  Future Office visit:  5-9-2023    Routing refill request to provider for review/approval because:  CONTROLLED MEDICATION      Kathleen M Doege RN

## 2023-04-15 ENCOUNTER — MEDICAL CORRESPONDENCE (OUTPATIENT)
Dept: HEALTH INFORMATION MANAGEMENT | Facility: CLINIC | Age: 33
End: 2023-04-15
Payer: MEDICARE

## 2023-04-21 ENCOUNTER — DOCUMENTATION ONLY (OUTPATIENT)
Dept: FAMILY MEDICINE | Facility: CLINIC | Age: 33
End: 2023-04-21
Payer: MEDICARE

## 2023-04-21 ENCOUNTER — VIRTUAL VISIT (OUTPATIENT)
Dept: BEHAVIORAL HEALTH | Facility: CLINIC | Age: 33
End: 2023-04-21
Payer: COMMERCIAL

## 2023-04-21 DIAGNOSIS — F33.1 MAJOR DEPRESSIVE DISORDER, RECURRENT EPISODE, MODERATE (H): Primary | ICD-10-CM

## 2023-04-21 PROCEDURE — 90837 PSYTX W PT 60 MINUTES: CPT | Mod: VID | Performed by: PSYCHOLOGIST

## 2023-04-21 NOTE — PROGRESS NOTES
Type of Form Received: order    Form Received (Date) 4/21/23   Form Filled out Yes 4/25/23   Placed in provider folder Yes
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
## 2023-04-24 ENCOUNTER — MEDICAL CORRESPONDENCE (OUTPATIENT)
Dept: HEALTH INFORMATION MANAGEMENT | Facility: CLINIC | Age: 33
End: 2023-04-24
Payer: MEDICARE

## 2023-04-26 ENCOUNTER — TRANSFERRED RECORDS (OUTPATIENT)
Dept: HEALTH INFORMATION MANAGEMENT | Facility: CLINIC | Age: 33
End: 2023-04-26
Payer: MEDICARE

## 2023-05-05 ENCOUNTER — DOCUMENTATION ONLY (OUTPATIENT)
Dept: FAMILY MEDICINE | Facility: CLINIC | Age: 33
End: 2023-05-05
Payer: MEDICARE

## 2023-05-08 ENCOUNTER — VIRTUAL VISIT (OUTPATIENT)
Dept: BEHAVIORAL HEALTH | Facility: CLINIC | Age: 33
End: 2023-05-08
Payer: COMMERCIAL

## 2023-05-08 DIAGNOSIS — F33.1 MAJOR DEPRESSIVE DISORDER, RECURRENT EPISODE, MODERATE (H): Primary | ICD-10-CM

## 2023-05-08 PROCEDURE — 90837 PSYTX W PT 60 MINUTES: CPT | Mod: VID | Performed by: PSYCHOLOGIST

## 2023-05-08 NOTE — PROGRESS NOTES
[facility]: Integrated Behavioral Health  May 8, 2023      Behavioral Health Clinician Progress Note    Patient Name: Lanie Neil           Service Type: Video appointment      Service Location:  Video appointment      Session Start Time: 9:03  Session End Time: 9:57      Session Length: 53 - 60      Attendees: Patient    Visit Activities (Refresh list every visit): [facility] Only    Service Modality:  Video Visit:      Provider verified identity through the following two step process.  Patient provided:  Patient photo and Patient is known previously to provider    Telemedicine Visit: The patient's condition can be safely assessed and treated via synchronous audio and visual telemedicine encounter.      Reason for Telemedicine Visit: Patient unable to travel    Originating Site (Patient Location): Patient's home    Distant Site (Provider Location): [facility]: [facility]    Consent:  The patient/guardian has verbally consented to: the potential risks and benefits of telemedicine (video visit) versus in person care; bill my insurance or make self-payment for services provided; and responsibility for payment of non-covered services.     Patient would like the video invitation sent by:  My Chart    Mode of Communication:  Video Conference via [facility]    Distant Location (Provider):  On-site    As the provider I attest to compliance with applicable laws and regulations related to telemedicine.      Diagnostic Assessment Date: 1/19/2021  Treatment Plan Review Date: 6/30/2023  See Flowsheets for today's PHQ-9 and KATHARINE-7 results  Previous PHQ-9:       12/15/2020    10:49 AM 3/16/2021     9:13 AM 6/30/2021     9:26 AM   PHQ-9 SCORE   PHQ-9 Total Score MyChart 2 (Minimal depression) 2 (Minimal depression) 2 (Minimal depression)   PHQ-9 Total Score 2 2 2     Previous KATHARINE-7:       12/15/2020    10:52 AM 3/16/2021     9:15 AM 6/30/2021     9:27 AM   KATHARINE-7 SCORE   Total Score 1 (minimal  "anxiety) 1 (minimal anxiety) 1 (minimal anxiety)   Total Score 1 1 1       DATA  Extended Session (60+ minutes): No  Interactive Complexity: No  Crisis: No    Treatment Objective(s) Addressed in This Session:  Target Behavior(s): disease management/lifestyle changes      Depressed Mood: Decrease frequency and intensity of feeling down, depressed, hopeless  Identify negative self-talk and behaviors: challenge core beliefs, myths, and actions  Anxiety: will develop more effective coping skills to manage anxiety symptoms  Relationship Problems: will address relationship difficulties in a more adaptive manner    Current Stressors / Issues:  [facility] met with Lanie today to continue supporting her treatment of depression, adjustment to chronic disease management, and relationship problems. Discussed relationship concerns with her mother, brother, and family. Explored patterns in relationship that contribute to emotional distress at times and alternative methods of interacting with others. Utilized ACT ideas to explore this - getting hooked versus unhooked by thoughts. Discussed ways of \"not taking the bait\" when it comes to certain thoughts and relational triggers, such as family conflicts.       Am I Hooked or Unhooked From My Thoughts?    Clues that I'm getting hooked and threatened by my thoughts:    1) Thoughts are Literal and Threatening; it s as if what we re thinking is actually present, here and now!    2) Thoughts are \"The Truth\"; we literally believe them!    3) Thoughts are Most Important; we take them seriously, and give them our full attention!    4) Thoughts are Orders; we automatically obey them!    5) Thoughts are Always Wise; we assume they know best and we follow their advice!     _____________________________________________________________________    Noticing when I'm unhooked and not threatened by my thoughts:    1) Thoughts are merely sounds, words, stories, bits of language, passing through our " "heads.    2) Thoughts may or may not be true. We don t automatically believe them.    3) Thoughts may or may not be important. We pay attention only if they re helpful.    4) Thoughts are not orders. We don t have to obey them.    5) Thoughts may or may not be wise. We don t automatically follow their advice.   Thoughts That Hook You:    What memories, fears, worries, self-criticisms, or other unhelpful thoughts do you dwell on or get \"stuck\" thinking about? What thoughts tend to hook you, jerk you around, and pull you out of your life?                 Life-Draining Actions:    What are you currently doing that makes your life worse in the long run: keeps you stuck, wastes your time or money, drains your energy; restricts your life; impacts negatively on your health, work, or relationships; maintains or worsens the problems your are dealing with?                     Feelings That Hook You:    What emotions, feelings, urges, impulses, or sensations tend to hook you and jerk you around or pull you into self-defeating actions?           Avoiding Challenging Situations:    What situations, activities, people, or places are you avoiding or staying away from? What have you quit, withdrawn from, dropped out of? What do you keep \"putting off\" until later?                           Progress on Treatment Objective(s) / Homework:  Satisfactory progress - ACTION (Actively working towards change); Intervened by reinforcing change plan / affirming steps taken       Psychodynamic psychotherapy    Discuss patient's emotional dynamics and issues and how they impact behaviors.    Explore patient's history of relationships and how they impact present behaviors.    Explore how to work with and make changes in these schemas and patterns.    Interpersonal Psychotherapy    Explore patterns in relationships that are effective or ineffective at helping patient reach their goals, find satisfying experience.    Discuss new patterns or " behaviors to engage in for improved social functioning.      Care Plan review completed: no     Medication Review:  No changes to current psychiatric medication(s)    Medication Compliance:  NA    Changes in Health Issues:   None reported    Chemical Use Review:   Substance Use: Chemical use reviewed, no active concerns identified      Tobacco Use: No current tobacco use.      Assessment: Current Emotional / Mental Status (status of significant symptoms):  Risk status (Self / Other harm or suicidal ideation)  Patient has had a history of suicidal ideation: passive thoughts only; easily controlled - denied any recently    Patient denies current fears or concerns for personal safety.  Patient denies current or recent suicidal ideation or behaviors.  Patient denies current or recent homicidal ideation or behaviors.  Patient denies current or recent self injurious behavior or ideation.  Patient denies other safety concerns.     A safety and risk management plan has not been developed at this time, however patient was encouraged to call [place] [phone] should there be a change in any of these risk factors.      [place] Suicide Severity Rating Scale (Short Version)      2/18/2021     4:47 PM   [place] Suicide Severity Rating (Short Version)   Over the past 2 weeks have you felt down, depressed, or hopeless? yes   Over the past 2 weeks have you had thoughts of killing yourself? no   Have you ever attempted to kill yourself? no     [place] Suicide Severity Rating Scale (Lifetime/Recent)      8/13/2021     1:25 PM   [place] Suicide Severity Rating (Lifetime/Recent)   Wish to be Dead (Lifetime) No   Non-Specific Active Suicidal Thoughts (Lifetime) No   Most Severe Ideation Rating (Lifetime) NA   Frequency (Lifetime) NA   Duration (Lifetime) NA   Controllability (Lifetime) NA   Protective Factors  (Lifetime) NA   Reasons for Ideation (Lifetime) NA   RETIRED: 1. Wish to be Dead (Recent) No   RETIRED: 2. Non-Specific  Active Suicidal Thoughts (Recent) No   3. Active Suicidal Ideation with any Methods (Not Plan) Without Intent to Act (Lifetime) No   RETIRED: 3. Active Suicidal Ideation with any Methods (Not Plan) Without Intent to Act (Recent) No   RETIRE: 4. Active Suicidal Ideation with Some Intent to Act, Without Specific Plan (Lifetime) No   4. Active Suicidal Ideation with Some Intent to Act, Without Specific Plan (Recent) No   RETIRE: 5. Active Suicidal Ideation with Specific Plan and Intent (Lifetime) No   RETIRED: 5. Active Suicidal Ideation with Specific Plan and Intent (Recent) No   Most Severe Ideation Rating (Past Month) NA   Frequency (Past Month) NA   Duration (Past Month) NA   Controllability (Past Month) NA   Protective Factors (Past Month) NA   Reasons for Ideation (Past Month) NA   Actual Attempt (Lifetime) No   Actual Attempt (Past 3 Months) No   Has subject engaged in non-suicidal self-injurious behavior? (Lifetime) No   Has subject engaged in non-suicidal self-injurious behavior? (Past 3 Months) No   Interrupted Attempts (Lifetime) No   Interrupted Attempts (Past 3 Months) No   Aborted or Self-Interrupted Attempt (Lifetime) No   Aborted or Self-Interrupted Attempt (Past 3 Months) No   Preparatory Acts or Behavior (Lifetime) No   Preparatory Acts or Behavior (Past 3 Months) No   Most Recent Attempt Actual Lethality Code NA   Most Lethal Attempt Actual Lethality Code NA   Initial/First Attempt Actual Lethality Code NA         Appearance:   Appropriate   Eye Contact:   Good   Psychomotor Behavior: Normal   Attitude:   Cooperative   Orientation:   All  Speech   Rate / Production: Normal    Volume:  Normal   Mood:    Depressed    Affect:    Tearful  Thought Content:  Rumination   Thought Form:  Coherent  Logical   Insight:    Good     Diagnoses:  1. Major depressive disorder, recurrent episode, moderate (H)        Collateral Reports Completed:  [facility] will coordinate with care team as needed    Plan: (Homework,  other):  Lanie was scheduled to RTC in two weeks for help addressing depressive symptoms, pain, and relationship conflicts. Continue thought log for help with depression.  CD Recommendations: No indications of CD issues.     Héctor Higuera Psy.D, LP   Behavioral Health Clinician   -[facility]     _______________________________________________________________________                                              Individual Treatment Plan    Patient's Name: Lanie Neil  YOB: 1990    Date of Creation: 3/4/2022  Date Treatment Plan Last Reviewed/Revised: 3/30/2023    DSM5 Diagnoses: 296.32 (F33.1) Major Depressive Disorder, Recurrent Episode, Moderate With anxious distress  Psychosocial / Contextual Factors: Chronic pain, SMA, disabled  PROMIS (reviewed every 90 days):     PROMIS-10    In general, would you say your health is: (P) Excellent    In general, would you say your quality of life is: (P) Good    In general, how would you rate your physical health? (P) Very good    In general, how would you rate your mental health, including your mood and your ability to think? (P) Good    In general, how would you rate your satisfaction with your social activities and relationships? (P) Good    In general, please rate how well you carry out your usual social activities and roles. (This includes activities at home, at work and in your community, and responsibilities as a parent, child, spouse, employee, friend, etc.) (P) Very good    To what extent are you able to carry out your everyday physical activities such as walking, climbing stairs, carrying groceries, or moving a chair? (P) Not at all    In the past 7 days,  How often have you been bothered by emotional problems such as feeling anxious, depressed or irritable? (P) Rarely  How would you rate your fatigue on average? (P) Moderate  How would you rate your pain on average? (P) 6    PROMIS GLOBAL SCORES    Mental health  question re-calculation - no clinical value - (P) 4  Physical health question re-calculation - no clinical value - (P) 3  Pain question re-calculation - no clinical value - (P) 3  Global Mental Health Score - (P) 13  Global Physical Health Score - (P) 11  PROMIS TOTAL - SUBSCORES - (P) 24      [id number] PROMIS HEALTH ORGANIZATION AND PROMIS COOPERATIVE GROUP VERSION 1.1          Referral / Collaboration:  Referral to another professional/service is not indicated at this time..    Anticipated number of session for this episode of care: 7-9  Anticipation frequency of session: Every other week  Anticipated Duration of each session: 38-52 minutes  Treatment plan will be reviewed in 90 days or when goals have been changed.       MeasurableTreatment Goal(s) related to diagnosis / functional impairment(s)  Goal 1: Patient will experience a reduction in depressive symptoms along with a corresponding increase in positive emotion and life satisfaction.      Objective #A (Patient Action)    Patient will Increase interest, engagement, and pleasure in doing things.  Status: Continued - Date(s): 3/30/2023    Intervention(s)  Therapist will help patient identify pleasant and mastery oriented events that elicit positive, relaxed mood.    Objective #B  Patient will Decrease frequency and intensity of feeling down, depressed, hopeless.  Status: Continued - Date(s): 3/30/2023    Intervention(s)  Therapist will introduce patient to cognitive-behavioral and acceptance and commitment therapy topics aimed to help reduce depression and anxiety    Objective #C  Patient will Identify negative self-talk and behaviors: challenge core beliefs, myths, and actions  Improve concentration, focus, and mindfulness in daily activities .  Status: Continued - Date(s): 3/30/2023    Intervention(s)  Therapist will help patient identify and manage negative self-talk and automatic thoughts; introduce patient to cognitive distortions; help patient develop  "cognitive diffusion techniques      Goal 2: Patient will experience a reduction in anxious symptoms, along with a corresponding increase in relaxed emotional states and life satisfaction.      Objective #A (Patient Action)  Patient will use cognitive-behavioral and thought diffusion strategies identified in therapy to challenge anxious thoughts.    Status: Continued - Date(s): 3/30/2023    Intervention(s)  Therapist will utilize CBT and ACT ideas to help patient challenge anxious thoughts and reduce intensity/duration of anxious distress    Objective #B  Patient will use \"worry time\" each day for 15 minutes of scheduled worry and then defer obsessive or anxious thinking until the next structured worry time.    Status: Continued - Date(s): 3/30/2023    Intervention(s)  Therapist will teach patient how to effectively utilize worry time and/or thought logs/journals each day and incorporate more relaxation behaviors into their routine.    Objective #C  Patient will identify the stressors which contribute to feelings of anxiety  Patient will increase engagement in adaptive coping skills and recreational activities, such as exercise and healthy socialization, to manage distress.  Status: Continued - Date(s): 3/30/2023    Intervention(s)  Therapist will help patient identify triggers/situational factors that contribute to anxiety and behavioral skills aimed to manage anxious distress.      Goal 3: Patient will learn more adaptive ways to manage chronic pain      Objective #A (Patient Action)    Status: Continued - Date(s): 3/30/2023    Patient will identify 2-4 strategies for managing pain.    Intervention(s)  Therapist will teach distraction skills aimed to help manage chronic pain and utilize ACT/CBT ideas to help with this (e.g. relaxation).    Objective #B  Patient will state understanding of stressors and relationship to physical symptoms.    Status: Continued - Date(s): 3/30/2023      Other Possible Therapeutic " Intervention(s):    Psycho-education regarding mental health diagnoses and treatment options    Supportive Therapy    Provide affirmations, reflections, and establish working rapport    Emphasize and reflect on strength of therapeutic relationship    Skills training    Explore skills useful to client in current situation.    Skills include assertiveness, communication, conflict management, problem-solving, relaxation, etc.    Solution-Focused Therapy    Explore patterns in patient's relationships and discuss options for new behaviors.    Explore patterns in patient's actions and choices and discuss options for new behaviors.    Cognitive-behavioral Therapy    Discuss common cognitive distortions, identify them in patient's life.    Explore ways to challenge, replace, and act against these cognitions.    Acceptance and Commitment Therapy    Explore and identify important values in patient's life.    Discuss ways to commit to behavioral activation around these values.    Psychodynamic psychotherapy    Discuss patient's emotional dynamics and issues and how they impact behaviors.    Explore patient's history of relationships and how they impact present behaviors.    Explore how to work with and make changes in these schemas and patterns.    Narrative Therapy    Explore the patient's story of his/her life from his/her perspective.    Explore alternate ways of understanding their experience, identifying exceptions, developing new themes.    Interpersonal Psychotherapy    Explore patterns in relationships that are effective or ineffective at helping patient reach their goals, find satisfying experience.    Discuss new patterns or behaviors to engage in for improved social functioning.    Behavioral Activation    Discuss steps patient can take to become more involved in meaningful activity.    Identify barriers to these activities and explore possible solutions.    Mindfulness-Based Strategies    Discuss skills based on  development and application of mindfulness.    Skills drawn from compassion-focused therapy, dialectical behavior therapy, mindfulness-based stress reduction, mindfulness-based cognitive therapy, etc.      Patient has reviewed and agreed to the above plan.    Héctor Higuera Psy.D, JENNIFER   Behavioral Health Clinician   -[facility]     3/30/2023    Crisis Resources    Refer to the resources below as needed.    Steps to care for yourself    1. If you are currently in counseling, call your counselor for an appointment  2. Call the local crisis resources below if needed.  3. Contact friends or family for support.  4. Get more exercise.  5. Do activities you enjoy.  6. Eat a well-balanced diet and drink plenty of fluids.  7. Rest as needed.  8. Limit alcohol and recreational drugs. These can worsen depression.    When to contact your primary care provider       You have thoughts of harming or killing yourself but have not made a plan to carry it out.    Your depression gets in the way of daily activities.    You are often unable to sleep.    You need help cutting back on alcohol or recreational drugs.    When to call 911 or go to the Emergency Room     Get emergency help right away if you have any of the following:    You are planning to harm or kill yourself and you have a way to carry out the plan.     You have injured yourself or others. Or, you think you will.    You feel confused or are having trouble thinking or remembering.    You are having delusions (false beliefs).    You are hearing voices or seeing things that aren t there.    You are feeling psychotic (paranoid, fearful, restless, agitated, nervous, racing thoughts or speech)    Crisis Resources     These hotlines are for both adults and children. The and are open 24 hours a day, 7 days a week unless noted otherwise.    988 - National Suicide and Crisis Lifeline  If you or someone you know needs support now, call or text 988 or  chat [employer]. 988 connects you with a trained crisis counselor who can help.    National Suicide Prevention Lifeline   3-752-080-TALK (1751)    Crisis Text Line    www.crisistextline.org  Text HOME to 460470 from anywhere in the [place], anytime, about any type of crisis. A live, trained crisis counselor will receive the text and respond quickly.    [place] Lifeline for LGBTQ Youth  A national crisis intervention and suicide lifeline for LGBTQ youth under 25. Provides a safe place to talk without judgement.   Call 1-478.146.9928; text START to 357879 or visit www.thetrevorproject.org to talk to a trained counselor.  For [facility] crisis numbers, visit the [facility] website at:  https://[place].gov/dhs/people-we-serve/adults/health-care/mental-health/resources/crisis-contacts.jsp      Héctor Higuera Psy.D, LP   Behavioral Health Clinician   -[facility]     6/3/2022

## 2023-05-09 ENCOUNTER — VIRTUAL VISIT (OUTPATIENT)
Dept: FAMILY MEDICINE | Facility: CLINIC | Age: 33
End: 2023-05-09
Payer: COMMERCIAL

## 2023-05-09 ENCOUNTER — MEDICAL CORRESPONDENCE (OUTPATIENT)
Dept: HEALTH INFORMATION MANAGEMENT | Facility: CLINIC | Age: 33
End: 2023-05-09

## 2023-05-09 DIAGNOSIS — K59.00 CONSTIPATION, UNSPECIFIED CONSTIPATION TYPE: ICD-10-CM

## 2023-05-09 DIAGNOSIS — G47.00 INSOMNIA, UNSPECIFIED TYPE: Primary | ICD-10-CM

## 2023-05-09 DIAGNOSIS — G12.9 SPINAL MUSCLE ATROPHY (H): ICD-10-CM

## 2023-05-09 PROCEDURE — 99214 OFFICE O/P EST MOD 30 MIN: CPT | Mod: VID | Performed by: FAMILY MEDICINE

## 2023-05-09 RX ORDER — SENNOSIDES 8.8 MG/5ML
15 LIQUID ORAL 2 TIMES DAILY
Qty: 900 ML | Refills: 11 | Status: SHIPPED | OUTPATIENT
Start: 2023-05-09

## 2023-05-09 NOTE — PROGRESS NOTES
Virtual Visit Details    Type of service:  Video Visit   Video Start Time: 11 am  Video End Time:11;25     Originating Location (pt. Location): Home    Distant Location (provider location):  On-site  Platform used for Video Visit: Boris Dela Cruz is a 32 year old who is being evaluated via a billable video visit.      How would you like to obtain your AVS? MyChart  If the video visit is dropped, the invitation should be resent by:   Will anyone else be joining your video visit? No        Assessment & Plan     Insomnia, unspecified type  If not better can slowly increase  - traZODone (DESYREL) 5 mg/ml SUSP; Take 100 mg per feeding tube at bedtime prn insomnia (20 ml of the 5 mg/ml strength, or 10 ml of the 10 mg/ml strength is you dispense that)    Constipation, unspecified constipation type  See HPI  - Sennosides (SENNA) 8.8 MG/5ML LIQD; 15 mLs by Feeding route 2 times daily    Spinal muscle atrophy (H)  Will await wc form        31 minutes spent by me on the date of the encounter doing chart review, history and exam, documentation and further activities per the note    No follow-ups on file.    Jimmy Moseley MD  [facility] ODILON Dela Cruz is a 32 year old, presenting for the following health issues:  Video Visit    HPI   Needs new wc battery, per pt form will be coming to me, needs for weakness of SMA  Constipated meds rvwd will increase senna, consider add colace, or more miralax  Insomnia not relieved by current well tolerated meds will increase traz  For both issues update me soon if no better  She knows can do covid booster, might call for nurse only  Past Medical History:   Diagnosis Date     Anxiety      Snoring      Spinal muscle atrophy (H)      possibly  Current Outpatient Medications   Medication     Sennosides (SENNA) 8.8 MG/5ML LIQD     traZODone (DESYREL) 5 mg/ml SUSP     ACETAMINOPHEN PO     albuterol (PROAIR HFA) 108 (90 Base) MCG/ACT inhaler      albuterol (PROVENTIL) (2.5 MG/3ML) 0.083% neb solution     ALPRAZolam (XANAX) 0.25 MG tablet     ALPRAZolam (XANAX) 0.25 MG tablet     aminocaproic acid (AMICAR) 0.25 GM/ML solution     azithromycin (ZITHROMAX) 200 MG/5ML suspension     bisacodyl (DULCOLAX) 10 MG suppository     celecoxib (CELEBREX) 200 MG capsule     clindamycin (CLEOCIN T) 1 % lotion     diazepam (VALIUM) 5 MG tablet     doxycycline hyclate (VIBRAMYCIN) 100 MG capsule     famotidine (PEPCID) 40 MG/5ML suspension     fluocinolone acetonide (DERMA SMOOTHE/FS BODY) 0.01 % external oil     fluticasone (FLOVENT HFA) 110 MCG/ACT inhaler     gabapentin (NEURONTIN) 250 MG/5ML solution     GENERLAC 10 GM/15ML solution     guaiFENesin (ORGANIDIN) 200 MG TABS tablet     HYDROmorphone (DILAUDID) 4 MG tablet     hydrOXYzine (ATARAX) 25 MG tablet     ketoconazole (NIZORAL) 2 % external shampoo     LANsoprazole (PREVACID) 30 MG DR capsule     levofloxacin (LEVAQUIN) 25 MG/ML solution     Magnesium Hydroxide (MILK OF MAGNESIA PO)     metroNIDAZOLE (METROCREAM) 0.75 % external cream     mometasone (NASONEX) 50 MCG/ACT nasal spray     morphine (SABINE) 30 MG 24 hr capsule     mupirocin (BACTROBAN) 2 % external cream     naloxone (NARCAN) 4 MG/0.1ML nasal spray     Nutritional Supplements (REPLETE FIBER) LIQD     omeprazole (PRILOSEC) 2 mg/mL suspension     ondansetron (ZOFRAN-ODT) 4 MG ODT tab     order for DME     order for DME     polyethylene glycol (MIRALAX) 17 GM/Dose powder     potassium chloride (KAYCIEL) 20 MEQ/15ML (10%) solution     prochlorperazine (COMPAZINE) 5 MG tablet     rivaroxaban ANTICOAGULANT (XARELTO ANTICOAGULANT) 10 MG TABS tablet     Salicylic Acid (OXY BALANCE FACIAL CLEAN WASH EX)     sertraline (ZOLOFT) 20 MG/ML (HIGH CONC) solution     silver nitrate (ARZOL) 75-25 % miscellaneous     silver sulfADIAZINE (SSD) 1 % external cream     simethicone (MYLICON) 66.7 mg/ml     simethicone 40 MG/0.6ML LIQD     sodium chloride (OCEAN) 0.65 %  "nasal spray     traZODone (DESYREL) 5 mg/ml SUSP     tretinoin (RETIN-A) 0.025 % external cream     No current facility-administered medications for this visit.     Allergies   Allergen Reactions     Ibuprofen                  Review of Systems         Objective    Vitals - Patient Reported  Systolic (Patient Reported):  (Not today)  Weight (Patient Reported):  (Not recently)  Height (Patient Reported): 144.8 cm (4' 9\")  Pain Score: Severe Pain (6)  Pain Loc: Other - see comment (Hips, lower back)        Physical Exam   GENERAL: Healthy, alert and no distress  EYES: Eyes grossly normal to inspection.  No discharge or erythema, or obvious scleral/conjunctival abnormalities.  RESP: No audible wheeze, cough, or visible cyanosis.  No visible retractions or increased work of breathing.    SKIN: Visible skin clear. No significant rash, abnormal pigmentation or lesions.  NEURO: Cranial nerves grossly intact.  Mentation and speech appropriate for age.  PSYCH: Mentation appears normal, affect normal/bright, judgement and insight intact, normal speech and appearance well-groomed.        " 81 y/o man w PMHx of HTN, HLD, DM2, CAD s/p 3vCABG known to Dr De La Fuente, HFmEF w EF 45-50% on 8/1/22, AF on Eliquis, AAA, COPD baseline rare home O2 known to Dr ALO Mars, CKD3 and C3 glomerulopathy known to Dr Daniel, hypothyroidism, PVD, presented on 8/20/22 for SOB x5days and hypoxia to 70s. Admitted to medicine for SOB/hypoxia attributed to CHF exacerbation.   Has been on Bumex drip for HF exacerbation and is s/p R thoracentesis 8/26/22 w 1.5L transudative fluid removed, recurrent R thoracentesis 9/8/22.     HOLDING LOVENOX FOR THORACENTESIS 9/7/22      #Acute Hypoxic Respiratory Failure  #Acute HFmrEF  #COPD  HFmEF w EF 45-50% on 8/1/22; multiple LV regional wall motion abnormalities, mild MR, mild TR, borderline pulm HTN   COPD baseline rare home O2, 3L O2 since last admission known to Dr ALO Mars  Thoracentesis 8/26/22 w 1.5L removed, transudative fluid. Repeat 9/8/22, 2L removed.   Worsening hypoxia, now on HFNC   CXR 9/8/22 w/o significant change in b/l opacities/effusions   - Continue Bumex drip at 1mg/hr  - Heart failure follow up, continue low Na diet, fluid restriction 1L/24 hrs.   - Pulmonary following  - Continue Prednisone (for C3 glomerulopathy), duonebs PRN   - Transitioned eliquis to Lovenox 80mg BID, then held for thoracentesis:   HOLDING LOVENOX FOR R THORACENTESIS 9/8/22  - Cont dexamethasone 6mg PO QD f34erri thru 9/16/22.   - ID: Tx as new COVID infection: RDV x5 days: day 1 w 200mg, days 2-5 w 100mg, ends 9/11/22. No toci-   Labs: procal 0.29, ferritin 957. 9/6/22: ddimer 1035, , CRP 69.1.     #BRENDA, HypoNa   Baseline Cr 1.1  Cr up to 1.6 on 9/8/22, up from 1.3 on 9/7/22  HypoNa to 133  May be due to dehydration/poor PO intake yesterday     #CAD  Continue Metoprolol 100mg QD, Imdur 30mg QD, and Atorvastatin 40mg qhs.     #HTN:   Continue metoprolol, Nifedipine 30mg QD   Will try to wean off nifedipine given HFmrEF  - Current SBP range 120s-130s    #DM II: A1C 7.3.   - Cont Lantus to 17u, Off Lispro.   - Varying fs/PO intake. Past day w 145-250 in setting of dexamethasone thru 9/16/22    #Acute on chronic anemia; goal Hb 8+  #Epistaxis - resolved   s/p 1 U PRBC 8/22/22 and 8/24/22 for Hb <8  Continue IV Venofer 200mg daily for 3 days.   ENT consulted 8/23/22 for epistaxis/ blood clots blocking nasal passage   - Hb 8.0 9/8/22  - Active T&S from today; ordered q3d through 10/3/22     #Chronic Atrial Fibrillation: continue metoprolol and Eliquis.   #C3 Glomerulonephropathy: Continue Pred 5mg QD, Tacro 0.5mg BID, Mycophenolate 500mg 4 times daily.   #Leukocytosis: likely from steroids; monitor off abx   #Hypothyroidism: continue synthroid  #Stage 2 right buttock pressure ulcer: present on admission. Continue local wound care, off loading.     #MISC  - Palliative consulted per family request - has been seen by palliative in the past.                                                                               ----------------------------------------------------  # DVT prophylaxis: Transition eliquis to Lovenox 80mg BID for possible future repeat thoracentesis. HOLDING LOVENOX FOR THORACENTESIS 9/7/22  # GI prophylaxis: Famotidine 20mg QD   # Diet: Minced and moist   # Activity: IAT - did not participate w PT 9/8/22 due to fatigue   # Code status: DNR DNI   # Disposition: Keep in CEU for now                                                                            --------------------------------------------------------    # Handoff:   - Trend Hb   - Wean HFNC as renay  - F/u FS     79 y/o man w PMHx of HTN, HLD, DM2, CAD s/p 3vCABG known to Dr De La Fuente, HFmEF w EF 45-50% on 8/1/22, AF on Eliquis, AAA, COPD baseline rare home O2 known to Dr ALO Mars, CKD3 and C3 glomerulopathy known to Dr Daniel, hypothyroidism, PVD, presented on 8/20/22 for SOB x5days and hypoxia to 70s. Admitted to medicine for SOB/hypoxia attributed to CHF exacerbation.   Has been on Bumex drip for HF exacerbation and is s/p R thoracentesis 8/26/22 w 1.5L transudative fluid removed, recurrent R thoracentesis 9/8/22.     per nephro tacro level OK. Per CHF: 150cc hypertonic saline over 3h BID, metolazone 5mg x1, BMP BID w Mag, Entresto 49-51. D/c Imdur and Nifedipine.       #Acute Hypoxic Respiratory Failure  #Acute HFmrEF  #COPD  HFmEF w EF 45-50% on 8/1/22; multiple LV regional wall motion abnormalities, mild MR, mild TR, borderline pulm HTN   COPD baseline rare home O2, 3L O2 since last admission known to Dr ALO Mars  Thoracentesis 8/26/22 w 1.5L removed, transudative fluid. Repeat 9/8/22, 2L removed.   Worsening hypoxia, now on HFNC   CXR 9/8/22 w/o significant change in b/l opacities/effusions   - Continue Bumex drip at 1mg/hr  - Heart failure follow up, continue low Na diet, fluid restriction 1L/24 hrs.   - Pulmonary following  - Continue Prednisone (for C3 glomerulopathy), duonebs PRN   - Transitioned eliquis to Lovenox 80mg BID, then held for thoracentesis:   HOLDING LOVENOX FOR R THORACENTESIS 9/8/22  - Cont dexamethasone 6mg PO QD d97affp thru 9/16/22.   - ID: Tx as new COVID infection: RDV x5 days: day 1 w 200mg, days 2-5 w 100mg, ends 9/11/22. No toci-   Labs: procal 0.29, ferritin 957. 9/6/22: ddimer 1035, , CRP 69.1.     #BRENDA, HypoNa   Baseline Cr 1.1  Cr up to 1.6 on 9/8/22, up from 1.3 on 9/7/22  HypoNa to 133  May be due to dehydration/poor PO intake yesterday     #CAD  Continue Metoprolol 100mg QD, Imdur 30mg QD, and Atorvastatin 40mg qhs.     #HTN:   Continue metoprolol, Nifedipine 30mg QD   Will try to wean off nifedipine given HFmrEF  - Current SBP range 120s-130s    #DM II: A1C 7.3.   - Cont Lantus to 17u, Off Lispro.   - Varying fs/PO intake. Past day w 145-250 in setting of dexamethasone thru 9/16/22    #Acute on chronic anemia; goal Hb 8+  #Epistaxis - resolved   s/p 1 U PRBC 8/22/22 and 8/24/22 for Hb <8  Continue IV Venofer 200mg daily for 3 days.   ENT consulted 8/23/22 for epistaxis/ blood clots blocking nasal passage   - Hb 8.0 9/8/22  - Active T&S from today; ordered q3d through 10/3/22     #Chronic Atrial Fibrillation: continue metoprolol and Eliquis.   #C3 Glomerulonephropathy: Continue Pred 5mg QD, Tacro 0.5mg BID, Mycophenolate 500mg 4 times daily.   #Leukocytosis: likely from steroids; monitor off abx   #Hypothyroidism: continue synthroid  #Stage 2 right buttock pressure ulcer: present on admission. Continue local wound care, off loading.     #MISC  - Palliative consulted per family request - has been seen by palliative in the past.                                                                               ----------------------------------------------------  # DVT prophylaxis: Transition eliquis to Lovenox 80mg BID for repeat thoracentesis. Restarted LMWH 9/9/22.    # GI prophylaxis: Famotidine 20mg QD   # Diet: Minced and moist   # Activity: IAT - did not participate w PT 9/8/22 due to fatigue   # Code status: DNR DNI   # Disposition: Keep in CEU for now                                                                            --------------------------------------------------------    # Handoff:   - Trend Hb   - Wean HFNC as renay  - F/u FS

## 2023-05-09 NOTE — NURSING NOTE
Is the patient currently in the state of [place]? YES    Visit mode:VIDEO    If the visit is dropped, the patient can be reconnected by: VIDEO VISIT: Text to cell phone: [phone]    Will anyone else be joining the visit? NO      How would you like to obtain your AVS? MyChart    Are changes needed to the allergy or medication list? NO - pt says same as mychart    Reason for visit: Video Visit    RONNY ACUÑA

## 2023-05-09 NOTE — PATIENT INSTRUCTIONS
Thank you for visiting the Primary Care Center today at the [facility]! The following is some information about our clinic:     Primary Care Center Frequently-Asked Questions    (1) How do I schedule appointments at the [facility]?     Primary Care--to schedule or make changes to an existing appointment, please call our primary care line at [phone].    Labs--to schedule a lab appointment at the [facility] you can use [employer] or call [phone]. If you have a [place] location that is closer to home, you can reach out to that location for scheduling options.     Imaging--if you need to schedule a CT, X-ray, MRI, ultrasound, or other imaging study you can call [phone] to schedule at the [facility] or any other [facility] imaging location.     Referrals--if a referral to another specialty was ordered you can expect a phone call from their scheduling team. If you have not heard from them in a week, please call us or send us a [employer] message to check the status or get a scheduling number. Please note that this only applies to internal [facility] referrals. If the referral is external you would need to contact their office for scheduling.     (2) I have a question about my visit, who do I contact?     You can call us at the primary care line at [phone] to ask questions about your visit. You can also send a secure message through [employer], which is reviewed by clinic staff. Please note that [employer] messages have a twenty-four to forty-eight business hour turnaround time and should not be used for urgent concerns.    (3) How will I get the results of my tests?    If you are signed up for [employer] all tests will be released to you within twenty-four hours of resulting. Please allow three to five days for your doctor to review your results and place a note interpreting the results. If you do not have [employer] you will receive your  results through mail seven to ten business days following the return of the tests. Please note that if there should be any urgent or concerning results that your doctor or their registered nurse will reach out to you the same day as the tests come back. If you have follow up questions about your results or would like to discuss the results in detail please schedule a follow up with your provider either in person or virtually.     (4) How do I get refills of my prescriptions?     You should always first contact your pharmacy for refills of your medications. If submitting a refill request on [employer], please be sure to submit the request only once--repeat requests can cause delays in refill. If you are requesting a NEW medication or a medication related to new symptoms you will need to schedule an appointment with a provider prior to approval. Please note: Routine medication refills have up to one to three business day turnaround whereas controlled substances refills have up to five to seven business day turnaround.    (5) I have new symptoms, what do I do?     If you are having an immediate medical emergency, you should dial 911 for assistance.   For anything urgent that needs to be seen within a few hours to one day you should visit a local urgent care for assistance.  For non-urgent symptoms that need to be seen within a few days to a week you can schedule with an available provider in primary care by going to [facility] or calling [phone].   If you are not sure how serious your symptoms are or you would like to receive medical advice you can always call [phone] to speak with a triage nurse.

## 2023-05-15 DIAGNOSIS — F41.9 ANXIETY: ICD-10-CM

## 2023-05-15 RX ORDER — ALPRAZOLAM 0.25 MG
0.25 TABLET ORAL 3 TIMES DAILY PRN
Qty: 60 TABLET | Refills: 1 | Status: SHIPPED | OUTPATIENT
Start: 2023-05-15 | End: 2023-07-26

## 2023-05-15 NOTE — TELEPHONE ENCOUNTER
ALPRAZolam (XANAX) 0.25 MG tablet  Last Written Prescription Date:   4/7/2023  Last Fill Quantity: 60,   # refills: 0  Last Office Visit :  5/9/2023  Future Office visit:  None  Routing refill request to provider for review/approval because:  Drug not on the [facility], [facility] or [facility] refill protocol or controlled substance      Georgina Sy RN  Central Triage Red Flags/Med Refills

## 2023-05-23 ENCOUNTER — VIRTUAL VISIT (OUTPATIENT)
Dept: BEHAVIORAL HEALTH | Facility: CLINIC | Age: 33
End: 2023-05-23
Payer: COMMERCIAL

## 2023-05-23 DIAGNOSIS — F33.1 MAJOR DEPRESSIVE DISORDER, RECURRENT EPISODE, MODERATE (H): Primary | ICD-10-CM

## 2023-05-23 PROCEDURE — 90791 PSYCH DIAGNOSTIC EVALUATION: CPT | Mod: 95 | Performed by: PSYCHOLOGIST

## 2023-05-23 NOTE — PROGRESS NOTES
[facility]: Integrated Behavioral Health   Diagnostic Assessment Update   Provider Name:  Héctor Columbus      Credentials:  JENNIFER Bradshaw    PATIENT'S NAME: Lanie Neil  PREFERRED NAME: Lanie Neil  PRONOUNS: She/her/hers  MRN: [id number]  : 1990  ADDRESS: [address] [place] [address] [place] [address]   ACCT. NUMBER:  [id number]  DATE OF SERVICE: 23  START TIME: 11:00  END TIME: 11:55  PREFERRED PHONE: [phone]  May we leave a program related message: Yes  SERVICE MODALITY:  Video Visit:      Provider verified identity through the following two step process.  Patient provided:  Patient photo and Patient     Telemedicine Visit: The patient's condition can be safely assessed and treated via synchronous audio and visual telemedicine encounter.      Reason for Telemedicine Visit: Services only offered telehealth    Originating Site (Patient Location): Patient's home    Distant Site (Provider Location): Provider Remote Setting    Consent:  The patient/guardian has verbally consented to: the potential risks and benefits of telemedicine (video visit) versus in person care; bill my insurance or make self-payment for services provided; and responsibility for payment of non-covered services.     Patient would like the video invitation sent by:  Send to e-mail at: [email or web address]    Mode of Communication:  Video Conference via [employer]    As the provider I attest to compliance with applicable laws and regulations related to telemedicine.    UNIVERSAL ADULT Mental Health DIAGNOSTIC ASSESSMENT      Identifying Information:  Patient is a 30 year old, [place] single, female with spinal muscle atrophy and chronic pain syndrome who presents to [facility] services upon referral from her primary care provider.  The pronoun use throughout this assessment reflects the patient's chosen pronoun.  Patient was referred for an assessment by her primary care provider.  Patient attended the  session alone.     Chief Complaint:     2023 Updates on Clinical Condition:  Patient is seeking ongoing mental health counseling for assistance in managing depressed mood, grief/loss, chronic pain difficulties, and relationship concerns. Reports mood is negatively affected by difficulties with pain and the recent CDC recommended changes on prescription pain medication; she has had prescription pain medication levels reduced over the last year, resulting in greater difficulty managing pain. Therapy services have have additionally focused on the patient's experienced sense of loss around not being able to have children. Related relationship challenges, centering around family conflicts that contribute to depressed mood have also been addressed and remain an outstanding difficulty for the patient at this time. Patient's condition and reported symptoms appear to cause clinically significant emotional distress and impairment in important areas of functioning, including chronic disease management and relationships, such that ongoing psychotherapy is highly recommended and considered clinically necessary at this time. Without this service, patient's condition is likely to deteriorate and adversely impact her quality of life.     The reason for seeking services at this time is: concerns with relationships and depressed mood. The problem(s) began in Fall of 2020, though records show longstanding issues with depression and anxiety, dating back to at least 2014. Patient has attempted to resolve these concerns in the past through counseling with this writer.    Social/Family History:    No changes from previous assessment    Patient was born in [place], but had moved to [place], [place], and back to [place] before moving to [place] in 2014.They were raised by biological mother, who is her primary care-taker. Her father left at an early age.  Parents are .  Patient reported that their childhood was  difficult, as she cited being raised by a single mother.  Patient described their current relationships with family of origin as close, though sometimes conflictual with her mother. She indicated that she has three brothers and three half sisters. She is close with one of her brothers, who periodically lives with her and her mother.       The patient describes their cultural background as of [place] decent.  Cultural influences and impact on patient's life structure, values, norms, and healthcare: none reported.  Contextual influences on patient's health include: Individual Factors spinal muscle atrophy.  These factors will be addressed in the Preliminary Treatment plan.  Patient identified their preferred language to be English. Patient reported they does not need the assistance of an  or other support involved in therapy.     Patient reported had no significant delays in developmental tasks.   Patient's highest education level was high school graduate. Patient identified the following learning problems: none reported.  Modifications will not be used to assist communication in therapy.  Patient reports they are  able to understand written materials.    Patient reported the following relationship history: single, never .  Patient's current relationship status is single for several years; she did have a boyfriend she met online.   Patient identified their sexual orientation as heterosexual.  Patient reported having zero child(alysia). Patient identified mother and siblings as part of their support system.  Patient identified the quality of these relationships as fair.      Patient's current living/housing situation involves staying in own home/apartment.  She lives with her mother, her PCA, and they report that housing is stable.     Patient is currently disabled.  Patient reports their finances are obtained through disability and mother.  Patient does not identify finances as a current stressor.       Patient reported that they have not been involved with the legal system. Patient denies being on probation / parole / under the jurisdiction of the court.    Patient's Strengths and Limitations:  Patient identified the following strengths or resources that will help them succeed in treatment: commitment to health and well being and family support. Things that may interfere with the patient's success in treatment include: few friends, physical health concerns and transportation concerns.     Personal and Family Medical History:   Patient does report a family history of mental health concerns. She suspects a family history of anxiety and depression. Patient reports family history includes Family History Negative in an other family member.    Patient does report Mental Health Diagnosis and/or Treatment.  Patient Patient reported the following previous diagnoses which include(s): Depression.  Depression issues appear longstanding, according to her report and records. Patient has received mental health services in the past: therapy with a pain psychologist in 2014. Patient has engaged with psychotherapy with this writer since 2020. Psychiatric Hospitalizations: None.  Patient denies a history of civil commitment.  Currently, patient is receiving other mental health services.  These include medication from PCP.     Patient has had a physical exam to rule out medical causes for current symptoms.  Date of last physical exam was within the past year. Client was encouraged to follow up with PCP if symptoms were to develop. The patient has a [place] Primary Care Provider, who is named Jimmy Moseley.  Patient reports the following current medical concerns: SMA, chronic pain syndrome, asthma, DVT (see EMR).  Patient reports pain concerns including from her SMA.  Patient does want help addressing pain concerns.  There are not significant appetite / nutritional concerns / weight changes. Patient does not report a  history of head injury / trauma / cognitive impairment.      Current Outpatient Medications   Medication     ACETAMINOPHEN PO     albuterol (PROAIR HFA) 108 (90 Base) MCG/ACT inhaler     albuterol (PROVENTIL) (2.5 MG/3ML) 0.083% neb solution     ALPRAZolam (XANAX) 0.25 MG tablet     ALPRAZolam (XANAX) 0.25 MG tablet     aminocaproic acid (AMICAR) 0.25 GM/ML solution     azithromycin (ZITHROMAX) 200 MG/5ML suspension     bisacodyl (DULCOLAX) 10 MG suppository     celecoxib (CELEBREX) 200 MG capsule     clindamycin (CLEOCIN T) 1 % lotion     diazepam (VALIUM) 5 MG tablet     doxycycline hyclate (VIBRAMYCIN) 100 MG capsule     famotidine (PEPCID) 40 MG/5ML suspension     fluocinolone acetonide (DERMA SMOOTHE/FS BODY) 0.01 % external oil     fluticasone (FLOVENT HFA) 110 MCG/ACT inhaler     gabapentin (NEURONTIN) 250 MG/5ML solution     GENERLAC 10 GM/15ML solution     guaiFENesin (ORGANIDIN) 200 MG TABS tablet     HYDROmorphone (DILAUDID) 4 MG tablet     hydrOXYzine (ATARAX) 25 MG tablet     ketoconazole (NIZORAL) 2 % external shampoo     LANsoprazole (PREVACID) 30 MG DR capsule     levofloxacin (LEVAQUIN) 25 MG/ML solution     Magnesium Hydroxide (MILK OF MAGNESIA PO)     metroNIDAZOLE (METROCREAM) 0.75 % external cream     mometasone (NASONEX) 50 MCG/ACT nasal spray     morphine (SABINE) 30 MG 24 hr capsule     mupirocin (BACTROBAN) 2 % external cream     naloxone (NARCAN) 4 MG/0.1ML nasal spray     Nutritional Supplements (REPLETE FIBER) LIQD     omeprazole (PRILOSEC) 2 mg/mL suspension     ondansetron (ZOFRAN-ODT) 4 MG ODT tab     order for DME     order for DME     polyethylene glycol (MIRALAX) 17 GM/Dose powder     potassium chloride (KAYCIEL) 20 MEQ/15ML (10%) solution     prochlorperazine (COMPAZINE) 5 MG tablet     rivaroxaban ANTICOAGULANT (XARELTO ANTICOAGULANT) 10 MG TABS tablet     Salicylic Acid (OXY BALANCE FACIAL CLEAN WASH EX)     Sennosides (SENNA) 8.8 MG/5ML LIQD     sertraline (ZOLOFT) 20 MG/ML  (HIGH CONC) solution     silver nitrate (ARZOL) 75-25 % miscellaneous     silver sulfADIAZINE (SSD) 1 % external cream     simethicone (MYLICON) 66.7 mg/ml     simethicone 40 MG/0.6ML LIQD     sodium chloride (OCEAN) 0.65 % nasal spray     traZODone (DESYREL) 5 mg/ml SUSP     traZODone (DESYREL) 5 mg/ml SUSP     tretinoin (RETIN-A) 0.025 % external cream     No current facility-administered medications for this visit.       Medication Adherence:  Patient reports taking prescribed medications as prescribed.    Patient Allergies:    Allergies   Allergen Reactions     Ibuprofen        Medical History:    Past Medical History:   Diagnosis Date     Anxiety      Snoring      Spinal muscle atrophy (H)          Current Mental Status Exam:   Appearance:  Appropriate    Eye Contact:  Good   Psychomotor:  Slow       Gait / station:  NA Patient uses wheelchair   Attitude / Demeanor: Cooperative  Friendly Pleasant  Speech      Rate / Production: Normal/ Responsive      Volume:  Normal  volume      Language:  intact  Mood:   Depressed   Affect:   Appropriate    Thought Content: Clear   Thought Process: Logical       Associations: No loosening of associations  Insight:   Good   Judgment:  Intact   Orientation:  All  Attention/concentration: Good    Rating Scales:    PHQ9:        12/15/2020    10:49 AM 3/16/2021     9:13 AM 6/30/2021     9:26 AM   PHQ-9 SCORE   PHQ-9 Total Score MyChart 2 (Minimal depression) 2 (Minimal depression) 2 (Minimal depression)   PHQ-9 Total Score 2 2 2   ;    GAD7:        12/15/2020    10:52 AM 3/16/2021     9:15 AM 6/30/2021     9:27 AM   KATHARINE-7 SCORE   Total Score 1 (minimal anxiety) 1 (minimal anxiety) 1 (minimal anxiety)   Total Score 1 1 1      3/4/2022  1:59 PM 3/15/2022  2:10 PM 3/30/2023  10:56 AM   PROMIS-10 Scores Only      Global Mental Health Score 13  8  7    Global Physical Health Score 11  9  9    PROMIS TOTAL - SUBSCORES 24  17  16          Substance Use:  Patient did not report a family  history of substance use concerns; see medical history section for details.  Patient has not received chemical dependency treatment in the past.  Patient has not ever been to detox.      Patient is not currently receiving any chemical dependency treatment. Patient reported the following problems as a result of their substance use: none.    Patient denies using alcohol.  Patient denies using tobacco.  Patient denies using marijuana.  Patient denies using caffeine.  Patient reports using/abusing the following substance(s). Patient reported no other substance use.      Patient does use opioid pain medication and benzodiazepine. She recognizes dependence on these substances due to her pain condition. She denies problematic use however.     Substance Use: none     Patient does endorse use of cannabis products and prescribed pain medication for chronic pain/disease management.    Based on the clinical interview there  are not indications of drug or alcohol abuse. We have talked about her dependency on narcotics for pain relief however, despite taking these as prescribed.       Significant Losses / Trauma / Abuse / Neglect Issues:   Patient did not serve in the .  There are indications or report of significant loss, trauma, abuse or neglect issues related to: are no indications and client denies any losses, trauma, abuse, or neglect concerns.  Concerns for possible neglect are not present.    Safety Assessment:   Current Safety Concerns:  [place] Suicide Severity Rating Scale (Short Version)      2/18/2021     4:47 PM   [place] Suicide Severity Rating (Short Version)   Over the past 2 weeks have you felt down, depressed, or hopeless? yes   Over the past 2 weeks have you had thoughts of killing yourself? no   Have you ever attempted to kill yourself? no      [place] Suicide Severity Rating Scale (Lifetime/Recent)      8/13/2021     1:25 PM   [place] Suicide Severity Rating (Lifetime/Recent)   Wish to be Dead  (Lifetime) No   Non-Specific Active Suicidal Thoughts (Lifetime) No   Most Severe Ideation Rating (Lifetime) NA   Frequency (Lifetime) NA   Duration (Lifetime) NA   Controllability (Lifetime) NA   Protective Factors  (Lifetime) NA   Reasons for Ideation (Lifetime) NA   RETIRED: 1. Wish to be Dead (Recent) No   RETIRED: 2. Non-Specific Active Suicidal Thoughts (Recent) No   3. Active Suicidal Ideation with any Methods (Not Plan) Without Intent to Act (Lifetime) No   RETIRED: 3. Active Suicidal Ideation with any Methods (Not Plan) Without Intent to Act (Recent) No   RETIRE: 4. Active Suicidal Ideation with Some Intent to Act, Without Specific Plan (Lifetime) No   4. Active Suicidal Ideation with Some Intent to Act, Without Specific Plan (Recent) No   RETIRE: 5. Active Suicidal Ideation with Specific Plan and Intent (Lifetime) No   RETIRED: 5. Active Suicidal Ideation with Specific Plan and Intent (Recent) No   Most Severe Ideation Rating (Past Month) NA   Frequency (Past Month) NA   Duration (Past Month) NA   Controllability (Past Month) NA   Protective Factors (Past Month) NA   Reasons for Ideation (Past Month) NA   Actual Attempt (Lifetime) No   Actual Attempt (Past 3 Months) No   Has subject engaged in non-suicidal self-injurious behavior? (Lifetime) No   Has subject engaged in non-suicidal self-injurious behavior? (Past 3 Months) No   Interrupted Attempts (Lifetime) No   Interrupted Attempts (Past 3 Months) No   Aborted or Self-Interrupted Attempt (Lifetime) No   Aborted or Self-Interrupted Attempt (Past 3 Months) No   Preparatory Acts or Behavior (Lifetime) No   Preparatory Acts or Behavior (Past 3 Months) No   Most Recent Attempt Actual Lethality Code NA   Most Lethal Attempt Actual Lethality Code NA   Initial/First Attempt Actual Lethality Code NA       Patient does have a history of passive thoughts of suicide, however described these thoughts as fleeting and easily controllable, citing her family and pets as  protective factors. Her thoughts did not include evidence of a plan for suicide, identified means, or any intent for self-harm. She does not have a history of previous attempts. Estimated risk of self-harm is low. She appears appropriate for an ongoing outpatient level of care.     Patient denies current homicidal ideation and behaviors.  Patient denies current self-injurious ideation and behaviors.    Patient denied risk behaviors associated with substance use.  Patient denies any high risk behaviors associated with mental health symptoms.  Patient reports the following current concerns for their personal safety: None.  Patient reports there are not  firearms in the house. NA.     History of Safety Concerns:  Patient denied a history of homicidal ideation.     Patient denied a history of personal safety concerns.    Patient denied a history of assaultive behaviors.    Patient denied a history of sexual assault behaviors.     Patient denied a history of risk behaviors associated with substance use.  Patient denies any history of high risk behaviors associated with mental health symptoms.  Patient reports the following protective factors: forward/future oriented thinking, dedication to family/friends, living with other people and pets    Risk Plan:  See Recommendations for Safety and Risk Management Plan    Review of Symptoms per patient report:  Depression: Lack of interest, Excessive or inappropriate guilt, Feelings of hopelessness, Feelings of helplessness, Low self-worth, Feeling sad, down, or depressed and Withdrawn  Vy:  No Symptoms  Psychosis: No Symptoms  Anxiety: Excessive worry, Nervousness, Ruminations and Irritability  Panic:  No symptoms  Post Traumatic Stress Disorder:  No Symptoms   Eating Disorder: No Symptoms  ADD / ADHD:  No symptoms  Conduct Disorder: No symptoms  Autism Spectrum Disorder: No symptoms  Obsessive Compulsive Disorder: No Symptoms    Patient reports the following compulsive  behaviors and treatment history: none reported.      Diagnostic Criteria:   A) Recurrent episode(s) - symptoms have been present during the same 2-week period and represent a change from previous functioning 5 or more symptoms (required for diagnosis)   - Depressed mood. Note: In children and adolescents, can be irritable mood.     - Diminished interest or pleasure in all, or almost all, activities.    - Fatigue or loss of energy.    - Feelings of worthlessness or inappropriate and excessive guilt.    - Recurrent thoughts of death (not just fear of dying), recurrent suicidal ideation without a specific plan, or a suicide attempt or a specific plan for committing suicide.   B) The symptoms cause clinically significant distress or impairment in social, occupational, or other important areas of functioning  C) The episode is not attributable to the physiological effects of a substance or to another medical condition  D) The occurence of major depressive episode is not better explained by other thought / psychotic disorders  E) There has never been a manic episode or hypomanic episode    Functional Status:  Patient reports the following functional impairments: chronic disease management and relationship(s).     Nonprogrammatic care:  Patient is requesting basic services to address current mental health concerns.     3/4/2022  1:59 PM 3/15/2022  2:10 PM 3/30/2023  10:56 AM   PROMIS-10 Scores Only      Global Mental Health Score 13  8  7    Global Physical Health Score 11  9  9    PROMIS TOTAL - SUBSCORES 24  17  16        Clinical Summary:  1. Reason for assessment: depression and relationship concerns.  2. Psychosocial, Cultural and Contextual Factors: Pt with SMA.  3. Principal DSM5 Diagnoses  (Sustained by DSM5 Criteria Listed Above):   296.31 (F33.0) Major Depressive Disorder, Recurrent Episode, Mild With anxious distress.  4. Other Diagnoses that is relevant to services: See EMR  5. Provisional Diagnosis: None.  6.  Prognosis: Expect Improvement.  7. Likely consequences of symptoms if not treated: deterioration of mood symptoms and quality of life.  8. Client strengths include:  intelligent, open to learning, open to suggestions / feedback and wants to learn .     Recommendations:     1. Plan for Safety and Risk Management:   Recommended that patient call 911 or go to the local ED should there be a change in any of these risk factors..          Report to child / adult protection services was NA.     2. Patient's identified no cultural or diversity factors relevant to counseling.     3. Initial Treatment will focus on:    Depressed Mood - help allieviate depressed mood and related relationship problems.     4. Resources/Service Plan:    services are not indicated.   Modifications to assist communication are not indicated.   Additional disability accommodations are not indicated.      5. Collaboration:   Collaboration / coordination of treatment will be initiated with the following  support professionals: primary care physician.      6.  Referrals:   The following referral(s) will be initiated: none right now. Next Scheduled Appointment: NA.     A Release of Information has been obtained for the following: NA.    7. ROBBY:    ROBBY:  Discussed the general effects of drugs and alcohol on health and well-being. Provider gave patient printed information about the effects of chemical use on their health and well being. Recommendations: She may benefit from long-term therapy for help managing chronic pain and related narcotic use.     8. Records:   These were reviewed at time of assessment.   Information in this assessment was obtained from the medical record and provided by patient who is a good historian. Patient will have open access to their mental health medical record.        Provider Name/ Credentials:  Héctor Higuera LP  February 18, 2021

## 2023-05-29 DIAGNOSIS — L21.9 DERMATITIS, SEBORRHEIC: ICD-10-CM

## 2023-05-30 DIAGNOSIS — N39.0 URINARY TRACT INFECTION WITHOUT HEMATURIA, SITE UNSPECIFIED: ICD-10-CM

## 2023-06-02 ENCOUNTER — MEDICAL CORRESPONDENCE (OUTPATIENT)
Dept: HEALTH INFORMATION MANAGEMENT | Facility: CLINIC | Age: 33
End: 2023-06-02
Payer: MEDICARE

## 2023-06-02 RX ORDER — LEVOFLOXACIN 25 MG/ML
SOLUTION ORAL
Qty: 480 ML | Refills: 1 | Status: SHIPPED | OUTPATIENT
Start: 2023-06-02 | End: 2024-06-22

## 2023-06-02 RX ORDER — KETOCONAZOLE 20 MG/ML
SHAMPOO TOPICAL
Qty: 120 ML | Refills: 11 | OUTPATIENT
Start: 2023-06-02

## 2023-06-02 RX ORDER — FLUOCINOLONE ACETONIDE 0.11 MG/ML
OIL TOPICAL
Qty: 118.28 ML | OUTPATIENT
Start: 2023-06-02

## 2023-06-02 NOTE — TELEPHONE ENCOUNTER
levofloxacin (LEVAQUIN) 25 MG/ML solution      Last Written Prescription Date:  8/12/22  Last Fill Quantity: 480ml,   # refills: 1  Last Office Visit : 5/9/23  Future Office visit:  none    Routing refill request to provider for review/approval because:  Drug not on the [facility], P or [facility] refill protocol or controlled substance    *current order states take for 1 week

## 2023-06-05 ENCOUNTER — VIRTUAL VISIT (OUTPATIENT)
Dept: BEHAVIORAL HEALTH | Facility: CLINIC | Age: 33
End: 2023-06-05
Payer: COMMERCIAL

## 2023-06-05 ENCOUNTER — DOCUMENTATION ONLY (OUTPATIENT)
Dept: FAMILY MEDICINE | Facility: CLINIC | Age: 33
End: 2023-06-05
Payer: MEDICARE

## 2023-06-05 DIAGNOSIS — F33.1 MAJOR DEPRESSIVE DISORDER, RECURRENT EPISODE, MODERATE (H): Primary | ICD-10-CM

## 2023-06-05 PROCEDURE — 90837 PSYTX W PT 60 MINUTES: CPT | Mod: 95 | Performed by: PSYCHOLOGIST

## 2023-06-05 NOTE — PROGRESS NOTES
[facility]: Integrated Behavioral Health  June 5, 2023        Behavioral Health Clinician Progress Note    Patient Name: Lanie Neil           Service Type: Video appointment      Service Location:  Video appointment      Session Start Time: 1:05  Session End Time: 2:00      Session Length: 53 - 60      Attendees: Patient    Visit Activities (Refresh list every visit): [facility] Only    Service Modality:  Video Visit:      Provider verified identity through the following two step process.  Patient provided:  Patient photo and Patient is known previously to provider    Telemedicine Visit: The patient's condition can be safely assessed and treated via synchronous audio and visual telemedicine encounter.      Reason for Telemedicine Visit: Patient unable to travel    Originating Site (Patient Location): Patient's home    Distant Site (Provider Location): [facility]: [facility] [place]    Consent:  The patient/guardian has verbally consented to: the potential risks and benefits of telemedicine (video visit) versus in person care; bill my insurance or make self-payment for services provided; and responsibility for payment of non-covered services.     Patient would like the video invitation sent by:  My Chart    Mode of Communication:  Video Conference via [facility]    Distant Location (Provider):  On-site    As the provider I attest to compliance with applicable laws and regulations related to telemedicine.      Diagnostic Assessment Date: 1/19/2021; update 5/23/2023  Treatment Plan Review Date: 6/30/2023  See Flowsheets for today's PHQ-9 and KATHARINE-7 results  Previous PHQ-9:       12/15/2020    10:49 AM 3/16/2021     9:13 AM 6/30/2021     9:26 AM   PHQ-9 SCORE   PHQ-9 Total Score MyChart 2 (Minimal depression) 2 (Minimal depression) 2 (Minimal depression)   PHQ-9 Total Score 2 2 2     Previous KATHARINE-7:       12/15/2020    10:52 AM 3/16/2021     9:15 AM 6/30/2021     9:27 AM   KATHARINE-7 SCORE   Total  "Score 1 (minimal anxiety) 1 (minimal anxiety) 1 (minimal anxiety)   Total Score 1 1 1       DATA  Extended Session (60+ minutes): No  Interactive Complexity: No  Crisis: No    Treatment Objective(s) Addressed in This Session:  Target Behavior(s): disease management/lifestyle changes      Depressed Mood: Decrease frequency and intensity of feeling down, depressed, hopeless  Identify negative self-talk and behaviors: challenge core beliefs, myths, and actions  Anxiety: will develop more effective coping skills to manage anxiety symptoms  Relationship Problems: will address relationship difficulties in a more adaptive manner    Current Stressors / Issues:  [facility] met with Lanie today to continue supporting her treatment of depression, adjustment to chronic disease management, and relationship problems.     Continued discussion today about relationship conflicts, most recently with her mother. Explored how the sense of tenuous security, with limited finances and options for support/safety nets, has led to greater difficulties with anxiety/tension at home, which she suggests exacerbates conflicts at times.     Talked about what acceptance of her parents, both her mother and father might look like - hypothesized what a universe where Lanie has found forgiveness/acceptance of others in her life, despite past hurts and resentments might look like for her (a modified miracle question).     Explored taking on a demeanor of assuming others are capable of self-improvement, avoiding limiting thoughts about how others will always disappoint her/let her down.     Am I Hooked or Unhooked From My Thoughts?    Clues that I'm getting hooked and threatened by my thoughts:    1) Thoughts are Literal and Threatening; it s as if what we re thinking is actually present, here and now!    2) Thoughts are \"The Truth\"; we literally believe them!    3) Thoughts are Most Important; we take them seriously, and give them our full attention!    4) " "Thoughts are Orders; we automatically obey them!    5) Thoughts are Always Wise; we assume they know best and we follow their advice!     _____________________________________________________________________    Noticing when I'm unhooked and not threatened by my thoughts:    1) Thoughts are merely sounds, words, stories, bits of language, passing through our heads.    2) Thoughts may or may not be true. We don t automatically believe them.    3) Thoughts may or may not be important. We pay attention only if they re helpful.    4) Thoughts are not orders. We don t have to obey them.    5) Thoughts may or may not be wise. We don t automatically follow their advice.   Thoughts That Hook You:    What memories, fears, worries, self-criticisms, or other unhelpful thoughts do you dwell on or get \"stuck\" thinking about? What thoughts tend to hook you, jerk you around, and pull you out of your life?                 Life-Draining Actions:    What are you currently doing that makes your life worse in the long run: keeps you stuck, wastes your time or money, drains your energy; restricts your life; impacts negatively on your health, work, or relationships; maintains or worsens the problems your are dealing with?                     Feelings That Hook You:    What emotions, feelings, urges, impulses, or sensations tend to hook you and jerk you around or pull you into self-defeating actions?           Avoiding Challenging Situations:    What situations, activities, people, or places are you avoiding or staying away from? What have you quit, withdrawn from, dropped out of? What do you keep \"putting off\" until later?                           Progress on Treatment Objective(s) / Homework:  Satisfactory progress - ACTION (Actively working towards change); Intervened by reinforcing change plan / affirming steps taken       Psychodynamic psychotherapy    Discuss patient's emotional dynamics and issues and how they impact " behaviors.    Explore patient's history of relationships and how they impact present behaviors.    Explore how to work with and make changes in these schemas and patterns.    Interpersonal Psychotherapy    Explore patterns in relationships that are effective or ineffective at helping patient reach their goals, find satisfying experience.    Discuss new patterns or behaviors to engage in for improved social functioning.      Care Plan review completed: no     Medication Review:  No changes to current psychiatric medication(s)    Medication Compliance:  NA    Changes in Health Issues:   None reported    Chemical Use Review:   Substance Use: Chemical use reviewed, no active concerns identified      Tobacco Use: No current tobacco use.      Assessment: Current Emotional / Mental Status (status of significant symptoms):  Risk status (Self / Other harm or suicidal ideation)  Patient has had a history of suicidal ideation: passive thoughts only; easily controlled - denied any recently    Patient denies current fears or concerns for personal safety.  Patient denies current or recent suicidal ideation or behaviors.  Patient denies current or recent homicidal ideation or behaviors.  Patient denies current or recent self injurious behavior or ideation.  Patient denies other safety concerns.     A safety and risk management plan has not been developed at this time, however patient was encouraged to call [place] [phone] should there be a change in any of these risk factors.      [place] Suicide Severity Rating Scale (Short Version)      2/18/2021     4:47 PM   [place] Suicide Severity Rating (Short Version)   Over the past 2 weeks have you felt down, depressed, or hopeless? yes   Over the past 2 weeks have you had thoughts of killing yourself? no   Have you ever attempted to kill yourself? no     [place] Suicide Severity Rating Scale (Lifetime/Recent)      8/13/2021     1:25 PM   [place] Suicide Severity Rating  (Lifetime/Recent)   Wish to be Dead (Lifetime) No   Non-Specific Active Suicidal Thoughts (Lifetime) No   Most Severe Ideation Rating (Lifetime) NA   Frequency (Lifetime) NA   Duration (Lifetime) NA   Controllability (Lifetime) NA   Protective Factors  (Lifetime) NA   Reasons for Ideation (Lifetime) NA   RETIRED: 1. Wish to be Dead (Recent) No   RETIRED: 2. Non-Specific Active Suicidal Thoughts (Recent) No   3. Active Suicidal Ideation with any Methods (Not Plan) Without Intent to Act (Lifetime) No   RETIRED: 3. Active Suicidal Ideation with any Methods (Not Plan) Without Intent to Act (Recent) No   RETIRE: 4. Active Suicidal Ideation with Some Intent to Act, Without Specific Plan (Lifetime) No   4. Active Suicidal Ideation with Some Intent to Act, Without Specific Plan (Recent) No   RETIRE: 5. Active Suicidal Ideation with Specific Plan and Intent (Lifetime) No   RETIRED: 5. Active Suicidal Ideation with Specific Plan and Intent (Recent) No   Most Severe Ideation Rating (Past Month) NA   Frequency (Past Month) NA   Duration (Past Month) NA   Controllability (Past Month) NA   Protective Factors (Past Month) NA   Reasons for Ideation (Past Month) NA   Actual Attempt (Lifetime) No   Actual Attempt (Past 3 Months) No   Has subject engaged in non-suicidal self-injurious behavior? (Lifetime) No   Has subject engaged in non-suicidal self-injurious behavior? (Past 3 Months) No   Interrupted Attempts (Lifetime) No   Interrupted Attempts (Past 3 Months) No   Aborted or Self-Interrupted Attempt (Lifetime) No   Aborted or Self-Interrupted Attempt (Past 3 Months) No   Preparatory Acts or Behavior (Lifetime) No   Preparatory Acts or Behavior (Past 3 Months) No   Most Recent Attempt Actual Lethality Code NA   Most Lethal Attempt Actual Lethality Code NA   Initial/First Attempt Actual Lethality Code NA         Appearance:   Appropriate   Eye Contact:   Good   Psychomotor Behavior: Normal   Attitude:   Cooperative    Orientation:   All  Speech   Rate / Production: Normal    Volume:  Normal   Mood:    Depressed    Affect:    Tearful  Thought Content:  Rumination   Thought Form:  Coherent  Logical   Insight:    Good     Diagnoses:  1. Major depressive disorder, recurrent episode, moderate (H)        Collateral Reports Completed:  [facility] will coordinate with care team as needed    Plan: (Homework, other):  Lanie was scheduled to RTC in two weeks for help addressing depressive symptoms, pain, and relationship conflicts. Continue thought log for help with depression.  CD Recommendations: No indications of CD issues.     Héctor Higuera Psy.D, LP   Behavioral Health Clinician   [facility]     _______________________________________________________________________                                              Individual Treatment Plan    Patient's Name: Lanie Neil  YOB: 1990    Date of Creation: 3/4/2022  Date Treatment Plan Last Reviewed/Revised: 3/30/2023    DSM5 Diagnoses: 296.32 (F33.1) Major Depressive Disorder, Recurrent Episode, Moderate With anxious distress  Psychosocial / Contextual Factors: Chronic pain, SMA, disabled  PROMIS (reviewed every 90 days):     PROMIS-10    In general, would you say your health is: (P) Excellent    In general, would you say your quality of life is: (P) Good    In general, how would you rate your physical health? (P) Very good    In general, how would you rate your mental health, including your mood and your ability to think? (P) Good    In general, how would you rate your satisfaction with your social activities and relationships? (P) Good    In general, please rate how well you carry out your usual social activities and roles. (This includes activities at home, at work and in your community, and responsibilities as a parent, child, spouse, employee, friend, etc.) (P) Very good    To what extent are you able to carry out your everyday physical  activities such as walking, climbing stairs, carrying groceries, or moving a chair? (P) Not at all    In the past 7 days,  How often have you been bothered by emotional problems such as feeling anxious, depressed or irritable? (P) Rarely  How would you rate your fatigue on average? (P) Moderate  How would you rate your pain on average? (P) 6    PROMIS GLOBAL SCORES    Mental health question re-calculation - no clinical value - (P) 4  Physical health question re-calculation - no clinical value - (P) 3  Pain question re-calculation - no clinical value - (P) 3  Global Mental Health Score - (P) 13  Global Physical Health Score - (P) 11  PROMIS TOTAL - SUBSCORES - (P) 24      [id number] PROMIS HEALTH ORGANIZATION AND PROMIS COOPERATIVE GROUP VERSION 1.1          Referral / Collaboration:  Referral to another professional/service is not indicated at this time..    Anticipated number of session for this episode of care: 7-9  Anticipation frequency of session: Every other week  Anticipated Duration of each session: 38-52 minutes  Treatment plan will be reviewed in 90 days or when goals have been changed.       MeasurableTreatment Goal(s) related to diagnosis / functional impairment(s)  Goal 1: Patient will experience a reduction in depressive symptoms along with a corresponding increase in positive emotion and life satisfaction.      Objective #A (Patient Action)    Patient will Increase interest, engagement, and pleasure in doing things.  Status: Continued - Date(s): 3/30/2023    Intervention(s)  Therapist will help patient identify pleasant and mastery oriented events that elicit positive, relaxed mood.    Objective #B  Patient will Decrease frequency and intensity of feeling down, depressed, hopeless.  Status: Continued - Date(s): 3/30/2023    Intervention(s)  Therapist will introduce patient to cognitive-behavioral and acceptance and commitment therapy topics aimed to help reduce depression and anxiety    Objective  "#C  Patient will Identify negative self-talk and behaviors: challenge core beliefs, myths, and actions  Improve concentration, focus, and mindfulness in daily activities .  Status: Continued - Date(s): 3/30/2023    Intervention(s)  Therapist will help patient identify and manage negative self-talk and automatic thoughts; introduce patient to cognitive distortions; help patient develop cognitive diffusion techniques      Goal 2: Patient will experience a reduction in anxious symptoms, along with a corresponding increase in relaxed emotional states and life satisfaction.      Objective #A (Patient Action)  Patient will use cognitive-behavioral and thought diffusion strategies identified in therapy to challenge anxious thoughts.    Status: Continued - Date(s): 3/30/2023    Intervention(s)  Therapist will utilize CBT and ACT ideas to help patient challenge anxious thoughts and reduce intensity/duration of anxious distress    Objective #B  Patient will use \"worry time\" each day for 15 minutes of scheduled worry and then defer obsessive or anxious thinking until the next structured worry time.    Status: Continued - Date(s): 3/30/2023    Intervention(s)  Therapist will teach patient how to effectively utilize worry time and/or thought logs/journals each day and incorporate more relaxation behaviors into their routine.    Objective #C  Patient will identify the stressors which contribute to feelings of anxiety  Patient will increase engagement in adaptive coping skills and recreational activities, such as exercise and healthy socialization, to manage distress.  Status: Continued - Date(s): 3/30/2023    Intervention(s)  Therapist will help patient identify triggers/situational factors that contribute to anxiety and behavioral skills aimed to manage anxious distress.      Goal 3: Patient will learn more adaptive ways to manage chronic pain      Objective #A (Patient Action)    Status: Continued - Date(s): " 3/30/2023    Patient will identify 2-4 strategies for managing pain.    Intervention(s)  Therapist will teach distraction skills aimed to help manage chronic pain and utilize ACT/CBT ideas to help with this (e.g. relaxation).    Objective #B  Patient will state understanding of stressors and relationship to physical symptoms.    Status: Continued - Date(s): 3/30/2023      Other Possible Therapeutic Intervention(s):    Psycho-education regarding mental health diagnoses and treatment options    Supportive Therapy    Provide affirmations, reflections, and establish working rapport    Emphasize and reflect on strength of therapeutic relationship    Skills training    Explore skills useful to client in current situation.    Skills include assertiveness, communication, conflict management, problem-solving, relaxation, etc.    Solution-Focused Therapy    Explore patterns in patient's relationships and discuss options for new behaviors.    Explore patterns in patient's actions and choices and discuss options for new behaviors.    Cognitive-behavioral Therapy    Discuss common cognitive distortions, identify them in patient's life.    Explore ways to challenge, replace, and act against these cognitions.    Acceptance and Commitment Therapy    Explore and identify important values in patient's life.    Discuss ways to commit to behavioral activation around these values.    Psychodynamic psychotherapy    Discuss patient's emotional dynamics and issues and how they impact behaviors.    Explore patient's history of relationships and how they impact present behaviors.    Explore how to work with and make changes in these schemas and patterns.    Narrative Therapy    Explore the patient's story of his/her life from his/her perspective.    Explore alternate ways of understanding their experience, identifying exceptions, developing new themes.    Interpersonal Psychotherapy    Explore patterns in relationships that are effective or  ineffective at helping patient reach their goals, find satisfying experience.    Discuss new patterns or behaviors to engage in for improved social functioning.    Behavioral Activation    Discuss steps patient can take to become more involved in meaningful activity.    Identify barriers to these activities and explore possible solutions.    Mindfulness-Based Strategies    Discuss skills based on development and application of mindfulness.    Skills drawn from compassion-focused therapy, dialectical behavior therapy, mindfulness-based stress reduction, mindfulness-based cognitive therapy, etc.      Patient has reviewed and agreed to the above plan.    Héctor Higuera Psy.D,    Behavioral Health Clinician   [facility]     3/30/2023    Crisis Resources    Refer to the resources below as needed.    Steps to care for yourself    1. If you are currently in counseling, call your counselor for an appointment  2. Call the local crisis resources below if needed.  3. Contact friends or family for support.  4. Get more exercise.  5. Do activities you enjoy.  6. Eat a well-balanced diet and drink plenty of fluids.  7. Rest as needed.  8. Limit alcohol and recreational drugs. These can worsen depression.    When to contact your primary care provider       You have thoughts of harming or killing yourself but have not made a plan to carry it out.    Your depression gets in the way of daily activities.    You are often unable to sleep.    You need help cutting back on alcohol or recreational drugs.    When to call 911 or go to the Emergency Room     Get emergency help right away if you have any of the following:    You are planning to harm or kill yourself and you have a way to carry out the plan.     You have injured yourself or others. Or, you think you will.    You feel confused or are having trouble thinking or remembering.    You are having delusions (false beliefs).    You are hearing voices  or seeing things that aren t there.    You are feeling psychotic (paranoid, fearful, restless, agitated, nervous, racing thoughts or speech)    Crisis Resources     These hotlines are for both adults and children. The and are open 24 hours a day, 7 days a week unless noted otherwise.    988 - National Suicide and Crisis Lifeline  If you or someone you know needs support now, call or text 988 or chat 988handsline.org. 988 connects you with a trained crisis counselor who can help.    National Suicide Prevention Lifeline   7-958-568-TALK (5252)    Crisis Text Line    www.crisistextline.org  Text HOME to 337253 from anywhere in the [place], anytime, about any type of crisis. A live, trained crisis counselor will receive the text and respond quickly.    [place] Lifeline for LGBTQ Youth  A national crisis intervention and suicide lifeline for LGBTQ youth under 25. Provides a safe place to talk without judgement.   Call 1-343.549.5941; text START to 095679 or visit www.thetrevorproject.org to talk to a trained counselor.  For [facility] crisis numbers, visit the [facility] website at:  https://[place].gov/dhs/people-we-serve/adults/health-care/mental-health/resources/crisis-contacts.jsp      Héctor Higuera Psy.D, LP   Behavioral Health Clinician   [facility]     6/3/2022

## 2023-06-06 ENCOUNTER — MEDICAL CORRESPONDENCE (OUTPATIENT)
Dept: HEALTH INFORMATION MANAGEMENT | Facility: CLINIC | Age: 33
End: 2023-06-06
Payer: MEDICARE

## 2023-06-07 ENCOUNTER — TRANSFERRED RECORDS (OUTPATIENT)
Dept: HEALTH INFORMATION MANAGEMENT | Facility: CLINIC | Age: 33
End: 2023-06-07
Payer: MEDICARE

## 2023-06-14 ENCOUNTER — MYC MEDICAL ADVICE (OUTPATIENT)
Dept: FAMILY MEDICINE | Facility: CLINIC | Age: 33
End: 2023-06-14
Payer: MEDICARE

## 2023-06-19 ENCOUNTER — VIRTUAL VISIT (OUTPATIENT)
Dept: BEHAVIORAL HEALTH | Facility: CLINIC | Age: 33
End: 2023-06-19
Payer: COMMERCIAL

## 2023-06-19 DIAGNOSIS — F33.1 MAJOR DEPRESSIVE DISORDER, RECURRENT EPISODE, MODERATE (H): Primary | ICD-10-CM

## 2023-06-19 PROCEDURE — 90834 PSYTX W PT 45 MINUTES: CPT | Mod: 95 | Performed by: PSYCHOLOGIST

## 2023-06-19 NOTE — PROGRESS NOTES
[facility] Integrated Behavioral Health  June 19, 2023        Behavioral Health Clinician Progress Note    Patient Name: Lanie Neil           Service Type: Video appointment      Service Location:  Video appointment      Session Start Time: 2:15  Session End Time: 3:00      Session Length: 53 - 60      Attendees: Patient    Visit Activities (Refresh list every visit): [facility] [place] Only    Service Modality:  Video Visit:      Provider verified identity through the following two step process.  Patient provided:  Patient photo and Patient is known previously to provider    Telemedicine Visit: The patient's condition can be safely assessed and treated via synchronous audio and visual telemedicine encounter.      Reason for Telemedicine Visit: Patient unable to travel    Originating Site (Patient Location): Patient's home    Distant Site (Provider Location): [facility] [place]    Consent:  The patient/guardian has verbally consented to: the potential risks and benefits of telemedicine (video visit) versus in person care; bill my insurance or make self-payment for services provided; and responsibility for payment of non-covered services.     Patient would like the video invitation sent by:  My Chart    Mode of Communication:  Video Conference via [facility]    Distant Location (Provider):  On-site    As the provider I attest to compliance with applicable laws and regulations related to telemedicine.      Diagnostic Assessment Date: 1/19/2021; update 5/23/2023  Treatment Plan Review Date: 6/30/2023  See Flowsheets for today's PHQ-9 and KATHARINE-7 results  Previous PHQ-9:       12/15/2020    10:49 AM 3/16/2021     9:13 AM 6/30/2021     9:26 AM   PHQ-9 SCORE   PHQ-9 Total Score MyChart 2 (Minimal depression) 2 (Minimal depression) 2 (Minimal depression)   PHQ-9 Total Score 2 2 2     Previous KATHARINE-7:       12/15/2020    10:52 AM 3/16/2021     9:15 AM 6/30/2021     9:27 AM   KATHARINE-7 SCORE  "  Total Score 1 (minimal anxiety) 1 (minimal anxiety) 1 (minimal anxiety)   Total Score 1 1 1       DATA  Extended Session (60+ minutes): No  Interactive Complexity: No  Crisis: No    Treatment Objective(s) Addressed in This Session:  Target Behavior(s): disease management/lifestyle changes      Depressed Mood: Decrease frequency and intensity of feeling down, depressed, hopeless  Identify negative self-talk and behaviors: challenge core beliefs, myths, and actions  Anxiety: will develop more effective coping skills to manage anxiety symptoms  Relationship Problems: will address relationship difficulties in a more adaptive manner    Current Stressors / Issues:  [facility] met with Lanie today to continue supporting her treatment of depression, adjustment to chronic disease management, and relationship problems.     Continued discussion today about relationship conflicts, most recently with her mother and brothers. States her brothers and mother often feel as though they have a more volatile relationship, noting it doesn't take too much for them to enter a conflict. Lanie states she is doing better with changing how she approaches certain conflicts, like choosing not to get involved or overly try to explain a conflicted party's perspective.     We discussed continued ways of managing relationship conflict. Also discussed the role of limiting self-beliefs with regard to herself and others, how they hold her back/contribute to anxiety and depressed mood. Explored these in the context of considering taking an online course or returning to school - looked at the belief about believing she would fail if she were to try, for example.     Am I Hooked or Unhooked From My Thoughts?    Clues that I'm getting hooked and threatened by my thoughts:    1) Thoughts are Literal and Threatening; it s as if what we re thinking is actually present, here and now!    2) Thoughts are \"The Truth\"; we literally believe them!    3) Thoughts are " "Most Important; we take them seriously, and give them our full attention!    4) Thoughts are Orders; we automatically obey them!    5) Thoughts are Always Wise; we assume they know best and we follow their advice!     _____________________________________________________________________    Noticing when I'm unhooked and not threatened by my thoughts:    1) Thoughts are merely sounds, words, stories, bits of language, passing through our heads.    2) Thoughts may or may not be true. We don t automatically believe them.    3) Thoughts may or may not be important. We pay attention only if they re helpful.    4) Thoughts are not orders. We don t have to obey them.    5) Thoughts may or may not be wise. We don t automatically follow their advice.   Thoughts That Hook You:    What memories, fears, worries, self-criticisms, or other unhelpful thoughts do you dwell on or get \"stuck\" thinking about? What thoughts tend to hook you, jerk you around, and pull you out of your life?                 Life-Draining Actions:    What are you currently doing that makes your life worse in the long run: keeps you stuck, wastes your time or money, drains your energy; restricts your life; impacts negatively on your health, work, or relationships; maintains or worsens the problems your are dealing with?                     Feelings That Hook You:    What emotions, feelings, urges, impulses, or sensations tend to hook you and jerk you around or pull you into self-defeating actions?           Avoiding Challenging Situations:    What situations, activities, people, or places are you avoiding or staying away from? What have you quit, withdrawn from, dropped out of? What do you keep \"putting off\" until later?                           Progress on Treatment Objective(s) / Homework:  Satisfactory progress - ACTION (Actively working towards change); Intervened by reinforcing change plan / affirming steps taken       Psychodynamic " psychotherapy    Discuss patient's emotional dynamics and issues and how they impact behaviors.    Explore patient's history of relationships and how they impact present behaviors.    Explore how to work with and make changes in these schemas and patterns.    Interpersonal Psychotherapy    Explore patterns in relationships that are effective or ineffective at helping patient reach their goals, find satisfying experience.    Discuss new patterns or behaviors to engage in for improved social functioning.      Care Plan review completed: no     Medication Review:  No changes to current psychiatric medication(s)    Medication Compliance:  NA    Changes in Health Issues:   None reported    Chemical Use Review:   Substance Use: Chemical use reviewed, no active concerns identified      Tobacco Use: No current tobacco use.      Assessment: Current Emotional / Mental Status (status of significant symptoms):  Risk status (Self / Other harm or suicidal ideation)  Patient has had a history of suicidal ideation: passive thoughts only; easily controlled - denied any recently    Patient denies current fears or concerns for personal safety.  Patient denies current or recent suicidal ideation or behaviors.  Patient denies current or recent homicidal ideation or behaviors.  Patient denies current or recent self injurious behavior or ideation.  Patient denies other safety concerns.     A safety and risk management plan has not been developed at this time, however patient was encouraged to call [place] [phone] should there be a change in any of these risk factors.      [place] Suicide Severity Rating Scale (Short Version)      2/18/2021     4:47 PM   [place] Suicide Severity Rating (Short Version)   Over the past 2 weeks have you felt down, depressed, or hopeless? yes   Over the past 2 weeks have you had thoughts of killing yourself? no   Have you ever attempted to kill yourself? no     [place] Suicide Severity Rating Scale  (Lifetime/Recent)      8/13/2021     1:25 PM   [place] Suicide Severity Rating (Lifetime/Recent)   Wish to be Dead (Lifetime) No   Non-Specific Active Suicidal Thoughts (Lifetime) No   Most Severe Ideation Rating (Lifetime) NA   Frequency (Lifetime) NA   Duration (Lifetime) NA   Controllability (Lifetime) NA   Protective Factors  (Lifetime) NA   Reasons for Ideation (Lifetime) NA   RETIRED: 1. Wish to be Dead (Recent) No   RETIRED: 2. Non-Specific Active Suicidal Thoughts (Recent) No   3. Active Suicidal Ideation with any Methods (Not Plan) Without Intent to Act (Lifetime) No   RETIRED: 3. Active Suicidal Ideation with any Methods (Not Plan) Without Intent to Act (Recent) No   RETIRE: 4. Active Suicidal Ideation with Some Intent to Act, Without Specific Plan (Lifetime) No   4. Active Suicidal Ideation with Some Intent to Act, Without Specific Plan (Recent) No   RETIRE: 5. Active Suicidal Ideation with Specific Plan and Intent (Lifetime) No   RETIRED: 5. Active Suicidal Ideation with Specific Plan and Intent (Recent) No   Most Severe Ideation Rating (Past Month) NA   Frequency (Past Month) NA   Duration (Past Month) NA   Controllability (Past Month) NA   Protective Factors (Past Month) NA   Reasons for Ideation (Past Month) NA   Actual Attempt (Lifetime) No   Actual Attempt (Past 3 Months) No   Has subject engaged in non-suicidal self-injurious behavior? (Lifetime) No   Has subject engaged in non-suicidal self-injurious behavior? (Past 3 Months) No   Interrupted Attempts (Lifetime) No   Interrupted Attempts (Past 3 Months) No   Aborted or Self-Interrupted Attempt (Lifetime) No   Aborted or Self-Interrupted Attempt (Past 3 Months) No   Preparatory Acts or Behavior (Lifetime) No   Preparatory Acts or Behavior (Past 3 Months) No   Most Recent Attempt Actual Lethality Code NA   Most Lethal Attempt Actual Lethality Code NA   Initial/First Attempt Actual Lethality Code NA         Appearance:   Appropriate   Eye  Contact:   Good   Psychomotor Behavior: Normal   Attitude:   Cooperative   Orientation:   All  Speech   Rate / Production: Normal    Volume:  Normal   Mood:    Depressed    Affect:    Tearful  Thought Content:  Rumination   Thought Form:  Coherent  Logical   Insight:    Good     Diagnoses:  1. Major depressive disorder, recurrent episode, moderate (H)        Collateral Reports Completed:  [facility] will coordinate with care team as needed    Plan: (Homework, other):  Lanie was scheduled to RTC in two weeks for help addressing depressive symptoms, pain, and relationship conflicts. Continue thought log for help with depression.  CD Recommendations: No indications of CD issues.     Héctor Higuera Psy.D, LP   Behavioral Health Clinician   [facility]     _______________________________________________________________________                                              Individual Treatment Plan    Patient's Name: Lanie Neil  YOB: 1990    Date of Creation: 3/4/2022  Date Treatment Plan Last Reviewed/Revised: 3/30/2023    DSM5 Diagnoses: 296.32 (F33.1) Major Depressive Disorder, Recurrent Episode, Moderate With anxious distress  Psychosocial / Contextual Factors: Chronic pain, SMA, disabled  PROMIS (reviewed every 90 days):     PROMIS-10    In general, would you say your health is: (P) Excellent    In general, would you say your quality of life is: (P) Good    In general, how would you rate your physical health? (P) Very good    In general, how would you rate your mental health, including your mood and your ability to think? (P) Good    In general, how would you rate your satisfaction with your social activities and relationships? (P) Good    In general, please rate how well you carry out your usual social activities and roles. (This includes activities at home, at work and in your community, and responsibilities as a parent, child, spouse, employee, friend, etc.) (P) Very  good    To what extent are you able to carry out your everyday physical activities such as walking, climbing stairs, carrying groceries, or moving a chair? (P) Not at all    In the past 7 days,  How often have you been bothered by emotional problems such as feeling anxious, depressed or irritable? (P) Rarely  How would you rate your fatigue on average? (P) Moderate  How would you rate your pain on average? (P) 6    PROMIS GLOBAL SCORES    Mental health question re-calculation - no clinical value - (P) 4  Physical health question re-calculation - no clinical value - (P) 3  Pain question re-calculation - no clinical value - (P) 3  Global Mental Health Score - (P) 13  Global Physical Health Score - (P) 11  PROMIS TOTAL - SUBSCORES - (P) 24      [id number] PROMIS HEALTH ORGANIZATION AND PROMIS COOPERATIVE GROUP VERSION 1.1          Referral / Collaboration:  Referral to another professional/service is not indicated at this time..    Anticipated number of session for this episode of care: 7-9  Anticipation frequency of session: Every other week  Anticipated Duration of each session: 38-52 minutes  Treatment plan will be reviewed in 90 days or when goals have been changed.       MeasurableTreatment Goal(s) related to diagnosis / functional impairment(s)  Goal 1: Patient will experience a reduction in depressive symptoms along with a corresponding increase in positive emotion and life satisfaction.      Objective #A (Patient Action)    Patient will Increase interest, engagement, and pleasure in doing things.  Status: Continued - Date(s): 3/30/2023    Intervention(s)  Therapist will help patient identify pleasant and mastery oriented events that elicit positive, relaxed mood.    Objective #B  Patient will Decrease frequency and intensity of feeling down, depressed, hopeless.  Status: Continued - Date(s): 3/30/2023    Intervention(s)  Therapist will introduce patient to cognitive-behavioral and acceptance and commitment  "therapy topics aimed to help reduce depression and anxiety    Objective #C  Patient will Identify negative self-talk and behaviors: challenge core beliefs, myths, and actions  Improve concentration, focus, and mindfulness in daily activities .  Status: Continued - Date(s): 3/30/2023    Intervention(s)  Therapist will help patient identify and manage negative self-talk and automatic thoughts; introduce patient to cognitive distortions; help patient develop cognitive diffusion techniques      Goal 2: Patient will experience a reduction in anxious symptoms, along with a corresponding increase in relaxed emotional states and life satisfaction.      Objective #A (Patient Action)  Patient will use cognitive-behavioral and thought diffusion strategies identified in therapy to challenge anxious thoughts.    Status: Continued - Date(s): 3/30/2023    Intervention(s)  Therapist will utilize CBT and ACT ideas to help patient challenge anxious thoughts and reduce intensity/duration of anxious distress    Objective #B  Patient will use \"worry time\" each day for 15 minutes of scheduled worry and then defer obsessive or anxious thinking until the next structured worry time.    Status: Continued - Date(s): 3/30/2023    Intervention(s)  Therapist will teach patient how to effectively utilize worry time and/or thought logs/journals each day and incorporate more relaxation behaviors into their routine.    Objective #C  Patient will identify the stressors which contribute to feelings of anxiety  Patient will increase engagement in adaptive coping skills and recreational activities, such as exercise and healthy socialization, to manage distress.  Status: Continued - Date(s): 3/30/2023    Intervention(s)  Therapist will help patient identify triggers/situational factors that contribute to anxiety and behavioral skills aimed to manage anxious distress.      Goal 3: Patient will learn more adaptive ways to manage chronic pain      Objective " #A (Patient Action)    Status: Continued - Date(s): 3/30/2023    Patient will identify 2-4 strategies for managing pain.    Intervention(s)  Therapist will teach distraction skills aimed to help manage chronic pain and utilize ACT/CBT ideas to help with this (e.g. relaxation).    Objective #B  Patient will state understanding of stressors and relationship to physical symptoms.    Status: Continued - Date(s): 3/30/2023      Other Possible Therapeutic Intervention(s):    Psycho-education regarding mental health diagnoses and treatment options    Supportive Therapy    Provide affirmations, reflections, and establish working rapport    Emphasize and reflect on strength of therapeutic relationship    Skills training    Explore skills useful to client in current situation.    Skills include assertiveness, communication, conflict management, problem-solving, relaxation, etc.    Solution-Focused Therapy    Explore patterns in patient's relationships and discuss options for new behaviors.    Explore patterns in patient's actions and choices and discuss options for new behaviors.    Cognitive-behavioral Therapy    Discuss common cognitive distortions, identify them in patient's life.    Explore ways to challenge, replace, and act against these cognitions.    Acceptance and Commitment Therapy    Explore and identify important values in patient's life.    Discuss ways to commit to behavioral activation around these values.    Psychodynamic psychotherapy    Discuss patient's emotional dynamics and issues and how they impact behaviors.    Explore patient's history of relationships and how they impact present behaviors.    Explore how to work with and make changes in these schemas and patterns.    Narrative Therapy    Explore the patient's story of his/her life from his/her perspective.    Explore alternate ways of understanding their experience, identifying exceptions, developing new themes.    Interpersonal  Psychotherapy    Explore patterns in relationships that are effective or ineffective at helping patient reach their goals, find satisfying experience.    Discuss new patterns or behaviors to engage in for improved social functioning.    Behavioral Activation    Discuss steps patient can take to become more involved in meaningful activity.    Identify barriers to these activities and explore possible solutions.    Mindfulness-Based Strategies    Discuss skills based on development and application of mindfulness.    Skills drawn from compassion-focused therapy, dialectical behavior therapy, mindfulness-based stress reduction, mindfulness-based cognitive therapy, etc.      Patient has reviewed and agreed to the above plan.    Héctor Higuera Psy.D,    Behavioral Health Clinician   [facility]     3/30/2023    Crisis Resources    Refer to the resources below as needed.    Steps to care for yourself    1. If you are currently in counseling, call your counselor for an appointment  2. Call the local crisis resources below if needed.  3. Contact friends or family for support.  4. Get more exercise.  5. Do activities you enjoy.  6. Eat a well-balanced diet and drink plenty of fluids.  7. Rest as needed.  8. Limit alcohol and recreational drugs. These can worsen depression.    When to contact your primary care provider       You have thoughts of harming or killing yourself but have not made a plan to carry it out.    Your depression gets in the way of daily activities.    You are often unable to sleep.    You need help cutting back on alcohol or recreational drugs.    When to call 911 or go to the Emergency Room     Get emergency help right away if you have any of the following:    You are planning to harm or kill yourself and you have a way to carry out the plan.     You have injured yourself or others. Or, you think you will.    You feel confused or are having trouble thinking or  remembering.    You are having delusions (false beliefs).    You are hearing voices or seeing things that aren t there.    You are feeling psychotic (paranoid, fearful, restless, agitated, nervous, racing thoughts or speech)    Crisis Resources     These hotlines are for both adults and children. The and are open 24 hours a day, 7 days a week unless noted otherwise.    988 - National Suicide and Crisis Lifeline  If you or someone you know needs support now, call or text 988 or chat 98[facility]. 988 connects you with a trained crisis counselor who can help.    National Suicide Prevention Lifeline   2-505-510-TALK (6866)    Crisis Text Line    www.crisistextline.org  Text HOME to 665611 from anywhere in the [place], anytime, about any type of crisis. A live, trained crisis counselor will receive the text and respond quickly.    [place] Lifeline for LGBTQ Youth  A national crisis intervention and suicide lifeline for LGBTQ youth under 25. Provides a safe place to talk without judgement.   Call 1-878.113.9111; text START to 608318 or visit www.the[employer]project.org to talk to a trained counselor.  For [facility] crisis numbers, visit the [facility] website at:  https://[place].gov/dhs/people-we-serve/adults/health-care/mental-health/resources/crisis-contacts.jsp      Héctor Higuera Psy.D, LP   Behavioral Health Clinician   -[facility]     6/3/2022

## 2023-06-27 DIAGNOSIS — N94.6 PAINFUL MENSTRUAL PERIODS: ICD-10-CM

## 2023-06-27 DIAGNOSIS — Z86.718 HISTORY OF DEEP VENOUS THROMBOSIS: ICD-10-CM

## 2023-06-27 DIAGNOSIS — Z79.01 CHRONIC ANTICOAGULATION: ICD-10-CM

## 2023-06-27 RX ORDER — CELECOXIB 200 MG/1
200 CAPSULE ORAL DAILY
Qty: 12 CAPSULE | Refills: 5 | Status: SHIPPED | OUTPATIENT
Start: 2023-06-27

## 2023-06-29 DIAGNOSIS — J45.909 ASTHMA: ICD-10-CM

## 2023-06-29 DIAGNOSIS — F32.A DEPRESSION, UNSPECIFIED DEPRESSION TYPE: ICD-10-CM

## 2023-06-29 DIAGNOSIS — F41.9 ANXIETY: ICD-10-CM

## 2023-06-30 RX ORDER — ALBUTEROL SULFATE 90 UG/1
2 AEROSOL, METERED RESPIRATORY (INHALATION) EVERY 4 HOURS PRN
Qty: 18 G | Refills: 3 | Status: SHIPPED | OUTPATIENT
Start: 2023-06-30 | End: 2023-12-21

## 2023-06-30 RX ORDER — SERTRALINE HYDROCHLORIDE 20 MG/ML
SOLUTION ORAL
Qty: 225 ML | Refills: 1 | Status: SHIPPED | OUTPATIENT
Start: 2023-06-30 | End: 2024-05-16

## 2023-06-30 NOTE — TELEPHONE ENCOUNTER
sertraline (ZOLOFT) 20 MG/ML (HIGH CONC) solution  Last Written Prescription Date:   12/15/2022  Last Fill Quantity: 360,   # refills: 0  Last Office Visit :  5/9/2023  Future Office visit:  None  Routing refill request to provider for review/approval because:  Second Request    Needing updated PHQ-9  Please call Pt to review       albuterol (PROAIR HFA) 108 (90 Base) MCG/ACT inhaler  Last Written Prescription Date:   12/15/2022  Last Fill Quantity: 360,   # refills: 0  Last Office Visit :  5/9/2023  Future Office visit:  None  Routing refill request to provider for review/approval because:  Second Request    Needing updated ACT   Please call Pt to review     Georgina Sy RN  Central Triage Red Flags/Med Refills

## 2023-07-05 ENCOUNTER — DOCUMENTATION ONLY (OUTPATIENT)
Dept: FAMILY MEDICINE | Facility: CLINIC | Age: 33
End: 2023-07-05
Payer: MEDICARE

## 2023-07-05 NOTE — PROGRESS NOTES
Type of Form Received:     Form Received (Date) 7/5/23   Form Filled out Yes, 07/06/23   Placed in provider folder Yes

## 2023-07-06 ENCOUNTER — VIRTUAL VISIT (OUTPATIENT)
Dept: BEHAVIORAL HEALTH | Facility: CLINIC | Age: 33
End: 2023-07-06
Payer: COMMERCIAL

## 2023-07-06 ENCOUNTER — MEDICAL CORRESPONDENCE (OUTPATIENT)
Dept: HEALTH INFORMATION MANAGEMENT | Facility: CLINIC | Age: 33
End: 2023-07-06
Payer: MEDICARE

## 2023-07-06 ENCOUNTER — TRANSFERRED RECORDS (OUTPATIENT)
Dept: HEALTH INFORMATION MANAGEMENT | Facility: CLINIC | Age: 33
End: 2023-07-06
Payer: MEDICARE

## 2023-07-06 DIAGNOSIS — F33.1 MAJOR DEPRESSIVE DISORDER, RECURRENT EPISODE, MODERATE (H): Primary | ICD-10-CM

## 2023-07-06 PROCEDURE — 90837 PSYTX W PT 60 MINUTES: CPT | Mod: 95 | Performed by: PSYCHOLOGIST

## 2023-07-06 NOTE — PROGRESS NOTES
[facility]: Integrated Behavioral Health  July 6, 2023          Behavioral Health Clinician Progress Note    Patient Name: Lanie Neil           Service Type: Video appointment      Service Location:  Video appointment      Session Start Time: 2:37  Session End Time: 3:30      Session Length: 53 - 60      Attendees: Patient    Visit Activities (Refresh list every visit): [facility] Only    Service Modality:  Video Visit:      Provider verified identity through the following two step process.  Patient provided:  Patient photo and Patient is known previously to provider    Telemedicine Visit: The patient's condition can be safely assessed and treated via synchronous audio and visual telemedicine encounter.      Reason for Telemedicine Visit: Patient unable to travel    Originating Site (Patient Location): Patient's home    Distant Site (Provider Location): [facility] [facility]    Consent:  The patient/guardian has verbally consented to: the potential risks and benefits of telemedicine (video visit) versus in person care; bill my insurance or make self-payment for services provided; and responsibility for payment of non-covered services.     Patient would like the video invitation sent by:  My Chart    Mode of Communication:  Video Conference via [facility]    Distant Location (Provider):  On-site    As the provider I attest to compliance with applicable laws and regulations related to telemedicine.      Diagnostic Assessment Date: 1/19/2021; update 5/23/2023  Treatment Plan Review Date: 6/30/2023  See Flowsheets for today's PHQ-9 and KATHARINE-7 results  Previous PHQ-9:       12/15/2020    10:49 AM 3/16/2021     9:13 AM 6/30/2021     9:26 AM   PHQ-9 SCORE   PHQ-9 Total Score MyChart 2 (Minimal depression) 2 (Minimal depression) 2 (Minimal depression)   PHQ-9 Total Score 2 2 2     Previous KATHARINE-7:       12/15/2020    10:52 AM 3/16/2021     9:15 AM 6/30/2021     9:27 AM   KATHARINE-7 SCORE  "  Total Score 1 (minimal anxiety) 1 (minimal anxiety) 1 (minimal anxiety)   Total Score 1 1 1       DATA  Extended Session (60+ minutes): No  Interactive Complexity: No  Crisis: No    Treatment Objective(s) Addressed in This Session:  Target Behavior(s): disease management/lifestyle changes      Depressed Mood: Decrease frequency and intensity of feeling down, depressed, hopeless  Identify negative self-talk and behaviors: challenge core beliefs, myths, and actions  Anxiety: will develop more effective coping skills to manage anxiety symptoms  Relationship Problems: will address relationship difficulties in a more adaptive manner    Current Stressors / Issues:  [facility] met with Lanie today to continue supporting her treatment of depression, adjustment to chronic disease management, and relationship problems.     Continued discussion today about relationship conflicts, most recently with her mother and brothers.     Discussed developing ability to cope effectively with relationship conflicts, for those she is directly involved in or between other parties. Explored and provided information on co-dependency with this regard, looking at the notion of \"Ill be okay if they are okay\" and how this relates to the distress she experiences during fights/conflicts at home. Discussed adaptive methods of coping with distress associated with conflict, such as focusing on what she can control or reminding herself of past conflicts and how she was not abandoned by others, her worst-case scenario as identified. Writer made use of CBT and MBSR ideas to help Lanie with her concerns today.      Am I Hooked or Unhooked From My Thoughts?    Clues that I'm getting hooked and threatened by my thoughts:    1) Thoughts are Literal and Threatening; it s as if what we re thinking is actually present, here and now!    2) Thoughts are \"The Truth\"; we literally believe them!    3) Thoughts are Most Important; we take them seriously, and give them " "our full attention!    4) Thoughts are Orders; we automatically obey them!    5) Thoughts are Always Wise; we assume they know best and we follow their advice!     _____________________________________________________________________    Noticing when I'm unhooked and not threatened by my thoughts:    1) Thoughts are merely sounds, words, stories, bits of language, passing through our heads.    2) Thoughts may or may not be true. We don t automatically believe them.    3) Thoughts may or may not be important. We pay attention only if they re helpful.    4) Thoughts are not orders. We don t have to obey them.    5) Thoughts may or may not be wise. We don t automatically follow their advice.   Thoughts That Hook You:    What memories, fears, worries, self-criticisms, or other unhelpful thoughts do you dwell on or get \"stuck\" thinking about? What thoughts tend to hook you, jerk you around, and pull you out of your life?                 Life-Draining Actions:    What are you currently doing that makes your life worse in the long run: keeps you stuck, wastes your time or money, drains your energy; restricts your life; impacts negatively on your health, work, or relationships; maintains or worsens the problems your are dealing with?                     Feelings That Hook You:    What emotions, feelings, urges, impulses, or sensations tend to hook you and jerk you around or pull you into self-defeating actions?           Avoiding Challenging Situations:    What situations, activities, people, or places are you avoiding or staying away from? What have you quit, withdrawn from, dropped out of? What do you keep \"putting off\" until later?                           Progress on Treatment Objective(s) / Homework:  Satisfactory progress - ACTION (Actively working towards change); Intervened by reinforcing change plan / affirming steps taken       Psychodynamic psychotherapy    Discuss patient's emotional dynamics and issues and how " they impact behaviors.    Explore patient's history of relationships and how they impact present behaviors.    Explore how to work with and make changes in these schemas and patterns.    Interpersonal Psychotherapy    Explore patterns in relationships that are effective or ineffective at helping patient reach their goals, find satisfying experience.    Discuss new patterns or behaviors to engage in for improved social functioning.      Care Plan review completed: no     Medication Review:  No changes to current psychiatric medication(s)    Medication Compliance:  NA    Changes in Health Issues:   None reported    Chemical Use Review:   Substance Use: Chemical use reviewed, no active concerns identified      Tobacco Use: No current tobacco use.      Assessment: Current Emotional / Mental Status (status of significant symptoms):  Risk status (Self / Other harm or suicidal ideation)  Patient has had a history of suicidal ideation: passive thoughts only; easily controlled - denied any recently    Patient denies current fears or concerns for personal safety.  Patient denies current or recent suicidal ideation or behaviors.  Patient denies current or recent homicidal ideation or behaviors.  Patient denies current or recent self injurious behavior or ideation.  Patient denies other safety concerns.     A safety and risk management plan has not been developed at this time, however patient was encouraged to call [place] [phone] should there be a change in any of these risk factors.      [place] Suicide Severity Rating Scale (Short Version)      2/18/2021     4:47 PM   [place] Suicide Severity Rating (Short Version)   Over the past 2 weeks have you felt down, depressed, or hopeless? yes   Over the past 2 weeks have you had thoughts of killing yourself? no   Have you ever attempted to kill yourself? no     [place] Suicide Severity Rating Scale (Lifetime/Recent)      8/13/2021     1:25 PM   [place] Suicide Severity  Rating (Lifetime/Recent)   Wish to be Dead (Lifetime) No   Non-Specific Active Suicidal Thoughts (Lifetime) No   Most Severe Ideation Rating (Lifetime) NA   Frequency (Lifetime) NA   Duration (Lifetime) NA   Controllability (Lifetime) NA   Protective Factors  (Lifetime) NA   Reasons for Ideation (Lifetime) NA   RETIRED: 1. Wish to be Dead (Recent) No   RETIRED: 2. Non-Specific Active Suicidal Thoughts (Recent) No   3. Active Suicidal Ideation with any Methods (Not Plan) Without Intent to Act (Lifetime) No   RETIRED: 3. Active Suicidal Ideation with any Methods (Not Plan) Without Intent to Act (Recent) No   RETIRE: 4. Active Suicidal Ideation with Some Intent to Act, Without Specific Plan (Lifetime) No   4. Active Suicidal Ideation with Some Intent to Act, Without Specific Plan (Recent) No   RETIRE: 5. Active Suicidal Ideation with Specific Plan and Intent (Lifetime) No   RETIRED: 5. Active Suicidal Ideation with Specific Plan and Intent (Recent) No   Most Severe Ideation Rating (Past Month) NA   Frequency (Past Month) NA   Duration (Past Month) NA   Controllability (Past Month) NA   Protective Factors (Past Month) NA   Reasons for Ideation (Past Month) NA   Actual Attempt (Lifetime) No   Actual Attempt (Past 3 Months) No   Has subject engaged in non-suicidal self-injurious behavior? (Lifetime) No   Has subject engaged in non-suicidal self-injurious behavior? (Past 3 Months) No   Interrupted Attempts (Lifetime) No   Interrupted Attempts (Past 3 Months) No   Aborted or Self-Interrupted Attempt (Lifetime) No   Aborted or Self-Interrupted Attempt (Past 3 Months) No   Preparatory Acts or Behavior (Lifetime) No   Preparatory Acts or Behavior (Past 3 Months) No   Most Recent Attempt Actual Lethality Code NA   Most Lethal Attempt Actual Lethality Code NA   Initial/First Attempt Actual Lethality Code NA         Appearance:   Appropriate   Eye Contact:   Good   Psychomotor Behavior: Normal   Attitude:   Cooperative    Orientation:   All  Speech   Rate / Production: Normal    Volume:  Normal   Mood:    Depressed    Affect:    Tearful  Thought Content:  Rumination   Thought Form:  Coherent  Logical   Insight:    Good     Diagnoses:  1. Major depressive disorder, recurrent episode, moderate (H)        Collateral Reports Completed:  [facility] will coordinate with care team as needed    Plan: (Homework, other):  Lanie was scheduled to RTC in two weeks for help addressing depressive symptoms, pain, and relationship conflicts. Continue thought log for help with depression.  CD Recommendations: No indications of CD issues.     Héctor Higuera Psy.D, LP   Behavioral Health Clinician   [facility]     _______________________________________________________________________                                              Individual Treatment Plan    Patient's Name: Lanie Neil  YOB: 1990    Date of Creation: 3/4/2022  Date Treatment Plan Last Reviewed/Revised: 3/30/2023    DSM5 Diagnoses: 296.32 (F33.1) Major Depressive Disorder, Recurrent Episode, Moderate With anxious distress  Psychosocial / Contextual Factors: Chronic pain, SMA, disabled  PROMIS (reviewed every 90 days):     PROMIS-10    In general, would you say your health is: (P) Excellent    In general, would you say your quality of life is: (P) Good    In general, how would you rate your physical health? (P) Very good    In general, how would you rate your mental health, including your mood and your ability to think? (P) Good    In general, how would you rate your satisfaction with your social activities and relationships? (P) Good    In general, please rate how well you carry out your usual social activities and roles. (This includes activities at home, at work and in your community, and responsibilities as a parent, child, spouse, employee, friend, etc.) (P) Very good    To what extent are you able to carry out your everyday physical  activities such as walking, climbing stairs, carrying groceries, or moving a chair? (P) Not at all    In the past 7 days,  How often have you been bothered by emotional problems such as feeling anxious, depressed or irritable? (P) Rarely  How would you rate your fatigue on average? (P) Moderate  How would you rate your pain on average? (P) 6    PROMIS GLOBAL SCORES    Mental health question re-calculation - no clinical value - (P) 4  Physical health question re-calculation - no clinical value - (P) 3  Pain question re-calculation - no clinical value - (P) 3  Global Mental Health Score - (P) 13  Global Physical Health Score - (P) 11  PROMIS TOTAL - SUBSCORES - (P) 24      [id number] PROMIS HEALTH ORGANIZATION AND PROMIS COOPERATIVE GROUP VERSION 1.1          Referral / Collaboration:  Referral to another professional/service is not indicated at this time..    Anticipated number of session for this episode of care: 7-9  Anticipation frequency of session: Every other week  Anticipated Duration of each session: 38-52 minutes  Treatment plan will be reviewed in 90 days or when goals have been changed.       MeasurableTreatment Goal(s) related to diagnosis / functional impairment(s)  Goal 1: Patient will experience a reduction in depressive symptoms along with a corresponding increase in positive emotion and life satisfaction.      Objective #A (Patient Action)    Patient will Increase interest, engagement, and pleasure in doing things.  Status: Continued - Date(s): 3/30/2023    Intervention(s)  Therapist will help patient identify pleasant and mastery oriented events that elicit positive, relaxed mood.    Objective #B  Patient will Decrease frequency and intensity of feeling down, depressed, hopeless.  Status: Continued - Date(s): 3/30/2023    Intervention(s)  Therapist will introduce patient to cognitive-behavioral and acceptance and commitment therapy topics aimed to help reduce depression and anxiety    Objective  "#C  Patient will Identify negative self-talk and behaviors: challenge core beliefs, myths, and actions  Improve concentration, focus, and mindfulness in daily activities .  Status: Continued - Date(s): 3/30/2023    Intervention(s)  Therapist will help patient identify and manage negative self-talk and automatic thoughts; introduce patient to cognitive distortions; help patient develop cognitive diffusion techniques      Goal 2: Patient will experience a reduction in anxious symptoms, along with a corresponding increase in relaxed emotional states and life satisfaction.      Objective #A (Patient Action)  Patient will use cognitive-behavioral and thought diffusion strategies identified in therapy to challenge anxious thoughts.    Status: Continued - Date(s): 3/30/2023    Intervention(s)  Therapist will utilize CBT and ACT ideas to help patient challenge anxious thoughts and reduce intensity/duration of anxious distress    Objective #B  Patient will use \"worry time\" each day for 15 minutes of scheduled worry and then defer obsessive or anxious thinking until the next structured worry time.    Status: Continued - Date(s): 3/30/2023    Intervention(s)  Therapist will teach patient how to effectively utilize worry time and/or thought logs/journals each day and incorporate more relaxation behaviors into their routine.    Objective #C  Patient will identify the stressors which contribute to feelings of anxiety  Patient will increase engagement in adaptive coping skills and recreational activities, such as exercise and healthy socialization, to manage distress.  Status: Continued - Date(s): 3/30/2023    Intervention(s)  Therapist will help patient identify triggers/situational factors that contribute to anxiety and behavioral skills aimed to manage anxious distress.      Goal 3: Patient will learn more adaptive ways to manage chronic pain      Objective #A (Patient Action)    Status: Continued - Date(s): " 3/30/2023    Patient will identify 2-4 strategies for managing pain.    Intervention(s)  Therapist will teach distraction skills aimed to help manage chronic pain and utilize ACT/CBT ideas to help with this (e.g. relaxation).    Objective #B  Patient will state understanding of stressors and relationship to physical symptoms.    Status: Continued - Date(s): 3/30/2023      Other Possible Therapeutic Intervention(s):    Psycho-education regarding mental health diagnoses and treatment options    Supportive Therapy    Provide affirmations, reflections, and establish working rapport    Emphasize and reflect on strength of therapeutic relationship    Skills training    Explore skills useful to client in current situation.    Skills include assertiveness, communication, conflict management, problem-solving, relaxation, etc.    Solution-Focused Therapy    Explore patterns in patient's relationships and discuss options for new behaviors.    Explore patterns in patient's actions and choices and discuss options for new behaviors.    Cognitive-behavioral Therapy    Discuss common cognitive distortions, identify them in patient's life.    Explore ways to challenge, replace, and act against these cognitions.    Acceptance and Commitment Therapy    Explore and identify important values in patient's life.    Discuss ways to commit to behavioral activation around these values.    Psychodynamic psychotherapy    Discuss patient's emotional dynamics and issues and how they impact behaviors.    Explore patient's history of relationships and how they impact present behaviors.    Explore how to work with and make changes in these schemas and patterns.    Narrative Therapy    Explore the patient's story of his/her life from his/her perspective.    Explore alternate ways of understanding their experience, identifying exceptions, developing new themes.    Interpersonal Psychotherapy    Explore patterns in relationships that are effective or  ineffective at helping patient reach their goals, find satisfying experience.    Discuss new patterns or behaviors to engage in for improved social functioning.    Behavioral Activation    Discuss steps patient can take to become more involved in meaningful activity.    Identify barriers to these activities and explore possible solutions.    Mindfulness-Based Strategies    Discuss skills based on development and application of mindfulness.    Skills drawn from compassion-focused therapy, dialectical behavior therapy, mindfulness-based stress reduction, mindfulness-based cognitive therapy, etc.      Patient has reviewed and agreed to the above plan.    Héctor Higuera Psy.D,    Behavioral Health Clinician   [facility]     3/30/2023    Crisis Resources    Refer to the resources below as needed.    Steps to care for yourself    1. If you are currently in counseling, call your counselor for an appointment  2. Call the local crisis resources below if needed.  3. Contact friends or family for support.  4. Get more exercise.  5. Do activities you enjoy.  6. Eat a well-balanced diet and drink plenty of fluids.  7. Rest as needed.  8. Limit alcohol and recreational drugs. These can worsen depression.    When to contact your primary care provider       You have thoughts of harming or killing yourself but have not made a plan to carry it out.    Your depression gets in the way of daily activities.    You are often unable to sleep.    You need help cutting back on alcohol or recreational drugs.    When to call 911 or go to the Emergency Room     Get emergency help right away if you have any of the following:    You are planning to harm or kill yourself and you have a way to carry out the plan.     You have injured yourself or others. Or, you think you will.    You feel confused or are having trouble thinking or remembering.    You are having delusions (false beliefs).    You are hearing voices  or seeing things that aren t there.    You are feeling psychotic (paranoid, fearful, restless, agitated, nervous, racing thoughts or speech)    Crisis Resources     These hotlines are for both adults and children. The and are open 24 hours a day, 7 days a week unless noted otherwise.    988 - National Suicide and Crisis Lifeline  If you or someone you know needs support now, call or text 988 or chat [employer] [place]. 988 connects you with a trained crisis counselor who can help.    National Suicide Prevention Lifeline   2-131-377-TALK (2807)    Crisis Text Line    www.crisistextline.org  Text HOME to 547299 from anywhere in the [place], anytime, about any type of crisis. A live, trained crisis counselor will receive the text and respond quickly.    [place] Lifeline for LGBTQ Youth  A national crisis intervention and suicide lifeline for LGBTQ youth under 25. Provides a safe place to talk without judgement.   Call 1-104.341.2855; text START to 784696 or visit www.thetrevorproject.org to talk to a trained counselor.  For [facility] crisis numbers, visit the [facility] website at:  https://[place].gov/dhs/people-we-serve/adults/health-care/mental-health/resources/crisis-contacts.jsp      Héctor Higuera Psy.D, LP   Behavioral Health Clinician   [facility]     6/3/2022

## 2023-07-07 DIAGNOSIS — G12.9 SPINAL MUSCLE ATROPHY (H): ICD-10-CM

## 2023-07-08 NOTE — TELEPHONE ENCOUNTER
diazepam (VALIUM) 5 MG tablet  Last Written Prescription Date:  5/1/23  Last Fill Quantity: 30   # refills: 0  Last Office Visit :5/9/23  Future Office visit:  none    Routing refill request to provider for review/approval because:  Controlled.

## 2023-07-10 RX ORDER — DIAZEPAM 5 MG
TABLET ORAL
Qty: 30 TABLET | Refills: 0 | Status: SHIPPED | OUTPATIENT
Start: 2023-07-10 | End: 2023-08-07

## 2023-07-11 RX ORDER — DIAZEPAM 5 MG
TABLET ORAL
Qty: 30 TABLET | OUTPATIENT
Start: 2023-07-11

## 2023-07-12 ENCOUNTER — VIRTUAL VISIT (OUTPATIENT)
Dept: BEHAVIORAL HEALTH | Facility: CLINIC | Age: 33
End: 2023-07-12
Payer: COMMERCIAL

## 2023-07-12 DIAGNOSIS — F33.1 MAJOR DEPRESSIVE DISORDER, RECURRENT EPISODE, MODERATE (H): Primary | ICD-10-CM

## 2023-07-12 PROCEDURE — 90837 PSYTX W PT 60 MINUTES: CPT | Mod: 95 | Performed by: PSYCHOLOGIST

## 2023-07-13 NOTE — PROGRESS NOTES
[facility]: Integrated Behavioral Health  July 12, 2023        Behavioral Health Clinician Progress Note    Patient Name: Lanie Neil           Service Type: Video appointment      Service Location:  Video appointment      Session Start Time: 2:08  Session End Time: 3:30      Session Length: 53 - 60      Attendees: Patient    Visit Activities (Refresh list every visit): [facility] Only    Service Modality:  Video Visit:      Provider verified identity through the following two step process.  Patient provided:  Patient photo and Patient is known previously to provider    Telemedicine Visit: The patient's condition can be safely assessed and treated via synchronous audio and visual telemedicine encounter.      Reason for Telemedicine Visit: Patient unable to travel    Originating Site (Patient Location): Patient's home    Distant Site (Provider Location): [facility]: [facility] [place]    Consent:  The patient/guardian has verbally consented to: the potential risks and benefits of telemedicine (video visit) versus in person care; bill my insurance or make self-payment for services provided; and responsibility for payment of non-covered services.     Patient would like the video invitation sent by:  My Chart    Mode of Communication:  [facility] Conference [facility]    Distant Location (Provider):  On-site    As the provider I attest to compliance with applicable laws and regulations related to telemedicine.      Diagnostic Assessment Date: 1/19/2021; update 5/23/2023  Treatment Plan Review Date: 10/12/2023  See Flowsheets for today's PHQ-9 and KATHARINE-7 results  Previous PHQ-9:       12/15/2020    10:49 AM 3/16/2021     9:13 AM 6/30/2021     9:26 AM   PHQ-9 SCORE   PHQ-9 Total Score MyChart 2 (Minimal depression) 2 (Minimal depression) 2 (Minimal depression)   PHQ-9 Total Score 2 2 2     Previous KATHARINE-7:       12/15/2020    10:52 AM 3/16/2021     9:15 AM 6/30/2021     9:27 AM   KATHARINE-7 SCORE  "  Total Score 1 (minimal anxiety) 1 (minimal anxiety) 1 (minimal anxiety)   Total Score 1 1 1       DATA  Extended Session (60+ minutes): No  Interactive Complexity: No  Crisis: No    Treatment Objective(s) Addressed in This Session:  Target Behavior(s): disease management/lifestyle changes      Depressed Mood: Decrease frequency and intensity of feeling down, depressed, hopeless  Identify negative self-talk and behaviors: challenge core beliefs, myths, and actions  Anxiety: will develop more effective coping skills to manage anxiety symptoms  Relationship Problems: will address relationship difficulties in a more adaptive manner    Current Stressors / Issues:  [facility] met with Lanie today to continue supporting her treatment of depression, adjustment to chronic disease management, and relationship problems.     Continued discussion today about relationship conflicts, most recently with her mother and brothers.     We met earlier than scheduled to discuss a recent family conflict. Lanie states she witnessed her brother and mother getting into a verbal argument that involved shouting a few days ago. Lanie states she became fearful of being left by her brother and by some of the upset comments her mother made. Lanie states she attempted to calm her mother down, though found her efforts ineffective. She descirbed feeling terrified and fearful. Notes experiencing a strong \"pressure\" in her chest that she \"wanted to cut myself with a knife, to get it out.\" She denied intent to act on self-harm now, though notes she considered this when she felt upset.    Reviewed the nature of the conflict, provided support, and reviewed possible opportunities for growth for Lanie, including how she approaches conflict. Reviewed ideas related to de-escalation as well as factors that contribute to escalated conflicts (e.g. invalidation, put downs). Discussed additional ways of improving emotional regulation in moments of triggered " "stress, which for her we agreed is interpersonal conflicts/disputes.     Broached the idea of looking into DBT for her, as I think this could be a real benefit to her from the perspective of learning emotion regulation skills, mindfulness, and ways of managing conflict better. She agreed to consider this recommendation for our next visit.      Am I Hooked or Unhooked From My Thoughts?    Clues that I'm getting hooked and threatened by my thoughts:    1) Thoughts are Literal and Threatening; it s as if what we re thinking is actually present, here and now!    2) Thoughts are \"The Truth\"; we literally believe them!    3) Thoughts are Most Important; we take them seriously, and give them our full attention!    4) Thoughts are Orders; we automatically obey them!    5) Thoughts are Always Wise; we assume they know best and we follow their advice!     _____________________________________________________________________    Noticing when I'm unhooked and not threatened by my thoughts:    1) Thoughts are merely sounds, words, stories, bits of language, passing through our heads.    2) Thoughts may or may not be true. We don t automatically believe them.    3) Thoughts may or may not be important. We pay attention only if they re helpful.    4) Thoughts are not orders. We don t have to obey them.    5) Thoughts may or may not be wise. We don t automatically follow their advice.   Thoughts That Hook You:    What memories, fears, worries, self-criticisms, or other unhelpful thoughts do you dwell on or get \"stuck\" thinking about? What thoughts tend to hook you, jerk you around, and pull you out of your life?                 Life-Draining Actions:    What are you currently doing that makes your life worse in the long run: keeps you stuck, wastes your time or money, drains your energy; restricts your life; impacts negatively on your health, work, or relationships; maintains or worsens the problems your are dealing " "with?                     Feelings That Hook You:    What emotions, feelings, urges, impulses, or sensations tend to hook you and jerk you around or pull you into self-defeating actions?           Avoiding Challenging Situations:    What situations, activities, people, or places are you avoiding or staying away from? What have you quit, withdrawn from, dropped out of? What do you keep \"putting off\" until later?                           Progress on Treatment Objective(s) / Homework:  Satisfactory progress - ACTION (Actively working towards change); Intervened by reinforcing change plan / affirming steps taken       Psychodynamic psychotherapy    Discuss patient's emotional dynamics and issues and how they impact behaviors.    Explore patient's history of relationships and how they impact present behaviors.    Explore how to work with and make changes in these schemas and patterns.    Interpersonal Psychotherapy    Explore patterns in relationships that are effective or ineffective at helping patient reach their goals, find satisfying experience.    Discuss new patterns or behaviors to engage in for improved social functioning.      Care Plan review completed: no     Medication Review:  No changes to current psychiatric medication(s)    Medication Compliance:  NA    Changes in Health Issues:   None reported    Chemical Use Review:   Substance Use: Chemical use reviewed, no active concerns identified      Tobacco Use: No current tobacco use.      Assessment: Current Emotional / Mental Status (status of significant symptoms):  Risk status (Self / Other harm or suicidal ideation)  Patient has had a history of suicidal ideation: passive thoughts only; easily controlled - denied any recently    Patient denies current fears or concerns for personal safety.  Patient denies current or recent suicidal ideation or behaviors.  Patient denies current or recent homicidal ideation or behaviors.  Patient denies current or recent " self injurious behavior or ideation.  Patient denies other safety concerns.     A safety and risk management plan has not been developed at this time, however patient was encouraged to call [place] [id number] should there be a change in any of these risk factors.      [employer] Suicide Severity Rating Scale (Short Version)      2/18/2021     4:47 PM   [employer] Suicide Severity Rating (Short Version)   Over the past 2 weeks have you felt down, depressed, or hopeless? yes   Over the past 2 weeks have you had thoughts of killing yourself? no   Have you ever attempted to kill yourself? no     [employer] Suicide Severity Rating Scale (Lifetime/Recent)      8/13/2021     1:25 PM   [employer] Suicide Severity Rating (Lifetime/Recent)   Wish to be Dead (Lifetime) No   Non-Specific Active Suicidal Thoughts (Lifetime) No   Most Severe Ideation Rating (Lifetime) NA   Frequency (Lifetime) NA   Duration (Lifetime) NA   Controllability (Lifetime) NA   Protective Factors  (Lifetime) NA   Reasons for Ideation (Lifetime) NA   RETIRED: 1. Wish to be Dead (Recent) No   RETIRED: 2. Non-Specific Active Suicidal Thoughts (Recent) No   3. Active Suicidal Ideation with any Methods (Not Plan) Without Intent to Act (Lifetime) No   RETIRED: 3. Active Suicidal Ideation with any Methods (Not Plan) Without Intent to Act (Recent) No   RETIRE: 4. Active Suicidal Ideation with Some Intent to Act, Without Specific Plan (Lifetime) No   4. Active Suicidal Ideation with Some Intent to Act, Without Specific Plan (Recent) No   RETIRE: 5. Active Suicidal Ideation with Specific Plan and Intent (Lifetime) No   RETIRED: 5. Active Suicidal Ideation with Specific Plan and Intent (Recent) No   Most Severe Ideation Rating (Past Month) NA   Frequency (Past Month) NA   Duration (Past Month) NA   Controllability (Past Month) NA   Protective Factors (Past Month) NA   Reasons for Ideation (Past Month) NA   Actual Attempt (Lifetime) No   Actual Attempt (Past 3 Months) No    Has subject engaged in non-suicidal self-injurious behavior? (Lifetime) No   Has subject engaged in non-suicidal self-injurious behavior? (Past 3 Months) No   Interrupted Attempts (Lifetime) No   Interrupted Attempts (Past 3 Months) No   Aborted or Self-Interrupted Attempt (Lifetime) No   Aborted or Self-Interrupted Attempt (Past 3 Months) No   Preparatory Acts or Behavior (Lifetime) No   Preparatory Acts or Behavior (Past 3 Months) No   Most Recent Attempt Actual Lethality Code NA   Most Lethal Attempt Actual Lethality Code NA   Initial/First Attempt Actual Lethality Code NA         Appearance:   Appropriate   Eye Contact:   Good   Psychomotor Behavior: Normal   Attitude:   Cooperative   Orientation:   All  Speech   Rate / Production: Normal    Volume:  Normal   Mood:    Depressed    Affect:    Tearful  Thought Content:  Rumination   Thought Form:  Coherent  Logical   Insight:    Good     Diagnoses:  1. Major depressive disorder, recurrent episode, moderate (H)        Collateral Reports Completed:  [facility] [place] will coordinate with care team as needed    Plan: (Homework, other):  Lanie was scheduled to RTC in two weeks for help addressing depressive symptoms, pain, and relationship conflicts. Continue thought log for help with depression.  CD Recommendations: No indications of CD issues.     Héctor Higuera Psy.D, LP   Behavioral Health Clinician   [facility]     _______________________________________________________________________                                              Individual Treatment Plan    Patient's Name: Lanie Neil  YOB: 1990    Date of Creation: 3/4/2022  Date Treatment Plan Last Reviewed/Revised: 7/12/2023    DSM5 Diagnoses: 296.32 (F33.1) Major Depressive Disorder, Recurrent Episode, Moderate With anxious distress  Psychosocial / Contextual Factors: Chronic pain, SMA, disabled  PROMIS (reviewed every 90 days):     PROMIS-10    In general, would  you say your health is: (P) Excellent    In general, would you say your quality of life is: (P) Good    In general, how would you rate your physical health? (P) Very good    In general, how would you rate your mental health, including your mood and your ability to think? (P) Good    In general, how would you rate your satisfaction with your social activities and relationships? (P) Good    In general, please rate how well you carry out your usual social activities and roles. (This includes activities at home, at work and in your community, and responsibilities as a parent, child, spouse, employee, friend, etc.) (P) Very good    To what extent are you able to carry out your everyday physical activities such as walking, climbing stairs, carrying groceries, or moving a chair? (P) Not at all    In the past 7 days,  How often have you been bothered by emotional problems such as feeling anxious, depressed or irritable? (P) Rarely  How would you rate your fatigue on average? (P) Moderate  How would you rate your pain on average? (P) 6    PROMIS GLOBAL SCORES    Mental health question re-calculation - no clinical value - (P) 4  Physical health question re-calculation - no clinical value - (P) 3  Pain question re-calculation - no clinical value - (P) 3  Global Mental Health Score - (P) 13  Global Physical Health Score - (P) 11  PROMIS TOTAL - SUBSCORES - (P) 24      [id number] PROMIS HEALTH ORGANIZATION AND PROMIS COOPERATIVE GROUP VERSION 1.1          Referral / Collaboration:  Referral to another professional/service is not indicated at this time..    Anticipated number of session for this episode of care: 7-9  Anticipation frequency of session: Every other week  Anticipated Duration of each session: 38-52 minutes  Treatment plan will be reviewed in 90 days or when goals have been changed.       MeasurableTreatment Goal(s) related to diagnosis / functional impairment(s)  Goal 1: Patient will experience a reduction in  "depressive symptoms along with a corresponding increase in positive emotion and life satisfaction.      Objective #A (Patient Action)    Patient will Increase interest, engagement, and pleasure in doing things.  Status: Continued - Date(s): 7/12/2023    Intervention(s)  Therapist will help patient identify pleasant and mastery oriented events that elicit positive, relaxed mood.    Objective #B  Patient will Decrease frequency and intensity of feeling down, depressed, hopeless.  Status: Continued - Date(s): 7/12/2023    Intervention(s)  Therapist will introduce patient to cognitive-behavioral and acceptance and commitment therapy topics aimed to help reduce depression and anxiety    Objective #C  Patient will Identify negative self-talk and behaviors: challenge core beliefs, myths, and actions  Improve concentration, focus, and mindfulness in daily activities .  Status: Continued - Date(s): 7/12/2023    Intervention(s)  Therapist will help patient identify and manage negative self-talk and automatic thoughts; introduce patient to cognitive distortions; help patient develop cognitive diffusion techniques      Goal 2: Patient will experience a reduction in anxious symptoms, along with a corresponding increase in relaxed emotional states and life satisfaction.      Objective #A (Patient Action)  Patient will use cognitive-behavioral and thought diffusion strategies identified in therapy to challenge anxious thoughts.    Status: Continued - Date(s): 7/12/2023    Intervention(s)  Therapist will utilize CBT and ACT ideas to help patient challenge anxious thoughts and reduce intensity/duration of anxious distress    Objective #B  Patient will use \"worry time\" each day for 15 minutes of scheduled worry and then defer obsessive or anxious thinking until the next structured worry time.    Status: Continued - Date(s): 7/12/2023    Intervention(s)  Therapist will teach patient how to effectively utilize worry time and/or thought " logs/journals each day and incorporate more relaxation behaviors into their routine.    Objective #C  Patient will identify the stressors which contribute to feelings of anxiety  Patient will increase engagement in adaptive coping skills and recreational activities, such as exercise and healthy socialization, to manage distress.  Status: Continued - Date(s): 7/12/2023    Intervention(s)  Therapist will help patient identify triggers/situational factors that contribute to anxiety and behavioral skills aimed to manage anxious distress.      Goal 3: Patient will learn more adaptive ways to manage chronic pain      Objective #A (Patient Action)    Status: Continued - Date(s): 7/12/2023    Patient will identify 2-4 strategies for managing pain.    Intervention(s)  Therapist will teach distraction skills aimed to help manage chronic pain and utilize ACT/CBT ideas to help with this (e.g. relaxation).    Objective #B  Patient will state understanding of stressors and relationship to physical symptoms.    Status: Continued - Date(s): 7/12/2023      Other Possible Therapeutic Intervention(s):    Psycho-education regarding mental health diagnoses and treatment options    Supportive Therapy    Provide affirmations, reflections, and establish working rapport    Emphasize and reflect on strength of therapeutic relationship    Skills training    Explore skills useful to client in current situation.    Skills include assertiveness, communication, conflict management, problem-solving, relaxation, etc.    Solution-Focused Therapy    Explore patterns in patient's relationships and discuss options for new behaviors.    Explore patterns in patient's actions and choices and discuss options for new behaviors.    Cognitive-behavioral Therapy    Discuss common cognitive distortions, identify them in patient's life.    Explore ways to challenge, replace, and act against these cognitions.    Acceptance and Commitment Therapy    Explore and  identify important values in patient's life.    Discuss ways to commit to behavioral activation around these values.    Psychodynamic psychotherapy    Discuss patient's emotional dynamics and issues and how they impact behaviors.    Explore patient's history of relationships and how they impact present behaviors.    Explore how to work with and make changes in these schemas and patterns.    Narrative Therapy    Explore the patient's story of his/her life from his/her perspective.    Explore alternate ways of understanding their experience, identifying exceptions, developing new themes.    Interpersonal Psychotherapy    Explore patterns in relationships that are effective or ineffective at helping patient reach their goals, find satisfying experience.    Discuss new patterns or behaviors to engage in for improved social functioning.    Behavioral Activation    Discuss steps patient can take to become more involved in meaningful activity.    Identify barriers to these activities and explore possible solutions.    Mindfulness-Based Strategies    Discuss skills based on development and application of mindfulness.    Skills drawn from compassion-focused therapy, dialectical behavior therapy, mindfulness-based stress reduction, mindfulness-based cognitive therapy, etc.      Patient has reviewed and agreed to the above plan.    Héctor Higuera Psy.D, LP   Behavioral Health Clinician   -[facility]     7/12/2023    Crisis Resources    Refer to the resources below as needed.    Steps to care for yourself    1. If you are currently in counseling, call your counselor for an appointment  2. Call the local crisis resources below if needed.  3. Contact friends or family for support.  4. Get more exercise.  5. Do activities you enjoy.  6. Eat a well-balanced diet and drink plenty of fluids.  7. Rest as needed.  8. Limit alcohol and recreational drugs. These can worsen depression.    When to contact  your primary care provider       You have thoughts of harming or killing yourself but have not made a plan to carry it out.    Your depression gets in the way of daily activities.    You are often unable to sleep.    You need help cutting back on alcohol or recreational drugs.    When to call 911 or go to the Emergency Room     Get emergency help right away if you have any of the following:    You are planning to harm or kill yourself and you have a way to carry out the plan.     You have injured yourself or others. Or, you think you will.    You feel confused or are having trouble thinking or remembering.    You are having delusions (false beliefs).    You are hearing voices or seeing things that aren t there.    You are feeling psychotic (paranoid, fearful, restless, agitated, nervous, racing thoughts or speech)    Crisis Resources     These hotlines are for both adults and children. The and are open 24 hours a day, 7 days a week unless noted otherwise.    988 - National Suicide and Crisis Lifeline  If you or someone you know needs support now, call or text 748 or chat [employer]. 988 connects you with a trained crisis counselor who can help.    National Suicide Prevention Lifeline   6-725-672-TALK (4974)    Crisis Text Line    www.crisistextline.org  Text HOME to 815186 from anywhere in the [place], anytime, about any type of crisis. A live, trained crisis counselor will receive the text and respond quickly.    [employer] Lifeline for LGBTQ Youth  A national crisis intervention and suicide lifeline for LGBTQ youth under 25. Provides a safe place to talk without judgement.   Call 1-378.298.2971; text START to 785749 or visit www.thetrevorproject.org to talk to a trained counselor.  For [facility] crisis numbers, visit the [facility] website at:  [place]://[place].gov/dhs/people-we-serve/adults/health-care/mental-health/resources/crisis-contacts.jsp      Héctor Higuera Psy.D, LP   Behavioral Health Clinician    [facility]     6/3/2022

## 2023-07-20 ENCOUNTER — VIRTUAL VISIT (OUTPATIENT)
Dept: BEHAVIORAL HEALTH | Facility: CLINIC | Age: 33
End: 2023-07-20
Payer: COMMERCIAL

## 2023-07-20 DIAGNOSIS — F33.1 MAJOR DEPRESSIVE DISORDER, RECURRENT EPISODE, MODERATE (H): Primary | ICD-10-CM

## 2023-07-20 PROCEDURE — 90837 PSYTX W PT 60 MINUTES: CPT | Mod: VID | Performed by: PSYCHOLOGIST

## 2023-07-20 NOTE — PROGRESS NOTES
[facility]: Integrated Behavioral Health  July 20, 2023          Behavioral Health Clinician Progress Note    Patient Name: Lanie Neil           Service Type: Video appointment      Service Location:  Video appointment      Session Start Time: 10:04   Session End Time: 11:00      Session Length: 53 - 60      Attendees: Patient    Visit Activities (Refresh list every visit): [facility] Only    Service Modality:  Video Visit:      Provider verified identity through the following two step process.  Patient provided:  Patient photo and Patient is known previously to provider    Telemedicine Visit: The patient's condition can be safely assessed and treated via synchronous audio and visual telemedicine encounter.      Reason for Telemedicine Visit: Patient unable to travel    Originating Site (Patient Location): Patient's home    Distant Site (Provider Location): Provider Remote Setting- Home Office    Consent:  The patient/guardian has verbally consented to: the potential risks and benefits of telemedicine (video visit) versus in person care; bill my insurance or make self-payment for services provided; and responsibility for payment of non-covered services.     Patient would like the video invitation sent by:  My Chart    Mode of Communication:  [facility]    Distant Location (Provider):  Off-site    As the provider I attest to compliance with applicable laws and regulations related to telemedicine.      Diagnostic Assessment Date: 1/19/2021; update 5/23/2023  Treatment Plan Review Date: 10/12/2023  See Flowsheets for today's PHQ-9 and KATHARINE-7 results  Previous PHQ-9:       12/15/2020    10:49 AM 3/16/2021     9:13 AM 6/30/2021     9:26 AM   PHQ-9 SCORE   PHQ-9 Total Score MyChart 2 (Minimal depression) 2 (Minimal depression) 2 (Minimal depression)   PHQ-9 Total Score 2 2 2     Previous KATHARINE-7:       12/15/2020    10:52 AM 3/16/2021     9:15 AM 6/30/2021     9:27 AM   KATHARINE-7  "SCORE   Total Score 1 (minimal anxiety) 1 (minimal anxiety) 1 (minimal anxiety)   Total Score 1 1 1       DATA  Extended Session (60+ minutes): No  Interactive Complexity: No  Crisis: No    Treatment Objective(s) Addressed in This Session:  Target Behavior(s): disease management/lifestyle changes      Depressed Mood: Decrease frequency and intensity of feeling down, depressed, hopeless  Identify negative self-talk and behaviors: challenge core beliefs, myths, and actions  Anxiety: will develop more effective coping skills to manage anxiety symptoms  Relationship Problems: will address relationship difficulties in a more adaptive manner  Psychological distress related to Pain    Current Stressors / Issues:  [facility] met with Lanie avery to continue supporting her treatment of depression, adjustment to chronic disease management, and relationship problems.     Session focused on relationship between pain and suffering. Differentiated these concepts. Looked at CBT ideas for thoughts that contribute to suffering in particular. Discussed how absolutist thoughts in particular can contribute. Utilized CBT strategies/socratic questioning to help facilitate discussion. Encouraged practiced work on developing awareness of negative automatic thoughts/interpretations. Discussed ways of  thoughts from emotions and the role of emotional reasoning in depression/suffering with pain.      Am I Hooked or Unhooked From My Thoughts?    Clues that I'm getting hooked and threatened by my thoughts:    1) Thoughts are Literal and Threatening; it s as if what we re thinking is actually present, here and now!    2) Thoughts are \"The Truth\"; we literally believe them!    3) Thoughts are Most Important; we take them seriously, and give them our full attention!    4) Thoughts are Orders; we automatically obey them!    5) Thoughts are Always Wise; we assume they know best and we follow their advice! " "    _____________________________________________________________________    Noticing when I'm unhooked and not threatened by my thoughts:    1) Thoughts are merely sounds, words, stories, bits of language, passing through our heads.    2) Thoughts may or may not be true. We don t automatically believe them.    3) Thoughts may or may not be important. We pay attention only if they re helpful.    4) Thoughts are not orders. We don t have to obey them.    5) Thoughts may or may not be wise. We don t automatically follow their advice.   Thoughts That Hook You:    What memories, fears, worries, self-criticisms, or other unhelpful thoughts do you dwell on or get \"stuck\" thinking about? What thoughts tend to hook you, jerk you around, and pull you out of your life?                 Life-Draining Actions:    What are you currently doing that makes your life worse in the long run: keeps you stuck, wastes your time or money, drains your energy; restricts your life; impacts negatively on your health, work, or relationships; maintains or worsens the problems your are dealing with?                     Feelings That Hook You:    What emotions, feelings, urges, impulses, or sensations tend to hook you and jerk you around or pull you into self-defeating actions?           Avoiding Challenging Situations:    What situations, activities, people, or places are you avoiding or staying away from? What have you quit, withdrawn from, dropped out of? What do you keep \"putting off\" until later?                           Progress on Treatment Objective(s) / Homework:  Satisfactory progress - ACTION (Actively working towards change); Intervened by reinforcing change plan / affirming steps taken       Psychodynamic psychotherapy    Discuss patient's emotional dynamics and issues and how they impact behaviors.    Explore patient's history of relationships and how they impact present behaviors.    Explore how to work with and make changes in " these schemas and patterns.    Interpersonal Psychotherapy    Explore patterns in relationships that are effective or ineffective at helping patient reach their goals, find satisfying experience.    Discuss new patterns or behaviors to engage in for improved social functioning.      Care Plan review completed: no     Medication Review:  No changes to current psychiatric medication(s)    Medication Compliance:  NA    Changes in Health Issues:   None reported    Chemical Use Review:   Substance Use: Chemical use reviewed, no active concerns identified      Tobacco Use: No current tobacco use.      Assessment: Current Emotional / Mental Status (status of significant symptoms):  Risk status (Self / Other harm or suicidal ideation)  Patient has had a history of suicidal ideation: passive thoughts only; easily controlled - denied any recently    Patient denies current fears or concerns for personal safety.  Patient denies current or recent suicidal ideation or behaviors.  Patient denies current or recent homicidal ideation or behaviors.  Patient denies current or recent self injurious behavior or ideation.  Patient denies other safety concerns.     A safety and risk management plan has not been developed at this time, however patient was encouraged to call [place] [phone] should there be a change in any of these risk factors.      Hart Suicide Severity Rating Scale (Short Version)      2/18/2021     4:47 PM   Hart Suicide Severity Rating (Short Version)   Over the past 2 weeks have you felt down, depressed, or hopeless? yes   Over the past 2 weeks have you had thoughts of killing yourself? no   Have you ever attempted to kill yourself? no     Hart Suicide Severity Rating Scale (Lifetime/Recent)      8/13/2021     1:25 PM   Hart Suicide Severity Rating (Lifetime/Recent)   Q1 Wish to be Dead (Lifetime) No   Q2 Non-Specific Active Suicidal Thoughts (Lifetime) No   Most Severe Ideation Rating (Lifetime) NA    Frequency (Lifetime) NA   Duration (Lifetime) NA   Controllability (Lifetime) NA   Protective Factors  (Lifetime) NA   Reasons for Ideation (Lifetime) NA   RETIRED: 1. Wish to be Dead (Recent) No   RETIRED: 2. Non-Specific Active Suicidal Thoughts (Recent) No   3. Active Suicidal Ideation with any Methods (Not Plan) Without Intent to Act (Lifetime) No   RETIRED: 3. Active Suicidal Ideation with any Methods (Not Plan) Without Intent to Act (Recent) No   RETIRE: 4. Active Suicidal Ideation with Some Intent to Act, Without Specific Plan (Lifetime) No   4. Active Suicidal Ideation with Some Intent to Act, Without Specific Plan (Recent) No   RETIRE: 5. Active Suicidal Ideation with Specific Plan and Intent (Lifetime) No   RETIRED: 5. Active Suicidal Ideation with Specific Plan and Intent (Recent) No   Most Severe Ideation Rating (Past Month) NA   Frequency (Past Month) NA   Duration (Past Month) NA   Controllability (Past Month) NA   Protective Factors (Past Month) NA   Reasons for Ideation (Past Month) NA   Actual Attempt (Lifetime) No   Actual Attempt (Past 3 Months) No   Has subject engaged in non-suicidal self-injurious behavior? (Lifetime) No   Has subject engaged in non-suicidal self-injurious behavior? (Past 3 Months) No   Interrupted Attempts (Lifetime) No   Interrupted Attempts (Past 3 Months) No   Aborted or Self-Interrupted Attempt (Lifetime) No   Aborted or Self-Interrupted Attempt (Past 3 Months) No   Preparatory Acts or Behavior (Lifetime) No   Preparatory Acts or Behavior (Past 3 Months) No   Most Recent Attempt Actual Lethality Code NA   Most Lethal Attempt Actual Lethality Code NA   Initial/First Attempt Actual Lethality Code NA         Appearance:   Appropriate   Eye Contact:   Good   Psychomotor Behavior: Normal   Attitude:   Cooperative   Orientation:   All  Speech   Rate / Production: Normal    Volume:  Normal   Mood:    Depressed    Affect:    Tearful  Thought Content:  Rumination   Thought  Form:  Coherent  Logical   Insight:    Good     Diagnoses:  1. Major depressive disorder, recurrent episode, moderate (H)        Collateral Reports Completed:  [facility] [place] will coordinate with care team as needed    Plan: (Homework, other):  Lanie was scheduled to RTC in two weeks for help addressing depressive symptoms, pain, and relationship conflicts. Continue thought log for help with depression.  CD Recommendations: No indications of CD issues.     Héctor Higuera Psy.D, LP   Behavioral Health Clinician   -[facility] [place]     _______________________________________________________________________                                              Individual Treatment Plan    Patient's Name: Lanie Neil  YOB: 1990    Date of Creation: 3/4/2022  Date Treatment Plan Last Reviewed/Revised: 7/12/2023    DSM5 Diagnoses: 296.32 (F33.1) Major Depressive Disorder, Recurrent Episode, Moderate With anxious distress  Psychosocial / Contextual Factors: Chronic pain, SMA, disabled  PROMIS (reviewed every 90 days):     PROMIS-10    In general, would you say your health is: (P) Excellent    In general, would you say your quality of life is: (P) Good    In general, how would you rate your physical health? (P) Very good    In general, how would you rate your mental health, including your mood and your ability to think? (P) Good    In general, how would you rate your satisfaction with your social activities and relationships? (P) Good    In general, please rate how well you carry out your usual social activities and roles. (This includes activities at home, at work and in your community, and responsibilities as a parent, child, spouse, employee, friend, etc.) (P) Very good    To what extent are you able to carry out your everyday physical activities such as walking, climbing stairs, carrying groceries, or moving a chair? (P) Not at all    In the past 7 days,  How often have you been bothered by  emotional problems such as feeling anxious, depressed or irritable? (P) Rarely  How would you rate your fatigue on average? (P) Moderate  How would you rate your pain on average? (P) 6    PROMIS GLOBAL SCORES    Mental health question re-calculation - no clinical value - (P) 4  Physical health question re-calculation - no clinical value - (P) 3  Pain question re-calculation - no clinical value - (P) 3  Global Mental Health Score - (P) 13  Global Physical Health Score - (P) 11  PROMIS TOTAL - SUBSCORES - (P) 24      [id number] [facility] VERSION 1.1          Referral / Collaboration:  Referral to another professional/service is not indicated at this time..    Anticipated number of session for this episode of care: 7-9  Anticipation frequency of session: Every other week  Anticipated Duration of each session: 38-52 minutes  Treatment plan will be reviewed in 90 days or when goals have been changed.       MeasurableTreatment Goal(s) related to diagnosis / functional impairment(s)  Goal 1: Patient will experience a reduction in depressive symptoms along with a corresponding increase in positive emotion and life satisfaction.      Objective #A (Patient Action)    Patient will Increase interest, engagement, and pleasure in doing things.  Status: Continued - Date(s): 7/12/2023    Intervention(s)  Therapist will help patient identify pleasant and mastery oriented events that elicit positive, relaxed mood.    Objective #B  Patient will Decrease frequency and intensity of feeling down, depressed, hopeless.  Status: Continued - Date(s): 7/12/2023    Intervention(s)  Therapist will introduce patient to cognitive-behavioral and acceptance and commitment therapy topics aimed to help reduce depression and anxiety    Objective #C  Patient will Identify negative self-talk and behaviors: challenge core beliefs, myths, and actions  Improve concentration, focus, and mindfulness in daily  "activities .  Status: Continued - Date(s): 7/12/2023    Intervention(s)  Therapist will help patient identify and manage negative self-talk and automatic thoughts; introduce patient to cognitive distortions; help patient develop cognitive diffusion techniques      Goal 2: Patient will experience a reduction in anxious symptoms, along with a corresponding increase in relaxed emotional states and life satisfaction.      Objective #A (Patient Action)  Patient will use cognitive-behavioral and thought diffusion strategies identified in therapy to challenge anxious thoughts.    Status: Continued - Date(s): 7/12/2023    Intervention(s)  Therapist will utilize CBT and ACT ideas to help patient challenge anxious thoughts and reduce intensity/duration of anxious distress    Objective #B  Patient will use \"worry time\" each day for 15 minutes of scheduled worry and then defer obsessive or anxious thinking until the next structured worry time.    Status: Continued - Date(s): 7/12/2023    Intervention(s)  Therapist will teach patient how to effectively utilize worry time and/or thought logs/journals each day and incorporate more relaxation behaviors into their routine.    Objective #C  Patient will identify the stressors which contribute to feelings of anxiety  Patient will increase engagement in adaptive coping skills and recreational activities, such as exercise and healthy socialization, to manage distress.  Status: Continued - Date(s): 7/12/2023    Intervention(s)  Therapist will help patient identify triggers/situational factors that contribute to anxiety and behavioral skills aimed to manage anxious distress.      Goal 3: Patient will learn more adaptive ways to manage chronic pain      Objective #A (Patient Action)    Status: Continued - Date(s): 7/12/2023    Patient will identify 2-4 strategies for managing pain.    Intervention(s)  Therapist will teach distraction skills aimed to help manage chronic pain and utilize " ACT/CBT ideas to help with this (e.g. relaxation).    Objective #B  Patient will state understanding of stressors and relationship to physical symptoms.    Status: Continued - Date(s): 7/12/2023      Other Possible Therapeutic Intervention(s):    Psycho-education regarding mental health diagnoses and treatment options    Supportive Therapy    Provide affirmations, reflections, and establish working rapport    Emphasize and reflect on strength of therapeutic relationship    Skills training    Explore skills useful to client in current situation.    Skills include assertiveness, communication, conflict management, problem-solving, relaxation, etc.    Solution-Focused Therapy    Explore patterns in patient's relationships and discuss options for new behaviors.    Explore patterns in patient's actions and choices and discuss options for new behaviors.    Cognitive-behavioral Therapy    Discuss common cognitive distortions, identify them in patient's life.    Explore ways to challenge, replace, and act against these cognitions.    Acceptance and Commitment Therapy    Explore and identify important values in patient's life.    Discuss ways to commit to behavioral activation around these values.    Psychodynamic psychotherapy    Discuss patient's emotional dynamics and issues and how they impact behaviors.    Explore patient's history of relationships and how they impact present behaviors.    Explore how to work with and make changes in these schemas and patterns.    Narrative Therapy    Explore the patient's story of his/her life from his/her perspective.    Explore alternate ways of understanding their experience, identifying exceptions, developing new themes.    Interpersonal Psychotherapy    Explore patterns in relationships that are effective or ineffective at helping patient reach their goals, find satisfying experience.    Discuss new patterns or behaviors to engage in for improved social functioning.    Behavioral  Activation    Discuss steps patient can take to become more involved in meaningful activity.    Identify barriers to these activities and explore possible solutions.    Mindfulness-Based Strategies    Discuss skills based on development and application of mindfulness.    Skills drawn from compassion-focused therapy, dialectical behavior therapy, mindfulness-based stress reduction, mindfulness-based cognitive therapy, etc.      Patient has reviewed and agreed to the above plan.    Héctor Higuera Psy.D, JENNIFER   Behavioral Health Clinician   -[facility]     7/12/2023    Crisis Resources    Refer to the resources below as needed.    Steps to care for yourself    1. If you are currently in counseling, call your counselor for an appointment  2. Call the local crisis resources below if needed.  3. Contact friends or family for support.  4. Get more exercise.  5. Do activities you enjoy.  6. Eat a well-balanced diet and drink plenty of fluids.  7. Rest as needed.  8. Limit alcohol and recreational drugs. These can worsen depression.    When to contact your primary care provider       You have thoughts of harming or killing yourself but have not made a plan to carry it out.    Your depression gets in the way of daily activities.    You are often unable to sleep.    You need help cutting back on alcohol or recreational drugs.    When to call 911 or go to the Emergency Room     Get emergency help right away if you have any of the following:    You are planning to harm or kill yourself and you have a way to carry out the plan.     You have injured yourself or others. Or, you think you will.    You feel confused or are having trouble thinking or remembering.    You are having delusions (false beliefs).    You are hearing voices or seeing things that aren t there.    You are feeling psychotic (paranoid, fearful, restless, agitated, nervous, racing thoughts or speech)    Crisis Resources     These  hotlines are for both adults and children. The and are open 24 hours a day, 7 days a week unless noted otherwise.    988 - National Suicide and Crisis Lifeline  If you or someone you know needs support now, call or text 698 or chat Walkabout.org. 988 connects you with a trained crisis counselor who can help.    National Suicide Prevention Lifeline   8-342-019-TALK (3007)    Crisis Text Line    www.crisistextline.org  Text HOME to 030008 from anywhere in the [place], anytime, about any type of crisis. A live, trained crisis counselor will receive the text and respond quickly.    [place] Lifeline for LGBTQ Youth  A national crisis intervention and suicide lifeline for LGBTQ youth under 25. Provides a safe place to talk without judgement.   Call 1-724.356.1405; text START to 260917 or visit www.thetrevorproject.org to talk to a trained counselor.  For [facility] crisis numbers, visit the [facility] website at:  https://[place].gov/dhs/people-we-serve/adults/health-care/mental-health/resources/crisis-contacts.jsp      Héctor Higuera Psy.D, LP   Behavioral Health Clinician   [facility]     6/3/2022

## 2023-07-23 ENCOUNTER — HEALTH MAINTENANCE LETTER (OUTPATIENT)
Age: 33
End: 2023-07-23

## 2023-07-26 ENCOUNTER — MYC MEDICAL ADVICE (OUTPATIENT)
Dept: FAMILY MEDICINE | Facility: CLINIC | Age: 33
End: 2023-07-26
Payer: MEDICARE

## 2023-07-26 DIAGNOSIS — F41.9 ANXIETY: ICD-10-CM

## 2023-07-26 RX ORDER — ALPRAZOLAM 0.25 MG
0.25 TABLET ORAL 3 TIMES DAILY PRN
Qty: 60 TABLET | Refills: 1 | Status: SHIPPED | OUTPATIENT
Start: 2023-07-26 | End: 2023-09-26

## 2023-07-26 NOTE — TELEPHONE ENCOUNTER
ALPRAZolam (XANAX) 0.25 MG tablet   Last Written Prescription Date:  5/15/23  Last Fill Quantity: 60,   # refills: 1  Last Office Visit : 5/9/23  Future Office visit:  None    Routing refill request to provider for review/approval because:  Controlled substance

## 2023-07-27 DIAGNOSIS — R63.5 ABNORMAL WEIGHT GAIN: ICD-10-CM

## 2023-07-27 DIAGNOSIS — K59.00 CONSTIPATION, UNSPECIFIED CONSTIPATION TYPE: ICD-10-CM

## 2023-07-27 DIAGNOSIS — Z23 NEED FOR PROPHYLACTIC VACCINATION AND INOCULATION AGAINST INFLUENZA: ICD-10-CM

## 2023-07-28 RX ORDER — POLYETHYLENE GLYCOL 3350 17 G/17G
17 POWDER, FOR SOLUTION ORAL DAILY
Qty: 714 G | Refills: 0 | Status: SHIPPED | OUTPATIENT
Start: 2023-07-28 | End: 2023-12-21

## 2023-07-28 NOTE — TELEPHONE ENCOUNTER
polyethylene glycol (MIRALAX) 17 GM/Dose powder    Virtual Visit    5/9/2023  [facility] Jimmy Patel MD  Family Medicine

## 2023-08-01 ENCOUNTER — VIRTUAL VISIT (OUTPATIENT)
Dept: BEHAVIORAL HEALTH | Facility: CLINIC | Age: 33
End: 2023-08-01
Payer: COMMERCIAL

## 2023-08-01 DIAGNOSIS — F33.1 MAJOR DEPRESSIVE DISORDER, RECURRENT EPISODE, MODERATE (H): Primary | ICD-10-CM

## 2023-08-01 DIAGNOSIS — G62.9 NEUROPATHY: ICD-10-CM

## 2023-08-01 PROCEDURE — 90837 PSYTX W PT 60 MINUTES: CPT | Mod: VID | Performed by: PSYCHOLOGIST

## 2023-08-01 NOTE — PROGRESS NOTES
[facility] Integrated [facility]al Health  August 1, 2023        Behavioral Health Clinician Progress Note    Patient Name: Lanie Neil           Service Type: Video appointment      Service Location:  Video appointment      Session Start Time: 9:00   Session End Time: 10:00      Session Length: 53 - 60      Attendees: Patient    Visit Activities (Refresh list every visit): [facility] Only    Service Modality:  Video Visit:      Provider verified identity through the following two step process.  Patient provided:  Patient photo and Patient is known previously to provider    Telemedicine Visit: The patient's condition can be safely assessed and treated via synchronous audio and visual telemedicine encounter.      Reason for Telemedicine Visit: Patient unable to travel    Originating Site (Patient Location): Patient's home    Distant Site (Provider Location): Provider Remote Setting- Home Office    Consent:  The patient/guardian has verbally consented to: the potential risks and benefits of telemedicine (video visit) versus in person care; bill my insurance or make self-payment for services provided; and responsibility for payment of non-covered services.     Patient would like the video invitation sent by:  My Chart    Mode of Communication:  [facility]    Distant Location (Provider):  Off-site    As the provider I attest to compliance with applicable laws and regulations related to telemedicine.      Diagnostic Assessment Date: 1/19/2021; update 5/23/2023  Treatment Plan Review Date: 10/12/2023  See Flowsheets for today's PHQ-9 and KATHARINE-7 results  Previous PHQ-9:       12/15/2020    10:49 AM 3/16/2021     9:13 AM 6/30/2021     9:26 AM   PHQ-9 SCORE   PHQ-9 Total Score MyChart 2 (Minimal depression) 2 (Minimal depression) 2 (Minimal depression)   PHQ-9 Total Score 2 2 2     Previous KATHARINE-7:       12/15/2020    10:52 AM 3/16/2021     9:15 AM 6/30/2021     9:27 AM   KATHARINE-7  "SCORE   Total Score 1 (minimal anxiety) 1 (minimal anxiety) 1 (minimal anxiety)   Total Score 1 1 1       DATA  Extended Session (60+ minutes): No  Interactive Complexity: No  Crisis: No    Treatment Objective(s) Addressed in This Session:  Target Behavior(s): disease management/lifestyle changes      Depressed Mood: Decrease frequency and intensity of feeling down, depressed, hopeless  Identify negative self-talk and behaviors: challenge core beliefs, myths, and actions  Anxiety: will develop more effective coping skills to manage anxiety symptoms  Relationship Problems: will address relationship difficulties in a more adaptive manner  Psychological distress related to Pain    Current Stressors / Issues:  [facility] met with Lanie today to continue supporting her treatment of depression, adjustment to chronic disease management, and relationship problems.     Continued to discuss/process relationship and filial concerns. Extended discussion today about gift giving in her family, how and why this occasionally bothers her. Discussed observed themes of rejection. Also processed/looked at managing hooked thoughts, such as \"It would be easier if they did xyz\" Discussed focusing instead on factors she can control, like practicing mindfulness around when she is ruminating on interpersonal factors. Discussed ways of recognizing rumination to this end.      Am I Hooked or Unhooked From My Thoughts?    Clues that I'm getting hooked and threatened by my thoughts:    1) Thoughts are Literal and Threatening; it s as if what we re thinking is actually present, here and now!    2) Thoughts are \"The Truth\"; we literally believe them!    3) Thoughts are Most Important; we take them seriously, and give them our full attention!    4) Thoughts are Orders; we automatically obey them!    5) Thoughts are Always Wise; we assume they know best and we follow their advice! " "    _____________________________________________________________________    Noticing when I'm unhooked and not threatened by my thoughts:    1) Thoughts are merely sounds, words, stories, bits of language, passing through our heads.    2) Thoughts may or may not be true. We don t automatically believe them.    3) Thoughts may or may not be important. We pay attention only if they re helpful.    4) Thoughts are not orders. We don t have to obey them.    5) Thoughts may or may not be wise. We don t automatically follow their advice.   Thoughts That Hook You:    What memories, fears, worries, self-criticisms, or other unhelpful thoughts do you dwell on or get \"stuck\" thinking about? What thoughts tend to hook you, jerk you around, and pull you out of your life?                 Life-Draining Actions:    What are you currently doing that makes your life worse in the long run: keeps you stuck, wastes your time or money, drains your energy; restricts your life; impacts negatively on your health, work, or relationships; maintains or worsens the problems your are dealing with?                     Feelings That Hook You:    What emotions, feelings, urges, impulses, or sensations tend to hook you and jerk you around or pull you into self-defeating actions?           Avoiding Challenging Situations:    What situations, activities, people, or places are you avoiding or staying away from? What have you quit, withdrawn from, dropped out of? What do you keep \"putting off\" until later?                           Progress on Treatment Objective(s) / Homework:  Satisfactory progress - ACTION (Actively working towards change); Intervened by reinforcing change plan / affirming steps taken       Psychodynamic psychotherapy  Discuss patient's emotional dynamics and issues and how they impact behaviors.  Explore patient's history of relationships and how they impact present behaviors.  Explore how to work with and make changes in these " schemas and patterns.    Interpersonal Psychotherapy  Explore patterns in relationships that are effective or ineffective at helping patient reach their goals, find satisfying experience.  Discuss new patterns or behaviors to engage in for improved social functioning.      Care Plan review completed: no     Medication Review:  No changes to current psychiatric medication(s)    Medication Compliance:  NA    Changes in Health Issues:   None reported    Chemical Use Review:   Substance Use: Chemical use reviewed, no active concerns identified      Tobacco Use: No current tobacco use.      Assessment: Current Emotional / Mental Status (status of significant symptoms):  Risk status (Self / Other harm or suicidal ideation)  Patient has had a history of suicidal ideation: passive thoughts only; easily controlled  - denied any recently    Patient denies current fears or concerns for personal safety.  Patient denies current or recent suicidal ideation or behaviors.  Patient denies current or recent homicidal ideation or behaviors.  Patient denies current or recent self injurious behavior or ideation.  Patient denies other safety concerns.     A safety and risk management plan has not been developed at this time, however patient was encouraged to call [place] [phone] should there be a change in any of these risk factors.      Johnson Suicide Severity Rating Scale (Short Version)      2/18/2021     4:47 PM   Johnson Suicide Severity Rating (Short Version)   Over the past 2 weeks have you felt down, depressed, or hopeless? yes   Over the past 2 weeks have you had thoughts of killing yourself? no   Have you ever attempted to kill yourself? no     Johnson Suicide Severity Rating Scale (Lifetime/Recent)      8/13/2021     1:25 PM   Johnson Suicide Severity Rating (Lifetime/Recent)   Q1 Wish to be Dead (Lifetime) No   Q2 Non-Specific Active Suicidal Thoughts (Lifetime) No   Most Severe Ideation Rating (Lifetime) NA    Frequency (Lifetime) NA   Duration (Lifetime) NA   Controllability (Lifetime) NA   Protective Factors  (Lifetime) NA   Reasons for Ideation (Lifetime) NA   RETIRED: 1. Wish to be Dead (Recent) No   RETIRED: 2. Non-Specific Active Suicidal Thoughts (Recent) No   3. Active Suicidal Ideation with any Methods (Not Plan) Without Intent to Act (Lifetime) No   RETIRED: 3. Active Suicidal Ideation with any Methods (Not Plan) Without Intent to Act (Recent) No   RETIRE: 4. Active Suicidal Ideation with Some Intent to Act, Without Specific Plan (Lifetime) No   4. Active Suicidal Ideation with Some Intent to Act, Without Specific Plan (Recent) No   RETIRE: 5. Active Suicidal Ideation with Specific Plan and Intent (Lifetime) No   RETIRED: 5. Active Suicidal Ideation with Specific Plan and Intent (Recent) No   Most Severe Ideation Rating (Past Month) NA   Frequency (Past Month) NA   Duration (Past Month) NA   Controllability (Past Month) NA   Protective Factors (Past Month) NA   Reasons for Ideation (Past Month) NA   Actual Attempt (Lifetime) No   Actual Attempt (Past 3 Months) No   Has subject engaged in non-suicidal self-injurious behavior? (Lifetime) No   Has subject engaged in non-suicidal self-injurious behavior? (Past 3 Months) No   Interrupted Attempts (Lifetime) No   Interrupted Attempts (Past 3 Months) No   Aborted or Self-Interrupted Attempt (Lifetime) No   Aborted or Self-Interrupted Attempt (Past 3 Months) No   Preparatory Acts or Behavior (Lifetime) No   Preparatory Acts or Behavior (Past 3 Months) No   Most Recent Attempt Actual Lethality Code NA   Most Lethal Attempt Actual Lethality Code NA   Initial/First Attempt Actual Lethality Code NA         Appearance:   Appropriate   Eye Contact:   Good   Psychomotor Behavior: Normal   Attitude:   Cooperative   Orientation:   All  Speech   Rate / Production: Normal    Volume:  Normal   Mood:    Depressed    Affect:    Tearful  Thought Content:  Rumination   Thought  Form:  Coherent  Logical   Insight:    Good     Diagnoses:  1. Major depressive disorder, recurrent episode, moderate (H)          Collateral Reports Completed:  [facility] will coordinate with care team as needed    Plan: (Homework, other):  Lanie was scheduled to RTC in two weeks for help addressing depressive symptoms, pain, and relationship conflicts. Continue thought log for help with depression.  CD Recommendations: No indications of CD issues.     Héctor Higuera Psy.D, LP   Behavioral Health Clinician   -[facility] [place]     _______________________________________________________________________                                              Individual Treatment Plan    Patient's Name: Lanie Neil  YOB: 1990    Date of Creation: 3/4/2022  Date Treatment Plan Last Reviewed/Revised: 7/12/2023    DSM5 Diagnoses: 296.32 (F33.1) Major Depressive Disorder, Recurrent Episode, Moderate With anxious distress  Psychosocial / Contextual Factors: Chronic pain, SMA, disabled  PROMIS (reviewed every 90 days):     PROMIS-10    In general, would you say your health is: (P) Excellent    In general, would you say your quality of life is: (P) Good    In general, how would you rate your physical health? (P) Very good    In general, how would you rate your mental health, including your mood and your ability to think? (P) Good    In general, how would you rate your satisfaction with your social activities and relationships? (P) Good    In general, please rate how well you carry out your usual social activities and roles. (This includes activities at home, at work and in your community, and responsibilities as a parent, child, spouse, employee, friend, etc.) (P) Very good    To what extent are you able to carry out your everyday physical activities such as walking, climbing stairs, carrying groceries, or moving a chair? (P) Not at all    In the past 7 days,  How often have you been bothered by  emotional problems such as feeling anxious, depressed or irritable? (P) Rarely  How would you rate your fatigue on average? (P) Moderate  How would you rate your pain on average? (P) 6    PROMIS GLOBAL SCORES    Mental health question re-calculation - no clinical value - (P) 4  Physical health question re-calculation - no clinical value - (P) 3  Pain question re-calculation - no clinical value - (P) 3  Global Mental Health Score - (P) 13  Global Physical Health Score - (P) 11  PROMIS TOTAL - SUBSCORES - (P) 24      [id number] [facility]AT[facility] VERSION 1.1          Referral / Collaboration:  Referral to another professional/service is not indicated at this time..    Anticipated number of session for this episode of care: 7-9  Anticipation frequency of session: Every other week  Anticipated Duration of each session: 38-52 minutes  Treatment plan will be reviewed in 90 days or when goals have been changed.       MeasurableTreatment Goal(s) related to diagnosis / functional impairment(s)  Goal 1: Patient will experience a reduction in depressive symptoms along with a corresponding increase in positive emotion and life satisfaction.      Objective #A (Patient Action)    Patient will Increase interest, engagement, and pleasure in doing things.  Status: Continued - Date(s): 7/12/2023    Intervention(s)  Therapist will help patient identify pleasant and mastery oriented events that elicit positive, relaxed mood.    Objective #B  Patient will Decrease frequency and intensity of feeling down, depressed, hopeless.  Status: Continued - Date(s): 7/12/2023    Intervention(s)  Therapist will introduce patient to cognitive-behavioral and acceptance and commitment therapy topics aimed to help reduce depression and anxiety    Objective #C  Patient will Identify negative self-talk and behaviors: challenge core beliefs, myths, and actions  Improve concentration, focus, and mindfulness in daily  "activities .  Status: Continued - Date(s): 7/12/2023    Intervention(s)  Therapist will help patient identify and manage negative self-talk and automatic thoughts; introduce patient to cognitive distortions; help patient develop cognitive diffusion techniques      Goal 2: Patient will experience a reduction in anxious symptoms, along with a corresponding increase in relaxed emotional states and life satisfaction.      Objective #A (Patient Action)  Patient will use cognitive-behavioral and thought diffusion strategies identified in therapy to challenge anxious thoughts.    Status: Continued - Date(s): 7/12/2023    Intervention(s)  Therapist will utilize CBT and ACT ideas to help patient challenge anxious thoughts and reduce intensity/duration of anxious distress    Objective #B  Patient will use \"worry time\" each day for 15 minutes of scheduled worry and then defer obsessive or anxious thinking until the next structured worry time.    Status: Continued - Date(s): 7/12/2023    Intervention(s)  Therapist will teach patient how to effectively utilize worry time and/or thought logs/journals each day and incorporate more relaxation behaviors into their routine.    Objective #C  Patient will identify the stressors which contribute to feelings of anxiety  Patient will increase engagement in adaptive coping skills and recreational activities, such as exercise and healthy socialization, to manage distress.  Status: Continued - Date(s): 7/12/2023    Intervention(s)  Therapist will help patient identify triggers/situational factors that contribute to anxiety and behavioral skills aimed to manage anxious distress.      Goal 3: Patient will learn more adaptive ways to manage chronic pain      Objective #A (Patient Action)    Status: Continued - Date(s): 7/12/2023    Patient will identify 2-4 strategies for managing pain.    Intervention(s)  Therapist will teach distraction skills aimed to help manage chronic pain and utilize " ACT/CBT ideas to help with this (e.g. relaxation) .    Objective #B  Patient will state understanding of stressors and relationship to physical symptoms.    Status: Continued - Date(s): 7/12/2023      Other Possible Therapeutic Intervention(s):    Psycho-education regarding mental health diagnoses and treatment options    Supportive Therapy  Provide affirmations, reflections, and establish working rapport  Emphasize and reflect on strength of therapeutic relationship    Skills training  Explore skills useful to client in current situation.  Skills include assertiveness, communication, conflict management, problem-solving, relaxation, etc.    Solution-Focused Therapy  Explore patterns in patient's relationships and discuss options for new behaviors.  Explore patterns in patient's actions and choices and discuss options for new behaviors.    Cognitive-behavioral Therapy  Discuss common cognitive distortions, identify them in patient's life.  Explore ways to challenge, replace, and act against these cognitions.    Acceptance and Commitment Therapy  Explore and identify important values in patient's life.  Discuss ways to commit to behavioral activation around these values.    Psychodynamic psychotherapy  Discuss patient's emotional dynamics and issues and how they impact behaviors.  Explore patient's history of relationships and how they impact present behaviors.  Explore how to work with and make changes in these schemas and patterns.    Narrative Therapy  Explore the patient's story of his/her life from his/her perspective.  Explore alternate ways of understanding their experience, identifying exceptions, developing new themes.    Interpersonal Psychotherapy  Explore patterns in relationships that are effective or ineffective at helping patient reach their goals, find satisfying experience.  Discuss new patterns or behaviors to engage in for improved social functioning.    Behavioral Activation  Discuss steps patient  can take to become more involved in meaningful activity.  Identify barriers to these activities and explore possible solutions.    Mindfulness-Based Strategies  Discuss skills based on development and application of mindfulness.  Skills drawn from compassion-focused therapy, dialectical behavior therapy, mindfulness-based stress reduction, mindfulness-based cognitive therapy, etc.      Patient has reviewed and agreed to the above plan.    Héctor Higuera Psy.D, JENNIFER   Behavioral Health Clinician   [facility]     7/12/2023    Crisis Resources    Refer to the resources below as needed.    Steps to care for yourself    If you are currently in counseling, call your counselor for an appointment  Call the local crisis resources below if needed.  Contact friends or family for support.  Get more exercise.  Do activities you enjoy.  Eat a well-balanced diet and drink plenty of fluids.  Rest as needed.  Limit alcohol and recreational drugs. These can worsen depression.    When to contact your primary care provider     You have thoughts of harming or killing yourself but have not made a plan to carry it out.  Your depression gets in the way of daily activities.  You are often unable to sleep.  You need help cutting back on alcohol or recreational drugs.    When to call 911 or go to the Emergency Room     Get emergency help right away if you have any of the following:  You are planning to harm or kill yourself and you have a way to carry out the plan.   You have injured yourself or others. Or, you think you will.  You feel confused or are having trouble thinking or remembering.  You are having delusions (false beliefs).  You are hearing voices or seeing things that aren t there.  You are feeling psychotic (paranoid, fearful, restless, agitated, nervous, racing thoughts or speech)    Crisis Resources     These hotlines are for both adults and children. The and are open 24 hours a day, 7 days a week  unless noted otherwise.    988 - National Suicide and Crisis Lifeline  If you or someone you know needs support now, call or text 029 or chat SmartVaultline.org. 988 connects you with a trained crisis counselor who can help.    National Suicide Prevention Lifeline   1-199-728-TALK (8877)    Crisis Text Line    www.crisistextline.org  Text HOME to 362312 from anywhere in the [place], anytime, about any type of crisis. A live, trained crisis counselor will receive the text and respond quickly.    [place] Lifeline for LGBTQ Youth  A national crisis intervention and suicide lifeline for LGBTQ youth under 25. Provides a safe place to talk without judgement.   Call 1-890.215.1991; text START to 388229 or visit www.thetrevorproject.org to talk to a trained counselor.  For county crisis numbers, visit the [facility] website at:  https://[place].gov/dhs/people-we-serve/adults/health-care/mental-health/resources/crisis-contacts.jsp      Héctor Higuera Psy.D, LP   Behavioral Health Clinician   [facility]     6/3/2022

## 2023-08-04 RX ORDER — GABAPENTIN 250 MG/5ML
1000 SOLUTION ORAL 3 TIMES DAILY
Qty: 1800 ML | Refills: 5 | Status: SHIPPED | OUTPATIENT
Start: 2023-08-04 | End: 2024-01-09

## 2023-08-04 NOTE — TELEPHONE ENCOUNTER
gabapentin (NEURONTIN) 250 MG/5ML solution    1800 mL 5 2/3/2023     5/9/2023  [facility]     Jimmy Moseley MD  Family Medicine     Nv:none    Routed because: not on protocol

## 2023-08-04 NOTE — TELEPHONE ENCOUNTER
Dispense report reviewed. Gabapentin last filled 7/06/23 for 1800 ml, a 30-day supply.    Last appointment with PCP on 5/9/23. BMP last completed 2/15/23. Medication refilled per protocol.    Jacqueline Corcoran RN ([place])

## 2023-08-07 DIAGNOSIS — G12.9 SPINAL MUSCLE ATROPHY (H): ICD-10-CM

## 2023-08-07 RX ORDER — DIAZEPAM 5 MG
TABLET ORAL
Qty: 30 TABLET | Refills: 0 | Status: SHIPPED | OUTPATIENT
Start: 2023-08-07 | End: 2023-09-26

## 2023-08-07 NOTE — TELEPHONE ENCOUNTER
diazepam (VALIUM) 5 MG tablet   Last Written Prescription Date:  7/120/2023  Last Fill Quantity: 30,   # refills: 0  Last Office Visit :  5/9/2023  Future Office visit:  None    Routing refill request to provider for review/approval because:  Drug not on the [facility], P or [facility] refill protocol or controlled substance    Georgina Sy RN  Central Triage Red Flags/Med Refills

## 2023-08-11 ENCOUNTER — TELEPHONE (OUTPATIENT)
Dept: FAMILY MEDICINE | Facility: CLINIC | Age: 33
End: 2023-08-11
Payer: MEDICARE

## 2023-08-11 NOTE — TELEPHONE ENCOUNTER
[facility] Call Center    Phone Message    May a detailed message be left on voicemail: yes     Reason for Call: Patient called asking for a virtual appointment, they wanted me ask to make an exception for her, can you see her virtually?  She wants to be seen for hair loss.  Please call patient.    Action Taken: Message routed to:  [facility]    Travel Screening: Not Applicable

## 2023-08-16 ENCOUNTER — VIRTUAL VISIT (OUTPATIENT)
Dept: BEHAVIORAL HEALTH | Facility: CLINIC | Age: 33
End: 2023-08-16
Payer: COMMERCIAL

## 2023-08-16 ENCOUNTER — MYC MEDICAL ADVICE (OUTPATIENT)
Dept: FAMILY MEDICINE | Facility: CLINIC | Age: 33
End: 2023-08-16
Payer: MEDICARE

## 2023-08-16 DIAGNOSIS — F33.1 MAJOR DEPRESSIVE DISORDER, RECURRENT EPISODE, MODERATE (H): Primary | ICD-10-CM

## 2023-08-16 PROCEDURE — 90834 PSYTX W PT 45 MINUTES: CPT | Mod: VID | Performed by: PSYCHOLOGIST

## 2023-08-16 NOTE — PROGRESS NOTES
[facility]: Integrated Behavioral Health  August 16, 2023        Behavioral Health Clinician Progress Note    Patient Name: Lanie Neil           Service Type: Video appointment      Service Location:  Video appointment      Session Start Time: 11:13  Session End Time: 11:56      Session Length: 38 - 52      Attendees: Patient    Visit Activities (Refresh list every visit): [facility] Only    Service Modality:  Video Visit:      Provider verified identity through the following two step process.  Patient provided:  Patient photo and Patient is known previously to provider    Telemedicine Visit: The patient's condition can be safely assessed and treated via synchronous audio and visual telemedicine encounter.      Reason for Telemedicine Visit: Patient unable to travel    Originating Site (Patient Location): Patient's home    Distant Site (Provider Location): [facility]: [facility]    Consent:  The patient/guardian has verbally consented to: the potential risks and benefits of telemedicine (video visit) versus in person care; bill my insurance or make self-payment for services provided; and responsibility for payment of non-covered services.     Patient would like the video invitation sent by:  My Chart    Mode of Communication:  [facility]    Distant Location (Provider):  On-site    As the provider I attest to compliance with applicable laws and regulations related to telemedicine.      Diagnostic Assessment Date: 1/19/2021; update 5/23/2023  Treatment Plan Review Date: 10/12/2023  See Flowsheets for today's PHQ-9 and KATHARINE-7 results  Previous PHQ-9:       12/15/2020    10:49 AM 3/16/2021     9:13 AM 6/30/2021     9:26 AM   PHQ-9 SCORE   PHQ-9 Total Score MyChart 2 (Minimal depression) 2 (Minimal depression) 2 (Minimal depression)   PHQ-9 Total Score 2 2 2     Previous KATHARINE-7:       12/15/2020    10:52 AM 3/16/2021     9:15 AM 6/30/2021     9:27 AM   KATHARINE-7 SCORE  "  Total Score 1 (minimal anxiety) 1 (minimal anxiety) 1 (minimal anxiety)   Total Score 1 1 1       DATA  Extended Session (60+ minutes): No  Interactive Complexity: No  Crisis: No    Treatment Objective(s) Addressed in This Session:  Target Behavior(s): disease management/lifestyle changes      Depressed Mood: Decrease frequency and intensity of feeling down, depressed, hopeless  Identify negative self-talk and behaviors: challenge core beliefs, myths, and actions  Anxiety: will develop more effective coping skills to manage anxiety symptoms  Relationship Problems: will address relationship difficulties in a more adaptive manner  Psychological distress related to Pain    Current Stressors / Issues:  [facility] met with Lanie today to continue supporting her treatment of depression, adjustment to chronic disease management, and relationship problems.     Continued to discuss/process relationship and filial concerns. Reports frustration/fear with relationship concerns. Notes recent conflict with mother and brother is bothering her - she notes her brother, following this conflict, stated recently he might move out Lanie notes frustration with her mother and brother for not being able to resolve the conflict adaptively. She notes her mother's difficulties with mental health is a factor in these conflicts she struggles with. We reflected on observed recurrent themes of abandonment, reviewing \"wise mind\" from DBT as a method of coping. Supportive and insight-oriented counseling was provided.       Am I Hooked or Unhooked From My Thoughts?    Clues that I'm getting hooked and threatened by my thoughts:    1) Thoughts are Literal and Threatening; it s as if what we re thinking is actually present, here and now!    2) Thoughts are \"The Truth\"; we literally believe them!    3) Thoughts are Most Important; we take them seriously, and give them our full attention!    4) Thoughts are Orders; we automatically obey them!    5) " "Thoughts are Always Wise; we assume they know best and we follow their advice!     _____________________________________________________________________    Noticing when I'm unhooked and not threatened by my thoughts:    1) Thoughts are merely sounds, words, stories, bits of language, passing through our heads.    2) Thoughts may or may not be true. We don t automatically believe them.    3) Thoughts may or may not be important. We pay attention only if they re helpful.    4) Thoughts are not orders. We don t have to obey them.    5) Thoughts may or may not be wise. We don t automatically follow their advice.   Thoughts That Hook You:    What memories, fears, worries, self-criticisms, or other unhelpful thoughts do you dwell on or get \"stuck\" thinking about? What thoughts tend to hook you, jerk you around, and pull you out of your life?                 Life-Draining Actions:    What are you currently doing that makes your life worse in the long run: keeps you stuck, wastes your time or money, drains your energy; restricts your life; impacts negatively on your health, work, or relationships; maintains or worsens the problems your are dealing with?                     Feelings That Hook You:    What emotions, feelings, urges, impulses, or sensations tend to hook you and jerk you around or pull you into self-defeating actions?           Avoiding Challenging Situations:    What situations, activities, people, or places are you avoiding or staying away from? What have you quit, withdrawn from, dropped out of? What do you keep \"putting off\" until later?                           Progress on Treatment Objective(s) / Homework:  Satisfactory progress - ACTION (Actively working towards change); Intervened by reinforcing change plan / affirming steps taken       Psychodynamic psychotherapy  Discuss patient's emotional dynamics and issues and how they impact behaviors.  Explore patient's history of relationships and how they " impact present behaviors.  Explore how to work with and make changes in these schemas and patterns.    Interpersonal Psychotherapy  Explore patterns in relationships that are effective or ineffective at helping patient reach their goals, find satisfying experience.  Discuss new patterns or behaviors to engage in for improved social functioning.      Care Plan review completed: no     Medication Review:  No changes to current psychiatric medication(s)    Medication Compliance:  NA    Changes in Health Issues:   None reported    Chemical Use Review:   Substance Use: Chemical use reviewed, no active concerns identified      Tobacco Use: No current tobacco use.      Assessment: Current Emotional / Mental Status (status of significant symptoms):  Risk status (Self / Other harm or suicidal ideation)  Patient has had a history of suicidal ideation: passive thoughts only; easily controlled  - denied any recently    Patient denies current fears or concerns for personal safety.  Patient denies current or recent suicidal ideation or behaviors.  Patient denies current or recent homicidal ideation or behaviors.  Patient denies current or recent self injurious behavior or ideation.  Patient denies other safety concerns.     A safety and risk management plan has not been developed at this time, however patient was encouraged to call [place] [phone] should there be a change in any of these risk factors.      [place] Suicide Severity Rating Scale (Short Version)      2/18/2021     4:47 PM   [place] Suicide Severity Rating (Short Version)   Over the past 2 weeks have you felt down, depressed, or hopeless? yes   Over the past 2 weeks have you had thoughts of killing yourself? no   Have you ever attempted to kill yourself? no     [place] Suicide Severity Rating Scale (Lifetime/Recent)      8/13/2021     1:25 PM   [place] Suicide Severity Rating (Lifetime/Recent)   Q1 Wish to be Dead (Lifetime) No   Q2 Non-Specific Active  Suicidal Thoughts (Lifetime) No   Most Severe Ideation Rating (Lifetime) NA   Frequency (Lifetime) NA   Duration (Lifetime) NA   Controllability (Lifetime) NA   Protective Factors  (Lifetime) NA   Reasons for Ideation (Lifetime) NA   RETIRED: 1. Wish to be Dead (Recent) No   RETIRED: 2. Non-Specific Active Suicidal Thoughts (Recent) No   3. Active Suicidal Ideation with any Methods (Not Plan) Without Intent to Act (Lifetime) No   RETIRED: 3. Active Suicidal Ideation with any Methods (Not Plan) Without Intent to Act (Recent) No   RETIRE: 4. Active Suicidal Ideation with Some Intent to Act, Without Specific Plan (Lifetime) No   4. Active Suicidal Ideation with Some Intent to Act, Without Specific Plan (Recent) No   RETIRE: 5. Active Suicidal Ideation with Specific Plan and Intent (Lifetime) No   RETIRED: 5. Active Suicidal Ideation with Specific Plan and Intent (Recent) No   Most Severe Ideation Rating (Past Month) NA   Frequency (Past Month) NA   Duration (Past Month) NA   Controllability (Past Month) NA   Protective Factors (Past Month) NA   Reasons for Ideation (Past Month) NA   Actual Attempt (Lifetime) No   Actual Attempt (Past 3 Months) No   Has subject engaged in non-suicidal self-injurious behavior? (Lifetime) No   Has subject engaged in non-suicidal self-injurious behavior? (Past 3 Months) No   Interrupted Attempts (Lifetime) No   Interrupted Attempts (Past 3 Months) No   Aborted or Self-Interrupted Attempt (Lifetime) No   Aborted or Self-Interrupted Attempt (Past 3 Months) No   Preparatory Acts or Behavior (Lifetime) No   Preparatory Acts or Behavior (Past 3 Months) No   Most Recent Attempt Actual Lethality Code NA   Most Lethal Attempt Actual Lethality Code NA   Initial/First Attempt Actual Lethality Code NA         Appearance:   Appropriate   Eye Contact:   Good   Psychomotor Behavior: Normal   Attitude:   Cooperative   Orientation:   All  Speech   Rate / Production: Normal    Volume:  Normal    Mood:    Depressed    Affect:    Tearful  Thought Content:  Rumination   Thought Form:  Coherent  Logical   Insight:    Good     Diagnoses:  1. Major depressive disorder, recurrent episode, moderate (H)            Collateral Reports Completed:  [facility] will coordinate with care team as needed    Plan: (Homework, other):  Lanie was scheduled to RTC in two weeks for help addressing depressive symptoms, pain, and relationship conflicts. Continue DBT skills provided.  CD Recommendations: No indications of CD issues.     Héctor Higuera Psy.D, LP   Behavioral Health Clinician   [facility]     _______________________________________________________________________                                              Individual Treatment Plan    Patient's Name: Lanie Neil  YOB: 1990    Date of Creation: 3/4/2022  Date Treatment Plan Last Reviewed/Revised: 7/12/2023    DSM5 Diagnoses: 296.32 (F33.1) Major Depressive Disorder, Recurrent Episode, Moderate With anxious distress  Psychosocial / Contextual Factors: Chronic pain, SMA, disabled  PROMIS (reviewed every 90 days):     PROMIS-10    In general, would you say your health is: (P) Excellent    In general, would you say your quality of life is: (P) Good    In general, how would you rate your physical health? (P) Very good    In general, how would you rate your mental health, including your mood and your ability to think? (P) Good    In general, how would you rate your satisfaction with your social activities and relationships? (P) Good    In general, please rate how well you carry out your usual social activities and roles. (This includes activities at home, at work and in your community, and responsibilities as a parent, child, spouse, employee, friend, etc.) (P) Very good    To what extent are you able to carry out your everyday physical activities such as walking, climbing stairs, carrying groceries, or moving a chair? (P)  Not at all    In the past 7 days,  How often have you been bothered by emotional problems such as feeling anxious, depressed or irritable? (P) Rarely  How would you rate your fatigue on average? (P) Moderate  How would you rate your pain on average? (P) 6    PROMIS GLOBAL SCORES    Mental health question re-calculation - no clinical value - (P) 4  Physical health question re-calculation - no clinical value - (P) 3  Pain question re-calculation - no clinical value - (P) 3  Global Mental Health Score - (P) 13  Global Physical Health Score - (P) 11  PROMIS TOTAL - SUBSCORES - (P) 24      [id number] PROMIS HEALTH ORGANIZATION AND PROMIS COOPERATIVE GROUP VERSION 1.1           3/4/2022  1:59 PM 3/15/2022  2:10 PM 3/30/2023  10:56 AM 7/6/2023  9:45 AM   PROMIS-10 Scores Only       Global Mental Health Score 13  8  7  12    Global Physical Health Score 11  9  9  11    PROMIS TOTAL - SUBSCORES 24  17  16  23            Referral / Collaboration:  Referral to another professional/service is not indicated at this time..    Anticipated number of session for this episode of care: 7-9  Anticipation frequency of session: Every other week  Anticipated Duration of each session: 38-52 minutes  Treatment plan will be reviewed in 90 days or when goals have been changed.       MeasurableTreatment Goal(s) related to diagnosis / functional impairment(s)  Goal 1: Patient will experience a reduction in depressive symptoms along with a corresponding increase in positive emotion and life satisfaction.      Objective #A (Patient Action)    Patient will Increase interest, engagement, and pleasure in doing things.  Status: Continued - Date(s): 7/12/2023    Intervention(s)  Therapist will help patient identify pleasant and mastery oriented events that elicit positive, relaxed mood.    Objective #B  Patient will Decrease frequency and intensity of feeling down, depressed, hopeless.  Status: Continued - Date(s):  "7/12/2023    Intervention(s)  Therapist will introduce patient to cognitive-behavioral and acceptance and commitment therapy topics aimed to help reduce depression and anxiety    Objective #C  Patient will Identify negative self-talk and behaviors: challenge core beliefs, myths, and actions  Improve concentration, focus, and mindfulness in daily activities .  Status: Continued - Date(s): 7/12/2023    Intervention(s)  Therapist will help patient identify and manage negative self-talk and automatic thoughts; introduce patient to cognitive distortions; help patient develop cognitive diffusion techniques      Goal 2: Patient will experience a reduction in anxious symptoms, along with a corresponding increase in relaxed emotional states and life satisfaction.      Objective #A (Patient Action)  Patient will use cognitive-behavioral and thought diffusion strategies identified in therapy to challenge anxious thoughts.    Status: Continued - Date(s): 7/12/2023    Intervention(s)  Therapist will utilize CBT and ACT ideas to help patient challenge anxious thoughts and reduce intensity/duration of anxious distress    Objective #B  Patient will use \"worry time\" each day for 15 minutes of scheduled worry and then defer obsessive or anxious thinking until the next structured worry time.    Status: Continued - Date(s): 7/12/2023    Intervention(s)  Therapist will teach patient how to effectively utilize worry time and/or thought logs/journals each day and incorporate more relaxation behaviors into their routine.    Objective #C  Patient will identify the stressors which contribute to feelings of anxiety  Patient will increase engagement in adaptive coping skills and recreational activities, such as exercise and healthy socialization, to manage distress.  Status: Continued - Date(s): 7/12/2023    Intervention(s)  Therapist will help patient identify triggers/situational factors that contribute to anxiety and behavioral skills aimed " to manage anxious distress.      Goal 3: Patient will learn more adaptive ways to manage chronic pain      Objective #A (Patient Action)    Status: Continued - Date(s): 7/12/2023    Patient will identify 2-4 strategies for managing pain.    Intervention(s)  Therapist will teach distraction skills aimed to help manage chronic pain and utilize ACT/CBT ideas to help with this (e.g. relaxation) .    Objective #B  Patient will state understanding of stressors and relationship to physical symptoms.    Status: Continued - Date(s): 7/12/2023      Other Possible Therapeutic Intervention(s):    Psycho-education regarding mental health diagnoses and treatment options    Supportive Therapy  Provide affirmations, reflections, and establish working rapport  Emphasize and reflect on strength of therapeutic relationship    Skills training  Explore skills useful to client in current situation.  Skills include assertiveness, communication, conflict management, problem-solving, relaxation, etc.    Solution-Focused Therapy  Explore patterns in patient's relationships and discuss options for new behaviors.  Explore patterns in patient's actions and choices and discuss options for new behaviors.    Cognitive-behavioral Therapy  Discuss common cognitive distortions, identify them in patient's life.  Explore ways to challenge, replace, and act against these cognitions.    Acceptance and Commitment Therapy  Explore and identify important values in patient's life.  Discuss ways to commit to behavioral activation around these values.    Psychodynamic psychotherapy  Discuss patient's emotional dynamics and issues and how they impact behaviors.  Explore patient's history of relationships and how they impact present behaviors.  Explore how to work with and make changes in these schemas and patterns.    Narrative Therapy  Explore the patient's story of his/her life from his/her perspective.  Explore alternate ways of understanding their  experience, identifying exceptions, developing new themes.    Interpersonal Psychotherapy  Explore patterns in relationships that are effective or ineffective at helping patient reach their goals, find satisfying experience.  Discuss new patterns or behaviors to engage in for improved social functioning.    Behavioral Activation  Discuss steps patient can take to become more involved in meaningful activity.  Identify barriers to these activities and explore possible solutions.    Mindfulness-Based Strategies  Discuss skills based on development and application of mindfulness.  Skills drawn from compassion-focused therapy, dialectical behavior therapy, mindfulness-based stress reduction, mindfulness-based cognitive therapy, etc.      Patient has reviewed and agreed to the above plan.    Héctor Higuera Psy.D, LP   Behavioral Health Clinician   [facility]     7/12/2023    Crisis Resources    Refer to the resources below as needed.    Steps to care for yourself    If you are currently in counseling, call your counselor for an appointment  Call the local crisis resources below if needed.  Contact friends or family for support.  Get more exercise.  Do activities you enjoy.  Eat a well-balanced diet and drink plenty of fluids.  Rest as needed.  Limit alcohol and recreational drugs. These can worsen depression.    When to contact your primary care provider     You have thoughts of harming or killing yourself but have not made a plan to carry it out.  Your depression gets in the way of daily activities.  You are often unable to sleep.  You need help cutting back on alcohol or recreational drugs.    When to call 911 or go to the Emergency Room     Get emergency help right away if you have any of the following:  You are planning to harm or kill yourself and you have a way to carry out the plan.   You have injured yourself or others. Or, you think you will.  You feel confused or are having  trouble thinking or remembering.  You are having delusions (false beliefs).  You are hearing voices or seeing things that aren t there.  You are feeling psychotic (paranoid, fearful, restless, agitated, nervous, racing thoughts or speech)    Crisis Resources     These hotlines are for both adults and children. The and are open 24 hours a day, 7 days a week unless noted otherwise.    988 - National Suicide and Crisis Lifeline  If you or someone you know needs support now, call or text 988 or chat [employer]org. 988 connects you with a trained crisis counselor who can help.    National Suicide Prevention Lifeline   5-101-924-TALK (8068)    Crisis Text Line    www.crisistextline.org  Text HOME to 004771 from anywhere in the [place], anytime, about any type of crisis. A live, trained crisis counselor will receive the text and respond quickly.    [place] Lifeline for LGBTQ Youth  A national crisis intervention and suicide lifeline for LGBTQ youth under 25. Provides a safe place to talk without judgement.   Call 1-163.414.7623; text START to 909287 or visit www.thetrevorproject.org to talk to a trained counselor.  For Critical access hospital crisis numbers, visit the [place] DHS website at:  https://[place].gov/dhs/people-we-serve/adults/health-care/mental-health/resources/crisis-contacts.jsp      Héctor Higuera Psy.D, LP   Behavioral Health Clinician   [facility]     6/3/2022

## 2023-08-31 ENCOUNTER — E-CONSULT (OUTPATIENT)
Dept: DERMATOLOGY | Facility: CLINIC | Age: 33
End: 2023-08-31
Payer: COMMERCIAL

## 2023-08-31 ENCOUNTER — VIRTUAL VISIT (OUTPATIENT)
Dept: FAMILY MEDICINE | Facility: CLINIC | Age: 33
End: 2023-08-31
Payer: COMMERCIAL

## 2023-08-31 ENCOUNTER — VIRTUAL VISIT (OUTPATIENT)
Dept: BEHAVIORAL HEALTH | Facility: CLINIC | Age: 33
End: 2023-08-31
Payer: COMMERCIAL

## 2023-08-31 DIAGNOSIS — F33.1 MAJOR DEPRESSIVE DISORDER, RECURRENT EPISODE, MODERATE (H): Primary | ICD-10-CM

## 2023-08-31 DIAGNOSIS — L65.9 HAIR LOSS: Primary | ICD-10-CM

## 2023-08-31 DIAGNOSIS — K59.00 CONSTIPATION, UNSPECIFIED CONSTIPATION TYPE: ICD-10-CM

## 2023-08-31 PROCEDURE — 99451 NTRPROF PH1/NTRNET/EHR 5/>: CPT | Performed by: DERMATOLOGY

## 2023-08-31 PROCEDURE — 99207 E-CONSULT TO DERMATOLOGY (ADULT OUTPT PROVIDER TO SPECIALIST WRITTEN QUESTION & RESPONSE): CPT | Mod: VID | Performed by: FAMILY MEDICINE

## 2023-08-31 PROCEDURE — 99214 OFFICE O/P EST MOD 30 MIN: CPT | Mod: VID | Performed by: FAMILY MEDICINE

## 2023-08-31 PROCEDURE — 90837 PSYTX W PT 60 MINUTES: CPT | Mod: VID | Performed by: PSYCHOLOGIST

## 2023-08-31 NOTE — PROGRESS NOTES
Lanie is a 33 year old who is being evaluated via a billable video visit.      How would you like to obtain your AVS? MyChart  If the video visit is dropped, the invitation should be resent by: in epic  Will anyone else be joining your video visit? No        Assessment & Plan     Hair loss  See HPI cbc irons tsh pending  - Adult E-Consult to Dermatology (Outpt Provider to Specialist Written Question & Response)    Constipation, unspecified constipation type  See HPI due to SMA cannot use lower body muscles  - Adult E-Consult to Gastroenterology (Outpt Provider to Specialist Written Question & Response)      33 minutes spent by me on the date of the encounter doing chart review, history and exam, documentation and further activities per the note    No follow-ups on file.    Jimmy Moseley MD  [facility]   Lanie is a 33 year old, presenting for the following health issues:  RECHECK    HPI 1-losing hair on posterior scalp. Shows photos. Recent. No recent med changes. Will check labs but she'll send photo and ok's derm e consult. Not elsewhere on body. No obvious rash.  Lies on back almost all the time re: vent dependent SMA pt  Long term position issue    2-even using senna, colace, mom, miralax together, hard to stool, needs digitial, can feel urge but cannot push.   Wonders if even colostomy a good idea; will start w/ GI consult, she agrees to E consult  Chronic    3-h/o low K redo          Review of Systems         Objective    Vitals - Patient Reported  Systolic (Patient Reported):  (Not today)  Weight (Patient Reported):  (Not recently)  Pain Score: Severe Pain (7)  Pain Loc: Hip        Physical Exam   GENERAL: Healthy, alert and no distress  EYES: Eyes grossly normal to inspection.  No discharge or erythema, or obvious scleral/conjunctival abnormalities.  RESP: No audible wheeze, cough, or visible cyanosis.  No visible retractions or increased work of  breathing.  On vent  SKIN: Visible skin clear. No significant rash, abnormal pigmentation or lesions. On photo shown hair loss post scalp  NEURO: Cranial nerves grossly intact.  Mentation and speech appropriate for age.  PSYCH: Mentation appears normal, affect normal/bright, judgement and insight intact, normal speech and appearance well-groomed.            Video-Visit Details    Type of service:  Video Visit   Video Start Time:  3:30  Video End Time: 3:55    Originating Location (pt. Location): Home    Distant Location (provider location):  On-site  Platform used for Video Visit: Boris

## 2023-08-31 NOTE — NURSING NOTE
Is the patient currently in the state of [place]? YES    Visit mode:VIDEO    If the visit is dropped, the patient can be reconnected by: VIDEO VISIT: Text to cell phone:   Telephone Information:   Mobile [phone]       Will anyone else be joining the visit? Yes, melva Rodarte  (If patient encounters technical issues they should call [phone] :[phone])    How would you like to obtain your AVS? MyChart    Are changes needed to the allergy or medication list? Pt stated no changes to allergies and Pt stated no med changes    Reason for visit: JENNIFER ACUÑA

## 2023-08-31 NOTE — PATIENT INSTRUCTIONS
Thank you for visiting the [facility] Center today at the [facility]! The following is some information about our clinic:     Primary Care Center Frequently-Asked Questions    (1) How do I schedule appointments at the [facility]?     Primary Care--to schedule or make changes to an existing appointment, please call our primary care line at [phone].    Labs--to schedule a lab appointment at the [facility] you can use [employer] or call [phone]. If you have a [place] location that is closer to home, you can reach out to that location for scheduling options.     Imaging--if you need to schedule a CT, X-ray, MRI, ultrasound, or other imaging study you can call [phone] to schedule at the [facility] or any other [facility] imaging location.     Referrals--if a referral to another specialty was ordered you can expect a phone call from their scheduling team. If you have not heard from them in a week, please call us or send us a [employer] message to check the status or get a scheduling number. Please note that this only applies to internal [facility] referrals. If the referral is external you would need to contact their office for scheduling.     (2) I have a question about my visit, who do I contact?     You can call us at the primary care line at [phone] to ask questions about your visit. You can also send a secure message through [employer], which is reviewed by clinic staff. Please note that [employer] messages have a twenty-four to forty-eight business hour turnaround time and should not be used for urgent concerns.    (3) How will I get the results of my tests?    If you are signed up for [employer] all tests will be released to you within twenty-four hours of resulting. Please allow three to five days for your doctor to review your results and place a note interpreting the results. If you do not have [employer] you will receive your  results through mail seven to ten business days following the return of the tests. Please note that if there should be any urgent or concerning results that your doctor or their registered nurse will reach out to you the same day as the tests come back. If you have follow up questions about your results or would like to discuss the results in detail please schedule a follow up with your provider either in person or virtually.     (4) How do I get refills of my prescriptions?     You should always first contact your pharmacy for refills of your medications. If submitting a refill request on [employer], please be sure to submit the request only once--repeat requests can cause delays in refill. If you are requesting a NEW medication or a medication related to new symptoms you will need to schedule an appointment with a provider prior to approval. Please note: Routine medication refills have up to one to three business day turnaround whereas controlled substances refills have up to five to seven business day turnaround.    (5) I have new symptoms, what do I do?     If you are having an immediate medical emergency, you should dial 911 for assistance.   For anything urgent that needs to be seen within a few hours to one day you should visit a local urgent care for assistance.  For non-urgent symptoms that need to be seen within a few days to a week you can schedule with an available provider in primary care by going to [employer] or calling [phone].   If you are not sure how serious your symptoms are or you would like to receive medical advice you can always call [phone] to speak with a triage nurse.

## 2023-08-31 NOTE — PROGRESS NOTES
[facility]: Integrated Behavioral Health  August 31, 2023          Behavioral Health Clinician Progress Note    Patient Name: Lanie Neil           Service Type: Video appointment      Service Location:  Video appointment      Session Start Time: 9:03 Session End Time: 9:59      Session Length: 53 - 60      Attendees: Patient    Visit Activities (Refresh list every visit): [facility] Only    Service Modality:  Video Visit:      Provider verified identity through the following two step process.  Patient provided:  Patient photo and Patient is known previously to provider    Telemedicine Visit: The patient's condition can be safely assessed and treated via synchronous audio and visual telemedicine encounter.      Reason for Telemedicine Visit: Patient unable to travel    Originating Site (Patient Location): Patient's home    Distant Site (Provider Location): Provider Remote Setting- Home Office    Consent:  The patient/guardian has verbally consented to: the potential risks and benefits of telemedicine (video visit) versus in person care; bill my insurance or make self-payment for services provided; and responsibility for payment of non-covered services.     Patient would like the video invitation sent by:  My Chart    [facility] of [facility]:  [facility]    Distant Location (Provider):  Off-site    As the provider I attest to compliance with applicable laws and regulations related to telemedicine.      Diagnostic Assessment Date: 1/19/2021; update 5/23/2023  Treatment Plan Review Date: 10/12/2023  See Flowsheets for today's PHQ-9 and KATHARINE-7 results  Previous PHQ-9:       12/15/2020    10:49 AM 3/16/2021     9:13 AM 6/30/2021     9:26 AM   PHQ-9 SCORE   PHQ-9 Total Score MyChart 2 (Minimal depression) 2 (Minimal depression) 2 (Minimal depression)   PHQ-9 Total Score 2 2 2     Previous KATHARINE-7:       12/15/2020    10:52 AM 3/16/2021     9:15 AM 6/30/2021     9:27 AM   KATHARINE-7  "SCORE   Total Score 1 (minimal anxiety) 1 (minimal anxiety) 1 (minimal anxiety)   Total Score 1 1 1       DATA  Extended Session (60+ minutes): No  Interactive Complexity: No  Crisis: No    Treatment Objective(s) Addressed in This Session:  Target Behavior(s): disease management/lifestyle changes      Depressed Mood: Decrease frequency and intensity of feeling down, depressed, hopeless  Identify negative self-talk and behaviors: challenge core beliefs, myths, and actions  Anxiety: will develop more effective coping skills to manage anxiety symptoms  Relationship Problems: will address relationship difficulties in a more adaptive manner  Psychological distress related to Pain    Current Stressors / Issues:  [facility] met with Lanie today to continue supporting her treatment of depression, adjustment to chronic disease management, and relationship problems.     Continued to discuss/process relationship and filial concerns. Reports stress today from a game on her phone she is playing not working as well lately. She notes reaching out to a friend for help which ended in conflict. She also notes her brother is moving out in a week or so which is stressful for her. Notes grappling with anger and sadness about other connections in her life \"moving on in life\" like having children or getting . Notes these events contribute to her depression. Reviewed difficulties with tendency to personalize certain interpersonal events and online interactions. Discussed ways to practice healthy online behavior to this end. Discussed online pornography use as well, how maybe seeing this as okay, not an addiction, normalizing sexual desire/want for intimacy.     Am I Hooked or Unhooked From My Thoughts?    Clues that I'm getting hooked and threatened by my thoughts:    1) Thoughts are Literal and Threatening; it s as if what we re thinking is actually present, here and now!    2) Thoughts are \"The Truth\"; we literally believe them!    3) " "Thoughts are Most Important; we take them seriously, and give them our full attention!    4) Thoughts are Orders; we automatically obey them!    5) Thoughts are Always Wise; we assume they know best and we follow their advice!     _____________________________________________________________________    Noticing when I'm unhooked and not threatened by my thoughts:    1) Thoughts are merely sounds, words, stories, bits of language, passing through our heads.    2) Thoughts may or may not be true. We don t automatically believe them.    3) Thoughts may or may not be important. We pay attention only if they re helpful.    4) Thoughts are not orders. We don t have to obey them.    5) Thoughts may or may not be wise. We don t automatically follow their advice.   Thoughts That Hook You:    What memories, fears, worries, self-criticisms, or other unhelpful thoughts do you dwell on or get \"stuck\" thinking about? What thoughts tend to hook you, jerk you around, and pull you out of your life?                 Life-Draining Actions:    What are you currently doing that makes your life worse in the long run: keeps you stuck, wastes your time or money, drains your energy; restricts your life; impacts negatively on your health, work, or relationships; maintains or worsens the problems your are dealing with?                     Feelings That Hook You:    What emotions, feelings, urges, impulses, or sensations tend to hook you and jerk you around or pull you into self-defeating actions?           Avoiding Challenging Situations:    What situations, activities, people, or places are you avoiding or staying away from? What have you quit, withdrawn from, dropped out of? What do you keep \"putting off\" until later?                           Progress on Treatment Objective(s) / Homework:  Satisfactory progress - ACTION (Actively working towards change); Intervened by reinforcing change plan / affirming steps taken       Psychodynamic " psychotherapy  Discuss patient's emotional dynamics and issues and how they impact behaviors.  Explore patient's history of relationships and how they impact present behaviors.  Explore how to work with and make changes in these schemas and patterns.    Interpersonal Psychotherapy  Explore patterns in relationships that are effective or ineffective at helping patient reach their goals, find satisfying experience.  Discuss new patterns or behaviors to engage in for improved social functioning.      Care Plan review completed: no     Medication Review:  No changes to current psychiatric medication(s)    Medication Compliance:  NA    Changes in Health Issues:   None reported    Chemical Use Review:   Substance Use: Chemical use reviewed, no active concerns identified      Tobacco Use: No current tobacco use.      Assessment: Current Emotional / Mental Status (status of significant symptoms):  Risk status (Self / Other harm or suicidal ideation)  Patient has had a history of suicidal ideation: passive thoughts only; easily controlled  - denied any recently    Patient denies current fears or concerns for personal safety.  Patient denies current or recent suicidal ideation or behaviors.  Patient denies current or recent homicidal ideation or behaviors.  Patient denies current or recent self injurious behavior or ideation.  Patient denies other safety concerns.     A safety and risk management plan has not been developed at this time, however patient was encouraged to call [place] [phone] should there be a change in any of these risk factors.      Day Suicide Severity Rating Scale (Short Version)      2/18/2021     4:47 PM   Day Suicide Severity Rating (Short Version)   Over the past 2 weeks have you felt down, depressed, or hopeless? yes   Over the past 2 weeks have you had thoughts of killing yourself? no   Have you ever attempted to kill yourself? no     Day Suicide Severity Rating Scale  (Lifetime/Recent)      8/13/2021     1:25 PM   [place] Suicide Severity Rating (Lifetime/Recent)   Q1 Wish to be Dead (Lifetime) No   Q2 Non-Specific Active Suicidal Thoughts (Lifetime) No   Most Severe Ideation Rating (Lifetime) NA   Frequency (Lifetime) NA   Duration (Lifetime) NA   Controllability (Lifetime) NA   Protective Factors  (Lifetime) NA   Reasons for Ideation (Lifetime) NA   RETIRED: 1. Wish to be Dead (Recent) No   RETIRED: 2. Non-Specific Active Suicidal Thoughts (Recent) No   3. Active Suicidal Ideation with any Methods (Not Plan) Without Intent to Act (Lifetime) No   RETIRED: 3. Active Suicidal Ideation with any Methods (Not Plan) Without Intent to Act (Recent) No   RETIRE: 4. Active Suicidal Ideation with Some Intent to Act, Without Specific Plan (Lifetime) No   4. Active Suicidal Ideation with Some Intent to Act, Without Specific Plan (Recent) No   RETIRE: 5. Active Suicidal Ideation with Specific Plan and Intent (Lifetime) No   RETIRED: 5. Active Suicidal Ideation with Specific Plan and Intent (Recent) No   Most Severe Ideation Rating (Past Month) NA   Frequency (Past Month) NA   Duration (Past Month) NA   Controllability (Past Month) NA   Protective Factors (Past Month) NA   Reasons for Ideation (Past Month) NA   Actual Attempt (Lifetime) No   Actual Attempt (Past 3 Months) No   Has subject engaged in non-suicidal self-injurious behavior? (Lifetime) No   Has subject engaged in non-suicidal self-injurious behavior? (Past 3 Months) No   Interrupted Attempts (Lifetime) No   Interrupted Attempts (Past 3 Months) No   Aborted or Self-Interrupted Attempt (Lifetime) No   Aborted or Self-Interrupted Attempt (Past 3 Months) No   Preparatory Acts or Behavior (Lifetime) No   Preparatory Acts or Behavior (Past 3 Months) No   Most Recent Attempt Actual Lethality Code NA   Most Lethal Attempt Actual Lethality Code NA   Initial/First Attempt Actual Lethality Code NA         Appearance:   Appropriate   Eye  Contact:   Good   Psychomotor Behavior: Normal   Attitude:   Cooperative   Orientation:   All  Speech   Rate / Production: Normal    Volume:  Normal   Mood:    Depressed    Affect:    Tearful  Thought Content:  Rumination   Thought Form:  Coherent  Logical   Insight:    Good     Diagnoses:  1. Major depressive disorder, recurrent episode, moderate (H)              Collateral Reports Completed:  [facility] will coordinate with care team as needed    Plan: (Homework, other):  Lanie was scheduled to RTC in three weeks for help addressing depressive symptoms, pain, and relationship conflicts. Continue DBT skills provided.  CD Recommendations: No indications of CD issues.     Héctor Higuera Psy.D, LP   Behavioral Health Clinician   [facility]     _______________________________________________________________________                                              Individual Treatment Plan    Patient's Name: Lanie Neil  YOB: 1990    Date of Creation: 3/4/2022  Date Treatment Plan Last Reviewed/Revised: 7/12/2023    DSM5 Diagnoses: 296.32 (F33.1) Major Depressive Disorder, Recurrent Episode, Moderate With anxious distress  Psychosocial / Contextual Factors: Chronic pain, SMA, disabled  PROMIS (reviewed every 90 days):     PROMIS-10    In general, would you say your health is: (P) Excellent    In general, would you say your quality of life is: (P) Good    In general, how would you rate your physical health? (P) Very good    In general, how would you rate your mental health, including your mood and your ability to think? (P) Good    In general, how would you rate your satisfaction with your social activities and relationships? (P) Good    In general, please rate how well you carry out your usual social activities and roles. (This includes activities at home, at work and in your community, and responsibilities as a parent, child, spouse, employee, friend, etc.) (P) Very  good    To what extent are you able to carry out your everyday physical activities such as walking, climbing stairs, carrying groceries, or moving a chair? (P) Not at all    In the past 7 days,  How often have you been bothered by emotional problems such as feeling anxious, depressed or irritable? (P) Rarely  How would you rate your fatigue on average? (P) Moderate  How would you rate your pain on average? (P) 6    PROMIS GLOBAL SCORES    Mental health question re-calculation - no clinical value - (P) 4  Physical health question re-calculation - no clinical value - (P) 3  Pain question re-calculation - no clinical value - (P) 3  Global Mental Health Score - (P) 13  Global Physical Health Score - (P) 11  PROMIS TOTAL - SUBSCORES - (P) 24      [id number] PROMIS HEALTH ORGANIZATION AND PROMIS COOPERATIVE GROUP VERSION 1.1           3/4/2022  1:59 PM 3/15/2022  2:10 PM 3/30/2023  10:56 AM 7/6/2023  9:45 AM   PROMIS-10 Scores Only       Global Mental Health Score 13  8  7  12    Global Physical Health Score 11  9  9  11    PROMIS TOTAL - SUBSCORES 24  17  16  23            Referral / Collaboration:  Referral to another professional/service is not indicated at this time..    Anticipated number of session for this episode of care: 7-9  Anticipation frequency of session: Every other week  Anticipated Duration of each session: 38-52 minutes  Treatment plan will be reviewed in 90 days or when goals have been changed.       MeasurableTreatment Goal(s) related to diagnosis / functional impairment(s)  Goal 1: Patient will experience a reduction in depressive symptoms along with a corresponding increase in positive emotion and life satisfaction.      Objective #A (Patient Action)    Patient will Increase interest, engagement, and pleasure in doing things.  Status: Continued - Date(s): 7/12/2023    Intervention(s)  Therapist will help patient identify pleasant and mastery oriented events that elicit positive, relaxed  "mood.    Objective #B  Patient will Decrease frequency and intensity of feeling down, depressed, hopeless.  Status: Continued - Date(s): 7/12/2023    Intervention(s)  Therapist will introduce patient to cognitive-behavioral and acceptance and commitment therapy topics aimed to help reduce depression and anxiety    Objective #C  Patient will Identify negative self-talk and behaviors: challenge core beliefs, myths, and actions  Improve concentration, focus, and mindfulness in daily activities .  Status: Continued - Date(s): 7/12/2023    Intervention(s)  Therapist will help patient identify and manage negative self-talk and automatic thoughts; introduce patient to cognitive distortions; help patient develop cognitive diffusion techniques      Goal 2: Patient will experience a reduction in anxious symptoms, along with a corresponding increase in relaxed emotional states and life satisfaction.      Objective #A (Patient Action)  Patient will use cognitive-behavioral and thought diffusion strategies identified in therapy to challenge anxious thoughts.    Status: Continued - Date(s): 7/12/2023    Intervention(s)  Therapist will utilize CBT and ACT ideas to help patient challenge anxious thoughts and reduce intensity/duration of anxious distress    Objective #B  Patient will use \"worry time\" each day for 15 minutes of scheduled worry and then defer obsessive or anxious thinking until the next structured worry time.    Status: Continued - Date(s): 7/12/2023    Intervention(s)  Therapist will teach patient how to effectively utilize worry time and/or thought logs/journals each day and incorporate more relaxation behaviors into their routine.    Objective #C  Patient will identify the stressors which contribute to feelings of anxiety  Patient will increase engagement in adaptive coping skills and recreational activities, such as exercise and healthy socialization, to manage distress.  Status: Continued - Date(s): " 7/12/2023    Intervention(s)  Therapist will help patient identify triggers/situational factors that contribute to anxiety and behavioral skills aimed to manage anxious distress.      Goal 3: Patient will learn more adaptive ways to manage chronic pain      Objective #A (Patient Action)    Status: Continued - Date(s): 7/12/2023    Patient will identify 2-4 strategies for managing pain.    Intervention(s)  Therapist will teach distraction skills aimed to help manage chronic pain and utilize ACT/CBT ideas to help with this (e.g. relaxation) .    Objective #B  Patient will state understanding of stressors and relationship to physical symptoms.    Status: Continued - Date(s): 7/12/2023      Other Possible Therapeutic Intervention(s):    Psycho-education regarding mental health diagnoses and treatment options    Supportive Therapy  Provide affirmations, reflections, and establish working rapport  Emphasize and reflect on strength of therapeutic relationship    Skills training  Explore skills useful to client in current situation.  Skills include assertiveness, communication, conflict management, problem-solving, relaxation, etc.    Solution-Focused Therapy  Explore patterns in patient's relationships and discuss options for new behaviors.  Explore patterns in patient's actions and choices and discuss options for new behaviors.    Cognitive-behavioral Therapy  Discuss common cognitive distortions, identify them in patient's life.  Explore ways to challenge, replace, and act against these cognitions.    Acceptance and Commitment Therapy  Explore and identify important values in patient's life.  Discuss ways to commit to behavioral activation around these values.    Psychodynamic psychotherapy  Discuss patient's emotional dynamics and issues and how they impact behaviors.  Explore patient's history of relationships and how they impact present behaviors.  Explore how to work with and make changes in these schemas and  patterns.    Narrative Therapy  Explore the patient's story of his/her life from his/her perspective.  Explore alternate ways of understanding their experience, identifying exceptions, developing new themes.    Interpersonal Psychotherapy  Explore patterns in relationships that are effective or ineffective at helping patient reach their goals, find satisfying experience.  Discuss new patterns or behaviors to engage in for improved social functioning.    Behavioral Activation  Discuss steps patient can take to become more involved in meaningful activity.  Identify barriers to these activities and explore possible solutions.    Mindfulness-Based Strategies  Discuss skills based on development and application of mindfulness.  Skills drawn from compassion-focused therapy, dialectical behavior therapy, mindfulness-based stress reduction, mindfulness-based cognitive therapy, etc.      Patient has reviewed and agreed to the above plan.    Héctor Higuera Psy.D, LP   Behavioral Health Clinician   -[facility]     7/12/2023    Crisis Resources    Refer to the resources below as needed.    Steps to care for yourself    If you are currently in counseling, call your counselor for an appointment  Call the local crisis resources below if needed.  Contact friends or family for support.  Get more exercise.  Do activities you enjoy.  Eat a well-balanced diet and drink plenty of fluids.  Rest as needed.  Limit alcohol and recreational drugs. These can worsen depression.    When to contact your primary care provider     You have thoughts of harming or killing yourself but have not made a plan to carry it out.  Your depression gets in the way of daily activities.  You are often unable to sleep.  You need help cutting back on alcohol or recreational drugs.    When to call 911 or go to the Emergency Room     Get emergency help right away if you have any of the following:  You are planning to harm or kill  yourself and you have a way to carry out the plan.   You have injured yourself or others. Or, you think you will.  You feel confused or are having trouble thinking or remembering.  You are having delusions (false beliefs).  You are hearing voices or seeing things that aren t there.  You are feeling psychotic (paranoid, fearful, restless, agitated, nervous, racing thoughts or speech)    Crisis Resources     These hotlines are for both adults and children. The and are open 24 hours a day, 7 days a week unless noted otherwise.    988 - National Suicide and Crisis Lifeline  If you or someone you know needs support now, call or text 398 or chat Crowdfunderline.org. 988 connects you with a trained crisis counselor who can help.    National Suicide Prevention Lifeline   0-262-738-TALK (8866)    Crisis Text Line    www.crisistextline.org  Text HOME to 799700 from anywhere in the [place], anytime, about any type of crisis. A live, trained crisis counselor will receive the text and respond quickly.    [place] Lifeline for LGBTQ Youth  A national crisis intervention and suicide lifeline for LGBTQ youth under 25. Provides a safe place to talk without judgement.   Call 1-366.496.3810; text START to 517334 or visit www.thetrevorproject.org to talk to a trained counselor.  For county crisis numbers, visit the [facility] website at:  https://[place].gov/dhs/people-we-serve/adults/health-care/mental-health/resources/crisis-contacts.jsp      Héctor Higuera Psy.D, LP   Behavioral Health Clinician   -[facility]     6/3/2022

## 2023-09-01 ENCOUNTER — E-CONSULT (OUTPATIENT)
Dept: GASTROENTEROLOGY | Facility: CLINIC | Age: 33
End: 2023-09-01
Payer: COMMERCIAL

## 2023-09-01 PROCEDURE — 99451 NTRPROF PH1/NTRNET/EHR 5/>: CPT | Performed by: INTERNAL MEDICINE

## 2023-09-01 NOTE — PROGRESS NOTES
9/1/2023     E-Consult has been accepted.    Interprofessional consultation requested by:  Jimmy Moseley MD      Clinical Question/Purpose: SMA pt, now needs digitital disimpaction, body habitus very weak from SMA,   Using senna, mom, miralax, colace, all at once, nothing helps. Pt can sense urge to stool but cannot push due to weakness. She wonders if anything even surgery for colostomy can be done, will start w/ GI e consult re: pt lives on ventilator very hard to transport. Has never had colonoscopy.     Patient assessment and information reviewed:   Spinal muscle atrophy - on ventilator support  Severe constipation - on senna, MOM, miralax, colace  CT abd/pelvis - no bowel pathology but partially visualized mass extending from perineum    Likely has severe constipation related to neurogenic bowel in setting of SMA. Agree with aggressive bowel regimen as you are. Can increase miralax up to TID to QID. Can add magnesium oxide 400 mg daily. Given neurogenic bowel she will likely need daily rectum stimulation with suppository and digital stimulation. If needed can even given gentle enema daily. If this does not help can trial Linzess or Amitiza.    For severe neurogenic bowel can consider diverting colostomy. I cannot comment on this without formal consult. You could refer to colorectal surgery for evaluation.    Typically, for severe constipation we often do a one time colonoscopy but will need to balance this with ability to take prep and patient's overall goals of care.    Finally, recommend that CT findings or mass from perineum are evaluated (not clear if this has been done from chart review.    Recommendations:   -can maximize bowel regimen as above. Increase miralax to TID or even QID. Can add magnesium oxide  -she will need daily digital stim with daily suppository and digital exam (glycerin supp would work)  -ok to give gentle enemas as needed  -if above does not work can trial Linzess or  Amitiza  -cannot comment on colostomy without formal referral  -consider colonoscopy if in lines with goals of care and if patient able to prep (though likely low yield given age) - would likely be needed prior to diverting colostomy  -please ensure follow up on possible perineum mass from prior CT from 3/28/23     The recommendations provided in this E-Consult are based on a review of clinical data pertinent to the clinical question presented, without a review of the patient's complete medical record or, the benefit of a comprehensive in-person or virtual patient evaluation. This consultation should not replace the clinical judgement and evaluation of the provider ordering this E-Consult. Any new clinical issues, or changes in patient status since the filing of this E-Consult will need to be taken into account when assessing these recommendations. Please contact me if you have further questions.    My total time spent reviewing clinical information and formulating assessment was 20 minutes.        Tyler Canas MD

## 2023-09-01 NOTE — PROGRESS NOTES
8/31/2023     E-Consult has been accepted.    Interprofessional consultation requested by:  Jimmy Moseley MD      Clinical Question/Purpose: MY CLINICAL QUESTION IS: Hair loss; back of head. I am running thyroid/iron studies via [facility]. She is bed bound, lies on her back, for years, SMA. This positioning is chronic, per pt hair loss recent. They are uploading photo (they showed me), showing back of head hairless patch.     Patient assessment and information reviewed: clinical notes and photos, labs    Recommendations:   Given how diffuse the hair loss appears, agree that this is unlikely to be caused by positional issues - in these cases the hair loss tends to be quite focal to the area where the head rests, rather than diffusely over the crown. 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 Would assess for recent stressors (physical, social/emotional, or others) in the last 2-3 months, as telogen effluvium usually occurs 2-3 months after such an event. Telogen effluvium is self-limited in most cases, lasting 3-9 months with spontaneous regrowth. Minoxidil 5% foam can be considered, however some patients experience a bit of additional hair loss upon starting this treatment. If symptoms persist, recommend referral to dermatology for in-person evaluation and possible scalp biopsy.      The recommendations provided in this E-Consult are based on a review of clinical data pertinent to the clinical question presented, without a review of the patient's complete medical record or, the benefit of a comprehensive in-person or virtual patient evaluation. This consultation should not replace the clinical judgement and evaluation of the provider ordering this E-Consult. Any new clinical issues, or changes in patient status since the filing of this E-Consult will need to be taken into account when assessing these  recommendations. Please contact me if you have further questions.    My total time spent reviewing clinical information and formulating assessment was 10 minutes.    Daniel Wilcox MD, FAAD   of Dermatology  Department of Dermatology  [facility]

## 2023-09-07 ENCOUNTER — LAB REQUISITION (OUTPATIENT)
Dept: LAB | Facility: CLINIC | Age: 33
End: 2023-09-07
Payer: COMMERCIAL

## 2023-09-07 DIAGNOSIS — E87.6 HYPOKALEMIA: ICD-10-CM

## 2023-09-07 DIAGNOSIS — L65.9 NONSCARRING HAIR LOSS, UNSPECIFIED: ICD-10-CM

## 2023-09-07 LAB
ANION GAP SERPL CALCULATED.3IONS-SCNC: 11 MMOL/L (ref 7–15)
BASOPHILS # BLD AUTO: 0 10E3/UL (ref 0–0.2)
BASOPHILS NFR BLD AUTO: 1 %
BUN SERPL-MCNC: 10.7 MG/DL (ref 6–20)
CALCIUM SERPL-MCNC: 9.2 MG/DL (ref 8.6–10)
CHLORIDE SERPL-SCNC: 108 MMOL/L (ref 98–107)
CREAT SERPL-MCNC: <0.06 MG/DL (ref 0.51–0.95)
DEPRECATED HCO3 PLAS-SCNC: 20 MMOL/L (ref 22–29)
EGFRCR SERPLBLD CKD-EPI 2021: ABNORMAL ML/MIN/{1.73_M2}
EOSINOPHIL # BLD AUTO: 0.2 10E3/UL (ref 0–0.7)
EOSINOPHIL NFR BLD AUTO: 3 %
ERYTHROCYTE [DISTWIDTH] IN BLOOD BY AUTOMATED COUNT: 20.6 % (ref 10–15)
FERRITIN SERPL-MCNC: 12 NG/ML (ref 6–175)
GLUCOSE SERPL-MCNC: 111 MG/DL (ref 70–99)
HCT VFR BLD AUTO: 32.4 % (ref 35–47)
HGB BLD-MCNC: 9.3 G/DL (ref 11.7–15.7)
HOLD SPECIMEN: NORMAL
IMM GRANULOCYTES # BLD: 0 10E3/UL
IMM GRANULOCYTES NFR BLD: 0 %
IRON BINDING CAPACITY (ROCHE): 331 UG/DL (ref 240–430)
IRON SATN MFR SERPL: 11 % (ref 15–46)
IRON SERPL-MCNC: 37 UG/DL (ref 37–145)
LYMPHOCYTES # BLD AUTO: 1.4 10E3/UL (ref 0.8–5.3)
LYMPHOCYTES NFR BLD AUTO: 22 %
MCH RBC QN AUTO: 24 PG (ref 26.5–33)
MCHC RBC AUTO-ENTMCNC: 28.7 G/DL (ref 31.5–36.5)
MCV RBC AUTO: 84 FL (ref 78–100)
MONOCYTES # BLD AUTO: 0.3 10E3/UL (ref 0–1.3)
MONOCYTES NFR BLD AUTO: 4 %
NEUTROPHILS # BLD AUTO: 4.7 10E3/UL (ref 1.6–8.3)
NEUTROPHILS NFR BLD AUTO: 70 %
NRBC # BLD AUTO: 0 10E3/UL
NRBC BLD AUTO-RTO: 0 /100
PLATELET # BLD AUTO: 352 10E3/UL (ref 150–450)
POTASSIUM SERPL-SCNC: 4.5 MMOL/L (ref 3.4–5.3)
RBC # BLD AUTO: 3.88 10E6/UL (ref 3.8–5.2)
SODIUM SERPL-SCNC: 139 MMOL/L (ref 136–145)
TSH SERPL DL<=0.005 MIU/L-ACNC: 1.86 UIU/ML (ref 0.3–4.2)
WBC # BLD AUTO: 6.6 10E3/UL (ref 4–11)

## 2023-09-07 PROCEDURE — 80048 BASIC METABOLIC PNL TOTAL CA: CPT | Mod: ORL | Performed by: FAMILY MEDICINE

## 2023-09-07 PROCEDURE — 82728 ASSAY OF FERRITIN: CPT | Mod: ORL | Performed by: FAMILY MEDICINE

## 2023-09-07 PROCEDURE — 84443 ASSAY THYROID STIM HORMONE: CPT | Mod: ORL | Performed by: FAMILY MEDICINE

## 2023-09-07 PROCEDURE — 83550 IRON BINDING TEST: CPT | Mod: ORL | Performed by: FAMILY MEDICINE

## 2023-09-07 PROCEDURE — 85025 COMPLETE CBC W/AUTO DIFF WBC: CPT | Mod: ORL | Performed by: FAMILY MEDICINE

## 2023-09-13 ENCOUNTER — TELEPHONE (OUTPATIENT)
Dept: FAMILY MEDICINE | Facility: CLINIC | Age: 33
End: 2023-09-13

## 2023-09-13 ENCOUNTER — VIRTUAL VISIT (OUTPATIENT)
Dept: FAMILY MEDICINE | Facility: CLINIC | Age: 33
End: 2023-09-13
Payer: COMMERCIAL

## 2023-09-13 DIAGNOSIS — L65.9 HAIR LOSS: ICD-10-CM

## 2023-09-13 DIAGNOSIS — N90.89 PERINEAL MASS IN FEMALE: ICD-10-CM

## 2023-09-13 DIAGNOSIS — N92.0 MENORRHAGIA WITH REGULAR CYCLE: ICD-10-CM

## 2023-09-13 DIAGNOSIS — K59.09 CHRONIC CONSTIPATION: Primary | ICD-10-CM

## 2023-09-13 DIAGNOSIS — Z86.0100 HISTORY OF COLONIC POLYPS: ICD-10-CM

## 2023-09-13 DIAGNOSIS — R05.9 COUGH, UNSPECIFIED TYPE: ICD-10-CM

## 2023-09-13 PROCEDURE — 99215 OFFICE O/P EST HI 40 MIN: CPT | Mod: VID | Performed by: FAMILY MEDICINE

## 2023-09-13 RX ORDER — MINOXIDIL 50 MG/G
AEROSOL, FOAM TOPICAL
Qty: 60 G | Refills: 3 | Status: SHIPPED | OUTPATIENT
Start: 2023-09-13

## 2023-09-13 RX ORDER — AZITHROMYCIN 200 MG/5ML
POWDER, FOR SUSPENSION ORAL
Qty: 40 ML | Refills: 0 | Status: SHIPPED | OUTPATIENT
Start: 2023-09-13 | End: 2024-03-21

## 2023-09-13 NOTE — NURSING NOTE
Is the patient currently in the state of [place]? YES    Visit mode:VIDEO    If the visit is dropped, the patient can be reconnected by: VIDEO VISIT: Send to e-mail at: [email or web address]    Will anyone else be joining the visit? Mom, Aster, is present  (If patient encounters technical issues they should call [phone]    How would you like to obtain your AVS? MyChart    Are changes needed to the allergy or medication list? Pt stated no changes to allergies and Pt stated no med changes    Reason for visit: RECHECK (Cough and lab results)    Anupama ACUÑA

## 2023-09-13 NOTE — PROGRESS NOTES
Virtual Visit Details    Type of service:  Video Visit   Video Start Time:  2:30 pm  Video End Time: 3:20 pm    Originating Location (pt. Location): Home    Distant Location (provider location):  On-site  Platform used for Video Visit: Boris Dela Cruz is a 33 year old who is being evaluated via a billable video visit.      How would you like to obtain your AVS? MyChart  If the video visit is dropped, the invitation should be resent by: in epic  Will anyone else be joining your video visit? 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  Ct, see gyn  - CT Abdomen Pelvis w/o & w Contrast; Future  - Ob/Gyn Referral; Future    Menorrhagia with regular cycle  See gyn this contributes to need for IV iron  - Ob/Gyn Referral; Future    Hair loss  She will resume IV iron ([facility] will facilitate), try the minoxidil, and see if a way for derm to see her when in [facility] for other  - Adult Dermatology Referral; Future  - Minoxidil (MINOXIDIL FOR WOMEN) 5 % FOAM; Apply 1/2 capful once a day to affected area    History of colonic polyps  Discuss at crs visit  - Adult Colorectal Surgery  Referral; Future    68 minutes spent by me on the date of the encounter doing chart review, history and exam, documentation and further activities per the note    No follow-ups on file.    Jimmy Moseley MD  [facility] ODILON Dela Cruz is a 33 year old, presenting for the following health issues:  RECHECK (Cough and lab results)    HPI   Hair loss: reviewed Derm E consult rec's; will start Minoxidil but also try to get her in to see Derm in person when here for other  visits it is very hard for her practically to travel so it is good to double up on [facility] visits when can    Perineal mass on 2022 CT at [facility]; discussed at length per pt there is a known mass visible in her perineal region previous MD's visualized and were not worried about, but she is not sure what they thought it was, but she believes this is what radiology saw on 2022 CT. Due to worsening chronic constipation and this we will redo CT    Heavy but regular menses, low iron. Discussed see Gyn both to visualize perineal mass and consult for heavy menses given her need for IV iron. She lives on formula no po intake of significance    Today I communicate w/ heme provider Jt Mir who will reach out to her to resume IV iron treatments; conceivalby this could help hair loss too    She is interested in colostomy for constipation, see e consult GI. I will offer refer to Colorectal to discuss; given h/o adenoma on 2019 colonoscopy I told her they'd likely want redo colonoscopy but given the enormous difficulty for her to prep and do one (likely requiring pre admit) we agree to wait for their consult    1.5 days she has had a cold, got from her mom, mom covid neg test. Runny nose, sore throat, some increase phlegm mom suctions from trach, hoarse. No ear pain or GI sx. High risk secondary infection.    I suggest flu shot and new covid shot    Past Medical History:   Diagnosis Date    Anxiety     Snoring     Spinal muscle atrophy (H)      Past Surgical History:   Procedure Laterality Date    ENT SURGERY      tubes    GASTROSTOMY TUBE      KIDNEY SURGERY      SPINE SURGERY      TRACHEOSTOMY       Current Outpatient Medications   Medication    azithromycin (ZITHROMAX) 200 MG/5ML suspension    Minoxidil (MINOXIDIL FOR WOMEN) 5 % FOAM    ACETAMINOPHEN PO    albuterol (PROAIR HFA) 108 (90 Base) MCG/ACT inhaler    albuterol (PROVENTIL) (2.5 MG/3ML) 0.083% neb solution    ALPRAZolam (XANAX) 0.25 MG tablet    ALPRAZolam (XANAX) 0.25  MG tablet    aminocaproic acid (AMICAR) 0.25 GM/ML solution    bisacodyl (DULCOLAX) 10 MG suppository    celecoxib (CELEBREX) 200 MG capsule    clindamycin (CLEOCIN T) 1 % lotion    diazepam (VALIUM) 5 MG tablet    doxycycline hyclate (VIBRAMYCIN) 100 MG capsule    famotidine (PEPCID) 40 MG/5ML suspension    fluocinolone acetonide (DERMA SMOOTHE/FS BODY) 0.01 % external oil    fluticasone (FLOVENT HFA) 110 MCG/ACT inhaler    gabapentin (NEURONTIN) 250 MG/5ML solution    GENERLAC 10 GM/15ML solution    guaiFENesin (ORGANIDIN) 200 MG TABS tablet    HYDROmorphone (DILAUDID) 4 MG tablet    hydrOXYzine (ATARAX) 25 MG tablet    ketoconazole (NIZORAL) 2 % external shampoo    LANsoprazole (PREVACID) 30 MG DR capsule    levofloxacin (LEVAQUIN) 25 MG/ML solution    Magnesium Hydroxide (MILK OF MAGNESIA PO)    metroNIDAZOLE (METROCREAM) 0.75 % external cream    mometasone (NASONEX) 50 MCG/ACT nasal spray    morphine (SABINE) 30 MG 24 hr capsule    mupirocin (BACTROBAN) 2 % external cream    naloxone (NARCAN) 4 MG/0.1ML nasal spray    Nutritional Supplements (REPLETE FIBER) LIQD    omeprazole (PRILOSEC) 2 mg/mL suspension    ondansetron (ZOFRAN-ODT) 4 MG ODT tab    order for DME    order for DME    polyethylene glycol (MIRALAX) 17 GM/Dose powder    potassium chloride (KAYCIEL) 20 MEQ/15ML (10%) solution    prochlorperazine (COMPAZINE) 5 MG tablet    rivaroxaban ANTICOAGULANT (XARELTO ANTICOAGULANT) 10 MG TABS tablet    Salicylic Acid (OXY BALANCE FACIAL CLEAN WASH EX)    Sennosides (SENNA) 8.8 MG/5ML LIQD    sertraline (ZOLOFT) 20 MG/ML (HIGH CONC) solution    silver nitrate (ARZOL) 75-25 % miscellaneous    silver sulfADIAZINE (SSD) 1 % external cream    simethicone (MYLICON) 66.7 mg/ml    simethicone 40 MG/0.6ML LIQD    sodium chloride (OCEAN) 0.65 % nasal spray    traZODone (DESYREL) 5 mg/ml SUSP    traZODone (DESYREL) 5 mg/ml SUSP    tretinoin (RETIN-A) 0.025 % external cream     No current facility-administered medications  "for this visit.     Allergies   Allergen Reactions    Ibuprofen              Review of Systems         Objective    Vitals - Patient Reported  Height (Patient Reported): 144.8 cm (4' 9\")  Pain Score: Severe Pain (7)  Pain Loc: Hip        Physical Exam   GENERAL: Healthy, alert and no distress, supine on vent as per chronic  EYES: Eyes grossly normal to inspection.  No discharge or erythema, or obvious scleral/conjunctival abnormalities.  RESP: No audible wheeze, cough, or visible cyanosis.  No visible retractions or increased work of breathing.    SKIN: Visible skin clear. No significant rash, abnormal pigmentation or lesions.  NEURO: Cranial nerves grossly intact.  Mentation and speech appropriate for age.  PSYCH: Mentation appears normal, affect normal/bright, judgement and insight intact, normal speech and appearance well-groomed.          "

## 2023-09-13 NOTE — TELEPHONE ENCOUNTER
[facility] Call Center    Phone Message    May a detailed message be left on voicemail: yes     Reason for Call: Other: Patient has a bad cold and would like to review lab results, I am unable to schedule via video, can you call?  They would prefer video visit.       Action Taken: Message routed to:  [facility]    Travel Screening: Not Applicable

## 2023-09-13 NOTE — PATIENT INSTRUCTIONS
Thank you for visiting the Primary Care Center today at the [facility]! The following is some information about our clinic:     Primary Care Center Frequently-Asked Questions    (1) How do I schedule appointments at the [facility]?     Primary Care--to schedule or make changes to an existing appointment, please call our primary care line at [phone].    Labs--to schedule a lab appointment at the [facility] you can use [employer] or call [phone]. If you have a [facility] location that is closer to home, you can reach out to that location for scheduling options.     Imaging--if you need to schedule a CT, X-ray, MRI, ultrasound, or other imaging study you can call [phone] to schedule at the [facility] or any other [facility] imaging location.     Referrals--if a referral to another specialty was ordered you can expect a phone call from their scheduling team. If you have not heard from them in a week, please call us or send us a [employer] message to check the status or get a scheduling number. Please note that this only applies to internal [facility] referrals. If the referral is external you would need to contact their office for scheduling.     (2) I have a question about my visit, who do I contact?     You can call us at the primary care line at [phone] to ask questions about your visit. You can also send a secure message through [employer], which is reviewed by clinic staff. Please note that [employer] messages have a twenty-four to forty-eight business hour turnaround time and should not be used for urgent concerns.    (3) How will I get the results of my tests?    If you are signed up for [employer] all tests will be released to you within twenty-four hours of resulting. Please allow three to five days for your doctor to review your results and place a note interpreting the results. If you do not have [employer] you will receive your  results through mail seven to ten business days following the return of the tests. Please note that if there should be any urgent or concerning results that your doctor or their registered nurse will reach out to you the same day as the tests come back. If you have follow up questions about your results or would like to discuss the results in detail please schedule a follow up with your provider either in person or virtually.     (4) How do I get refills of my prescriptions?     You should always first contact your pharmacy for refills of your medications. If submitting a refill request on [employer], please be sure to submit the request only once--repeat requests can cause delays in refill. If you are requesting a NEW medication or a medication related to new symptoms you will need to schedule an appointment with a provider prior to approval. Please note: Routine medication refills have up to one to three business day turnaround whereas controlled substances refills have up to five to seven business day turnaround.    (5) I have new symptoms, what do I do?     If you are having an immediate medical emergency, you should dial 911 for assistance.   For anything urgent that needs to be seen within a few hours to one day you should visit a local urgent care for assistance.  For non-urgent symptoms that need to be seen within a few days to a week you can schedule with an available provider in primary care by going to [employer] or calling [phone].   If you are not sure how serious your symptoms are or you would like to receive medical advice you can always call [phone] to speak with a triage nurse.     2/day(s)

## 2023-09-14 ENCOUNTER — TELEPHONE (OUTPATIENT)
Dept: FAMILY MEDICINE | Facility: CLINIC | Age: 33
End: 2023-09-14
Payer: MEDICARE

## 2023-09-14 DIAGNOSIS — Z79.01 CHRONIC ANTICOAGULATION: Primary | ICD-10-CM

## 2023-09-14 DIAGNOSIS — G12.9 SPINAL MUSCLE ATROPHY (H): Primary | ICD-10-CM

## 2023-09-14 DIAGNOSIS — J96.10 CHRONIC RESPIRATORY FAILURE (H): ICD-10-CM

## 2023-09-14 DIAGNOSIS — D50.0 IRON DEFICIENCY ANEMIA DUE TO CHRONIC BLOOD LOSS: ICD-10-CM

## 2023-09-14 RX ORDER — ALBUTEROL SULFATE 0.83 MG/ML
2.5 SOLUTION RESPIRATORY (INHALATION)
Status: CANCELLED | OUTPATIENT
Start: 2023-09-14

## 2023-09-14 RX ORDER — HEPARIN SODIUM (PORCINE) LOCK FLUSH IV SOLN 100 UNIT/ML 100 UNIT/ML
5 SOLUTION INTRAVENOUS
Status: CANCELLED | OUTPATIENT
Start: 2023-09-14

## 2023-09-14 RX ORDER — EPINEPHRINE 1 MG/ML
0.3 INJECTION, SOLUTION, CONCENTRATE INTRAVENOUS EVERY 5 MIN PRN
Status: CANCELLED | OUTPATIENT
Start: 2023-09-14

## 2023-09-14 RX ORDER — MEPERIDINE HYDROCHLORIDE 25 MG/ML
25 INJECTION INTRAMUSCULAR; INTRAVENOUS; SUBCUTANEOUS EVERY 30 MIN PRN
Status: CANCELLED | OUTPATIENT
Start: 2023-09-14

## 2023-09-14 RX ORDER — DIPHENHYDRAMINE HYDROCHLORIDE 50 MG/ML
50 INJECTION INTRAMUSCULAR; INTRAVENOUS
Status: CANCELLED
Start: 2023-09-14

## 2023-09-14 RX ORDER — HEPARIN SODIUM,PORCINE 10 UNIT/ML
5-20 VIAL (ML) INTRAVENOUS DAILY PRN
Status: CANCELLED | OUTPATIENT
Start: 2023-09-14

## 2023-09-14 RX ORDER — ALBUTEROL SULFATE 90 UG/1
1-2 AEROSOL, METERED RESPIRATORY (INHALATION)
Status: CANCELLED
Start: 2023-09-14

## 2023-09-14 NOTE — PROGRESS NOTES
Note and orders from Jt faxed to [facility] at [phone]. Spoke to Renato in pharmacy who is aware of the incoming fax.    Sherrill Soria  BSN, RN, PHN   [facility]   Office: [phone]  Fax: [phone]

## 2023-09-14 NOTE — PROGRESS NOTES
[facility]  [facility] [address], [address], [place] [address]  Main: [phone], Fax: [phone]    Patient: Lanie Neil  MRN: [id number]  : 1990  Date of this note written: 2023  Order date: 2023.      Diagnosis:  History of DVT  Chronic anticoagulation therapy.   Menorrhagia  Iron deficiency anemia.      ORDERS FOR [facility]:  Please proceed with IV iron infusion with Injectafer in the next 1-2 weeks. See order set attached for specific orders.  Please obtain CBC, iron level, iron binding capacity and ferritin level, 12 weeks following her last Venofer infusion. Please fax result to [phone] Attention: Jt Mir PA-C.      This order along with a copy of the Venofer order set is faxed to [facility] at [phone]     Thank you.     If there are any questions, please call [phone] and ask to speak to a nursing staff.     Note that this writer did spoke with the patient and her mother on 2023 at 09:05am to communicate the above plan. They are in agreement with this plan. Lanie also agree verbally to see GYN for consultation about a more definitive management of her menorrhagia.        Jt Mir PA-C, MPAS  Physician Assistant  [facility].

## 2023-09-14 NOTE — TELEPHONE ENCOUNTER
[facility] Call Center    Phone Message    May a detailed message be left on voicemail: yes     Reason for Call: Other: Patient smother calling requesting a referral for a new pulmonologist due to her current ne no longer practicing.      Action Taken: Message routed to:  [facility]    Travel Screening: Not Applicable

## 2023-09-25 ENCOUNTER — DOCUMENTATION ONLY (OUTPATIENT)
Dept: FAMILY MEDICINE | Facility: CLINIC | Age: 33
End: 2023-09-25

## 2023-09-25 NOTE — PROGRESS NOTES
Type of Form Received:     Form Received (Date) 9/25/23   Form Filled out Yes, faxed 9/27   Placed in provider folder Yes

## 2023-09-26 ENCOUNTER — MEDICAL CORRESPONDENCE (OUTPATIENT)
Dept: HEALTH INFORMATION MANAGEMENT | Facility: CLINIC | Age: 33
End: 2023-09-26
Payer: MEDICARE

## 2023-09-26 DIAGNOSIS — F41.9 ANXIETY: ICD-10-CM

## 2023-09-26 DIAGNOSIS — G12.9 SPINAL MUSCLE ATROPHY (H): ICD-10-CM

## 2023-09-26 NOTE — TELEPHONE ENCOUNTER
diazepam (VALIUM) 5 MG tablet   Last Written Prescription Date:   8/7/2023  Last Fill Quantity: 30,   # refills: 0  Last Office Visit :  9/13/2023  Future Office visit:  None    Routing refill request to provider for review/approval because:  Drug not on the [facility], [facility] or [facility] refill protocol or controlled substance      ALPRAZolam (XANAX) 0.25 MG tablet   Last Written Prescription Date:   7/26/2023  Last Fill Quantity: 60,   # refills: 1  Last Office Visit :  9/13/2023  Future Office visit:  None    Routing refill request to provider for review/approval because:  Drug not on the [facility], [facility] or [facility] refill protocol or controlled substance    Georgina Sy RN  Central Triage Red Flags/Med Refills

## 2023-09-27 RX ORDER — ALPRAZOLAM 0.25 MG
0.25 TABLET ORAL 3 TIMES DAILY PRN
Qty: 60 TABLET | Refills: 1 | Status: SHIPPED | OUTPATIENT
Start: 2023-09-27 | End: 2023-12-11

## 2023-09-27 RX ORDER — DIAZEPAM 5 MG
5 TABLET ORAL EVERY 6 HOURS PRN
Qty: 30 TABLET | Refills: 1 | Status: SHIPPED | OUTPATIENT
Start: 2023-09-27 | End: 2023-12-30

## 2023-09-29 ENCOUNTER — VIRTUAL VISIT (OUTPATIENT)
Dept: BEHAVIORAL HEALTH | Facility: CLINIC | Age: 33
End: 2023-09-29
Payer: COMMERCIAL

## 2023-09-29 DIAGNOSIS — F33.1 MAJOR DEPRESSIVE DISORDER, RECURRENT EPISODE, MODERATE (H): Primary | ICD-10-CM

## 2023-09-29 PROCEDURE — 90837 PSYTX W PT 60 MINUTES: CPT | Mod: VID | Performed by: PSYCHOLOGIST

## 2023-09-29 NOTE — PROGRESS NOTES
[facility] Integrated [facility]al Health  September 29, 2023        Behavioral Health Clinician Progress Note    Patient Name: Lanie Neil           Service Type: Video appointment      Service Location:  Video appointment      Session Start Time: 11:00 Session End Time: 11:55      Session Length: 53 - 60      Attendees: Patient    Visit Activities (Refresh list every visit): [facility] Only    Service Modality:  Video Visit:      Provider verified identity through the following two step process.  Patient provided:  Patient photo and Patient is known previously to provider    Telemedicine Visit: The patient's condition can be safely assessed and treated via synchronous audio and visual telemedicine encounter.      Reason for Telemedicine Visit: Patient unable to travel    Originating Site (Patient Location): Patient's home    Distant Site (Provider Location): Provider Remote Setting- Home Office    Consent:  The patient/guardian has verbally consented to: the potential risks and benefits of telemedicine (video visit) versus in person care; bill my insurance or make self-payment for services provided; and responsibility for payment of non-covered services.     Patient would like the video invitation sent by:  My Chart    Mode of Communication:  Video Conference via [facility]    Distant Location (Provider):  Off-site    As the provider I attest to compliance with applicable laws and regulations related to telemedicine.      Diagnostic Assessment Date: 1/19/2021; update 5/23/2023  Treatment Plan Review Date: 10/12/2023  See Flowsheets for today's PHQ-9 and KATHARINE-7 results  Previous PHQ-9:       12/15/2020    10:49 AM 3/16/2021     9:13 AM 6/30/2021     9:26 AM   PHQ-9 SCORE   PHQ-9 Total Score MyChart 2 (Minimal depression) 2 (Minimal depression) 2 (Minimal depression)   PHQ-9 Total Score 2 2 2     Previous KATHARINE-7:       12/15/2020    10:52 AM 3/16/2021     9:15 AM 6/30/2021     9:27 AM  "  KATHARINE-7 SCORE   Total Score 1 (minimal anxiety) 1 (minimal anxiety) 1 (minimal anxiety)   Total Score 1 1 1       DATA  Extended Session (60+ minutes): No  Interactive Complexity: No  Crisis: No    Treatment Objective(s) Addressed in This Session:  Target Behavior(s): disease management/lifestyle changes      Depressed Mood: Decrease frequency and intensity of feeling down, depressed, hopeless  Identify negative self-talk and behaviors: challenge core beliefs, myths, and actions  Anxiety: will develop more effective coping skills to manage anxiety symptoms  Relationship Problems: will address relationship difficulties in a more adaptive manner  Psychological distress related to Pain    Current Stressors / Issues:  [facility] met with Lanie today to continue supporting her treatment of depression, adjustment to chronic disease management, and relationship problems.     Reports recent relationship concerns and feeling stuck in life due to pain. States she was recommended by her care team to try sitting up in her new wheelchair more often, though she notes this is painful for her. Lanie states she believes life feels meaningless and unfulfilling because she is unable to do what others can and that she will unlikely have meaningful relationships/connections. We tried looking at negative automatic thoughts that contribute to suffering. We agreed that finding more interpersonal connections might make sense for her, even if they are online. Provided support and CBT style therapy to help Lanie with her concerns.     Am I Hooked or Unhooked From My Thoughts?    Clues that I'm getting hooked and threatened by my thoughts:    1) Thoughts are Literal and Threatening; it s as if what we re thinking is actually present, here and now!    2) Thoughts are \"The Truth\"; we literally believe them!    3) Thoughts are Most Important; we take them seriously, and give them our full attention!    4) Thoughts are Orders; we automatically obey " "them!    5) Thoughts are Always Wise; we assume they know best and we follow their advice!     _____________________________________________________________________    Noticing when I'm unhooked and not threatened by my thoughts:    1) Thoughts are merely sounds, words, stories, bits of language, passing through our heads.    2) Thoughts may or may not be true. We don t automatically believe them.    3) Thoughts may or may not be important. We pay attention only if they re helpful.    4) Thoughts are not orders. We don t have to obey them.    5) Thoughts may or may not be wise. We don t automatically follow their advice.   Thoughts That Hook You:    What memories, fears, worries, self-criticisms, or other unhelpful thoughts do you dwell on or get \"stuck\" thinking about? What thoughts tend to hook you, jerk you around, and pull you out of your life?                 Life-Draining Actions:    What are you currently doing that makes your life worse in the long run: keeps you stuck, wastes your time or money, drains your energy; restricts your life; impacts negatively on your health, work, or relationships; maintains or worsens the problems your are dealing with?                     Feelings That Hook You:    What emotions, feelings, urges, impulses, or sensations tend to hook you and jerk you around or pull you into self-defeating actions?           Avoiding Challenging Situations:    What situations, activities, people, or places are you avoiding or staying away from? What have you quit, withdrawn from, dropped out of? What do you keep \"putting off\" until later?                           Progress on Treatment Objective(s) / Homework:  Satisfactory progress - ACTION (Actively working towards change); Intervened by reinforcing change plan / affirming steps taken       Psychodynamic psychotherapy  Discuss patient's emotional dynamics and issues and how they impact behaviors.  Explore patient's history of relationships and " how they impact present behaviors.  Explore how to work with and make changes in these schemas and patterns.    Interpersonal Psychotherapy  Explore patterns in relationships that are effective or ineffective at helping patient reach their goals, find satisfying experience.  Discuss new patterns or behaviors to engage in for improved social functioning.      Care Plan review completed: no     Medication Review:  No changes to current psychiatric medication(s)    Medication Compliance:  NA    Changes in Health Issues:   None reported    Chemical Use Review:   Substance Use: Chemical use reviewed, no active concerns identified      Tobacco Use: No current tobacco use.      Assessment: Current Emotional / Mental Status (status of significant symptoms):  Risk status (Self / Other harm or suicidal ideation)  Patient has had a history of suicidal ideation: passive thoughts only; easily controlled  - denied any recently    Patient denies current fears or concerns for personal safety.  Patient denies current or recent suicidal ideation or behaviors.  Patient denies current or recent homicidal ideation or behaviors.  Patient denies current or recent self injurious behavior or ideation.  Patient denies other safety concerns.     A safety and risk management plan has not been developed at this time, however patient was encouraged to call [place] [phone] should there be a change in any of these risk factors.      [place] Suicide Severity Rating Scale (Short Version)      2/18/2021     4:47 PM   [place] Suicide Severity Rating (Short Version)   Over the past 2 weeks have you felt down, depressed, or hopeless? yes   Over the past 2 weeks have you had thoughts of killing yourself? no   Have you ever attempted to kill yourself? no     [place] Suicide Severity Rating Scale (Lifetime/Recent)      8/13/2021     1:25 PM   [place] Suicide Severity Rating (Lifetime/Recent)   Q1 Wish to be Dead (Lifetime) No   Q2 Non-Specific  Active Suicidal Thoughts (Lifetime) No   Most Severe Ideation Rating (Lifetime) NA   Frequency (Lifetime) NA   Duration (Lifetime) NA   Controllability (Lifetime) NA   Protective Factors  (Lifetime) NA   Reasons for Ideation (Lifetime) NA   RETIRED: 1. Wish to be Dead (Recent) No   RETIRED: 2. Non-Specific Active Suicidal Thoughts (Recent) No   3. Active Suicidal Ideation with any Methods (Not Plan) Without Intent to Act (Lifetime) No   RETIRED: 3. Active Suicidal Ideation with any Methods (Not Plan) Without Intent to Act (Recent) No   RETIRE: 4. Active Suicidal Ideation with Some Intent to Act, Without Specific Plan (Lifetime) No   4. Active Suicidal Ideation with Some Intent to Act, Without Specific Plan (Recent) No   RETIRE: 5. Active Suicidal Ideation with Specific Plan and Intent (Lifetime) No   RETIRED: 5. Active Suicidal Ideation with Specific Plan and Intent (Recent) No   Most Severe Ideation Rating (Past Month) NA   Frequency (Past Month) NA   Duration (Past Month) NA   Controllability (Past Month) NA   Protective Factors (Past Month) NA   Reasons for Ideation (Past Month) NA   Actual Attempt (Lifetime) No   Actual Attempt (Past 3 Months) No   Has subject engaged in non-suicidal self-injurious behavior? (Lifetime) No   Has subject engaged in non-suicidal self-injurious behavior? (Past 3 Months) No   Interrupted Attempts (Lifetime) No   Interrupted Attempts (Past 3 Months) No   Aborted or Self-Interrupted Attempt (Lifetime) No   Aborted or Self-Interrupted Attempt (Past 3 Months) No   Preparatory Acts or Behavior (Lifetime) No   Preparatory Acts or Behavior (Past 3 Months) No   Most Recent Attempt Actual Lethality Code NA   Most Lethal Attempt Actual Lethality Code NA   Initial/First Attempt Actual Lethality Code NA         Appearance:   Appropriate   Eye Contact:   Good   Psychomotor Behavior: Normal   Attitude:   Cooperative   Orientation:   All  Speech   Rate / Production: Normal    Volume:  Normal    Mood:    Depressed    Affect:    Tearful  Thought Content:  Rumination   Thought Form:  Coherent  Logical   Insight:    Good     Diagnoses:  1. Major depressive disorder, recurrent episode, moderate (H)                Collateral Reports Completed:  [facility] [place] will coordinate with care team as needed    Plan: (Homework, other):  Lanie was scheduled to RTC in three weeks for help addressing depressive symptoms, pain, and relationship conflicts. Continue DBT skills provided.  CD Recommendations: No indications of CD issues.     Héctor Higuera Psy.D, LP   Behavioral Health Clinician   [facility]     _______________________________________________________________________                                              Individual Treatment Plan    Patient's Name: Lanie Neil  YOB: 1990    Date of Creation: 3/4/2022  Date Treatment Plan Last Reviewed/Revised: 7/12/2023    DSM5 Diagnoses: 296.32 (F33.1) Major Depressive Disorder, Recurrent Episode, Moderate With anxious distress  Psychosocial / Contextual Factors: Chronic pain, SMA, disabled  PROMIS (reviewed every 90 days):     PROMIS-10    In general, would you say your health is: (P) Excellent    In general, would you say your quality of life is: (P) Good    In general, how would you rate your physical health? (P) Very good    In general, how would you rate your mental health, including your mood and your ability to think? (P) Good    In general, how would you rate your satisfaction with your social activities and relationships? (P) Good    In general, please rate how well you carry out your usual social activities and roles. (This includes activities at home, at work and in your community, and responsibilities as a parent, child, spouse, employee, friend, etc.) (P) Very good    To what extent are you able to carry out your everyday physical activities such as walking, climbing stairs, carrying groceries, or moving a chair?  (P) Not at all    In the past 7 days,  How often have you been bothered by emotional problems such as feeling anxious, depressed or irritable? (P) Rarely  How would you rate your fatigue on average? (P) Moderate  How would you rate your pain on average? (P) 6    PROMIS GLOBAL SCORES    Mental health question re-calculation - no clinical value - (P) 4  Physical health question re-calculation - no clinical value - (P) 3  Pain question re-calculation - no clinical value - (P) 3  Global Mental Health Score - (P) 13  Global Physical Health Score - (P) 11  PROMIS TOTAL - SUBSCORES - (P) 24      [id number] PROMIS HEALTH ORGANIZATION AND PROMIS COOPERATIVE GROUP VERSION 1.1           3/4/2022  1:59 PM 3/15/2022  2:10 PM 3/30/2023  10:56 AM 7/6/2023  9:45 AM   PROMIS-10 Scores Only       Global Mental Health Score 13  8  7  12    Global Physical Health Score 11  9  9  11    PROMIS TOTAL - SUBSCORES 24  17  16  23            Referral / Collaboration:  Referral to another professional/service is not indicated at this time..    Anticipated number of session for this episode of care: 7-9  Anticipation frequency of session: Every other week  Anticipated Duration of each session: 38-52 minutes  Treatment plan will be reviewed in 90 days or when goals have been changed.       MeasurableTreatment Goal(s) related to diagnosis / functional impairment(s)  Goal 1: Patient will experience a reduction in depressive symptoms along with a corresponding increase in positive emotion and life satisfaction.      Objective #A (Patient Action)    Patient will Increase interest, engagement, and pleasure in doing things.  Status: Continued - Date(s): 7/12/2023    Intervention(s)  Therapist will help patient identify pleasant and mastery oriented events that elicit positive, relaxed mood.    Objective #B  Patient will Decrease frequency and intensity of feeling down, depressed, hopeless.  Status: Continued - Date(s):  "7/12/2023    Intervention(s)  Therapist will introduce patient to cognitive-behavioral and acceptance and commitment therapy topics aimed to help reduce depression and anxiety    Objective #C  Patient will Identify negative self-talk and behaviors: challenge core beliefs, myths, and actions  Improve concentration, focus, and mindfulness in daily activities .  Status: Continued - Date(s): 7/12/2023    Intervention(s)  Therapist will help patient identify and manage negative self-talk and automatic thoughts; introduce patient to cognitive distortions; help patient develop cognitive diffusion techniques      Goal 2: Patient will experience a reduction in anxious symptoms, along with a corresponding increase in relaxed emotional states and life satisfaction.      Objective #A (Patient Action)  Patient will use cognitive-behavioral and thought diffusion strategies identified in therapy to challenge anxious thoughts.    Status: Continued - Date(s): 7/12/2023    Intervention(s)  Therapist will utilize CBT and ACT ideas to help patient challenge anxious thoughts and reduce intensity/duration of anxious distress    Objective #B  Patient will use \"worry time\" each day for 15 minutes of scheduled worry and then defer obsessive or anxious thinking until the next structured worry time.    Status: Continued - Date(s): 7/12/2023    Intervention(s)  Therapist will teach patient how to effectively utilize worry time and/or thought logs/journals each day and incorporate more relaxation behaviors into their routine.    Objective #C  Patient will identify the stressors which contribute to feelings of anxiety  Patient will increase engagement in adaptive coping skills and recreational activities, such as exercise and healthy socialization, to manage distress.  Status: Continued - Date(s): 7/12/2023    Intervention(s)  Therapist will help patient identify triggers/situational factors that contribute to anxiety and behavioral skills aimed " to manage anxious distress.      Goal 3: Patient will learn more adaptive ways to manage chronic pain      Objective #A (Patient Action)    Status: Continued - Date(s): 7/12/2023    Patient will identify 2-4 strategies for managing pain.    Intervention(s)  Therapist will teach distraction skills aimed to help manage chronic pain and utilize ACT/CBT ideas to help with this (e.g. relaxation) .    Objective #B  Patient will state understanding of stressors and relationship to physical symptoms.    Status: Continued - Date(s): 7/12/2023      Other Possible Therapeutic Intervention(s):    Psycho-education regarding mental health diagnoses and treatment options    Supportive Therapy  Provide affirmations, reflections, and establish working rapport  Emphasize and reflect on strength of therapeutic relationship    Skills training  Explore skills useful to client in current situation.  Skills include assertiveness, communication, conflict management, problem-solving, relaxation, etc.    Solution-Focused Therapy  Explore patterns in patient's relationships and discuss options for new behaviors.  Explore patterns in patient's actions and choices and discuss options for new behaviors.    Cognitive-behavioral Therapy  Discuss common cognitive distortions, identify them in patient's life.  Explore ways to challenge, replace, and act against these cognitions.    Acceptance and Commitment Therapy  Explore and identify important values in patient's life.  Discuss ways to commit to behavioral activation around these values.    Psychodynamic psychotherapy  Discuss patient's emotional dynamics and issues and how they impact behaviors.  Explore patient's history of relationships and how they impact present behaviors.  Explore how to work with and make changes in these schemas and patterns.    Narrative Therapy  Explore the patient's story of his/her life from his/her perspective.  Explore alternate ways of understanding their  experience, identifying exceptions, developing new themes.    Interpersonal Psychotherapy  Explore patterns in relationships that are effective or ineffective at helping patient reach their goals, find satisfying experience.  Discuss new patterns or behaviors to engage in for improved social functioning.    Behavioral Activation  Discuss steps patient can take to become more involved in meaningful activity.  Identify barriers to these activities and explore possible solutions.    Mindfulness-Based Strategies  Discuss skills based on development and application of mindfulness.  Skills drawn from compassion-focused therapy, dialectical behavior therapy, mindfulness-based stress reduction, mindfulness-based cognitive therapy, etc.      Patient has reviewed and agreed to the above plan.    Héctor Higuera Psy.D, LP   Behavioral Health Clinician   [facility]     7/12/2023    Crisis Resources    Refer to the resources below as needed.    Steps to care for yourself    If you are currently in counseling, call your counselor for an appointment  Call the local crisis resources below if needed.  Contact friends or family for support.  Get more exercise.  Do activities you enjoy.  Eat a well-balanced diet and drink plenty of fluids.  Rest as needed.  Limit alcohol and recreational drugs. These can worsen depression.    When to contact your primary care provider     You have thoughts of harming or killing yourself but have not made a plan to carry it out.  Your depression gets in the way of daily activities.  You are often unable to sleep.  You need help cutting back on alcohol or recreational drugs.    When to call 911 or go to the Emergency Room     Get emergency help right away if you have any of the following:  You are planning to harm or kill yourself and you have a way to carry out the plan.   You have injured yourself or others. Or, you think you will.  You feel confused or are having  trouble thinking or remembering.  You are having delusions (false beliefs).  You are hearing voices or seeing things that aren t there.  You are feeling psychotic (paranoid, fearful, restless, agitated, nervous, racing thoughts or speech)    Crisis Resources     These hotlines are for both adults and children. The and are open 24 hours a day, 7 days a week unless noted otherwise.    988 - National Suicide and Crisis Lifeline  If you or someone you know needs support now, call or text 988 or chat 98Epyonline.org. 988 connects you with a trained crisis counselor who can help.    National Suicide Prevention Lifeline   5-029-033-TALK (4397)    Crisis Text Line    www.crisistextline.org  Text HOME to 186811 from anywhere in the [place], anytime, about any type of crisis. A live, trained crisis counselor will receive the text and respond quickly.    [place] Lifeline for LGBTQ Youth  A national crisis intervention and suicide lifeline for LGBTQ youth under 25. Provides a safe place to talk without judgement.   Call 1-480.371.4342; text START to 696474 or visit www.thetrevorproject.org to talk to a trained counselor.  For [facility] crisis numbers, visit the [place] DHS website at:  https://[place].gov/dhs/people-we-serve/adults/health-care/mental-health/resources/crisis-contacts.jsp      Héctor Higuera Psy.D, LP   Behavioral Health Clinician   -[facility]     6/3/2022

## 2023-10-03 DIAGNOSIS — J45.909 ASTHMA: ICD-10-CM

## 2023-10-05 RX ORDER — FLUTICASONE PROPIONATE 110 UG/1
1 AEROSOL, METERED RESPIRATORY (INHALATION) 2 TIMES DAILY
Qty: 36 G | Refills: 5 | Status: SHIPPED | OUTPATIENT
Start: 2023-10-05

## 2023-10-05 NOTE — TELEPHONE ENCOUNTER
fluticasone (FLOVENT HFA) 110 MCG/ACT inhaler      Last Written Prescription Date:  2/1/23  Last Fill Quantity: 36 g,   # refills: 0  Last Office Visit : 9/13/23  Future Office visit:  None    Routing refill request to provider for review/approval because:     Pended with refills  Asthma [J45.909]      No ACT, tracheostomy/ ventilator dependent

## 2023-10-10 ENCOUNTER — TELEPHONE (OUTPATIENT)
Dept: FAMILY MEDICINE | Facility: CLINIC | Age: 33
End: 2023-10-10
Payer: MEDICARE

## 2023-10-10 NOTE — CONFIDENTIAL NOTE
[facility] calling requesting a script for vent, Patients mother states patient does not see any other providers now and is requesting that script from Dr. Moseley for a renewal of her vent. Patient not able to see pulmonary for another 6 months. Please call [facility] to discuss thank you.

## 2023-10-10 NOTE — TELEPHONE ENCOUNTER
[facility]    Phone Message    May a detailed message be left on voicemail: yes     Reason for Call: Other: The patient mother was calling to schedule a follow up appointment with Dr. Moseley. However, they wanted to know if the 10/12/23 at 4:30 pm can be changed to a video visits please review and follow up thank you.      Action Taken: Message routed to:  [facility]    Travel Screening: Not Applicable

## 2023-10-10 NOTE — TELEPHONE ENCOUNTER
[facility] Call Center    Phone Message    May a detailed message be left on voicemail: yes     Reason for Call: Order(s): Other:   Reason for requested: Formula- Nutrition 1.0 Fiber unflavored 250mL, needs to be faxed to [facility] at [phone]   Date needed: 10/10/23  Provider name: Dr. Moseley

## 2023-10-10 NOTE — TELEPHONE ENCOUNTER
Sent [facility] message regarding below :     Since the end of the Federal Covid-19 Public Health Emergency ended on May 11th, 2023, the insurance are no longer accepting video visits for the face-to-face visit. The pt have to see the provider in-person.

## 2023-10-11 ENCOUNTER — TELEPHONE (OUTPATIENT)
Dept: FAMILY MEDICINE | Facility: CLINIC | Age: 33
End: 2023-10-11
Payer: MEDICARE

## 2023-10-11 NOTE — TELEPHONE ENCOUNTER
We never received the first order request so they are sending it again.    Jes Weinstein on 10/11/2023 at 1:59 PM

## 2023-10-11 NOTE — TELEPHONE ENCOUNTER
We received a DME order form from [facility].  Left a detailed message to Georgina at [facility] that pt will see PCP tomorrow and we need to review all those DME orders during the visit. After signing on the form, we will fax it back to them.  Also I am not sure her insurance accepts a virtual visit chart note or not.

## 2023-10-11 NOTE — TELEPHONE ENCOUNTER
[facility] Call Center    Phone Message    May a detailed message be left on voicemail: yes     Reason for Call: Other: Needs udated RX and chart notes from most recent visit, they have been faxing info for her food and they have not been getting any response, please send new food RX as well as chart notes to Fax: [phone].  Please do this ASAP, they have been waiting for over a week now.  Please call if questions.         Action Taken: Message routed to:  [facility]    Travel Screening: Not Applicable

## 2023-10-11 NOTE — TELEPHONE ENCOUNTER
[facility] Call Center    Phone Message    May a detailed message be left on voicemail: yes     Reason for Call: Other:  Formula- Nutrition 1.0 Fiber unflavored 250mL. [facility] home medical is still waiting for this order. Patient is out of this food and was expecting the order yesterday. Please fax the order to [phone]     Action Taken: Message routed to:  [facility] ([facility] P[facility]    Travel Screening: Not Applicable

## 2023-10-12 ENCOUNTER — TELEPHONE (OUTPATIENT)
Dept: PULMONOLOGY | Facility: CLINIC | Age: 33
End: 2023-10-12

## 2023-10-12 ENCOUNTER — MEDICAL CORRESPONDENCE (OUTPATIENT)
Dept: HEALTH INFORMATION MANAGEMENT | Facility: CLINIC | Age: 33
End: 2023-10-12

## 2023-10-12 ENCOUNTER — VIRTUAL VISIT (OUTPATIENT)
Dept: FAMILY MEDICINE | Facility: CLINIC | Age: 33
End: 2023-10-12
Payer: COMMERCIAL

## 2023-10-12 DIAGNOSIS — G12.9 SPINAL MUSCLE ATROPHY (H): Primary | ICD-10-CM

## 2023-10-12 PROCEDURE — 99214 OFFICE O/P EST MOD 30 MIN: CPT | Mod: VID | Performed by: FAMILY MEDICINE

## 2023-10-12 RX ORDER — NUTRIT SUPP/INULIN/FOS/FIBER 0.04G-1/ML
LIQUID (ML) ORAL
Qty: 30000 ML | Refills: 11 | Status: SHIPPED | OUTPATIENT
Start: 2023-10-12

## 2023-10-12 NOTE — TELEPHONE ENCOUNTER
Form was given to [facility][note 3[facility] nurse for Dr Moseley yesterday. Patient has appointment with Dr Moseley 10/12 4:30pm.

## 2023-10-12 NOTE — NURSING NOTE
Is the patient currently in the state of [place]? YES    Visit mode:VIDEO    If the visit is dropped, the patient can be reconnected by: VIDEO VISIT: Text to cell phone:   Telephone Information:   Mobile [phone]       Will anyone else be joining the visit? NO  (If patient encounters technical issues they should call [phone]    How would you like to obtain your AVS? MyChart    Are changes needed to the allergy or medication list? Pt stated no changes to allergies and Pt stated no med changes    Reason for visit: RECHECK and Medication Request (Prescription for new formula)    RONNY DEL RIO VVF    Pt wants formula sent to [facility], Fax # [phone]

## 2023-10-12 NOTE — TELEPHONE ENCOUNTER
[facility] Call Center    Phone Message    May a detailed message be left on voicemail: yes     Reason for Call: Appointment Intake    Referring Provider Name: JOSE PARKER Priority: 1-2 Weeks  Diagnosis and/or Symptoms: Last Pulm visit 2018 [facility]/Dr Soni; pt requests asap Pulm appt at [facility], I'm doing forms to continue vent she lives on but ideally Pulm involved, Please review Referral.    Action Taken: Other: Pulm    Travel Screening: Not Applicable

## 2023-10-12 NOTE — PROGRESS NOTES
Virtual Visit Details    Type of service:  Video Visit   Video Start Time:  4:30 pm  Video End Time: 5:05 pm    Originating Location (pt. Location): Home    Distant Location (provider location):  On-site  Platform used for Video Visit: Boris Dela Cruz is a 33 year old who is being evaluated via a billable video visit.      How would you like to obtain your AVS? MyChart  If the video visit is dropped, the invitation should be resent by: in epic  Will anyone else be joining your video visit? No          Assessment & Plan     Spinal muscle atrophy (H)    - Nutritional Supplements (NUTREN 1.0/FIBER) LIQD; Per G tube; 250 ml Per G Tube qid  - Adult Pulmonary Medicine  Referral; Future      38 minutes spent by me on the date of the encounter doing chart review, history and exam, documentation and further activities per the note    No follow-ups on file.    Jimmy Moseley MD  [facility] ODILON Dela Cruz is a 33 year old, presenting for the following health issues:  RECHECK and Medication Request (Prescription for new formula)      HPI Here in follow-up  No acute c/o  Did not try hair cream yet, might look for other options to move up derm appt  Needs new formula, see rx today-sending to her  Needs new Pulm MD  Needs new rx for vent/supplies in meantime  Has not seen Pulm since 2018 per pt  Discussed get flu/covid shot soon; she will try to when in [facility] for upcoming [place]  See my last note; did not set up Gyn or CRS visits, we can first see what CT shows  Past Medical History:   Diagnosis Date    Anxiety     Snoring     Spinal muscle atrophy (H)      Past Surgical History:   Procedure Laterality Date    ENT SURGERY      tubes    GASTROSTOMY TUBE      KIDNEY SURGERY      SPINE SURGERY      TRACHEOSTOMY       Current Outpatient Medications   Medication    Nutritional Supplements (NUTREN 1.0/FIBER) LIQD    ACETAMINOPHEN PO    albuterol (PROAIR HFA) 108 (90 Base) MCG/ACT  inhaler    albuterol (PROVENTIL) (2.5 MG/3ML) 0.083% neb solution    ALPRAZolam (XANAX) 0.25 MG tablet    ALPRAZolam (XANAX) 0.25 MG tablet    aminocaproic acid (AMICAR) 0.25 GM/ML solution    azithromycin (ZITHROMAX) 200 MG/5ML suspension    bisacodyl (DULCOLAX) 10 MG suppository    celecoxib (CELEBREX) 200 MG capsule    clindamycin (CLEOCIN T) 1 % lotion    diazepam (VALIUM) 5 MG tablet    doxycycline hyclate (VIBRAMYCIN) 100 MG capsule    famotidine (PEPCID) 40 MG/5ML suspension    fluocinolone acetonide (DERMA SMOOTHE/FS BODY) 0.01 % external oil    fluticasone (FLOVENT HFA) 110 MCG/ACT inhaler    gabapentin (NEURONTIN) 250 MG/5ML solution    GENERLAC 10 GM/15ML solution    guaiFENesin (ORGANIDIN) 200 MG TABS tablet    HYDROmorphone (DILAUDID) 4 MG tablet    hydrOXYzine (ATARAX) 25 MG tablet    ketoconazole (NIZORAL) 2 % external shampoo    LANsoprazole (PREVACID) 30 MG DR capsule    levofloxacin (LEVAQUIN) 25 MG/ML solution    Magnesium Hydroxide (MILK OF MAGNESIA PO)    metroNIDAZOLE (METROCREAM) 0.75 % external cream    Minoxidil (MINOXIDIL FOR WOMEN) 5 % FOAM    mometasone (NASONEX) 50 MCG/ACT nasal spray    morphine (SABINE) 30 MG 24 hr capsule    mupirocin (BACTROBAN) 2 % external cream    naloxone (NARCAN) 4 MG/0.1ML nasal spray    Nutritional Supplements (REPLETE FIBER) LIQD    omeprazole (PRILOSEC) 2 mg/mL suspension    ondansetron (ZOFRAN-ODT) 4 MG ODT tab    order for DME    order for DME    polyethylene glycol (MIRALAX) 17 GM/Dose powder    potassium chloride (KAYCIEL) 20 MEQ/15ML (10%) solution    prochlorperazine (COMPAZINE) 5 MG tablet    rivaroxaban ANTICOAGULANT (XARELTO ANTICOAGULANT) 10 MG TABS tablet    Salicylic Acid (OXY BALANCE FACIAL CLEAN WASH EX)    Sennosides (SENNA) 8.8 MG/5ML LIQD    sertraline (ZOLOFT) 20 MG/ML (HIGH CONC) solution    silver nitrate (ARZOL) 75-25 % miscellaneous    silver sulfADIAZINE (SSD) 1 % external cream    simethicone (MYLICON) 66.7 mg/ml    simethicone 40  MG/0.6ML LIQD    sodium chloride (OCEAN) 0.65 % nasal spray    traZODone (DESYREL) 5 mg/ml SUSP    traZODone (DESYREL) 5 mg/ml SUSP    tretinoin (RETIN-A) 0.025 % external cream     No current facility-administered medications for this visit.     Allergies   Allergen Reactions    Ibuprofen              Review of Systems         Objective    Vitals - Patient Reported  Systolic (Patient Reported):  (Not today)  Weight (Patient Reported):  (Not recently)  Pain Score: Severe Pain (7)  Pain Loc: Hip        Physical Exam   GENERAL: Healthy, alert and no distress  EYES: Eyes grossly normal to inspection.  No discharge or erythema, or obvious scleral/conjunctival abnormalities.  RESP: No audible wheeze, cough, or visible cyanosis.  No visible retractions or increased work of breathing. On vent.    SKIN: Visible skin clear. No significant rash, abnormal pigmentation or lesions.  NEURO: Cranial nerves grossly intact.  Mentation and speech appropriate for age.  PSYCH: Mentation appears normal, affect normal/bright, judgement and insight intact, normal speech and appearance well-groomed.

## 2023-10-17 ENCOUNTER — MEDICAL CORRESPONDENCE (OUTPATIENT)
Dept: HEALTH INFORMATION MANAGEMENT | Facility: CLINIC | Age: 33
End: 2023-10-17
Payer: MEDICARE

## 2023-10-18 ENCOUNTER — VIRTUAL VISIT (OUTPATIENT)
Dept: BEHAVIORAL HEALTH | Facility: CLINIC | Age: 33
End: 2023-10-18
Payer: COMMERCIAL

## 2023-10-18 DIAGNOSIS — F33.1 MAJOR DEPRESSIVE DISORDER, RECURRENT EPISODE, MODERATE (H): Primary | ICD-10-CM

## 2023-10-18 PROCEDURE — 90837 PSYTX W PT 60 MINUTES: CPT | Mod: VID | Performed by: PSYCHOLOGIST

## 2023-10-18 NOTE — PROGRESS NOTES
[facility]: Integrated Behavioral Health  October 18, 2023          Behavioral Health Clinician Progress Note    Patient Name: Lanie Neil           Service Type: Video appointment      Service Location:  Video appointment      Session Start Time: 11:00 Session End Time: 11:55      Session Length: 53 - 60      Attendees: Patient    Visit Activities (Refresh list every visit): [facility] Only    Service Modality:  Video Visit:      Provider verified identity through the following two step process.  Patient provided:  Patient photo and Patient is known previously to provider    Telemedicine Visit: The patient's condition can be safely assessed and treated via synchronous audio and visual telemedicine encounter.      Reason for Telemedicine Visit: Patient unable to travel    Originating Site (Patient Location): Patient's home    Distant Site (Provider Location): Provider Remote Setting- Home Office    Consent:  The patient/guardian has verbally consented to: the potential risks and benefits of telemedicine (video visit) versus in person care; bill my insurance or make self-payment for services provided; and responsibility for payment of non-covered services.     Patient would like the video invitation sent by:  My Chart    Mode of Communication:  [facility] Conference [facility]    Distant Location (Provider):  Off-site    As the provider I attest to compliance with applicable laws and regulations related to telemedicine.      Diagnostic Assessment Date: 1/19/2021; update 5/23/2023  Treatment Plan Review Date: 11/18/2024  See Flowsheets for today's PHQ-9 and KATHARINE-7 results  Previous PHQ-9:       12/15/2020    10:49 AM 3/16/2021     9:13 AM 6/30/2021     9:26 AM   PHQ-9 SCORE   PHQ-9 Total Score MyChart 2 (Minimal depression) 2 (Minimal depression) 2 (Minimal depression)   PHQ-9 Total Score 2 2 2     Previous KATHARINE-7:       12/15/2020    10:52 AM 3/16/2021     9:15 AM 6/30/2021     9:27 AM  "  KATHARINE-7 SCORE   Total Score 1 (minimal anxiety) 1 (minimal anxiety) 1 (minimal anxiety)   Total Score 1 1 1       DATA  Extended Session (60+ minutes): No  Interactive Complexity: No  Crisis: No    Treatment Objective(s) Addressed in This Session:  Target Behavior(s): disease management/lifestyle changes      Depressed Mood: Decrease frequency and intensity of feeling down, depressed, hopeless  Identify negative self-talk and behaviors: challenge core beliefs, myths, and actions  Anxiety: will develop more effective coping skills to manage anxiety symptoms  Relationship Problems: will address relationship difficulties in a more adaptive manner  Psychological distress related to Pain    Current Stressors / Issues:  [facility] met with Lanie today to continue supporting her treatment of depression, adjustment to chronic disease management, and relationship problems.     Reports recent relationship concerns and feeling stuck in life due to pain. Extended discussion today about relationship dynamics, feeling stuck/trapped, and ways she engages in various patterns with others, how these contribute to experiences of anxiety and depression. Discussed the role of emotional reasoning in particular, how utilizing wise mind ideas (using logic with emotions) can help arrive at better interpretation/coping with stressful events.     Am I Hooked or Unhooked From My Thoughts?    Clues that I'm getting hooked and threatened by my thoughts:    1) Thoughts are Literal and Threatening; it s as if what we re thinking is actually present, here and now!    2) Thoughts are \"The Truth\"; we literally believe them!    3) Thoughts are Most Important; we take them seriously, and give them our full attention!    4) Thoughts are Orders; we automatically obey them!    5) Thoughts are Always Wise; we assume they know best and we follow their advice!     _____________________________________________________________________    Noticing when I'm unhooked " "and not threatened by my thoughts:    1) Thoughts are merely sounds, words, stories, bits of language, passing through our heads.    2) Thoughts may or may not be true. We don t automatically believe them.    3) Thoughts may or may not be important. We pay attention only if they re helpful.    4) Thoughts are not orders. We don t have to obey them.    5) Thoughts may or may not be wise. We don t automatically follow their advice.   Thoughts That Hook You:    What memories, fears, worries, self-criticisms, or other unhelpful thoughts do you dwell on or get \"stuck\" thinking about? What thoughts tend to hook you, jerk you around, and pull you out of your life?                 Life-Draining Actions:    What are you currently doing that makes your life worse in the long run: keeps you stuck, wastes your time or money, drains your energy; restricts your life; impacts negatively on your health, work, or relationships; maintains or worsens the problems your are dealing with?                     Feelings That Hook You:    What emotions, feelings, urges, impulses, or sensations tend to hook you and jerk you around or pull you into self-defeating actions?           Avoiding Challenging Situations:    What situations, activities, people, or places are you avoiding or staying away from? What have you quit, withdrawn from, dropped out of? What do you keep \"putting off\" until later?                           Progress on Treatment Objective(s) / Homework:  Satisfactory progress - ACTION (Actively working towards change); Intervened by reinforcing change plan / affirming steps taken       Psychodynamic psychotherapy  Discuss patient's emotional dynamics and issues and how they impact behaviors.  Explore patient's history of relationships and how they impact present behaviors.  Explore how to work with and make changes in these schemas and patterns.    Interpersonal Psychotherapy  Explore patterns in relationships that are effective " or ineffective at helping patient reach their goals, find satisfying experience.  Discuss new patterns or behaviors to engage in for improved social functioning.      Care Plan review completed: yes    Medication Review:  No changes to current psychiatric medication(s)    Medication Compliance:  NA    Changes in Health Issues:   None reported    Chemical Use Review:   Substance Use: Chemical use reviewed, no active concerns identified      Tobacco Use: No current tobacco use.      Assessment: Current Emotional / Mental Status (status of significant symptoms):  Risk status (Self / Other harm or suicidal ideation)  Patient has had a history of suicidal ideation: passive thoughts only; easily controlled  - denied any recently    Patient denies current fears or concerns for personal safety.  Patient denies current or recent suicidal ideation or behaviors.  Patient denies current or recent homicidal ideation or behaviors.  Patient denies current or recent self injurious behavior or ideation.  Patient denies other safety concerns.     A safety and risk management plan has not been developed at this time, however patient was encouraged to call [place] [phone] should there be a change in any of these risk factors.      [place] Suicide Severity Rating Scale (Short Version)      2/18/2021     4:47 PM   [place] Suicide Severity Rating (Short Version)   Over the past 2 weeks have you felt down, depressed, or hopeless? yes   Over the past 2 weeks have you had thoughts of killing yourself? no   Have you ever attempted to kill yourself? no     [place] Suicide Severity Rating Scale (Lifetime/Recent)      8/13/2021     1:25 PM   [place] Suicide Severity Rating (Lifetime/Recent)   Q1 Wish to be Dead (Lifetime) No   Q2 Non-Specific Active Suicidal Thoughts (Lifetime) No   Most Severe Ideation Rating (Lifetime) NA   Frequency (Lifetime) NA   Duration (Lifetime) NA   Controllability (Lifetime) NA   Protective Factors  (Lifetime)  NA   Reasons for Ideation (Lifetime) NA   RETIRED: 1. Wish to be Dead (Recent) No   RETIRED: 2. Non-Specific Active Suicidal Thoughts (Recent) No   3. Active Suicidal Ideation with any Methods (Not Plan) Without Intent to Act (Lifetime) No   RETIRED: 3. Active Suicidal Ideation with any Methods (Not Plan) Without Intent to Act (Recent) No   RETIRE: 4. Active Suicidal Ideation with Some Intent to Act, Without Specific Plan (Lifetime) No   4. Active Suicidal Ideation with Some Intent to Act, Without Specific Plan (Recent) No   RETIRE: 5. Active Suicidal Ideation with Specific Plan and Intent (Lifetime) No   RETIRED: 5. Active Suicidal Ideation with Specific Plan and Intent (Recent) No   Most Severe Ideation Rating (Past Month) NA   Frequency (Past Month) NA   Duration (Past Month) NA   Controllability (Past Month) NA   Protective Factors (Past Month) NA   Reasons for Ideation (Past Month) NA   Actual Attempt (Lifetime) No   Actual Attempt (Past 3 Months) No   Has subject engaged in non-suicidal self-injurious behavior? (Lifetime) No   Has subject engaged in non-suicidal self-injurious behavior? (Past 3 Months) No   Interrupted Attempts (Lifetime) No   Interrupted Attempts (Past 3 Months) No   Aborted or Self-Interrupted Attempt (Lifetime) No   Aborted or Self-Interrupted Attempt (Past 3 Months) No   Preparatory Acts or Behavior (Lifetime) No   Preparatory Acts or Behavior (Past 3 Months) No   Most Recent Attempt Actual Lethality Code NA   Most Lethal Attempt Actual Lethality Code NA   Initial/First Attempt Actual Lethality Code NA         Appearance:   Appropriate   Eye Contact:   Good   Psychomotor Behavior: Normal   Attitude:   Cooperative   Orientation:   All  Speech   Rate / Production: Normal    Volume:  Normal   Mood:    Depressed    Affect:    Tearful  Thought Content:  Rumination   Thought Form:  Coherent  Logical   Insight:    Good     Diagnoses:  1. Major depressive disorder, recurrent episode, moderate (H)                   Collateral Reports Completed:  [facility] will coordinate with care team as needed    Plan: (Homework, other):  Lanie was scheduled to RTC in three weeks for help addressing depressive symptoms, pain, and relationship conflicts. Continue DBT skills provided.  CD Recommendations: No indications of CD issues.     Héctor Higuera Psy.D, JENNIFER   Behavioral Health Clinician   [facility] [place]     _______________________________________________________________________                                              Individual Treatment Plan    Patient's Name: Lanie Neil  YOB: 1990    Date of Creation: 3/4/2022  Date Treatment Plan Last Reviewed/Revised: 10/18/2023    DSM5 Diagnoses: 296.32 (F33.1) Major Depressive Disorder, Recurrent Episode, Moderate With anxious distress  Psychosocial / Contextual Factors: Chronic pain, SMA, disabled  PROMIS (reviewed every 90 days):     PROMIS-10    In general, would you say your health is: (P) Excellent    In general, would you say your quality of life is: (P) Good    In general, how would you rate your physical health? (P) Very good    In general, how would you rate your mental health, including your mood and your ability to think? (P) Good    In general, how would you rate your satisfaction with your social activities and relationships? (P) Good    In general, please rate how well you carry out your usual social activities and roles. (This includes activities at home, at work and in your community, and responsibilities as a parent, child, spouse, employee, friend, etc.) (P) Very good    To what extent are you able to carry out your everyday physical activities such as walking, climbing stairs, carrying groceries, or moving a chair? (P) Not at all    In the past 7 days,  How often have you been bothered by emotional problems such as feeling anxious, depressed or irritable? (P) Rarely  How would you rate your fatigue on average? (P)  Moderate  How would you rate your pain on average? (P) 6    PROMIS GLOBAL SCORES    Mental health question re-calculation - no clinical value - (P) 4  Physical health question re-calculation - no clinical value - (P) 3  Pain question re-calculation - no clinical value - (P) 3  Global Mental Health Score - (P) 13  Global Physical Health Score - (P) 11  PROMIS TOTAL - SUBSCORES - (P) 24      [id number] PROMIS HEALTH ORGANIZATION AND PROMIS COOPERATIVE GROUP VERSION 1.1         3/4/2022  1:59 PM 3/15/2022  2:10 PM 3/30/2023  10:56 AM 7/6/2023  9:45 AM 10/18/2023  7:09 AM   PROMIS-10 Scores Only        Global Mental Health Score 13  8  7  12  11    Global Physical Health Score 11  9  9  11  8    PROMIS TOTAL - SUBSCORES 24  17  16  23  19          Referral / Collaboration:  Referral to another professional/service is not indicated at this time..    Anticipated number of session for this episode of care: 7-9  Anticipation frequency of session: Every other week  Anticipated Duration of each session: 38-52 minutes  Treatment plan will be reviewed in 90 days or when goals have been changed.       MeasurableTreatment Goal(s) related to diagnosis / functional impairment(s)  Goal 1: Patient will experience a reduction in depressive symptoms along with a corresponding increase in positive emotion and life satisfaction.      Objective #A (Patient Action)    Patient will Increase interest, engagement, and pleasure in doing things.  Status: Continued - Date(s): 10/18/2023    Intervention(s)  Therapist will help patient identify pleasant and mastery oriented events that elicit positive, relaxed mood.    Objective #B  Patient will Decrease frequency and intensity of feeling down, depressed, hopeless.  Status: Continued - Date(s): 10/18/2023    Intervention(s)  Therapist will introduce patient to cognitive-behavioral and acceptance and commitment therapy topics aimed to help reduce depression and anxiety    Objective #C  Patient will  "Identify negative self-talk and behaviors: challenge core beliefs, myths, and actions  Improve concentration, focus, and mindfulness in daily activities .  Status: Continued - Date(s): 10/18/2023    Intervention(s)  Therapist will help patient identify and manage negative self-talk and automatic thoughts; introduce patient to cognitive distortions; help patient develop cognitive diffusion techniques      Goal 2: Patient will experience a reduction in anxious symptoms, along with a corresponding increase in relaxed emotional states and life satisfaction.      Objective #A (Patient Action)  Patient will use cognitive-behavioral and thought diffusion strategies identified in therapy to challenge anxious thoughts.    Status: Continued - Date(s): 10/18/2023    Intervention(s)  Therapist will utilize CBT and ACT ideas to help patient challenge anxious thoughts and reduce intensity/duration of anxious distress    Objective #B  Patient will use \"worry time\" each day for 15 minutes of scheduled worry and then defer obsessive or anxious thinking until the next structured worry time.    Status: Continued - Date(s): 10/18/2023    Intervention(s)  Therapist will teach patient how to effectively utilize worry time and/or thought logs/journals each day and incorporate more relaxation behaviors into their routine.    Objective #C  Patient will identify the stressors which contribute to feelings of anxiety  Patient will increase engagement in adaptive coping skills and recreational activities, such as exercise and healthy socialization, to manage distress.  Status: Continued - Date(s): 10/18/2023    Intervention(s)  Therapist will help patient identify triggers/situational factors that contribute to anxiety and behavioral skills aimed to manage anxious distress.      Goal 3: Patient will learn more adaptive ways to manage chronic pain      Objective #A (Patient Action)    Status: Continued - Date(s): 10/18/2023    Patient will " identify 2-4 strategies for managing pain.    Intervention(s)  Therapist will teach distraction skills aimed to help manage chronic pain and utilize ACT/CBT ideas to help with this (e.g. relaxation) .    Objective #B  Patient will state understanding of stressors and relationship to physical symptoms.    Status: Continued - Date(s): 10/18/2023      Other Possible Therapeutic Intervention(s):    Psycho-education regarding mental health diagnoses and treatment options    Supportive Therapy  Provide affirmations, reflections, and establish working rapport  Emphasize and reflect on strength of therapeutic relationship    Skills training  Explore skills useful to client in current situation.  Skills include assertiveness, communication, conflict management, problem-solving, relaxation, etc.    Solution-Focused Therapy  Explore patterns in patient's relationships and discuss options for new behaviors.  Explore patterns in patient's actions and choices and discuss options for new behaviors.    Cognitive-behavioral Therapy  Discuss common cognitive distortions, identify them in patient's life.  Explore ways to challenge, replace, and act against these cognitions.    Acceptance and Commitment Therapy  Explore and identify important values in patient's life.  Discuss ways to commit to behavioral activation around these values.    Psychodynamic psychotherapy  Discuss patient's emotional dynamics and issues and how they impact behaviors.  Explore patient's history of relationships and how they impact present behaviors.  Explore how to work with and make changes in these schemas and patterns.    Narrative Therapy  Explore the patient's story of his/her life from his/her perspective.  Explore alternate ways of understanding their experience, identifying exceptions, developing new themes.    Interpersonal Psychotherapy  Explore patterns in relationships that are effective or ineffective at helping patient reach their goals, find  satisfying experience.  Discuss new patterns or behaviors to engage in for improved social functioning.    Behavioral Activation  Discuss steps patient can take to become more involved in meaningful activity.  Identify barriers to these activities and explore possible solutions.    Mindfulness-Based Strategies  Discuss skills based on development and application of mindfulness.  Skills drawn from compassion-focused therapy, dialectical behavior therapy, mindfulness-based stress reduction, mindfulness-based cognitive therapy, etc.      Patient has reviewed and agreed to the above plan.    Héctor Higuera Psy.D, LP   Behavioral Health Clinician   [facility]     7/12/2023    Crisis Resources    Refer to the resources below as needed.    Steps to care for yourself    If you are currently in counseling, call your counselor for an appointment  Call the local crisis resources below if needed.  Contact friends or family for support.  Get more exercise.  Do activities you enjoy.  Eat a well-balanced diet and drink plenty of fluids.  Rest as needed.  Limit alcohol and recreational drugs. These can worsen depression.    When to contact your primary care provider     You have thoughts of harming or killing yourself but have not made a plan to carry it out.  Your depression gets in the way of daily activities.  You are often unable to sleep.  You need help cutting back on alcohol or recreational drugs.    When to call 911 or go to the Emergency Room     Get emergency help right away if you have any of the following:  You are planning to harm or kill yourself and you have a way to carry out the plan.   You have injured yourself or others. Or, you think you will.  You feel confused or are having trouble thinking or remembering.  You are having delusions (false beliefs).  You are hearing voices or seeing things that aren t there.  You are feeling psychotic (paranoid, fearful, restless, agitated,  nervous, racing thoughts or speech)    Crisis Resources     These hotlines are for both adults and children. The and are open 24 hours a day, 7 days a week unless noted otherwise.    988 - National Suicide and Crisis Lifeline  If you or someone you know needs support now, call or text 308 or chat 988Cleankeysline.org. 988 connects you with a trained crisis counselor who can help.    National Suicide Prevention Lifeline   3-494-376-TALK (1328)    Crisis Text Line    www.crisistextline.org  Text HOME to 269883 from anywhere in the [place], anytime, about any type of crisis. A live, trained crisis counselor will receive the text and respond quickly.    [place] Lifeline for LGBTQ Youth  A national crisis intervention and suicide lifeline for LGBTQ youth under 25. Provides a safe place to talk without judgement.   Call 1-392.286.9648; text START to 752644 or visit www.thetrevorproject.org to talk to a trained counselor.  For county crisis numbers, visit the [facility] website at:  https://[place].gov/dhs/people-we-serve/adults/health-care/mental-health/resources/crisis-contacts.jsp      Héctor Higuera Psy.D, LP   Behavioral Health Clinician   [facility]     6/3/2022

## 2023-10-19 ENCOUNTER — TRANSFERRED RECORDS (OUTPATIENT)
Dept: HEALTH INFORMATION MANAGEMENT | Facility: CLINIC | Age: 33
End: 2023-10-19
Payer: MEDICARE

## 2023-10-19 ENCOUNTER — DOCUMENTATION ONLY (OUTPATIENT)
Dept: FAMILY MEDICINE | Facility: CLINIC | Age: 33
End: 2023-10-19
Payer: MEDICARE

## 2023-10-19 NOTE — PROGRESS NOTES
Type of Form Received: Respiratory Therapist Orders    Form Received (Date) 10/13/23   Form Filled out Yes, faxed 10/19   Placed in provider folder Yes

## 2023-10-23 ENCOUNTER — ANCILLARY PROCEDURE (OUTPATIENT)
Dept: INTERVENTIONAL RADIOLOGY/VASCULAR | Facility: CLINIC | Age: 33
End: 2023-10-23
Attending: RADIOLOGY
Payer: COMMERCIAL

## 2023-10-23 ENCOUNTER — ANCILLARY PROCEDURE (OUTPATIENT)
Dept: CT IMAGING | Facility: CLINIC | Age: 33
End: 2023-10-23
Attending: FAMILY MEDICINE
Payer: COMMERCIAL

## 2023-10-23 DIAGNOSIS — K59.09 CHRONIC CONSTIPATION: ICD-10-CM

## 2023-10-23 DIAGNOSIS — N90.89 PERINEAL MASS IN FEMALE: ICD-10-CM

## 2023-10-23 DIAGNOSIS — Z45.2 ENCOUNTER FOR CARE RELATED TO VASCULAR ACCESS PORT: ICD-10-CM

## 2023-10-23 PROCEDURE — 74177 CT ABD & PELVIS W/CONTRAST: CPT | Mod: GC | Performed by: RADIOLOGY

## 2023-10-23 PROCEDURE — 36598 INJ W/FLUOR EVAL CV DEVICE: CPT | Performed by: RADIOLOGY

## 2023-10-23 RX ORDER — HEPARIN SODIUM (PORCINE) LOCK FLUSH IV SOLN 100 UNIT/ML 100 UNIT/ML
500 SOLUTION INTRAVENOUS ONCE
Status: COMPLETED | OUTPATIENT
Start: 2023-10-23 | End: 2023-10-23

## 2023-10-23 RX ORDER — IODIXANOL 320 MG/ML
50 INJECTION, SOLUTION INTRAVASCULAR ONCE
Status: COMPLETED | OUTPATIENT
Start: 2023-10-23 | End: 2023-10-23

## 2023-10-23 RX ORDER — IOPAMIDOL 755 MG/ML
81 INJECTION, SOLUTION INTRAVASCULAR ONCE
Status: COMPLETED | OUTPATIENT
Start: 2023-10-23 | End: 2023-10-23

## 2023-10-23 RX ADMIN — HEPARIN SODIUM (PORCINE) LOCK FLUSH IV SOLN 100 UNIT/ML 500 UNITS: 100 SOLUTION at 16:36

## 2023-10-23 RX ADMIN — IOPAMIDOL 81 ML: 755 INJECTION, SOLUTION INTRAVASCULAR at 16:19

## 2023-10-23 RX ADMIN — IODIXANOL 4 ML: 320 INJECTION, SOLUTION INTRAVASCULAR at 16:19

## 2023-10-23 NOTE — DISCHARGE INSTRUCTIONS

## 2023-10-23 NOTE — PROGRESS NOTES
Patient her for CT. Port accessed prior to arrival. Noted to be non power port needle.   X5 attempts to reaccess with 3/4in then, 1in needle.     Dr. Van consulted. Agreeable to attempt access under fluoroscopy. Order entered, patient laid supine on the table, area prepped. Accessed by Dr. Van. Handed off to CT for imaging. Mother present for entirety of visit.

## 2023-10-24 DIAGNOSIS — Z95.828 S/P IVC FILTER: ICD-10-CM

## 2023-10-24 DIAGNOSIS — Z86.718 HISTORY OF DEEP VENOUS THROMBOSIS: ICD-10-CM

## 2023-10-24 DIAGNOSIS — D50.0 IRON DEFICIENCY ANEMIA DUE TO CHRONIC BLOOD LOSS: ICD-10-CM

## 2023-10-24 DIAGNOSIS — J96.10 CHRONIC RESPIRATORY FAILURE, UNSPECIFIED WHETHER WITH HYPOXIA OR HYPERCAPNIA (H): ICD-10-CM

## 2023-10-24 DIAGNOSIS — Z79.01 CHRONIC ANTICOAGULATION: Primary | ICD-10-CM

## 2023-10-24 NOTE — PROGRESS NOTES
"  [facility]  [facility] [address], [address], [place] [address]  Main: [phone], Fax: [phone]    Telephone Note:    Patient: Lanie Neil  MRN: [id number]  : 1990  Date of this note written: 2023    This writer was made aware by Dr. Moseley, patient's primary care provider, today that a CT abdomen was done on 10/23/2023, with the following report:    Thickening of the rectum and sigmoid colon without surrounding fat stranding, which likely represents chronic inflammatory changes.  Infrarenal IVC filter in place with caval penetration of all 6 struts. Chart review demonstrates that the filter is an [employer] Option IVC filter placed 2012. Patient is currently anticoagulated. Consider referral to Interventional Radiology for IVC filter removal.   Chronic/ancillary findings as detailed in the body of the report.    This writer reviewed her previous CT abdomen report that was done at outside facility ([facility]) and discovered that her CT Abd/Pelvis done back on 2021 also showed \"IVC filter in place with struts projecting beyond the IVC lumen as on prior.\" Thus this finding of the IVC filter struts penetrating beyond the lumen is not a new finding.     I discuss the case with Dr. Jay West, staff hematologist, and he agrees that we should consult interventional radiology (IR) to have them discuss with the patient about risks and benefits of attempting removal of this well imbedded IVC filter vs leaving the filter in place.     I have spoken with the patient and her mother (power of ) today about referring Lanie to be evaluated by IR. They are in agreement and I have place IR referral today.       Jt Mir PA-C, MPAS  Physician Assistant  [facility].      "

## 2023-10-30 ENCOUNTER — TELEPHONE (OUTPATIENT)
Dept: FAMILY MEDICINE | Facility: CLINIC | Age: 33
End: 2023-10-30
Payer: MEDICARE

## 2023-10-30 NOTE — TELEPHONE ENCOUNTER
----- Message from Kenisha Costa RN sent at 10/24/2023  1:16 PM CDT -----  Please schedule with colorectal and call the pt. Thank you.    Kenisha  ----- Message -----  From: Jimmy Moseley MD  Sent: 10/24/2023   1:01 PM CDT  To: Kenisha Costa RN    I saw her last month re: mass in pelvis  Per ct report it looks like sigmoid colon related  I did put in colorectal surgery consult in SEptember, I'm not sure who would do this but can you ask someone to help her make sure appt w/ colorectal is set up?

## 2023-11-07 ENCOUNTER — VIRTUAL VISIT (OUTPATIENT)
Dept: BEHAVIORAL HEALTH | Facility: CLINIC | Age: 33
End: 2023-11-07
Payer: COMMERCIAL

## 2023-11-07 DIAGNOSIS — F33.1 MAJOR DEPRESSIVE DISORDER, RECURRENT EPISODE, MODERATE (H): Primary | ICD-10-CM

## 2023-11-07 PROCEDURE — 90834 PSYTX W PT 45 MINUTES: CPT | Mod: VID | Performed by: PSYCHOLOGIST

## 2023-11-07 NOTE — PROGRESS NOTES
[facility]: Integrated Behavioral Health  November 7, 2023          Behavioral Health Clinician Progress Note    Patient Name: Lanie Neil           Service Type: Video appointment      Service Location:  Video appointment      Session Start Time: 11:10 Session End Time: 11:58      Session Length: 38 - 52      Attendees: Patient    Visit Activities (Refresh list every visit): [facility] Only    Service Modality:  Video Visit:      Provider verified identity through the following two step process.  Patient provided:  Patient photo and Patient is known previously to provider    Telemedicine Visit: The patient's condition can be safely assessed and treated via synchronous audio and visual telemedicine encounter.      Reason for Telemedicine Visit: Patient unable to travel    Originating Site (Patient Location): Patient's home    Distant Site (Provider Location): [facility]: [facility] [place]    Consent:  The patient/guardian has verbally consented to: the potential risks and benefits of telemedicine (video visit) versus in person care; bill my insurance or make self-payment for services provided; and responsibility for payment of non-covered services.     Patient would like the video invitation sent by:  My Chart    Mode of Communication:  Video Conference via [facility]    Distant Location (Provider):  On-site    As the provider I attest to compliance with applicable laws and regulations related to telemedicine.      Diagnostic Assessment Date: 1/19/2021; update 5/23/2023  Treatment Plan Review Date: 11/18/2024  See Flowsheets for today's PHQ-9 and KATHARINE-7 results  Previous PHQ-9:       12/15/2020    10:49 AM 3/16/2021     9:13 AM 6/30/2021     9:26 AM   PHQ-9 SCORE   PHQ-9 Total Score MyChart 2 (Minimal depression) 2 (Minimal depression) 2 (Minimal depression)   PHQ-9 Total Score 2 2 2     Previous KATHARINE-7:       12/15/2020    10:52 AM 3/16/2021     9:15 AM 6/30/2021     9:27 AM   KATHARINE-7 SCORE  "  Total Score 1 (minimal anxiety) 1 (minimal anxiety) 1 (minimal anxiety)   Total Score 1 1 1       DATA  Extended Session (60+ minutes): No  Interactive Complexity: No  Crisis: No    Treatment Objective(s) Addressed in This Session:  Target Behavior(s): disease management/lifestyle changes      Depressed Mood: Decrease frequency and intensity of feeling down, depressed, hopeless  Identify negative self-talk and behaviors: challenge core beliefs, myths, and actions  Anxiety: will develop more effective coping skills to manage anxiety symptoms  Relationship Problems: will address relationship difficulties in a more adaptive manner  Psychological distress related to Pain    Current Stressors / Issues:  [facility] met with Lanie today to continue supporting her treatment of depression, adjustment to chronic disease management, and relationship problems.     Notes feeling bothered by a recent conflict between her mother and brother with regard to Thanksgiving plans. She notes that she is doubtful that she will have all her family present for Thanksgiving, which is upsetting to her.     Lanie also notes conflict with her other brother for not paying her back a loan from several years ago. Notes being bothered most by her brother not being open to talking to her about it and him avoiding conversations about it.     She reflects on how she is approaching these conflicts, what role she plays, how she tries coping with these disappointments.     States she has observed herself going into her fantasy world more, to dissociate more from conflicts with her family. Notes this \"shut down\" for her helps her gain a sense control of interpersonal events. Discussed how re-framing helplessness as a cue that she might be trying to control the uncontrollable with others and in conflicts. Reviewed a pattern of how greater efforts to control things result in feeling more anxious.     Provided interpersonal feedback and discussed methods of " exercising greater cognitive flexibility, interpreting emotions and thoughts more adaptively.     Progress on Treatment Objective(s) / Homework:  Satisfactory progress - ACTION (Actively working towards change); Intervened by reinforcing change plan / affirming steps taken       Psychodynamic psychotherapy  Discuss patient's emotional dynamics and issues and how they impact behaviors.  Explore patient's history of relationships and how they impact present behaviors.  Explore how to work with and make changes in these schemas and patterns.    Interpersonal Psychotherapy  Explore patterns in relationships that are effective or ineffective at helping patient reach their goals, find satisfying experience.  Discuss new patterns or behaviors to engage in for improved social functioning.      Care Plan review completed: No    Medication Review:  No changes to current psychiatric medication(s)    Medication Compliance:  NA    Changes in Health Issues:   None reported    Chemical Use Review:   Substance Use: Chemical use reviewed, no active concerns identified      Tobacco Use: No current tobacco use.      Assessment: Current Emotional / Mental Status (status of significant symptoms):  Risk status (Self / Other harm or suicidal ideation)  Patient has had a history of suicidal ideation: passive thoughts only; easily controlled  - denied any recently    Patient denies current fears or concerns for personal safety.  Patient denies current or recent suicidal ideation or behaviors.  Patient denies current or recent homicidal ideation or behaviors.  Patient denies current or recent self injurious behavior or ideation.  Patient denies other safety concerns.     A safety and risk management plan has not been developed at this time, however patient was encouraged to call [place] [phone] should there be a change in any of these risk factors.      [place] Suicide Severity Rating Scale (Short Version)      2/18/2021     4:47 PM    [place] Suicide Severity Rating (Short Version)   Over the past 2 weeks have you felt down, depressed, or hopeless? yes   Over the past 2 weeks have you had thoughts of killing yourself? no   Have you ever attempted to kill yourself? no     [place] Suicide Severity Rating Scale (Lifetime/Recent)      8/13/2021     1:25 PM   [place] Suicide Severity Rating (Lifetime/Recent)   Q1 Wish to be Dead (Lifetime) No   Q2 Non-Specific Active Suicidal Thoughts (Lifetime) No   Most Severe Ideation Rating (Lifetime) NA   Frequency (Lifetime) NA   Duration (Lifetime) NA   Controllability (Lifetime) NA   Protective Factors  (Lifetime) NA   Reasons for Ideation (Lifetime) NA   RETIRED: 1. Wish to be Dead (Recent) No   RETIRED: 2. Non-Specific Active Suicidal Thoughts (Recent) No   3. Active Suicidal Ideation with any Methods (Not Plan) Without Intent to Act (Lifetime) No   RETIRED: 3. Active Suicidal Ideation with any Methods (Not Plan) Without Intent to Act (Recent) No   RETIRE: 4. Active Suicidal Ideation with Some Intent to Act, Without Specific Plan (Lifetime) No   4. Active Suicidal Ideation with Some Intent to Act, Without Specific Plan (Recent) No   RETIRE: 5. Active Suicidal Ideation with Specific Plan and Intent (Lifetime) No   RETIRED: 5. Active Suicidal Ideation with Specific Plan and Intent (Recent) No   Most Severe Ideation Rating (Past Month) NA   Frequency (Past Month) NA   Duration (Past Month) NA   Controllability (Past Month) NA   Protective Factors (Past Month) NA   Reasons for Ideation (Past Month) NA   Actual Attempt (Lifetime) No   Actual Attempt (Past 3 Months) No   Has subject engaged in non-suicidal self-injurious behavior? (Lifetime) No   Has subject engaged in non-suicidal self-injurious behavior? (Past 3 Months) No   Interrupted Attempts (Lifetime) No   Interrupted Attempts (Past 3 Months) No   Aborted or Self-Interrupted Attempt (Lifetime) No   Aborted or Self-Interrupted Attempt (Past 3 Months) No    Preparatory Acts or Behavior (Lifetime) No   Preparatory Acts or Behavior (Past 3 Months) No   Most Recent Attempt Actual Lethality Code NA   Most Lethal Attempt Actual Lethality Code NA   Initial/First Attempt Actual Lethality Code NA         Appearance:   Appropriate   Eye Contact:   Good   Psychomotor Behavior: Normal   Attitude:   Cooperative   Orientation:   All  Speech   Rate / Production: Normal    Volume:  Normal   Mood:    Depressed    Affect:    Tearful  Thought Content:  Rumination   Thought Form:  Coherent  Logical   Insight:    Good     Diagnoses:  1. Major depressive disorder, recurrent episode, moderate (H)          Collateral Reports Completed:  [facility] will coordinate with care team as needed    Plan: (Homework, other):  Lanie was scheduled to RTC in three weeks for help addressing depressive symptoms, pain, and relationship conflicts. Continue ACT skills provided.  CD Recommendations: No indications of CD issues.     Héctor Higuera Psy.D, LP   Behavioral Health Clinician   [facility]     _______________________________________________________________________                                              Individual Treatment Plan    Patient's Name: Lanie Neil  YOB: 1990    Date of Creation: 3/4/2022  Date Treatment Plan Last Reviewed/Revised: 10/18/2023    DSM5 Diagnoses: 296.32 (F33.1) Major Depressive Disorder, Recurrent Episode, Moderate With anxious distress  Psychosocial / Contextual Factors: Chronic pain, SMA, disabled  PROMIS (reviewed every 90 days):     PROMIS-10    In general, would you say your health is: (P) Excellent    In general, would you say your quality of life is: (P) Good    In general, how would you rate your physical health? (P) Very good    In general, how would you rate your mental health, including your mood and your ability to think? (P) Good    In general, how would you rate your satisfaction with your social activities  and relationships? (P) Good    In general, please rate how well you carry out your usual social activities and roles. (This includes activities at home, at work and in your community, and responsibilities as a parent, child, spouse, employee, friend, etc.) (P) Very good    To what extent are you able to carry out your everyday physical activities such as walking, climbing stairs, carrying groceries, or moving a chair? (P) Not at all    In the past 7 days,  How often have you been bothered by emotional problems such as feeling anxious, depressed or irritable? (P) Rarely  How would you rate your fatigue on average? (P) Moderate  How would you rate your pain on average? (P) 6    PROMIS GLOBAL SCORES    Mental health question re-calculation - no clinical value - (P) 4  Physical health question re-calculation - no clinical value - (P) 3  Pain question re-calculation - no clinical value - (P) 3  Global Mental Health Score - (P) 13  Global Physical Health Score - (P) 11  PROMIS TOTAL - SUBSCORES - (P) 24      [id number] PROMIS HEALTH ORGANIZATION AND PROMIS COOPERATIVE GROUP VERSION 1.1         3/4/2022  1:59 PM 3/15/2022  2:10 PM 3/30/2023  10:56 AM 7/6/2023  9:45 AM 10/18/2023  7:09 AM   PROMIS-10 Scores Only        Global Mental Health Score 13  8  7  12  11    Global Physical Health Score 11  9  9  11  8    PROMIS TOTAL - SUBSCORES 24  17  16  23  19          Referral / Collaboration:  Referral to another professional/service is not indicated at this time..    Anticipated number of session for this episode of care: 7-9  Anticipation frequency of session: Every other week  Anticipated Duration of each session: 38-52 minutes  Treatment plan will be reviewed in 90 days or when goals have been changed.       MeasurableTreatment Goal(s) related to diagnosis / functional impairment(s)  Goal 1: Patient will experience a reduction in depressive symptoms along with a corresponding increase in positive emotion and life  "satisfaction.      Objective #A (Patient Action)    Patient will Increase interest, engagement, and pleasure in doing things.  Status: Continued - Date(s): 10/18/2023    Intervention(s)  Therapist will help patient identify pleasant and mastery oriented events that elicit positive, relaxed mood.    Objective #B  Patient will Decrease frequency and intensity of feeling down, depressed, hopeless.  Status: Continued - Date(s): 10/18/2023    Intervention(s)  Therapist will introduce patient to cognitive-behavioral and acceptance and commitment therapy topics aimed to help reduce depression and anxiety    Objective #C  Patient will Identify negative self-talk and behaviors: challenge core beliefs, myths, and actions  Improve concentration, focus, and mindfulness in daily activities .  Status: Continued - Date(s): 10/18/2023    Intervention(s)  Therapist will help patient identify and manage negative self-talk and automatic thoughts; introduce patient to cognitive distortions; help patient develop cognitive diffusion techniques      Goal 2: Patient will experience a reduction in anxious symptoms, along with a corresponding increase in relaxed emotional states and life satisfaction.      Objective #A (Patient Action)  Patient will use cognitive-behavioral and thought diffusion strategies identified in therapy to challenge anxious thoughts.    Status: Continued - Date(s): 10/18/2023    Intervention(s)  Therapist will utilize CBT and ACT ideas to help patient challenge anxious thoughts and reduce intensity/duration of anxious distress    Objective #B  Patient will use \"worry time\" each day for 15 minutes of scheduled worry and then defer obsessive or anxious thinking until the next structured worry time.    Status: Continued - Date(s): 10/18/2023    Intervention(s)  Therapist will teach patient how to effectively utilize worry time and/or thought logs/journals each day and incorporate more relaxation behaviors into their " routine.    Objective #C  Patient will identify the stressors which contribute to feelings of anxiety  Patient will increase engagement in adaptive coping skills and recreational activities, such as exercise and healthy socialization, to manage distress.  Status: Continued - Date(s): 10/18/2023    Intervention(s)  Therapist will help patient identify triggers/situational factors that contribute to anxiety and behavioral skills aimed to manage anxious distress.      Goal 3: Patient will learn more adaptive ways to manage chronic pain      Objective #A (Patient Action)    Status: Continued - Date(s): 10/18/2023    Patient will identify 2-4 strategies for managing pain.    Intervention(s)  Therapist will teach distraction skills aimed to help manage chronic pain and utilize ACT/CBT ideas to help with this (e.g. relaxation) .    Objective #B  Patient will state understanding of stressors and relationship to physical symptoms.    Status: Continued - Date(s): 10/18/2023      Other Possible Therapeutic Intervention(s):    Psycho-education regarding mental health diagnoses and treatment options    Supportive Therapy  Provide affirmations, reflections, and establish working rapport  Emphasize and reflect on strength of therapeutic relationship    Skills training  Explore skills useful to client in current situation.  Skills include assertiveness, communication, conflict management, problem-solving, relaxation, etc.    Solution-Focused Therapy  Explore patterns in patient's relationships and discuss options for new behaviors.  Explore patterns in patient's actions and choices and discuss options for new behaviors.    Cognitive-behavioral Therapy  Discuss common cognitive distortions, identify them in patient's life.  Explore ways to challenge, replace, and act against these cognitions.    Acceptance and Commitment Therapy  Explore and identify important values in patient's life.  Discuss ways to commit to behavioral  activation around these values.    Psychodynamic psychotherapy  Discuss patient's emotional dynamics and issues and how they impact behaviors.  Explore patient's history of relationships and how they impact present behaviors.  Explore how to work with and make changes in these schemas and patterns.    Narrative Therapy  Explore the patient's story of his/her life from his/her perspective.  Explore alternate ways of understanding their experience, identifying exceptions, developing new themes.    Interpersonal Psychotherapy  Explore patterns in relationships that are effective or ineffective at helping patient reach their goals, find satisfying experience.  Discuss new patterns or behaviors to engage in for improved social functioning.    Behavioral Activation  Discuss steps patient can take to become more involved in meaningful activity.  Identify barriers to these activities and explore possible solutions.    Mindfulness-Based Strategies  Discuss skills based on development and application of mindfulness.  Skills drawn from compassion-focused therapy, dialectical behavior therapy, mindfulness-based stress reduction, mindfulness-based cognitive therapy, etc.      Patient has reviewed and agreed to the above plan.    Héctor Higuera Psy.D, LP   Behavioral Health Clinician   [facility]     7/12/2023    Crisis Resources    Refer to the resources below as needed.    Steps to care for yourself    If you are currently in counseling, call your counselor for an appointment  Call the local crisis resources below if needed.  Contact friends or family for support.  Get more exercise.  Do activities you enjoy.  Eat a well-balanced diet and drink plenty of fluids.  Rest as needed.  Limit alcohol and recreational drugs. These can worsen depression.    When to contact your primary care provider     You have thoughts of harming or killing yourself but have not made a plan to carry it out.  Your  depression gets in the way of daily activities.  You are often unable to sleep.  You need help cutting back on alcohol or recreational drugs.    When to call 911 or go to the Emergency Room     Get emergency help right away if you have any of the following:  You are planning to harm or kill yourself and you have a way to carry out the plan.   You have injured yourself or others. Or, you think you will.  You feel confused or are having trouble thinking or remembering.  You are having delusions (false beliefs).  You are hearing voices or seeing things that aren t there.  You are feeling psychotic (paranoid, fearful, restless, agitated, nervous, racing thoughts or speech)    Crisis Resources     These hotlines are for both adults and children. The and are open 24 hours a day, 7 days a week unless noted otherwise.    988 - National Suicide and Crisis Lifeline  If you or someone you know needs support now, call or text 778 or chat web2media.skline.org. 988 connects you with a trained crisis counselor who can help.    National Suicide Prevention Lifeline   9-598-594-TWGY (3570)    Crisis Text Line    www.crisistextline.org  Text HOME to 987020 from anywhere in the [place], anytime, about any type of crisis. A live, trained crisis counselor will receive the text and respond quickly.    [place] Lifeline for LGBTQ Youth  A national crisis intervention and suicide lifeline for LGBTQ youth under 25. Provides a safe place to talk without judgement.   Call 1-489.619.8146; text START to 781670 or visit www.thetrevorproject.org to talk to a trained counselor.  For [facility] crisis numbers, visit the [facility] website at:  https://[place].gov/dhs/people-we-serve/adults/health-care/mental-health/resources/crisis-contacts.jsp      Héctor Higuera Psy.D, LP   Behavioral Health Clinician   -[facility]     6/3/2022

## 2023-11-08 ENCOUNTER — VIRTUAL VISIT (OUTPATIENT)
Dept: VASCULAR SURGERY | Facility: CLINIC | Age: 33
End: 2023-11-08
Payer: COMMERCIAL

## 2023-11-08 VITALS — BODY MASS INDEX: 32.36 KG/M2 | HEIGHT: 57 IN | WEIGHT: 150 LBS

## 2023-11-08 DIAGNOSIS — I82.409 DVT (DEEP VENOUS THROMBOSIS) (H): Primary | ICD-10-CM

## 2023-11-08 PROCEDURE — 99204 OFFICE O/P NEW MOD 45 MIN: CPT | Mod: VID | Performed by: RADIOLOGY

## 2023-11-08 ASSESSMENT — PAIN SCALES - GENERAL: PAINLEVEL: SEVERE PAIN (6)

## 2023-11-08 NOTE — PROGRESS NOTES
Ms. Neil is a very pleasant 33-year-old female who is referred for discussion of risk-benefit of removal of a chronic  inferior vena cava filter which was placed approximately 11 years ago at outside institution.    She is accompanied by her mother.  She suffers from spinal muscular atrophy and is quadriplegic.  She is taking prophylactic dose Xarelto for the past decade, without any bleeding complications or recurrent deep venous thrombosis or pulmonary embolus.    She currently denies any symptoms related to her chronic indwelling filter, no pain or discomfort in her abdomen or lower back.    Physical exam: Normal speech.  Normocephalic.  Remainder the physical dam is deferred due to video virtual visit.    Imaging:    I reviewed the CT abdomen pelvis with contrast From 10/23/2023 which demonstrates:    1. Thickening of the rectum and sigmoid colon without surrounding fat  stranding, which likely represents chronic inflammatory changes.  2. Infrarenal IVC filter in place with caval penetration of all 6  struts. Chart review demonstrates that the filter is an [employer]  Option IVC filter placed 1/21/2012. Patient is currently  anticoagulated. Consider referral to Interventional Radiology for IVC  filter removal.   3. Chronic/ancillary findings as detailed in the body of the report.    Assessment/plan:    33-year-old female with spinal muscular atrophy/quadriplegia, on lifelong indefinite prophylactic anticoagulation due to high risk for recurrent thrombosis.  She has a option IVC filter that was placed 1/21/2012.  The filter is slightly tilted, the hook appears to be embedded in the wall of the IVC, and there is significant strep penetration.  She is asymptomatic.  No recurrent DVT events since the filter was placed.  She is tolerating prophylactic anticoagulation without any bleeding.    We discussed the risks and benefits of attempting a complex IVC for filter removal.  I explained that the procedure will be  done under general anesthesia and we would require likely forcep removal, possibly with assistance with the laser sheath.    I discussed that given that she is asymptomatic, the recommendation from our Society guidelines, includes to attempt removal given our experience with complex filter removal at our institution.  However, there is a not insignificant risk of IVC tear and injury which may necessitate placement of a stent graft or possibly even surgical repair (which would have a high chance of morbidity and mortality).    We also discussed that the filter might break apart and portions of the filter might not be able to be removed or may embolize to the lung.    We also discussed that manipulation of the IVC in the air of the filter may cause clot to form both in the IVC as well as potentially in the kidney veins which are adjacent.    After our thorough discussion, it does seem that the patient and her mother are interested in an attempt at retrieval.  I did reassure the patient that during the procedure if it feels like the filter would not safely come out despite advanced techniques, that we would not be overly aggressive given that she is asymptomatic.  There is a chance, if we cannot remove the filter that this will have to be left in permanently, and there is slight risk with that, however she does not done well for the past decade.    We will arrange IVC filter removal with general anesthesia, but we also have to coordinate the availability of the laser sheath, as well as appropriately sized aortic stent graft for potential IVC injury repair.  I would like her to be evaluated in the preanesthesia clinic with our anesthesiology colleagues for risk factor assessment and optimization for general anesthesia..

## 2023-11-08 NOTE — LETTER
11/8/2023       RE: Lanie Neil  [address]  [place] [address]     Dear Colleague,    Thank you for referring your patient, Lanie Neil, to the [facility] VASCULAR CLINIC [place] at [facility]. Please see a copy of my visit note below.    Ms. Neil is a very pleasant 33-year-old female who is referred for discussion of risk-benefit of removal of a chronic  inferior vena cava filter which was placed approximately 11 years ago at outside institution.    She is accompanied by her mother.  She suffers from spinal muscular atrophy and is quadriplegic.  She is taking prophylactic dose Xarelto for the past decade, without any bleeding complications or recurrent deep venous thrombosis or pulmonary embolus.    She currently denies any symptoms related to her chronic indwelling filter, no pain or discomfort in her abdomen or lower back.    Physical exam: Normal speech.  Normocephalic.  Remainder the physical dam is deferred due to video virtual visit.    Imaging:    I reviewed the CT abdomen pelvis with contrast From 10/23/2023 which demonstrates:    1. Thickening of the rectum and sigmoid colon without surrounding fat  stranding, which likely represents chronic inflammatory changes.  2. Infrarenal IVC filter in place with caval penetration of all 6  struts. Chart review demonstrates that the filter is an [employer]  Option IVC filter placed 1/21/2012. Patient is currently  anticoagulated. Consider referral to Interventional Radiology for IVC  filter removal.   3. Chronic/ancillary findings as detailed in the body of the report.    Assessment/plan:    33-year-old female with spinal muscular atrophy/quadriplegia, on lifelong indefinite prophylactic anticoagulation due to high risk for recurrent thrombosis.  She has a option IVC filter that was placed 1/21/2012.  The filter is slightly tilted, the hook appears to be embedded in the wall of the IVC, and there  is significant strep penetration.  She is asymptomatic.  No recurrent DVT events since the filter was placed.  She is tolerating prophylactic anticoagulation without any bleeding.    We discussed the risks and benefits of attempting a complex IVC for filter removal.  I explained that the procedure will be done under general anesthesia and we would require likely forcep removal, possibly with assistance with the laser sheath.    I discussed that given that she is asymptomatic, the recommendation from our Society guidelines, includes to attempt removal given our experience with complex filter removal at our institution.  However, there is a not insignificant risk of IVC tear and injury which may necessitate placement of a stent graft or possibly even surgical repair (which would have a high chance of morbidity and mortality).    We also discussed that the filter might break apart and portions of the filter might not be able to be removed or may embolize to the lung.    We also discussed that manipulation of the IVC in the air of the filter may cause clot to form both in the IVC as well as potentially in the kidney veins which are adjacent.    After our thorough discussion, it does seem that the patient and her mother are interested in an attempt at retrieval.  I did reassure the patient that during the procedure if it feels like the filter would not safely come out despite advanced techniques, that we would not be overly aggressive given that she is asymptomatic.  There is a chance, if we cannot remove the filter that this will have to be left in permanently, and there is slight risk with that, however she does not done well for the past decade.    We will arrange IVC filter removal with general anesthesia, but we also have to coordinate the availability of the laser sheath, as well as appropriately sized aortic stent graft for potential IVC injury repair.  I would like her to be evaluated in the preanesthesia clinic  with our anesthesiology colleagues for risk factor assessment and optimization for general anesthesia..        Again, thank you for allowing me to participate in the care of your patient.      Sincerely,    Andrea Espinal MD

## 2023-11-08 NOTE — NURSING NOTE
Patient confirms medications and allergies are accurate via patients echeck in completion, and or denies any changes since last reviewed/verified.   States no change from last visit    Talisha Deutsch, Virtual Facilitator  Is the patient currently in the state of [place]? YES    Visit mode:VIDEO    If the visit is dropped, the patient can be reconnected by: VIDEO VISIT: Text to cell phone:   Telephone Information:   Mobile [phone]       Will anyone else be joining the visit? NO  (If patient encounters technical issues they should call [phone]    How would you like to obtain your AVS? MyChart    Are changes needed to the allergy or medication list? No    Reason for visit: Consult    Talisha Deutsch VVF

## 2023-11-08 NOTE — PATIENT INSTRUCTIONS
You were seen today in the Vascular IR Clinic by Dr Andrea Espinal for consult regarding IVC filter removal.    Plan:    - We will move forward with attempting filter removal. Our scheduling team will contact you regarding coordinating.     - Once schedule, you will need to schedule a visit with our pre-anesthesia clinic (PAC). This should be in person and done within 30 days prior to your procedure.     Please call or send a MyChart with any questions or concerns.    Irene MONTANO LPN/ Julia Scales, RNCC  [phone]

## 2023-11-09 NOTE — TELEPHONE ENCOUNTER
Diagnosis, Referred by & from: Perineal Mass; quadriplegic   Appt date: 1/29/2024   NOTES STATUS DETAILS   OFFICE NOTE from referring provider Internal [facility]:  11/24/23, 9/13/23 - PCC OV with Dr. Moseley   OFFICE NOTE from other specialist [facility]:  9/5/19 - GI OV with Dr. Chambers   DISCHARGE SUMMARY from hospital N/A    DISCHARGE REPORT from the ER [facility]:  3/28/22 - ED OV with Dr. Carey   OPERATIVE REPORT N/A    MEDICATION LIST Internal    LABS     BIOPSIES/PATHOLOGY RELATED TO DIAGNOSIS [facility]:  9/5/19 - Colon Biopsy (Case: [id number])   DIAGNOSTIC PROCEDURES     COLONOSCOPY Care [facility]:  9/11/19 - Colonoscopy  9/5/19 - Colonoscopy   UPPER ENDOSCOPY (EGD) Care [facility]:  9/5/19 - EGD   IMAGING (DISC & REPORT)      CT Received / Internal [facility]:  10/23/23 - CT Abd/Pelvis    [facility]:  3/28/22 - CT Abd/Pelvis  7/17/21 - CT Abd/Pelvis  5/10/19 - CT Abd/Pelvis  5/6/19 - CT Abd/Pelvis   XRAY Received [facility]:  5/8/19 - XR Abdomen     Records Requested  11/09/23    Facility  [facility]  Fax: [phone]   Outcome * 11/9/23 3:22 PM Faxed req to [facility] for images to be pushed to [facility] PACs. - Yamile    * 11/30/23 7:27 AM Images received from [facility] and attached to the patient in PACs. - Yamile

## 2023-11-16 ENCOUNTER — OFFICE VISIT (OUTPATIENT)
Dept: PULMONOLOGY | Facility: CLINIC | Age: 33
End: 2023-11-16
Payer: COMMERCIAL

## 2023-11-16 ENCOUNTER — TRANSFERRED RECORDS (OUTPATIENT)
Dept: HEALTH INFORMATION MANAGEMENT | Facility: CLINIC | Age: 33
End: 2023-11-16

## 2023-11-16 VITALS
SYSTOLIC BLOOD PRESSURE: 108 MMHG | HEIGHT: 57 IN | WEIGHT: 150 LBS | OXYGEN SATURATION: 99 % | RESPIRATION RATE: 18 BRPM | BODY MASS INDEX: 32.36 KG/M2 | DIASTOLIC BLOOD PRESSURE: 67 MMHG | HEART RATE: 64 BPM

## 2023-11-16 DIAGNOSIS — J96.12 CHRONIC RESPIRATORY FAILURE WITH HYPERCAPNIA (H): Primary | ICD-10-CM

## 2023-11-16 PROCEDURE — 99213 OFFICE O/P EST LOW 20 MIN: CPT

## 2023-11-16 ASSESSMENT — PATIENT HEALTH QUESTIONNAIRE - PHQ9: SUM OF ALL RESPONSES TO PHQ QUESTIONS 1-9: 0

## 2023-11-16 ASSESSMENT — PAIN SCALES - GENERAL: PAINLEVEL: SEVERE PAIN (7)

## 2023-11-16 NOTE — LETTER
11/16/2023       RE: Lanie Neil  [address]  [place] [address]     Dear Colleague,    Thank you for referring your patient, Lanie Neil, to the [facility] NEUROLOGY CLINIC [place] at [facility]. Please see a copy of my visit note below.    Pulmonary outpatient visit    November 16, 2023    S: Lanie Neil is being seen today for followup of respiratory failure in the setting of SMA. I had last seen her in August 2015.    She has IVC filter that was placed in 2012, she is considering having this removed. She saw Dr. Espinal on 11/8 and is going to be scheduled for a removal attempt.    She continues on vent to trach 24/7. [employer] is her [employer] company. She has LTV, knows she will need to get new equipment. Recently tried Trilogy, but she didn't like this. RT from [facility] is going to bring another new vent for her to try in the next few days. She has a cuffed 6 Bivona trach in place, but she doesn't have any water in the cuff (24/7). Talking is very important to her, and she gets a bit anxious thinking about even partial cuff inflation. She still needs her trach changed about every week due to secretions. She doesn't think her secretions are particularly dry.    No difficulties with her breathing at the current time.    Current Outpatient Medications   Medication Sig Dispense Refill    ACETAMINOPHEN PO Take 650 mg by mouth every 4 hours as needed for pain      albuterol (PROAIR HFA) 108 (90 Base) MCG/ACT inhaler Inhale 2 puffs into the lungs every 4 hours as needed  18 g 3    albuterol (PROVENTIL) (2.5 MG/3ML) 0.083% neb solution USE ONE VIAL BY NEBULIZATION EVERY 6 HOURS AS NEEDED 360 mL 1    ALPRAZolam (XANAX) 0.25 MG tablet Take 1 tablet (0.25 mg) by mouth 3 times daily as needed 60 tablet 1    bisacodyl (DULCOLAX) 10 MG suppository Place 10 mg rectally daily as needed      celecoxib (CELEBREX) 200 MG capsule Take 1 capsule (200 mg) by mouth  daily  12 capsule 5    diazepam (VALIUM) 5 MG tablet Take 1 tablet (5 mg) by mouth every 6 hours as needed 30 tablet 1    famotidine (PEPCID) 40 MG/5ML suspension Take 2.5 mLs (20 mg) by mouth 2 times daily 150 mL 11    fluticasone (FLOVENT HFA) 110 MCG/ACT inhaler Inhale 1 puff into the lungs 2 times daily 36 g 5    gabapentin (NEURONTIN) 250 MG/5ML solution 20 mLs (1,000 mg) by Per Feeding Tube route 3 times daily 1800 mL 5    guaiFENesin (ORGANIDIN) 200 MG TABS tablet Take 200 mg by mouth every 4 hours as needed for cough      HYDROmorphone (DILAUDID) 4 MG tablet       hydrOXYzine (ATARAX) 25 MG tablet Take 25 mg by mouth every 6 hours as needed for itching      LANsoprazole (PREVACID) 30 MG DR capsule Take 30 mg by mouth      Magnesium Hydroxide (MILK OF MAGNESIA PO) Take 30 mLs by mouth as needed      mometasone (NASONEX) 50 MCG/ACT nasal spray       morphine (SABINE) 30 MG 24 hr capsule       omeprazole (PRILOSEC) 2 mg/mL suspension SHAKE LIQUID WELL AND GIVE 20ML (40MG) DAILY (VIA G-TUBE) 600 mL 9    ondansetron (ZOFRAN-ODT) 4 MG ODT tab Take 1 tablet (4 mg) by mouth every 8 hours as needed 30 tablet 1    polyethylene glycol (MIRALAX) 17 GM/Dose powder Take 17 g by mouth daily 714 g 0    potassium chloride (KAYCIEL) 20 MEQ/15ML (10%) solution 22.5 mLs (30 mEq) by Per G Tube route daily 675 mL 11    prochlorperazine (COMPAZINE) 5 MG tablet Take 5 mg by mouth every 6 hours as needed       rivaroxaban ANTICOAGULANT (XARELTO ANTICOAGULANT) 10 MG TABS tablet 1 tablet (10 mg) by Oral or Feeding Tube route daily  90 tablet 3    Sennosides (SENNA) 8.8 MG/5ML LIQD 15 mLs by Feeding route 2 times daily 900 mL 11    sertraline (ZOLOFT) 20 MG/ML (HIGH CONC) solution TAKE 2.5 ML(50 MG) BY MOUTH DAILY 225 mL 1    traZODone (DESYREL) 5 mg/ml SUSP Take 100 mg per feeding tube at bedtime prn insomnia 600 mL 1       Social Hx:  Nonsmoker, no exposure to secondhand smoke    Family Hx:   Family History   Problem Relation Age of  "Onset    Family History Negative Other        O: Alert, appears comfortable without resp acc muscle use  VS: /67 (BP Location: Left arm, Patient Position: Sitting)   Pulse 64   Resp 18   Ht 1.448 m (4' 9\")   Wt 68 kg (150 lb)   SpO2 99%   BMI 32.46 kg/m    HEENT: NC/AT, no icterus  Neck: No ÁGNELA.  Midline trach - 6 Bivona - site C/D/I  Lungs: CTA clear anteriorly  Cor: RRR S1S2, no murmurs  Extr: Chronic contractures, no edema  Neuro: Alert, speech fluent, no facial droop  LTV settings RR 12 with . Airway pressures in the 20s (6 Bivona has cuff fully deflated)      A/P: Chronic respiratory failure:  The patient has SMA with neuromuscular respiratory failure. She had a tracheostomy placed prior to 2015 and has been on the ventilator due to recurrent aspiration pneumonitis - she has done well since that time.    - She has LTV vent ([facility]). She does well on this but will need to change - she's working with [employer] on this  - I broached the subject of partial cuff deflation with her - might make it easier for her to tolerate the new vents, and it might lead to less tracheal dryness (and less frequent trach changes). She is quite resistant to this idea, but she agreed to at least think about it.     I'm happy to work with [employer] on her equipment needs. She'll return here in one year, sooner PRN          Again, thank you for allowing me to participate in the care of your patient.      Sincerely,    Anne Soni MD    "

## 2023-11-17 ENCOUNTER — TELEPHONE (OUTPATIENT)
Dept: INTERVENTIONAL RADIOLOGY/VASCULAR | Facility: CLINIC | Age: 33
End: 2023-11-17
Payer: MEDICARE

## 2023-11-17 NOTE — TELEPHONE ENCOUNTER
[facility] Call Center    Phone Message    May a detailed message be left on voicemail: yes     Reason for Call: Appointment Intake    Referring Provider Name: Andrea Espinal MD in [facility] VASCULAR SURGERY    Diagnosis and/or Symptoms: time-sensitive  DVT (deep venous thrombosis) (H) [I82.409]  IVC filter removal with [employer]. GA. Will need laser sheath and GORE rep. 2 hr.    Action Taken: Message routed to:  [facility] ([facility] IR    Travel Screening: Not Applicable

## 2023-11-21 NOTE — TELEPHONE ENCOUNTER
Called and spoke with Pt mother, Aster. Noted we are working with limited availability for GA. We are working on finding opens. She will be contacted once we have availability. She verbalized understanding, agreed to current plan and denied any further questions.    Irene Trejo LPN

## 2023-11-24 ENCOUNTER — VIRTUAL VISIT (OUTPATIENT)
Dept: FAMILY MEDICINE | Facility: CLINIC | Age: 33
End: 2023-11-24
Payer: COMMERCIAL

## 2023-11-24 DIAGNOSIS — G89.4 CHRONIC PAIN SYNDROME: ICD-10-CM

## 2023-11-24 DIAGNOSIS — G12.9 SPINAL MUSCLE ATROPHY (H): Primary | ICD-10-CM

## 2023-11-24 DIAGNOSIS — N90.89 PERINEAL MASS IN FEMALE: ICD-10-CM

## 2023-11-24 PROCEDURE — 99213 OFFICE O/P EST LOW 20 MIN: CPT | Mod: VID | Performed by: FAMILY MEDICINE

## 2023-11-24 NOTE — COMMUNITY RESOURCES LIST (ENGLISH)
11/24/2023   [facility]  N/A  For questions about this resource list or additional care needs, please contact your primary care clinic or care manager.  Phone: [phone]   Email: N/A   Address: [address] [place] [address]   Hours: N/A        Transportation       Free or low-cost transportation  1  [facility] Distance: 22.03 miles      In-Person   [address] [place] [address]  Language: English  Hours: Mon - Fri 9:00 AM - 12:00 PM , Mon - Fri 1:00 PM - 3:00 PM  Fees: Self Pay   Phone: (252) 236-5289 Email: info@[facility].[employer] Website: [email or web address]://[email or web address].org/[place]     2  [facility] Distance: 22.34 miles      In-Person   [address] [place] [address]  Language: English  Hours: Mon - Fri 8:00 AM - 3:00 PM  Fees: Free   Phone: (939) 543-9935 Ext.14 Email: neighborhood@[facility] Website: http://www.[facility].org     Transportation to medical appointments  3  [facility] - SmartLink Transit - Non Emergency Medical Assistance - SmartLink - Non Emergency Medical Transportation Distance: 6.94 miles      In-Person   [address] [place] [address]  Language: English  Hours: Mon - Fri 6:00 AM - 7:00 PM  Fees: Free   Phone: (274) 962-6624 Email: transit@[facility]. Website: https://www.[facility].gov/1077/Transit     4  [employer] Distance: 8.26 miles      In-Person   [address] [place] [address]  Language: English  Hours: Mon - Fri 7:00 AM - 5:00 PM  Fees: Insurance, Self Pay   Phone: (675) 558-7875 Website: http://www.[place]/          Important Numbers & Websites       Emergency Services   911  City Services   311  Poison Control   (420) 944-6353  Suicide Prevention Lifeline   (170) 871-6644 (TALK)  Child Abuse Hotline   (308) 841-8559 (4-A-Child)  Sexual Assault Hotline   (996) 332-7903 (HOPE)  National Runaway Safeline   (159) 250-1742  (RUNAWAY)  All-Options Talkline   (790) 405-6301  Substance Abuse Referral   (739) 360-1387 (HELP)

## 2023-11-24 NOTE — PROGRESS NOTES
"Virtual Visit Details    Type of service:  Video Visit   Video Start Time:  2:05  Video End Time: 2:25    Originating Location (pt. Location): Home    Distant Location (provider location):  On-site  Platform used for Video Visit: Boris Dela Cruz is a 33 year old who is being evaluated via a billable video visit.      How would you like to obtain your AVS? MyChart  If the video visit is dropped, the invitation should be resent by: in epic  Will anyone else be joining your video visit? No          Assessment & Plan     Spinal muscle atrophy (H)  Cont med cannabis, helps, tolerated    Perineal mass in female  Keep Gyn/CRS plans    Chronic pain syndrome  Cont w/ [place] too      24 minutes spent by me on the date of the encounter doing chart review, history and exam, documentation and further activities per the note    BMI:   Estimated body mass index is 32.46 kg/m  as calculated from the following:    Height as of 11/16/23: 1.448 m (4' 9\").    Weight as of 11/16/23: 68 kg (150 lb).       No follow-ups on file.    Jimmy Moseley MD  [facility] PRIMARY CARE CLINIC [place]    Julia Dela Cruz is a 33 year old, presenting for the following health issues:  RECHECK      HPI   Here in follow-up  1-working w/ Vasc for filter removal, discussed  2-sees Gyn and CRS this winter re: abnl pelvic ct findings, discussed  3-when at Gyn clinic I suggest she ask for flu and covid shots  4-medical cannabis used for pain from SMA on MSK, reduces from 9 to 5/10, tolerated  Updated cert in visit  Past Medical History:   Diagnosis Date    Anxiety     Arthritis Unknown    Depressive disorder     I don't have it right now currently    History of blood transfusion     Snoring     Spinal muscle atrophy (H)      Past Surgical History:   Procedure Laterality Date    APPENDECTOMY      BACK SURGERY  '04    BIOPSY  1990    ENT SURGERY      tubes    GASTROSTOMY TUBE      GI SURGERY  '04    IR PORT CHECK RIGHT  10/23/2023    KIDNEY " SURGERY      SPINE SURGERY      TRACHEOSTOMY       Current Outpatient Medications   Medication    ACETAMINOPHEN PO    albuterol (PROAIR HFA) 108 (90 Base) MCG/ACT inhaler    albuterol (PROVENTIL) (2.5 MG/3ML) 0.083% neb solution    ALPRAZolam (XANAX) 0.25 MG tablet    ALPRAZolam (XANAX) 0.25 MG tablet    aminocaproic acid (AMICAR) 0.25 GM/ML solution    azithromycin (ZITHROMAX) 200 MG/5ML suspension    bisacodyl (DULCOLAX) 10 MG suppository    celecoxib (CELEBREX) 200 MG capsule    clindamycin (CLEOCIN T) 1 % lotion    diazepam (VALIUM) 5 MG tablet    doxycycline hyclate (VIBRAMYCIN) 100 MG capsule    famotidine (PEPCID) 40 MG/5ML suspension    fluocinolone acetonide (DERMA SMOOTHE/FS BODY) 0.01 % external oil    fluticasone (FLOVENT HFA) 110 MCG/ACT inhaler    gabapentin (NEURONTIN) 250 MG/5ML solution    GENERLAC 10 GM/15ML solution    guaiFENesin (ORGANIDIN) 200 MG TABS tablet    HYDROmorphone (DILAUDID) 4 MG tablet    hydrOXYzine (ATARAX) 25 MG tablet    ketoconazole (NIZORAL) 2 % external shampoo    LANsoprazole (PREVACID) 30 MG DR capsule    levofloxacin (LEVAQUIN) 25 MG/ML solution    Magnesium Hydroxide (MILK OF MAGNESIA PO)    metroNIDAZOLE (METROCREAM) 0.75 % external cream    Minoxidil (MINOXIDIL FOR WOMEN) 5 % FOAM    mometasone (NASONEX) 50 MCG/ACT nasal spray    morphine (SABINE) 30 MG 24 hr capsule    mupirocin (BACTROBAN) 2 % external cream    naloxone (NARCAN) 4 MG/0.1ML nasal spray    Nutritional Supplements (NUTREN 1.0/FIBER) LIQD    Nutritional Supplements (REPLETE FIBER) LIQD    omeprazole (PRILOSEC) 2 mg/mL suspension    ondansetron (ZOFRAN-ODT) 4 MG ODT tab    order for DME    order for DME    polyethylene glycol (MIRALAX) 17 GM/Dose powder    potassium chloride (KAYCIEL) 20 MEQ/15ML (10%) solution    prochlorperazine (COMPAZINE) 5 MG tablet    rivaroxaban ANTICOAGULANT (XARELTO ANTICOAGULANT) 10 MG TABS tablet    Salicylic Acid (OXY BALANCE FACIAL CLEAN WASH EX)    Sennosides (SENNA) 8.8  MG/5ML LIQD    sertraline (ZOLOFT) 20 MG/ML (HIGH CONC) solution    silver nitrate (ARZOL) 75-25 % miscellaneous    silver sulfADIAZINE (SSD) 1 % external cream    simethicone (MYLICON) 66.7 mg/ml    simethicone 40 MG/0.6ML LIQD    sodium chloride (OCEAN) 0.65 % nasal spray    traZODone (DESYREL) 5 mg/ml SUSP    traZODone (DESYREL) 5 mg/ml SUSP    tretinoin (RETIN-A) 0.025 % external cream     No current facility-administered medications for this visit.     Allergies   Allergen Reactions    Ibuprofen            Review of Systems         Objective    Vitals - Patient Reported  Weight (Patient Reported): 68 kg (150 lb)  Pain Score: Severe Pain (6)        Physical Exam   GENERAL: Healthy, alert and no distress, on vent   EYES: Eyes grossly normal to inspection.  No discharge or erythema, or obvious scleral/conjunctival abnormalities.  RESP: No audible wheeze, cough, or visible cyanosis.  No visible retractions or increased work of breathing.  On vent.  SKIN: Visible skin clear. No significant rash, abnormal pigmentation or lesions.  NEURO: Cranial nerves grossly intact.  Mentation and speech appropriate for age.  PSYCH: Mentation appears normal, affect normal/bright, judgement and insight intact, normal speech and appearance well-groomed.

## 2023-11-24 NOTE — COMMUNITY RESOURCES LIST (ENGLISH)
11/24/2023   [facility]  N/A  For questions about this resource list or additional care needs, please contact your primary care clinic or care manager.  Phone: [phone]   Email: N/A   Address: [address] [place] [address]   Hours: N/A        Transportation       Free or low-cost transportation  1  [facility] Distance: 22.03 miles      In-Person   [address] [place] [address]  Language: English  Hours: Mon - Fri 9:00 AM - 12:00 PM , Mon - Fri 1:00 PM - 3:00 PM  Fees: Self Pay   Phone: (375) 593-2814 Email: info@[facility].[employer] Website: https://www.[facility].org/[place]     2  [facility] Distance: 22.34 miles      In-Person   [address] [place] [address]  Language: English  Hours: Mon - Fri 8:00 AM - 3:00 PM  Fees: Free   Phone: (810) 912-2768 Ext.14 Email: neighborhood@[facility] Website: http://www.[facility].org     Transportation to medical appointments  3  [facility] - SmartLink Transit - Non Emergency Medical Assistance - SmartLink - Non Emergency Medical Transportation Distance: 6.94 miles      In-Person   [address] [place] [address]  Language: English  Hours: Mon - Fri 6:00 AM - 7:00 PM  Fees: Free   Phone: (861) 561-9198 Email: transit@[facility]. Website: https://www.[facility].gov/1077/Transit     4  [employer] Distance: 8.26 miles      In-Person   [address] [place] [address]  Language: English  Hours: Mon - Fri 7:00 AM - 5:00 PM  Fees: Insurance, Self Pay   Phone: (779) 488-2125 Website: http://www.[place].la/          Important Numbers & Websites       Emergency Services   911  City Services   311  Poison Control   (139) 641-5937  Suicide Prevention Lifeline   (141) 960-4173 (TALK)  Child Abuse Hotline   (544) 661-7975 (4-A-Child)  Sexual Assault Hotline   (134) 693-5906 (HOPE)  National Runaway Safeline   (569) 183-9507  (RUNAWAY)  All-Options Talkline   (312) 291-1337  Substance Abuse Referral   (585) 749-6489 (HELP)

## 2023-11-24 NOTE — NURSING NOTE
Is the patient currently in the state of [place]? YES    Visit mode:VIDEO    If the visit is dropped, the patient can be reconnected by: VIDEO VISIT: Text to cell phone:   Telephone Information:   Mobile [phone]       Will anyone else be joining the visit? No  (If patient encounters technical issues they should call [phone])    How would you like to obtain your AVS? MyChart    Are changes needed to the allergy or medication list? Pt stated no changes to allergies and Pt stated no med changes    Rooming Documentation: Assigned questionnaire(s) completed .    Reason for visit: ARIANNE Garza

## 2023-11-29 ENCOUNTER — VIRTUAL VISIT (OUTPATIENT)
Dept: BEHAVIORAL HEALTH | Facility: CLINIC | Age: 33
End: 2023-11-29
Payer: COMMERCIAL

## 2023-11-29 DIAGNOSIS — F33.1 MAJOR DEPRESSIVE DISORDER, RECURRENT EPISODE, MODERATE (H): Primary | ICD-10-CM

## 2023-11-29 PROCEDURE — 90834 PSYTX W PT 45 MINUTES: CPT | Mod: VID | Performed by: PSYCHOLOGIST

## 2023-11-29 NOTE — PROGRESS NOTES
[facility]: Integrated Behavioral Health  November 29, 2023          Behavioral Health Clinician Progress Note    Patient Name: Lanie Neil           Service Type: Video appointment      Service Location:  Video appointment      Session Start Time: 10:09 Session End Time: 10:58      Session Length: 38 - 52      Attendees: Patient    Visit Activities (Refresh list every visit): [facility] Only    Service Modality:  Video Visit:      Provider verified identity through the following two step process.  Patient provided:  Patient photo and Patient is known previously to provider    Telemedicine Visit: The patient's condition can be safely assessed and treated via synchronous audio and visual telemedicine encounter.      Reason for Telemedicine Visit: Patient unable to travel    Originating Site (Patient Location): Patient's home    Distant Site (Provider Location): [facility]: [facility]    Consent:  The patient/guardian has verbally consented to: the potential risks and benefits of telemedicine (video visit) versus in person care; bill my insurance or make self-payment for services provided; and responsibility for payment of non-covered services.     Patient would like the video invitation sent by:  My Chart    Mode of Communication:  Video Conference [facility]    Distant Location (Provider):  On-site    As the provider I attest to compliance with applicable laws and regulations related to telemedicine.      Diagnostic Assessment Date: 1/19/2021; update 5/23/2023  Treatment Plan Review Date: 1/18/2024  See Flowsheets for today's PHQ-9 and KATHARINE-7 results  Previous PHQ-9:       3/16/2021     9:13 AM 6/30/2021     9:26 AM 11/16/2023     4:00 PM   PHQ-9 SCORE   PHQ-9 Total Score MyChart 2 (Minimal depression) 2 (Minimal depression)    PHQ-9 Total Score 2 2 0     Previous KATHARINE-7:       12/15/2020    10:52 AM 3/16/2021     9:15 AM 6/30/2021     9:27 AM   KATHARINE-7 SCORE   Total Score 1  "(minimal anxiety) 1 (minimal anxiety) 1 (minimal anxiety)   Total Score 1 1 1      3/4/2022  1:59 PM 3/15/2022  2:10 PM 3/30/2023  10:56 AM 7/6/2023  9:45 AM 10/18/2023  7:09 AM   PROMIS-10 Scores Only        Global Mental Health Score 13  8  7  12  11    Global Physical Health Score 11  9  9  11  8    PROMIS TOTAL - SUBSCORES 24  17  16  23  19          DATA  Extended Session (60+ minutes): No  Interactive Complexity: No  Crisis: No    Treatment Objective(s) Addressed in This Session:  Target Behavior(s): disease management/lifestyle changes      Depressed Mood: Decrease frequency and intensity of feeling down, depressed, hopeless  Identify negative self-talk and behaviors: challenge core beliefs, myths, and actions  Anxiety: will develop more effective coping skills to manage anxiety symptoms  Relationship Problems: will address relationship difficulties in a more adaptive manner  Psychological distress related to Pain    Current Stressors / Issues:  [facility] met with Lanie today to continue supporting her treatment of depression, adjustment to chronic disease management, and relationship problems.     Discussed thanksgiving holiday for her and her family. She would like to attend a comedy special in [place] with a  she likes, hoping to include her brothers in coming.     Processed observed tendencies to ruminate and \"spiral\" with her thought patterns. Reviewed triggers being relationship factors, or moments she experiences a sense of helplessness. Explored uses of mindfulness to help identify how she can ramp up in distress and how this contributes to experiences of depression. Reviewed distress tolerance ideas when utilizing mindfulness practices.     We talked briefly about IOP or a group of some kind for depression. Reviewed options through [employer]. Lanie opted to consider recommendations for now.     Progress on Treatment Objective(s) / Homework:  Satisfactory progress - ACTION (Actively working " towards change); Intervened by reinforcing change plan / affirming steps taken       Psychodynamic psychotherapy  Discuss patient's emotional dynamics and issues and how they impact behaviors.  Explore patient's history of relationships and how they impact present behaviors.  Explore how to work with and make changes in these schemas and patterns.    Interpersonal Psychotherapy  Explore patterns in relationships that are effective or ineffective at helping patient reach their goals, find satisfying experience.  Discuss new patterns or behaviors to engage in for improved social functioning.      Care Plan review completed: No    Medication Review:  No changes to current psychiatric medication(s)    Medication Compliance:  NA    Changes in Health Issues:   None reported    Chemical Use Review:   Substance Use: Chemical use reviewed, no active concerns identified      Tobacco Use: No current tobacco use.      Assessment: Current Emotional / Mental Status (status of significant symptoms):  Risk status (Self / Other harm or suicidal ideation)  Patient has had a history of suicidal ideation: passive thoughts only; easily controlled  - denied any recently    Patient denies current fears or concerns for personal safety.  Patient denies current or recent suicidal ideation or behaviors.  Patient denies current or recent homicidal ideation or behaviors.  Patient denies current or recent self injurious behavior or ideation.  Patient denies other safety concerns.     A safety and risk management plan has not been developed at this time, however patient was encouraged to call [place] [phone] should there be a change in any of these risk factors.      [place] Suicide Severity Rating Scale (Short Version)      2/18/2021     4:47 PM   [place] Suicide Severity Rating (Short Version)   Over the past 2 weeks have you felt down, depressed, or hopeless? yes   Over the past 2 weeks have you had thoughts of killing yourself? no    Have you ever attempted to kill yourself? no     [place] Suicide Severity Rating Scale (Lifetime/Recent)      8/13/2021     1:25 PM   [place] Suicide Severity Rating (Lifetime/Recent)   Q1 Wish to be Dead (Lifetime) No   Q2 Non-Specific Active Suicidal Thoughts (Lifetime) No   Most Severe Ideation Rating (Lifetime) NA   Frequency (Lifetime) NA   Duration (Lifetime) NA   Controllability (Lifetime) NA   Protective Factors  (Lifetime) NA   Reasons for Ideation (Lifetime) NA   RETIRED: 1. Wish to be Dead (Recent) No   RETIRED: 2. Non-Specific Active Suicidal Thoughts (Recent) No   3. Active Suicidal Ideation with any Methods (Not Plan) Without Intent to Act (Lifetime) No   RETIRED: 3. Active Suicidal Ideation with any Methods (Not Plan) Without Intent to Act (Recent) No   RETIRE: 4. Active Suicidal Ideation with Some Intent to Act, Without Specific Plan (Lifetime) No   4. Active Suicidal Ideation with Some Intent to Act, Without Specific Plan (Recent) No   RETIRE: 5. Active Suicidal Ideation with Specific Plan and Intent (Lifetime) No   RETIRED: 5. Active Suicidal Ideation with Specific Plan and Intent (Recent) No   Most Severe Ideation Rating (Past Month) NA   Frequency (Past Month) NA   Duration (Past Month) NA   Controllability (Past Month) NA   Protective Factors (Past Month) NA   Reasons for Ideation (Past Month) NA   Actual Attempt (Lifetime) No   Actual Attempt (Past 3 Months) No   Has subject engaged in non-suicidal self-injurious behavior? (Lifetime) No   Has subject engaged in non-suicidal self-injurious behavior? (Past 3 Months) No   Interrupted Attempts (Lifetime) No   Interrupted Attempts (Past 3 Months) No   Aborted or Self-Interrupted Attempt (Lifetime) No   Aborted or Self-Interrupted Attempt (Past 3 Months) No   Preparatory Acts or Behavior (Lifetime) No   Preparatory Acts or Behavior (Past 3 Months) No   Most Recent Attempt Actual Lethality Code NA   Most Lethal Attempt Actual Lethality Code NA    Initial/First Attempt Actual Lethality Code NA         Appearance:   Appropriate   Eye Contact:   Good   Psychomotor Behavior: Normal   Attitude:   Cooperative   Orientation:   All  Speech   Rate / Production: Normal    Volume:  Normal   Mood:    Depressed    Affect:    Tearful  Thought Content:  Rumination   Thought Form:  Coherent  Logical   Insight:    Good     Diagnoses:  1. Major depressive disorder, recurrent episode, moderate (H)            Collateral Reports Completed:  [facility] will coordinate with care team as needed    Plan: (Homework, other):  Lanie was scheduled to RTC in three weeks for help addressing depressive symptoms, pain, and relationship conflicts. Continue ACT/MBSR skills provided.  CD Recommendations: No indications of CD issues.     Héctor Higuera Psy.D, LP   Behavioral Health Clinician   [facility]     _______________________________________________________________________                                              Individual Treatment Plan    Patient's Name: Lanie Neil  YOB: 1990    Date of Creation: 3/4/2022  Date Treatment Plan Last Reviewed/Revised: 10/18/2023    DSM5 Diagnoses: 296.32 (F33.1) Major Depressive Disorder, Recurrent Episode, Moderate With anxious distress  Psychosocial / Contextual Factors: Chronic pain, SMA, disabled  PROMIS (reviewed every 90 days):      3/4/2022  1:59 PM 3/15/2022  2:10 PM 3/30/2023  10:56 AM 7/6/2023  9:45 AM 10/18/2023  7:09 AM   PROMIS-10 Scores Only        Global Mental Health Score 13  8  7  12  11    Global Physical Health Score 11  9  9  11  8    PROMIS TOTAL - SUBSCORES 24  17  16  23  19            Referral / Collaboration:  Referral to another professional/service is not indicated at this time..    Anticipated number of session for this episode of care: 7-9  Anticipation frequency of session: Every other week  Anticipated Duration of each session: 38-52 minutes  Treatment plan will be  "reviewed in 90 days or when goals have been changed.       MeasurableTreatment Goal(s) related to diagnosis / functional impairment(s)  Goal 1: Patient will experience a reduction in depressive symptoms along with a corresponding increase in positive emotion and life satisfaction.      Objective #A (Patient Action)    Patient will Increase interest, engagement, and pleasure in doing things.  Status: Continued - Date(s): 10/18/2023    Intervention(s)  Therapist will help patient identify pleasant and mastery oriented events that elicit positive, relaxed mood.    Objective #B  Patient will Decrease frequency and intensity of feeling down, depressed, hopeless.  Status: Continued - Date(s): 10/18/2023    Intervention(s)  Therapist will introduce patient to cognitive-behavioral and acceptance and commitment therapy topics aimed to help reduce depression and anxiety    Objective #C  Patient will Identify negative self-talk and behaviors: challenge core beliefs, myths, and actions  Improve concentration, focus, and mindfulness in daily activities .  Status: Continued - Date(s): 10/18/2023    Intervention(s)  Therapist will help patient identify and manage negative self-talk and automatic thoughts; introduce patient to cognitive distortions; help patient develop cognitive diffusion techniques      Goal 2: Patient will experience a reduction in anxious symptoms, along with a corresponding increase in relaxed emotional states and life satisfaction.      Objective #A (Patient Action)  Patient will use cognitive-behavioral and thought diffusion strategies identified in therapy to challenge anxious thoughts.    Status: Continued - Date(s): 10/18/2023    Intervention(s)  Therapist will utilize CBT and ACT ideas to help patient challenge anxious thoughts and reduce intensity/duration of anxious distress    Objective #B  Patient will use \"worry time\" each day for 15 minutes of scheduled worry and then defer obsessive or anxious " thinking until the next structured worry time.    Status: Continued - Date(s): 10/18/2023    Intervention(s)  Therapist will teach patient how to effectively utilize worry time and/or thought logs/journals each day and incorporate more relaxation behaviors into their routine.    Objective #C  Patient will identify the stressors which contribute to feelings of anxiety  Patient will increase engagement in adaptive coping skills and recreational activities, such as exercise and healthy socialization, to manage distress.  Status: Continued - Date(s): 10/18/2023    Intervention(s)  Therapist will help patient identify triggers/situational factors that contribute to anxiety and behavioral skills aimed to manage anxious distress.      Goal 3: Patient will learn more adaptive ways to manage chronic pain      Objective #A (Patient Action)    Status: Continued - Date(s): 10/18/2023    Patient will identify 2-4 strategies for managing pain.    Intervention(s)  Therapist will teach distraction skills aimed to help manage chronic pain and utilize ACT/CBT ideas to help with this (e.g. relaxation) .    Objective #B  Patient will state understanding of stressors and relationship to physical symptoms.    Status: Continued - Date(s): 10/18/2023      Other Possible Therapeutic Intervention(s):    Psycho-education regarding mental health diagnoses and treatment options    Supportive Therapy  Provide affirmations, reflections, and establish working rapport  Emphasize and reflect on strength of therapeutic relationship    Skills training  Explore skills useful to client in current situation.  Skills include assertiveness, communication, conflict management, problem-solving, relaxation, etc.    Solution-Focused Therapy  Explore patterns in patient's relationships and discuss options for new behaviors.  Explore patterns in patient's actions and choices and discuss options for new behaviors.    Cognitive-behavioral Therapy  Discuss common  cognitive distortions, identify them in patient's life.  Explore ways to challenge, replace, and act against these cognitions.    Acceptance and Commitment Therapy  Explore and identify important values in patient's life.  Discuss ways to commit to behavioral activation around these values.    Psychodynamic psychotherapy  Discuss patient's emotional dynamics and issues and how they impact behaviors.  Explore patient's history of relationships and how they impact present behaviors.  Explore how to work with and make changes in these schemas and patterns.    Narrative Therapy  Explore the patient's story of his/her life from his/her perspective.  Explore alternate ways of understanding their experience, identifying exceptions, developing new themes.    Interpersonal Psychotherapy  Explore patterns in relationships that are effective or ineffective at helping patient reach their goals, find satisfying experience.  Discuss new patterns or behaviors to engage in for improved social functioning.    Behavioral Activation  Discuss steps patient can take to become more involved in meaningful activity.  Identify barriers to these activities and explore possible solutions.    Mindfulness-Based Strategies  Discuss skills based on development and application of mindfulness.  Skills drawn from compassion-focused therapy, dialectical behavior therapy, mindfulness-based stress reduction, mindfulness-based cognitive therapy, etc.      Patient has reviewed and agreed to the above plan.    Héctor Higuera Psy.D, LP   Behavioral Health Clinician   [facility]     7/12/2023    Crisis Resources    Refer to the resources below as needed.    Steps to care for yourself    If you are currently in counseling, call your counselor for an appointment  Call the local crisis resources below if needed.  Contact friends or family for support.  Get more exercise.  Do activities you enjoy.  Eat a well-balanced diet and  drink plenty of fluids.  Rest as needed.  Limit alcohol and recreational drugs. These can worsen depression.    When to contact your primary care provider     You have thoughts of harming or killing yourself but have not made a plan to carry it out.  Your depression gets in the way of daily activities.  You are often unable to sleep.  You need help cutting back on alcohol or recreational drugs.    When to call 911 or go to the Emergency Room     Get emergency help right away if you have any of the following:  You are planning to harm or kill yourself and you have a way to carry out the plan.   You have injured yourself or others. Or, you think you will.  You feel confused or are having trouble thinking or remembering.  You are having delusions (false beliefs).  You are hearing voices or seeing things that aren t there.  You are feeling psychotic (paranoid, fearful, restless, agitated, nervous, racing thoughts or speech)    Crisis Resources     These hotlines are for both adults and children. The and are open 24 hours a day, 7 days a week unless noted otherwise.    988 - National Suicide and Crisis Lifeline  If you or someone you know needs support now, call or text 988 or chat [employer]. 988 connects you with a trained crisis counselor who can help.    National Suicide Prevention Lifeline   5-819-207-XRDM (9345)    Crisis Text Line    www.crisistextline.org  Text HOME to 209812 from anywhere in the [place], anytime, about any type of crisis. A live, trained crisis counselor will receive the text and respond quickly.    [place] Lifeline for LGBTQ Youth  A national crisis intervention and suicide lifeline for LGBTQ youth under 25. Provides a safe place to talk without judgement.   Call 1-563.659.3679; text START to 961566 or visit www.thetrevorproject.org to talk to a trained counselor.  For [facility] crisis numbers, visit the [place] DHS website  at:  https://[place].gov/dhs/people-we-serve/adults/health-care/mental-health/resources/crisis-contacts.jsp      Héctor Higuera Psy.D, LP   Behavioral Health Clinician   [facility]     6/3/2022

## 2023-12-08 DIAGNOSIS — F41.9 ANXIETY: ICD-10-CM

## 2023-12-11 RX ORDER — ALPRAZOLAM 0.25 MG
0.25 TABLET ORAL 3 TIMES DAILY PRN
Qty: 60 TABLET | Refills: 0 | Status: SHIPPED | OUTPATIENT
Start: 2023-12-11 | End: 2024-01-17

## 2023-12-11 NOTE — TELEPHONE ENCOUNTER
ALPRAZolam (XANAX) 0.25 MG tablet   Last Written Prescription Date:  9/27/2023  Last Fill Quantity: 60,   # refills: 1  Last Office Visit : 11/24/2023  Future Office visit:  None    Routing refill request to provider for review/approval because:  Drug not on the [facility], [facility] or [facility] refill protocol or controlled substance    Georgina Sy RN  Central Triage Red Flags/Med Refills

## 2023-12-13 ENCOUNTER — VIRTUAL VISIT (OUTPATIENT)
Dept: BEHAVIORAL HEALTH | Facility: CLINIC | Age: 33
End: 2023-12-13
Payer: COMMERCIAL

## 2023-12-13 DIAGNOSIS — F33.1 MAJOR DEPRESSIVE DISORDER, RECURRENT EPISODE, MODERATE (H): Primary | ICD-10-CM

## 2023-12-13 PROBLEM — E83.42 HYPOMAGNESEMIA: Status: ACTIVE | Noted: 2021-07-18

## 2023-12-13 PROBLEM — Z86.718 HISTORY OF DVT (DEEP VEIN THROMBOSIS): Status: ACTIVE | Noted: 2019-09-10

## 2023-12-13 PROBLEM — E83.39 HYPOPHOSPHATEMIA: Status: ACTIVE | Noted: 2021-07-18

## 2023-12-13 PROBLEM — D68.9 COAGULATION DISORDER (H): Status: ACTIVE | Noted: 2023-12-13

## 2023-12-13 PROBLEM — Z86.59 HISTORY OF DEPRESSION: Status: ACTIVE | Noted: 2023-12-13

## 2023-12-13 PROBLEM — Z98.1 HISTORY OF ARTHRODESIS: Status: ACTIVE | Noted: 2023-12-13

## 2023-12-13 PROBLEM — Z79.01 ANTICOAGULATED: Status: ACTIVE | Noted: 2023-12-13

## 2023-12-13 PROBLEM — F41.9 ANXIETY DISORDER: Status: ACTIVE | Noted: 2023-12-13

## 2023-12-13 PROBLEM — Z91.81 AT HIGH RISK FOR FALLS: Status: ACTIVE | Noted: 2023-12-13

## 2023-12-13 PROBLEM — Z95.828 PRESENCE OF IVC FILTER: Status: ACTIVE | Noted: 2021-07-18

## 2023-12-13 PROBLEM — F32.A DEPRESSION: Status: ACTIVE | Noted: 2020-02-13

## 2023-12-13 PROBLEM — Z97.8 PRESENCE OF INTRATHECAL PUMP: Status: ACTIVE | Noted: 2018-01-16

## 2023-12-13 PROBLEM — R78.81 GRAM-NEGATIVE BACTEREMIA: Status: ACTIVE | Noted: 2021-07-18

## 2023-12-13 PROBLEM — Z97.8 INTRAVENOUS CATHETER IN PLACE: Status: ACTIVE | Noted: 2023-12-13

## 2023-12-13 PROBLEM — K92.2 ACUTE LOWER GI BLEEDING: Status: ACTIVE | Noted: 2019-09-10

## 2023-12-13 PROBLEM — Z87.442 HISTORY OF RENAL CALCULI: Status: ACTIVE | Noted: 2023-12-13

## 2023-12-13 PROBLEM — E83.51 HYPOCALCEMIA: Status: ACTIVE | Noted: 2021-07-18

## 2023-12-13 PROBLEM — Z99.11 DEPENDENT ON VENTILATOR (H): Status: ACTIVE | Noted: 2023-12-13

## 2023-12-13 PROCEDURE — 90837 PSYTX W PT 60 MINUTES: CPT | Mod: VID | Performed by: PSYCHOLOGIST

## 2023-12-13 NOTE — PROGRESS NOTES
[facility]: Integrated Behavioral Health  December 13, 2023          Behavioral Health Clinician Progress Note    Patient Name: Lanie Neil           Service Type: Video appointment      Service Location:  Video appointment      Session Start Time: 1:04 Session End Time: 1:58      Session Length: 53 - 60      Attendees: Patient    Visit Activities (Refresh list every visit): [facility] Only    Service Modality:  Video Visit:      Provider verified identity through the following two step process.  Patient provided:  Patient photo and Patient is known previously to provider    Telemedicine Visit: The patient's condition can be safely assessed and treated via synchronous audio and visual telemedicine encounter.      Reason for Telemedicine Visit: Patient unable to travel    Originating Site (Patient Location): Patient's home    Distant Site (Provider Location): [facility]: [facility]    Consent:  The patient/guardian has verbally consented to: the potential risks and benefits of telemedicine (video visit) versus in person care; bill my insurance or make self-payment for services provided; and responsibility for payment of non-covered services.     Patient would like the video invitation sent by:  My Chart    Mode of Communication:  Video Conference via [facility]    Distant Location (Provider):  On-site    As the provider I attest to compliance with applicable laws and regulations related to telemedicine.      Diagnostic Assessment Date: 1/19/2021; update 5/23/2023  Treatment Plan Review Date: 1/18/2024  See Flowsheets for today's PHQ-9 and KATHARINE-7 results  Previous PHQ-9:       3/16/2021     9:13 AM 6/30/2021     9:26 AM 11/16/2023     4:00 PM   PHQ-9 SCORE   PHQ-9 Total Score MyChart 2 (Minimal depression) 2 (Minimal depression)    PHQ-9 Total Score 2 2 0     Previous KATHARINE-7:       12/15/2020    10:52 AM 3/16/2021     9:15 AM 6/30/2021     9:27 AM   KATAHRINE-7 SCORE   Total Score 1  "(minimal anxiety) 1 (minimal anxiety) 1 (minimal anxiety)   Total Score 1 1 1      3/4/2022  1:59 PM 3/15/2022  2:10 PM 3/30/2023  10:56 AM 7/6/2023  9:45 AM 10/18/2023  7:09 AM   PROMIS-10 Scores Only        Global Mental Health Score 13  8  7  12  11    Global Physical Health Score 11  9  9  11  8    PROMIS TOTAL - SUBSCORES 24  17  16  23  19          DATA  Extended Session (60+ minutes): No  Interactive Complexity: No  Crisis: No    Treatment Objective(s) Addressed in This Session:  Target Behavior(s): disease management/lifestyle changes      Depressed Mood: Decrease frequency and intensity of feeling down, depressed, hopeless  Identify negative self-talk and behaviors: challenge core beliefs, myths, and actions  Anxiety: will develop more effective coping skills to manage anxiety symptoms  Relationship Problems: will address relationship difficulties in a more adaptive manner  Psychological distress related to Pain    Current Stressors / Issues:  [facility] met with Lanie today to continue supporting her treatment of depression, adjustment to chronic disease management, and relationship problems.     Reports making a new connection with her brother's new partner. This lead us to discussing her observed sense of getting attached too quickly to others, exploring ideas related to an anxious attachment style. Reviewed how new relationships can be \"intoxicating\" in some respects, how the DBT and mindfulness ideas might be applicable to her in this scenario. Discussed how \"spirals\" as we previously reviewed can feel positive and negative, though result in further relationship problems/dysfunction.    Lanie also reports today interest in an IOP program for depression, as her difficulties have responded well to 1:1 though might further resolve with an IOP program. Referral entered.     Progress on Treatment Objective(s) / Homework:  Satisfactory progress - ACTION (Actively working towards change); Intervened by " reinforcing change plan / affirming steps taken       Psychodynamic psychotherapy  Discuss patient's emotional dynamics and issues and how they impact behaviors.  Explore patient's history of relationships and how they impact present behaviors.  Explore how to work with and make changes in these schemas and patterns.    Interpersonal Psychotherapy  Explore patterns in relationships that are effective or ineffective at helping patient reach their goals, find satisfying experience.  Discuss new patterns or behaviors to engage in for improved social functioning.      Care Plan review completed: No    Medication Review:  No changes to current psychiatric medication(s)    Medication Compliance:  NA    Changes in Health Issues:   None reported    Chemical Use Review:   Substance Use: Chemical use reviewed, no active concerns identified      Tobacco Use: No current tobacco use.      Assessment: Current Emotional / Mental Status (status of significant symptoms):  Risk status (Self / Other harm or suicidal ideation)  Patient has had a history of suicidal ideation: passive thoughts only; easily controlled  - denied any recently    Patient denies current fears or concerns for personal safety.  Patient denies current or recent suicidal ideation or behaviors.  Patient denies current or recent homicidal ideation or behaviors.  Patient denies current or recent self injurious behavior or ideation.  Patient denies other safety concerns.     A safety and risk management plan has not been developed at this time, however patient was encouraged to call [place] [phone] should there be a change in any of these risk factors.      [place] Suicide Severity Rating Scale (Short Version)      2/18/2021     4:47 PM   [place] Suicide Severity Rating (Short Version)   Over the past 2 weeks have you felt down, depressed, or hopeless? yes   Over the past 2 weeks have you had thoughts of killing yourself? no   Have you ever attempted to kill  yourself? no     [place] Suicide Severity Rating Scale (Lifetime/Recent)      8/13/2021     1:25 PM   [place] Suicide Severity Rating (Lifetime/Recent)   Q1 Wish to be Dead (Lifetime) No   Q2 Non-Specific Active Suicidal Thoughts (Lifetime) No   Most Severe Ideation Rating (Lifetime) NA   Frequency (Lifetime) NA   Duration (Lifetime) NA   Controllability (Lifetime) NA   Protective Factors  (Lifetime) NA   Reasons for Ideation (Lifetime) NA   RETIRED: 1. Wish to be Dead (Recent) No   RETIRED: 2. Non-Specific Active Suicidal Thoughts (Recent) No   3. Active Suicidal Ideation with any Methods (Not Plan) Without Intent to Act (Lifetime) No   RETIRED: 3. Active Suicidal Ideation with any Methods (Not Plan) Without Intent to Act (Recent) No   RETIRE: 4. Active Suicidal Ideation with Some Intent to Act, Without Specific Plan (Lifetime) No   4. Active Suicidal Ideation with Some Intent to Act, Without Specific Plan (Recent) No   RETIRE: 5. Active Suicidal Ideation with Specific Plan and Intent (Lifetime) No   RETIRED: 5. Active Suicidal Ideation with Specific Plan and Intent (Recent) No   Most Severe Ideation Rating (Past Month) NA   Frequency (Past Month) NA   Duration (Past Month) NA   Controllability (Past Month) NA   Protective Factors (Past Month) NA   Reasons for Ideation (Past Month) NA   Actual Attempt (Lifetime) No   Actual Attempt (Past 3 Months) No   Has subject engaged in non-suicidal self-injurious behavior? (Lifetime) No   Has subject engaged in non-suicidal self-injurious behavior? (Past 3 Months) No   Interrupted Attempts (Lifetime) No   Interrupted Attempts (Past 3 Months) No   Aborted or Self-Interrupted Attempt (Lifetime) No   Aborted or Self-Interrupted Attempt (Past 3 Months) No   Preparatory Acts or Behavior (Lifetime) No   Preparatory Acts or Behavior (Past 3 Months) No   Most Recent Attempt Actual Lethality Code NA   Most Lethal Attempt Actual Lethality Code NA   Initial/First Attempt Actual  Lethality Code NA         Appearance:   Appropriate   Eye Contact:   Good   Psychomotor Behavior: Normal   Attitude:   Cooperative   Orientation:   All  Speech   Rate / Production: Normal    Volume:  Normal   Mood:    Depressed    Affect:    Tearful  Thought Content:  Rumination   Thought Form:  Coherent  Logical   Insight:    Good     Diagnoses:  1. Major depressive disorder, recurrent episode, moderate (H)            Collateral Reports Completed:  [facility] will coordinate with care team as needed    Plan: (Homework, other):  Lanie was scheduled to RTC in three weeks for help addressing depressive symptoms, pain, and relationship conflicts. Continue ACT/MBSR skills provided.  CD Recommendations: No indications of CD issues.     Héctor Higuera Psy.D, LP   Behavioral Health Clinician   [facility]     _______________________________________________________________________                                              Individual Treatment Plan    Patient's Name: Lanie Neil  YOB: 1990    Date of Creation: 3/4/2022  Date Treatment Plan Last Reviewed/Revised: 10/18/2023    DSM5 Diagnoses: 296.32 (F33.1) Major Depressive Disorder, Recurrent Episode, Moderate With anxious distress  Psychosocial / Contextual Factors: Chronic pain, SMA, disabled  PROMIS (reviewed every 90 days):      3/4/2022  1:59 PM 3/15/2022  2:10 PM 3/30/2023  10:56 AM 7/6/2023  9:45 AM 10/18/2023  7:09 AM   PROMIS-10 Scores Only        Global Mental Health Score 13  8  7  12  11    Global Physical Health Score 11  9  9  11  8    PROMIS TOTAL - SUBSCORES 24  17  16  23  19            Referral / Collaboration:  Referral to another professional/service is not indicated at this time..    Anticipated number of session for this episode of care: 7-9  Anticipation frequency of session: Every other week  Anticipated Duration of each session: 38-52 minutes  Treatment plan will be reviewed in 90 days or when goals  "have been changed.       MeasurableTreatment Goal(s) related to diagnosis / functional impairment(s)  Goal 1: Patient will experience a reduction in depressive symptoms along with a corresponding increase in positive emotion and life satisfaction.      Objective #A (Patient Action)    Patient will Increase interest, engagement, and pleasure in doing things.  Status: Continued - Date(s): 10/18/2023    Intervention(s)  Therapist will help patient identify pleasant and mastery oriented events that elicit positive, relaxed mood.    Objective #B  Patient will Decrease frequency and intensity of feeling down, depressed, hopeless.  Status: Continued - Date(s): 10/18/2023    Intervention(s)  Therapist will introduce patient to cognitive-behavioral and acceptance and commitment therapy topics aimed to help reduce depression and anxiety    Objective #C  Patient will Identify negative self-talk and behaviors: challenge core beliefs, myths, and actions  Improve concentration, focus, and mindfulness in daily activities .  Status: Continued - Date(s): 10/18/2023    Intervention(s)  Therapist will help patient identify and manage negative self-talk and automatic thoughts; introduce patient to cognitive distortions; help patient develop cognitive diffusion techniques      Goal 2: Patient will experience a reduction in anxious symptoms, along with a corresponding increase in relaxed emotional states and life satisfaction.      Objective #A (Patient Action)  Patient will use cognitive-behavioral and thought diffusion strategies identified in therapy to challenge anxious thoughts.    Status: Continued - Date(s): 10/18/2023    Intervention(s)  Therapist will utilize CBT and ACT ideas to help patient challenge anxious thoughts and reduce intensity/duration of anxious distress    Objective #B  Patient will use \"worry time\" each day for 15 minutes of scheduled worry and then defer obsessive or anxious thinking until the next structured " worry time.    Status: Continued - Date(s): 10/18/2023    Intervention(s)  Therapist will teach patient how to effectively utilize worry time and/or thought logs/journals each day and incorporate more relaxation behaviors into their routine.    Objective #C  Patient will identify the stressors which contribute to feelings of anxiety  Patient will increase engagement in adaptive coping skills and recreational activities, such as exercise and healthy socialization, to manage distress.  Status: Continued - Date(s): 10/18/2023    Intervention(s)  Therapist will help patient identify triggers/situational factors that contribute to anxiety and behavioral skills aimed to manage anxious distress.      Goal 3: Patient will learn more adaptive ways to manage chronic pain      Objective #A (Patient Action)    Status: Continued - Date(s): 10/18/2023    Patient will identify 2-4 strategies for managing pain.    Intervention(s)  Therapist will teach distraction skills aimed to help manage chronic pain and utilize ACT/CBT ideas to help with this (e.g. relaxation) .    Objective #B  Patient will state understanding of stressors and relationship to physical symptoms.    Status: Continued - Date(s): 10/18/2023      Other Possible Therapeutic Intervention(s):    Psycho-education regarding mental health diagnoses and treatment options    Supportive Therapy  Provide affirmations, reflections, and establish working rapport  Emphasize and reflect on strength of therapeutic relationship    Skills training  Explore skills useful to client in current situation.  Skills include assertiveness, communication, conflict management, problem-solving, relaxation, etc.    Solution-Focused Therapy  Explore patterns in patient's relationships and discuss options for new behaviors.  Explore patterns in patient's actions and choices and discuss options for new behaviors.    Cognitive-behavioral Therapy  Discuss common cognitive distortions, identify them  in patient's life.  Explore ways to challenge, replace, and act against these cognitions.    Acceptance and Commitment Therapy  Explore and identify important values in patient's life.  Discuss ways to commit to behavioral activation around these values.    Psychodynamic psychotherapy  Discuss patient's emotional dynamics and issues and how they impact behaviors.  Explore patient's history of relationships and how they impact present behaviors.  Explore how to work with and make changes in these schemas and patterns.    Narrative Therapy  Explore the patient's story of his/her life from his/her perspective.  Explore alternate ways of understanding their experience, identifying exceptions, developing new themes.    Interpersonal Psychotherapy  Explore patterns in relationships that are effective or ineffective at helping patient reach their goals, find satisfying experience.  Discuss new patterns or behaviors to engage in for improved social functioning.    Behavioral Activation  Discuss steps patient can take to become more involved in meaningful activity.  Identify barriers to these activities and explore possible solutions.    Mindfulness-Based Strategies  Discuss skills based on development and application of mindfulness.  Skills drawn from compassion-focused therapy, dialectical behavior therapy, mindfulness-based stress reduction, mindfulness-based cognitive therapy, etc.      Patient has reviewed and agreed to the above plan.    Héctor Higuera Psy.D, LP   Behavioral Health Clinician   [facility]     7/12/2023    Crisis Resources    Refer to the resources below as needed.    Steps to care for yourself    If you are currently in counseling, call your counselor for an appointment  Call the local crisis resources below if needed.  Contact friends or family for support.  Get more exercise.  Do activities you enjoy.  Eat a well-balanced diet and drink plenty of fluids.  Rest as  needed.  Limit alcohol and recreational drugs. These can worsen depression.    When to contact your primary care provider     You have thoughts of harming or killing yourself but have not made a plan to carry it out.  Your depression gets in the way of daily activities.  You are often unable to sleep.  You need help cutting back on alcohol or recreational drugs.    When to call 911 or go to the Emergency Room     Get emergency help right away if you have any of the following:  You are planning to harm or kill yourself and you have a way to carry out the plan.   You have injured yourself or others. Or, you think you will.  You feel confused or are having trouble thinking or remembering.  You are having delusions (false beliefs).  You are hearing voices or seeing things that aren t there.  You are feeling psychotic (paranoid, fearful, restless, agitated, nervous, racing thoughts or speech)    Crisis Resources     These hotlines are for both adults and children. The and are open 24 hours a day, 7 days a week unless noted otherwise.    988 - National Suicide and Crisis Lifeline  If you or someone you know needs support now, call or text 958 or chat [employer]. 988 connects you with a trained crisis counselor who can help.    National Suicide Prevention Lifeline   3-769-455-IRRK (3921)    Crisis Text Line    www.crisistextline.org  Text HOME to 299168 from anywhere in the [place], anytime, about any type of crisis. A live, trained crisis counselor will receive the text and respond quickly.    [place] Lifeline for LGBTQ Youth  A national crisis intervention and suicide lifeline for LGBTQ youth under 25. Provides a safe place to talk without judgement.   Call 1-294.120.6934; text START to 124038 or visit www.[employer].org to talk to a trained counselor.  For [facility] crisis numbers, visit the [place] DHS website  at:  https://[place].gov/dhs/people-we-serve/adults/health-care/mental-health/resources/crisis-contacts.jsp      Héctor Higuera Psy.D, LP   Behavioral Health Clinician   [facility]     6/3/2022

## 2023-12-14 ENCOUNTER — TELEPHONE (OUTPATIENT)
Dept: SURGERY | Facility: CLINIC | Age: 33
End: 2023-12-14
Payer: MEDICARE

## 2023-12-14 NOTE — TELEPHONE ENCOUNTER
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

## 2023-12-14 NOTE — TELEPHONE ENCOUNTER
Called and talked to patient about switching to a surgeons schedule per Lora Saleh PA-C. Appointment was scheduled

## 2023-12-19 ENCOUNTER — LAB REQUISITION (OUTPATIENT)
Dept: LAB | Facility: CLINIC | Age: 33
End: 2023-12-19
Payer: COMMERCIAL

## 2023-12-19 DIAGNOSIS — Z01.812 ENCOUNTER FOR PREPROCEDURAL LABORATORY EXAMINATION: ICD-10-CM

## 2023-12-19 LAB
BASOPHILS # BLD AUTO: 0 10E3/UL (ref 0–0.2)
BASOPHILS NFR BLD AUTO: 0 %
EOSINOPHIL # BLD AUTO: 0.2 10E3/UL (ref 0–0.7)
EOSINOPHIL NFR BLD AUTO: 2 %
ERYTHROCYTE [DISTWIDTH] IN BLOOD BY AUTOMATED COUNT: 17.5 % (ref 10–15)
FERRITIN SERPL-MCNC: 22 NG/ML (ref 6–175)
HCT VFR BLD AUTO: 34.4 % (ref 35–47)
HGB BLD-MCNC: 10.7 G/DL (ref 11.7–15.7)
IMM GRANULOCYTES # BLD: 0 10E3/UL
IMM GRANULOCYTES NFR BLD: 0 %
IRON BINDING CAPACITY (ROCHE): 266 UG/DL (ref 240–430)
IRON SATN MFR SERPL: 22 % (ref 15–46)
IRON SERPL-MCNC: 58 UG/DL (ref 37–145)
LYMPHOCYTES # BLD AUTO: 1.2 10E3/UL (ref 0.8–5.3)
LYMPHOCYTES NFR BLD AUTO: 16 %
MCH RBC QN AUTO: 28.6 PG (ref 26.5–33)
MCHC RBC AUTO-ENTMCNC: 31.1 G/DL (ref 31.5–36.5)
MCV RBC AUTO: 92 FL (ref 78–100)
MONOCYTES # BLD AUTO: 0.2 10E3/UL (ref 0–1.3)
MONOCYTES NFR BLD AUTO: 2 %
NEUTROPHILS # BLD AUTO: 5.8 10E3/UL (ref 1.6–8.3)
NEUTROPHILS NFR BLD AUTO: 80 %
NRBC # BLD AUTO: 0 10E3/UL
NRBC BLD AUTO-RTO: 0 /100
PLATELET # BLD AUTO: 330 10E3/UL (ref 150–450)
RBC # BLD AUTO: 3.74 10E6/UL (ref 3.8–5.2)
WBC # BLD AUTO: 7.4 10E3/UL (ref 4–11)

## 2023-12-19 PROCEDURE — 82728 ASSAY OF FERRITIN: CPT | Mod: ORL | Performed by: PHYSICIAN ASSISTANT

## 2023-12-19 PROCEDURE — 83550 IRON BINDING TEST: CPT | Mod: ORL | Performed by: PHYSICIAN ASSISTANT

## 2023-12-19 PROCEDURE — 85025 COMPLETE CBC W/AUTO DIFF WBC: CPT | Mod: ORL | Performed by: PHYSICIAN ASSISTANT

## 2023-12-20 ENCOUNTER — TELEPHONE (OUTPATIENT)
Dept: BEHAVIORAL HEALTH | Facility: CLINIC | Age: 33
End: 2023-12-20
Payer: MEDICARE

## 2023-12-20 NOTE — TELEPHONE ENCOUNTER
Spoke with patient regarding needing to reschedule appointment for  12/21. Patient rescheduled for next available DA on 12/26.     Deanna Lopez  Transition Clinic Coordinator  12/20/23 3:48 PM

## 2023-12-21 DIAGNOSIS — J45.909 ASTHMA: ICD-10-CM

## 2023-12-21 DIAGNOSIS — Z23 NEED FOR PROPHYLACTIC VACCINATION AND INOCULATION AGAINST INFLUENZA: ICD-10-CM

## 2023-12-21 DIAGNOSIS — K59.00 CONSTIPATION, UNSPECIFIED CONSTIPATION TYPE: ICD-10-CM

## 2023-12-21 DIAGNOSIS — R63.5 ABNORMAL WEIGHT GAIN: ICD-10-CM

## 2023-12-21 RX ORDER — POLYETHYLENE GLYCOL 3350 17 G/17G
17 POWDER, FOR SOLUTION ORAL DAILY
Qty: 714 G | Refills: 5 | Status: SHIPPED | OUTPATIENT
Start: 2023-12-21

## 2023-12-21 RX ORDER — ALBUTEROL SULFATE 90 UG/1
2 AEROSOL, METERED RESPIRATORY (INHALATION) EVERY 4 HOURS PRN
Qty: 18 G | Refills: 5 | Status: SHIPPED | OUTPATIENT
Start: 2023-12-21

## 2023-12-21 NOTE — TELEPHONE ENCOUNTER
polyethylene glycol (MIRALAX) 17 GM/Dose powder   Last Written Prescription Date:  7/28/2023  Last Fill Quantity: 714,   # refills: 0  Last Office Visit : 11/24/2023  Future Office visit:  None  Refer to clinic for review     albuterol (PROAIR HFA) 108 (90 Base) MCG/ACT inhaler   Last Written Prescription Date:  7/28/2023  Last Fill Quantity: 714,   # refills: 0  Last Office Visit : 11/24/2023  Future Office visit:  None  Needing updated ACT Score on file for this med  Refer to clinic for review.       Asthma Maintenance Inhalers - Anticholinergics [id number]/21/2023 09:45 AM   Protocol Details Asthma control assessment score within normal limits in last 6 months

## 2023-12-26 ENCOUNTER — VIRTUAL VISIT (OUTPATIENT)
Dept: BEHAVIORAL HEALTH | Facility: CLINIC | Age: 33
End: 2023-12-26
Payer: COMMERCIAL

## 2023-12-26 DIAGNOSIS — F33.1 MAJOR DEPRESSIVE DISORDER, RECURRENT EPISODE, MODERATE (H): Primary | ICD-10-CM

## 2023-12-26 PROCEDURE — 90791 PSYCH DIAGNOSTIC EVALUATION: CPT | Mod: 95

## 2023-12-26 ASSESSMENT — COLUMBIA-SUICIDE SEVERITY RATING SCALE - C-SSRS
6. HAVE YOU EVER DONE ANYTHING, STARTED TO DO ANYTHING, OR PREPARED TO DO ANYTHING TO END YOUR LIFE?: NO
1. SINCE LAST CONTACT, HAVE YOU WISHED YOU WERE DEAD OR WISHED YOU COULD GO TO SLEEP AND NOT WAKE UP?: NO
SUICIDE, SINCE LAST CONTACT: NO
TOTAL  NUMBER OF ABORTED OR SELF INTERRUPTED ATTEMPTS SINCE LAST CONTACT: NO
2. HAVE YOU ACTUALLY HAD ANY THOUGHTS OF KILLING YOURSELF?: NO
ATTEMPT SINCE LAST CONTACT: NO
TOTAL  NUMBER OF INTERRUPTED ATTEMPTS SINCE LAST CONTACT: NO

## 2023-12-26 NOTE — TELEPHONE ENCOUNTER
Omeprazole suspension   Last Written Prescription Date:  dc'd from med list    Last Office Visit : 12/26/17  Future Office visit:  No future appt    Routing refill request to clinic nurse for review/approval because:  Omeprazole capsrefilled 9/20/17 #90 refillls x 3 but pharmacy states pt has always had the suspension which was dc'd from list.       
Vaccine status unknown

## 2023-12-26 NOTE — PROGRESS NOTES
"    [facility]      PATIENT'S NAME: Lanie Neil  PREFERRED NAME: Lanie  PRONOUNS:       MRN: [id number]  : 1990  ADDRESS: [address] [place] [address]  ACCT. NUMBER:  [id number]  DATE OF SERVICE: 23  START TIME: 1:30 pm  END TIME: 2:30 pm  PREFERRED PHONE: [phone]  May we leave a program related message: Yes  EMERGENCY CONTACT: was not obtained pt declined .  SERVICE MODALITY:  Video Visit:      Provider verified identity through the following two step process.  Patient provided:  Patient  and Patient address    Telemedicine Visit: The patient's condition can be safely assessed and treated via synchronous audio and visual telemedicine encounter.      Reason for Telemedicine Visit: Patient convenience (e.g. access to timely appointments / distance to available provider)    Originating Site (Patient Location): Patient's home    Distant Site (Provider Location): [facility]    Consent:  The patient/guardian has verbally consented to: the potential risks and benefits of telemedicine (video visit) versus in person care; bill my insurance or make self-payment for services provided; and responsibility for payment of non-covered services.     Patient would like the video invitation sent by:  My Chart    Mode of Communication:  Video Conference via Amwell    Distant Location (Provider):  Off-site    As the provider I attest to compliance with applicable laws and regulations related to telemedicine.    UNIVERSAL ADULT Mental Health DIAGNOSTIC ASSESSMENT    Identifying Information:  Patient is a 33 year old,    individual.  Patient was referred for an assessment by  \"my therapist Dr. Higuera\" .  Patient attended the session alone.    Chief Complaint:   The reason for seeking services at this time is: \"Depression\".   Pt looking to feel more \"social\", she reports " "feeling okay the last 2 weeks.  In general, pt reports she had been feeling down, low mood, all day everyday, she also associates this with medical conditions.  Pt denies any excessive anxiety sx.  Pt reports medical conditions and functioning.  Pt reports she sees a long term therapist and medication management.  Pt reports she is looking for more social support and was recommended by her therapist Héctor Higuera for \"group therapy\".  Pt denies SI, HI, AH/VH at this point in time.  Pt denies a hx of attempts and/or denies a psych hosp hx, appears congruent with chart review.   Pt reports protective factors including family support, willingness to engage in therapy, hopefulness, spiritual/Gnosticism\".  The problem(s) began 03/17/20.   Pt reports she is looking for more support in a social setting for therapy, reports her long term therapist referred her-do see updates in chart but do not see full DA, per chart review at the time of this note, with information available in epic ehr at the time of this note..      Patient has attempted to resolve these concerns in the past through individual therapy and medication management .    Social/Family History:  Patient reported they grew up in other [place], [place]. [place]..  They were raised by biological mother  .  Parents  / .  Patient reported that their childhood was \"stable and safe\", pt reports her dad was not involved.  Patient described their current relationships with family of origin as \"it is good, I think it is normal, I live with her now\".    The patient describes their cultural background as other.  Cultural influences and impact on patient's life structure, values, norms, and healthcare: I am of [place] heritage & And the first my full blood Siblings to be born in the [place] . I'm a Jain. Not very strictly though because my father was a  and he was unfaithful to my mother and that was the whole reason for their " "divorce. He remarried his mistress.  Contextual influences on patient's health include: Contextual Factors: Individual Factors mental health .    These factors will be addressed in the Preliminary Treatment plan. Patient identified their preferred language to be English. Patient reported they does not need the assistance of an  or other support involved in therapy.     Patient reported  developmental delays related to medical condition, \"I never walked, I use my hands more, I have spinal muscular atrophy\" .   Patient's highest education level was high school graduate  .  Patient identified the following learning problems: none reported.  Modifications will not be used to assist communication in therapy.  Patient reports they are  able to understand written materials.    Patient reported the following relationship history Pt reports she is single and never .  Patient's current relationship status is single.   Patient identified their sexual orientation as heterosexual.  Patient reported having   0 child(alysia).  Patient identified mother , my therapist, a few social friends\" I talk to as part of their support system.  Patient identified the quality of these relationships as stable and meaningful,  .      Patient's current living/housing situation involves staying with someone \"my mom\".  The immediate members of family and household include Brandon Koenig,Mom  and they report that housing is stable.    Patient is currently disabled.  Patient reports their finances are obtained through disability. Patient does identify finances as a current stressor.      Patient reported that they have not been involved with the legal system.   . Patient does not report being under probation/ parole/ jurisdiction. They are not under any current court jurisdiction. .    Patient's Strengths and Limitations:  Patient identified the following strengths or resources that will help them succeed in treatment: , " Adventism / Latter day, commitment to health and well being, intelligence, and sense of humor. Things that may interfere with the patient's success in treatment include: financial hardship, lack of social support, and physical health concerns.     Assessments:  The following assessments were completed by patient for this visit:  PHQ9:       5/28/2019     1:45 PM 2/12/2020     2:30 PM 12/15/2020    10:49 AM 3/16/2021     9:13 AM 6/30/2021     9:26 AM 11/16/2023     4:00 PM 12/26/2023    10:45 AM   PHQ-9 SCORE   PHQ-9 Total Score MyChart   2 (Minimal depression) 2 (Minimal depression) 2 (Minimal depression)  2 (Minimal depression)   PHQ-9 Total Score 1 16 2 2 2 0 2   Answers submitted by the patient for this visit:  Patient Health Questionnaire (Submitted on 12/26/2023)  If you checked off any problems, how difficult have these problems made it for you to do your work, take care of things at home, or get along with other people?: Not difficult at all  PHQ9 TOTAL SCORE: 2  GAD7:       5/28/2019     1:45 PM 2/12/2020     2:30 PM 12/15/2020    10:52 AM 3/16/2021     9:15 AM 6/30/2021     9:27 AM   KATHARINE-7 SCORE   Total Score   1 (minimal anxiety) 1 (minimal anxiety) 1 (minimal anxiety)   Total Score 3 19 1 1 1     CAGE-AID:       12/26/2023    12:04 PM   CAGE-AID Total Score   Total Score 1   Total Score MyChart 1 (A total score of 2 or greater is considered clinically significant)     PROMIS 10-Global Health (all questions and answers displayed):       3/4/2022     1:59 PM 3/15/2022     2:10 PM 3/30/2023    10:56 AM 7/6/2023     9:45 AM 10/18/2023     7:09 AM 12/26/2023    12:03 PM   PROMIS 10   In general, would you say your health is: Excellent Very good Very good Very good Very good Very good   In general, would you say your quality of life is: Good Fair Fair Good Good Good   In general, how would you rate your physical health? Very good Excellent Very good Very good Poor Very good   In general, how would you rate your  mental health, including your mood and your ability to think? Good Fair Fair Good Good Very good   In general, how would you rate your satisfaction with your social activities and relationships? Good Good Poor Good Fair Good   In general, please rate how well you carry out your usual social activities and roles Very good Fair Fair Fair Very good Very good   To what extent are you able to carry out your everyday physical activities such as walking, climbing stairs, carrying groceries, or moving a chair? Not at all Not at all Not at all Not at all Not at all Completely   In the past 7 days, how often have you been bothered by emotional problems such as feeling anxious, depressed, or irritable? Rarely Always Often Sometimes Sometimes Sometimes   In the past 7 days, how would you rate your fatigue on average? Moderate Very severe Severe Moderate Moderate Moderate   In the past 7 days, how would you rate your pain on average, where 0 means no pain, and 10 means worst imaginable pain? 6 7 7 6 6 7   In general, would you say your health is: 5 4 4 4 4 4   In general, would you say your quality of life is: 3 2 2 3 3 3   In general, how would you rate your physical health? 4 5 4 4 1 4   In general, how would you rate your mental health, including your mood and your ability to think? 3 2 2 3 3 4   In general, how would you rate your satisfaction with your social activities and relationships? 3 3 1 3 2 3   In general, please rate how well you carry out your usual social activities and roles. (This includes activities at home, at work and in your community, and responsibilities as a parent, child, spouse, employee, friend, etc.) 4 2 2 2 4 4   To what extent are you able to carry out your everyday physical activities such as walking, climbing stairs, carrying groceries, or moving a chair? 1 1 1 1 1 5   In the past 7 days, how often have you been bothered by emotional problems such as feeling anxious, depressed, or irritable? 2 5  4 3 3 3   In the past 7 days, how would you rate your fatigue on average? 3 5 4 3 3 3   In the past 7 days, how would you rate your pain on average, where 0 means no pain, and 10 means worst imaginable pain? 6 7 7 6 6 7   Global Mental Health Score 13 8 7 12 11 13   Global Physical Health Score 11 9 9 11 8 14   PROMIS TOTAL - SUBSCORES 24 17 16 23 19 27     PROMIS 10-Global Health (only subscores and total score):       3/4/2022     1:59 PM 3/15/2022     2:10 PM 3/30/2023    10:56 AM 7/6/2023     9:45 AM 10/18/2023     7:09 AM 12/26/2023    12:03 PM   PROMIS-10 Scores Only   Global Mental Health Score 13 8 7 12 11 13   Global Physical Health Score 11 9 9 11 8 14   PROMIS TOTAL - SUBSCORES 24 17 16 23 19 27     Arthur Suicide Severity Rating Scale (Short Version)      2/18/2021     4:47 PM 12/26/2023     2:00 PM   Arthur Suicide Severity Rating (Short Version)   Over the past 2 weeks have you felt down, depressed, or hopeless? yes    Over the past 2 weeks have you had thoughts of killing yourself? no    Have you ever attempted to kill yourself? no    1. Wish to be Dead (Since Last Contact)  N   2. Non-Specific Active Suicidal Thoughts (Since Last Contact)  N   Actual Attempt (Since Last Contact)  N   Has subject engaged in non-suicidal self-injurious behavior? (Since Last Contact)  N   Interrupted Attempts (Since Last Contact)  N   Aborted or Self-Interrupted Attempt (Since Last Contact)  N   Preparatory Acts or Behavior (Since Last Contact)  N   Suicide (Since Last Contact)  N   Calculated C-SSRS Risk Score (Since Last Contact)  No Risk Indicated         Personal and Family Medical History:  Patient does report a family history of mental health concerns.  Patient reports family history includes Breast Cancer in her mother; Cerebrovascular Disease in an other family member; Family History Negative in an other family member; Hypertension in her brother, brother, father, and mother..     Patient does report Mental  "Health Diagnosis and/or Treatment.  Patient reported the following previous diagnoses which include(s): depression .  Patient reported symptoms began \"2020\".  Patient has received mental health services in the past:  therapy  and medication managment .  Psychiatric Hospitalizations: none when   ,  ,  ,  ,  ,  ,  ,  ,  ,  ,  .    Patient denies a history of civil commitment.      Currently, patient pt reports she sees a long term therapist and medication management is receiving other mental health services.  These include case management with Meredith Segal\" and psychotherapy with \"Héctor Higuera [facility]\" .       Patient has had a physical exam to rule out medical causes for current symptoms.  Date of last physical exam was within the past year. Client was encouraged to follow up with PCP if symptoms were to develop. The patient has a [facility] Provider, who is named Jimmy Moseley.  Patient reports  medical conditions per pt and EHR \"Spinal muscular atrophy\" .  Patient reports pain concerns including \"chronic pain\" per pt report.  Patient \"No, I go to a pain specialist every month\" does not  want help addressing pain concerns..   There are not significant appetite / nutritional concerns / weight changes.   Patient does not report a history of head injury / trauma / cognitive impairment, per pt report.    Patient reports current meds as: per ehr  No outpatient medications have been marked as taking for the 12/26/23 encounter (Virtual Visit) with Ghazala Olvera.       Medication Adherence:  Patient reports taking.  taking prescribed medications as prescribed.    Patient Allergies:  per ehr  Allergies   Allergen Reactions    Ibuprofen        Medical History:  per ehr  Past Medical History:   Diagnosis Date    Anxiety     Arthritis Unknown    Depressive disorder     I don't have it right now currently    History of blood transfusion     Snoring     Spinal muscle atrophy (H)          Current Mental Status " Exam:   Appearance:  Appropriate    Eye Contact:  Good   Psychomotor:  Normal       Gait / station:  .  Attitude / Demeanor: Cooperative  Interested Friendly Pleasant  Speech      Rate / Production: Normal/ Responsive      Volume:  Normal  volume      Language:  intact  Mood:   Normal  Affect:   Appropriate    Thought Content: Clear   Thought Process: Coherent  Logical       Associations: No loosening of associations  Insight:   Good   Judgment:  Intact   Orientation:  All  Attention/concentration: Good    Substance Use:   Patient did not report a family history of substance use concerns; see medical history section for details.  Patient has not received chemical dependency treatment in the past.  Patient has not ever been to detox.      Patient is not currently receiving any chemical dependency treatment. Patient reported the following problems as a result of their substance use:   N/A .    Patient denies using alcohol.  Patient denies using tobacco.  Patient denies using cannabis. Pt reports prescription  Patient denies using caffeine.  Patient reports using/abusing the following substance(s). Patient reported no other substance use.     Substance Use:  Pt denies use.    Based on the negative CAGE score and clinical interview there  are not indications of drug or alcohol abuse.    Significant Losses / Trauma / Abuse / Neglect Issues:   Patient   denies serve in the .  There are indications or report of significant loss, trauma, abuse or neglect issues related to: are no indications and client denies any losses, trauma, abuse, or neglect concerns.  Concerns for possible neglect are not present.     Safety Assessment:   Patient denies current homicidal ideation and behaviors.  Patient denies current self-injurious ideation and behaviors.    Patient denied risk behaviors associated with substance use.   Patient denies any high risk behaviors associated with mental health symptoms.  Patient reports the  following current concerns for their personal safety: None.  Patient reports there   firearms in the house.       There are no firearms in the home..    History of Safety Concerns:  Patient denied a history of homicidal ideation.     Patient denied a history of personal safety concerns.    Patient denied a history of assaultive behaviors.    Patient denied a history of sexual assault behaviors.     Patient  denies hx  associated with substance use.  Patient denies any history of high risk behaviors associated with mental health symptoms.  Patient reports the following protective factors:      Risk Plan:  See Recommendations for Safety and Risk Management Plan    Review of Symptoms per patient report:   Depression: Change in sleep, Change in energy level, Feelings of helplessness, Ruminations, Irritability, Feeling sad, down, or depressed, and Frequent crying  Vy:  No Symptoms  Psychosis: No Symptoms  Anxiety: Excessive worry  Panic:  No symptoms  Post Traumatic Stress Disorder:  No Symptoms   Eating Disorder: No Symptoms  ADD / ADHD:  No symptoms  Conduct Disorder: No symptoms  Autism Spectrum Disorder: No symptoms  Obsessive Compulsive Disorder: No Symptoms    Patient reports the following compulsive behaviors and treatment history:  none reported upon list dropdown. .      Diagnostic Criteria:   Major Depressive Disorder  CRITERIA (A-C) REPRESENT A MAJOR DEPRESSIVE EPISODE - SELECT THESE CRITERIA  A) Recurrent episode(s) - symptoms have been present during the same 2-week period and represent a change from previous functioning 5 or more symptoms (required for diagnosis)   - Depressed mood. Note: In children and adolescents, can be irritable mood.     - Diminished interest or pleasure in all, or almost all, activities.    - Increased sleep.    - Fatigue or loss of energy.    - Diminished ability to think or concentrate, or indecisiveness.     Functional Status:  Patient reports the following functional  impairments:  chronic disease management, health maintenance, home life with  , self-care, and social interactions.     Programmatic care:  Current LOCUS was assigned and patient needs the following level of care based on score 15  .      LOCUS Worksheet     Name: Lanie Neil MRN: [id number]    : 1990      Gender:  female    Provider Name: Ghazala Olvera           Rating completed by: patient      I. Risk of Harm:   2      Low Risk of Harm    II. Functional Status:   3      Moderate Impairment    III. Co-Morbidity:   3      Significant Co-Morbidity    IV - A. Recovery Environment - Level of Stress:   1      Low Stress Environment    IV - B. Recovery Environment - Level of Support:   1      Highly Supportive Environment    V. Treatment and Recovery History:   3      Moderate to Equivocal Response to Treatment and Recovery Management    VI. Engagement and Recovery Project:   2      Positive Engagement and Recovery       15 Composite Score 15    Level of Care Recommendation:   14 to 16       Low Intensity Community Based Services   Clinical Summary:  1. Psychosocial, Cultural and Contextual Factors: Mental health.  2. Principal DSM5 Diagnoses  (Sustained by DSM5 Criteria Listed Above):   296.32 (F33.1) Major Depressive Disorder, Recurrent Episode, Moderate _ and With anxious distress.  3. Other Diagnoses that is relevant to services:    4. Provisional Diagnosis:    5. Prognosis: Expect Improvement and Relieve Acute Symptoms.  6. Likely consequences of symptoms if not treated: without treatment pt likely to experience a continuation of symptoms.  7. Client strengths include:  caring, creative, empathetic, has a previous history of therapy, insightful, open to learning, open to suggestions / feedback, supportive, and wants to learn .     Recommendations:     1. Plan for Safety and Risk Management:   Safety and Risk: Recommended that patient call 911 or go to the local ED should there be a change in any of  these risk factors..          Report to child / adult protection services was NA.     2. Patient's identified romeo / Jain / spiritual influences will be incorporated into care by individual practice .     3. Initial Treatment will focus on:    Depressed Mood - some reported minimal anxiety, per pt and screeners .     4. Resources/Service Plan:    services are not indicated.   Modifications to assist communication are not indicated.   Additional disability accommodations  Pt reports medical condition .      5. Collaboration:   Collaboration / coordination of treatment will be initiated with the following  support professionals:  Navigation coordination handoff .      6.  Referrals:   The following referral(s) will be initiated:  Pt interested in IOP and PHP, already seeing a long term therapist .       A Release of Information has been obtained for the following:  n/a .     Clinical Substantiation/medical necessity for the above recommendations:  Pt sees a long term therapist, limited social supports.    7. ROBBY:    ROBBY:  Discussed the general effects of drugs and alcohol on health and well-being. Provider gave patient printed information about the  effects of chemical use on their health and well being.  Pt denies drug or alcohol use .     8. Records:   These were reviewed at time of assessment.   Information in this assessment was obtained from the medical record and  provided by patient who is a good and fair historian.    Patient will have open access to their mental health medical record.    9.   Interactive Complexity: No    10. Safety Plan:  Patient has no change in safety concerns. Committed to safety and agreed to follow previously developed safety plan.    Provider Name/ Credentials:  Ghazala Olvera Psychotherapist Trainee  December 26, 2023    ______________________________________________________________________________________________________________     Safety Plan      If I am feeling  unsafe or I am in a crisis, I will:   -Contact my established care providers   -Call the National Suicide Prevention Lifeline: [phone]   -Go to the nearest emergency room   -Call 911     Warning signs that I or other people might notice when a crisis is developing for me:  hopelessness  -Decreased appetite,    -Decreased sleep   -Restlessness   -Increased thoughts about wanting to die   -Increased irritability or agitation.      Things I am able to do on my own to cope or help me feel better:    -take a time-out. Practice yoga, listen to music, meditate, get a massage, or learn relaxation techniques. Stepping back from the problem helps clear your head.   -Eat well-balanced meals. Do not skip any meals. Do keep healthful, energy-boosting snacks on hand.   -Limit alcohol and caffeine   -Get enough sleep. When stressed, your body needs additional sleep and rest.    -Exercise daily to help you feel good and maintain your health.   -Accept that you cannot control everything. Put your stress in perspective: Is it really as bad as you think?    -Welcome humor. A good laugh goes a long way.     Things that I am able to do with others to cope or help me better:    -Listen and talk about concerns   -Continue to follow with outpatient care providers and case management    -Go for a walk   -Distract with going out to eat, to a movie or a park.      Things I can use or do for distraction:    -Watch a TV show. If you don't have cable or a subscription service, many television networks offer free access, without a log-in, until you get closer to the most recent episodes.   -Watch a movie. Light-hearted comedy, drama to suck you in, or an old favorite - there are countless films to whisk you away for a bit.   -Sing. It doesn't matter if you're a professional vocalist or can't to carry a tune, singing engages a completely different part of your brain. Plus, the vibrations in your chest give great sensory feedback and the  vocalization reminds you of your voice.   -Watch cute videos on [employer]. About as low-effort as it gets: puppy/kirill videos, laugh challenges, or Vine compilations - take your pick.  -Mindless doodles/finger painting/playing with rae. This may be especially helpful to those with child parts (DID/OSDD) who need an activity of their own.   -Grab a snack.   -Drum on a surface. Like singing, the vibrations and bilateral stimulation of your hands thumping will engage different parts of your mind and bring your attention away from what's intruding on you.   -Play a game or use a fun tae on your phone. Even if you aren't a , search the tae store. You might find one that speaks to you. It can be a great escape to get lost in for a bit.   -Video games. Any console, any game!   -Tear out words/photos/etc for a collage. Ask a local doctor's office or hairdresser for their spare magazines. Mindlessly rip out photos and words that speak to you. (Bonus: you may get to put tabloids to good use for once! They often have the scathing, overdramatic words that happen to be great for a therapeutic collages. Shocking! Betrayal! You Won't Believe It!).   -Discover new music. Peatix, Ultralife, -Campbell, so many ways to find new gems!   -Wash your face/hands or brush your teeth. A quick refresher can help you restart your day on a brand new page.   -Re-watch highlights from your favorite sport. It's easy to forget just how many epic, captivating moments there were once some time has passed. Relive your excitement. Plus, you already know how it ends, so you don't have to pay super close attention!   -Gratitude list. When your mind only wants to remind you of distressing things, focusing on 10+ things you're grateful for can really take you to a whole new atmosphere in your mind and heart.  -Imagery exercises. Containment exercises, healing pool/healing light, guided meditation, so many options!   -Play a board game with a friend.  Something simple like Sorry!, challenging like chess, or silly like Cards Against Humanity, there are lots of options to distract you in the company of friends.   -Card games. Solo works, too, if there's no one around.     Changes I can make to support my mental health and wellness:     1. Value yourself: Treat yourself with kindness and respect, and avoid self-criticism. Make time for your hobbies and favorite projects, or broaden your horizons. Do a daily crossword puzzle, plant a garden, take dance lessons, learn to play an instrument or become fluent in another language.     2. Take care of your body: Taking care of yourself physically can improve your mental health. Be sure to:   -Eat nutritious meals   -Avoid smoking and vaping-  -Drink plenty of water   -Exercise, which helps decrease depression and anxiety and improve moods   -Get enough sleep. Researchers believe that lack of sleep contributes to a high rate of depression in college students.      3. Surround yourself with good people: People with strong family or social connections are generally healthier than those who lack a support network. Make plans with supportive family members and friends, or seek out activities where you can meet new people, such as a club, class or support group.     4. Give yourself: Volunteer your time and energy to help someone else. You'll feel good about doing something tangible to help someone in need -- and it's a great way to meet new people. See Fun and Cheap Things to do in [place] for ideas.     5. Learn how to deal with stress: Like it or not, stress is a part of life. Practice good coping skills: Try One-Minute Stress Strategies, do Singh Chi, exercise, take a nature walk, play with your pet or try journal writing as a stress reducer. Also, remember to smile and see the humor in life. Research shows that laughter can boost your immune system, ease pain, relax your body and reduce stress.     6. Quiet your mind: Try  "meditating, Mindfulness and/or prayer. Relaxation exercises and prayer can improve your state of mind and outlook on life. In fact, research shows that meditation may help you feel calm and enhance the effects of therapy. To get connected, see spiritual resources on Personal Well-being for Students     7. Set realistic goals: Decide what you want to achieve academically, professionally and personally, and write down the steps you need to realize your goals. Aim high, but be realistic and don't over-schedule. You'll enjoy a tremendous sense of accomplishment and self-worth as you progress toward your goal. Wellness Coaching, free to - students, can help you develop goals and stay on track.      8. Break up the monotony: Although our routines make us more efficient and enhance our feelings of security and safety, a little change of pace can perk up a tedious schedule. Alter your jogging route, plan a road-trip, take a walk in a different park, hang some new pictures or try a new restaurant.     9. Avoid alcohol and other drugs: Keep alcohol use to a minimum and avoid other drugs. Sometimes people use alcohol and other drugs to \"self-medicate\" but in reality, alcohol and other drugs only aggravate problems.     10. Get help when you need it: Seeking help is a sign of strength -- not a weakness. And it is important to remember that treatment is effective. People who get appropriate care can recover from mental illness and addiction and lead full, rewarding lives.     People in my life that I can ask for help: my mom, therapist  -Friends   -Therapist    -Other Mental health Supportive Services     Your [facility] has a mental health crisis team you can call 24/7:    Phone: [phone] [facility] 24/7 number    Other things that are important when I m in crisis for myself or others to do:     -Keep your voice calm   -Avoid overreacting   -Listen to the person   -Express support and concern   -Avoid continuous eye " "contact   -Ask how you can help   -Keep stimulation level low   -Move slowly   -Offer options instead of trying to take control   -Avoid touching the person unless you ask permission   -Be patient    -Gently announce actions before initiating them    -Give them space, don t make them feel  trapped   -Don t make judgmental comments   -Don t argue or try to reason with the person     Additional resources and information:      National [place] on Mental Illness (EMMANUEL) [place]: Connect for help, to navigate the mental health system, and for support and for resources. Call: 3-831-OBBT-Helps / 7-507-393-8511     Crisis Text Line: The 24/7 emergency service is available if you or someone you know is experiencing a psychiatric or mental health crisis. Text: \"[place]\" to 786222     [place] Warmline: Are you an adult needing support? Talk to a specialist who has firsthand experience living with a mental health condition. Call: 199.768.2970. Text: \"Support\" to [address]12     National Suicide Prevention Lifeline: The 24/7 lifeline provides support when in distress, has prevention and crisis resources for you or your loved ones, and resources for professionals.  Call 8-095-903-TALK (0181)     Peer Support Connection Warmlines: Rxyb-gn-hrms telephone support that s safe and supportive. Open 5 p.m. to 9 a.m. Call or text: 1-500.328.7645      COVID Cares Stress Phone Support Service. Any [place] experiencing stress can call 448-LAJS8MN (982-668-0997) for free telephone support from 9am to 9pm every day. The service is a collaboration with volunteers from the [place] Psychiatric [employer], the [place] Psychological Association, the [place] [employer], and [facility] [place]. The free service is also accessible at [employer] where searchers can also find psychiatric and mental health services availability and real-time Substance Use Disorder Treatment program openings.     Mental Health Apps     My3  " [email or web address]/     VirtualHopeBox  [email or web address]

## 2023-12-27 ENCOUNTER — VIRTUAL VISIT (OUTPATIENT)
Dept: SURGERY | Facility: CLINIC | Age: 33
End: 2023-12-27
Payer: COMMERCIAL

## 2023-12-27 ENCOUNTER — PRE VISIT (OUTPATIENT)
Dept: SURGERY | Facility: CLINIC | Age: 33
End: 2023-12-27

## 2023-12-27 ENCOUNTER — ANESTHESIA EVENT (OUTPATIENT)
Dept: SURGERY | Facility: CLINIC | Age: 33
End: 2023-12-27
Payer: COMMERCIAL

## 2023-12-27 DIAGNOSIS — Z01.818 PRE-OP EVALUATION: Primary | ICD-10-CM

## 2023-12-27 DIAGNOSIS — I82.502 CHRONIC DEEP VEIN THROMBOSIS (DVT) OF LEFT LOWER EXTREMITY, UNSPECIFIED VEIN (H): ICD-10-CM

## 2023-12-27 PROCEDURE — 99215 OFFICE O/P EST HI 40 MIN: CPT | Mod: VID | Performed by: PHYSICIAN ASSISTANT

## 2023-12-27 RX ORDER — RISDIPLAM 0.75 MG/ML
6.6 POWDER, FOR SOLUTION ORAL EVERY MORNING
COMMUNITY
Start: 2023-12-26

## 2023-12-27 ASSESSMENT — PAIN SCALES - GENERAL: PAINLEVEL: MODERATE PAIN (5)

## 2023-12-27 ASSESSMENT — ENCOUNTER SYMPTOMS: SEIZURES: 0

## 2023-12-27 ASSESSMENT — LIFESTYLE VARIABLES: TOBACCO_USE: 0

## 2023-12-27 NOTE — PATIENT INSTRUCTIONS
Preparing for Your Surgery      Name:  Lanie Neil   MRN:  [id number]   :  1990   Today's Date:  2023       Arriving for surgery:  Surgery date:  24  Arrival time:  10:00 AM      Please come to:      [facility] [address]  [address] [place]  [address]      The [facility] Patient /Visitor Ramp is located at [address]. Patients and visitors who self-park will receive the reduced hospital parking rate. If the Patient /Visitor Ramp is full, please follow the signs to the [employer] parking located at the main hospital entrance.     parking is available ( 24 hours/ 7 days a week)    Discounted parking pass options are available for patients and visitors. They can be purchased at the [employer] desk at the main hospital entrance.    -    Stop at the security desk and they will direct surgery patients to the 3rd floor Surgery Waiting Room. [phone]     -  If you are in need of directions, wheelchair or escort please stop at the Information/security desk in the lobby.       What can I eat or drink?  -  You may eat and drink normally up to 8 hours prior to arrival time.   -  You may have clear liquids until 2 hours prior to arrival time.    Examples of clear liquids:  Water  Clear broth  Juices (apple, white grape, white cranberry  and cider) without pulp  Noncarbonated, powder based beverages  (lemonade and Jesse-Aid)  Sodas (Sprite, 7-Up, ginger ale and seltzer)  Coffee or tea (without milk or cream)  Gatorade    -  No Alcohol or cannabis products for at least 24 hours before surgery.     Which medicines can I take?    Hold Aspirin for 7 days before surgery.   Hold Multivitamins for 7 days before surgery.  Hold Supplements for 7 days before surgery.  Hold Ibuprofen (Advil, Motrin) for 1 day(s) before surgery--unless otherwise directed by surgeon.  Hold Naproxen (Aleve) for 4 days before surgery.    HOLD  XARELTO ( RIVAROXABAN) X 2 DOSES BEFORE SURGERY.    HOLD CELEBREX X 3 DAYS BEFORE SURGERY.    -  DO NOT take these medications the day of surgery:  Milk of Magnesium, potassium, miralax.    -  PLEASE TAKE these medications the day of surgery:  Tylenol if needed; take zoloft and prilosec. Bring inhalers if currently using.    How do I prepare myself?  - Please take 2 showers (one the night prior to surgery and one the morning of surgery) using Scrubcare or Hibiclens soap.    Use this soap only from the neck to your toes.     Leave the soap on your skin for one minute--then rinse thoroughly.      You may use your own shampoo and conditioner. No other hair products.   - Please remove all jewelry and body piercings.  - No lotions, deodorants or fragrance.  - No makeup or fingernail polish.   - Bring your ID and insurance card.    -If you use a CPAP machine, please bring the CPAP machine, tubing, and mask to hospital.    -If you have a Deep Brain Stimulator, Spinal Cord Stimulator, or any Neuro Stimulator device---you must bring the remote control to the hospital.      ALL PATIENTS GOING HOME THE SAME DAY OF SURGERY ARE REQUIRED TO HAVE A RESPONSIBLE ADULT TO DRIVE AND BE IN ATTENDANCE WITH THEM FOR 24 HOURS FOLLOWING SURGERY.    Covid testing policy as of 12/06/2022  Your surgeon will notify and schedule you for a COVID test if one is needed before surgery--please direct any questions or COVID symptoms to your surgeon      Questions or Concerns:    - For any questions regarding the day of surgery or your hospital stay, please contact the Pre Admission Nursing Office at [phone].       - If you have health changes between today and your surgery, please call your surgeon.       - For questions after surgery, please call your surgeons office.           Current Visitor Guidelines    You may have 2 visitors in the pre op area.    Visiting hours: 8 a.m. to 8:30 p.m.    Patients confirmed or suspected to have symptoms of  COVID 19 or flu:     No visitors allowed for adult patients.   Children (under age 18) can have 1 named visitor.     People who are sick or showing symptoms of COVID 19 or flu:    Are not allowed to visit patients--we can only make exceptions in special situations.       Please follow these guidelines for your visit:          Please maintain social distance          Masking is optional--however at times you may be asked to wear a mask for the safety of yourself and others     Clean your hands with alcohol hand . Do this when you arrive at and leave the building and patient room,    And again after you touch your mask or anything in the room.     Go directly to and from the room you are visiting.     Stay in the patient s room during your visit. Limit going to other places in the hospital as much as possible     Leave bags and jackets at home or in the car.     For everyone s health, please don t come and go during your visit. That includes for smoking   during your visit.

## 2023-12-27 NOTE — H&P
Pre-Operative H & P     CC:  Preoperative exam to assess for increased cardiopulmonary risk while undergoing surgery and anesthesia.    Date of Encounter: 12/27/2023  Primary Care Physician:  Jimmy Moseley     Reason for visit:   Encounter Diagnoses   Name Primary?    Pre-op evaluation Yes    Chronic deep vein thrombosis (DVT) of left lower extremity, unspecified vein (H)        HPI  Lanie Neil is a 33 year old female who presents for pre-operative H & P in preparation for  Procedure Information       Case: [id number] Date/Time: 01/24/24 1200    Procedure: ANESTHESIA OUT OF OR Inferior Vena Cava (IVC) Filter Removal @1200 (Update)    Anesthesia type: General    Diagnosis: Deep vein thrombosis (H) [I82.409]    Pre-op diagnosis: Deep vein thrombosis (H) [I82.409]    [facility]: [facility]    Providers: GENERIC ANESTHESIA PROVIDER            Patient is being evaluated for comorbid conditions of chronic respiratory failure (trach/ventilator dependent), anemia, quadriplegia secondary to spinal muscular atrophy, arthritis, chronic pain, obesity, GERD, constipation, anxiety, and depression.    She had an IVC filter placed approximately 11 years ago at an outside institution for history of DVT. She recently had a CT scan in October that showed an infrarenal IVC filter in place but it was noted to be slightly tilted and the hook appeared to be embedded in the wall of the IVC with significant strep penetration. She was evaluated by interventional radiology and removal was recommended. She is now scheduled as above.    Of note, patient is also on prophylactic anticoagulation. She has not had bleeding issues or a recurrent DVT or PE.     History is obtained from the patient and chart review. She is accompanied by her mom today.     Hx of abnormal bleeding or anti-platelet use: Xarelto    Menstrual history: Patient's last menstrual period was 12/13/2023 (approximate).     Past Medical History  Past Medical History:    Diagnosis Date    Anemia     Anxiety     Arthritis Unknown    Asthma     Asthma 06/18/2014    Chronic pain     Chronic respiratory failure (H)     Constipation     Depression     Depressive disorder     I don't have it right now currently    DVT (deep venous thrombosis) (H)     Gastrointestinal tube present (H)     History of blood transfusion     Obesity     Sinusitis, chronic     Snoring     Spinal muscle atrophy (H)        Past Surgical History  Past Surgical History:   Procedure Laterality Date    BACK SURGERY  '04    fusion of cervical and thoracic spine, 2004 and 2018    BIOPSY  1990    muscle biopsy    GASTROSTOMY TUBE  2004    IR IVC FILTER PLACEMENT  2012    IR PORT CHECK RIGHT  10/23/2023    right chest    KIDNEY SURGERY  2009    kidney stones    SPINE SURGERY      reservoir placed, has had three total procedures    TRACHEOSTOMY  2004       Prior to Admission Medications  Current Outpatient Medications   Medication Sig Dispense Refill    ACETAMINOPHEN PO Take 650 mg by mouth every 4 hours as needed for pain      albuterol (PROAIR HFA) 108 (90 Base) MCG/ACT inhaler Inhale 2 puffs into the lungs every 4 hours as needed for shortness of breath or wheezing (Patient taking differently: Inhale 2 puffs into the lungs 2 times daily) 18 g 5    albuterol (PROVENTIL) (2.5 MG/3ML) 0.083% neb solution USE ONE VIAL BY NEBULIZATION EVERY 6 HOURS AS NEEDED FOR SHORTNESS OF BREATH/ DYSPNEA OR WHEEZING (Patient taking differently: Take 2.5 mg by nebulization 3 times daily as needed USE ONE VIAL BY NEBULIZATION EVERY 6 HOURS AS NEEDED FOR SHORTNESS OF BREATH/ DYSPNEA OR WHEEZING) 360 mL 1    ALPRAZolam (XANAX) 0.25 MG tablet Take 1 tablet (0.25 mg) by mouth 3 times daily as needed for anxiety 60 tablet 0    aminocaproic acid (AMICAR) 0.25 GM/ML solution TAKE 12 ML (3 GRAMS) VIA G-TUBE EVERY 8 HOURS DURING MENSTRUAL PERIOD. 473 mL 5    azithromycin (ZITHROMAX) 200 MG/5ML suspension Take 12.5 ml po today then 6.25 ml once a  day for four more days (total five days) (Patient taking differently: Take by mouth as needed Take 12.5 ml po today then 6.25 ml once a day for four more days (total five days)) 40 mL 0    bisacodyl (DULCOLAX) 10 MG suppository Place 10 mg rectally daily as needed for constipation      celecoxib (CELEBREX) 200 MG capsule Take 1 capsule (200 mg) by mouth daily On days of period 12 capsule 5    diazepam (VALIUM) 5 MG tablet Take 1 tablet (5 mg) by mouth every 6 hours as needed for muscle spasms 30 tablet 1    EVRYSDI 0.75 MG/ML oral solution 6.6 mLs by Oral or NG Tube route every morning      famotidine (PEPCID) 40 MG/5ML suspension Take 2.5 mLs (20 mg) by mouth 2 times daily (Patient taking differently: Take 20 mg by mouth every morning) 150 mL 11    fluocinolone acetonide (DERMA SMOOTHE/FS BODY) 0.01 % external oil Apply at nighttime to forehead and scalp as needed for scale or pruritus (Patient taking differently: Apply topically at bedtime Apply at nighttime to forehead and scalp as needed for scale or pruritus) 118 mL 3    fluticasone (FLOVENT HFA) 110 MCG/ACT inhaler Inhale 1 puff into the lungs 2 times daily 36 g 5    gabapentin (NEURONTIN) 250 MG/5ML solution 20 mLs (1,000 mg) by Per Feeding Tube route 3 times daily 1800 mL 5    guaiFENesin (ORGANIDIN) 200 MG TABS tablet Take 200 mg by mouth every 4 hours as needed for cough      HYDROmorphone (DILAUDID) 4 MG tablet Take 4 mg by mouth every 3 hours      ketoconazole (NIZORAL) 2 % external shampoo Lather onto scalp and forehead once weekly when showering and let sit for 1-2 minutes before rinsing 120 mL 11    levofloxacin (LEVAQUIN) 25 MG/ML solution TAKE 20ML BY FEEDING TUBE ONCE DAILY FOR ONE WEEK (Patient taking differently: Take by mouth as needed TAKE 20ML BY FEEDING TUBE ONCE DAILY FOR ONE WEEK) 480 mL 1    Magnesium Hydroxide (MILK OF MAGNESIA PO) Take 30 mLs by mouth as needed      mometasone (NASONEX) 50 MCG/ACT nasal spray Spray 2 sprays in nostril  daily as needed      morphine (SABINE) 30 MG 24 hr capsule Take 30 mg by mouth 2 times daily      naloxone (NARCAN) 4 MG/0.1ML nasal spray Spray 1 spray (4 mg) into one nostril alternating nostrils once as needed for opioid reversal every 2-3 minutes until assistance arrives 0.2 mL 0    Nutritional Supplements (REPLETE FIBER) LIQD Take 1 Can by mouth 4 times daily      ondansetron (ZOFRAN-ODT) 4 MG ODT tab Take 1 tablet (4 mg) by mouth every 8 hours as needed for nausea 30 tablet 1    polyethylene glycol (MIRALAX) 17 GM/Dose powder Take 17 g by mouth daily (Patient taking differently: Take 17 g by mouth daily as needed) 714 g 5    potassium chloride (KAYCIEL) 20 MEQ/15ML (10%) solution 22.5 mLs (30 mEq) by Per G Tube route daily (Patient taking differently: 30 mEq by Per G Tube route every morning) 675 mL 11    prochlorperazine (COMPAZINE) 5 MG tablet Take 5 mg by mouth every 6 hours as needed for nausea or vomiting      rivaroxaban ANTICOAGULANT (XARELTO ANTICOAGULANT) 10 MG TABS tablet 1 tablet (10 mg) by Oral or Feeding Tube route daily (with dinner) (Patient taking differently: 10 mg by Oral or Feeding Tube route every morning) 90 tablet 3    Sennosides (SENNA) 8.8 MG/5ML LIQD 15 mLs by Feeding route 2 times daily (Patient taking differently: 15 mLs by Feeding route 2 times daily as needed) 900 mL 11    sertraline (ZOLOFT) 20 MG/ML (HIGH CONC) solution TAKE 2.5 ML(50 MG) BY MOUTH DAILY (Patient taking differently: Take 50 mg by mouth every morning TAKE 2.5 ML(50 MG) BY MOUTH DAILY) 225 mL 1    silver nitrate (ARZOL) 75-25 % miscellaneous Use one stick apply tip to granuloma prn granuloma formation 20 applicator 1    simethicone (MYLICON) 66.7 mg/ml as needed      simethicone 40 MG/0.6ML LIQD by NG or OG tube route as needed      sodium chloride (OCEAN) 0.65 % nasal spray Spray 2 sprays in nostril daily as needed      traZODone (DESYREL) 5 mg/ml SUSP Take 100 mg per feeding tube at bedtime prn insomnia (20 ml of  "the 5 mg/ml strength, or 10 ml of the 10 mg/ml strength is you dispense that) (Patient taking differently: 75 mg by Oral or Feeding Tube route at bedtime Take 100 mg per feeding tube at bedtime prn insomnia (20 ml of the 5 mg/ml strength, or 10 ml of the 10 mg/ml strength is you dispense that)) 600 mL 1    clindamycin (CLEOCIN T) 1 % lotion Apply topically 2 times daily To face as needed (Patient not taking: Reported on 12/27/2023) 60 mL 3    doxycycline hyclate (VIBRAMYCIN) 100 MG capsule GIVE ONE CAPSULE VIA \"G\"- TUBE TWICE DAILY (Patient not taking: Reported on 12/27/2023) 60 capsule 2    GENERLAC 10 GM/15ML solution  (Patient not taking: Reported on 1/27/2023)  11    hydrOXYzine (ATARAX) 25 MG tablet Take 25 mg by mouth every 6 hours as needed for itching (Patient not taking: Reported on 12/27/2023)      LANsoprazole (PREVACID) 30 MG DR capsule Take 30 mg by mouth (Patient not taking: Reported on 12/27/2023)      metroNIDAZOLE (METROCREAM) 0.75 % external cream Apply topically 2 times daily (Patient not taking: Reported on 12/27/2023) 45 g 11    Minoxidil (MINOXIDIL FOR WOMEN) 5 % FOAM Apply 1/2 capful once a day to affected area (Patient not taking: Reported on 12/27/2023) 60 g 3    mupirocin (BACTROBAN) 2 % external cream Apply topically 3 times daily (Patient not taking: Reported on 12/27/2023) 30 g 3    Nutritional Supplements (NUTREN 1.0/FIBER) LIQD Per G tube; 250 ml Per G Tube qid (Patient not taking: Reported on 12/27/2023) 11891 mL 11    omeprazole (PRILOSEC) 2 mg/mL suspension SHAKE LIQUID WELL AND GIVE 20ML (40MG) DAILY (VIA G-TUBE) (Patient not taking: Reported on 12/27/2023) 600 mL 9    order for DME Equipment being ordered: 16 fr 5cc balloon indwelling catheter with drainage bag. 2 catheters/month 2 catheter 11    order for DME Equipment being ordered: Vibracare Percussor 1 Units 0    Salicylic Acid (OXY BALANCE FACIAL CLEAN WASH EX) Externally apply 1 pad topically daily (Patient not taking: " Reported on 12/27/2023)      silver sulfADIAZINE (SSD) 1 % external cream Apply topically 2 times daily To affected areas. (Patient not taking: Reported on 12/27/2023) 400 g 3    traZODone (DESYREL) 5 mg/ml SUSP Take 12.5 ml (75 mg) po at bedtime Prn insomnia (Patient not taking: Reported on 12/27/2023) 375 mL 11    tretinoin (RETIN-A) 0.025 % external cream Apply a pea-sized amount evenly over the face at nighttime before bed. (Patient not taking: Reported on 12/27/2023) 45 g 2       Allergies  Allergies   Allergen Reactions    Ibuprofen        Social History  Social History     Socioeconomic History    Marital status: Single     Spouse name: Not on file    Number of children: Not on file    Years of education: Not on file    Highest education level: Not on file   Occupational History    Not on file   Tobacco Use    Smoking status: Never    Smokeless tobacco: Never   Substance and Sexual Activity    Alcohol use: Never    Drug use: Never    Sexual activity: Never   Other Topics Concern    Parent/sibling w/ CABG, MI or angioplasty before 65F 55M? No   Social History Narrative    Not on file     Social Determinants of Health     Financial Resource Strain: Low Risk  (11/23/2023)    Financial Resource Strain     Within the past 12 months, have you or your family members you live with been unable to get utilities (heat, electricity) when it was really needed?: No   Food Insecurity: Low Risk  (11/23/2023)    Food Insecurity     Within the past 12 months, did you worry that your food would run out before you got money to buy more?: No     Within the past 12 months, did the food you bought just not last and you didn t have money to get more?: No   Transportation Needs: High Risk (11/23/2023)    Transportation Needs     Within the past 12 months, has lack of transportation kept you from medical appointments, getting your medicines, non-medical meetings or appointments, work, or from getting things that you need?: Yes    Physical Activity: Not on file   Stress: Not on file   Social Connections: Not on file   Interpersonal Safety: Not on file   Housing Stability: Low Risk  (11/23/2023)    Housing Stability     Do you have housing? : Yes     Are you worried about losing your housing?: No       Family History  Family History   Problem Relation Age of Onset    Hypertension Mother     Breast Cancer Mother         No longer has it    Hypertension Father     Hypertension Brother     Hypertension Brother     Family History Negative Other     Cerebrovascular Disease Other     Cerebrovascular Disease Maternal Aunt     Deep Vein Thrombosis (DVT) Maternal Aunt     Deep Vein Thrombosis (DVT) Maternal Uncle     Anesthesia Reaction No family hx of        Review of Systems  The complete review of systems is negative other than noted in the HPI or here.   Anesthesia Evaluation   Pt has had prior anesthetic.     No history of anesthetic complications       ROS/MED HX  ENT/Pulmonary: Comment: Chronic respiratory failure - trach/ventilator dependent (LTV 1150)    Chronic sinusitis    (+)                              recent URI, resolved, cough treated with azithromycin 9/13/23:     (-) tobacco use and sleep apnea   Neurologic: Comment: Spinal muscular atrophy  Quadriplegia  Hx of brain bleed 2013 due to too much anticoagulation   (-) no seizures, no CVA and no TIA   Cardiovascular:  - neg cardiovascular ROS  (-) hypertension, CAD and dyslipidemia   METS/Exercise Tolerance:  Comment: Bed bound due to spinal muscular atrophy   Hematologic:     (+) History of blood clots (DVT x 2 in 2012),    pt is anticoagulated, anemia, history of blood transfusion, no previous transfusion reaction,        Musculoskeletal: Comment: Bilateral hip dislocation  Unable to turn to right side  (+)  arthritis,             GI/Hepatic: Comment: Constipation    Uses G-tube for all nutrition and meds. Able to take PO for enjoyment    (+) GERD, Asymptomatic on medication,                (-) liver disease   Renal/Genitourinary: Comment: Recurrent UTIs  Chronic fischer    (+)       Nephrolithiasis ,    (-) renal disease   Endo:     (+)               Obesity,    (-) Type II DM and thyroid disease   Psychiatric/Substance Use:     (+) psychiatric history anxiety and depression  H/O chronic opiod use . Recreational drug usage: Cannabis.    Infectious Disease:  - neg infectious disease ROS     Malignancy:  - neg malignancy ROS     Other:  - neg other ROS    (+)  , H/O Chronic Pain (hips, left shoulder),         Virtual visit -  No vitals were obtained    Physical Exam  Constitutional: Pleasant female, no apparent distress, and appears stated age.  Eyes: Pupils equal  HENT: Normocephalic and atraumatic  Respiratory: Non labored breathing. Trach/ventilator.   Neurologic: Awake, alert, oriented to name, place and time.   Neuropsychiatric: Calm, cooperative. Normal affect.     Prior Labs/Diagnostic Studies   All labs and imaging personally reviewed     EKG/ stress test - if available please see in ROS above   No results found.    The patient's records and results personally reviewed by this provider.     Outside records reviewed from: Care Everywhere      Assessment  Lanie Neil is a 33 year old female seen as a PAC referral for risk assessment and optimization for anesthesia.    Plan/Recommendations  Pt will be optimized for the proposed procedure.  See below for details on the assessment, risk, and preoperative recommendations    NEUROLOGY  - No history of TIA, CVA or seizure  - Spinal muscular atrophy  Diagnosed as a child. Is bedbound and ventilator dependent secondary to this.   - Chronic Pain  Follows with [facility]  On chronic opiates, morphine equivilant = 188 MME/Day   Consider consultation to inpatient pain team if needed.  -Post Op delirium risk factors:  High co-morbid index    ENT  - Patient has trach and is ventilator dependent (see pulm section for details)  Mallampati: Unable  "to assess  TM: Unable to assess    CARDIAC  - No history of CAD, Hypertension, and Afib  - METS (Metabolic Equivalents)  Patient CANNOT perform 4 METS exercise due to SMA. She denies chest pain.           Total Score: 1    Functional Capacity: Unable to complete 4 METS      RCRI-Very low risk: Class 1 0.4% complication rate            Total Score: 0        PULMONARY  - SMA with neuromuscular respiratory failure. She had a tracheostomy (cuffed 6 Bivona, no water in cuff per pulmonology note) placed prior to 2015 and has been on the ventilator (LTV 1150) due to recurrent aspiration pneumonitis - per pulmonology she has done well since that time. Last pulmonology visit 11/16/23, follow up 1 year recommended.    SOLE Low Risk            Total Score: 0      - Tobacco History    History   Smoking Status    Never   Smokeless Tobacco    Never       GI  - GERD  Controlled on medications: Proton Pump Inhibitor  - Constipation  Hold Miralax DOS  - Gastrostomy tube  Patient uses g-tube for all nutrition and medications. She is able to take in PO for enjoyment.    PONV Medium Risk  Total Score: 2           1 AN PONV: Pt is Female    1 AN PONV: Patient is not a current smoker        /RENAL  - Baseline Creatinine  <0.06 (stable)  - History of recurrent UTIs.  No current concerns  - Chronic fischer catheter    ENDOCRINE   - BMI: Estimated body mass index is 32.46 kg/m  as calculated from the following:    Height as of 11/16/23: 1.448 m (4' 9\").    Weight as of 11/16/23: 68 kg (150 lb).  Obesity (BMI >30)  - No history of Diabetes Mellitus    HEME  VTE Medium Risk 1.8%            Total Score: 7    VTE: Pt history of VTE    VTE: Family Hx of VTE      - History of DVT x 2 in 2012 s/p IVC filter placement. Removal of IVC filter planned as noted above.  - Coagulopathy secondary to Rivaroxaban (Xarelto). Hold for 2 doses prior to surgery per IR team.   - Chronic anemia  Recommend perioperative use of blood conservation techniques " intraoperatively and close monitoring for postoperative bleeding.      MSK  - Arthritis  - Chronic pain of hips  Patient states that both hips are dislocated  - Patient states she is not able to turn onto her right side  - History of multiple prior spine surgeries including fusion of cervical and thoracic spines  - Recommend consideration for careful positioning to limit patient discomfort.      PSYCH  - Anxiety, Depression  Continue current medication regimen    OTHER  - Patient has right chest port-a-cath. She reports it is difficult to access.    Different anesthesia methods/types have been discussed with the patient, but they are aware that the final plan will be decided by the assigned anesthesia provider on the date of service.  Patient was discussed with Dr. Mcneill and message sent to [facility] for DOS.     The patient is optimized for their procedure. AVS with information on surgery time/arrival time, meds and NPO status given by nursing staff. No further diagnostic testing indicated.    Please refer to the physical examination documented by the anesthesiologist in the anesthesia record on the day of surgery.    Video-Visit Details    Type of service:  Video Visit    Provider received verbal consent for a Video Visit from the patient? Yes   Video Start Time:  1020  Video End Time: 1042    Originating Location (pt. Location): Home    Distant Location (provider location):  Off-site  Mode of Communication:  Video Conference via [employer]  On the day of service:     Prep time: 20 minutes  Visit time: 22 minutes  Documentation time: 20 minutes  ------------------------------------------  Total time: 62 minutes      Rosa Zuluaga PA-C  Preoperative Assessment Center  [facility]  [facility]  Phone: [phone]  Fax: [phone]

## 2023-12-28 ENCOUNTER — TELEPHONE (OUTPATIENT)
Dept: HEMATOLOGY | Facility: CLINIC | Age: 33
End: 2023-12-28
Payer: MEDICARE

## 2023-12-28 DIAGNOSIS — Z79.01 CHRONIC ANTICOAGULATION: ICD-10-CM

## 2023-12-28 DIAGNOSIS — J96.10 CHRONIC RESPIRATORY FAILURE, UNSPECIFIED WHETHER WITH HYPOXIA OR HYPERCAPNIA (H): ICD-10-CM

## 2023-12-28 DIAGNOSIS — Z86.718 HISTORY OF DEEP VENOUS THROMBOSIS: ICD-10-CM

## 2023-12-28 RX ORDER — AMINOCAPROIC ACID 0.25 G/ML
SYRUP ORAL
Qty: 473 ML | Refills: 5 | Status: SHIPPED | OUTPATIENT
Start: 2023-12-28

## 2023-12-28 NOTE — TELEPHONE ENCOUNTER
[facility]  [facility]  [address] [place] [address], [address], [place] [address]  Main: [phone], Fax: [phone]    Telephone Note:    Patient: Lanie Neil  MRN: [id number]  : 1990  Date of this note written: 2023    Spoke with the patient today and re-confirmed her anticoagulation therapy management plan for her upcoming IVC filter extraction procedure on 2024. Hold rivaroxaban x 48 hours prior to the procedure.     She is inquiring about her recent CBC, and iron panel result. She seems to be still iron deficient but her hemoglobin has improved from 3 months ago. Thus I will have her start taking an oral iron supplement and await for her Ob/Gyn provider's appointment in 2024 in regard to her menorrhagia.     Component      Latest Ref Rng 2023  1:25 PM   WBC      4.0 - 11.0 10e3/uL 7.4    RBC Count      3.80 - 5.20 10e6/uL 3.74 (L)    Hemoglobin      11.7 - 15.7 g/dL 10.7 (L)    Hematocrit      35.0 - 47.0 % 34.4 (L)    MCV      78 - 100 fL 92    MCH      26.5 - 33.0 pg 28.6    MCHC      31.5 - 36.5 g/dL 31.1 (L)    RDW      10.0 - 15.0 % 17.5 (H)    Platelet Count      150 - 450 10e3/uL 330    % Neutrophils      % 80    % Lymphocytes      % 16    % Monocytes      % 2    % Eosinophils      % 2    % Basophils      % 0    % Immature Granulocytes      % 0    NRBCs per 100 WBC      <1 /100 0    Absolute Neutrophils      1.6 - 8.3 10e3/uL 5.8    Absolute Lymphocytes      0.8 - 5.3 10e3/uL 1.2    Absolute Monocytes      0.0 - 1.3 10e3/uL 0.2    Absolute Eosinophils      0.0 - 0.7 10e3/uL 0.2    Absolute Basophils      0.0 - 0.2 10e3/uL 0.0    Absolute Immature Granulocytes      <=0.4 10e3/uL 0.0    Absolute NRBCs      10e3/uL 0.0    Iron      37 - 145 ug/dL 58    Iron Binding Capacity      240 - 430 ug/dL 266    Iron Sat Index      15 - 46 % 22    Ferritin      6 - 175 ng/mL 22         Jt Y Mir PA-C, MPAS  Physician  Assistant  [facility].

## 2023-12-30 DIAGNOSIS — J45.909 UNCOMPLICATED ASTHMA, UNSPECIFIED ASTHMA SEVERITY, UNSPECIFIED WHETHER PERSISTENT: ICD-10-CM

## 2023-12-30 DIAGNOSIS — G12.9 SPINAL MUSCLE ATROPHY (H): ICD-10-CM

## 2023-12-31 NOTE — TELEPHONE ENCOUNTER
diazepam (VALIUM) 5 MG tablet   30 tablet 1 9/27/2023     albuterol (PROVENTIL) (2.5 MG/3ML) 0.083% neb solution     360 mL 1 12/7/2022 11/24/2023  [facility] Jimmy Patel MD  Family Medicine      Routed because: [place] controlled  Nurse: act. See pop up dosing differently than ordered.

## 2024-01-02 ENCOUNTER — VIRTUAL VISIT (OUTPATIENT)
Dept: BEHAVIORAL HEALTH | Facility: CLINIC | Age: 34
End: 2024-01-02
Payer: COMMERCIAL

## 2024-01-02 DIAGNOSIS — F41.1 GENERALIZED ANXIETY DISORDER: ICD-10-CM

## 2024-01-02 DIAGNOSIS — F33.1 MAJOR DEPRESSIVE DISORDER, RECURRENT EPISODE, MODERATE (H): Primary | ICD-10-CM

## 2024-01-02 PROCEDURE — 90837 PSYTX W PT 60 MINUTES: CPT | Mod: 95 | Performed by: PSYCHOLOGIST

## 2024-01-02 RX ORDER — DIAZEPAM 5 MG
5 TABLET ORAL EVERY 6 HOURS PRN
Qty: 30 TABLET | Refills: 1 | Status: SHIPPED | OUTPATIENT
Start: 2024-01-02 | End: 2024-04-10

## 2024-01-02 RX ORDER — ALBUTEROL SULFATE 0.83 MG/ML
SOLUTION RESPIRATORY (INHALATION)
Qty: 360 ML | Refills: 1 | Status: SHIPPED | OUTPATIENT
Start: 2024-01-02 | End: 2024-09-03

## 2024-01-02 ASSESSMENT — PATIENT HEALTH QUESTIONNAIRE - PHQ9
SUM OF ALL RESPONSES TO PHQ QUESTIONS 1-9: 3
10. IF YOU CHECKED OFF ANY PROBLEMS, HOW DIFFICULT HAVE THESE PROBLEMS MADE IT FOR YOU TO DO YOUR WORK, TAKE CARE OF THINGS AT HOME, OR GET ALONG WITH OTHER PEOPLE: NOT DIFFICULT AT ALL
SUM OF ALL RESPONSES TO PHQ QUESTIONS 1-9: 3

## 2024-01-02 NOTE — PROGRESS NOTES
[facility]: Integrated Behavioral Health  January 2, 2024          Behavioral Health Clinician Progress Note    Patient Name: Lanie Neil           Service Type: Video appointment      Service Location:  Video appointment      Session Start Time: 12:36  Session End Time: 1:36      Session Length: 53 - 60      Attendees: Patient    Visit Activities (Refresh list every visit): [facility] [place] Only    Service Modality:  Video Visit:      Provider verified identity through the following two step process.  Patient provided:  Patient photo and Patient is known previously to provider    Telemedicine Visit: The patient's condition can be safely assessed and treated via synchronous audio and visual telemedicine encounter.      Reason for Telemedicine Visit: Patient unable to travel    Originating Site (Patient Location): Patient's home    [place] [facility] [facility] [place]ent:  The patient/guardian has verbally consented to: the potential risks and benefits of telemedicine (video visit) versus in person care; bill my insurance or make self-payment for services provided; and responsibility for payment of non-covered services.     Patient would like the video invitation sent by:  My Chart    Mode of Communication:  Video Conference via [facility]    Distant Location (Provider):  On-site    As the provider I attest to compliance with applicable laws and regulations related to telemedicine.      Diagnostic Assessment Date: 1/19/2021; update 5/23/2023  Treatment Plan Review Date: 1/18/2024  See Flowsheets for today's PHQ-9 and KATHARINE-7 results  Previous PHQ-9:       6/30/2021     9:26 AM 11/16/2023     4:00 PM 12/26/2023    10:45 AM   PHQ-9 SCORE   PHQ-9 Total Score MyChart 2 (Minimal depression)  2 (Minimal depression)   PHQ-9 Total Score 2 0 2     Previous KATHARINE-7:       12/15/2020    10:52 AM 3/16/2021     9:15 AM 6/30/2021     9:27 AM   KATHARINE-7 SCORE   Total Score 1  "(minimal anxiety) 1 (minimal anxiety) 1 (minimal anxiety)   Total Score 1 1 1      3/4/2022  1:59 PM 3/15/2022  2:10 PM 3/30/2023  10:56 AM 7/6/2023  9:45 AM 10/18/2023  7:09 AM   PROMIS-10 Scores Only        Global Mental Health Score 13  8  7  12  11    Global Physical Health Score 11  9  9  11  8    PROMIS TOTAL - SUBSCORES 24  17  16  23  19          DATA  Extended Session (60+ minutes): No  Interactive Complexity: No  Crisis: No    Treatment Objective(s) Addressed in This Session:  Target Behavior(s): disease management/lifestyle changes      Depressed Mood: Decrease frequency and intensity of feeling down, depressed, hopeless  Identify negative self-talk and behaviors: challenge core beliefs, myths, and actions  Anxiety: will develop more effective coping skills to manage anxiety symptoms  Relationship Problems: will address relationship difficulties in a more adaptive manner  Psychological distress related to Pain    Current Stressors / Issues:  [facility] met with Lanie today to continue supporting her treatment of depression, adjustment to chronic disease management, and relationship problems.     Notes overall the holiday season for her was good, though notes an \"annoying thing\" happened. She provided background on the nature of the situation, which involved a conflict with her brother and his immediate family. Helped Lanie identify negative automatic thoughts and types of thought patterns that lead to \"downward spirals\" as we have discussed previously. Reviewed the effects of rumination on her daily life and what kind of negative thought patterns are present when she ruminates on interpersonal events. Explored in particular the role of emotional reasoning, all-or-nothing thinking, mind-reading, and jumping to conclusions. Discussed making use of a visual aid to help Lanie better identify how she tends to spiral downward, labeling negative automatic thoughts, categorizing them. CBT ideas were mainly used " today. DBT elements were also utilized.     Progress on Treatment Objective(s) / Homework:  Satisfactory progress - ACTION (Actively working towards change); Intervened by reinforcing change plan / affirming steps taken     Skills training  Explore skills useful to client in current situation.  Skills include assertiveness, communication, conflict management, problem-solving, relaxation, etc.    Solution-Focused Therapy  Explore patterns in patient's relationships and discuss options for new behaviors.  Explore patterns in patient's actions and choices and discuss options for new behaviors.    Cognitive-behavioral Therapy  Discuss common cognitive distortions, identify them in patient's life.  Explore ways to challenge, replace, and act against these cognitions.    Acceptance and Commitment Therapy  Explore and identify important values in patient's life.  Discuss ways to commit to behavioral activation around these values.    Interpersonal Psychotherapy  Explore patterns in relationships that are effective or ineffective at helping patient reach their goals, find satisfying experience.  Discuss new patterns or behaviors to engage in for improved social functioning.      Care Plan review completed: No    Medication Review:  No changes to current psychiatric medication(s)    Medication Compliance:  NA    Changes in Health Issues:   None reported    Chemical Use Review:   Substance Use: Chemical use reviewed, no active concerns identified      Tobacco Use: No current tobacco use.      Assessment: Current Emotional / Mental Status (status of significant symptoms):  Risk status (Self / Other harm or suicidal ideation)  Patient has had a history of suicidal ideation: passive thoughts only; easily controlled  - denied any recently    Patient denies current fears or concerns for personal safety.  Patient denies current or recent suicidal ideation or behaviors.  Patient denies current or recent homicidal ideation or  behaviors.  Patient denies current or recent self injurious behavior or ideation.  Patient denies other safety concerns.     A safety and risk management plan has not been developed at this time, however patient was encouraged to call [place] [phone] should there be a change in any of these risk factors.      Gaylord Suicide Severity Rating Scale (Short Version)      2/18/2021     4:47 PM 12/26/2023     2:00 PM   G[place]lord Suicide Severity Rating (Short Version)   Over the past 2 weeks have you felt down, depressed, or hopeless? yes    Over the past 2 weeks have you had thoughts of killing yourself? no    Have you ever attempted to kill yourself? no    1. Wish to be Dead (Since Last Contact)  N   2. Non-Specific Active Suicidal Thoughts (Since Last Contact)  N   Actual Attempt (Since Last Contact)  N   Has subject engaged in non-suicidal self-injurious behavior? (Since Last Contact)  N   Interrupted Attempts (Since Last Contact)  N   Aborted or Self-Interrupted Attempt (Since Last Contact)  N   Preparatory Acts or Behavior (Since Last Contact)  N   Suicide (Since Last Contact)  N   Calculated C-SSRS Risk Score (Since Last Contact)  No Risk Indicated     Gaylord Suicide Severity Rating Scale (Lifetime/Recent)      8/13/2021     1:25 PM 12/26/2023    11:59 AM   Gaylord Suicide Severity Rating (Lifetime/Recent)   Q1 Wish to be Dead (Lifetime) No    Q2 Non-Specific Active Suicidal Thoughts (Lifetime) No    Most Severe Ideation Rating (Lifetime) NA    Frequency (Lifetime) NA    Duration (Lifetime) NA    Controllability (Lifetime) NA    Protective Factors  (Lifetime) NA    Reasons for Ideation (Lifetime) NA    RETIRED: 1. Wish to be Dead (Recent) No    RETIRED: 2. Non-Specific Active Suicidal Thoughts (Recent) No    3. Active Suicidal Ideation with any Methods (Not Plan) Without Intent to Act (Lifetime) No    RETIRED: 3. Active Suicidal Ideation with any Methods (Not Plan) Without Intent to Act (Recent) No     RETIRE: 4. Active Suicidal Ideation with Some Intent to Act, Without Specific Plan (Lifetime) No    4. Active Suicidal Ideation with Some Intent to Act, Without Specific Plan (Recent) No    RETIRE: 5. Active Suicidal Ideation with Specific Plan and Intent (Lifetime) No    RETIRED: 5. Active Suicidal Ideation with Specific Plan and Intent (Recent) No    Most Severe Ideation Rating (Past Month) NA    Frequency (Past Month) NA    Duration (Past Month) NA    Controllability (Past Month) NA    Protective Factors (Past Month) NA    Reasons for Ideation (Past Month) NA    Actual Attempt (Lifetime) No    Actual Attempt (Past 3 Months) No    Has subject engaged in non-suicidal self-injurious behavior? (Lifetime) No    Has subject engaged in non-suicidal self-injurious behavior? (Past 3 Months) No    Interrupted Attempts (Lifetime) No    Interrupted Attempts (Past 3 Months) No    Aborted or Self-Interrupted Attempt (Lifetime) No    Aborted or Self-Interrupted Attempt (Past 3 Months) No    Preparatory Acts or Behavior (Lifetime) No    Preparatory Acts or Behavior (Past 3 Months) No    Most Recent Attempt Actual Lethality Code NA    Most Lethal Attempt Actual Lethality Code NA    Initial/First Attempt Actual Lethality Code NA    1. Wish to be Dead (Past 1 Month)  Y   2. Non-Specific Active Suicidal Thoughts (Past 1 Month)  N   Calculated C-SSRS Risk Score (Lifetime/Recent)  Low Risk         Appearance:   Appropriate   Eye Contact:   Good   Psychomotor Behavior: Normal   Attitude:   Cooperative   Orientation:   All  Speech   Rate / Production: Normal    Volume:  Normal   Mood:    Depressed    Affect:    Tearful  Thought Content:  Rumination   Thought Form:  Coherent  Logical   Insight:    Good     Diagnoses:  1. Major depressive disorder, recurrent episode, moderate (H)    2. Generalized anxiety disorder          Collateral Reports Completed:  [facility] will coordinate with care team as needed    Plan: (Homework, other):  Lanie  was scheduled to RTC in three weeks for help addressing depressive symptoms, pain, and relationship conflicts. Continue ACT/MBSR skills provided.  CD Recommendations: No indications of CD issues.     Héctor Higuera Psy.D, LP   Behavioral Health Clinician   [facility]     _______________________________________________________________________                                              Individual Treatment Plan    Patient's Name: Lanie Neil  YOB: 1990    Date of Creation: 3/4/2022  Date Treatment Plan Last Reviewed/Revised: 10/18/2023    DSM5 Diagnoses: 296.32 (F33.1) Major Depressive Disorder, Recurrent Episode, Moderate With anxious distress  Psychosocial / Contextual Factors: Chronic pain, SMA, disabled  PROMIS (reviewed every 90 days):      3/4/2022  1:59 PM 3/15/2022  2:10 PM 3/30/2023  10:56 AM 7/6/2023  9:45 AM 10/18/2023  7:09 AM   PROMIS-10 Scores Only        Global Mental Health Score 13  8  7  12  11    Global Physical Health Score 11  9  9  11  8    PROMIS TOTAL - SUBSCORES 24  17  16  23  19            Referral / Collaboration:  Referral to another professional/service is not indicated at this time..    Anticipated number of session for this episode of care: 7-9  Anticipation frequency of session: Every other week  Anticipated Duration of each session: 38-52 minutes  Treatment plan will be reviewed in 90 days or when goals have been changed.       MeasurableTreatment Goal(s) related to diagnosis / functional impairment(s)  Goal 1: Patient will experience a reduction in depressive symptoms along with a corresponding increase in positive emotion and life satisfaction.      Objective #A (Patient Action)    Patient will Increase interest, engagement, and pleasure in doing things.  Status: Continued - Date(s): 10/18/2023    Intervention(s)  Therapist will help patient identify pleasant and mastery oriented events that elicit positive, relaxed  "mood.    Objective #B  Patient will Decrease frequency and intensity of feeling down, depressed, hopeless.  Status: Continued - Date(s): 10/18/2023    Intervention(s)  Therapist will introduce patient to cognitive-behavioral and acceptance and commitment therapy topics aimed to help reduce depression and anxiety    Objective #C  Patient will Identify negative self-talk and behaviors: challenge core beliefs, myths, and actions  Improve concentration, focus, and mindfulness in daily activities .  Status: Continued - Date(s): 10/18/2023    Intervention(s)  Therapist will help patient identify and manage negative self-talk and automatic thoughts; introduce patient to cognitive distortions; help patient develop cognitive diffusion techniques      Goal 2: Patient will experience a reduction in anxious symptoms, along with a corresponding increase in relaxed emotional states and life satisfaction.      Objective #A (Patient Action)  Patient will use cognitive-behavioral and thought diffusion strategies identified in therapy to challenge anxious thoughts.    Status: Continued - Date(s): 10/18/2023    Intervention(s)  Therapist will utilize CBT and ACT ideas to help patient challenge anxious thoughts and reduce intensity/duration of anxious distress    Objective #B  Patient will use \"worry time\" each day for 15 minutes of scheduled worry and then defer obsessive or anxious thinking until the next structured worry time.    Status: Continued - Date(s): 10/18/2023    Intervention(s)  Therapist will teach patient how to effectively utilize worry time and/or thought logs/journals each day and incorporate more relaxation behaviors into their routine.    Objective #C  Patient will identify the stressors which contribute to feelings of anxiety  Patient will increase engagement in adaptive coping skills and recreational activities, such as exercise and healthy socialization, to manage distress.  Status: Continued - Date(s): " 10/18/2023    Intervention(s)  Therapist will help patient identify triggers/situational factors that contribute to anxiety and behavioral skills aimed to manage anxious distress.      Goal 3: Patient will learn more adaptive ways to manage chronic pain      Objective #A (Patient Action)    Status: Continued - Date(s): 10/18/2023    Patient will identify 2-4 strategies for managing pain.    Intervention(s)  Therapist will teach distraction skills aimed to help manage chronic pain and utilize ACT/CBT ideas to help with this (e.g. relaxation) .    Objective #B  Patient will state understanding of stressors and relationship to physical symptoms.    Status: Continued - Date(s): 10/18/2023      Other Possible Therapeutic Intervention(s):    Psycho-education regarding mental health diagnoses and treatment options    Supportive Therapy  Provide affirmations, reflections, and establish working rapport  Emphasize and reflect on strength of therapeutic relationship    Skills training  Explore skills useful to client in current situation.  Skills include assertiveness, communication, conflict management, problem-solving, relaxation, etc.    Solution-Focused Therapy  Explore patterns in patient's relationships and discuss options for new behaviors.  Explore patterns in patient's actions and choices and discuss options for new behaviors.    Cognitive-behavioral Therapy  Discuss common cognitive distortions, identify them in patient's life.  Explore ways to challenge, replace, and act against these cognitions.    Acceptance and Commitment Therapy  Explore and identify important values in patient's life.  Discuss ways to commit to behavioral activation around these values.    Psychodynamic psychotherapy  Discuss patient's emotional dynamics and issues and how they impact behaviors.  Explore patient's history of relationships and how they impact present behaviors.  Explore how to work with and make changes in these schemas and  patterns.    Narrative Therapy  Explore the patient's story of his/her life from his/her perspective.  Explore alternate ways of understanding their experience, identifying exceptions, developing new themes.    Interpersonal Psychotherapy  Explore patterns in relationships that are effective or ineffective at helping patient reach their goals, find satisfying experience.  Discuss new patterns or behaviors to engage in for improved social functioning.    Behavioral Activation  Discuss steps patient can take to become more involved in meaningful activity.  Identify barriers to these activities and explore possible solutions.    Mindfulness-Based Strategies  Discuss skills based on development and application of mindfulness.  Skills drawn from compassion-focused therapy, dialectical behavior therapy, mindfulness-based stress reduction, mindfulness-based cognitive therapy, etc.      Patient has reviewed and agreed to the above plan.    Héctor Higuera Psy.D, LP   Behavioral Health Clinician   [facility]     7/12/2023    Crisis Resources    Refer to the resources below as needed.    Steps to care for yourself    If you are currently in counseling, call your counselor for an appointment  Call the local crisis resources below if needed.  Contact friends or family for support.  Get more exercise.  Do activities you enjoy.  Eat a well-balanced diet and drink plenty of fluids.  Rest as needed.  Limit alcohol and recreational drugs. These can worsen depression.    When to contact your primary care provider     You have thoughts of harming or killing yourself but have not made a plan to carry it out.  Your depression gets in the way of daily activities.  You are often unable to sleep.  You need help cutting back on alcohol or recreational drugs.    When to call 911 or go to the Emergency Room     Get emergency help right away if you have any of the following:  You are planning to harm or kill  yourself and you have a way to carry out the plan.   You have injured yourself or others. Or, you think you will.  You feel confused or are having trouble thinking or remembering.  You are having delusions (false beliefs).  You are hearing voices or seeing things that aren t there.  You are feeling psychotic (paranoid, fearful, restless, agitated, nervous, racing thoughts or speech)    Crisis Resources     These hotlines are for both adults and children. The and are open 24 hours a day, 7 days a week unless noted otherwise.    988 - National Suicide and Crisis Lifeline  If you or someone you know needs support now, call or text 988 or chat TapDogline.org. 988 connects you with a trained crisis counselor who can help.    National Suicide Prevention Lifeline   0-547-479-TALK (3507)    Crisis Text Line    www.crisistextline.org  Text HOME to 370784 from anywhere in the [place], anytime, about any type of crisis. A live, trained crisis counselor will receive the text and respond quickly.    [place] Lifeline for LGBTQ Youth  A national crisis intervention and suicide lifeline for LGBTQ youth under 25. Provides a safe place to talk without judgement.   Call 1-220.348.6386; text START to 156829 or visit www.the[facility]project.org to talk to a trained counselor.  For [facility] crisis numbers, visit the [facility] website at:  https://[place].gov/dhs/people-we-serve/adults/health-care/mental-health/resources/crisis-contacts.jsp      Héctor Higuera Psy.D, LP   Behavioral Health Clinician   [facility]     6/3/2022      Answers submitted by the patient for this visit:  Patient Health Questionnaire (Submitted on 1/2/2024)  If you checked off any problems, how difficult have these problems made it for you to do your work, take care of things at home, or get along with other people?: Not difficult at all  PHQ9 TOTAL SCORE: 3

## 2024-01-03 ENCOUNTER — PATIENT OUTREACH (OUTPATIENT)
Dept: CARE COORDINATION | Facility: CLINIC | Age: 34
End: 2024-01-03

## 2024-01-03 ENCOUNTER — OFFICE VISIT (OUTPATIENT)
Dept: OBGYN | Facility: CLINIC | Age: 34
End: 2024-01-03
Payer: COMMERCIAL

## 2024-01-03 VITALS
DIASTOLIC BLOOD PRESSURE: 71 MMHG | HEIGHT: 57 IN | BODY MASS INDEX: 31.92 KG/M2 | SYSTOLIC BLOOD PRESSURE: 109 MMHG | HEART RATE: 61 BPM

## 2024-01-03 DIAGNOSIS — N90.89 PERINEAL MASS IN FEMALE: ICD-10-CM

## 2024-01-03 DIAGNOSIS — R19.00 PELVIC MASS: Primary | ICD-10-CM

## 2024-01-03 PROCEDURE — 99204 OFFICE O/P NEW MOD 45 MIN: CPT | Mod: GC | Performed by: OBSTETRICS & GYNECOLOGY

## 2024-01-03 PROCEDURE — G0463 HOSPITAL OUTPT CLINIC VISIT: HCPCS

## 2024-01-03 ASSESSMENT — ANXIETY QUESTIONNAIRES
6. BECOMING EASILY ANNOYED OR IRRITABLE: NOT AT ALL
IF YOU CHECKED OFF ANY PROBLEMS ON THIS QUESTIONNAIRE, HOW DIFFICULT HAVE THESE PROBLEMS MADE IT FOR YOU TO DO YOUR WORK, TAKE CARE OF THINGS AT HOME, OR GET ALONG WITH OTHER PEOPLE: NOT DIFFICULT AT ALL
7. FEELING AFRAID AS IF SOMETHING AWFUL MIGHT HAPPEN: NOT AT ALL
8. IF YOU CHECKED OFF ANY PROBLEMS, HOW DIFFICULT HAVE THESE MADE IT FOR YOU TO DO YOUR WORK, TAKE CARE OF THINGS AT HOME, OR GET ALONG WITH OTHER PEOPLE?: NOT DIFFICULT AT ALL
7. FEELING AFRAID AS IF SOMETHING AWFUL MIGHT HAPPEN: NOT AT ALL
3. WORRYING TOO MUCH ABOUT DIFFERENT THINGS: NOT AT ALL
4. TROUBLE RELAXING: SEVERAL DAYS
GAD7 TOTAL SCORE: 1
5. BEING SO RESTLESS THAT IT IS HARD TO SIT STILL: NOT AT ALL
GAD7 TOTAL SCORE: 1
1. FEELING NERVOUS, ANXIOUS, OR ON EDGE: NOT AT ALL
2. NOT BEING ABLE TO STOP OR CONTROL WORRYING: NOT AT ALL

## 2024-01-03 ASSESSMENT — PAIN SCALES - GENERAL: PAINLEVEL: NO PAIN (0)

## 2024-01-03 NOTE — PROGRESS NOTES
"[facility]  Gynecology Visit    Reason for Visit: Perineal Mass    HPI:    Lanie Neil is a 33 year old with a history of chronic respiratory failure (trach/ventilator dependent), quadriplegia secondary to SMA, chronic pain, obesity, asthma, DVT w/IVC filter in place on Xarelto, KATHARINE/MDD, s/p G-tube and chronic fischer catheter here for AUB and a perineal mass. She is here today with her mother who helps provide collateral history.    In terms of her AUB, she reports that she has had heavy periods for as long as she can remember. She gets regular monthly menstrual cycles with ~6 days of bleeding. Bleeding is heavy and is poorly quantified given use of diapers but she reports occasional passage of large clots. She has some cramping with periods though it is not too significant. No known history of structural causes. She has never taken anything for her AUB including hormonal/non-hormonal medications. Lanie reports she is fearful of the IUD given pain with placement and is hesitant to try any other hormonal options given \"chemical alterations\" she has read about.    In terms of her perineal mass, Lanie and her mother report that since she was a child she has had a \"bump on the outside like a testicle.\" They report that it widely varies in size and will spontaneously grow/shrink however it has never fully gone away. It is not painful, is not associated with itching/burning, it never spontaneously ruptures though once it was accidentally scratched and released some clear fluid. No one has ever evaluated or worked up the mass. It is not particularly bothersome however they would like to know what it is.    Finally, Lanie reports previously being diagnosed with PCOS and being bothered by facial hair growth.     GYN History  Age at menarche: 12   Length of menstrual cycle: every 28-30 days  Duration of menses: 4 to 6 days  Heavy bleeding: yes, as above  Pain with menses: No  LMP: 12/13/23  Sexually active: " No  Pap Smears: Has never had a pap smear  Contraception: Denies    OBHx      PMH  Past Medical History:   Diagnosis Date    Anemia     Anxiety     Arthritis Unknown    Asthma     Asthma 2014    Chronic pain     Chronic respiratory failure (H)     Constipation     Depression     Depressive disorder     I don't have it right now currently    DVT (deep venous thrombosis) (H)     Gastrointestinal tube present (H)     History of blood transfusion     Obesity     Sinusitis, chronic     Snoring     Spinal muscle atrophy (H)      PSH  Past Surgical History:   Procedure Laterality Date    BACK SURGERY  '    fusion of cervical and thoracic spine,  and     BIOPSY      muscle biopsy    GASTROSTOMY TUBE      IR IVC FILTER PLACEMENT      IR PORT CHECK RIGHT  10/23/2023    right chest    KIDNEY SURGERY      kidney stones    SPINE SURGERY      reservoir placed, has had three total procedures    TRACHEOSTOMY       Medications    Current Outpatient Medications:     ACETAMINOPHEN PO, Take 650 mg by mouth every 4 hours as needed for pain, Disp: , Rfl:     ALPRAZolam (XANAX) 0.25 MG tablet, Take 1 tablet (0.25 mg) by mouth 3 times daily as needed for anxiety, Disp: 60 tablet, Rfl: 0    azithromycin (ZITHROMAX) 200 MG/5ML suspension, Take 12.5 ml po today then 6.25 ml once a day for four more days (total five days) (Patient taking differently: Take by mouth as needed Take 12.5 ml po today then 6.25 ml once a day for four more days (total five days)), Disp: 40 mL, Rfl: 0    bisacodyl (DULCOLAX) 10 MG suppository, Place 10 mg rectally daily as needed for constipation, Disp: , Rfl:     celecoxib (CELEBREX) 200 MG capsule, Take 1 capsule (200 mg) by mouth daily On days of period, Disp: 12 capsule, Rfl: 5    clindamycin (CLEOCIN T) 1 % lotion, Apply topically 2 times daily To face as needed, Disp: 60 mL, Rfl: 3    diazepam (VALIUM) 5 MG tablet, Take 1 tablet (5 mg) by mouth every 6 hours as needed  for muscle spasms, Disp: 30 tablet, Rfl: 1    EVRYSDI 0.75 MG/ML oral solution, 6.6 mLs by Oral or NG Tube route every morning, Disp: , Rfl:     famotidine (PEPCID) 40 MG/5ML suspension, Take 2.5 mLs (20 mg) by mouth 2 times daily (Patient taking differently: Take 20 mg by mouth every morning), Disp: 150 mL, Rfl: 11    fluocinolone acetonide (DERMA SMOOTHE/FS BODY) 0.01 % external oil, Apply at nighttime to forehead and scalp as needed for scale or pruritus (Patient taking differently: Apply topically at bedtime Apply at nighttime to forehead and scalp as needed for scale or pruritus), Disp: 118 mL, Rfl: 3    fluticasone (FLOVENT HFA) 110 MCG/ACT inhaler, Inhale 1 puff into the lungs 2 times daily, Disp: 36 g, Rfl: 5    gabapentin (NEURONTIN) 250 MG/5ML solution, 20 mLs (1,000 mg) by Per Feeding Tube route 3 times daily, Disp: 1800 mL, Rfl: 5    guaiFENesin (ORGANIDIN) 200 MG TABS tablet, Take 200 mg by mouth every 4 hours as needed for cough, Disp: , Rfl:     HYDROmorphone (DILAUDID) 4 MG tablet, Take 4 mg by mouth every 3 hours, Disp: , Rfl:     ketoconazole (NIZORAL) 2 % external shampoo, Lather onto scalp and forehead once weekly when showering and let sit for 1-2 minutes before rinsing, Disp: 120 mL, Rfl: 11    LANsoprazole (PREVACID) 30 MG DR capsule, Take 30 mg by mouth, Disp: , Rfl:     Magnesium Hydroxide (MILK OF MAGNESIA PO), Take 30 mLs by mouth as needed, Disp: , Rfl:     metroNIDAZOLE (METROCREAM) 0.75 % external cream, Apply topically 2 times daily, Disp: 45 g, Rfl: 11    Minoxidil (MINOXIDIL FOR WOMEN) 5 % FOAM, Apply 1/2 capful once a day to affected area, Disp: 60 g, Rfl: 3    mometasone (NASONEX) 50 MCG/ACT nasal spray, Spray 2 sprays in nostril daily as needed, Disp: , Rfl:     morphine (SABINE) 30 MG 24 hr capsule, Take 30 mg by mouth 2 times daily, Disp: , Rfl:     Nutritional Supplements (REPLETE FIBER) LIQD, Take 1 Can by mouth 4 times daily, Disp: , Rfl:     omeprazole (PRILOSEC) 2 mg/mL  suspension, SHAKE LIQUID WELL AND GIVE 20ML (40MG) DAILY (VIA G-TUBE), Disp: 600 mL, Rfl: 9    ondansetron (ZOFRAN-ODT) 4 MG ODT tab, Take 1 tablet (4 mg) by mouth every 8 hours as needed for nausea, Disp: 30 tablet, Rfl: 1    order for DME, Equipment being ordered: 16 fr 5cc balloon indwelling catheter with drainage bag. 2 catheters/month, Disp: 2 catheter, Rfl: 11    order for DME, Equipment being ordered: Vibracare Percussor, Disp: 1 Units, Rfl: 0    polyethylene glycol (MIRALAX) 17 GM/Dose powder, Take 17 g by mouth daily (Patient taking differently: Take 17 g by mouth daily as needed), Disp: 714 g, Rfl: 5    prochlorperazine (COMPAZINE) 5 MG tablet, Take 5 mg by mouth every 6 hours as needed for nausea or vomiting, Disp: , Rfl:     rivaroxaban ANTICOAGULANT (XARELTO ANTICOAGULANT) 10 MG TABS tablet, 1 tablet (10 mg) by Oral or Feeding Tube route daily (with dinner) (Patient taking differently: 10 mg by Oral or Feeding Tube route every morning), Disp: 90 tablet, Rfl: 3    Sennosides (SENNA) 8.8 MG/5ML LIQD, 15 mLs by Feeding route 2 times daily (Patient taking differently: 15 mLs by Feeding route 2 times daily as needed), Disp: 900 mL, Rfl: 11    sertraline (ZOLOFT) 20 MG/ML (HIGH CONC) solution, TAKE 2.5 ML(50 MG) BY MOUTH DAILY (Patient taking differently: Take 50 mg by mouth every morning TAKE 2.5 ML(50 MG) BY MOUTH DAILY), Disp: 225 mL, Rfl: 1    simethicone (MYLICON) 66.7 mg/ml, as needed, Disp: , Rfl:     simethicone 40 MG/0.6ML LIQD, by NG or OG tube route as needed, Disp: , Rfl:     sodium chloride (OCEAN) 0.65 % nasal spray, Spray 2 sprays in nostril daily as needed, Disp: , Rfl:     traZODone (DESYREL) 5 mg/ml SUSP, Take 100 mg per feeding tube at bedtime prn insomnia (20 ml of the 5 mg/ml strength, or 10 ml of the 10 mg/ml strength is you dispense that) (Patient taking differently: 75 mg by Oral or Feeding Tube route at bedtime Take 100 mg per feeding tube at bedtime prn insomnia (20 ml of the 5 mg/ml  "strength, or 10 ml of the 10 mg/ml strength is you dispense that)), Disp: 600 mL, Rfl: 1    albuterol (PROAIR HFA) 108 (90 Base) MCG/ACT inhaler, Inhale 2 puffs into the lungs every 4 hours as needed for shortness of breath or wheezing (Patient not taking: Reported on 1/3/2024), Disp: 18 g, Rfl: 5    albuterol (PROVENTIL) (2.5 MG/3ML) 0.083% neb solution, USE ONE VIAL BY NEBULIZATION EVERY 6 HOURS AS NEEDED FOR SHORTNESS OF BREATH/ DYSPNEA OR WHEEZING (Patient not taking: Reported on 1/3/2024), Disp: 360 mL, Rfl: 1    aminocaproic acid (AMICAR) 0.25 GM/ML solution, Take 12 mL (3 grams) via G-tube every 8 hours during menstrual period., Disp: 473 mL, Rfl: 5    doxycycline hyclate (VIBRAMYCIN) 100 MG capsule, GIVE ONE CAPSULE VIA \"G\"- TUBE TWICE DAILY (Patient not taking: Reported on 12/27/2023), Disp: 60 capsule, Rfl: 2    GENERLAC 10 GM/15ML solution, , Disp: , Rfl: 11    hydrOXYzine (ATARAX) 25 MG tablet, Take 25 mg by mouth every 6 hours as needed for itching (Patient not taking: Reported on 12/27/2023), Disp: , Rfl:     levofloxacin (LEVAQUIN) 25 MG/ML solution, TAKE 20ML BY FEEDING TUBE ONCE DAILY FOR ONE WEEK (Patient not taking: Reported on 1/3/2024), Disp: 480 mL, Rfl: 1    mupirocin (BACTROBAN) 2 % external cream, Apply topically 3 times daily (Patient not taking: Reported on 12/27/2023), Disp: 30 g, Rfl: 3    naloxone (NARCAN) 4 MG/0.1ML nasal spray, Spray 1 spray (4 mg) into one nostril alternating nostrils once as needed for opioid reversal every 2-3 minutes until assistance arrives (Patient not taking: Reported on 1/3/2024), Disp: 0.2 mL, Rfl: 0    Nutritional Supplements (NUTREN 1.0/FIBER) LIQD, Per G tube; 250 ml Per G Tube qid (Patient not taking: Reported on 12/27/2023), Disp: 74900 mL, Rfl: 11    potassium chloride (KAYCIEL) 20 MEQ/15ML (10%) solution, 22.5 mLs (30 mEq) by Per G Tube route daily (Patient not taking: Reported on 1/3/2024), Disp: 675 mL, Rfl: 11    Salicylic Acid (OXY BALANCE FACIAL " CLEAN WASH EX), Externally apply 1 pad topically daily (Patient not taking: Reported on 12/27/2023), Disp: , Rfl:     silver nitrate (ARZOL) 75-25 % miscellaneous, Use one stick apply tip to granuloma prn granuloma formation (Patient not taking: Reported on 1/3/2024), Disp: 20 applicator, Rfl: 1    silver sulfADIAZINE (SSD) 1 % external cream, Apply topically 2 times daily To affected areas. (Patient not taking: Reported on 12/27/2023), Disp: 400 g, Rfl: 3    traZODone (DESYREL) 5 mg/ml SUSP, Take 12.5 ml (75 mg) po at bedtime Prn insomnia (Patient not taking: Reported on 12/27/2023), Disp: 375 mL, Rfl: 11    tretinoin (RETIN-A) 0.025 % external cream, Apply a pea-sized amount evenly over the face at nighttime before bed. (Patient not taking: Reported on 12/27/2023), Disp: 45 g, Rfl: 2    Allergies  Allergies   Allergen Reactions    Ibuprofen      Family Hx  Family History   Problem Relation Age of Onset    Hypertension Mother     Breast Cancer Mother         No longer has it    Hypertension Father     Hypertension Brother     Hypertension Brother     Family History Negative Other     Cerebrovascular Disease Other     Cerebrovascular Disease Maternal Aunt     Deep Vein Thrombosis (DVT) Maternal Aunt     Deep Vein Thrombosis (DVT) Maternal Uncle     Anesthesia Reaction No family hx of      Social Hx  Social History     Socioeconomic History    Marital status: Single     Spouse name: Not on file    Number of children: Not on file    Years of education: Not on file    Highest education level: Not on file   Occupational History    Not on file   Tobacco Use    Smoking status: Never    Smokeless tobacco: Never   Vaping Use    Vaping Use: Never used   Substance and Sexual Activity    Alcohol use: Never    Drug use: Never    Sexual activity: Never   Other Topics Concern    Parent/sibling w/ CABG, MI or angioplasty before 65F 55M? No   Social History Narrative    Not on file     Social Determinants of Health     Financial  "Resource Strain: Low Risk  (11/23/2023)    Financial Resource Strain     Within the past 12 months, have you or your family members you live with been unable to get utilities (heat, electricity) when it was really needed?: No   Food Insecurity: Low Risk  (11/23/2023)    Food Insecurity     Within the past 12 months, did you worry that your food would run out before you got money to buy more?: No     Within the past 12 months, did the food you bought just not last and you didn t have money to get more?: No   Transportation Needs: High Risk (11/23/2023)    Transportation Needs     Within the past 12 months, has lack of transportation kept you from medical appointments, getting your medicines, non-medical meetings or appointments, work, or from getting things that you need?: Yes   Physical Activity: Not on file   Stress: Not on file   Social Connections: Not on file   Interpersonal Safety: Not on file   Housing Stability: Low Risk  (11/23/2023)    Housing Stability     Do you have housing? : Yes     Are you worried about losing your housing?: No     ROS: 10-Point ROS negative except as noted in HPI    Physical Exam  /71   Pulse 61   Ht 1.46 m (4' 9.48\")   LMP 12/13/2023 (Approximate)   BMI 31.92 kg/m    Gen: NAD, laying in reclining wheelchair  CV:  Regular rate  Pulm: Tracheostomy/ventilator dependent  Pelvic:  Diffusely/significantly enlarged right labia extending to/partially distorting the clitoral bennett with palpable fluctuance though without discrete masses or cysts (~4x the size of the contralateral labia). Some dependent edema however mass much larger than typical presentation for that. To palpation, labial tissue fluctuant/non-tender, no firmness appreciated. No overlying skin changes including no lesions or erythema. Lateral to edematous labia majora is what appears to be a small sliver of normal labial tissue. To palpation, edema does not track into vagina, perineum, or inguinal canal. Normal " "appearing left labia. Vaginal mucosa at introitus pink and well rugaeted. Chronic fischer catheter in place. No inguinal lymphadenopathy. Normal mons pubis with normal hair distribution.     Assessment/Plan:  Lanie Neil is a 33 year old  with a history of chronic respiratory failure (trach/ventilator dependent), quadriplegia secondary to SMA, chronic pain, obesity, asthma, DVT w/IVC filter in place on Xarelto, KATHARINE/MDD, s/p G-tube and chronic fischer catheter here for AUB and a perineal mass.    # AUB  Discussed possible etiologies of AUB using PALM-COIEN mnemonic. Previous CT Abd/Pelvis without obvious structural lesions however no prior pelvic US or other imaging to rule out structural cause of bleeding though all possible. However, most likely etiology is coagulopathy secondary to Xarelto use in the setting of previous DVT (). Planning for IVC filter removal at the end of this month given malposition. Discussed workup for structural causes and management options including expectant and medical. At this time, she is not interested in an IUD unless it was able to be placed under anesthesia and is not interested in systemic hormone use given concern for \"chemical changes\" also not a candidate for all hormonal treatment options given history of DVT.   - Pelvic MRI as below; can consider pelvic US for further imaging if MRI unable to visualize obvious structural causes  - Discussed possibility of switching to alternate form of anticoagulation given that Xarelto commonly causes AUB; she is planning to discuss with her hematologist   - If uninterested in any form of hormonal management, could consider TXA use after thorough counseling regarding potential risks of repeat blood clot though this was not discussed today given upcoming plan for IVC filter removal    # Perineal mass  Large vulvar mass of unknown etiology present on examination today. On examination, entire right labia appears diffusely and " significantly edematous with palpable fluctuance. Possible differential includes canal of Nuck cyst, dependent edema, vulvar malignancy, Bartholin gland cyst though lesion not pathognomonic for any of these etiologies. No significant symptoms from this mass including no pain.   - Pelvic MRI to further specify cause; plan to follow up after MRI to discuss next steps including possible excision with possible involvement from gyn/onc depending on etiology    # Hirsutism  - Discussed possible Spironolactone use; reviewed medication list and no significant interactions however concern for possible potassium derangements with G-tube feedings and Kayciel use. Discussed that given time spent discussing above etiologies; will plan to discuss further at follow up appointment.  - Can consider topical eflornithine (not discussed today; will plan to discuss at follow up appointment)    Return to clinic after MRI    Staffed with Dr. Lam who was present for pelvic examination    Lurdes Ivey MD  Ob/Gyn PGY-2  01/03/24 7:36 PM    Patient was seen by the resident in [facility].  I reviewed the history & was present for the patient's exam.  The patient's assessment and plan were made jointly.    Sharri Lam MD MPH

## 2024-01-05 DIAGNOSIS — J96.10 CHRONIC RESPIRATORY FAILURE, UNSPECIFIED WHETHER WITH HYPOXIA OR HYPERCAPNIA (H): ICD-10-CM

## 2024-01-05 DIAGNOSIS — Z86.718 HISTORY OF DEEP VENOUS THROMBOSIS: ICD-10-CM

## 2024-01-05 DIAGNOSIS — Z79.01 CHRONIC ANTICOAGULATION: ICD-10-CM

## 2024-01-05 NOTE — PROGRESS NOTES
"Colon and Rectal Surgery Clinic Note    RE: Lanie Neil.  : 1990.  JOSÉ MIGUEL: 2024.    Lanie Gutierres is a 33 year old female who is being evaluated via a billable video visit.      The patient has been notified of following:     \"This phone visit will be conducted via a call between you and your physician/provider. We have found that certain health care needs can be provided without the need for an in-person physical exam.  This service lets us provide the care you need with a video conversation.  If a prescription is necessary we can send it directly to your pharmacy.  If lab work is needed we can place an order for that and you can then stop by our lab to have the test done at a later time.    Phone visits are billed at different rates depending on your insurance coverage. Please reach out to your insurance provider with any questions.    If during the course of the call the physician/provider feels a video visit is not appropriate, you will not be charged for this service.\"    Patient has given verbal consent for phone visit? Yes    Reason for visit: colostomy placement.    HPI: Lanie Neil is a 33 year old female who presents today to discuss colostomy placement. She has a past medical history of anemia, anxiety, asthma, chronic pain on opioids, depression, DVT s/p IVC filter on Xarelto (scheduled for removal on  due to tilt of filter), obesity, chronic respiratory failure (trach/ventilator dependent), constipation, chronic indwelling fischer, and quadriplegia secondary to spinal muscle atrophy.     Today Lanie feels frustrated from the constipation she has been dealing with for years. She takes several laxatives, which do not have much effects for her. She has 1 BM per week.    CT Abdomen/Pelvis (10/23/2023):  MPRESSION:   1. Thickening of the rectum and sigmoid colon without surrounding fat  stranding, which likely represents chronic inflammatory changes.  2. Infrarenal IVC filter in " place with caval penetration of all 6  struts. Chart review demonstrates that the filter is an [employer]  Option IVC filter placed 1/21/2012. Patient is currently  anticoagulated. Consider referral to Interventional Radiology for IVC  filter removal.   3. Chronic/ancillary findings as detailed in the body of the report.    Medical history:  Anemia   Anxiety  Asthma   Chronic pain   Depression   DVT  Obesity   Chronic respiratory failure   Spinal muscle atrophy   Quadriplegia     Surgical history:  Fusion of cervical and thoracic spine in 2004 and 2018  G tube placement   IVC filter in 2012  Tracheostomy in 2004    Family history:  Family History   Problem Relation Age of Onset    Hypertension Mother     Breast Cancer Mother         No longer has it    Hypertension Father     Hypertension Brother     Hypertension Brother     Family History Negative Other     Cerebrovascular Disease Other     Cerebrovascular Disease Maternal Aunt     Deep Vein Thrombosis (DVT) Maternal Aunt     Deep Vein Thrombosis (DVT) Maternal Uncle     Anesthesia Reaction No family hx of      Medications:  Current Outpatient Medications   Medication Sig Dispense Refill    ACETAMINOPHEN PO Take 650 mg by mouth every 4 hours as needed for pain      ALPRAZolam (XANAX) 0.25 MG tablet Take 1 tablet (0.25 mg) by mouth 3 times daily as needed for anxiety 60 tablet 0    aminocaproic acid (AMICAR) 0.25 GM/ML solution Take 12 mL (3 grams) via G-tube every 8 hours during menstrual period. 473 mL 5    azithromycin (ZITHROMAX) 200 MG/5ML suspension Take 12.5 ml po today then 6.25 ml once a day for four more days (total five days) (Patient taking differently: Take by mouth as needed Take 12.5 ml po today then 6.25 ml once a day for four more days (total five days)) 40 mL 0    bisacodyl (DULCOLAX) 10 MG suppository Place 10 mg rectally daily as needed for constipation      celecoxib (CELEBREX) 200 MG capsule Take 1 capsule (200 mg) by mouth daily On days of  period 12 capsule 5    clindamycin (CLEOCIN T) 1 % lotion Apply topically 2 times daily To face as needed 60 mL 3    diazepam (VALIUM) 5 MG tablet Take 1 tablet (5 mg) by mouth every 6 hours as needed for muscle spasms 30 tablet 1    EVRYSDI 0.75 MG/ML oral solution 6.6 mLs by Oral or NG Tube route every morning      famotidine (PEPCID) 40 MG/5ML suspension Take 2.5 mLs (20 mg) by mouth 2 times daily (Patient taking differently: Take 20 mg by mouth every morning) 150 mL 11    fluocinolone acetonide (DERMA SMOOTHE/FS BODY) 0.01 % external oil Apply at nighttime to forehead and scalp as needed for scale or pruritus (Patient taking differently: Apply topically at bedtime Apply at nighttime to forehead and scalp as needed for scale or pruritus) 118 mL 3    fluticasone (FLOVENT HFA) 110 MCG/ACT inhaler Inhale 1 puff into the lungs 2 times daily 36 g 5    gabapentin (NEURONTIN) 250 MG/5ML solution 20 mLs (1,000 mg) by Per Feeding Tube route 3 times daily 1800 mL 5    GENERLAC 10 GM/15ML solution   11    guaiFENesin (ORGANIDIN) 200 MG TABS tablet Take 200 mg by mouth every 4 hours as needed for cough      HYDROmorphone (DILAUDID) 4 MG tablet Take 4 mg by mouth every 3 hours      ketoconazole (NIZORAL) 2 % external shampoo Lather onto scalp and forehead once weekly when showering and let sit for 1-2 minutes before rinsing 120 mL 11    LANsoprazole (PREVACID) 30 MG DR capsule Take 30 mg by mouth      Magnesium Hydroxide (MILK OF MAGNESIA PO) Take 30 mLs by mouth as needed      metroNIDAZOLE (METROCREAM) 0.75 % external cream Apply topically 2 times daily 45 g 11    Minoxidil (MINOXIDIL FOR WOMEN) 5 % FOAM Apply 1/2 capful once a day to affected area 60 g 3    mometasone (NASONEX) 50 MCG/ACT nasal spray Spray 2 sprays in nostril daily as needed      morphine (SABINE) 30 MG 24 hr capsule Take 30 mg by mouth 2 times daily      Nutritional Supplements (REPLETE FIBER) LIQD Take 1 Can by mouth 4 times daily      omeprazole  (PRILOSEC) 2 mg/mL suspension SHAKE LIQUID WELL AND GIVE 20ML (40MG) DAILY (VIA G-TUBE) 600 mL 9    ondansetron (ZOFRAN-ODT) 4 MG ODT tab Take 1 tablet (4 mg) by mouth every 8 hours as needed for nausea 30 tablet 1    order for DME Equipment being ordered: 16 fr 5cc balloon indwelling catheter with drainage bag. 2 catheters/month 2 catheter 11    order for DME Equipment being ordered: Vibracare Percussor 1 Units 0    polyethylene glycol (MIRALAX) 17 GM/Dose powder Take 17 g by mouth daily (Patient taking differently: Take 17 g by mouth daily as needed) 714 g 5    prochlorperazine (COMPAZINE) 5 MG tablet Take 5 mg by mouth every 6 hours as needed for nausea or vomiting      rivaroxaban ANTICOAGULANT (XARELTO ANTICOAGULANT) 10 MG TABS tablet 1 tablet (10 mg) by Oral or Feeding Tube route daily with food 30 tablet 1    Sennosides (SENNA) 8.8 MG/5ML LIQD 15 mLs by Feeding route 2 times daily (Patient taking differently: 15 mLs by Feeding route 2 times daily as needed) 900 mL 11    sertraline (ZOLOFT) 20 MG/ML (HIGH CONC) solution TAKE 2.5 ML(50 MG) BY MOUTH DAILY (Patient taking differently: Take 50 mg by mouth every morning TAKE 2.5 ML(50 MG) BY MOUTH DAILY) 225 mL 1    simethicone (MYLICON) 66.7 mg/ml as needed      simethicone 40 MG/0.6ML LIQD by NG or OG tube route as needed      sodium chloride (OCEAN) 0.65 % nasal spray Spray 2 sprays in nostril daily as needed      traZODone (DESYREL) 5 mg/ml SUSP Take 100 mg per feeding tube at bedtime prn insomnia (20 ml of the 5 mg/ml strength, or 10 ml of the 10 mg/ml strength is you dispense that) (Patient taking differently: 75 mg by Oral or Feeding Tube route at bedtime Take 100 mg per feeding tube at bedtime prn insomnia (20 ml of the 5 mg/ml strength, or 10 ml of the 10 mg/ml strength is you dispense that)) 600 mL 1    albuterol (PROAIR HFA) 108 (90 Base) MCG/ACT inhaler Inhale 2 puffs into the lungs every 4 hours as needed for shortness of breath or wheezing (Patient  "not taking: Reported on 1/3/2024) 18 g 5    albuterol (PROVENTIL) (2.5 MG/3ML) 0.083% neb solution USE ONE VIAL BY NEBULIZATION EVERY 6 HOURS AS NEEDED FOR SHORTNESS OF BREATH/ DYSPNEA OR WHEEZING (Patient not taking: Reported on 1/3/2024) 360 mL 1    doxycycline hyclate (VIBRAMYCIN) 100 MG capsule GIVE ONE CAPSULE VIA \"G\"- TUBE TWICE DAILY (Patient not taking: Reported on 12/27/2023) 60 capsule 2    hydrOXYzine (ATARAX) 25 MG tablet Take 25 mg by mouth every 6 hours as needed for itching (Patient not taking: Reported on 12/27/2023)      levofloxacin (LEVAQUIN) 25 MG/ML solution TAKE 20ML BY FEEDING TUBE ONCE DAILY FOR ONE WEEK (Patient not taking: Reported on 1/3/2024) 480 mL 1    mupirocin (BACTROBAN) 2 % external cream Apply topically 3 times daily (Patient not taking: Reported on 12/27/2023) 30 g 3    naloxone (NARCAN) 4 MG/0.1ML nasal spray Spray 1 spray (4 mg) into one nostril alternating nostrils once as needed for opioid reversal every 2-3 minutes until assistance arrives (Patient not taking: Reported on 1/3/2024) 0.2 mL 0    Nutritional Supplements (NUTREN 1.0/FIBER) LIQD Per G tube; 250 ml Per G Tube qid (Patient not taking: Reported on 12/27/2023) 20054 mL 11    potassium chloride (KAYCIEL) 20 MEQ/15ML (10%) solution 22.5 mLs (30 mEq) by Per G Tube route daily (Patient not taking: Reported on 1/3/2024) 675 mL 11    Salicylic Acid (OXY BALANCE FACIAL CLEAN WASH EX) Externally apply 1 pad topically daily (Patient not taking: Reported on 12/27/2023)      silver nitrate (ARZOL) 75-25 % miscellaneous Use one stick apply tip to granuloma prn granuloma formation (Patient not taking: Reported on 1/3/2024) 20 applicator 1    silver sulfADIAZINE (SSD) 1 % external cream Apply topically 2 times daily To affected areas. (Patient not taking: Reported on 12/27/2023) 400 g 3    traZODone (DESYREL) 5 mg/ml SUSP Take 12.5 ml (75 mg) po at bedtime Prn insomnia (Patient not taking: Reported on 12/27/2023) 375 mL 11    " tretinoin (RETIN-A) 0.025 % external cream Apply a pea-sized amount evenly over the face at nighttime before bed. (Patient not taking: Reported on 12/27/2023) 45 g 2       Allergies:  Allergies   Allergen Reactions    Ibuprofen        Social history:   Social History     Tobacco Use    Smoking status: Never    Smokeless tobacco: Never   Substance Use Topics    Alcohol use: Never     Marital status: single.    ROS:  A complete review of systems was performed with the patient and all systems negative except as per HPI.    Physical Examination:  Exam was chaperoned by Kaleb Eden, EMT-P  LMP 12/13/2023 (Approximate)   General: A&O x3      Assessment    This is a 33 year old F with multiple severe medical problems as discussed above, who presents with chronic constipation, likely due to colonic dysmotility. She reports never to have seen GI, and would like to meet with them to discuss further option, before committing to an ileostomy. We discussed risk, benefits and alternatives. She will call the clinic again if she decides to proceed with surgical intervention, likely laparoscopic ileostomy.    All pertinent labs and imaging were personally reviewed by me.      PLAN  - Referral to GI  - RTC in 3 months    Video-Visit Details    Type of service: Phone Visit    Distant Location (provider location):  [facility] Mobile     Mode of Communication:  Phone Conference via OYE!    30 minutes spent on the date of the encounter doing chart review, history, review of imaging, documentation ,and further activities as noted above, which includes my time spent on video with the patient/family.       Terence Bryson MD    Division of Colon and Rectal Surgery  [facility]    Referring Provider:  Jimmy Moseley MD  [address] [place] [id number]     Primary Care Provider:  Jimmy Moseley

## 2024-01-08 NOTE — PROGRESS NOTES
[facility]  Center for [facility]  [facility] [address], [address], [place] [address]  Main: [phone], Fax: [phone]    Video Virtual Visit Note:    Patient: Lanie Neil  MRN: [id number]  : 1990  JOSÉ MIGUEL: 2024  Location of the patient when this video visit was conducted: Patient's home  Location of this writer when this video visit was conducted: [facility], [facility].     Due to the ongoing COVID-19 outbreak, this visit was conducted by video, with the patient's approval.    Reason of today's visit:  Spinal muscular atrophy. Wheelchair bound. History of DVT. Currently on long term anticoagulation therapy with rivaroxaban. Here for her routine annual follow up clinic visit.      Summary of Medical History:  This is a 33 year old female with a history of spinal muscular atrophy, who is chronically wheelchair bound, who moved from [place] to [place] back in May 2014, who has a history of DVT in the left leg, currently on long term anticoagulation therapy with rivaroxaban at 10 mg PO Qday, participates in today's scheduled video visit for her annual follow up. I last saw this patient back in 2022, please see my initial consult note and previous progress notes for her complete detail history.      Thrombosis History Summary:  2012, she was having left leg swelling. Ultrasound at the time in [facility] found subacute/chronic occlusive thrombus in the mid to distal left femoral vein with collateralization. She was placed on enoxaparin initially for 2 weeks when she start complaining of headaches. A CT head at the time showed a subdural hematoma on 2012. Enoxaparin was held and an IVC filter was placed on 2012.   Eventually restarted on enoxaparin at a lowered dose.  2013, saw Dr. Willy Box, hematologist at [facility] who stopped her enoxaparin and placed  her on rivaroxaban at 20 mg PO Qday dosing.  Aug 2014, she established care with our clinic after she moved to [place] and she had maintained on long term anticoagulation therapy with rivaroxaban. Her mother usually crushes up her rivaroxaban and administers it via her J-tube.   7/23/2019, her rivaroxaban dose was decreased to prophylactic dosing regimen of 10 mg PO Qday as she was experiencing menorrhagia.   Sept 2019, she was found to be iron deficient and was anemic and we arranged for her to receive IV iron infusion with injectafer. Repeat iron panel in Nov 2019 showed normal iron and ferritin with normal hemoglobin.   Dec 2021, she was found to have iron deficiency anemia requiring IV iron infusion. Her iron defiicency anemia was related to menorrhagia.       She also uses celebrex for her dysmenorrhea and amicar for menorrhagia which according to Lanie are effective.      Historically, for invasive procedures such as one related to her spinal reservoir and catheter, We usually hold her Xarelto for about 48 hours and resume the next day and she does well with that.      Interim History:  Dec 2022 after her visit with this writer was again found to have Iron deficiency anemia. She again required IV iron infusion.   Sept 2023, she again found to have iron deficiency anemia and needed IV iron infusion.   10/23/2023, she underwent a CT abdomen secondary to chronic constipation for which she is found to have evidence of chronic inflammatory changes of the rectum and sigmoid colon and was incidentally found to have 6 struts of her IVC filter penetrating her IVC. Thus this writer referred the patient to interventional radiology for consultation.   She is scheduled to undergo attempt IVC filter extraction on 1/24/2024. I have instructed her to hold her rivaroxaban for 48 hours prior to the procedure.   1/3/2024, she was seen by Dr. Lurdes Ivey of Ob/Gyn who had a long discussion with the patient on abnormal uterine  bleeding. Patient is not interested in IUD placement or any hormonal therapy. Note that Dr. Ivey would like us to discuss with patient about potential alternative anticoagulation and also consideration of tranexamic acid use with menses. Additionally, Dr. Ivey on exam noted diffuse right labia significant edematous, currently planned Pelvic MRI.     Today, Lanie and her mother reports that she is now interested in IUD placement and the procedure is not currently scheduled. She is still scheduled to undergo attempt IVC filter extraction on 1/24/2024. She reports that her Pelvic MRI is now scheduled on 2/13/2024. Lanie has been using aminocaproic acid syrup down the J-tube with her menstrual period at a dose of 3 grams Q 8 hours.     ROS:  Denies any bleeding complications. Specifically, no frequent epistaxis. No issues with oral mucosal bleeding. Denies any hematuria or blood in stools. Denies any shortness of breath. No chest pain. No cough. No fever.    Medications:   Current Outpatient Medications   Medication    ACETAMINOPHEN PO    albuterol (PROAIR HFA) 108 (90 Base) MCG/ACT inhaler    albuterol (PROVENTIL) (2.5 MG/3ML) 0.083% neb solution    ALPRAZolam (XANAX) 0.25 MG tablet    aminocaproic acid (AMICAR) 0.25 GM/ML solution    azithromycin (ZITHROMAX) 200 MG/5ML suspension    bisacodyl (DULCOLAX) 10 MG suppository    celecoxib (CELEBREX) 200 MG capsule    clindamycin (CLEOCIN T) 1 % lotion    diazepam (VALIUM) 5 MG tablet    doxycycline hyclate (VIBRAMYCIN) 100 MG capsule    EVRYSDI 0.75 MG/ML oral solution    famotidine (PEPCID) 40 MG/5ML suspension    fluocinolone acetonide (DERMA SMOOTHE/FS BODY) 0.01 % external oil    fluticasone (FLOVENT HFA) 110 MCG/ACT inhaler    gabapentin (NEURONTIN) 250 MG/5ML solution    GENERLAC 10 GM/15ML solution    guaiFENesin (ORGANIDIN) 200 MG TABS tablet    HYDROmorphone (DILAUDID) 4 MG tablet    hydrOXYzine (ATARAX) 25 MG tablet    ketoconazole (NIZORAL) 2 % external  shampoo    LANsoprazole (PREVACID) 30 MG DR capsule    levofloxacin (LEVAQUIN) 25 MG/ML solution    Magnesium Hydroxide (MILK OF MAGNESIA PO)    metroNIDAZOLE (METROCREAM) 0.75 % external cream    Minoxidil (MINOXIDIL FOR WOMEN) 5 % FOAM    mometasone (NASONEX) 50 MCG/ACT nasal spray    morphine (SABINE) 30 MG 24 hr capsule    mupirocin (BACTROBAN) 2 % external cream    naloxone (NARCAN) 4 MG/0.1ML nasal spray    Nutritional Supplements (NUTREN 1.0/FIBER) LIQD    Nutritional Supplements (REPLETE FIBER) LIQD    omeprazole (PRILOSEC) 2 mg/mL suspension    ondansetron (ZOFRAN-ODT) 4 MG ODT tab    order for DME    order for DME    polyethylene glycol (MIRALAX) 17 GM/Dose powder    potassium chloride (KAYCIEL) 20 MEQ/15ML (10%) solution    prochlorperazine (COMPAZINE) 5 MG tablet    rivaroxaban ANTICOAGULANT (XARELTO ANTICOAGULANT) 10 MG TABS tablet    Salicylic Acid (OXY BALANCE FACIAL CLEAN WASH EX)    Sennosides (SENNA) 8.8 MG/5ML LIQD    sertraline (ZOLOFT) 20 MG/ML (HIGH CONC) solution    silver nitrate (ARZOL) 75-25 % miscellaneous    silver sulfADIAZINE (SSD) 1 % external cream    simethicone (MYLICON) 66.7 mg/ml    simethicone 40 MG/0.6ML LIQD    sodium chloride (OCEAN) 0.65 % nasal spray    traZODone (DESYREL) 5 mg/ml SUSP    traZODone (DESYREL) 5 mg/ml SUSP    tretinoin (RETIN-A) 0.025 % external cream     No current facility-administered medications for this visit.       Allergies:   Allergies   Allergen Reactions    Ibuprofen         PMH:   Past Medical History:   Diagnosis Date    Anemia     Anxiety     Arthritis Unknown    Asthma     Asthma 06/18/2014    Chronic pain     Chronic respiratory failure (H)     Constipation     Depression     Depressive disorder     I don't have it right now currently    DVT (deep venous thrombosis) (H)     Gastrointestinal tube present (H)     History of blood transfusion     Obesity     Sinusitis, chronic     Snoring     Spinal muscle atrophy (H)        Social History:    Deferred    Family History:  Deferred    Objective:  Visual Examination via Video:  Pleasant in no acute distress.  Normal work of breathing   A+O x 3    Labs:  Component      Latest Ref Rng 12/19/2023  1:25 PM   WBC      4.0 - 11.0 10e3/uL 7.4    RBC Count      3.80 - 5.20 10e6/uL 3.74 (L)    Hemoglobin      11.7 - 15.7 g/dL 10.7 (L)    Hematocrit      35.0 - 47.0 % 34.4 (L)    MCV      78 - 100 fL 92    MCH      26.5 - 33.0 pg 28.6    MCHC      31.5 - 36.5 g/dL 31.1 (L)    RDW      10.0 - 15.0 % 17.5 (H)    Platelet Count      150 - 450 10e3/uL 330    % Neutrophils      % 80    % Lymphocytes      % 16    % Monocytes      % 2    % Eosinophils      % 2    % Basophils      % 0    % Immature Granulocytes      % 0    NRBCs per 100 WBC      <1 /100 0    Absolute Neutrophils      1.6 - 8.3 10e3/uL 5.8    Absolute Lymphocytes      0.8 - 5.3 10e3/uL 1.2    Absolute Monocytes      0.0 - 1.3 10e3/uL 0.2    Absolute Eosinophils      0.0 - 0.7 10e3/uL 0.2    Absolute Basophils      0.0 - 0.2 10e3/uL 0.0    Absolute Immature Granulocytes      <=0.4 10e3/uL 0.0    Absolute NRBCs      10e3/uL 0.0    Iron      37 - 145 ug/dL 58    Iron Binding Capacity      240 - 430 ug/dL 266    Iron Sat Index      15 - 46 % 22    Ferritin      6 - 175 ng/mL 22       Assessment:  In summary, Lanie is a 33 year old female who has a history of spinal muscular atrophy, who is chronically wheelchair bound, who also has a history of DVT in the left leg, currently on long term anticoagulation therapy with rivaroxaban at 10 mg PO Qday dosing, participates in today's scheduled video visit for routine annual follow up. She has a history of iron deficiency anemia which likely was related menorrhagia. She has been using amicar for her menorrhagia and according to her is decreasing her bleeding during her menstrual period. She also uses Celebrex for her dysmenorrhea which also is effective. Both Celebrex and Amicar are prescribed by this writer.       Diagnosis:  History of left lower extremity DVT.  On chronic anticoagulation therapy with Xarelto at 10 mg PO Qday.  Spinal muscular atrophy.  Chronic respiratory failure.    Chronically wheelchair bound.  Rosacea   History of Iron deficiency anemia.  Menorrhagia / abnormal uterine bleeding.   Significant diffuse edematous of the right labia on recent Ob/Gyn exam by Dr. Ivey. Planned Pelvic MRI on 2/13/2024.    Plan:  Lanie remains to be a good candidate to stay on indefinite anticoagulation therapy. I did note that Dr. Ivey would like me to discuss with the patient about alternative anticoagulation therapy. I explain to Lanie that she is already on a low dose rivaroxaban (prophylactic dose of 10 mg Qday). I also explain to Lanie and her mother that in some studies, it has been shown that apixaban (Eliquis) might have less bleeding issues with patients who has menorrhagia. I explain to them that I have no opposition for her to switch over to apixaban at 2.5 mg PO BID dosing (which is again a prophylactic dosing regimen). However, after our discussion, the patient would like to continue rivaroxaban for now and see what happen after IUD placement and then re-consider switching to apixaban if her menorrhagia does not improve with IUD placement.     I have renewed her rivaroxaban prescription today with a one year refill.     For her upcoming attempt IVC filter removal procedure on 1/24/2024, she already knows that she supposed to stop her rivaroxaban for 48 hours prior to the procedure and restart it the day after her procedure.     I explain to them that in general, interruption or holding anticoagulation therapy is not necessary for IUD placement. But if Dr. Ivey would like her to hold anticoagulation for this procedure, I have instructed her to call our clinic and I can provide her with an anticoagulation management plan.     Lanie is already on an antifibrinolytics with her menstrual bleeding. She has  been using aminocaproic acid syrup at 3 grams down her J-tube every 8 hours during her menstrual period. I do think that aminocaproic acid syrup is best for her as this can easily be placed down her J-tube instead of tranexamic acid tablets.     I will recheck her CBC and Iron panel with ferritin in the next 1-2 weeks. Consider IV iron replacement once again if she is anemic and iron deficient.     She is instructed to call our clinic if she should experience any unusual bleeding issues or if she should need any invasive or surgical procedures in the future. Otherwise, I will plan to see her back annually for routine follow up. In person or virtual visit is fine.     The longitudinal plan of care for Lanie Neil was addressed during this visit. Due to the added complexity in care, I will continue to support Lanie in the subsequent management of these conditions and with the ongoing continuity of care of these conditions.    Video-Visit Details:  Type of service:  Video Visit  Video Start Time:  10:48  Video End Time (time video stopped): 11:05  Originating Location (pt. Location): Home  Distant Location (provider location):  [facility]   Mode of Communication:  Video Conference via [employer] PA-C, MPAS  Physician Assistant  [facility].     30 minutes spent by me on the date of the encounter doing chart review, history and exam, documentation and further activities per the note

## 2024-01-16 ENCOUNTER — MYC MEDICAL ADVICE (OUTPATIENT)
Dept: FAMILY MEDICINE | Facility: CLINIC | Age: 34
End: 2024-01-16

## 2024-01-16 ENCOUNTER — VIRTUAL VISIT (OUTPATIENT)
Dept: SURGERY | Facility: CLINIC | Age: 34
End: 2024-01-16
Attending: FAMILY MEDICINE
Payer: COMMERCIAL

## 2024-01-16 DIAGNOSIS — Z86.0100 HISTORY OF COLONIC POLYPS: ICD-10-CM

## 2024-01-16 DIAGNOSIS — K59.09 CHRONIC CONSTIPATION: ICD-10-CM

## 2024-01-16 DIAGNOSIS — T14.8XXA OPEN WOUND: ICD-10-CM

## 2024-01-16 DIAGNOSIS — K59.00 CONSTIPATION, UNSPECIFIED CONSTIPATION TYPE: ICD-10-CM

## 2024-01-16 DIAGNOSIS — Z99.11 DEPENDENT ON VENTILATOR (H): ICD-10-CM

## 2024-01-16 DIAGNOSIS — G12.9 SPINAL MUSCLE ATROPHY (H): Primary | ICD-10-CM

## 2024-01-16 DIAGNOSIS — Z95.828 PRESENCE OF IVC FILTER: ICD-10-CM

## 2024-01-16 DIAGNOSIS — I82.4Y2 DEEP VEIN THROMBOSIS (DVT) OF PROXIMAL VEIN OF LEFT LOWER EXTREMITY, UNSPECIFIED CHRONICITY (H): ICD-10-CM

## 2024-01-16 PROCEDURE — 99203 OFFICE O/P NEW LOW 30 MIN: CPT | Mod: 93 | Performed by: SURGERY

## 2024-01-16 ASSESSMENT — PAIN SCALES - GENERAL: PAINLEVEL: MILD PAIN (2)

## 2024-01-16 NOTE — LETTER
"2024       RE: Lanie Neil  [address]  [facility] [address]       Dear Colleague,    Thank you for referring your patient, Lanie Neil, to the [facility] COLON AND RECTAL SURGERY CLINIC [place] at [facility]. Please see a copy of my visit note below.    Colon and Rectal Surgery Clinic Note    RE: Lanie Neil.  : 1990.  JOSÉ MIGUEL: 2024.    Lanie Gutierres is a 33 year old female who is being evaluated via a billable video visit.      The patient has been notified of following:     \"This phone visit will be conducted via a call between you and your physician/provider. We have found that certain health care needs can be provided without the need for an in-person physical exam.  This service lets us provide the care you need with a video conversation.  If a prescription is necessary we can send it directly to your pharmacy.  If lab work is needed we can place an order for that and you can then stop by our lab to have the test done at a later time.    Phone visits are billed at different rates depending on your insurance coverage. Please reach out to your insurance provider with any questions.    If during the course of the call the physician/provider feels a video visit is not appropriate, you will not be charged for this service.\"    Patient has given verbal consent for phone visit? Yes    Reason for visit: colostomy placement.    HPI: Lanie Neil is a 33 year old female who presents today to discuss colostomy placement. She has a past medical history of anemia, anxiety, asthma, chronic pain on opioids, depression, DVT s/p IVC filter on Xarelto (scheduled for removal on  due to tilt of filter), obesity, chronic respiratory failure (trach/ventilator dependent), constipation, chronic indwelling fischer, and quadriplegia secondary to spinal muscle atrophy.     Today aLnie feels frustrated from the constipation she has been " dealing with for years. She takes several laxatives, which do not have much effects for her. She has 1 BM per week.    CT Abdomen/Pelvis (10/23/2023):  MPRESSION:   1. Thickening of the rectum and sigmoid colon without surrounding fat  stranding, which likely represents chronic inflammatory changes.  2. Infrarenal IVC filter in place with caval penetration of all 6  struts. Chart review demonstrates that the filter is an [employer]  Option IVC filter placed 1/21/2012. Patient is currently  anticoagulated. Consider referral to Interventional Radiology for IVC  filter removal.   3. Chronic/ancillary findings as detailed in the body of the report.    Medical history:  Anemia   Anxiety  Asthma   Chronic pain   Depression   DVT  Obesity   Chronic respiratory failure   Spinal muscle atrophy   Quadriplegia     Surgical history:  Fusion of cervical and thoracic spine in 2004 and 2018  G tube placement   IVC filter in 2012  Tracheostomy in 2004    Family history:  Family History   Problem Relation Age of Onset    Hypertension Mother     Breast Cancer Mother         No longer has it    Hypertension Father     Hypertension Brother     Hypertension Brother     Family History Negative Other     Cerebrovascular Disease Other     Cerebrovascular Disease Maternal Aunt     Deep Vein Thrombosis (DVT) Maternal Aunt     Deep Vein Thrombosis (DVT) Maternal Uncle     Anesthesia Reaction No family hx of      Medications:  Current Outpatient Medications   Medication Sig Dispense Refill    ACETAMINOPHEN PO Take 650 mg by mouth every 4 hours as needed for pain      ALPRAZolam (XANAX) 0.25 MG tablet Take 1 tablet (0.25 mg) by mouth 3 times daily as needed for anxiety 60 tablet 0    aminocaproic acid (AMICAR) 0.25 GM/ML solution Take 12 mL (3 grams) via G-tube every 8 hours during menstrual period. 473 mL 5    azithromycin (ZITHROMAX) 200 MG/5ML suspension Take 12.5 ml po today then 6.25 ml once a day for four more days (total five days)  (Patient taking differently: Take by mouth as needed Take 12.5 ml po today then 6.25 ml once a day for four more days (total five days)) 40 mL 0    bisacodyl (DULCOLAX) 10 MG suppository Place 10 mg rectally daily as needed for constipation      celecoxib (CELEBREX) 200 MG capsule Take 1 capsule (200 mg) by mouth daily On days of period 12 capsule 5    clindamycin (CLEOCIN T) 1 % lotion Apply topically 2 times daily To face as needed 60 mL 3    diazepam (VALIUM) 5 MG tablet Take 1 tablet (5 mg) by mouth every 6 hours as needed for muscle spasms 30 tablet 1    EVRYSDI 0.75 MG/ML oral solution 6.6 mLs by Oral or NG Tube route every morning      famotidine (PEPCID) 40 MG/5ML suspension Take 2.5 mLs (20 mg) by mouth 2 times daily (Patient taking differently: Take 20 mg by mouth every morning) 150 mL 11    fluocinolone acetonide (DERMA SMOOTHE/FS BODY) 0.01 % external oil Apply at nighttime to forehead and scalp as needed for scale or pruritus (Patient taking differently: Apply topically at bedtime Apply at nighttime to forehead and scalp as needed for scale or pruritus) 118 mL 3    fluticasone (FLOVENT HFA) 110 MCG/ACT inhaler Inhale 1 puff into the lungs 2 times daily 36 g 5    gabapentin (NEURONTIN) 250 MG/5ML solution 20 mLs (1,000 mg) by Per Feeding Tube route 3 times daily 1800 mL 5    GENERLAC 10 GM/15ML solution   11    guaiFENesin (ORGANIDIN) 200 MG TABS tablet Take 200 mg by mouth every 4 hours as needed for cough      HYDROmorphone (DILAUDID) 4 MG tablet Take 4 mg by mouth every 3 hours      ketoconazole (NIZORAL) 2 % external shampoo Lather onto scalp and forehead once weekly when showering and let sit for 1-2 minutes before rinsing 120 mL 11    LANsoprazole (PREVACID) 30 MG DR capsule Take 30 mg by mouth      Magnesium Hydroxide (MILK OF MAGNESIA PO) Take 30 mLs by mouth as needed      metroNIDAZOLE (METROCREAM) 0.75 % external cream Apply topically 2 times daily 45 g 11    Minoxidil (MINOXIDIL FOR WOMEN) 5  % FOAM Apply 1/2 capful once a day to affected area 60 g 3    mometasone (NASONEX) 50 MCG/ACT nasal spray Spray 2 sprays in nostril daily as needed      morphine (SABINE) 30 MG 24 hr capsule Take 30 mg by mouth 2 times daily      Nutritional Supplements (REPLETE FIBER) LIQD Take 1 Can by mouth 4 times daily      omeprazole (PRILOSEC) 2 mg/mL suspension SHAKE LIQUID WELL AND GIVE 20ML (40MG) DAILY (VIA G-TUBE) 600 mL 9    ondansetron (ZOFRAN-ODT) 4 MG ODT tab Take 1 tablet (4 mg) by mouth every 8 hours as needed for nausea 30 tablet 1    order for DME Equipment being ordered: 16 fr 5cc balloon indwelling catheter with drainage bag. 2 catheters/month 2 catheter 11    order for DME Equipment being ordered: Vibracare Percussor 1 Units 0    polyethylene glycol (MIRALAX) 17 GM/Dose powder Take 17 g by mouth daily (Patient taking differently: Take 17 g by mouth daily as needed) 714 g 5    prochlorperazine (COMPAZINE) 5 MG tablet Take 5 mg by mouth every 6 hours as needed for nausea or vomiting      rivaroxaban ANTICOAGULANT (XARELTO ANTICOAGULANT) 10 MG TABS tablet 1 tablet (10 mg) by Oral or Feeding Tube route daily with food 30 tablet 1    Sennosides (SENNA) 8.8 MG/5ML LIQD 15 mLs by Feeding route 2 times daily (Patient taking differently: 15 mLs by Feeding route 2 times daily as needed) 900 mL 11    sertraline (ZOLOFT) 20 MG/ML (HIGH CONC) solution TAKE 2.5 ML(50 MG) BY MOUTH DAILY (Patient taking differently: Take 50 mg by mouth every morning TAKE 2.5 ML(50 MG) BY MOUTH DAILY) 225 mL 1    simethicone (MYLICON) 66.7 mg/ml as needed      simethicone 40 MG/0.6ML LIQD by NG or OG tube route as needed      sodium chloride (OCEAN) 0.65 % nasal spray Spray 2 sprays in nostril daily as needed      traZODone (DESYREL) 5 mg/ml SUSP Take 100 mg per feeding tube at bedtime prn insomnia (20 ml of the 5 mg/ml strength, or 10 ml of the 10 mg/ml strength is you dispense that) (Patient taking differently: 75 mg by Oral or Feeding Tube  "route at bedtime Take 100 mg per feeding tube at bedtime prn insomnia (20 ml of the 5 mg/ml strength, or 10 ml of the 10 mg/ml strength is you dispense that)) 600 mL 1    albuterol (PROAIR HFA) 108 (90 Base) MCG/ACT inhaler Inhale 2 puffs into the lungs every 4 hours as needed for shortness of breath or wheezing (Patient not taking: Reported on 1/3/2024) 18 g 5    albuterol (PROVENTIL) (2.5 MG/3ML) 0.083% neb solution USE ONE VIAL BY NEBULIZATION EVERY 6 HOURS AS NEEDED FOR SHORTNESS OF BREATH/ DYSPNEA OR WHEEZING (Patient not taking: Reported on 1/3/2024) 360 mL 1    doxycycline hyclate (VIBRAMYCIN) 100 MG capsule GIVE ONE CAPSULE VIA \"G\"- TUBE TWICE DAILY (Patient not taking: Reported on 12/27/2023) 60 capsule 2    hydrOXYzine (ATARAX) 25 MG tablet Take 25 mg by mouth every 6 hours as needed for itching (Patient not taking: Reported on 12/27/2023)      levofloxacin (LEVAQUIN) 25 MG/ML solution TAKE 20ML BY FEEDING TUBE ONCE DAILY FOR ONE WEEK (Patient not taking: Reported on 1/3/2024) 480 mL 1    mupirocin (BACTROBAN) 2 % external cream Apply topically 3 times daily (Patient not taking: Reported on 12/27/2023) 30 g 3    naloxone (NARCAN) 4 MG/0.1ML nasal spray Spray 1 spray (4 mg) into one nostril alternating nostrils once as needed for opioid reversal every 2-3 minutes until assistance arrives (Patient not taking: Reported on 1/3/2024) 0.2 mL 0    Nutritional Supplements (NUTREN 1.0/FIBER) LIQD Per G tube; 250 ml Per G Tube qid (Patient not taking: Reported on 12/27/2023) 84038 mL 11    potassium chloride (KAYCIEL) 20 MEQ/15ML (10%) solution 22.5 mLs (30 mEq) by Per G Tube route daily (Patient not taking: Reported on 1/3/2024) 675 mL 11    Salicylic Acid (OXY BALANCE FACIAL CLEAN WASH EX) Externally apply 1 pad topically daily (Patient not taking: Reported on 12/27/2023)      silver nitrate (ARZOL) 75-25 % miscellaneous Use one stick apply tip to granuloma prn granuloma formation (Patient not taking: Reported on " 1/3/2024) 20 applicator 1    silver sulfADIAZINE (SSD) 1 % external cream Apply topically 2 times daily To affected areas. (Patient not taking: Reported on 12/27/2023) 400 g 3    traZODone (DESYREL) 5 mg/ml SUSP Take 12.5 ml (75 mg) po at bedtime Prn insomnia (Patient not taking: Reported on 12/27/2023) 375 mL 11    tretinoin (RETIN-A) 0.025 % external cream Apply a pea-sized amount evenly over the face at nighttime before bed. (Patient not taking: Reported on 12/27/2023) 45 g 2       Allergies:  Allergies   Allergen Reactions    Ibuprofen        Social history:   Social History     Tobacco Use    Smoking status: Never    Smokeless tobacco: Never   Substance Use Topics    Alcohol use: Never     Marital status: single.    ROS:  A complete review of systems was performed with the patient and all systems negative except as per HPI.    Physical Examination:  Exam was chaperoned by Kaleb Eden, EMT-P  LMP 12/13/2023 (Approximate)   General: A&O x3      Assessment    This is a 33 year old F with multiple severe medical problems as discussed above, who presents with chronic constipation, likely due to colonic dysmotility. She reports never to have seen GI, and would like to meet with them to discuss further option, before committing to an ileostomy. We discussed risk, benefits and alternatives. She will call the clinic again if she decides to proceed with surgical intervention, likely laparoscopic ileostomy.    All pertinent labs and imaging were personally reviewed by me.      PLAN  - Referral to GI  - RTC in 3 months        30 minutes spent on the date of the encounter doing chart review, history, review of imaging, documentation ,and further activities as noted above, which includes my time spent on video with the patient/family.       Referring Provider:  Jimmy Moseley MD  [address] [place] [id number]     Primary Care Provider:  Jimmy Moseley      Again, thank you for allowing me to participate  in the care of your patient.      Sincerely,    Terence Bryson MD

## 2024-01-16 NOTE — TELEPHONE ENCOUNTER
silver sulfADIAZINE (SSD) 1 % external cream    Last Written Prescription Date:  7/20/2022  Last Fill Quantity: 400 g ,   # refills: 3  Last Office Visit : 11/24/23 Pradip  Future Office visit:  None       Routing refill request to provider for review/approval because:   Silvadene cream is not on  [facility] refill protocol

## 2024-01-16 NOTE — NURSING NOTE
Chief Complaint   Patient presents with    Consult       There were no vitals filed for this visit.    There is no height or weight on file to calculate BMI.    Kaleb Eden EMT-P

## 2024-01-17 DIAGNOSIS — F41.9 ANXIETY: ICD-10-CM

## 2024-01-17 RX ORDER — ALPRAZOLAM 0.25 MG
0.25 TABLET ORAL 3 TIMES DAILY PRN
Qty: 60 TABLET | Refills: 1 | Status: SHIPPED | OUTPATIENT
Start: 2024-01-17 | End: 2024-03-14

## 2024-01-17 RX ORDER — SILVER SULFADIAZINE 10 MG/G
CREAM TOPICAL 2 TIMES DAILY
Qty: 400 G | Refills: 11 | Status: SHIPPED | OUTPATIENT
Start: 2024-01-17

## 2024-01-17 NOTE — PROGRESS NOTES
Clinic Care Coordination Contact  [facility]/Voicemail    Clinical Data: Care Coordinator Outreach    Outreach Documentation Number of Outreach Attempt   1/3/2024   2:14 PM 1       Left message on patient's voicemail with call back information and requested return call.    Plan: Care Coordinator will try to reach patient again in 3-5 business days.    SHILO Coreas  Clinic Care Coordination  [facility]  [email or web address]  [phone]

## 2024-01-18 ENCOUNTER — VIRTUAL VISIT (OUTPATIENT)
Dept: HEMATOLOGY | Facility: CLINIC | Age: 34
End: 2024-01-18
Attending: PHYSICIAN ASSISTANT
Payer: COMMERCIAL

## 2024-01-18 DIAGNOSIS — Z86.718 HISTORY OF DEEP VENOUS THROMBOSIS: ICD-10-CM

## 2024-01-18 DIAGNOSIS — Z79.01 CHRONIC ANTICOAGULATION: ICD-10-CM

## 2024-01-18 DIAGNOSIS — J96.10 CHRONIC RESPIRATORY FAILURE, UNSPECIFIED WHETHER WITH HYPOXIA OR HYPERCAPNIA (H): ICD-10-CM

## 2024-01-18 PROCEDURE — 99214 OFFICE O/P EST MOD 30 MIN: CPT | Mod: 95 | Performed by: PHYSICIAN ASSISTANT

## 2024-01-18 PROCEDURE — 99207 PR NO BILLABLE SERVICE THIS VISIT: CPT | Mod: 95 | Performed by: PHYSICIAN ASSISTANT

## 2024-01-18 NOTE — LETTER
[facility]  [facility]  [facility] [address], [address], [place] [address]  Main: [phone], Fax: [phone]    Video Virtual Visit Note:    Patient: Lanie Neil  MRN: [id number]  : 1990  JOSÉ MIGUEL: 2024  Location of the patient when this video visit was conducted: Patient's home  Location of this writer when this video visit was conducted: [facility], [facility].     Due to the ongoing COVID-19 outbreak, this visit was conducted by video, with the patient's approval.    Reason of today's visit:  Spinal muscular atrophy. Wheelchair bound. History of DVT. Currently on long term anticoagulation therapy with rivaroxaban. Here for her routine annual follow up clinic visit.      Summary of Medical History:  This is a 33 year old female with a history of spinal muscular atrophy, who is chronically wheelchair bound, who moved from [place] to [place] back in May 2014, who has a history of DVT in the left leg, currently on long term anticoagulation therapy with rivaroxaban at 10 mg PO Qday, participates in today's scheduled video visit for her annual follow up. I last saw this patient back in 2022, please see my initial consult note and previous progress notes for her complete detail history.      Thrombosis History Summary:  2012, she was having left leg swelling. Ultrasound at the time in [facility] found subacute/chronic occlusive thrombus in the mid to distal left femoral vein with collateralization. She was placed on enoxaparin initially for 2 weeks when she start complaining of headaches. A CT head at the time showed a subdural hematoma on 2012. Enoxaparin was held and an IVC filter was placed on 2012.   Eventually restarted on enoxaparin at a lowered dose.  2013, saw Dr. Willy Box, hematologist at [facility] who stopped her enoxaparin and  placed her on rivaroxaban at 20 mg PO Qday dosing.  Aug 2014, she established care with our clinic after she moved to [place] and she had maintained on long term anticoagulation therapy with rivaroxaban. Her mother usually crushes up her rivaroxaban and administers it via her J-tube.   7/23/2019, her rivaroxaban dose was decreased to prophylactic dosing regimen of 10 mg PO Qday as she was experiencing menorrhagia.   Sept 2019, she was found to be iron deficient and was anemic and we arranged for her to receive IV iron infusion with injectafer. Repeat iron panel in Nov 2019 showed normal iron and ferritin with normal hemoglobin.   Dec 2021, she was found to have iron deficiency anemia requiring IV iron infusion. Her iron defiicency anemia was related to menorrhagia.       She also uses celebrex for her dysmenorrhea and amicar for menorrhagia which according to Lanie are effective.      Historically, for invasive procedures such as one related to her spinal reservoir and catheter, We usually hold her Xarelto for about 48 hours and resume the next day and she does well with that.      Interim History:  Dec 2022 after her visit with this writer was again found to have Iron deficiency anemia. She again required IV iron infusion.   Sept 2023, she again found to have iron deficiency anemia and needed IV iron infusion.   10/23/2023, she underwent a CT abdomen secondary to chronic constipation for which she is found to have evidence of chronic inflammatory changes of the rectum and sigmoid colon and was incidentally found to have 6 struts of her IVC filter penetrating her IVC. Thus this writer referred the patient to interventional radiology for consultation.   She is scheduled to undergo attempt IVC filter extraction on 1/24/2024. I have instructed her to hold her rivaroxaban for 48 hours prior to the procedure.   1/3/2024, she was seen by Dr. Lurdes Ivey of Ob/Gyn who had a long discussion with the patient on abnormal  uterine bleeding. Patient is not interested in IUD placement or any hormonal therapy. Note that Dr. Ivey would like us to discuss with patient about potential alternative anticoagulation and also consideration of tranexamic acid use with menses. Additionally, Dr. Ivey on exam noted diffuse right labia significant edematous, currently planned Pelvic MRI.     Today, Lanie and her mother reports that she is now interested in IUD placement and the procedure is not currently scheduled. She is still scheduled to undergo attempt IVC filter extraction on 1/24/2024. She reports that her Pelvic MRI is now scheduled on 2/13/2024. Lanie has been using aminocaproic acid syrup down the J-tube with her menstrual period at a dose of 3 grams Q 8 hours.     ROS:  Denies any bleeding complications. Specifically, no frequent epistaxis. No issues with oral mucosal bleeding. Denies any hematuria or blood in stools. Denies any shortness of breath. No chest pain. No cough. No fever.    Medications:   Current Outpatient Medications   Medication     ACETAMINOPHEN PO     albuterol (PROAIR HFA) 108 (90 Base) MCG/ACT inhaler     albuterol (PROVENTIL) (2.5 MG/3ML) 0.083% neb solution     ALPRAZolam (XANAX) 0.25 MG tablet     aminocaproic acid (AMICAR) 0.25 GM/ML solution     azithromycin (ZITHROMAX) 200 MG/5ML suspension     bisacodyl (DULCOLAX) 10 MG suppository     celecoxib (CELEBREX) 200 MG capsule     clindamycin (CLEOCIN T) 1 % lotion     diazepam (VALIUM) 5 MG tablet     doxycycline hyclate (VIBRAMYCIN) 100 MG capsule     EVRYSDI 0.75 MG/ML oral solution     famotidine (PEPCID) 40 MG/5ML suspension     fluocinolone acetonide (DERMA SMOOTHE/FS BODY) 0.01 % external oil     fluticasone (FLOVENT HFA) 110 MCG/ACT inhaler     gabapentin (NEURONTIN) 250 MG/5ML solution     GENERLAC 10 GM/15ML solution     guaiFENesin (ORGANIDIN) 200 MG TABS tablet     HYDROmorphone (DILAUDID) 4 MG tablet     hydrOXYzine (ATARAX) 25 MG tablet      ketoconazole (NIZORAL) 2 % external shampoo     LANsoprazole (PREVACID) 30 MG DR capsule     levofloxacin (LEVAQUIN) 25 MG/ML solution     Magnesium Hydroxide (MILK OF MAGNESIA PO)     metroNIDAZOLE (METROCREAM) 0.75 % external cream     Minoxidil (MINOXIDIL FOR WOMEN) 5 % FOAM     mometasone (NASONEX) 50 MCG/ACT nasal spray     morphine (SABINE) 30 MG 24 hr capsule     mupirocin (BACTROBAN) 2 % external cream     naloxone (NARCAN) 4 MG/0.1ML nasal spray     Nutritional Supplements (NUTREN 1.0/FIBER) LIQD     Nutritional Supplements (REPLETE FIBER) LIQD     omeprazole (PRILOSEC) 2 mg/mL suspension     ondansetron (ZOFRAN-ODT) 4 MG ODT tab     order for DME     order for DME     polyethylene glycol (MIRALAX) 17 GM/Dose powder     potassium chloride (KAYCIEL) 20 MEQ/15ML (10%) solution     prochlorperazine (COMPAZINE) 5 MG tablet     rivaroxaban ANTICOAGULANT (XARELTO ANTICOAGULANT) 10 MG TABS tablet     Salicylic Acid (OXY BALANCE FACIAL CLEAN WASH EX)     Sennosides (SENNA) 8.8 MG/5ML LIQD     sertraline (ZOLOFT) 20 MG/ML (HIGH CONC) solution     silver nitrate (ARZOL) 75-25 % miscellaneous     silver sulfADIAZINE (SSD) 1 % external cream     simethicone (MYLICON) 66.7 mg/ml     simethicone 40 MG/0.6ML LIQD     sodium chloride (OCEAN) 0.65 % nasal spray     traZODone (DESYREL) 5 mg/ml SUSP     traZODone (DESYREL) 5 mg/ml SUSP     tretinoin (RETIN-A) 0.025 % external cream     No current facility-administered medications for this visit.       Allergies:   Allergies   Allergen Reactions     Ibuprofen         PMH:   Past Medical History:   Diagnosis Date     Anemia      Anxiety      Arthritis Unknown     Asthma      Asthma 06/18/2014     Chronic pain      Chronic respiratory failure (H)      Constipation      Depression      Depressive disorder     I don't have it right now currently     DVT (deep venous thrombosis) (H)      Gastrointestinal tube present (H)      History of blood transfusion      Obesity       Sinusitis, chronic      Snoring      Spinal muscle atrophy (H)        Social History:   Deferred    Family History:  Deferred    Objective:  Visual Examination via Video:  Pleasant in no acute distress.  Normal work of breathing   A+O x 3    Labs:  Component      Latest Ref Rng 12/19/2023  1:25 PM   WBC      4.0 - 11.0 10e3/uL 7.4    RBC Count      3.80 - 5.20 10e6/uL 3.74 (L)    Hemoglobin      11.7 - 15.7 g/dL 10.7 (L)    Hematocrit      35.0 - 47.0 % 34.4 (L)    MCV      78 - 100 fL 92    MCH      26.5 - 33.0 pg 28.6    MCHC      31.5 - 36.5 g/dL 31.1 (L)    RDW      10.0 - 15.0 % 17.5 (H)    Platelet Count      150 - 450 10e3/uL 330    % Neutrophils      % 80    % Lymphocytes      % 16    % Monocytes      % 2    % Eosinophils      % 2    % Basophils      % 0    % Immature Granulocytes      % 0    NRBCs per 100 WBC      <1 /100 0    Absolute Neutrophils      1.6 - 8.3 10e3/uL 5.8    Absolute Lymphocytes      0.8 - 5.3 10e3/uL 1.2    Absolute Monocytes      0.0 - 1.3 10e3/uL 0.2    Absolute Eosinophils      0.0 - 0.7 10e3/uL 0.2    Absolute Basophils      0.0 - 0.2 10e3/uL 0.0    Absolute Immature Granulocytes      <=0.4 10e3/uL 0.0    Absolute NRBCs      10e3/uL 0.0    Iron      37 - 145 ug/dL 58    Iron Binding Capacity      240 - 430 ug/dL 266    Iron Sat Index      15 - 46 % 22    Ferritin      6 - 175 ng/mL 22       Assessment:  In summary, Lanie is a 33 year old female who has a history of spinal muscular atrophy, who is chronically wheelchair bound, who also has a history of DVT in the left leg, currently on long term anticoagulation therapy with rivaroxaban at 10 mg PO Qday dosing, participates in today's scheduled video visit for routine annual follow up. She has a history of iron deficiency anemia which likely was related menorrhagia. She has been using amicar for her menorrhagia and according to her is decreasing her bleeding during her menstrual period. She also uses Celebrex for her dysmenorrhea  which also is effective. Both Celebrex and Amicar are prescribed by this writer.      Diagnosis:  History of left lower extremity DVT.  On chronic anticoagulation therapy with Xarelto at 10 mg PO Qday.  Spinal muscular atrophy.  Chronic respiratory failure.    Chronically wheelchair bound.  Rosacea   History of Iron deficiency anemia.  Menorrhagia / abnormal uterine bleeding.   Significant diffuse edematous of the right labia on recent Ob/Gyn exam by Dr. Ivey. Planned Pelvic MRI on 2/13/2024.    Plan:  Lanie remains to be a good candidate to stay on indefinite anticoagulation therapy. I did note that Dr. Ivey would like me to discuss with the patient about alternative anticoagulation therapy. I explain to Lanie that she is already on a low dose rivaroxaban (prophylactic dose of 10 mg Qday). I also explain to Lanie and her mother that in some studies, it has been shown that apixaban (Eliquis) might have less bleeding issues with patients who has menorrhagia. I explain to them that I have no opposition for her to switch over to apixaban at 2.5 mg PO BID dosing (which is again a prophylactic dosing regimen). However, after our discussion, the patient would like to continue rivaroxaban for now and see what happen after IUD placement and then re-consider switching to apixaban if her menorrhagia does not improve with IUD placement.     I have renewed her rivaroxaban prescription today with a one year refill.     For her upcoming attempt IVC filter removal procedure on 1/24/2024, she already knows that she supposed to stop her rivaroxaban for 48 hours prior to the procedure and restart it the day after her procedure.     I explain to them that in general, interruption or holding anticoagulation therapy is not necessary for IUD placement. But if Dr. Ivey would like her to hold anticoagulation for this procedure, I have instructed her to call our clinic and I can provide her with an anticoagulation management plan.      Lanie is already on an antifibrinolytics with her menstrual bleeding. She has been using aminocaproic acid syrup at 3 grams down her J-tube every 8 hours during her menstrual period. I do think that aminocaproic acid syrup is best for her as this can easily be placed down her J-tube instead of tranexamic acid tablets.     I will recheck her CBC and Iron panel with ferritin in the next 1-2 weeks. Consider IV iron replacement once again if she is anemic and iron deficient.     She is instructed to call our clinic if she should experience any unusual bleeding issues or if she should need any invasive or surgical procedures in the future. Otherwise, I will plan to see her back annually for routine follow up. In person or virtual visit is fine.     The longitudinal plan of care for Lanie Neil was addressed during this visit. Due to the added complexity in care, I will continue to support Lanie in the subsequent management of these conditions and with the ongoing continuity of care of these conditions.    Video-Visit Details:  Type of service:  Video Visit  Video Start Time:  10:48  Video End Time (time video stopped): 11:05  Originating Location (pt. Location): Home  Distant Location (provider location):  [facility]   Mode of Communication:  Video Conference via [employer] PA-C, MPAS  Physician Assistant  [facility] for [facility].     30 minutes spent by me on the date of the encounter doing chart review, history and exam, documentation and further activities per the note      Patient was contacted to complete the pre-visit call prior to their video visit with the provider.      Allergies and medications were reviewed.    I thanked them for their time to cover this information   Layne Burciaga CMA

## 2024-01-18 NOTE — PROGRESS NOTES
[facility]  [facility]  [facility], [address], [place] [address]  Main: [phone], Fax: [phone]    ORDERS FOR [facility]:    Patient: Lanie Neil  MRN: [id number]  : 1990  Date of this order written: 2024  Time: 11:02am    Diagnosis:  History of DVT  Chronic anticoagulation therapy.   Menorrhagia  Iron deficiency anemia.      ORDERS FOR [facility]:  Please obtain CBC, iron level, iron binding capacity and ferritin level in the next home care visit with Lanie.Please fax result to [phone], Attention: Jt Mir PA-C    This order is faxed to [facility] at [phone]    Thank you.    If there are any questions, please call [phone] and ask to speak to a nursing staff.      Jt Mir PA-C, MPAS  Physician Assistant  [facility] for [facility].

## 2024-01-18 NOTE — PROGRESS NOTES
Patient was contacted to complete the pre-visit call prior to their video visit with the provider.      Allergies and medications were reviewed.    I thanked them for their time to cover this information   Layne Burciaga CMA

## 2024-01-18 NOTE — PATIENT INSTRUCTIONS
Lanie,    It was nice to see you and your mother via video visit today.    Below is a summary of our plan:  We have discuss about potentially transition you to apixaban (Eliquis) at 2.5 mg by mouth every 12 hours as some studies have shown that Eliquis might decrease bleeding amount in menstruating women. However, at this time, you have decided to likely proceed with IUD placement and would like to continue with rivaroxaban (Xarelto) for now until after IUD placement. Please continue rivaroxaban (Xarelto) at 10 mg via J-tube every 24 hours.   To re-iterate, for your attempt IVC filter removal procedure on 1/24/2024. Please hold your rivaroxaban (Xarelto) for 48 hours prior to the procedure and restart it the day after the procedure assuming that you do not have any bleeding issues.   Please call our clinic at [phone] and ask to speak to a nursing staff if Dr. Ivey would like you to hold your Xarelto for IUD placement procedure once it has been scheduled.  I will have [facility] collect labs from you with their next visit. I will recheck your blood count and iron panel. You might again need IV iron replacement if you are found to be again anemic and iron deficient.   Please continue to take aminocaproic acid syrup with your menstrual bleeding.   If you should have any further questions, or experience any unusual bleeding issues or if you should need any invasive or surgical procedures in the future, please call us at [phone] and ask to speak to a nursing staff.  One of our administrative assistants will contact you to schedule your return visit with me in one year.     Thank you once again in choosing our clinic as part of your healthcare team.      Jt Mir PA-C, MPAS  Physician Assistant  [facility] for [facility].

## 2024-01-23 ENCOUNTER — LAB REQUISITION (OUTPATIENT)
Dept: LAB | Facility: CLINIC | Age: 34
End: 2024-01-23
Payer: COMMERCIAL

## 2024-01-23 ENCOUNTER — TRANSFERRED RECORDS (OUTPATIENT)
Dept: HEALTH INFORMATION MANAGEMENT | Facility: CLINIC | Age: 34
End: 2024-01-23

## 2024-01-23 DIAGNOSIS — Z01.812 ENCOUNTER FOR PREPROCEDURAL LABORATORY EXAMINATION: ICD-10-CM

## 2024-01-23 LAB
BASOPHILS # BLD AUTO: 0.1 10E3/UL (ref 0–0.2)
BASOPHILS NFR BLD AUTO: 1 %
EOSINOPHIL # BLD AUTO: 0.2 10E3/UL (ref 0–0.7)
EOSINOPHIL NFR BLD AUTO: 4 %
ERYTHROCYTE [DISTWIDTH] IN BLOOD BY AUTOMATED COUNT: 18 % (ref 10–15)
FERRITIN SERPL-MCNC: 19 NG/ML (ref 6–175)
HCT VFR BLD AUTO: 32.9 % (ref 35–47)
HGB BLD-MCNC: 10.3 G/DL (ref 11.7–15.7)
IMM GRANULOCYTES # BLD: 0 10E3/UL
IMM GRANULOCYTES NFR BLD: 0 %
IRON BINDING CAPACITY (ROCHE): 292 UG/DL (ref 240–430)
IRON SATN MFR SERPL: 17 % (ref 15–46)
IRON SERPL-MCNC: 50 UG/DL (ref 37–145)
LYMPHOCYTES # BLD AUTO: 1.8 10E3/UL (ref 0.8–5.3)
LYMPHOCYTES NFR BLD AUTO: 31 %
MCH RBC QN AUTO: 28.3 PG (ref 26.5–33)
MCHC RBC AUTO-ENTMCNC: 31.3 G/DL (ref 31.5–36.5)
MCV RBC AUTO: 90 FL (ref 78–100)
MONOCYTES # BLD AUTO: 0.3 10E3/UL (ref 0–1.3)
MONOCYTES NFR BLD AUTO: 4 %
NEUTROPHILS # BLD AUTO: 3.4 10E3/UL (ref 1.6–8.3)
NEUTROPHILS NFR BLD AUTO: 60 %
NRBC # BLD AUTO: 0 10E3/UL
NRBC BLD AUTO-RTO: 0 /100
PLATELET # BLD AUTO: 340 10E3/UL (ref 150–450)
RBC # BLD AUTO: 3.64 10E6/UL (ref 3.8–5.2)
WBC # BLD AUTO: 5.7 10E3/UL (ref 4–11)

## 2024-01-23 PROCEDURE — 82728 ASSAY OF FERRITIN: CPT | Mod: ORL | Performed by: PHYSICIAN ASSISTANT

## 2024-01-23 PROCEDURE — 85025 COMPLETE CBC W/AUTO DIFF WBC: CPT | Mod: ORL | Performed by: PHYSICIAN ASSISTANT

## 2024-01-23 PROCEDURE — 83550 IRON BINDING TEST: CPT | Mod: ORL | Performed by: PHYSICIAN ASSISTANT

## 2024-01-24 ENCOUNTER — HOSPITAL ENCOUNTER (OUTPATIENT)
Facility: CLINIC | Age: 34
Discharge: HOME | End: 2024-01-24
Attending: INTERNAL MEDICINE | Admitting: RADIOLOGY
Payer: COMMERCIAL

## 2024-01-24 ENCOUNTER — ANESTHESIA (OUTPATIENT)
Dept: SURGERY | Facility: CLINIC | Age: 34
End: 2024-01-24
Payer: COMMERCIAL

## 2024-01-24 ENCOUNTER — APPOINTMENT (OUTPATIENT)
Dept: INTERVENTIONAL RADIOLOGY/VASCULAR | Facility: CLINIC | Age: 34
End: 2024-01-24
Attending: RADIOLOGY
Payer: COMMERCIAL

## 2024-01-24 VITALS
RESPIRATION RATE: 12 BRPM | HEART RATE: 82 BPM | SYSTOLIC BLOOD PRESSURE: 147 MMHG | OXYGEN SATURATION: 99 % | DIASTOLIC BLOOD PRESSURE: 100 MMHG | TEMPERATURE: 97.8 F

## 2024-01-24 DIAGNOSIS — I82.409 DVT (DEEP VENOUS THROMBOSIS) (H): ICD-10-CM

## 2024-01-24 LAB
ABO/RH(D): NORMAL
ANION GAP SERPL CALCULATED.3IONS-SCNC: 11 MMOL/L (ref 7–15)
ANTIBODY SCREEN: NEGATIVE
BUN SERPL-MCNC: 6.1 MG/DL (ref 6–20)
CALCIUM SERPL-MCNC: 8.8 MG/DL (ref 8.6–10)
CHLORIDE SERPL-SCNC: 113 MMOL/L (ref 98–107)
CREAT SERPL-MCNC: 0.08 MG/DL (ref 0.51–0.95)
DEPRECATED HCO3 PLAS-SCNC: 17 MMOL/L (ref 22–29)
EGFRCR SERPLBLD CKD-EPI 2021: >90 ML/MIN/1.73M2
ERYTHROCYTE [DISTWIDTH] IN BLOOD BY AUTOMATED COUNT: 18.5 % (ref 10–15)
GLUCOSE SERPL-MCNC: 100 MG/DL (ref 70–99)
HCT VFR BLD AUTO: 36.5 % (ref 35–47)
HGB BLD-MCNC: 11.2 G/DL (ref 11.7–15.7)
INR PPP: 1.15 (ref 0.85–1.15)
MCH RBC QN AUTO: 27.7 PG (ref 26.5–33)
MCHC RBC AUTO-ENTMCNC: 30.7 G/DL (ref 31.5–36.5)
MCV RBC AUTO: 90 FL (ref 78–100)
PLATELET # BLD AUTO: 369 10E3/UL (ref 150–450)
POTASSIUM SERPL-SCNC: 4.4 MMOL/L (ref 3.4–5.3)
RBC # BLD AUTO: 4.04 10E6/UL (ref 3.8–5.2)
SODIUM SERPL-SCNC: 141 MMOL/L (ref 135–145)
SPECIMEN EXPIRATION DATE: NORMAL
WBC # BLD AUTO: 6.6 10E3/UL (ref 4–11)

## 2024-01-24 PROCEDURE — 86900 BLOOD TYPING SEROLOGIC ABO: CPT | Performed by: INTERNAL MEDICINE

## 2024-01-24 PROCEDURE — 999N000141 HC STATISTIC PRE-PROCEDURE NURSING ASSESSMENT

## 2024-01-24 PROCEDURE — 250N000013 HC RX MED GY IP 250 OP 250 PS 637: Performed by: RADIOLOGY

## 2024-01-24 PROCEDURE — 99153 MOD SED SAME PHYS/QHP EA: CPT

## 2024-01-24 PROCEDURE — 272N000192 HC ACCESSORY CR2

## 2024-01-24 PROCEDURE — 710N000012 HC RECOVERY PHASE 2, PER MINUTE

## 2024-01-24 PROCEDURE — 80048 BASIC METABOLIC PNL TOTAL CA: CPT | Performed by: NURSE PRACTITIONER

## 2024-01-24 PROCEDURE — 370N000017 HC ANESTHESIA TECHNICAL FEE, PER MIN

## 2024-01-24 PROCEDURE — 250N000025 HC SEVOFLURANE, PER MIN

## 2024-01-24 PROCEDURE — 99152 MOD SED SAME PHYS/QHP 5/>YRS: CPT | Mod: GC | Performed by: RADIOLOGY

## 2024-01-24 PROCEDURE — 250N000011 HC RX IP 250 OP 636: Performed by: RADIOLOGY

## 2024-01-24 PROCEDURE — 255N000002 HC RX 255 OP 636: Performed by: INTERNAL MEDICINE

## 2024-01-24 PROCEDURE — 99152 MOD SED SAME PHYS/QHP 5/>YRS: CPT

## 2024-01-24 PROCEDURE — 37193 REM ENDOVAS VENA CAVA FILTER: CPT

## 2024-01-24 PROCEDURE — 85027 COMPLETE CBC AUTOMATED: CPT | Performed by: NURSE PRACTITIONER

## 2024-01-24 PROCEDURE — 258N000003 HC RX IP 258 OP 636: Performed by: ANESTHESIOLOGY

## 2024-01-24 PROCEDURE — 710N000011 HC RECOVERY PHASE 1, LEVEL 3, PER MIN

## 2024-01-24 PROCEDURE — C1769 GUIDE WIRE: HCPCS

## 2024-01-24 PROCEDURE — C1887 CATHETER, GUIDING: HCPCS

## 2024-01-24 PROCEDURE — 250N000011 HC RX IP 250 OP 636: Performed by: ANESTHESIOLOGY

## 2024-01-24 PROCEDURE — 36591 DRAW BLOOD OFF VENOUS DEVICE: CPT | Performed by: NURSE PRACTITIONER

## 2024-01-24 PROCEDURE — 250N000009 HC RX 250: Performed by: RADIOLOGY

## 2024-01-24 PROCEDURE — 250N000011 HC RX IP 250 OP 636: Performed by: STUDENT IN AN ORGANIZED HEALTH CARE EDUCATION/TRAINING PROGRAM

## 2024-01-24 PROCEDURE — 272N000504 HC NEEDLE CR4

## 2024-01-24 PROCEDURE — 85610 PROTHROMBIN TIME: CPT | Performed by: NURSE PRACTITIONER

## 2024-01-24 PROCEDURE — 37193 REM ENDOVAS VENA CAVA FILTER: CPT | Mod: GC | Performed by: RADIOLOGY

## 2024-01-24 PROCEDURE — 272N000566 HC SHEATH CR3

## 2024-01-24 RX ORDER — HYDROMORPHONE HYDROCHLORIDE 5 MG/5ML
4 SOLUTION ORAL ONCE
Status: DISCONTINUED | OUTPATIENT
Start: 2024-01-24 | End: 2024-01-24 | Stop reason: HOSPADM

## 2024-01-24 RX ORDER — HEPARIN SODIUM 200 [USP'U]/100ML
1 INJECTION, SOLUTION INTRAVENOUS CONTINUOUS PRN
Status: DISCONTINUED | OUTPATIENT
Start: 2024-01-24 | End: 2024-01-24 | Stop reason: HOSPADM

## 2024-01-24 RX ORDER — SODIUM CHLORIDE, SODIUM LACTATE, POTASSIUM CHLORIDE, CALCIUM CHLORIDE 600; 310; 30; 20 MG/100ML; MG/100ML; MG/100ML; MG/100ML
INJECTION, SOLUTION INTRAVENOUS CONTINUOUS
Status: DISCONTINUED | OUTPATIENT
Start: 2024-01-24 | End: 2024-01-24 | Stop reason: HOSPADM

## 2024-01-24 RX ORDER — IODIXANOL 320 MG/ML
100 INJECTION, SOLUTION INTRAVASCULAR ONCE
Status: COMPLETED | OUTPATIENT
Start: 2024-01-24 | End: 2024-01-24

## 2024-01-24 RX ORDER — ONDANSETRON 4 MG/1
4 TABLET, ORALLY DISINTEGRATING ORAL EVERY 30 MIN PRN
Status: DISCONTINUED | OUTPATIENT
Start: 2024-01-24 | End: 2024-01-24 | Stop reason: HOSPADM

## 2024-01-24 RX ORDER — ONDANSETRON 2 MG/ML
4 INJECTION INTRAMUSCULAR; INTRAVENOUS EVERY 30 MIN PRN
Status: CANCELLED | OUTPATIENT
Start: 2024-01-24

## 2024-01-24 RX ORDER — SODIUM CHLORIDE 9 MG/ML
INJECTION, SOLUTION INTRAVENOUS CONTINUOUS PRN
Status: DISCONTINUED | OUTPATIENT
Start: 2024-01-24 | End: 2024-01-24

## 2024-01-24 RX ORDER — FENTANYL CITRATE 50 UG/ML
INJECTION, SOLUTION INTRAMUSCULAR; INTRAVENOUS PRN
Status: DISCONTINUED | OUTPATIENT
Start: 2024-01-24 | End: 2024-01-24

## 2024-01-24 RX ORDER — HYDROMORPHONE HYDROCHLORIDE 1 MG/ML
0.4 INJECTION, SOLUTION INTRAMUSCULAR; INTRAVENOUS; SUBCUTANEOUS EVERY 5 MIN PRN
Status: DISCONTINUED | OUTPATIENT
Start: 2024-01-24 | End: 2024-01-24 | Stop reason: HOSPADM

## 2024-01-24 RX ORDER — HEPARIN SODIUM 1000 [USP'U]/ML
INJECTION, SOLUTION INTRAVENOUS; SUBCUTANEOUS PRN
Status: DISCONTINUED | OUTPATIENT
Start: 2024-01-24 | End: 2024-01-24

## 2024-01-24 RX ORDER — ONDANSETRON 2 MG/ML
4 INJECTION INTRAMUSCULAR; INTRAVENOUS EVERY 30 MIN PRN
Status: DISCONTINUED | OUTPATIENT
Start: 2024-01-24 | End: 2024-01-24 | Stop reason: HOSPADM

## 2024-01-24 RX ORDER — HYDROMORPHONE HYDROCHLORIDE 4 MG/1
8 TABLET ORAL ONCE
Status: COMPLETED | OUTPATIENT
Start: 2024-01-24 | End: 2024-01-24

## 2024-01-24 RX ORDER — FENTANYL CITRATE 50 UG/ML
25 INJECTION, SOLUTION INTRAMUSCULAR; INTRAVENOUS EVERY 5 MIN PRN
Status: DISCONTINUED | OUTPATIENT
Start: 2024-01-24 | End: 2024-01-24 | Stop reason: HOSPADM

## 2024-01-24 RX ORDER — HEPARIN SODIUM (PORCINE) LOCK FLUSH IV SOLN 100 UNIT/ML 100 UNIT/ML
5-10 SOLUTION INTRAVENOUS
Status: DISCONTINUED | OUTPATIENT
Start: 2024-01-24 | End: 2024-01-24 | Stop reason: HOSPADM

## 2024-01-24 RX ORDER — HYDROMORPHONE HYDROCHLORIDE 1 MG/ML
0.5 INJECTION, SOLUTION INTRAMUSCULAR; INTRAVENOUS; SUBCUTANEOUS ONCE
Status: COMPLETED | OUTPATIENT
Start: 2024-01-24 | End: 2024-01-24

## 2024-01-24 RX ORDER — HEPARIN SODIUM,PORCINE 10 UNIT/ML
5-10 VIAL (ML) INTRAVENOUS EVERY 24 HOURS
Status: DISCONTINUED | OUTPATIENT
Start: 2024-01-24 | End: 2024-01-24 | Stop reason: HOSPADM

## 2024-01-24 RX ORDER — HEPARIN SODIUM,PORCINE 10 UNIT/ML
5-10 VIAL (ML) INTRAVENOUS
Status: DISCONTINUED | OUTPATIENT
Start: 2024-01-24 | End: 2024-01-24 | Stop reason: HOSPADM

## 2024-01-24 RX ORDER — OXYCODONE HYDROCHLORIDE 5 MG/1
5 TABLET ORAL
Status: CANCELLED | OUTPATIENT
Start: 2024-01-24

## 2024-01-24 RX ORDER — GLYCOPYRROLATE 0.2 MG/ML
INJECTION, SOLUTION INTRAMUSCULAR; INTRAVENOUS PRN
Status: DISCONTINUED | OUTPATIENT
Start: 2024-01-24 | End: 2024-01-24

## 2024-01-24 RX ORDER — ONDANSETRON 4 MG/1
4 TABLET, ORALLY DISINTEGRATING ORAL EVERY 30 MIN PRN
Status: CANCELLED | OUTPATIENT
Start: 2024-01-24

## 2024-01-24 RX ORDER — HYDROMORPHONE HYDROCHLORIDE 1 MG/ML
0.2 INJECTION, SOLUTION INTRAMUSCULAR; INTRAVENOUS; SUBCUTANEOUS EVERY 5 MIN PRN
Status: DISCONTINUED | OUTPATIENT
Start: 2024-01-24 | End: 2024-01-24 | Stop reason: HOSPADM

## 2024-01-24 RX ORDER — ONDANSETRON 2 MG/ML
INJECTION INTRAMUSCULAR; INTRAVENOUS PRN
Status: DISCONTINUED | OUTPATIENT
Start: 2024-01-24 | End: 2024-01-24

## 2024-01-24 RX ORDER — LIDOCAINE HYDROCHLORIDE 10 MG/ML
1-30 INJECTION, SOLUTION EPIDURAL; INFILTRATION; INTRACAUDAL; PERINEURAL
Status: COMPLETED | OUTPATIENT
Start: 2024-01-24 | End: 2024-01-24

## 2024-01-24 RX ORDER — FENTANYL CITRATE 50 UG/ML
50 INJECTION, SOLUTION INTRAMUSCULAR; INTRAVENOUS EVERY 5 MIN PRN
Status: DISCONTINUED | OUTPATIENT
Start: 2024-01-24 | End: 2024-01-24 | Stop reason: HOSPADM

## 2024-01-24 RX ORDER — PROPOFOL 10 MG/ML
INJECTION, EMULSION INTRAVENOUS PRN
Status: DISCONTINUED | OUTPATIENT
Start: 2024-01-24 | End: 2024-01-24

## 2024-01-24 RX ORDER — OXYCODONE HYDROCHLORIDE 10 MG/1
10 TABLET ORAL
Status: CANCELLED | OUTPATIENT
Start: 2024-01-24

## 2024-01-24 RX ORDER — SODIUM CHLORIDE 9 MG/ML
INJECTION, SOLUTION INTRAVENOUS CONTINUOUS
Status: DISCONTINUED | OUTPATIENT
Start: 2024-01-24 | End: 2024-01-24 | Stop reason: HOSPADM

## 2024-01-24 RX ORDER — NOREPINEPHRINE BITARTRATE 0.06 MG/ML
.01-.6 INJECTION, SOLUTION INTRAVENOUS CONTINUOUS
Status: DISCONTINUED | OUTPATIENT
Start: 2024-01-24 | End: 2024-01-24

## 2024-01-24 RX ADMIN — FENTANYL CITRATE 100 MCG: 50 INJECTION INTRAMUSCULAR; INTRAVENOUS at 13:02

## 2024-01-24 RX ADMIN — PROPOFOL 70 MG: 10 INJECTION, EMULSION INTRAVENOUS at 13:02

## 2024-01-24 RX ADMIN — FENTANYL CITRATE 50 MCG: 50 INJECTION, SOLUTION INTRAMUSCULAR; INTRAVENOUS at 15:45

## 2024-01-24 RX ADMIN — FENTANYL CITRATE 50 MCG: 50 INJECTION, SOLUTION INTRAMUSCULAR; INTRAVENOUS at 15:53

## 2024-01-24 RX ADMIN — ONDANSETRON 4 MG: 2 INJECTION INTRAMUSCULAR; INTRAVENOUS at 15:07

## 2024-01-24 RX ADMIN — HYDROMORPHONE HYDROCHLORIDE 8 MG: 2 TABLET ORAL at 17:12

## 2024-01-24 RX ADMIN — HEPARIN SODIUM 5000 UNITS: 1000 INJECTION INTRAVENOUS; SUBCUTANEOUS at 14:38

## 2024-01-24 RX ADMIN — SODIUM CHLORIDE: 9 INJECTION, SOLUTION INTRAVENOUS at 12:55

## 2024-01-24 RX ADMIN — HYDROMORPHONE HYDROCHLORIDE 0.5 MG: 1 INJECTION, SOLUTION INTRAMUSCULAR; INTRAVENOUS; SUBCUTANEOUS at 15:06

## 2024-01-24 RX ADMIN — MIDAZOLAM 2 MG: 1 INJECTION INTRAMUSCULAR; INTRAVENOUS at 13:02

## 2024-01-24 RX ADMIN — IODIXANOL 20 ML: 320 INJECTION, SOLUTION INTRAVASCULAR at 15:08

## 2024-01-24 RX ADMIN — HEPARIN SODIUM (PORCINE) LOCK FLUSH IV SOLN 100 UNIT/ML 5 ML: 100 SOLUTION at 17:24

## 2024-01-24 RX ADMIN — HYDROMORPHONE HYDROCHLORIDE 0.5 MG: 1 INJECTION, SOLUTION INTRAMUSCULAR; INTRAVENOUS; SUBCUTANEOUS at 16:10

## 2024-01-24 RX ADMIN — HEPARIN SODIUM 1 BAG: 200 INJECTION, SOLUTION INTRAVENOUS at 15:56

## 2024-01-24 RX ADMIN — GLYCOPYRROLATE 0.2 MG: 0.2 INJECTION, SOLUTION INTRAMUSCULAR; INTRAVENOUS at 14:37

## 2024-01-24 RX ADMIN — LIDOCAINE HYDROCHLORIDE 10 ML: 10 INJECTION, SOLUTION EPIDURAL; INFILTRATION; INTRACAUDAL; PERINEURAL at 15:55

## 2024-01-24 ASSESSMENT — ACTIVITIES OF DAILY LIVING (ADL)
ADLS_ACUITY_SCORE: 31
ADLS_ACUITY_SCORE: 35

## 2024-01-24 NOTE — ANESTHESIA POSTPROCEDURE EVALUATION
Patient: Lanie Neil    Procedure: Procedure(s):  ANESTHESIA OUT OF OR Inferior Vena Cava (IVC) Filter Removal @1200       Anesthesia Type:  General    Note:  Disposition: Outpatient   Postop Pain Control: Uneventful            Sign Out: Well controlled pain   PONV: No   Neuro/Psych: Uneventful            Sign Out: Acceptable/Baseline neuro status   Airway/Respiratory: Uneventful            Sign Out: Acceptable/Baseline resp. status   CV/Hemodynamics: Uneventful            Sign Out: Acceptable CV status; No obvious hypovolemia; No obvious fluid overload   Other NRE: NONE   DID A NON-ROUTINE EVENT OCCUR? No    Event details/Postop Comments:  No complications.           Last vitals:  Vitals Value Taken Time   /85 01/24/24 1546   Temp     Pulse 84 01/24/24 1600   Resp     SpO2 99 % 01/24/24 1600   Vitals shown include unfiled device data.    Electronically Signed By: Tomás Kim MD  January 24, 2024  4:01 PM

## 2024-01-24 NOTE — PROCEDURES
[facility]    Procedure: IR Procedure Note    Date/Time: 1/24/2024 3:17 PM    Performed by: Dru Armenta MD  Authorized by: Andrea Espinal MD      UNIVERSAL PROTOCOL   Site Marked: NA  Prior Images Obtained and Reviewed:  Yes  Required items: Required blood products, implants, devices and special equipment available    Patient identity confirmed:  Verbally with patient, arm band, provided demographic data and hospital-assigned identification number  Patient was reevaluated immediately before administering moderate or deep sedation or anesthesia  Confirmation Checklist:  Patient's identity using two indicators, relevant allergies, procedure was appropriate and matched the consent or emergent situation and correct equipment/implants were available  Time out: Immediately prior to the procedure a time out was called    Universal Protocol: the Joint Commission Universal Protocol was followed    Preparation: Patient was prepped and draped in usual sterile fashion       ANESTHESIA    Anesthesia:  Local infiltration  Local Anesthetic:  Lidocaine 1% without epinephrine      SEDATION  Patient Sedated: Yes    Sedation:  Fentanyl and midazolam  Vital signs: Vital signs monitored during sedation    See dictated procedure note for full details.  Findings: Successful IVC filter retrieval    Specimens: none    Complications: None    Condition: Stable    Plan: Successful IVC filter retrieval      PROCEDURE  Describe Procedure: Successful IVC filter retrieval  Patient Tolerance:  Patient tolerated the procedure well with no immediate complications  Length of time physician/provider present for 1:1 monitoring during sedation: 60

## 2024-01-24 NOTE — ANESTHESIA PREPROCEDURE EVALUATION
Anesthesia Pre-Procedure Evaluation    Patient: Lanie Neil   MRN: [id number] : 1990        Procedure : Procedure(s):  ANESTHESIA OUT OF OR Inferior Vena Cava (IVC) Filter Removal @1200          Past Medical History:   Diagnosis Date    Anemia     Anxiety     Arthritis Unknown    Asthma     Asthma 2014    Chronic pain     Chronic respiratory failure (H)     Constipation     Depression     Depressive disorder     I don't have it right now currently    DVT (deep venous thrombosis) (H)     Gastrointestinal tube present (H)     History of blood transfusion     Obesity     Sinusitis, chronic     Snoring     Spinal muscle atrophy (H)       Past Surgical History:   Procedure Laterality Date    BACK SURGERY  '04    fusion of cervical and thoracic spine,  and     BIOPSY      muscle biopsy    GASTROSTOMY TUBE      IR IVC FILTER PLACEMENT      IR PORT CHECK RIGHT  10/23/2023    right chest    KIDNEY SURGERY      kidney stones    SPINE SURGERY      reservoir placed, has had three total procedures    TRACHEOSTOMY        Allergies   Allergen Reactions    Ibuprofen       Social History     Tobacco Use    Smoking status: Never    Smokeless tobacco: Never   Substance Use Topics    Alcohol use: Never      Wt Readings from Last 1 Encounters:   23 68 kg (150 lb)        Anesthesia Evaluation   Pt has had prior anesthetic. Type: MAC and General.        ROS/MED HX  ENT/Pulmonary: Comment: Respiratory failure with trach and home mechanical ventilation.    (+)                      asthma                  Neurologic: Comment: Spinal muscular atrophy.      Cardiovascular:       METS/Exercise Tolerance:     Hematologic:  - neg hematologic  ROS     Musculoskeletal:       GI/Hepatic: Comment: History of acute lower GI  bleeding.      Renal/Genitourinary:  - neg Renal ROS     Endo:  - neg endo ROS   (+)               Obesity,       Psychiatric/Substance Use:  - neg psychiatric ROS    "  Infectious Disease:  - neg infectious disease ROS     Malignancy:  - neg malignancy ROS     Other:  - neg other ROS          Physical Exam    Airway      Comment: Trach and mechanical ventilation.    Mallampati: II   TM distance: < 3 FB   Neck ROM: limited   Mouth opening: > 3 cm    Respiratory Devices and Support         Dental       (+) Minor Abnormalities - some fillings, tiny chips      Cardiovascular   cardiovascular exam normal       Rhythm and rate: regular and normal     Pulmonary   pulmonary exam normal        breath sounds clear to auscultation           OUTSIDE LABS:  CBC:   Lab Results   Component Value Date    WBC 5.7 01/23/2024    WBC 7.4 12/19/2023    HGB 10.3 (L) 01/23/2024    HGB 10.7 (L) 12/19/2023    HCT 32.9 (L) 01/23/2024    HCT 34.4 (L) 12/19/2023     01/23/2024     12/19/2023     BMP:   Lab Results   Component Value Date     09/07/2023     02/15/2023    POTASSIUM 4.5 09/07/2023    POTASSIUM 4.3 02/15/2023    CHLORIDE 108 (H) 09/07/2023    CHLORIDE 108 (H) 02/15/2023    CO2 20 (L) 09/07/2023    CO2 19 (L) 02/15/2023    BUN 10.7 09/07/2023    BUN 11.0 02/15/2023    CR <0.06 (L) 09/07/2023    CR 0.06 (L) 02/15/2023     (H) 09/07/2023    GLC 94 02/15/2023     COAGS:   Lab Results   Component Value Date    PTT 60 (H) 10/08/2019    INR 1.43 (H) 06/15/2020     POC: No results found for: \"BGM\", \"HCG\", \"HCGS\"  HEPATIC:   Lab Results   Component Value Date    ALBUMIN 3.1 (L) 01/03/2022    PROTTOTAL 7.5 01/03/2022    ALT 43 01/03/2022    AST 25 01/03/2022    ALKPHOS 119 01/03/2022    BILITOTAL 0.4 01/03/2022     OTHER:   Lab Results   Component Value Date    CHINTAN 9.2 09/07/2023    PHOS 2.6 02/19/2021    MAG 1.8 12/17/2020    TSH 1.86 09/07/2023    T4 1.30 11/24/2014       Anesthesia Plan    ASA Status:  4    NPO Status:  NPO Appropriate    Anesthesia Type: General.   Induction: Intravenous.   Maintenance: Inhalation.        Consents    Anesthesia Plan(s) and associated " risks, benefits, and realistic alternatives discussed. Questions answered and patient/representative(s) expressed understanding.     - Discussed: Risks, Benefits and Alternatives for BOTH SEDATION and the PROCEDURE were discussed     - Discussed with:  Patient      - Extended Intubation/Ventilatory Support Discussed: Yes.      - Patient is DNR/DNI Status: No     Use of blood products discussed: Yes.     - Discussed with: Patient.     Postoperative Care       PONV prophylaxis: Ondansetron (or other 5HT-3), Dexamethasone or Solumedrol     Comments:    Other Comments: The material risks, benefits and alternatives were discussed in  detail.  The patient agrees to proceed.  The patient has no other complaints at this time.           Tomás Kim MD    I have reviewed the pertinent notes and labs in the chart from the past 30 days and (re)examined the patient.  Any updates or changes from those notes are reflected in this note.            # Drug Induced Coagulation Defect: home medication list includes an anticoagulant medication

## 2024-01-24 NOTE — ANESTHESIA CARE TRANSFER NOTE
Patient: Lanie Neil    Procedure: Procedure(s):  ANESTHESIA OUT OF OR Inferior Vena Cava (IVC) Filter Removal @1200       Diagnosis: Deep vein thrombosis (H) [I82.409]  Diagnosis Additional Information: No value filed.    Anesthesia Type:   General     Note:    Oropharynx: oropharynx clear of all foreign objects, spontaneously breathing and ventilatory support  Level of Consciousness: awake  Patient oxygen source: Home vent, mechanical ventilation.    Independent Airway: airway patency not satisfactory and stable (Surgical airway)  Dentition: dentition unchanged  Vital Signs Stable: post-procedure vital signs reviewed and stable  Report to RN Given: handoff report given  Patient transferred to: PACU    Handoff Report: Identifed the Patient, Identified the Reponsible Provider, Reviewed the pertinent medical history, Discussed the surgical course, Reviewed Intra-OP anesthesia mangement and issues during anesthesia, Set expectations for post-procedure period and Allowed opportunity for questions and acknowledgement of understanding      Vitals:  Vitals Value Taken Time   /89 01/24/24 1537   Temp     Pulse 86 01/24/24 1542   Resp 12    SpO2 99 % 01/24/24 1542   Vitals shown include unfiled device data.    Electronically Signed By: RIDGE Lew CRNA  January 24, 2024  3:43 PM

## 2024-01-24 NOTE — IR NOTE
Patient Name: Lanie Neil  Medical Record Number: [id number]  Today's Date: 1/24/2024    Procedure: IVC filter removal  Proceduralist: Niranjan Espinal and samanta    Procedure Start: 1400  Procedure end: 1515  Sedation medications administered: general anesthesia     Report given to: PACU RN    Other Notes: Pt arrived to IR room 5 from . Consent reviewed. Pt denies any questions or concerns regarding procedure. Pt positioned supine and monitored per protocol. Pt tolerated procedure without any noted complications. Pt transferred back to PACU. RIJ puncture and RFV puncture dressed with tegaderm.

## 2024-01-24 NOTE — DISCHARGE INSTRUCTIONS
Contacting your Doctor -   To contact a doctor, call your primary care doctor's office or:  [phone] and ask for the resident on call for interventional radiology (answered 24 hours a day)   Emergency Department:  [facility]: [phone] 911 if you are in need of immediate or emergent help

## 2024-01-24 NOTE — OR NURSING
Patient declines Mepilex.  Mom, Aster, states she is on her back 24/7 and has not developed an ulcer.

## 2024-01-24 NOTE — OR NURSING
Dru Armenta MD paged, pt is oozing blood from R groin site and tegaderm has come loose, holding pressure now

## 2024-01-25 ENCOUNTER — VIRTUAL VISIT (OUTPATIENT)
Dept: BEHAVIORAL HEALTH | Facility: CLINIC | Age: 34
End: 2024-01-25
Payer: COMMERCIAL

## 2024-01-25 DIAGNOSIS — F41.1 GENERALIZED ANXIETY DISORDER: ICD-10-CM

## 2024-01-25 DIAGNOSIS — F33.1 MAJOR DEPRESSIVE DISORDER, RECURRENT EPISODE, MODERATE (H): Primary | ICD-10-CM

## 2024-01-25 PROCEDURE — 90834 PSYTX W PT 45 MINUTES: CPT | Mod: 95 | Performed by: PSYCHOLOGIST

## 2024-01-25 NOTE — PROGRESS NOTES
[facility]: Integrated Behavioral Health  January 25, 2024          Behavioral Health Clinician Progress Note    Patient Name: Lanie Neil           Service Type: Video appointment      Service Location:  Video appointment      Session Start Time: 1:00 Session End Time: 1:50      Session Length: 38 - 52      Attendees: Patient    Visit Activities (Refresh list every visit): [facility] Only    Service Modality:  Video Visit:      Provider verified identity through the following two step process.  Patient provided:  Patient photo and Patient is known previously to provider    Telemedicine Visit: The patient's condition can be safely assessed and treated via synchronous audio and visual telemedicine encounter.      Reason for Telemedicine Visit: Patient unable to travel    Originating Site (Patient Location): Patient's home    Distant Site (Provider Location): Provider Remote Setting- Home Office    Consent:  The patient/guardian has verbally consented to: the potential risks and benefits of telemedicine (video visit) versus in person care; bill my insurance or make self-payment for services provided; and responsibility for payment of non-covered services.     Patient would like the video invitation sent by:  My Chart    Mode of Communication:  Video Conference via [facility]    Distant Location (Provider):  Off-site    As the provider I attest to compliance with applicable laws and regulations related to telemedicine.      Diagnostic Assessment Date: 1/19/2021; update 5/23/2023  Treatment Plan Review Date: 4/25/2024  See Flowsheets for today's PHQ-9 and KATHARINE-7 results  Previous PHQ-9:       11/16/2023     4:00 PM 12/26/2023    10:45 AM 1/2/2024    10:31 AM   PHQ-9 SCORE   PHQ-9 Total Score MyChart  2 (Minimal depression) 3 (Minimal depression)   PHQ-9 Total Score 0 2 3     Previous KATHARINE-7:       3/16/2021     9:15 AM 6/30/2021     9:27 AM 1/3/2024    11:09 AM   KATHARINE-7 SCORE   Total Score 1  (minimal anxiety) 1 (minimal anxiety) 1 (minimal anxiety)   Total Score 1 1 1      3/4/2022  1:59 PM 3/15/2022  2:10 PM 3/30/2023  10:56 AM 7/6/2023  9:45 AM 10/18/2023  7:09 AM   PROMIS-10 Scores Only        Global Mental Health Score 13  8  7  12  11    Global Physical Health Score 11  9  9  11  8    PROMIS TOTAL - SUBSCORES 24  17  16  23  19       12/26/2023  12:03 PM   PROMIS-10 Scores Only    Global Mental Health Score 13    Global Physical Health Score 14    PROMIS TOTAL - SUBSCORES 27          DATA  Extended Session (60+ minutes): No  Interactive Complexity: No  Crisis: No    Treatment Objective(s) Addressed in This Session:  Target Behavior(s): disease management/lifestyle changes      Depressed Mood: Decrease frequency and intensity of feeling down, depressed, hopeless  Identify negative self-talk and behaviors: challenge core beliefs, myths, and actions  Anxiety: will develop more effective coping skills to manage anxiety symptoms  Relationship Problems: will address relationship difficulties in a more adaptive manner  Psychological distress related to Pain    Current Stressors / Issues:  [facility] met with Lanie today to continue supporting her treatment of depression, adjustment to chronic disease management, and relationship problems.     Facilitated discussion about grief/loss issues as they pertain to not having children/family of her own one day. Notes she mourns not being able to date or engage with feminine behaviors. Helped Lanie process themes of helplessness in this regard. She notes doing better in avoiding rumination about issues however, noting improvement in recognizing when she engages in this thought process. Affirmed successes and reviewed ways of continuing to apply mindfulness ideas.     Progress on Treatment Objective(s) / Homework:  Satisfactory progress - ACTION (Actively working towards change); Intervened by reinforcing change plan / affirming steps taken     Skills  training  Explore skills useful to client in current situation.  Skills include assertiveness, communication, conflict management, problem-solving, relaxation, etc.    Solution-Focused Therapy  Explore patterns in patient's relationships and discuss options for new behaviors.  Explore patterns in patient's actions and choices and discuss options for new behaviors.    Cognitive-behavioral Therapy  Discuss common cognitive distortions, identify them in patient's life.  Explore ways to challenge, replace, and act against these cognitions.    Acceptance and Commitment Therapy  Explore and identify important values in patient's life.  Discuss ways to commit to behavioral activation around these values.    Interpersonal Psychotherapy  Explore patterns in relationships that are effective or ineffective at helping patient reach their goals, find satisfying experience.  Discuss new patterns or behaviors to engage in for improved social functioning.      Care Plan review completed: No    Medication Review:  No changes to current psychiatric medication(s)    Medication Compliance:  NA    Changes in Health Issues:   None reported    Chemical Use Review:   Substance Use: Chemical use reviewed, no active concerns identified      Tobacco Use: No current tobacco use.      Assessment: Current Emotional / Mental Status (status of significant symptoms):  Risk status (Self / Other harm or suicidal ideation)  Patient has had a history of suicidal ideation: passive thoughts only; easily controlled  - denied any recently    Patient denies current fears or concerns for personal safety.  Patient denies current or recent suicidal ideation or behaviors.  Patient denies current or recent homicidal ideation or behaviors.  Patient denies current or recent self injurious behavior or ideation.  Patient denies other safety concerns.     A safety and risk management plan has not been developed at this time, however patient was encouraged to call  [place] [phone] should there be a change in any of these risk factors.      [place] Suicide Severity Rating Scale (Short Version)      2/18/2021     4:47 PM 12/26/2023     2:00 PM   [place] Suicide Severity Rating (Short Version)   Over the past 2 weeks have you felt down, depressed, or hopeless? yes    Over the past 2 weeks have you had thoughts of killing yourself? no    Have you ever attempted to kill yourself? no    1. Wish to be Dead (Since Last Contact)  N   2. Non-Specific Active Suicidal Thoughts (Since Last Contact)  N   Actual Attempt (Since Last Contact)  N   Has subject engaged in non-suicidal self-injurious behavior? (Since Last Contact)  N   Interrupted Attempts (Since Last Contact)  N   Aborted or Self-Interrupted Attempt (Since Last Contact)  N   Preparatory Acts or Behavior (Since Last Contact)  N   Suicide (Since Last Contact)  N   Calculated C-SSRS Risk Score (Since Last Contact)  No Risk Indicated     [place] Suicide Severity Rating Scale (Lifetime/Recent)      8/13/2021     1:25 PM 12/26/2023    11:59 AM   [place] Suicide Severity Rating (Lifetime/Recent)   Q1 Wish to be Dead (Lifetime) No    Q2 Non-Specific Active Suicidal Thoughts (Lifetime) No    Most Severe Ideation Rating (Lifetime) NA    Frequency (Lifetime) NA    Duration (Lifetime) NA    Controllability (Lifetime) NA    Protective Factors  (Lifetime) NA    Reasons for Ideation (Lifetime) NA    RETIRED: 1. Wish to be Dead (Recent) No    RETIRED: 2. Non-Specific Active Suicidal Thoughts (Recent) No    3. Active Suicidal Ideation with any Methods (Not Plan) Without Intent to Act (Lifetime) No    RETIRED: 3. Active Suicidal Ideation with any Methods (Not Plan) Without Intent to Act (Recent) No    RETIRE: 4. Active Suicidal Ideation with Some Intent to Act, Without Specific Plan (Lifetime) No    4. Active Suicidal Ideation with Some Intent to Act, Without Specific Plan (Recent) No    RETIRE: 5. Active Suicidal Ideation with Specific  Plan and Intent (Lifetime) No    RETIRED: 5. Active Suicidal Ideation with Specific Plan and Intent (Recent) No    Most Severe Ideation Rating (Past Month) NA    Frequency (Past Month) NA    Duration (Past Month) NA    Controllability (Past Month) NA    Protective Factors (Past Month) NA    Reasons for Ideation (Past Month) NA    Actual Attempt (Lifetime) No    Actual Attempt (Past 3 Months) No    Has subject engaged in non-suicidal self-injurious behavior? (Lifetime) No    Has subject engaged in non-suicidal self-injurious behavior? (Past 3 Months) No    Interrupted Attempts (Lifetime) No    Interrupted Attempts (Past 3 Months) No    Aborted or Self-Interrupted Attempt (Lifetime) No    Aborted or Self-Interrupted Attempt (Past 3 Months) No    Preparatory Acts or Behavior (Lifetime) No    Preparatory Acts or Behavior (Past 3 Months) No    Most Recent Attempt Actual Lethality Code NA    Most Lethal Attempt Actual Lethality Code NA    Initial/First Attempt Actual Lethality Code NA    1. Wish to be Dead (Past 1 Month)  Y   2. Non-Specific Active Suicidal Thoughts (Past 1 Month)  N   Calculated C-SSRS Risk Score (Lifetime/Recent)  Low Risk         Appearance:   Appropriate   Eye Contact:   Good   Psychomotor Behavior: Normal   Attitude:   Cooperative   Orientation:   All  Speech   Rate / Production: Normal    Volume:  Normal   Mood:    Depressed    Affect:    Tearful  Thought Content:  Rumination   Thought Form:  Coherent  Logical   Insight:    Good     Diagnoses:  1. Major depressive disorder, recurrent episode, moderate (H)    2. Generalized anxiety disorder            Collateral Reports Completed:  [facility] will coordinate with care team as needed    Plan: (Homework, other):  Lanie was scheduled to RTC in three weeks for help addressing depressive symptoms, pain, and relationship conflicts. Continue ACT/MBSR skills provided.  CD Recommendations: No indications of CD issues.     Héctor Higuera Psy.D, LP   Behavioral  Health Clinician   -[facility] [place]     _______________________________________________________________________                                              Individual Treatment Plan    Patient's Name: Lanie Neil  YOB: 1990    Date of Creation: 3/4/2022  Date Treatment Plan Last Reviewed/Revised: 1/25/2024    DSM5 Diagnoses: 296.32 (F33.1) Major Depressive Disorder, Recurrent Episode, Moderate With anxious distress  Psychosocial / Contextual Factors: Chronic pain, SMA, disabled  PROMIS (reviewed every 90 days):      3/4/2022  1:59 PM 3/15/2022  2:10 PM 3/30/2023  10:56 AM 7/6/2023  9:45 AM 10/18/2023  7:09 AM   PROMIS-10 Scores Only        Global Mental Health Score 13  8  7  12  11    Global Physical Health Score 11  9  9  11  8    PROMIS TOTAL - SUBSCORES 24  17  16  23  19       12/26/2023  12:03 PM   PROMIS-10 Scores Only    Global Mental Health Score 13    Global Physical Health Score 14    PROMIS TOTAL - SUBSCORES 27                Referral / Collaboration:  Referral to another professional/service is not indicated at this time..    Anticipated number of session for this episode of care: 7-9  Anticipation frequency of session: Every other week  Anticipated Duration of each session: 38-52 minutes  Treatment plan will be reviewed in 90 days or when goals have been changed.       MeasurableTreatment Goal(s) related to diagnosis / functional impairment(s)  Goal 1: Patient will experience a reduction in depressive symptoms along with a corresponding increase in positive emotion and life satisfaction.      Objective #A (Patient Action)    Patient will Increase interest, engagement, and pleasure in doing things.  Status: Continued - Date(s): 1/25/2024    Intervention(s)  Therapist will help patient identify pleasant and mastery oriented events that elicit positive, relaxed mood.    Objective #B  Patient will Decrease frequency and intensity of feeling down, depressed,  "hopeless.  Status: Continued - Date(s): 1/25/2024    Intervention(s)  Therapist will introduce patient to cognitive-behavioral and acceptance and commitment therapy topics aimed to help reduce depression and anxiety    Objective #C  Patient will Identify negative self-talk and behaviors: challenge core beliefs, myths, and actions  Improve concentration, focus, and mindfulness in daily activities .  Status: Continued - Date(s): 1/25/2024    Intervention(s)  Therapist will help patient identify and manage negative self-talk and automatic thoughts; introduce patient to cognitive distortions; help patient develop cognitive diffusion techniques      Goal 2: Patient will experience a reduction in anxious symptoms, along with a corresponding increase in relaxed emotional states and life satisfaction.      Objective #A (Patient Action)  Patient will use cognitive-behavioral and thought diffusion strategies identified in therapy to challenge anxious thoughts.    Status: Continued - Date(s): 1/25/2024    Intervention(s)  Therapist will utilize CBT and ACT ideas to help patient challenge anxious thoughts and reduce intensity/duration of anxious distress    Objective #B  Patient will use \"worry time\" each day for 15 minutes of scheduled worry and then defer obsessive or anxious thinking until the next structured worry time.    Status: Continued - Date(s): 1/25/2024    Intervention(s)  Therapist will teach patient how to effectively utilize worry time and/or thought logs/journals each day and incorporate more relaxation behaviors into their routine.    Objective #C  Patient will identify the stressors which contribute to feelings of anxiety  Patient will increase engagement in adaptive coping skills and recreational activities, such as exercise and healthy socialization, to manage distress.  Status: Continued - Date(s): 1/25/2024    Intervention(s)  Therapist will help patient identify triggers/situational factors that " contribute to anxiety and behavioral skills aimed to manage anxious distress.      Goal 3: Patient will learn more adaptive ways to manage chronic pain      Objective #A (Patient Action)    Status: Continued - Date(s): 1/25/2024    Patient will identify 2-4 strategies for managing pain.    Intervention(s)  Therapist will teach distraction skills aimed to help manage chronic pain and utilize ACT/CBT ideas to help with this (e.g. relaxation) .    Objective #B  Patient will state understanding of stressors and relationship to physical symptoms.    Status: Continued - Date(s): 1/25/2024      Other Possible Therapeutic Intervention(s):    Psycho-education regarding mental health diagnoses and treatment options    Supportive Therapy  Provide affirmations, reflections, and establish working rapport  Emphasize and reflect on strength of therapeutic relationship    Skills training  Explore skills useful to client in current situation.  Skills include assertiveness, communication, conflict management, problem-solving, relaxation, etc.    Solution-Focused Therapy  Explore patterns in patient's relationships and discuss options for new behaviors.  Explore patterns in patient's actions and choices and discuss options for new behaviors.    Cognitive-behavioral Therapy  Discuss common cognitive distortions, identify them in patient's life.  Explore ways to challenge, replace, and act against these cognitions.    Acceptance and Commitment Therapy  Explore and identify important values in patient's life.  Discuss ways to commit to behavioral activation around these values.    Psychodynamic psychotherapy  Discuss patient's emotional dynamics and issues and how they impact behaviors.  Explore patient's history of relationships and how they impact present behaviors.  Explore how to work with and make changes in these schemas and patterns.    Narrative Therapy  Explore the patient's story of his/her life from his/her  perspective.  Explore alternate ways of understanding their experience, identifying exceptions, developing new themes.    Interpersonal Psychotherapy  Explore patterns in relationships that are effective or ineffective at helping patient reach their goals, find satisfying experience.  Discuss new patterns or behaviors to engage in for improved social functioning.    Behavioral Activation  Discuss steps patient can take to become more involved in meaningful activity.  Identify barriers to these activities and explore possible solutions.    Mindfulness-Based Strategies  Discuss skills based on development and application of mindfulness.  Skills drawn from compassion-focused therapy, dialectical behavior therapy, mindfulness-based stress reduction, mindfulness-based cognitive therapy, etc.      Patient has reviewed and agreed to the above plan.    Héctor Higuera Psy.D, LP   Behavioral Health Clinician   [facility]   1/25/2024  Crisis Resources    Refer to the resources below as needed.    Steps to care for yourself    If you are currently in counseling, call your counselor for an appointment  Call the local crisis resources below if needed.  Contact friends or family for support.  Get more exercise.  Do activities you enjoy.  Eat a well-balanced diet and drink plenty of fluids.  Rest as needed.  Limit alcohol and recreational drugs. These can worsen depression.    When to contact your primary care provider     You have thoughts of harming or killing yourself but have not made a plan to carry it out.  Your depression gets in the way of daily activities.  You are often unable to sleep.  You need help cutting back on alcohol or recreational drugs.    When to call 911 or go to the Emergency Room     Get emergency help right away if you have any of the following:  You are planning to harm or kill yourself and you have a way to carry out the plan.   You have injured yourself or others. Or,  you think you will.  You feel confused or are having trouble thinking or remembering.  You are having delusions (false beliefs).  You are hearing voices or seeing things that aren t there.  You are feeling psychotic (paranoid, fearful, restless, agitated, nervous, racing thoughts or speech)    Crisis Resources     These hotlines are for both adults and children. The and are open 24 hours a day, 7 days a week unless noted otherwise.    988 - National Suicide and Crisis Lifeline  If you or someone you know needs support now, call or text 988 or chat 988ReplyBuyline.org. 988 connects you with a trained crisis counselor who can help.    National Suicide Prevention Lifeline   8-222-127-TALK (0601)    Crisis Text Line    www.crisistextline.org  Text HOME to 763625 from anywhere in the [place], anytime, about any type of crisis. A live, trained crisis counselor will receive the text and respond quickly.    [place] Lifeline for LGBTQ Youth  A national crisis intervention and suicide lifeline for LGBTQ youth under 25. Provides a safe place to talk without judgement.   Call 1-998.237.5374; text START to 850129 or visit www.the[employer]vorproject.org to talk to a trained counselor.  For [facility] crisis numbers, visit the [facility] website at:  https://[place].gov/dhs/people-we-serve/adults/health-care/mental-health/resources/crisis-contacts.jsp      Héctor Higuera Psy.D, LP   Behavioral Health Clinician   [facility]     6/3/2022      Answers submitted by the patient for this visit:  Patient Health Questionnaire (Submitted on 1/2/2024)  If you checked off any problems, how difficult have these problems made it for you to do your work, take care of things at home, or get along with other people?: Not difficult at all  PHQ9 TOTAL SCORE: 3

## 2024-01-26 ENCOUNTER — TELEPHONE (OUTPATIENT)
Dept: VASCULAR SURGERY | Facility: CLINIC | Age: 34
End: 2024-01-26
Payer: MEDICARE

## 2024-01-26 NOTE — TELEPHONE ENCOUNTER
I called and left a voicemail including my call back number.  I called to see how Lanie is doing after her IVC filter removal with Dr Espinal.  I will send a [employer] message as well. No further follow up is needed.  Thanks,     OLIVER Swift RN, BSN  Interventional Radiology Care Coordinator   Phone:  [phone]

## 2024-01-27 DIAGNOSIS — G47.00 INSOMNIA, UNSPECIFIED TYPE: ICD-10-CM

## 2024-01-29 ENCOUNTER — PRE VISIT (OUTPATIENT)
Dept: SURGERY | Facility: CLINIC | Age: 34
End: 2024-01-29

## 2024-01-29 ENCOUNTER — PATIENT OUTREACH (OUTPATIENT)
Dept: CARE COORDINATION | Facility: CLINIC | Age: 34
End: 2024-01-29

## 2024-01-29 DIAGNOSIS — Z79.01 CHRONIC ANTICOAGULATION: Primary | ICD-10-CM

## 2024-01-29 DIAGNOSIS — D50.0 IRON DEFICIENCY ANEMIA DUE TO CHRONIC BLOOD LOSS: ICD-10-CM

## 2024-01-29 RX ORDER — EPINEPHRINE 1 MG/ML
0.3 INJECTION, SOLUTION, CONCENTRATE INTRAVENOUS EVERY 5 MIN PRN
OUTPATIENT
Start: 2024-01-29

## 2024-01-29 RX ORDER — ALBUTEROL SULFATE 90 UG/1
1-2 AEROSOL, METERED RESPIRATORY (INHALATION)
Start: 2024-01-29

## 2024-01-29 RX ORDER — DIPHENHYDRAMINE HYDROCHLORIDE 50 MG/ML
50 INJECTION INTRAMUSCULAR; INTRAVENOUS
Start: 2024-01-29

## 2024-01-29 RX ORDER — HEPARIN SODIUM,PORCINE 10 UNIT/ML
5-20 VIAL (ML) INTRAVENOUS DAILY PRN
OUTPATIENT
Start: 2024-01-29

## 2024-01-29 RX ORDER — ALBUTEROL SULFATE 0.83 MG/ML
2.5 SOLUTION RESPIRATORY (INHALATION)
OUTPATIENT
Start: 2024-01-29

## 2024-01-29 RX ORDER — MEPERIDINE HYDROCHLORIDE 25 MG/ML
25 INJECTION INTRAMUSCULAR; INTRAVENOUS; SUBCUTANEOUS EVERY 30 MIN PRN
OUTPATIENT
Start: 2024-01-29

## 2024-01-29 RX ORDER — HEPARIN SODIUM (PORCINE) LOCK FLUSH IV SOLN 100 UNIT/ML 100 UNIT/ML
5 SOLUTION INTRAVENOUS
OUTPATIENT
Start: 2024-01-29

## 2024-01-29 NOTE — PROGRESS NOTES
[facility]  [facility]  [address] [place] [address], [address], [place] [id number]  Main: [phone], Fax: [phone]    Telephone Note:    Patient: Lanie Neil  MRN: [id number]  : 1990  Date of this note written: 2024    Telephone Call to patient by this writer.  This writer spoke with Lanie and her mother today in regard to her iron panel and CBC results done on 2024 and 2024. Lanie also happy to report that her IVC filter removal procedure last week went well and Dr. Gloria was able to remove her IVC filter. She reports no signs of bleeding or hematoma at the site of access.    I explain to Lanie that her hemoglobin is still low and her ferritin and iron levels are at low normal range.     I provided Lanie at this time with 2 options: 1) Continue to observe for now and repeat her iron panel and CBC in one month. 2) Arrange for IV iron infusion now and repeat levels in 2 months after her infusion.     Lanie would like to proceed with IV iron replacement now as she reports that she continues to have menorrhagia and that she will anticipate that her iron level and hemoglobin will continue to worsen. I do think that this is reasonable to give her IV iron now. Order to [facility] will be sent.     Component      Latest Ref Rng 2024  2:04 PM 2024  11:26 AM   WBC      4.0 - 11.0 10e3/uL 5.7  6.6    RBC Count      3.80 - 5.20 10e6/uL 3.64 (L)  4.04    Hemoglobin      11.7 - 15.7 g/dL 10.3 (L)  11.2 (L)    Hematocrit      35.0 - 47.0 % 32.9 (L)  36.5    MCV      78 - 100 fL 90  90    MCH      26.5 - 33.0 pg 28.3  27.7    MCHC      31.5 - 36.5 g/dL 31.3 (L)  30.7 (L)    RDW      10.0 - 15.0 % 18.0 (H)  18.5 (H)    Platelet Count      150 - 450 10e3/uL 340  369    % Neutrophils      % 60     % Lymphocytes      % 31     % Monocytes      % 4     % Eosinophils      % 4     % Basophils      % 1     % Immature Granulocytes      % 0      NRBCs per 100 WBC      <1 /100 0     Absolute Neutrophils      1.6 - 8.3 10e3/uL 3.4     Absolute Lymphocytes      0.8 - 5.3 10e3/uL 1.8     Absolute Monocytes      0.0 - 1.3 10e3/uL 0.3     Absolute Eosinophils      0.0 - 0.7 10e3/uL 0.2     Absolute Basophils      0.0 - 0.2 10e3/uL 0.1     Absolute Immature Granulocytes      <=0.4 10e3/uL 0.0     Absolute NRBCs      10e3/uL 0.0     Iron      37 - 145 ug/dL 50     Iron Binding Capacity      240 - 430 ug/dL 292     Iron Sat Index      15 - 46 % 17     Ferritin      6 - 175 ng/mL 19          Jt Mir PA-C, MPAS  Physician Assistant  [facility].

## 2024-01-29 NOTE — PROGRESS NOTES
[id number]  Lanie Neil  33 year old female    RN faxed over infusion order and note from Jt Mir PA-C to [facility] @ [phone].    Rosa Isela Emerson RN, BSN, PCCN  Nurse Clinician    [facility]  [address] [place] [address], [facility] [address], [place] [address]   Office, direct: [phone]  Main office number: [phone]  Pronouns: She, her, hers

## 2024-01-29 NOTE — PROGRESS NOTES
Clinic Care Coordination Contact  [facility]/Voicemail    Clinical Data: Care Coordinator Outreach    Outreach Documentation Number of Outreach Attempt   1/3/2024   2:14 PM 1   1/29/2024   2:56 PM 2       Left message on patient's mother's voicemail with call back information and requested return call.    Plan: Care Coordinator will do no further outreaches at this time.    [facility]  Clinic Care Coordination  [facility]  [email or web address]  [phone]

## 2024-02-06 ENCOUNTER — MEDICAL CORRESPONDENCE (OUTPATIENT)
Dept: HEALTH INFORMATION MANAGEMENT | Facility: CLINIC | Age: 34
End: 2024-02-06
Payer: MEDICARE

## 2024-02-06 ENCOUNTER — DOCUMENTATION ONLY (OUTPATIENT)
Dept: FAMILY MEDICINE | Facility: CLINIC | Age: 34
End: 2024-02-06
Payer: MEDICARE

## 2024-02-06 ENCOUNTER — MYC MEDICAL ADVICE (OUTPATIENT)
Dept: FAMILY MEDICINE | Facility: CLINIC | Age: 34
End: 2024-02-06
Payer: MEDICARE

## 2024-02-06 DIAGNOSIS — G47.00 INSOMNIA, UNSPECIFIED TYPE: ICD-10-CM

## 2024-02-06 NOTE — PROGRESS NOTES
Type of Form Received:     Form Received (Date) 02/05/24   Form Filled out Yes, faxed 2/7   Placed in provider folder Yes

## 2024-02-07 NOTE — TELEPHONE ENCOUNTER
Daily Note     Today's date: 2023  Patient name: Saqib Byrnes  : 2015  MRN: [id number]  Referring provider: Smita Aguilar  Dx:   Encounter Diagnosis     ICD-10-CM    1. Congenital diplegia (HCC)  G80.8                  Wont send, so I printed   2 minutes and kept waiting to sit down.    In his gait pattern in walker at times he would turn RLE into IR and required cues to place forward and take a full step with knees extended. Noting him to take steps with right knee bent more today. PT spoke with Mom afterwards to make sure that they use his walker for ambulation at home. He needs to work on hamstring stretches with his arms above his head. He needs to work on kicking his knees forward to extend his knees to hold in place.     Plan: Continue Individual physical therapy services 1x/week for B/L UE & LE & trunk strengthening, coordination and balance activities, functional mobility and gait training.

## 2024-02-08 ENCOUNTER — TELEPHONE (OUTPATIENT)
Dept: FAMILY MEDICINE | Facility: CLINIC | Age: 34
End: 2024-02-08
Payer: MEDICARE

## 2024-02-08 DIAGNOSIS — G47.00 INSOMNIA, UNSPECIFIED TYPE: ICD-10-CM

## 2024-02-08 NOTE — TELEPHONE ENCOUNTER
[facility] Call Center    Phone Message    May a detailed message be left on voicemail: yes     Reason for Call: Medication Question or concern regarding medication   Prescription Clarification  Name of Medication: traZODone (DESYREL) 5 mg/ml SUSP   Prescribing Provider: Jimmy Moseley MD    Pharmacy: [facility] - [place] [address]    What on the order needs clarification? Pharmacy calling with a question for provider, did provider want dosage to be 12.5mg at bedtime or 15mg at bedtime, because total dosage would be 62.5mg total if 12.5mg verses 75mg total if 15mg were dosage. Please call to clarify so pharmacy can get medication to patient, thank you!      Action Taken: Message routed to:  [facility]    Travel Screening: Not Applicable

## 2024-02-16 ENCOUNTER — TELEPHONE (OUTPATIENT)
Dept: FAMILY MEDICINE | Facility: CLINIC | Age: 34
End: 2024-02-16
Payer: MEDICARE

## 2024-02-21 ENCOUNTER — VIRTUAL VISIT (OUTPATIENT)
Dept: BEHAVIORAL HEALTH | Facility: CLINIC | Age: 34
End: 2024-02-21
Payer: MEDICARE

## 2024-02-21 DIAGNOSIS — F33.1 MAJOR DEPRESSIVE DISORDER, RECURRENT EPISODE, MODERATE (H): Primary | ICD-10-CM

## 2024-02-21 DIAGNOSIS — F41.1 GENERALIZED ANXIETY DISORDER: ICD-10-CM

## 2024-02-21 PROCEDURE — 90834 PSYTX W PT 45 MINUTES: CPT | Mod: 95 | Performed by: PSYCHOLOGIST

## 2024-02-21 NOTE — PROGRESS NOTES
[facility]  February 21, 2024        Behavioral Health Clinician Progress Note    Patient Name: Lanie Neil           Service Type: Video appointment      Service Location:  Video appointment      Session Start Time: 1:06 Session End Time: 1:56      Session Length: 38 - 52      Attendees: Patient    Visit Activities (Refresh list every visit): [facility] Only    Service Modality:  Video Visit:      Provider verified identity through the following two step process.  Patient provided:  Patient photo and Patient is known previously to provider    Telemedicine Visit: The patient's condition can be safely assessed and treated via synchronous audio and visual telemedicine encounter.      Reason for Telemedicine Visit: Patient unable to travel    Originating Site (Patient Location): Patient's home    Distant Site (Provider Location[place] [facility] [facility] [place]    Consent:  The patient/guardian has verbally consented to: the potential risks and benefits of telemedicine (video visit) versus in person care; bill my insurance or make self-payment for services provided; and responsibility for payment of non-covered services.     Patient would like the video invitation sent by:  My Chart    Mode of Communication:  Video Conference via [facility]    Distant Location (Provider):  On-site    As the provider I attest to compliance with applicable laws and regulations related to telemedicine.      Diagnostic Assessment Date: 1/19/2021; update 5/23/2023  Treatment Plan Review Date: 4/25/2024  See Flowsheets for today's PHQ-9 and KATHARINE-7 results  Previous PHQ-9:       12/26/2023    10:45 AM 1/2/2024    10:31 AM 2/12/2024    12:40 PM   PHQ-9 SCORE   PHQ-9 Total Score MyChart 2 (Minimal depression) 3 (Minimal depression) 2 (Minimal depression)   PHQ-9 Total Score 2 3 2     Previous KATHARINE-7:       3/16/2021     9:15 AM 6/30/2021     9:27 AM 1/3/2024    11:09 AM   KATHARINE-7 SCORE    Total Score 1 (minimal anxiety) 1 (minimal anxiety) 1 (minimal anxiety)   Total Score 1 1 1      3/4/2022  1:59 PM 3/15/2022  2:10 PM 3/30/2023  10:56 AM 7/6/2023  9:45 AM 10/18/2023  7:09 AM   PROMIS-10 Scores Only        Global Mental Health Score 13  8  7  12  11    Global Physical Health Score 11  9  9  11  8    PROMIS TOTAL - SUBSCORES 24  17  16  23  19       12/26/2023  12:03 PM   PROMIS-10 Scores Only    Global Mental Health Score 13    Global Physical Health Score 14    PROMIS TOTAL - SUBSCORES 27          DATA  Extended Session (60+ minutes): No  Interactive Complexity: No  Crisis: No    Treatment Objective(s) Addressed in This Session:  Target Behavior(s): disease management/lifestyle changes      Depressed Mood: Decrease frequency and intensity of feeling down, depressed, hopeless  Identify negative self-talk and behaviors: challenge core beliefs, myths, and actions  Anxiety: will develop more effective coping skills to manage anxiety symptoms  Relationship Problems: will address relationship difficulties in a more adaptive manner  Psychological distress related to Pain    Current Stressors / Issues:  [facility] met with Lanie today to continue supporting her treatment of depression, adjustment to chronic disease management, and relationship problems.     Primary discussion today about this writer's resignation from [employer]. Lanie notes difficulties processing this news, noting it triggered feelings of abandonment for her. Helped process emotional themes and connect experiences to past events. Utilized narrative therapy ideas to help Lanie identify convictions in her narrative about male figures in her life. Also discussed framing the change as an opportunity for her to get a fresh perspective on her difficulties. Discussed placing a referral for counseling today to get scheduled and she was agreeable to this.     Progress on Treatment Objective(s) / Homework:  Satisfactory progress - ACTION (Actively  working towards change); Intervened by reinforcing change plan / affirming steps taken     Skills training  Explore skills useful to client in current situation.  Skills include assertiveness, communication, conflict management, problem-solving, relaxation, etc.    Solution-Focused Therapy  Explore patterns in patient's relationships and discuss options for new behaviors.  Explore patterns in patient's actions and choices and discuss options for new behaviors.    Cognitive-behavioral Therapy  Discuss common cognitive distortions, identify them in patient's life.  Explore ways to challenge, replace, and act against these cognitions.    Acceptance and Commitment Therapy  Explore and identify important values in patient's life.  Discuss ways to commit to behavioral activation around these values.    Interpersonal Psychotherapy  Explore patterns in relationships that are effective or ineffective at helping patient reach their goals, find satisfying experience.  Discuss new patterns or behaviors to engage in for improved social functioning.      Care Plan review completed: No    Medication Review:  No changes to current psychiatric medication(s)    Medication Compliance:  NA    Changes in Health Issues:   None reported    Chemical Use Review:   Substance Use: Chemical use reviewed, no active concerns identified      Tobacco Use: No current tobacco use.      Assessment: Current Emotional / Mental Status (status of significant symptoms):  Risk status (Self / Other harm or suicidal ideation)  Patient has had a history of suicidal ideation: passive thoughts only; easily controlled  - denied any recently    Patient denies current fears or concerns for personal safety.  Patient denies current or recent suicidal ideation or behaviors.  Patient denies current or recent homicidal ideation or behaviors.  Patient denies current or recent self injurious behavior or ideation.  Patient denies other safety concerns.     A safety and  risk management plan has not been developed at this time, however patient was encouraged to call [place] [phone] should there be a change in any of these risk factors.      Flagler Suicide Severity Rating Scale (Short Version)      2/18/2021     4:47 PM 12/26/2023     2:00 PM   Flagler Suicide Severity Rating (Short Version)   Over the past 2 weeks have you felt down, depressed, or hopeless? yes    Over the past 2 weeks have you had thoughts of killing yourself? no    Have you ever attempted to kill yourself? no    1. Wish to be Dead (Since Last Contact)  N   2. Non-Specific Active Suicidal Thoughts (Since Last Contact)  N   Actual Attempt (Since Last Contact)  N   Has subject engaged in non-suicidal self-injurious behavior? (Since Last Contact)  N   Interrupted Attempts (Since Last Contact)  N   Aborted or Self-Interrupted Attempt (Since Last Contact)  N   Preparatory Acts or Behavior (Since Last Contact)  N   Suicide (Since Last Contact)  N   Calculated C-SSRS Risk Score (Since Last Contact)  No Risk Indicated     Flagler Suicide Severity Rating Scale (Lifetime/Recent)      8/13/2021     1:25 PM 12/26/2023    11:59 AM   Flagler Suicide Severity Rating (Lifetime/Recent)   Q1 Wish to be Dead (Lifetime) No    Q2 Non-Specific Active Suicidal Thoughts (Lifetime) No    Most Severe Ideation Rating (Lifetime) NA    Frequency (Lifetime) NA    Duration (Lifetime) NA    Controllability (Lifetime) NA    Protective Factors  (Lifetime) NA    Reasons for Ideation (Lifetime) NA    RETIRED: 1. Wish to be Dead (Recent) No    RETIRED: 2. Non-Specific Active Suicidal Thoughts (Recent) No    3. Active Suicidal Ideation with any Methods (Not Plan) Without Intent to Act (Lifetime) No    RETIRED: 3. Active Suicidal Ideation with any Methods (Not Plan) Without Intent to Act (Recent) No    RETIRE: 4. Active Suicidal Ideation with Some Intent to Act, Without Specific Plan (Lifetime) No    4. Active Suicidal Ideation with Some Intent  to Act, Without Specific Plan (Recent) No    RETIRE: 5. Active Suicidal Ideation with Specific Plan and Intent (Lifetime) No    RETIRED: 5. Active Suicidal Ideation with Specific Plan and Intent (Recent) No    Most Severe Ideation Rating (Past Month) NA    Frequency (Past Month) NA    Duration (Past Month) NA    Controllability (Past Month) NA    Protective Factors (Past Month) NA    Reasons for Ideation (Past Month) NA    Actual Attempt (Lifetime) No    Actual Attempt (Past 3 Months) No    Has subject engaged in non-suicidal self-injurious behavior? (Lifetime) No    Has subject engaged in non-suicidal self-injurious behavior? (Past 3 Months) No    Interrupted Attempts (Lifetime) No    Interrupted Attempts (Past 3 Months) No    Aborted or Self-Interrupted Attempt (Lifetime) No    Aborted or Self-Interrupted Attempt (Past 3 Months) No    Preparatory Acts or Behavior (Lifetime) No    Preparatory Acts or Behavior (Past 3 Months) No    Most Recent Attempt Actual Lethality Code NA    Most Lethal Attempt Actual Lethality Code NA    Initial/First Attempt Actual Lethality Code NA    1. Wish to be Dead (Past 1 Month)  Y   2. Non-Specific Active Suicidal Thoughts (Past 1 Month)  N   Calculated C-SSRS Risk Score (Lifetime/Recent)  Low Risk         Appearance:   Appropriate   Eye Contact:   Good   Psychomotor Behavior: Normal   Attitude:   Cooperative   Orientation:   All  Speech   Rate / Production: Normal    Volume:  Normal   Mood:    Depressed    Affect:    Tearful  Thought Content:  Rumination   Thought Form:  Coherent  Logical   Insight:    Good     Diagnoses:  1. Major depressive disorder, recurrent episode, moderate (H)    2. Generalized anxiety disorder              Collateral Reports Completed:  [facility] will coordinate with care team as needed    Plan: (Homework, other):  Lanie was scheduled to RTC in 2 weeks for help addressing depressive symptoms, pain, and relationship conflicts. Continue ACT/MBSR skills provided.  CD  Recommendations: No indications of CD issues.     Héctor Higuera Psy.D, LP   Behavioral Health Clinician   [facility]     _______________________________________________________________________                                              Individual Treatment Plan    Patient's Name: Lanie Neil  YOB: 1990    Date of Creation: 3/4/2022  Date Treatment Plan Last Reviewed/Revised: 1/25/2024    DSM5 Diagnoses: 296.32 (F33.1) Major Depressive Disorder, Recurrent Episode, Moderate With anxious distress  Psychosocial / Contextual Factors: Chronic pain, SMA, disabled  PROMIS (reviewed every 90 days):      3/4/2022  1:59 PM 3/15/2022  2:10 PM 3/30/2023  10:56 AM 7/6/2023  9:45 AM 10/18/2023  7:09 AM   PROMIS-10 Scores Only        Global Mental Health Score 13  8  7  12  11    Global Physical Health Score 11  9  9  11  8    PROMIS TOTAL - SUBSCORES 24  17  16  23  19       12/26/2023  12:03 PM   PROMIS-10 Scores Only    Global Mental Health Score 13    Global Physical Health Score 14    PROMIS TOTAL - SUBSCORES 27                Referral / Collaboration:  Referral to another professional/service is not indicated at this time..    Anticipated number of session for this episode of care: 7-9  Anticipation frequency of session: Every other week  Anticipated Duration of each session: 38-52 minutes  Treatment plan will be reviewed in 90 days or when goals have been changed.       MeasurableTreatment Goal(s) related to diagnosis / functional impairment(s)  Goal 1: Patient will experience a reduction in depressive symptoms along with a corresponding increase in positive emotion and life satisfaction.      Objective #A (Patient Action)    Patient will Increase interest, engagement, and pleasure in doing things.  Status: Continued - Date(s): 1/25/2024    Intervention(s)  Therapist will help patient identify pleasant and mastery oriented events that elicit positive, relaxed  "mood.    Objective #B  Patient will Decrease frequency and intensity of feeling down, depressed, hopeless.  Status: Continued - Date(s): 1/25/2024    Intervention(s)  Therapist will introduce patient to cognitive-behavioral and acceptance and commitment therapy topics aimed to help reduce depression and anxiety    Objective #C  Patient will Identify negative self-talk and behaviors: challenge core beliefs, myths, and actions  Improve concentration, focus, and mindfulness in daily activities .  Status: Continued - Date(s): 1/25/2024    Intervention(s)  Therapist will help patient identify and manage negative self-talk and automatic thoughts; introduce patient to cognitive distortions; help patient develop cognitive diffusion techniques      Goal 2: Patient will experience a reduction in anxious symptoms, along with a corresponding increase in relaxed emotional states and life satisfaction.      Objective #A (Patient Action)  Patient will use cognitive-behavioral and thought diffusion strategies identified in therapy to challenge anxious thoughts.    Status: Continued - Date(s): 1/25/2024    Intervention(s)  Therapist will utilize CBT and ACT ideas to help patient challenge anxious thoughts and reduce intensity/duration of anxious distress    Objective #B  Patient will use \"worry time\" each day for 15 minutes of scheduled worry and then defer obsessive or anxious thinking until the next structured worry time.    Status: Continued - Date(s): 1/25/2024    Intervention(s)  Therapist will teach patient how to effectively utilize worry time and/or thought logs/journals each day and incorporate more relaxation behaviors into their routine.    Objective #C  Patient will identify the stressors which contribute to feelings of anxiety  Patient will increase engagement in adaptive coping skills and recreational activities, such as exercise and healthy socialization, to manage distress.  Status: Continued - Date(s): " 1/25/2024    Intervention(s)  Therapist will help patient identify triggers/situational factors that contribute to anxiety and behavioral skills aimed to manage anxious distress.      Goal 3: Patient will learn more adaptive ways to manage chronic pain      Objective #A (Patient Action)    Status: Continued - Date(s): 1/25/2024    Patient will identify 2-4 strategies for managing pain.    Intervention(s)  Therapist will teach distraction skills aimed to help manage chronic pain and utilize ACT/CBT ideas to help with this (e.g. relaxation) .    Objective #B  Patient will state understanding of stressors and relationship to physical symptoms.    Status: Continued - Date(s): 1/25/2024      Other Possible Therapeutic Intervention(s):    Psycho-education regarding mental health diagnoses and treatment options    Supportive Therapy  Provide affirmations, reflections, and establish working rapport  Emphasize and reflect on strength of therapeutic relationship    Skills training  Explore skills useful to client in current situation.  Skills include assertiveness, communication, conflict management, problem-solving, relaxation, etc.    Solution-Focused Therapy  Explore patterns in patient's relationships and discuss options for new behaviors.  Explore patterns in patient's actions and choices and discuss options for new behaviors.    Cognitive-behavioral Therapy  Discuss common cognitive distortions, identify them in patient's life.  Explore ways to challenge, replace, and act against these cognitions.    Acceptance and Commitment Therapy  Explore and identify important values in patient's life.  Discuss ways to commit to behavioral activation around these values.    Psychodynamic psychotherapy  Discuss patient's emotional dynamics and issues and how they impact behaviors.  Explore patient's history of relationships and how they impact present behaviors.  Explore how to work with and make changes in these schemas and  patterns.    Narrative Therapy  Explore the patient's story of his/her life from his/her perspective.  Explore alternate ways of understanding their experience, identifying exceptions, developing new themes.    Interpersonal Psychotherapy  Explore patterns in relationships that are effective or ineffective at helping patient reach their goals, find satisfying experience.  Discuss new patterns or behaviors to engage in for improved social functioning.    Behavioral Activation  Discuss steps patient can take to become more involved in meaningful activity.  Identify barriers to these activities and explore possible solutions.    Mindfulness-Based Strategies  Discuss skills based on development and application of mindfulness.  Skills drawn from compassion-focused therapy, dialectical behavior therapy, mindfulness-based stress reduction, mindfulness-based cognitive therapy, etc.      Patient has reviewed and agreed to the above plan.    Héctor Higuera Psy.D, LP   Behavioral Health Clinician   -[facility]   1/25/2024  Crisis Resources    Refer to the resources below as needed.    Steps to care for yourself    If you are currently in counseling, call your counselor for an appointment  Call the local crisis resources below if needed.  Contact friends or family for support.  Get more exercise.  Do activities you enjoy.  Eat a well-balanced diet and drink plenty of fluids.  Rest as needed.  Limit alcohol and recreational drugs. These can worsen depression.    When to contact your primary care provider     You have thoughts of harming or killing yourself but have not made a plan to carry it out.  Your depression gets in the way of daily activities.  You are often unable to sleep.  You need help cutting back on alcohol or recreational drugs.    When to call 911 or go to the Emergency Room     Get emergency help right away if you have any of the following:  You are planning to harm or kill  yourself and you have a way to carry out the plan.   You have injured yourself or others. Or, you think you will.  You feel confused or are having trouble thinking or remembering.  You are having delusions (false beliefs).  You are hearing voices or seeing things that aren t there.  You are feeling psychotic (paranoid, fearful, restless, agitated, nervous, racing thoughts or speech)    Crisis Resources     These hotlines are for both adults and children. The and are open 24 hours a day, 7 days a week unless noted otherwise.    988 - National Suicide and Crisis Lifeline  If you or someone you know needs support now, call or text 988 or chat Swayline.org. 988 connects you with a trained crisis counselor who can help.    National Suicide Prevention Lifeline   7-618-593-TALK (0447)    Crisis Text Line    www.crisistextline.org  Text HOME to 289604 from anywhere in the [place], anytime, about any type of crisis. A live, trained crisis counselor will receive the text and respond quickly.    [place] Lifeline for LGBTQ Youth  A national crisis intervention and suicide lifeline for LGBTQ youth under 25. Provides a safe place to talk without judgement.   Call 1-139.855.6582; text START to 302720 or visit www.theAdeniosvorproject.org to talk to a trained counselor.  For [facility] crisis numbers, visit the [facility] website at:  https://[place].gov/dhs/people-we-serve/adults/health-care/mental-health/resources/crisis-contacts.jsp      Héctor Higuera Psy.D, LP   Behavioral Health Clinician   [facility]     6/3/2022      Answers submitted by the patient for this visit:  Patient Health Questionnaire (Submitted on 1/2/2024)  If you checked off any problems, how difficult have these problems made it for you to do your work, take care of things at home, or get along with other people?: Not difficult at all  PHQ9 TOTAL SCORE: 3

## 2024-03-06 DIAGNOSIS — E87.6 HYPOKALEMIA: ICD-10-CM

## 2024-03-07 ENCOUNTER — VIRTUAL VISIT (OUTPATIENT)
Dept: BEHAVIORAL HEALTH | Facility: CLINIC | Age: 34
End: 2024-03-07
Payer: MEDICARE

## 2024-03-07 DIAGNOSIS — F33.1 MAJOR DEPRESSIVE DISORDER, RECURRENT EPISODE, MODERATE (H): Primary | ICD-10-CM

## 2024-03-07 DIAGNOSIS — F41.1 GENERALIZED ANXIETY DISORDER: ICD-10-CM

## 2024-03-07 PROCEDURE — 90834 PSYTX W PT 45 MINUTES: CPT | Mod: 95 | Performed by: PSYCHOLOGIST

## 2024-03-07 NOTE — PROGRESS NOTES
[facility]: Integrated Behavioral Health  March 7, 2024          Behavioral Health Clinician Progress Note    Patient Name: Lanie Neil           Service Type: Video appointment      Service Location:  Video appointment      Session Start Time: 10:06  Session End Time: 10:56      Session Length: 38 - 52      Attendees: Patient    Visit Activities (Refresh list every visit): [facility] Only    Service Modality:  Video Visit:      Provider verified identity through the following two step process.  Patient provided:  Patient photo and Patient is known previously to provider    Telemedicine Visit: The patient's condition can be safely assessed and treated via synchronous audio and visual telemedicine encounter.      Reason for Telemedicine Visit: Patient unable to travel    Originating Site (Patient Location): Patient's home    Distant Site (Provider Location): Provider Remote Setting- Home Office    Consent:  The patient/guardian has verbally consented to: the potential risks and benefits of telemedicine (video visit) versus in person care; bill my insurance or make self-payment for services provided; and responsibility for payment of non-covered services.     Patient would like the video invitation sent by:  My Chart    Mode of Communication:  [facility]    Distant Location (Provider):  Off-site    As the provider I attest to compliance with applicable laws and regulations related to telemedicine.      Diagnostic Assessment Date: 1/19/2021; update 5/23/2023  Treatment Plan Review Date: 4/25/2024  See Flowsheets for today's PHQ-9 and KATHARINE-7 results  Previous PHQ-9:       12/26/2023    10:45 AM 1/2/2024    10:31 AM 2/12/2024    12:40 PM   PHQ-9 SCORE   PHQ-9 Total Score MyChart 2 (Minimal depression) 3 (Minimal depression) 2 (Minimal depression)   PHQ-9 Total Score 2 3 2     Previous KATHARINE-7:       3/16/2021     9:15 AM 6/30/2021     9:27 AM 1/3/2024    11:09 AM   KATHARINE-7  SCORE   Total Score 1 (minimal anxiety) 1 (minimal anxiety) 1 (minimal anxiety)   Total Score 1 1 1      3/4/2022  1:59 PM 3/15/2022  2:10 PM 3/30/2023  10:56 AM 7/6/2023  9:45 AM 10/18/2023  7:09 AM   PROMIS-10 Scores Only        Global Mental Health Score 13  8  7  12  11    Global Physical Health Score 11  9  9  11  8    PROMIS TOTAL - SUBSCORES 24  17  16  23  19       12/26/2023  12:03 PM   PROMIS-10 Scores Only    Global Mental Health Score 13    Global Physical Health Score 14    PROMIS TOTAL - SUBSCORES 27          DATA  Extended Session (60+ minutes): No  Interactive Complexity: No  Crisis: No    Treatment Objective(s) Addressed in This Session:  Target Behavior(s): disease management/lifestyle changes      Depressed Mood: Decrease frequency and intensity of feeling down, depressed, hopeless  Identify negative self-talk and behaviors: challenge core beliefs, myths, and actions  Anxiety: will develop more effective coping skills to manage anxiety symptoms  Relationship Problems: will address relationship difficulties in a more adaptive manner  Psychological distress related to Pain    Current Stressors / Issues:  [facility] met with Lanie today to continue supporting her treatment of depression, adjustment to chronic disease management, and relationship problems. Extended discussion today about processing termination from counseling with this provider due to resignation. We processed Lanie declining an offer to see a female therapist, preferring to work with a male. Discussed the underlying reasons for this, looking at framing therapy in more adaptive ways, that it is a treatment rather than a place to find companionship. This lead us to processing more about past hurts/conflicts with male figures in her life. Reviewed ways of identifying boundaries she can have with herself, to recognize when she is approaching therapy in ways that will unlikely be satisfying. Utilized interpersonal and insight-oriented  therapies today to help facilitate discussion about termination and ways of utilizing therapy services well in the future.         Progress on Treatment Objective(s) / Homework:  Satisfactory progress - ACTION (Actively working towards change); Intervened by reinforcing change plan / affirming steps taken     Skills training  Explore skills useful to client in current situation.  Skills include assertiveness, communication, conflict management, problem-solving, relaxation, etc.    Solution-Focused Therapy  Explore patterns in patient's relationships and discuss options for new behaviors.  Explore patterns in patient's actions and choices and discuss options for new behaviors.    Cognitive-behavioral Therapy  Discuss common cognitive distortions, identify them in patient's life.  Explore ways to challenge, replace, and act against these cognitions.    Acceptance and Commitment Therapy  Explore and identify important values in patient's life.  Discuss ways to commit to behavioral activation around these values.    Interpersonal Psychotherapy  Explore patterns in relationships that are effective or ineffective at helping patient reach their goals, find satisfying experience.  Discuss new patterns or behaviors to engage in for improved social functioning.      Care Plan review completed: No    Medication Review:  No changes to current psychiatric medication(s)    Medication Compliance:  NA    Changes in Health Issues:   None reported    Chemical Use Review:   Substance Use: Chemical use reviewed, no active concerns identified      Tobacco Use: No current tobacco use.      Assessment: Current Emotional / Mental Status (status of significant symptoms):  Risk status (Self / Other harm or suicidal ideation)  Patient has had a history of suicidal ideation: passive thoughts only; easily controlled  - denied any recently    Patient denies current fears or concerns for personal safety.  Patient denies current or recent suicidal  ideation or behaviors.  Patient denies current or recent homicidal ideation or behaviors.  Patient denies current or recent self injurious behavior or ideation.  Patient denies other safety concerns.     A safety and risk management plan has not been developed at this time, however patient was encouraged to call [place] [phone] should there be a change in any of these risk factors.      [place] Suicide Severity Rating Scale (Short Version)      2/18/2021     4:47 PM 12/26/2023     2:00 PM   [place] Suicide Severity Rating (Short Version)   Over the past 2 weeks have you felt down, depressed, or hopeless? yes    Over the past 2 weeks have you had thoughts of killing yourself? no    Have you ever attempted to kill yourself? no    1. Wish to be Dead (Since Last Contact)  N   2. Non-Specific Active Suicidal Thoughts (Since Last Contact)  N   Actual Attempt (Since Last Contact)  N   Has subject engaged in non-suicidal self-injurious behavior? (Since Last Contact)  N   Interrupted Attempts (Since Last Contact)  N   Aborted or Self-Interrupted Attempt (Since Last Contact)  N   Preparatory Acts or Behavior (Since Last Contact)  N   Suicide (Since Last Contact)  N   Calculated C-SSRS Risk Score (Since Last Contact)  No Risk Indicated     [place] Suicide Severity Rating Scale (Lifetime/Recent)      8/13/2021     1:25 PM 12/26/2023    11:59 AM   [place] Suicide Severity Rating (Lifetime/Recent)   Q1 Wish to be Dead (Lifetime) No    Q2 Non-Specific Active Suicidal Thoughts (Lifetime) No    Most Severe Ideation Rating (Lifetime) NA    Frequency (Lifetime) NA    Duration (Lifetime) NA    Controllability (Lifetime) NA    Protective Factors  (Lifetime) NA    Reasons for Ideation (Lifetime) NA    RETIRED: 1. Wish to be Dead (Recent) No    RETIRED: 2. Non-Specific Active Suicidal Thoughts (Recent) No    3. Active Suicidal Ideation with any Methods (Not Plan) Without Intent to Act (Lifetime) No    RETIRED: 3. Active Suicidal  Ideation with any Methods (Not Plan) Without Intent to Act (Recent) No    RETIRE: 4. Active Suicidal Ideation with Some Intent to Act, Without Specific Plan (Lifetime) No    4. Active Suicidal Ideation with Some Intent to Act, Without Specific Plan (Recent) No    RETIRE: 5. Active Suicidal Ideation with Specific Plan and Intent (Lifetime) No    RETIRED: 5. Active Suicidal Ideation with Specific Plan and Intent (Recent) No    Most Severe Ideation Rating (Past Month) NA    Frequency (Past Month) NA    Duration (Past Month) NA    Controllability (Past Month) NA    Protective Factors (Past Month) NA    Reasons for Ideation (Past Month) NA    Actual Attempt (Lifetime) No    Actual Attempt (Past 3 Months) No    Has subject engaged in non-suicidal self-injurious behavior? (Lifetime) No    Has subject engaged in non-suicidal self-injurious behavior? (Past 3 Months) No    Interrupted Attempts (Lifetime) No    Interrupted Attempts (Past 3 Months) No    Aborted or Self-Interrupted Attempt (Lifetime) No    Aborted or Self-Interrupted Attempt (Past 3 Months) No    Preparatory Acts or Behavior (Lifetime) No    Preparatory Acts or Behavior (Past 3 Months) No    Most Recent Attempt Actual Lethality Code NA    Most Lethal Attempt Actual Lethality Code NA    Initial/First Attempt Actual Lethality Code NA    1. Wish to be Dead (Past 1 Month)  Y   2. Non-Specific Active Suicidal Thoughts (Past 1 Month)  N   Calculated C-SSRS Risk Score (Lifetime/Recent)  Low Risk         Appearance:   Appropriate   Eye Contact:   Good   Psychomotor Behavior: Normal   Attitude:   Cooperative   Orientation:   All  Speech   Rate / Production: Normal    Volume:  Normal   Mood:    Depressed    Affect:    Tearful  Thought Content:  Rumination   Thought Form:  Coherent  Logical   Insight:    Good     Diagnoses:  1. Major depressive disorder, recurrent episode, moderate (H)    2. Generalized anxiety disorder                Collateral Reports Completed:  [facility]  will coordinate with care team as needed    Plan: (Homework, other):  Continue with [facility] with Peter Bravo for therapy when scheduled. Continue ACT/MBSR skills provided.  CD Recommendations: No indications of CD issues.     Héctor Higuera Psy.D,    Behavioral Health Clinician   [facility] [place]     _______________________________________________________________________                                              Individual Treatment Plan    Patient's Name: Lanie Neil  YOB: 1990    Date of Creation: 3/4/2022  Date Treatment Plan Last Reviewed/Revised: 1/25/2024    DSM5 Diagnoses: 296.32 (F33.1) Major Depressive Disorder, Recurrent Episode, Moderate With anxious distress  Psychosocial / Contextual Factors: Chronic pain, SMA, disabled  PROMIS (reviewed every 90 days):      3/4/2022  1:59 PM 3/15/2022  2:10 PM 3/30/2023  10:56 AM 7/6/2023  9:45 AM 10/18/2023  7:09 AM   PROMIS-10 Scores Only        Global Mental Health Score 13  8  7  12  11    Global Physical Health Score 11  9  9  11  8    PROMIS TOTAL - SUBSCORES 24  17  16  23  19       12/26/2023  12:03 PM   PROMIS-10 Scores Only    Global Mental Health Score 13    Global Physical Health Score 14    PROMIS TOTAL - SUBSCORES 27                Referral / Collaboration:  Referral to another professional/service is not indicated at this time..    Anticipated number of session for this episode of care: 7-9  Anticipation frequency of session: Every other week  Anticipated Duration of each session: 38-52 minutes  Treatment plan will be reviewed in 90 days or when goals have been changed.       MeasurableTreatment Goal(s) related to diagnosis / functional impairment(s)  Goal 1: Patient will experience a reduction in depressive symptoms along with a corresponding increase in positive emotion and life satisfaction.      Objective #A (Patient Action)    Patient will Increase interest, engagement, and pleasure in doing  "things.  Status: Continued - Date(s): 1/25/2024    Intervention(s)  Therapist will help patient identify pleasant and mastery oriented events that elicit positive, relaxed mood.    Objective #B  Patient will Decrease frequency and intensity of feeling down, depressed, hopeless.  Status: Continued - Date(s): 1/25/2024    Intervention(s)  Therapist will introduce patient to cognitive-behavioral and acceptance and commitment therapy topics aimed to help reduce depression and anxiety    Objective #C  Patient will Identify negative self-talk and behaviors: challenge core beliefs, myths, and actions  Improve concentration, focus, and mindfulness in daily activities .  Status: Continued - Date(s): 1/25/2024    Intervention(s)  Therapist will help patient identify and manage negative self-talk and automatic thoughts; introduce patient to cognitive distortions; help patient develop cognitive diffusion techniques      Goal 2: Patient will experience a reduction in anxious symptoms, along with a corresponding increase in relaxed emotional states and life satisfaction.      Objective #A (Patient Action)  Patient will use cognitive-behavioral and thought diffusion strategies identified in therapy to challenge anxious thoughts.    Status: Continued - Date(s): 1/25/2024    Intervention(s)  Therapist will utilize CBT and ACT ideas to help patient challenge anxious thoughts and reduce intensity/duration of anxious distress    Objective #B  Patient will use \"worry time\" each day for 15 minutes of scheduled worry and then defer obsessive or anxious thinking until the next structured worry time.    Status: Continued - Date(s): 1/25/2024    Intervention(s)  Therapist will teach patient how to effectively utilize worry time and/or thought logs/journals each day and incorporate more relaxation behaviors into their routine.    Objective #C  Patient will identify the stressors which contribute to feelings of anxiety  Patient will increase " engagement in adaptive coping skills and recreational activities, such as exercise and healthy socialization, to manage distress.  Status: Continued - Date(s): 1/25/2024    Intervention(s)  Therapist will help patient identify triggers/situational factors that contribute to anxiety and behavioral skills aimed to manage anxious distress.      Goal 3: Patient will learn more adaptive ways to manage chronic pain      Objective #A (Patient Action)    Status: Continued - Date(s): 1/25/2024    Patient will identify 2-4 strategies for managing pain.    Intervention(s)  Therapist will teach distraction skills aimed to help manage chronic pain and utilize ACT/CBT ideas to help with this (e.g. relaxation) .    Objective #B  Patient will state understanding of stressors and relationship to physical symptoms.    Status: Continued - Date(s): 1/25/2024      Other Possible Therapeutic Intervention(s):    Psycho-education regarding mental health diagnoses and treatment options    Supportive Therapy  Provide affirmations, reflections, and establish working rapport  Emphasize and reflect on strength of therapeutic relationship    Skills training  Explore skills useful to client in current situation.  Skills include assertiveness, communication, conflict management, problem-solving, relaxation, etc.    Solution-Focused Therapy  Explore patterns in patient's relationships and discuss options for new behaviors.  Explore patterns in patient's actions and choices and discuss options for new behaviors.    Cognitive-behavioral Therapy  Discuss common cognitive distortions, identify them in patient's life.  Explore ways to challenge, replace, and act against these cognitions.    Acceptance and Commitment Therapy  Explore and identify important values in patient's life.  Discuss ways to commit to behavioral activation around these values.    Psychodynamic psychotherapy  Discuss patient's emotional dynamics and issues and how they impact  behaviors.  Explore patient's history of relationships and how they impact present behaviors.  Explore how to work with and make changes in these schemas and patterns.    Narrative Therapy  Explore the patient's story of his/her life from his/her perspective.  Explore alternate ways of understanding their experience, identifying exceptions, developing new themes.    Interpersonal Psychotherapy  Explore patterns in relationships that are effective or ineffective at helping patient reach their goals, find satisfying experience.  Discuss new patterns or behaviors to engage in for improved social functioning.    Behavioral Activation  Discuss steps patient can take to become more involved in meaningful activity.  Identify barriers to these activities and explore possible solutions.    Mindfulness-Based Strategies  Discuss skills based on development and application of mindfulness.  Skills drawn from compassion-focused therapy, dialectical behavior therapy, mindfulness-based stress reduction, mindfulness-based cognitive therapy, etc.      Patient has reviewed and agreed to the above plan.    Héctor Higuera Psy.D,    Behavioral Health Clinician   [facility]   1/25/2024  Crisis Resources    Refer to the resources below as needed.    Steps to care for yourself    If you are currently in counseling, call your counselor for an appointment  Call the local crisis resources below if needed.  Contact friends or family for support.  Get more exercise.  Do activities you enjoy.  Eat a well-balanced diet and drink plenty of fluids.  Rest as needed.  Limit alcohol and recreational drugs. These can worsen depression.    When to contact your primary care provider     You have thoughts of harming or killing yourself but have not made a plan to carry it out.  Your depression gets in the way of daily activities.  You are often unable to sleep.  You need help cutting back on alcohol or recreational  drugs.    When to call 911 or go to the Emergency Room     Get emergency help right away if you have any of the following:  You are planning to harm or kill yourself and you have a way to carry out the plan.   You have injured yourself or others. Or, you think you will.  You feel confused or are having trouble thinking or remembering.  You are having delusions (false beliefs).  You are hearing voices or seeing things that aren t there.  You are feeling psychotic (paranoid, fearful, restless, agitated, nervous, racing thoughts or speech)    Crisis Resources     These hotlines are for both adults and children. The and are open 24 hours a day, 7 days a week unless noted otherwise.    988 - National Suicide and Crisis Lifeline  If you or someone you know needs support now, call or text 988 or chat [employer]. 988 connects you with a trained crisis counselor who can help.    National Suicide Prevention Lifeline   1-884-790-TALK (9883)    Crisis Text Line    www.crisistextline.org  Text HOME to 920788 from anywhere in the [place], anytime, about any type of crisis. A live, trained crisis counselor will receive the text and respond quickly.    [place] Lifeline for LGBTQ Youth  A national crisis intervention and suicide lifeline for LGBTQ youth under 25. Provides a safe place to talk without judgement.   Call 1-940.508.6262; text START to 420158 or visit www.the[employer]ervorproject.org to talk to a trained counselor.  For [facility] crisis numbers, visit the [facility] website at:  https://[place].gov/dhs/people-we-serve/adults/health-care/mental-health/resources/crisis-contacts.jsp      Héctor Higuera Psy.D, LP   Behavioral Health Clinician   [facility]     6/3/2022      Answers submitted by the patient for this visit:  Patient Health Questionnaire (Submitted on 1/2/2024)  If you checked off any problems, how difficult have these problems made it for you to do your work, take care of things at  home, or get along with other people?: Not difficult at all  PHQ9 TOTAL SCORE: 3

## 2024-03-12 RX ORDER — POTASSIUM CHLORIDE 20MEQ/15ML
30 LIQUID (ML) ORAL DAILY
Qty: 675 ML | Refills: 11 | Status: SHIPPED | OUTPATIENT
Start: 2024-03-12

## 2024-03-14 DIAGNOSIS — F41.9 ANXIETY: ICD-10-CM

## 2024-03-15 RX ORDER — ALPRAZOLAM 0.25 MG
0.25 TABLET ORAL 3 TIMES DAILY PRN
Qty: 60 TABLET | Refills: 1 | Status: SHIPPED | OUTPATIENT
Start: 2024-03-15 | End: 2024-05-08

## 2024-03-15 NOTE — TELEPHONE ENCOUNTER
ALPRAZolam (XANAX) 0.25 MG tablet         Last Written Prescription Date:  1/17/24  Last Fill Quantity: 60,   # refills: 1  Last Office Visit : 11/24/23  Future Office visit:  none    Routing refill request to provider for review/approval because:  Controlled substance

## 2024-03-21 ENCOUNTER — VIRTUAL VISIT (OUTPATIENT)
Dept: FAMILY MEDICINE | Facility: CLINIC | Age: 34
End: 2024-03-21
Payer: MEDICARE

## 2024-03-21 ENCOUNTER — TELEPHONE (OUTPATIENT)
Dept: FAMILY MEDICINE | Facility: CLINIC | Age: 34
End: 2024-03-21

## 2024-03-21 DIAGNOSIS — R05.9 COUGH, UNSPECIFIED TYPE: ICD-10-CM

## 2024-03-21 PROCEDURE — 99213 OFFICE O/P EST LOW 20 MIN: CPT | Mod: 95 | Performed by: FAMILY MEDICINE

## 2024-03-21 RX ORDER — AZITHROMYCIN 200 MG/5ML
POWDER, FOR SUSPENSION ORAL
Qty: 40 ML | Refills: 0 | Status: SHIPPED | OUTPATIENT
Start: 2024-03-21 | End: 2024-08-22

## 2024-03-21 ASSESSMENT — ASTHMA QUESTIONNAIRES
QUESTION_1 LAST FOUR WEEKS HOW MUCH OF THE TIME DID YOUR ASTHMA KEEP YOU FROM GETTING AS MUCH DONE AT WORK, SCHOOL OR AT HOME: NONE OF THE TIME
QUESTION_3 LAST FOUR WEEKS HOW OFTEN DID YOUR ASTHMA SYMPTOMS (WHEEZING, COUGHING, SHORTNESS OF BREATH, CHEST TIGHTNESS OR PAIN) WAKE YOU UP AT NIGHT OR EARLIER THAN USUAL IN THE MORNING: NOT AT ALL
ACT_TOTALSCORE: 25
ACT_TOTALSCORE: 25
QUESTION_2 LAST FOUR WEEKS HOW OFTEN HAVE YOU HAD SHORTNESS OF BREATH: NOT AT ALL
QUESTION_5 LAST FOUR WEEKS HOW WOULD YOU RATE YOUR ASTHMA CONTROL: COMPLETELY CONTROLLED
QUESTION_4 LAST FOUR WEEKS HOW OFTEN HAVE YOU USED YOUR RESCUE INHALER OR NEBULIZER MEDICATION (SUCH AS ALBUTEROL): NOT AT ALL

## 2024-03-21 NOTE — PROGRESS NOTES
"Lanie is a 33 year old who is being evaluated via a billable video visit.    How would you like to obtain your AVS? MyChart  If the video visit is dropped, the invitation should be resent by: Send to e-mail at: [email or web address]  Will anyone else be joining your video visit? No      Assessment & Plan     Cough, unspecified type  Hi risk  - azithromycin (ZITHROMAX) 200 MG/5ML suspension; Take 12.5 ml po today then 6.25 ml once a day for four more days (total five days)  See me soon rvw Gyn, CRS plans    25 minutes spent by me on the date of the encounter doing chart review, history and exam, documentation and further activities per the note      BMI  Estimated body mass index is 31.92 kg/m  as calculated from the following:    Height as of 1/3/24: 1.46 m (4' 9.48\").    Weight as of 11/16/23: 68 kg (150 lb).       Return in about 1 month (around 4/21/2024).    Subjective   Lanie is a 33 year old, presenting for the following health issues:  RECHECK and Discuss antibiotics    Video Start Time:  12:30 pm    History of Present Illness       Reason for visit:  Antibiotics for a Cold  Symptom onset:  3-7 days ago  Symptoms include:  Fever sore throat and loss of voice when overused  Symptom intensity:  Moderate  Symptom progression:  Staying the same  Had these symptoms before:  Yes  Has tried/received treatment for these symptoms:  Yes  Previous treatment was successful:  Yes  Prior treatment description:  Antibiotics  What makes it worse:  Over using my voice  What makes it better:  Antibiotics    She eats 0-1 servings of fruits and vegetables daily.She consumes 2 sweetened beverage(s) daily.She exercises with enough effort to increase her heart rate 9 or less minutes per day.  She exercises with enough effort to increase her heart rate 3 or less days per week.   She is taking medications regularly.     Four days stuffy nose, st, low dgrade fever, no cough or GI sx, not covid she states  She is ok doing covid flu " shots when in [facility] soon for MR-will ask if can get as discharge pharmacy  Rvwd Gyn/CRS notes; MR soon, might get IUD, also CRS surgery option noted  Getting new therapist, hers is moving away  Vena cava filter removed  Past Medical History:   Diagnosis Date    Anemia     Anxiety     Arthritis Unknown    Asthma     Asthma 06/18/2014    Chronic pain     Chronic respiratory failure (H)     Constipation     Depression     Depressive disorder     I don't have it right now currently    DVT (deep venous thrombosis) (H)     Gastrointestinal tube present (H)     History of blood transfusion     Obesity     Sinusitis, chronic     Snoring     Spinal muscle atrophy (H)      Past Surgical History:   Procedure Laterality Date    BACK SURGERY  '04    fusion of cervical and thoracic spine, 2004 and 2018    BIOPSY  1990    muscle biopsy    GASTROSTOMY TUBE  2004    IR IVC FILTER PLACEMENT  2012    IR IVC FILTER REMOVAL  1/24/2024    IR PORT CHECK RIGHT  10/23/2023    right chest    KIDNEY SURGERY  2009    kidney stones    SPINE SURGERY      reservoir placed, has had three total procedures    TRACHEOSTOMY  2004     Current Outpatient Medications   Medication    azithromycin (ZITHROMAX) 200 MG/5ML suspension    ACETAMINOPHEN PO    albuterol (PROAIR HFA) 108 (90 Base) MCG/ACT inhaler    albuterol (PROVENTIL) (2.5 MG/3ML) 0.083% neb solution    ALPRAZolam (XANAX) 0.25 MG tablet    aminocaproic acid (AMICAR) 0.25 GM/ML solution    bisacodyl (DULCOLAX) 10 MG suppository    celecoxib (CELEBREX) 200 MG capsule    clindamycin (CLEOCIN T) 1 % lotion    diazepam (VALIUM) 5 MG tablet    doxycycline hyclate (VIBRAMYCIN) 100 MG capsule    EVRYSDI 0.75 MG/ML oral solution    famotidine (PEPCID) 40 MG/5ML suspension    fluocinolone acetonide (DERMA SMOOTHE/FS BODY) 0.01 % external oil    fluticasone (FLOVENT HFA) 110 MCG/ACT inhaler    gabapentin (NEURONTIN) 250 MG/5ML solution    GENERLAC 10 GM/15ML solution    guaiFENesin (ORGANIDIN) 200 MG TABS  tablet    HYDROmorphone (DILAUDID) 4 MG tablet    hydrOXYzine (ATARAX) 25 MG tablet    ketoconazole (NIZORAL) 2 % external shampoo    levofloxacin (LEVAQUIN) 25 MG/ML solution    Magnesium Hydroxide (MILK OF MAGNESIA PO)    metroNIDAZOLE (METROCREAM) 0.75 % external cream    Minoxidil (MINOXIDIL FOR WOMEN) 5 % FOAM    mometasone (NASONEX) 50 MCG/ACT nasal spray    morphine (SABINE) 30 MG 24 hr capsule    mupirocin (BACTROBAN) 2 % external cream    naloxone (NARCAN) 4 MG/0.1ML nasal spray    Nutritional Supplements (NUTREN 1.0/FIBER) LIQD    Nutritional Supplements (REPLETE FIBER) LIQD    omeprazole (PRILOSEC) 2 mg/mL suspension    ondansetron (ZOFRAN-ODT) 4 MG ODT tab    order for DME    order for DME    polyethylene glycol (MIRALAX) 17 GM/Dose powder    potassium chloride (KAYCIEL) 20 MEQ/15ML (10%) solution    prochlorperazine (COMPAZINE) 5 MG tablet    rivaroxaban ANTICOAGULANT (XARELTO ANTICOAGULANT) 10 MG TABS tablet    Salicylic Acid (OXY BALANCE FACIAL CLEAN WASH EX)    Sennosides (SENNA) 8.8 MG/5ML LIQD    sertraline (ZOLOFT) 20 MG/ML (HIGH CONC) solution    silver nitrate (ARZOL) 75-25 % miscellaneous    silver sulfADIAZINE (SSD) 1 % external cream    simethicone (MYLICON) 66.7 mg/ml    simethicone 40 MG/0.6ML LIQD    sodium chloride (OCEAN) 0.65 % nasal spray    traZODone (DESYREL) 5 mg/ml SUSP    tretinoin (RETIN-A) 0.025 % external cream     No current facility-administered medications for this visit.     Allergies   Allergen Reactions    Ibuprofen      Family History   Problem Relation Age of Onset    Hypertension Mother     Breast Cancer Mother         No longer has it    Hypertension Father     Hypertension Brother     Hypertension Brother     Family History Negative Other     Cerebrovascular Disease Other     Cerebrovascular Disease Maternal Aunt     Deep Vein Thrombosis (DVT) Maternal Aunt     Deep Vein Thrombosis (DVT) Maternal Uncle     Anesthesia Reaction No family hx of      Social History      Socioeconomic History    Marital status: Single     Spouse name: Not on file    Number of children: Not on file    Years of education: Not on file    Highest education level: Not on file   Occupational History    Not on file   Tobacco Use    Smoking status: Never    Smokeless tobacco: Never   Vaping Use    Vaping Use: Never used   Substance and Sexual Activity    Alcohol use: Never    Drug use: Never    Sexual activity: Never   Other Topics Concern    Parent/sibling w/ CABG, MI or angioplasty before 65F 55M? No   Social History Narrative    Not on file     Social Determinants of Health     Financial Resource Strain: Low Risk  (3/21/2024)    Financial Resource Strain     Within the past 12 months, have you or your family members you live with been unable to get utilities (heat, electricity) when it was really needed?: No   Food Insecurity: Low Risk  (3/21/2024)    Food Insecurity     Within the past 12 months, did you worry that your food would run out before you got money to buy more?: No     Within the past 12 months, did the food you bought just not last and you didn t have money to get more?: No   Transportation Needs: Low Risk  (3/21/2024)    Transportation Needs     Within the past 12 months, has lack of transportation kept you from medical appointments, getting your medicines, non-medical meetings or appointments, work, or from getting things that you need?: No   Physical Activity: Not on file   Stress: Not on file   Social Connections: Not on file   Interpersonal Safety: Not on file   Housing Stability: Low Risk  (3/21/2024)    Housing Stability     Do you have housing? : Yes     Are you worried about losing your housing?: No           Objective           Vitals:  No vitals were obtained today due to virtual visit.    Physical Exam   GENERAL: alert and no distress, in bed on oxygen per chronic  EYES: Eyes grossly normal to inspection.  No discharge or erythema, or obvious scleral/conjunctival  abnormalities.  RESP: No audible wheeze, cough, or visible cyanosis.    SKIN: Visible skin clear. No significant rash, abnormal pigmentation or lesions.  NEURO: Cranial nerves grossly intact.  Mentation and speech appropriate for age.  PSYCH: Appropriate affect, tone, and pace of words          Video-Visit Details    Type of service:  Video Visit   Video End Time: 12:45  Originating Location (pt. Location): Home    Distant Location (provider location):  On-site  Platform used for Video Visit: Boris  Signed Electronically by: Jimmy Moseley MD

## 2024-03-21 NOTE — PATIENT INSTRUCTIONS
Thank you for visiting the Primary Care Center today at the [facility]! The following is some information about our clinic:     Primary Care Center Frequently-Asked Questions    (1) How do I schedule appointments at the [facility]?     Primary Care--to schedule or make changes to an existing appointment, please call our primary care line at [phone].    Labs--to schedule a lab appointment at the [facility] you can use Taketake or call [phone]. If you have a [place] location that is closer to home, you can reach out to that location for scheduling options.     Imaging--if you need to schedule a CT, X-ray, MRI, ultrasound, or other imaging study you can call [phone] to schedule at the [facility] or any other [facility] imaging location.     Referrals--if a referral to another specialty was ordered you can expect a phone call from their scheduling team. If you have not heard from them in a week, please call us or send us a Taketake message to check the status or get a scheduling number. Please note that this only applies to internal [facility] referrals. If the referral is external you would need to contact their office for scheduling.     (2) I have a question about my visit, who do I contact?     You can call us at the primary care line at [phone] to ask questions about your visit. You can also send a secure message through Taketake, which is reviewed by clinic staff. Please note that Taketake messages have a twenty-four to forty-eight business hour turnaround time and should not be used for urgent concerns.    (3) How will I get the results of my tests?    If you are signed up for PharmaSecuret all tests will be released to you within twenty-four hours of resulting. Please allow three to five days for your doctor to review your results and place a note interpreting the results. If you do not have [employer] Range[employer] you will receive your  results through mail seven to ten business days following the return of the tests. Please note that if there should be any urgent or concerning results that your doctor or their registered nurse will reach out to you the same day as the tests come back. If you have follow up questions about your results or would like to discuss the results in detail please schedule a follow up with your provider either in person or virtually.     (4) How do I get refills of my prescriptions?     You should always first contact your pharmacy for refills of your medications. If submitting a refill request on PeptiVir, please be sure to submit the request only once--repeat requests can cause delays in refill. If you are requesting a NEW medication or a medication related to new symptoms you will need to schedule an appointment with a provider prior to approval. Please note: Routine medication refills have up to one to three business day turnaround whereas controlled substances refills have up to five to seven business day turnaround.    (5) I have new symptoms, what do I do?     If you are having an immediate medical emergency, you should dial 911 for assistance.   For anything urgent that needs to be seen within a few hours to one day you should visit a local urgent care for assistance.  For non-urgent symptoms that need to be seen within a few days to a week you can schedule with an available provider in primary care by going to Houzz or calling [phone].   If you are not sure how serious your symptoms are or you would like to receive medical advice you can always call [phone] to speak with a triage nurse.

## 2024-03-26 ENCOUNTER — TELEPHONE (OUTPATIENT)
Dept: HEMATOLOGY | Facility: CLINIC | Age: 34
End: 2024-03-26
Payer: MEDICARE

## 2024-03-26 NOTE — CONFIDENTIAL NOTE
[id number]  Lanie Neil  33 year old female  CBCD Diagnosis: Spinal muscular atrophy, hx of DVT, on LTA with Xarelto 10, hx of STEVE  CBCD Provider: Clarke BRIZUELA    RN received a phone call from ED provider at [facility]. They are reaching out as a courtesy of the family. Per Provider, Lanie is having hematuria and issues with her fischer. She presented to ED this AM. Her hemoglobin was 9.5. They are not planning to transfuse her and or reverse of her Xarelto at this time. Per provider, they are recommending she follow up with her providers outpatient. Lanie's mother asked that they call us as she has a hx of STEVE and has received IV iron infusions via home care before. She is having a home health nurse come to the house tomorrow and they are wondering if we need to do any labs or have any additional recommendations.     RN did let provider know, we would review the chart and reach out to the family if there is any follow up needed from our team.     Christina GIRALDO, RN, N  [facility]  Office: [phone]  Fax: [phone]    Addendum  Per Jt Mir PA-C, we should draw a CBC and iron panel. RN did call and speak with [facility] who took verbal orders for these labs. They will be added on tomorrow. We will reach out to family if any follow up needed from labs.     Christina GIRALDO, RN, N  [facility]  Office: [phone]  Fax: [phone]

## 2024-03-27 ENCOUNTER — TRANSFERRED RECORDS (OUTPATIENT)
Dept: HEALTH INFORMATION MANAGEMENT | Facility: CLINIC | Age: 34
End: 2024-03-27

## 2024-03-27 ENCOUNTER — LAB REQUISITION (OUTPATIENT)
Dept: LAB | Facility: CLINIC | Age: 34
End: 2024-03-27
Payer: MEDICARE

## 2024-03-27 DIAGNOSIS — D50.9 IRON DEFICIENCY ANEMIA, UNSPECIFIED: ICD-10-CM

## 2024-03-27 LAB
BASOPHILS # BLD AUTO: 0 10E3/UL (ref 0–0.2)
BASOPHILS NFR BLD AUTO: 0 %
EOSINOPHIL # BLD AUTO: 0.2 10E3/UL (ref 0–0.7)
EOSINOPHIL NFR BLD AUTO: 2 %
ERYTHROCYTE [DISTWIDTH] IN BLOOD BY AUTOMATED COUNT: 18.6 % (ref 10–15)
HCT VFR BLD AUTO: 30.3 % (ref 35–47)
HGB BLD-MCNC: 9.6 G/DL (ref 11.7–15.7)
IMM GRANULOCYTES # BLD: 0 10E3/UL
IMM GRANULOCYTES NFR BLD: 0 %
IRON BINDING CAPACITY (ROCHE): 192 UG/DL (ref 240–430)
IRON SATN MFR SERPL: 32 % (ref 15–46)
IRON SERPL-MCNC: 61 UG/DL (ref 37–145)
LYMPHOCYTES # BLD AUTO: 1.8 10E3/UL (ref 0.8–5.3)
LYMPHOCYTES NFR BLD AUTO: 24 %
MCH RBC QN AUTO: 29.1 PG (ref 26.5–33)
MCHC RBC AUTO-ENTMCNC: 31.7 G/DL (ref 31.5–36.5)
MCV RBC AUTO: 92 FL (ref 78–100)
MONOCYTES # BLD AUTO: 0.2 10E3/UL (ref 0–1.3)
MONOCYTES NFR BLD AUTO: 3 %
NEUTROPHILS # BLD AUTO: 5.3 10E3/UL (ref 1.6–8.3)
NEUTROPHILS NFR BLD AUTO: 71 %
NRBC # BLD AUTO: 0 10E3/UL
NRBC BLD AUTO-RTO: 0 /100
PLATELET # BLD AUTO: 510 10E3/UL (ref 150–450)
RBC # BLD AUTO: 3.3 10E6/UL (ref 3.8–5.2)
WBC # BLD AUTO: 7.6 10E3/UL (ref 4–11)

## 2024-03-27 PROCEDURE — 82728 ASSAY OF FERRITIN: CPT | Mod: ORL | Performed by: PHYSICIAN ASSISTANT

## 2024-03-27 PROCEDURE — 83550 IRON BINDING TEST: CPT | Mod: ORL | Performed by: PHYSICIAN ASSISTANT

## 2024-03-27 PROCEDURE — 83540 ASSAY OF IRON: CPT | Mod: ORL | Performed by: PHYSICIAN ASSISTANT

## 2024-03-27 PROCEDURE — 85025 COMPLETE CBC W/AUTO DIFF WBC: CPT | Mod: ORL | Performed by: PHYSICIAN ASSISTANT

## 2024-03-28 DIAGNOSIS — Z79.01 CHRONIC ANTICOAGULATION: Primary | ICD-10-CM

## 2024-03-28 DIAGNOSIS — Z95.828 S/P IVC FILTER: ICD-10-CM

## 2024-03-28 DIAGNOSIS — D50.0 IRON DEFICIENCY ANEMIA DUE TO CHRONIC BLOOD LOSS: ICD-10-CM

## 2024-03-28 DIAGNOSIS — J96.10 CHRONIC RESPIRATORY FAILURE, UNSPECIFIED WHETHER WITH HYPOXIA OR HYPERCAPNIA (H): ICD-10-CM

## 2024-03-28 DIAGNOSIS — Z86.718 HISTORY OF DEEP VENOUS THROMBOSIS: ICD-10-CM

## 2024-03-28 LAB — FERRITIN SERPL-MCNC: 144 NG/ML (ref 6–175)

## 2024-03-29 ENCOUNTER — HOSPITAL ENCOUNTER (OUTPATIENT)
Dept: MRI IMAGING | Facility: CLINIC | Age: 34
Discharge: HOME | End: 2024-03-29
Attending: OBSTETRICS & GYNECOLOGY | Admitting: OBSTETRICS & GYNECOLOGY
Payer: MEDICARE

## 2024-03-29 DIAGNOSIS — N90.89 PERINEAL MASS IN FEMALE: ICD-10-CM

## 2024-03-29 PROCEDURE — A9585 GADOBUTROL INJECTION: HCPCS | Performed by: OBSTETRICS & GYNECOLOGY

## 2024-03-29 PROCEDURE — 250N000011 HC RX IP 250 OP 636: Performed by: OBSTETRICS & GYNECOLOGY

## 2024-03-29 PROCEDURE — 255N000002 HC RX 255 OP 636: Performed by: OBSTETRICS & GYNECOLOGY

## 2024-03-29 PROCEDURE — 72197 MRI PELVIS W/O & W/DYE: CPT | Mod: 26 | Performed by: RADIOLOGY

## 2024-03-29 PROCEDURE — G1010 CDSM STANSON: HCPCS | Performed by: RADIOLOGY

## 2024-03-29 PROCEDURE — G1010 CDSM STANSON: HCPCS

## 2024-03-29 RX ORDER — GADOBUTROL 604.72 MG/ML
7.5 INJECTION INTRAVENOUS ONCE
Status: COMPLETED | OUTPATIENT
Start: 2024-03-29 | End: 2024-03-29

## 2024-03-29 RX ORDER — HEPARIN SODIUM (PORCINE) LOCK FLUSH IV SOLN 100 UNIT/ML 100 UNIT/ML
5 SOLUTION INTRAVENOUS ONCE
Status: COMPLETED | OUTPATIENT
Start: 2024-03-29 | End: 2024-03-29

## 2024-03-29 RX ADMIN — GADOBUTROL 7.5 ML: 604.72 INJECTION INTRAVENOUS at 12:58

## 2024-03-29 RX ADMIN — Medication 5 ML: at 12:46

## 2024-04-01 ENCOUNTER — TELEPHONE (OUTPATIENT)
Dept: HEMATOLOGY | Facility: CLINIC | Age: 34
End: 2024-04-01
Payer: MEDICARE

## 2024-04-01 NOTE — TELEPHONE ENCOUNTER
[id number]  Lanie Neil  33 year old female  CBCD Diagnosis: Hx of DVT  CBCD Provider: Clarke     Notified patient's mom of Jt's request to have labs rechecked in 2 months (CBC; iron panel, ferritin), sooner if she has ongoing hematuria (i.e. 1 month).  Per Lanie's mom, she has had no further hematuria. Coordinated follow up labs with her home care nurse Teresa. Faxed orders to [facility] [phone].  Ariana Crandall, MSN, RN, PHN -Nurse Clinician, [facility] [phone]

## 2024-04-03 ENCOUNTER — TELEPHONE (OUTPATIENT)
Dept: HEMATOLOGY | Facility: CLINIC | Age: 34
End: 2024-04-03
Payer: MEDICARE

## 2024-04-03 NOTE — TELEPHONE ENCOUNTER
[id number]  Lanie Neil  33 year old female  CBCD Diagnosis: DVT  CBCD Provider: Clarke    Incoming call from home health RN Teresa ([phone]). Patient had Ferritin, CBC, and Iron and Iron Binding Studies completed last week d/t hematuria. Results reviewed by Jt Mir PA-C and he recommended patient get labs redrawn in 2 months.    Today, Teresa calls because there are still orders for Lanie to get Ferritin, CBC, and Iron and Iron Binding Studies completed this week as it is the 8 week meg from her IV Venofer.    RN discussed with provider. No need to repeat those labs this week. She should get them redrawn in two months. RN gave these verbal orders to Teresa. She will send over a fax for Jt to sign discontinuing those orders.    Rosa Isela Emerson RN, BSN, PCCN  Nurse Clinician    [facility]  [address] [place] [address], [address], [place] [address]   Office, direct: [phone]  Main office number: [phone]  Pronouns: She, her, hers

## 2024-04-10 DIAGNOSIS — G12.9 SPINAL MUSCLE ATROPHY (H): ICD-10-CM

## 2024-04-10 DIAGNOSIS — K21.9 GASTROESOPHAGEAL REFLUX DISEASE WITHOUT ESOPHAGITIS: ICD-10-CM

## 2024-04-10 RX ORDER — DIAZEPAM 5 MG
5 TABLET ORAL EVERY 6 HOURS PRN
Qty: 30 TABLET | Refills: 0 | Status: SHIPPED | OUTPATIENT
Start: 2024-04-10 | End: 2024-06-03

## 2024-04-10 RX ORDER — FAMOTIDINE 40 MG/5ML
20 POWDER, FOR SUSPENSION ORAL 2 TIMES DAILY
Qty: 150 ML | Refills: 11 | Status: SHIPPED | OUTPATIENT
Start: 2024-04-10 | End: 2024-06-04 | Stop reason: ALTCHOICE

## 2024-04-10 NOTE — TELEPHONE ENCOUNTER
diazepam (VALIUM) 5 MG tablet   Last Written Prescription Date:  1/2/2024  Last Fill Quantity: 30,   # refills: 1  Last Office Visit : 3/21/2024  Future Office visit:  4/23/2024  Routing refill request to provider for review/approval because:  Drug not on the [facility], [facility] or [facility] refill protocol or controlled substance    famotidine (PEPCID) 40 MG/5ML suspension   Last Written Prescription Date:  1/27/2023  Last Fill Quantity: 150,   # refills: 11  Last Office Visit : 3/21/2024  Future Office visit:  4/23/2024  Routing refill request to provider for review/approval because:  Refer to clinic for review    Georgina Sy RN  Central Triage Red Flags/Med Refills

## 2024-04-16 ENCOUNTER — TELEPHONE (OUTPATIENT)
Dept: OBGYN | Facility: CLINIC | Age: 34
End: 2024-04-16
Payer: MEDICARE

## 2024-04-16 NOTE — TELEPHONE ENCOUNTER
Talked to patient's mother. We do not have any appointments with Dr. Lurdes Ivey, but patient's mother declined to see another provider. Patient's mother will call back in 2 weeks to see if schedule has opened.

## 2024-04-16 NOTE — TELEPHONE ENCOUNTER
[facility] Call Center    Phone Message    May a detailed message be left on voicemail: yes     Reason for Call: Other: Pt's mother is looking to schedule a follow up appt with Dr. Ivey, but her scheduling template does not pull up. Please review and call to discuss.     Action Taken: Other: OBGYN    Travel Screening: Not Applicable

## 2024-04-23 ENCOUNTER — VIRTUAL VISIT (OUTPATIENT)
Dept: FAMILY MEDICINE | Facility: CLINIC | Age: 34
End: 2024-04-23
Payer: MEDICARE

## 2024-04-23 DIAGNOSIS — R93.5 ABNORMAL MRI, PELVIS: ICD-10-CM

## 2024-04-23 DIAGNOSIS — R05.9 COUGH, UNSPECIFIED TYPE: ICD-10-CM

## 2024-04-23 DIAGNOSIS — R31.9 HEMATURIA, UNSPECIFIED TYPE: Primary | ICD-10-CM

## 2024-04-23 PROCEDURE — G2211 COMPLEX E/M VISIT ADD ON: HCPCS | Performed by: FAMILY MEDICINE

## 2024-04-23 PROCEDURE — 99213 OFFICE O/P EST LOW 20 MIN: CPT | Mod: 95 | Performed by: FAMILY MEDICINE

## 2024-04-23 NOTE — PATIENT INSTRUCTIONS
Thank you for visiting the Primary Care Center today at the [facility]! The following is some information about our clinic:     Primary Care Center Frequently-Asked Questions    (1) How do I schedule appointments at the [facility]?     Primary Care--to schedule or make changes to an existing appointment, please call our primary care line at [phone].    Labs--to schedule a lab appointment at the [facility] you can use R2integrated or call [phone]. If you have a [place] location that is closer to home, you can reach out to that location for scheduling options.     Imaging--if you need to schedule a CT, X-ray, MRI, ultrasound, or other imaging study you can call [phone] to schedule at the [facility] or any other [facility] imaging location.     Referrals--if a referral to another specialty was ordered you can expect a phone call from their scheduling team. If you have not heard from them in a week, please call us or send us a R2integrated message to check the status or get a scheduling number. Please note that this only applies to internal [facility] referrals. If the referral is external you would need to contact their office for scheduling.     (2) I have a question about my visit, who do I contact?     You can call us at the primary care line at [phone] to ask questions about your visit. You can also send a secure message through R2integrated, which is reviewed by clinic staff. Please note that R2integrated messages have a twenty-four to forty-eight business hour turnaround time and should not be used for urgent concerns.    (3) How will I get the results of my tests?    If you are signed up for Hepregent all tests will be released to you within twenty-four hours of resulting. Please allow three to five days for your doctor to review your results and place a note interpreting the results. If you do not have Theatricshart you will receive your  results through mail seven to ten business days following the return of the tests. Please note that if there should be any urgent or concerning results that your doctor or their registered nurse will reach out to you the same day as the tests come back. If you have follow up questions about your results or would like to discuss the results in detail please schedule a follow up with your provider either in person or virtually.     (4) How do I get refills of my prescriptions?     You should always first contact your pharmacy for refills of your medications. If submitting a refill request on GreenGoose!, please be sure to submit the request only once--repeat requests can cause delays in refill. If you are requesting a NEW medication or a medication related to new symptoms you will need to schedule an appointment with a provider prior to approval. Please note: Routine medication refills have up to one to three business day turnaround whereas controlled substances refills have up to five to seven business day turnaround.    (5) I have new symptoms, what do I do?     If you are having an immediate medical emergency, you should dial 911 for assistance.   For anything urgent that needs to be seen within a few hours to one day you should visit a local urgent care for assistance.  For non-urgent symptoms that need to be seen within a few days to a week you can schedule with an available provider in primary care by going to [employer]esGroupGlobal or calling [phone].   If you are not sure how serious your symptoms are or you would like to receive medical advice you can always call [phone] to speak with a triage nurse.

## 2024-04-23 NOTE — PROGRESS NOTES
"Lanie is a 33 year old who is being evaluated via a billable video visit.    How would you like to obtain your AVS? MyChart  If the video visit is dropped, the invitation should be resent by: Text to cell phone: [phone]  Will anyone else be joining your video visit? No      Are refills needed on medications prescribed by this physician? NO   The longitudinal plan of care for the diagnosis(es)/condition(s) as documented were addressed during this visit. Due to the added complexity in care, I will continue to support Lanie in the subsequent management and with ongoing continuity of care.    Assessment & Plan     Cough; resolved  Pelvic MR:  CRS does not feel follow-up there needed now, I asked them to review pelvic MR, their reply was continue good bowel regimen  Soft tissue swelling: await Gyn reply  Hematuria: follow-up prn    25 minutes spent by me on the date of the encounter doing chart review, history and exam, documentation and further activities per the note      BMI  Estimated body mass index is 31.92 kg/m  as calculated from the following:    Height as of 1/3/24: 1.46 m (4' 9.48\").    Weight as of 11/16/23: 68 kg (150 lb).             No follow-ups on file.    Subjective   Lanie is a 33 year old, presenting for the following health issues:  RECHECK      Video Start Time:  11 am    History of Present Illness       Reason for visit:  Follow-up    She eats 0-1 servings of fruits and vegetables daily.She consumes 1 sweetened beverage(s) daily.She exercises with enough effort to increase her heart rate 9 or less minutes per day.  She exercises with enough effort to increase her heart rate 3 or less days per week.   She is taking medications regularly.   Here in follow-up  Reviewed MR  Rectal prolapse seen, also soft tissue swelling in pelvic area  I've reached out by staff message to the colorectal and gyn providers she'd seen before the MR  No new symptoms  Also had interval hemturia, seen in ER then " followed up by hematology, resovled  Cough imrpoved after last visit/atbx    Past Medical History:   Diagnosis Date    Anemia     Anxiety     Arthritis Unknown    Asthma     Asthma 06/18/2014    Chronic pain     Chronic respiratory failure (H)     Constipation     Depression     Depressive disorder     I don't have it right now currently    DVT (deep venous thrombosis) (H)     Gastrointestinal tube present (H)     History of blood transfusion     Obesity     Sinusitis, chronic     Snoring     Spinal muscle atrophy (H)      Past Surgical History:   Procedure Laterality Date    BACK SURGERY  '04    fusion of cervical and thoracic spine, 2004 and 2018    BIOPSY  1990    muscle biopsy    GASTROSTOMY TUBE  2004    IR IVC FILTER PLACEMENT  2012    IR IVC FILTER REMOVAL  1/24/2024    IR PORT CHECK RIGHT  10/23/2023    right chest    KIDNEY SURGERY  2009    kidney stones    SPINE SURGERY      reservoir placed, has had three total procedures    TRACHEOSTOMY  2004     Current Outpatient Medications   Medication Sig Dispense Refill    ACETAMINOPHEN PO Take 650 mg by mouth every 4 hours as needed for pain      albuterol (PROAIR HFA) 108 (90 Base) MCG/ACT inhaler Inhale 2 puffs into the lungs every 4 hours as needed for shortness of breath or wheezing 18 g 5    albuterol (PROVENTIL) (2.5 MG/3ML) 0.083% neb solution USE ONE VIAL BY NEBULIZATION EVERY 6 HOURS AS NEEDED FOR SHORTNESS OF BREATH/ DYSPNEA OR WHEEZING 360 mL 1    ALPRAZolam (XANAX) 0.25 MG tablet Take 1 tablet (0.25 mg) by mouth 3 times daily as needed for anxiety 60 tablet 1    aminocaproic acid (AMICAR) 0.25 GM/ML solution Take 12 mL (3 grams) via G-tube every 8 hours during menstrual period. 473 mL 5    azithromycin (ZITHROMAX) 200 MG/5ML suspension Take 12.5 ml po today then 6.25 ml once a day for four more days (total five days) 40 mL 0    bisacodyl (DULCOLAX) 10 MG suppository Place 10 mg rectally daily as needed for constipation      celecoxib (CELEBREX) 200 MG  "capsule Take 1 capsule (200 mg) by mouth daily On days of period 12 capsule 5    clindamycin (CLEOCIN T) 1 % lotion Apply topically 2 times daily To face as needed 60 mL 3    diazepam (VALIUM) 5 MG tablet Take 1 tablet (5 mg) by mouth every 6 hours as needed for muscle spasms 30 tablet 0    doxycycline hyclate (VIBRAMYCIN) 100 MG capsule GIVE ONE CAPSULE VIA \"G\"- TUBE TWICE DAILY 60 capsule 2    EVRYSDI 0.75 MG/ML oral solution 6.6 mLs by Oral or NG Tube route every morning      famotidine (PEPCID) 40 MG/5ML suspension Take 2.5 mLs (20 mg) by mouth 2 times daily 150 mL 11    fluocinolone acetonide (DERMA SMOOTHE/FS BODY) 0.01 % external oil Apply at nighttime to forehead and scalp as needed for scale or pruritus (Patient taking differently: Apply topically at bedtime Apply at nighttime to forehead and scalp as needed for scale or pruritus) 118 mL 3    fluticasone (FLOVENT HFA) 110 MCG/ACT inhaler Inhale 1 puff into the lungs 2 times daily 36 g 5    gabapentin (NEURONTIN) 250 MG/5ML solution 20 mLs (1,000 mg) by Per Feeding Tube route 3 times daily 1800 mL 5    GENERLAC 10 GM/15ML solution   11    guaiFENesin (ORGANIDIN) 200 MG TABS tablet Take 200 mg by mouth every 4 hours as needed for cough      HYDROmorphone (DILAUDID) 4 MG tablet Take 8 mg by mouth every 3 hours      hydrOXYzine (ATARAX) 25 MG tablet Take 25 mg by mouth every 6 hours as needed for itching      ketoconazole (NIZORAL) 2 % external shampoo Lather onto scalp and forehead once weekly when showering and let sit for 1-2 minutes before rinsing 120 mL 11    levofloxacin (LEVAQUIN) 25 MG/ML solution TAKE 20ML BY FEEDING TUBE ONCE DAILY FOR ONE WEEK 480 mL 1    Magnesium Hydroxide (MILK OF MAGNESIA PO) Take 30 mLs by mouth as needed      metroNIDAZOLE (METROCREAM) 0.75 % external cream Apply topically 2 times daily 45 g 11    Minoxidil (MINOXIDIL FOR WOMEN) 5 % FOAM Apply 1/2 capful once a day to affected area 60 g 3    mometasone (NASONEX) 50 MCG/ACT " nasal spray Spray 2 sprays in nostril daily as needed      morphine (SABINE) 30 MG 24 hr capsule Take 30 mg by mouth 2 times daily      mupirocin (BACTROBAN) 2 % external cream Apply topically 3 times daily 30 g 3    Nutritional Supplements (NUTREN 1.0/FIBER) LIQD Per G tube; 250 ml Per G Tube qid 58818 mL 11    Nutritional Supplements (REPLETE FIBER) LIQD Take 1 Can by mouth 4 times daily      omeprazole (PRILOSEC) 2 mg/mL suspension SHAKE LIQUID WELL AND GIVE 20ML (40MG) DAILY (VIA G-TUBE) 600 mL 9    ondansetron (ZOFRAN-ODT) 4 MG ODT tab Take 1 tablet (4 mg) by mouth every 8 hours as needed for nausea 30 tablet 1    order for DME Equipment being ordered: 16 fr 5cc balloon indwelling catheter with drainage bag. 2 catheters/month 2 catheter 11    order for DME Equipment being ordered: Vibracare Percussor 1 Units 0    polyethylene glycol (MIRALAX) 17 GM/Dose powder Take 17 g by mouth daily (Patient taking differently: Take 17 g by mouth daily as needed) 714 g 5    potassium chloride (KAYCIEL) 20 MEQ/15ML (10%) solution 22.5 mLs (30 mEq) by Per G Tube route daily 675 mL 11    prochlorperazine (COMPAZINE) 5 MG tablet Take 5 mg by mouth every 6 hours as needed for nausea or vomiting      rivaroxaban ANTICOAGULANT (XARELTO ANTICOAGULANT) 10 MG TABS tablet 1 tablet (10 mg) by Oral or Feeding Tube route daily with food 90 tablet 3    sertraline (ZOLOFT) 20 MG/ML (HIGH CONC) solution TAKE 2.5 ML(50 MG) BY MOUTH DAILY (Patient taking differently: Take 50 mg by mouth every morning TAKE 2.5 ML(50 MG) BY MOUTH DAILY) 225 mL 1    silver nitrate (ARZOL) 75-25 % miscellaneous Use one stick apply tip to granuloma prn granuloma formation 20 applicator 1    silver sulfADIAZINE (SSD) 1 % external cream Apply topically 2 times daily To affected areas-apply bid to wounds till healed 400 g 11    simethicone (MYLICON) 66.7 mg/ml as needed      simethicone 40 MG/0.6ML LIQD by NG or OG tube route as needed      sodium chloride (OCEAN) 0.65  % nasal spray Spray 2 sprays in nostril daily as needed      traZODone (DESYREL) 5 mg/ml SUSP Take 15 ml (75 mg) po at bedtime Prn insomnia      tretinoin (RETIN-A) 0.025 % external cream Apply a pea-sized amount evenly over the face at nighttime before bed. 45 g 2    naloxone (NARCAN) 4 MG/0.1ML nasal spray Spray 1 spray (4 mg) into one nostril alternating nostrils once as needed for opioid reversal every 2-3 minutes until assistance arrives (Patient not taking: Reported on 1/3/2024) 0.2 mL 0    Salicylic Acid (OXY BALANCE FACIAL CLEAN WASH EX) Externally apply 1 pad topically daily (Patient not taking: Reported on 4/23/2024)      Sennosides (SENNA) 8.8 MG/5ML LIQD 15 mLs by Feeding route 2 times daily (Patient not taking: Reported on 4/23/2024) 900 mL 11     No current facility-administered medications for this visit.     Allergies   Allergen Reactions    Ibuprofen      Family History   Problem Relation Age of Onset    Hypertension Mother     Breast Cancer Mother         No longer has it    Hypertension Father     Hypertension Brother     Hypertension Brother     Family History Negative Other     Cerebrovascular Disease Other     Cerebrovascular Disease Maternal Aunt     Deep Vein Thrombosis (DVT) Maternal Aunt     Deep Vein Thrombosis (DVT) Maternal Uncle     Anesthesia Reaction No family hx of      Social History     Socioeconomic History    Marital status: Single     Spouse name: Not on file    Number of children: Not on file    Years of education: Not on file    Highest education level: Not on file   Occupational History    Not on file   Tobacco Use    Smoking status: Never    Smokeless tobacco: Never   Vaping Use    Vaping status: Never Used   Substance and Sexual Activity    Alcohol use: Never    Drug use: Never    Sexual activity: Never   Other Topics Concern    Parent/sibling w/ CABG, MI or angioplasty before 65F 55M? No   Social History Narrative    Not on file     Social Determinants of Health      Financial Resource Strain: Low Risk  (3/21/2024)    Financial Resource Strain     Within the past 12 months, have you or your family members you live with been unable to get utilities (heat, electricity) when it was really needed?: No   Food Insecurity: Low Risk  (3/21/2024)    Food Insecurity     Within the past 12 months, did you worry that your food would run out before you got money to buy more?: No     Within the past 12 months, did the food you bought just not last and you didn t have money to get more?: No   Transportation Needs: Low Risk  (3/21/2024)    Transportation Needs     Within the past 12 months, has lack of transportation kept you from medical appointments, getting your medicines, non-medical meetings or appointments, work, or from getting things that you need?: No   Physical Activity: Not on file   Stress: Not on file   Social Connections: Not on file   Interpersonal Safety: Unknown (3/26/2024)    [facility]    Humiliation, Afraid, Rape, and Kick questionnaire     Fear of Current or Ex-Partner: Not on file     Emotionally Abused: Not on file     Physically Abused: No     Sexually Abused: No   Housing Stability: Low Risk  (3/21/2024)    Housing Stability     Do you have housing? : Yes     Are you worried about losing your housing?: No               Objective           Vitals:  No vitals were obtained today due to virtual visit.    Physical Exam   GENERAL: alert and no distress  EYES: Eyes grossly normal to inspection.  No discharge or erythema, or obvious scleral/conjunctival abnormalities.  RESP: No audible wheeze, cough, or visible cyanosis.    SKIN: Visible skin clear. No significant rash, abnormal pigmentation or lesions.  NEURO: Cranial nerves grossly intact.  Mentation and speech appropriate for age.  PSYCH: Appropriate affect, tone, and pace of words            Video-Visit Details    Type of service:  Video Visit   Video End Time: 11:20  Originating  Location (pt. Location): Home    Distant Location (provider location):  On-site  Platform used for Video Visit: Boris  Signed Electronically by: Jimmy Moseley MD

## 2024-04-28 ENCOUNTER — HEALTH MAINTENANCE LETTER (OUTPATIENT)
Age: 34
End: 2024-04-28

## 2024-05-07 DIAGNOSIS — F41.9 ANXIETY: ICD-10-CM

## 2024-05-08 NOTE — TELEPHONE ENCOUNTER
ALPRAZolam (XANAX) 0.25 MG tablet 60 tablet 1 3/15/2024       Last Office Visit: 4/23/24  Future Office visit:   none    Routing refill request to provider for review/approval because:  Drug not on the [facility], [facility] or [facility] refill protocol or controlled substance    Mihaela Moseley RN  [facility] Red Flag Triage/MRT

## 2024-05-09 RX ORDER — ALPRAZOLAM 0.25 MG
0.25 TABLET ORAL 3 TIMES DAILY PRN
Qty: 60 TABLET | Refills: 1 | Status: SHIPPED | OUTPATIENT
Start: 2024-05-12 | End: 2024-07-01

## 2024-05-16 DIAGNOSIS — F41.9 ANXIETY: ICD-10-CM

## 2024-05-16 DIAGNOSIS — F32.A DEPRESSION, UNSPECIFIED DEPRESSION TYPE: ICD-10-CM

## 2024-05-21 RX ORDER — SERTRALINE HYDROCHLORIDE 20 MG/ML
SOLUTION ORAL
Qty: 225 ML | Refills: 1 | Status: SHIPPED | OUTPATIENT
Start: 2024-05-21

## 2024-05-22 NOTE — TELEPHONE ENCOUNTER
"sertraline (ZOLOFT)  Last Written Prescription Date:  6/30/2023  Last Fill Quantity: 225 ml,  # refills: 1   Last office visit: 4/23/2024 with prescribing provider:  Jimmy Moseley MD    Future Office Visit:  none    Requested Prescriptions   Pending Prescriptions Disp Refills    sertraline (ZOLOFT) 20 MG/ML (HIGH CONC) solution 225 mL 1     Sig: TAKE 2.5 ML(50 MG) BY MOUTH DAILY       SSRIs Protocol Passed - 5/16/2024  8:45 AM        Passed - PHQ-9 score less than 5 in past 6 months     Please review last PHQ-9 score.           Passed - Medication is active on med list        Passed - Patient is age 18 or older        Passed - No active pregnancy on record        Passed - No positive pregnancy test in last 12 months        Passed - Recent (6 mo) or future (30 days) visit within the authorizing provider's specialty     Patient had office visit in the last 6 months or has a visit in the next 30 days with authorizing provider or within the authorizing provider's specialty.  See \"Patient Info\" tab in inbasket, or \"Choose Columns\" in Meds & Orders section of the refill encounter.                 Medication refilled per RN protocol.    Adriane Gaytan RN on 5/21/2024 at 7:19 PM  "

## 2024-05-30 ENCOUNTER — LAB REQUISITION (OUTPATIENT)
Dept: LAB | Facility: CLINIC | Age: 34
End: 2024-05-30
Payer: MEDICARE

## 2024-05-30 ENCOUNTER — MYC MEDICAL ADVICE (OUTPATIENT)
Dept: FAMILY MEDICINE | Facility: CLINIC | Age: 34
End: 2024-05-30

## 2024-05-30 ENCOUNTER — TRANSFERRED RECORDS (OUTPATIENT)
Dept: HEALTH INFORMATION MANAGEMENT | Facility: CLINIC | Age: 34
End: 2024-05-30

## 2024-05-30 DIAGNOSIS — G12.9 SPINAL MUSCULAR ATROPHY, UNSPECIFIED (H): ICD-10-CM

## 2024-05-30 DIAGNOSIS — D50.9 IRON DEFICIENCY ANEMIA, UNSPECIFIED: ICD-10-CM

## 2024-05-30 LAB
ALBUMIN SERPL BCG-MCNC: 3.9 G/DL (ref 3.5–5.2)
ALP SERPL-CCNC: 107 U/L (ref 40–150)
ALT SERPL W P-5'-P-CCNC: 17 U/L (ref 0–50)
ANION GAP SERPL CALCULATED.3IONS-SCNC: 13 MMOL/L (ref 7–15)
AST SERPL W P-5'-P-CCNC: 22 U/L (ref 0–45)
BASOPHILS # BLD AUTO: 0 10E3/UL (ref 0–0.2)
BASOPHILS NFR BLD AUTO: 1 %
BILIRUB SERPL-MCNC: 0.3 MG/DL
BUN SERPL-MCNC: 6.7 MG/DL (ref 6–20)
CALCIUM SERPL-MCNC: 8.7 MG/DL (ref 8.6–10)
CHLORIDE SERPL-SCNC: 106 MMOL/L (ref 98–107)
CREAT SERPL-MCNC: 0.06 MG/DL (ref 0.51–0.95)
DEPRECATED HCO3 PLAS-SCNC: 18 MMOL/L (ref 22–29)
EGFRCR SERPLBLD CKD-EPI 2021: >90 ML/MIN/1.73M2
EOSINOPHIL # BLD AUTO: 0.2 10E3/UL (ref 0–0.7)
EOSINOPHIL NFR BLD AUTO: 3 %
ERYTHROCYTE [DISTWIDTH] IN BLOOD BY AUTOMATED COUNT: 17.1 % (ref 10–15)
GLUCOSE SERPL-MCNC: 101 MG/DL (ref 70–99)
HCT VFR BLD AUTO: 38.7 % (ref 35–47)
HGB BLD-MCNC: 12.1 G/DL (ref 11.7–15.7)
IMM GRANULOCYTES # BLD: 0 10E3/UL
IMM GRANULOCYTES NFR BLD: 0 %
IRON BINDING CAPACITY (ROCHE): 318 UG/DL (ref 240–430)
IRON SATN MFR SERPL: 17 % (ref 15–46)
IRON SERPL-MCNC: 54 UG/DL (ref 37–145)
LYMPHOCYTES # BLD AUTO: 1.9 10E3/UL (ref 0.8–5.3)
LYMPHOCYTES NFR BLD AUTO: 29 %
MCH RBC QN AUTO: 28.9 PG (ref 26.5–33)
MCHC RBC AUTO-ENTMCNC: 31.3 G/DL (ref 31.5–36.5)
MCV RBC AUTO: 93 FL (ref 78–100)
MONOCYTES # BLD AUTO: 0.3 10E3/UL (ref 0–1.3)
MONOCYTES NFR BLD AUTO: 4 %
NEUTROPHILS # BLD AUTO: 4.1 10E3/UL (ref 1.6–8.3)
NEUTROPHILS NFR BLD AUTO: 63 %
NRBC # BLD AUTO: 0 10E3/UL
NRBC BLD AUTO-RTO: 0 /100
PLATELET # BLD AUTO: 303 10E3/UL (ref 150–450)
POTASSIUM SERPL-SCNC: 3.7 MMOL/L (ref 3.4–5.3)
PROT SERPL-MCNC: 7.3 G/DL (ref 6.4–8.3)
RBC # BLD AUTO: 4.18 10E6/UL (ref 3.8–5.2)
SODIUM SERPL-SCNC: 137 MMOL/L (ref 135–145)
T4 FREE SERPL-MCNC: 1.37 NG/DL (ref 0.9–1.7)
TSH SERPL DL<=0.005 MIU/L-ACNC: 0.99 UIU/ML (ref 0.3–4.2)
WBC # BLD AUTO: 6.4 10E3/UL (ref 4–11)

## 2024-05-30 PROCEDURE — 82610 CYSTATIN C: CPT | Mod: ORL | Performed by: PHYSICIAN ASSISTANT

## 2024-05-30 PROCEDURE — 84443 ASSAY THYROID STIM HORMONE: CPT | Mod: ORL | Performed by: PHYSICIAN ASSISTANT

## 2024-05-30 PROCEDURE — 83550 IRON BINDING TEST: CPT | Mod: ORL | Performed by: PHYSICIAN ASSISTANT

## 2024-05-30 PROCEDURE — 85025 COMPLETE CBC W/AUTO DIFF WBC: CPT | Mod: ORL | Performed by: PHYSICIAN ASSISTANT

## 2024-05-30 PROCEDURE — 80053 COMPREHEN METABOLIC PANEL: CPT | Mod: ORL | Performed by: PHYSICIAN ASSISTANT

## 2024-05-30 PROCEDURE — 82728 ASSAY OF FERRITIN: CPT | Mod: ORL | Performed by: PHYSICIAN ASSISTANT

## 2024-05-30 PROCEDURE — 84439 ASSAY OF FREE THYROXINE: CPT | Mod: ORL | Performed by: PHYSICIAN ASSISTANT

## 2024-05-31 ENCOUNTER — TELEPHONE (OUTPATIENT)
Dept: OBGYN | Facility: CLINIC | Age: 34
End: 2024-05-31
Payer: MEDICARE

## 2024-05-31 LAB
CYSTATIN C (ROCHE): 0.8 MG/L (ref 0.6–1)
FERRITIN SERPL-MCNC: 32 NG/ML (ref 6–175)
GFR SERPL CREATININE-BSD FRML MDRD: >90 ML/MIN/1.73M2

## 2024-05-31 NOTE — TELEPHONE ENCOUNTER
[facility] Call Center    Phone Message    May a detailed message be left on voicemail: yes     Reason for Call: Other: Patient's mother Aster calling in regards to doing a follow up visit after patient's MRI from March. She states when she tried to get in with [facility] back in April she was told that [facility] didn't have any openings. Writer not seeing a schedule for Dr. Ivey. She wants to know is she going to have a schedule coming up ? Please contact patient's mother in regards to this message. Thank you       Action Taken: Other: [facility]    Travel Screening: Not Applicable     Date of Service:

## 2024-05-31 NOTE — TELEPHONE ENCOUNTER
Outpatient Medication Detail     Disp Refills Start End PADDY   ALPRAZolam (XANAX) 0.25 MG tablet 60 tablet 1 5/12/2024 -- No   Sig - Route: Take 1 tablet (0.25 mg) by mouth 3 times daily as needed for anxiety - Oral   Sent to pharmacy as: ALPRAZolam 0.25 MG Oral Tablet (XANAX)   Class: E-Prescribe   Order: [id number]   E-Prescribing Status: Receipt confirmed by pharmacy (5/9/2024  3:21 PM CDT)     Printout Tracking    External Result Report     Pharmacy

## 2024-06-03 DIAGNOSIS — G12.9 SPINAL MUSCLE ATROPHY (H): ICD-10-CM

## 2024-06-03 RX ORDER — DIAZEPAM 5 MG
5 TABLET ORAL EVERY 6 HOURS PRN
Qty: 30 TABLET | Refills: 0 | Status: SHIPPED | OUTPATIENT
Start: 2024-06-03 | End: 2024-09-06

## 2024-06-03 NOTE — TELEPHONE ENCOUNTER
diazepam (VALIUM) 5 MG tablet       Last Written Prescription Date:  4-10-24  Last Fill Quantity: 30,   # refills: 0  Last Office Visit : 4-23-24  Future Office visit:  none    Routing refill request to provider for review/approval because:  Drug not on the [facility], [facility] or [facility] refill protocol or controlled substance    Pharm 2nd request

## 2024-06-03 NOTE — TELEPHONE ENCOUNTER
Spoke with clinic manager, who said that the resident's schedule will be open around mid- next week (6/12). Called pt's mother Aster and let her know to call us back around then. Also sent delayed staff message to  to schedule pt w/ Dr. Ivey once her schedule is open.

## 2024-06-22 DIAGNOSIS — N39.0 URINARY TRACT INFECTION WITHOUT HEMATURIA, SITE UNSPECIFIED: ICD-10-CM

## 2024-07-01 DIAGNOSIS — G62.9 NEUROPATHY: ICD-10-CM

## 2024-07-01 DIAGNOSIS — F41.9 ANXIETY: ICD-10-CM

## 2024-07-02 RX ORDER — ALPRAZOLAM 0.25 MG
0.25 TABLET ORAL 3 TIMES DAILY PRN
Qty: 60 TABLET | Refills: 1 | Status: SHIPPED | OUTPATIENT
Start: 2024-07-02 | End: 2024-08-29

## 2024-07-02 RX ORDER — GABAPENTIN 250 MG/5ML
1000 SOLUTION ORAL 3 TIMES DAILY
Qty: 1800 ML | Refills: 5 | Status: SHIPPED | OUTPATIENT
Start: 2024-07-02

## 2024-07-02 NOTE — TELEPHONE ENCOUNTER
ALPRAZolam (XANAX) 0.25 MG tablet       Last Written Prescription Date:  5-12-24  Last Fill Quantity: 60,   # refills: 1  Last Office Visit: 4-23-24  Future Office visit:  none      Routing refill request to provider for review/approval because:  Drug not on the [facility], [facility] or  [facility] refill protocol or controlled substance    gabapentin (NEURONTIN) 250 MG/5ML solution       Last Written Prescription Date:  1-9-24  Last Fill Quantity: 1800 ml,   # refills: 5    Routing refill request to provider for review/approval because:  Drug not on the [facility], [facility] or  [facility] refill protocol or controlled substance

## 2024-07-02 NOTE — TELEPHONE ENCOUNTER
levofloxacin (LEVAQUIN) 25 MG/ML solution 480 mL 1 6/2/2023     Last Office Visit: 4/23/24  Future Office visit:   none    Routing refill request to provider for review/approval because:  Not on refill protocol    Mihaela Moseley RN  [facility] Red Flag Triage/MRT '

## 2024-07-03 RX ORDER — LEVOFLOXACIN 25 MG/ML
SOLUTION ORAL
Qty: 480 ML | Refills: 1 | Status: SHIPPED | OUTPATIENT
Start: 2024-07-03

## 2024-07-08 ENCOUNTER — VIRTUAL VISIT (OUTPATIENT)
Dept: PSYCHOLOGY | Facility: CLINIC | Age: 34
End: 2024-07-08
Payer: MEDICARE

## 2024-07-08 DIAGNOSIS — F41.1 GENERALIZED ANXIETY DISORDER: ICD-10-CM

## 2024-07-08 DIAGNOSIS — F34.1 PERSISTENT DEPRESSIVE DISORDER: Primary | ICD-10-CM

## 2024-07-08 DIAGNOSIS — F33.1 MAJOR DEPRESSIVE DISORDER, RECURRENT EPISODE, MODERATE (H): ICD-10-CM

## 2024-07-08 PROCEDURE — 90791 PSYCH DIAGNOSTIC EVALUATION: CPT | Mod: 95 | Performed by: STUDENT IN AN ORGANIZED HEALTH CARE EDUCATION/TRAINING PROGRAM

## 2024-07-08 NOTE — PROGRESS NOTES
"    [facility]         PATIENT'S NAME: Lanie Neil  PREFERRED NAME: Lanie  PRONOUNS: Lanie  MRN: [id number]  : 1990  ADDRESS: [address] [place] [address] [place] [address]  ACCT. NUMBER:  [id number]  DATE OF SERVICE: 24  START TIME: 12.30  END TIME: 1.20  PREFERRED PHONE: [phone]  May we leave a program related message: Yes  EMERGENCY CONTACT: was obtained Aster Heard (Mother)  [phone]  .  SERVICE MODALITY:  Video Visit:      Provider verified identity through the following two step process.  Patient provided:  Patient photo, Patient , and Patient address    Telemedicine Visit: The patient's condition can be safely assessed and treated via synchronous audio and visual telemedicine encounter.      Reason for Telemedicine Visit: Patient has requested telehealth visit    Originating Site (Patient Location): Patient's home    Distant Site (Provider Location): [facility] MENTAL HEALTH & ADDI[facility]SELING [facility]INIC    Consent:  The patient/guardian has verbally consented to: the potential risks and benefits of telemedicine (video visit) versus in person care; bill my insurance or make self-payment for services provided; and responsibility for payment of non-covered services.     Patient would like the video invitation sent by:  My Chart    Mode of Communication:  Video Conference via Amwell    Distant Location (Provider):  Off-site    As the provider I attest to compliance with applicable laws and regulations related to telemedicine.    UNIVERSAL ADULT Mental Health DIAGNOSTIC ASSESSMENT    Identifying Information:  Patient is a 34 year old,  other individual.  Patient was referred for an assessment by current Behavioral Health Provider.  Patient attended the session alone.    Chief Complaint:   The reason for seeking services at this time is: \"General mental health\".  The problem(s) began 19.    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 She reported that she has had this all her life and that has limited social interactions and ability to create meaningful and sustainable social relationships. Patient shared that she was home schooled  because she could not go to regular school due to her condition. She reported having low self esteem and abandonment issues as she was abandoned by her father and multiple online dating partners that she has had in the past. Patient noted that she is having recurrent chronic pain, being fed via a tube, and bed bound.        Patient has not attempted to resolve these concerns in the past.    Social/Family History:  Patient reported they grew up in other [place], [place], [place], [place], [place].  They were raised by biological mother  .  Parents  / .  Patient reported that their childhood was challenging and . Patient described their current relationships with family of origin as very supportive with mother and inconsistent and with dad. Patient noted that she feels sad thinking about him because he was unfaithful to her mum and they  when she was 18 months old. Patient reported that her mother got  when she was 7 to an [place] and they were together for about 4 years before breaking up. She reported that she spent more of her life with step dad and consider him more as a father than biological father. Patient noted she has 3 full brothers 3 step sibblings    The patient describes their cultural background as other.  Cultural influences and impact on patient's life structure, values, norms, and healthcare: My parents are [place]. I was born in the [place], so I am considered American [place]. My father was a missionary . My parents travelled often. Even after , my mum would move every single year until very recently. Usually following my brothers state to state. I am of Zoroastrianism romeo. Not very rigid, but I have a strong belief in  God. Out of my three brothers, one is very devout, another is agnostic, and the other is atheist. I am less than devout, but I am a believer still. Being born and raised in [place], my first language is English, but I also speak Tajik and Frisian as my second and third languages respectively. I don't think there's anything in my culture that can help or hurt, but I'm here to answer questions lol. Thank you for asking..  Contextual influences on patient's health include: Contextual Factors: Health- Seeking Factors Patient has a genetic condition and  is bed bound .    These factors will be addressed in the Preliminary Treatment plan. Patient identified their preferred language to be English. Patient reported they does not need the assistance of an  or other support involved in therapy.     Patient reported experienced significant delays in developmental tasks, such as muscular atrophy .   Patient's highest education level was high school graduate  .  Patient identified the following learning problems: none reported.  Modifications will not be used to assist communication in therapy.  Patient reports they are  able to understand written materials.    Patient reported the following relationship history that she has had a couple of online relationships but nothing physical.  Patient's current relationship status is single for all her life.   Patient identified their sexual orientation as heterosexual.  Patient reported having  no  child(alysia). Patient identified mother; siblings; pets; friends; therapist as part of their support system.  Patient identified the quality of these relationships as stable and meaningful,  .      Patient's current living/housing situation involves staying with someone.  The immediate members of family and household include Margarette Koenig,Mom  and they report that housing is stable.    Patient is currently disabled.  Patient reports their finances are obtained through family;  disability; SSDI disability. Patient does identify finances as a current stressor.      Patient reported that they have not been involved with the legal system. Patient does not report being under probation/ parole/ jurisdiction. They are not under any current court jurisdiction. .    Patient's Strengths and Limitations:  Patient identified the following strengths or resources that will help them succeed in treatment: romeo / spirituality, family support, and motivation. Things that may interfere with the patient's success in treatment include: lack of social support and physical health concerns.     Assessments:  The following assessments were completed by patient for this visit:  PHQ9:       3/16/2021     9:13 AM 6/30/2021     9:26 AM 11/16/2023     4:00 PM 12/26/2023    10:45 AM 1/2/2024    10:31 AM 2/12/2024    12:40 PM 7/8/2024     9:46 AM   PHQ-9 SCORE   PHQ-9 Total Score MyChart 2 (Minimal depression) 2 (Minimal depression)  2 (Minimal depression) 3 (Minimal depression) 2 (Minimal depression) 3 (Minimal depression)   PHQ-9 Total Score 2 2 0 2 3 2 3     GAD7:       5/28/2019     1:45 PM 2/12/2020     2:30 PM 12/15/2020    10:52 AM 3/16/2021     9:15 AM 6/30/2021     9:27 AM 1/3/2024    11:09 AM   KATHARINE-7 SCORE   Total Score   1 (minimal anxiety) 1 (minimal anxiety) 1 (minimal anxiety) 1 (minimal anxiety)   Total Score 3 19 1 1 1 1     PROMIS 10-Global Health (all questions and answers displayed):       3/4/2022     1:59 PM 3/15/2022     2:10 PM 3/30/2023    10:56 AM 7/6/2023     9:45 AM 10/18/2023     7:09 AM 12/26/2023    12:03 PM 7/8/2024    10:40 AM   PROMIS 10   In general, would you say your health is: Excellent Very good Very good Very good Very good  Good   In general, would you say your quality of life is: Good Fair Fair Good Good  Good   In general, how would you rate your physical health? Very good Excellent Very good Very good Poor  Good   In general, how would you rate your mental health, including  your mood and your ability to think? Good Fair Fair Good Good  Good   In general, how would you rate your satisfaction with your social activities and relationships? Good Good Poor Good Fair  Good   In general, please rate how well you carry out your usual social activities and roles Very good Fair Fair Fair Very good  Good   To what extent are you able to carry out your everyday physical activities such as walking, climbing stairs, carrying groceries, or moving a chair? Not at all Not at all Not at all Not at all Not at all  Not at all   In the past 7 days, how often have you been bothered by emotional problems such as feeling anxious, depressed, or irritable? Rarely Always Often Sometimes Sometimes  Rarely   In the past 7 days, how would you rate your fatigue on average? Moderate Very severe Severe Moderate Moderate  Moderate   In the past 7 days, how would you rate your pain on average, where 0 means no pain, and 10 means worst imaginable pain? 6 7 7 6 6  6   In general, would you say your health is: 5 4 4 4 4  3   In general, would you say your quality of life is: 3 2 2 3 3  3   In general, how would you rate your physical health? 4 5 4 4 1  3   In general, how would you rate your mental health, including your mood and your ability to think? 3 2 2 3 3  3   In general, how would you rate your satisfaction with your social activities and relationships? 3 3 1 3 2  3   In general, please rate how well you carry out your usual social activities and roles. (This includes activities at home, at work and in your community, and responsibilities as a parent, child, spouse, employee, friend, etc.) 4 2 2 2 4  3   To what extent are you able to carry out your everyday physical activities such as walking, climbing stairs, carrying groceries, or moving a chair? 1 1 1 1 1  1   In the past 7 days, how often have you been bothered by emotional problems such as feeling anxious, depressed, or irritable? 2 5 4 3 3  2   In the past 7  days, how would you rate your fatigue on average? 3 5 4 3 3  3   In the past 7 days, how would you rate your pain on average, where 0 means no pain, and 10 means worst imaginable pain? 6 7 7 6 6  6   Global Mental Health Score 13 8 7 12 11  13    13   Global Physical Health Score 11 9 9 11 8  10    10   PROMIS TOTAL - SUBSCORES 24 17 16 23 19  23    23       Information is confidential and restricted. Go to Review Flowsheets to unlock data.    Multiple values from one day are sorted in reverse-chronological order       Personal and Family Medical History:  Patient does report a family history of mental health concerns.  Patient reports family history includes Breast Cancer in her mother; Cerebrovascular Disease in her maternal aunt and another family member; Deep Vein Thrombosis (DVT) in her maternal aunt and maternal uncle; Family History Negative in an other family member; Hypertension in her brother, brother, father, and mother..     Patient does report Mental Health Diagnosis and/or Treatment.  Patient Patient reported the following previous diagnoses which include(s): Depression.  Patient reported symptoms began about 20 years ago.   Patient has received mental health services in the past: therapy with  [facility] .  Psychiatric Hospitalizations: None.  Patient denies a history of civil commitment.  Patient is not receiving other mental health services.  These include none.       Patient has had a physical exam to rule out medical causes for current symptoms.  Date of last physical exam was within the past year. Client was encouraged to follow up with PCP if symptoms were to develop. The patient has a [facility] Primary Care Provider, who is named Jimmy Moseley.  Patient reports the following current medical concerns: muscular dystrophy .  Patient reports pain concerns including back, leg.  Patient does want help addressing pain concerns..   There are not significant appetite / nutritional concerns /  "weight changes.   Patient does not report a history of head injury / trauma / cognitive impairment.      Patient reports current meds as:   Current Outpatient Medications   Medication Sig Dispense Refill    ACETAMINOPHEN PO Take 650 mg by mouth every 4 hours as needed for pain      albuterol (PROAIR HFA) 108 (90 Base) MCG/ACT inhaler Inhale 2 puffs into the lungs every 4 hours as needed for shortness of breath or wheezing 18 g 5    albuterol (PROVENTIL) (2.5 MG/3ML) 0.083% neb solution USE ONE VIAL BY NEBULIZATION EVERY 6 HOURS AS NEEDED FOR SHORTNESS OF BREATH/ DYSPNEA OR WHEEZING 360 mL 1    ALPRAZolam (XANAX) 0.25 MG tablet Take 1 tablet (0.25 mg) by mouth 3 times daily as needed for anxiety 60 tablet 1    aminocaproic acid (AMICAR) 0.25 GM/ML solution Take 12 mL (3 grams) via G-tube every 8 hours during menstrual period. 473 mL 5    azithromycin (ZITHROMAX) 200 MG/5ML suspension Take 12.5 ml po today then 6.25 ml once a day for four more days (total five days) 40 mL 0    bisacodyl (DULCOLAX) 10 MG suppository Place 10 mg rectally daily as needed for constipation      celecoxib (CELEBREX) 200 MG capsule Take 1 capsule (200 mg) by mouth daily On days of period 12 capsule 5    clindamycin (CLEOCIN T) 1 % lotion Apply topically 2 times daily To face as needed 60 mL 3    diazepam (VALIUM) 5 MG tablet Take 1 tablet (5 mg) by mouth every 6 hours as needed for muscle spasms 30 tablet 0    doxycycline hyclate (VIBRAMYCIN) 100 MG capsule GIVE ONE CAPSULE VIA \"G\"- TUBE TWICE DAILY 60 capsule 2    EVRYSDI 0.75 MG/ML oral solution 6.6 mLs by Oral or NG Tube route every morning      famotidine (PEPCID) 20 MG tablet Take 1 tablet (20 mg) by mouth 2 times daily 180 tablet 3    fluocinolone acetonide (DERMA SMOOTHE/FS BODY) 0.01 % external oil Apply at nighttime to forehead and scalp as needed for scale or pruritus (Patient taking differently: Apply topically at bedtime Apply at nighttime to forehead and scalp as needed for " scale or pruritus) 118 mL 3    fluticasone (FLOVENT HFA) 110 MCG/ACT inhaler Inhale 1 puff into the lungs 2 times daily 36 g 5    gabapentin (NEURONTIN) 250 MG/5ML solution 20 mLs (1,000 mg) by Per Feeding Tube route 3 times daily 1800 mL 5    GENERLAC 10 GM/15ML solution   11    guaiFENesin (ORGANIDIN) 200 MG TABS tablet Take 200 mg by mouth every 4 hours as needed for cough      HYDROmorphone (DILAUDID) 4 MG tablet Take 8 mg by mouth every 3 hours      hydrOXYzine (ATARAX) 25 MG tablet Take 25 mg by mouth every 6 hours as needed for itching      ketoconazole (NIZORAL) 2 % external shampoo Lather onto scalp and forehead once weekly when showering and let sit for 1-2 minutes before rinsing 120 mL 11    levofloxacin (LEVAQUIN) 25 MG/ML solution TAKE 20ML BY FEEDING TUBE ONCE DAILY FOR ONE WEEK 480 mL 1    Magnesium Hydroxide (MILK OF MAGNESIA PO) Take 30 mLs by mouth as needed      metroNIDAZOLE (METROCREAM) 0.75 % external cream Apply topically 2 times daily 45 g 11    Minoxidil (MINOXIDIL FOR WOMEN) 5 % FOAM Apply 1/2 capful once a day to affected area 60 g 3    mometasone (NASONEX) 50 MCG/ACT nasal spray Spray 2 sprays in nostril daily as needed      morphine (SABINE) 30 MG 24 hr capsule Take 30 mg by mouth 2 times daily      mupirocin (BACTROBAN) 2 % external cream Apply topically 3 times daily 30 g 3    naloxone (NARCAN) 4 MG/0.1ML nasal spray Spray 1 spray (4 mg) into one nostril alternating nostrils once as needed for opioid reversal every 2-3 minutes until assistance arrives (Patient not taking: Reported on 1/3/2024) 0.2 mL 0    Nutritional Supplements (NUTREN 1.0/FIBER) LIQD Per G tube; 250 ml Per G Tube qid 13724 mL 11    Nutritional Supplements (REPLETE FIBER) LIQD Take 1 Can by mouth 4 times daily      omeprazole (PRILOSEC) 2 mg/mL suspension SHAKE LIQUID WELL AND GIVE 20ML (40MG) DAILY (VIA G-TUBE) 600 mL 9    ondansetron (ZOFRAN-ODT) 4 MG ODT tab Take 1 tablet (4 mg) by mouth every 8 hours as needed for  nausea 30 tablet 1    order for DME Equipment being ordered: 16 fr 5cc balloon indwelling catheter with drainage bag. 2 catheters/month 2 catheter 11    order for DME Equipment being ordered: Vibracare Percussor 1 Units 0    polyethylene glycol (MIRALAX) 17 GM/Dose powder Take 17 g by mouth daily (Patient taking differently: Take 17 g by mouth daily as needed) 714 g 5    potassium chloride (KAYCIEL) 20 MEQ/15ML (10%) solution 22.5 mLs (30 mEq) by Per G Tube route daily 675 mL 11    prochlorperazine (COMPAZINE) 5 MG tablet Take 5 mg by mouth every 6 hours as needed for nausea or vomiting      rivaroxaban ANTICOAGULANT (XARELTO ANTICOAGULANT) 10 MG TABS tablet 1 tablet (10 mg) by Oral or Feeding Tube route daily with food 90 tablet 3    Salicylic Acid (OXY BALANCE FACIAL CLEAN WASH EX) Externally apply 1 pad topically daily (Patient not taking: Reported on 4/23/2024)      Sennosides (SENNA) 8.8 MG/5ML LIQD 15 mLs by Feeding route 2 times daily (Patient not taking: Reported on 4/23/2024) 900 mL 11    sertraline (ZOLOFT) 20 MG/ML (HIGH CONC) solution TAKE 2.5 ML(50 MG) BY MOUTH DAILY 225 mL 1    silver nitrate (ARZOL) 75-25 % miscellaneous Use one stick apply tip to granuloma prn granuloma formation 20 applicator 1    silver sulfADIAZINE (SSD) 1 % external cream Apply topically 2 times daily To affected areas-apply bid to wounds till healed 400 g 11    simethicone (MYLICON) 66.7 mg/ml as needed      simethicone 40 MG/0.6ML LIQD by NG or OG tube route as needed      sodium chloride (OCEAN) 0.65 % nasal spray Spray 2 sprays in nostril daily as needed      traZODone (DESYREL) 5 mg/ml SUSP Take 15 ml (75 mg) po at bedtime Prn insomnia      tretinoin (RETIN-A) 0.025 % external cream Apply a pea-sized amount evenly over the face at nighttime before bed. 45 g 2     No current facility-administered medications for this visit.       Medication Adherence:  Patient reports taking.  taking prescribed medications as  prescribed.    Patient Allergies:    Allergies   Allergen Reactions    Ibuprofen        Medical History:    Past Medical History:   Diagnosis Date    Anemia     Anxiety     Arthritis Unknown    Asthma     Asthma 06/18/2014    Chronic pain     Chronic respiratory failure (H)     Constipation     Depression     Depressive disorder     I don't have it right now currently    DVT (deep venous thrombosis) (H)     Gastrointestinal tube present (H)     History of blood transfusion     Obesity     Sinusitis, chronic     Snoring     Spinal muscle atrophy (H)          Current Mental Status Exam:   Appearance:  Appropriate    Eye Contact:  Good   Psychomotor:  Patient is bed bound       Gait / station:  Patient is bed bound  Attitude / Demeanor: Cooperative  Interested Friendly Pleasant  Speech      Rate / Production: Normal/ Responsive      Volume:  Normal  volume      Language:  good  Mood:   Anxious  Depressed   Affect:   Appropriate    Thought Content: Clear   Thought Process: Coherent       Associations: No loosening of associations  Insight:   Good   Judgment:  Good   Orientation:  Person Place Time Situation  Attention/concentration: Good    Substance Use:   Patient did not report a family history of substance use concerns; see medical history section for details.  Patient has not received chemical dependency treatment in the past.  Patient has not ever been to detox.      Patient is not currently receiving any chemical dependency treatment.           Substance History of use Age of first use Date of last use     Pattern and duration of use (include amounts and frequency)   Alcohol used in the past   20 06/26/24 Reported she has used socially like on her birthday in the past and took like half a glass of wine but does not drink on any other day due to current health condition.    Cannabis   used in the past 27-29 12/01/23 REPORTS SUBSTANCE USE: N/A     Amphetamines   never used     REPORTS SUBSTANCE USE: N/A    Cocaine/crack    never used       REPORTS SUBSTANCE USE: N/A   Hallucinogens never used         REPORTS SUBSTANCE USE: N/A   Inhalants never used         REPORTS SUBSTANCE USE: N/A   Heroin never used         REPORTS SUBSTANCE USE: N/A   Other Opiates currently use 14 07/08/24 Using opioids as prescribed   Benzodiazepine   currently use 14 07/07/24 Using as prescribed   Barbiturates never used     REPORTS SUBSTANCE USE: N/A   Over the counter meds never used     REPORTS SUBSTANCE USE: N/A   Caffeine currently use 5   REPORTS SUBSTANCE USE: N/A   Nicotine  never used     REPORTS SUBSTANCE USE: N/A   Other substances not listed above:  Identify:  never used     REPORTS SUBSTANCE USE: N/A     Patient reported the following problems as a result of their substance use: no problems, not applicable.    Substance Use: No symptoms    Based on the negative CAGE score and clinical interview there  are not indications of drug or alcohol abuse.    Significant Losses / Trauma / Abuse / Neglect Issues:   Patient did not  serve in the .  There are indications or report of significant loss, trauma, abuse or neglect issues related to: are no indications and client denies any losses, trauma, abuse, or neglect concerns.  Concerns for possible neglect are not present.     Safety Assessment:   Patient denies current homicidal ideation and behaviors.  Patient denies current self-injurious ideation and behaviors.    Patient denied risk behaviors associated with substance use.   Patient denies any high risk behaviors associated with mental health symptoms.  Patient reports the following current concerns for their personal safety: None.  Patient reports there are not firearms in the house.       .    History of Safety Concerns:  Patient denied a history of homicidal ideation.     Patient denied a history of personal safety concerns.    Patient denied a history of assaultive behaviors.    Patient denied a history of sexual assault  behaviors.     Patient denied a history of risk behaviors associated with substance use.  Patient denies any history of high risk behaviors associated with mental health symptoms.  Patient reports the following protective factors: dedication to family or friends; safe and stable environment; secure attachment; help seeking behaviors when distressed; adherence with prescribed medication; sense of meaning; sense of personal control or determination; access to a variety of clinical interventions and pets    Risk Plan:  See Recommendations for Safety and Risk Management Plan    Review of Symptoms per patient report:   Depression: Change in sleep, Change in energy level, Psychomotor slowing or agitation, Feelings of hopelessness, Feelings of helplessness, Low self-worth, and Feeling sad, down, or depressed  Vy:  No Symptoms  Psychosis: No Symptoms  Anxiety: Excessive worry, Nervousness, Sleep disturbance, and Ruminations  Panic:  No symptoms  Post Traumatic Stress Disorder:  No Symptoms   Eating Disorder: No Symptoms  ADD / ADHD:  No symptoms  Conduct Disorder: No symptoms  Autism Spectrum Disorder: No symptoms  Obsessive Compulsive Disorder: No Symptoms    Patient reports the following compulsive behaviors and treatment history:  None .      Diagnostic Criteria:   Generalized Anxiety Disorder  A. Excessive anxiety and worry about a number of events or activities (such as work or school performance).   B. The person finds it difficult to control the worry.  C. Select 3 or more symptoms (required for diagnosis). Only one item is required in children.   - Restlessness or feeling keyed up or on edge.    - Being easily fatigued.    - Difficulty concentrating or mind going blank.    - Muscle tension.   D. The focus of the anxiety and worry is not confined to features of an Axis I disorder.  E. The anxiety, worry, or physical symptoms cause clinically significant distress or impairment in social, occupational, or other  important areas of functioning.   F. The disturbance is not due to the direct physiological effects of a substance (e.g., a drug of abuse, a medication) or a general medical condition (e.g., hyperthyroidism) and does not occur exclusively during a Mood Disorder, a Psychotic Disorder, or a Pervasive Developmental Disorder.    - The aformentioned symptoms began 20 year(s) ago and occurs 6 days per week and is experienced as moderate. Persistent Depressive Disorder  A. Depressed mood for most of the day, for more days than not, as indicated either by subjective account or observation by others, for at least 2 years. Note: In children and adolescents, mood can be irritable and duration must be at least 1 year.   B. Presence, while depressed, of two (or more) of the following:        - low energy or fatigue        - low self-esteem        - poor concentration or difficulty making decisions        - feelings of hopelessness   C. During the 2-year period (1 year for children or adolescents) of the disturbance, the person has never been without the symptoms in Criteria A and B for more than 2 months at a time.  D. Criteria for a major depressive disorder may be continously present for 2 years  E. There has never been a Manic Episode, a Mixed Episode, or a Hypomanic Episode, and criteria have never been met for Cyclothymic Disorder.   F. The disturbance is not better explained by a persistent schizoaffective disorder, schizophrenia, delusional disorder, or other specified or unspecified schizophrenia spectrum and other psychotic disorder  G. The symptoms are not attributable to the physiological effects of a substance (e.g., a drug of abuse, a medication) or another medical condition (e.g., hypothyroidism).   H. The symptoms cause clinically significant distress or impairment in social, occupational, or other important areas of functioning.        - Early Onset: onset is before age 21 years        - With Atypical Features  (for most recent 2 years of Dysthymic Disorder)    Functional Status:  Patient reports the following functional impairments:  chronic disease management, health maintenance, management of the household and or completion of tasks, relationship(s), self-care, social interactions, use of public transportation, and work / vocational responsibilities.     Nonprogrammatic care:  Patient is requesting basic services to address current mental health concerns.    Clinical Summary:  1. Psychosocial, Cultural and Contextual Factors: muscle dystrophy adversely affecting motor movement/ recurrent pain/ losses  .  2. Principal DSM5 Diagnoses  (Sustained by DSM5 Criteria Listed Above):   300.4 (F34.1) Persistent Depressive Disorder, Early onset, With intermittent major depressive episodes, without current episode, and Moderate  300.02 (F41.1) Generalized Anxiety Disorder.  3. Other Diagnoses that is relevant to services:   None reported.  4. Provisional Diagnosis:   None at this time  5. Prognosis: Relieve Acute Symptoms.  6. Likely consequences of symptoms if not treated: Current condition could deteriorate causing other adverse health problems with a negative impact on  pt's psychosocial functioning.   7. Client strengths include:  committed to sobriety, has a previous history of therapy, insightful, motivated, and support of family, friends and providers .     Recommendations:     1. Plan for Safety and Risk Management:   Safety and Risk: Recommended that patient call 911 or go to the local ED should there be a change in any of these risk factors..          Report to child / adult protection services was NA.     2. Patient's identified nothing at this time.     3. Initial Treatment will focus on:    Depressed Mood -   .     4. Resources/Service Plan:    services are not indicated.   Modifications to assist communication are not indicated.   Additional disability accommodations are not indicated.      5.  Collaboration:   Collaboration / coordination of treatment will be initiated with the following  support professionals: NA.      6.  Referrals:   The following referral(s) will be initiated: NA.       A Release of Information has been obtained for the following: NA.     Clinical Substantiation/medical necessity for the above recommendations:  NA.    7. ROBBY:    ROBBY:  Discussed the general effects of drugs and alcohol on health and well-being.   8. Records:   These were reviewed at time of assessment.   Information in this assessment was obtained from the medical record and  provided by patient who is a good historian.    Patient will have open access to their mental health medical record.    9.   Interactive Complexity: No    10. Safety Plan : No reported safety concerns at this time.        Provider Name/ Credentials:  Peter Bravo [facility]  July 8, 2024    ----- Service Performed and Documented by [facility]------  This note was reviewed and clinical supervision by GUSTAVO Medina [facility]    7/9/2024         Answers submitted by the patient for this visit:  Patient Health Questionnaire (Submitted on 7/8/2024)  If you checked off any problems, how difficult have these problems made it for you to do your work, take care of things at home, or get along with other people?: Not difficult at all  PHQ9 TOTAL SCORE: 3

## 2024-07-15 ENCOUNTER — VIRTUAL VISIT (OUTPATIENT)
Dept: PSYCHOLOGY | Facility: CLINIC | Age: 34
End: 2024-07-15
Payer: MEDICARE

## 2024-07-15 DIAGNOSIS — F34.1 PERSISTENT DEPRESSIVE DISORDER: Primary | ICD-10-CM

## 2024-07-15 PROCEDURE — 90834 PSYTX W PT 45 MINUTES: CPT | Mod: 95 | Performed by: STUDENT IN AN ORGANIZED HEALTH CARE EDUCATION/TRAINING PROGRAM

## 2024-07-15 ASSESSMENT — PATIENT HEALTH QUESTIONNAIRE - PHQ9
SUM OF ALL RESPONSES TO PHQ QUESTIONS 1-9: 0
SUM OF ALL RESPONSES TO PHQ QUESTIONS 1-9: 0
10. IF YOU CHECKED OFF ANY PROBLEMS, HOW DIFFICULT HAVE THESE PROBLEMS MADE IT FOR YOU TO DO YOUR WORK, TAKE CARE OF THINGS AT HOME, OR GET ALONG WITH OTHER PEOPLE: NOT DIFFICULT AT ALL

## 2024-07-15 NOTE — PROGRESS NOTES
[facility]- Psychotherapy                                    Progress Note    Patient Name: Lanie Neil  Date: 07/15/2024         Service Type: Individual      Session Start Time: 11.00  Session End Time: 11.50     Session Length: 38-52    Session #: 02    Attendees: Client attended alone    Service Modality:  Video Visit:      Provider verified identity through the following two step process.  Patient provided:  Patient is known previously to provider    Telemedicine Visit: The patient's condition can be safely assessed and treated via synchronous audio and visual telemedicine encounter.      Reason for Telemedicine Visit: Patient has requested telehealth visit    Originating Site (Patient Location): Patient's home    Distant Site (Provider Location): [facility]    Consent:  The patient/guardian has verbally consented to: the potential risks and benefits of telemedicine (video visit) versus in person care; bill my insurance or make self-payment for services provided; and responsibility for payment of non-covered services.     Patient would like the video invitation sent by:  My Chart    Mode of Communication:  Video Conference via [employer]well    Distant Location (Provider):  On-site    As the provider I attest to compliance with applicable laws and regulations related to telemedicine.    DATA  Interactive Complexity: No  Crisis: No        Progress Since Last Session (Related to Symptoms / Goals / Homework):   Symptoms: No change patient reported no change from previous session    Homework: Completed in session review patient's  goals for treatment and collaboratively reviewed and update  treatment plan.       Episode of Care Goals: Minimal progress - ACTION (Actively working towards change); Intervened by reinforcing change plan / affirming steps taken     Current / Ongoing Stressors and Concerns:                Depression, anxiety and  recurrent pain. 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 She reported that she has had this all her life and that has limited social interactions and ability to create meaningful and sustainable social relationships. Patient shared that she is bed bound due to current health conditio. She noted  having low self esteem and abandonment issues as she was abandoned by her father and multiple online dating partners that she has had in the past. Patient noted that she is having recurrent chronic pain, being fed via a tube, and bed bound.       Treatment Objective(s) Addressed in This Session:   Decrease frequency and intensity of feeling down, depressed, hopeless     Intervention:     MI Intervention: Expressed Empathy/Understanding, Supported Autonomy, Collaboration, Evocation, Permission to raise concern or advise, Open-ended questions, and Reflections: simple and complex       Assessments completed prior to visit:  PHQ2:       4/23/2024    10:44 AM 3/21/2024    12:20 PM 1/3/2024     1:47 PM 11/24/2023     1:00 PM 11/8/2023     2:33 PM 9/13/2023     1:48 PM 8/31/2023     3:10 PM   PHQ-2 ( 1999 [employer])   Q1: Little interest or pleasure in doing things 0 0 0 0 0 0 0   Q2: Feeling down, depressed or hopeless 0 0 0 0 0 0 0   PHQ-2 Score 0 0 0 0 0 0 0     GAD7:       5/28/2019     1:45 PM 2/12/2020     2:30 PM 12/15/2020    10:52 AM 3/16/2021     9:15 AM 6/30/2021     9:27 AM 1/3/2024    11:09 AM   KATHARINE-7 SCORE   Total Score   1 (minimal anxiety) 1 (minimal anxiety) 1 (minimal anxiety) 1 (minimal anxiety)   Total Score 3 19 1 1 1 1         ASSESSMENT: Current Emotional / Mental Status (status of significant symptoms):   Risk status (Self / Other harm or suicidal ideation)   Patient denies current fears or concerns for personal safety.   Patient denies current or recent suicidal ideation or behaviors.   Patient denies current or recent homicidal ideation  or behaviors.   Patient denies current or recent self injurious behavior or ideation.   Patient denies other safety concerns.   Patient reports there has been no change in risk factors since their last session.     Patient reports there has been no change in protective factors since their last session.     Recommended that patient call 911 or go to the local ED should there be a change in any of these risk factors.     Appearance:   Appropriate    Eye Contact:   Good    Psychomotor Behavior: Patient is bed bond    Attitude:   Cooperative  Interested Friendly Pleasant   Orientation:   All   Speech    Rate / Production: Normal/ Responsive    Volume:  Normal    Mood:    Depressed    Affect:    Appropriate    Thought Content:  Clear    Thought Form:  Coherent    Insight:    Good      Medication Review:   No changes to current psychiatric medication(s)     Medication Compliance:   Yes     Changes in Health Issues:   None reported     Chemical Use Review:   Substance Use: Chemical use reviewed, no active concerns identified      Tobacco Use: No current tobacco use.      Diagnosis:  300.4 (F34.1) Persistent Depressive Disorder, Early onset and Mild or 300.02 (F41.1) Generalized Anxiety Disorder    Collateral Reports Completed:   Not Applicable    PLAN: (Patient Tasks / Therapist Tasks / Other)     Notice recurrent negative self talk and reframed them to positive self talk.       Peter Bravo [facility]                    ----- Service Performed and Documented by [facility]------  This note was reviewed and clinical supervision by GUSTAVO Medina [facility]    7/16/2024                                      ______________________________________________________________________    Individual Treatment Plan    Patient's Name: Lanie Neil  YOB: 1990    Date of Creation: 7/15/2024  Date Treatment Plan Last Reviewed/Revised:     DSM5 Diagnoses: 300.4 (F34.1) Persistent Depressive Disorder, Early onset and Mild or 300.02  "(F41.1) Generalized Anxiety Disorder  Psychosocial / Contextual Factors:   PROMIS (reviewed every 90 days):     Referral / Collaboration:  Referral to another professional/service is not indicated at this time..    Anticipated number of session for this episode of care:  15/25  Anticipation frequency of session: Biweekly  Anticipated Duration of each session: 38-52 minutes  Treatment plan will be reviewed in 90 days or when goals have been changed.       MeasurableTreatment Goal(s) related to diagnosis / functional impairment(s)    Goal 1: Patient will experience a reduction in depressive symptoms along with a corresponding increase in positive emotion and life satisfaction.               I will know I have met my goals when I have learned healthy coping mechanisms and can focus more on the positive and accept what I can't change.\"     Objective #A (Patient Action)                          Patient will Increase interest, engagement, and pleasure in doing things.  Status: Continued - Date(s): 10/16/2024     Intervention(s)  Therapist will help patient identify pleasant and mastery oriented events that elicit positive, relaxed mood.     Objective #B  Patient will Decrease frequency and intensity of feeling down, depressed, hopeless.  Status: Continued - Date(s):10/16/2024     Intervention(s)  Therapist will introduce patient to cognitive-behavioral and acceptance and commitment therapy topics aimed to help reduce depression and anxiety     Objective #C  Patient will Identify negative self-talk and behaviors: challenge core beliefs, myths, and actions  Improve concentration, focus, and mindfulness in daily activities .  Status: Continued - Date(s): 10/16/2024     Intervention(s)  Therapist will help patient identify and manage negative self-talk and automatic thoughts; introduce patient to cognitive distortions; help patient develop cognitive diffusion techniques        Goal 2: Patient will experience a reduction in " "anxious symptoms, along with a corresponding increase in relaxed emotional states and life satisfaction.        Objective #A (Patient Action)  Patient will use cognitive-behavioral and thought diffusion strategies identified in therapy to challenge anxious thoughts.                       Status: Continued - Date(s): 10/16/2024     Intervention(s)  Therapist will utilize CBT and ACT ideas to help patient challenge anxious thoughts and reduce intensity/duration of anxious distress     Objective #B  Patient will use \"worry time\" each day for 15 minutes of scheduled worry and then defer obsessive or anxious thinking until the next structured worry time.                      Status: Continued - Date(s): 10/16/2024     Intervention(s)  Therapist will teach patient how to effectively utilize worry time and/or thought logs/journals each day and incorporate more relaxation behaviors into their routine.     Objective #C  Patient will identify the stressors which contribute to feelings of anxiety  Patient will increase engagement in adaptive coping skills and recreational activities, such as exercise and healthy socialization, to manage distress.  Status: Continued - Date(s):10/16/2024     Intervention(s)  Therapist will help patient identify triggers/situational factors that contribute to anxiety and behavioral skills aimed to manage anxious distress.        Goal 3: Patient will learn adaptive ways to manage chronic pain        Objective #A Patient will identify 2-4 strategies for managing pain.                          Status: Continued - Date(s): 10/16/2024     Intervention(s)  Therapist will teach distraction and mindfulness skills aimed to help manage chronic pain.   Objective #B  Patient will state understanding of stressors and relationship to physical symptoms.                    Status: Continued - Date(s): 10/16/2024                Intervention(s)  Therapist will teach distraction skills aimed to help manage chronic " pain and utilize ACT/CBT ideas to help with this.      Patient has reviewed and agreed to the above plan.      Peter Bravo, [facility] [place]  July 15, 2024    Answers submitted by the patient for this visit:  Patient Health Questionnaire (Submitted on 7/15/2024)  If you checked off any problems, how difficult have these problems made it for you to do your work, take care of things at home, or get along with other people?: Not difficult at all  PHQ9 TOTAL SCORE: 0

## 2024-07-29 NOTE — PROGRESS NOTES
Problem: Discharge Planning  Goal: Discharge to home or other facility with appropriate resources  7/29/2024 1407 by Josey Hirsch RN  Outcome: Progressing  Flowsheets (Taken 7/28/2024 2030 by Schober, Robyn L, RN)  Discharge to home or other facility with appropriate resources: Identify barriers to discharge with patient and caregiver  7/29/2024 0330 by Schober, Robyn L, RN  Outcome: Progressing  Flowsheets (Taken 7/28/2024 2030)  Discharge to home or other facility with appropriate resources: Identify barriers to discharge with patient and caregiver     Problem: Safety - Adult  Goal: Free from fall injury  7/29/2024 1407 by Josey Hirsch RN  Outcome: Progressing  Flowsheets (Taken 7/29/2024 0330 by Schober, Robyn L, RN)  Free From Fall Injury: Based on caregiver fall risk screen, instruct family/caregiver to ask for assistance with transferring infant if caregiver noted to have fall risk factors  7/29/2024 0330 by Schober, Robyn L, RN  Outcome: Progressing  Flowsheets (Taken 7/29/2024 0330)  Free From Fall Injury: Based on caregiver fall risk screen, instruct family/caregiver to ask for assistance with transferring infant if caregiver noted to have fall risk factors     Problem: Pain  Goal: Verbalizes/displays adequate comfort level or baseline comfort level  7/29/2024 1407 by Josey Hirsch RN  Outcome: Progressing  Flowsheets (Taken 7/29/2024 1407)  Verbalizes/displays adequate comfort level or baseline comfort level: Encourage patient to monitor pain and request assistance  7/29/2024 0330 by Schober, Robyn L, RN  Outcome: Progressing     Problem: ABCDS Injury Assessment  Goal: Absence of physical injury  Outcome: Progressing  Flowsheets (Taken 7/29/2024 1407)  Absence of Physical Injury: Implement safety measures based on patient assessment      Pulmonary outpatient visit    November 16, 2023    S: Lanie Neil is being seen today for followup of respiratory failure in the setting of SMA. I had last seen her in August 2015.    She has IVC filter that was placed in 2012, she is considering having this removed. She saw Dr. Espinal on 11/8 and is going to be scheduled for a removal attempt.    She continues on vent to trach 24/7. [employer] is her [employer] company. She has LTV, knows she will need to get new equipment. Recently tried Trilogy, but she didn't like this. RT from [facility] is going to bring another new vent for her to try in the next few days. She has a cuffed 6 Bivona trach in place, but she doesn't have any water in the cuff (24/7). Talking is very important to her, and she gets a bit anxious thinking about even partial cuff inflation. She still needs her trach changed about every week due to secretions. She doesn't think her secretions are particularly dry.    No difficulties with her breathing at the current time.    Current Outpatient Medications   Medication Sig Dispense Refill    ACETAMINOPHEN PO Take 650 mg by mouth every 4 hours as needed for pain      albuterol (PROAIR HFA) 108 (90 Base) MCG/ACT inhaler Inhale 2 puffs into the lungs every 4 hours as needed  18 g 3    albuterol (PROVENTIL) (2.5 MG/3ML) 0.083% neb solution USE ONE VIAL BY NEBULIZATION EVERY 6 HOURS AS NEEDED 360 mL 1    ALPRAZolam (XANAX) 0.25 MG tablet Take 1 tablet (0.25 mg) by mouth 3 times daily as needed 60 tablet 1    bisacodyl (DULCOLAX) 10 MG suppository Place 10 mg rectally daily as needed      celecoxib (CELEBREX) 200 MG capsule Take 1 capsule (200 mg) by mouth daily  12 capsule 5    diazepam (VALIUM) 5 MG tablet Take 1 tablet (5 mg) by mouth every 6 hours as needed 30 tablet 1    famotidine (PEPCID) 40 MG/5ML suspension Take 2.5 mLs (20 mg) by mouth 2 times daily 150 mL 11    fluticasone (FLOVENT HFA) 110 MCG/ACT inhaler Inhale 1 puff into the lungs 2 times daily 36 g  "5    gabapentin (NEURONTIN) 250 MG/5ML solution 20 mLs (1,000 mg) by Per Feeding Tube route 3 times daily 1800 mL 5    guaiFENesin (ORGANIDIN) 200 MG TABS tablet Take 200 mg by mouth every 4 hours as needed for cough      HYDROmorphone (DILAUDID) 4 MG tablet       hydrOXYzine (ATARAX) 25 MG tablet Take 25 mg by mouth every 6 hours as needed for itching      LANsoprazole (PREVACID) 30 MG DR capsule Take 30 mg by mouth      Magnesium Hydroxide (MILK OF MAGNESIA PO) Take 30 mLs by mouth as needed      mometasone (NASONEX) 50 MCG/ACT nasal spray       morphine (SABINE) 30 MG 24 hr capsule       omeprazole (PRILOSEC) 2 mg/mL suspension SHAKE LIQUID WELL AND GIVE 20ML (40MG) DAILY (VIA G-TUBE) 600 mL 9    ondansetron (ZOFRAN-ODT) 4 MG ODT tab Take 1 tablet (4 mg) by mouth every 8 hours as needed 30 tablet 1    polyethylene glycol (MIRALAX) 17 GM/Dose powder Take 17 g by mouth daily 714 g 0    potassium chloride (KAYCIEL) 20 MEQ/15ML (10%) solution 22.5 mLs (30 mEq) by Per G Tube route daily 675 mL 11    prochlorperazine (COMPAZINE) 5 MG tablet Take 5 mg by mouth every 6 hours as needed       rivaroxaban ANTICOAGULANT (XARELTO ANTICOAGULANT) 10 MG TABS tablet 1 tablet (10 mg) by Oral or Feeding Tube route daily  90 tablet 3    Sennosides (SENNA) 8.8 MG/5ML LIQD 15 mLs by Feeding route 2 times daily 900 mL 11    sertraline (ZOLOFT) 20 MG/ML (HIGH CONC) solution TAKE 2.5 ML(50 MG) BY MOUTH DAILY 225 mL 1    traZODone (DESYREL) 5 mg/ml SUSP Take 100 mg per feeding tube at bedtime prn insomnia 600 mL 1       Social Hx:  Nonsmoker, no exposure to secondhand smoke    Family Hx:   Family History   Problem Relation Age of Onset    Family History Negative Other        O: Alert, appears comfortable without resp acc muscle use  VS: /67 (BP Location: Left arm, Patient Position: Sitting)   Pulse 64   Resp 18   Ht 1.448 m (4' 9\")   Wt 68 kg (150 lb)   SpO2 99%   BMI 32.46 kg/m    HEENT: NC/AT, no icterus  Neck: No ÁNGELA.  " Midline trach - 6 Bivona - site C/D/I  Lungs: CTA clear anteriorly  Cor: RRR S1S2, no murmurs  Extr: Chronic contractures, no edema  Neuro: Alert, speech fluent, no facial droop  LTV settings RR 12 with . Airway pressures in the 20s (6 Bivona has cuff fully deflated)      A/P: Chronic respiratory failure:  The patient has SMA with neuromuscular respiratory failure. She had a tracheostomy placed prior to 2015 and has been on the ventilator due to recurrent aspiration pneumonitis - she has done well since that time.    - She has LTV vent ([facility]). She does well on this but will need to change - she's working with [facility] on this  - I broached the subject of partial cuff deflation with her - might make it easier for her to tolerate the new vents, and it might lead to less tracheal dryness (and less frequent trach changes). She is quite resistant to this idea, but she agreed to at least think about it.     I'm happy to work with [facility] on her equipment needs. She'll return here in one year, sooner KENTRELL Soni MD    Dept of Pulmonary, Sleep and Critical Care Medicine

## 2024-07-31 ENCOUNTER — VIRTUAL VISIT (OUTPATIENT)
Dept: PSYCHOLOGY | Facility: CLINIC | Age: 34
End: 2024-07-31
Payer: MEDICARE

## 2024-07-31 DIAGNOSIS — F34.1 PERSISTENT DEPRESSIVE DISORDER: Primary | ICD-10-CM

## 2024-07-31 PROCEDURE — 90834 PSYTX W PT 45 MINUTES: CPT | Mod: 95 | Performed by: STUDENT IN AN ORGANIZED HEALTH CARE EDUCATION/TRAINING PROGRAM

## 2024-07-31 NOTE — PROGRESS NOTES
[facility]                                     Progress Note    Patient Name: Lanie Neil  Date: 07/31/2024         Service Type: Individual      Session Start Time: 4.30  Session End Time: 5.15     Session Length: 38-52    Session #: 03    Attendees: Client attended alone    Service Modality:  Video Visit:      Provider verified identity through the following two step process.  Patient provided:  Patient is known previously to provider    Telemedicine Visit: The patient's condition can be safely assessed and treated via synchronous audio and visual telemedicine encounter.      Reason for Telemedicine Visit: Patient has requested telehealth visit    Originating Site (Patient Location): Patient's home    Distant Site (Provider Location): Provider Remote Setting- Home Office    Consent:  The patient/guardian has verbally consented to: the potential risks and benefits of telemedicine (video visit) versus in person care; bill my insurance or make self-payment for services provided; and responsibility for payment of non-covered services.     Patient would like the video invitation sent by:  My Chart    Mode of Communication:  Video Conference via Amwell    Distant Location (Provider):  On-site    As the provider I attest to compliance with applicable laws and regulations related to telemedicine.    DATA  Interactive Complexity: No  Crisis: No        Progress Since Last Session (Related to Symptoms / Goals / Homework):   Symptoms: No change patient reported no change from previous session    Homework: Completed in session Explore how to use mindfulness to notice and and change negative self talk.        Episode of Care Goals: Minimal progress - ACTION (Actively working towards change); Intervened by reinforcing change plan / affirming steps taken     Current / Ongoing Stressors and Concerns:     Depression, anxiety and recurrent pain. 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 She reported that she has had this all her life and that has limited social interactions and ability to create meaningful and sustainable social relationships. Patient shared that she is bed bound due to current health conditio. She noted  having low self esteem and abandonment issues as she was abandoned by her father and multiple online dating partners that she has had in the past. Patient noted that she is having recurrent chronic pain, being fed via a tube, and bed bound.    Current: Today, patient shared that its been challenging to be bed bound and being unable to do things on her own. She reported that she is still struggling to let go of the desire to have her own kids and be a mum and be able to go to school. She noted that she has nieces and nephews but cannot play with them as she would love to since she cannot do any thing physical. She noted that she has learned to be complacent with everybody and deal with peoples moods because she is dependent on her mum and others for basic needs and help like dressing and siting up. She reported that she sometimes feels like everything has been taken away from her and noted that her romeo has been her internal resource that keeps her going daily.        Treatment Objective(s) Addressed in This Session:   Decrease frequency and intensity of feeling down, depressed, hopeless     Intervention:     DBT: Discussion today on radical acceptance as an effective distress tolerance skill and connect concept to pt. s presenting problem.  Couched pt that radical acceptance means a complete acknowledgment of reality as it is in that precise moment and learning to let go of what may be keeping you trapped in a cycle of suffering. It means realizing that you cannot change the situation and  that there is no short-term solution for what s happening in the present. Explained to pt  that rejecting reality usually turns  pain into suffering. Couched pt that when we radically accept what we can t change we build positive adaptation to the situation and that helps mitigate anger, depression, and anxiety.     Assessments completed prior to visit:  PHQ2:       4/23/2024    10:44 AM 3/21/2024    12:20 PM 1/3/2024     1:47 PM 11/24/2023     1:00 PM 11/8/2023     2:33 PM 9/13/2023     1:48 PM 8/31/2023     3:10 PM   PHQ-2 ( 1999 [employer])   Q1: Little interest or pleasure in doing things 0 0 0 0 0 0 0   Q2: Feeling down, depressed or hopeless 0 0 0 0 0 0 0   PHQ-2 Score 0 0 0 0 0 0 0     GAD7:       5/28/2019     1:45 PM 2/12/2020     2:30 PM 12/15/2020    10:52 AM 3/16/2021     9:15 AM 6/30/2021     9:27 AM 1/3/2024    11:09 AM   KATHARINE-7 SCORE   Total Score   1 (minimal anxiety) 1 (minimal anxiety) 1 (minimal anxiety) 1 (minimal anxiety)   Total Score 3 19 1 1 1 1         ASSESSMENT: Current Emotional / Mental Status (status of significant symptoms):   Risk status (Self / Other harm or suicidal ideation)   Patient denies current fears or concerns for personal safety.   Patient denies current or recent suicidal ideation or behaviors.   Patient denies current or recent homicidal ideation or behaviors.   Patient denies current or recent self injurious behavior or ideation.   Patient denies other safety concerns.   Patient reports there has been no change in risk factors since their last session.     Patient reports there has been no change in protective factors since their last session.     Recommended that patient call 911 or go to the local ED should there be a change in any of these risk factors.     Appearance:   Appropriate    Eye Contact:   Good    Psychomotor Behavior: Patient is bed bond    Attitude:   Cooperative  Interested Friendly Pleasant   Orientation:   All   Speech    Rate / Production: Normal/ Responsive    Volume:  Normal    Mood:    Depressed    Affect:    Appropriate    Thought Content:  Clear    Thought Form:  Coherent  "   Insight:    Good      Medication Review:   No changes to current psychiatric medication(s)     Medication Compliance:   Yes     Changes in Health Issues:   None reported     Chemical Use Review:   Substance Use: Chemical use reviewed, no active concerns identified      Tobacco Use: No current tobacco use.      Diagnosis:  300.4 (F34.1) Persistent Depressive Disorder, Early onset and Mild or 300.02 (F41.1) Generalized Anxiety Disorder    Collateral Reports Completed:   Not Applicable    PLAN: (Patient Tasks / Therapist Tasks / Other)     Notice recurrent negative self talk and reframed them to positive self talk.    Use radical acceptance skill to positive adapt to current disability.     Peter Bravo, [facility]                                  ______________________________________________________________________    Individual Treatment Plan    Patient's Name: Lanie Neil  YOB: 1990    Date of Creation: 7/15/2024  Date Treatment Plan Last Reviewed/Revised:     DSM5 Diagnoses: 300.4 (F34.1) Persistent Depressive Disorder, Early onset and Mild or 300.02 (F41.1) Generalized Anxiety Disorder  Psychosocial / Contextual Factors:   PROMIS (reviewed every 90 days):     Referral / Collaboration:  Referral to another professional/service is not indicated at this time..    Anticipated number of session for this episode of care:  15/25  Anticipation frequency of session: Biweekly  Anticipated Duration of each session: 38-52 minutes  Treatment plan will be reviewed in 90 days or when goals have been changed.       MeasurableTreatment Goal(s) related to diagnosis / functional impairment(s)    Goal 1: Patient will experience a reduction in depressive symptoms along with a corresponding increase in positive emotion and life satisfaction.               I will know I have met my goals when I have learned healthy coping mechanisms and can focus more on the positive and accept what I can't change.\"     Objective #A " "(Patient Action)                          Patient will Increase interest, engagement, and pleasure in doing things.  Status: Continued - Date(s): 10/16/2024     Intervention(s)  Therapist will help patient identify pleasant and mastery oriented events that elicit positive, relaxed mood.     Objective #B  Patient will Decrease frequency and intensity of feeling down, depressed, hopeless.  Status: Continued - Date(s):10/16/2024     Intervention(s)  Therapist will introduce patient to cognitive-behavioral and acceptance and commitment therapy topics aimed to help reduce depression and anxiety     Objective #C  Patient will Identify negative self-talk and behaviors: challenge core beliefs, myths, and actions  Improve concentration, focus, and mindfulness in daily activities .  Status: Continued - Date(s): 10/16/2024     Intervention(s)  Therapist will help patient identify and manage negative self-talk and automatic thoughts; introduce patient to cognitive distortions; help patient develop cognitive diffusion techniques        Goal 2: Patient will experience a reduction in anxious symptoms, along with a corresponding increase in relaxed emotional states and life satisfaction.        Objective #A (Patient Action)  Patient will use cognitive-behavioral and thought diffusion strategies identified in therapy to challenge anxious thoughts.                       Status: Continued - Date(s): 10/16/2024     Intervention(s)  Therapist will utilize CBT and ACT ideas to help patient challenge anxious thoughts and reduce intensity/duration of anxious distress     Objective #B  Patient will use \"worry time\" each day for 15 minutes of scheduled worry and then defer obsessive or anxious thinking until the next structured worry time.                      Status: Continued - Date(s): 10/16/2024     Intervention(s)  Therapist will teach patient how to effectively utilize worry time and/or thought logs/journals each day and incorporate " more relaxation behaviors into their routine.     Objective #C  Patient will identify the stressors which contribute to feelings of anxiety  Patient will increase engagement in adaptive coping skills and recreational activities, such as exercise and healthy socialization, to manage distress.  Status: Continued - Date(s):10/16/2024     Intervention(s)  Therapist will help patient identify triggers/situational factors that contribute to anxiety and behavioral skills aimed to manage anxious distress.        Goal 3: Patient will learn adaptive ways to manage chronic pain        Objective #A Patient will identify 2-4 strategies for managing pain.                          Status: Continued - Date(s): 10/16/2024     Intervention(s)  Therapist will teach distraction and mindfulness skills aimed to help manage chronic pain.   Objective #B  Patient will state understanding of stressors and relationship to physical symptoms.                    Status: Continued - Date(s): 10/16/2024                Intervention(s)  Therapist will teach distraction skills aimed to help manage chronic pain and utilize ACT/CBT ideas to help with this.      Patient has reviewed and agreed to the above plan.      MARLON Saez  July 15, 2024    Answers submitted by the patient for this visit:  Patient Health Questionnaire (Submitted on 7/15/2024)  If you checked off any problems, how difficult have these problems made it for you to do your work, take care of things at home, or get along with other people?: Not difficult at all  PHQ9 TOTAL SCORE: 0

## 2024-08-05 ENCOUNTER — TRANSFERRED RECORDS (OUTPATIENT)
Dept: HEALTH INFORMATION MANAGEMENT | Facility: CLINIC | Age: 34
End: 2024-08-05
Payer: MEDICARE

## 2024-08-12 ENCOUNTER — VIRTUAL VISIT (OUTPATIENT)
Dept: PSYCHOLOGY | Facility: CLINIC | Age: 34
End: 2024-08-12
Payer: MEDICARE

## 2024-08-12 DIAGNOSIS — F34.1 PERSISTENT DEPRESSIVE DISORDER WITH ANXIOUS DISTRESS, CURRENTLY MILD: Primary | ICD-10-CM

## 2024-08-12 PROCEDURE — 90834 PSYTX W PT 45 MINUTES: CPT | Mod: 95 | Performed by: STUDENT IN AN ORGANIZED HEALTH CARE EDUCATION/TRAINING PROGRAM

## 2024-08-12 NOTE — PROGRESS NOTES
[facility]                                     Progress Note    Patient Name: Lanie Neil  Date: 08/12/2024         Service Type: Individual      Session Start Time: 9.00  Session End Time: 9.48     Session Length: 38-52    Session #: 04    Attendees: Client attended alone    Service Modality:  Video Visit:      Provider verified identity through the following two step process.  Patient provided:  Patient is known previously to provider    Telemedicine Visit: The patient's condition can be safely assessed and treated via synchronous audio and visual telemedicine encounter.      Reason for Telemedicine Visit: Patient has requested telehealth visit    Originating Site (Patient Location): Patient's home    Distant Site (Provider Location): [facility]    Consent:  The patient/guardian has verbally consented to: the potential risks and benefits of telemedicine (video visit) versus in person care; bill my insurance or make self-payment for services provided; and responsibility for payment of non-covered services.     Patient would like the video invitation sent by:  My Chart    Mode of Communication:  Video Conference via Amwell    Distant Location (Provider):  On-site    As the provider I attest to compliance with applicable laws and regulations related to telemedicine.    DATA  Interactive Complexity: No  Crisis: No        Progress Since Last Session (Related to Symptoms / Goals / Homework):   Symptoms: No change patient reported no change from previous session    Homework: Completed in session Explore how to use mindfulness to notice and and change negative self talk.        Episode of Care Goals: Minimal progress - ACTION (Actively working towards change); Intervened by reinforcing change plan / affirming steps taken     Current / Ongoing Stressors and Concerns:     Depression, anxiety and recurrent pain. Patient reported that she  has a degenerative genetic condition that adversely affects motor movement and mobility. She reported that she has had this all her life and that has limited social interactions and ability to create meaningful and sustainable social relationships. Patient shared that she is bed bound due to current health conditio. She noted  having low self esteem and abandonment issues as she was abandoned by her father and multiple online dating partners that she has had in the past. Patient noted that she is having recurrent chronic pain, being fed via a tube, and bed bound.    Current: Today, patient shared that she has held on to the bitterness and pain that her dad caused her and hr family when she abandoned them and left to another wife. She reported she is still angry with him and at times feels that its her disability that caused him to leave the family. She noted that she struggles with acknowledging him as her father and sometimes she is  hesitant to communicate with him when he calls. She reported that she cannot understand how her mother has forgiven him and is moving on. She noted that she is finding it difficult to let go of the hurt and bitterness.        Treatment Objective(s) Addressed in This Session:   Decrease frequency and intensity of feeling down, depressed, hopeless   Patient will Identify negative self-talk and behaviors: challenge core beliefs, myths, and actions     Intervention:     Mindfulness: Discussion today on forgiveness as an inside job and a choice which doesn't mean forgetting or pardoning an offense but rather an acknowledgement of the pain and hurtful feelings it creates and acknowledging that humans are flawed.  Discussed the concept as the awareness that we can put appropriate boundaries in place, letting go, and moving on. Work with patient to identify the internal and external layers of forgiveness, walk them through the process of observing their current emotions and thoughts with  kindness and then focusing externally to the lessons learned from the pain and hurt and processing that with gratitude. Encourage patient to practice writing a forgiveness letter to self and write a loving phrase to themself daily to mitigate the hurt and resentment.      Assessments completed prior to visit:  PHQ2:       4/23/2024    10:44 AM 3/21/2024    12:20 PM 1/3/2024     1:47 PM 11/24/2023     1:00 PM 11/8/2023     2:33 PM 9/13/2023     1:48 PM 8/31/2023     3:10 PM   PHQ-2 ( 1999 [employer])   Q1: Little interest or pleasure in doing things 0 0 0 0 0 0 0   Q2: Feeling down, depressed or hopeless 0 0 0 0 0 0 0   PHQ-2 Score 0 0 0 0 0 0 0     GAD7:       5/28/2019     1:45 PM 2/12/2020     2:30 PM 12/15/2020    10:52 AM 3/16/2021     9:15 AM 6/30/2021     9:27 AM 1/3/2024    11:09 AM   KATHARINE-7 SCORE   Total Score   1 (minimal anxiety) 1 (minimal anxiety) 1 (minimal anxiety) 1 (minimal anxiety)   Total Score 3 19 1 1 1 1         ASSESSMENT: Current Emotional / Mental Status (status of significant symptoms):   Risk status (Self / Other harm or suicidal ideation)   Patient denies current fears or concerns for personal safety.   Patient denies current or recent suicidal ideation or behaviors.   Patient denies current or recent homicidal ideation or behaviors.   Patient denies current or recent self injurious behavior or ideation.   Patient denies other safety concerns.   Patient reports there has been no change in risk factors since their last session.     Patient reports there has been no change in protective factors since their last session.     Recommended that patient call 911 or go to the local ED should there be a change in any of these risk factors.     Appearance:   Appropriate    Eye Contact:   Good    Psychomotor Behavior: Patient is bed bond    Attitude:   Cooperative  Interested Friendly Pleasant   Orientation:   All   Speech    Rate / Production: Normal/ Responsive    Volume:  Normal    Mood:    Depressed     Affect:    Appropriate    Thought Content:  Clear    Thought Form:  Coherent    Insight:    Good      Medication Review:   No changes to current psychiatric medication(s)     Medication Compliance:   Yes     Changes in Health Issues:   None reported     Chemical Use Review:   Substance Use: Chemical use reviewed, no active concerns identified      Tobacco Use: No current tobacco use.      Diagnosis:  300.4 (F34.1) Persistent Depressive Disorder, Early onset and Mild or 300.02 (F41.1) Generalized Anxiety Disorder    Collateral Reports Completed:   Not Applicable    PLAN: (Patient Tasks / Therapist Tasks / Other)     Notice recurrent negative self talk and reframed them to positive self talk.    Write a forgiveness letter and process that with provider next session.    Peter Bravo, LICSW                                  ______________________________________________________________________    Individual Treatment Plan    Patient's Name: Lanie Neil  YOB: 1990    Date of Creation: 7/15/2024  Date Treatment Plan Last Reviewed/Revised:     DSM5 Diagnoses: 300.4 (F34.1) Persistent Depressive Disorder, Early onset and Mild or 300.02 (F41.1) Generalized Anxiety Disorder  Psychosocial / Contextual Factors:   PROMIS (reviewed every 90 days):     Referral / Collaboration:  Referral to another professional/service is not indicated at this time..    Anticipated number of session for this episode of care:  15/25  Anticipation frequency of session: Biweekly  Anticipated Duration of each session: 38-52 minutes  Treatment plan will be reviewed in 90 days or when goals have been changed.       MeasurableTreatment Goal(s) related to diagnosis / functional impairment(s)    Goal 1: Patient will experience a reduction in depressive symptoms along with a corresponding increase in positive emotion and life satisfaction.               I will know I have met my goals when I have learned healthy coping mechanisms and can  "focus more on the positive and accept what I can't change.\"     Objective #A (Patient Action)                          Patient will Increase interest, engagement, and pleasure in doing things.  Status: Continued - Date(s): 10/16/2024     Intervention(s)  Therapist will help patient identify pleasant and mastery oriented events that elicit positive, relaxed mood.     Objective #B  Patient will Decrease frequency and intensity of feeling down, depressed, hopeless.  Status: Continued - Date(s):10/16/2024     Intervention(s)  Therapist will introduce patient to cognitive-behavioral and acceptance and commitment therapy topics aimed to help reduce depression and anxiety     Objective #C  Patient will Identify negative self-talk and behaviors: challenge core beliefs, myths, and actions  Improve concentration, focus, and mindfulness in daily activities .  Status: Continued - Date(s): 10/16/2024     Intervention(s)  Therapist will help patient identify and manage negative self-talk and automatic thoughts; introduce patient to cognitive distortions; help patient develop cognitive diffusion techniques        Goal 2: Patient will experience a reduction in anxious symptoms, along with a corresponding increase in relaxed emotional states and life satisfaction.        Objective #A (Patient Action)  Patient will use cognitive-behavioral and thought diffusion strategies identified in therapy to challenge anxious thoughts.                       Status: Continued - Date(s): 10/16/2024     Intervention(s)  Therapist will utilize CBT and ACT ideas to help patient challenge anxious thoughts and reduce intensity/duration of anxious distress     Objective #B  Patient will use \"worry time\" each day for 15 minutes of scheduled worry and then defer obsessive or anxious thinking until the next structured worry time.                      Status: Continued - Date(s): 10/16/2024     Intervention(s)  Therapist will teach patient how to " effectively utilize worry time and/or thought logs/journals each day and incorporate more relaxation behaviors into their routine.     Objective #C  Patient will identify the stressors which contribute to feelings of anxiety  Patient will increase engagement in adaptive coping skills and recreational activities, such as exercise and healthy socialization, to manage distress.  Status: Continued - Date(s):10/16/2024     Intervention(s)  Therapist will help patient identify triggers/situational factors that contribute to anxiety and behavioral skills aimed to manage anxious distress.        Goal 3: Patient will learn adaptive ways to manage chronic pain        Objective #A Patient will identify 2-4 strategies for managing pain.                          Status: Continued - Date(s): 10/16/2024     Intervention(s)  Therapist will teach distraction and mindfulness skills aimed to help manage chronic pain.   Objective #B  Patient will state understanding of stressors and relationship to physical symptoms.                    Status: Continued - Date(s): 10/16/2024                Intervention(s)  Therapist will teach distraction skills aimed to help manage chronic pain and utilize ACT/CBT ideas to help with this.      Patient has reviewed and agreed to the above plan.      MARLON Saez  July 15, 2024    Answers submitted by the patient for this visit:  Patient Health Questionnaire (Submitted on 7/15/2024)  If you checked off any problems, how difficult have these problems made it for you to do your work, take care of things at home, or get along with other people?: Not difficult at all  PHQ9 TOTAL SCORE: 0

## 2024-08-14 ENCOUNTER — OFFICE VISIT (OUTPATIENT)
Dept: OBGYN | Facility: CLINIC | Age: 34
End: 2024-08-14
Payer: MEDICARE

## 2024-08-14 VITALS — DIASTOLIC BLOOD PRESSURE: 79 MMHG | HEART RATE: 59 BPM | SYSTOLIC BLOOD PRESSURE: 119 MMHG

## 2024-08-14 DIAGNOSIS — N93.9 ABNORMAL UTERINE BLEEDING (AUB): Primary | ICD-10-CM

## 2024-08-14 DIAGNOSIS — L68.0 HIRSUTISM: ICD-10-CM

## 2024-08-14 PROCEDURE — G0463 HOSPITAL OUTPT CLINIC VISIT: HCPCS

## 2024-08-14 PROCEDURE — 99213 OFFICE O/P EST LOW 20 MIN: CPT | Mod: GE | Performed by: OBSTETRICS & GYNECOLOGY

## 2024-08-14 RX ORDER — ACETAMINOPHEN 325 MG/1
975 TABLET ORAL ONCE
Status: CANCELLED | OUTPATIENT
Start: 2024-08-14 | End: 2024-08-14

## 2024-08-14 NOTE — PATIENT INSTRUCTIONS
Thank you for trusting us with your care!   Please be aware, if you are on Mychart, you may see your results prior to your providers review. If labs are abnormal, we will call or message you on [facility] with a follow up plan.    If you need to contact us for questions about:  Symptoms, Scheduling & Medical Questions; Non-urgent (2-3 day response) [employer] message, Urgent (needing response today) [phone] (if after 3:30pm next day response)   Prescriptions: Please call your Pharmacy   Billing: [place] [phone] or [facility]:[phone]

## 2024-08-14 NOTE — PROGRESS NOTES
"[facility]  Gynecology Visit    HPI:    Lanie Neil is a 34 year old  last seen 2024: \"Lanie Neil is a 33 year old with a history of chronic respiratory failure (trach/ventilator dependent), quadriplegia secondary to SMA, chronic pain, obesity, asthma, DVT s/p IVC filter and on Xarelto, KATHARINE/MDD, s/p G-tube and chronic fischer catheter here for AUB and a perineal mass.\" At that visit, discussed menorrhagia with regular cycles. Had not tried anything for AUB though was considering IUD. Suspected most likely cause of AUB was Xarelto use which was discussed with her hematologist and after discussion they decided not to alter anticoagulant agent per patient preference. She is using aminocaproic acid syrup down J tube with periods to help with menorrhagia which was preferred over TXA tablets. Pelvic MRI obtained given perineal mass to also evaluate for structural causes of AUB. In terms of her perineal mass, examination as follows: \" Diffusely/significantly enlarged right labia extending to/partially distorting the clitoral bennett with palpable fluctuance though without discrete masses or cysts (~4x the size of the contralateral labia). Some dependent edema however mass much larger than typical presentation for that. To palpation, labial tissue fluctuant/non-tender, no firmness appreciated. No overlying skin changes including no lesions or erythema. Lateral to edematous labia majora is what appears to be a small sliver of normal labial tissue. To palpation, edema does not track into vagina, perineum, or inguinal canal. Normal appearing left labia. Vaginal mucosa at introitus pink and well rugaeted. Chronic fischer catheter in place. No inguinal lymphadenopathy. Normal mons pubis with normal hair distribution.\" MRI obtained with below report.     Pelvic MRI (2024)   IMPRESSION:   1.  No discrete mass detected in the right labium as queried. There is subcutaneous edema involving the right labium, " perineum, anterior pelvic wall.  2.  Normal uterus and ovaries. Normal endometrial thickness.  3.  Rectal prolapse.  4.  Chronic incidental findings include muscular atrophy, bilateral hip dislocation with degenerative changes.    Today, she reports that she continues to have heavy menstrual cycles and is interested in having an IUD placed under anesthesia. She continues to notice labial swelling though is understanding that there is nothing that could be surgically removed. She is still bothered by hair growth on the chin and is interested in treatment options.      OBHx  OB History    Para Term  AB Living   0 0 0 0 0 0   SAB IAB Ectopic Multiple Live Births   0 0 0 0 0     PMH  Past Medical History:   Diagnosis Date    Anemia     Anxiety     Arthritis Unknown    Asthma     Asthma 2014    Chronic pain     Chronic respiratory failure (H)     Constipation     Depression     Depressive disorder     I don't have it right now currently    DVT (deep venous thrombosis) (H)     Gastrointestinal tube present (H)     History of blood transfusion     Obesity     Sinusitis, chronic     Snoring     Spinal muscle atrophy (H)      PSH  Past Surgical History:   Procedure Laterality Date    BACK SURGERY  '    fusion of cervical and thoracic spine,  and     BIOPSY      muscle biopsy    GASTROSTOMY TUBE      IR CHEST PORT PLACEMENT > 5 YRS OF AGE  2018    IR CVC TUNNEL W2 CATH W/O PORT  2018    IR IVC FILTER PLACEMENT      IR IVC FILTER REMOVAL  2024    IR PORT CHECK RIGHT  10/23/2023    right chest    KIDNEY SURGERY      kidney stones    SPINE SURGERY      reservoir placed, has had three total procedures    TRACHEOSTOMY       Medications    Current Outpatient Medications:     ACETAMINOPHEN PO, Take 650 mg by mouth every 4 hours as needed for pain, Disp: , Rfl:     albuterol (PROAIR HFA) 108 (90 Base) MCG/ACT inhaler, Inhale 2 puffs into the lungs every 4 hours as needed  "for shortness of breath or wheezing, Disp: 18 g, Rfl: 5    albuterol (PROVENTIL) (2.5 MG/3ML) 0.083% neb solution, USE ONE VIAL BY NEBULIZATION EVERY 6 HOURS AS NEEDED FOR SHORTNESS OF BREATH/ DYSPNEA OR WHEEZING, Disp: 360 mL, Rfl: 1    ALPRAZolam (XANAX) 0.25 MG tablet, Take 1 tablet (0.25 mg) by mouth 3 times daily as needed for anxiety, Disp: 60 tablet, Rfl: 1    aminocaproic acid (AMICAR) 0.25 GM/ML solution, Take 12 mL (3 grams) via G-tube every 8 hours during menstrual period., Disp: 473 mL, Rfl: 5    azithromycin (ZITHROMAX) 200 MG/5ML suspension, Take 12.5 ml po today then 6.25 ml once a day for four more days (total five days), Disp: 40 mL, Rfl: 0    bisacodyl (DULCOLAX) 10 MG suppository, Place 10 mg rectally daily as needed for constipation, Disp: , Rfl:     celecoxib (CELEBREX) 200 MG capsule, Take 1 capsule (200 mg) by mouth daily On days of period, Disp: 12 capsule, Rfl: 5    clindamycin (CLEOCIN T) 1 % lotion, Apply topically 2 times daily To face as needed, Disp: 60 mL, Rfl: 3    diazepam (VALIUM) 5 MG tablet, Take 1 tablet (5 mg) by mouth every 6 hours as needed for muscle spasms, Disp: 30 tablet, Rfl: 0    doxycycline hyclate (VIBRAMYCIN) 100 MG capsule, GIVE ONE CAPSULE VIA \"G\"- TUBE TWICE DAILY, Disp: 60 capsule, Rfl: 2    EVRYSDI 0.75 MG/ML oral solution, 6.6 mLs by Oral or NG Tube route every morning, Disp: , Rfl:     famotidine (PEPCID) 20 MG tablet, Take 1 tablet (20 mg) by mouth 2 times daily, Disp: 180 tablet, Rfl: 3    fluocinolone acetonide (DERMA SMOOTHE/FS BODY) 0.01 % external oil, Apply at nighttime to forehead and scalp as needed for scale or pruritus (Patient taking differently: Apply topically at bedtime Apply at nighttime to forehead and scalp as needed for scale or pruritus), Disp: 118 mL, Rfl: 3    fluticasone (FLOVENT HFA) 110 MCG/ACT inhaler, Inhale 1 puff into the lungs 2 times daily, Disp: 36 g, Rfl: 5    gabapentin (NEURONTIN) 250 MG/5ML solution, 20 mLs (1,000 mg) by Per " Feeding Tube route 3 times daily, Disp: 1800 mL, Rfl: 5    GENERLAC 10 GM/15ML solution, , Disp: , Rfl: 11    guaiFENesin (ORGANIDIN) 200 MG TABS tablet, Take 200 mg by mouth every 4 hours as needed for cough, Disp: , Rfl:     HYDROmorphone (DILAUDID) 4 MG tablet, Take 8 mg by mouth every 3 hours, Disp: , Rfl:     hydrOXYzine (ATARAX) 25 MG tablet, Take 25 mg by mouth every 6 hours as needed for itching, Disp: , Rfl:     ketoconazole (NIZORAL) 2 % external shampoo, Lather onto scalp and forehead once weekly when showering and let sit for 1-2 minutes before rinsing, Disp: 120 mL, Rfl: 11    levofloxacin (LEVAQUIN) 25 MG/ML solution, TAKE 20ML BY FEEDING TUBE ONCE DAILY FOR ONE WEEK, Disp: 480 mL, Rfl: 1    Magnesium Hydroxide (MILK OF MAGNESIA PO), Take 30 mLs by mouth as needed, Disp: , Rfl:     metroNIDAZOLE (METROCREAM) 0.75 % external cream, Apply topically 2 times daily, Disp: 45 g, Rfl: 11    Minoxidil (MINOXIDIL FOR WOMEN) 5 % FOAM, Apply 1/2 capful once a day to affected area, Disp: 60 g, Rfl: 3    mometasone (NASONEX) 50 MCG/ACT nasal spray, Spray 2 sprays in nostril daily as needed, Disp: , Rfl:     morphine (SABINE) 30 MG 24 hr capsule, Take 30 mg by mouth 2 times daily, Disp: , Rfl:     mupirocin (BACTROBAN) 2 % external cream, Apply topically 3 times daily, Disp: 30 g, Rfl: 3    Nutritional Supplements (NUTREN 1.0/FIBER) LIQD, Per G tube; 250 ml Per G Tube qid, Disp: 77734 mL, Rfl: 11    Nutritional Supplements (REPLETE FIBER) LIQD, Take 1 Can by mouth 4 times daily, Disp: , Rfl:     omeprazole (PRILOSEC) 2 mg/mL suspension, SHAKE LIQUID WELL AND GIVE 20ML (40MG) DAILY (VIA G-TUBE), Disp: 600 mL, Rfl: 9    ondansetron (ZOFRAN-ODT) 4 MG ODT tab, Take 1 tablet (4 mg) by mouth every 8 hours as needed for nausea, Disp: 30 tablet, Rfl: 1    order for DME, Equipment being ordered: 16 fr 5cc balloon indwelling catheter with drainage bag. 2 catheters/month, Disp: 2 catheter, Rfl: 11    order for DME, Equipment  being ordered: Vibracare Percussor, Disp: 1 Units, Rfl: 0    polyethylene glycol (MIRALAX) 17 GM/Dose powder, Take 17 g by mouth daily (Patient taking differently: Take 17 g by mouth daily as needed), Disp: 714 g, Rfl: 5    potassium chloride (KAYCIEL) 20 MEQ/15ML (10%) solution, 22.5 mLs (30 mEq) by Per G Tube route daily, Disp: 675 mL, Rfl: 11    prochlorperazine (COMPAZINE) 5 MG tablet, Take 5 mg by mouth every 6 hours as needed for nausea or vomiting, Disp: , Rfl:     rivaroxaban ANTICOAGULANT (XARELTO ANTICOAGULANT) 10 MG TABS tablet, 1 tablet (10 mg) by Oral or Feeding Tube route daily with food, Disp: 90 tablet, Rfl: 3    Sennosides (SENNA) 8.8 MG/5ML LIQD, 15 mLs by Feeding route 2 times daily, Disp: 900 mL, Rfl: 11    sertraline (ZOLOFT) 20 MG/ML (HIGH CONC) solution, TAKE 2.5 ML(50 MG) BY MOUTH DAILY, Disp: 225 mL, Rfl: 1    silver nitrate (ARZOL) 75-25 % miscellaneous, Use one stick apply tip to granuloma prn granuloma formation, Disp: 20 applicator, Rfl: 1    silver sulfADIAZINE (SSD) 1 % external cream, Apply topically 2 times daily To affected areas-apply bid to wounds till healed, Disp: 400 g, Rfl: 11    simethicone (MYLICON) 66.7 mg/ml, as needed, Disp: , Rfl:     simethicone 40 MG/0.6ML LIQD, by NG or OG tube route as needed, Disp: , Rfl:     sodium chloride (OCEAN) 0.65 % nasal spray, Spray 2 sprays in nostril daily as needed, Disp: , Rfl:     traZODone (DESYREL) 5 mg/ml SUSP, Take 15 ml (75 mg) po at bedtime Prn insomnia, Disp: , Rfl:     tretinoin (RETIN-A) 0.025 % external cream, Apply a pea-sized amount evenly over the face at nighttime before bed., Disp: 45 g, Rfl: 2    naloxone (NARCAN) 4 MG/0.1ML nasal spray, Spray 1 spray (4 mg) into one nostril alternating nostrils once as needed for opioid reversal every 2-3 minutes until assistance arrives (Patient not taking: Reported on 1/3/2024), Disp: 0.2 mL, Rfl: 0    Salicylic Acid (OXY BALANCE FACIAL CLEAN WASH EX), Externally apply 1 pad  topically daily (Patient not taking: Reported on 4/23/2024), Disp: , Rfl:     Allergies  Allergies   Allergen Reactions    Ibuprofen      Family Hx  Family History   Problem Relation Age of Onset    Hypertension Mother     Breast Cancer Mother         No longer has it    Hypertension Father     Hypertension Brother     Hypertension Brother     Family History Negative Other     Cerebrovascular Disease Other     Cerebrovascular Disease Maternal Aunt     Deep Vein Thrombosis (DVT) Maternal Aunt     Deep Vein Thrombosis (DVT) Maternal Uncle     Anesthesia Reaction No family hx of      Social Hx  Social History     Socioeconomic History    Marital status: Single     Spouse name: Not on file    Number of children: Not on file    Years of education: Not on file    Highest education level: Not on file   Occupational History    Not on file   Tobacco Use    Smoking status: Never    Smokeless tobacco: Never   Vaping Use    Vaping status: Never Used   Substance and Sexual Activity    Alcohol use: Never    Drug use: Never    Sexual activity: Never   Other Topics Concern    Parent/sibling w/ CABG, MI or angioplasty before 65F 55M? No   Social History Narrative    Not on file     Social Determinants of Health     Financial Resource Strain: Low Risk  (3/21/2024)    Financial Resource Strain     Within the past 12 months, have you or your family members you live with been unable to get utilities (heat, electricity) when it was really needed?: No   Food Insecurity: Low Risk  (3/21/2024)    Food Insecurity     Within the past 12 months, did you worry that your food would run out before you got money to buy more?: No     Within the past 12 months, did the food you bought just not last and you didn t have money to get more?: No   Transportation Needs: Low Risk  (3/21/2024)    Transportation Needs     Within the past 12 months, has lack of transportation kept you from medical appointments, getting your medicines, non-medical meetings or  appointments, work, or from getting things that you need?: No   Physical Activity: Not on file   Stress: Not on file   Social Connections: Not on file   Interpersonal Safety: Unknown (3/26/2024)    [facility]    Humiliation, Afraid, Rape, and Kick questionnaire     Fear of Current or Ex-Partner: Not on file     Emotionally Abused: Not on file     Physically Abused: No     Sexually Abused: No   Housing Stability: Low Risk  (3/21/2024)    Housing Stability     Do you have housing? : Yes     Are you worried about losing your housing?: No     ROS: 10-Point ROS negative except as noted in HPI    Physical Exam  /79   Pulse 59   Gen: NAD  CV: Regular rate, well perfused   Pulm:  Conversing comfortably   Pelvic:  Deferred     Assessment/Plan:  Lanie Neil is a 34 year old  female with complex past medical history as above here for follow up of AUB, hirsutism and labial swelling.     # AUB  Discussed most likely secondary to Xarelto use. She discussed other anticoagulants with her primary hematologist and elected to continue with current medication. Pelvic MRI did not show any structural causes of AUB. Now interested in management with Mirena IUD.   - Case request placed for exam under anesthesia, placement of Mirena IUD   - Will need PAC consultation prior to procedure given complex medical history     # Hirsutism  Continues to have bothersome hair growth on chin and side of face.   - Discussed trial of topical Eflorinone BID; ordered today     # Labial swelling   Presented with significant unilateral labial swelling of unknown etiology. Pelvic MRI showing only subcutaneous edema. Discussed ongoing expectant management.     Staffed with Dr. Yajaira Ivey MD  Ob/Gyn PGY-3  24 4:40 PM    The Patient was seen in [facility] by LORENA IVEY.  I reviewed the history & exam. Assessment and plan were jointly made.    Antoinette Gates,  MD

## 2024-08-15 ENCOUNTER — TELEPHONE (OUTPATIENT)
Dept: OBGYN | Facility: CLINIC | Age: 34
End: 2024-08-15
Payer: MEDICARE

## 2024-08-15 NOTE — TELEPHONE ENCOUNTER
LVM for patients melva Rodarte to call back to schedule patient for their IUD insertion in the OR    Susannah    Surgery Scheduler

## 2024-08-16 NOTE — TELEPHONE ENCOUNTER
FUTURE VISIT INFORMATION      SURGERY INFORMATION:  Date: 9/3/24  Location: [facility] or  Surgeon:  Lucia Garrett MD   Anesthesia Type:  choice  Procedure: EXAM UNDER ANESTHESIA, Insert intrauterine device Mirena   RECORDS REQUESTED FROM:       Primary Care Provider: Jimmy Moseley MD - [facility]    Pertinent Medical History: dvt

## 2024-08-21 ENCOUNTER — PRE VISIT (OUTPATIENT)
Dept: SURGERY | Facility: CLINIC | Age: 34
End: 2024-08-21

## 2024-08-21 ENCOUNTER — ANESTHESIA EVENT (OUTPATIENT)
Dept: SURGERY | Facility: CLINIC | Age: 34
End: 2024-08-21
Payer: MEDICARE

## 2024-08-21 ENCOUNTER — VIRTUAL VISIT (OUTPATIENT)
Dept: SURGERY | Facility: CLINIC | Age: 34
End: 2024-08-21
Payer: MEDICARE

## 2024-08-21 DIAGNOSIS — Z01.818 PREOP EXAMINATION: Primary | ICD-10-CM

## 2024-08-21 DIAGNOSIS — N93.9 ABNORMAL UTERINE BLEEDING (AUB): ICD-10-CM

## 2024-08-21 PROCEDURE — 99204 OFFICE O/P NEW MOD 45 MIN: CPT | Mod: 95 | Performed by: PHYSICIAN ASSISTANT

## 2024-08-21 ASSESSMENT — ENCOUNTER SYMPTOMS: SEIZURES: 0

## 2024-08-21 ASSESSMENT — LIFESTYLE VARIABLES: TOBACCO_USE: 0

## 2024-08-21 NOTE — PROGRESS NOTES
Lanie is a 34 year old who is being evaluated via a billable video visit.      kiersten      Subjective   Lanie is a 34 year old, presenting for the following health issues:  Pre-Op Exam (/)       FATOU Page LPN

## 2024-08-21 NOTE — H&P
Pre-Operative H & P     CC:  Preoperative exam to assess for increased cardiopulmonary risk while undergoing surgery and anesthesia.    Date of Encounter: 8/21/2024  Primary Care Physician:  Jimmy Moseley     Reason for visit:   Encounter Diagnoses   Name Primary?    Abnormal uterine bleeding (AUB) Yes    Preop examination        HPI  Lanie Neil is a 34 year old female who presents for pre-operative H & P in preparation for  Procedure Information       Case: [id number] Date/Time: 09/03/24 0830    Procedures:       EXAM UNDER ANESTHESIA, (Vagina)      Insert intrauterine device Mirena (Uterus)    Anesthesia type: Choice    Diagnosis: Abnormal uterine bleeding (AUB) [N93.9]    Pre-op diagnosis: Abnormal uterine bleeding (AUB) [N93.9]    [facility]: [facility]    Providers: Lucia Garrett MD            Patient is being evaluated for comorbid conditions of chronic respiratory failure (trach/ventilator dependent), quadriplegia secondary to SMA, chronic pain, obesity, asthma, DVT s/p IVC filter and on Xarelto, KATHARINE/MDD, s/p G-tube, chronic fischer catheter, asthma, chronic sinusitis, anxiety, depression, chronic B/L hip dislocation, chronic pain w/ chronic opioid use.     Ms. Neil has a history of menorrhagia with regular cycles. Suspected most likely cause of AUB was Xarelto use which was discussed with her hematologist and after discussion they decided not to alter anticoagulant agent per patient preference. She is using aminocaproic acid syrup down J tube with periods to help with menorrhagia which was preferred over TXA tablets. She has been counseled by Dr. Garrett and now presents for the above procedure.      History is obtained from the patient and chart review. She is accompanied by her mother.    Hx of abnormal bleeding or anti-platelet use: on Xarelto    Menstrual history: No LMP recorded.:       Past Medical History  Past Medical History:   Diagnosis Date    Abnormal uterine bleeding (AUB)      Anemia     Anxiety     Arthritis Unknown    Asthma 06/18/2014    Chronic pain     Chronic respiratory failure (H)     Constipation     Depression     DVT (deep venous thrombosis) (H)     Gastrointestinal tube present (H)     History of blood transfusion     Obesity     Sinusitis, chronic     Snoring     Spinal muscle atrophy (H)        Past Surgical History  Past Surgical History:   Procedure Laterality Date    BACK SURGERY  '04    fusion of cervical and thoracic spine, 2004 and 2018    BIOPSY  1990    muscle biopsy    GASTROSTOMY TUBE  2004    IR CHEST PORT PLACEMENT > 5 YRS OF AGE  9/17/2018    IR CVC TUNNEL W2 CATH W/O PORT  7/5/2018    IR IVC FILTER PLACEMENT  2012    IR IVC FILTER REMOVAL  1/24/2024    IR PORT CHECK RIGHT  10/23/2023    right chest    KIDNEY SURGERY  2009    kidney stones    SPINE SURGERY      reservoir placed, has had three total procedures    TRACHEOSTOMY  2004       Prior to Admission Medications  Current Outpatient Medications   Medication Sig Dispense Refill    ACETAMINOPHEN PO Take 650 mg by mouth every 4 hours as needed for pain      albuterol (PROAIR HFA) 108 (90 Base) MCG/ACT inhaler Inhale 2 puffs into the lungs every 4 hours as needed for shortness of breath or wheezing 18 g 5    albuterol (PROVENTIL) (2.5 MG/3ML) 0.083% neb solution USE ONE VIAL BY NEBULIZATION EVERY 6 HOURS AS NEEDED FOR SHORTNESS OF BREATH/ DYSPNEA OR WHEEZING 360 mL 1    ALPRAZolam (XANAX) 0.25 MG tablet Take 1 tablet (0.25 mg) by mouth 3 times daily as needed for anxiety 60 tablet 1    aminocaproic acid (AMICAR) 0.25 GM/ML solution Take 12 mL (3 grams) via G-tube every 8 hours during menstrual period. 473 mL 5    azithromycin (ZITHROMAX) 200 MG/5ML suspension Take 12.5 ml po today then 6.25 ml once a day for four more days (total five days) 40 mL 0    bisacodyl (DULCOLAX) 10 MG suppository Place 10 mg rectally daily as needed for constipation      celecoxib (CELEBREX) 200 MG capsule Take 1 capsule (200 mg) by  "mouth daily On days of period 12 capsule 5    clindamycin (CLEOCIN T) 1 % lotion Apply topically 2 times daily To face as needed 60 mL 3    diazepam (VALIUM) 5 MG tablet Take 1 tablet (5 mg) by mouth every 6 hours as needed for muscle spasms 30 tablet 0    doxycycline hyclate (VIBRAMYCIN) 100 MG capsule GIVE ONE CAPSULE VIA \"G\"- TUBE TWICE DAILY 60 capsule 2    eflornithine HCl 13.9 % CREA Externally apply topically 2 times daily Apply thin layer of cream to affected areas of face and adjacent chin twice daily, at least 8 hours apart. 45 g 3    EVRYSDI 0.75 MG/ML oral solution 6.6 mLs by Oral or NG Tube route every morning      famotidine (PEPCID) 20 MG tablet Take 1 tablet (20 mg) by mouth 2 times daily 180 tablet 3    fluocinolone acetonide (DERMA SMOOTHE/FS BODY) 0.01 % external oil Apply at nighttime to forehead and scalp as needed for scale or pruritus (Patient taking differently: Apply topically at bedtime Apply at nighttime to forehead and scalp as needed for scale or pruritus) 118 mL 3    fluticasone (FLOVENT HFA) 110 MCG/ACT inhaler Inhale 1 puff into the lungs 2 times daily 36 g 5    gabapentin (NEURONTIN) 250 MG/5ML solution 20 mLs (1,000 mg) by Per Feeding Tube route 3 times daily 1800 mL 5    GENERLAC 10 GM/15ML solution   11    guaiFENesin (ORGANIDIN) 200 MG TABS tablet Take 200 mg by mouth every 4 hours as needed for cough      HYDROmorphone (DILAUDID) 4 MG tablet Take 8 mg by mouth every 3 hours      hydrOXYzine (ATARAX) 25 MG tablet Take 25 mg by mouth every 6 hours as needed for itching      ketoconazole (NIZORAL) 2 % external shampoo Lather onto scalp and forehead once weekly when showering and let sit for 1-2 minutes before rinsing 120 mL 11    levofloxacin (LEVAQUIN) 25 MG/ML solution TAKE 20ML BY FEEDING TUBE ONCE DAILY FOR ONE WEEK 480 mL 1    Magnesium Hydroxide (MILK OF MAGNESIA PO) Take 30 mLs by mouth as needed      metroNIDAZOLE (METROCREAM) 0.75 % external cream Apply topically 2 times " daily 45 g 11    Minoxidil (MINOXIDIL FOR WOMEN) 5 % FOAM Apply 1/2 capful once a day to affected area 60 g 3    mometasone (NASONEX) 50 MCG/ACT nasal spray Spray 2 sprays in nostril daily as needed      morphine (SABINE) 30 MG 24 hr capsule Take 30 mg by mouth 2 times daily      mupirocin (BACTROBAN) 2 % external cream Apply topically 3 times daily 30 g 3    naloxone (NARCAN) 4 MG/0.1ML nasal spray Spray 1 spray (4 mg) into one nostril alternating nostrils once as needed for opioid reversal every 2-3 minutes until assistance arrives (Patient not taking: Reported on 1/3/2024) 0.2 mL 0    Nutritional Supplements (NUTREN 1.0/FIBER) LIQD Per G tube; 250 ml Per G Tube qid 03969 mL 11    Nutritional Supplements (REPLETE FIBER) LIQD Take 1 Can by mouth 4 times daily      omeprazole (PRILOSEC) 2 mg/mL suspension SHAKE LIQUID WELL AND GIVE 20ML (40MG) DAILY (VIA G-TUBE) 600 mL 9    ondansetron (ZOFRAN-ODT) 4 MG ODT tab Take 1 tablet (4 mg) by mouth every 8 hours as needed for nausea 30 tablet 1    order for DME Equipment being ordered: 16 fr 5cc balloon indwelling catheter with drainage bag. 2 catheters/month 2 catheter 11    order for DME Equipment being ordered: Vibracare Percussor 1 Units 0    polyethylene glycol (MIRALAX) 17 GM/Dose powder Take 17 g by mouth daily (Patient taking differently: Take 17 g by mouth daily as needed) 714 g 5    potassium chloride (KAYCIEL) 20 MEQ/15ML (10%) solution 22.5 mLs (30 mEq) by Per G Tube route daily 675 mL 11    prochlorperazine (COMPAZINE) 5 MG tablet Take 5 mg by mouth every 6 hours as needed for nausea or vomiting      rivaroxaban ANTICOAGULANT (XARELTO ANTICOAGULANT) 10 MG TABS tablet 1 tablet (10 mg) by Oral or Feeding Tube route daily with food 90 tablet 3    Salicylic Acid (OXY BALANCE FACIAL CLEAN WASH EX) Externally apply 1 pad topically daily (Patient not taking: Reported on 4/23/2024)      Sennosides (SENNA) 8.8 MG/5ML LIQD 15 mLs by Feeding route 2 times daily 900 mL 11     sertraline (ZOLOFT) 20 MG/ML (HIGH CONC) solution TAKE 2.5 ML(50 MG) BY MOUTH DAILY 225 mL 1    silver nitrate (ARZOL) 75-25 % miscellaneous Use one stick apply tip to granuloma prn granuloma formation 20 applicator 1    silver sulfADIAZINE (SSD) 1 % external cream Apply topically 2 times daily To affected areas-apply bid to wounds till healed 400 g 11    simethicone (MYLICON) 66.7 mg/ml as needed      simethicone 40 MG/0.6ML LIQD by NG or OG tube route as needed      sodium chloride (OCEAN) 0.65 % nasal spray Spray 2 sprays in nostril daily as needed      traZODone (DESYREL) 5 mg/ml SUSP Take 15 ml (75 mg) po at bedtime Prn insomnia      tretinoin (RETIN-A) 0.025 % external cream Apply a pea-sized amount evenly over the face at nighttime before bed. 45 g 2       Allergies  Allergies   Allergen Reactions    Ibuprofen        Social History  Social History     Socioeconomic History    Marital status: Single     Spouse name: Not on file    Number of children: Not on file    Years of education: Not on file    Highest education level: Not on file   Occupational History    Not on file   Tobacco Use    Smoking status: Never    Smokeless tobacco: Never   Vaping Use    Vaping status: Never Used   Substance and Sexual Activity    Alcohol use: Never    Drug use: Never    Sexual activity: Never   Other Topics Concern    Parent/sibling w/ CABG, MI or angioplasty before 65F 55M? No   Social History Narrative    Not on file     Social Determinants of Health     Financial Resource Strain: Low Risk  (3/21/2024)    Financial Resource Strain     Within the past 12 months, have you or your family members you live with been unable to get utilities (heat, electricity) when it was really needed?: No   Food Insecurity: Low Risk  (3/21/2024)    Food Insecurity     Within the past 12 months, did you worry that your food would run out before you got money to buy more?: No     Within the past 12 months, did the food you bought just not last  and you didn t have money to get more?: No   Transportation Needs: Low Risk  (3/21/2024)    Transportation Needs     Within the past 12 months, has lack of transportation kept you from medical appointments, getting your medicines, non-medical meetings or appointments, work, or from getting things that you need?: No   Physical Activity: Not on file   Stress: Not on file   Social Connections: Not on file   Interpersonal Safety: Unknown (3/26/2024)    [facility]    Humiliation, Afraid, Rape, and Kick questionnaire     Fear of Current or Ex-Partner: Not on file     Emotionally Abused: Not on file     Physically Abused: No     Sexually Abused: No   Housing Stability: Low Risk  (3/21/2024)    Housing Stability     Do you have housing? : Yes     Are you worried about losing your housing?: No       Family History  Family History   Problem Relation Age of Onset    Hypertension Mother     Breast Cancer Mother         No longer has it    Hypertension Father     Hypertension Brother     Hypertension Brother     Family History Negative Other     Cerebrovascular Disease Other     Cerebrovascular Disease Maternal Aunt     Deep Vein Thrombosis (DVT) Maternal Aunt     Deep Vein Thrombosis (DVT) Maternal Uncle     Anesthesia Reaction No family hx of        Review of Systems  The complete review of systems is negative other than noted in the HPI or here.     Anesthesia Evaluation   Pt has had prior anesthetic.     No history of anesthetic complications       ROS/MED HX  ENT/Pulmonary: Comment: Chronic respiratory failure, trach dependent    Chronic sinusitis        (+)                     Intermittent, asthma  Treatment: Nebulizer prn and Inhaler prn,              (-) tobacco use, sleep apnea and recent URI   Neurologic: Comment: Quadriplegia 2/2 SMA     (-) no seizures and no CVA   Cardiovascular:     (+)  - -   -  - -   Taking blood thinners (Xarelto)                                   METS/Exercise  Tolerance: 1 - Eating, dressing    Hematologic: Comments: S/p IVC filter, s/p removal    Has right chest port          (+) History of blood clots,    pt is anticoagulated,  history of blood transfusion, no previous transfusion reaction,        Musculoskeletal: Comment: Chronic B/L hip dislocation    Chronic pain    S/p cervical & thoracic fusion 2004 & 2018          GI/Hepatic: Comment: Has G-tube for meds & supplemental nutrition. Able to eat orally.      (-) GERD and liver disease   Renal/Genitourinary: Comment: AUB    Chronic fischer catheter   (-) renal disease   Endo:     (+)               Obesity,    (-) Type II DM   Psychiatric/Substance Use:     (+) psychiatric history anxiety and depression  H/O chronic opiod use .     Infectious Disease:  - neg infectious disease ROS     Malignancy:  - neg malignancy ROS     Other:      (+)  , H/O Chronic Pain,, other significant disability Wheelchair bound         Virtual visit -  No vitals were obtained    Physical Exam  Constitutional: Awake, alert, cooperative, no apparent distress, and appears stated age. Lying supine in bed.  HENT: Normocephalic  Respiratory: non labored breathing   Neurologic: Awake, alert, oriented to name, place and time. Answers questions appropriately; good historian.  Neuropsychiatric: Calm, cooperative. Normal affect. Pleasant.     Prior Labs/Diagnostic Studies   All labs and imaging personally reviewed       The patient's records and results personally reviewed by this provider.       Assessment    Lanie Neil is a 34 year old female seen as a PAC referral for risk assessment and optimization for anesthesia.    Plan/Recommendations  Pt will be optimized for the proposed procedure.  See below for details on the assessment, risk, and preoperative recommendations    NEUROLOGY  - No history of TIA, CVA or seizure  - Spinal muscular atrophy, continue Evrysdi.  - Quadriplegia  - Chronic Pain.  On chronic opiates, morphine equivilant = 316  "MME/Day.    -Post Op delirium risk factors:  No risk identified    ENT  - No current airway concerns.  Will need to be reassessed day of surgery. S/p tracheostomy.   Mallampati: Unable to assess  TM: Unable to assess    CARDIAC  - No history of CAD, Hypertension, and Afib  - METS (Metabolic Equivalents) Quadriplegia, wheelchair bound.    Patient CANNOT perform 4 METS exercise without symptoms             Total Score: 1    Functional Capacity: Unable to complete 4 METS      RCRI-Very low risk: Class 1 0.4% complication rate             Total Score: 0        PULMONARY  - Chronic respiratory failure, trach/ vent dependent.  SOLE Low Risk             Total Score: 0      - Intermittent asthma, uses inhaler & nebs prn  - Tobacco History    History   Smoking Status    Never   Smokeless Tobacco    Never       GI  - Denies GERD, but taking Pepcid & Prilosec prophylactically.   - Has G-tube for meds & supplemental nutrition. Able to eat orally.     PONV Medium Risk  Total Score: 2           1 AN PONV: Pt is Female    1 AN PONV: Patient is not a current smoker        /RENAL  - Chronic fischer    ENDOCRINE    - BMI: Estimated body mass index is 31.92 kg/m  as calculated from the following:    Height as of 1/3/24: 1.46 m (4' 9.48\").    Weight as of 11/16/23: 68 kg (150 lb).  Obesity (BMI >30)  - No history of Diabetes Mellitus  - Menorrhagia, takes aminocaproic acid during menstrual cycles    HEME  VTE Low Risk 0.5%             Total Score: 4    VTE: Family Hx of VTE      - Hx DVT, s/p IVC filter (removed 1/2024)  - Pt has right chest port  - Taking Xarelto, hold x24h prior    MSK  Patient is NOT Frail             Total Score: 0      - Chronic B/L hip dislocation  - Chronic pain  - S/p cervical & thoracic fusion 2004 & 2018  - Hold Celebrex x3d prior       ** Patient had logged into virtual visit prior to being checked in by rooming staff and completing medication reconciliation. This writer completed the video H&P evaluation, " then informed patient that she would receive 2 phone calls immediately following the end of the video visit: one from rooming staff to complete check in process & med reconciliation and one from PAC RN to review preop instructions (surgery time/arrival time, meds and NPO status).  This writer verified patient's phone number and then patient verbalized her own phone number. The pts mother was present during the visit, as well. (See addendum below)    PAC staff called patient's number multiple times over a 2-hour time span, as well has her facility's number, her mother's and her brother's number. Staff was unable to contact patient to complete visit.      The patient is aware that the final anesthesia plan will be decided by the assigned anesthesia provider on the date of service.    The patient is not optimized for her procedure, as this visit is not complete due to the absence of a med reconciliation and no preop instructions provided to pt.      ADDENDUM 8/27/24  PAC care coordinator was able to contact pt and her mother. Medications have been reconciled. PAN RNs will contact the pt 2-3 days prior to DOS to review surgery time/arrival time, meds and NPO status.    Patient is optimized and is acceptable candidate for the proposed procedure. No further diagnostic evaluation is needed.          Video-Visit Details    Type of service:  Video Visit    Provider received verbal consent for a Video Visit from the patient? Yes   Video Start Time:  1231  Video End Time: 1243    Originating Location (pt. Location): Long term Care    Distant Location (provider location):  Off-site  Mode of Communication:  Video Conference via Inaura  On the day of service:     Prep time: 14 minutes  Visit time: 12 minutes  Documentation time: 25 minutes  ------------------------------------------  Total time: 51 minutes      Elza Paz PA-C  Preoperative Assessment Center  [facility]  [facility]  Phone:  [phone]  Fax: [phone]

## 2024-08-21 NOTE — PATIENT INSTRUCTIONS
Preparing for Your Surgery      Name:  Lanie Neil   MRN:  [id number]   :  1990   Today's Date:  2024       Arriving for surgery:  Surgery date:  9/3/24   Arrival time:  6:30 am    Please come to:     Please come to:       [facility] [address]   [address] [place]  [address]     The Green Ramp for patients and visitors is beneath the [facility]. The parking facility entrance is at the intersection of [address] [place] [address] and [address]. Patients and visitors who self-park will receive the reduced hospital parking rate (no ticket validation needed).     [employer] parking, located at the [facility] main entrance on [address] [place] [address], is available Monday - Friday from 7 am to 3:30 pm.     Discounted parking pass options can be purchased from  attendants during business hours.     -Check in at the security desk in the [facility] ([facility])   Lobby. They will direct you to the correct elevators.   -Proceed to the 3rd floor, Adult Surgery Waiting Lounge. [phone]     If you need directions, a wheelchair or escort please stop at the Information Desk in the lobby.  Inform the information person you are here for surgery; a wheelchair and escort to [address] will be provided.   An escort to the Adult Surgery Waiting Lounge will be provided. .    What can I eat or drink?  -  You may eat and drink normally up to 8 hours prior to arrival time - this includes G-tube feeds. (Until 10:30 pm on )  -  You may have clear liquids until 2 hours prior to arrival time. (Until 4:30 am on 9/3)    Examples of clear liquids:  Water  Clear broth  Juices (apple, white grape, white cranberry  and cider) without pulp  Noncarbonated, powder based beverages  (lemonade and Jesse-Aid)  Sodas (Sprite, 7-Up, ginger ale and seltzer)  Coffee or tea (without milk or  cream)  Gatorade    -  No Alcohol or cannabis products for at least 24 hours before surgery.     Which medicines can I take?    Hold Aspirin for 7 days before surgery.   Hold Multivitamins for 7 days before surgery.  Hold Supplements for 7 days before surgery.  Hold Ibuprofen (Advil, Motrin) for 3 day(s) before surgery--unless otherwise directed by surgeon.  Hold Naproxen (Aleve) for 4 days before surgery.    **BRING INHALERS WITH YOU TO SURGERY**  **HOLD CELEBREX 3 DAYS PRIOR TO SURGERY**    -  DO NOT take these medications the day of surgery:  Xarelto - you will get a call with the hold time for this.    -  PLEASE TAKE these medications the day of surgery:  You may take your usual morning medications with a small amount of water before coming to the hospital.    How do I prepare myself?  - Please take 2 showers (one the night prior to surgery and one the morning of surgery) using Scrubcare or Hibiclens soap.    Use this soap only from the neck to your toes.     Leave the soap on your skin for one minute--then rinse thoroughly.      You may use your own shampoo and conditioner. No other hair products.   - Please remove all jewelry and body piercings.  - No lotions, deodorants or fragrance.  - No makeup or fingernail polish.   - Bring your ID and insurance card.    -If you use a CPAP machine, please bring the CPAP machine, tubing, and mask to hospital.    -If you have a Deep Brain Stimulator, Spinal Cord Stimulator, or any Neuro Stimulator device---you must bring the remote control to the hospital.      ALL PATIENTS GOING HOME THE SAME DAY OF SURGERY ARE REQUIRED TO HAVE A RESPONSIBLE ADULT TO DRIVE AND BE IN ATTENDANCE WITH THEM FOR 24 HOURS FOLLOWING SURGERY.    Covid testing policy as of 12/06/2022  Your surgeon will notify and schedule you for a COVID test if one is needed before surgery--please direct any questions or COVID symptoms to your surgeon      Questions or Concerns:    - For any questions regarding  the day of surgery or your hospital stay, please contact the Pre Admission Nursing Office at [phone].       - If you have health changes between today and your surgery, please call your surgeon.       - For questions after surgery, please call your surgeons office.           Current Visitor Guidelines    You may have 2 visitors in the pre op area.    Visiting hours: 8 a.m. to 8:30 p.m.    Patients confirmed or suspected to have symptoms of COVID 19 or flu:     No visitors allowed for adult patients.   Children (under age 18) can have 1 named visitor.     People who are sick or showing symptoms of COVID 19 or flu:    Are not allowed to visit patients--we can only make exceptions in special situations.       Please follow these guidelines for your visit:          Please maintain social distance          Masking is optional--however at times you may be asked to wear a mask for the safety of yourself and others     Clean your hands with alcohol hand . Do this when you arrive at and leave the building and patient room,    And again after you touch your mask or anything in the room.     Go directly to and from the room you are visiting.     Stay in the patient s room during your visit. Limit going to other places in the hospital as much as possible     Leave bags and jackets at home or in the car.     For everyone s health, please don t come and go during your visit. That includes for smoking   during your visit.

## 2024-08-26 ENCOUNTER — TELEPHONE (OUTPATIENT)
Dept: SURGERY | Facility: CLINIC | Age: 34
End: 2024-08-26
Payer: MEDICARE

## 2024-08-26 NOTE — TELEPHONE ENCOUNTER
Updated Aster, (Lanie's mom), that Lanie should hold Xarelto 24 hours prior to surgery.  Aster reported that Lanie has cough and congestion but no fever.  She recently became ill, but tested negative for covid.  Will update the PAC provider.  Suri Whiftield RN

## 2024-08-27 ASSESSMENT — PATIENT HEALTH QUESTIONNAIRE - PHQ9
10. IF YOU CHECKED OFF ANY PROBLEMS, HOW DIFFICULT HAVE THESE PROBLEMS MADE IT FOR YOU TO DO YOUR WORK, TAKE CARE OF THINGS AT HOME, OR GET ALONG WITH OTHER PEOPLE: NOT DIFFICULT AT ALL
SUM OF ALL RESPONSES TO PHQ QUESTIONS 1-9: 5
SUM OF ALL RESPONSES TO PHQ QUESTIONS 1-9: 5

## 2024-08-28 ENCOUNTER — VIRTUAL VISIT (OUTPATIENT)
Dept: PSYCHOLOGY | Facility: CLINIC | Age: 34
End: 2024-08-28
Payer: MEDICARE

## 2024-08-28 DIAGNOSIS — F34.1 PERSISTENT DEPRESSIVE DISORDER: Primary | ICD-10-CM

## 2024-08-28 PROCEDURE — 90834 PSYTX W PT 45 MINUTES: CPT | Mod: 95 | Performed by: STUDENT IN AN ORGANIZED HEALTH CARE EDUCATION/TRAINING PROGRAM

## 2024-08-28 NOTE — PROGRESS NOTES
[facility]                                     Progress Note    Patient Name: Lanie Neil  Date: 08/28/2024         Service Type: Individual      Session Start Time: 3.30  Session End Time: 4.20     Session Length: 38-52    Session #: 05    Attendees: Client attended alone    Service Modality:  Video Visit:      Provider verified identity through the following two step process.  Patient provided:  Patient is known previously to provider    Telemedicine Visit: The patient's condition can be safely assessed and treated via synchronous audio and visual telemedicine encounter.      Reason for Telemedicine Visit: Patient has requested telehealth visit    Originating Site (Patient Location): Patient's home    Distant Site (Provider Location): Provider Remote Setting- Home Office    Consent:  The patient/guardian has verbally consented to: the potential risks and benefits of telemedicine (video visit) versus in person care; bill my insurance or make self-payment for services provided; and responsibility for payment of non-covered services.     Patient would like the video invitation sent by:  My Chart    Mode of Communication:  Video Conference via [employer]    Distant Location (Provider):  On-site    As the provider I attest to compliance with applicable laws and regulations related to telemedicine.    DATA  Interactive Complexity: No  Crisis: No        Progress Since Last Session (Related to Symptoms / Goals / Homework):   Symptoms: No change patient reported no change from previous session    Homework: Completed in session Explore how to use mindfulness to notice and and change negative self talk.        Episode of Care Goals: Minimal progress - ACTION (Actively working towards change); Intervened by reinforcing change plan / affirming steps taken     Current / Ongoing Stressors and Concerns:     Depression, anxiety and recurrent pain. 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 She reported that she has had this all her life and that has limited social interactions and ability to create meaningful and sustainable social relationships. Patient shared that she is bed bound due to current health conditio. She noted  having low self esteem and abandonment issues as she was abandoned by her father and multiple online dating partners that she has had in the past. Patient noted that she is having recurrent chronic pain, being fed via a tube, and bed bound.    Current: Today, patient reported challenges with physical pain and the limited choices she has to mitigate the pain as she  cannot be physically active. She noted that its been hard to accept the daily functional challenges she is experiencing even when she tries to \"stay positive.\" She noted that she feels like everything has been taken away from her even her ability to take her medications as her mother is very controlling about what she can or cannot take. She reported feeling very overwhelmed with the daily physical limitations to function like others and  reported she sometime feel that it is not worth living in her condition but denied any suicidal thought.         Treatment Objective(s) Addressed in This Session:   Decrease frequency and intensity of feeling down, depressed, hopeless   Patient will Identify negative self-talk and behaviors: challenge core beliefs, myths, and actions     Intervention:     DBT: Discussion today about Shanika Jimenez mindfulness exercise RAIN- Recognize, Accept, Investigate and Natural Awareness to help client deal with feelings of anxiety and panic. Model process in session with client by encouraging client to begin by recognizing the presence of the uncomfortable emotion and label it so that it can be seen and then accept it as it is it in the moment with its presenting features.  Couched client to I-investigate their experience with " gentle curiosity and kindness, noting their thoughts, feelings and sensations and ask themselves questions like  what does this feeling wants from me? Or how am I experiencing this in my body, and what am I concluding about my experience? and then naturally attend to their experience with love and compassion and then label and note their emotion     Assessments completed prior to visit:  PHQ2:       4/23/2024    10:44 AM 3/21/2024    12:20 PM 1/3/2024     1:47 PM 11/24/2023     1:00 PM 11/8/2023     2:33 PM 9/13/2023     1:48 PM 8/31/2023     3:10 PM   PHQ-2 ( 1999 [employer])   Q1: Little interest or pleasure in doing things 0 0 0 0 0 0 0   Q2: Feeling down, depressed or hopeless 0 0 0 0 0 0 0   PHQ-2 Score 0 0 0 0 0 0 0     GAD7:       5/28/2019     1:45 PM 2/12/2020     2:30 PM 12/15/2020    10:52 AM 3/16/2021     9:15 AM 6/30/2021     9:27 AM 1/3/2024    11:09 AM   KATHARINE-7 SCORE   Total Score   1 (minimal anxiety) 1 (minimal anxiety) 1 (minimal anxiety) 1 (minimal anxiety)   Total Score 3 19 1 1 1 1         ASSESSMENT: Current Emotional / Mental Status (status of significant symptoms):   Risk status (Self / Other harm or suicidal ideation)   Patient denies current fears or concerns for personal safety.   Patient denies current or recent suicidal ideation or behaviors.   Patient denies current or recent homicidal ideation or behaviors.   Patient denies current or recent self injurious behavior or ideation.   Patient denies other safety concerns.   Patient reports there has been no change in risk factors since their last session.     Patient reports there has been no change in protective factors since their last session.     Recommended that patient call 911 or go to the local ED should there be a change in any of these risk factors.     Appearance:   Appropriate    Eye Contact:   Good    Psychomotor Behavior: Patient is bed bond    Attitude:   Cooperative  Interested Friendly  "Pleasant   Orientation:   All   Speech    Rate / Production: Normal/ Responsive    Volume:  Normal    Mood:    Depressed    Affect:    Appropriate    Thought Content:  Clear    Thought Form:  Coherent    Insight:    Good      Medication Review:   No changes to current psychiatric medication(s)     Medication Compliance:   Yes     Changes in Health Issues:   None reported     Chemical Use Review:   Substance Use: Chemical use reviewed, no active concerns identified      Tobacco Use: No current tobacco use.      Diagnosis:  300.4 (F34.1) Persistent Depressive Disorder, Early onset and Mild or 300.02 (F41.1) Generalized Anxiety Disorder    Collateral Reports Completed:   Not Applicable    PLAN: (Patient Tasks / Therapist Tasks / Other)     Notice recurrent negative self talk and reframed them to positive self talk.    Read and practice \"RAIN\"  skill learned in session daily.     Peter Bravo, [facility]                                  ______________________________________________________________________    Individual Treatment Plan    Patient's Name: Lanie Neil  YOB: 1990    Date of Creation: 7/15/2024  Date Treatment Plan Last Reviewed/Revised:     DSM5 Diagnoses: 300.4 (F34.1) Persistent Depressive Disorder, Early onset and Mild or 300.02 (F41.1) Generalized Anxiety Disorder  Psychosocial / Contextual Factors:   PROMIS (reviewed every 90 days):     Referral / Collaboration:  Referral to another professional/service is not indicated at this time..    Anticipated number of session for this episode of care:  15/25  Anticipation frequency of session: Biweekly  Anticipated Duration of each session: 38-52 minutes  Treatment plan will be reviewed in 90 days or when goals have been changed.       MeasurableTreatment Goal(s) related to diagnosis / functional impairment(s)    Goal 1: Patient will experience a reduction in depressive symptoms along with a corresponding increase in positive emotion and life " "satisfaction.               I will know I have met my goals when I have learned healthy coping mechanisms and can focus more on the positive and accept what I can't change.\"     Objective #A (Patient Action)                          Patient will Increase interest, engagement, and pleasure in doing things.  Status: Continued - Date(s): 10/16/2024     Intervention(s)  Therapist will help patient identify pleasant and mastery oriented events that elicit positive, relaxed mood.     Objective #B  Patient will Decrease frequency and intensity of feeling down, depressed, hopeless.  Status: Continued - Date(s):10/16/2024     Intervention(s)  Therapist will introduce patient to cognitive-behavioral and acceptance and commitment therapy topics aimed to help reduce depression and anxiety     Objective #C  Patient will Identify negative self-talk and behaviors: challenge core beliefs, myths, and actions  Improve concentration, focus, and mindfulness in daily activities .  Status: Continued - Date(s): 10/16/2024     Intervention(s)  Therapist will help patient identify and manage negative self-talk and automatic thoughts; introduce patient to cognitive distortions; help patient develop cognitive diffusion techniques        Goal 2: Patient will experience a reduction in anxious symptoms, along with a corresponding increase in relaxed emotional states and life satisfaction.        Objective #A (Patient Action)  Patient will use cognitive-behavioral and thought diffusion strategies identified in therapy to challenge anxious thoughts.                       Status: Continued - Date(s): 10/16/2024     Intervention(s)  Therapist will utilize CBT and ACT ideas to help patient challenge anxious thoughts and reduce intensity/duration of anxious distress     Objective #B  Patient will use \"worry time\" each day for 15 minutes of scheduled worry and then defer obsessive or anxious thinking until the next structured worry time.                "       Status: Continued - Date(s): 10/16/2024     Intervention(s)  Therapist will teach patient how to effectively utilize worry time and/or thought logs/journals each day and incorporate more relaxation behaviors into their routine.     Objective #C  Patient will identify the stressors which contribute to feelings of anxiety  Patient will increase engagement in adaptive coping skills and recreational activities, such as exercise and healthy socialization, to manage distress.  Status: Continued - Date(s):10/16/2024     Intervention(s)  Therapist will help patient identify triggers/situational factors that contribute to anxiety and behavioral skills aimed to manage anxious distress.        Goal 3: Patient will learn adaptive ways to manage chronic pain        Objective #A Patient will identify 2-4 strategies for managing pain.                          Status: Continued - Date(s): 10/16/2024     Intervention(s)  Therapist will teach distraction and mindfulness skills aimed to help manage chronic pain.   Objective #B  Patient will state understanding of stressors and relationship to physical symptoms.                    Status: Continued - Date(s): 10/16/2024                Intervention(s)  Therapist will teach distraction skills aimed to help manage chronic pain and utilize ACT/CBT ideas to help with this.      Patient has reviewed and agreed to the above plan.      REHANA SaezSW  July 15, 2024    Answers submitted by the patient for this visit:  Patient Health Questionnaire (Submitted on 7/15/2024)  If you checked off any problems, how difficult have these problems made it for you to do your work, take care of things at home, or get along with other people?: Not difficult at all  PHQ9 TOTAL SCORE: 0    Answers submitted by the patient for this visit:  Patient Health Questionnaire (Submitted on 8/27/2024)  If you checked off any problems, how difficult have these problems made it for you to do your work, take care  of things at home, or get along with other people?: Not difficult at all  PHQ9 TOTAL SCORE: 5

## 2024-08-29 ENCOUNTER — VIRTUAL VISIT (OUTPATIENT)
Dept: FAMILY MEDICINE | Facility: CLINIC | Age: 34
End: 2024-08-29
Payer: MEDICARE

## 2024-08-29 ENCOUNTER — PATIENT OUTREACH (OUTPATIENT)
Dept: CARE COORDINATION | Facility: CLINIC | Age: 34
End: 2024-08-29

## 2024-08-29 DIAGNOSIS — G12.9 SPINAL MUSCLE ATROPHY (H): Primary | ICD-10-CM

## 2024-08-29 DIAGNOSIS — R21 RASH: ICD-10-CM

## 2024-08-29 DIAGNOSIS — F41.9 ANXIETY: ICD-10-CM

## 2024-08-29 DIAGNOSIS — R09.89 RUNNY NOSE: ICD-10-CM

## 2024-08-29 PROCEDURE — 99215 OFFICE O/P EST HI 40 MIN: CPT | Mod: 95 | Performed by: FAMILY MEDICINE

## 2024-08-29 PROCEDURE — G2211 COMPLEX E/M VISIT ADD ON: HCPCS | Mod: 95 | Performed by: FAMILY MEDICINE

## 2024-08-29 RX ORDER — ALPRAZOLAM 0.25 MG
0.25 TABLET ORAL 3 TIMES DAILY PRN
Qty: 60 TABLET | Refills: 1 | Status: SHIPPED | OUTPATIENT
Start: 2024-08-29

## 2024-08-29 NOTE — PROGRESS NOTES
"Lanie is a 34 year old who is being evaluated via a billable video visit.    How would you like to obtain your AVS? MyChart  If the video visit is dropped, the invitation should be resent by: Send to e-mail at: [email or web address]  Will anyone else be joining your video visit? Mom is present    Are refills needed on medications prescribed by this physician? YES    Anupama Rai VVF      Assessment & Plan     Anxiety  Helps, tolerated. On vent.  - ALPRAZolam (XANAX) 0.25 MG tablet; Take 1 tablet (0.25 mg) by mouth 3 times daily as needed for anxiety.    Spinal muscle atrophy (H)  See HPI  - Primary Care - Care Coordination Referral; Future  - Social Work Referral Specific Sites Only - See Locations in Order; Future    Rash  On video can make out spots of redness on face  - Adult Dermatology  Referral; Future    Runny nose  [place], follow-up prn    The longitudinal plan of care for the diagnosis(es)/condition(s) as documented were addressed during this visit. Due to the added complexity in care, I will continue to support Lanie in the subsequent management and with ongoing continuity of care.  44 minutes spent by me on the date of the encounter doing chart review, history and exam, documentation and further activities per the note          BMI  Estimated body mass index is 31.92 kg/m  as calculated from the following:    Height as of 1/3/24: 1.46 m (4' 9.48\").    Weight as of 11/16/23: 68 kg (150 lb).   Weight management plan: Discussed healthy diet and exercise guidelines        No follow-ups on file.    Subjective   Lanie is a 34 year old, presenting for the following health issues:  RECHECK and H&P and cold      Video Start Time:  12: 30    History of Present Illness       Reason for visit:  H&P & Cold   She is taking medications regularly.     Mild runny nose last few days no fever or change resp status (chronic vent use) did do neg home covid   Surgery in four days; reviewed meds I suggest no Xarelto " day before or day of, otherwise proceed as is w/ meds  Did preop at preop clinic the other day, rvwd and discussed  I suggest flu covid shots when can  [facility] her [facility]/Home [facility]fusion; they no longer take her insurance. I discuss at put her in touch w/ [facility]/SW here to help facilitate new  Facial red spots, chronic, she'd like derm opinion  Past Medical History:   Diagnosis Date    Abnormal uterine bleeding (AUB)     Anemia     Anxiety     Arthritis Unknown    Asthma 06/18/2014    Chronic pain     Chronic respiratory failure (H)     Constipation     Depression     DVT (deep venous thrombosis) (H)     Gastrointestinal tube present (H)     History of blood transfusion     Obesity     Sinusitis, chronic     Snoring     Spinal muscle atrophy (H)      Past Surgical History:   Procedure Laterality Date    BACK SURGERY  '04    fusion of cervical and thoracic spine, 2004 and 2018    BIOPSY  1990    muscle biopsy    GASTROSTOMY TUBE  2004    IR CHEST PORT PLACEMENT > 5 YRS OF AGE  9/17/2018    IR CVC TUNNEL W2 CATH W/O PORT  7/5/2018    IR IVC FILTER PLACEMENT  2012    IR IVC FILTER REMOVAL  1/24/2024    IR PORT CHECK RIGHT  10/23/2023    right chest    KIDNEY SURGERY  2009    kidney stones    SPINE SURGERY      reservoir placed, has had three total procedures    TRACHEOSTOMY  2004     Current Outpatient Medications   Medication Sig Dispense Refill    ALPRAZolam (XANAX) 0.25 MG tablet Take 1 tablet (0.25 mg) by mouth 3 times daily as needed for anxiety. 60 tablet 1    ACETAMINOPHEN PO Take 650 mg by mouth every 4 hours as needed for pain      albuterol (PROAIR HFA) 108 (90 Base) MCG/ACT inhaler Inhale 2 puffs into the lungs every 4 hours as needed for shortness of breath or wheezing 18 g 5    albuterol (PROVENTIL) (2.5 MG/3ML) 0.083% neb solution USE ONE VIAL BY NEBULIZATION EVERY 6 HOURS AS NEEDED FOR SHORTNESS OF BREATH/ DYSPNEA OR WHEEZING 360 mL 1    aminocaproic acid (AMICAR) 0.25 GM/ML solution Take 12 mL (3 grams) via  G-tube every 8 hours during menstrual period. 473 mL 5    bisacodyl (DULCOLAX) 10 MG suppository Place 10 mg rectally daily as needed for constipation. Patient also takes Bisacodyl Dulcolax liquid, 20 ml BID as needed for consitpation      celecoxib (CELEBREX) 200 MG capsule Take 1 capsule (200 mg) by mouth daily On days of period 12 capsule 5    clindamycin (CLEOCIN T) 1 % lotion Apply topically 2 times daily To face as needed 60 mL 3    diazepam (VALIUM) 5 MG tablet Take 1 tablet (5 mg) by mouth every 6 hours as needed for muscle spasms (Patient taking differently: Take 5 mg by mouth every 6 hours as needed for muscle spasms. Patient takes this during her period) 30 tablet 0    eflornithine HCl 13.9 % CREA Externally apply topically 2 times daily Apply thin layer of cream to affected areas of face and adjacent chin twice daily, at least 8 hours apart. 45 g 3    EVRYSDI 0.75 MG/ML oral solution 6.6 mLs by Oral or NG Tube route every morning      famotidine (PEPCID) 20 MG tablet Take 1 tablet (20 mg) by mouth 2 times daily 180 tablet 3    fluocinolone acetonide (DERMA SMOOTHE/FS BODY) 0.01 % external oil Apply at nighttime to forehead and scalp as needed for scale or pruritus (Patient taking differently: Apply topically at bedtime. Apply at nighttime to forehead and scalp as needed for scale or pruritus) 118 mL 3    fluticasone (FLOVENT HFA) 110 MCG/ACT inhaler Inhale 1 puff into the lungs 2 times daily (Patient taking differently: Inhale 1 puff into the lungs 2 times daily. PATIENT NOT TAKING CURRENTLY BUT MIGHT IN THE FUTURE) 36 g 5    gabapentin (NEURONTIN) 250 MG/5ML solution 20 mLs (1,000 mg) by Per Feeding Tube route 3 times daily 1800 mL 5    GENERLAC 10 GM/15ML solution PATIENT NOT TAKING CURRENTLY  11    guaiFENesin (ORGANIDIN) 200 MG TABS tablet Take 200 mg by mouth every 4 hours as needed for cough. ONLY TAKES THIS WHEN THE PATIENT HAS A COLD      HYDROmorphone (DILAUDID) 4 MG tablet Take 8 mg by mouth  every 3 hours      hydrOXYzine (ATARAX) 25 MG tablet Take 25 mg by mouth every 6 hours as needed for itching. PATIENT NOT TAKING CURRENTLY      ketoconazole (NIZORAL) 2 % external shampoo Lather onto scalp and forehead once weekly when showering and let sit for 1-2 minutes before rinsing 120 mL 11    levofloxacin (LEVAQUIN) 25 MG/ML solution TAKE 20ML BY FEEDING TUBE ONCE DAILY FOR ONE WEEK (Patient taking differently: TAKE 20ML BY FEEDING TUBE ONCE DAILY FOR ONE WEEK  CURRENTLY NOT TAKING - TAKES IT WHEN SHE HAS A UTI) 480 mL 1    Magnesium Hydroxide (MILK OF MAGNESIA PO) Take 30 mLs by mouth as needed      metroNIDAZOLE (METROCREAM) 0.75 % external cream Apply topically 2 times daily (Patient taking differently: Apply topically 2 times daily. PATIENT NOT TAKING CURRENTLY) 45 g 11    Minoxidil (MINOXIDIL FOR WOMEN) 5 % FOAM Apply 1/2 capful once a day to affected area (Patient taking differently: Apply 1/2 capful once a day to affected area  PATIENT NOT TAKING CURRENTLY) 60 g 3    mometasone (NASONEX) 50 MCG/ACT nasal spray Spray 2 sprays in nostril daily as needed. PATIENT NOT TAKING CURRENTLY      morphine (SABINE) 30 MG 24 hr capsule Take 30 mg by mouth 2 times daily      naloxone (NARCAN) 4 MG/0.1ML nasal spray Spray 1 spray (4 mg) into one nostril alternating nostrils once as needed for opioid reversal every 2-3 minutes until assistance arrives (Patient not taking: Reported on 1/3/2024) 0.2 mL 0    Nutritional Supplements (NUTREN 1.0/FIBER) LIQD Per G tube; 250 ml Per G Tube qid 58335 mL 11    ondansetron (ZOFRAN-ODT) 4 MG ODT tab Take 1 tablet (4 mg) by mouth every 8 hours as needed for nausea 30 tablet 1    order for DME Equipment being ordered: 16 fr 5cc balloon indwelling catheter with drainage bag. 2 catheters/month 2 catheter 11    order for DME Equipment being ordered: Vibracare Percussor 1 Units 0    polyethylene glycol (MIRALAX) 17 GM/Dose powder Take 17 g by mouth daily (Patient taking differently:  Take 17 g by mouth daily as needed.) 714 g 5    potassium chloride (KAYCIEL) 20 MEQ/15ML (10%) solution 22.5 mLs (30 mEq) by Per G Tube route daily 675 mL 11    prochlorperazine (COMPAZINE) 5 MG tablet Take 5 mg by mouth every 6 hours as needed for nausea or vomiting      rivaroxaban ANTICOAGULANT (XARELTO ANTICOAGULANT) 10 MG TABS tablet 1 tablet (10 mg) by Oral or Feeding Tube route daily with food 90 tablet 3    Salicylic Acid (OXY BALANCE FACIAL CLEAN WASH EX) Externally apply 1 pad topically daily (Patient not taking: Reported on 4/23/2024)      Sennosides (SENNA) 8.8 MG/5ML LIQD 15 mLs by Feeding route 2 times daily 900 mL 11    sertraline (ZOLOFT) 20 MG/ML (HIGH CONC) solution TAKE 2.5 ML(50 MG) BY MOUTH DAILY 225 mL 1    silver nitrate (ARZOL) 75-25 % miscellaneous Use one stick apply tip to granuloma prn granuloma formation (Patient taking differently: Use one stick apply tip to granuloma prn granuloma formation  Uses around the trach stoma) 20 applicator 1    silver sulfADIAZINE (SSD) 1 % external cream Apply topically 2 times daily To affected areas-apply bid to wounds till healed 400 g 11    simethicone 40 MG/0.6ML LIQD Place 40 mg into NG or OG tube as needed (PRN GAS).      traZODone (DESYREL) 5 mg/ml SUSP Take 15 ml (75 mg) po at bedtime Prn insomnia      tretinoin (RETIN-A) 0.025 % external cream Apply a pea-sized amount evenly over the face at nighttime before bed. (Patient taking differently: at bedtime. Apply a pea-sized amount evenly over the face at nighttime before bed.  NOT CURRENTLY USING) 45 g 2     No current facility-administered medications for this visit.     Allergies   Allergen Reactions    Ibuprofen      Family History   Problem Relation Age of Onset    Hypertension Mother     Breast Cancer Mother         No longer has it    Hypertension Father     Hypertension Brother     Hypertension Brother     Family History Negative Other     Cerebrovascular Disease Other     Cerebrovascular  Disease Maternal Aunt     Deep Vein Thrombosis (DVT) Maternal Aunt     Deep Vein Thrombosis (DVT) Maternal Uncle     Anesthesia Reaction No family hx of      Social History     Socioeconomic History    Marital status: Single     Spouse name: Not on file    Number of children: Not on file    Years of education: Not on file    Highest education level: Not on file   Occupational History    Not on file   Tobacco Use    Smoking status: Never    Smokeless tobacco: Never   Vaping Use    Vaping status: Never Used   Substance and Sexual Activity    Alcohol use: Never    Drug use: Never    Sexual activity: Never   Other Topics Concern    Parent/sibling w/ CABG, MI or angioplasty before 65F 55M? No   Social History Narrative    Not on file     Social Determinants of Health     Financial Resource Strain: Low Risk  (3/21/2024)    Financial Resource Strain     Within the past 12 months, have you or your family members you live with been unable to get utilities (heat, electricity) when it was really needed?: No   Food Insecurity: Low Risk  (3/21/2024)    Food Insecurity     Within the past 12 months, did you worry that your food would run out before you got money to buy more?: No     Within the past 12 months, did the food you bought just not last and you didn t have money to get more?: No   Transportation Needs: Low Risk  (3/21/2024)    Transportation Needs     Within the past 12 months, has lack of transportation kept you from medical appointments, getting your medicines, non-medical meetings or appointments, work, or from getting things that you need?: No   Physical Activity: Not on file   Stress: Not on file   Social Connections: Not on file   Interpersonal Safety: Unknown (3/26/2024)    [facility]    Humiliation, Afraid, Rape, and Kick questionnaire     Fear of Current or Ex-Partner: Not on file     Emotionally Abused: Not on file     Physically Abused: No     Sexually Abused: No   Housing  Stability: Low Risk  (3/21/2024)    Housing Stability     Do you have housing? : Yes     Are you worried about losing your housing?: No               Objective           Vitals:  No vitals were obtained today due to virtual visit.    Physical Exam   GENERAL: alert and no distress; in bed on vent per chronic  EYES: Eyes grossly normal to inspection.  No discharge or erythema, or obvious scleral/conjunctival abnormalities.  RESP: No audible wheeze, cough, or visible cyanosis.    SKIN: Visible skin clear. No significant rash, abnormal pigmentation or lesions.  NEURO: Cranial nerves grossly intact.  Mentation and speech appropriate for age.  PSYCH: Appropriate affect, tone, and pace of words        Video-Visit Details    Type of service:  Video Visit   Video End Time: 1 pm  Originating Location (pt. Location): Home    Distant Location (provider location):  On-site  Platform used for Video Visit: Boris  Signed Electronically by: Jimmy Moseley MD

## 2024-08-29 NOTE — PATIENT INSTRUCTIONS
Thank you for visiting the Primary Care Center today at the [facility]! The following is some information about our clinic:     Primary Care Center Frequently-Asked Questions    (1) How do I schedule appointments at the [facility]?     Primary Care--to schedule or make changes to an existing appointment, please call our primary care line at [phone].    Labs--to schedule a lab appointment at the [facility] you can use [employer] or call [phone]. If you have a [place] location that is closer to home, you can reach out to that location for scheduling options.     Imaging--if you need to schedule a CT, X-ray, MRI, ultrasound, or other imaging study you can call [phone] to schedule at the [facility] or any other [facility] imaging location.     Referrals--if a referral to another specialty was ordered you can expect a phone call from their scheduling team. If you have not heard from them in a week, please call us or send us a [employer] message to check the status or get a scheduling number. Please note that this only applies to internal [facility] referrals. If the referral is external you would need to contact their office for scheduling.     (2) I have a question about my visit, who do I contact?     You can call us at the primary care line at [phone] to ask questions about your visit. You can also send a secure message through [employer], which is reviewed by clinic staff. Please note that [employer] messages have a twenty-four to forty-eight business hour turnaround time and should not be used for urgent concerns.    (3) How will I get the results of my tests?    If you are signed up for [employer] all tests will be released to you within twenty-four hours of resulting. Please allow three to five days for your doctor to review your results and place a note interpreting the results. If you do not have [employer] you will receive your  results through mail seven to ten business days following the return of the tests. Please note that if there should be any urgent or concerning results that your doctor or their registered nurse will reach out to you the same day as the tests come back. If you have follow up questions about your results or would like to discuss the results in detail please schedule a follow up with your provider either in person or virtually.     (4) How do I get refills of my prescriptions?     You should always first contact your pharmacy for refills of your medications. If submitting a refill request on [employer], please be sure to submit the request only once--repeat requests can cause delays in refill. If you are requesting a NEW medication or a medication related to new symptoms you will need to schedule an appointment with a provider prior to approval. Please note: Routine medication refills have up to one to three business day turnaround whereas controlled substances refills have up to five to seven business day turnaround.    (5) I have new symptoms, what do I do?     If you are having an immediate medical emergency, you should dial 911 for assistance.   For anything urgent that needs to be seen within a few hours to one day you should visit a local urgent care for assistance.  For non-urgent symptoms that need to be seen within a few days to a week you can schedule with an available provider in primary care by going to [employer] or calling [phone].   If you are not sure how serious your symptoms are or you would like to receive medical advice you can always call [phone] to speak with a triage nurse.

## 2024-08-29 NOTE — PROGRESS NOTES
"Clinic Care Coordination Contact    Situation: Patient chart reviewed by care coordinator.    Background: Referred by PCP for Complex Medical Concerns/Education related to Chronic Diagnosis (SMA) \"Immobile and on vent chronic due to SMA; per pt long term home agency can no longer come ([facility]), needs new one, main issue was IV's, could we set her up w/ [facility]?\" on 8/29/24.     Per Dr. Moseley, patient lives at home with her mom, and due to SMA has limited mobility and is on a ventilator. Patient was getting services through [facility] (home health nurse and infusion services) and they were told that they are no longer covered under their insurance. Patient therefore currently does not have services. Dr. Moseley is hoping care coordination can work to get patient connected with resources.     Assessment: [employer] message sent to patient offering CC services. Home Infusion referral also pended to PCP to sign.     Plan/Recommendations: RN will await pt's reply for further outreach.    NAVI HAMILTON RN on 9/3/2024 at 12:54 PM    Addendum:  Home infusion order signed with verbal readback to PCP.     NAVI HAMILTON RN on 9/3/2024 at 1:03 PM    "

## 2024-08-31 DIAGNOSIS — J45.909 UNCOMPLICATED ASTHMA, UNSPECIFIED ASTHMA SEVERITY, UNSPECIFIED WHETHER PERSISTENT: ICD-10-CM

## 2024-09-03 ENCOUNTER — TELEPHONE (OUTPATIENT)
Dept: OBGYN | Facility: CLINIC | Age: 34
End: 2024-09-03

## 2024-09-03 ENCOUNTER — HOSPITAL ENCOUNTER (OUTPATIENT)
Facility: CLINIC | Age: 34
Discharge: HOME | End: 2024-09-03
Attending: OBSTETRICS & GYNECOLOGY | Admitting: OBSTETRICS & GYNECOLOGY
Payer: MEDICARE

## 2024-09-03 ENCOUNTER — PATIENT OUTREACH (OUTPATIENT)
Dept: ONCOLOGY | Facility: CLINIC | Age: 34
End: 2024-09-03

## 2024-09-03 ENCOUNTER — ANESTHESIA (OUTPATIENT)
Dept: SURGERY | Facility: CLINIC | Age: 34
End: 2024-09-03
Payer: MEDICARE

## 2024-09-03 VITALS
HEIGHT: 57 IN | SYSTOLIC BLOOD PRESSURE: 104 MMHG | WEIGHT: 140.1 LBS | DIASTOLIC BLOOD PRESSURE: 62 MMHG | HEART RATE: 59 BPM | RESPIRATION RATE: 26 BRPM | TEMPERATURE: 97.5 F | OXYGEN SATURATION: 98 % | BODY MASS INDEX: 30.23 KG/M2

## 2024-09-03 DIAGNOSIS — N90.89 VULVAR EDEMA: Primary | ICD-10-CM

## 2024-09-03 DIAGNOSIS — N93.9 ABNORMAL UTERINE BLEEDING (AUB): ICD-10-CM

## 2024-09-03 LAB — HCG UR QL: NEGATIVE

## 2024-09-03 PROCEDURE — 250N000009 HC RX 250: Performed by: OBSTETRICS & GYNECOLOGY

## 2024-09-03 PROCEDURE — 250N000011 HC RX IP 250 OP 636: Performed by: OBSTETRICS & GYNECOLOGY

## 2024-09-03 PROCEDURE — 370N000017 HC ANESTHESIA TECHNICAL FEE, PER MIN: Performed by: OBSTETRICS & GYNECOLOGY

## 2024-09-03 PROCEDURE — 710N000012 HC RECOVERY PHASE 2, PER MINUTE: Performed by: OBSTETRICS & GYNECOLOGY

## 2024-09-03 PROCEDURE — 999N000141 HC STATISTIC PRE-PROCEDURE NURSING ASSESSMENT: Performed by: OBSTETRICS & GYNECOLOGY

## 2024-09-03 PROCEDURE — 258N000003 HC RX IP 258 OP 636: Performed by: NURSE ANESTHETIST, CERTIFIED REGISTERED

## 2024-09-03 PROCEDURE — 81025 URINE PREGNANCY TEST: CPT

## 2024-09-03 PROCEDURE — 250N000011 HC RX IP 250 OP 636: Performed by: NURSE ANESTHETIST, CERTIFIED REGISTERED

## 2024-09-03 PROCEDURE — 710N000010 HC RECOVERY PHASE 1, LEVEL 2, PER MIN: Performed by: OBSTETRICS & GYNECOLOGY

## 2024-09-03 PROCEDURE — 250N000009 HC RX 250: Performed by: NURSE ANESTHETIST, CERTIFIED REGISTERED

## 2024-09-03 PROCEDURE — 360N000075 HC SURGERY LEVEL 2, PER MIN: Performed by: OBSTETRICS & GYNECOLOGY

## 2024-09-03 PROCEDURE — 250N000013 HC RX MED GY IP 250 OP 250 PS 637

## 2024-09-03 PROCEDURE — 250N000011 HC RX IP 250 OP 636: Performed by: ANESTHESIOLOGY

## 2024-09-03 PROCEDURE — G0145 SCR C/V CYTO,THINLAYER,RESCR: HCPCS | Performed by: OBSTETRICS & GYNECOLOGY

## 2024-09-03 PROCEDURE — 76998 US GUIDE INTRAOP: CPT | Mod: 26 | Performed by: OBSTETRICS & GYNECOLOGY

## 2024-09-03 PROCEDURE — 58300 INSERT INTRAUTERINE DEVICE: CPT | Performed by: OBSTETRICS & GYNECOLOGY

## 2024-09-03 PROCEDURE — 87624 HPV HI-RISK TYP POOLED RSLT: CPT | Performed by: OBSTETRICS & GYNECOLOGY

## 2024-09-03 DEVICE — IUD CONTRACEPTIVE DEVICE MIRENA 50419-4230-01: Type: IMPLANTABLE DEVICE | Site: UTERUS | Status: FUNCTIONAL

## 2024-09-03 RX ORDER — ONDANSETRON 2 MG/ML
INJECTION INTRAMUSCULAR; INTRAVENOUS PRN
Status: DISCONTINUED | OUTPATIENT
Start: 2024-09-03 | End: 2024-09-03

## 2024-09-03 RX ORDER — PROPOFOL 10 MG/ML
INJECTION, EMULSION INTRAVENOUS PRN
Status: DISCONTINUED | OUTPATIENT
Start: 2024-09-03 | End: 2024-09-03

## 2024-09-03 RX ORDER — DEXAMETHASONE SODIUM PHOSPHATE 4 MG/ML
4 INJECTION, SOLUTION INTRA-ARTICULAR; INTRALESIONAL; INTRAMUSCULAR; INTRAVENOUS; SOFT TISSUE
Status: DISCONTINUED | OUTPATIENT
Start: 2024-09-03 | End: 2024-09-03 | Stop reason: HOSPADM

## 2024-09-03 RX ORDER — DEXAMETHASONE SODIUM PHOSPHATE 4 MG/ML
INJECTION, SOLUTION INTRA-ARTICULAR; INTRALESIONAL; INTRAMUSCULAR; INTRAVENOUS; SOFT TISSUE PRN
Status: DISCONTINUED | OUTPATIENT
Start: 2024-09-03 | End: 2024-09-03

## 2024-09-03 RX ORDER — LIDOCAINE HYDROCHLORIDE 10 MG/ML
INJECTION, SOLUTION INFILTRATION; PERINEURAL PRN
Status: DISCONTINUED | OUTPATIENT
Start: 2024-09-03 | End: 2024-09-03 | Stop reason: HOSPADM

## 2024-09-03 RX ORDER — FENTANYL CITRATE 50 UG/ML
50 INJECTION, SOLUTION INTRAMUSCULAR; INTRAVENOUS EVERY 5 MIN PRN
Status: DISCONTINUED | OUTPATIENT
Start: 2024-09-03 | End: 2024-09-03 | Stop reason: HOSPADM

## 2024-09-03 RX ORDER — PROPOFOL 10 MG/ML
INJECTION, EMULSION INTRAVENOUS CONTINUOUS PRN
Status: DISCONTINUED | OUTPATIENT
Start: 2024-09-03 | End: 2024-09-03

## 2024-09-03 RX ORDER — ONDANSETRON 4 MG/1
4 TABLET, ORALLY DISINTEGRATING ORAL EVERY 30 MIN PRN
Status: DISCONTINUED | OUTPATIENT
Start: 2024-09-03 | End: 2024-09-03 | Stop reason: HOSPADM

## 2024-09-03 RX ORDER — FENTANYL CITRATE 50 UG/ML
INJECTION, SOLUTION INTRAMUSCULAR; INTRAVENOUS PRN
Status: DISCONTINUED | OUTPATIENT
Start: 2024-09-03 | End: 2024-09-03

## 2024-09-03 RX ORDER — HYDROMORPHONE HYDROCHLORIDE 1 MG/ML
0.4 INJECTION, SOLUTION INTRAMUSCULAR; INTRAVENOUS; SUBCUTANEOUS EVERY 5 MIN PRN
Status: DISCONTINUED | OUTPATIENT
Start: 2024-09-03 | End: 2024-09-03 | Stop reason: HOSPADM

## 2024-09-03 RX ORDER — SODIUM CHLORIDE, SODIUM LACTATE, POTASSIUM CHLORIDE, CALCIUM CHLORIDE 600; 310; 30; 20 MG/100ML; MG/100ML; MG/100ML; MG/100ML
INJECTION, SOLUTION INTRAVENOUS CONTINUOUS PRN
Status: DISCONTINUED | OUTPATIENT
Start: 2024-09-03 | End: 2024-09-03

## 2024-09-03 RX ORDER — ACETAMINOPHEN 325 MG/1
975 TABLET ORAL ONCE
Status: DISCONTINUED | OUTPATIENT
Start: 2024-09-03 | End: 2024-09-03 | Stop reason: HOSPADM

## 2024-09-03 RX ORDER — NALOXONE HYDROCHLORIDE 0.4 MG/ML
0.1 INJECTION, SOLUTION INTRAMUSCULAR; INTRAVENOUS; SUBCUTANEOUS
Status: DISCONTINUED | OUTPATIENT
Start: 2024-09-03 | End: 2024-09-03 | Stop reason: HOSPADM

## 2024-09-03 RX ORDER — FENTANYL CITRATE 50 UG/ML
25 INJECTION, SOLUTION INTRAMUSCULAR; INTRAVENOUS EVERY 5 MIN PRN
Status: DISCONTINUED | OUTPATIENT
Start: 2024-09-03 | End: 2024-09-03 | Stop reason: HOSPADM

## 2024-09-03 RX ORDER — ONDANSETRON 2 MG/ML
4 INJECTION INTRAMUSCULAR; INTRAVENOUS EVERY 30 MIN PRN
Status: DISCONTINUED | OUTPATIENT
Start: 2024-09-03 | End: 2024-09-03 | Stop reason: HOSPADM

## 2024-09-03 RX ORDER — HEPARIN SODIUM (PORCINE) LOCK FLUSH IV SOLN 100 UNIT/ML 100 UNIT/ML
5 SOLUTION INTRAVENOUS EVERY 8 HOURS
Status: DISCONTINUED | OUTPATIENT
Start: 2024-09-03 | End: 2024-09-03 | Stop reason: HOSPADM

## 2024-09-03 RX ORDER — ACETAMINOPHEN 325 MG/1
975 TABLET ORAL ONCE
Status: COMPLETED | OUTPATIENT
Start: 2024-09-03 | End: 2024-09-03

## 2024-09-03 RX ORDER — SODIUM CHLORIDE, SODIUM LACTATE, POTASSIUM CHLORIDE, CALCIUM CHLORIDE 600; 310; 30; 20 MG/100ML; MG/100ML; MG/100ML; MG/100ML
INJECTION, SOLUTION INTRAVENOUS CONTINUOUS
Status: DISCONTINUED | OUTPATIENT
Start: 2024-09-03 | End: 2024-09-03 | Stop reason: HOSPADM

## 2024-09-03 RX ORDER — HYDROMORPHONE HYDROCHLORIDE 1 MG/ML
0.2 INJECTION, SOLUTION INTRAMUSCULAR; INTRAVENOUS; SUBCUTANEOUS EVERY 5 MIN PRN
Status: DISCONTINUED | OUTPATIENT
Start: 2024-09-03 | End: 2024-09-03 | Stop reason: HOSPADM

## 2024-09-03 RX ADMIN — MIDAZOLAM 2 MG: 1 INJECTION INTRAMUSCULAR; INTRAVENOUS at 08:31

## 2024-09-03 RX ADMIN — LEVONORGESTREL 1 EACH: 52 INTRAUTERINE DEVICE INTRAUTERINE at 08:54

## 2024-09-03 RX ADMIN — SODIUM CHLORIDE, POTASSIUM CHLORIDE, SODIUM LACTATE AND CALCIUM CHLORIDE: 600; 310; 30; 20 INJECTION, SOLUTION INTRAVENOUS at 08:31

## 2024-09-03 RX ADMIN — DEXMEDETOMIDINE HYDROCHLORIDE 8 MCG: 100 INJECTION, SOLUTION INTRAVENOUS at 08:37

## 2024-09-03 RX ADMIN — FENTANYL CITRATE 50 MCG: 50 INJECTION INTRAMUSCULAR; INTRAVENOUS at 09:57

## 2024-09-03 RX ADMIN — FENTANYL CITRATE 50 MCG: 50 INJECTION INTRAMUSCULAR; INTRAVENOUS at 08:37

## 2024-09-03 RX ADMIN — DEXAMETHASONE SODIUM PHOSPHATE 4 MG: 4 INJECTION, SOLUTION INTRAMUSCULAR; INTRAVENOUS at 08:37

## 2024-09-03 RX ADMIN — FENTANYL CITRATE 50 MCG: 50 INJECTION INTRAMUSCULAR; INTRAVENOUS at 08:49

## 2024-09-03 RX ADMIN — ACETAMINOPHEN 975 MG: 325 TABLET ORAL at 07:59

## 2024-09-03 RX ADMIN — PROPOFOL 150 MCG/KG/MIN: 10 INJECTION, EMULSION INTRAVENOUS at 08:38

## 2024-09-03 RX ADMIN — HEPARIN 5 ML: 100 SYRINGE at 10:30

## 2024-09-03 RX ADMIN — FENTANYL CITRATE 50 MCG: 50 INJECTION INTRAMUSCULAR; INTRAVENOUS at 09:30

## 2024-09-03 RX ADMIN — DEXMEDETOMIDINE HYDROCHLORIDE 4 MCG: 100 INJECTION, SOLUTION INTRAVENOUS at 08:54

## 2024-09-03 RX ADMIN — ONDANSETRON 4 MG: 2 INJECTION INTRAMUSCULAR; INTRAVENOUS at 08:52

## 2024-09-03 RX ADMIN — PROPOFOL 50 MG: 10 INJECTION, EMULSION INTRAVENOUS at 08:37

## 2024-09-03 ASSESSMENT — ACTIVITIES OF DAILY LIVING (ADL)
ADLS_ACUITY_SCORE: 33
ADLS_ACUITY_SCORE: 39

## 2024-09-03 NOTE — TELEPHONE ENCOUNTER
LVM for pt to schedule a follow up telephone visit for her IUD placement with Dr Garrett in 2-3 months. DOS 9/3

## 2024-09-03 NOTE — OP NOTE
[facility] Operative Note    Pre-operative diagnosis: Abnormal uterine bleeding  Cervical cancer screening   Post-operative diagnosis: Same   Procedure: Pelvic exam under anesthesia  Pap smear  Placement of Mirena IUD under ultrasound guidance   Surgeon: Lucia Garrett MD   Assistant(s): Carlos Thornton MD PGY-4   Anesthesia: General Endotracheal Anesthesia   Estimated blood loss: 3 ml    Total IV fluids: (See anesthesia record)  200ml   Blood transfusion: No transfusion was given during surgery   Total urine output: Not measured, fischer catheter in place with clear urine output   Drains: Fischer catheter- chronic placement   Specimens: Pap cytology and HPV testing   Findings: EUA revealed small anteverted uterus, mobile with no adnexal masses. Cervix small without lesions, stenotic not dilated. Sounded to 9 cm. Mirena IUD with LOT: [id number]. IUD at fundus by ultrasound at conclusion of procedure.   Right labial swelling noted on exam measuring 14 x  9 cm today and 12 x8 cm via MRI in March. Labial skin intact without ulcerations. Swelling is not taunt, there was no seeping of edema.  No firm mass palpable within the labial swelling.  Left labia appeared normal.  Indwelling fischer catheter present.  No irritation to surrounding skin noted.   Complications: None   Condition: Stable   Details of procedure: Patient was brought to the OR.  Patient is ventilator dependant and ventilation switched to OR ventilator.  Once adequate general anesthesia was achieved she was placed in lithotomy position.  A pelvic exam was completed with findings above.   A Kindra speculum was placed in the vagina and cervix visualized.  Pap smear collected, including endocervical brush. A paracervical block with 20cc of 1% lidocaine was administered.  Attempt to sound cervix noted a stenotic internal os.  An os finder was used and followed by ultrasound to confirm location within the endometrial cavity.  Cervix dilated  to 3 mm.  IUD placed in standard fashion at uterine fundus and confirmed by ultrasound. IUD strings cut at 3 cm length. The tenaculum was removed and tenaculum sites were hemostatic with pressure.  All instruments were removed from the vagina and patient transferred to the PACU in stable condition.     Lucia Garrett MD

## 2024-09-03 NOTE — ANESTHESIA CARE TRANSFER NOTE
Patient: Lanie Neil    Procedure: Procedure(s):  pelvic EXAM UNDER ANESTHESIA,  Insert intrauterine device Mirena UNDER ULTRA SOUND GUIDANCE       Diagnosis: Abnormal uterine bleeding (AUB) [N93.9]  Diagnosis Additional Information: No value filed.    Anesthesia Type:   MAC     Note:    Oropharynx: oropharynx clear of all foreign objects and ventilatory support (on home ventilator at home settings)  Level of Consciousness: awake      Independent Airway: airway patency not satisfactory and stable  Dentition: dentition unchanged  Vital Signs Stable: post-procedure vital signs reviewed and stable  Report to RN Given: handoff report given  Patient transferred to: PACU    Handoff Report: Identifed the Patient, Identified the Reponsible Provider, Reviewed the pertinent medical history, Discussed the surgical course, Reviewed Intra-OP anesthesia mangement and issues during anesthesia, Set expectations for post-procedure period and Allowed opportunity for questions and acknowledgement of understanding      Vitals:  Vitals Value Taken Time   BP     Temp     Pulse     Resp     SpO2         Electronically Signed By: RIDGE Mendoza CRNA  September 3, 2024  9:25 AM  
no

## 2024-09-03 NOTE — ANESTHESIA POSTPROCEDURE EVALUATION
Patient: Lanie Neil    Procedure: Procedure(s):  pelvic EXAM UNDER ANESTHESIA,  Insert intrauterine device Mirena UNDER ULTRA SOUND GUIDANCE       Anesthesia Type:  MAC    Note:  Disposition: Outpatient   Postop Pain Control: Uneventful            Sign Out: Well controlled pain   PONV: No   Neuro/Psych: Uneventful            Sign Out: Acceptable/Baseline neuro status   Airway/Respiratory: Uneventful            Sign Out: AIRWAY IN SITU/Resp. Support   CV/Hemodynamics: Uneventful            Sign Out: Acceptable CV status; No obvious hypovolemia; No obvious fluid overload   Other NRE: NONE   DID A NON-ROUTINE EVENT OCCUR? No           Last vitals:  Vitals Value Taken Time   BP 98/57 09/03/24 1000   Temp 36.4  C (97.5  F) 09/03/24 0918   Pulse 58 09/03/24 1000   Resp 24 09/03/24 1000   SpO2 96 % 09/03/24 1000       Electronically Signed By: Stella Huerta MD  September 3, 2024  2:46 PM

## 2024-09-03 NOTE — PROGRESS NOTES
"New Patient Hematology / Oncology Nurse Navigator Note     Referral Date: 9/3/24    Referring provider:   Lucia Garrett MD   [address] [facility] [[email or web address] [address]   [facility] [address]   Phone: [phone]   Fax: [phone]       Referring Clinic/Organization: [facility]     Referred to: GynOnc    Requested provider (if applicable): First available - did not specify     Evaluation for : \"Patient with significant right labial edema.  Seen by benign gyn who recommended continued observation after MRI showed no mass.  Patient with h/o SMA and vent dependent, this is very bothered by it.  She would like a second opinion on possible excision. \"     Clinical History (per Nurse review of records provided):    3/29/24 MRI Pelvis:  IMPRESSION:   1.  No discrete mass detected in the right labium as queried. There is  subcutaneous edema involving the right labium, perineum, anterior  pelvic wall.  2.  Normal uterus and ovaries. Normal endometrial thickness.  3.  Rectal prolapse.  4.  Chronic incidental findings include muscular atrophy, bilateral  hip dislocation with degenerative changes. -- BOOKMARKED  8/14/24 Office Visit with OBGYN:  \"# Labial swelling   Presented with significant unilateral labial swelling of unknown etiology. Pelvic MRI showing only subcutaneous edema. Discussed ongoing expectant management. \" -- BOOKMARKED  PAP/HPV today, in process-- BOOKMARKED    Clinical Assessment / Barriers to Care (Per Nurse):  Pt lives in [place], [place]      Records Location: [facility]     Records Needed:   N/A    Additional testing needed prior to consult:   N/A    Referral updates and Plan:   Referral received, chart reviewed by nursing, scheduling instructions updated and routed to NPS for scheduling.     Yesica Espinal, MICHAN, RN, PHN, OCN  Hematology/Oncology Nurse Navigator  [facility]  [phone]    "

## 2024-09-03 NOTE — BRIEF OP NOTE
[facility]    Brief Operative Note    Pre-operative diagnosis: Abnormal uterine bleeding (AUB) [N93.9]  Post-operative diagnosis Same as pre-operative diagnosis    Procedure: pelvic EXAM UNDER ANESTHESIA,, N/A - Vagina  Insert intrauterine device Mirena UNDER ULTRA SOUND GUIDANCE, N/A - Uterus    Surgeon: Surgeons and Role:     * Lucia Garrett MD - Primary     * Carlos Thornton MD - Resident - Assisting  Anesthesia: Choice   Estimated Blood Loss: 3 ml    Drains: None  Specimens:   ID Type Source Tests Collected by Time Destination   1 :  Brushing Cervix HPV HIGH RISK TYPES DNA CERVICAL, GYNECOLOGIC CYTOLOGY Lucia Garrett MD 9/3/2024  8:51 AM      Findings:   EUA revealed small anteverted uterus, mobile with no adnexal masses. Cervix stenotic not dilated. Sounded to 9 cm. Mirena IUD with LOT: [id number]. Right labial swelling noted on exam measuring 14 x  9 cm today and 12 x8 cm via MRI in March.    Complications: None.  Implants:   Implant Name Type Inv. Item Serial No.  Lot No. LRB No. Used Action   IUD CONTRACEPTIVE DEVICE MIRENA [id number]  [id number] Contraceptive Device IUD CONTRACEPTIVE DEVICE MIRENA [id number]  [facility] [id number] N/A 1 Implanted     Carlos Thornton MD, MPH  Ob/Gyn Resident, PGY-4  09/03/24 9:14 AM

## 2024-09-03 NOTE — ANESTHESIA PREPROCEDURE EVALUATION
Anesthesia Pre-Procedure Evaluation    Patient: Lanie Neil   MRN: [id number] : 1990        Procedure : Procedure(s):  EXAM UNDER ANESTHESIA,  Insert intrauterine device Mirena          Past Medical History:   Diagnosis Date    Abnormal uterine bleeding (AUB)     Anemia     Anxiety     Arthritis Unknown    Asthma 2014    Chronic pain     Chronic respiratory failure (H)     Constipation     Depression     DVT (deep venous thrombosis) (H)     Gastrointestinal tube present (H)     History of blood transfusion     Obesity     Sinusitis, chronic     Snoring     Spinal muscle atrophy (H)       Past Surgical History:   Procedure Laterality Date    BACK SURGERY  '    fusion of cervical and thoracic spine,  and     BIOPSY      muscle biopsy    GASTROSTOMY TUBE      IR CHEST PORT PLACEMENT > 5 YRS OF AGE  2018    IR CVC TUNNEL W2 CATH W/O PORT  2018    IR IVC FILTER PLACEMENT      IR IVC FILTER REMOVAL  2024    IR PORT CHECK RIGHT  10/23/2023    right chest    KIDNEY SURGERY      kidney stones    SPINE SURGERY      reservoir placed, has had three total procedures    TRACHEOSTOMY        Allergies   Allergen Reactions    Ibuprofen       Social History     Tobacco Use    Smoking status: Never    Smokeless tobacco: Never   Substance Use Topics    Alcohol use: Never      Wt Readings from Last 1 Encounters:   23 68 kg (150 lb)        Anesthesia Evaluation            ROS/MED HX  ENT/Pulmonary: Comment: Chronic vent dependence      Neurologic:       Cardiovascular:       METS/Exercise Tolerance:     Hematologic:     (+) History of blood clots,    pt is anticoagulated,           Musculoskeletal: Comment: Spinal muscle atrophy      GI/Hepatic:       Renal/Genitourinary:       Endo:       Psychiatric/Substance Use:       Infectious Disease:       Malignancy:       Other:               OUTSIDE LABS:  CBC:   Lab Results   Component Value Date    WBC 6.4 2024    WBC  "7.6 03/27/2024    HGB 12.1 05/30/2024    HGB 9.6 (L) 03/27/2024    HCT 38.7 05/30/2024    HCT 30.3 (L) 03/27/2024     05/30/2024     (H) 03/27/2024     BMP:   Lab Results   Component Value Date     05/30/2024     01/24/2024    POTASSIUM 3.7 05/30/2024    POTASSIUM 4.4 01/24/2024    CHLORIDE 106 05/30/2024    CHLORIDE 113 (H) 01/24/2024    CO2 18 (L) 05/30/2024    CO2 17 (L) 01/24/2024    BUN 6.7 05/30/2024    BUN 6.1 01/24/2024    CR 0.06 (L) 05/30/2024    CR 0.08 (L) 01/24/2024     (H) 05/30/2024     (H) 01/24/2024     COAGS:   Lab Results   Component Value Date    PTT 60 (H) 10/08/2019    INR 1.15 01/24/2024     POC: No results found for: \"BGM\", \"HCG\", \"HCGS\"  HEPATIC:   Lab Results   Component Value Date    ALBUMIN 3.9 05/30/2024    PROTTOTAL 7.3 05/30/2024    ALT 17 05/30/2024    AST 22 05/30/2024    ALKPHOS 107 05/30/2024    BILITOTAL 0.3 05/30/2024     OTHER:   Lab Results   Component Value Date    CHINTAN 8.7 05/30/2024    PHOS 2.6 02/19/2021    MAG 1.8 12/17/2020    TSH 0.99 05/30/2024    T4 1.37 05/30/2024       Anesthesia Plan    ASA Status:  3       Anesthesia Type: MAC.   Induction: Intravenous.   Maintenance: TIVA.        Consents            Postoperative Care       PONV prophylaxis: Background Propofol Infusion, Ondansetron (or other 5HT-3)     Comments:               Stella Huerta MD    I have reviewed the pertinent notes and labs in the chart from the past 30 days and (re)examined the patient.  Any updates or changes from those notes are reflected in this note.                  "

## 2024-09-03 NOTE — PROGRESS NOTES
Dr. Stella Huerta gave the ok for patient to take home pain medication. Dilaudid tab for better pain control for going home. Pt's mom crushed medication and it was given via pt's Gtube

## 2024-09-04 LAB
HPV HR 12 DNA CVX QL NAA+PROBE: NEGATIVE
HPV16 DNA CVX QL NAA+PROBE: NEGATIVE
HPV18 DNA CVX QL NAA+PROBE: NEGATIVE
HUMAN PAPILLOMA VIRUS FINAL DIAGNOSIS: NORMAL

## 2024-09-04 RX ORDER — ALBUTEROL SULFATE 0.83 MG/ML
SOLUTION RESPIRATORY (INHALATION)
Qty: 360 ML | Refills: 3 | Status: SHIPPED | OUTPATIENT
Start: 2024-09-04

## 2024-09-04 NOTE — TELEPHONE ENCOUNTER
LVD:  8/29/2024  [facility] Jimmy Patel MD  Family Medicine     Refilled per protocol.

## 2024-09-05 ENCOUNTER — PATIENT OUTREACH (OUTPATIENT)
Dept: CARE COORDINATION | Facility: CLINIC | Age: 34
End: 2024-09-05
Payer: MEDICARE

## 2024-09-05 ASSESSMENT — ACTIVITIES OF DAILY LIVING (ADL)
DEPENDENT_IADLS:: CLEANING;COOKING;LAUNDRY;SHOPPING;MEAL PREPARATION;MEDICATION MANAGEMENT;MONEY MANAGEMENT;TRANSPORTATION;INCONTINENCE

## 2024-09-05 NOTE — PROGRESS NOTES
Social Work - Intervention  [facility]  Data/Intervention:    Patient Name:  Lanie Neil  /Age:  1990 (34 year old)     Visit Type: Consultation with care team/chart review  Referral Source: Dr. Moseley  Reason for Referral: care at home    Collaborated With:    -SULEMA Naranjo     Psychosocial Information/Concerns:   received a Social Work referral order from Dr. Moseley for care at home.     Intervention/Education/Resources Provided:  Per chart review, these concerns were/are being addressed by RNC[facility], Siri Naranjo. Due to duplicative nature of services,  will close this referral.     Assessment/Plan:   will not follow up on this referral, but will remain available in the future as needed.     Charito De La Cruz [facility]  Clinical , Outpatient Specialty Clinics  [facility] and Surgery [facility]  Direct Phone: [phone]

## 2024-09-05 NOTE — PROGRESS NOTES
Addendum:  [facility] unable to help with port cares and labs. RN will fax to [facility] to see if skilled nursing HC able to assist with this. RN will continue to work towards finding a HC agency that is able to accept. HC order signed with verbal readback to PCP. PCP has also been included on messages with [facility].     NAVI HAMILTON RN on 9/5/2024 at 12:03 PM    Addendum:  [facility] accepted patient for HC with start date for 9/14. RN will graduate patient from program at this time.     NAVI HAMILTON RN on 9/11/2024 at 8:25 AM

## 2024-09-05 NOTE — PROGRESS NOTES
Clinic Care Coordination Contact  Clinic Care Coordination Contact  OUTREACH    Referral Information:  Referral Source: PCP         Chief Complaint   Patient presents with    Clinic Care Coordination - Initial        Universal Utilization: 93.7% Admission or ED Risk      Utilization      No Show Count (past year)  3             ED Visits  0             Hospital Admissions  2                    Current as of: 9/4/2024  9:10 PM                Clinical Concerns:  Current Medical Concerns:    RN called and spoke with patient's mother (no consent, mother is caregiver, no ACP docs- to work on obtaining at next visit). Per pt's mother, patient does not have any medications ([facility] reached out via staff message, [facility] and provider not aware). Pt needs a flush every month for her port and port cares, as well as lab work to be completed if there are labs ordered. RN informed pt's mom that would check with [facility], and that we or they would be in touch.   Current Behavioral Concerns: N/A    Education Provided to patient: N/A      Health Maintenance Reviewed:      Functional Status:  Dependent ADLs:: Bathing, Dressing, Eating, Grooming, Incontinence, Positioning, Transfers, Wheelchair-with assist, Toileting  Dependent IADLs:: Cleaning, Cooking, Laundry, Shopping, Meal Preparation, Medication Management, Money Management, Transportation, Incontinence  Bed or wheelchair confined:: Yes  Mobility Status: Dependent/Assisted by Another    Living Situation:  Current living arrangement:: I live in a private home with family    Lifestyle & Psychosocial Needs:    Social Determinants of Health     Food Insecurity: Low Risk  (3/21/2024)    Food Insecurity     Within the past 12 months, did you worry that your food would run out before you got money to buy more?: No     Within the past 12 months, did the food you bought just not last and you didn t have money to get more?: No   Depression: Not at risk  (8/28/2024)    PHQ-2     PHQ-2 Score: 2   Housing Stability: Low Risk  (3/21/2024)    Housing Stability     Do you have housing? : Yes     Are you worried about losing your housing?: No   Tobacco Use: Low Risk  (9/3/2024)    Patient History     Smoking Tobacco Use: Never     Smokeless Tobacco Use: Never     Passive Exposure: Not on file   Financial Resource Strain: Low Risk  (3/21/2024)    Financial Resource Strain     Within the past 12 months, have you or your family members you live with been unable to get utilities (heat, electricity) when it was really needed?: No   Alcohol Use: Not on file   Transportation Needs: Low Risk  (3/21/2024)    Transportation Needs     Within the past 12 months, has lack of transportation kept you from medical appointments, getting your medicines, non-medical meetings or appointments, work, or from getting things that you need?: No   Physical Activity: Not on file   Interpersonal Safety: Low Risk  (9/3/2024)    Interpersonal Safety     Do you feel physically and emotionally safe where you currently live?: Yes     Within the past 12 months, have you been hit, slapped, kicked or otherwise physically hurt by someone?: No     Within the past 12 months, have you been humiliated or emotionally abused in other ways by your partner or ex-partner?: No   Stress: Not on file   Social Connections: Not on file   Health Literacy: Not on file              Chemical Dependency Status: No Current Concerns      Resources and Interventions:  Current Resources:         Supplies Currently Used at Home: Oxygen Tubing/Supplies  Equipment Currently Used at Home: wheelchair, power, lift device, shower chair  Employment Status: disabled         Advance Care Plan/Directive  Advanced Care Plans/Directives on file:: No  Discussed with patient/caregiver:: Declined Further Information    Referrals Placed: Home Infusion     Care Plan:  Care Plan: Help At Home       Problem: Insufficient In-home support       Goal:  Establish adequate home support       Note:     Barriers: patient is home bound, lost services for skilled nursing, home infusion  Strengths: patient's mother is able to help  Patient expressed understanding of goal: yes  Action steps to achieve this goal:  1. I will work with RN Care Coordinator to get home infusion services (port flushes, blood work).                                   Patient/Caregiver understanding: Patient states understanding. Patient has no further questions or concerns regarding above assessment. Patient is aware to call the clinic and reach out to the care team with any further questions.          Future Appointments                In 1 week Peter Bravo LICSW [facility], [facility]    [facility]    In 4 weeks Peter Bravo [facility]    In 4 weeks Barbara Lewis MD [facility]    In 1 month [facility]            Plan: RN to follow up with patient.     NAVI HAMILTON RN on 9/5/2024 at 9:42 AM

## 2024-09-06 DIAGNOSIS — G12.9 SPINAL MUSCLE ATROPHY (H): ICD-10-CM

## 2024-09-06 LAB
BKR LAB AP GYN ADEQUACY: NORMAL
BKR LAB AP GYN INTERPRETATION: NORMAL
BKR LAB AP LMP: NORMAL
PATH REPORT.COMMENTS IMP SPEC: NORMAL
PATH REPORT.COMMENTS IMP SPEC: NORMAL

## 2024-09-06 RX ORDER — DIAZEPAM 5 MG
5 TABLET ORAL EVERY 6 HOURS PRN
Qty: 30 TABLET | Refills: 0 | Status: SHIPPED | OUTPATIENT
Start: 2024-09-06

## 2024-09-06 NOTE — TELEPHONE ENCOUNTER
diazepam (VALIUM) 5 MG tablet 30 tablet 0 6/3/2024     Last Office Visit: 8/29/24  Future Office visit:   none    Routing refill request to provider for review/approval because:  Controlled medication    Mihaela Moseley RN  [facility] Central Nursing/Red Flag Triage & Med Refill Team

## 2024-09-10 ENCOUNTER — TELEPHONE (OUTPATIENT)
Dept: FAMILY MEDICINE | Facility: CLINIC | Age: 34
End: 2024-09-10
Payer: MEDICARE

## 2024-09-10 NOTE — TELEPHONE ENCOUNTER
[facility] Call Center    Phone Message    May a detailed message be left on voicemail: yes     Reason for Call: Order(s): Home Care Orders: Other: delay start of care per patient request until 09/14/2024 for home care. Please call and advise.

## 2024-09-10 NOTE — TELEPHONE ENCOUNTER
Spoke with Silvia with [facility] and gave the following verbal order(s): Home Care Orders: delay start of care per patient request until 09/14/2024 for home care.  Treasure MELVIN LPN  [facility]

## 2024-09-13 ENCOUNTER — TELEPHONE (OUTPATIENT)
Dept: FAMILY MEDICINE | Facility: CLINIC | Age: 34
End: 2024-09-13
Payer: MEDICARE

## 2024-09-13 NOTE — TELEPHONE ENCOUNTER
[facility] Call Center    Phone Message    May a detailed message be left on voicemail: yes     Reason for Call: Other: Pt home care team is reaching out to us to let the provider know that the Pt is not able to get any skilled nursing [facility] as it would not be covered though insurance. Please advise.      Action Taken: Other: PCC    Travel Screening: Not Applicable     Date of Service: 9/13/24

## 2024-09-16 ENCOUNTER — PATIENT OUTREACH (OUTPATIENT)
Dept: CARE COORDINATION | Facility: CLINIC | Age: 34
End: 2024-09-16
Payer: MEDICARE

## 2024-09-16 NOTE — TELEPHONE ENCOUNTER
RN returned call to Jacqueline, director of patient care services. Please see patient outreach encounter for further information.     NAVI HAMILTON RN on 9/16/2024 at 7:59 AM

## 2024-09-16 NOTE — LETTER
PRIMARY CARE CARE COORDINATION   [facility]  [address] [place] [id number]    September 18, 2024    Lanie Neil  1909 [facility] [address]    Please feel free to call me directly regarding the status of this referral - [phone].   Thanks,  Siri VELIZ RN Care Manager  [facility]   Phone: [phone]  Fax: [phone]        Documentation of Face to Face and Certification for Home Health Services     I attest that I saw or will see Lanie Neil on this date:  8/29/2024     This encounter with the patient was in whole, or in part, for medical condition, which is the primary reason for Home Health Care:       Patient Active Problem List   Diagnosis    Constipation    Asthma    Spinal muscle atrophy (H)    Dyspepsia and other specified disorders of function of stomach    Sinusitis, chronic    DVT (deep venous thrombosis) (H)    Chronic respiratory failure (H)    Chronic pain syndrome    Anemia due to blood loss, acute    Iron deficiency anemia due to chronic blood loss    Chronic anticoagulation    Hypokalemia    Acute lower GI bleeding    Anticoagulated    Anxiety disorder    At high risk for falls    Chronic hip pain    History of DVT (deep vein thrombosis)    History of depression    History of arthrodesis    Gram-negative bacteremia    G tube feedings (H)    Depression    Dependent on ventilator (H)    Coagulation disorder (H24)    Presence of IVC filter    Presence of intrathecal pump    Intravenous catheter in place    Hypophosphatemia    Hypomagnesemia    Hypocalcemia    History of renal calculi      I certify that, based on my findings, the following services are medically necessary Home Health Services: Skilled Nursing Other port flushes monthly, occassional labs      Additional services needed:        Further, I certify that my clinical findings support that this patient is homebound (i.e. absences from home require considerable and taxing  effort and are for medical reasons or [employer] services or infrequently or of short duration when for other reasons) related to:Patient has difficulty ambulating >100 ft  Dyspnea on exertion that makes it unsafe to leave home without clinical deterioration  Infection risk / immunocompromised state where it is safer for them to receive services in home  Requires assistance of another person or specialized equipment is needed     Based on the above findings, I certify that this patient is confined to the home and needs intermittent skilled nursing care, physical therapy and/or speech therapy. The patient is under my care, and I have initiated the establishment of the plan of care. This patient will be followed by a physician who will periodically review the plan of care.        Physician/Provider to provide follow up care: Provider to follow patient: JOSE MOSELEY [[id number]]        Please be aware that coverage of these services is subject to the terms and limitations of your health insurance plan.  Call member services at your health plan with any benefit or coverage questions.  _______________________________________________________________________  Authorized by:  Jose Moseley MD       PLEASE EVALUATE AND TREAT (Evaluation timeline is 24 - 48 hrs. Please call if there is need for a variance to this timeline).     Medications:         Current Outpatient Medications   Medication Sig Dispense Refill    ACETAMINOPHEN PO Take 650 mg by mouth every 4 hours as needed for pain        albuterol (PROAIR HFA) 108 (90 Base) MCG/ACT inhaler Inhale 2 puffs into the lungs every 4 hours as needed for shortness of breath or wheezing 18 g 5    albuterol (PROVENTIL) (2.5 MG/3ML) 0.083% neb solution USE ONE VIAL BY NEBULIZATION EVERY 6 HOURS AS NEEDED FOR SHORTNESS OF BREATH/ DYSPNEA OR WHEEZING 360 mL 3    ALPRAZolam (XANAX) 0.25 MG tablet Take 1 tablet (0.25 mg) by mouth 3 times daily as needed for anxiety. 60 tablet 1     aminocaproic acid (AMICAR) 0.25 GM/ML solution Take 12 mL (3 grams) via G-tube every 8 hours during menstrual period. 473 mL 5    bisacodyl (DULCOLAX) 10 MG suppository Place 10 mg rectally daily as needed for constipation. Patient also takes Bisacodyl Dulcolax liquid, 20 ml BID as needed for consitpation        celecoxib (CELEBREX) 200 MG capsule Take 1 capsule (200 mg) by mouth daily On days of period 12 capsule 5    clindamycin (CLEOCIN T) 1 % lotion Apply topically 2 times daily To face as needed 60 mL 3    diazepam (VALIUM) 5 MG tablet Take 1 tablet (5 mg) by mouth every 6 hours as needed for muscle spasms. 30 tablet 0    eflornithine HCl 13.9 % CREA Externally apply topically 2 times daily Apply thin layer of cream to affected areas of face and adjacent chin twice daily, at least 8 hours apart. 45 g 3    EVRYSDI 0.75 MG/ML oral solution 6.6 mLs by Oral or NG Tube route every morning        famotidine (PEPCID) 20 MG tablet Take 1 tablet (20 mg) by mouth 2 times daily 180 tablet 3    fluocinolone acetonide (DERMA SMOOTHE/FS BODY) 0.01 % external oil Apply at nighttime to forehead and scalp as needed for scale or pruritus (Patient taking differently: Apply topically at bedtime. Apply at nighttime to forehead and scalp as needed for scale or pruritus) 118 mL 3    fluticasone (FLOVENT HFA) 110 MCG/ACT inhaler Inhale 1 puff into the lungs 2 times daily (Patient taking differently: Inhale 1 puff into the lungs 2 times daily. PATIENT NOT TAKING CURRENTLY BUT MIGHT IN THE FUTURE) 36 g 5    gabapentin (NEURONTIN) 250 MG/5ML solution 20 mLs (1,000 mg) by Per Feeding Tube route 3 times daily 1800 mL 5    GENERLAC 10 GM/15ML solution PATIENT NOT TAKING CURRENTLY   11    guaiFENesin (ORGANIDIN) 200 MG TABS tablet Take 200 mg by mouth every 4 hours as needed for cough. ONLY TAKES THIS WHEN THE PATIENT HAS A COLD        HYDROmorphone (DILAUDID) 4 MG tablet Take 8 mg by mouth every 3 hours        hydrOXYzine (ATARAX) 25 MG  tablet Take 25 mg by mouth every 6 hours as needed for itching. PATIENT NOT TAKING CURRENTLY        ketoconazole (NIZORAL) 2 % external shampoo Lather onto scalp and forehead once weekly when showering and let sit for 1-2 minutes before rinsing 120 mL 11    levofloxacin (LEVAQUIN) 25 MG/ML solution TAKE 20ML BY FEEDING TUBE ONCE DAILY FOR ONE WEEK (Patient taking differently: TAKE 20ML BY FEEDING TUBE ONCE DAILY FOR ONE WEEK  CURRENTLY NOT TAKING - TAKES IT WHEN SHE HAS A UTI) 480 mL 1    Magnesium Hydroxide (MILK OF MAGNESIA PO) Take 30 mLs by mouth as needed        metroNIDAZOLE (METROCREAM) 0.75 % external cream Apply topically 2 times daily (Patient taking differently: Apply topically 2 times daily. PATIENT NOT TAKING CURRENTLY) 45 g 11    Minoxidil (MINOXIDIL FOR WOMEN) 5 % FOAM Apply 1/2 capful once a day to affected area (Patient taking differently: Apply 1/2 capful once a day to affected area  PATIENT NOT TAKING CURRENTLY) 60 g 3    mometasone (NASONEX) 50 MCG/ACT nasal spray Spray 2 sprays in nostril daily as needed. PATIENT NOT TAKING CURRENTLY        morphine (SABINE) 30 MG 24 hr capsule Take 30 mg by mouth 2 times daily        naloxone (NARCAN) 4 MG/0.1ML nasal spray Spray 1 spray (4 mg) into one nostril alternating nostrils once as needed for opioid reversal every 2-3 minutes until assistance arrives 0.2 mL 0    Nutritional Supplements (NUTREN 1.0/FIBER) LIQD Per G tube; 250 ml Per G Tube qid 68465 mL 11    ondansetron (ZOFRAN-ODT) 4 MG ODT tab Take 1 tablet (4 mg) by mouth every 8 hours as needed for nausea 30 tablet 1    order for DME Equipment being ordered: 16 fr 5cc balloon indwelling catheter with drainage bag. 2 catheters/month 2 catheter 11    order for DME Equipment being ordered: Vibracare Percussor 1 Units 0    polyethylene glycol (MIRALAX) 17 GM/Dose powder Take 17 g by mouth daily (Patient taking differently: Take 17 g by mouth daily as needed.) 714 g 5    potassium chloride (KAYCIEL) 20  MEQ/15ML (10%) solution 22.5 mLs (30 mEq) by Per G Tube route daily 675 mL 11    prochlorperazine (COMPAZINE) 5 MG tablet Take 5 mg by mouth every 6 hours as needed for nausea or vomiting        rivaroxaban ANTICOAGULANT (XARELTO ANTICOAGULANT) 10 MG TABS tablet 1 tablet (10 mg) by Oral or Feeding Tube route daily with food 90 tablet 3    Salicylic Acid (OXY BALANCE FACIAL CLEAN WASH EX) Externally apply 1 pad. topically daily.        Sennosides (SENNA) 8.8 MG/5ML LIQD 15 mLs by Feeding route 2 times daily 900 mL 11    sertraline (ZOLOFT) 20 MG/ML (HIGH CONC) solution TAKE 2.5 ML(50 MG) BY MOUTH DAILY 225 mL 1    silver nitrate (ARZOL) 75-25 % miscellaneous Use one stick apply tip to granuloma prn granuloma formation (Patient taking differently: Use one stick apply tip to granuloma prn granuloma formation  Uses around the trach stoma) 20 applicator 1    silver sulfADIAZINE (SSD) 1 % external cream Apply topically 2 times daily To affected areas-apply bid to wounds till healed 400 g 11    simethicone 40 MG/0.6ML LIQD Place 40 mg into NG or OG tube as needed (PRN GAS).        traZODone (DESYREL) 5 mg/ml SUSP Take 15 ml (75 mg) po at bedtime Prn insomnia        tretinoin (RETIN-A) 0.025 % external cream Apply a pea-sized amount evenly over the face at nighttime before bed. (Patient taking differently: at bedtime. Apply a pea-sized amount evenly over the face at nighttime before bed.  NOT CURRENTLY USING) 45 g 2      Problems:      Patient Active Problem List   Diagnosis    Constipation    Asthma    Spinal muscle atrophy (H)    Dyspepsia and other specified disorders of function of stomach    Sinusitis, chronic    DVT (deep venous thrombosis) (H)    Chronic respiratory failure (H)    Chronic pain syndrome    Anemia due to blood loss, acute    Iron deficiency anemia due to chronic blood loss    Chronic anticoagulation    Hypokalemia    Acute lower GI bleeding    Anticoagulated    Anxiety disorder    At high risk for  falls    Chronic hip pain    History of DVT (deep vein thrombosis)    History of depression    History of arthrodesis    Gram-negative bacteremia    G tube feedings (H)    Depression    Dependent on ventilator (H)    Coagulation disorder (H24)    Presence of IVC filter    Presence of intrathecal pump    Intravenous catheter in place    Hypophosphatemia    Hypomagnesemia    Hypocalcemia    History of renal calculi      Diet:  None        Code Status:    Code Status: Not on file     Allergies:  Ibuprofen   [facility] Coordination  [facility] [address]  Phone:  [phone]  Fax:    Patient:     Lanie Neil MRN:  [id number]   [address] [facility] [place] [address] :  1990  Sex:  F   Phone: [phone]        INSURANCE PAYOR PLAN GROUP # SUBSCRIBER ID   Primary:  Secondary:    MEDICARE  MEDICA [id number]  [id number]    [id number]  [id number]           Allergies   Allergen Reactions    Ibuprofen        Order Date:  Sep 5, 2024  Home Care Referral       (Order ID: [id number])     Diagnosis:  Spinal muscle atrophy (H) (G12.9)   Priority:  Routine: Next available opening Expiration Date:   Interval:   Count:    Reason for Referral: Skilled Nursing  Skilled Nursing Eval and Treat for: Other  Other Reason for Referral: port flushes monthly, occassional labs  Is the patient homebound? Yes  Homebound Status (describe the functional limitations that support this patient is confined to his/her home. Medicaid recipients are not required to be homebound.): Patient has difficulty ambulating >100 ft  Homebound Status (describe the functional limitations that support this patient is confined to his/her home. Medicaid recipients are not required to be homebound.): Dyspnea on exertion that makes it unsafe to leave home without clinical deterioration  Homebound Status (describe the functional limitations that support this patient is confined to his/her home. Medicaid recipients are not required  to be homebound.): Infection risk / immunocompromised state where it is safer for them to receive services in home  Homebound Status (describe the functional limitations that support this patient is confined to his/her home. Medicaid recipients are not required to be homebound.): Requires assistance of another person or specialized equipment is needed  I attest that I saw or will see the patient on this date: 8/29/2024  Provider to follow patient: JOSE MOSELEY [[id number]]  Ordered by: Siri Naranjo RN  Authorized by:  Jose Moseley MD   (NPI: [id number])

## 2024-09-16 NOTE — LETTER
PRIMARY CARE CARE COORDINATION   [facility]  [address] [place] [id number]    September 24, 2024    Lanie Neil  [address] [facility] [address]      Please feel free to call me directly regarding the status of this referral - [phone].   Thanks,  Siri VELIZ, RN Care Manager  [facility]   Phone: [phone]  Fax: [phone]        Documentation of Face to Face and Certification for Home Health Services     I attest that I saw or will see Lanie Chunira on this date:  8/29/2024     This encounter with the patient was in whole, or in part, for medical condition, which is the primary reason for Home Health Care:       Patient Active Problem List   Diagnosis    Constipation    Asthma    Spinal muscle atrophy (H)    Dyspepsia and other specified disorders of function of stomach    Sinusitis, chronic    DVT (deep venous thrombosis) (H)    Chronic respiratory failure (H)    Chronic pain syndrome    Anemia due to blood loss, acute    Iron deficiency anemia due to chronic blood loss    Chronic anticoagulation    Hypokalemia    Acute lower GI bleeding    Anticoagulated    Anxiety disorder    At high risk for falls    Chronic hip pain    History of DVT (deep vein thrombosis)    History of depression    History of arthrodesis    Gram-negative bacteremia    G tube feedings (H)    Depression    Dependent on ventilator (H)    Coagulation disorder (H24)    Presence of IVC filter    Presence of intrathecal pump    Intravenous catheter in place    Hypophosphatemia    Hypomagnesemia    Hypocalcemia    History of renal calculi      I certify that, based on my findings, the following services are medically necessary Home Health Services: Skilled Nursing Other port flushes monthly, occassional labs      Additional services needed:        Further, I certify that my clinical findings support that this patient is homebound (i.e. absences from home require considerable and taxing  effort and are for medical reasons or [employer] services or infrequently or of short duration when for other reasons) related to:Patient has difficulty ambulating >100 ft  Dyspnea on exertion that makes it unsafe to leave home without clinical deterioration  Infection risk / immunocompromised state where it is safer for them to receive services in home  Requires assistance of another person or specialized equipment is needed     Based on the above findings, I certify that this patient is confined to the home and needs intermittent skilled nursing care, physical therapy and/or speech therapy. The patient is under my care, and I have initiated the establishment of the plan of care. This patient will be followed by a physician who will periodically review the plan of care.        Physician/Provider to provide follow up care: Provider to follow patient: JOSE MOSELEY [[id number]]        Please be aware that coverage of these services is subject to the terms and limitations of your health insurance plan.  Call member services at your health plan with any benefit or coverage questions.  _______________________________________________________________________  Authorized by:  Jose Moseley MD       PLEASE EVALUATE AND TREAT (Evaluation timeline is 24 - 48 hrs. Please call if there is need for a variance to this timeline).     Medications:         Current Outpatient Medications   Medication Sig Dispense Refill    ACETAMINOPHEN PO Take 650 mg by mouth every 4 hours as needed for pain        albuterol (PROAIR HFA) 108 (90 Base) MCG/ACT inhaler Inhale 2 puffs into the lungs every 4 hours as needed for shortness of breath or wheezing 18 g 5    albuterol (PROVENTIL) (2.5 MG/3ML) 0.083% neb solution USE ONE VIAL BY NEBULIZATION EVERY 6 HOURS AS NEEDED FOR SHORTNESS OF BREATH/ DYSPNEA OR WHEEZING 360 mL 3    ALPRAZolam (XANAX) 0.25 MG tablet Take 1 tablet (0.25 mg) by mouth 3 times daily as needed for anxiety. 60 tablet 1     aminocaproic acid (AMICAR) 0.25 GM/ML solution Take 12 mL (3 grams) via G-tube every 8 hours during menstrual period. 473 mL 5    bisacodyl (DULCOLAX) 10 MG suppository Place 10 mg rectally daily as needed for constipation. Patient also takes Bisacodyl Dulcolax liquid, 20 ml BID as needed for consitpation        celecoxib (CELEBREX) 200 MG capsule Take 1 capsule (200 mg) by mouth daily On days of period 12 capsule 5    clindamycin (CLEOCIN T) 1 % lotion Apply topically 2 times daily To face as needed 60 mL 3    diazepam (VALIUM) 5 MG tablet Take 1 tablet (5 mg) by mouth every 6 hours as needed for muscle spasms. 30 tablet 0    eflornithine HCl 13.9 % CREA Externally apply topically 2 times daily Apply thin layer of cream to affected areas of face and adjacent chin twice daily, at least 8 hours apart. 45 g 3    EVRYSDI 0.75 MG/ML oral solution 6.6 mLs by Oral or NG Tube route every morning        famotidine (PEPCID) 20 MG tablet Take 1 tablet (20 mg) by mouth 2 times daily 180 tablet 3    fluocinolone acetonide (DERMA SMOOTHE/FS BODY) 0.01 % external oil Apply at nighttime to forehead and scalp as needed for scale or pruritus (Patient taking differently: Apply topically at bedtime. Apply at nighttime to forehead and scalp as needed for scale or pruritus) 118 mL 3    fluticasone (FLOVENT HFA) 110 MCG/ACT inhaler Inhale 1 puff into the lungs 2 times daily (Patient taking differently: Inhale 1 puff into the lungs 2 times daily. PATIENT NOT TAKING CURRENTLY BUT MIGHT IN THE FUTURE) 36 g 5    gabapentin (NEURONTIN) 250 MG/5ML solution 20 mLs (1,000 mg) by Per Feeding Tube route 3 times daily 1800 mL 5    GENERLAC 10 GM/15ML solution PATIENT NOT TAKING CURRENTLY   11    guaiFENesin (ORGANIDIN) 200 MG TABS tablet Take 200 mg by mouth every 4 hours as needed for cough. ONLY TAKES THIS WHEN THE PATIENT HAS A COLD        HYDROmorphone (DILAUDID) 4 MG tablet Take 8 mg by mouth every 3 hours        hydrOXYzine (ATARAX) 25 MG  tablet Take 25 mg by mouth every 6 hours as needed for itching. PATIENT NOT TAKING CURRENTLY        ketoconazole (NIZORAL) 2 % external shampoo Lather onto scalp and forehead once weekly when showering and let sit for 1-2 minutes before rinsing 120 mL 11    levofloxacin (LEVAQUIN) 25 MG/ML solution TAKE 20ML BY FEEDING TUBE ONCE DAILY FOR ONE WEEK (Patient taking differently: TAKE 20ML BY FEEDING TUBE ONCE DAILY FOR ONE WEEK  CURRENTLY NOT TAKING - TAKES IT WHEN SHE HAS A UTI) 480 mL 1    Magnesium Hydroxide (MILK OF MAGNESIA PO) Take 30 mLs by mouth as needed        metroNIDAZOLE (METROCREAM) 0.75 % external cream Apply topically 2 times daily (Patient taking differently: Apply topically 2 times daily. PATIENT NOT TAKING CURRENTLY) 45 g 11    Minoxidil (MINOXIDIL FOR WOMEN) 5 % FOAM Apply 1/2 capful once a day to affected area (Patient taking differently: Apply 1/2 capful once a day to affected area  PATIENT NOT TAKING CURRENTLY) 60 g 3    mometasone (NASONEX) 50 MCG/ACT nasal spray Spray 2 sprays in nostril daily as needed. PATIENT NOT TAKING CURRENTLY        morphine (SABINE) 30 MG 24 hr capsule Take 30 mg by mouth 2 times daily        naloxone (NARCAN) 4 MG/0.1ML nasal spray Spray 1 spray (4 mg) into one nostril alternating nostrils once as needed for opioid reversal every 2-3 minutes until assistance arrives 0.2 mL 0    Nutritional Supplements (NUTREN 1.0/FIBER) LIQD Per G tube; 250 ml Per G Tube qid 70574 mL 11    ondansetron (ZOFRAN-ODT) 4 MG ODT tab Take 1 tablet (4 mg) by mouth every 8 hours as needed for nausea 30 tablet 1    order for DME Equipment being ordered: 16 fr 5cc balloon indwelling catheter with drainage bag. 2 catheters/month 2 catheter 11    order for DME Equipment being ordered: Vibracare Percussor 1 Units 0    polyethylene glycol (MIRALAX) 17 GM/Dose powder Take 17 g by mouth daily (Patient taking differently: Take 17 g by mouth daily as needed.) 714 g 5    potassium chloride (KAYCIEL) 20  MEQ/15ML (10%) solution 22.5 mLs (30 mEq) by Per G Tube route daily 675 mL 11    prochlorperazine (COMPAZINE) 5 MG tablet Take 5 mg by mouth every 6 hours as needed for nausea or vomiting        rivaroxaban ANTICOAGULANT (XARELTO ANTICOAGULANT) 10 MG TABS tablet 1 tablet (10 mg) by Oral or Feeding Tube route daily with food 90 tablet 3    Salicylic Acid (OXY BALANCE FACIAL CLEAN WASH EX) Externally apply 1 pad. topically daily.        Sennosides (SENNA) 8.8 MG/5ML LIQD 15 mLs by Feeding route 2 times daily 900 mL 11    sertraline (ZOLOFT) 20 MG/ML (HIGH CONC) solution TAKE 2.5 ML(50 MG) BY MOUTH DAILY 225 mL 1    silver nitrate (ARZOL) 75-25 % miscellaneous Use one stick apply tip to granuloma prn granuloma formation (Patient taking differently: Use one stick apply tip to granuloma prn granuloma formation  Uses around the trach stoma) 20 applicator 1    silver sulfADIAZINE (SSD) 1 % external cream Apply topically 2 times daily To affected areas-apply bid to wounds till healed 400 g 11    simethicone 40 MG/0.6ML LIQD Place 40 mg into NG or OG tube as needed (PRN GAS).        traZODone (DESYREL) 5 mg/ml SUSP Take 15 ml (75 mg) po at bedtime Prn insomnia        tretinoin (RETIN-A) 0.025 % external cream Apply a pea-sized amount evenly over the face at nighttime before bed. (Patient taking differently: at bedtime. Apply a pea-sized amount evenly over the face at nighttime before bed.  NOT CURRENTLY USING) 45 g 2      Problems:      Patient Active Problem List   Diagnosis    Constipation    Asthma    Spinal muscle atrophy (H)    Dyspepsia and other specified disorders of function of stomach    Sinusitis, chronic    DVT (deep venous thrombosis) (H)    Chronic respiratory failure (H)    Chronic pain syndrome    Anemia due to blood loss, acute    Iron deficiency anemia due to chronic blood loss    Chronic anticoagulation    Hypokalemia    Acute lower GI bleeding    Anticoagulated    Anxiety disorder    At high risk for  falls    Chronic hip pain    History of DVT (deep vein thrombosis)    History of depression    History of arthrodesis    Gram-negative bacteremia    G tube feedings (H)    Depression    Dependent on ventilator (H)    Coagulation disorder (H24)    Presence of IVC filter    Presence of intrathecal pump    Intravenous catheter in place    Hypophosphatemia    Hypomagnesemia    Hypocalcemia    History of renal calculi      Diet:  None        Code Status:    Code Status: Not on file     Allergies:  Ibuprofen   [facility]  [facility] [address]  Phone:  [phone]  Fax:    Patient:     Lanie Neil MRN:  [id number]   [address] [facility] [address] :  1990  Sex:  F   Phone: [phone]        INSURANCE PAYOR PLAN GROUP # SUBSCRIBER ID   Primary:  Secondary:    MEDICARE  MEDICA [id number]  [id number]    [id number]  [id number]           Allergies   Allergen Reactions    Ibuprofen        Order Date:  Sep 5, 2024  Home Care Referral       (Order ID: [id number])     Diagnosis:  Spinal muscle atrophy (H) (G12.9)   Priority:  Routine: Next available opening Expiration Date:   Interval:   Count:    Reason for Referral: Skilled Nursing  Skilled Nursing Eval and Treat for: Other  Other Reason for Referral: port flushes monthly, occassional labs  Is the patient homebound? Yes  Homebound Status (describe the functional limitations that support this patient is confined to his/her home. Medicaid recipients are not required to be homebound.): Patient has difficulty ambulating >100 ft  Homebound Status (describe the functional limitations that support this patient is confined to his/her home. Medicaid recipients are not required to be homebound.): Dyspnea on exertion that makes it unsafe to leave home without clinical deterioration  Homebound Status (describe the functional limitations that support this patient is confined to his/her home. Medicaid recipients are not required  to be homebound.): Infection risk / immunocompromised state where it is safer for them to receive services in home  Homebound Status (describe the functional limitations that support this patient is confined to his/her home. Medicaid recipients are not required to be homebound.): Requires assistance of another person or specialized equipment is needed  I attest that I saw or will see the patient on this date: 8/29/2024  Provider to follow patient: JOSE MOSELEY [[id number]]  Ordered by: Siri Naranjo RN  Authorized by:  Jose Moseley MD   (NPI: [id number])

## 2024-09-16 NOTE — PROGRESS NOTES
RN assisted with finding patient HC- however received call from HC agency that unable to accept insurance. RN returned call to Jacqueline, director of patient services at [facility] inquiring about why insurance was unable to accept. RN provided direct number for call back.     NAVI HAMILTON RN on 9/16/2024 at 8:01 AM    RN did not hear back from [facility]- RN to restart Care Coordination program with intention of helping patient find a home care agency that will support patient. RN will fax HC orders to various agencies- [facility], [facility], [facility], [facility].      NAVI HAMILTON RN on 9/18/2024 at 8:45 AM    [facility]- unable to accept 9/18  [facility]- unable to accept vented client 9/18

## 2024-09-17 ENCOUNTER — TRANSFERRED RECORDS (OUTPATIENT)
Dept: HEALTH INFORMATION MANAGEMENT | Facility: CLINIC | Age: 34
End: 2024-09-17
Payer: MEDICARE

## 2024-09-18 ENCOUNTER — MYC MEDICAL ADVICE (OUTPATIENT)
Dept: CARE COORDINATION | Facility: CLINIC | Age: 34
End: 2024-09-18
Payer: MEDICARE

## 2024-09-19 ENCOUNTER — TRANSFERRED RECORDS (OUTPATIENT)
Dept: HEALTH INFORMATION MANAGEMENT | Facility: CLINIC | Age: 34
End: 2024-09-19
Payer: MEDICARE

## 2024-09-24 ENCOUNTER — PATIENT OUTREACH (OUTPATIENT)
Dept: CARE COORDINATION | Facility: CLINIC | Age: 34
End: 2024-09-24
Payer: MEDICARE

## 2024-09-24 NOTE — LETTER
PRIMARY CARE CARE COORDINATION   [facility]  [address] [place] [id number]    September 30, 2024    Lanie Neil  [address] [facility] [address]    Please feel free to call me directly regarding the status of this referral- [phone].     Thanks,  Siri VELIZ RN Care Manager  [facility]   Phone: [phone]  Fax: [phone]        Documentation of Face to Face and Certification for Home Health Services     I attest that I saw or will see Lanie Chunira on this date:  8/29/2024     This encounter with the patient was in whole, or in part, for medical condition, which is the primary reason for Home Health Care:       Patient Active Problem List   Diagnosis    Constipation    Asthma    Spinal muscle atrophy (H)    Dyspepsia and other specified disorders of function of stomach    Sinusitis, chronic    DVT (deep venous thrombosis) (H)    Chronic respiratory failure (H)    Chronic pain syndrome    Anemia due to blood loss, acute    Iron deficiency anemia due to chronic blood loss    Chronic anticoagulation    Hypokalemia    Acute lower GI bleeding    Anticoagulated    Anxiety disorder    At high risk for falls    Chronic hip pain    History of DVT (deep vein thrombosis)    History of depression    History of arthrodesis    Gram-negative bacteremia    G tube feedings (H)    Depression    Dependent on ventilator (H)    Coagulation disorder (H)    Presence of IVC filter    Presence of intrathecal pump    Intravenous catheter in place    Hypophosphatemia    Hypomagnesemia    Hypocalcemia    History of renal calculi      I certify that, based on my findings, the following services are medically necessary Home Health Services: Skilled Nursing Other port flushes monthly, occassional labs      Additional services needed:        Further, I certify that my clinical findings support that this patient is homebound (i.e. absences from home require considerable and taxing  effort and are for medical reasons or [employer] services or infrequently or of short duration when for other reasons) related to:Patient has difficulty ambulating >100 ft  Dyspnea on exertion that makes it unsafe to leave home without clinical deterioration  Infection risk / immunocompromised state where it is safer for them to receive services in home  Requires assistance of another person or specialized equipment is needed     Based on the above findings, I certify that this patient is confined to the home and needs intermittent skilled nursing care, physical therapy and/or speech therapy. The patient is under my care, and I have initiated the establishment of the plan of care. This patient will be followed by a physician who will periodically review the plan of care.        Physician/Provider to provide follow up care: Provider to follow patient: JOSE MOSELEY [[id number]]        Please be aware that coverage of these services is subject to the terms and limitations of your health insurance plan.  Call member services at your health plan with any benefit or coverage questions.  _______________________________________________________________________  Authorized by:  Jose Moseley MD       PLEASE EVALUATE AND TREAT (Evaluation timeline is 24 - 48 hrs. Please call if there is need for a variance to this timeline).     Medications:         Current Outpatient Medications   Medication Sig Dispense Refill    ACETAMINOPHEN PO Take 650 mg by mouth every 4 hours as needed for pain        albuterol (PROAIR HFA) 108 (90 Base) MCG/ACT inhaler Inhale 2 puffs into the lungs every 4 hours as needed for shortness of breath or wheezing 18 g 5    albuterol (PROVENTIL) (2.5 MG/3ML) 0.083% neb solution USE ONE VIAL BY NEBULIZATION EVERY 6 HOURS AS NEEDED FOR SHORTNESS OF BREATH/ DYSPNEA OR WHEEZING 360 mL 3    ALPRAZolam (XANAX) 0.25 MG tablet Take 1 tablet (0.25 mg) by mouth 3 times daily as needed for anxiety. 60 tablet 1     aminocaproic acid (AMICAR) 0.25 GM/ML solution Take 12 mL (3 grams) via G-tube every 8 hours during menstrual period. 473 mL 5    bisacodyl (DULCOLAX) 10 MG suppository Place 10 mg rectally daily as needed for constipation. Patient also takes Bisacodyl Dulcolax liquid, 20 ml BID as needed for consitpation        celecoxib (CELEBREX) 200 MG capsule Take 1 capsule (200 mg) by mouth daily On days of period 12 capsule 5    clindamycin (CLEOCIN T) 1 % lotion Apply topically 2 times daily To face as needed 60 mL 3    diazepam (VALIUM) 5 MG tablet Take 1 tablet (5 mg) by mouth every 6 hours as needed for muscle spasms. 30 tablet 0    eflornithine HCl 13.9 % CREA Externally apply topically 2 times daily Apply thin layer of cream to affected areas of face and adjacent chin twice daily, at least 8 hours apart. 45 g 3    EVRYSDI 0.75 MG/ML oral solution 6.6 mLs by Oral or NG Tube route every morning        famotidine (PEPCID) 20 MG tablet Take 1 tablet (20 mg) by mouth 2 times daily 180 tablet 3    fluocinolone acetonide (DERMA SMOOTHE/FS BODY) 0.01 % external oil Apply at nighttime to forehead and scalp as needed for scale or pruritus (Patient taking differently: Apply topically at bedtime. Apply at nighttime to forehead and scalp as needed for scale or pruritus) 118 mL 3    fluticasone (FLOVENT HFA) 110 MCG/ACT inhaler Inhale 1 puff into the lungs 2 times daily (Patient taking differently: Inhale 1 puff into the lungs 2 times daily. PATIENT NOT TAKING CURRENTLY BUT MIGHT IN THE FUTURE) 36 g 5    gabapentin (NEURONTIN) 250 MG/5ML solution 20 mLs (1,000 mg) by Per Feeding Tube route 3 times daily 1800 mL 5    GENERLAC 10 GM/15ML solution PATIENT NOT TAKING CURRENTLY   11    guaiFENesin (ORGANIDIN) 200 MG TABS tablet Take 200 mg by mouth every 4 hours as needed for cough. ONLY TAKES THIS WHEN THE PATIENT HAS A COLD        HYDROmorphone (DILAUDID) 4 MG tablet Take 8 mg by mouth every 3 hours        hydrOXYzine (ATARAX) 25 MG  tablet Take 25 mg by mouth every 6 hours as needed for itching. PATIENT NOT TAKING CURRENTLY        ketoconazole (NIZORAL) 2 % external shampoo Lather onto scalp and forehead once weekly when showering and let sit for 1-2 minutes before rinsing 120 mL 11    levofloxacin (LEVAQUIN) 25 MG/ML solution TAKE 20ML BY FEEDING TUBE ONCE DAILY FOR ONE WEEK (Patient taking differently: TAKE 20ML BY FEEDING TUBE ONCE DAILY FOR ONE WEEK  CURRENTLY NOT TAKING - TAKES IT WHEN SHE HAS A UTI) 480 mL 1    Magnesium Hydroxide (MILK OF MAGNESIA PO) Take 30 mLs by mouth as needed        metroNIDAZOLE (METROCREAM) 0.75 % external cream Apply topically 2 times daily (Patient taking differently: Apply topically 2 times daily. PATIENT NOT TAKING CURRENTLY) 45 g 11    Minoxidil (MINOXIDIL FOR WOMEN) 5 % FOAM Apply 1/2 capful once a day to affected area (Patient taking differently: Apply 1/2 capful once a day to affected area  PATIENT NOT TAKING CURRENTLY) 60 g 3    mometasone (NASONEX) 50 MCG/ACT nasal spray Spray 2 sprays in nostril daily as needed. PATIENT NOT TAKING CURRENTLY        morphine (SABINE) 30 MG 24 hr capsule Take 30 mg by mouth 2 times daily        naloxone (NARCAN) 4 MG/0.1ML nasal spray Spray 1 spray (4 mg) into one nostril alternating nostrils once as needed for opioid reversal every 2-3 minutes until assistance arrives 0.2 mL 0    Nutritional Supplements (NUTREN 1.0/FIBER) LIQD Per G tube; 250 ml Per G Tube qid 18159 mL 11    ondansetron (ZOFRAN-ODT) 4 MG ODT tab Take 1 tablet (4 mg) by mouth every 8 hours as needed for nausea 30 tablet 1    order for DME Equipment being ordered: 16 fr 5cc balloon indwelling catheter with drainage bag. 2 catheters/month 2 catheter 11    order for DME Equipment being ordered: Vibracare Percussor 1 Units 0    polyethylene glycol (MIRALAX) 17 GM/Dose powder Take 17 g by mouth daily (Patient taking differently: Take 17 g by mouth daily as needed.) 714 g 5    potassium chloride (KAYCIEL) 20  MEQ/15ML (10%) solution 22.5 mLs (30 mEq) by Per G Tube route daily 675 mL 11    prochlorperazine (COMPAZINE) 5 MG tablet Take 5 mg by mouth every 6 hours as needed for nausea or vomiting        rivaroxaban ANTICOAGULANT (XARELTO ANTICOAGULANT) 10 MG TABS tablet 1 tablet (10 mg) by Oral or Feeding Tube route daily with food 90 tablet 3    Salicylic Acid (OXY BALANCE FACIAL CLEAN WASH EX) Externally apply 1 pad. topically daily.        Sennosides (SENNA) 8.8 MG/5ML LIQD 15 mLs by Feeding route 2 times daily 900 mL 11    sertraline (ZOLOFT) 20 MG/ML (HIGH CONC) solution TAKE 2.5 ML(50 MG) BY MOUTH DAILY 225 mL 1    silver nitrate (ARZOL) 75-25 % miscellaneous Use one stick apply tip to granuloma prn granuloma formation (Patient taking differently: Use one stick apply tip to granuloma prn granuloma formation  Uses around the trach stoma) 20 applicator 1    silver sulfADIAZINE (SSD) 1 % external cream Apply topically 2 times daily To affected areas-apply bid to wounds till healed 400 g 11    simethicone 40 MG/0.6ML LIQD Place 40 mg into NG or OG tube as needed (PRN GAS).        traZODone (DESYREL) 5 mg/ml SUSP Take 15 ml (75 mg) po at bedtime Prn insomnia        tretinoin (RETIN-A) 0.025 % external cream Apply a pea-sized amount evenly over the face at nighttime before bed. (Patient taking differently: at bedtime. Apply a pea-sized amount evenly over the face at nighttime before bed.  NOT CURRENTLY USING) 45 g 2      Problems:      Patient Active Problem List   Diagnosis    Constipation    Asthma    Spinal muscle atrophy (H)    Dyspepsia and other specified disorders of function of stomach    Sinusitis, chronic    DVT (deep venous thrombosis) (H)    Chronic respiratory failure (H)    Chronic pain syndrome    Anemia due to blood loss, acute    Iron deficiency anemia due to chronic blood loss    Chronic anticoagulation    Hypokalemia    Acute lower GI bleeding    Anticoagulated    Anxiety disorder    At high risk for  falls    Chronic hip pain    History of DVT (deep vein thrombosis)    History of depression    History of arthrodesis    Gram-negative bacteremia    G tube feedings (H)    Depression    Dependent on ventilator (H)    Coagulation disorder (H)    Presence of IVC filter    Presence of intrathecal pump    Intravenous catheter in place    Hypophosphatemia    Hypomagnesemia    Hypocalcemia    History of renal calculi      Diet:  None        Code Status:    Code Status: Not on file     Allergies:  Ibuprofen        [facility]  [facility] [address]  Phone:  [phone]  Fax:    Patient:     Lanie Neil MRN:  [id number]   [address] [facility] [address] [address] :  1990  Sex:  F   Phone: [phone]        INSURANCE PAYOR PLAN GROUP # SUBSCRIBER ID   Primary:  Secondary:    MEDICARE  MEDICA [id number]  [id number]    [id number]  [id number]           Allergies   Allergen Reactions    Ibuprofen        Order Date:  Sep 5, 2024  Home Care Referral       (Order ID: [id number])     Diagnosis:  Spinal muscle atrophy (H) (G12.9)   Priority:  Routine: Next available opening Expiration Date:   Interval:   Count:    Reason for Referral: Skilled Nursing  Skilled Nursing Eval and Treat for: Other  Other Reason for Referral: port flushes monthly, occassional labs  Is the patient homebound? Yes  Homebound Status (describe the functional limitations that support this patient is confined to his/her home. Medicaid recipients are not required to be homebound.): Patient has difficulty ambulating >100 ft  Homebound Status (describe the functional limitations that support this patient is confined to his/her home. Medicaid recipients are not required to be homebound.): Dyspnea on exertion that makes it unsafe to leave home without clinical deterioration  Homebound Status (describe the functional limitations that support this patient is confined to his/her home. Medicaid recipients are not  required to be homebound.): Infection risk / immunocompromised state where it is safer for them to receive services in home  Homebound Status (describe the functional limitations that support this patient is confined to his/her home. Medicaid recipients are not required to be homebound.): Requires assistance of another person or specialized equipment is needed  I attest that I saw or will see the patient on this date: 8/29/2024  Provider to follow patient: JOSE MOSELEY [[id number]]  Ordered by: Siri Naranjo RN  Authorized by:  Jose Moseley MD   (NPI: [id number])

## 2024-09-24 NOTE — LETTER
PRIMARY CARE CARE COORDINATION   [facility]  [address] [place] [id number]    September 30, 2024    Lanie Neil  [address] [facility] [address]    Please feel free to call me directly regarding the status of this referral- [phone].     Thanks,  Siri VELIZ RN Care Manager  [facility]   Phone: [phone]  Fax: [phone]        Documentation of Face to Face and Certification for Home Health Services     I attest that I saw or will see Lanie Chunira on this date:  8/29/2024     This encounter with the patient was in whole, or in part, for medical condition, which is the primary reason for Home Health Care:       Patient Active Problem List   Diagnosis    Constipation    Asthma    Spinal muscle atrophy (H)    Dyspepsia and other specified disorders of function of stomach    Sinusitis, chronic    DVT (deep venous thrombosis) (H)    Chronic respiratory failure (H)    Chronic pain syndrome    Anemia due to blood loss, acute    Iron deficiency anemia due to chronic blood loss    Chronic anticoagulation    Hypokalemia    Acute lower GI bleeding    Anticoagulated    Anxiety disorder    At high risk for falls    Chronic hip pain    History of DVT (deep vein thrombosis)    History of depression    History of arthrodesis    Gram-negative bacteremia    G tube feedings (H)    Depression    Dependent on ventilator (H)    Coagulation disorder (H)    Presence of IVC filter    Presence of intrathecal pump    Intravenous catheter in place    Hypophosphatemia    Hypomagnesemia    Hypocalcemia    History of renal calculi      I certify that, based on my findings, the following services are medically necessary Home Health Services: Skilled Nursing Other port flushes monthly, occassional labs      Additional services needed:        Further, I certify that my clinical findings support that this patient is homebound (i.e. absences from home require considerable and taxing effort  and are for medical reasons or Christian services or infrequently or of short duration when for other reasons) related to:Patient has difficulty ambulating >100 ft  Dyspnea on exertion that makes it unsafe to leave home without clinical deterioration  Infection risk / immunocompromised state where it is safer for them to receive services in home  Requires assistance of another person or specialized equipment is needed     Based on the above findings, I certify that this patient is confined to the home and needs intermittent skilled nursing care, physical therapy and/or speech therapy. The patient is under my care, and I have initiated the establishment of the plan of care. This patient will be followed by a physician who will periodically review the plan of care.        Physician/Provider to provide follow up care: Provider to follow patient: JOSE MOSELEY [[id number]]        Please be aware that coverage of these services is subject to the terms and limitations of your health insurance plan.  Call member services at your health plan with any benefit or coverage questions.  _______________________________________________________________________  Authorized by:  Jose Moseley MD       PLEASE EVALUATE AND TREAT (Evaluation timeline is 24 - 48 hrs. Please call if there is need for a variance to this timeline).     Medications:         Current Outpatient Medications   Medication Sig Dispense Refill    ACETAMINOPHEN PO Take 650 mg by mouth every 4 hours as needed for pain        albuterol (PROAIR HFA) 108 (90 Base) MCG/ACT inhaler Inhale 2 puffs into the lungs every 4 hours as needed for shortness of breath or wheezing 18 g 5    albuterol (PROVENTIL) (2.5 MG/3ML) 0.083% neb solution USE ONE VIAL BY NEBULIZATION EVERY 6 HOURS AS NEEDED FOR SHORTNESS OF BREATH/ DYSPNEA OR WHEEZING 360 mL 3    ALPRAZolam (XANAX) 0.25 MG tablet Take 1 tablet (0.25 mg) by mouth 3 times daily as needed for anxiety. 60 tablet 1     aminocaproic acid (AMICAR) 0.25 GM/ML solution Take 12 mL (3 grams) via G-tube every 8 hours during menstrual period. 473 mL 5    bisacodyl (DULCOLAX) 10 MG suppository Place 10 mg rectally daily as needed for constipation. Patient also takes Bisacodyl Dulcolax liquid, 20 ml BID as needed for consitpation        celecoxib (CELEBREX) 200 MG capsule Take 1 capsule (200 mg) by mouth daily On days of period 12 capsule 5    clindamycin (CLEOCIN T) 1 % lotion Apply topically 2 times daily To face as needed 60 mL 3    diazepam (VALIUM) 5 MG tablet Take 1 tablet (5 mg) by mouth every 6 hours as needed for muscle spasms. 30 tablet 0    eflornithine HCl 13.9 % CREA Externally apply topically 2 times daily Apply thin layer of cream to affected areas of face and adjacent chin twice daily, at least 8 hours apart. 45 g 3    EVRYSDI 0.75 MG/ML oral solution 6.6 mLs by Oral or NG Tube route every morning        famotidine (PEPCID) 20 MG tablet Take 1 tablet (20 mg) by mouth 2 times daily 180 tablet 3    fluocinolone acetonide (DERMA SMOOTHE/FS BODY) 0.01 % external oil Apply at nighttime to forehead and scalp as needed for scale or pruritus (Patient taking differently: Apply topically at bedtime. Apply at nighttime to forehead and scalp as needed for scale or pruritus) 118 mL 3    fluticasone (FLOVENT HFA) 110 MCG/ACT inhaler Inhale 1 puff into the lungs 2 times daily (Patient taking differently: Inhale 1 puff into the lungs 2 times daily. PATIENT NOT TAKING CURRENTLY BUT MIGHT IN THE FUTURE) 36 g 5    gabapentin (NEURONTIN) 250 MG/5ML solution 20 mLs (1,000 mg) by Per Feeding Tube route 3 times daily 1800 mL 5    GENERLAC 10 GM/15ML solution PATIENT NOT TAKING CURRENTLY   11    guaiFENesin (ORGANIDIN) 200 MG TABS tablet Take 200 mg by mouth every 4 hours as needed for cough. ONLY TAKES THIS WHEN THE PATIENT HAS A COLD        HYDROmorphone (DILAUDID) 4 MG tablet Take 8 mg by mouth every 3 hours        hydrOXYzine (ATARAX) 25 MG  tablet Take 25 mg by mouth every 6 hours as needed for itching. PATIENT NOT TAKING CURRENTLY        ketoconazole (NIZORAL) 2 % external shampoo Lather onto scalp and forehead once weekly when showering and let sit for 1-2 minutes before rinsing 120 mL 11    levofloxacin (LEVAQUIN) 25 MG/ML solution TAKE 20ML BY FEEDING TUBE ONCE DAILY FOR ONE WEEK (Patient taking differently: TAKE 20ML BY FEEDING TUBE ONCE DAILY FOR ONE WEEK  CURRENTLY NOT TAKING - TAKES IT WHEN SHE HAS A UTI) 480 mL 1    Magnesium Hydroxide (MILK OF MAGNESIA PO) Take 30 mLs by mouth as needed        metroNIDAZOLE (METROCREAM) 0.75 % external cream Apply topically 2 times daily (Patient taking differently: Apply topically 2 times daily. PATIENT NOT TAKING CURRENTLY) 45 g 11    Minoxidil (MINOXIDIL FOR WOMEN) 5 % FOAM Apply 1/2 capful once a day to affected area (Patient taking differently: Apply 1/2 capful once a day to affected area  PATIENT NOT TAKING CURRENTLY) 60 g 3    mometasone (NASONEX) 50 MCG/ACT nasal spray Spray 2 sprays in nostril daily as needed. PATIENT NOT TAKING CURRENTLY        morphine (SABINE) 30 MG 24 hr capsule Take 30 mg by mouth 2 times daily        naloxone (NARCAN) 4 MG/0.1ML nasal spray Spray 1 spray (4 mg) into one nostril alternating nostrils once as needed for opioid reversal every 2-3 minutes until assistance arrives 0.2 mL 0    Nutritional Supplements (NUTREN 1.0/FIBER) LIQD Per G tube; 250 ml Per G Tube qid 42291 mL 11    ondansetron (ZOFRAN-ODT) 4 MG ODT tab Take 1 tablet (4 mg) by mouth every 8 hours as needed for nausea 30 tablet 1    order for DME Equipment being ordered: 16 fr 5cc balloon indwelling catheter with drainage bag. 2 catheters/month 2 catheter 11    order for DME Equipment being ordered: Vibracare Percussor 1 Units 0    polyethylene glycol (MIRALAX) 17 GM/Dose powder Take 17 g by mouth daily (Patient taking differently: Take 17 g by mouth daily as needed.) 714 g 5    potassium chloride (KAYCIEL) 20  MEQ/15ML (10%) solution 22.5 mLs (30 mEq) by Per G Tube route daily 675 mL 11    prochlorperazine (COMPAZINE) 5 MG tablet Take 5 mg by mouth every 6 hours as needed for nausea or vomiting        rivaroxaban ANTICOAGULANT (XARELTO ANTICOAGULANT) 10 MG TABS tablet 1 tablet (10 mg) by Oral or Feeding Tube route daily with food 90 tablet 3    Salicylic Acid (OXY BALANCE FACIAL CLEAN WASH EX) Externally apply 1 pad. topically daily.        Sennosides (SENNA) 8.8 MG/5ML LIQD 15 mLs by Feeding route 2 times daily 900 mL 11    sertraline (ZOLOFT) 20 MG/ML (HIGH CONC) solution TAKE 2.5 ML(50 MG) BY MOUTH DAILY 225 mL 1    silver nitrate (ARZOL) 75-25 % miscellaneous Use one stick apply tip to granuloma prn granuloma formation (Patient taking differently: Use one stick apply tip to granuloma prn granuloma formation  Uses around the trach stoma) 20 applicator 1    silver sulfADIAZINE (SSD) 1 % external cream Apply topically 2 times daily To affected areas-apply bid to wounds till healed 400 g 11    simethicone 40 MG/0.6ML LIQD Place 40 mg into NG or OG tube as needed (PRN GAS).        traZODone (DESYREL) 5 mg/ml SUSP Take 15 ml (75 mg) po at bedtime Prn insomnia        tretinoin (RETIN-A) 0.025 % external cream Apply a pea-sized amount evenly over the face at nighttime before bed. (Patient taking differently: at bedtime. Apply a pea-sized amount evenly over the face at nighttime before bed.  NOT CURRENTLY USING) 45 g 2      Problems:      Patient Active Problem List   Diagnosis    Constipation    Asthma    Spinal muscle atrophy (H)    Dyspepsia and other specified disorders of function of stomach    Sinusitis, chronic    DVT (deep venous thrombosis) (H)    Chronic respiratory failure (H)    Chronic pain syndrome    Anemia due to blood loss, acute    Iron deficiency anemia due to chronic blood loss    Chronic anticoagulation    Hypokalemia    Acute lower GI bleeding    Anticoagulated    Anxiety disorder    At high risk for  falls    Chronic hip pain    History of DVT (deep vein thrombosis)    History of depression    History of arthrodesis    Gram-negative bacteremia    G tube feedings (H)    Depression    Dependent on ventilator (H)    Coagulation disorder (H)    Presence of IVC filter    Presence of intrathecal pump    Intravenous catheter in place    Hypophosphatemia    Hypomagnesemia    Hypocalcemia    History of renal calculi      Diet:  None        Code Status:    Code Status: Not on file     Allergies:  Ibuprofen        [facility]  [facility] [address]  Phone:  [phone]  Fax:    Patient:     Lanie Neil MRN:  [id number]   [address] [facility] [address] [facility] [address] :  1990  Sex:  F   Phone: [phone]        INSURANCE PAYOR PLAN GROUP # SUBSCRIBER ID   Primary:  Secondary:    MEDICARE  MEDICA [id number]  [id number]    [id number]  [id number]           Allergies   Allergen Reactions    Ibuprofen        Order Date:  Sep 5, 2024  Home Care Referral       (Order ID: [id number])     Diagnosis:  Spinal muscle atrophy (H) (G12.9)   Priority:  Routine: Next available opening Expiration Date:   Interval:   Count:    Reason for Referral: Skilled Nursing  Skilled Nursing Eval and Treat for: Other  Other Reason for Referral: port flushes monthly, occassional labs  Is the patient homebound? Yes  Homebound Status (describe the functional limitations that support this patient is confined to his/her home. Medicaid recipients are not required to be homebound.): Patient has difficulty ambulating >100 ft  Homebound Status (describe the functional limitations that support this patient is confined to his/her home. Medicaid recipients are not required to be homebound.): Dyspnea on exertion that makes it unsafe to leave home without clinical deterioration  Homebound Status (describe the functional limitations that support this patient is confined to his/her home. Medicaid recipients are not  required to be homebound.): Infection risk / immunocompromised state where it is safer for them to receive services in home  Homebound Status (describe the functional limitations that support this patient is confined to his/her home. Medicaid recipients are not required to be homebound.): Requires assistance of another person or specialized equipment is needed  I attest that I saw or will see the patient on this date: 8/29/2024  Provider to follow patient: JOSE MOSELEY [[id number]]  Ordered by: Siri Naranjo RN  Authorized by:  Jose Moseley MD   (NPI: [id number])

## 2024-09-24 NOTE — PROGRESS NOTES
Clinic Care Coordination Contact  Follow Up Progress Note      Assessment:   RN called and spoke with patient and mother, Aster. Per patient and mother, they lost their services for port flush and labs at home when they lost [facility] as they don't have a contract with Medicare. RN asked about [facility] who was to see them, and they were told that they don't do IV management for patient and that they could not accept. RN asked if they had looked into the [facility] message and they had not. They requested that writer call these placed to determine if they can help. RN also asked about [facility], to which patient and mother were not sure that they have further. RN encouraged pt to contact [facility], which mother states they have a SW who helps them and she will call her today. RN informed would keep working to find port flush at home for patient.     RN called [facility], and they stated that they do not do port flushes or lab draws through ports. RN called [facility] ([phone]). RN called [facility], who stated that they do have skilled nurses who do port flushes and they requested a referral be faxed to [phone]. Writer faxed the HC order to [facility] at 10:30am.     Care Gaps:    Health Maintenance Due   Topic Date Due    URINE DRUG SCREEN  Never done    ASTHMA ACTION PLAN  Never done    ADVANCE CARE PLANNING  Never done    DEPRESSION ACTION PLAN  Never done    HIV SCREENING  Never done    MEDICARE ANNUAL WELLNESS VISIT  Never done    HEPATITIS C SCREENING  Never done    HEPATITIS B IMMUNIZATION (1 of 3 - 19+ 3-dose series) Never done    INFLUENZA VACCINE (1) 09/01/2024    COVID-19 Vaccine (5 - 2024-25 season) 09/01/2024    ASTHMA CONTROL TEST  09/21/2024       Care Gaps Last addressed on 8/24/24    Care Plans  Care Plan: Help At Home       Problem: Insufficient In-home support       Goal: Establish adequate home support       Note:       Barriers: patient is home  bound, lost services for skilled nursing, home infusion  Strengths: patient's mother is able to help  Patient expressed understanding of goal: yes  Action steps to achieve this goal:  1. I will work with RN Care Coordinator to get home infusion services (port flushes, blood work).                                  Intervention/Education provided during outreach: Discussed patients plan of care. CC RN asked open ended questions, provided support, resources, and encouragement as needed. Patient will reach out to care team sooner than planned with new questions or concerns.     Plan:   Care Coordinator will follow up after hearing back from [facility] or once able to find an agency that will accept for home care.     NAVI HAMILTON RN on 9/24/2024 at 10:54 AM    Addendum:  RN returned call to [facility]. They stated that they have received the HC order and the intake team is reviewing.     NAVI HAMILTON RN on 9/24/2024 at 2:12 PM    Addendum:  RN called [facility]- they stated that they are very busy and they are unsure yet if they are able to accept. Writer stated needing to know so that we can find another agency if they aren't able to accept. Nancy at [facility] stated she would see if they could look into it today.     NAVI HAMILTON RN on 9/26/2024 at 8:57 AM    Addendum:  RN has not heard from [facility]. RN [facility], and [facility], and [facility].    NAVI HAMILTON RN on 9/30/2024 at 3:36 PM

## 2024-09-24 NOTE — LETTER
PRIMARY CARE CARE COORDINATION   [facility]  [address] [place] [id number]    September 30, 2024    Lanie Neil  [address] [facility] [address]    Please feel free to call me directly regarding the status of this referral- [phone].     Thanks,  Siri VELIZ RN Care Manager  [facility]   Phone: [phone]  Fax: [phone]        Documentation of Face to Face and Certification for Home Health Services     I attest that I saw or will see Lanie Chunira on this date:  8/29/2024     This encounter with the patient was in whole, or in part, for medical condition, which is the primary reason for Home Health Care:       Patient Active Problem List   Diagnosis    Constipation    Asthma    Spinal muscle atrophy (H)    Dyspepsia and other specified disorders of function of stomach    Sinusitis, chronic    DVT (deep venous thrombosis) (H)    Chronic respiratory failure (H)    Chronic pain syndrome    Anemia due to blood loss, acute    Iron deficiency anemia due to chronic blood loss    Chronic anticoagulation    Hypokalemia    Acute lower GI bleeding    Anticoagulated    Anxiety disorder    At high risk for falls    Chronic hip pain    History of DVT (deep vein thrombosis)    History of depression    History of arthrodesis    Gram-negative bacteremia    G tube feedings (H)    Depression    Dependent on ventilator (H)    Coagulation disorder (H)    Presence of IVC filter    Presence of intrathecal pump    Intravenous catheter in place    Hypophosphatemia    Hypomagnesemia    Hypocalcemia    History of renal calculi      I certify that, based on my findings, the following services are medically necessary Home Health Services: Skilled Nursing Other port flushes monthly, occassional labs      Additional services needed:        Further, I certify that my clinical findings support that this patient is homebound (i.e. absences from home require considerable and taxing effort  and are for medical reasons or [employer] services or infrequently or of short duration when for other reasons) related to:Patient has difficulty ambulating >100 ft  Dyspnea on exertion that makes it unsafe to leave home without clinical deterioration  Infection risk / immunocompromised state where it is safer for them to receive services in home  Requires assistance of another person or specialized equipment is needed     Based on the above findings, I certify that this patient is confined to the home and needs intermittent skilled nursing care, physical therapy and/or speech therapy. The patient is under my care, and I have initiated the establishment of the plan of care. This patient will be followed by a physician who will periodically review the plan of care.        Physician/Provider to provide follow up care: Provider to follow patient: JOSE MOSELEY [[id number]]        Please be aware that coverage of these services is subject to the terms and limitations of your health insurance plan.  Call member services at your health plan with any benefit or coverage questions.  _______________________________________________________________________  Authorized by:  Jose Moseley MD       PLEASE EVALUATE AND TREAT (Evaluation timeline is 24 - 48 hrs. Please call if there is need for a variance to this timeline).     Medications:         Current Outpatient Medications   Medication Sig Dispense Refill    ACETAMINOPHEN PO Take 650 mg by mouth every 4 hours as needed for pain        albuterol (PROAIR HFA) 108 (90 Base) MCG/ACT inhaler Inhale 2 puffs into the lungs every 4 hours as needed for shortness of breath or wheezing 18 g 5    albuterol (PROVENTIL) (2.5 MG/3ML) 0.083% neb solution USE ONE VIAL BY NEBULIZATION EVERY 6 HOURS AS NEEDED FOR SHORTNESS OF BREATH/ DYSPNEA OR WHEEZING 360 mL 3    ALPRAZolam (XANAX) 0.25 MG tablet Take 1 tablet (0.25 mg) by mouth 3 times daily as needed for anxiety. 60 tablet 1     aminocaproic acid (AMICAR) 0.25 GM/ML solution Take 12 mL (3 grams) via G-tube every 8 hours during menstrual period. 473 mL 5    bisacodyl (DULCOLAX) 10 MG suppository Place 10 mg rectally daily as needed for constipation. Patient also takes Bisacodyl Dulcolax liquid, 20 ml BID as needed for consitpation        celecoxib (CELEBREX) 200 MG capsule Take 1 capsule (200 mg) by mouth daily On days of period 12 capsule 5    clindamycin (CLEOCIN T) 1 % lotion Apply topically 2 times daily To face as needed 60 mL 3    diazepam (VALIUM) 5 MG tablet Take 1 tablet (5 mg) by mouth every 6 hours as needed for muscle spasms. 30 tablet 0    eflornithine HCl 13.9 % CREA Externally apply topically 2 times daily Apply thin layer of cream to affected areas of face and adjacent chin twice daily, at least 8 hours apart. 45 g 3    EVRYSDI 0.75 MG/ML oral solution 6.6 mLs by Oral or NG Tube route every morning        famotidine (PEPCID) 20 MG tablet Take 1 tablet (20 mg) by mouth 2 times daily 180 tablet 3    fluocinolone acetonide (DERMA SMOOTHE/FS BODY) 0.01 % external oil Apply at nighttime to forehead and scalp as needed for scale or pruritus (Patient taking differently: Apply topically at bedtime. Apply at nighttime to forehead and scalp as needed for scale or pruritus) 118 mL 3    fluticasone (FLOVENT HFA) 110 MCG/ACT inhaler Inhale 1 puff into the lungs 2 times daily (Patient taking differently: Inhale 1 puff into the lungs 2 times daily. PATIENT NOT TAKING CURRENTLY BUT MIGHT IN THE FUTURE) 36 g 5    gabapentin (NEURONTIN) 250 MG/5ML solution 20 mLs (1,000 mg) by Per Feeding Tube route 3 times daily 1800 mL 5    GENERLAC 10 GM/15ML solution PATIENT NOT TAKING CURRENTLY   11    guaiFENesin (ORGANIDIN) 200 MG TABS tablet Take 200 mg by mouth every 4 hours as needed for cough. ONLY TAKES THIS WHEN THE PATIENT HAS A COLD        HYDROmorphone (DILAUDID) 4 MG tablet Take 8 mg by mouth every 3 hours        hydrOXYzine (ATARAX) 25 MG  tablet Take 25 mg by mouth every 6 hours as needed for itching. PATIENT NOT TAKING CURRENTLY        ketoconazole (NIZORAL) 2 % external shampoo Lather onto scalp and forehead once weekly when showering and let sit for 1-2 minutes before rinsing 120 mL 11    levofloxacin (LEVAQUIN) 25 MG/ML solution TAKE 20ML BY FEEDING TUBE ONCE DAILY FOR ONE WEEK (Patient taking differently: TAKE 20ML BY FEEDING TUBE ONCE DAILY FOR ONE WEEK  CURRENTLY NOT TAKING - TAKES IT WHEN SHE HAS A UTI) 480 mL 1    Magnesium Hydroxide (MILK OF MAGNESIA PO) Take 30 mLs by mouth as needed        metroNIDAZOLE (METROCREAM) 0.75 % external cream Apply topically 2 times daily (Patient taking differently: Apply topically 2 times daily. PATIENT NOT TAKING CURRENTLY) 45 g 11    Minoxidil (MINOXIDIL FOR WOMEN) 5 % FOAM Apply 1/2 capful once a day to affected area (Patient taking differently: Apply 1/2 capful once a day to affected area  PATIENT NOT TAKING CURRENTLY) 60 g 3    mometasone (NASONEX) 50 MCG/ACT nasal spray Spray 2 sprays in nostril daily as needed. PATIENT NOT TAKING CURRENTLY        morphine (SABINE) 30 MG 24 hr capsule Take 30 mg by mouth 2 times daily        naloxone (NARCAN) 4 MG/0.1ML nasal spray Spray 1 spray (4 mg) into one nostril alternating nostrils once as needed for opioid reversal every 2-3 minutes until assistance arrives 0.2 mL 0    Nutritional Supplements (NUTREN 1.0/FIBER) LIQD Per G tube; 250 ml Per G Tube qid 06295 mL 11    ondansetron (ZOFRAN-ODT) 4 MG ODT tab Take 1 tablet (4 mg) by mouth every 8 hours as needed for nausea 30 tablet 1    order for DME Equipment being ordered: 16 fr 5cc balloon indwelling catheter with drainage bag. 2 catheters/month 2 catheter 11    order for DME Equipment being ordered: Vibracare Percussor 1 Units 0    polyethylene glycol (MIRALAX) 17 GM/Dose powder Take 17 g by mouth daily (Patient taking differently: Take 17 g by mouth daily as needed.) 714 g 5    potassium chloride (KAYCIEL) 20  MEQ/15ML (10%) solution 22.5 mLs (30 mEq) by Per G Tube route daily 675 mL 11    prochlorperazine (COMPAZINE) 5 MG tablet Take 5 mg by mouth every 6 hours as needed for nausea or vomiting        rivaroxaban ANTICOAGULANT (XARELTO ANTICOAGULANT) 10 MG TABS tablet 1 tablet (10 mg) by Oral or Feeding Tube route daily with food 90 tablet 3    Salicylic Acid (OXY BALANCE FACIAL CLEAN WASH EX) Externally apply 1 pad. topically daily.        Sennosides (SENNA) 8.8 MG/5ML LIQD 15 mLs by Feeding route 2 times daily 900 mL 11    sertraline (ZOLOFT) 20 MG/ML (HIGH CONC) solution TAKE 2.5 ML(50 MG) BY MOUTH DAILY 225 mL 1    silver nitrate (ARZOL) 75-25 % miscellaneous Use one stick apply tip to granuloma prn granuloma formation (Patient taking differently: Use one stick apply tip to granuloma prn granuloma formation  Uses around the trach stoma) 20 applicator 1    silver sulfADIAZINE (SSD) 1 % external cream Apply topically 2 times daily To affected areas-apply bid to wounds till healed 400 g 11    simethicone 40 MG/0.6ML LIQD Place 40 mg into NG or OG tube as needed (PRN GAS).        traZODone (DESYREL) 5 mg/ml SUSP Take 15 ml (75 mg) po at bedtime Prn insomnia        tretinoin (RETIN-A) 0.025 % external cream Apply a pea-sized amount evenly over the face at nighttime before bed. (Patient taking differently: at bedtime. Apply a pea-sized amount evenly over the face at nighttime before bed.  NOT CURRENTLY USING) 45 g 2      Problems:      Patient Active Problem List   Diagnosis    Constipation    Asthma    Spinal muscle atrophy (H)    Dyspepsia and other specified disorders of function of stomach    Sinusitis, chronic    DVT (deep venous thrombosis) (H)    Chronic respiratory failure (H)    Chronic pain syndrome    Anemia due to blood loss, acute    Iron deficiency anemia due to chronic blood loss    Chronic anticoagulation    Hypokalemia    Acute lower GI bleeding    Anticoagulated    Anxiety disorder    At high risk for  falls    Chronic hip pain    History of DVT (deep vein thrombosis)    History of depression    History of arthrodesis    Gram-negative bacteremia    G tube feedings (H)    Depression    Dependent on ventilator (H)    Coagulation disorder (H)    Presence of IVC filter    Presence of intrathecal pump    Intravenous catheter in place    Hypophosphatemia    Hypomagnesemia    Hypocalcemia    History of renal calculi      Diet:  None        Code Status:    Code Status: Not on file     Allergies:  Ibuprofen        [facility]  [facility] [address]  Phone:  [phone]  Fax:    Patient:     Lanie Neil MRN:  [id number]   [address] [facility] [address] [facility] [address] :  1990  Sex:  F   Phone: [phone]        INSURANCE PAYOR PLAN GROUP # SUBSCRIBER ID   Primary:  Secondary:    MEDICARE  MEDICA 950  [id number]    [id number]  [id number]           Allergies   Allergen Reactions    Ibuprofen        Order Date:  Sep 5, 2024  Home Care Referral       (Order ID: [id number])     Diagnosis:  Spinal muscle atrophy (H) (G12.9)   Priority:  Routine: Next available opening Expiration Date:   Interval:   Count:    Reason for Referral: Skilled Nursing  Skilled Nursing Eval and Treat for: Other  Other Reason for Referral: port flushes monthly, occassional labs  Is the patient homebound? Yes  Homebound Status (describe the functional limitations that support this patient is confined to his/her home. Medicaid recipients are not required to be homebound.): Patient has difficulty ambulating >100 ft  Homebound Status (describe the functional limitations that support this patient is confined to his/her home. Medicaid recipients are not required to be homebound.): Dyspnea on exertion that makes it unsafe to leave home without clinical deterioration  Homebound Status (describe the functional limitations that support this patient is confined to his/her home. Medicaid recipients are not  required to be homebound.): Infection risk / immunocompromised state where it is safer for them to receive services in home  Homebound Status (describe the functional limitations that support this patient is confined to his/her home. Medicaid recipients are not required to be homebound.): Requires assistance of another person or specialized equipment is needed  I attest that I saw or will see the patient on this date: 8/29/2024  Provider to follow patient: JOSE MOSELEY [[id number]]  Ordered by: Siri Naranjo RN  Authorized by:  Jose Moseley MD   (NPI: [id number])

## 2024-09-24 NOTE — TELEPHONE ENCOUNTER
RN returned call to patient. Please see patient outreach encounter.     NAVI HAMILTON RN on 9/24/2024 at 10:48 AM

## 2024-10-01 ENCOUNTER — VIRTUAL VISIT (OUTPATIENT)
Dept: PSYCHOLOGY | Facility: CLINIC | Age: 34
End: 2024-10-01
Payer: MEDICARE

## 2024-10-01 DIAGNOSIS — F34.1 PERSISTENT DEPRESSIVE DISORDER: Primary | ICD-10-CM

## 2024-10-01 PROCEDURE — 90834 PSYTX W PT 45 MINUTES: CPT | Mod: 95 | Performed by: STUDENT IN AN ORGANIZED HEALTH CARE EDUCATION/TRAINING PROGRAM

## 2024-10-01 RX ORDER — HEPARIN SODIUM,PORCINE 10 UNIT/ML
5-20 VIAL (ML) INTRAVENOUS DAILY PRN
OUTPATIENT
Start: 2024-10-01

## 2024-10-01 RX ORDER — HEPARIN SODIUM (PORCINE) LOCK FLUSH IV SOLN 100 UNIT/ML 100 UNIT/ML
5 SOLUTION INTRAVENOUS
OUTPATIENT
Start: 2024-10-01

## 2024-10-01 NOTE — PROGRESS NOTES
"Clinic Care Coordination Contact    RN called and spoke with patient's mother. Pt's mother states she has not yet heard from anyone, and states she herself has been calling many places who are unable to help. Patient's mother states that last case scenario is to come into the clinic (infusion center) which would be difficult but they could do it in emergency. She states she last had it flushed on 9/3 and is due for a flush. The mother also states with the supplies she could do it but she has not been trained. They do not have the supplies. She did also share that they are ok with self pay each month ( dollars). Pt's mother stated she \"contacted her \" to see if patient \"has a waiver\" but \"has heard nothing\".     RN informed that would try today to find an agency and call places until they provided an answer. RN informed patient's mother that if unable to find somewhere, we might have to resort with going to the infusion center for this month until we can find somewhere.     RN called and spoke with [facility] ([phone])- they state they do not carry the flush supplies and they do not do labs, but they will check. RN informed that order was sent via fax, they will look at order and call writer back. RN called Andre ([phone]) and writer was transferred to Nurse , Charlotte. BELEN LVM for Charlotte with direct number and clinic number for call back.     RN called [facility] and was directed to call [phone]. Per [facility], they only do medications and think this is something that needs to be through patient's insurance. She does not have prescription benefits through [employer]. Per them, she should try her medical insurance to see who can cover this for the patient. Per [facility], patient has a CAC waiver. RN called [facility] at [phone] and intake prompts a voicemail to which RN left . County of residence is " [place]unknown on [place], patient's address is [place]. RN is awaiting call back from [facility]. RN informed patient would call back by the end of the day.     NAVI HAMILTON RN on 10/1/2024 at 10:24 AM    RN called and spoke with patient's mother, Aster. Pt's mother stated that she is frustrated with insurance- when changed did not expect these concerns with patient's care at home. She states that Lanie does have a nurse that comes in, but she doesn't want that nurse to do this because it is something that is \"so sterile\". She stated that she doesn't have an agency, the home care is through the [facility]. She asked if writer wanted to talk to her , who is Nanette Gutierres ([phone]). RN called Nanette and LVM informing of situation and asking for assistance. Per Aster, Nanette was just out to the house last week for an assessment.     RN did suggest that [facility] could do private pay for the care, and Aster stated she'd rather focus on finding someone who could do the port flush first. Writer shared that discussed case with Dr. Moseley and determined that it would be best for her to come in this month for this. Aster agreed with that. Aster would share this with Lanie. RN discussed case with PCP who agreed for orders for infusion plan at [facility] for access management. RN placed orders with verbal co-sign to PCP. RN informed Aster they should hear from scheduling for this.     NAVI HAMILTON RN on 10/1/2024 at 3:20 PM          "

## 2024-10-01 NOTE — PROGRESS NOTES
[facility]                                     Progress Note    Patient Name: Lanie Neil  Date: 10/01/2024         Service Type: Individual      Session Start Time: 3.30  Session End Time: 4.20     Session Length: 38-52    Session #: 06    Attendees: Client attended alone    Service Modality:  Video Visit:      Provider verified identity through the following two step process.  Patient provided:  Patient is known previously to provider    Telemedicine Visit: The patient's condition can be safely assessed and treated via synchronous audio and visual telemedicine encounter.      Reason for Telemedicine Visit: Patient has requested telehealth visit    Originating Site (Patient Location): Patient's home    Distant Site (Provider Location): Provider Remote Setting- Home Office    Consent:  The patient/guardian has verbally consented to: the potential risks and benefits of telemedicine (video visit) versus in person care; bill my insurance or make self-payment for services provided; and responsibility for payment of non-covered services.     Patient would like the video invitation sent by:  My Chart    Mode of Communication:  Video Conference via [employer]well    Distant Location (Provider):  On-site    As the provider I attest to compliance with applicable laws and regulations related to telemedicine.    DATA  Interactive Complexity: No  Crisis: No        Progress Since Last Session (Related to Symptoms / Goals / Homework):   Symptoms: No change patient reported no change from previous session    Homework: Completed in session Explore how to use mindfulness to notice and and change negative self talk.        Episode of Care Goals: Minimal progress - ACTION (Actively working towards change); Intervened by reinforcing change plan / affirming steps taken     Current / Ongoing Stressors and Concerns:     Depression, anxiety and recurrent pain. 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 She reported that she has had this all her life and that has limited social interactions and ability to create meaningful and sustainable social relationships. Patient shared that she is bed bound due to current health conditio. She noted  having low self esteem and abandonment issues as she was abandoned by her father and multiple online dating partners that she has had in the past. Patient noted that she is having recurrent chronic pain, being fed via a tube, and bed bound.    Current: Today, patient reported challenges with physical pain and the limited choices she has due to enduring health problems and being bed bound. She feeling like being \"disable and dump\" and stuck in the same place without any life achievement. She noted she wanted to go to school and get a collage degree and that  it will take her a long time and she will not be able to work due to her disabilities. She noted she plans to  take an online course for about six weeks so she can complete it and have some accomplishment to hold on to. She reported having a  new home care nurse that has been spending time with her and noted that her mental health has been better as she is receiving daily  nursing services and companionship.         Treatment Objective(s) Addressed in This Session:   Decrease frequency and intensity of feeling down, depressed, hopeless   Patient will Identify negative self-talk and behaviors: challenge core beliefs, myths, and actions     Intervention:     DBT: Discussion today on radical acceptance as an effective distress tolerance skill and connect concept to pt. s presenting problem. Use the dandelion metaphor on a beautiful lawn to provide insight into the concept and patient's experience. Couched pt that radical acceptance means a complete acknowledgment of reality as it is and learning to let go of what may be keeping you trapped in a cycle of suffering. It " means realizing that you cannot change the situation and  that there is no short-term solution for what s happening in the present. Couched pt that when we radically accept what we can t change we build positive adaptation to the situation and that helps mitigate anger, depression, and anxiety.     Assessments completed prior to visit:  PHQ2:       4/23/2024    10:44 AM 3/21/2024    12:20 PM 1/3/2024     1:47 PM 11/24/2023     1:00 PM 11/8/2023     2:33 PM 9/13/2023     1:48 PM 8/31/2023     3:10 PM   PHQ-2 ( 1999 [employer])   Q1: Little interest or pleasure in doing things 0 0 0 0 0 0 0   Q2: Feeling down, depressed or hopeless 0 0 0 0 0 0 0   PHQ-2 Score 0 0 0 0 0 0 0     GAD7:       5/28/2019     1:45 PM 2/12/2020     2:30 PM 12/15/2020    10:52 AM 3/16/2021     9:15 AM 6/30/2021     9:27 AM 1/3/2024    11:09 AM   KATHARINE-7 SCORE   Total Score   1 (minimal anxiety) 1 (minimal anxiety) 1 (minimal anxiety) 1 (minimal anxiety)   Total Score 3 19 1 1 1 1         ASSESSMENT: Current Emotional / Mental Status (status of significant symptoms):   Risk status (Self / Other harm or suicidal ideation)   Patient denies current fears or concerns for personal safety.   Patient denies current or recent suicidal ideation or behaviors.   Patient denies current or recent homicidal ideation or behaviors.   Patient denies current or recent self injurious behavior or ideation.   Patient denies other safety concerns.   Patient reports there has been no change in risk factors since their last session.     Patient reports there has been no change in protective factors since their last session.     Recommended that patient call 911 or go to the local ED should there be a change in any of these risk factors.     Appearance:   Appropriate    Eye Contact:   Good    Psychomotor Behavior: Patient is bed bond    Attitude:   Cooperative  Interested Friendly Pleasant   Orientation:   All   Speech    Rate / Production: Normal/  Responsive    Volume:  Normal    Mood:    Depressed    Affect:    Appropriate    Thought Content:  Clear    Thought Form:  Coherent    Insight:    Good      Medication Review:   No changes to current psychiatric medication(s)     Medication Compliance:   Yes     Changes in Health Issues:   None reported     Chemical Use Review:   Substance Use: Chemical use reviewed, no active concerns identified      Tobacco Use: No current tobacco use.      Diagnosis:  300.4 (F34.1) Persistent Depressive Disorder, Early onset and Mild or 300.02 (F41.1) Generalized Anxiety Disorder    Collateral Reports Completed:   Not Applicable    PLAN: (Patient Tasks / Therapist Tasks / Other)     Notice recurrent negative self talk and reframed them to positive self talk.       Peter Bravo [facility]                                  ______________________________________________________________________    Individual Treatment Plan    Patient's Name: Lanie Neil  YOB: 1990    Date of Creation: 7/15/2024  Date Treatment Plan Last Reviewed/Revised:     DSM5 Diagnoses: 300.4 (F34.1) Persistent Depressive Disorder, Early onset and Mild or 300.02 (F41.1) Generalized Anxiety Disorder  Psychosocial / Contextual Factors:   PROMIS (reviewed every 90 days):     Referral / Collaboration:  Referral to another professional/service is not indicated at this time..    Anticipated number of session for this episode of care:  15/25  Anticipation frequency of session: Biweekly  Anticipated Duration of each session: 38-52 minutes  Treatment plan will be reviewed in 90 days or when goals have been changed.       MeasurableTreatment Goal(s) related to diagnosis / functional impairment(s)    Goal 1: Patient will experience a reduction in depressive symptoms along with a corresponding increase in positive emotion and life satisfaction.               I will know I have met my goals when I have learned healthy coping mechanisms and can focus more on the  "positive and accept what I can't change.\"     Objective #A (Patient Action)                          Patient will Increase interest, engagement, and pleasure in doing things.  Status: Continued - Date(s): 10/16/2024     Intervention(s)  Therapist will help patient identify pleasant and mastery oriented events that elicit positive, relaxed mood.     Objective #B  Patient will Decrease frequency and intensity of feeling down, depressed, hopeless.  Status: Continued - Date(s):10/16/2024     Intervention(s)  Therapist will introduce patient to cognitive-behavioral and acceptance and commitment therapy topics aimed to help reduce depression and anxiety     Objective #C  Patient will Identify negative self-talk and behaviors: challenge core beliefs, myths, and actions  Improve concentration, focus, and mindfulness in daily activities .  Status: Continued - Date(s): 10/16/2024     Intervention(s)  Therapist will help patient identify and manage negative self-talk and automatic thoughts; introduce patient to cognitive distortions; help patient develop cognitive diffusion techniques        Goal 2: Patient will experience a reduction in anxious symptoms, along with a corresponding increase in relaxed emotional states and life satisfaction.        Objective #A (Patient Action)  Patient will use cognitive-behavioral and thought diffusion strategies identified in therapy to challenge anxious thoughts.                       Status: Continued - Date(s): 10/16/2024     Intervention(s)  Therapist will utilize CBT and ACT ideas to help patient challenge anxious thoughts and reduce intensity/duration of anxious distress     Objective #B  Patient will use \"worry time\" each day for 15 minutes of scheduled worry and then defer obsessive or anxious thinking until the next structured worry time.                      Status: Continued - Date(s): 10/16/2024     Intervention(s)  Therapist will teach patient how to effectively utilize worry " time and/or thought logs/journals each day and incorporate more relaxation behaviors into their routine.     Objective #C  Patient will identify the stressors which contribute to feelings of anxiety  Patient will increase engagement in adaptive coping skills and recreational activities, such as exercise and healthy socialization, to manage distress.  Status: Continued - Date(s):10/16/2024     Intervention(s)  Therapist will help patient identify triggers/situational factors that contribute to anxiety and behavioral skills aimed to manage anxious distress.        Goal 3: Patient will learn adaptive ways to manage chronic pain        Objective #A Patient will identify 2-4 strategies for managing pain.                          Status: Continued - Date(s): 10/16/2024     Intervention(s)  Therapist will teach distraction and mindfulness skills aimed to help manage chronic pain.   Objective #B  Patient will state understanding of stressors and relationship to physical symptoms.                    Status: Continued - Date(s): 10/16/2024                Intervention(s)  Therapist will teach distraction skills aimed to help manage chronic pain and utilize ACT/CBT ideas to help with this.      Patient has reviewed and agreed to the above plan.      Peter Bravo [facility]  July 15, 2024    Answers submitted by the patient for this visit:  Patient Health Questionnaire (Submitted on 7/15/2024)  If you checked off any problems, how difficult have these problems made it for you to do your work, take care of things at home, or get along with other people?: Not difficult at all  PHQ9 TOTAL SCORE: 0    Answers submitted by the patient for this visit:  Patient Health Questionnaire (Submitted on 8/27/2024)  If you checked off any problems, how difficult have these problems made it for you to do your work, take care of things at home, or get along with other people?: Not difficult at all  PHQ9 TOTAL SCORE: 5

## 2024-10-02 NOTE — TELEPHONE ENCOUNTER
RECORDS STATUS - ALL OTHER DIAGNOSIS      RECORDS RECEIVED FROM: [facility] - Internal records   DATE RECEIVED: 10/2

## 2024-10-03 NOTE — PROGRESS NOTES
Clinic Care Coordination Contact    RN received call from [place] with [facility] calling regarding the referral placed. Charlotte requested call back at [phone].    NAVI HAMILTON RN on 10/3/2024 at 7:42 AM    RN returned call to Charlotte and reached . RN left  for Charlotte with return call as direct number.     NAVI HAMILTON RN on 10/3/2024 at 8:58 AM

## 2024-10-04 ENCOUNTER — PRE VISIT (OUTPATIENT)
Dept: ONCOLOGY | Facility: CLINIC | Age: 34
End: 2024-10-04
Payer: MEDICARE

## 2024-10-04 ENCOUNTER — TELEPHONE (OUTPATIENT)
Dept: FAMILY MEDICINE | Facility: CLINIC | Age: 34
End: 2024-10-04
Payer: MEDICARE

## 2024-10-04 NOTE — TELEPHONE ENCOUNTER
[facility]    Phone Message    May a detailed message be left on voicemail: yes     Reason for Call: Mother of patient calling wanting to know if patient can get a covid and flu shot please advise.     Action Taken: Message routed to:  [facility]    Travel Screening: Not Applicable     Date of Service:

## 2024-10-07 ENCOUNTER — LAB (OUTPATIENT)
Dept: LAB | Facility: CLINIC | Age: 34
End: 2024-10-07
Attending: FAMILY MEDICINE
Payer: MEDICARE

## 2024-10-07 PROCEDURE — 250N000011 HC RX IP 250 OP 636: Performed by: FAMILY MEDICINE

## 2024-10-07 RX ORDER — HEPARIN SODIUM (PORCINE) LOCK FLUSH IV SOLN 100 UNIT/ML 100 UNIT/ML
5 SOLUTION INTRAVENOUS ONCE
Status: COMPLETED | OUTPATIENT
Start: 2024-10-07 | End: 2024-10-07

## 2024-10-07 RX ADMIN — Medication 5 ML: at 13:30

## 2024-10-07 NOTE — PROGRESS NOTES
Clinic Care Coordination Contact    Situation: Patient chart reviewed by care coordinator.    Background: RN has not received any return calls regarding port flushes for Lanie.    Assessment: RN called pt's Nanette ROSADO ([phone]) and reached VM. RN LVM for Nanette with direct number and clinic number for call back. RN called   [facility] at [phone] as has not heard back from their department either. RN reached VM initially and LVM again. RN also sent an email as has not yet heard back from the county- [email or web address]..     Plan/Recommendations: RN will call again tomorrow to both numbers if did not hear back.    NAVI HAMILTON RN on 10/7/2024 at 2:08 PM    RN received call from Cora with [facility] intake. She stated that she received my message on 10/1 and also yesterday, 10/8. She reached out to a supervisor to follow up with me on that, and stated if I am not hearing back in the next couple of days to return call to Cora- her number [phone].     NAVI HAMILTON RN on 10/8/2024 at 2:04 PM    RN received return call from ALONZO Fitzpatrick with [facility]. Nanette states that VM weren't working so she didn't receive the message. She is the [facility] Waiver CM. She stated that they discussed this at assessment and has some ideas to look into and will call back once has more information and ideas to give.     RN called and spoke with Nanette ([phone]). Nanette states that she has been working with Aster and Lanie for 7 years and they talk frequently. She stated that she just did the assessment in the home recently and Aster wasn't sure who the waiver worker was and Nanette educated that it was her. She states Lanie has the [facility] waiver for 7 years now. She states most of her services are PCA, mom and brother. She states they just had a nurse start with them and are thinking that agency could coordinate something- RN gave insight to trying various home  agencies. Nanette has some in mind in [facility] and also is going to contact [facility] and see if they can re-bill to [place] or the waiver to resume the services they were previously providing.     RN will await Nanette's return call with updates- at this time no action needed from writer. RN will close program once port flush has been secured for home.     NAVI HAMILTON RN on 10/8/2024 at 3:47 PM    Addendum:  RN sent [employer] message to patient and FYI to PCP regarding updates. RN will continue to await return call/update from Nanette.     NAVI HAMILTON RN on 10/9/2024 at 12:09 PM

## 2024-10-07 NOTE — NURSING NOTE
Chief Complaint   Patient presents with    Blood Draw     Port heparin flush by lab RN       Port accessed and heparin flushed by lab RN.    Lurdes Travis RN

## 2024-10-09 ENCOUNTER — MYC MEDICAL ADVICE (OUTPATIENT)
Dept: FAMILY MEDICINE | Facility: CLINIC | Age: 34
End: 2024-10-09
Payer: MEDICARE

## 2024-10-09 DIAGNOSIS — G12.9 SPINAL MUSCLE ATROPHY (H): ICD-10-CM

## 2024-10-10 RX ORDER — DIAZEPAM 5 MG/1
5 TABLET ORAL EVERY 6 HOURS PRN
Qty: 30 TABLET | Refills: 3 | Status: SHIPPED | OUTPATIENT
Start: 2024-10-10

## 2024-10-14 ENCOUNTER — TELEPHONE (OUTPATIENT)
Dept: FAMILY MEDICINE | Facility: CLINIC | Age: 34
End: 2024-10-14
Payer: MEDICARE

## 2024-10-14 ENCOUNTER — MYC MEDICAL ADVICE (OUTPATIENT)
Dept: FAMILY MEDICINE | Facility: CLINIC | Age: 34
End: 2024-10-14
Payer: MEDICARE

## 2024-10-14 DIAGNOSIS — R05.9 COUGH: Primary | ICD-10-CM

## 2024-10-14 RX ORDER — LEVOFLOXACIN 25 MG/ML
500 SOLUTION ORAL DAILY
Qty: 140 ML | Refills: 0 | Status: SHIPPED | OUTPATIENT
Start: 2024-10-14

## 2024-10-14 NOTE — TELEPHONE ENCOUNTER
Levaquin 500 mg once a day for one week re: cough    25 mg/ml strength    Twenty ml once a day; we can ask if takes any po likely would be via G tube    Then if worse at any point, or, not better this week, let us know    I agree high risk given vent status

## 2024-10-14 NOTE — TELEPHONE ENCOUNTER
[facility] Call Center    Phone Message    May a detailed message be left on voicemail: yes     Reason for Call: Other: Per mom pt is very sick and needs and want to only see . Per mom pt has had a cold for 1 week and her mucus is getting thick. Please call mom back to discuss. Thank you.       Action Taken: Message routed to:  [facility]    Travel Screening: Not Applicable     Date of Service:

## 2024-10-14 NOTE — TELEPHONE ENCOUNTER
Patient confirmed scheduled appointment:  Date: 10/16/2024  Time: 2:00pm  Visit type: UMP RETURN  Provider: PCP    Additional notes: Per mom pt is very sick has had a cold for 1 week and her mucus is getting thick Patient is on vent, has trachea tube

## 2024-10-14 NOTE — TELEPHONE ENCOUNTER
Patient confirmed scheduled appointment:  Date: 10/16/2024  Time: 2:00pm  Visit type: UMP RETURN  Provider: PCP    Additional notes: scheduled appt per pt's mom

## 2024-10-17 ENCOUNTER — VIRTUAL VISIT (OUTPATIENT)
Dept: PSYCHOLOGY | Facility: CLINIC | Age: 34
End: 2024-10-17
Payer: MEDICARE

## 2024-10-17 DIAGNOSIS — F34.1 PERSISTENT DEPRESSIVE DISORDER: Primary | ICD-10-CM

## 2024-10-17 PROCEDURE — 90834 PSYTX W PT 45 MINUTES: CPT | Mod: 95 | Performed by: STUDENT IN AN ORGANIZED HEALTH CARE EDUCATION/TRAINING PROGRAM
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
## 2024-10-18 ENCOUNTER — DOCUMENTATION ONLY (OUTPATIENT)
Dept: FAMILY MEDICINE | Facility: CLINIC | Age: 34
End: 2024-10-18
Payer: MEDICARE

## 2024-10-18 NOTE — PROGRESS NOTES
Type of Form Received: [facility] Annual Orders    Form Received (Date) 10/18/24   Form Filled out Yes, faxed 10/28/2024   Placed in provider folder Yes

## 2024-10-21 ENCOUNTER — TRANSFERRED RECORDS (OUTPATIENT)
Dept: HEALTH INFORMATION MANAGEMENT | Facility: CLINIC | Age: 34
End: 2024-10-21
Payer: MEDICARE

## 2024-10-22 ENCOUNTER — PATIENT OUTREACH (OUTPATIENT)
Dept: CARE COORDINATION | Facility: CLINIC | Age: 34
End: 2024-10-22
Payer: MEDICARE

## 2024-10-22 NOTE — PROGRESS NOTES
Clinic Care Coordination Contact  Follow Up Progress Note      Assessment: Clinic received form from [facility] for annual paperwork. PCP is wondering if further vent settings need to be filled out or if just needs to sign to agree for continued care. RN called [facility] and spoke with RT department regarding this. Per [facility], they only need a provider's signature in all of the areas where prompted to state that he agrees. No additional information needs to be filled out, it is already filled out. Form returned to PCP to sign.     RN called and spoke with Kylie, and they stated that they still do not have any information about the port flushes and have not heard from anyone. RN stated that Nanette, the  was supposed to be working on this. RN stated would call Nanette to see what the update is.    RN called and LVM for Nanette, SW for [facility] and Lanie's CAC Waiver . RN called inquiring about status for monthly port flushes at home. RN will await for Nanette's return call (10/22 at 10:00am first VM left).     Care Gaps:    Health Maintenance Due   Topic Date Due    URINE DRUG SCREEN  Never done    ASTHMA ACTION PLAN  Never done    ADVANCE CARE PLANNING  Never done    DEPRESSION ACTION PLAN  Never done    HIV SCREENING  Never done    MEDICARE ANNUAL WELLNESS VISIT  Never done    HEPATITIS C SCREENING  Never done    HEPATITIS B IMMUNIZATION (1 of 3 - 19+ 3-dose series) Never done    INFLUENZA VACCINE (1) 09/01/2024    COVID-19 Vaccine (5 - 2024-25 season) 09/01/2024    ASTHMA CONTROL TEST  09/21/2024       Currently there are no Care Gaps.    Care Plans  Care Plan: Help At Home       Problem: Insufficient In-home support       Goal: Establish adequate home support       Note:       Barriers: patient is home bound, lost services for skilled nursing, home infusion  Strengths: patient's mother is able to help  Patient expressed understanding of goal:  yes  Action steps to achieve this goal:  1. I will work with RN Care Coordinator to get home infusion services (port flushes, blood work).                                  Intervention/Education provided during outreach: Discussed patients plan of care. CC RN asked open ended questions, provided support, resources, and encouragement as needed. Patient will reach out to care team sooner than planned with new questions or concerns.      Plan:   Care Coordinator will await return call from [facility] HAMILTON RN on 10/24/2024 at 12:55 PM

## 2024-10-23 ENCOUNTER — MEDICAL CORRESPONDENCE (OUTPATIENT)
Dept: HEALTH INFORMATION MANAGEMENT | Facility: CLINIC | Age: 34
End: 2024-10-23
Payer: MEDICARE

## 2024-10-24 NOTE — PROGRESS NOTES
Addendum:  RN called and LVM for pt's CAC Waiver worker again to see the status of the port flushes for patient. RN will continue to await Nanette's call.     NAVI HAMILTON RN on 10/24/2024 at 12:55 PM    RN received return call from Nanette with [facility]. She stated that she keeps striking out- she states that [facility] thought they had passed it off, that company stated that they weren't able to do it because they cannot make home visits just for port flushes. She states this is the case for multiple companies.     She states she is out of the office this afternoon but will be back on Friday morning. RN will call Nanette back on Friday morning.     NAVI HAMILTON RN on 10/31/2024 at 12:50 PM    RN returned call to Nanette and SIA.     NAVI HAMILTON RN on 11/1/2024 at 11:09 AM

## 2024-10-31 ENCOUNTER — TELEPHONE (OUTPATIENT)
Dept: FAMILY MEDICINE | Facility: CLINIC | Age: 34
End: 2024-10-31
Payer: MEDICARE

## 2024-11-05 ENCOUNTER — PATIENT OUTREACH (OUTPATIENT)
Dept: CARE COORDINATION | Facility: CLINIC | Age: 34
End: 2024-11-05
Payer: MEDICARE

## 2024-11-05 ENCOUNTER — TELEPHONE (OUTPATIENT)
Dept: FAMILY MEDICINE | Facility: CLINIC | Age: 34
End: 2024-11-05
Payer: MEDICARE

## 2024-11-05 NOTE — PROGRESS NOTES
Clinic Care Coordination Contact  Follow Up Progress Note      Assessment: Please see telephone call from 10/31/24. RN called and spoke with Aparna from [facility]. Aparna states that she will be doing the port flushes monthly for Lanie. She stated that she has the supplies at home for the first flush, but family is going to get supplies monthly from us. RN shared that we can order the supplies to a DME company, but we are not sure of what the supplies are. Aparna is hoping [facility] can help with the supplies. RN shared would send staff message to them regarding this request and guidance on what can be ordered from the clinic. RN also shared that there is a physical order from Dr. Moseley for port flushes from September when we have been trying to initially find this for Lanie and Aparna requested this gets faxed to her. Her fax is [phone]. She will be doing the flush tomorrow for Lanie for November, and she will just be needing supplies for December so we have some time.     Care Gaps:    Health Maintenance Due   Topic Date Due    URINE DRUG SCREEN  Never done    ASTHMA ACTION PLAN  Never done    ADVANCE CARE PLANNING  Never done    DEPRESSION ACTION PLAN  Never done    HIV SCREENING  Never done    MEDICARE ANNUAL WELLNESS VISIT  Never done    HEPATITIS C SCREENING  Never done    HEPATITIS B IMMUNIZATION (1 of 3 - 19+ 3-dose series) Never done    ASTHMA CONTROL TEST  09/21/2024       Currently there are no Care Gaps.    Care Plans  Care Plan: Help At Home       Problem: Insufficient In-home support       Goal: Establish adequate home support       Note:       Barriers: patient is home bound, lost services for skilled nursing, home infusion  Strengths: patient's mother is able to help  Patient expressed understanding of goal: yes  Action steps to achieve this goal:  1. I will work with RN Care Coordinator to get home infusion services (port flushes, blood work).                                   Intervention/Education provided during outreach: Discussed patients plan of care. CC RN asked open ended questions, provided support, resources, and encouragement as needed. Patient will reach out to care team sooner than planned with new questions or concerns.      Plan:   RN faxed order for monthly flushes to Aparna at [phone]. RN sent staff message to  [facility] regarding assistance with supplies.   Care Coordinator will follow up in 1 month.     NAVI HAMILTON RN on 11/5/2024 at 9:18 AM

## 2024-11-05 NOTE — LETTER
PRIMARY CARE CARE COORDINATION   [facility]  [address] [place] [id number]    November 6, 2024    Lanie Neil  1909 [facility] [address]        Documentation of Face to Face and Certification for Home Health Services     I attest that I saw or will see Lanie Neil on this date:  8/29/2024     This encounter with the patient was in whole, or in part, for medical condition, which is the primary reason for Home Health Care:       Patient Active Problem List   Diagnosis    Constipation    Asthma    Spinal muscle atrophy (H)    Dyspepsia and other specified disorders of function of stomach    Sinusitis, chronic    DVT (deep venous thrombosis) (H)    Chronic respiratory failure (H)    Chronic pain syndrome    Anemia due to blood loss, acute    Iron deficiency anemia due to chronic blood loss    Chronic anticoagulation    Hypokalemia    Acute lower GI bleeding    Anticoagulated    Anxiety disorder    At high risk for falls    Chronic hip pain    History of DVT (deep vein thrombosis)    History of depression    History of arthrodesis    Gram-negative bacteremia    G tube feedings (H)    Depression    Dependent on ventilator (H)    Coagulation disorder (H)    Presence of IVC filter    Presence of intrathecal pump    Intravenous catheter in place    Hypophosphatemia    Hypomagnesemia    Hypocalcemia    History of renal calculi      I certify that, based on my findings, the following services are medically necessary Home Health Services: Skilled Nursing Other port flushes monthly, occassional labs      Additional services needed:        Further, I certify that my clinical findings support that this patient is homebound (i.e. absences from home require considerable and taxing effort and are for medical reasons or [employer] services or infrequently or of short duration when for other reasons) related to:Patient has difficulty ambulating >100 ft  Dyspnea on exertion that makes it  unsafe to leave home without clinical deterioration  Infection risk / immunocompromised state where it is safer for them to receive services in home  Requires assistance of another person or specialized equipment is needed     Based on the above findings, I certify that this patient is confined to the home and needs intermittent skilled nursing care, physical therapy and/or speech therapy. The patient is under my care, and I have initiated the establishment of the plan of care. This patient will be followed by a physician who will periodically review the plan of care.        Physician/Provider to provide follow up care: Provider to follow patient: JOSE MOSELEY [[id number]]        Please be aware that coverage of these services is subject to the terms and limitations of your health insurance plan.  Call member services at your health plan with any benefit or coverage questions.  _______________________________________________________________________  Authorized by:  Jose Moseley MD       PLEASE EVALUATE AND TREAT (Evaluation timeline is 24 - 48 hrs. Please call if there is need for a variance to this timeline).     Medications:         Current Outpatient Medications   Medication Sig Dispense Refill    ACETAMINOPHEN PO Take 650 mg by mouth every 4 hours as needed for pain        albuterol (PROAIR HFA) 108 (90 Base) MCG/ACT inhaler Inhale 2 puffs into the lungs every 4 hours as needed for shortness of breath or wheezing 18 g 5    albuterol (PROVENTIL) (2.5 MG/3ML) 0.083% neb solution USE ONE VIAL BY NEBULIZATION EVERY 6 HOURS AS NEEDED FOR SHORTNESS OF BREATH/ DYSPNEA OR WHEEZING 360 mL 3    ALPRAZolam (XANAX) 0.25 MG tablet Take 1 tablet (0.25 mg) by mouth 3 times daily as needed for anxiety. 60 tablet 1    aminocaproic acid (AMICAR) 0.25 GM/ML solution Take 12 mL (3 grams) via G-tube every 8 hours during menstrual period. 473 mL 5    bisacodyl (DULCOLAX) 10 MG suppository Place 10 mg rectally daily as needed  for constipation. Patient also takes Bisacodyl Dulcolax liquid, 20 ml BID as needed for consitpation        celecoxib (CELEBREX) 200 MG capsule Take 1 capsule (200 mg) by mouth daily On days of period 12 capsule 5    clindamycin (CLEOCIN T) 1 % lotion Apply topically 2 times daily To face as needed 60 mL 3    diazepam (VALIUM) 5 MG tablet Take 1 tablet (5 mg) by mouth every 6 hours as needed for muscle spasms. 30 tablet 3    eflornithine HCl 13.9 % CREA Externally apply topically 2 times daily Apply thin layer of cream to affected areas of face and adjacent chin twice daily, at least 8 hours apart. 45 g 3    EVRYSDI 0.75 MG/ML oral solution 6.6 mLs by Oral or NG Tube route every morning        famotidine (PEPCID) 20 MG tablet Take 1 tablet (20 mg) by mouth 2 times daily 180 tablet 3    fluocinolone acetonide (DERMA SMOOTHE/FS BODY) 0.01 % external oil Apply at nighttime to forehead and scalp as needed for scale or pruritus (Patient taking differently: Apply topically at bedtime. Apply at nighttime to forehead and scalp as needed for scale or pruritus) 118 mL 3    fluticasone (FLOVENT HFA) 110 MCG/ACT inhaler Inhale 1 puff into the lungs 2 times daily (Patient taking differently: Inhale 1 puff into the lungs 2 times daily. PATIENT NOT TAKING CURRENTLY BUT MIGHT IN THE FUTURE) 36 g 5    gabapentin (NEURONTIN) 250 MG/5ML solution 20 mLs (1,000 mg) by Per Feeding Tube route 3 times daily 1800 mL 5    GENERLAC 10 GM/15ML solution PATIENT NOT TAKING CURRENTLY   11    guaiFENesin (ORGANIDIN) 200 MG TABS tablet Take 200 mg by mouth every 4 hours as needed for cough. ONLY TAKES THIS WHEN THE PATIENT HAS A COLD        HYDROmorphone (DILAUDID) 4 MG tablet Take 8 mg by mouth every 3 hours        hydrOXYzine (ATARAX) 25 MG tablet Take 25 mg by mouth every 6 hours as needed for itching. PATIENT NOT TAKING CURRENTLY        ketoconazole (NIZORAL) 2 % external shampoo Lather onto scalp and forehead once weekly when showering and let  sit for 1-2 minutes before rinsing 120 mL 11    levofloxacin (LEVAQUIN) 25 MG/ML solution Place 20 mLs (500 mg) into G tube daily. 140 mL 0    levofloxacin (LEVAQUIN) 25 MG/ML solution TAKE 20ML BY FEEDING TUBE ONCE DAILY FOR ONE WEEK (Patient taking differently: TAKE 20ML BY FEEDING TUBE ONCE DAILY FOR ONE WEEK  CURRENTLY NOT TAKING - TAKES IT WHEN SHE HAS A UTI) 480 mL 1    Magnesium Hydroxide (MILK OF MAGNESIA PO) Take 30 mLs by mouth as needed        metroNIDAZOLE (METROCREAM) 0.75 % external cream Apply topically 2 times daily (Patient taking differently: Apply topically 2 times daily. PATIENT NOT TAKING CURRENTLY) 45 g 11    Minoxidil (MINOXIDIL FOR WOMEN) 5 % FOAM Apply 1/2 capful once a day to affected area (Patient taking differently: Apply 1/2 capful once a day to affected area  PATIENT NOT TAKING CURRENTLY) 60 g 3    mometasone (NASONEX) 50 MCG/ACT nasal spray Spray 2 sprays in nostril daily as needed. PATIENT NOT TAKING CURRENTLY        morphine (SABINE) 30 MG 24 hr capsule Take 30 mg by mouth 2 times daily        naloxone (NARCAN) 4 MG/0.1ML nasal spray Spray 1 spray (4 mg) into one nostril alternating nostrils once as needed for opioid reversal every 2-3 minutes until assistance arrives 0.2 mL 0    Nutritional Supplements (NUTREN 1.0/FIBER) LIQD Per G tube; 250 ml Per G Tube qid 57416 mL 11    ondansetron (ZOFRAN-ODT) 4 MG ODT tab Take 1 tablet (4 mg) by mouth every 8 hours as needed for nausea 30 tablet 1    order for DME Equipment being ordered: 16 fr 5cc balloon indwelling catheter with drainage bag. 2 catheters/month 2 catheter 11    order for DME Equipment being ordered: Vibracare Percussor 1 Units 0    polyethylene glycol (MIRALAX) 17 GM/Dose powder Take 17 g by mouth daily (Patient taking differently: Take 17 g by mouth daily as needed.) 714 g 5    potassium chloride (KAYCIEL) 20 MEQ/15ML (10%) solution 22.5 mLs (30 mEq) by Per G Tube route daily 675 mL 11    prochlorperazine (COMPAZINE) 5 MG  tablet Take 5 mg by mouth every 6 hours as needed for nausea or vomiting        rivaroxaban ANTICOAGULANT (XARELTO ANTICOAGULANT) 10 MG TABS tablet 1 tablet (10 mg) by Oral or Feeding Tube route daily with food 90 tablet 3    Salicylic Acid (OXY BALANCE FACIAL CLEAN WASH EX) Externally apply 1 pad. topically daily.        Sennosides (SENNA) 8.8 MG/5ML LIQD 15 mLs by Feeding route 2 times daily 900 mL 11    sertraline (ZOLOFT) 20 MG/ML (HIGH CONC) solution TAKE 2.5 ML(50 MG) BY MOUTH DAILY 225 mL 1    silver nitrate (ARZOL) 75-25 % miscellaneous Use one stick apply tip to granuloma prn granuloma formation (Patient taking differently: Use one stick apply tip to granuloma prn granuloma formation  Uses around the trach stoma) 20 applicator 1    silver sulfADIAZINE (SSD) 1 % external cream Apply topically 2 times daily To affected areas-apply bid to wounds till healed 400 g 11    simethicone 40 MG/0.6ML LIQD Place 40 mg into NG or OG tube as needed (PRN GAS).        traZODone (DESYREL) 5 mg/ml SUSP Take 15 ml (75 mg) po at bedtime Prn insomnia        tretinoin (RETIN-A) 0.025 % external cream Apply a pea-sized amount evenly over the face at nighttime before bed. (Patient taking differently: at bedtime. Apply a pea-sized amount evenly over the face at nighttime before bed.  NOT CURRENTLY USING) 45 g 2      Problems:      Patient Active Problem List   Diagnosis    Constipation    Asthma    Spinal muscle atrophy (H)    Dyspepsia and other specified disorders of function of stomach    Sinusitis, chronic    DVT (deep venous thrombosis) (H)    Chronic respiratory failure (H)    Chronic pain syndrome    Anemia due to blood loss, acute    Iron deficiency anemia due to chronic blood loss    Chronic anticoagulation    Hypokalemia    Acute lower GI bleeding    Anticoagulated    Anxiety disorder    At high risk for falls    Chronic hip pain    History of DVT (deep vein thrombosis)    History of depression    History of arthrodesis     Gram-negative bacteremia    G tube feedings (H)    Depression    Dependent on ventilator (H)    Coagulation disorder (H)    Presence of IVC filter    Presence of intrathecal pump    Intravenous catheter in place    Hypophosphatemia    Hypomagnesemia    Hypocalcemia    History of renal calculi      Diet:  None        Code Status:    Code Status: Not on file     Allergies:  Ibuprofen             Order  Home Care Referral [9046.001] (Order [id number])  Referral Details    Referred By  Referred To   Jimmy Moseley MD   [address] [place] [address]   Phone: [phone]   Fax: [phone]    Diagnoses: Spinal muscle atrophy (H)   Order: Home Infusion Referral            Jimmy Moseley MD   [address] [place] [address]   Phone: [phone]   Fax: [phone]    Diagnoses: Spinal muscle atrophy (H)   Order: Home Care Referral         Patient Name  Lanie Neil MRN  [id number] Legal Sex  Female   1990     Patient Demographics    Address  [address] [place] [address] Phone  [phone] (Home)  [phone] (Mobile) *Preferred* E-mail Address  [email or web address]     Base CPT Code (Reference Only)    Code CPT Chargeables   9046.001 9046      Existing Charges    Charge Line Charge Code Status Charge Trigger Charge Type   None          Order Information    Order Date/Time Release Date/Time Start Date/Time End Date/Time   24 02:53 PM None 2024 None     Order Details    Frequency Duration Priority Order Class   None None Routine: Next available opening  Home Care     Order Providers    Authorizing Provider Encounter Provider   Jimmy Moseley MD Broten, Marissa, RN     ABN Associated with this Order    There is no ABN associated with this order.                    Ordering Provider's NPI: [id number]  Jimmy Moseley    Home Care Referral [[id number]]    Electronically signed by: Jimmy Moseley MD on 24 1507 Status: Active   Mode: Ordering in  Verbal with readback mode Communicated by: Siri Naranjo RN   Ordering user: Siri Naranjo RN 24 1453 Ordering provider: Jose Parker MD     Order History  Outpatient  Date/Time Action Taken User Additional Information   24 1453 Sign Siri Naranjo RN Ordering Mode: Verbal with readback   24 1507 Verbal Cosign Jose Parker MD      Order Questions    Question Answer   Reason for Referral: Skilled Nursing   Note: Must select at least one of Skilled Nursing, Physical Therapy, and/or Speech Therapy in addition to any other services.   Skilled Nursing Eval and Treat for: Other   Other Reason for Referral: port flushes monthly, occassional labs   Note: Symptoms such as weakness and fragility are non-covered   Is the patient homebound? Yes   Homebound Status (describe the functional limitations that support this patient is confined to his/her home. Medicaid recipients are not required to be homebound.): Patient has difficulty ambulating >100 ft    Dyspnea on exertion that makes it unsafe to leave home without clinical deterioration    Infection risk / immunocompromised state where it is safer for them to receive services in home    Requires assistance of another person or specialized equipment is needed   I attest that I saw or will see the patient on this date: 2024   Provider to follow patient JOSE PARKER       [facility] Care Coordination  [address] [place] [id number]  Phone:  [phone]  Fax:    Patient:     Lanie Neil MRN:  [id number]   [address] [facility] [address] :  1990  Sex:  F   Phone: [phone]        INSURANCE PAYOR PLAN GROUP # SUBSCRIBER ID   Primary:  Secondary:    MEDICARE  MEDICA 950  [id number]    [id number]  [id number]           Allergies   Allergen Reactions    Ibuprofen        Order Date:  Sep 5, 2024  Home Care Referral       (Order ID: [id number])     Diagnosis:  Spinal muscle atrophy (H) (G12.9)    Priority:  Routine: Next available opening Expiration Date:   Interval:   Count:    Reason for Referral: Skilled Nursing  Skilled Nursing Eval and Treat for: Other  Other Reason for Referral: port flushes monthly, occassional labs  Is the patient homebound? Yes  Homebound Status (describe the functional limitations that support this patient is confined to his/her home. Medicaid recipients are not required to be homebound.): Patient has difficulty ambulating >100 ft  Homebound Status (describe the functional limitations that support this patient is confined to his/her home. Medicaid recipients are not required to be homebound.): Dyspnea on exertion that makes it unsafe to leave home without clinical deterioration  Homebound Status (describe the functional limitations that support this patient is confined to his/her home. Medicaid recipients are not required to be homebound.): Infection risk / immunocompromised state where it is safer for them to receive services in home  Homebound Status (describe the functional limitations that support this patient is confined to his/her home. Medicaid recipients are not required to be homebound.): Requires assistance of another person or specialized equipment is needed  I attest that I saw or will see the patient on this date: 8/29/2024  Provider to follow patient: JOSE MOSELEY [[id number]]  Ordered by: Siri Naranjo RN  Authorized by:  Jose Moseley MD   (NPI: [id number])      Primary Visit Coverage    Payer Plan Sponsor Code Group Number Group Name   MEDICARE MEDICARE [id number]       Primary Visit Coverage Subscriber    ID Name [facility] Address   [id number] [place] [address]     Secondary Visit Coverage    Payer Plan Sponsor Code Group Number Group Name   [facility] MED[id number]      Secondary Visit Coverage Subscriber    ID Name [facility] Address   [id number] [id number]      [place] [id number]

## 2024-11-05 NOTE — TELEPHONE ENCOUNTER
[facility] Call Center    Phone Message    May a detailed message be left on voicemail: yes     Reason for Call: Other: [facility] was contacted to do monthly port flushes for her so they need orders. She'd like to talk to you about supplies and all the stuff needed for that flush. Needing this by November 7th. Please call and advise.                                                                          
Home care calling requesting a return call to discuss who they should go through for supplies, Home care nurse states she is new to this patient.   
Left a message to [facility] for the verbal auth for port flush. Also we need to know the details of supplies and name of the medical supply company that we can fax the orders.  
RN returned call to BELEN Braun with [facility]. Please see patient outreach encounter.     NAVI HAMILTON RN on 11/5/2024 at 9:15 AM    
RN returned call to pt's mother, please see patient outreach encounter.    NAVI HAMILTON RN on 11/5/2024 at 12:36 PM    
160.02

## 2024-11-05 NOTE — LETTER
PRIMARY CARE CARE COORDINATION   [facility]  [address] [place] [id number]    November 5, 2024    Lanie Neil  [address] [facility] [address]    Please feel free to contact me directly at [phone]- you can also email me at [email or web address]. I am also available on both [place] and [facility] [email or web address]] teams.       Documentation of Face to Face and Certification for Home Health Services     I attest that I saw or will see Lanie Neil on this date:  8/29/2024     This encounter with the patient was in whole, or in part, for medical condition, which is the primary reason for Home Health Care:       Patient Active Problem List   Diagnosis    Constipation    Asthma    Spinal muscle atrophy (H)    Dyspepsia and other specified disorders of function of stomach    Sinusitis, chronic    DVT (deep venous thrombosis) (H)    Chronic respiratory failure (H)    Chronic pain syndrome    Anemia due to blood loss, acute    Iron deficiency anemia due to chronic blood loss    Chronic anticoagulation    Hypokalemia    Acute lower GI bleeding    Anticoagulated    Anxiety disorder    At high risk for falls    Chronic hip pain    History of DVT (deep vein thrombosis)    History of depression    History of arthrodesis    Gram-negative bacteremia    G tube feedings (H)    Depression    Dependent on ventilator (H)    Coagulation disorder (H)    Presence of IVC filter    Presence of intrathecal pump    Intravenous catheter in place    Hypophosphatemia    Hypomagnesemia    Hypocalcemia    History of renal calculi      I certify that, based on my findings, the following services are medically necessary Home Health Services: Skilled Nursing Other port flushes monthly, occassional labs      Additional services needed:        Further, I certify that my clinical findings support that this patient is homebound (i.e. absences from home require considerable and taxing effort and are for medical reasons or  Rastaf[employer] services or infrequently or of short duration when for other reasons) related to:Patient has difficulty ambulating >100 ft  Dyspnea on exertion that makes it unsafe to leave home without clinical deterioration  Infection risk / immunocompromised state where it is safer for them to receive services in home  Requires assistance of another person or specialized equipment is needed     Based on the above findings, I certify that this patient is confined to the home and needs intermittent skilled nursing care, physical therapy and/or speech therapy. The patient is under my care, and I have initiated the establishment of the plan of care. This patient will be followed by a physician who will periodically review the plan of care.        Physician/Provider to provide follow up care: Provider to follow patient: JOSE MOSELEY [[id number]]        Please be aware that coverage of these services is subject to the terms and limitations of your health insurance plan.  Call member services at your health plan with any benefit or coverage questions.  _______________________________________________________________________  Authorized by:  Jose Moseley MD       PLEASE EVALUATE AND TREAT (Evaluation timeline is 24 - 48 hrs. Please call if there is need for a variance to this timeline).     Medications:         Current Outpatient Medications   Medication Sig Dispense Refill    ACETAMINOPHEN PO Take 650 mg by mouth every 4 hours as needed for pain        albuterol (PROAIR HFA) 108 (90 Base) MCG/ACT inhaler Inhale 2 puffs into the lungs every 4 hours as needed for shortness of breath or wheezing 18 g 5    albuterol (PROVENTIL) (2.5 MG/3ML) 0.083% neb solution USE ONE VIAL BY NEBULIZATION EVERY 6 HOURS AS NEEDED FOR SHORTNESS OF BREATH/ DYSPNEA OR WHEEZING 360 mL 3    ALPRAZolam (XANAX) 0.25 MG tablet Take 1 tablet (0.25 mg) by mouth 3 times daily as needed for anxiety. 60 tablet 1    aminocaproic acid (AMICAR) 0.25 GM/ML  solution Take 12 mL (3 grams) via G-tube every 8 hours during menstrual period. 473 mL 5    bisacodyl (DULCOLAX) 10 MG suppository Place 10 mg rectally daily as needed for constipation. Patient also takes Bisacodyl Dulcolax liquid, 20 ml BID as needed for consitpation        celecoxib (CELEBREX) 200 MG capsule Take 1 capsule (200 mg) by mouth daily On days of period 12 capsule 5    clindamycin (CLEOCIN T) 1 % lotion Apply topically 2 times daily To face as needed 60 mL 3    diazepam (VALIUM) 5 MG tablet Take 1 tablet (5 mg) by mouth every 6 hours as needed for muscle spasms. 30 tablet 3    eflornithine HCl 13.9 % CREA Externally apply topically 2 times daily Apply thin layer of cream to affected areas of face and adjacent chin twice daily, at least 8 hours apart. 45 g 3    EVRYSDI 0.75 MG/ML oral solution 6.6 mLs by Oral or NG Tube route every morning        famotidine (PEPCID) 20 MG tablet Take 1 tablet (20 mg) by mouth 2 times daily 180 tablet 3    fluocinolone acetonide (DERMA SMOOTHE/FS BODY) 0.01 % external oil Apply at nighttime to forehead and scalp as needed for scale or pruritus (Patient taking differently: Apply topically at bedtime. Apply at nighttime to forehead and scalp as needed for scale or pruritus) 118 mL 3    fluticasone (FLOVENT HFA) 110 MCG/ACT inhaler Inhale 1 puff into the lungs 2 times daily (Patient taking differently: Inhale 1 puff into the lungs 2 times daily. PATIENT NOT TAKING CURRENTLY BUT MIGHT IN THE FUTURE) 36 g 5    gabapentin (NEURONTIN) 250 MG/5ML solution 20 mLs (1,000 mg) by Per Feeding Tube route 3 times daily 1800 mL 5    GENERLAC 10 GM/15ML solution PATIENT NOT TAKING CURRENTLY   11    guaiFENesin (ORGANIDIN) 200 MG TABS tablet Take 200 mg by mouth every 4 hours as needed for cough. ONLY TAKES THIS WHEN THE PATIENT HAS A COLD        HYDROmorphone (DILAUDID) 4 MG tablet Take 8 mg by mouth every 3 hours        hydrOXYzine (ATARAX) 25 MG tablet Take 25 mg by mouth every 6 hours  as needed for itching. PATIENT NOT TAKING CURRENTLY        ketoconazole (NIZORAL) 2 % external shampoo Lather onto scalp and forehead once weekly when showering and let sit for 1-2 minutes before rinsing 120 mL 11    levofloxacin (LEVAQUIN) 25 MG/ML solution Place 20 mLs (500 mg) into G tube daily. 140 mL 0    levofloxacin (LEVAQUIN) 25 MG/ML solution TAKE 20ML BY FEEDING TUBE ONCE DAILY FOR ONE WEEK (Patient taking differently: TAKE 20ML BY FEEDING TUBE ONCE DAILY FOR ONE WEEK  CURRENTLY NOT TAKING - TAKES IT WHEN SHE HAS A UTI) 480 mL 1    Magnesium Hydroxide (MILK OF MAGNESIA PO) Take 30 mLs by mouth as needed        metroNIDAZOLE (METROCREAM) 0.75 % external cream Apply topically 2 times daily (Patient taking differently: Apply topically 2 times daily. PATIENT NOT TAKING CURRENTLY) 45 g 11    Minoxidil (MINOXIDIL FOR WOMEN) 5 % FOAM Apply 1/2 capful once a day to affected area (Patient taking differently: Apply 1/2 capful once a day to affected area  PATIENT NOT TAKING CURRENTLY) 60 g 3    mometasone (NASONEX) 50 MCG/ACT nasal spray Spray 2 sprays in nostril daily as needed. PATIENT NOT TAKING CURRENTLY        morphine (SABINE) 30 MG 24 hr capsule Take 30 mg by mouth 2 times daily        naloxone (NARCAN) 4 MG/0.1ML nasal spray Spray 1 spray (4 mg) into one nostril alternating nostrils once as needed for opioid reversal every 2-3 minutes until assistance arrives 0.2 mL 0    Nutritional Supplements (NUTREN 1.0/FIBER) LIQD Per G tube; 250 ml Per G Tube qid 16283 mL 11    ondansetron (ZOFRAN-ODT) 4 MG ODT tab Take 1 tablet (4 mg) by mouth every 8 hours as needed for nausea 30 tablet 1    order for DME Equipment being ordered: 16 fr 5cc balloon indwelling catheter with drainage bag. 2 catheters/month 2 catheter 11    order for DME Equipment being ordered: Vibracare Percussor 1 Units 0    polyethylene glycol (MIRALAX) 17 GM/Dose powder Take 17 g by mouth daily (Patient taking differently: Take 17 g by mouth daily as  needed.) 714 g 5    potassium chloride (KAYCIEL) 20 MEQ/15ML (10%) solution 22.5 mLs (30 mEq) by Per G Tube route daily 675 mL 11    prochlorperazine (COMPAZINE) 5 MG tablet Take 5 mg by mouth every 6 hours as needed for nausea or vomiting        rivaroxaban ANTICOAGULANT (XARELTO ANTICOAGULANT) 10 MG TABS tablet 1 tablet (10 mg) by Oral or Feeding Tube route daily with food 90 tablet 3    Salicylic Acid (OXY BALANCE FACIAL CLEAN WASH EX) Externally apply 1 pad. topically daily.        Sennosides (SENNA) 8.8 MG/5ML LIQD 15 mLs by Feeding route 2 times daily 900 mL 11    sertraline (ZOLOFT) 20 MG/ML (HIGH CONC) solution TAKE 2.5 ML(50 MG) BY MOUTH DAILY 225 mL 1    silver nitrate (ARZOL) 75-25 % miscellaneous Use one stick apply tip to granuloma prn granuloma formation (Patient taking differently: Use one stick apply tip to granuloma prn granuloma formation  Uses around the trach stoma) 20 applicator 1    silver sulfADIAZINE (SSD) 1 % external cream Apply topically 2 times daily To affected areas-apply bid to wounds till healed 400 g 11    simethicone 40 MG/0.6ML LIQD Place 40 mg into NG or OG tube as needed (PRN GAS).        traZODone (DESYREL) 5 mg/ml SUSP Take 15 ml (75 mg) po at bedtime Prn insomnia        tretinoin (RETIN-A) 0.025 % external cream Apply a pea-sized amount evenly over the face at nighttime before bed. (Patient taking differently: at bedtime. Apply a pea-sized amount evenly over the face at nighttime before bed.  NOT CURRENTLY USING) 45 g 2      Problems:      Patient Active Problem List   Diagnosis    Constipation    Asthma    Spinal muscle atrophy (H)    Dyspepsia and other specified disorders of function of stomach    Sinusitis, chronic    DVT (deep venous thrombosis) (H)    Chronic respiratory failure (H)    Chronic pain syndrome    Anemia due to blood loss, acute    Iron deficiency anemia due to chronic blood loss    Chronic anticoagulation    Hypokalemia    Acute lower GI bleeding     Anticoagulated    Anxiety disorder    At high risk for falls    Chronic hip pain    History of DVT (deep vein thrombosis)    History of depression    History of arthrodesis    Gram-negative bacteremia    G tube feedings (H)    Depression    Dependent on ventilator (H)    Coagulation disorder (H)    Presence of IVC filter    Presence of intrathecal pump    Intravenous catheter in place    Hypophosphatemia    Hypomagnesemia    Hypocalcemia    History of renal calculi      Diet:  None        Code Status:    Code Status: Not on file     Allergies:  Ibuprofen          [facility] Coordination  [facility] [address]  Phone:  [phone]  Fax:    Patient:     Lanie Neil MRN:  [id number]   [address]  [facility] [address] :  1990  Sex:  F   Phone: [phone]        INSURANCE PAYOR PLAN GROUP # SUBSCRIBER ID   Primary:  Secondary:    MEDICARE  MEDICA [id number]  [id number]    [id number]  [id number]           Allergies   Allergen Reactions    Ibuprofen        Order Date:  Sep 5, 2024  Home Care Referral       (Order ID: [id number])     Diagnosis:  Spinal muscle atrophy (H) (G12.9)   Priority:  Routine: Next available opening Expiration Date:   Interval:   Count:    Reason for Referral: Skilled Nursing  Skilled Nursing Eval and Treat for: Other  Other Reason for Referral: port flushes monthly, occassional labs  Is the patient homebound? Yes  Homebound Status (describe the functional limitations that support this patient is confined to his/her home. Medicaid recipients are not required to be homebound.): Patient has difficulty ambulating >100 ft  Homebound Status (describe the functional limitations that support this patient is confined to his/her home. Medicaid recipients are not required to be homebound.): Dyspnea on exertion that makes it unsafe to leave home without clinical deterioration  Homebound Status (describe the functional limitations that support this patient is  confined to his/her home. Medicaid recipients are not required to be homebound.): Infection risk / immunocompromised state where it is safer for them to receive services in home  Homebound Status (describe the functional limitations that support this patient is confined to his/her home. Medicaid recipients are not required to be homebound.): Requires assistance of another person or specialized equipment is needed  I attest that I saw or will see the patient on this date: 8/29/2024  Provider to follow patient: JOSE MOSELEY [[id number]]  Ordered by: Siri Naranjo RN  Authorized by:  Jose Moseley MD   (NPI: [id number])

## 2024-11-05 NOTE — TELEPHONE ENCOUNTER
[facility] Call Center    Phone Message    May a detailed message be left on voicemail: yes     Reason for Call: Other: Pt's mother requesting call back. She is needing to know if the care team has found out where the pt can go to get her port flushed

## 2024-11-05 NOTE — PROGRESS NOTES
"Clinic Care Coordination Contact  Follow Up Progress Note      RN called Aster pt's mother back (see telephone encounter from today). Aster shares that she found [facility] for port flushes, but that [facility] needs supplies. RN shared the conversation with [facility] and asked Aster what was needed. She shared that she shared this with [facility] this morning and she knows it (heparin, saline, needle, lidocaine, something to scrub the area). She asked if Dr. Moseley could write an order to the pharmacy, and stated that she would come to [address] [place] [address] monthly to get these. RN called and spoke with [address] [place] [address] Pharmacy who state that they have heparin and saline flushes, as well as \"emlock\" cream and lidocaine, but they do not have the access kits or the needles. They do not have chlorhexadine. RN called and spoke with [facility], pt's preferred DME company, and they stated that they do not have the port flush access kits there. RN called and LVM for [facility] and informed of the updates on the supplies. RN remains messaging [facility] for support to determine if they are able to share where supplies can come from.     NAVI HAMILTON RN on 11/5/2024 at 12:36 PM    "

## 2024-11-05 NOTE — PROGRESS NOTES
Per recommendation of [facility]-     [facility] @ [phone]   RN called and spoke with pharmacy at [facility] who states that they are not accepting new port flush patients at this time.     [facility] @ [phone]  RN called and spoke with the pharmacy at [facility]. The person who answered their phone stated everyone is away from their desk and she can give the phone number to call, but cannot transfer the call. The number is [phone] for Abby. RN called Abby and LVM.     NAVI HAMILTON RN on 11/5/2024 at 1:07 PM    RN received VM from [facility], who states [facility] does not do port flushes. She states that she thinks [facility] would be the best choice to help with this, or [facility]. She states that [facility] and [facility] won't be able to help.     NAVI HAMILTON RN on 11/6/2024 at 8:02 AM    RN faxed order to [facility] as well as [facility].     NAVI HAMILTON RN on 11/6/2024 at 9:24 AM

## 2024-11-06 ENCOUNTER — TELEPHONE (OUTPATIENT)
Dept: FAMILY MEDICINE | Facility: CLINIC | Age: 34
End: 2024-11-06
Payer: MEDICARE

## 2024-11-06 NOTE — TELEPHONE ENCOUNTER
[facility] Call Center    Phone Message    May a detailed message be left on voicemail: yes     Reason for Call: Other: [facility] calling wanting clarification on the referral for what is needed. Please call back to discuss further.

## 2024-11-06 NOTE — PROGRESS NOTES
"Clinic Care Coordination Contact  Follow Up Progress Note      Assessment:   RN received call from Charito with [facility] ([phone]). Per Charito, they just need a LDA (Line device access report) and a more recent face to face visit. They also needed her height and weight and the \"need for supplies\" to be added to the referral. Charito then called again to say that she wanted to make sure that the patient is just getting this port for access to chemo, and then they will de-access the port once chemo is over. RN called Charito back to share that this isn't for chemo, it is for monthly port flushes and explained a little more about the patient's situation. She shared that Medicare in general will not cover port supplies, but she will talk to the supervisor there to see if home care is able to help. [facility] called writer a final time to share that after talking with their supervisor, unfortunately they cannot take on the patient as they cannot do monthly visits, instead they need to see a patient 5x a month to be able to bill for them. She suggested that she go in person to a cancer center or ask them to help as she sees an upcoming visit there.     RN called Nanette Carrington, [facility] Waiver Worker and LVM regarding the update with all of this and inquiring if the waiver can pay for the port supplies if Medicare is not able to.     RN called [facility] Sher, Home Care RN with [facility] and LVM regarding update as above, inquiring about list of supplies from today's port flush, and looking for further information.     Care Gaps:    Health Maintenance Due   Topic Date Due    URINE DRUG SCREEN  Never done    ASTHMA ACTION PLAN  Never done    ADVANCE CARE PLANNING  Never done    DEPRESSION ACTION PLAN  Never done    HIV SCREENING  Never done    MEDICARE ANNUAL WELLNESS VISIT  Never done    HEPATITIS C SCREENING  Never done    HEPATITIS B IMMUNIZATION (1 of 3 - 19+ 3-dose series) Never done    ASTHMA CONTROL TEST  09/21/2024 "       Currently there are no Care Gaps.    Care Plans  Care Plan: Help At Home       Problem: Insufficient In-home support       Goal: Establish adequate home support       Note:       Barriers: patient is home bound, lost services for skilled nursing, home infusion  Strengths: patient's mother is able to help  Patient expressed understanding of goal: yes  Action steps to achieve this goal:  1. I will work with RN Care Coordinator to get home infusion services (port flushes, blood work).                                  Intervention/Education provided during outreach: Discussed patients plan of care. CC RN asked open ended questions, provided support, resources, and encouragement as needed. Patient will reach out to care team sooner than planned with new questions or concerns.         Outreach Frequency: monthly, more frequently as needed    Plan:   Care Coordinator will follow up in 1 month (or when hear from care team).    NAVI HAMILTON RN on 11/6/2024 at 2:27 PM

## 2024-11-06 NOTE — TELEPHONE ENCOUNTER
RN returned call to Charito- please see patient outreach encounter.     NAVI HAMILTON RN on 11/6/2024 at 11:49 AM

## 2024-11-07 ENCOUNTER — DOCUMENTATION ONLY (OUTPATIENT)
Dept: FAMILY MEDICINE | Facility: CLINIC | Age: 34
End: 2024-11-07
Payer: MEDICARE

## 2024-11-07 NOTE — PROGRESS NOTES
Type of Form Received: [facility]  Orders    Form Received (Date) 11/5/24   Form Filled out Yes, faxed 11/8 & 11/11/24   Placed in provider folder Yes

## 2024-11-08 ENCOUNTER — MEDICAL CORRESPONDENCE (OUTPATIENT)
Dept: HEALTH INFORMATION MANAGEMENT | Facility: CLINIC | Age: 34
End: 2024-11-08
Payer: MEDICARE

## 2024-11-12 DIAGNOSIS — F41.9 ANXIETY: ICD-10-CM

## 2024-11-12 RX ORDER — ALPRAZOLAM 0.25 MG/1
0.25 TABLET ORAL 3 TIMES DAILY PRN
Qty: 60 TABLET | Refills: 1 | Status: SHIPPED | OUTPATIENT
Start: 2024-11-12

## 2024-11-12 NOTE — TELEPHONE ENCOUNTER
ALPRAZolam (XANAX) 0.25 MG tablet  Last Written Prescription Date:  8/29/2024  Last Fill Quantity: 60,   # refills: 1  Last Office Visit : 8/29/2024  Future Office visit:  none  Routing ALPRAZolam (XANAX) 0.25 MG tablet refill request to provider for review/approval because: Controlled substance.

## 2024-11-14 ENCOUNTER — VIRTUAL VISIT (OUTPATIENT)
Dept: PSYCHOLOGY | Facility: CLINIC | Age: 34
End: 2024-11-14
Payer: MEDICARE

## 2024-11-14 DIAGNOSIS — F34.1 PERSISTENT DEPRESSIVE DISORDER: Primary | ICD-10-CM

## 2024-11-14 PROCEDURE — 90834 PSYTX W PT 45 MINUTES: CPT | Mod: 95 | Performed by: STUDENT IN AN ORGANIZED HEALTH CARE EDUCATION/TRAINING PROGRAM

## 2024-11-14 NOTE — PROGRESS NOTES
Clinic Care Coordination Contact  RN called and LVM for [facility], [facility].     RN called Aster, pt's mother. She stated that [facility] came and did the port flush, things went well. She states that they have the supplies for next month as well so there is not a large petit. RN shared that the pharmacy can do flushes, heparin, but not the needle for access. She stated that she will call around to see if places they utilize have the supplies that they have at home. RN shared has been trying to get in touch with [facility] and have left voicemail with each of them. Aster stated she would keep me updated. No further action needed at this time until writer hears back from other members of pt's care team.     NAVI HAMILTON RN on 11/14/2024 at 12:21 PM

## 2024-11-14 NOTE — PROGRESS NOTES
[facility]                                     Progress Note    Patient Name: Lanie Neil  Date: 11/14/2024         Service Type: Individual      Session Start Time: 10.00  Session End Time: 10.50     Session Length: 38-52    Session #: 08    Attendees: Client attended alone    Service Modality:  Video Visit:      Provider verified identity through the following two step process.  Patient provided:  Patient is known previously to provider    Telemedicine Visit: The patient's condition can be safely assessed and treated via synchronous audio and visual telemedicine encounter.      Reason for Telemedicine Visit: Patient has requested telehealth visit    Originating Site (Patient Location): Patient's home    Distant Site (Provider Location): Provider Remote Setting- Home Office    Consent:  The patient/guardian has verbally consented to: the potential risks and benefits of telemedicine (video visit) versus in person care; bill my insurance or make self-payment for services provided; and responsibility for payment of non-covered services.     Patient would like the video invitation sent by:  My Chart    Mode of Communication:  Video Conference via Amwell    Distant Location (Provider):  On-site    As the provider I attest to compliance with applicable laws and regulations related to telemedicine.    DATA  Interactive Complexity: No  Crisis: No        Progress Since Last Session (Related to Symptoms / Goals / Homework):   Symptoms: No change patient reported no change from previous session    Homework: Completed in session Explore how to use mindfulness to notice and and change negative self talk.        Episode of Care Goals: Minimal progress - ACTION (Actively working towards change); Intervened by reinforcing change plan / affirming steps taken     Current / Ongoing Stressors and Concerns:     Depression, anxiety and recurrent pain. 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 She reported that she has had this all her life and that has limited social interactions and ability to create meaningful and sustainable social relationships. Patient shared that she is bed bound due to current health conditio. She noted  having low self esteem and abandonment issues as she was abandoned by her father and multiple online dating partners that she has had in the past. Patient noted that she is having recurrent chronic pain, being fed via a tube, and bed bound.    Current: Today, patient reported she continue to struggle with physical pain and the limited choices she has due to enduring health problems and being bed bound. She reported that her niece is turning 18 tomorrow and she is having mixed feelings about this [place] for her. She reported feeling sad that she has not been there for her all the years she was growing up due to her own health problems and limitations. She noted she has also been thinking about when she turned 17 and her family  had a huge celebration, and  she could not actively engage  in the activities due to limitations with physical mobility.      Treatment Objective(s) Addressed in This Session:   Decrease frequency and intensity of feeling down, depressed, hopeless   Patient will Identify negative self-talk and behaviors: challenge core beliefs, myths, and actions     Intervention:     CBT: Discussed automatic thoughts today in session.  Explored the role of automatic thoughts in increasing unhelpful thinking in depression, anxiety, and stress.  Explored techniques and strategies to increase awareness of unhelpful automatic thinking such as mindfulness. Work with patient to reraimed current negative thoght to a postive one and patient verbalized that \"I did the best I could with the situation that I am in\"   Introduced concept of challenging negative thinking.     Assessments completed prior to visit:  PHQ2: "       4/23/2024    10:44 AM 3/21/2024    12:20 PM 1/3/2024     1:47 PM 11/24/2023     1:00 PM 11/8/2023     2:33 PM 9/13/2023     1:48 PM 8/31/2023     3:10 PM   PHQ-2 ( 1999 [employer])   Q1: Little interest or pleasure in doing things 0 0 0 0 0 0 0   Q2: Feeling down, depressed or hopeless 0 0 0 0 0 0 0   PHQ-2 Score 0 0 0 0 0 0 0     GAD7:       5/28/2019     1:45 PM 2/12/2020     2:30 PM 12/15/2020    10:52 AM 3/16/2021     9:15 AM 6/30/2021     9:27 AM 1/3/2024    11:09 AM   KATHARINE-7 SCORE   Total Score   1 (minimal anxiety) 1 (minimal anxiety) 1 (minimal anxiety) 1 (minimal anxiety)   Total Score 3 19 1 1 1 1         ASSESSMENT: Current Emotional / Mental Status (status of significant symptoms):   Risk status (Self / Other harm or suicidal ideation)   Patient denies current fears or concerns for personal safety.   Patient denies current or recent suicidal ideation or behaviors.   Patient denies current or recent homicidal ideation or behaviors.   Patient denies current or recent self injurious behavior or ideation.   Patient denies other safety concerns.   Patient reports there has been no change in risk factors since their last session.     Patient reports there has been no change in protective factors since their last session.     Recommended that patient call 911 or go to the local ED should there be a change in any of these risk factors.     Appearance:   Appropriate    Eye Contact:   Good    Psychomotor Behavior: Patient is bed bond    Attitude:   Cooperative  Interested Friendly Pleasant   Orientation:   All   Speech    Rate / Production: Normal/ Responsive    Volume:  Normal    Mood:    Depressed    Affect:    Appropriate    Thought Content:  Clear    Thought Form:  Coherent    Insight:    Good      Medication Review:   No changes to current psychiatric medication(s)     Medication Compliance:   Yes     Changes in Health Issues:   None reported     Chemical Use Review:   Substance Use: Chemical use reviewed,  "no active concerns identified      Tobacco Use: No current tobacco use.      Diagnosis:  300.4 (F34.1) Persistent Depressive Disorder, Early onset and Mild or 300.02 (F41.1) Generalized Anxiety Disorder    Collateral Reports Completed:   Not Applicable    PLAN: (Patient Tasks / Therapist Tasks / Other)     Notice recurrent negative self talk and reframed them to positive self talk.     Client will practice recognizing automatic thoughts once per day by next session.  Client will document this and bring to next session.       Peter Bravo, [facility]                                  ______________________________________________________________________    Individual Treatment Plan    Patient's Name: Lanie Neil  YOB: 1990    Date of Creation: 7/15/2024  Date Treatment Plan Last Reviewed/Revised: 10/17/2024    DSM5 Diagnoses: 300.4 (F34.1) Persistent Depressive Disorder, Early onset and Mild or 300.02 (F41.1) Generalized Anxiety Disorder  Psychosocial / Contextual Factors:   PROMIS (reviewed every 90 days):     Referral / Collaboration:  Referral to another professional/service is not indicated at this time..    Anticipated number of session for this episode of care:  15/25  Anticipation frequency of session: Biweekly  Anticipated Duration of each session: 38-52 minutes  Treatment plan will be reviewed in 90 days or when goals have been changed.       MeasurableTreatment Goal(s) related to diagnosis / functional impairment(s)    Goal 1: Patient will experience a reduction in depressive symptoms along with a corresponding increase in positive emotion and life satisfaction.               I will know I have met my goals when I have learned healthy coping mechanisms and can focus more on the positive and accept what I can't change.\"     Objective #A (Patient Action)                          Patient will Increase interest, engagement, and pleasure in doing things.  Status: Continued - Date(s): 01/18/2025   " "  Intervention(s)  Therapist will help patient identify pleasant and mastery oriented events that elicit positive, relaxed mood.     Objective #B  Patient will Decrease frequency and intensity of feeling down, depressed, hopeless.  Status: Continued - Date(s):01/18/2025     Intervention(s)  Therapist will introduce patient to cognitive-behavioral and acceptance and commitment therapy topics aimed to help reduce depression and anxiety     Objective #C  Patient will Identify negative self-talk and behaviors: challenge core beliefs, myths, and actions  Improve concentration, focus, and mindfulness in daily activities .  Status: Continued - Date(s): 01/18/2025     Intervention(s)  Therapist will help patient identify and manage negative self-talk and automatic thoughts; introduce patient to cognitive distortions; help patient develop cognitive diffusion techniques        Goal 2: Patient will experience a reduction in anxious symptoms, along with a corresponding increase in relaxed emotional states and life satisfaction.        Objective #A (Patient Action)  Patient will use cognitive-behavioral and thought diffusion strategies identified in therapy to challenge anxious thoughts.                       Status: Continued - Date(s): 01/18/2025     Intervention(s)  Therapist will utilize CBT and ACT ideas to help patient challenge anxious thoughts and reduce intensity/duration of anxious distress     Objective #B  Patient will use \"worry time\" each day for 15 minutes of scheduled worry and then defer obsessive or anxious thinking until the next structured worry time.                      Status: Continued - Date(s): 01/18/2025     Intervention(s)  Therapist will teach patient how to effectively utilize worry time and/or thought logs/journals each day and incorporate more relaxation behaviors into their routine.     Objective #C  Patient will identify the stressors which contribute to feelings of anxiety  Patient will " increase engagement in adaptive coping skills and recreational activities, such as exercise and healthy socialization, to manage distress.  Status: Continued - Date(s):01/18/2025     Intervention(s)  Therapist will help patient identify triggers/situational factors that contribute to anxiety and behavioral skills aimed to manage anxious distress.        Goal 3: Patient will learn adaptive ways to manage chronic pain        Objective #A Patient will identify 2-4 strategies for managing pain.                          Status: Continued - Date(s): 01/18/2025     Intervention(s)  Therapist will teach distraction and mindfulness skills aimed to help manage chronic pain.   Objective #B  Patient will state understanding of stressors and relationship to physical symptoms.                    Status: Continued - Date(s): 01/18/2025                Intervention(s)  Therapist will teach distraction skills aimed to help manage chronic pain and utilize ACT/CBT ideas to help with this.      Patient has reviewed and agreed to the above plan.      Peter Bravo [facility]  July 15, 2024    Answers submitted by the patient for this visit:  Patient Health Questionnaire (Submitted on 7/15/2024)  If you checked off any problems, how difficult have these problems made it for you to do your work, take care of things at home, or get along with other people?: Not difficult at all  PHQ9 TOTAL SCORE: 0    Answers submitted by the patient for this visit:  Patient Health Questionnaire (Submitted on 8/27/2024)  If you checked off any problems, how difficult have these problems made it for you to do your work, take care of things at home, or get along with other people?: Not difficult at all  PHQ9 TOTAL SCORE: 5    Answers submitted by the patient for this visit:  Patient Health Questionnaire (Submitted on 11/14/2024)  If you checked off any problems, how difficult have these problems made it for you to do your work, take care of things at home, or get  along with other people?: Not difficult at all  PHQ9 TOTAL SCORE: 3

## 2024-11-27 ENCOUNTER — DOCUMENTATION ONLY (OUTPATIENT)
Dept: FAMILY MEDICINE | Facility: CLINIC | Age: 34
End: 2024-11-27
Payer: MEDICARE

## 2024-11-27 NOTE — PROGRESS NOTES
Type of Form Received:     Form Received (Date) 11/27/2024   Form Filled out No   Placed in provider folder Yes

## 2024-12-02 DIAGNOSIS — Z53.9 DIAGNOSIS NOT YET DEFINED: Primary | ICD-10-CM

## 2024-12-03 ENCOUNTER — PATIENT OUTREACH (OUTPATIENT)
Dept: CARE COORDINATION | Facility: CLINIC | Age: 34
End: 2024-12-03
Payer: MEDICARE

## 2024-12-03 ENCOUNTER — MYC MEDICAL ADVICE (OUTPATIENT)
Dept: CARE COORDINATION | Facility: CLINIC | Age: 34
End: 2024-12-03
Payer: MEDICARE

## 2024-12-03 DIAGNOSIS — G12.9 SPINAL MUSCLE ATROPHY (H): Primary | ICD-10-CM

## 2024-12-05 ENCOUNTER — VIRTUAL VISIT (OUTPATIENT)
Dept: PSYCHOLOGY | Facility: CLINIC | Age: 34
End: 2024-12-05
Payer: MEDICARE

## 2024-12-05 DIAGNOSIS — F34.1 PERSISTENT DEPRESSIVE DISORDER: Primary | ICD-10-CM

## 2024-12-05 ASSESSMENT — PATIENT HEALTH QUESTIONNAIRE - PHQ9
SUM OF ALL RESPONSES TO PHQ QUESTIONS 1-9: 1
SUM OF ALL RESPONSES TO PHQ QUESTIONS 1-9: 1

## 2024-12-05 NOTE — PROGRESS NOTES
[facility]                                     Progress Note    Patient Name: Lanie Neil  Date: 12/05/2024         Service Type: Individual      Session Start Time: 10.00  Session End Time: 10.50     Session Length: 38-52    Session #: 09    Attendees: Client attended alone    Service Modality:  Video Visit:      Provider verified identity through the following two step process.  Patient provided:  Patient is known previously to provider    Telemedicine Visit: The patient's condition can be safely assessed and treated via synchronous audio and visual telemedicine encounter.      Reason for Telemedicine Visit: Patient has requested telehealth visit    Originating Site (Patient Location): Patient's home    Distant Site (Provider Location): Provider Remote Setting- Home Office    Consent:  The patient/guardian has verbally consented to: the potential risks and benefits of telemedicine (video visit) versus in person care; bill my insurance or make self-payment for services provided; and responsibility for payment of non-covered services.     Patient would like the video invitation sent by:  My Chart    Mode of Communication:  Video Conference via Amwell    Distant Location (Provider):  On-site    As the provider I attest to compliance with applicable laws and regulations related to telemedicine.    DATA  Interactive Complexity: No  Crisis: No        Progress Since Last Session (Related to Symptoms / Goals / Homework):   Symptoms: No change patient reported no change from previous session    Homework: Completed in session Explore how to use mindfulness to notice and and change negative self talk.        Episode of Care Goals: Minimal progress - ACTION (Actively working towards change); Intervened by reinforcing change plan / affirming steps taken     Current / Ongoing Stressors and Concerns:     Depression, anxiety and recurrent pain. 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 She reported that she has had this all her life and that has limited social interactions and ability to create meaningful and sustainable social relationships. Patient shared that she is bed bound due to current health conditio. She noted  having low self esteem and abandonment issues as she was abandoned by her father and multiple online dating partners that she has had in the past. Patient noted that she is having recurrent chronic pain, being fed via a tube, and bed bound.    Current: Today, patient reported she continue to struggle with physical pain and the limited choices she has due to enduring health problems and being bed bound. 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 She noted this has been a very helpful outlet as she is able to work on a project on her own and put it out on social media.      Treatment Objective(s) Addressed in This Session:   Decrease frequency and intensity of feeling down, depressed, hopeless   Patient will Identify negative self-talk and behaviors: challenge core beliefs, myths, and actions     Intervention:     Discussion today with patient today  about the utilization of  creative expressions in anxiety and depression  mitigation.  Explore the psychological richness and depth of identified creative expression to client.Provident positive reinforcement for her utilization of IA technology in creating a You tube channel and providing critical analysis to movies and documentaries she has watched.         10/30/2024     7:55 AM 11/14/2024     7:49 AM 12/5/2024     7:42 AM   PHQ   PHQ-9 Total Score 2  3  1    Q9: Thoughts of better off dead/self-harm past 2 weeks Not at all  Not at all  Not at all        Patient-reported        ASSESSMENT: Current Emotional / Mental Status (status of  significant symptoms):   Risk status (Self / Other harm or suicidal ideation)   Patient denies current fears or concerns for personal safety.   Patient denies current or recent suicidal ideation or behaviors.   Patient denies current or recent homicidal ideation or behaviors.   Patient denies current or recent self injurious behavior or ideation.   Patient denies other safety concerns.   Patient reports there has been no change in risk factors since their last session.     Patient reports there has been no change in protective factors since their last session.     Recommended that patient call 911 or go to the local ED should there be a change in any of these risk factors.     Appearance:   Appropriate    Eye Contact:   Good    Psychomotor Behavior: Patient is bed bond    Attitude:   Cooperative  Interested Friendly Pleasant   Orientation:   All   Speech    Rate / Production: Normal/ Responsive    Volume:  Normal    Mood:    Depressed    Affect:    Appropriate    Thought Content:  Clear    Thought Form:  Coherent    Insight:    Good      Medication Review:   No changes to current psychiatric medication(s)     Medication Compliance:   Yes     Changes in Health Issues:   None reported     Chemical Use Review:   Substance Use: Chemical use reviewed, no active concerns identified      Tobacco Use: No current tobacco use.      Diagnosis:  300.4 (F34.1) Persistent Depressive Disorder, Early onset and Mild or 300.02 (F41.1) Generalized Anxiety Disorder    Collateral Reports Completed:   Not Applicable    PLAN: (Patient Tasks / Therapist Tasks / Other)     Notice recurrent negative self talk and reframed them to positive self talk.     Client will practice recognizing automatic thoughts once per day by next session.  Client will document this and bring to next session.       MARLON Saez                                  ______________________________________________________________________    Individual Treatment  "Plan    Patient's Name: Lanie Neil  YOB: 1990    Date of Creation: 7/15/2024  Date Treatment Plan Last Reviewed/Revised: 10/17/2024    DSM5 Diagnoses: 300.4 (F34.1) Persistent Depressive Disorder, Early onset and Mild or 300.02 (F41.1) Generalized Anxiety Disorder  Psychosocial / Contextual Factors:   PROMIS (reviewed every 90 days):     Referral / Collaboration:  Referral to another professional/service is not indicated at this time..    Anticipated number of session for this episode of care:  15/25  Anticipation frequency of session: Biweekly  Anticipated Duration of each session: 38-52 minutes  Treatment plan will be reviewed in 90 days or when goals have been changed.       MeasurableTreatment Goal(s) related to diagnosis / functional impairment(s)    Goal 1: Patient will experience a reduction in depressive symptoms along with a corresponding increase in positive emotion and life satisfaction.               I will know I have met my goals when I have learned healthy coping mechanisms and can focus more on the positive and accept what I can't change.\"     Objective #A (Patient Action)                          Patient will Increase interest, engagement, and pleasure in doing things.  Status: Continued - Date(s): 01/18/2025     Intervention(s)  Therapist will help patient identify pleasant and mastery oriented events that elicit positive, relaxed mood.     Objective #B  Patient will Decrease frequency and intensity of feeling down, depressed, hopeless.  Status: Continued - Date(s):01/18/2025     Intervention(s)  Therapist will introduce patient to cognitive-behavioral and acceptance and commitment therapy topics aimed to help reduce depression and anxiety     Objective #C  Patient will Identify negative self-talk and behaviors: challenge core beliefs, myths, and actions  Improve concentration, focus, and mindfulness in daily activities .  Status: Continued - Date(s): 01/18/2025   " "  Intervention(s)  Therapist will help patient identify and manage negative self-talk and automatic thoughts; introduce patient to cognitive distortions; help patient develop cognitive diffusion techniques        Goal 2: Patient will experience a reduction in anxious symptoms, along with a corresponding increase in relaxed emotional states and life satisfaction.        Objective #A (Patient Action)  Patient will use cognitive-behavioral and thought diffusion strategies identified in therapy to challenge anxious thoughts.                       Status: Continued - Date(s): 01/18/2025     Intervention(s)  Therapist will utilize CBT and ACT ideas to help patient challenge anxious thoughts and reduce intensity/duration of anxious distress     Objective #B  Patient will use \"worry time\" each day for 15 minutes of scheduled worry and then defer obsessive or anxious thinking until the next structured worry time.                      Status: Continued - Date(s): 01/18/2025     Intervention(s)  Therapist will teach patient how to effectively utilize worry time and/or thought logs/journals each day and incorporate more relaxation behaviors into their routine.     Objective #C  Patient will identify the stressors which contribute to feelings of anxiety  Patient will increase engagement in adaptive coping skills and recreational activities, such as exercise and healthy socialization, to manage distress.  Status: Continued - Date(s):01/18/2025     Intervention(s)  Therapist will help patient identify triggers/situational factors that contribute to anxiety and behavioral skills aimed to manage anxious distress.        Goal 3: Patient will learn adaptive ways to manage chronic pain        Objective #A Patient will identify 2-4 strategies for managing pain.                          Status: Continued - Date(s): 01/18/2025     Intervention(s)  Therapist will teach distraction and mindfulness skills aimed to help manage chronic pain. "   Objective #B  Patient will state understanding of stressors and relationship to physical symptoms.                    Status: Continued - Date(s): 01/18/2025                Intervention(s)  Therapist will teach distraction skills aimed to help manage chronic pain and utilize ACT/CBT ideas to help with this.      Patient has reviewed and agreed to the above plan.      MARLON Saez  July 15, 2024    Answers submitted by the patient for this visit:  Patient Health Questionnaire (Submitted on 7/15/2024)  If you checked off any problems, how difficult have these problems made it for you to do your work, take care of things at home, or get along with other people?: Not difficult at all  PHQ9 TOTAL SCORE: 0    Answers submitted by the patient for this visit:  Patient Health Questionnaire (Submitted on 8/27/2024)  If you checked off any problems, how difficult have these problems made it for you to do your work, take care of things at home, or get along with other people?: Not difficult at all  PHQ9 TOTAL SCORE: 5    Answers submitted by the patient for this visit:  Patient Health Questionnaire (Submitted on 11/14/2024)  If you checked off any problems, how difficult have these problems made it for you to do your work, take care of things at home, or get along with other people?: Not difficult at all  PHQ9 TOTAL SCORE: 3    Answers submitted by the patient for this visit:  Patient Health Questionnaire (Submitted on 12/5/2024)  PHQ9 TOTAL SCORE: 1

## 2024-12-05 NOTE — PROGRESS NOTES
Addendum:  Pt previously had these forms managed by Dr. Soni- per neurology, is no longer with them. They suggested a pulmonology referral. RN sent message to PCP inquiring about next steps for managing these annual orders (pulmonology referral, PCP manage, in person visit versus virtual).     NAVI HAMILTON RN on 12/5/2024 at 10:36 AM    Per Neurology team- Dr. Soni is now in [place]. Pt is able to see provider (She can see Dr Bourgeois) but referral to pulmonology needed. Per PCP, ok for pulmonology referral. Pulmonology referral for [facility] placed with verbal with readback to Dr. Moseley.     Pt notified via [employer] that new referral was placed for Pulmonology, will inform patient can also see Dr. Soni at new practice. Will inform patient that can schedule with Dr. Moseley and we can see what documentation we are able to provide, but ventilator orders should come from pulmonologist. Dr. Moseley was asked about in person visit for patient- PCP ok with seeing pt virtually due to ventilator.    NAVI HAMILTON RN on 12/5/2024 at 2:37 PM    
Clinic Care Coordination Contact  Follow Up Progress Note      Assessment: Clinic received fax from [facility] requesting yearly insurance documentation for Lanie's vent, tube feeding, trach, suctioning, and supplies. Chart notes discussing the need for this equipment is needed along with documentation for her tube feedings are requested. RN sent message to patient inquiring about who manages orders for her ventilator and requested that she has an in person visit with Dr. Moseley for this as she has not ever seen him in person. See [facility] for further details. RN will await pt's reply.     Care Gaps:    Health Maintenance Due   Topic Date Due    URINE DRUG SCREEN  Never done    ASTHMA ACTION PLAN  Never done    ADVANCE CARE PLANNING  Never done    DEPRESSION ACTION PLAN  Never done    HIV SCREENING  Never done    MEDICARE ANNUAL WELLNESS VISIT  Never done    HEPATITIS C SCREENING  Never done    HEPATITIS B IMMUNIZATION (1 of 3 - 19+ 3-dose series) Never done    ASTHMA CONTROL TEST  09/21/2024       Currently there are no Care Gaps.    Care Plans  Care Plan: Help At Home       Problem: Insufficient In-home support       Goal: Establish adequate home support       Note:       Barriers: patient is home bound, lost services for skilled nursing, home infusion  Strengths: patient's mother is able to help  Patient expressed understanding of goal: yes  Action steps to achieve this goal:  1. I will work with RN Care Coordinator to get home infusion services (port flushes, blood work).                                  Intervention/Education provided during outreach: Discussed patients plan of care. CC asked open ended questions, provided support, resources, and encouragement as needed. Patient will reach out to care team sooner than planned with new questions or concerns.      Plan:   Care Coordinator will follow up in 1 month.     NAVI HAMILTON RN on 12/3/2024 at 10:32 AM    
Implemented All Universal Safety Interventions:  [place] to call system. Call bell, personal items and telephone within reach. Instruct patient to call for assistance. Room bathroom lighting operational. Non-slip footwear when patient is off stretcher. Physically safe environment: no spills, clutter or unnecessary equipment. Stretcher in lowest position, wheels locked, appropriate side rails in place.

## 2024-12-09 DIAGNOSIS — F32.A DEPRESSION, UNSPECIFIED DEPRESSION TYPE: ICD-10-CM

## 2024-12-09 DIAGNOSIS — F41.9 ANXIETY: ICD-10-CM

## 2024-12-11 RX ORDER — SERTRALINE HYDROCHLORIDE 20 MG/ML
SOLUTION ORAL
Qty: 225 ML | Refills: 0 | Status: SHIPPED | OUTPATIENT
Start: 2024-12-11

## 2024-12-11 NOTE — TELEPHONE ENCOUNTER
sertraline (ZOLOFT) 20 MG/ML (HIGH CONC) solution 225 mL 1 5/21/2024     Last Office Visit : 8/29/24  Future Office visit:  1/8/25    Needed KATHARINE-7  90 day george refill sent to the pharmacy -

## 2025-01-02 ENCOUNTER — DOCUMENTATION ONLY (OUTPATIENT)
Dept: FAMILY MEDICINE | Facility: CLINIC | Age: 35
End: 2025-01-02
Payer: MEDICARE

## 2025-01-02 NOTE — PROGRESS NOTES
Type of Form Received: [facility] Request for Vent and O2 Chart Notes     Form Received (Date) 1/2/25   Form Filled out No   Placed in provider folder Yes

## 2025-01-06 ENCOUNTER — PATIENT OUTREACH (OUTPATIENT)
Dept: CARE COORDINATION | Facility: CLINIC | Age: 35
End: 2025-01-06
Payer: MEDICARE

## 2025-01-06 ASSESSMENT — ASTHMA QUESTIONNAIRES
QUESTION_4 LAST FOUR WEEKS HOW OFTEN HAVE YOU USED YOUR RESCUE INHALER OR NEBULIZER MEDICATION (SUCH AS ALBUTEROL): ONCE A WEEK OR LESS
QUESTION_2 LAST FOUR WEEKS HOW OFTEN HAVE YOU HAD SHORTNESS OF BREATH: NOT AT ALL
QUESTION_5 LAST FOUR WEEKS HOW WOULD YOU RATE YOUR ASTHMA CONTROL: WELL CONTROLLED
QUESTION_3 LAST FOUR WEEKS HOW OFTEN DID YOUR ASTHMA SYMPTOMS (WHEEZING, COUGHING, SHORTNESS OF BREATH, CHEST TIGHTNESS OR PAIN) WAKE YOU UP AT NIGHT OR EARLIER THAN USUAL IN THE MORNING: NOT AT ALL
QUESTION_1 LAST FOUR WEEKS HOW MUCH OF THE TIME DID YOUR ASTHMA KEEP YOU FROM GETTING AS MUCH DONE AT WORK, SCHOOL OR AT HOME: NONE OF THE TIME
ACT_TOTALSCORE: 23
ACT_TOTALSCORE: 23

## 2025-01-06 NOTE — PROGRESS NOTES
Clinic Care Coordination Contact  Community Health Worker Follow Up    Care Gaps:     Health Maintenance Due   Topic Date Due    URINE DRUG SCREEN  Never done    ASTHMA ACTION PLAN  Never done    ADVANCE CARE PLANNING  Never done    DEPRESSION ACTION PLAN  Never done    HIV SCREENING  Never done    MEDICARE ANNUAL WELLNESS VISIT  Never done    HEPATITIS C SCREENING  Never done    HEPATITIS B IMMUNIZATION (1 of 3 - 19+ 3-dose series) Never done    ASTHMA CONTROL TEST  09/21/2024       Scheduled 01/08/2025 PCP APPT      Care Plan:   Care Plan: Help At Home       Problem: Insufficient In-home support       Goal: Establish adequate home support       Note:       Barriers: patient is home bound, lost services for skilled nursing, home infusion  Strengths: patient's mother is able to help  Patient expressed understanding of goal: yes  Action steps to achieve this goal:  1. I will work with RN Care Coordinator to get home infusion services (port flushes, blood work).                                  Intervention and Education during outreach: Patient is doing better from being discharged from the hospital. Patient doesn't have any new needs at this time but CHW explained that if any new needs or concerns arise patient is always welcome to call and request to speak with department Care Coordination. CHW reminded patient of virtual APPT with PCP on 01/08/25 at 3:30 PM. Patient would like to continue with monthly follow up calls. There is not a CTC on file for mother.    CHW Plan: CHW will reassign to RNCC for follow up within 1 month.    Karely BOYD Community Health Worker  Clinic Care Coordination  [facility]   Clinic #: [phone]  Direct #: [phone]

## 2025-01-06 NOTE — LETTER
PRIMARY CARE CARE COORDINATION   - CLINICS [facility] SURGERY [facility]ENTER  Patient Centered Plan of Care  About Me:        Patient Name:  Lanie Ford    YOB: 1990  Age:         34 year old   [place] MRN:    [id number] Telephone Information:  Home Phone [phone]   Mobile [phone]       Address:  [place] [address] Email address:  [email or web address]      Emergency Contact(s)    Name Relationship Lgl Grd Work Phone Home Phone Mobile Phone   1. CASANDRA LOZADA Mother    [phone]   2. RUPAL FORD Brother   [phone]            Primary language:  English     needed? No   [employer]:  [phone] op. 1  Other communication barriers:No data recorded  Preferred Method of Communication:  Linda  Current living arrangement: I live in a private home with family    Mobility Status/ Medical Equipment: Dependent/Assisted by Another        Health Maintenance  Health Maintenance Reviewed: No data recorded    My Access Plan  Medical Emergency 911   Primary Clinic Line [phone] to speak with clinic staff or schedule appointment   24 Hour Nurse Line [phone] (toll-free)   Preferred Urgent Care [facility] [phone]             My Care Team Members  Patient Care Team         Relationship Specialty Notifications Start End    Jimmy Moseley MD PCP - General Family Practice  8/28/19     Referred to Dermatology    Phone: [phone] Fax: [phone]         [address] [facility] [address]    Kirk Bird MD MD Family Medicine - Sports Medicine  3/10/15     Phone: [phone] Fax: [phone]         [address] [address]    Anne Soni MD MD Internal Medicine  3/16/15     Phone: [phone] Fax: [phone] [address]  [address] [address]    Erica Mejia MD Referring Physician Internal Medicine  11/14/16      Referring to Neurology    Phone: [phone] Fax: [phone]         [facility] [address] [facility] [address]    Robert Dukes MD MD Neurology  11/14/16     Phone: [phone] Fax: [phone]         [address] [facility] [id number] [facility] [address]    Rosa Hernandez MD MD Dermatology  10/11/17     Phone: [phone] Fax: [phone]         [address] [place] [address] [address] [facility] [address]    Nilo Montaño MD MD Internal Medicine  10/19/18     Phone: [phone] Fax: [phone]         [address] [place] [place] [address]    Jt Mir PA-C Assigned Cancer Care Provider   3/17/21     Phone: [phone] Fax: [phone]         [address] [place] [address]    Héctor Higuera Behavioral Health Clinician   7/15/21     Phone: [phone] Fax: [phone] [address] [facility] [address]    Jimmy Moseley MD Assigned PCP   7/16/21     Phone: [phone] Fax: [phone]         [address] [place] [address]    Héctor Higuera Assigned Behavioral Health Provider   11/21/21     Phone: [phone] Fax: [phone]         [address] [facility] [address]    dAa Tyler APRN CNP Nurse Practitioner OB/Gyn  1/7/22     Phone: [phone] Fax: [phone]         [facility] [address] [facility] [address]    Charito Cabrera PA-C Physician Assistant Dermatology  9/20/23     Phone: [phone] Fax: [phone] [address] [facility] [address]    Lanie Kimball MD Resident Student in organized health care education/training program  11/17/23     Phone: [phone] Fax: [phone]         [facility] [address] [facility] [address]    Terence Bryson MD Assigned Surgical Provider   1/25/24     Phone: [phone] Fax: [phone]         [address] [facility] [address]    Lurdes Ivey MD Resident OB/Gyn  7/8/24     Phone: [phone] Fax:  [phone]         [address] [facility] [address]    Anne Soni MD Assigned Pulmonology Provider   7/23/24     Phone: [phone] Fax: [phone]         [address] [facility] [id number] [facility] [address]    Lucia Garrett MD MD OB/Gyn  8/16/24     Phone: [phone] Fax: [phone]         [address] [place] [facility] [address]    Barbara Lewis MD MD Gynecologic Oncology  9/5/24     Phone: [phone] Fax: [phone]         [address] [facility] [address]    Siri Naranjo, RN Lead Care Coordinator Primary Care - CC Admissions 9/16/24               My Care Plans  Self Management and Treatment Plan  Care Plan  Care Plan: Help At Home       Problem: Insufficient In-home support       Goal: Establish adequate home support       Note:       Barriers: patient is home bound, lost services for skilled nursing, home infusion  Strengths: patient's mother is able to help  Patient expressed understanding of goal: yes  Action steps to achieve this goal:  1. I will work with RN Care Coordinator to get home infusion services (port flushes, blood work).                                Care Plan: Health Maintenance       Problem: Health Maintenance Due or Overdue       Goal: Become up-to-date with health maintenance visit(s)       Start Date: 1/8/2025    This Visit's Progress: On track    Priority: Low    Note:     Barriers: patient has complex medical concerns  Strengths: patient is engaged in managing health care   Patient expressed understanding of goal: yes  Action steps to achieve this goal:  1. I will continue to attend my upcoming appointments.   2. I will alert my care team if there are any concerns that they can assist with.   3. I will continue with monthly care coordination calls and outreach.                                      My Medical and Care Information  Problem List   Patient Active Problem List   Diagnosis    Constipation    Asthma    Spinal muscle  atrophy (H)    Dyspepsia and other specified disorders of function of stomach    Sinusitis, chronic    DVT (deep venous thrombosis) (H)    Chronic respiratory failure (H)    Chronic pain syndrome    Anemia due to blood loss, acute    Iron deficiency anemia due to chronic blood loss    Chronic anticoagulation    Hypokalemia    Acute lower GI bleeding    Anticoagulated    Anxiety disorder    At high risk for falls    Chronic hip pain    History of DVT (deep vein thrombosis)    History of depression    History of arthrodesis    Gram-negative bacteremia    G tube feedings (H)    Depression    Dependent on ventilator (H)    Coagulation disorder    Presence of IVC filter    Presence of intrathecal pump    Intravenous catheter in place    Hypophosphatemia    Hypomagnesemia    Hypocalcemia    History of renal calculi      Current Medications and Allergies:      Current Outpatient Medications:     ACETAMINOPHEN PO, Take 650 mg by mouth every 4 hours as needed for pain, Disp: , Rfl:     albuterol (PROAIR HFA) 108 (90 Base) MCG/ACT inhaler, Inhale 2 puffs into the lungs every 4 hours as needed for shortness of breath or wheezing, Disp: 18 g, Rfl: 5    albuterol (PROVENTIL) (2.5 MG/3ML) 0.083% neb solution, USE ONE VIAL BY NEBULIZATION EVERY 6 HOURS AS NEEDED FOR SHORTNESS OF BREATH/ DYSPNEA OR WHEEZING, Disp: 360 mL, Rfl: 3    ALPRAZolam (XANAX) 0.25 MG tablet, Take 1 tablet (0.25 mg) by mouth 3 times daily as needed for anxiety., Disp: 60 tablet, Rfl: 1    aminocaproic acid (AMICAR) 0.25 GM/ML solution, Take 12 mL (3 grams) via G-tube every 8 hours during menstrual period., Disp: 473 mL, Rfl: 5    bisacodyl (DULCOLAX) 10 MG suppository, Place 10 mg rectally daily as needed for constipation. Patient also takes Bisacodyl Dulcolax liquid, 20 ml BID as needed for consitpation, Disp: , Rfl:     celecoxib (CELEBREX) 200 MG capsule, Take 1 capsule (200 mg) by mouth daily On days of period, Disp: 12 capsule, Rfl: 5    clindamycin  (CLEOCIN T) 1 % lotion, Apply topically 2 times daily To face as needed, Disp: 60 mL, Rfl: 3    diazepam (VALIUM) 5 MG tablet, Take 1 tablet (5 mg) by mouth every 6 hours as needed for muscle spasms., Disp: 30 tablet, Rfl: 3    eflornithine HCl 13.9 % CREA, Externally apply topically 2 times daily Apply thin layer of cream to affected areas of face and adjacent chin twice daily, at least 8 hours apart., Disp: 45 g, Rfl: 3    EVRYSDI 0.75 MG/ML oral solution, 6.6 mLs by Oral or NG Tube route every morning, Disp: , Rfl:     famotidine (PEPCID) 20 MG tablet, Take 1 tablet (20 mg) by mouth 2 times daily, Disp: 180 tablet, Rfl: 3    fluocinolone acetonide (DERMA SMOOTHE/FS BODY) 0.01 % external oil, Apply at nighttime to forehead and scalp as needed for scale or pruritus (Patient taking differently: Apply topically at bedtime. Apply at nighttime to forehead and scalp as needed for scale or pruritus), Disp: 118 mL, Rfl: 3    fluticasone (FLOVENT HFA) 110 MCG/ACT inhaler, Inhale 1 puff into the lungs 2 times daily (Patient taking differently: Inhale 1 puff into the lungs 2 times daily. PATIENT NOT TAKING CURRENTLY BUT MIGHT IN THE FUTURE), Disp: 36 g, Rfl: 5    gabapentin (NEURONTIN) 250 MG/5ML solution, 20 mLs (1,000 mg) by Per Feeding Tube route 3 times daily, Disp: 1800 mL, Rfl: 5    GENERLAC 10 GM/15ML solution, PATIENT NOT TAKING CURRENTLY, Disp: , Rfl: 11    guaiFENesin (ORGANIDIN) 200 MG TABS tablet, Take 200 mg by mouth every 4 hours as needed for cough. ONLY TAKES THIS WHEN THE PATIENT HAS A COLD, Disp: , Rfl:     HYDROmorphone (DILAUDID) 4 MG tablet, Take 8 mg by mouth every 3 hours, Disp: , Rfl:     hydrOXYzine (ATARAX) 25 MG tablet, Take 25 mg by mouth every 6 hours as needed for itching. PATIENT NOT TAKING CURRENTLY, Disp: , Rfl:     ketoconazole (NIZORAL) 2 % external shampoo, Lather onto scalp and forehead once weekly when showering and let sit for 1-2 minutes before rinsing, Disp: 120 mL, Rfl: 11     levofloxacin (LEVAQUIN) 25 MG/ML solution, Place 20 mLs (500 mg) into G tube daily., Disp: 140 mL, Rfl: 0    levofloxacin (LEVAQUIN) 25 MG/ML solution, TAKE 20ML BY FEEDING TUBE ONCE DAILY FOR ONE WEEK (Patient taking differently: TAKE 20ML BY FEEDING TUBE ONCE DAILY FOR ONE WEEK CURRENTLY NOT TAKING - TAKES IT WHEN SHE HAS A UTI), Disp: 480 mL, Rfl: 1    Magnesium Hydroxide (MILK OF MAGNESIA PO), Take 30 mLs by mouth as needed, Disp: , Rfl:     metroNIDAZOLE (METROCREAM) 0.75 % external cream, Apply topically 2 times daily (Patient taking differently: Apply topically 2 times daily. PATIENT NOT TAKING CURRENTLY), Disp: 45 g, Rfl: 11    Minoxidil (MINOXIDIL FOR WOMEN) 5 % FOAM, Apply 1/2 capful once a day to affected area (Patient taking differently: Apply 1/2 capful once a day to affected area PATIENT NOT TAKING CURRENTLY), Disp: 60 g, Rfl: 3    mometasone (NASONEX) 50 MCG/ACT nasal spray, Spray 2 sprays in nostril daily as needed. PATIENT NOT TAKING CURRENTLY, Disp: , Rfl:     morphine (SABINE) 30 MG 24 hr capsule, Take 30 mg by mouth 2 times daily, Disp: , Rfl:     naloxone (NARCAN) 4 MG/0.1ML nasal spray, Spray 1 spray (4 mg) into one nostril alternating nostrils once as needed for opioid reversal every 2-3 minutes until assistance arrives, Disp: 0.2 mL, Rfl: 0    Nutren 1.0 Fiber PO LIQD, Per G tube; 8 oz Per G Tube qid, Disp: 3000 mL, Rfl: 11    ondansetron (ZOFRAN-ODT) 4 MG ODT tab, Take 1 tablet (4 mg) by mouth every 8 hours as needed for nausea, Disp: 30 tablet, Rfl: 1    order for DME, Equipment being ordered: 16 fr 5cc balloon indwelling catheter with drainage bag. 2 catheters/month, Disp: 2 catheter, Rfl: 11    order for DME, Equipment being ordered: Vibracare Percussor, Disp: 1 Units, Rfl: 0    polyethylene glycol (MIRALAX) 17 GM/Dose powder, Take 17 g by mouth daily (Patient taking differently: Take 17 g by mouth daily as needed.), Disp: 714 g, Rfl: 5    potassium chloride (KAYCIEL) 20 MEQ/15ML (10%)  solution, 22.5 mLs (30 mEq) by Per G Tube route daily, Disp: 675 mL, Rfl: 11    prochlorperazine (COMPAZINE) 5 MG tablet, Take 5 mg by mouth every 6 hours as needed for nausea or vomiting, Disp: , Rfl:     rivaroxaban ANTICOAGULANT (XARELTO ANTICOAGULANT) 10 MG TABS tablet, 1 tablet (10 mg) by Oral or Feeding Tube route daily with food, Disp: 90 tablet, Rfl: 3    Salicylic Acid (OXY BALANCE FACIAL CLEAN WASH EX), Externally apply 1 pad. topically daily., Disp: , Rfl:     Sennosides (SENNA) 8.8 MG/5ML LIQD, 15 mLs by Feeding route 2 times daily, Disp: 900 mL, Rfl: 11    sertraline (ZOLOFT) 20 MG/ML (HIGH CONC) solution, TAKE 2.5 ML(50 MG) BY MOUTH DAILYTAKE 2.5 ML(50 MG) BY MOUTH DAILY, Disp: 225 mL, Rfl: 0    silver nitrate (ARZOL) 75-25 % miscellaneous, Use one stick apply tip to granuloma prn granuloma formation (Patient taking differently: Use one stick apply tip to granuloma prn granuloma formation Uses around the trach stoma), Disp: 20 applicator, Rfl: 1    silver sulfADIAZINE (SSD) 1 % external cream, Apply topically 2 times daily To affected areas-apply bid to wounds till healed, Disp: 400 g, Rfl: 11    simethicone 40 MG/0.6ML LIQD, Place 40 mg into NG or OG tube as needed (PRN GAS)., Disp: , Rfl:     traZODone (DESYREL) 5 mg/ml SUSP, Take 15 ml (75 mg) po at bedtime Prn insomnia, Disp: , Rfl:     tretinoin (RETIN-A) 0.025 % external cream, Apply a pea-sized amount evenly over the face at nighttime before bed. (Patient taking differently: at bedtime. Apply a pea-sized amount evenly over the face at nighttime before bed. NOT CURRENTLY USING), Disp: 45 g, Rfl: 2       Allergies   Allergen Reactions    Ibuprofen          Care Coordination Start Date: No linked episodes   Frequency of Care Coordination: monthly, more frequently as needed     Form Last Updated: 01/08/2025

## 2025-01-08 ENCOUNTER — VIRTUAL VISIT (OUTPATIENT)
Dept: FAMILY MEDICINE | Facility: CLINIC | Age: 35
End: 2025-01-08
Payer: MEDICARE

## 2025-01-08 DIAGNOSIS — F41.9 ANXIETY: ICD-10-CM

## 2025-01-08 DIAGNOSIS — Z09 HOSPITAL DISCHARGE FOLLOW-UP: ICD-10-CM

## 2025-01-08 DIAGNOSIS — M54.2 CERVICALGIA: ICD-10-CM

## 2025-01-08 DIAGNOSIS — G12.9 SPINAL MUSCLE ATROPHY (H): Primary | ICD-10-CM

## 2025-01-08 DIAGNOSIS — G62.9 NEUROPATHY: ICD-10-CM

## 2025-01-08 RX ORDER — DIAZEPAM 5 MG/1
5 TABLET ORAL EVERY 6 HOURS PRN
Qty: 45 TABLET | Refills: 3 | Status: SHIPPED | OUTPATIENT
Start: 2025-01-08

## 2025-01-08 RX ORDER — ALPRAZOLAM 0.25 MG/1
0.25 TABLET ORAL 3 TIMES DAILY PRN
Qty: 60 TABLET | Refills: 1 | Status: SHIPPED | OUTPATIENT
Start: 2025-01-08

## 2025-01-08 RX ORDER — GABAPENTIN 250 MG/5ML
1000 SOLUTION ORAL 3 TIMES DAILY
Qty: 1800 ML | Refills: 5 | Status: SHIPPED | OUTPATIENT
Start: 2025-01-08

## 2025-01-08 NOTE — PATIENT INSTRUCTIONS
Thank you for visiting the Primary Care Center today at the [facility]! The following is some information about our clinic:     Primary Care Center Frequently-Asked Questions    (1) How do I schedule appointments at the [facility]?     Primary Care--to schedule or make changes to an existing appointment, please call our primary care line at [phone].    Labs--to schedule a lab appointment at the [facility] you can use [employer] or call [phone]. If you have a [place] location that is closer to home, you can reach out to that location for scheduling options.     Imaging--if you need to schedule a CT, X-ray, MRI, ultrasound, or other imaging study you can call [phone] to schedule at the [facility] or any other [facility] imaging location.     Referrals--if a referral to another specialty was ordered you can expect a phone call from their scheduling team. If you have not heard from them in a week, please call us or send us a [employer] message to check the status or get a scheduling number. Please note that this only applies to internal [facility] referrals. If the referral is external you would need to contact their office for scheduling.     (2) I have a question about my visit, who do I contact?     You can call us at the primary care line at [phone] to ask questions about your visit. You can also send a secure message through [employer], which is reviewed by clinic staff. Please note that [employer] messages have a twenty-four to forty-eight business hour turnaround time and should not be used for urgent concerns.    (3) How will I get the results of my tests?    If you are signed up for [employer] all tests will be released to you within twenty-four hours of resulting. Please allow three to five days for your doctor to review your results and place a note interpreting the results. If you do not have [employer] you will receive your  results through mail seven to ten business days following the return of the tests. Please note that if there should be any urgent or concerning results that your doctor or their registered nurse will reach out to you the same day as the tests come back. If you have follow up questions about your results or would like to discuss the results in detail please schedule a follow up with your provider either in person or virtually.     (4) How do I get refills of my prescriptions?     You should always first contact your pharmacy for refills of your medications. If submitting a refill request on [employer], please be sure to submit the request only once--repeat requests can cause delays in refill. If you are requesting a NEW medication or a medication related to new symptoms you will need to schedule an appointment with a provider prior to approval. Please note: Routine medication refills have up to one to three business day turnaround whereas controlled substances refills have up to five to seven business day turnaround.    (5) I have new symptoms, what do I do?     If you are having an immediate medical emergency, you should dial 911 for assistance.   For anything urgent that needs to be seen within a few hours to one day you should visit a local urgent care for assistance.  For non-urgent symptoms that need to be seen within a few days to a week you can schedule with an available provider in primary care by going to [employer] or calling [phone].   If you are not sure how serious your symptoms are or you would like to receive medical advice you can always call [phone] to speak with a triage nurse.

## 2025-01-08 NOTE — PROGRESS NOTES
Lanie is a 34 year old who is being evaluated via a billable video visit.    How would you like to obtain your AVS? MyChart  If the video visit is dropped, the invitation should be resent by: Text to cell phone: [phone]  Will anyone else be joining your video visit? No      Are refills needed on medications prescribed by this physician? YES Alprazalone, Diazepam, Gabapentin    Assessment & Plan     Spinal muscle atrophy (H)  Valium very helpful for muscle spasms, tolerated, on narcotics too, is on vent   - Adult Pulmonary Medicine  Referral; Future  - diazepam (VALIUM) 5 MG tablet; Take 1 tablet (5 mg) by mouth every 6 hours as needed for muscle spasms.    Anxiety  Very helpful for anxiety attacks, tolerated, on vent  - ALPRAZolam (XANAX) 0.25 MG tablet; Take 1 tablet (0.25 mg) by mouth 3 times daily as needed for anxiety.    Neuropathy  Helps w/ nerve paiin  - gabapentin (NEURONTIN) 250 MG/5ML solution; Place 20 mLs (1,000 mg) into Feeding Tube 3 times daily.    Cervicalgia  [facility] notes rvwd; can do prn medrol dosepak (or liquid similar) if flares. She will discuss w/ pain clinic    Interval visist to other providers rvwd today    Past Medical History:   Diagnosis Date    Abnormal uterine bleeding (AUB)     Anemia     Anxiety     Arthritis Unknown    Asthma 06/18/2014    Chronic pain     Chronic respiratory failure (H)     Constipation     Depression     DVT (deep venous thrombosis) (H)     Gastrointestinal tube present (H)     History of blood transfusion     Obesity     Sinusitis, chronic     Snoring     Spinal muscle atrophy (H)      Past Surgical History:   Procedure Laterality Date    BACK SURGERY  '04    fusion of cervical and thoracic spine, 2004 and 2018    BIOPSY  1990    muscle biopsy    EXAM UNDER ANESTHESIA PELVIC N/A 9/3/2024    Procedure: pelvic EXAM UNDER ANESTHESIA,;  Surgeon: Lucia Garrett MD;  [facility]    GASTROSTOMY TUBE  2004    INSERT INTRAUTERINE DEVICE N/A  9/3/2024    Procedure: Insert intrauterine device Mirena UNDER ULTRA SOUND GUIDANCE;  Surgeon: Lucia Garrett MD;  [facility]: [facility]    IR CHEST PORT PLACEMENT > 5 YRS OF AGE  9/17/2018    IR CVC TUNNEL W2 CATH W/O PORT  7/5/2018    IR IVC FILTER PLACEMENT  2012    IR IVC FILTER REMOVAL  1/24/2024    IR PORT CHECK RIGHT  10/23/2023    right chest    KIDNEY SURGERY  2009    kidney stones    SPINE SURGERY      reservoir placed, has had three total procedures    TRACHEOSTOMY  2004     Current Outpatient Medications   Medication Sig Dispense Refill    ACETAMINOPHEN PO Take 650 mg by mouth every 4 hours as needed for pain      albuterol (PROAIR HFA) 108 (90 Base) MCG/ACT inhaler Inhale 2 puffs into the lungs every 4 hours as needed for shortness of breath or wheezing 18 g 5    albuterol (PROVENTIL) (2.5 MG/3ML) 0.083% neb solution USE ONE VIAL BY NEBULIZATION EVERY 6 HOURS AS NEEDED FOR SHORTNESS OF BREATH/ DYSPNEA OR WHEEZING 360 mL 3    ALPRAZolam (XANAX) 0.25 MG tablet Take 1 tablet (0.25 mg) by mouth 3 times daily as needed for anxiety. 60 tablet 1    aminocaproic acid (AMICAR) 0.25 GM/ML solution Take 12 mL (3 grams) via G-tube every 8 hours during menstrual period. 473 mL 5    bisacodyl (DULCOLAX) 10 MG suppository Place 10 mg rectally daily as needed for constipation. Patient also takes Bisacodyl Dulcolax liquid, 20 ml BID as needed for consitpation      celecoxib (CELEBREX) 200 MG capsule Take 1 capsule (200 mg) by mouth daily On days of period 12 capsule 5    clindamycin (CLEOCIN T) 1 % lotion Apply topically 2 times daily To face as needed 60 mL 3    diazepam (VALIUM) 5 MG tablet Take 1 tablet (5 mg) by mouth every 6 hours as needed for muscle spasms. 45 tablet 3    eflornithine HCl 13.9 % CREA Externally apply topically 2 times daily Apply thin layer of cream to affected areas of face and adjacent chin twice daily, at least 8 hours apart. 45 g 3    EVRYSDI 0.75 MG/ML oral solution 6.6 mLs by Oral or NG Tube  route every morning      famotidine (PEPCID) 20 MG tablet Take 1 tablet (20 mg) by mouth 2 times daily 180 tablet 3    fluocinolone acetonide (DERMA SMOOTHE/FS BODY) 0.01 % external oil Apply at nighttime to forehead and scalp as needed for scale or pruritus (Patient taking differently: Apply topically at bedtime. Apply at nighttime to forehead and scalp as needed for scale or pruritus) 118 mL 3    fluticasone (FLOVENT HFA) 110 MCG/ACT inhaler Inhale 1 puff into the lungs 2 times daily (Patient taking differently: Inhale 1 puff into the lungs 2 times daily. PATIENT NOT TAKING CURRENTLY BUT MIGHT IN THE FUTURE) 36 g 5    gabapentin (NEURONTIN) 250 MG/5ML solution Place 20 mLs (1,000 mg) into Feeding Tube 3 times daily. 1800 mL 5    GENERLAC 10 GM/15ML solution PATIENT NOT TAKING CURRENTLY  11    guaiFENesin (ORGANIDIN) 200 MG TABS tablet Take 200 mg by mouth every 4 hours as needed for cough. ONLY TAKES THIS WHEN THE PATIENT HAS A COLD      HYDROmorphone (DILAUDID) 4 MG tablet Take 8 mg by mouth every 3 hours      hydrOXYzine (ATARAX) 25 MG tablet Take 25 mg by mouth every 6 hours as needed for itching. PATIENT NOT TAKING CURRENTLY      ketoconazole (NIZORAL) 2 % external shampoo Lather onto scalp and forehead once weekly when showering and let sit for 1-2 minutes before rinsing 120 mL 11    levofloxacin (LEVAQUIN) 25 MG/ML solution Place 20 mLs (500 mg) into G tube daily. 140 mL 0    levofloxacin (LEVAQUIN) 25 MG/ML solution TAKE 20ML BY FEEDING TUBE ONCE DAILY FOR ONE WEEK (Patient taking differently: TAKE 20ML BY FEEDING TUBE ONCE DAILY FOR ONE WEEK  CURRENTLY NOT TAKING - TAKES IT WHEN SHE HAS A UTI) 480 mL 1    Magnesium Hydroxide (MILK OF MAGNESIA PO) Take 30 mLs by mouth as needed      metroNIDAZOLE (METROCREAM) 0.75 % external cream Apply topically 2 times daily (Patient taking differently: Apply topically 2 times daily. PATIENT NOT TAKING CURRENTLY) 45 g 11    Minoxidil (MINOXIDIL FOR WOMEN) 5 % FOAM Apply  1/2 capful once a day to affected area (Patient taking differently: Apply 1/2 capful once a day to affected area  PATIENT NOT TAKING CURRENTLY) 60 g 3    mometasone (NASONEX) 50 MCG/ACT nasal spray Spray 2 sprays in nostril daily as needed. PATIENT NOT TAKING CURRENTLY      morphine (SABINE) 30 MG 24 hr capsule Take 30 mg by mouth 2 times daily      naloxone (NARCAN) 4 MG/0.1ML nasal spray Spray 1 spray (4 mg) into one nostril alternating nostrils once as needed for opioid reversal every 2-3 minutes until assistance arrives 0.2 mL 0    Nutren 1.0 Fiber PO LIQD Per G tube; 8 oz Per G Tube qid 3000 mL 11    ondansetron (ZOFRAN-ODT) 4 MG ODT tab Take 1 tablet (4 mg) by mouth every 8 hours as needed for nausea 30 tablet 1    order for DME Equipment being ordered: 16 fr 5cc balloon indwelling catheter with drainage bag. 2 catheters/month 2 catheter 11    order for DME Equipment being ordered: Vibracare Percussor 1 Units 0    polyethylene glycol (MIRALAX) 17 GM/Dose powder Take 17 g by mouth daily (Patient taking differently: Take 17 g by mouth daily as needed.) 714 g 5    potassium chloride (KAYCIEL) 20 MEQ/15ML (10%) solution 22.5 mLs (30 mEq) by Per G Tube route daily 675 mL 11    prochlorperazine (COMPAZINE) 5 MG tablet Take 5 mg by mouth every 6 hours as needed for nausea or vomiting      rivaroxaban ANTICOAGULANT (XARELTO ANTICOAGULANT) 10 MG TABS tablet 1 tablet (10 mg) by Oral or Feeding Tube route daily with food 90 tablet 3    Salicylic Acid (OXY BALANCE FACIAL CLEAN WASH EX) Externally apply 1 pad. topically daily.      Sennosides (SENNA) 8.8 MG/5ML LIQD 15 mLs by Feeding route 2 times daily 900 mL 11    sertraline (ZOLOFT) 20 MG/ML (HIGH CONC) solution TAKE 2.5 ML(50 MG) BY MOUTH DAILYTAKE 2.5 ML(50 MG) BY MOUTH DAILY 225 mL 0    silver nitrate (ARZOL) 75-25 % miscellaneous Use one stick apply tip to granuloma prn granuloma formation (Patient taking differently: Use one stick apply tip to granuloma prn  granuloma formation  Uses around the trach stoma) 20 applicator 1    silver sulfADIAZINE (SSD) 1 % external cream Apply topically 2 times daily To affected areas-apply bid to wounds till healed 400 g 11    simethicone 40 MG/0.6ML LIQD Place 40 mg into NG or OG tube as needed (PRN GAS).      traZODone (DESYREL) 5 mg/ml SUSP Take 15 ml (75 mg) po at bedtime Prn insomnia      tretinoin (RETIN-A) 0.025 % external cream Apply a pea-sized amount evenly over the face at nighttime before bed. (Patient taking differently: at bedtime. Apply a pea-sized amount evenly over the face at nighttime before bed.  NOT CURRENTLY USING) 45 g 2     No current facility-administered medications for this visit.     Allergies   Allergen Reactions    Ibuprofen      Family History   Problem Relation Age of Onset    Hypertension Mother     Breast Cancer Mother         No longer has it    Hypertension Father     Hypertension Brother     Hypertension Brother     Family History Negative Other     Cerebrovascular Disease Other     Cerebrovascular Disease Maternal Aunt     Deep Vein Thrombosis (DVT) Maternal Aunt     Deep Vein Thrombosis (DVT) Maternal Uncle     Anesthesia Reaction No family hx of      Social History     Socioeconomic History    Marital status: Single     Spouse name: Not on file    Number of children: Not on file    Years of education: Not on file    Highest education level: Not on file   Occupational History    Not on file   Tobacco Use    Smoking status: Never    Smokeless tobacco: Never   Vaping Use    Vaping status: Never Used   Substance and Sexual Activity    Alcohol use: Never    Drug use: Never    Sexual activity: Never   Other Topics Concern    Parent/sibling w/ CABG, MI or angioplasty before 65F 55M? No   Social History Narrative    Not on file     Social Drivers of Health     Financial Resource Strain: Low Risk  (1/6/2025)    Financial Resource Strain     Within the past 12 months, have you or your family members you  live with been unable to get utilities (heat, electricity) when it was really needed?: No   Food Insecurity: Low Risk  (1/6/2025)    Food Insecurity     Within the past 12 months, did you worry that your food would run out before you got money to buy more?: No     Within the past 12 months, did the food you bought just not last and you didn t have money to get more?: No   Transportation Needs: Low Risk  (1/6/2025)    Transportation Needs     Within the past 12 months, has lack of transportation kept you from medical appointments, getting your medicines, non-medical meetings or appointments, work, or from getting things that you need?: No   Physical Activity: Not on file   Stress: Not on file   Social Connections: Not on file   Interpersonal Safety: Patient Declined (12/31/2024)    Received from [facility]    Humiliation, Afraid, Rape, and Kick questionnaire     Fear of Current or Ex-Partner: Patient declined     Emotionally Abused: Patient declined     Physically Abused: Patient declined     Sexually Abused: Patient declined   Housing Stability: Low Risk  (1/6/2025)    Housing Stability     Do you have housing? : Yes     Are you worried about losing your housing?: No   The longitudinal plan of care for the diagnosis(es)/condition(s) as documented were addressed during this visit. Due to the added complexity in care, I will continue to support Lanie in the subsequent management and with ongoing continuity of care.Review of external notes as documented elsewhere in note  32 minutes spent by me on the date of the encounter doing chart review, history and exam, documentation and further activities per the note                No follow-ups on file.    Julia Dela Cruz is a 34 year old, presenting for the following health issues:  RECHECK, Forms, and Back Pain      Video Start Time:  4;42    History of Present Illness       Reason for visit:  Herniated Disc  Symptom onset:  3-4 weeks ago  Symptoms include:   Excruciating Pain and Inflammation  Symptom intensity:  Severe  Symptom progression:  Improving  Had these symptoms before:  No  What makes it worse:  Movement  What makes it better:  Medicine, Lidocane Patches and Steroids   She is taking medications regularly.   Cervicalgia: [facility] eval treat reviewed  Better  Oral sterois helped; rvwd can do prn burst when severe, also she will discuss w/ Pain MD at [facility]  Needs lynda Alatorre MD, referred  She is on ventilator 24/7/365 which requires supplemental oxygen, life sustaining, was seeing Pulm Dr Soni who's moved away, will start w/ new Pulm MD here soon  She in on G tube feeds as only source of food, form done/faxed for this today  Past Medical History:   Diagnosis Date    Abnormal uterine bleeding (AUB)     Anemia     Anxiety     Arthritis Unknown    Asthma 06/18/2014    Chronic pain     Chronic respiratory failure (H)     Constipation     Depression     DVT (deep venous thrombosis) (H)     Gastrointestinal tube present (H)     History of blood transfusion     Obesity     Sinusitis, chronic     Snoring     Spinal muscle atrophy (H)      Past Surgical History:   Procedure Laterality Date    BACK SURGERY  '04    fusion of cervical and thoracic spine, 2004 and 2018    BIOPSY  1990    muscle biopsy    EXAM UNDER ANESTHESIA PELVIC N/A 9/3/2024    Procedure: pelvic EXAM UNDER ANESTHESIA,;  Surgeon: Lucia Garrett MD;  [facility]    GASTROSTOMY TUBE  2004    INSERT INTRAUTERINE DEVICE N/A 9/3/2024    Procedure: Insert intrauterine device Mirena UNDER ULTRA SOUND GUIDANCE;  Surgeon: Lucia Garrett MD;  [facility]    IR CHEST PORT PLACEMENT > 5 YRS OF AGE  9/17/2018    IR CVC TUNNEL W2 CATH W/O PORT  7/5/2018    IR IVC FILTER PLACEMENT  2012    IR IVC FILTER REMOVAL  1/24/2024    IR PORT CHECK RIGHT  10/23/2023    right chest    KIDNEY SURGERY  2009    kidney stones    SPINE SURGERY      reservoir placed, has had three total procedures    TRACHEOSTOMY  2004      Current Outpatient Medications   Medication Sig Dispense Refill    ACETAMINOPHEN PO Take 650 mg by mouth every 4 hours as needed for pain      albuterol (PROAIR HFA) 108 (90 Base) MCG/ACT inhaler Inhale 2 puffs into the lungs every 4 hours as needed for shortness of breath or wheezing 18 g 5    albuterol (PROVENTIL) (2.5 MG/3ML) 0.083% neb solution USE ONE VIAL BY NEBULIZATION EVERY 6 HOURS AS NEEDED FOR SHORTNESS OF BREATH/ DYSPNEA OR WHEEZING 360 mL 3    ALPRAZolam (XANAX) 0.25 MG tablet Take 1 tablet (0.25 mg) by mouth 3 times daily as needed for anxiety. 60 tablet 1    aminocaproic acid (AMICAR) 0.25 GM/ML solution Take 12 mL (3 grams) via G-tube every 8 hours during menstrual period. 473 mL 5    bisacodyl (DULCOLAX) 10 MG suppository Place 10 mg rectally daily as needed for constipation. Patient also takes Bisacodyl Dulcolax liquid, 20 ml BID as needed for consitpation      celecoxib (CELEBREX) 200 MG capsule Take 1 capsule (200 mg) by mouth daily On days of period 12 capsule 5    clindamycin (CLEOCIN T) 1 % lotion Apply topically 2 times daily To face as needed 60 mL 3    diazepam (VALIUM) 5 MG tablet Take 1 tablet (5 mg) by mouth every 6 hours as needed for muscle spasms. 45 tablet 3    eflornithine HCl 13.9 % CREA Externally apply topically 2 times daily Apply thin layer of cream to affected areas of face and adjacent chin twice daily, at least 8 hours apart. 45 g 3    EVRYSDI 0.75 MG/ML oral solution 6.6 mLs by Oral or NG Tube route every morning      famotidine (PEPCID) 20 MG tablet Take 1 tablet (20 mg) by mouth 2 times daily 180 tablet 3    fluocinolone acetonide (DERMA SMOOTHE/FS BODY) 0.01 % external oil Apply at nighttime to forehead and scalp as needed for scale or pruritus (Patient taking differently: Apply topically at bedtime. Apply at nighttime to forehead and scalp as needed for scale or pruritus) 118 mL 3    fluticasone (FLOVENT HFA) 110 MCG/ACT inhaler Inhale 1 puff into the lungs 2 times  daily (Patient taking differently: Inhale 1 puff into the lungs 2 times daily. PATIENT NOT TAKING CURRENTLY BUT MIGHT IN THE FUTURE) 36 g 5    gabapentin (NEURONTIN) 250 MG/5ML solution Place 20 mLs (1,000 mg) into Feeding Tube 3 times daily. 1800 mL 5    GENERLAC 10 GM/15ML solution PATIENT NOT TAKING CURRENTLY  11    guaiFENesin (ORGANIDIN) 200 MG TABS tablet Take 200 mg by mouth every 4 hours as needed for cough. ONLY TAKES THIS WHEN THE PATIENT HAS A COLD      HYDROmorphone (DILAUDID) 4 MG tablet Take 8 mg by mouth every 3 hours      hydrOXYzine (ATARAX) 25 MG tablet Take 25 mg by mouth every 6 hours as needed for itching. PATIENT NOT TAKING CURRENTLY      ketoconazole (NIZORAL) 2 % external shampoo Lather onto scalp and forehead once weekly when showering and let sit for 1-2 minutes before rinsing 120 mL 11    levofloxacin (LEVAQUIN) 25 MG/ML solution Place 20 mLs (500 mg) into G tube daily. 140 mL 0    levofloxacin (LEVAQUIN) 25 MG/ML solution TAKE 20ML BY FEEDING TUBE ONCE DAILY FOR ONE WEEK (Patient taking differently: TAKE 20ML BY FEEDING TUBE ONCE DAILY FOR ONE WEEK  CURRENTLY NOT TAKING - TAKES IT WHEN SHE HAS A UTI) 480 mL 1    Magnesium Hydroxide (MILK OF MAGNESIA PO) Take 30 mLs by mouth as needed      metroNIDAZOLE (METROCREAM) 0.75 % external cream Apply topically 2 times daily (Patient taking differently: Apply topically 2 times daily. PATIENT NOT TAKING CURRENTLY) 45 g 11    Minoxidil (MINOXIDIL FOR WOMEN) 5 % FOAM Apply 1/2 capful once a day to affected area (Patient taking differently: Apply 1/2 capful once a day to affected area  PATIENT NOT TAKING CURRENTLY) 60 g 3    mometasone (NASONEX) 50 MCG/ACT nasal spray Spray 2 sprays in nostril daily as needed. PATIENT NOT TAKING CURRENTLY      morphine (SABINE) 30 MG 24 hr capsule Take 30 mg by mouth 2 times daily      naloxone (NARCAN) 4 MG/0.1ML nasal spray Spray 1 spray (4 mg) into one nostril alternating nostrils once as needed for opioid reversal  every 2-3 minutes until assistance arrives 0.2 mL 0    Nutren 1.0 Fiber PO LIQD Per G tube; 8 oz Per G Tube qid 3000 mL 11    ondansetron (ZOFRAN-ODT) 4 MG ODT tab Take 1 tablet (4 mg) by mouth every 8 hours as needed for nausea 30 tablet 1    order for DME Equipment being ordered: 16 fr 5cc balloon indwelling catheter with drainage bag. 2 catheters/month 2 catheter 11    order for DME Equipment being ordered: Vibracare Percussor 1 Units 0    polyethylene glycol (MIRALAX) 17 GM/Dose powder Take 17 g by mouth daily (Patient taking differently: Take 17 g by mouth daily as needed.) 714 g 5    potassium chloride (KAYCIEL) 20 MEQ/15ML (10%) solution 22.5 mLs (30 mEq) by Per G Tube route daily 675 mL 11    prochlorperazine (COMPAZINE) 5 MG tablet Take 5 mg by mouth every 6 hours as needed for nausea or vomiting      rivaroxaban ANTICOAGULANT (XARELTO ANTICOAGULANT) 10 MG TABS tablet 1 tablet (10 mg) by Oral or Feeding Tube route daily with food 90 tablet 3    Salicylic Acid (OXY BALANCE FACIAL CLEAN WASH EX) Externally apply 1 pad. topically daily.      Sennosides (SENNA) 8.8 MG/5ML LIQD 15 mLs by Feeding route 2 times daily 900 mL 11    sertraline (ZOLOFT) 20 MG/ML (HIGH CONC) solution TAKE 2.5 ML(50 MG) BY MOUTH DAILYTAKE 2.5 ML(50 MG) BY MOUTH DAILY 225 mL 0    silver nitrate (ARZOL) 75-25 % miscellaneous Use one stick apply tip to granuloma prn granuloma formation (Patient taking differently: Use one stick apply tip to granuloma prn granuloma formation  Uses around the trach stoma) 20 applicator 1    silver sulfADIAZINE (SSD) 1 % external cream Apply topically 2 times daily To affected areas-apply bid to wounds till healed 400 g 11    simethicone 40 MG/0.6ML LIQD Place 40 mg into NG or OG tube as needed (PRN GAS).      traZODone (DESYREL) 5 mg/ml SUSP Take 15 ml (75 mg) po at bedtime Prn insomnia      tretinoin (RETIN-A) 0.025 % external cream Apply a pea-sized amount evenly over the face at nighttime before bed.  (Patient taking differently: at bedtime. Apply a pea-sized amount evenly over the face at nighttime before bed.  NOT CURRENTLY USING) 45 g 2     No current facility-administered medications for this visit.     Allergies   Allergen Reactions    Ibuprofen      Family History   Problem Relation Age of Onset    Hypertension Mother     Breast Cancer Mother         No longer has it    Hypertension Father     Hypertension Brother     Hypertension Brother     Family History Negative Other     Cerebrovascular Disease Other     Cerebrovascular Disease Maternal Aunt     Deep Vein Thrombosis (DVT) Maternal Aunt     Deep Vein Thrombosis (DVT) Maternal Uncle     Anesthesia Reaction No family hx of      Social History     Socioeconomic History    Marital status: Single     Spouse name: Not on file    Number of children: Not on file    Years of education: Not on file    Highest education level: Not on file   Occupational History    Not on file   Tobacco Use    Smoking status: Never    Smokeless tobacco: Never   Vaping Use    Vaping status: Never Used   Substance and Sexual Activity    Alcohol use: Never    Drug use: Never    Sexual activity: Never   Other Topics Concern    Parent/sibling w/ CABG, MI or angioplasty before 65F 55M? No   Social History Narrative    Not on file     Social Drivers of Health     Financial Resource Strain: Low Risk  (1/6/2025)    Financial Resource Strain     Within the past 12 months, have you or your family members you live with been unable to get utilities (heat, electricity) when it was really needed?: No   Food Insecurity: Low Risk  (1/6/2025)    Food Insecurity     Within the past 12 months, did you worry that your food would run out before you got money to buy more?: No     Within the past 12 months, did the food you bought just not last and you didn t have money to get more?: No   Transportation Needs: Low Risk  (1/6/2025)    Transportation Needs     Within the past 12 months, has lack of  "transportation kept you from medical appointments, getting your medicines, non-medical meetings or appointments, work, or from getting things that you need?: No   Physical Activity: Not on file   Stress: Not on file   Social Connections: Not on file   Interpersonal Safety: Patient Declined (12/31/2024)    Received from [facility]    Humiliation, Afraid, Rape, and Kick questionnaire     Fear of Current or Ex-Partner: Patient declined     Emotionally Abused: Patient declined     Physically Abused: Patient declined     Sexually Abused: Patient declined   Housing Stability: Low Risk  (1/6/2025)    Housing Stability     Do you have housing? : Yes     Are you worried about losing your housing?: No           Objective    Vitals - Patient Reported  Systolic (Patient Reported):  (Not today)  Weight (Patient Reported): 56.2 kg (124 lb)  Height (Patient Reported): 144.8 cm (4' 9\")  BMI (Based on Pt Reported Ht/Wt): 26.83  Pain Score: Severe Pain (6)  Pain Loc: Other - see comment (Hips, lower)      Vitals:  No vitals were obtained today due to virtual visit.    Physical Exam   GENERAL: alert and no distress  On vent, in bed per usual  EYES: Eyes grossly normal to inspection.  No discharge or erythema, or obvious scleral/conjunctival abnormalities.  RESP: No audible wheeze, cough, or visible cyanosis.    SKIN: Visible skin clear. No significant rash, abnormal pigmentation or lesions.  NEURO: Cranial nerves grossly intact.  Mentation and speech appropriate for age.  PSYCH: Appropriate affect, tone, and pace of words          Video-Visit Details    Type of service:  Video Visit   Video End Time: 5:02  Originating Location (pt. Location): Home    Distant Location (provider location):  On-site  Platform used for Video Visit: Boris  Signed Electronically by: Jimmy Moseley MD    "

## 2025-01-13 NOTE — PROGRESS NOTES
[facility]  Center for [facility]  [facility] [address], [address], [place] [address]  Main: [phone], Fax: [phone]    Video Virtual Visit Note:    Patient: Lanie Neil  MRN: [id number]  : 1990  JOSÉ MIGUEL: 2025  Location of the patient when this video visit is conducted: Patient's home  Location of this writer at the time of this video visit is conducted: [facility], [facility].     Due to the ongoing COVID-19 outbreak, this visit was conducted by video, with the patient's approval.    Reason of today's visit:  Spinal muscular atrophy. Wheelchair bound. History of DVT. Currently on long term anticoagulation therapy with rivaroxaban. Here for her routine annual follow up clinic visit.      Summary of Medical History:  This is a 33 year old female with a history of spinal muscular atrophy, who is chronically wheelchair bound, who moved from [place] to [place] back in May 2014, who has a history of DVT in the left leg, currently on long term anticoagulation therapy with rivaroxaban at 10 mg PO Qday, participates in today's scheduled video visit for her annual follow up. I last saw this patient back in 2022, please see my initial consult note and previous progress notes for her complete detail history.      Thrombosis History Summary:  2012, she was having left leg swelling. Ultrasound at the time in [facility] found subacute/chronic occlusive thrombus in the mid to distal left femoral vein with collateralization. She was placed on enoxaparin initially for 2 weeks when she start complaining of headaches. A CT head at the time showed a subdural hematoma on 2012. Enoxaparin was held and an IVC filter was placed on 2012.   Eventually restarted on enoxaparin at a lowered dose.  2013, saw Dr. Willy Box, hematologist at [facility] who stopped her enoxaparin and  placed her on rivaroxaban at 20 mg PO Qday dosing.  Aug 2014, she established care with our clinic after she moved to [place] and she had maintained on long term anticoagulation therapy with rivaroxaban. Her mother usually crushes up her rivaroxaban and administers it via her J-tube.   7/23/2019, her rivaroxaban dose was decreased to prophylactic dosing regimen of 10 mg PO Qday as she was experiencing menorrhagia.   Sept 2019, she was found to be iron deficient and was anemic and we arranged for her to receive IV iron infusion with injectafer. Repeat iron panel in Nov 2019 showed normal iron and ferritin with normal hemoglobin.   Dec 2021, she was found to have iron deficiency anemia requiring IV iron infusion. Her iron defiicency anemia was related to menorrhagia.   Dec 2022 after her visit with this writer was again found to have Iron deficiency anemia. She again required IV iron infusion.   Sept 2023, she again found to have iron deficiency anemia and needed IV iron infusion.   10/23/2023, she underwent a CT abdomen secondary to chronic constipation for which she is found to have evidence of chronic inflammatory changes of the rectum and sigmoid colon and was incidentally found to have 6 struts of her IVC filter penetrating her IVC. Thus this writer referred the patient to interventional radiology for consultation.   She is scheduled to undergo attempt IVC filter extraction on 1/24/2024. I have instructed her to hold her rivaroxaban for 48 hours prior to the procedure.   1/3/2024, she was seen by Dr. Lurdes Ivey of Ob/Gyn who had a long discussion with the patient on abnormal uterine bleeding. Patient is not interested in IUD placement or any hormonal therapy. Note that Dr. Ivey would like us to discuss with patient about potential alternative anticoagulation and also consideration of tranexamic acid use with menses. Additionally, Dr. Ivey on exam noted diffuse right labia significant edematous, currently  planned Pelvic MRI.       She also uses celebrex for her dysmenorrhea and amicar for menorrhagia which according to Lanie are effective.      Historically, for invasive procedures such as one related to her spinal reservoir and catheter, We usually hold her Xarelto for about 48 hours and resume the next day and she does well with that.      Interim History:  1/18/2024, repeat labs showed that she is iron deficient and anemic again. This writer once again arranged for repeat IV iron infusion.   9/3/2024, Placement of IUD under general anesthesia. Told to hold rivaroxaban for 24 hours prior. No complications.   12/30/2024, [facility] Cervical MRI showed anterior epidural fluid collection at C2-5. Neurosurgery consulted and recommended pain control and outpatient non-urgent follow up.     Currently, Lanie is on rivaroxaban at 10 mg PO Qday. She denies any bleeding complications. She denies any significant menstrual bleeding since the placement of her IUD. There are mentions about her needing epidural steroid injection to the C-Spine in the future if her pain recurs.     ROS:  Denies any bleeding complications. Specifically, no frequent epistaxis. No issues with oral mucosal bleeding. Denies any hematuria or blood in stools. Denies any shortness of breath. No chest pain. No cough. No fever.      Medications:   Current Outpatient Medications   Medication Sig Dispense Refill    ACETAMINOPHEN PO Take 650 mg by mouth every 4 hours as needed for pain      albuterol (PROAIR HFA) 108 (90 Base) MCG/ACT inhaler Inhale 2 puffs into the lungs every 4 hours as needed for shortness of breath or wheezing 18 g 5    albuterol (PROVENTIL) (2.5 MG/3ML) 0.083% neb solution USE ONE VIAL BY NEBULIZATION EVERY 6 HOURS AS NEEDED FOR SHORTNESS OF BREATH/ DYSPNEA OR WHEEZING 360 mL 3    ALPRAZolam (XANAX) 0.25 MG tablet Take 1 tablet (0.25 mg) by mouth 3 times daily as  needed for anxiety. 60 tablet 1    aminocaproic acid (AMICAR) 0.25 GM/ML solution Take 12 mL (3 grams) via G-tube every 8 hours during menstrual period. 473 mL 5    bisacodyl (DULCOLAX) 10 MG suppository Place 10 mg rectally daily as needed for constipation. Patient also takes Bisacodyl Dulcolax liquid, 20 ml BID as needed for consitpation      celecoxib (CELEBREX) 200 MG capsule Take 1 capsule (200 mg) by mouth daily On days of period 12 capsule 5    clindamycin (CLEOCIN T) 1 % lotion Apply topically 2 times daily To face as needed 60 mL 3    diazepam (VALIUM) 5 MG tablet Take 1 tablet (5 mg) by mouth every 6 hours as needed for muscle spasms. 45 tablet 3    eflornithine HCl 13.9 % CREA Externally apply topically 2 times daily Apply thin layer of cream to affected areas of face and adjacent chin twice daily, at least 8 hours apart. 45 g 3    EVRYSDI 0.75 MG/ML oral solution 6.6 mLs by Oral or NG Tube route every morning      famotidine (PEPCID) 20 MG tablet Take 1 tablet (20 mg) by mouth 2 times daily 180 tablet 3    fluocinolone acetonide (DERMA SMOOTHE/FS BODY) 0.01 % external oil Apply at nighttime to forehead and scalp as needed for scale or pruritus (Patient taking differently: Apply topically at bedtime. Apply at nighttime to forehead and scalp as needed for scale or pruritus) 118 mL 3    fluticasone (FLOVENT HFA) 110 MCG/ACT inhaler Inhale 1 puff into the lungs 2 times daily (Patient taking differently: Inhale 1 puff into the lungs 2 times daily. PATIENT NOT TAKING CURRENTLY BUT MIGHT IN THE FUTURE) 36 g 5    gabapentin (NEURONTIN) 250 MG/5ML solution Place 20 mLs (1,000 mg) into Feeding Tube 3 times daily. 1800 mL 5    GENERLAC 10 GM/15ML solution PATIENT NOT TAKING CURRENTLY  11    guaiFENesin (ORGANIDIN) 200 MG TABS tablet Take 200 mg by mouth every 4 hours as needed for cough. ONLY TAKES THIS WHEN THE PATIENT HAS A COLD      HYDROmorphone (DILAUDID) 4 MG tablet Take 8 mg by mouth every 3 hours       hydrOXYzine (ATARAX) 25 MG tablet Take 25 mg by mouth every 6 hours as needed for itching. PATIENT NOT TAKING CURRENTLY      ketoconazole (NIZORAL) 2 % external shampoo Lather onto scalp and forehead once weekly when showering and let sit for 1-2 minutes before rinsing 120 mL 11    levofloxacin (LEVAQUIN) 25 MG/ML solution Place 20 mLs (500 mg) into G tube daily. 140 mL 0    levofloxacin (LEVAQUIN) 25 MG/ML solution TAKE 20ML BY FEEDING TUBE ONCE DAILY FOR ONE WEEK (Patient taking differently: TAKE 20ML BY FEEDING TUBE ONCE DAILY FOR ONE WEEK  CURRENTLY NOT TAKING - TAKES IT WHEN SHE HAS A UTI) 480 mL 1    Magnesium Hydroxide (MILK OF MAGNESIA PO) Take 30 mLs by mouth as needed      metroNIDAZOLE (METROCREAM) 0.75 % external cream Apply topically 2 times daily (Patient taking differently: Apply topically 2 times daily. PATIENT NOT TAKING CURRENTLY) 45 g 11    Minoxidil (MINOXIDIL FOR WOMEN) 5 % FOAM Apply 1/2 capful once a day to affected area (Patient taking differently: Apply 1/2 capful once a day to affected area  PATIENT NOT TAKING CURRENTLY) 60 g 3    mometasone (NASONEX) 50 MCG/ACT nasal spray Spray 2 sprays in nostril daily as needed. PATIENT NOT TAKING CURRENTLY      morphine (SABINE) 30 MG 24 hr capsule Take 30 mg by mouth 2 times daily      naloxone (NARCAN) 4 MG/0.1ML nasal spray Spray 1 spray (4 mg) into one nostril alternating nostrils once as needed for opioid reversal every 2-3 minutes until assistance arrives 0.2 mL 0    Nutren 1.0 Fiber PO LIQD Per G tube; 8 oz Per G Tube qid 3000 mL 11    ondansetron (ZOFRAN-ODT) 4 MG ODT tab Take 1 tablet (4 mg) by mouth every 8 hours as needed for nausea 30 tablet 1    order for DME Equipment being ordered: 16 fr 5cc balloon indwelling catheter with drainage bag. 2 catheters/month 2 catheter 11    order for DME Equipment being ordered: Vibracare Percussor 1 Units 0    polyethylene glycol (MIRALAX) 17 GM/Dose powder Take 17 g by mouth daily (Patient taking  differently: Take 17 g by mouth daily as needed.) 714 g 5    potassium chloride (KAYCIEL) 20 MEQ/15ML (10%) solution 22.5 mLs (30 mEq) by Per G Tube route daily 675 mL 11    prochlorperazine (COMPAZINE) 5 MG tablet Take 5 mg by mouth every 6 hours as needed for nausea or vomiting      rivaroxaban ANTICOAGULANT (XARELTO ANTICOAGULANT) 10 MG TABS tablet 1 tablet (10 mg) by Oral or Feeding Tube route daily with food 90 tablet 3    Salicylic Acid (OXY BALANCE FACIAL CLEAN WASH EX) Externally apply 1 pad. topically daily.      Sennosides (SENNA) 8.8 MG/5ML LIQD 15 mLs by Feeding route 2 times daily 900 mL 11    sertraline (ZOLOFT) 20 MG/ML (HIGH CONC) solution TAKE 2.5 ML(50 MG) BY MOUTH DAILYTAKE 2.5 ML(50 MG) BY MOUTH DAILY 225 mL 0    silver nitrate (ARZOL) 75-25 % miscellaneous Use one stick apply tip to granuloma prn granuloma formation (Patient taking differently: Use one stick apply tip to granuloma prn granuloma formation  Uses around the trach stoma) 20 applicator 1    silver sulfADIAZINE (SSD) 1 % external cream Apply topically 2 times daily To affected areas-apply bid to wounds till healed 400 g 11    simethicone 40 MG/0.6ML LIQD Place 40 mg into NG or OG tube as needed (PRN GAS).      traZODone (DESYREL) 5 mg/ml SUSP Take 15 ml (75 mg) po at bedtime Prn insomnia      tretinoin (RETIN-A) 0.025 % external cream Apply a pea-sized amount evenly over the face at nighttime before bed. (Patient taking differently: at bedtime. Apply a pea-sized amount evenly over the face at nighttime before bed.  NOT CURRENTLY USING) 45 g 2     No current facility-administered medications for this visit.         Allergies:      Allergies   Allergen Reactions    Ibuprofen         PMH:   Past Medical History:   Diagnosis Date    Abnormal uterine bleeding (AUB)     Anemia     Anxiety     Arthritis Unknown    Asthma 06/18/2014    Chronic pain     Chronic respiratory failure (H)     Constipation     Depression     DVT (deep venous  thrombosis) (H)     Gastrointestinal tube present (H)     History of blood transfusion     Obesity     Sinusitis, chronic     Snoring     Spinal muscle atrophy (H)        Social History:   Deferred    Family History:  Deferred    Objective:  Visual Examination via Video:  Pleasant in no acute distress.  Normal work of breathing   A+O x 3    Labs:  12/30/2024 CBC done at [facility]:  WBC 7.0; Hgb 13.8; .     12/31/2024 Labs done at [facility]:  Creatinine 0.1; BUN 8.   Component      Latest Ref Rng 5/30/2024  2:57 PM   Sodium      135 - 145 mmol/L 137    Potassium      3.4 - 5.3 mmol/L 3.7    Carbon Dioxide (CO2)      22 - 29 mmol/L 18 (L)    Anion Gap      7 - 15 mmol/L 13    Urea Nitrogen      6.0 - 20.0 mg/dL 6.7    Creatinine      0.51 - 0.95 mg/dL 0.06 (L)    GFR Estimate      >60 mL/min/1.73m2 >90    Calcium      8.6 - 10.0 mg/dL 8.7    Chloride      98 - 107 mmol/L 106    Glucose      70 - 99 mg/dL 101 (H)    Alkaline Phosphatase      40 - 150 U/L 107    AST      0 - 45 U/L 22    ALT      0 - 50 U/L 17    Protein Total      6.4 - 8.3 g/dL 7.3    Albumin      3.5 - 5.2 g/dL 3.9    Bilirubin Total      <=1.2 mg/dL 0.3         Assessment:  In summary, Lanie is a 33 year old female who has a history of spinal muscular atrophy, who is chronically wheelchair bound, who also has a history of DVT in the left leg, currently on long term anticoagulation therapy with rivaroxaban at 10 mg PO Qday dosing, participates in today's scheduled video visit for routine annual follow up. She has a history of iron deficiency anemia which likely was related menorrhagia. She has been using amicar for her menorrhagia and according to her is decreasing her bleeding during her menstrual period. She also uses Celebrex for her dysmenorrhea which also is effective. Both Celebrex and Amicar are prescribed by this writer.      Diagnosis:  History of left lower extremity DVT.  On chronic anticoagulation therapy with Xarelto  at 10 mg PO Qday.  Spinal muscular atrophy.  Chronic respiratory failure.    Chronically wheelchair bound.  Rosacea   History of Iron deficiency anemia. Resolved since IUD placement in Sept 2024.  Menorrhagia / abnormal uterine bleeding. Resolved since IUD placement in Sept 2024.     Plan:  No change in regard to her anticoagulation therapy. She remains to be a good candidate to stay on indefinite anticoagulation therapy with rivaroxaban at 10 mg PO Qday dosing. I have renewed her rivaroxaban prescription today with a one year refill.     Her menorrhagia has resolved since IUD placement in Sept 2024 and she is no longer anemic since that time with her most recent Hgb on 12/30/2024 at 13.8. No concern for iron deficiency at this time.     As mentioned above, she might need cervical spinal steroid injection in the future. For this, she is instructed to hold her rivaroxaban for 72 hours prior to the procedure and restart it the day after her procedure assuming that she has no bleeding complications.     She is instructed to call our clinic if she should experience any unusual bleeding issues or if she should need any invasive procedures or surgical procedures in the future. Otherwise, I will plan to see her back in one year for follow up. Virtual or in-person visit is fine.      Video-Visit Details:  Type of service:  Video Visit  Video Start Time:  13:48  Video End Time (time video stopped): 13:54  Originating Location (pt. Location): Home  Distant Location (provider location):  [facility]ORD[facility]   Mode of Communication:  Video Conference via [employer]      Jt Mir PA-C, MPAS  Physician Assistant  [facility].     The longitudinal plan of care for these conditions below were addressed during this visit. Due to the added complexity in care, I will continue to support Lanie Neil  in the subsequent  management of these conditions and with the ongoing continuity of care of these conditions.    Problem List Items Addressed This Visit as of 2/19/2024   History of left lower extremity DVT.  On chronic anticoagulation therapy with Xarelto at 10 mg PO Qday.  Spinal muscular atrophy.  Chronic respiratory failure.    Chronically wheelchair bound.  Rosacea   History of Iron deficiency anemia. Resolved since IUD placement in Sept 2024.  Menorrhagia / abnormal uterine bleeding. Resolved since IUD placement in Sept 2024.     20 minutes spent by me on the date of the encounter doing chart review, history and exam, documentation and further activities per the note

## 2025-01-17 ENCOUNTER — MYC MEDICAL ADVICE (OUTPATIENT)
Dept: FAMILY MEDICINE | Facility: CLINIC | Age: 35
End: 2025-01-17
Payer: MEDICARE

## 2025-01-17 DIAGNOSIS — R05.9 COUGH, UNSPECIFIED TYPE: Primary | ICD-10-CM

## 2025-01-20 ENCOUNTER — VIRTUAL VISIT (OUTPATIENT)
Dept: HEMATOLOGY | Facility: CLINIC | Age: 35
End: 2025-01-20
Attending: PHYSICIAN ASSISTANT
Payer: MEDICARE

## 2025-01-20 DIAGNOSIS — Z86.718 HISTORY OF DEEP VENOUS THROMBOSIS: Primary | ICD-10-CM

## 2025-01-20 DIAGNOSIS — Z95.828 PRESENCE OF IVC FILTER: ICD-10-CM

## 2025-01-20 DIAGNOSIS — G12.9 SPINAL MUSCLE ATROPHY (H): ICD-10-CM

## 2025-01-20 DIAGNOSIS — G89.4 CHRONIC PAIN SYNDROME: ICD-10-CM

## 2025-01-20 DIAGNOSIS — Z79.01 CHRONIC ANTICOAGULATION: ICD-10-CM

## 2025-01-20 DIAGNOSIS — G47.00 INSOMNIA, UNSPECIFIED TYPE: ICD-10-CM

## 2025-01-20 DIAGNOSIS — M54.2 CERVICALGIA: ICD-10-CM

## 2025-01-20 DIAGNOSIS — J96.10 CHRONIC RESPIRATORY FAILURE, UNSPECIFIED WHETHER WITH HYPOXIA OR HYPERCAPNIA (H): ICD-10-CM

## 2025-01-20 PROCEDURE — G2211 COMPLEX E/M VISIT ADD ON: HCPCS | Mod: 95 | Performed by: PHYSICIAN ASSISTANT

## 2025-01-20 PROCEDURE — 98005 SYNCH AUDIO-VIDEO EST LOW 20: CPT | Performed by: PHYSICIAN ASSISTANT

## 2025-01-20 RX ORDER — LEVOFLOXACIN 25 MG/ML
500 SOLUTION ORAL DAILY
Qty: 140 ML | Refills: 0 | Status: SHIPPED | OUTPATIENT
Start: 2025-01-20

## 2025-01-20 NOTE — PATIENT INSTRUCTIONS
Lanie,    It was nice to see you via video visit earlier today.     Below is a summary of our plan:  No change in regard to your anticoagulation therapy. Please continue to take your rivaroxaban (Xarelto) at 10 mg via feeding tube every 24 hours.   If you should need cervical spinal injections in the future, please hold your Xarelto for 72 hours prior to the procedure and then you can restart it the day after the procedure assuming that you have no bleeding complications.  If you should have any further questions, or experience any unusual bleeding issues or if you should need any invasive or surgical procedures in the future, please call us at [phone] and ask to speak to a nursing staff.  One of our administrative assistants will contact you to schedule your return visit with me in one year. Virtual or in-person visit is fine.     Thank you once again in choosing our clinic as part of your healthcare team.      Jt Mir PA-C, MPAS  Physician Assistant  [facility].

## 2025-01-20 NOTE — TELEPHONE ENCOUNTER
I just sent in levqauin, good for respiratory tract bacteria an I agree she is high risk, sent to [facility] as closest pharmacy to home in her list I think

## 2025-01-22 ENCOUNTER — TELEPHONE (OUTPATIENT)
Dept: FAMILY MEDICINE | Facility: CLINIC | Age: 35
End: 2025-01-22
Payer: MEDICARE

## 2025-01-22 ENCOUNTER — TELEPHONE (OUTPATIENT)
Dept: FAMILY MEDICINE | Facility: CLINIC | Age: 35
End: 2025-01-22

## 2025-01-22 NOTE — TELEPHONE ENCOUNTER
Retail Pharmacy Prior Authorization Team   Phone: [phone]      PA Initiation    Medication: ORA-PLUS PO LIQD  Insurance Company: [employer] Part D - Phone [phone] Fax [phone]  Pharmacy Filling the Rx: [facility] - [place], [place] - [facility] [address]  Filling Pharmacy Phone:    Filling Pharmacy Fax:    Start Date: 1/22/2025

## 2025-01-22 NOTE — TELEPHONE ENCOUNTER
A prior authorization is needed for the following compounded medications prescribed.  Please complete a prior authorization with the information included below.    Medication:TRAZODONE  MG TABS    Ingredients                                                                  NDCs                                                Quantities                       TRAZODONE  mg tabs                            [address]-50                                                 15.0 tabs  ORA SWEET                                                          [id number]                                              225 mls  ORA PLUS                                                             [id number]                                           225 mls        RX #:[id number]  Reason for Rejection:PRODUCT / SERVICE NOT COVERED- WE CANNOT TAKE 08- (SECONDARY ALSO DOESN'T WORK WITH 08)    PRIMARY  Pharmacy Insurance plan:[employer] PART D-CANNOT DO 08  BIN #:[id number]  ID #:[id number]  PCN #:[id number]  Phone #:[phone]    SECONDARY  Pharmacy Insurance plan:[place]-CANNOT DO 08  BIN #:[id number]  ID #:[id number]  PCN #:[place]  Phone #:[phone]    Pharmacy NPI:[id number]      Please advise the pharmacy when the prior authorization is approved or denied.     Thank you for your time.      Raymond Moreau    [facility]   [address] [place] [id number]   Phone: [phone]  Fax: [phone]

## 2025-01-22 NOTE — TELEPHONE ENCOUNTER
Retail Pharmacy Prior Authorization Team   Phone: [phone]      m patel: CRISTIAN FORD (Key: [id number]) - [id number]    PA Initiation    Medication: ORA-SWEET SF PO SYRP  Insurance Company: "[employer] Part D - Phone [phone] Fax [phone]  Pharmacy Filling the Rx: [facility] - [place], [place] - [facility] [address]  Filling Pharmacy Phone:    Filling Pharmacy Fax:    Start Date: 1/22/2025

## 2025-01-22 NOTE — TELEPHONE ENCOUNTER
Retail Pharmacy Prior Authorization Team   Phone: [phone]      [facility] FRANCO: CRISTIAN FORD (Key: [id number]) - [id number]    PA Initiation    Medication: TRAZODONE  MG PO TABS  Insurance Company: [employer] D - Phone [phone] Fax [phone]  Pharmacy Filling the Rx: [facility] - [place], [place] - [facility] [address]  Filling Pharmacy Phone:    Filling Pharmacy Fax:    Start Date: 1/22/2025

## 2025-01-23 NOTE — TELEPHONE ENCOUNTER
Retail Pharmacy Prior Authorization Team   Phone: [phone]      PRIOR AUTHORIZATION DENIED    Medication: ORA-PLUS PO LIQD  Insurance Company: [employer] Part D - Phone [phone] Fax [phone]  Denial Date: 1/22/2025  Denial Reason(s):       Appeal Information: Drug exclusions cannot be appealed.  This medication will not be covered by the prescription plan for any reason. The drug is not on formulary and there are no loopholes to gaining approval.    Patient Notified: No. The [employer] [facility] PA Team does not notify of the denial outcomes as the patient often will ask what their provider will prescribe in place of the denied medication, or additional information in regards to other therapies they can take in place of the denied medication.  This is not something we can advise on in our department.

## 2025-01-23 NOTE — TELEPHONE ENCOUNTER
Retail Pharmacy Prior Authorization Team   Phone: [phone]      Prior Authorization Not Needed per Insurance    Medication: TRAZODONE  MG PO TABS  Insurance Company: [employer] Part D - Phone [phone] Fax [phone]  Expected CoPay: $    Pharmacy Filling the Rx: [facility] - [place], [place] - [address]   Pharmacy Notified: Yes  Patient Notified: No      Patient's Medicare Part D is covering the Trazodone 150 mg tablets in the compounded medication.  An additional review was not needed

## 2025-01-23 NOTE — TELEPHONE ENCOUNTER
Retail Pharmacy Prior Authorization Team   Phone: [phone]      PRIOR AUTHORIZATION DENIED    Medication: ORA-SWEET SF PO SYRP  Insurance Company: [employer] Part D - Phone [phone] Fax [phone]  Denial Date: 1/22/2025  Denial Reason(s):     Appeal Information: Drug exclusions cannot be appealed.  This medication will not be covered by the prescription plan for any reason. The drug is not on formulary and there are no loopholes to gaining approval.     Patient Notified: No. The [employer] PA Team does not notify of the denial outcomes as the patient often will ask what their provider will prescribe in place of the denied medication, or additional information in regards to other therapies they can take in place of the denied medication.  This is not something we can advise on in our department.

## 2025-01-27 ENCOUNTER — DOCUMENTATION ONLY (OUTPATIENT)
Dept: FAMILY MEDICINE | Facility: CLINIC | Age: 35
End: 2025-01-27
Payer: MEDICARE

## 2025-02-10 ENCOUNTER — TELEPHONE (OUTPATIENT)
Dept: FAMILY MEDICINE | Facility: CLINIC | Age: 35
End: 2025-02-10
Payer: MEDICARE

## 2025-02-10 NOTE — TELEPHONE ENCOUNTER
[facility] Call Center    Phone Message    May a detailed message be left on voicemail: yes     Reason for Call: Other:     Patients mother calling in wanting to know if she can get a Cpt procedure code for flushing a port so insurance can run it through to see if I would be covered at [facility] or somewhere else where patient could possibly go please advise.    Action Taken: Message routed to:  [facility] ([facility]): pcc    Travel Screening: Not Applicable     Date of Service:

## 2025-02-10 NOTE — TELEPHONE ENCOUNTER
Called mother. Mother unable to find where the patient can get port flushes through Medicare. Mother is asking for the CPT code, so Medicare can help find a location. [place] port flush is 71403      Remigio Becerra CMA (AAMA) at 12:48 PM on 2/10/2025

## 2025-02-11 ENCOUNTER — DOCUMENTATION ONLY (OUTPATIENT)
Dept: FAMILY MEDICINE | Facility: CLINIC | Age: 35
End: 2025-02-11
Payer: MEDICARE

## 2025-02-11 ENCOUNTER — PATIENT OUTREACH (OUTPATIENT)
Dept: CARE COORDINATION | Facility: CLINIC | Age: 35
End: 2025-02-11
Payer: MEDICARE

## 2025-02-11 NOTE — PROGRESS NOTES
Type of Form Received: [facility] Primary Medical Provider Documentation    Form Received (Date) 2/10/25   Form Filled out No   Placed in provider folder Yes

## 2025-02-12 NOTE — PROGRESS NOTES
Clinic Care Coordination Contact  Follow Up Progress Note      Assessment: RN called and spoke with patient's mother, Aster. Aster shares that Aparna could do the flushes through [facility], but she doesn't have supplies. She states they need supplies. She could do it if there were supplies. She states that she has spent the whole day looking for a place and cannot find anyone. She shares she has called [facility] to see if they can re-visit using their insurance and has reached a dead end with them as well. Tomorrow, she is going to contact distribution companies to see about supplies. She stresses that it is difficult for the patient to leave the house especially in the winter.     RN shared would look into this with [facility], with the infusion center, and get back to her.     Care Gaps:    Health Maintenance Due   Topic Date Due    URINE DRUG SCREEN  Never done    ASTHMA ACTION PLAN  Never done    ADVANCE CARE PLANNING  Never done    DEPRESSION ACTION PLAN  Never done    HIV SCREENING  Never done    MEDICARE ANNUAL WELLNESS VISIT  Never done    HEPATITIS C SCREENING  Never done    HEPATITIS B IMMUNIZATION (1 of 3 - 19+ 3-dose series) Never done       Patient does not have any upcoming visits to address, generally does not come in person.     Care Plans  Care Plan: Help At Home       Problem: Insufficient In-home support       Goal: Establish adequate home support       Note:       Barriers: patient is home bound, lost services for skilled nursing, home infusion  Strengths: patient's mother is able to help  Patient expressed understanding of goal: yes  Action steps to achieve this goal:  1. I will work with RN Care Coordinator to get home infusion services (port flushes, blood work).                                Care Plan: Health Maintenance       Problem: Health Maintenance Due or Overdue       Goal: Become up-to-date with health maintenance visit(s)       Start Date: 1/8/2025    This Visit's Progress: On track    Priority:  Low    Note:     Barriers: patient has complex medical concerns  Strengths: patient is engaged in managing health care   Patient expressed understanding of goal: yes  Action steps to achieve this goal:  1. I will continue to attend my upcoming appointments.   2. I will alert my care team if there are any concerns that they can assist with.   3. I will continue with monthly care coordination calls and outreach.                                 Intervention/Education provided during outreach: Discussed patients plan of care. CC asked open ended questions, provided support, resources, and encouragement as needed. Patient will reach out to care team sooner than planned with new questions or concerns.      Plan:   -called Nanette Saravia, to determine if she is able to revisit providers   -sent staff message to PCP and [facility] nurses to inquire about advice for this situation (order a medication for FHI, home care?, get supplies ordered and have mom come in to be a teachable party to do the flushes)  Care Coordinator will follow up in 1 month, sooner with updates as able.     NAVI HAMILTON RN on 2/12/2025 at 3:38 PM

## 2025-02-12 NOTE — TELEPHONE ENCOUNTER
RN called and spoke with pt's mother, please see patient outreach encounter.     NAVI HAMILTON RN on 2/12/2025 at 3:46 PM

## 2025-02-14 DIAGNOSIS — Z86.718 HISTORY OF DEEP VENOUS THROMBOSIS: ICD-10-CM

## 2025-02-14 DIAGNOSIS — N94.6 PAINFUL MENSTRUAL PERIODS: ICD-10-CM

## 2025-02-14 DIAGNOSIS — Z79.01 CHRONIC ANTICOAGULATION: ICD-10-CM

## 2025-02-17 ENCOUNTER — MYC MEDICAL ADVICE (OUTPATIENT)
Dept: CARE COORDINATION | Facility: CLINIC | Age: 35
End: 2025-02-17
Payer: MEDICARE

## 2025-02-17 RX ORDER — CELECOXIB 200 MG/1
200 CAPSULE ORAL DAILY
Qty: 30 CAPSULE | Refills: 1 | Status: SHIPPED | OUTPATIENT
Start: 2025-02-17

## 2025-02-17 NOTE — TELEPHONE ENCOUNTER
Refill request for Celebrex. Per CHATA Mir's note from 01/20/25, patient uses Celebrex for dysmenorrhea and he is OK prescribing it. Refills granted.    Elio MCCRAY RN  [facility]  Office: [phone]  Clinic: [phone]  Fax: [phone]

## 2025-02-17 NOTE — PROGRESS NOTES
Clinic received form for re-certificaton from [facility] for medical monitoring and treatment needs. [employer] message sent to patient requesting all of patient's cares/ care supports/ medical needs at home to make accurate for the paperwork. Please see [employer] message.     NAVI HAMILTON RN on 2/17/2025 at 3:20 PM    Addendum:   Form filled out per pt's services on [employer] message and faxed to Nanette Gutierres, [phone].     NAVI HAMILTON RN on 2/19/2025 at 7:37 AM

## 2025-02-18 ENCOUNTER — HOME INFUSION BILLING (OUTPATIENT)
Dept: HOME HEALTH SERVICES | Facility: HOME HEALTH | Age: 35
End: 2025-02-18
Payer: COMMERCIAL

## 2025-02-18 ENCOUNTER — HOME INFUSION (OUTPATIENT)
Dept: HOME HEALTH SERVICES | Facility: HOME HEALTH | Age: 35
End: 2025-02-18
Payer: MEDICARE

## 2025-02-18 ENCOUNTER — ENROLLMENT (OUTPATIENT)
Dept: HOME HEALTH SERVICES | Facility: HOME HEALTH | Age: 35
End: 2025-02-18
Payer: MEDICARE

## 2025-02-18 DIAGNOSIS — Z97.8 INTRAVENOUS CATHETER IN PLACE: Primary | ICD-10-CM

## 2025-02-18 PROCEDURE — A4221 SUPP NON-INSULIN INF CATH/WK: HCPCS | Mod: GY

## 2025-02-18 PROCEDURE — A4245 ALCOHOL WIPES PER BOX: HCPCS | Mod: GY

## 2025-02-18 PROCEDURE — A4223 INFUSION SUPPLIES W/O PUMP: HCPCS | Mod: GY

## 2025-02-18 PROCEDURE — A4215 STERILE NEEDLE: HCPCS | Mod: GY

## 2025-02-18 PROCEDURE — A4452 WATERPROOF TAPE: HCPCS | Mod: GY

## 2025-02-18 PROCEDURE — S9379 HIT NOC PER DIEM: HCPCS | Mod: GY

## 2025-02-18 NOTE — PROGRESS NOTES
[facility]  Start of Care/Resumption of Care Note    [facility] SOC}    DX: Intravenous catheter in place [Z97.8]  - Primary     Therapy:     Next Dose Due: 2/19     Delivery plan: to pt home by end of day today    In-basket sent to [facility] Scheduling, requesting visit on 2/19     Per [facility] care plan, labs are due on n/a     Nursing plan:  Pharmacy only after 2 non-billable teaching visits       Teaching Plan: Liaison Teach Done?: No    Other Info: Plan is to teach mom at first visit and then assess for compliance at the second visit then will be pharmacy only.     Victor M Singleton RN 02/18/25

## 2025-02-18 NOTE — PROGRESS NOTES
Addendum:  RN sent staff message to [facility] RN  regarding assistance navigating this for patient. Leisa replied and stated she would look into it. RN received call from Leisa Calderon RN, who stated that they have benefit coverage for line care supplies, but not for nursing. She stated that they would need verbal order that they are ok with teaching mom for port flush and verify mom would be willing to learn and do 1-2 visits max. She stated otherwise they could look at regional agencies Medicare covered that could help.     Leisa's Number- [phone]  RN returned call to Leisa and informed that mom has said she would be a teachable party and shared concern for supplies. Leisa stated that they would be able to do the port flush supplies for patient and they would go out to the home 2x to teach mom (1st is a teaching, 2nd is a teachback). They also do partner with [employer] or [employer] to help with this as well if needed. They will have nurses contact Aster for teaching and supply delivery in the coming days and figure out when the next flush is due. RN informed Lanie on Linda of the update as well as informed Dr. Moseley. RN gave verbal ok to start the port flush as Dr. Moseley gave ok to sign on anything needed to get patient port flushes at home.     NAVI HAMILTON RN on 2/18/2025 at 3:37 PM

## 2025-02-19 ENCOUNTER — HOME CARE VISIT (OUTPATIENT)
Dept: HOME HEALTH SERVICES | Facility: HOME HEALTH | Age: 35
End: 2025-02-19
Payer: MEDICARE

## 2025-02-19 PROCEDURE — A4223 INFUSION SUPPLIES W/O PUMP: HCPCS | Mod: GY

## 2025-02-19 PROCEDURE — S9379 HIT NOC PER DIEM: HCPCS | Mod: GY

## 2025-02-19 NOTE — PROGRESS NOTES
"Nursing Visit Note:  Nurse visit today for port access/flushing teaching  for Lanie Neil.     present during visit today: Not Applicable.    Intravenous Access:  Implanted Port.    Education Provided:     Patient Education focused on port access/flushing.     Note: Patient, mom and home care nurse are present for teaching. Mom will be accessing her port. Reviewed the provided flushing and port access handouts. Walked mom through the process step by step on how to access and flush her port. Port was then de-accessed with minimal bleeding and bandaid was applied. Pt did have slight discomfort on insertion of needle but line flushed without difficulty. 1.5\" needle was used. Mom states they normally access her with a 1/25\" needed. EMLA cream and 1.25\" needles were requested from pharmacy. Mom would like one more visit for teaching and to attempt accessing the port on her own. Mom had questions regarding flushing with NS only. Discussed the new recommendations and encouraged her to check with the pt's provider with any further concerns. They prefer to do monthly access and flushing of her prot. All questions answered.    Saline administered: 10 (ml)    Supply Check:   Does the patient have all the supplies they need for the next visit?  No, Order placed for Mom/pt are requesting EMLA cream/1.25\" port needles to be sent with the next shipment.    Next visit plan: 3/19/25 at 1200. Email sent to scheduling.     Lanie Lopez RN 2/19/2025  "

## 2025-02-20 ENCOUNTER — TELEPHONE (OUTPATIENT)
Dept: HOME HEALTH SERVICES | Facility: HOME HEALTH | Age: 35
End: 2025-02-20
Payer: MEDICARE

## 2025-02-20 PROCEDURE — S9379 HIT NOC PER DIEM: HCPCS | Mod: GY

## 2025-02-20 PROCEDURE — A4223 INFUSION SUPPLIES W/O PUMP: HCPCS | Mod: GY

## 2025-02-20 NOTE — TELEPHONE ENCOUNTER
"Writer has been communicating with mom, Aster through [employer]. She is requesting a change in port needles from 20 g 1.5\" needles to 22 g 1/25\" needles. A TT was sent to the pharmacy coordinator regarding this. They were informed the 20 g 1.25\" needles are not available through pharmacy. They would also like an order for EMLA cream and were informed an order would need to be requested from them by their provider. Lanie BLANCHARD RN, BSN    "

## 2025-02-21 PROCEDURE — A4223 INFUSION SUPPLIES W/O PUMP: HCPCS | Mod: GY

## 2025-02-21 PROCEDURE — S9379 HIT NOC PER DIEM: HCPCS | Mod: GY

## 2025-02-22 PROCEDURE — S9379 HIT NOC PER DIEM: HCPCS | Mod: GY

## 2025-02-22 PROCEDURE — A4223 INFUSION SUPPLIES W/O PUMP: HCPCS | Mod: GY

## 2025-02-23 PROCEDURE — S9379 HIT NOC PER DIEM: HCPCS | Mod: GY

## 2025-02-23 PROCEDURE — A4223 INFUSION SUPPLIES W/O PUMP: HCPCS | Mod: GY

## 2025-02-24 PROCEDURE — A4223 INFUSION SUPPLIES W/O PUMP: HCPCS | Mod: GY

## 2025-02-24 PROCEDURE — S9379 HIT NOC PER DIEM: HCPCS | Mod: GY

## 2025-02-25 PROCEDURE — A4221 SUPP NON-INSULIN INF CATH/WK: HCPCS | Mod: GY

## 2025-02-25 PROCEDURE — S9379 HIT NOC PER DIEM: HCPCS | Mod: GY

## 2025-02-25 PROCEDURE — A4223 INFUSION SUPPLIES W/O PUMP: HCPCS | Mod: GY

## 2025-02-26 PROCEDURE — S9379 HIT NOC PER DIEM: HCPCS | Mod: GY

## 2025-02-26 PROCEDURE — A4223 INFUSION SUPPLIES W/O PUMP: HCPCS | Mod: GY

## 2025-02-27 PROCEDURE — S9379 HIT NOC PER DIEM: HCPCS | Mod: GY

## 2025-02-27 PROCEDURE — A4223 INFUSION SUPPLIES W/O PUMP: HCPCS | Mod: GY

## 2025-03-03 ENCOUNTER — HOME INFUSION (OUTPATIENT)
Dept: HOME HEALTH SERVICES | Facility: HOME HEALTH | Age: 35
End: 2025-03-03
Payer: MEDICARE

## 2025-03-03 ENCOUNTER — MYC MEDICAL ADVICE (OUTPATIENT)
Dept: FAMILY MEDICINE | Facility: CLINIC | Age: 35
End: 2025-03-03
Payer: MEDICARE

## 2025-03-03 DIAGNOSIS — G47.00 INSOMNIA, UNSPECIFIED TYPE: Primary | ICD-10-CM

## 2025-03-03 RX ORDER — TRAZODONE HYDROCHLORIDE 50 MG/1
75 TABLET ORAL
Qty: 135 TABLET | Refills: 1 | Status: SHIPPED | OUTPATIENT
Start: 2025-03-03

## 2025-03-04 PROCEDURE — A4221 SUPP NON-INSULIN INF CATH/WK: HCPCS | Mod: GY

## 2025-03-05 DIAGNOSIS — F41.9 ANXIETY: ICD-10-CM

## 2025-03-05 RX ORDER — ALPRAZOLAM 0.25 MG
0.25 TABLET ORAL 3 TIMES DAILY PRN
Qty: 60 TABLET | Refills: 1 | Status: SHIPPED | OUTPATIENT
Start: 2025-03-05

## 2025-03-05 NOTE — TELEPHONE ENCOUNTER
Alprazolam 0.25 mg PO TID PRN    Last Written Prescription Date:  1/8/25  Last Fill Quantity: 60,   # refills: 1  Last Office Visit : 1/8/25 virtual visit   Future Office visit:  None w/ FP    Routing refill request to provider for review/approval because:  Controlled substance

## 2025-03-11 PROCEDURE — A4221 SUPP NON-INSULIN INF CATH/WK: HCPCS | Mod: GY

## 2025-03-13 ENCOUNTER — PATIENT OUTREACH (OUTPATIENT)
Dept: CARE COORDINATION | Facility: CLINIC | Age: 35
End: 2025-03-13
Payer: MEDICARE

## 2025-03-13 NOTE — PROGRESS NOTES
Clinic Care Coordination Contact    Situation: Patient chart reviewed by care coordinator.    Background: Patient enrolled in Care Coordination.    Assessment: [employer] message sent to patient for follow up.     Plan/Recommendations: RN will await pt's reply for further outreach.    NAVI HAMILTON RN on 3/13/2025 at 8:42 AM

## 2025-03-13 NOTE — PROGRESS NOTES
Clinic Care Coordination Contact  Follow Up Progress Note      Assessment: Please see patient's reply on MyChart. Lanie requested a different home care nurse to come and flush her port as she is not having a good relationship with the one who has come out. This was forwarded to BELEN De La Fuente with [facility]. RN will place pt in maintenance as goals have been complete with no new concerns.     Care Gaps:    Health Maintenance Due   Topic Date Due    URINE DRUG SCREEN  Never done    ASTHMA ACTION PLAN  Never done    ADVANCE CARE PLANNING  Never done    DEPRESSION ACTION PLAN  Never done    HIV SCREENING  Never done    MEDICARE ANNUAL WELLNESS VISIT  Never done    HEPATITIS C SCREENING  Never done    HEPATITIS B IMMUNIZATION (1 of 3 - 19+ 3-dose series) Never done       Currently there are no Care Gaps.    Care Plans  Care Plan: Help At Home Completed 3/13/2025      Problem: Insufficient In-home support  Resolved 3/13/2025      Goal: Establish adequate home support  Completed 3/13/2025      Note:       Barriers: patient is home bound, lost services for skilled nursing, home infusion  Strengths: patient's mother is able to help  Patient expressed understanding of goal: yes  Action steps to achieve this goal:  1. I will work with RN Care Coordinator to get home infusion services (port flushes, blood work).                                Care Plan: Health Maintenance Completed 3/13/2025      Problem: Health Maintenance Due or Overdue  Resolved 3/13/2025      Goal: Become up-to-date with health maintenance visit(s)  Completed 3/13/2025      Start Date: 1/8/2025    Recent Progress: On track    Priority: Low    Note:     Barriers: patient has complex medical concerns  Strengths: patient is engaged in managing health care   Patient expressed understanding of goal: yes  Action steps to achieve this goal:  1. I will continue to attend my upcoming appointments.   2. I will alert my care team if there are any concerns that they can  assist with.   3. I will continue with monthly care coordination calls and outreach.   4. I will work to re-establish with pulmonology for ventilator settings and orders.                               Intervention/Education provided during outreach: Discussed patients plan of care. CC asked open ended questions, provided support, resources, and encouragement as needed. Patient will reach out to care team sooner than planned with new questions or concerns.      Plan:   Care Coordinator will follow up in 2 months.     NAVI HAMILTON RN on 3/13/2025 at 12:56 PM

## 2025-03-17 ENCOUNTER — HOME INFUSION BILLING (OUTPATIENT)
Dept: HOME HEALTH SERVICES | Facility: HOME HEALTH | Age: 35
End: 2025-03-17
Payer: MEDICARE

## 2025-03-17 PROCEDURE — A4215 STERILE NEEDLE: HCPCS | Mod: GY

## 2025-03-19 ENCOUNTER — HOME CARE VISIT (OUTPATIENT)
Dept: HOME HEALTH SERVICES | Facility: HOME HEALTH | Age: 35
End: 2025-03-19
Payer: MEDICARE

## 2025-03-19 VITALS
HEART RATE: 66 BPM | WEIGHT: 140 LBS | OXYGEN SATURATION: 96 % | BODY MASS INDEX: 30.3 KG/M2 | TEMPERATURE: 98.6 F | RESPIRATION RATE: 18 BRPM

## 2025-03-19 PROCEDURE — A4221 SUPP NON-INSULIN INF CATH/WK: HCPCS | Mod: GY

## 2025-03-19 PROCEDURE — S5502 HIT INTERIM CATH CARE: HCPCS | Mod: GY

## 2025-03-19 NOTE — PROGRESS NOTES
Nursing Visit Note:  Nurse visit today for Port access, flush and deaccess teach for Lanie Neil.     present during visit today: Not Applicable.    Intravenous Access:  Implanted Port.    Education Provided:     Patient Education focused on Mother Aster instructed on port access, flushing and deaccessing .     Note: A&Ox4 with VSS on RA. Patient and mother Aster educated to access flush and sailing lock and then deaccess port using sterile technique and following protocol. Educated on set up, maintaining sterile field, cap and port access, needle, priming, finding port, accessing, checking for placement verification by observing blood return, pulse flush method, and removal of port needle. mother was able to access port in first attempt and follow instruction. Mother reports relief that she was able to access port, but states she is still not completely confident and would prefer a professional to monthly flush the port. Pleasant and compliant with cares. Patient and mother made aware of [facility] contact information, how to reach [facility] for supplies, nurse Line, and warning signs to look out for including when to call MD or 911.    Saline administered: 10 (ml)    Supply Check:   Does the patient have all the supplies they need for the next visit?  Yes    Next visit plan: Per pt needs approved by [facility]    Abraham Horton RN 3/19/2025   Pt called stating he had seen Dr Edwards and he referred him to Dr. Davis.  Pt is scheduled w/ Dr. Davis on 6/2/21.  Pt will be faxing some [facility] paperwork over and he would like a call back to discuss further.    Pt Call Back #....[phone]

## 2025-03-20 PROCEDURE — S5502 HIT INTERIM CATH CARE: HCPCS | Mod: GY

## 2025-03-23 DIAGNOSIS — F32.A DEPRESSION, UNSPECIFIED DEPRESSION TYPE: ICD-10-CM

## 2025-03-23 DIAGNOSIS — J45.909 ASTHMA: ICD-10-CM

## 2025-03-23 DIAGNOSIS — R63.5 ABNORMAL WEIGHT GAIN: ICD-10-CM

## 2025-03-23 DIAGNOSIS — Z23 NEED FOR PROPHYLACTIC VACCINATION AND INOCULATION AGAINST INFLUENZA: ICD-10-CM

## 2025-03-23 DIAGNOSIS — F41.9 ANXIETY: ICD-10-CM

## 2025-03-23 DIAGNOSIS — E87.6 HYPOKALEMIA: ICD-10-CM

## 2025-03-23 DIAGNOSIS — K59.00 CONSTIPATION, UNSPECIFIED CONSTIPATION TYPE: ICD-10-CM

## 2025-03-23 DIAGNOSIS — T14.8XXA OPEN WOUND: ICD-10-CM

## 2025-03-25 RX ORDER — POTASSIUM CHLORIDE 20MEQ/15ML
30 LIQUID (ML) ORAL DAILY
Qty: 675 ML | Refills: 11 | Status: SHIPPED | OUTPATIENT
Start: 2025-03-25

## 2025-03-25 RX ORDER — SILVER SULFADIAZINE 10 MG/G
CREAM TOPICAL 2 TIMES DAILY
Qty: 400 G | Refills: 11 | Status: SHIPPED | OUTPATIENT
Start: 2025-03-25

## 2025-03-25 RX ORDER — POLYETHYLENE GLYCOL 3350 17 G/17G
17 POWDER, FOR SOLUTION ORAL DAILY
Qty: 714 G | Refills: 5 | Status: SHIPPED | OUTPATIENT
Start: 2025-03-25

## 2025-03-25 RX ORDER — ALBUTEROL SULFATE 90 UG/1
2 INHALANT RESPIRATORY (INHALATION) EVERY 4 HOURS PRN
Qty: 18 G | Refills: 5 | Status: SHIPPED | OUTPATIENT
Start: 2025-03-25

## 2025-03-25 RX ORDER — SERTRALINE HYDROCHLORIDE 20 MG/ML
SOLUTION ORAL
Qty: 225 ML | Refills: 0 | Status: SHIPPED | OUTPATIENT
Start: 2025-03-25

## 2025-03-25 NOTE — TELEPHONE ENCOUNTER
Polyethylene glycol 17gm/dose powder - take 17gm PO daily PRN    Potassium chloride 20 MEQ/15mL (10%) solution - 22.5 mL's per g-tube daily    Silver sulfadiazine 1% external cream - apply topically BID to affected areas    Albuterol 108 mcg/act inhaler - 2 puffs into the lungs Q4 hours PRN for shortness of breath or wheezing    Last Clinic Visit: 01/08/2025    Sertraline 20 mg/mL solution - take 2.5 mL PO daily      Last Written Prescription Date:  12/11/2024  Last Fill Quantity: 225mL,   # refills: 0  Last Office Visit : 01/08/2025  Future Office visit:  03/26/2025    Routing refill request to provider for review/approval because:   KATHARINE-7 score of less than 5 in past 6 months.   9:27 AM 1/3/2024  11:09 AM  KATHARINE-7 SCORE  Total Score 1 (minimal anxiety) 1 (minimal anxiety) 1 (minimal anxiety) 1 (minimal anxiety)  Total Score 3 19 1 1 1 1     Signing all orders due to FP visit tomorrow (03/26/2025)

## 2025-03-26 ENCOUNTER — VIRTUAL VISIT (OUTPATIENT)
Dept: FAMILY MEDICINE | Facility: CLINIC | Age: 35
End: 2025-03-26
Payer: MEDICARE

## 2025-03-26 DIAGNOSIS — M54.2 NECK PAIN: Primary | ICD-10-CM

## 2025-03-26 PROCEDURE — 98006 SYNCH AUDIO-VIDEO EST MOD 30: CPT | Performed by: FAMILY MEDICINE

## 2025-03-26 PROCEDURE — 1125F AMNT PAIN NOTED PAIN PRSNT: CPT | Performed by: FAMILY MEDICINE

## 2025-03-26 PROCEDURE — A4221 SUPP NON-INSULIN INF CATH/WK: HCPCS | Mod: GY

## 2025-03-26 PROCEDURE — G2211 COMPLEX E/M VISIT ADD ON: HCPCS | Performed by: FAMILY MEDICINE

## 2025-03-26 NOTE — PATIENT INSTRUCTIONS
Thank you for visiting the Primary Care Center today at the [facility]! The following is some information about our clinic:     Primary Care Center Frequently-Asked Questions    (1) How do I schedule appointments at the [facility]?     Primary Care--to schedule or make changes to an existing appointment, please call our primary care line at [phone].    Labs--to schedule a lab appointment at the [facility] you can use [employer] or call [phone]. If you have a [place] location that is closer to home, you can reach out to that location for scheduling options.     Imaging--if you need to schedule a CT, X-ray, MRI, ultrasound, or other imaging study you can call [phone] to schedule at the [facility] or any other [facility] imaging location.     Referrals--if a referral to another specialty was ordered you can expect a phone call from their scheduling team. If you have not heard from them in a week, please call us or send us a [employer] message to check the status or get a scheduling number. Please note that this only applies to internal [facility] referrals. If the referral is external you would need to contact their office for scheduling.     (2) I have a question about my visit, who do I contact?     You can call us at the primary care line at [phone] to ask questions about your visit. You can also send a secure message through [employer], which is reviewed by clinic staff. Please note that [employer] messages have a twenty-four to forty-eight business hour turnaround time and should not be used for urgent concerns.    (3) How will I get the results of my tests?    If you are signed up for [employer] all tests will be released to you within twenty-four hours of resulting. Please allow three to five days for your doctor to review your results and place a note interpreting the results. If you do not have [employer] you will receive your  results through mail seven to ten business days following the return of the tests. Please note that if there should be any urgent or concerning results that your doctor or their registered nurse will reach out to you the same day as the tests come back. If you have follow up questions about your results or would like to discuss the results in detail please schedule a follow up with your provider either in person or virtually.     (4) How do I get refills of my prescriptions?     You should always first contact your pharmacy for refills of your medications. If submitting a refill request on [employer], please be sure to submit the request only once--repeat requests can cause delays in refill. If you are requesting a NEW medication or a medication related to new symptoms you will need to schedule an appointment with a provider prior to approval. Please note: Routine medication refills have up to one to three business day turnaround whereas controlled substances refills have up to five to seven business day turnaround.    (5) I have new symptoms, what do I do?     If you are having an immediate medical emergency, you should dial 911 for assistance.   For anything urgent that needs to be seen within a few hours to one day you should visit a local urgent care for assistance.  For non-urgent symptoms that need to be seen within a few days to a week you can schedule with an available provider in primary care by going to [employer] or calling [phone].   If you are not sure how serious your symptoms are or you would like to receive medical advice you can always call [phone] to speak with a triage nurse.

## 2025-03-26 NOTE — PROGRESS NOTES
"Lanie is a 34 year old who is being evaluated via a billable video visit.    How would you like to obtain your AVS? MyChart  If the video visit is dropped, the invitation should be resent by: Send to e-mail at: [email or web address]  Will anyone else be joining your video visit? Mom is present    Vi    Are refills needed on medications prescribed by this physician? NO  Video start time 3:30 pm  Anupamachance Rai VVF      Assessment & Plan     Neck pain  She will let me know if this does not help. If ongoing or recurrent, since she sees [facility] already, I can communicate w/ them to see if they can help her for this relativley new pain  - predniSONE (DELTASONE) 5 MG/ML (HIGH CONC) solution; Take 3 ml (15 mg) per feeding tube bid for five days  The longitudinal plan of care for the diagnosis(es)/condition(s) as documented were addressed during this visit. Due to the added complexity in care, I will continue to support Lanie in the subsequent management and with ongoing continuity of care.Review of external notes as documented elsewhere in noteWe reviewed [facility] notes and tests about this pain  32 minutes spent by me on the date of the encounter doing chart review, history and exam, documentation and further activities per the note        BMI  Estimated body mass index is 30.3 kg/m  as calculated from the following:    Height as of 9/3/24: 1.448 m (4' 9\").    Weight as of 3/19/25: 63.5 kg (140 lb).   Weight management plan: Discussed healthy diet and exercise guidelines        No follow-ups on file.    Subjective   Lanie is a 34 year old, presenting for the following health issues:  RECHECK and Left shoulder pain, herniated disc  Here in follow-up  Late 2024 inpt ntoes rvwd; severe neck pain, imroved w/ oral pred. Pain recurred over last few weeks; same pain and location (left neck to shoulder), limits comfort greatly, not quite as bad as when inpt  Sees [facility] has not discussed w/ them  For now, we will rredo " pred, if keeps recurring I told her I'll communicate w/ [facility] and see if they can find a way to improve pain perhaps an injection  No other medicall concerns; she was worried that after March she cannot do video visits, as it is practically very difficult for her to travel and painful to; I I did inform her that the virtual visit rules were ok'd thru end Sept, but we don't know after that  Past Medical History:   Diagnosis Date    Abnormal uterine bleeding (AUB)     Anemia     Anxiety     Arthritis Unknown    Asthma 06/18/2014    Chronic pain     Chronic respiratory failure (H)     Constipation     Depression     DVT (deep venous thrombosis) (H)     Gastrointestinal tube present (H)     History of blood transfusion     Obesity     Sinusitis, chronic     Snoring     Spinal muscle atrophy (H)      Past Surgical History:   Procedure Laterality Date    BACK SURGERY  '04    fusion of cervical and thoracic spine, 2004 and 2018    BIOPSY  1990    muscle biopsy    EXAM UNDER ANESTHESIA PELVIC N/A 9/3/2024    Procedure: pelvic EXAM UNDER ANESTHESIA,;  Surgeon: Lucia Garrett MD;  Location: [facility]    GASTROSTOMY TUBE  2004    INSERT INTRAUTERINE DEVICE N/A 9/3/2024    Procedure: Insert intrauterine device Mirena UNDER ULTRA SOUND GUIDANCE;  Surgeon: Lucia Garrett MD;  Location: [facility] OR    IR CHEST PORT PLACEMENT > 5 YRS OF AGE  9/17/2018    IR CVC TUNNEL W2 CATH W/O PORT  7/5/2018    IR IVC FILTER PLACEMENT  2012    IR IVC FILTER REMOVAL  1/24/2024    IR PORT CHECK RIGHT  10/23/2023    right chest    KIDNEY SURGERY  2009    kidney stones    SPINE SURGERY      reservoir placed, has had three total procedures    TRACHEOSTOMY  2004     Current Outpatient Medications   Medication Sig Dispense Refill    predniSONE (DELTASONE) 5 MG/ML (HIGH CONC) solution Take 3 ml (15 mg) per feeding tube bid for five days 30 mL 0    ACETAMINOPHEN PO Take 650 mg by mouth every 4 hours as needed for pain      albuterol (PROAIR HFA) 108 (90 Base)  "MCG/ACT inhaler Inhale 2 puffs into the lungs every 4 hours as needed for shortness of breath or wheezing. 18 g 5    albuterol (PROVENTIL) (2.5 MG/3ML) 0.083% neb solution USE ONE VIAL BY NEBULIZATION EVERY 6 HOURS AS NEEDED FOR SHORTNESS OF BREATH/ DYSPNEA OR WHEEZING 360 mL 3    ALPRAZolam (XANAX) 0.25 MG tablet Take 1 tablet (0.25 mg) by mouth 3 times daily as needed for anxiety. 60 tablet 1    aminocaproic acid (AMICAR) 0.25 GM/ML solution Take 12 mL (3 grams) via G-tube every 8 hours during menstrual period. 473 mL 5    bisacodyl (DULCOLAX) 10 MG suppository Place 10 mg rectally daily as needed for constipation. Patient also takes Bisacodyl Dulcolax liquid, 20 ml BID as needed for consitpation      celecoxib (CELEBREX) 200 MG capsule TAKE 1 CAPSULE(200 MG) BY MOUTH DAILY 30 capsule 1    clindamycin (CLEOCIN T) 1 % lotion Apply topically 2 times daily To face as needed 60 mL 3    diazepam (VALIUM) 5 MG tablet Take 1 tablet (5 mg) by mouth every 6 hours as needed for muscle spasms. 45 tablet 3    eflornithine HCl 13.9 % CREA Externally apply topically 2 times daily Apply thin layer of cream to affected areas of face and adjacent chin twice daily, at least 8 hours apart. 45 g 3    Emergency Supply Kit, Central, Patient use for emergency only. Contents: 3 sodium chloride 0.9% flushes, 1 dressing kit, 1 microclave ext set 14\", 4 nitrile gloves (med), 6 alcohol prep pads, 1 bacitracin, 1 syringe (10 cc 20 G 1\"). Call [phone] to reorder. 525817 kit 0    EVRYSDI 0.75 MG/ML oral solution 6.6 mLs by Oral or NG Tube route every morning      famotidine (PEPCID) 20 MG tablet Take 1 tablet (20 mg) by mouth 2 times daily 180 tablet 3    fluocinolone acetonide (DERMA SMOOTHE/FS BODY) 0.01 % external oil Apply at nighttime to forehead and scalp as needed for scale or pruritus (Patient taking differently: Apply topically at bedtime. Apply at nighttime to forehead and scalp as needed for scale or pruritus) 118 mL 3    " fluticasone (FLOVENT HFA) 110 MCG/ACT inhaler Inhale 1 puff into the lungs 2 times daily (Patient taking differently: Inhale 1 puff into the lungs 2 times daily. PATIENT NOT TAKING CURRENTLY BUT MIGHT IN THE FUTURE) 36 g 5    gabapentin (NEURONTIN) 250 MG/5ML solution Place 20 mLs (1,000 mg) into Feeding Tube 3 times daily. 1800 mL 5    GENERLAC 10 GM/15ML solution PATIENT NOT TAKING CURRENTLY  11    guaiFENesin (ORGANIDIN) 200 MG TABS tablet Take 200 mg by mouth every 4 hours as needed for cough. ONLY TAKES THIS WHEN THE PATIENT HAS A COLD      HYDROmorphone (DILAUDID) 4 MG tablet Take 8 mg by mouth every 3 hours      hydrOXYzine (ATARAX) 25 MG tablet Take 25 mg by mouth every 6 hours as needed for itching. PATIENT NOT TAKING CURRENTLY      ketoconazole (NIZORAL) 2 % external shampoo Lather onto scalp and forehead once weekly when showering and let sit for 1-2 minutes before rinsing 120 mL 11    levofloxacin (LEVAQUIN) 25 MG/ML solution Place 20 mLs (500 mg) into G tube daily. 140 mL 0    levofloxacin (LEVAQUIN) 25 MG/ML solution Place 20 mLs (500 mg) into G tube daily. 140 mL 0    levofloxacin (LEVAQUIN) 25 MG/ML solution TAKE 20ML BY FEEDING TUBE ONCE DAILY FOR ONE WEEK (Patient taking differently: TAKE 20ML BY FEEDING TUBE ONCE DAILY FOR ONE WEEK  CURRENTLY NOT TAKING - TAKES IT WHEN SHE HAS A UTI) 480 mL 1    Magnesium Hydroxide (MILK OF MAGNESIA PO) Take 30 mLs by mouth as needed      metroNIDAZOLE (METROCREAM) 0.75 % external cream Apply topically 2 times daily (Patient taking differently: Apply topically 2 times daily. PATIENT NOT TAKING CURRENTLY) 45 g 11    Minoxidil (MINOXIDIL FOR WOMEN) 5 % FOAM Apply 1/2 capful once a day to affected area (Patient taking differently: Apply 1/2 capful once a day to affected area  PATIENT NOT TAKING CURRENTLY) 60 g 3    mometasone (NASONEX) 50 MCG/ACT nasal spray Spray 2 sprays in nostril daily as needed. PATIENT NOT TAKING CURRENTLY      morphine (SABINE) 30 MG 24 hr  capsule Take 30 mg by mouth 2 times daily      naloxone (NARCAN) 4 MG/0.1ML nasal spray Spray 1 spray (4 mg) into one nostril alternating nostrils once as needed for opioid reversal every 2-3 minutes until assistance arrives 0.2 mL 0    Nutren 1.0 Fiber PO LIQD Per G tube; 8 oz Per G Tube qid 3000 mL 11    ondansetron (ZOFRAN-ODT) 4 MG ODT tab Take 1 tablet (4 mg) by mouth every 8 hours as needed for nausea 30 tablet 1    order for DME Equipment being ordered: 16 fr 5cc balloon indwelling catheter with drainage bag. 2 catheters/month 2 catheter 11    order for DME Equipment being ordered: Vibracare Percussor 1 Units 0    polyethylene glycol (MIRALAX) 17 GM/Dose powder Take 17 g by mouth daily. 714 g 5    Port Access Kit For nurse use only.  Do not remove items from bag.  Use for port access.  Do not place syringe on sterile field. [id number] kit 0    potassium chloride (KAYCIEL) 20 MEQ/15ML (10%) solution Place 22.5 mLs (30 mEq) into G tube daily. 675 mL 11    prochlorperazine (COMPAZINE) 5 MG tablet Take 5 mg by mouth every 6 hours as needed for nausea or vomiting      rivaroxaban ANTICOAGULANT (XARELTO ANTICOAGULANT) 10 MG TABS tablet Take 1 tablet (10 mg) by mouth or Feeding Tube daily with food. 90 tablet 3    Salicylic Acid (OXY BALANCE FACIAL CLEAN WASH EX) Externally apply 1 pad. topically daily.      Sennosides (SENNA) 8.8 MG/5ML LIQD 15 mLs by Feeding route 2 times daily 900 mL 11    sertraline (ZOLOFT) 20 MG/ML (HIGH CONC) solution TAKE 2.5 ML(50 MG) BY MOUTH DAILYTAKE 2.5 ML(50 MG) BY MOUTH DAILY 225 mL 0    silver nitrate (ARZOL) 75-25 % miscellaneous Use one stick apply tip to granuloma prn granuloma formation (Patient taking differently: Use one stick apply tip to granuloma prn granuloma formation  Uses around the trach stoma) 20 applicator 1    silver sulfADIAZINE (SSD) 1 % external cream Apply topically 2 times daily. To affected areas-apply bid to wounds till healed 400 g 11    simethicone 40 MG/0.6ML  LIQD Place 40 mg into NG or OG tube as needed (PRN GAS).      sodium chloride, PF, 0.9% PF flush Inject 10 mLs into the vein as needed for line flush. Flush IV before and after med administration as directed and/or at least every 24 hours, or prior to deaccessing for no further use and/or at least every 4 weeks when not accessed. 914582 mL 0    traZODone (DESYREL) 5 mg/ml SUSP Take 15 ml (75 mg) po at bedtime Prn insomnia 1350 mL 3    traZODone (DESYREL) 50 MG tablet Take 1.5 tablets (75 mg) by mouth nightly as needed for sleep. 135 tablet 1    tretinoin (RETIN-A) 0.025 % external cream Apply a pea-sized amount evenly over the face at nighttime before bed. (Patient taking differently: at bedtime. Apply a pea-sized amount evenly over the face at nighttime before bed.  NOT CURRENTLY USING) 45 g 2     No current facility-administered medications for this visit.     Allergies   Allergen Reactions    Ibuprofen      Family History   Problem Relation Age of Onset    Hypertension Mother     Breast Cancer Mother         No longer has it    Hypertension Father     Hypertension Brother     Hypertension Brother     Family History Negative Other     Cerebrovascular Disease Other     Cerebrovascular Disease Maternal Aunt     Deep Vein Thrombosis (DVT) Maternal Aunt     Deep Vein Thrombosis (DVT) Maternal Uncle     Anesthesia Reaction No family hx of      Social History     Socioeconomic History    Marital status: Single     Spouse name: Not on file    Number of children: Not on file    Years of education: Not on file    Highest education level: Not on file   Occupational History    Not on file   Tobacco Use    Smoking status: Never    Smokeless tobacco: Never   Vaping Use    Vaping status: Never Used   Substance and Sexual Activity    Alcohol use: Never    Drug use: Never    Sexual activity: Never   Other Topics Concern    Parent/sibling w/ CABG, MI or angioplasty before 65F 55M? No   Social History Narrative    Not on file      Social Drivers of Health     Financial Resource Strain: Low Risk  (1/6/2025)    Financial Resource Strain     Within the past 12 months, have you or your family members you live with been unable to get utilities (heat, electricity) when it was really needed?: No   Food Insecurity: Low Risk  (1/6/2025)    Food Insecurity     Within the past 12 months, did you worry that your food would run out before you got money to buy more?: No     Within the past 12 months, did the food you bought just not last and you didn t have money to get more?: No   Transportation Needs: Low Risk  (1/6/2025)    Transportation Needs     Within the past 12 months, has lack of transportation kept you from medical appointments, getting your medicines, non-medical meetings or appointments, work, or from getting things that you need?: No   Physical Activity: Not on file   Stress: Not on file   Social Connections: Not on file   Interpersonal Safety: Patient Declined (12/31/2024)    Received from [facility]    Humiliation, Afraid, Rape, and Kick questionnaire     Fear of Current or Ex-Partner: Patient declined     Emotionally Abused: Patient declined     Physically Abused: Patient declined     Sexually Abused: Patient declined   Housing Stability: Low Risk  (1/6/2025)    Housing Stability     Do you have housing? : Yes     Are you worried about losing your housing?: No           History of Present Illness       Reason for visit:  Left Shoulder Pain (Herniated Disc)    She eats 2-3 servings of fruits and vegetables daily.She consumes 1 sweetened beverage(s) daily.She exercises with enough effort to increase her heart rate 9 or less minutes per day.  She exercises with enough effort to increase her heart rate 3 or less days per week.   She is taking medications regularly.              Objective           Vitals:  No vitals were obtained today due to virtual visit.    Physical Exam   GENERAL: alert and no distress  EYES: Eyes grossly normal to  inspection.  No discharge or erythema, or obvious scleral/conjunctival abnormalities.  RESP: No audible wheeze, cough, or visible cyanosis.    SKIN: Visible skin clear. No significant rash, abnormal pigmentation or lesions.  NEURO: Cranial nerves grossly intact.  Mentation and speech appropriate for age.  PSYCH: Appropriate affect, tone, and pace of words          Video-Visit Details    Type of service:  Video Visit   Video End Time: 3:50 pm  Originating Location (pt. Location): Home    Distant Location (provider location):  On-site  Platform used for Video Visit: Boris  Signed Electronically by: Jimmy Moseley MD

## 2025-04-01 DIAGNOSIS — J96.10 CHRONIC RESPIRATORY FAILURE, UNSPECIFIED WHETHER WITH HYPOXIA OR HYPERCAPNIA (H): ICD-10-CM

## 2025-04-01 DIAGNOSIS — Z79.01 CHRONIC ANTICOAGULATION: ICD-10-CM

## 2025-04-01 DIAGNOSIS — Z86.718 HISTORY OF DEEP VENOUS THROMBOSIS: ICD-10-CM

## 2025-04-01 RX ORDER — RIVAROXABAN 10 MG/1
TABLET, FILM COATED ORAL
Qty: 90 TABLET | Refills: 3 | OUTPATIENT
Start: 2025-04-01

## 2025-04-01 NOTE — TELEPHONE ENCOUNTER
Patient was prescribed 1 years worth of Xarelto in January. I confirmed she has refills on file at the pharmacy. Request denied.

## 2025-04-02 PROCEDURE — A4221 SUPP NON-INSULIN INF CATH/WK: HCPCS | Mod: GY

## 2025-04-09 PROCEDURE — A4221 SUPP NON-INSULIN INF CATH/WK: HCPCS | Mod: GY

## 2025-04-11 ENCOUNTER — HOME INFUSION (OUTPATIENT)
Dept: HOME HEALTH SERVICES | Facility: HOME HEALTH | Age: 35
End: 2025-04-11
Payer: MEDICARE

## 2025-04-14 ENCOUNTER — HOME INFUSION BILLING (OUTPATIENT)
Dept: HOME HEALTH SERVICES | Facility: HOME HEALTH | Age: 35
End: 2025-04-14
Payer: COMMERCIAL

## 2025-04-14 PROCEDURE — A4215 STERILE NEEDLE: HCPCS | Mod: GY

## 2025-04-16 PROCEDURE — S5502 HIT INTERIM CATH CARE: HCPCS | Mod: GY

## 2025-04-16 PROCEDURE — A4221 SUPP NON-INSULIN INF CATH/WK: HCPCS | Mod: GY

## 2025-04-17 PROCEDURE — S5502 HIT INTERIM CATH CARE: HCPCS | Mod: GY

## 2025-04-23 PROCEDURE — A4221 SUPP NON-INSULIN INF CATH/WK: HCPCS | Mod: GY

## 2025-04-30 PROCEDURE — A4221 SUPP NON-INSULIN INF CATH/WK: HCPCS | Mod: GY

## 2025-05-07 PROCEDURE — A4221 SUPP NON-INSULIN INF CATH/WK: HCPCS | Mod: GY

## 2025-05-08 ENCOUNTER — PATIENT OUTREACH (OUTPATIENT)
Dept: CARE COORDINATION | Facility: CLINIC | Age: 35
End: 2025-05-08
Payer: MEDICARE

## 2025-05-08 ENCOUNTER — HOME INFUSION (OUTPATIENT)
Dept: HOME HEALTH SERVICES | Facility: HOME HEALTH | Age: 35
End: 2025-05-08
Payer: MEDICARE

## 2025-05-08 NOTE — LETTER
PRIMARY CARE CARE COORDINATION   [facility]  [address] [place] [address]    May 8, 2025    Lanie Neil  [address] [address]    Dear Lanie,  Your Care Team congratulates you on your journey to maintain wellness. This document will help guide you on your journey to maintain a healthy lifestyle.  You can use this to help you overcome any barriers you may encounter.  If you should have any questions or concerns, you can contact the members of your Care Team or contact your Primary Care Clinic for assistance.     Patient feedback is important to us and helps us continue to improve the way we care for patients as well as celebrate the things we do well. You may receive a short survey from [employer] on behalf of the care coordination team. We would appreciate hearing from you!    Health Maintenance  Health Maintenance Reviewed:      My Access Plan  Medical Emergency 911   Primary Clinic Line [facility] - [phone]   24 Hour Appointment Line [phone] or  [phone]) (toll-free)   24 Hour Nurse Line [phone] (toll-free)   Preferred Urgent Care     Preferred Hospital     Preferred Pharmacy [id number] [address] [facility] [address]     Behavioral Health Crisis Line The National Suicide Prevention Lifeline at 1-792.160.4590 or 911     My Care Team Members  Patient Care Team         Relationship Specialty Notifications Start End    Jimmy Moseley MD PCP - General Family Practice  8/28/19     Referred to Dermatology    Phone: [phone] Fax: [phone]         [address] [facility] [address]    Kirk Bird MD MD Family Medicine - Sports Medicine  3/10/15     Phone: [phone] Fax: [phone]         [address] [address]    Anne Soni MD MD Internal Medicine  3/16/15     Phone: [phone] Fax:  [phone] [address] [facility] [address]  [facility] [address] [facility] [address]    Erica Mejia MD Referring Physician Internal Medicine  11/14/16     Referring to Neurology    Phone: [phone] Fax: [phone]         [facility] [address] [facility] [address]    Robert Dukes MD MD Neurology  11/14/16     Phone: [phone] Fax: [phone]          [facility] [id number] [facility] [address]    Rosa Hernandez MD MD Dermatology  10/11/17     Phone: [phone] Fax: [phone]         [address] [facility] [address] [facility] [address]    Nilo Montaño MD MD Internal Medicine  10/19/18     Phone: [phone] Fax: [phone]         [address] [facility] [address]    Jt Mir PAGlendyC Assigned Cancer Care Provider   3/17/21     Phone: [phone] Fax: [phone]         [facility] [address] [facility] [address]    Héctor Higuera Behavioral Health Clinician   7/15/21     Phone: [phone] Fax: [phone] [address] [facility] [address]    Jimmy Moseley MD Assigned PCP   7/16/21     Phone: [phone] Fax: [phone]         [address] [facility] [address]    Ada Tyler APRN CNP Nurse Practitioner OB/Gyn  1/7/22     Phone: [phone] Fax: [phone]         [address] [facility] [address]    Charito Cabrera PAGlendyC Physician Assistant Dermatology  9/20/23     Phone: [phone] Fax: [phone] [address] [facility] [address]    Lanie Kimball MD Resident Student in [facility] health care education/training program  11/17/23     Phone: [phone] Fax: [phone]         [facility] [address] [facility] [address]    Terence Bryson MD Assigned Surgical Provider   1/25/24     Phone: [phone] Fax: [phone] [address] [facility] [address]    Lurdes Ivey MD Resident OB/Gyn  7/8/24     Phone: [phone] Fax: [phone]         [address] [place] [address]  [facility] [address]    Anne Soni MD Assigned Pulmonology Provider   7/23/24     Phone: [phone] Fax: [phone]         [address] [facility] [id number] [facility] [address]    Lucia Garrett MD MD OB/Gyn  8/16/24     Phone: [phone] Fax: [phone]         [address] [facility] [address] [facility] [address] [facility] [address]    Barbara Lewis MD MD Gynecologic Oncology  9/5/24     Phone: [phone] Fax: [phone]         [address]  [facility] [address] [facility] [address]    Siri Naranjo, RN Lead Care Coordinator Primary Care - CC Admissions 9/16/24 5/8/25    Peter Bravo LICSW Assigned Behavioral Health Provider   1/23/25     Phone: [phone] Fax: [phone]         [address] [facility] [address]    Jimmy Moseley MD Home Infusion Following Provider Family Medicine  2/18/25     Phone: [phone] Fax: [phone]         [address] [facility] [address]              Advance Care Plans/Directives Type:      We notice that you do not have an Advance Directive on file. Upon completion of your Health Care Directive, please bring a copy with you to your next office visit.    It has been your Clinic Care Team's pleasure to work with you on accomplishing your goals.    Regards,  Your Clinic Care Team

## 2025-05-08 NOTE — PROGRESS NOTES
Clinic Care Coordination Contact    Situation: Patient chart reviewed by care coordinator.    Background: Patient enrolled in Care Coordination.    Assessment: [employer] message sent to patient for follow up.     Plan/Recommendations: RN will await pt's reply for further outreach.    NAVI HAMILTON RN on 5/8/2025 at 12:31 PM    Pt read [employer] and didn't reply. RN will assume no new needs, still enrolled with [facility]. RN will graduate pt at this time.     NAVI HAMILTON RN on 5/8/2025 at 2:35 PM

## 2025-05-12 ENCOUNTER — HOME INFUSION BILLING (OUTPATIENT)
Dept: HOME HEALTH SERVICES | Facility: HOME HEALTH | Age: 35
End: 2025-05-12
Payer: COMMERCIAL

## 2025-05-12 PROCEDURE — A4215 STERILE NEEDLE: HCPCS | Mod: GY

## 2025-05-13 ENCOUNTER — VIRTUAL VISIT (OUTPATIENT)
Dept: INTERNAL MEDICINE | Facility: CLINIC | Age: 35
End: 2025-05-13
Payer: MEDICARE

## 2025-05-13 DIAGNOSIS — G12.9 SPINAL MUSCLE ATROPHY (H): ICD-10-CM

## 2025-05-13 DIAGNOSIS — Z98.890 GRANULATION TISSUE OF SITE OF TRACHEOSTOMY: ICD-10-CM

## 2025-05-13 DIAGNOSIS — L92.9 GRANULATION TISSUE OF SITE OF TRACHEOSTOMY: ICD-10-CM

## 2025-05-13 DIAGNOSIS — Z93.0 TRACHEOSTOMY DEPENDENT (H): Primary | ICD-10-CM

## 2025-05-13 PROCEDURE — 98005 SYNCH AUDIO-VIDEO EST LOW 20: CPT | Performed by: NURSE PRACTITIONER

## 2025-05-13 RX ORDER — MUPIROCIN 20 MG/G
OINTMENT TOPICAL 2 TIMES DAILY
Qty: 30 G | Refills: 0 | Status: SHIPPED | OUTPATIENT
Start: 2025-05-13

## 2025-05-13 NOTE — PROGRESS NOTES
Lanie is a 34 year old who is being evaluated via a billable video visit.    How would you like to obtain your AVS? MyChart  If the video visit is dropped, the invitation should be resent by: Text to cell phone: [phone]  Will anyone else be joining your video visit? No      Are refills needed on medications prescribed by this physician?  YES Potassium    Assessment & Plan     Tracheostomy dependent (H)  Spinal muscle atrophy (H)  Granulation tissue of site of tracheostomy  Endorsed increased odor of trach site, possible discharge, but otherwise denies pain of trach site, bleeding, increased redness, or systemic illness.  Can apply mupirocin to site BID, continue with good trach site care, monitor for fever, new erythema, pain, or increased drainage--would prescribe oral antibiotics if this were to occur.  Referral provided to ENT in the event of persistent symptoms or tissue breakdown.  She agrees with the plan.  - Adult ENT  Referral; Future  - mupirocin (BACTROBAN) 2 % external ointment; Apply topically 2 times daily. To tracheostomy site for 1 week      Follow-up  Return if symptoms worsen or fail to improve.    Subjective   Lanie is a 34 year old, presenting for the following health issues:  RECHECK and Infection      Video Start Time: 11:36 AM    History of Present Illness       Reason for visit:  Potential Trach Infection  Symptoms include:  Pus Inside Stoma & Smell  Symptom progression:  Staying the same  Had these symptoms before:  No  What makes it worse:  No  What makes it better:  Antibiotics   She is taking medications regularly.        Concerned about trach site infection--a small amount of pus and odor, right at base of tracheostomy site.  No increased redness or pain.     No other symptoms.    Breathing is fine, no cough, fever or flu.    Last saw ENT in 2022, had granulation tissue at tracheal stoma site cauterized using silver nitrate and was given ciprodex drops to stoma.  She  reports this has been otherwise stable since that time.    Review of Systems  Constitutional, HEENT, cardiovascular, pulmonary, gi and gu systems are negative, except as otherwise noted.      Objective           Vitals:  No vitals were obtained today due to virtual visit.    Physical Exam   GENERAL: alert and no distress  EYES: Eyes grossly normal to inspection.  No discharge or erythema, or obvious scleral/conjunctival abnormalities.  RESP: No audible wheeze, cough, or visible cyanosis.  Tracheostomy site covered with clean dressing, ventilator-dependent  SKIN: Visible skin clear. No significant rash, abnormal pigmentation or lesions.  MSK/NEURO:  Mentation and speech appropriate for age. Lying in bed  PSYCH: Appropriate affect, tone, and pace of words          Video-Visit Details    Type of service:  Video Visit   Video End Time:11:43 AM  Originating Location (pt. Location): Home    Distant Location (provider location):  Off-site  Platform used for Video Visit: Boris  Signed Electronically by: RIDGE Engle CNP

## 2025-05-14 ENCOUNTER — PATIENT OUTREACH (OUTPATIENT)
Dept: CARE COORDINATION | Facility: CLINIC | Age: 35
End: 2025-05-14
Payer: MEDICARE

## 2025-05-14 PROCEDURE — A4221 SUPP NON-INSULIN INF CATH/WK: HCPCS | Mod: GY

## 2025-05-17 ENCOUNTER — HEALTH MAINTENANCE LETTER (OUTPATIENT)
Age: 35
End: 2025-05-17

## 2025-05-21 PROCEDURE — A4221 SUPP NON-INSULIN INF CATH/WK: HCPCS | Mod: GY

## 2025-05-28 PROCEDURE — A4221 SUPP NON-INSULIN INF CATH/WK: HCPCS | Mod: GY

## 2025-06-04 ENCOUNTER — HOME INFUSION (OUTPATIENT)
Dept: HOME HEALTH SERVICES | Facility: HOME HEALTH | Age: 35
End: 2025-06-04
Payer: MEDICARE

## 2025-06-04 ENCOUNTER — HOME INFUSION BILLING (OUTPATIENT)
Dept: HOME HEALTH SERVICES | Facility: HOME HEALTH | Age: 35
End: 2025-06-04
Payer: MEDICARE

## 2025-06-04 PROCEDURE — A4221 SUPP NON-INSULIN INF CATH/WK: HCPCS | Mod: GY

## 2025-06-04 PROCEDURE — A4215 STERILE NEEDLE: HCPCS | Mod: GY

## 2025-06-04 NOTE — PROGRESS NOTES
[facility]  Progress Note      Patient is on service with [facility] for port care supplies, Pharmacy Only.    Received a call from Mom, Aster, requesting an [facility] nurse visit to observe/educate Mom accessing and flushing Lanie's port.   Mom has been educated, but feels that she needs one more nurse visit.  After that visit, Mom feels should be okay to do monthly port accessing and flushing independently.     Message to [facility] Nurse  for an SNV  the week of 6/16/25.    Effie Oakes, RN, CRNI  Resource RN Team  [facility]  [phone]

## 2025-06-11 PROCEDURE — A4221 SUPP NON-INSULIN INF CATH/WK: HCPCS | Mod: GY

## 2025-06-11 PROCEDURE — S5502 HIT INTERIM CATH CARE: HCPCS | Mod: GY

## 2025-06-12 PROCEDURE — S5502 HIT INTERIM CATH CARE: HCPCS | Mod: GY

## 2025-06-17 ENCOUNTER — HOME CARE VISIT (OUTPATIENT)
Dept: HOME HEALTH SERVICES | Facility: HOME HEALTH | Age: 35
End: 2025-06-17
Payer: MEDICARE

## 2025-06-17 DIAGNOSIS — Z79.01 CHRONIC ANTICOAGULATION: ICD-10-CM

## 2025-06-17 DIAGNOSIS — Z86.718 HISTORY OF DEEP VENOUS THROMBOSIS: ICD-10-CM

## 2025-06-17 DIAGNOSIS — N94.6 PAINFUL MENSTRUAL PERIODS: ICD-10-CM

## 2025-06-17 PROCEDURE — 99601 HOME NFS VISIT <2 HRS: CPT

## 2025-06-17 RX ORDER — CELECOXIB 200 MG/1
200 CAPSULE ORAL DAILY
Qty: 30 CAPSULE | Refills: 1 | Status: SHIPPED | OUTPATIENT
Start: 2025-06-17

## 2025-06-17 NOTE — TELEPHONE ENCOUNTER
Last evaluated in January of 2025. She uses celebrex for her dysmenorrhea. Prescription refill sent.     Christina Lockwood  MSN, RN, PHN  [facility]  Office: [phone]  Fax: [phone]      
verbal cues/nonverbal cues (demo/gestures)/1 person assist

## 2025-06-18 PROCEDURE — A4221 SUPP NON-INSULIN INF CATH/WK: HCPCS | Mod: GY

## 2025-06-18 NOTE — PROGRESS NOTES
Nursing Visit Note:  Nurse visit today for observe PAC for Lanie Neil.     present during visit today: Not Applicable.    Intravenous Access:  Implanted Port.      Note: Patient in bed at time of arrival, pleasant. Mom initially did not want to attempt accessing patient and wanted to watch RN access. More concerned with how to de-access port. Patient noted to have a very deep port, scar tissue surrounding, quite difficult to palpate. Author attempted access x 2 without it blood return. Attempts using shorter needle (3/4 ) and was not long enough to penetrate port. Mom then tried using 1.25  needle. Successful on first attempt, blood return noted. Advised her how to flush and she-access. Vitals deferred. Will place order for replacement PAC kits and needles as we went through a few today. No further needs at time of departure.    Saline administered: 20mL (ml)    Supply Check:   Does the patient have all the supplies they need for the next visit?  No, Order placed for PAC kits and 22g 1.25  needles    Next visit plan: PO patient, mom to do PAC s.    Lindsey L Behrens, RN 6/18/2025

## 2025-06-25 DIAGNOSIS — G12.9 SPINAL MUSCLE ATROPHY (H): ICD-10-CM

## 2025-06-25 PROCEDURE — A4221 SUPP NON-INSULIN INF CATH/WK: HCPCS | Mod: GY

## 2025-06-25 RX ORDER — DIAZEPAM 5 MG/1
5 TABLET ORAL EVERY 6 HOURS PRN
Qty: 45 TABLET | Refills: 5 | Status: SHIPPED | OUTPATIENT
Start: 2025-06-25

## 2025-06-25 NOTE — TELEPHONE ENCOUNTER
Last Visit Date: 3/26/2025 [facility]  Future Visit Date: none  ----------------------  Medication Requested: diazepam (VALIUM) 5 MG tablet   Last Written Prescription: 1/8/2025 Disp:45 R:3  ---------------------  [x]  Refill decision: Medication unable to be refilled by RN due to:     []    Pt not seen within past 12 months;  No FOV;  or FOV exceeds timeframe per protocol requirements  []    Compliance - lapse in therapy/gap in refills; No Shows; Cancellations  []    Verification - order discrepancy, clarification needed, Sig modification needed  [x]    Controlled medication  []    Medication not included in refill protocol policy  []    Abnormal labs/test:  []    Overdue labs/test:    []    Medication not active on Pt's med list  []    Drug interaction Warning  []    Medication - Last script is Reported/Historical/Transitional  []    Advanced refill request  []    Review Needed: New med; Med adjusted within <= 30 days; Safety Alert; Lab monitoring required  []    Other:     Request from pharmacy:  Requested Prescriptions   Pending Prescriptions Disp Refills    diazepam (VALIUM) 5 MG tablet [Pharmacy Med Name: DIAZEPAM 5MG TABLETS] 45 tablet 5     Sig: Take 1 tablet (5 mg) by mouth every 6 hours as needed for muscle spasms.       Rx Protocol Controlled Substance Failed - 6/25/2025 12:21 PM        Failed - Visit with relevant provider in past 3 months or upcoming 3 months (provided they have been seen in the last 6 months)        Failed - Medication is active on med list and the sig matches        Failed - Urine drug screeen results on file in past 12 months     [unfilled]           Failed - Controlled Substance Agreement on file in last 12 months     Please review last Controlled Substance Pain agreement document.   CSA -- Encounter Level:    CSA: None found at the encounter level.       CSA -- Patient Level:    CSA: None found at the patient level.               Failed -  Auto Fail - Please forward to Provider        Failed - No Opioids on active med list        Passed - Medication not refilled in past 28 days     Invalid Medication Grouper          Passed - No active pregnancy on record        Passed - No pregnancy test in past 12 months or most recent test was negative

## 2025-06-25 NOTE — TELEPHONE ENCOUNTER
Controlled substance refill request notes    Refill request received for: Diazepam 5 Mg Tablet  [place]  data reviewed 06/25/25:  Medication last refill: qty 45, 12 day supply, filled/sold to patient on 05/28/2025  Pended order: Diazepam 5 Mg Tablet, qty 45, 12 day supply, no delay in fill date     Primary care provider: Jimmy Moseley  Last office visit with this department: 02/12/2020  Last virtual visit with this department: 3/26/2025  Next appointment with PCP: none    Refill request forwarded to provider for review.     Treasure MELVIN LPN  [facility]

## 2025-07-02 ENCOUNTER — MYC MEDICAL ADVICE (OUTPATIENT)
Dept: FAMILY MEDICINE | Facility: CLINIC | Age: 35
End: 2025-07-02
Payer: MEDICARE

## 2025-07-02 DIAGNOSIS — G62.9 NEUROPATHY: ICD-10-CM

## 2025-07-02 RX ORDER — GABAPENTIN 250 MG/5ML
SOLUTION ORAL
Qty: 1800 ML | Refills: 11 | Status: SHIPPED | OUTPATIENT
Start: 2025-07-02

## 2025-07-02 NOTE — TELEPHONE ENCOUNTER
Gabapentin 250 mg/5mL solution vis feeding tube TID        Last Written Prescription Date:  01/08/25  Last Fill Quantity: 1800 mL,   # refills: 5  Last Office Visit : 03/26/25 Virtual  Future Office visit:  None    Routing refill request to provider for review/approval because:  Drug not on the [facility], [facility] or [facility] refill protocol or controlled substance

## 2025-07-05 DIAGNOSIS — K21.9 GASTROESOPHAGEAL REFLUX DISEASE WITHOUT ESOPHAGITIS: ICD-10-CM

## 2025-07-07 ENCOUNTER — MYC MEDICAL ADVICE (OUTPATIENT)
Dept: FAMILY MEDICINE | Facility: CLINIC | Age: 35
End: 2025-07-07
Payer: MEDICARE

## 2025-07-07 DIAGNOSIS — K21.9 GASTROESOPHAGEAL REFLUX DISEASE WITHOUT ESOPHAGITIS: ICD-10-CM

## 2025-07-07 RX ORDER — FAMOTIDINE 20 MG/1
20 TABLET, FILM COATED ORAL 2 TIMES DAILY
Qty: 180 TABLET | Refills: 2 | Status: SHIPPED | OUTPATIENT
Start: 2025-07-07

## 2025-07-07 NOTE — TELEPHONE ENCOUNTER
Last Written Prescription:  famotidine (PEPCID) 20 MG tablet 180 tablet 3 6/4/2024 -- No   Sig - Route: Take 1 tablet (20 mg) by mouth 2 times daily - Oral     ----------------------  Last Visit Date: 5-13-25  Future Visit Date: none  ----------------------      Refill decision:   [x] Medication refilled per  Medication Refill in Ambulatory Care  policy.      Request from pharmacy:  Requested Prescriptions   Pending Prescriptions Disp Refills    famotidine (PEPCID) 20 MG tablet 180 tablet 3     Sig: Take 1 tablet (20 mg) by mouth 2 times daily.       H2 Blockers Protocol Passed - 7/7/2025  3:25 PM        Passed - Patient is age 12 or older        Passed - Medication is active on med list and the sig matches. RN to manually verify dose and sig if red X/fail.     If the protocol passes (green check), you do not need to verify med dose and sig.    A prescription matches if they are the same clinical intention.    For Example: once daily and every morning are the same.    The protocol can not identify upper and lower case letters as matching and will fail.     For Example: Take 1 tablet (50 mg) by mouth daily     TAKE 1 TABLET (50 MG) BY MOUTH DAILY    For all fails (red x), verify dose and sig.    If the refill does match what is on file, the RN can still proceed to approve the refill request.       If they do not match, route to the appropriate provider.             Passed - Medication indicated for associated diagnosis     Medication is associated with one or more of the following diagnoses:  Erosive esophagitis - Gastric hypersecretion   Gastric ulcer   Gastroesophageal reflux disease   Indigestion   Ulcer of duodenum   Esophagitis   Gastritis   Gastrointestinal hemorrhage   Stress ulcer; Prophylaxis   Helicobacter pylori gastrointestinal tract infection - Ulcer of duodenum  Zollinger-Malone syndrome  Cystic Fibrosis  Bronchiectasis            Passed - Recent (12 month) or future (90 days) visit with authorizing  provider's specialty (provided they have been seen in the past 15 months)     The patient must have completed an in-person or virtual visit within the past 12 months or has a future visit scheduled within the next 90 days with the authorizing provider s specialty.  Urgent care and e-visits do not qualify as an office visit for this protocol.

## 2025-07-10 ENCOUNTER — HOME INFUSION BILLING (OUTPATIENT)
Dept: HOME HEALTH SERVICES | Facility: HOME HEALTH | Age: 35
End: 2025-07-10
Payer: MEDICARE

## 2025-07-14 ENCOUNTER — MYC MEDICAL ADVICE (OUTPATIENT)
Dept: FAMILY MEDICINE | Facility: CLINIC | Age: 35
End: 2025-07-14
Payer: MEDICARE

## 2025-07-14 DIAGNOSIS — J31.0 RHINITIS: Primary | ICD-10-CM

## 2025-07-15 RX ORDER — FLUTICASONE PROPIONATE 50 MCG
1 SPRAY, SUSPENSION (ML) NASAL DAILY
Qty: 9.9 ML | Refills: 11 | Status: SHIPPED | OUTPATIENT
Start: 2025-07-15

## 2025-07-15 NOTE — TELEPHONE ENCOUNTER
If she can do nose spray (her mom could help), ok flonase one puff each nostril every day (we can increase dose if needed later), ok one bottle eleven refills re: Rhinitis

## 2025-07-18 ENCOUNTER — TELEPHONE (OUTPATIENT)
Dept: FAMILY MEDICINE | Facility: CLINIC | Age: 35
End: 2025-07-18
Payer: MEDICARE

## 2025-07-18 NOTE — TELEPHONE ENCOUNTER
Prior Authorization Retail Medication Request    Medication/Dose: diazepam (VALIUM) 5 MG tablet  Diagnosis and ICD code (if different than what is on RX):    New/renewal/insurance change PA/secondary ins. PA:  Previously Tried and Failed:    Rationale:      Insurance   Primary:   Insurance ID:  [employer]OMO [9] - MEDICARE [950]  Coverage ID:[id number]  Subscriber:  Lanie Neil  Subscriber ID:[id number]  Relationship:Self  Member:Laniepenny Chunira  Member ID:[id number]  LOB:None  Plan year:  1/1/2025 - [place]  Effective dates:  6/1/2022 - [place]      Secondary (if applicable):  Insurance ID:  [employer]    Pharmacy Information (if different than what is on RX)  Name:    Phone:    Fax:    Clinic Information  Preferred routing pool for dept communication:          Media Information        Document Information    Other: Insurance Card   [employer]   Attached on: 06/09/2018 1:16 PM   Received on: 06/09/2018 1:16 PM   Source Information    [facility]   Document History

## 2025-07-23 NOTE — TELEPHONE ENCOUNTER
Central Prior Authorization Team  Phone: [phone]    PA Initiation    Medication: DIAZEPAM 5 MG PO TABS  Insurance Company: [employer] Part D - Phone [phone] Fax [phone]  Pharmacy Filling the Rx: [id number] - [place], [place] - [address] AT [facility] [address]  Filling Pharmacy Phone: [phone]  Filling Pharmacy Fax: [phone]  Start Date: 7/23/2025

## 2025-07-23 NOTE — TELEPHONE ENCOUNTER
Prior Authorization Approval    Medication: DIAZEPAM 5 MG PO TABS  Authorization Effective Date: 7/23/2025  Authorization Expiration Date: 12/31/2025  Approved Dose/Quantity:   Reference #:     Insurance Company: [employer] Part D - Phone [phone] Fax [phone]  Expected CoPay: $    CoPay Card Available:      Financial Assistance Needed:   Which Pharmacy is filling the prescription: [id number] - [place], [place] - [address] AT [facility] [address] [facility] [address]  Pharmacy Notified: Yes  Patient Notified: **Instructed pharmacy to notify patient when script is ready to /ship.**

## 2025-07-30 ENCOUNTER — MYC MEDICAL ADVICE (OUTPATIENT)
Dept: FAMILY MEDICINE | Facility: CLINIC | Age: 35
End: 2025-07-30
Payer: MEDICARE

## 2025-08-05 ENCOUNTER — VIRTUAL VISIT (OUTPATIENT)
Dept: FAMILY MEDICINE | Facility: CLINIC | Age: 35
End: 2025-08-05
Payer: MEDICARE

## 2025-08-05 DIAGNOSIS — J31.0 CHRONIC RHINITIS: Primary | ICD-10-CM

## 2025-08-05 PROCEDURE — 98005 SYNCH AUDIO-VIDEO EST LOW 20: CPT | Performed by: FAMILY MEDICINE

## 2025-08-05 PROCEDURE — G2211 COMPLEX E/M VISIT ADD ON: HCPCS | Performed by: FAMILY MEDICINE

## 2025-08-05 PROCEDURE — 1125F AMNT PAIN NOTED PAIN PRSNT: CPT | Performed by: FAMILY MEDICINE

## 2025-08-05 RX ORDER — IPRATROPIUM BROMIDE 21 UG/1
SPRAY, METERED NASAL
Qty: 30 ML | Refills: 1 | Status: SHIPPED | OUTPATIENT
Start: 2025-08-05

## 2025-08-05 ASSESSMENT — ASTHMA QUESTIONNAIRES
QUESTION_5 LAST FOUR WEEKS HOW WOULD YOU RATE YOUR ASTHMA CONTROL: COMPLETELY CONTROLLED
QUESTION_2 LAST FOUR WEEKS HOW OFTEN HAVE YOU HAD SHORTNESS OF BREATH: NOT AT ALL
QUESTION_1 LAST FOUR WEEKS HOW MUCH OF THE TIME DID YOUR ASTHMA KEEP YOU FROM GETTING AS MUCH DONE AT WORK, SCHOOL OR AT HOME: NONE OF THE TIME
EMERGENCY_ROOM_LAST_YEAR_TOTAL: ONE
ACT_TOTALSCORE: 25
QUESTION_3 LAST FOUR WEEKS HOW OFTEN DID YOUR ASTHMA SYMPTOMS (WHEEZING, COUGHING, SHORTNESS OF BREATH, CHEST TIGHTNESS OR PAIN) WAKE YOU UP AT NIGHT OR EARLIER THAN USUAL IN THE MORNING: NOT AT ALL
HOSPITALIZATION_OVERNIGHT_LAST_YEAR_TOTAL: ONE
QUESTION_4 LAST FOUR WEEKS HOW OFTEN HAVE YOU USED YOUR RESCUE INHALER OR NEBULIZER MEDICATION (SUCH AS ALBUTEROL): NOT AT ALL

## 2025-09-03 ENCOUNTER — HOME INFUSION BILLING (OUTPATIENT)
Dept: HOME HEALTH SERVICES | Facility: HOME HEALTH | Age: 35
End: 2025-09-03
Payer: MEDICARE

## 2025-09-03 ENCOUNTER — DOCUMENTATION ONLY (OUTPATIENT)
Dept: FAMILY MEDICINE | Facility: CLINIC | Age: 35
End: 2025-09-03
Payer: MEDICARE

## 2025-09-04 ENCOUNTER — DOCUMENTATION ONLY (OUTPATIENT)
Dept: FAMILY MEDICINE | Facility: CLINIC | Age: 35
End: 2025-09-04
Payer: MEDICARE

## (undated) DEVICE — LINEN GOWN X4 5410

## (undated) DEVICE — DILATOR PROBE UTERINE OS CANAL FINDER 260-610

## (undated) DEVICE — SPECIMEN CONTAINER 5OZ STERILE 2600SA

## (undated) DEVICE — SYR 10ML FINGER CONTROL W/O NDL 309695

## (undated) DEVICE — GLOVE BIOGEL PI MICRO SZ 7.0 48570

## (undated) DEVICE — SOL NACL 0.9% IRRIG 1000ML BOTTLE 2F7124

## (undated) DEVICE — LIGHT HANDLE X2

## (undated) DEVICE — NDL SPINAL 22GA 3.5" QUINCKE 405181

## (undated) DEVICE — DRAPE UNDER BUTTOCK 8483

## (undated) DEVICE — STRAP KNEE/BODY 31143004

## (undated) DEVICE — GOWN XLG DISP 9545

## (undated) DEVICE — LINEN TOWEL PACK X5 5464

## (undated) DEVICE — PREP DYNA-HEX 4% CHG SCRUB 4OZ BOTTLE MDS098710

## (undated) DEVICE — LINEN LEG DRAPE 5457

## (undated) DEVICE — GLOVE BIOGEL PI MICRO INDICATOR UNDERGLOVE SZ 7.5 48975

## (undated) DEVICE — LINEN DRAPE 54X72" 5467

## (undated) DEVICE — DRAPE MAYO STAND 23X54 8337

## (undated) DEVICE — SOL WATER IRRIG 1000ML BOTTLE 2F7114

## (undated) DEVICE — PAD CHUX UNDERPAD 30X36" P3036C

## (undated) RX ORDER — LIDOCAINE HYDROCHLORIDE 10 MG/ML
INJECTION, SOLUTION EPIDURAL; INFILTRATION; INTRACAUDAL; PERINEURAL
Status: DISPENSED
Start: 2024-01-24

## (undated) RX ORDER — LIDOCAINE HYDROCHLORIDE 10 MG/ML
INJECTION, SOLUTION EPIDURAL; INFILTRATION; INTRACAUDAL; PERINEURAL
Status: DISPENSED
Start: 2024-09-03

## (undated) RX ORDER — PROPOFOL 10 MG/ML
INJECTION, EMULSION INTRAVENOUS
Status: DISPENSED
Start: 2024-09-03

## (undated) RX ORDER — FENTANYL CITRATE 50 UG/ML
INJECTION, SOLUTION INTRAMUSCULAR; INTRAVENOUS
Status: DISPENSED
Start: 2024-01-24

## (undated) RX ORDER — FENTANYL CITRATE-0.9 % NACL/PF 10 MCG/ML
PLASTIC BAG, INJECTION (ML) INTRAVENOUS
Status: DISPENSED
Start: 2024-09-03

## (undated) RX ORDER — FENTANYL CITRATE 50 UG/ML
INJECTION, SOLUTION INTRAMUSCULAR; INTRAVENOUS
Status: DISPENSED
Start: 2024-09-03

## (undated) RX ORDER — HEPARIN SODIUM (PORCINE) LOCK FLUSH IV SOLN 100 UNIT/ML 100 UNIT/ML
SOLUTION INTRAVENOUS
Status: DISPENSED
Start: 2024-09-03

## (undated) RX ORDER — GLYCOPYRROLATE 0.2 MG/ML
INJECTION, SOLUTION INTRAMUSCULAR; INTRAVENOUS
Status: DISPENSED
Start: 2024-09-03

## (undated) RX ORDER — ACETAMINOPHEN 325 MG/1
TABLET ORAL
Status: DISPENSED
Start: 2024-09-03

## (undated) RX ORDER — HEPARIN SODIUM (PORCINE) LOCK FLUSH IV SOLN 100 UNIT/ML 100 UNIT/ML
SOLUTION INTRAVENOUS
Status: DISPENSED
Start: 2024-03-29

## (undated) RX ORDER — ATROPINE SULFATE 0.4 MG/ML
AMPUL (ML) INJECTION
Status: DISPENSED
Start: 2024-09-03

## (undated) RX ORDER — HYDROMORPHONE HYDROCHLORIDE 1 MG/ML
INJECTION, SOLUTION INTRAMUSCULAR; INTRAVENOUS; SUBCUTANEOUS
Status: DISPENSED
Start: 2024-01-24

## (undated) RX ORDER — HEPARIN SODIUM (PORCINE) LOCK FLUSH IV SOLN 100 UNIT/ML 100 UNIT/ML
SOLUTION INTRAVENOUS
Status: DISPENSED
Start: 2023-10-23

## (undated) RX ORDER — HEPARIN SODIUM 200 [USP'U]/100ML
INJECTION, SOLUTION INTRAVENOUS
Status: DISPENSED
Start: 2024-01-24

## (undated) RX ORDER — DEXAMETHASONE SODIUM PHOSPHATE 4 MG/ML
INJECTION, SOLUTION INTRA-ARTICULAR; INTRALESIONAL; INTRAMUSCULAR; INTRAVENOUS; SOFT TISSUE
Status: DISPENSED
Start: 2024-09-03

## (undated) RX ORDER — HYDROMORPHONE HCL IN WATER/PF 6 MG/30 ML
PATIENT CONTROLLED ANALGESIA SYRINGE INTRAVENOUS
Status: DISPENSED
Start: 2024-01-24

## (undated) RX ORDER — LIDOCAINE HYDROCHLORIDE 20 MG/ML
JELLY TOPICAL
Status: DISPENSED
Start: 2024-01-24

## (undated) RX ORDER — ONDANSETRON 2 MG/ML
INJECTION INTRAMUSCULAR; INTRAVENOUS
Status: DISPENSED
Start: 2024-09-03

## (undated) RX ORDER — LIDOCAINE 40 MG/G
CREAM TOPICAL
Status: DISPENSED
Start: 2024-09-03

## (undated) RX ORDER — HEPARIN SODIUM 1000 [USP'U]/ML
INJECTION, SOLUTION INTRAVENOUS; SUBCUTANEOUS
Status: DISPENSED
Start: 2024-01-24

## (undated) RX ORDER — HYDROMORPHONE HYDROCHLORIDE 2 MG/1
TABLET ORAL
Status: DISPENSED
Start: 2024-01-24